# Patient Record
Sex: FEMALE | Race: WHITE | NOT HISPANIC OR LATINO | Employment: OTHER | URBAN - METROPOLITAN AREA
[De-identification: names, ages, dates, MRNs, and addresses within clinical notes are randomized per-mention and may not be internally consistent; named-entity substitution may affect disease eponyms.]

---

## 2017-04-04 ENCOUNTER — LAB CONVERSION - ENCOUNTER (OUTPATIENT)
Dept: OTHER | Facility: OTHER | Age: 72
End: 2017-04-04

## 2017-04-04 ENCOUNTER — APPOINTMENT (OUTPATIENT)
Dept: LAB | Facility: CLINIC | Age: 72
End: 2017-04-04
Payer: MEDICARE

## 2017-04-04 DIAGNOSIS — I81 PORTAL VEIN THROMBOSIS: ICD-10-CM

## 2017-04-04 LAB
INR PPP: 2.12 (ref 0.86–1.16)
PROTHROMBIN TIME: 23 SECONDS (ref 12–14.3)

## 2017-04-04 PROCEDURE — 85610 PROTHROMBIN TIME: CPT

## 2017-04-04 PROCEDURE — 36415 COLL VENOUS BLD VENIPUNCTURE: CPT

## 2017-04-17 ENCOUNTER — ALLSCRIPTS OFFICE VISIT (OUTPATIENT)
Dept: OTHER | Facility: OTHER | Age: 72
End: 2017-04-17

## 2017-04-17 ENCOUNTER — APPOINTMENT (OUTPATIENT)
Dept: LAB | Facility: CLINIC | Age: 72
End: 2017-04-17
Payer: MEDICARE

## 2017-04-17 ENCOUNTER — GENERIC CONVERSION - ENCOUNTER (OUTPATIENT)
Dept: OTHER | Facility: OTHER | Age: 72
End: 2017-04-17

## 2017-04-17 DIAGNOSIS — E87.1 HYPO-OSMOLALITY AND HYPONATREMIA: ICD-10-CM

## 2017-04-17 DIAGNOSIS — M85.80 OTHER SPECIFIED DISORDERS OF BONE DENSITY AND STRUCTURE, UNSPECIFIED SITE: ICD-10-CM

## 2017-04-17 DIAGNOSIS — I81 PORTAL VEIN THROMBOSIS: ICD-10-CM

## 2017-04-17 LAB
ANION GAP SERPL CALCULATED.3IONS-SCNC: 7 MMOL/L (ref 4–13)
BUN SERPL-MCNC: 19 MG/DL (ref 5–25)
CALCIUM SERPL-MCNC: 8.5 MG/DL (ref 8.3–10.1)
CHLORIDE SERPL-SCNC: 93 MMOL/L (ref 100–108)
CO2 SERPL-SCNC: 27 MMOL/L (ref 21–32)
CREAT SERPL-MCNC: 1.04 MG/DL (ref 0.6–1.3)
GFR SERPL CREATININE-BSD FRML MDRD: 52.1 ML/MIN/1.73SQ M
GLUCOSE SERPL-MCNC: 116 MG/DL (ref 65–140)
INR PPP: 1.49 (ref 0.86–1.16)
POTASSIUM SERPL-SCNC: 5 MMOL/L (ref 3.5–5.3)
PROTHROMBIN TIME: 17.6 SECONDS (ref 12–14.3)
SODIUM SERPL-SCNC: 127 MMOL/L (ref 136–145)

## 2017-04-17 PROCEDURE — 80048 BASIC METABOLIC PNL TOTAL CA: CPT

## 2017-04-17 PROCEDURE — 36415 COLL VENOUS BLD VENIPUNCTURE: CPT

## 2017-04-17 PROCEDURE — 85610 PROTHROMBIN TIME: CPT

## 2017-04-20 ENCOUNTER — HOSPITAL ENCOUNTER (OUTPATIENT)
Dept: RADIOLOGY | Age: 72
Discharge: HOME | End: 2017-04-20
Payer: MEDICARE

## 2017-04-20 ENCOUNTER — GENERIC CONVERSION - ENCOUNTER (OUTPATIENT)
Dept: OTHER | Facility: OTHER | Age: 72
End: 2017-04-20

## 2017-04-20 DIAGNOSIS — M85.80 OTHER SPECIFIED DISORDERS OF BONE DENSITY AND STRUCTURE, UNSPECIFIED SITE: ICD-10-CM

## 2017-04-20 PROCEDURE — 77080 DXA BONE DENSITY AXIAL: CPT

## 2017-04-24 ENCOUNTER — APPOINTMENT (OUTPATIENT)
Dept: LAB | Facility: CLINIC | Age: 72
End: 2017-04-24
Payer: MEDICARE

## 2017-04-24 ENCOUNTER — TRANSCRIBE ORDERS (OUTPATIENT)
Dept: LAB | Facility: CLINIC | Age: 72
End: 2017-04-24

## 2017-04-24 ENCOUNTER — LAB CONVERSION - ENCOUNTER (OUTPATIENT)
Dept: OTHER | Facility: OTHER | Age: 72
End: 2017-04-24

## 2017-04-24 DIAGNOSIS — E87.1 HYPO-OSMOLALITY AND HYPONATREMIA: ICD-10-CM

## 2017-04-24 DIAGNOSIS — I81 PORTAL VEIN THROMBOSIS: Primary | ICD-10-CM

## 2017-04-24 DIAGNOSIS — I81 PORTAL VEIN THROMBOSIS: ICD-10-CM

## 2017-04-24 DIAGNOSIS — I82.0 BUDD-CHIARI SYNDROME (HCC): ICD-10-CM

## 2017-04-24 LAB
ANION GAP SERPL CALCULATED.3IONS-SCNC: 4 MMOL/L (ref 4–13)
BUN SERPL-MCNC: 16 MG/DL (ref 5–25)
CALCIUM SERPL-MCNC: 8.9 MG/DL (ref 8.3–10.1)
CHLORIDE SERPL-SCNC: 96 MMOL/L (ref 100–108)
CO2 SERPL-SCNC: 32 MMOL/L (ref 21–32)
CREAT SERPL-MCNC: 0.79 MG/DL (ref 0.6–1.3)
GFR SERPL CREATININE-BSD FRML MDRD: >60 ML/MIN/1.73SQ M
GLUCOSE SERPL-MCNC: 95 MG/DL (ref 65–140)
INR PPP: 1.81 (ref 0.86–1.16)
POTASSIUM SERPL-SCNC: 4.3 MMOL/L (ref 3.5–5.3)
PROTHROMBIN TIME: 20.4 SECONDS (ref 12–14.3)
SODIUM SERPL-SCNC: 132 MMOL/L (ref 136–145)

## 2017-04-24 PROCEDURE — 85610 PROTHROMBIN TIME: CPT

## 2017-04-24 PROCEDURE — 80048 BASIC METABOLIC PNL TOTAL CA: CPT

## 2017-04-24 PROCEDURE — 36415 COLL VENOUS BLD VENIPUNCTURE: CPT

## 2017-04-25 ENCOUNTER — GENERIC CONVERSION - ENCOUNTER (OUTPATIENT)
Dept: OTHER | Facility: OTHER | Age: 72
End: 2017-04-25

## 2017-04-27 ENCOUNTER — TRANSCRIBE ORDERS (OUTPATIENT)
Dept: ADMINISTRATIVE | Facility: HOSPITAL | Age: 72
End: 2017-04-27

## 2017-04-27 DIAGNOSIS — R05.9 COUGH: Primary | ICD-10-CM

## 2017-04-27 DIAGNOSIS — K21.9 GASTROESOPHAGEAL REFLUX DISEASE WITHOUT ESOPHAGITIS: ICD-10-CM

## 2017-05-01 ENCOUNTER — APPOINTMENT (OUTPATIENT)
Dept: LAB | Facility: CLINIC | Age: 72
End: 2017-05-01
Payer: MEDICARE

## 2017-05-01 ENCOUNTER — GENERIC CONVERSION - ENCOUNTER (OUTPATIENT)
Dept: OTHER | Facility: OTHER | Age: 72
End: 2017-05-01

## 2017-05-01 DIAGNOSIS — I81 PORTAL VEIN THROMBOSIS: ICD-10-CM

## 2017-05-01 LAB
INR PPP: 1.83 (ref 0.86–1.16)
PROTHROMBIN TIME: 21.8 SECONDS (ref 12.1–14.4)

## 2017-05-01 PROCEDURE — 36415 COLL VENOUS BLD VENIPUNCTURE: CPT

## 2017-05-01 PROCEDURE — 85610 PROTHROMBIN TIME: CPT

## 2017-05-04 ENCOUNTER — HOSPITAL ENCOUNTER (OUTPATIENT)
Dept: RADIOLOGY | Facility: HOSPITAL | Age: 72
Discharge: HOME | End: 2017-05-04
Attending: INTERNAL MEDICINE
Payer: MEDICARE

## 2017-05-04 ENCOUNTER — GENERIC CONVERSION - ENCOUNTER (OUTPATIENT)
Dept: OTHER | Facility: OTHER | Age: 72
End: 2017-05-04

## 2017-05-04 DIAGNOSIS — K21.9 GASTROESOPHAGEAL REFLUX DISEASE WITHOUT ESOPHAGITIS: ICD-10-CM

## 2017-05-04 DIAGNOSIS — R05.9 COUGH: ICD-10-CM

## 2017-05-04 PROCEDURE — 92611 MOTION FLUOROSCOPY/SWALLOW: CPT

## 2017-05-04 PROCEDURE — G8998 SWALLOW D/C STATUS: HCPCS

## 2017-05-04 PROCEDURE — G8997 SWALLOW GOAL STATUS: HCPCS

## 2017-05-04 PROCEDURE — G8996 SWALLOW CURRENT STATUS: HCPCS

## 2017-05-04 PROCEDURE — 74230 X-RAY XM SWLNG FUNCJ C+: CPT

## 2017-05-08 ENCOUNTER — APPOINTMENT (OUTPATIENT)
Dept: LAB | Facility: CLINIC | Age: 72
End: 2017-05-08
Payer: MEDICARE

## 2017-05-08 ENCOUNTER — GENERIC CONVERSION - ENCOUNTER (OUTPATIENT)
Dept: OTHER | Facility: OTHER | Age: 72
End: 2017-05-08

## 2017-05-08 ENCOUNTER — HOSPITAL ENCOUNTER (OUTPATIENT)
Dept: MAMMOGRAPHY | Facility: HOSPITAL | Age: 72
Discharge: HOME | End: 2017-05-08
Payer: MEDICARE

## 2017-05-08 DIAGNOSIS — Z12.31 ENCOUNTER FOR SCREENING MAMMOGRAM FOR MALIGNANT NEOPLASM OF BREAST: ICD-10-CM

## 2017-05-08 DIAGNOSIS — I81 PORTAL VEIN THROMBOSIS: ICD-10-CM

## 2017-05-08 LAB
INR PPP: 2.42 (ref 0.86–1.16)
PROTHROMBIN TIME: 27.2 SECONDS (ref 12.1–14.4)

## 2017-05-08 PROCEDURE — G0202 SCR MAMMO BI INCL CAD: HCPCS

## 2017-05-08 PROCEDURE — 36415 COLL VENOUS BLD VENIPUNCTURE: CPT

## 2017-05-08 PROCEDURE — 85610 PROTHROMBIN TIME: CPT

## 2017-05-25 ENCOUNTER — APPOINTMENT (OUTPATIENT)
Dept: LAB | Facility: CLINIC | Age: 72
End: 2017-05-25
Payer: MEDICARE

## 2017-05-25 DIAGNOSIS — D50.9 IRON DEFICIENCY ANEMIA: ICD-10-CM

## 2017-05-25 LAB
ERYTHROCYTE [DISTWIDTH] IN BLOOD BY AUTOMATED COUNT: 13.3 % (ref 11.6–15.1)
FERRITIN SERPL-MCNC: 194 NG/ML (ref 8–388)
HCT VFR BLD AUTO: 41.6 % (ref 34.8–46.1)
HGB BLD-MCNC: 13.8 G/DL (ref 11.5–15.4)
IRON SATN MFR SERPL: 32 %
IRON SERPL-MCNC: 88 UG/DL (ref 50–170)
MCH RBC QN AUTO: 32 PG (ref 26.8–34.3)
MCHC RBC AUTO-ENTMCNC: 33.2 G/DL (ref 31.4–37.4)
MCV RBC AUTO: 97 FL (ref 82–98)
PLATELET # BLD AUTO: 387 THOUSANDS/UL (ref 149–390)
PMV BLD AUTO: 8.8 FL (ref 8.9–12.7)
RBC # BLD AUTO: 4.31 MILLION/UL (ref 3.81–5.12)
TIBC SERPL-MCNC: 272 UG/DL (ref 250–450)
WBC # BLD AUTO: 5.81 THOUSAND/UL (ref 4.31–10.16)

## 2017-05-25 PROCEDURE — 83550 IRON BINDING TEST: CPT

## 2017-05-25 PROCEDURE — 82728 ASSAY OF FERRITIN: CPT

## 2017-05-25 PROCEDURE — 83540 ASSAY OF IRON: CPT

## 2017-05-25 PROCEDURE — 36415 COLL VENOUS BLD VENIPUNCTURE: CPT

## 2017-05-25 PROCEDURE — 85027 COMPLETE CBC AUTOMATED: CPT

## 2017-05-31 ENCOUNTER — GENERIC CONVERSION - ENCOUNTER (OUTPATIENT)
Dept: OTHER | Facility: OTHER | Age: 72
End: 2017-05-31

## 2017-05-31 ENCOUNTER — APPOINTMENT (OUTPATIENT)
Dept: LAB | Facility: CLINIC | Age: 72
End: 2017-05-31
Payer: MEDICARE

## 2017-05-31 ENCOUNTER — TRANSCRIBE ORDERS (OUTPATIENT)
Dept: LAB | Facility: CLINIC | Age: 72
End: 2017-05-31

## 2017-05-31 DIAGNOSIS — I81 PORTAL VEIN THROMBOSIS: ICD-10-CM

## 2017-05-31 DIAGNOSIS — Z46.2: Primary | ICD-10-CM

## 2017-05-31 LAB
INR PPP: 3.99 (ref 0.86–1.16)
PROTHROMBIN TIME: 40.5 SECONDS (ref 12.1–14.4)

## 2017-05-31 PROCEDURE — 36415 COLL VENOUS BLD VENIPUNCTURE: CPT

## 2017-05-31 PROCEDURE — 85610 PROTHROMBIN TIME: CPT

## 2017-06-02 ENCOUNTER — GENERIC CONVERSION - ENCOUNTER (OUTPATIENT)
Dept: OTHER | Facility: OTHER | Age: 72
End: 2017-06-02

## 2017-06-02 ENCOUNTER — ALLSCRIPTS OFFICE VISIT (OUTPATIENT)
Dept: OTHER | Facility: OTHER | Age: 72
End: 2017-06-02

## 2017-06-06 ENCOUNTER — GENERIC CONVERSION - ENCOUNTER (OUTPATIENT)
Dept: OTHER | Facility: OTHER | Age: 72
End: 2017-06-06

## 2017-06-06 ENCOUNTER — APPOINTMENT (OUTPATIENT)
Dept: LAB | Facility: CLINIC | Age: 72
End: 2017-06-06
Payer: MEDICARE

## 2017-06-06 DIAGNOSIS — I81 PORTAL VEIN THROMBOSIS: ICD-10-CM

## 2017-06-06 LAB
INR PPP: 2.63 (ref 0.86–1.16)
PROTHROMBIN TIME: 29.1 SECONDS (ref 12.1–14.4)

## 2017-06-06 PROCEDURE — 85610 PROTHROMBIN TIME: CPT

## 2017-06-06 PROCEDURE — 36415 COLL VENOUS BLD VENIPUNCTURE: CPT

## 2017-06-12 ENCOUNTER — OFFICE VISIT (OUTPATIENT)
Dept: LAB | Facility: CLINIC | Age: 72
End: 2017-06-12
Payer: MEDICARE

## 2017-06-12 ENCOUNTER — ALLSCRIPTS OFFICE VISIT (OUTPATIENT)
Dept: OTHER | Facility: OTHER | Age: 72
End: 2017-06-12

## 2017-06-12 ENCOUNTER — APPOINTMENT (OUTPATIENT)
Dept: LAB | Facility: CLINIC | Age: 72
End: 2017-06-12
Payer: MEDICARE

## 2017-06-12 ENCOUNTER — HOSPITAL ENCOUNTER (OUTPATIENT)
Dept: RADIOLOGY | Facility: HOSPITAL | Age: 72
Discharge: HOME | End: 2017-06-12
Attending: NEUROLOGICAL SURGERY
Payer: MEDICARE

## 2017-06-12 DIAGNOSIS — Z46.2 ENCOUNTER FOR FITTING AND ADJUSTMENT OF OTHER DEVICES RELATED TO NERVOUS SYSTEM AND SPECIAL SENSES: ICD-10-CM

## 2017-06-12 DIAGNOSIS — Z79.01 LONG TERM CURRENT USE OF ANTICOAGULANT: ICD-10-CM

## 2017-06-12 DIAGNOSIS — Z46.2: ICD-10-CM

## 2017-06-12 LAB
ALBUMIN SERPL BCP-MCNC: 4.2 G/DL (ref 3.5–5)
ALP SERPL-CCNC: 63 U/L (ref 46–116)
ALT SERPL W P-5'-P-CCNC: 31 U/L (ref 12–78)
ANION GAP SERPL CALCULATED.3IONS-SCNC: 9 MMOL/L (ref 4–13)
APTT PPP: 42 SECONDS (ref 23–35)
AST SERPL W P-5'-P-CCNC: 21 U/L (ref 5–45)
ATRIAL RATE: 0 BPM
ATRIAL RATE: 63 BPM
BACTERIA UR QL AUTO: ABNORMAL /HPF
BASOPHILS # BLD AUTO: 0.16 THOUSANDS/ΜL (ref 0–0.1)
BASOPHILS NFR BLD AUTO: 2 % (ref 0–1)
BILIRUB SERPL-MCNC: 0.5 MG/DL (ref 0.2–1)
BILIRUB UR QL STRIP: NEGATIVE
BUN SERPL-MCNC: 26 MG/DL (ref 5–25)
CALCIUM SERPL-MCNC: 9.6 MG/DL (ref 8.3–10.1)
CHLORIDE SERPL-SCNC: 98 MMOL/L (ref 100–108)
CLARITY UR: CLEAR
CO2 SERPL-SCNC: 27 MMOL/L (ref 21–32)
COLOR UR: YELLOW
CREAT SERPL-MCNC: 0.81 MG/DL (ref 0.6–1.3)
EOSINOPHIL # BLD AUTO: 0.26 THOUSAND/ΜL (ref 0–0.61)
EOSINOPHIL NFR BLD AUTO: 4 % (ref 0–6)
ERYTHROCYTE [DISTWIDTH] IN BLOOD BY AUTOMATED COUNT: 13.3 % (ref 11.6–15.1)
EST. AVERAGE GLUCOSE BLD GHB EST-MCNC: 117 MG/DL
GFR SERPL CREATININE-BSD FRML MDRD: >60 ML/MIN/1.73SQ M
GLUCOSE P FAST SERPL-MCNC: 108 MG/DL (ref 65–99)
GLUCOSE UR STRIP-MCNC: NEGATIVE MG/DL
HBA1C MFR BLD: 5.7 % (ref 4.2–6.3)
HCT VFR BLD AUTO: 42.4 % (ref 34.8–46.1)
HGB BLD-MCNC: 14.6 G/DL (ref 11.5–15.4)
HGB UR QL STRIP.AUTO: ABNORMAL
INR PPP: 2.68 (ref 0.86–1.16)
KETONES UR STRIP-MCNC: NEGATIVE MG/DL
LEUKOCYTE ESTERASE UR QL STRIP: ABNORMAL
LYMPHOCYTES # BLD AUTO: 2.78 THOUSANDS/ΜL (ref 0.6–4.47)
LYMPHOCYTES NFR BLD AUTO: 40 % (ref 14–44)
MCH RBC QN AUTO: 32.7 PG (ref 26.8–34.3)
MCHC RBC AUTO-ENTMCNC: 34.4 G/DL (ref 31.4–37.4)
MCV RBC AUTO: 95 FL (ref 82–98)
MONOCYTES # BLD AUTO: 0.91 THOUSAND/ΜL (ref 0.17–1.22)
MONOCYTES NFR BLD AUTO: 13 % (ref 4–12)
NEUTROPHILS # BLD AUTO: 2.81 THOUSANDS/ΜL (ref 1.85–7.62)
NEUTS SEG NFR BLD AUTO: 41 % (ref 43–75)
NITRITE UR QL STRIP: NEGATIVE
NON-SQ EPI CELLS URNS QL MICRO: ABNORMAL /HPF
PH UR STRIP.AUTO: 5.5 [PH] (ref 4.5–8)
PLATELET # BLD AUTO: 495 THOUSANDS/UL (ref 149–390)
PMV BLD AUTO: 9.5 FL (ref 8.9–12.7)
POTASSIUM SERPL-SCNC: 4.1 MMOL/L (ref 3.5–5.3)
PR INTERVAL: 222 MS
PROT SERPL-MCNC: 7.3 G/DL (ref 6.4–8.2)
PROT UR STRIP-MCNC: NEGATIVE MG/DL
PROTHROMBIN TIME: 29.5 SECONDS (ref 12.1–14.4)
QRS AXIS: 0 DEGREES
QRS AXIS: 140 DEGREES
QRSD INTERVAL: 0 MS
QRSD INTERVAL: 104 MS
QT INTERVAL: 0 MS
QT INTERVAL: 432 MS
QTC INTERVAL: 0 MS
QTC INTERVAL: 442 MS
RBC # BLD AUTO: 4.46 MILLION/UL (ref 3.81–5.12)
RBC #/AREA URNS AUTO: ABNORMAL /HPF
SODIUM SERPL-SCNC: 134 MMOL/L (ref 136–145)
SP GR UR STRIP.AUTO: 1.01 (ref 1–1.03)
T WAVE AXIS: -15 DEGREES
T WAVE AXIS: 0 DEGREES
UROBILINOGEN UR QL STRIP.AUTO: 0.2 E.U./DL
VENTRICULAR RATE: 0 BPM
VENTRICULAR RATE: 63 BPM
WBC # BLD AUTO: 6.92 THOUSAND/UL (ref 4.31–10.16)
WBC #/AREA URNS AUTO: ABNORMAL /HPF

## 2017-06-12 PROCEDURE — 85730 THROMBOPLASTIN TIME PARTIAL: CPT

## 2017-06-12 PROCEDURE — 83036 HEMOGLOBIN GLYCOSYLATED A1C: CPT

## 2017-06-12 PROCEDURE — 36415 COLL VENOUS BLD VENIPUNCTURE: CPT

## 2017-06-12 PROCEDURE — 85610 PROTHROMBIN TIME: CPT

## 2017-06-12 PROCEDURE — 81001 URINALYSIS AUTO W/SCOPE: CPT

## 2017-06-12 PROCEDURE — 93005 ELECTROCARDIOGRAM TRACING: CPT

## 2017-06-12 PROCEDURE — 71020 HB CHEST X-RAY 2VW FRONTAL&LATL: CPT

## 2017-06-12 PROCEDURE — 85025 COMPLETE CBC W/AUTO DIFF WBC: CPT

## 2017-06-12 PROCEDURE — 80053 COMPREHEN METABOLIC PANEL: CPT

## 2017-06-15 ENCOUNTER — ALLSCRIPTS OFFICE VISIT (OUTPATIENT)
Dept: OTHER | Facility: OTHER | Age: 72
End: 2017-06-15

## 2017-06-16 RX ORDER — ALBUTEROL SULFATE 90 UG/1
2 AEROSOL, METERED RESPIRATORY (INHALATION) EVERY 6 HOURS PRN
COMMUNITY
End: 2018-12-05 | Stop reason: SDUPTHER

## 2017-06-19 ENCOUNTER — GENERIC CONVERSION - ENCOUNTER (OUTPATIENT)
Dept: OTHER | Facility: OTHER | Age: 72
End: 2017-06-19

## 2017-06-20 ENCOUNTER — APPOINTMENT (OUTPATIENT)
Dept: RADIOLOGY | Facility: HOSPITAL | Age: 72
End: 2017-06-20
Payer: MEDICARE

## 2017-06-20 ENCOUNTER — HOSPITAL ENCOUNTER (OUTPATIENT)
Facility: HOSPITAL | Age: 72
Setting detail: OUTPATIENT SURGERY
Discharge: HOME | End: 2017-06-20
Attending: NEUROLOGICAL SURGERY | Admitting: NEUROLOGICAL SURGERY
Payer: MEDICARE

## 2017-06-20 ENCOUNTER — ANESTHESIA (OUTPATIENT)
Dept: PERIOP | Facility: HOSPITAL | Age: 72
End: 2017-06-20
Payer: MEDICARE

## 2017-06-20 ENCOUNTER — ANESTHESIA EVENT (OUTPATIENT)
Dept: PERIOP | Facility: HOSPITAL | Age: 72
End: 2017-06-20
Payer: MEDICARE

## 2017-06-20 VITALS
TEMPERATURE: 98.2 F | HEIGHT: 64 IN | HEART RATE: 55 BPM | DIASTOLIC BLOOD PRESSURE: 52 MMHG | RESPIRATION RATE: 18 BRPM | SYSTOLIC BLOOD PRESSURE: 128 MMHG | OXYGEN SATURATION: 100 % | WEIGHT: 156 LBS | BODY MASS INDEX: 26.63 KG/M2

## 2017-06-20 LAB
INR PPP: 1.03 (ref 0.86–1.16)
PROTHROMBIN TIME: 13.3 SECONDS (ref 12.1–14.4)

## 2017-06-20 PROCEDURE — C1787 PATIENT PROGR, NEUROSTIM: HCPCS | Performed by: NEUROLOGICAL SURGERY

## 2017-06-20 PROCEDURE — 85610 PROTHROMBIN TIME: CPT | Performed by: PHYSICIAN ASSISTANT

## 2017-06-20 PROCEDURE — C1820 GENERATOR NEURO RECHG BAT SY: HCPCS | Performed by: NEUROLOGICAL SURGERY

## 2017-06-20 DEVICE — ACCESSORY KIT NEUROSTIM OCTOPOLAR IN LINE PLUG: Type: IMPLANTABLE DEVICE | Site: CHEST | Status: FUNCTIONAL

## 2017-06-20 DEVICE — NEUROSTIMULATOR ACTIVA RC OCTAPOLAR CONNECTOR: Type: IMPLANTABLE DEVICE | Site: CHEST | Status: FUNCTIONAL

## 2017-06-20 RX ORDER — VANCOMYCIN HYDROCHLORIDE 1 G/200ML
1000 INJECTION, SOLUTION INTRAVENOUS ONCE
Status: COMPLETED | OUTPATIENT
Start: 2017-06-20 | End: 2017-06-20

## 2017-06-20 RX ORDER — FENTANYL CITRATE 50 UG/ML
INJECTION, SOLUTION INTRAMUSCULAR; INTRAVENOUS AS NEEDED
Status: DISCONTINUED | OUTPATIENT
Start: 2017-06-20 | End: 2017-06-20 | Stop reason: SURG

## 2017-06-20 RX ORDER — OXYCODONE HYDROCHLORIDE AND ACETAMINOPHEN 5; 325 MG/1; MG/1
2 TABLET ORAL EVERY 6 HOURS PRN
Status: DISCONTINUED | OUTPATIENT
Start: 2017-06-20 | End: 2017-06-21 | Stop reason: HOSPADM

## 2017-06-20 RX ORDER — ONDANSETRON 2 MG/ML
4 INJECTION INTRAMUSCULAR; INTRAVENOUS EVERY 6 HOURS PRN
Status: DISCONTINUED | OUTPATIENT
Start: 2017-06-20 | End: 2017-06-21 | Stop reason: HOSPADM

## 2017-06-20 RX ORDER — MIDAZOLAM HYDROCHLORIDE 1 MG/ML
INJECTION INTRAMUSCULAR; INTRAVENOUS AS NEEDED
Status: DISCONTINUED | OUTPATIENT
Start: 2017-06-20 | End: 2017-06-20 | Stop reason: SURG

## 2017-06-20 RX ORDER — PROPOFOL 10 MG/ML
INJECTION, EMULSION INTRAVENOUS AS NEEDED
Status: DISCONTINUED | OUTPATIENT
Start: 2017-06-20 | End: 2017-06-20 | Stop reason: SURG

## 2017-06-20 RX ORDER — FENTANYL CITRATE/PF 50 MCG/ML
25 SYRINGE (ML) INJECTION
Status: DISCONTINUED | OUTPATIENT
Start: 2017-06-20 | End: 2017-06-20 | Stop reason: HOSPADM

## 2017-06-20 RX ORDER — SODIUM CHLORIDE, SODIUM LACTATE, POTASSIUM CHLORIDE, CALCIUM CHLORIDE 600; 310; 30; 20 MG/100ML; MG/100ML; MG/100ML; MG/100ML
20 INJECTION, SOLUTION INTRAVENOUS CONTINUOUS
Status: DISPENSED | OUTPATIENT
Start: 2017-06-20 | End: 2017-06-21

## 2017-06-20 RX ORDER — ONDANSETRON 2 MG/ML
4 INJECTION INTRAMUSCULAR; INTRAVENOUS ONCE AS NEEDED
Status: DISCONTINUED | OUTPATIENT
Start: 2017-06-20 | End: 2017-06-20 | Stop reason: HOSPADM

## 2017-06-20 RX ADMIN — PROPOFOL 50 MG: 10 INJECTION, EMULSION INTRAVENOUS at 07:40

## 2017-06-20 RX ADMIN — PROPOFOL 20 MG: 10 INJECTION, EMULSION INTRAVENOUS at 07:45

## 2017-06-20 RX ADMIN — VANCOMYCIN HYDROCHLORIDE 1000 MG: 1 INJECTION, SOLUTION INTRAVENOUS at 07:16

## 2017-06-20 RX ADMIN — SODIUM CHLORIDE, SODIUM LACTATE, POTASSIUM CHLORIDE, AND CALCIUM CHLORIDE 20 ML/HR: .6; .31; .03; .02 INJECTION, SOLUTION INTRAVENOUS at 06:35

## 2017-06-20 RX ADMIN — FENTANYL CITRATE 50 MCG: 50 INJECTION, SOLUTION INTRAMUSCULAR; INTRAVENOUS at 07:37

## 2017-06-20 RX ADMIN — PROPOFOL 20 MG: 10 INJECTION, EMULSION INTRAVENOUS at 07:50

## 2017-06-20 RX ADMIN — PROPOFOL 20 MG: 10 INJECTION, EMULSION INTRAVENOUS at 08:00

## 2017-06-20 RX ADMIN — FENTANYL CITRATE 50 MCG: 50 INJECTION, SOLUTION INTRAMUSCULAR; INTRAVENOUS at 07:34

## 2017-06-20 RX ADMIN — MIDAZOLAM HYDROCHLORIDE 2 MG: 1 INJECTION, SOLUTION INTRAMUSCULAR; INTRAVENOUS at 07:34

## 2017-06-20 RX ADMIN — PROPOFOL 20 MG: 10 INJECTION, EMULSION INTRAVENOUS at 07:56

## 2017-06-22 ENCOUNTER — GENERIC CONVERSION - ENCOUNTER (OUTPATIENT)
Dept: OTHER | Facility: OTHER | Age: 72
End: 2017-06-22

## 2017-06-27 ENCOUNTER — GENERIC CONVERSION - ENCOUNTER (OUTPATIENT)
Dept: OTHER | Facility: OTHER | Age: 72
End: 2017-06-27

## 2017-06-28 ENCOUNTER — GENERIC CONVERSION - ENCOUNTER (OUTPATIENT)
Dept: OTHER | Facility: OTHER | Age: 72
End: 2017-06-28

## 2017-06-29 ENCOUNTER — ALLSCRIPTS OFFICE VISIT (OUTPATIENT)
Dept: OTHER | Facility: OTHER | Age: 72
End: 2017-06-29

## 2017-06-29 ENCOUNTER — GENERIC CONVERSION - ENCOUNTER (OUTPATIENT)
Dept: OTHER | Facility: OTHER | Age: 72
End: 2017-06-29

## 2017-07-05 ENCOUNTER — ALLSCRIPTS OFFICE VISIT (OUTPATIENT)
Dept: OTHER | Facility: OTHER | Age: 72
End: 2017-07-05

## 2017-07-06 ENCOUNTER — GENERIC CONVERSION - ENCOUNTER (OUTPATIENT)
Dept: OTHER | Facility: OTHER | Age: 72
End: 2017-07-06

## 2017-07-13 ENCOUNTER — ALLSCRIPTS OFFICE VISIT (OUTPATIENT)
Dept: OTHER | Facility: OTHER | Age: 72
End: 2017-07-13

## 2017-07-17 ENCOUNTER — GENERIC CONVERSION - ENCOUNTER (OUTPATIENT)
Dept: OTHER | Facility: OTHER | Age: 72
End: 2017-07-17

## 2017-07-17 ENCOUNTER — APPOINTMENT (OUTPATIENT)
Dept: LAB | Facility: CLINIC | Age: 72
End: 2017-07-17
Payer: MEDICARE

## 2017-07-17 ENCOUNTER — TRANSCRIBE ORDERS (OUTPATIENT)
Dept: LAB | Facility: CLINIC | Age: 72
End: 2017-07-17

## 2017-07-17 DIAGNOSIS — I10 ESSENTIAL (PRIMARY) HYPERTENSION: ICD-10-CM

## 2017-07-17 DIAGNOSIS — I81 PORTAL VEIN THROMBOSIS: ICD-10-CM

## 2017-07-17 LAB
ANION GAP SERPL CALCULATED.3IONS-SCNC: 8 MMOL/L (ref 4–13)
BUN SERPL-MCNC: 34 MG/DL (ref 5–25)
CALCIUM SERPL-MCNC: 8.9 MG/DL (ref 8.3–10.1)
CHLORIDE SERPL-SCNC: 98 MMOL/L (ref 100–108)
CO2 SERPL-SCNC: 27 MMOL/L (ref 21–32)
CREAT SERPL-MCNC: 1.19 MG/DL (ref 0.6–1.3)
GFR SERPL CREATININE-BSD FRML MDRD: 44.6 ML/MIN/1.73SQ M
GLUCOSE SERPL-MCNC: 94 MG/DL (ref 65–140)
INR PPP: 1.89 (ref 0.86–1.16)
POTASSIUM SERPL-SCNC: 4.9 MMOL/L (ref 3.5–5.3)
PROTHROMBIN TIME: 22.4 SECONDS (ref 12.1–14.4)
SODIUM SERPL-SCNC: 133 MMOL/L (ref 136–145)
URATE SERPL-MCNC: 6.9 MG/DL (ref 2–6.8)

## 2017-07-17 PROCEDURE — 84550 ASSAY OF BLOOD/URIC ACID: CPT

## 2017-07-17 PROCEDURE — 80048 BASIC METABOLIC PNL TOTAL CA: CPT

## 2017-07-17 PROCEDURE — 85610 PROTHROMBIN TIME: CPT

## 2017-07-17 PROCEDURE — 36415 COLL VENOUS BLD VENIPUNCTURE: CPT

## 2017-07-18 ENCOUNTER — GENERIC CONVERSION - ENCOUNTER (OUTPATIENT)
Dept: OTHER | Facility: OTHER | Age: 72
End: 2017-07-18

## 2017-07-19 ENCOUNTER — ALLSCRIPTS OFFICE VISIT (OUTPATIENT)
Dept: OTHER | Facility: OTHER | Age: 72
End: 2017-07-19

## 2017-07-24 ENCOUNTER — APPOINTMENT (OUTPATIENT)
Dept: LAB | Facility: CLINIC | Age: 72
End: 2017-07-24
Payer: MEDICARE

## 2017-07-24 ENCOUNTER — GENERIC CONVERSION - ENCOUNTER (OUTPATIENT)
Dept: OTHER | Facility: OTHER | Age: 72
End: 2017-07-24

## 2017-07-24 DIAGNOSIS — I81 PORTAL VEIN THROMBOSIS: ICD-10-CM

## 2017-07-24 LAB
INR PPP: 2.84 (ref 0.86–1.16)
PROTHROMBIN TIME: 30.9 SECONDS (ref 12.1–14.4)

## 2017-07-24 PROCEDURE — 36415 COLL VENOUS BLD VENIPUNCTURE: CPT

## 2017-07-24 PROCEDURE — 85610 PROTHROMBIN TIME: CPT

## 2017-08-09 ENCOUNTER — APPOINTMENT (OUTPATIENT)
Dept: LAB | Facility: CLINIC | Age: 72
End: 2017-08-09
Payer: MEDICARE

## 2017-08-09 DIAGNOSIS — I81 PORTAL VEIN THROMBOSIS: ICD-10-CM

## 2017-08-09 LAB
INR PPP: 2.26 (ref 0.86–1.16)
PROTHROMBIN TIME: 25.8 SECONDS (ref 12.1–14.4)

## 2017-08-09 PROCEDURE — 85610 PROTHROMBIN TIME: CPT

## 2017-08-09 PROCEDURE — 36415 COLL VENOUS BLD VENIPUNCTURE: CPT

## 2017-08-10 ENCOUNTER — ALLSCRIPTS OFFICE VISIT (OUTPATIENT)
Dept: OTHER | Facility: OTHER | Age: 72
End: 2017-08-10

## 2017-08-21 ENCOUNTER — ALLSCRIPTS OFFICE VISIT (OUTPATIENT)
Dept: OTHER | Facility: OTHER | Age: 72
End: 2017-08-21

## 2017-08-29 ENCOUNTER — ANESTHESIA EVENT (OUTPATIENT)
Dept: GASTROENTEROLOGY | Facility: HOSPITAL | Age: 72
End: 2017-08-29
Payer: MEDICARE

## 2017-08-30 ENCOUNTER — ANESTHESIA (OUTPATIENT)
Dept: GASTROENTEROLOGY | Facility: HOSPITAL | Age: 72
End: 2017-08-30
Payer: MEDICARE

## 2017-08-30 ENCOUNTER — HOSPITAL ENCOUNTER (OUTPATIENT)
Facility: HOSPITAL | Age: 72
Setting detail: OUTPATIENT SURGERY
Discharge: HOME | End: 2017-08-30
Attending: COLON & RECTAL SURGERY | Admitting: COLON & RECTAL SURGERY
Payer: MEDICARE

## 2017-08-30 VITALS
BODY MASS INDEX: 25.61 KG/M2 | RESPIRATION RATE: 16 BRPM | DIASTOLIC BLOOD PRESSURE: 60 MMHG | SYSTOLIC BLOOD PRESSURE: 145 MMHG | HEIGHT: 64 IN | HEART RATE: 60 BPM | OXYGEN SATURATION: 97 % | TEMPERATURE: 97.2 F | WEIGHT: 150 LBS

## 2017-08-30 DIAGNOSIS — K62.3 RECTAL PROLAPSE: ICD-10-CM

## 2017-08-30 PROCEDURE — 88305 TISSUE EXAM BY PATHOLOGIST: CPT | Performed by: COLON & RECTAL SURGERY

## 2017-08-30 RX ORDER — WARFARIN SODIUM 5 MG/1
TABLET ORAL
COMMUNITY
End: 2018-09-19 | Stop reason: SDUPTHER

## 2017-08-30 RX ORDER — PROPOFOL 10 MG/ML
INJECTION, EMULSION INTRAVENOUS AS NEEDED
Status: DISCONTINUED | OUTPATIENT
Start: 2017-08-30 | End: 2017-08-30 | Stop reason: SURG

## 2017-08-30 RX ORDER — SODIUM CHLORIDE 9 MG/ML
125 INJECTION, SOLUTION INTRAVENOUS CONTINUOUS
Status: DISCONTINUED | OUTPATIENT
Start: 2017-08-30 | End: 2017-08-30 | Stop reason: HOSPADM

## 2017-08-30 RX ORDER — DOXEPIN HYDROCHLORIDE 50 MG/1
10 CAPSULE ORAL
COMMUNITY
End: 2017-12-23

## 2017-08-30 RX ADMIN — PROPOFOL 100 MG: 10 INJECTION, EMULSION INTRAVENOUS at 08:08

## 2017-08-30 RX ADMIN — SODIUM CHLORIDE 125 ML/HR: 0.9 INJECTION, SOLUTION INTRAVENOUS at 08:01

## 2017-08-30 RX ADMIN — PROPOFOL 30 MG: 10 INJECTION, EMULSION INTRAVENOUS at 08:12

## 2017-09-04 DIAGNOSIS — F32.9 MAJOR DEPRESSIVE DISORDER, SINGLE EPISODE: ICD-10-CM

## 2017-09-04 DIAGNOSIS — I10 ESSENTIAL (PRIMARY) HYPERTENSION: ICD-10-CM

## 2017-09-04 DIAGNOSIS — D64.9 ANEMIA: ICD-10-CM

## 2017-09-04 DIAGNOSIS — J45.909 UNCOMPLICATED ASTHMA: ICD-10-CM

## 2017-09-04 DIAGNOSIS — K21.9 GASTRO-ESOPHAGEAL REFLUX DISEASE WITHOUT ESOPHAGITIS: ICD-10-CM

## 2017-09-04 DIAGNOSIS — J30.9 ALLERGIC RHINITIS: ICD-10-CM

## 2017-09-04 DIAGNOSIS — E66.8 OTHER OBESITY: ICD-10-CM

## 2017-09-04 DIAGNOSIS — R63.5 ABNORMAL WEIGHT GAIN: ICD-10-CM

## 2017-09-04 DIAGNOSIS — F41.9 ANXIETY DISORDER: ICD-10-CM

## 2017-09-07 ENCOUNTER — GENERIC CONVERSION - ENCOUNTER (OUTPATIENT)
Dept: OTHER | Facility: OTHER | Age: 72
End: 2017-09-07

## 2017-09-07 ENCOUNTER — APPOINTMENT (OUTPATIENT)
Dept: LAB | Facility: CLINIC | Age: 72
End: 2017-09-07
Payer: MEDICARE

## 2017-09-07 ENCOUNTER — TRANSCRIBE ORDERS (OUTPATIENT)
Dept: LAB | Facility: CLINIC | Age: 72
End: 2017-09-07

## 2017-09-07 DIAGNOSIS — I81 PORTAL VEIN THROMBOSIS: ICD-10-CM

## 2017-09-07 LAB
INR PPP: 1.7 (ref 0.86–1.16)
PROTHROMBIN TIME: 20.6 SECONDS (ref 12.1–14.4)

## 2017-09-07 PROCEDURE — 85610 PROTHROMBIN TIME: CPT

## 2017-09-07 PROCEDURE — 36415 COLL VENOUS BLD VENIPUNCTURE: CPT

## 2017-09-15 ENCOUNTER — APPOINTMENT (OUTPATIENT)
Dept: LAB | Facility: CLINIC | Age: 72
End: 2017-09-15
Payer: MEDICARE

## 2017-09-15 ENCOUNTER — GENERIC CONVERSION - ENCOUNTER (OUTPATIENT)
Dept: OTHER | Facility: OTHER | Age: 72
End: 2017-09-15

## 2017-09-15 DIAGNOSIS — I81 PORTAL VEIN THROMBOSIS: ICD-10-CM

## 2017-09-15 LAB
INR PPP: 1.99 (ref 0.86–1.16)
PROTHROMBIN TIME: 23.3 SECONDS (ref 12.1–14.4)

## 2017-09-15 PROCEDURE — 36415 COLL VENOUS BLD VENIPUNCTURE: CPT

## 2017-09-15 PROCEDURE — 85610 PROTHROMBIN TIME: CPT

## 2017-10-05 ENCOUNTER — APPOINTMENT (OUTPATIENT)
Dept: LAB | Facility: CLINIC | Age: 72
End: 2017-10-05
Payer: MEDICARE

## 2017-10-05 ENCOUNTER — GENERIC CONVERSION - ENCOUNTER (OUTPATIENT)
Dept: OTHER | Facility: OTHER | Age: 72
End: 2017-10-05

## 2017-10-05 DIAGNOSIS — I81 PORTAL VEIN THROMBOSIS: ICD-10-CM

## 2017-10-05 LAB
INR PPP: 2.02 (ref 0.86–1.16)
PROTHROMBIN TIME: 23.6 SECONDS (ref 12.1–14.4)

## 2017-10-05 PROCEDURE — 85610 PROTHROMBIN TIME: CPT

## 2017-10-05 PROCEDURE — 36415 COLL VENOUS BLD VENIPUNCTURE: CPT

## 2017-10-18 ENCOUNTER — HOSPITAL ENCOUNTER (OUTPATIENT)
Dept: NON INVASIVE DIAGNOSTICS | Facility: HOSPITAL | Age: 72
Discharge: HOME | End: 2017-10-18
Payer: MEDICARE

## 2017-10-18 DIAGNOSIS — E78.5 HYPERLIPIDEMIA: ICD-10-CM

## 2017-10-18 PROCEDURE — 93226 XTRNL ECG REC<48 HR SCAN A/R: CPT

## 2017-10-18 PROCEDURE — 93225 XTRNL ECG REC<48 HRS REC: CPT

## 2017-10-19 DIAGNOSIS — E78.5 HYPERLIPIDEMIA: ICD-10-CM

## 2017-10-23 ENCOUNTER — GENERIC CONVERSION - ENCOUNTER (OUTPATIENT)
Dept: OTHER | Facility: OTHER | Age: 72
End: 2017-10-23

## 2017-10-31 ENCOUNTER — GENERIC CONVERSION - ENCOUNTER (OUTPATIENT)
Dept: OTHER | Facility: OTHER | Age: 72
End: 2017-10-31

## 2017-11-03 ENCOUNTER — APPOINTMENT (OUTPATIENT)
Dept: LAB | Facility: CLINIC | Age: 72
End: 2017-11-03
Payer: MEDICARE

## 2017-11-03 ENCOUNTER — GENERIC CONVERSION - ENCOUNTER (OUTPATIENT)
Dept: OTHER | Facility: OTHER | Age: 72
End: 2017-11-03

## 2017-11-03 ENCOUNTER — TRANSCRIBE ORDERS (OUTPATIENT)
Dept: LAB | Facility: CLINIC | Age: 72
End: 2017-11-03

## 2017-11-03 ENCOUNTER — ALLSCRIPTS OFFICE VISIT (OUTPATIENT)
Dept: OTHER | Facility: OTHER | Age: 72
End: 2017-11-03

## 2017-11-03 DIAGNOSIS — I81 PORTAL VEIN THROMBOSIS: ICD-10-CM

## 2017-11-03 LAB
INR PPP: 2.42 (ref 0.86–1.16)
PROTHROMBIN TIME: 27.2 SECONDS (ref 12.1–14.4)

## 2017-11-03 PROCEDURE — 36415 COLL VENOUS BLD VENIPUNCTURE: CPT

## 2017-11-03 PROCEDURE — 85610 PROTHROMBIN TIME: CPT

## 2017-11-24 ENCOUNTER — GENERIC CONVERSION - ENCOUNTER (OUTPATIENT)
Dept: OTHER | Facility: OTHER | Age: 72
End: 2017-11-24

## 2017-11-24 ENCOUNTER — APPOINTMENT (OUTPATIENT)
Dept: LAB | Facility: CLINIC | Age: 72
End: 2017-11-24
Payer: MEDICARE

## 2017-11-24 DIAGNOSIS — J45.909 UNCOMPLICATED ASTHMA: ICD-10-CM

## 2017-11-24 DIAGNOSIS — I10 ESSENTIAL (PRIMARY) HYPERTENSION: ICD-10-CM

## 2017-11-24 DIAGNOSIS — J30.9 ALLERGIC RHINITIS: ICD-10-CM

## 2017-11-24 DIAGNOSIS — D50.9 IRON DEFICIENCY ANEMIA: ICD-10-CM

## 2017-11-24 DIAGNOSIS — E66.8 OTHER OBESITY: ICD-10-CM

## 2017-11-24 DIAGNOSIS — F32.9 MAJOR DEPRESSIVE DISORDER, SINGLE EPISODE: ICD-10-CM

## 2017-11-24 DIAGNOSIS — K21.9 GASTRO-ESOPHAGEAL REFLUX DISEASE WITHOUT ESOPHAGITIS: ICD-10-CM

## 2017-11-24 DIAGNOSIS — R63.5 ABNORMAL WEIGHT GAIN: ICD-10-CM

## 2017-11-24 DIAGNOSIS — I81 PORTAL VEIN THROMBOSIS: ICD-10-CM

## 2017-11-24 DIAGNOSIS — F41.9 ANXIETY DISORDER: ICD-10-CM

## 2017-11-24 DIAGNOSIS — D64.9 ANEMIA: ICD-10-CM

## 2017-11-24 LAB
ANION GAP SERPL CALCULATED.3IONS-SCNC: 6 MMOL/L (ref 4–13)
BUN SERPL-MCNC: 12 MG/DL (ref 5–25)
CALCIUM SERPL-MCNC: 9.5 MG/DL (ref 8.3–10.1)
CHLORIDE SERPL-SCNC: 101 MMOL/L (ref 100–108)
CO2 SERPL-SCNC: 32 MMOL/L (ref 21–32)
CREAT SERPL-MCNC: 0.78 MG/DL (ref 0.6–1.3)
ERYTHROCYTE [DISTWIDTH] IN BLOOD BY AUTOMATED COUNT: 14.9 % (ref 11.6–15.1)
FERRITIN SERPL-MCNC: 119 NG/ML (ref 8–388)
GFR SERPL CREATININE-BSD FRML MDRD: 76 ML/MIN/1.73SQ M
GLUCOSE P FAST SERPL-MCNC: 114 MG/DL (ref 65–99)
HCT VFR BLD AUTO: 40.8 % (ref 34.8–46.1)
HGB BLD-MCNC: 13.4 G/DL (ref 11.5–15.4)
INR PPP: 2.01 (ref 0.86–1.16)
IRON SATN MFR SERPL: 32 %
IRON SERPL-MCNC: 90 UG/DL (ref 50–170)
MCH RBC QN AUTO: 32.2 PG (ref 26.8–34.3)
MCHC RBC AUTO-ENTMCNC: 32.8 G/DL (ref 31.4–37.4)
MCV RBC AUTO: 98 FL (ref 82–98)
PLATELET # BLD AUTO: 431 THOUSANDS/UL (ref 149–390)
PMV BLD AUTO: 9.3 FL (ref 8.9–12.7)
POTASSIUM SERPL-SCNC: 4.2 MMOL/L (ref 3.5–5.3)
PROTHROMBIN TIME: 23.5 SECONDS (ref 12.1–14.4)
RBC # BLD AUTO: 4.16 MILLION/UL (ref 3.81–5.12)
SODIUM SERPL-SCNC: 139 MMOL/L (ref 136–145)
TIBC SERPL-MCNC: 285 UG/DL (ref 250–450)
WBC # BLD AUTO: 6.73 THOUSAND/UL (ref 4.31–10.16)

## 2017-11-24 PROCEDURE — 83550 IRON BINDING TEST: CPT

## 2017-11-24 PROCEDURE — 85610 PROTHROMBIN TIME: CPT

## 2017-11-24 PROCEDURE — 83540 ASSAY OF IRON: CPT

## 2017-11-24 PROCEDURE — 80048 BASIC METABOLIC PNL TOTAL CA: CPT

## 2017-11-24 PROCEDURE — 85027 COMPLETE CBC AUTOMATED: CPT

## 2017-11-24 PROCEDURE — 82728 ASSAY OF FERRITIN: CPT

## 2017-11-24 PROCEDURE — 36415 COLL VENOUS BLD VENIPUNCTURE: CPT

## 2017-12-19 ENCOUNTER — TRANSCRIBE ORDERS (OUTPATIENT)
Dept: LAB | Facility: CLINIC | Age: 72
End: 2017-12-19

## 2017-12-19 ENCOUNTER — GENERIC CONVERSION - ENCOUNTER (OUTPATIENT)
Dept: OTHER | Facility: OTHER | Age: 72
End: 2017-12-19

## 2017-12-19 ENCOUNTER — APPOINTMENT (OUTPATIENT)
Dept: LAB | Facility: CLINIC | Age: 72
End: 2017-12-19
Payer: MEDICARE

## 2017-12-19 DIAGNOSIS — M85.80 OTHER SPECIFIED DISORDERS OF BONE DENSITY AND STRUCTURE: ICD-10-CM

## 2017-12-19 DIAGNOSIS — E78.5 HYPERLIPIDEMIA: ICD-10-CM

## 2017-12-19 DIAGNOSIS — R26.81 UNSTEADINESS ON FEET: ICD-10-CM

## 2017-12-19 DIAGNOSIS — G62.9 POLYNEUROPATHY: ICD-10-CM

## 2017-12-19 DIAGNOSIS — E03.9 HYPOTHYROIDISM: ICD-10-CM

## 2017-12-19 DIAGNOSIS — I81 PORTAL VEIN THROMBOSIS: ICD-10-CM

## 2017-12-19 DIAGNOSIS — E83.42 HYPOMAGNESEMIA: ICD-10-CM

## 2017-12-19 DIAGNOSIS — R73.09 OTHER ABNORMAL GLUCOSE: ICD-10-CM

## 2017-12-19 LAB
25(OH)D3 SERPL-MCNC: 30 NG/ML (ref 30–100)
ALBUMIN SERPL BCP-MCNC: 3.5 G/DL (ref 3.5–5)
ALP SERPL-CCNC: 79 U/L (ref 46–116)
ALT SERPL W P-5'-P-CCNC: 22 U/L (ref 12–78)
ANION GAP SERPL CALCULATED.3IONS-SCNC: 5 MMOL/L (ref 4–13)
AST SERPL W P-5'-P-CCNC: 20 U/L (ref 5–45)
BILIRUB SERPL-MCNC: 0.5 MG/DL (ref 0.2–1)
BUN SERPL-MCNC: 15 MG/DL (ref 5–25)
CALCIUM SERPL-MCNC: 9.1 MG/DL (ref 8.3–10.1)
CHLORIDE SERPL-SCNC: 104 MMOL/L (ref 100–108)
CHOLEST SERPL-MCNC: 193 MG/DL (ref 50–200)
CO2 SERPL-SCNC: 30 MMOL/L (ref 21–32)
CREAT SERPL-MCNC: 0.75 MG/DL (ref 0.6–1.3)
EST. AVERAGE GLUCOSE BLD GHB EST-MCNC: 126 MG/DL
GFR SERPL CREATININE-BSD FRML MDRD: 79 ML/MIN/1.73SQ M
GLUCOSE P FAST SERPL-MCNC: 102 MG/DL (ref 65–99)
HBA1C MFR BLD: 6 % (ref 4.2–6.3)
HDLC SERPL-MCNC: 78 MG/DL (ref 40–60)
INR PPP: 2.56 (ref 0.86–1.16)
LDLC SERPL CALC-MCNC: 100 MG/DL (ref 0–100)
MAGNESIUM SERPL-MCNC: 1.8 MG/DL (ref 1.6–2.6)
POTASSIUM SERPL-SCNC: 4.4 MMOL/L (ref 3.5–5.3)
PROT SERPL-MCNC: 7.2 G/DL (ref 6.4–8.2)
PROTHROMBIN TIME: 28.5 SECONDS (ref 12.1–14.4)
SODIUM SERPL-SCNC: 139 MMOL/L (ref 136–145)
TRIGL SERPL-MCNC: 73 MG/DL
TSH SERPL DL<=0.05 MIU/L-ACNC: 2.78 UIU/ML (ref 0.36–3.74)
VIT B12 SERPL-MCNC: 486 PG/ML (ref 100–900)

## 2017-12-19 PROCEDURE — 80053 COMPREHEN METABOLIC PANEL: CPT

## 2017-12-19 PROCEDURE — 82306 VITAMIN D 25 HYDROXY: CPT

## 2017-12-19 PROCEDURE — 83036 HEMOGLOBIN GLYCOSYLATED A1C: CPT

## 2017-12-19 PROCEDURE — 36415 COLL VENOUS BLD VENIPUNCTURE: CPT

## 2017-12-19 PROCEDURE — 83735 ASSAY OF MAGNESIUM: CPT

## 2017-12-19 PROCEDURE — 82607 VITAMIN B-12: CPT

## 2017-12-19 PROCEDURE — 84443 ASSAY THYROID STIM HORMONE: CPT

## 2017-12-19 PROCEDURE — 85610 PROTHROMBIN TIME: CPT

## 2017-12-19 PROCEDURE — 80061 LIPID PANEL: CPT

## 2017-12-20 ENCOUNTER — GENERIC CONVERSION - ENCOUNTER (OUTPATIENT)
Dept: OTHER | Facility: OTHER | Age: 72
End: 2017-12-20

## 2017-12-22 ENCOUNTER — ALLSCRIPTS OFFICE VISIT (OUTPATIENT)
Dept: OTHER | Facility: OTHER | Age: 72
End: 2017-12-22

## 2017-12-23 ENCOUNTER — APPOINTMENT (EMERGENCY)
Dept: CT IMAGING | Facility: HOSPITAL | Age: 72
End: 2017-12-23
Payer: MEDICARE

## 2017-12-23 ENCOUNTER — HOSPITAL ENCOUNTER (OUTPATIENT)
Facility: HOSPITAL | Age: 72
Setting detail: OBSERVATION
Discharge: HOME | End: 2017-12-25
Attending: EMERGENCY MEDICINE | Admitting: COLON & RECTAL SURGERY
Payer: MEDICARE

## 2017-12-23 DIAGNOSIS — R10.30 LOWER ABDOMINAL PAIN: Primary | ICD-10-CM

## 2017-12-23 LAB
ANION GAP SERPL CALCULATED.3IONS-SCNC: 6 MMOL/L (ref 4–13)
BASOPHILS # BLD AUTO: 0.06 THOUSANDS/ΜL (ref 0–0.1)
BASOPHILS NFR BLD AUTO: 1 % (ref 0–1)
BUN SERPL-MCNC: 13 MG/DL (ref 5–25)
CALCIUM SERPL-MCNC: 9.1 MG/DL (ref 8.3–10.1)
CHLORIDE SERPL-SCNC: 103 MMOL/L (ref 100–108)
CO2 SERPL-SCNC: 32 MMOL/L (ref 21–32)
CREAT SERPL-MCNC: 0.82 MG/DL (ref 0.6–1.3)
EOSINOPHIL # BLD AUTO: 0.25 THOUSAND/ΜL (ref 0–0.61)
EOSINOPHIL NFR BLD AUTO: 3 % (ref 0–6)
ERYTHROCYTE [DISTWIDTH] IN BLOOD BY AUTOMATED COUNT: 14.4 % (ref 11.6–15.1)
GFR SERPL CREATININE-BSD FRML MDRD: 71 ML/MIN/1.73SQ M
GLUCOSE SERPL-MCNC: 109 MG/DL (ref 65–140)
HCT VFR BLD AUTO: 38.9 % (ref 34.8–46.1)
HGB BLD-MCNC: 13 G/DL (ref 11.5–15.4)
INR PPP: 2.62 (ref 0.86–1.16)
LYMPHOCYTES # BLD AUTO: 2.22 THOUSANDS/ΜL (ref 0.6–4.47)
LYMPHOCYTES NFR BLD AUTO: 22 % (ref 14–44)
MCH RBC QN AUTO: 32.3 PG (ref 26.8–34.3)
MCHC RBC AUTO-ENTMCNC: 33.4 G/DL (ref 31.4–37.4)
MCV RBC AUTO: 97 FL (ref 82–98)
MONOCYTES # BLD AUTO: 1.19 THOUSAND/ΜL (ref 0.17–1.22)
MONOCYTES NFR BLD AUTO: 12 % (ref 4–12)
NEUTROPHILS # BLD AUTO: 6.22 THOUSANDS/ΜL (ref 1.85–7.62)
NEUTS SEG NFR BLD AUTO: 62 % (ref 43–75)
PLATELET # BLD AUTO: 383 THOUSANDS/UL (ref 149–390)
PMV BLD AUTO: 9.2 FL (ref 8.9–12.7)
POTASSIUM SERPL-SCNC: 4.2 MMOL/L (ref 3.5–5.3)
PROTHROMBIN TIME: 29 SECONDS (ref 12.1–14.4)
RBC # BLD AUTO: 4.02 MILLION/UL (ref 3.81–5.12)
SODIUM SERPL-SCNC: 141 MMOL/L (ref 136–145)
WBC # BLD AUTO: 9.94 THOUSAND/UL (ref 4.31–10.16)

## 2017-12-23 PROCEDURE — 96374 THER/PROPH/DIAG INJ IV PUSH: CPT

## 2017-12-23 PROCEDURE — 96361 HYDRATE IV INFUSION ADD-ON: CPT

## 2017-12-23 PROCEDURE — 99285 EMERGENCY DEPT VISIT HI MDM: CPT

## 2017-12-23 PROCEDURE — 85025 COMPLETE CBC W/AUTO DIFF WBC: CPT | Performed by: EMERGENCY MEDICINE

## 2017-12-23 PROCEDURE — 74176 CT ABD & PELVIS W/O CONTRAST: CPT

## 2017-12-23 PROCEDURE — 96376 TX/PRO/DX INJ SAME DRUG ADON: CPT

## 2017-12-23 PROCEDURE — 36415 COLL VENOUS BLD VENIPUNCTURE: CPT | Performed by: EMERGENCY MEDICINE

## 2017-12-23 PROCEDURE — 85610 PROTHROMBIN TIME: CPT | Performed by: EMERGENCY MEDICINE

## 2017-12-23 PROCEDURE — 80048 BASIC METABOLIC PNL TOTAL CA: CPT | Performed by: EMERGENCY MEDICINE

## 2017-12-23 RX ORDER — PRAVASTATIN SODIUM 10 MG
10 TABLET ORAL
Status: DISCONTINUED | OUTPATIENT
Start: 2017-12-24 | End: 2017-12-25 | Stop reason: HOSPADM

## 2017-12-23 RX ORDER — HEPARIN SODIUM 5000 [USP'U]/ML
5000 INJECTION, SOLUTION INTRAVENOUS; SUBCUTANEOUS EVERY 8 HOURS SCHEDULED
Status: DISCONTINUED | OUTPATIENT
Start: 2017-12-23 | End: 2017-12-23

## 2017-12-23 RX ORDER — HYDROXYCHLOROQUINE SULFATE 200 MG/1
200 TABLET, FILM COATED ORAL 2 TIMES DAILY
Status: DISCONTINUED | OUTPATIENT
Start: 2017-12-23 | End: 2017-12-25 | Stop reason: HOSPADM

## 2017-12-23 RX ORDER — LISINOPRIL 20 MG/1
20 TABLET ORAL DAILY
Status: DISCONTINUED | OUTPATIENT
Start: 2017-12-24 | End: 2017-12-25 | Stop reason: HOSPADM

## 2017-12-23 RX ORDER — ALBUTEROL SULFATE 90 UG/1
2 AEROSOL, METERED RESPIRATORY (INHALATION) EVERY 6 HOURS PRN
Status: DISCONTINUED | OUTPATIENT
Start: 2017-12-23 | End: 2017-12-25 | Stop reason: HOSPADM

## 2017-12-23 RX ORDER — HYDROCODONE BITARTRATE AND ACETAMINOPHEN 5; 325 MG/1; MG/1
1 TABLET ORAL EVERY 4 HOURS PRN
Status: DISCONTINUED | OUTPATIENT
Start: 2017-12-23 | End: 2017-12-25 | Stop reason: HOSPADM

## 2017-12-23 RX ORDER — MORPHINE SULFATE 10 MG/ML
6 INJECTION, SOLUTION INTRAMUSCULAR; INTRAVENOUS ONCE
Status: COMPLETED | OUTPATIENT
Start: 2017-12-23 | End: 2017-12-23

## 2017-12-23 RX ORDER — ESCITALOPRAM OXALATE 20 MG/1
20 TABLET ORAL DAILY
Status: DISCONTINUED | OUTPATIENT
Start: 2017-12-24 | End: 2017-12-25 | Stop reason: HOSPADM

## 2017-12-23 RX ORDER — WARFARIN SODIUM 5 MG/1
5 TABLET ORAL
Status: DISCONTINUED | OUTPATIENT
Start: 2017-12-24 | End: 2017-12-23

## 2017-12-23 RX ORDER — WARFARIN SODIUM 5 MG/1
5 TABLET ORAL
Status: DISCONTINUED | OUTPATIENT
Start: 2017-12-23 | End: 2017-12-25 | Stop reason: HOSPADM

## 2017-12-23 RX ORDER — SODIUM CHLORIDE 1000 MG
1 TABLET, SOLUBLE MISCELLANEOUS 2 TIMES DAILY
Status: DISCONTINUED | OUTPATIENT
Start: 2017-12-23 | End: 2017-12-25 | Stop reason: HOSPADM

## 2017-12-23 RX ORDER — PANTOPRAZOLE SODIUM 40 MG/1
40 TABLET, DELAYED RELEASE ORAL
Status: DISCONTINUED | OUTPATIENT
Start: 2017-12-24 | End: 2017-12-25 | Stop reason: HOSPADM

## 2017-12-23 RX ORDER — DILTIAZEM HYDROCHLORIDE 60 MG/1
60 TABLET, FILM COATED ORAL EVERY 8 HOURS SCHEDULED
Status: DISCONTINUED | OUTPATIENT
Start: 2017-12-23 | End: 2017-12-25 | Stop reason: HOSPADM

## 2017-12-23 RX ORDER — TORSEMIDE 10 MG/1
10 TABLET ORAL DAILY PRN
Status: DISCONTINUED | OUTPATIENT
Start: 2017-12-23 | End: 2017-12-25 | Stop reason: HOSPADM

## 2017-12-23 RX ORDER — DOXEPIN HYDROCHLORIDE 25 MG/1
50 CAPSULE ORAL
Status: DISCONTINUED | OUTPATIENT
Start: 2017-12-23 | End: 2017-12-25 | Stop reason: HOSPADM

## 2017-12-23 RX ORDER — MECLIZINE HYDROCHLORIDE 25 MG/1
25 TABLET ORAL EVERY 6 HOURS PRN
Status: DISCONTINUED | OUTPATIENT
Start: 2017-12-23 | End: 2017-12-25 | Stop reason: HOSPADM

## 2017-12-23 RX ORDER — GABAPENTIN 300 MG/1
600 CAPSULE ORAL 3 TIMES DAILY
Status: DISCONTINUED | OUTPATIENT
Start: 2017-12-23 | End: 2017-12-25 | Stop reason: HOSPADM

## 2017-12-23 RX ORDER — MORPHINE SULFATE 4 MG/ML
4 INJECTION, SOLUTION INTRAMUSCULAR; INTRAVENOUS ONCE
Status: COMPLETED | OUTPATIENT
Start: 2017-12-23 | End: 2017-12-23

## 2017-12-23 RX ORDER — LORAZEPAM 1 MG/1
1 TABLET ORAL EVERY 8 HOURS PRN
Status: DISCONTINUED | OUTPATIENT
Start: 2017-12-23 | End: 2017-12-25 | Stop reason: HOSPADM

## 2017-12-23 RX ORDER — DOXEPIN HYDROCHLORIDE 50 MG/1
1 CAPSULE ORAL
COMMUNITY
Start: 2017-08-21 | End: 2018-03-20 | Stop reason: SDUPTHER

## 2017-12-23 RX ORDER — LEVOTHYROXINE SODIUM 0.05 MG/1
50 TABLET ORAL
Status: DISCONTINUED | OUTPATIENT
Start: 2017-12-24 | End: 2017-12-25 | Stop reason: HOSPADM

## 2017-12-23 RX ORDER — SODIUM CHLORIDE 9 MG/ML
75 INJECTION, SOLUTION INTRAVENOUS CONTINUOUS
Status: DISCONTINUED | OUTPATIENT
Start: 2017-12-23 | End: 2017-12-25 | Stop reason: HOSPADM

## 2017-12-23 RX ORDER — ACETAMINOPHEN 325 MG/1
650 TABLET ORAL EVERY 6 HOURS PRN
Status: DISCONTINUED | OUTPATIENT
Start: 2017-12-23 | End: 2017-12-25 | Stop reason: HOSPADM

## 2017-12-23 RX ORDER — ONDANSETRON 2 MG/ML
4 INJECTION INTRAMUSCULAR; INTRAVENOUS EVERY 6 HOURS PRN
Status: DISCONTINUED | OUTPATIENT
Start: 2017-12-23 | End: 2017-12-25 | Stop reason: HOSPADM

## 2017-12-23 RX ORDER — FLUTICASONE PROPIONATE 50 MCG
1 SPRAY, SUSPENSION (ML) NASAL
Status: DISCONTINUED | OUTPATIENT
Start: 2017-12-24 | End: 2017-12-25 | Stop reason: HOSPADM

## 2017-12-23 RX ORDER — HYDRALAZINE HYDROCHLORIDE 20 MG/ML
5 INJECTION INTRAMUSCULAR; INTRAVENOUS EVERY 6 HOURS PRN
Status: DISCONTINUED | OUTPATIENT
Start: 2017-12-23 | End: 2017-12-25 | Stop reason: HOSPADM

## 2017-12-23 RX ADMIN — DOXEPIN HYDROCHLORIDE 50 MG: 25 CAPSULE ORAL at 22:44

## 2017-12-23 RX ADMIN — WARFARIN SODIUM 5 MG: 5 TABLET ORAL at 22:42

## 2017-12-23 RX ADMIN — SODIUM CHLORIDE 500 ML: 0.9 INJECTION, SOLUTION INTRAVENOUS at 19:23

## 2017-12-23 RX ADMIN — IOHEXOL 100 ML: 350 INJECTION, SOLUTION INTRAVENOUS at 19:14

## 2017-12-23 RX ADMIN — MORPHINE SULFATE 6 MG: 10 INJECTION, SOLUTION INTRAMUSCULAR; INTRAVENOUS at 20:46

## 2017-12-23 RX ADMIN — ANORECTAL OINTMENT: 15.7; .44; 24; 20.6 OINTMENT TOPICAL at 22:44

## 2017-12-23 RX ADMIN — GABAPENTIN 600 MG: 300 CAPSULE ORAL at 22:41

## 2017-12-23 RX ADMIN — HYDRALAZINE HYDROCHLORIDE 5 MG: 20 INJECTION INTRAMUSCULAR; INTRAVENOUS at 23:32

## 2017-12-23 RX ADMIN — SODIUM CHLORIDE 75 ML/HR: 0.9 INJECTION, SOLUTION INTRAVENOUS at 22:14

## 2017-12-23 RX ADMIN — MORPHINE SULFATE 4 MG: 4 INJECTION, SOLUTION INTRAMUSCULAR; INTRAVENOUS at 20:10

## 2017-12-23 RX ADMIN — HYDROCODONE BITARTRATE AND ACETAMINOPHEN 1 TABLET: 5; 325 TABLET ORAL at 22:24

## 2017-12-23 NOTE — PROGRESS NOTES
Assessment   1  Lower back pain (724 2) (M54 5)   2  Iron deficiency anemia (280 9) (D50 9)   3  Portal vein thrombosis (452) (I81)   4  Hypothyroidism (244 9) (E03 9)   5  Insomnia (780 52) (G47 00)    Plan   Anxiety    · Doxepin HCl - 50 MG Oral Capsule; TAKE 1-2 CAPSULES AT BEDTIME AS NEEDED  Hypomagnesemia    · (1) MAGNESIUM; Status:Active; Requested for:09Apr2018;   Hyponatremia    · (1) BASIC METABOLIC PROFILE; Status:Active; Requested [id number]:[id number]; Insomnia    · Belsomra 10 MG Oral Tablet; TAKE 1 TABLET AT  BEDTIME AS NEEDED FOR INSOMNIA  Iron deficiency anemia    · (1) CBC/PLT/DIFF; Status:Active; Requested RKZ:[id number]; Lower back pain, Osteoarthritis of right knee    · Hydrocodone-Acetaminophen 7 5-325 MG Oral Tablet; TAKE 1 TABLET FOUR TIMES A DAY    AS NEEDED FOR PAIN  Portal vein thrombosis    · (1) PT WITH INR; Status:Active; Requested for:PRN Schedule: ;   Prediabetes    · (1) HEMOGLOBIN A1C; Status:Active; Requested [id number]:[id number]; Discussion/Summary   Discussion Summary:     Anxiety, picking  Improved, may take doxepin 1-2 capsules at night  Maintain on Lexapro  Insomnia  Trial of Belsomra  Consider trazodone or adding Wellbutrin if no change  HTN,hyponatremia  BP stable, Na 139  On diltiazem, lisinopril and torsemide  Taking NaCl bid  Follow up with Nephrology next year  Hyperlipidemia  Total and LDL at goal  Maintain on low-dose simvastatin  Overweight, prediabetes  Weight gain of 14 lb since last visit  She will exercise more regularly, cut down carbs  A1c 6%  OA, knees and back  PDMP reviewed, Vicodin refilled  Allergies, asthma  No recent exacerbation, on Advair, rescue inhaler before exercise  GERD, s/p esophageal dilatation  On daily PPI  Sjogren's  On Plaquenil  DBS s/p battery replacement, tremors  Stable  Osteopenia, hypothyroid  increase D3 supplements in the winter  Thyroid adequately replaced  HM  Flu vaccine updated  Screening updated  up in 4 months or prn  Counseling Documentation With Imm: The patient was counseled regarding diagnostic results,-- instructions for management,-- risk factor reductions,-- impressions  Chief Complaint   Chief Complaint Chronic Condition [facility]: Patient is here today for follow up of chronic conditions described in HPI  History of Present Illness   HPI: Ms Katja Torres has been doing well  reports she is taking less, since increase in doxepin  She finds herself scratching and picking, mostly her hands at night  She continues to have issues staying asleep  She denies any increase in anxiety or depression recently  reports her back and leg pain has been good, takes gabapentin regularly  She takes Vicodin as needed  She reports almost falling down, caught herself and hurt her left hand  This is getting better, finds herself taking Vicodin regularly then  is taking her sodium tablets regularly  She reports loose stools depends, can occur between two to 5 times a day  is trying to walk on her treadmill at least five days a week  She admits eating more sweets recently  Insomnia (Follow-Up): The patient is being seen for follow-up of insomnia  The patient reports doing poorly  Comorbid Illnesses: anxiety-- and-- depression  Interval symptoms:  denies difficulty falling asleep-- and-- worsened difficulty staying asleep  Associated symptoms: no snoring  Medications:  the patient is adherent to her medication regimen  Disease management:  the patient is not doing well with her goals  Hypothyroidism (Follow-Up): The patient reports doing well  Interval symptoms:  worsened weight gain  Associated symptoms: no myalgias-- and-- no arthralgias  Review of Systems   Complete-Female:      Constitutional: not feeling tired  Eyes: no eyesight problems  Cardiovascular: no chest pain,-- no palpitations-- and-- no lower extremity edema        Respiratory: no cough-- and-- no shortness of breath during exertion  Gastrointestinal: diarrhea, but-- as noted in HPI,-- no abdominal pain,-- no vomiting-- and-- no constipation  Genitourinary: no dysuria  Musculoskeletal: no arthralgias  Integumentary: skin lesion  Neurological: no headache-- and-- no dizziness  Psychiatric: sleep disturbances, but-- as noted in HPI-- and-- no anxiety  no feelings of weakness      Active Problems   1  Abnormal weight gain (783 1) (R63 5)   2  Acid reflux (530 81) (K21 9)   3  Adult BMI > 30 (V85 30)   4  Allergic rhinitis (477 9) (J30 9)   5  Anemia (285 9) (D64 9)   6  Anxiety (300 00) (F41 9)   7  Asthma (493 90) (J45 909)   8  Depression (311) (F32 9)   9  Diarrhea (787 91) (R19 7)   10  Encounter for current long-term use of anticoagulants (V58 61) (Z79 01)   11  Encounter for gynecological examination without abnormal finding (V72 31) (Z01 419)   12  Encounter for postoperative care (V58 49) (Z48 89)   13  Encounter for screening mammogram for malignant neoplasm of breast (V76 12) (Z12 31)   14  End of battery life of deep brain stimulator (V53 02) (Z46 2)   15  Essential tremor (333 1) (G25 0)   16  History of allergy (V15 09) (Z88 9)   17  History of esophageal dilatation (V45 89) (Z98 890)   18  Hyperlipidemia (272 4) (E78 5)   19  Hypertension (401 9) (I10)   20  Hypomagnesemia (275 2) (E83 42)   21  Hyponatremia (276 1) (E87 1)   22  Hypothyroidism (244 9) (E03 9)   23  Insomnia (780 52) (G47 00)   24  Iron deficiency anemia (280 9) (D50 9)   25  Lower back pain (724 2) (M54 5)   26  Menopausal state (627 2) (N95 1)   27  Need for pneumococcal vaccination (V03 82) (Z23)   28  Need for prophylactic vaccination and inoculation against influenza (V04 81) (Z23)   29  Obesity (278 00) (E66 9)   30  Osteoarthritis of right knee (715 96) (M17 11)   31  Osteopenia (733 90) (M85 80)   32  Peripheral neuropathy (356 9) (G62 9)   33  Portal vein thrombosis (452) (I81)   34   Prediabetes (790 29) (R73 09)   35  S/P deep brain stimulator placement (V45 89) (Z96 89)   36  Salpingitis/oophoritis, 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)   37  Sjogren's syndrome (710 2) (M35 00)   38  Spinal stenosis (724 00) (M48 00)   39  Supraventricular tachycardia (427 89) (I47 1)   40  Ulcerative colitis without complications (423 6) (H62 52)   41  Unsteady gait (781 2) (R26 81)   42  Visit for pre-operative examination (V72 84) (Z01 818)   43  Visit for screening mammogram (V76 12) (Z12 31)   44  Vulvar itching (698 1) (L29 2)    Past Medical History   1  History of Arthritis (V13 4)   2  History of Back Pain   3  History of Bacterial vaginosis (616 10,041 9) (N76 0,B96 89)   4  History of Depression with anxiety (300 4) (F41 8)   5  Essential tremor (333 1) (G25 0)   6  History of Follow-up examination following surgery (V67 00) (Z09)   7  History of Gastroesophageal reflux disease, esophagitis presence not specified (530 81) (K21 9)   8  History of  2   9  History of breast lump (V13 89) (Z87 898)   10  History of edema (V13 89) (Z87 898)   11  History of fatigue (V13 89) (Z87 898)   12  History of hypertension (V12 59) (Z86 79)   13  History of hypokalemia (V12 29) (Z86 39)   14  History of hypothyroidism (V12 29) (Z86 39)   15  History of osteoarthritis (V13 4) (Z87 39)   16  History of paroxysmal supraventricular tachycardia (V12 59) (Z86 79)   17  History of thrombocytosis (V12 3) (Z86 2)   18  History of ulcerative colitis (V12 79) (Z87 19)   19  History of vertigo (V12 49) (Z87 898)   20  History of Impacted cerumen of right ear (380 4) (H61 21)   21  History of Lower extremity pain, left (729 5) (M79 605)   22  History of Mammogram abnormal (793 80) (R92 8)   23  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 0)   24  History of Other abnormal finding of urine (791 9) (R82 99)   25  History of Other muscle spasm (728 85) (M62 838)   26  History of Palpitations (785 1) (R00 2)   27   History of  (spontaneous vaginal delivery) (650) (O80)   28  History of Tear film insufficiency, unspecified laterality (375 15) (H04 129)   29  History of Vaginal yeast infection (112 1) (B37 3)   30  History of Weakness of both legs (729 89) (R29 898)  Active Problems And Past Medical History Reviewed: The active problems and past medical history were reviewed and updated today  Surgical History   1  History of Arthroscopy Knee   2  History of Breast Surgery Lumpectomy   3  History of Diagnostic Esophagogastroduodenoscopy   4  History of Fiberoptic Examinations Proctoscopy   5  History of Hysterectomy   6  History of Ileoanal Pouch Fistula Repair Transperin & Transabd Approach   7  History of Ileostomy Closure   8  History of Implantation Of Intracranial Neurostimulator   9  History of Splenectomy   10  History of Total Abdominal Colectomy    Family History   Mother    1  Family history of Acute Venous Thrombosis Of The Deep Vessels Of The Distal Lower Extremity   2  Denied: Family history of Alcoholism and drug addiction in family   3  Denied: Family history of Anxiety and depression   4  Family history of phlebitis (V19 8) (Z84 89)   5  Family history of Hypertension (V17 49)   6  Family history of Methicillin Resistant Staphylococcus Aureus Infection   7  Family history of Mother  At Age 49   5  Family history of Peripheral Arterial Disease  Father    5  Denied: Family history of Alcoholism and drug addiction in family   8  Denied: Family history of Anxiety and depression   11  Family history of Chronic Obstructive Pulmonary Disease   12  Family history of diabetes mellitus (V18 0) (Z83 3)   13  Family history of Stroke Syndrome (V17 1)  Child    15  Denied: Family history of Alcoholism and drug addiction in family   13  Denied: Family history of Anxiety and depression  Sibling    12  Denied: Family history of Alcoholism and drug addiction in family   16  Denied: Family history of Anxiety and depression  Sister    25   Family history of Diabetes Mellitus (V18 0)   19  Family history of Sjogren Syndrome    Social History    · Activities   · Caffeine use (V49 89) (F15 90)   · Drinks beer (V49 89) (Z78 9)   · Drinks caffeinated tea   · Drinks coffee   · Drinks wine (V49 89) (Z78 9)   · Denied: History of Drug Use   · Educational Level   · Former smoker (V15 82) (D68 367)   · Inadequate exercise (V69 0) (Z72 3)   · Lives with adult children   · Marital History -  (V61 03)   · Occupation: Retired   · Sexually Active   · Social alcohol use (Z78 9)  Social History Reviewed: The social history was reviewed and is unchanged  Current Meds    1  Advair Diskus 250-50 MCG/DOSE Inhalation Aerosol Powder Breath Activated; inhale 1 puff twice     daily; Therapy: 59LYI0396 to (Evaluate:10Jun2018)  Requested for: 47Vgi2118; Last Rx:79Dal5043     Ordered   2  Dexilant 60 MG Oral Capsule Delayed Release; TAKE 1 CAPSULE DAILY EVERY MORNING BEFORE     BREAKFAST Recorded   3  DilTIAZem HCl ER Coated Beads 180 MG Oral Capsule Extended Release 24 Hour; take 1 capsule     daily; Therapy: 77Sar0193 to (Evaluate:25Mar2018)  Requested for: 15SUJ8388; Last Rx:33Ekg2216     Ordered   4  Doxepin HCl - 50 MG Oral Capsule; TAKE 1 CAPSULE AT BEDTIME; Therapy: 40Qcl0115 to (Sean Way)  Requested for: 26BIV6510; Last Rx:39Nan6325     Ordered   5  Escitalopram Oxalate 20 MG Oral Tablet; Take 1 tablet daily; Therapy: 31WAF2662 to (Evaluate:20Opy3878)  Requested for: 73Coy8677; Last Rx:47Uvl3016     Ordered   6  Estradiol 0 05 MG/24HR Transdermal Patch Weekly; APPLY 1 PATCH WEEKLY AS DIRECTED; Therapy: 21JST5749 to (24-20-52-61)  Requested for: 052 576 88 48; Last Rx:26Qrf2441     Ordered   7  Fluticasone Propionate 50 MCG/ACT Nasal Suspension; use 2 sprays in each nostril once daily; Therapy: 79OOT8406 to (Evaluate:01Apr2018)  Requested for: 68GEQ3497; Last Rx:03Oct2017     Ordered   8   Gabapentin 100 MG Oral Capsule; TAKE ONE CAPSULE BY MOUTH TWICE DAILY EVERY MORNING &     AFTERNOON;     Therapy: 04FUP7587 to (Evaluate:52Wfk4949)  Requested for: 27Jun2017; Last PK:68URV0201     Ordered   9  Gabapentin 600 MG Oral Tablet; TAKE 1 TAB IN AM, 1 TAB IN AFTERNOON, AND 2 TABS QHS      Requested for: 68Mao6698; Last Rx:51Lhu6624 Ordered   10  Hydrocodone-Acetaminophen 7 5-325 MG Oral Tablet; TAKE 1 TABLET FOUR TIMES A DAY AS      NEEDED FOR PAIN;      Therapy: 01GKK1705 to (Evaluate:24Nov2017); Last Rx:39Bqd9707 Ordered   11  Hydroxychloroquine Sulfate 200 MG Oral Tablet; TAKE 1 TABLET TWICE DAILY WITH FOOD; Therapy: 75Oyg6511 to (Jai Forde)  Requested for: 23Ney7360; Last Rx:36Xcn2154;      Status: ACTIVE - Renewal Voided Ordered   12  Levothyroxine Sodium 50 MCG Oral Tablet; TAKE ONE TABLET BY MOUTH ONCE DAILY; Therapy: 98TUX5979 to (Evaluate:26Mar2018)  Requested for: 06DZG7740; Last Rx:61Epw5648      Ordered   13  Lisinopril 20 MG Oral Tablet; TAKE 1 TABLET DAILY AS DIRECTED; Therapy: 97OKP7768 to (Evaluate:58Xeq2354)  Requested for: 62Qqc0121; Last Rx:17Vib4523      Ordered   14  LORazepam 1 MG Oral Tablet; TAKE 1 TABLET Every 6 hours; Therapy: 72MFY5121 to (Evaluate:01Gqo3959); Last Rx:33Nir5311 Ordered   15  Meclizine HCl - 25 MG Oral Tablet; TAKE 1 TABLET Every 6 hours PRN; Therapy: 60Vjc8789 to (Evaluate:07Jan2016)  Requested for: 58WTQ4642; Last Rx:64Wyx9291      Ordered   16  ProAir  (90 Base) MCG/ACT Inhalation Aerosol Solution; INHALE 1 PUFFS Every 6-8 hours      PRN SOB; Therapy: 70YWJ9564 to (Last Rx:68Qxe0900)  Requested for: 77Pfx2842 Ordered   17  Simvastatin 5 MG Oral Tablet; take one tablet by mouth every day; Therapy: 45WVA7836 to (Evaluate:99Ovw3811)  Requested for: 34RFC8674; Last RC:12KBM0942      Ordered   18  Sodium Chloride 1 GM Oral Tablet; Take 1 tablet twice daily  Requested for: 22Nov2017; Last      Rx:22Nov2017 Ordered   19   Torsemide 10 MG Oral Tablet; TAKE 1 TABLET DAILY PRN for edema; Therapy: 91XBC7238 to (Evaluate:86Pys0754)  Requested for: 99HRF5538; Last Rx:67Agn5882      Ordered   21  Vitamin D3 2000 UNIT Oral Tablet; 1 TAB DAILY Recorded   21  Warfarin Sodium 5 MG Oral Tablet; TAKE 2 TABLETS ONCE DAILY OR AS DIRECTED; Therapy: 62AFR9266 to ([phone])  Requested for: 26Oct2017; Last Rx:26Oct2017      Ordered   22  ZyrTEC Allergy 10 MG Oral Tablet; TAKE 1 TABLET AT BEDTIME; Therapy: 41GKO5327 to Recorded  Medication List Reviewed: The medication list was reviewed and updated today  Allergies   1  Indocin SOLR   2  Macrobid CAPS   3  Penicillins   4  Sulfa Drugs    Vitals   Vital Signs    Recorded: 92Amr9746 08:02AM   Temperature 97 9 F   Heart Rate 72   Respiration 18   Systolic 870   Diastolic 70   Height 5 ft 3 in   Weight 165 lb 6 oz   BMI Calculated 29 3   BSA Calculated 1 78   O2 Saturation 97     Physical Exam        Constitutional      General appearance: No acute distress, well appearing and well nourished  comfortable,-- clothing appropriate-- and-- well hydrated  Head and Face      Head and face: Normal        Eyes      Pupils and irises: Equal, round, reactive to light  Ears, Nose, Mouth, and Throat      External inspection of ears and nose: Normal        Neck      Neck: Supple, symmetric, trachea midline, no masses  Pulmonary      Respiratory effort: No increased work of breathing or signs of respiratory distress  Auscultation of lungs: Clear to auscultation  Cardiovascular      Auscultation of heart: Normal rate and rhythm, normal S1 and S2, no murmurs  Examination of extremities for edema and/or varicosities: Normal        Abdomen      Abdomen: Non-tender, no masses  Musculoskeletal      Gait and station: Normal        Skin      Skin and subcutaneous tissue: Normal without rashes or lesions  Neurologic      Cortical function: Normal mental status         Psychiatric Orientation to person, place, and time: Normal        Mood and affect: Normal   Mood and Affect: not anxious,-- calm-- and-- not depressed  Results/Data   (1) HEMOGLOBIN A1C 71UTB8373 06:17AM [employer]   TW Order Number: [id number]      Test Name Result Flag Reference   HEMOGLOBIN A1C 6 0 %  4 2-6 3   EST  AVG  GLUCOSE 126 mg/dl          Future Appointments      Date/Time Provider Specialty Site   07/16/2018 07:00 AM Corbin Moran MD Neurology [facility]  [place]   07/19/2018 01:00 PM DEVAN De Guzman  Obstetrics/Gynecology [facility]   04/20/2018 08:30 AM Jannet Velasquez MD Internal Medicine [facility] INTERNAL MED     Verified Results   (1) COMPREHENSIVE METABOLIC PANEL 66WYX2383 42:00HN [employer]   TW Order Number: [id number]      Test Name Result Flag Reference   SODIUM 139 mmol/L  136-145   POTASSIUM 4 4 mmol/L  3 5-5 3   CHLORIDE 104 mmol/L  100-108   CARBON DIOXIDE 30 mmol/L  21-32   ANION GAP (CALC) 5 mmol/L  4-13   BLOOD UREA NITROGEN 15 mg/dL  5-25   CREATININE 0 75 mg/dL  0 60-1 30   Standardized to IDMS reference method   CALCIUM 9 1 mg/dL  8 3-10 1   BILI, TOTAL 0 50 mg/dL  0 20-1 00   ALK PHOSPHATAS 79 U/L     ALT (SGPT) 22 U/L  12-78   Specimen collection should occur prior to Sulfasalazine administration due to the potential for falsely depressed results  AST(SGOT) 20 U/L  5-45   Specimen collection should occur prior to Sulfasalazine administration due to the potential for falsely depressed results  ALBUMIN 3 5 g/dL  3 5-5 0   TOTAL PROTEIN 7 2 g/dL  6 4-8 2   eGFR 79 ml/min/1 73sq m     [facility] Disease Education Program recommendations are as follows:     GFR calculation is accurate only with a steady state creatinine     Chronic Kidney disease less than 60 ml/min/1 73 sq  meters     Kidney failure less than 15 ml/min/1 73 sq  meters     GLUCOSE FASTING 102 mg/dL H 65-99   Specimen collection should occur prior to Sulfasalazine administration due to the potential for falsely depressed results  Specimen collection should occur prior to Sulfapyridine administration due to the potential for falsely elevated results  (1) HEMOGLOBIN A1C 87QZL4123 06:17AM Yariel Qureshi    Order Number: [id number]      Test Name Result Flag Reference   HEMOGLOBIN A1C 6 0 %  4 2-6 3   EST  AVG   GLUCOSE 126 mg/dl          Signatures    Electronically signed by : Willa Pittman MD; Dec 22 2017  9:31AM EST                       (Author)

## 2017-12-24 PROBLEM — K56.600 PARTIAL SMALL BOWEL OBSTRUCTION (HCC): Status: ACTIVE | Noted: 2017-12-24

## 2017-12-24 LAB
ANION GAP SERPL CALCULATED.3IONS-SCNC: 10 MMOL/L (ref 4–13)
BASOPHILS # BLD AUTO: 0.06 THOUSANDS/ΜL (ref 0–0.1)
BASOPHILS NFR BLD AUTO: 1 % (ref 0–1)
BUN SERPL-MCNC: 10 MG/DL (ref 5–25)
CALCIUM SERPL-MCNC: 8.6 MG/DL (ref 8.3–10.1)
CHLORIDE SERPL-SCNC: 108 MMOL/L (ref 100–108)
CO2 SERPL-SCNC: 26 MMOL/L (ref 21–32)
CREAT SERPL-MCNC: 0.77 MG/DL (ref 0.6–1.3)
EOSINOPHIL # BLD AUTO: 0.07 THOUSAND/ΜL (ref 0–0.61)
EOSINOPHIL NFR BLD AUTO: 1 % (ref 0–6)
ERYTHROCYTE [DISTWIDTH] IN BLOOD BY AUTOMATED COUNT: 14.4 % (ref 11.6–15.1)
GFR SERPL CREATININE-BSD FRML MDRD: 77 ML/MIN/1.73SQ M
GLUCOSE P FAST SERPL-MCNC: 173 MG/DL (ref 65–99)
GLUCOSE SERPL-MCNC: 173 MG/DL (ref 65–140)
HCT VFR BLD AUTO: 42.2 % (ref 34.8–46.1)
HGB BLD-MCNC: 14.1 G/DL (ref 11.5–15.4)
INR PPP: 2.47 (ref 0.86–1.16)
LYMPHOCYTES # BLD AUTO: 1.14 THOUSANDS/ΜL (ref 0.6–4.47)
LYMPHOCYTES NFR BLD AUTO: 11 % (ref 14–44)
MCH RBC QN AUTO: 32.5 PG (ref 26.8–34.3)
MCHC RBC AUTO-ENTMCNC: 33.4 G/DL (ref 31.4–37.4)
MCV RBC AUTO: 97 FL (ref 82–98)
MONOCYTES # BLD AUTO: 0.98 THOUSAND/ΜL (ref 0.17–1.22)
MONOCYTES NFR BLD AUTO: 10 % (ref 4–12)
NEUTROPHILS # BLD AUTO: 7.98 THOUSANDS/ΜL (ref 1.85–7.62)
NEUTS SEG NFR BLD AUTO: 77 % (ref 43–75)
PLATELET # BLD AUTO: 390 THOUSANDS/UL (ref 149–390)
PMV BLD AUTO: 9.4 FL (ref 8.9–12.7)
POTASSIUM SERPL-SCNC: 3.5 MMOL/L (ref 3.5–5.3)
PROTHROMBIN TIME: 27.7 SECONDS (ref 12.1–14.4)
RBC # BLD AUTO: 4.34 MILLION/UL (ref 3.81–5.12)
SODIUM SERPL-SCNC: 144 MMOL/L (ref 136–145)
WBC # BLD AUTO: 10.23 THOUSAND/UL (ref 4.31–10.16)

## 2017-12-24 PROCEDURE — 85610 PROTHROMBIN TIME: CPT | Performed by: SURGERY

## 2017-12-24 PROCEDURE — 80048 BASIC METABOLIC PNL TOTAL CA: CPT | Performed by: SURGERY

## 2017-12-24 PROCEDURE — 85025 COMPLETE CBC W/AUTO DIFF WBC: CPT | Performed by: SURGERY

## 2017-12-24 RX ORDER — CALCIUM CARBONATE 200(500)MG
500 TABLET,CHEWABLE ORAL 3 TIMES DAILY PRN
Status: DISCONTINUED | OUTPATIENT
Start: 2017-12-24 | End: 2017-12-25 | Stop reason: HOSPADM

## 2017-12-24 RX ORDER — POTASSIUM CHLORIDE 20 MEQ/1
40 TABLET, EXTENDED RELEASE ORAL ONCE
Status: COMPLETED | OUTPATIENT
Start: 2017-12-24 | End: 2017-12-24

## 2017-12-24 RX ADMIN — POTASSIUM CHLORIDE 40 MEQ: 1500 TABLET, EXTENDED RELEASE ORAL at 09:49

## 2017-12-24 RX ADMIN — DILTIAZEM HYDROCHLORIDE 60 MG: 60 TABLET, FILM COATED ORAL at 21:21

## 2017-12-24 RX ADMIN — ANORECTAL OINTMENT: 15.7; .44; 24; 20.6 OINTMENT TOPICAL at 18:15

## 2017-12-24 RX ADMIN — ONDANSETRON 4 MG: 2 INJECTION INTRAMUSCULAR; INTRAVENOUS at 00:44

## 2017-12-24 RX ADMIN — DOXEPIN HYDROCHLORIDE 50 MG: 25 CAPSULE ORAL at 21:21

## 2017-12-24 RX ADMIN — HYDROCODONE BITARTRATE AND ACETAMINOPHEN 1 TABLET: 5; 325 TABLET ORAL at 16:36

## 2017-12-24 RX ADMIN — SODIUM CHLORIDE TAB 1 GM 1 G: 1 TAB at 09:02

## 2017-12-24 RX ADMIN — GABAPENTIN 600 MG: 300 CAPSULE ORAL at 09:02

## 2017-12-24 RX ADMIN — DILTIAZEM HYDROCHLORIDE 60 MG: 60 TABLET, FILM COATED ORAL at 05:32

## 2017-12-24 RX ADMIN — FLUTICASONE PROPIONATE AND SALMETEROL 1 PUFF: 50; 250 POWDER RESPIRATORY (INHALATION) at 21:23

## 2017-12-24 RX ADMIN — ESCITALOPRAM OXALATE 20 MG: 20 TABLET ORAL at 09:02

## 2017-12-24 RX ADMIN — ACETAMINOPHEN 650 MG: 325 TABLET, FILM COATED ORAL at 05:31

## 2017-12-24 RX ADMIN — GABAPENTIN 600 MG: 300 CAPSULE ORAL at 21:21

## 2017-12-24 RX ADMIN — GABAPENTIN 600 MG: 300 CAPSULE ORAL at 16:36

## 2017-12-24 RX ADMIN — HYDROMORPHONE HYDROCHLORIDE 0.5 MG: 1 INJECTION, SOLUTION INTRAMUSCULAR; INTRAVENOUS; SUBCUTANEOUS at 00:44

## 2017-12-24 RX ADMIN — HYDROCODONE BITARTRATE AND ACETAMINOPHEN 1 TABLET: 5; 325 TABLET ORAL at 21:21

## 2017-12-24 RX ADMIN — FLUTICASONE PROPIONATE AND SALMETEROL 1 PUFF: 50; 250 POWDER RESPIRATORY (INHALATION) at 09:04

## 2017-12-24 RX ADMIN — HYDROCODONE BITARTRATE AND ACETAMINOPHEN 1 TABLET: 5; 325 TABLET ORAL at 03:22

## 2017-12-24 RX ADMIN — DILTIAZEM HYDROCHLORIDE 60 MG: 60 TABLET, FILM COATED ORAL at 13:58

## 2017-12-24 RX ADMIN — SODIUM CHLORIDE TAB 1 GM 1 G: 1 TAB at 18:14

## 2017-12-24 RX ADMIN — WARFARIN SODIUM 5 MG: 5 TABLET ORAL at 18:14

## 2017-12-24 RX ADMIN — MECLIZINE HYDROCHLORIDE 25 MG: 25 TABLET ORAL at 03:22

## 2017-12-24 RX ADMIN — LISINOPRIL 20 MG: 20 TABLET ORAL at 09:02

## 2017-12-24 RX ADMIN — HYDROXYCHLOROQUINE SULFATE 200 MG: 200 TABLET, FILM COATED ORAL at 18:15

## 2017-12-24 RX ADMIN — PRAVASTATIN SODIUM 10 MG: 10 TABLET ORAL at 16:36

## 2017-12-24 RX ADMIN — ANORECTAL OINTMENT: 15.7; .44; 24; 20.6 OINTMENT TOPICAL at 09:04

## 2017-12-24 RX ADMIN — LEVOTHYROXINE SODIUM 50 MCG: 50 TABLET ORAL at 05:32

## 2017-12-24 RX ADMIN — PANTOPRAZOLE SODIUM 40 MG: 40 TABLET, DELAYED RELEASE ORAL at 05:31

## 2017-12-24 NOTE — ED NOTES
Patient transported to [facility] 18, [address] [facility]  12/23/17 1913

## 2017-12-24 NOTE — H&P
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 by Dr Brien Fish as per patient  Patient has always had some issues with chronic diarrhea; however 2 days prior, the diarrhea worsened  Patient normally takes imodium and decided to take more imodium 2 days prior because of the worsening diarrhea  She then tried to counter the imodium with miralax, which didn't help and the patient was unable to have a BM thereafter and started getting severe crampy abdominal pain prompting her visit to the ED  Patient denies any sick contacts, and recent changes to her diet  Patient felt nauseous overnight after admission, but no emesis  Patient denies any fevers or chills  In addition, since her overnight admission, patient was able to have 3 watery bowel movements  Patient recently had a pouchoscopy in 08/2017 that demonstrated normal ileal pouch mucosa and another pouchoscopy was recommended for 3 years from now  Review of Systems   Constitutional: Negative for appetite change, chills and fever  Respiratory: Negative for shortness of breath  Cardiovascular: Negative for chest pain  Gastrointestinal: Positive for abdominal distention, abdominal pain, constipation and nausea  Negative for diarrhea and vomiting  All other systems reviewed and are negative        Historical Information   Past Medical History:   Diagnosis Date    Anxiety     Arrhythmia     Arthritis     Asthma     Blood clot in vein     portal vein    Disease of thyroid gland     DVT, lower extremity (HCC)     GERD (gastroesophageal reflux disease)     Hyperlipidemia     Hypertension     Hypokalemia     Hyponatremia     Iron deficiency anemia     Irregular heart beat     [facility] [address] [facility]     PE (pulmonary thromboembolism) (HCC)     Tremors of nervous system     dbs implanted right and left chest    Ulcerative colitis ([facility] [address] )      Past Surgical History:   Procedure Laterality Date    ABDOMINAL SURGERY      APPENDECTOMY      BREAST SURGERY      lumpectomy & biopsy    COLON SURGERY      COLONOSCOPY N/A 8/30/2017    Procedure: POUCHOSCOPY;  Surgeon: Agustin Duncan MD;  [facility]; Service: Colorectal    COLOPROCTECTOMY W/ ILEO J POUCH      HYSTERECTOMY      KNEE ARTHROSCOPY      Right    NM IMP STIM,CRANIAL,SUBQ,1 ARRAY Right 6/20/2017    Procedure: DBS GENERATOR REPLACEMENT;  Surgeon: Jensen Baum MD;  Location:  MAIN OR;  Service: Neurosurgery    SPLENECTOMY       Social History   History   Alcohol Use    Yes     Comment: socially     History   Drug Use No     History   Smoking Status    Former Smoker    Packs/day: 2 00    Years: 5 00    Quit date: 1965   Smokeless Tobacco    Never Used     Family History: History reviewed  No pertinent family history  Meds/Allergies   PTA meds:   Prior to Admission Medications   Prescriptions Last Dose Informant Patient Reported? Taking?    Calcium Carbonate-Vitamin D (CALCIUM 600+D) 600-200 MG-UNIT TABS 12/23/2017 at Unknown time Self Yes Yes   Sig: Take 2 tablets by mouth daily   HYDROcodone-acetaminophen (NORCO) 5-325 mg per tablet 12/22/2017 at Unknown time Self Yes Yes   Sig: Take 1 tablet by mouth every 6 (six) hours as needed for pain Patient dose is 7 5-325, not an option   LORazepam (ATIVAN) 1 mg tablet 12/23/2017 at Unknown time Self Yes Yes   Sig: Take 1 mg by mouth every 6 (six) hours as needed for anxiety   Multiple Vitamin (MULTIVITAMIN) tablet 2017 at Unknown time Self Yes Yes   Sig: Take 1 tablet by mouth daily   Omega-3 Fatty Acids (OMEGA 3 PO) 2017 at Unknown time Self Yes Yes   Sig: Take 1,200 mg by mouth daily   albuterol (PROVENTIL HFA,VENTOLIN HFA) 90 mcg/act inhaler  Self Yes Yes   Sig: Inhale 2 puffs every 6 (six) hours as needed for wheezing   cetirizine (ZyrTEC) 10 mg tablet 2017 at Unknown time Self Yes Yes   Sig: Take 10 mg by mouth daily   co-enzyme Q-10 50 MG capsule 2017 at Unknown time Self Yes Yes   Sig: Take 100 mg by mouth daily   dexlansoprazole (DEXILANT) 60 MG capsule 2017 at Unknown time Self Yes Yes   Sig: Take 60 mg by mouth daily   diltiazem (DILACOR XR) 180 MG 24 hr capsule 2017 at Unknown time Self Yes Yes   Sig: Take 180 mg by mouth daily   doxepin (SINEquan) 50 mg capsule 2017 at Unknown time Self Yes Yes   Sig: Take 1 capsule by mouth daily at bedtime   escitalopram (LEXAPRO) 20 mg tablet 2017 at Unknown time Self Yes Yes   Sig: Take 20 mg by mouth daily   estradiol (VIVELLE-DOT) 0 05 MG/24HR Past Week at Unknown time Self Yes Yes   Sig: Place 1 patch on the skin once a week   fluticasone (VERAMYST) 27 5 MCG/SPRAY nasal spray 2017 at Unknown time Self Yes Yes   Si sprays into each nostril daily   fluticasone-salmeterol (ADVAIR) 250-50 mcg/dose inhaler 2017 at Unknown time Self Yes Yes   Sig: Inhale 1 puff every 12 (twelve) hours   gabapentin (NEURONTIN) 100 mg capsule 2017 at Unknown time Self Yes Yes   Sig: Take 100 mg by mouth 2 (two) times a day   gabapentin (NEURONTIN) 600 MG tablet 2017 at Unknown time Self Yes Yes   Sig: Take 600 mg by mouth 3 (three) times a day Patient takes one in am, one afternoon and 2 at hs   hydroxychloroquine (PLAQUENIL) 200 mg tablet 2017 at Unknown time Self Yes Yes   Sig: Take 200 mg by mouth 2 (two) times a day   levothyroxine 50 mcg tablet 12/22/2017 at Unknown time Self Yes Yes   Sig: Take 50 mcg by mouth daily   lisinopril (ZESTRIL) 20 mg tablet 12/23/2017 at Unknown time Self Yes Yes   Sig: Take 20 mg by mouth daily     loperamide (IMODIUM) 2 mg capsule 12/22/2017 at Unknown time Self Yes Yes   Sig: Take 2 mg by mouth 4 (four) times a day as needed     meclizine (ANTIVERT) 32 MG tablet Past Month at Unknown time Self Yes Yes   Sig: Take 25 mg by mouth every 6 (six) hours as needed for dizziness   simvastatin (ZOCOR) 5 MG tablet 12/22/2017 at Unknown time Self Yes Yes   Sig: Take 5 mg by mouth daily at bedtime   sodium chloride 1 g tablet 12/23/2017 at Unknown time Self Yes Yes   Sig: Take 1 g by mouth 2 (two) times a day   torsemide (DEMADEX) 10 mg tablet 12/23/2017 at Unknown time Self Yes Yes   Sig: Take 10 mg by mouth daily as needed   warfarin (COUMADIN) 5 mg tablet 12/22/2017 at Unknown time Self Yes Yes   Sig: Take by mouth daily      Facility-Administered Medications: None     Allergies   Allergen Reactions    Indocin [Indomethacin] Other (See Comments)     Nausea and dizziness    Macrobid [Nitrofurantoin Monohyd Macro] Rash    Penicillins Rash    Sulfa Antibiotics Rash       Objective   First Vitals:   Blood Pressure: 170/85 (12/23/17 1830)  Pulse: 82 (12/23/17 1830)  Temperature: 99 5 °F (37 5 °C) (12/23/17 1830)  Temp Source: Oral (12/23/17 1830)  Respirations: 18 (12/23/17 1830)  Height: 5' 3 5" (161 3 cm) (12/23/17 1830)  Weight - Scale: 77 8 kg (171 lb 8 3 oz) (12/23/17 1830)  SpO2: 95 % (12/23/17 1830)    Current Vitals:   Blood Pressure: 148/72 (12/24/17 0530)  Pulse: 72 (12/24/17 0530)  Temperature: 98 6 °F (37 °C) (12/23/17 2301)  Temp Source: Oral (12/23/17 2301)  Respirations: 16 (12/23/17 2301)  Height: 5' 3" (160 cm) (12/23/17 2152)  Weight - Scale: 78 8 kg (173 lb 11 6 oz) (12/23/17 2152)  SpO2: 93 % (12/23/17 2301)      Intake/Output Summary (Last 24 hours) at 12/24/17 0768  Last data filed at 12/24/17 5124   Gross per 24 hour   Intake          1091 25 ml   Output              800 ml   Net           291 25 ml       Invasive Devices     Peripheral Intravenous Line            Peripheral IV 12/23/17 Left Antecubital less than 1 day                Physical Exam   Gen: comfortable, NAD  CVS: RRR  Resp: CTAB  Abd: soft, lower tympany, lower distension, tender in the bilateral quadrants, no guarding, no rebound  Neuro: AO X3  Ext: BARBOSA  LUCIUS: deferred, patient with perianal excoriations and states its extremely painful     Lab Results:      Lab Results   Component Value Date    WBC 10 23 (H) 12/24/2017    HGB 14 1 12/24/2017    HCT 42 2 12/24/2017    MCV 97 12/24/2017     12/24/2017       Lab Results   Component Value Date    GLUCOSE 173 (H) 12/24/2017    CALCIUM 8 6 12/24/2017     12/24/2017    K 3 5 12/24/2017    CO2 26 12/24/2017     12/24/2017    BUN 10 12/24/2017    CREATININE 0 77 12/24/2017       Imaging: I have personally reviewed pertinent films in PACS    Code Status: Level 1 - Full Code  Advance Directive and Living Will:      Power of :    POLST:      Counseling / Coordination of Care  Total floor / unit time spent today 30  minutes  Greater than 50% of total time was spent with the patient and / or family counseling and / or coordination of care  A description of the counseling / coordination of care: Plan was discussed with patient

## 2017-12-24 NOTE — ED PROVIDER NOTES
History  Chief Complaint   Patient presents with    Abdominal Pain     pt with j-pouch presents with intermittent sharp lower abdominal cramps since this am  pt states she had diarrhea yesterday around noon so she took three imodium  today had not been able to have a bowel movment at all  states she also took miralax around 2 am this morning  denies n/v  denies fever     72 YR FEMALE WITH UC- WITH J POUCH- WITH CHROICI DIARRHEA- ANAL PAIN - REDNESS FROM DIARRHEA-- STATES YESTERDAY IN [place] TO CONTROL DIARRHEA- TOOK IMODIUM AD X 3-- WHICH SHE DOES- TO TRY TO LIMIT ANAL PAIN/REDNESS--  THEN COULD NOT MOVE BOWELS-  AND TOOK MIRALAX -- WHICH HAS HELPED HER IN THE PAST  IN THESE SITUATIONS-- SICNE NO BM AND LOWER ABD INTERMITENT CRAMPING--  NO FEVERS- VOMITING- NO ILL COTNACTS- RECENT ANTIBX- ON COUAMDIN FOR VET-- INR LAST WEEK NORMAL AS PER PT--         History provided by:  Patient and spouse   used: No    Abdominal Pain   Associated symptoms: diarrhea    Associated symptoms: no constipation, no nausea and no vomiting        Prior to Admission Medications   Prescriptions Last Dose Informant Patient Reported? Taking?    Calcium Carbonate-Vitamin D (CALCIUM 600+D) 600-200 MG-UNIT TABS   Yes No   Sig: Take 2 tablets by mouth daily   Cholecalciferol (VITAMIN D-3 PO)   Yes Yes   Sig: Take 2,000 Units by mouth     HYDROcodone-acetaminophen (NORCO) 5-325 mg per tablet   Yes Yes   Sig: Take 1 tablet by mouth every 6 (six) hours as needed for pain Patient dose is 7 5-325, not an option   LORazepam (ATIVAN) 1 mg tablet   Yes Yes   Sig: Take 1 mg by mouth every 6 (six) hours as needed for anxiety   Multiple Vitamin (MULTIVITAMIN) tablet   Yes No   Sig: Take 1 tablet by mouth daily   Omega-3 Fatty Acids (OMEGA 3 PO)   Yes No   Sig: Take 1,200 mg by mouth daily   albuterol (PROVENTIL HFA,VENTOLIN HFA) 90 mcg/act inhaler   Yes Yes   Sig: Inhale 2 puffs every 6 (six) hours as needed for wheezing   cetirizine (ZyrTEC) 10 mg tablet   Yes Yes   Sig: Take 10 mg by mouth daily   co-enzyme Q-10 50 MG capsule   Yes No   Sig: Take 100 mg by mouth daily   dexlansoprazole (DEXILANT) 60 MG capsule   Yes Yes   Sig: Take 60 mg by mouth daily   diltiazem (DILACOR XR) 180 MG 24 hr capsule   Yes Yes   Sig: Take 180 mg by mouth daily   doxepin (SINEquan) 50 mg capsule   Yes No   Sig: Take 10 mg by mouth daily at bedtime   escitalopram (LEXAPRO) 20 mg tablet   Yes Yes   Sig: Take 20 mg by mouth daily   estradiol (VIVELLE-DOT) 0 05 MG/24HR   Yes No   Sig: Place 1 patch on the skin once a week   fluticasone (VERAMYST) 27 5 MCG/SPRAY nasal spray   Yes Yes   Si sprays into each nostril daily   fluticasone-salmeterol (ADVAIR) 250-50 mcg/dose inhaler   Yes Yes   Sig: Inhale 1 puff every 12 (twelve) hours   gabapentin (NEURONTIN) 100 mg capsule   Yes Yes   Sig: Take 100 mg by mouth 2 (two) times a day   gabapentin (NEURONTIN) 600 MG tablet   Yes Yes   Sig: Take 600 mg by mouth 3 (three) times a day Patient takes one in am, one afternoon and 2 at hs   hydroxychloroquine (PLAQUENIL) 200 mg tablet   Yes Yes   Sig: Take 200 mg by mouth 2 (two) times a day   levothyroxine 50 mcg tablet   Yes Yes   Sig: Take 50 mcg by mouth daily   lisinopril (ZESTRIL) 20 mg tablet   Yes Yes   Sig: Take 20 mg by mouth 2 (two) times a day   loperamide (IMODIUM) 2 mg capsule   Yes No   Sig: Take 2 mg by mouth daily   meclizine (ANTIVERT) 32 MG tablet   Yes Yes   Sig: Take 25 mg by mouth every 6 (six) hours as needed for dizziness   metaxalone (SKELAXIN) 800 mg tablet   Yes Yes   Sig: Take 800 mg by mouth 3 (three) times a day as needed for muscle spasms 1 tablet in am, 1 afternoon, and 2 HS    simvastatin (ZOCOR) 5 MG tablet   Yes Yes   Sig: Take 5 mg by mouth daily at bedtime   sodium chloride 1 g tablet   Yes Yes   Sig: Take 1 g by mouth 2 (two) times a day   torsemide (DEMADEX) 10 mg tablet   Yes Yes   Sig: Take 10 mg by mouth daily as needed   warfarin (COUMADIN) 5 mg tablet   Yes No   Sig: Take by mouth daily      Facility-Administered Medications: None       Past Medical History:   Diagnosis Date    Anxiety     Arrhythmia     Arthritis     Asthma     Blood clot in vein     portal vein    Disease of thyroid gland     DVT, lower extremity (HCC)     GERD (gastroesophageal reflux disease)     Hyperlipidemia     Hypertension     Hypokalemia     Hyponatremia     Iron deficiency anemia     Irregular heart beat     [facility] [address]     PE (pulmonary thromboembolism) (HCC)     Tremors of nervous system     dbs implanted right and left chest    Ulcerative colitis ([facility] [address] [facility]        Past Surgical History:   Procedure Laterality Date    ABDOMINAL SURGERY      APPENDECTOMY      BREAST SURGERY      lumpectomy & biopsy    COLON SURGERY      COLONOSCOPY N/A 8/30/2017    Procedure: POUCHOSCOPY;  Surgeon: Bob Srinivasan MD;  [facility]; Service: Colorectal    COLOPROCTECTOMY W/ ILEO J POUCH      HYSTERECTOMY      KNEE ARTHROSCOPY      Right    NE IMP STIM,CRANIAL,SUBQ,1 ARRAY Right 6/20/2017    Procedure: DBS GENERATOR REPLACEMENT;  Surgeon: Peter Hernandez MD;  Location: [facility] OR;  Service: Neurosurgery    SPLENECTOMY         History reviewed  No pertinent family history  I have reviewed and agree with the history as documented  Social History   Substance Use Topics    Smoking status: Former Smoker     Packs/day: 2 00     Years: 5 00     Quit date: 1965    Smokeless tobacco: Never Used    Alcohol use Yes      Comment: socially        Review of Systems   Constitutional: Negative  HENT: Negative  Eyes: Negative  Respiratory: Negative  Cardiovascular: Negative  Gastrointestinal: Positive for abdominal pain and diarrhea  Negative for abdominal distention, anal bleeding, blood in stool, constipation, nausea, rectal pain and vomiting  Endocrine: Negative  Genitourinary: Negative  Musculoskeletal: Negative  Skin: Negative  Allergic/Immunologic: Negative  Neurological: Negative  Hematological: Negative  Psychiatric/Behavioral: Negative  Physical Exam  ED Triage Vitals [12/23/17 1830]   Temperature Pulse Respirations Blood Pressure SpO2   99 5 °F (37 5 °C) 82 18 170/85 95 %      Temp Source Heart Rate Source Patient Position - Orthostatic VS BP Location FiO2 (%)   Oral Monitor Sitting Right arm --      Pain Score       8           Orthostatic Vital Signs  Vitals:    12/23/17 1830   BP: 170/85   Pulse: 82   Patient Position - Orthostatic VS: Sitting       Physical Exam   Constitutional: She is oriented to person, place, and time  She appears well-developed and well-nourished  No distress  AVSS--  PULSE OX 95 % ON RA- INTEPRETATION IS NORMAL- NO INTERVENTION    HENT:   Head: Normocephalic and atraumatic  Eyes: Conjunctivae and EOM are normal  Pupils are equal, round, and reactive to light  Right eye exhibits no discharge  Left eye exhibits no discharge  No scleral icterus  MM PINK   Neck: Normal range of motion  Neck supple  No JVD present  No tracheal deviation present  No thyromegaly present  Cardiovascular: Normal rate, regular rhythm, normal heart sounds and intact distal pulses  Exam reveals no gallop and no friction rub  No murmur heard  Pulmonary/Chest: Effort normal and breath sounds normal  No stridor  No respiratory distress  She has no wheezes  She has no rales  She exhibits no tenderness  Abdominal: Soft  Bowel sounds are normal  She exhibits no distension and no mass  There is tenderness  There is no rebound and no guarding  No hernia  MILD LOWER ABD TENDERNESS-- NO PERITONEAL SIGNS   Musculoskeletal: Normal range of motion  She exhibits no edema, tenderness or deformity  Lymphadenopathy:     She has no cervical adenopathy  Neurological: She is alert and oriented to person, place, and time  No cranial nerve deficit or sensory deficit  She exhibits normal muscle tone  Coordination normal    Skin: Skin is warm  Capillary refill takes less than 2 seconds  No rash noted  She is not diaphoretic  No erythema  No pallor  Psychiatric: She has a normal mood and affect  Her behavior is normal  Judgment and thought content normal    Nursing note and vitals reviewed  ED Medications  Medications   sodium chloride 0 9 % bolus 500 mL (not administered)   iohexol (OMNIPAQUE) 350 MG/ML injection (MULTI-DOSE) 100 mL (100 mL Intravenous Given 12/23/17 1914)       Diagnostic Studies  Results Reviewed     Procedure Component Value Units Date/Time    CBC and differential [[id number]]     Lab Status:  No result Specimen:  Blood     Basic metabolic panel [[id number]]     Lab Status:  No result Specimen:  Blood     Protime-INR [[id number]]     Lab Status:  No result Specimen:  Blood                  CT abdomen pelvis wo contrast    (Results Pending)              Procedures  Procedures       Phone Contacts  ED Phone Contact    ED Course  ED Course as of Dec 23 2301   Sat Dec 23, 2017   1852 ER MD NOTE- ANO-RECTAL EXAM- VERY RAW EXCORIATED PERIANAL AREA-- NO ABSCESS SEEN -- BROWN- HEME POS STOOL- FEELS HEME POS VARSHA IS DO TO CHRISTOPHER-ANAL SKIN    1853 ER MD NOTE- PT WANTS TO WITHHOLD ON PAIN MEDS AT PRESENT     2023 - ER MD NOTE- CASE D/W- SURGERICAL RESIDENT ON CALL- WILL GET BACK TO ER MD -- PT AND  WARE OF THIS    2049 ER MD NOTE- SURGERICAL RESIDENT CALLED BACK- STATES WILL ADMIT PT TO OBS- UNDER COLORECTAL SERVICE- PT AND  WARE                                MDM  CritCare Time    Disposition  Final diagnoses:   None     ED Disposition     None      Follow-up Information    None       Patient's Medications   Discharge Prescriptions    No medications on file     No discharge procedures on file      ED Provider  Electronically Signed by           Georgie De La Rosa MD  12/23/17 5410

## 2017-12-24 NOTE — ED NOTES
Pt ambulated to the bathroom   Felt like she might have a bowel movement but did not     Guevara NOBLE Marroquin  12/23/17 7616

## 2017-12-24 NOTE — CASE MANAGEMENT
Initial Clinical Review    Admission: Date/Time/Statement: 12/23/2017 @ 20:47    Orders Placed This Encounter   Procedures    Place in Observation     Standing Status:   Standing     Number of Occurrences:   1     Order Specific Question:   Admitting Physician     Answer:   Regino Jules [[id number]]     Order Specific Question:   Level of Care     Answer:   Med Surg [16]    Place in Observation (expected length of stay for this patient is less than two midnights)     Standing Status:   Standing     Number of Occurrences:   1     Order Specific Question:   Admitting Physician     Answer:   Regino Jules [[id number]]     Order Specific Question:   Level of Care     Answer:   Med Surg [16]         ED: Date/Time/Mode of Arrival:   ED Arrival Information     Expected Arrival Acuity Means of Arrival Escorted By Service Admission Type    - 12/23/2017 18:24 Urgent Walk-In Self Colorectal Urgent    Arrival Complaint    Abdominal cramping          Chief Complaint:   Chief Complaint   Patient presents with    Abdominal Pain     pt with j-pouch presents with intermittent sharp lower abdominal cramps since this am  pt states she had diarrhea yesterday around noon so she took three imodium  today had not been able to have a bowel movment at all  states she also took miralax around 2 am this morning  denies n/v  denies fever       History of Illness:  67 y o  female who presents with lower abdominal pain and constipation  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 by Dr Geetha Zhao as per patient  Patient has always had some issues with chronic diarrhea; however 2 days prior, the diarrhea worsened  Patient normally takes imodium and decided to take more imodium 2 days prior because of the worsening diarrhea   She then tried to counter the imodium with miralax, which didn't help and the patient was unable to have a BM thereafter and started getting severe crampy abdominal pain prompting her visit to the ED      Patient denies any sick contacts, and recent changes to her diet  Patient felt nauseous overnight after admission, but no emesis  Patient denies any fevers or chills  In addition, since her overnight admission, patient was able to have 3 watery bowel movements  Patient recently had a pouchoscopy in 08/2017 that demonstrated normal ileal pouch mucosa and another pouchoscopy was recommended for 3 years from now      ED Vital Signs:   ED Triage Vitals [12/23/17 1830]   Temperature Pulse Respirations Blood Pressure SpO2   99 5 °F (37 5 °C) 82 18 170/85 95 %      Temp Source Heart Rate Source Patient Position - Orthostatic VS BP Location FiO2 (%)   Oral Monitor Sitting Right arm --      Pain Score       8        Wt Readings from Last 1 Encounters:   12/23/17 78 8 kg (173 lb 11 6 oz)     Abnormal Labs/Diagnostic Test Results: PT/INR 29 0/2 62    CT of Abd/Pelvis: Distended and fluid-filled J-pouch with mild prominence of fluid-filled small bowel loops proximal to the pouch    ED Treatment:   Medication Administration from 12/23/2017 1823 to 12/23/2017 2138       Date/Time Order Dose Route Action Action by Comments     12/23/2017 2009 sodium chloride 0 9 % bolus 500 mL 0 mL Intravenous Stopped Markos Tsai RN      12/23/2017 1923 sodium chloride 0 9 % bolus 500 mL 500 mL Intravenous Jennifer [address] [place] [address]      12/23/2017 1914 iohexol (OMNIPAQUE) 350 MG/ML injection (MULTI-DOSE) 100 mL 100 mL Intravenous Given Le Squibb      12/23/2017 2010 morphine (PF) 4 mg/mL injection 4 mg 4 mg Intravenous Given Markos Tsai RN      12/23/2017 2046 morphine (PF) 10 mg/mL injection 6 mg 6 mg Intravenous Given Markos Tsai RN      12/23/2017 2122 sodium chloride tablet 1 g 1 g Oral Not Given Benny Warren RN Pt alraedy took todays dose prior to arrival        Physical Exam   Gen: comfortable, NAD  CVS: RRR  Resp: CTAB  Abd: soft, lower tympany, lower distension, tender in the bilateral quadrants, no guarding, no rebound  Neuro: AO X3  Ext: BARBOSA  LUCIUS: deferred, patient with perianal excoriations and states its extremely painful      Past Medical/Surgical History: Active Ambulatory Problems     Diagnosis Date Noted    No Active Ambulatory Problems     Resolved Ambulatory Problems     Diagnosis Date Noted    No Resolved Ambulatory Problems     Past Medical History:   Diagnosis Date    Anxiety     Arrhythmia     Arthritis     Asthma     Blood clot in vein     Disease of thyroid gland     DVT, lower extremity (HCC)     GERD (gastroesophageal reflux disease)     Hyperlipidemia     Hypertension     Hypokalemia     Hyponatremia     Iron deficiency anemia     Irregular heart beat     Manic behavior ([address] )     PE (pulmonary thromboembolism) (HCC)     Tremors of nervous system     Ulcerative colitis (HCC)        Admitting Diagnosis: Abdominal cramping [R10 9]  Lower abdominal pain [R10 30]    Age/Sex: 67 y o  female    Assessment/Plan:     Assessment:  A 67 y o   Female presents with lower abdominal pain and distension 2/2 to distended J pouch and some dilated proximal small bowel loops possible partial small bowel obstruction     Plan:  - continue on clears  - oob/amb/IS  - continue IVF  - pain control prn  - on home coumadin       Admission Orders:  Observation/MedSurg  Bilateral Sequential Compression Device  Clear Liquids  NSS @ 75  Scheduled Meds:   diltiazem 60 mg Oral Q8H [address]   doxepin 50 mg Oral HS   escitalopram 20 mg Oral Daily   fluticasone 1 spray Each Nare Daily   fluticasone-salmeterol 1 puff Inhalation Q12H TEDDY   gabapentin 600 mg Oral TID   hydroxychloroquine 200 mg Oral BID   levothyroxine 50 mcg Oral Early Morning   lisinopril 20 mg Oral Daily   menthol-zinc oxide  Topical BID   pantoprazole 40 mg Oral Early Morning   pravastatin 10 mg Oral Daily With Dinner   sodium chloride 1 g Oral BID   warfarin 5 mg Oral Daily (warfarin)     IV Hydralazine 5 mg x 1 thus far  Hydrocodone po x 2 thus far  IV dilaudid 0 5 mg x 1 thus far  Antivert 25 mg po x 1 thus far  IV Zofran 4 mg x 1 thus far

## 2017-12-25 VITALS
BODY MASS INDEX: 30.78 KG/M2 | RESPIRATION RATE: 16 BRPM | HEART RATE: 72 BPM | OXYGEN SATURATION: 93 % | SYSTOLIC BLOOD PRESSURE: 128 MMHG | TEMPERATURE: 98.2 F | WEIGHT: 173.72 LBS | DIASTOLIC BLOOD PRESSURE: 58 MMHG | HEIGHT: 63 IN

## 2017-12-25 LAB
ANION GAP SERPL CALCULATED.3IONS-SCNC: 9 MMOL/L (ref 4–13)
BASOPHILS # BLD AUTO: 0.09 THOUSANDS/ΜL (ref 0–0.1)
BASOPHILS NFR BLD AUTO: 1 % (ref 0–1)
BUN SERPL-MCNC: 7 MG/DL (ref 5–25)
CALCIUM SERPL-MCNC: 8.2 MG/DL (ref 8.3–10.1)
CHLORIDE SERPL-SCNC: 111 MMOL/L (ref 100–108)
CO2 SERPL-SCNC: 22 MMOL/L (ref 21–32)
CREAT SERPL-MCNC: 0.61 MG/DL (ref 0.6–1.3)
EOSINOPHIL # BLD AUTO: 0.27 THOUSAND/ΜL (ref 0–0.61)
EOSINOPHIL NFR BLD AUTO: 3 % (ref 0–6)
ERYTHROCYTE [DISTWIDTH] IN BLOOD BY AUTOMATED COUNT: 15.1 % (ref 11.6–15.1)
GFR SERPL CREATININE-BSD FRML MDRD: 90 ML/MIN/1.73SQ M
GLUCOSE P FAST SERPL-MCNC: 122 MG/DL (ref 65–99)
GLUCOSE SERPL-MCNC: 122 MG/DL (ref 65–140)
HCT VFR BLD AUTO: 38.2 % (ref 34.8–46.1)
HGB BLD-MCNC: 12.3 G/DL (ref 11.5–15.4)
INR PPP: 3.33 (ref 0.86–1.16)
LYMPHOCYTES # BLD AUTO: 2.14 THOUSANDS/ΜL (ref 0.6–4.47)
LYMPHOCYTES NFR BLD AUTO: 24 % (ref 14–44)
MCH RBC QN AUTO: 32.3 PG (ref 26.8–34.3)
MCHC RBC AUTO-ENTMCNC: 32.2 G/DL (ref 31.4–37.4)
MCV RBC AUTO: 100 FL (ref 82–98)
MONOCYTES # BLD AUTO: 1.44 THOUSAND/ΜL (ref 0.17–1.22)
MONOCYTES NFR BLD AUTO: 16 % (ref 4–12)
NEUTROPHILS # BLD AUTO: 5.06 THOUSANDS/ΜL (ref 1.85–7.62)
NEUTS SEG NFR BLD AUTO: 56 % (ref 43–75)
PLATELET # BLD AUTO: 360 THOUSANDS/UL (ref 149–390)
PMV BLD AUTO: 9.7 FL (ref 8.9–12.7)
POTASSIUM SERPL-SCNC: 4.2 MMOL/L (ref 3.5–5.3)
PROTHROMBIN TIME: 35 SECONDS (ref 12.1–14.4)
RBC # BLD AUTO: 3.81 MILLION/UL (ref 3.81–5.12)
SODIUM SERPL-SCNC: 142 MMOL/L (ref 136–145)
WBC # BLD AUTO: 9 THOUSAND/UL (ref 4.31–10.16)

## 2017-12-25 PROCEDURE — 85025 COMPLETE CBC W/AUTO DIFF WBC: CPT | Performed by: SURGERY

## 2017-12-25 PROCEDURE — 80048 BASIC METABOLIC PNL TOTAL CA: CPT | Performed by: SURGERY

## 2017-12-25 PROCEDURE — 85610 PROTHROMBIN TIME: CPT | Performed by: SURGERY

## 2017-12-25 RX ADMIN — LEVOTHYROXINE SODIUM 50 MCG: 50 TABLET ORAL at 05:09

## 2017-12-25 RX ADMIN — DILTIAZEM HYDROCHLORIDE 60 MG: 60 TABLET, FILM COATED ORAL at 05:10

## 2017-12-25 RX ADMIN — PANTOPRAZOLE SODIUM 40 MG: 40 TABLET, DELAYED RELEASE ORAL at 05:10

## 2017-12-25 RX ADMIN — ACETAMINOPHEN 650 MG: 325 TABLET, FILM COATED ORAL at 07:45

## 2017-12-25 RX ADMIN — SODIUM CHLORIDE TAB 1 GM 1 G: 1 TAB at 08:07

## 2017-12-25 RX ADMIN — LISINOPRIL 20 MG: 20 TABLET ORAL at 08:08

## 2017-12-25 RX ADMIN — ANORECTAL OINTMENT: 15.7; .44; 24; 20.6 OINTMENT TOPICAL at 08:08

## 2017-12-25 RX ADMIN — FLUTICASONE PROPIONATE AND SALMETEROL 1 PUFF: 50; 250 POWDER RESPIRATORY (INHALATION) at 08:08

## 2017-12-25 RX ADMIN — HYDROXYCHLOROQUINE SULFATE 200 MG: 200 TABLET, FILM COATED ORAL at 08:07

## 2017-12-25 RX ADMIN — SODIUM CHLORIDE 75 ML/HR: 0.9 INJECTION, SOLUTION INTRAVENOUS at 00:42

## 2017-12-25 RX ADMIN — ESCITALOPRAM OXALATE 20 MG: 20 TABLET ORAL at 08:07

## 2017-12-25 RX ADMIN — FLUTICASONE PROPIONATE 1 SPRAY: 50 SPRAY, METERED NASAL at 08:08

## 2017-12-25 RX ADMIN — GABAPENTIN 600 MG: 300 CAPSULE ORAL at 08:07

## 2017-12-25 RX ADMIN — ONDANSETRON 4 MG: 2 INJECTION INTRAMUSCULAR; INTRAVENOUS at 08:28

## 2017-12-25 NOTE — PROGRESS NOTES
Progress Note - Colorectal Surgery   [place] 67 y o  female MRN: [id number]  Unit/Bed#: [facility] Encounter: [id number]    Assessment:  A 67 y o  Female presents with lower abdominal pain and distension 2/2 to distended J pouch and some dilated proximal small bowel loops p/w partial small bowel obstruction       Plan:  - d/c ivf  - pain control prn  - oob/amb/IS  - d/c home today    Subjective/Objective       Subjective: No acute events overnight  Tolerated regular diet, still having some crampy abdominal pain, but passing flatus and having BM's  Patient would like to go home today  Objective:     Vitals: Blood pressure 128/58, pulse 72, temperature 98 2 °F (36 8 °C), temperature source Oral, resp  rate 16, height 5' 3" (1 6 m), weight 78 8 kg (173 lb 11 6 oz), SpO2 93 %  ,Body mass index is 30 77 kg/m²  I/O       12/23 0701 - 12/24 0700 12/24 0701 - 12/25 0700 12/25 0701 - 12/26 0700    P  O   680     I V  (mL/kg) 591 3 (7 5)      IV Piggyback 500      Total Intake(mL/kg) 1091 3 (13 8) 680 (8 6)     Urine (mL/kg/hr) 800 1650 (0 9)     Stool 0 0 (0)     Total Output 800 1650      Net +291 3 -970             Unmeasured Stool Occurrence 2 x 1 x           Physical Exam:   Gen: comfortable, NAD  CVS: RRR  Abd: soft, minimally distended, mild upper tenderness, no guarding, no rebound    Lab, Imaging and other studies:      Lab Results   Component Value Date    WBC 9 00 12/25/2017    HGB 12 3 12/25/2017    HCT 38 2 12/25/2017     (H) 12/25/2017     12/25/2017         Lab Results   Component Value Date    GLUCOSE 122 12/25/2017    CALCIUM 8 2 (L) 12/25/2017     12/25/2017    K 4 2 12/25/2017    CO2 22 12/25/2017     (H) 12/25/2017    BUN 7 12/25/2017    CREATININE 0 61 12/25/2017       VTE Pharmacologic Prophylaxis: Warfarin (Coumadin)  VTE Mechanical Prophylaxis: sequential compression device

## 2017-12-25 NOTE — DISCHARGE SUMMARY
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 by Dr Belinda Salazar as per patient  Patient has always had some issues with chronic diarrhea; however 2 days prior, the diarrhea worsened  Patient normally takes imodium and decided to take more imodium 2 days prior because of the worsening diarrhea  She then tried to counter the imodium with miralax, which didn't help and the patient was unable to have a BM thereafter and started getting severe crampy abdominal pain prompting her visit to the ED      Patient denies any sick contacts, and recent changes to her diet  Patient felt nauseous overnight after admission, but no emesis  Patient denies any fevers or chills  In addition, since her overnight admission, patient was able to have 3 watery bowel movements  Patient recently had a pouchoscopy in 08/2017 that demonstrated normal ileal pouch mucosa and another pouchoscopy was recommended for 3 years from now  Procedures Performed: No orders of the defined types were placed in this encounter  Hospital Course: A 67 y  o female presents with partial small bowel obstruction 2/2 imodium and dehydration  Patient was admitted for observation onto the colorectal team and started on IVF  On HOD#1, patient started to regain bowel function and started to show improvement and was advanced from a clear liquid diet to a regular diet  On HOD#2, patient is stable for discharge home, tolerating a regular diet, and continues to have bowel function  Patient was instructed to minimize the imodium use as best as possible      Significant Findings, Care, Treatment and Services Provided: IV fluid hydration    Lab Results:      Lab Results   Component Value Date    WBC 9 00 12/25/2017    HGB 12 3 12/25/2017    HCT 38 2 12/25/2017     (H) 12/25/2017     12/25/2017       Lab Results   Component Value Date    GLUCOSE 122 12/25/2017    CALCIUM 8 2 (L) 12/25/2017     12/25/2017    K 4 2 12/25/2017    CO2 22 12/25/2017     (H) 12/25/2017    BUN 7 12/25/2017    CREATININE 0 61 15/31/7469         Complications: None    Discharge Diagnosis: Partial small bowel obstruction    Condition at Discharge: stable     Discharge instructions/Information to patient and family:   See after visit summary for information provided to patient and family  Provisions for Follow-Up Care:  See after visit summary for information related to follow-up care and any pertinent home health orders  Disposition: Home    Planned Readmission:No    Discharge Statement   I spent 30 minutes discharging the patient  This time was spent on the day of discharge  I had direct contact with the patient on the day of discharge  Additional documentation is required if more than 30 minutes were spent on discharge  Discharge Medications:  See after visit summary for reconciled discharge medications provided to patient and family

## 2017-12-25 NOTE — PLAN OF CARE
GASTROINTESTINAL - ADULT     Maintains or returns to baseline bowel function Progressing        PAIN - ADULT     Verbalizes/displays adequate comfort level or baseline comfort level Progressing        Potential for Falls     Patient will remain free of falls Progressing

## 2017-12-25 NOTE — DISCHARGE INSTRUCTIONS
Partial Small Bowel Obstruction   WHAT YOU NEED TO KNOW:   A partial small bowel obstruction occurs when your small intestine is  partly blocked  The blockage prevents food and waste from passing through normally  DISCHARGE INSTRUCTIONS:   Follow up with your healthcare provider as directed: Write down your questions so you remember to ask them during your visits  Contact your healthcare provider if:   · You have nausea and are vomiting  · Your abdomen is enlarged  · You cannot pass a bowel movement or gas  · You lose weight without trying  · You have blood in your bowel movement  · You have questions or concerns about your condition or care  Return to the emergency department if:   · You have severe abdominal pain that does not get better  · Your heart is beating faster than normal for you  · You have a fever

## 2017-12-28 ENCOUNTER — GENERIC CONVERSION - ENCOUNTER (OUTPATIENT)
Dept: INTERNAL MEDICINE CLINIC | Facility: CLINIC | Age: 72
End: 2017-12-28

## 2017-12-28 ENCOUNTER — APPOINTMENT (OUTPATIENT)
Dept: LAB | Facility: CLINIC | Age: 72
End: 2017-12-28
Payer: MEDICARE

## 2017-12-28 DIAGNOSIS — R19.7 DIARRHEA: ICD-10-CM

## 2017-12-28 LAB
ALBUMIN SERPL BCP-MCNC: 3.5 G/DL (ref 3.5–5)
ALP SERPL-CCNC: 60 U/L (ref 46–116)
ALT SERPL W P-5'-P-CCNC: 24 U/L (ref 12–78)
ANION GAP SERPL CALCULATED.3IONS-SCNC: 8 MMOL/L (ref 4–13)
AST SERPL W P-5'-P-CCNC: 23 U/L (ref 5–45)
BILIRUB SERPL-MCNC: 0.4 MG/DL (ref 0.2–1)
BUN SERPL-MCNC: 8 MG/DL (ref 5–25)
CALCIUM SERPL-MCNC: 9.1 MG/DL (ref 8.3–10.1)
CHLORIDE SERPL-SCNC: 100 MMOL/L (ref 100–108)
CO2 SERPL-SCNC: 31 MMOL/L (ref 21–32)
CREAT SERPL-MCNC: 0.81 MG/DL (ref 0.6–1.3)
GFR SERPL CREATININE-BSD FRML MDRD: 72 ML/MIN/1.73SQ M
GLUCOSE P FAST SERPL-MCNC: 103 MG/DL (ref 65–99)
POTASSIUM SERPL-SCNC: 4 MMOL/L (ref 3.5–5.3)
PROT SERPL-MCNC: 7 G/DL (ref 6.4–8.2)
SODIUM SERPL-SCNC: 139 MMOL/L (ref 136–145)

## 2017-12-28 PROCEDURE — 36415 COLL VENOUS BLD VENIPUNCTURE: CPT

## 2017-12-28 PROCEDURE — 80053 COMPREHEN METABOLIC PANEL: CPT

## 2018-01-09 NOTE — MISCELLANEOUS
Message   Recorded as Task   Date: 10/23/2017 09:57 AM, Created By: Zion Pitt   Task Name: Med Renewal Request   Assigned To: Ashley To   Regarding Patient: Harjeet Teixeira, Status: Active   Comment:    Opal Colvin - 23 Oct 2017 9:57 AM     TASK CREATED  Caller: Self; Renew Medication; [phone] (Home); [phone] (Work)  prov is d87, pt would like a 3 mth supply of the patch,  [facility],  pts # [phone] if needed   Janny Flores - 23 Oct 2017 9:59 AM     TASK EDITED   Luh Holcomb - 23 Oct 2017 10:12 AM     TASK EDITED  pt said dr Juan Beck gave her the estradiol patches and she wants to resume them, rx to ed told pt not sure if she will fill them but if not she will need an apt        Active Problems    1  Abnormal weight gain (783 1) (R63 5)   2  Acid reflux (530 81) (K21 9)   3  Adult BMI > 30 (V85 30)   4  Allergic rhinitis (477 9) (J30 9)   5  Anemia (285 9) (D64 9)   6  Anxiety (300 00) (F41 9)   7  Asthma (493 90) (J45 909)   8  Depression (311) (F32 9)   9  Diarrhea (787 91) (R19 7)   10  Encounter for current long-term use of anticoagulants (V58 61) (Z79 01)   11  Encounter for gynecological examination without abnormal finding (V72 31) (Z01 419)   12  Encounter for postoperative care (V58 49) (Z48 89)   13  Encounter for screening mammogram for malignant neoplasm of breast (V76 12)    (Z12 31)   14  End of battery life of deep brain stimulator (V53 02) (Z46 2)   15  Essential tremor (333 1) (G25 0)   16  History of allergy (V15 09) (Z88 9)   17  History of esophageal dilatation (V45 89) (Z98 890)   18  Hyperlipidemia (272 4) (E78 5)   19  Hypertension (401 9) (I10)   20  Hypomagnesemia (275 2) (E83 42)   21  Hyponatremia (276 1) (E87 1)   22  Hypothyroidism (244 9) (E03 9)   23  Insomnia (780 52) (G47 00)   24  Iron deficiency anemia (280 9) (D50 9)   25  Lower back pain (724 2) (M54 5)   26  Lower extremity pain, left (729 5) (M79 605)   27   Menopausal state (627 2) (N95 1)   28  Need for pneumococcal vaccination (V03 82) (Z23)   29  Need for prophylactic vaccination and inoculation against influenza (V04 81) (Z23)   30  Obesity (278 00) (E66 9)   31  Osteoarthritis of right knee (715 96) (M17 11)   32  Osteopenia (733 90) (M85 80)   33  Peripheral neuropathy (356 9) (G62 9)   34  Portal vein thrombosis (452) (I81)   35  Post-operative state (V45 89) (Z98 890)   36  Prediabetes (790 29) (R73 09)   37  S/P deep brain stimulator placement (V45 89) (Z96 89)   38  Salpingitis/oophoritis, 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)   39  Sjogren's syndrome (710 2) (M35 00)   40  Spinal stenosis (724 00) (M48 00)   41  Supraventricular tachycardia (427 89) (I47 1)   42  Ulcerative colitis without complications (316 4) (A38 51)   43  Unsteady gait (781 2) (R26 81)   44  Visit for pre-operative examination (V72 84) (Z01 818)   45  Visit for screening mammogram (V76 12) (Z12 31)   46  Vulvar itching (698 1) (L29 2)    Current Meds   1  Advair Diskus 250-50 MCG/DOSE Inhalation Aerosol Powder Breath Activated; inhale 1   puff twice daily; Therapy: 44KFS0890 to (Evaluate:78Nme1951)  Requested for: 12Jun2017; Last   Rx:12Jun2017 Ordered   2  Clobetasol Propionate 0 05 % External Ointment; APPLY AND GENTLY MASSAGE INTO   AFFECTED AREA(S) TWICE DAILY; Therapy: 64QOD2804 to (Last Raquel Grad)  Requested for: 76QXH3123 Ordered   3  Dexilant 60 MG Oral Capsule Delayed Release; TAKE 1 CAPSULE DAILY EVERY   MORNING BEFORE BREAKFAST Recorded   4  DilTIAZem HCl ER Coated Beads 180 MG Oral Capsule Extended Release 24 Hour;   take 1 capsule daily; Therapy: 40Xws6009 to (Evaluate:25Mar2018)  Requested for: 91UHC0430; Last   Rx:12Exv9962 Ordered   5  Doxepin HCl - 50 MG Oral Capsule; TAKE 1 CAPSULE AT BEDTIME; Therapy: 81Wyu0557 to (Oral Dorian)  Requested for: 63LEZ9486; Last   Rx:06Sep2017 Ordered   6  Escitalopram Oxalate 20 MG Oral Tablet (Lexapro); Take 1 tablet daily;    Therapy: 62DBT1926 to (Evaluate:41Hpp3934)  Requested for: 79Hdf6444; Last   Rx:30Hop5569 Ordered   7  Fluticasone Propionate 50 MCG/ACT Nasal Suspension; use 2 sprays in each nostril   once daily; Therapy: 36JVN5826 to (Evaluate:01Apr2018)  Requested for: 38PTU9482; Last   Rx:47Pjw0995 Ordered   8  Gabapentin 100 MG Oral Capsule; TAKE ONE CAPSULE BY MOUTH TWICE DAILY   EVERY MORNING & AFTERNOON;   Therapy: 88AOX6682 to (Evaluate:49Gxk7543)  Requested for: 27Jun2017; Last   OL:07YUW3800 Ordered   9  Gabapentin 600 MG Oral Tablet; TAKE 1 TAB IN AM, 1 TAB IN AFTERNOON, AND 2   TABS QHS  Requested for: 15Utj4782; Last Rx:84Cqe4987 Ordered   10  Hydrocodone-Acetaminophen 7 5-325 MG Oral Tablet; TAKE 1 TABLET FOUR TIMES A    DAY AS NEEDED FOR PAIN;    Therapy: 01YVR0280 to (Evaluate:20Sep2017); Last Rx:12Izw5399 Ordered   11  Hydroxychloroquine Sulfate 200 MG Oral Tablet; TAKE 1 TABLET TWICE DAILY WITH    FOOD; Therapy: 37Fbx7586 to (Rahel Mt)  Requested for: 81Pkv5535; Last    Rx:73Kca9576; Status: ACTIVE - Renewal Voided Ordered   12  Levothyroxine Sodium 50 MCG Oral Tablet; TAKE ONE TABLET BY MOUTH ONCE    DAILY; Therapy: 92RUF5416 to (Evaluate:26Mar2018)  Requested for: 80YSO3660; Last    Rx:64Ezf6415 Ordered   13  Lisinopril 20 MG Oral Tablet; TAKE 1 TABLET DAILY AS DIRECTED; Therapy: 79TGE9982 to (Evaluate:31Qkc8526)  Requested for: 53Pgj1031; Last    Rx:81Zuv3119 Ordered   14  LORazepam 1 MG Oral Tablet; TAKE 1 TABLET Every 6 hours; Therapy: 78WWH6105 to (Evaluate:53Vkr6575); Last Rx:99Gum1805 Ordered   15  Meclizine HCl - 25 MG Oral Tablet; TAKE 1 TABLET Every 6 hours PRN; Therapy: 47Sja8889 to (Evaluate:07Jan2016)  Requested for: 71SOO3405; Last    Rx:95Ygf5027 Ordered   16  ProAir  (90 Base) MCG/ACT Inhalation Aerosol Solution; INHALE 1 PUFFS    Every 6-8 hours PRN SOB; Therapy: 62AGU3009 to (Last Rx:80Udy7042)  Requested for: 94Nng5327 Ordered   17   Simvastatin 5 MG Oral Tablet; take one tablet by mouth every day; Therapy: 06GNO7851 to (Evaluate:41Xye3581)  Requested for: 92FHJ9518; Last    WE:59ANH1881 Ordered   18  Sodium Chloride 1 GM Oral Tablet; Take 1 tablet twice daily  Requested for: 55VLX8784;    Last Rx:70Yst2198 Ordered   19  Torsemide 10 MG Oral Tablet; TAKE 1 TABLET DAILY PRN for edema; Therapy: 66ZPM8922 to (Evaluate:04Nws5370)  Requested for: 60WAT2709; Last    Rx:46Ipr6416 Ordered   21  Vitamin D3 2000 UNIT Oral Tablet; 1 TAB DAILY Recorded   21  Warfarin Sodium 5 MG Oral Tablet; TAKE 2 TABLET Daily or as directed; Therapy: 88EXI3113 to (Mervin Hurtado)  Requested for: 27VWB5609; Last    LB:77BFJ6561 Ordered   22  ZyrTEC Allergy 10 MG Oral Tablet; TAKE 1 TABLET AT BEDTIME; Therapy: 62YGR5980 to Recorded    Allergies    1  Indocin SOLR   2  Macrobid CAPS   3  Penicillins   4   Sulfa Drugs    Signatures   Electronically signed by : Kali Quiros, ; Oct 23 2017 10:12AM EST                       (Author)

## 2018-01-09 NOTE — RESULT NOTES
Verified Results  (1) PT WITH INR 66JBG0405 01:54PM Mireya Merlin     Test Name Result Flag Reference   INR 1 83 H 0 86-1 16   PT 21 8 seconds H 12 1-14 4

## 2018-01-09 NOTE — RESULT NOTES
Verified Results  (1) PT WITH INR 62KQC8199 11:21AM Juan F Shear     Test Name Result Flag Reference   INR 1 59 H 0 86-1 16   PT 18 5 seconds H 12 0-14 3

## 2018-01-10 NOTE — MISCELLANEOUS
Message   Recorded as Task   Date: 11/21/2016 08:59 AM, Created By: Emmie Hodges   Task Name: Medical Complaint Callback   Assigned To: Sienna Ross   Regarding Patient: Grecia Vaz, Status: In Progress   Comment:    Marianne Lyon - 21 Nov 2016 8:59 AM     TASK CREATED  Caller: Self; Medical Complaint; [phone] (Home); [phone] (Work)  pt has a bacterial infection- she can't come in for appt because she has a stomach virus  [phone]  [facility]  Toby Pimentel - 21 Nov 2016 9:12 AM     TASK IN PROGRESS   Shady Lisette - 21 Nov 2016 9:13 AM     TASK IN PROGRESS   Toby Pimentel - 21 Nov 2016 9:17 AM     TASK EDITED  pt states she had alot of itching and burnign on the opening of vagina states she ahd itched alot, [facility]conazole [facility] to [facility]  Active Problems    1  Abnormal weight gain (783 1) (R63 5)   2  Acid reflux (530 81) (K21 9)   3  Adult BMI > 30 (V85 30) (E66 8)   4  Allergic rhinitis (477 9) (J30 9)   5  Anemia (285 9) (D64 9)   6  Anxiety (300 00) (F41 9)   7  Asthma (493 90) (J45 909)   8  Depression (311) (F32 9)   9  Diarrhea (787 91) (R19 7)   10  Edema (782 3) (R60 9)   11  Encounter for current long-term use of anticoagulants (V58 61) (Z79 01)   12  Encounter for gynecological examination without abnormal finding (V72 31) (Z01 419)   13  Encounter for screening mammogram for malignant neoplasm of breast (V76 12)    (Z12 31)   14  Essential tremor (333 1) (G25 0)   15  Follow-up examination following surgery (V67 00) (Z09)   16  History of allergy (V15 09) (Z88 9)   17  Hyperlipidemia (272 4) (E78 5)   18  Hypertension (401 9) (I10)   19  Hypokalemia (276 8) (E87 6)   20  Hypomagnesemia (275 2) (E83 42)   21  Hyponatremia (276 1) (E87 1)   22  Hypothyroidism (244 9) (E03 9)   23  Insomnia (780 52) (G47 00)   24  Iron deficiency anemia (280 9) (D50 9)   25  Lower back pain (724 2) (M54 5)   26  Lower extremity pain, left (729 5) (M79 605)   27   Lump or mass in breast (611 72) (N63)   28  Mammogram abnormal (793 80) (R92 8)   29  Menopausal state (627 2) (N95 1)   30  Muscle spasm (728 85) (M62 838)   31  Need for pneumococcal vaccination (V03 82) (Z23)   32  Need for prophylactic vaccination and inoculation against influenza (V04 81) (Z23)   33  Obesity (278 00) (E66 9)   34  Osteoarthritis of right knee (715 96) (M17 9)   35  Osteopenia (733 90) (M85 80)   36  Other abnormal finding of urine (791 9) (R82 99)   37  Peripheral neuropathy (356 9) (G62 9)   38  Portal vein thrombosis (452) (I81)   39  Prediabetes (790 29) (R73 09)   40  Primary osteoarthritis of right knee (715 16) (M17 11)   41  S/P deep brain stimulator placement (V45 89) (Z96 89)   42  Salpingitis/oophoritis, 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)   43  Spinal stenosis (724 00) (M48 00)   44  Supraventricular tachycardia (427 89) (I47 1)   45  Tear film insufficiency, unspecified laterality (375 15) (H04 129)   46  Thrombocytosis (238 71) (D47 3)   47  Ulcerative colitis without complications (049 2) (L33 20)   48  Unsteady gait (781 2) (R26 81)   49  Vaginal yeast infection (112 1) (B37 3)   50  Vertigo (780 4) (R42)   51  Visit for pre-operative examination (V72 84) (Z01 818)   52  Visit for screening mammogram (V76 12) (Z12 31)    Current Meds   1  Advair Diskus 250-50 MCG/DOSE Inhalation Aerosol Powder Breath Activated; Inhale 1   puff twice daily; Therapy: 39IYY2577 to (Evaluate:11Jun2016)  Requested for: 15VXZ2971; Last   Rx:35Uym3336 Ordered   2  Baclofen 10 MG Oral Tablet; TAKE 1 TABLET 3 TIMES DAILY AS NEEDED FOR MUSCLE   SPASM  Requested for: 43TNZ2787; Last Rx:31Mar2016 Ordered   3  Calcium 600+D 600-200 MG-UNIT Oral Tablet; take 2 tablet daily; Therapy: 22YBB1397 to Recorded   4  Co Q 10 100 MG Oral Capsule; take 1 capsule daily; Therapy: 65NMB8690 to Recorded   5  Dexilant 60 MG Oral Capsule Delayed Release; Therapy: (Recorded:19Mky4975) to Recorded   6   DiltiaZEM HCl ER Coated Beads 180 MG Oral Capsule Extended Release 24 Hour; take   1 capsule daily; Therapy: 74Teo4751 to (Evaluate:18Mar2017)  Requested for: 79Rpr3152; Last   Rx:37Xqi5843 Ordered   7  Escitalopram Oxalate 20 MG Oral Tablet (Lexapro); Take 1 tablet daily; Therapy: 16PYG4143 to (Evaluate:18Mar2017)  Requested for: 85Sng7222; Last   Rx:23Bhd5435 Ordered   8  Estradiol 0 05 MG/24HR Transdermal Patch Weekly (Climara); APPLY 1 PATCH TO SKIN   WEEKLY AS DIRECTED; Therapy: 67UIL3557 to (Evaluate:00Bod5354)  Requested for: 00Jlg5801; Last   Rx:37Olv3272 Ordered   9  Fluticasone Propionate 50 MCG/ACT Nasal Suspension; USE 2 SPRAYS IN EACH   NOSTRIL ONCE DAILY; Therapy: 24KIH1968 to (Evaluate:22Vne4445)  Requested for: 11Prr2317; Last   Rx:03Teh2799 Ordered   10  Gabapentin 100 MG Oral Capsule; TAKE 1 CAPSULE TWICE DAILY EVERY MORNING    AND AFTERNOON;    Therapy: 94RMF9829 to (Evaluate:18Mar2017)  Requested for: 75Erl6956; Last    Rx:32Qtg7292 Ordered   11  Gabapentin 600 MG Oral Tablet; TAKE 1 TAB IN AM, 1 TAB IN AFTERNOON, AND 2    TABS QHS  Requested for: 25Gii5082; Last Rx:46Vwi1079 Ordered   12  Hydrocodone-Acetaminophen 7 5-325 MG Oral Tablet; TAKE 1 TABLET FOUR TIMES A    DAY AS NEEDED FOR PAIN;    Therapy: 74PIT9406 to (Evaluate:81Evc9540); Last Rx:06Jfm9119 Ordered   13  Hydroxychloroquine Sulfate 200 MG Oral Tablet; TAKE 1 TABLET TWICE DAILY WITH    FOOD; Therapy: 42Liq2910 to (Herlinda Hill)  Requested for: 57Ejn2624; Last    Rx:84Wjx0144; Status: ACTIVE - Renewal Voided Ordered   14  Levothyroxine Sodium 50 MCG Oral Tablet; Take 1 tablet daily; Therapy: 42PHT8101 to (Evaluate:18Mar2017)  Requested for: 28Mbi4774; Last    Rx:40Ycp9504 Ordered   15  Lisinopril 20 MG Oral Tablet; Take 1 tablet twice daily; Therapy: 27WXW0771 to (Evaluate:18Mar2017)  Requested for: 11Eyb7463; Last    Rx:19Sep2016 Ordered   16  Loperamide HCl - 2 MG Oral Capsule; take 1 capsule daily; Therapy: (Recorded:07Apr2014) to Recorded   17   LORazepam 1 MG Oral Tablet; TAKE 1 TABLET Every 6 hours; Therapy: 66ENH4697 to (Evaluate:27Nov2016); Last Rx:15Byu3039 Ordered   18  Meclizine HCl - 25 MG Oral Tablet; TAKE 1 TABLET Every 6 hours PRN; Therapy: 29Njz2191 to (Evaluate:07Jan2016)  Requested for: 70RKW9020; Last    Rx:60Xop9552 Ordered   19  Multi-Vitamin Oral Tablet; TAKE 1 TABLET DAILY; Therapy: 55HSM8035 to Recorded   20  Omega-3 Fish Oil 1200 MG Oral Capsule; TAKE 1 CAPSULE Daily; Therapy: 63YFY3148 to Recorded   21  Simvastatin 5 MG Oral Tablet; TAKE 1 TABLET DAILY; Therapy: 40OKJ1529 to (Evaluate:65Mah7756)  Requested for: 21Jun2016; Last    Rx:21Jun2016 Ordered   22  Sodium Chloride 1 GM Oral Tablet; Take 1 tablet twice daily  Requested for: 82HCF5798;    Last Rx:43Oci0594 Ordered   23  Torsemide 10 MG Oral Tablet; TAKE 1 TABLET DAILY PRN for edema; Therapy: 64MHK1571 to (Evaluate:64Tbp6326)  Requested for: 17JWH7537; Last    Rx:92Usr4414 Ordered   24  Vitamin D3 TABS; Therapy: (Recorded:04Apr2016) to Recorded   25  Warfarin Sodium 5 MG Oral Tablet; TAKE 2 TABLET Daily or as directed; Therapy: 81CYH8520 to (Evaluate:27Zmi1313)  Requested for: 85UJP5776; Last    Rx:28Jun2016 Ordered   26  ZyrTEC Allergy 10 MG Oral Tablet; TAKE 1 TABLET AT BEDTIME; Therapy: 54DXM3190 to Recorded    Allergies    1  Indocin SOLR   2  Macrobid CAPS   3  Penicillins   4   Sulfa Drugs    Plan  Vaginal yeast infection    · Terconazole 0 4 % Vaginal Cream; INSERT 1 APPLICATORFUL  INTRAVAGINALLY AT BEDTIME NIGHTLY    Signatures   Electronically signed by : Ysabel Askew LPN; Nov 21 2474  4:23IE EST                       (Author)

## 2018-01-10 NOTE — RESULT NOTES
Verified Results  (1) PT WITH INR 22Agx7084 12:03PM Yariel Qureshi     Test Name Result Flag Reference   INR 2 28 H 0 86-1 16   PT 24 3 seconds H 12 0-14 3

## 2018-01-10 NOTE — RESULT NOTES
Verified Results  (1) PT WITH INR 27OZA4097 12:18PM Marylee Paterson     Test Name Result Flag Reference   INR 2 42 H 0 86-1 16   PT 27 2 seconds H 12 1-14 4

## 2018-01-11 NOTE — MISCELLANEOUS
Message  S/w pt on telephone who is POD 2  She reports that she is doing very well since the surgery and incision is wnl  She denies any fevers, chills, or pain  She only mild tenderness at the incision site  Went over incision care with patient and advised her to call the office with any further questions or concerns, or if any incisional issues or fevers would arise  Verified date/time/location of her upcoming POV  Active Problems    1  Abnormal weight gain (783 1) (R63 5)   2  Acid reflux (530 81) (K21 9)   3  Adult BMI > 30 (V85 30) (E66 8)   4  Allergic rhinitis (477 9) (J30 9)   5  Anemia (285 9) (D64 9)   6  Anxiety (300 00) (F41 9)   7  Asthma (493 90) (J45 909)   8  Depression (311) (F32 9)   9  Diarrhea (787 91) (R19 7)   10  Encounter for current long-term use of anticoagulants (V58 61) (Z79 01)   11  Encounter for gynecological examination without abnormal finding (V72 31) (Z01 419)   12  Encounter for screening mammogram for malignant neoplasm of breast (V76 12)    (Z12 31)   13  End of battery life of deep brain stimulator (V53 02) (Z46 2)   14  Essential tremor (333 1) (G25 0)   15  History of allergy (V15 09) (Z88 9)   16  Hyperlipidemia (272 4) (E78 5)   17  Hypertension (401 9) (I10)   18  Hypokalemia (276 8) (E87 6)   19  Hypomagnesemia (275 2) (E83 42)   20  Hyponatremia (276 1) (E87 1)   21  Hypothyroidism (244 9) (E03 9)   22  Insomnia (780 52) (G47 00)   23  Iron deficiency anemia (280 9) (D50 9)   24  Lower back pain (724 2) (M54 5)   25  Lower extremity pain, left (729 5) (M79 605)   26  Menopausal state (627 2) (N95 1)   27  Need for pneumococcal vaccination (V03 82) (Z23)   28  Need for prophylactic vaccination and inoculation against influenza (V04 81) (Z23)   29  Obesity (278 00) (E66 9)   30  Osteoarthritis of right knee (715 96) (M17 11)   31  Osteopenia (733 90) (M85 80)   32  Peripheral neuropathy (356 9) (G62 9)   33  Portal vein thrombosis (452) (I81)   34   Post-operative state (V45 89) (Z98 890)   35  Prediabetes (790 29) (R73 09)   36  S/P deep brain stimulator placement (V45 89) (Z96 89)   37  Salpingitis/oophoritis, 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)   38  Spinal stenosis (724 00) (M48 00)   39  Supraventricular tachycardia (427 89) (I47 1)   40  Ulcerative colitis without complications (997 7) (B15 47)   41  Unsteady gait (781 2) (R26 81)   42  Visit for pre-operative examination (V72 84) (Z01 818)   43  Visit for screening mammogram (V76 12) (Z12 31)    Current Meds   1  Advair Diskus 250-50 MCG/DOSE Inhalation Aerosol Powder Breath Activated; inhale 1   puff twice daily; Therapy: 52KIF9749 to (Evaluate:45Omg4990)  Requested for: 12Jun2017; Last   Rx:12Jun2017 Ordered   2  Clindamycin HCl - 300 MG Oral Capsule; TAKE 2 CAPSULE Every 8 hours; Therapy: 89RRI3870 to (Evaluate:26Jun2017)  Requested for: 49UGZ4071; Last   Rx:19Jun2017 Ordered   3  Dexilant 60 MG Oral Capsule Delayed Release; TAKE 1 CAPSULE DAILY EVERY   MORNING BEFORE BREAKFAST Recorded   4  DilTIAZem HCl ER Coated Beads 180 MG Oral Capsule Extended Release 24 Hour;   take 1 capsule daily; Therapy: 40Qvh0893 to (Evaluate:60Yiz4283)  Requested for: 71XJU4448; Last   Rx:30Mar2017 Ordered   5  Escitalopram Oxalate 20 MG Oral Tablet (Lexapro); Take 1 tablet daily; Therapy: 49YKI8634 to (Evaluate:13Jun2017)  Requested for: 23MKT6628; Last   Rx:16Fky3121 Ordered   6  Fluticasone Propionate 50 MCG/ACT Nasal Suspension; use 2 sprays in each nostril   once daily; Therapy: 43XKZ2135 to [phone]  Requested for: 28Apr2017; Last   Rx:28Apr2017 Ordered   7  Gabapentin 100 MG Oral Capsule; TAKE ONE CAPSULE BY MOUTH TWICE DAILY   EVERY MORNING & AFTERNOON;   Therapy: 83KYN2992 to (Evaluate:13Jun2017)  Requested for: 94OJW0911; Last   Rx:28Jyq7256 Ordered   8  Gabapentin 600 MG Oral Tablet; TAKE 1 TAB IN AM, 1 TAB IN AFTERNOON, AND 2   TABS QHS  Requested for: 11Bhj1516; Last Rx:96Etm7350 Ordered   9  Hydrocodone-Acetaminophen 7 5-325 MG Oral Tablet; TAKE 1 TABLET FOUR TIMES A   DAY AS NEEDED FOR PAIN;   Therapy: 12AVK9835 to (Evaluate:40Jpz8442); Last Rx:12Jun2017 Ordered   10  Hydroxychloroquine Sulfate 200 MG Oral Tablet; TAKE 1 TABLET TWICE DAILY WITH    FOOD; Therapy: 53Srq6088 to (Grayson Mccormick)  Requested for: 27Wbo5728; Last    Rx:71Hes5252; Status: ACTIVE - Renewal Voided Ordered   11  Levothyroxine Sodium 50 MCG Oral Tablet; TAKE ONE TABLET BY MOUTH ONCE    DAILY; Therapy: 71QMU3958 to (Evaluate:87Ftl8966)  Requested for: 45LZK0372; Last    Rx:61Kwh9464 Ordered   12  Lisinopril 20 MG Oral Tablet; Take 1 tablet twice daily; Therapy: 92VDX7821 to (Evaluate:01Oct2017)  Requested for: 21Cgb2571; Last    Rx:29Qao3241 Ordered   13  LORazepam 1 MG Oral Tablet; TAKE 1 TABLET Every 6 hours; Therapy: 95UJJ5669 to (Evaluate:14Apr2017); Last Rx:83Bfz4253 Ordered   14  Meclizine HCl - 25 MG Oral Tablet; TAKE 1 TABLET Every 6 hours PRN; Therapy: 62Bor2434 to (Evaluate:07Jan2016)  Requested for: 89BBN3196; Last    Rx:58Gpy2912 Ordered   15  ProAir  (90 Base) MCG/ACT Inhalation Aerosol Solution; INHALE 1 PUFFS    Every 6-8 hours PRN SOB; Therapy: 55Smz7190 to (Last Rx:70Wib0735)  Requested for: 38Jqd4997 Ordered   16  Simvastatin 5 MG Oral Tablet; take one tablet by mouth every day; Therapy: 75HOL1010 to (Evaluate:13Jun2017)  Requested for: 36WXN6888; Last    Rx:81Pmj0094 Ordered   17  Sodium Chloride 1 GM Oral Tablet; Take 1 tablet twice daily  Requested for: 21YHQ5414;    Last Rx:17Zwo3461 Ordered   18  Torsemide 10 MG Oral Tablet; TAKE 1 TABLET DAILY PRN for edema; Therapy: 13BBC4600 to (Evaluate:91Hal7586)  Requested for: 27SWR1171; Last    Rx:03Zgk6309 Ordered   19  Vitamin D3 2000 UNIT Oral Tablet; 1 TAB DAILY Recorded   20  Warfarin Sodium 5 MG Oral Tablet; TAKE 2 TABLET Daily or as directed;     Therapy: 03LFO6897 to ([phone])  Requested for: 57KPC1534; Last CA:99QOK5059 Ordered   21  ZyrTEC Allergy 10 MG Oral Tablet; TAKE 1 TABLET AT BEDTIME; Therapy: 34MKT5489 to Recorded    Allergies    1  Indocin SOLR   2  Macrobid CAPS   3  Penicillins   4   Sulfa Drugs    Signatures   Electronically signed by : Yousif Bird RN; Jun 22 2017  4:09PM EST                       (Author)

## 2018-01-11 NOTE — MISCELLANEOUS
Message   Recorded as Task   Date: 12/28/2016 09:01 AM, Created By: Jany Maxwell   Task Name: Follow Up   Assigned To: Paco Gamble   Regarding Patient: Aicha Osborne, Status: In Progress   Comment:    Sergio Kumar - 28 Dec 2016 9:01 AM     TASK CREATED  pt called this morning regaurding her PT INR Blood work  Pt states around 12/5 she stopped her coumadin for a tooth being pulled on 12/8, on 12/9 she restarted the coumadin  then she had BW done and was told to skip 12/21 and then she took 2 5mg 12/22 to 12/26, pt states on 12/26 she then had to stop her coumadin again due to a scope she is having done on 12/29  Pt states she is to take 5mg on 12/30 and test on 12/31 because she is leaving for [facility] late on 12/31/16 for 3 months  Pt would like to know if she should wait to have this blood work done because she will only be back on this medication for 1 day before having her BW done again? Please call pt Cell phone   Paco Gamble - 28 Dec 2016 10:31 AM     TASK REPLIED TO: Previously Assigned To [facility],Team  patient should complete blood work prior to leaving for [facility] the [facility]    also, pt needs to complete blood work while at [facility] [employer] for INR as would not want Syed Fuentes to have a problem such as bleeding while taking this medication    ordered INR labs for patient to take with her to [facility] as well to complete there    let me know if  questions    thanks   Sergio Kumar - 28 Dec 2016 10:48 AM     TASK IN PROGRESS   Sergio Kumar - 28 Dec 2016 10:53 AM     TASK EDITED  pt notified, pt states she has a sore throat, ears itch, cough with congestion  No fevers, no body aches  pt would like to know if you could prescibe her something for her cold, and also states she takes Advair but does not have an emergancy inhaler and wanted to know if you can prescribe one     Paco Gamble - 28 Dec 2016 11:00 AM     TASK REPLIED TO: Previously Assigned To Paco Gamble  recommend to be seen for her symptoms    can go to a local /walk-in care today for evaluation    thanks   Sergio Kumar - 28 Dec 2016 11:08 AM     TASK EDITED  pt notified and will go to a  center, pt would like to know if you can prescibe her an emergancy inhaler, states she has talked to Dr Brenton Whitley in the past about getting one but at that time stated she didnt want it   Pt would like one now before she leave with the current symptoms she is having just in case        Plan  Asthma    · ProAir  (90 Base) MCG/ACT Inhalation Aerosol Solution; INHALE 1 PUFFS  Every 6-8 hours PRN SOB    Signatures   Electronically signed by : Yaa Carbajal DO; Dec 28 2016 11:18AM EST                       (Author)

## 2018-01-11 NOTE — RESULT NOTES
Verified Results  (1) PT WITH INR 29Nxw1349 06:29AM Nancy Salvage     Test Name Result Flag Reference   INR 2 78 H 0 86-1 16   PT 28 2 seconds H 12 0-14 3

## 2018-01-11 NOTE — RESULT NOTES
Verified Results  Coumadin Flow Sheet [id number] 08:48AM Leigh      Test Name Result Flag Reference   INR 3 9     Current Dose      6mg for 6 days and 2 5mg for 1 day

## 2018-01-11 NOTE — RESULT NOTES
Verified Results  (1) PT WITH INR 06Jun2017 10:14AM Eugenia Chang     Test Name Result Flag Reference   INR 2 63 H 0 86-1 16   PT 29 1 seconds H 12 1-14 4

## 2018-01-11 NOTE — MISCELLANEOUS
Message   Let patient know INR blood test results are back and look fine at 2 28   recommend to continue SAME dose warfarin at this time and re-check INR blood work in 3 weeks  thanks   Plan - advised staff to contact patient to review INR blood work   INR at goal range, re-check INR in 3 weeks      Verified Results  (1) PT WITH INR 99Dsp3522 06:11AM Derrick Slater     Test Name Result Flag Reference   INR 2 28 H 0 86-1 16   PT 23 3 seconds H 11 8-14 1       Signatures   Electronically signed by : Laquita Dodson DO;  Apr 26 2016 10:30AM EST                       (Author)

## 2018-01-11 NOTE — RESULT NOTES
Verified Results  (1) PT WITH INR 18Qnu3105 11:56AM Freddy Pierre     Test Name Result Flag Reference   INR 1 70 H 0 86-1 16   PT 20 6 seconds H 12 1-14 4

## 2018-01-12 VITALS
OXYGEN SATURATION: 96 % | SYSTOLIC BLOOD PRESSURE: 136 MMHG | WEIGHT: 169.25 LBS | RESPIRATION RATE: 18 BRPM | HEIGHT: 63 IN | BODY MASS INDEX: 29.99 KG/M2 | HEART RATE: 72 BPM | DIASTOLIC BLOOD PRESSURE: 64 MMHG

## 2018-01-12 NOTE — RESULT NOTES
Message   Let Rober Askew know - INR test results are back and is 1 56   please confirm Matilda's current dose of warfarin - I see from Dr Abel Figures' notes she is to be alternating between 2 5 mg dose for 4 days of the week with 5mg dose for 3 days of the week?    recommend to complete INR test again on January 4th or 5th, thx     Verified Results  (1) PT WITH INR 60Vmz0013 01:14PM Buckner Nesha     Test Name Result Flag Reference   INR 1 56 H 0 86-1 16   PT 18 2 seconds H 12 0-14 3

## 2018-01-12 NOTE — RESULT NOTES
Verified Results  (1) PT WITH INR 63SJW6736 10:08AM Shawna Kelly     Test Name Result Flag Reference   INR 2 42 H 0 86-1 16   PT 27 2 seconds H 12 1-14 4

## 2018-01-12 NOTE — RESULT NOTES
Verified Results  (1) BASIC METABOLIC PROFILE [id number] 08:32AM Freddy Pierre   TW Order Number: [id number]     Test Name Result Flag Reference   GLUCOSE,RANDM 116 mg/dL     If the patient is fasting, the ADA then defines impaired fasting glucose as > 100 mg/dL and diabetes as > or equal to 123 mg/dL  SODIUM 127 mmol/L L 136-145   POTASSIUM 5 0 mmol/L  3 5-5 3   CHLORIDE 93 mmol/L L 100-108   CARBON DIOXIDE 27 mmol/L  21-32   ANION GAP (CALC) 7 mmol/L  4-13   BLOOD UREA NITROGEN 19 mg/dL  5-25   CREATININE 1 04 mg/dL  0 60-1 30   Standardized to IDMS reference method   CALCIUM 8 5 mg/dL  8 3-10 1   eGFR Non-African American 52 1 ml/min/1 73sq [facility] [employer] Disease Education Program recommendations are as follows:  GFR calculation is accurate only with a steady state creatinine  Chronic Kidney disease less than 60 ml/min/1 73 sq  meters  Kidney failure less than 15 ml/min/1 73 sq  meters       (1) PT WITH INR [id number] 08:32AM Freddy Ignacio     Test Name Result Flag Reference   INR 1 49 H 0 86-1 16   PT 17 6 seconds H 12 0-14 3

## 2018-01-13 VITALS
OXYGEN SATURATION: 96 % | HEIGHT: 63 IN | DIASTOLIC BLOOD PRESSURE: 70 MMHG | SYSTOLIC BLOOD PRESSURE: 122 MMHG | HEART RATE: 72 BPM | TEMPERATURE: 97.4 F | WEIGHT: 161.25 LBS | RESPIRATION RATE: 18 BRPM | BODY MASS INDEX: 28.57 KG/M2

## 2018-01-13 VITALS
BODY MASS INDEX: 27.38 KG/M2 | WEIGHT: 154.5 LBS | SYSTOLIC BLOOD PRESSURE: 112 MMHG | DIASTOLIC BLOOD PRESSURE: 58 MMHG | HEIGHT: 63 IN | OXYGEN SATURATION: 95 % | HEART RATE: 77 BPM

## 2018-01-13 VITALS
BODY MASS INDEX: 26.78 KG/M2 | WEIGHT: 151.13 LBS | HEIGHT: 63 IN | OXYGEN SATURATION: 97 % | HEART RATE: 82 BPM | DIASTOLIC BLOOD PRESSURE: 78 MMHG | TEMPERATURE: 97.6 F | RESPIRATION RATE: 18 BRPM | SYSTOLIC BLOOD PRESSURE: 114 MMHG

## 2018-01-13 NOTE — PROGRESS NOTES
Assessment    1  Encounter for postoperative care (V58 49) (Z48 89)    Plan  Encounter for postoperative care    · Follow-up PRN Evaluation and Treatment  Follow-up  Status: Complete  Done:  51SCJ8924   Ordered; For: Encounter for postoperative care; Ordered By: Keiko Lorenzana Performed:  Due: 36IEH3417    Discussion/Summary    Very pleasant 74-year-old female, returns for two-week postoperative follow-up, status post right chest DBS generator replacement  Patient reports unit is operational and providing excellent trial of her tremors  She is overall very pleased with surgical outcome  She has a history of a DBS placed approximately 3 years ago and noted the battery on the right when dead a few weeks ago, providing no control  She continues to follow with Dr Tyler Pizano, neurology as planned  Wound care was reviewed with the patient, specifically she is to observe for redness, swelling, increased pain, drainage or fever, should these be observed she understands she is too call and/or return immediately for reassessment  Additionally patient understands she may shower, wash with soap and water, pat wound dry, she also understands she is to avoid immersion in water such as swimming, hot tub use or tub bath  May resume immersion in water in approximately 2 weeks  Activity levels were also reviewed with the patient in detail, she is to lift no greater than 10 pounds she is advised she may bend occasionally, ambulation is encouraged as tolerated  She understands she may gradually increase her activity levels over the next 2 weeks to usual level  These findings, impressions and recommendations are reviewed in great detail with the patient, she expressed understanding and agreement, her questions were answered completely and to her satisfaction        Chief Complaint  Postoperative follow-up, 2 weeks, status post right chest DBS generator replacement      Post-Op  Post-Op Crani-Brain:   Zach Kendall is status post  performed on 06/20/2017  1  Replacement of right chest deep brain stimulator dual channel electrode array implantable pulse Â generator  Â   2  Electronic analysis and complex programming of deep brain Â   stimulator system in the postoperative period for approximately 1 Â   hour  Â      History of Present Illness: The patient reports no dizziness, no headache, no speech disturbances, no visual complaints, no neck stiffness, no fever, no vertigo, no facial weakness, no cognitive difficulties, no wound drainage, no ataxia, no upper extremity weakness, no lower extremity weakness, no photophobia and no cognition difficulties  Physical Examination:   Vitals: within normal limits  Surgical incision site is clean, dry and intact (the incision site had intact staples and had no drainage )  The surrounding skin findings included normal appearance  Evaluation of the surgical site demonstrates no warmth, no erythema, no swelling, no induration, no ecchymosis and no tenderness  Cranial Nerves: normal cranial nerve function unless otherwise noted  The patient is noted to be awake, alert and oriented  The patient's speech is normal    Coordination demonstrates normal function, including finger to nose, heal to shin, and rapid alternating movements   normal upper extremity and lower extremity motor strength bilaterally  Sensory exam: sensory function intact unless otherwise noted  no apparent limitations in mobility  Test Results:   Assessment:   Plan: To Do For Next Visit:  Further follow-up as needed  Review of Systems    Constitutional: No fever, no chills, feels well, no tiredness, no recent weight gain or weight loss  Eyes: No complaints of eye pain, no red eyes, no eyesight problems, no discharge, no dry eyes, no itching of eyes  ENT: no complaints of earache, no loss of hearing, no nose bleeds, no nasal discharge, no sore throat, no hoarseness     Cardiovascular: No complaints of slow heart rate, no fast heart rate, no chest pain, no palpitations, no leg claudication, no lower extremity edema  Respiratory: No complaints of shortness of breath, no wheezing, no cough, no SOB on exertion, no orthopnea, no PND  Gastrointestinal: No complaints of abdominal pain, no constipation, no nausea or vomiting, no diarrhea, no bloody stools  Genitourinary: No complaints of dysuria, no incontinence, no pelvic pain, no dysmenorrhea, no vaginal discharge or bleeding  Musculoskeletal: No complaints of arthralgias, no myalgias, no joint swelling or stiffness, no limb pain or swelling  Integumentary: skin wound and incision  Neurological: No complaints of headache, no confusion, no convulsions, no numbness, no dizziness or fainting, no tingling, no limb weakness, no difficulty walking  Psychiatric: Not suicidal, no sleep disturbance, no anxiety or depression, no change in personality, no emotional problems  Endocrine: No complaints of proptosis, no hot flashes, no muscle weakness, no deepening of the voice, no feelings of weakness  Hematologic/Lymphatic: a tendency for easy bruising  ROS reviewed  Active Problems    1  Abnormal weight gain (783 1) (R63 5)   2  Acid reflux (530 81) (K21 9)   3  Adult BMI > 30 (V85 30) (E66 8)   4  Allergic rhinitis (477 9) (J30 9)   5  Anemia (285 9) (D64 9)   6  Anxiety (300 00) (F41 9)   7  Asthma (493 90) (J45 909)   8  Depression (311) (F32 9)   9  Diarrhea (787 91) (R19 7)   10  Encounter for current long-term use of anticoagulants (V58 61) (Z79 01)   11  Encounter for gynecological examination without abnormal finding (V72 31) (Z01 419)   12  Encounter for screening mammogram for malignant neoplasm of breast (V76 12)    (Z12 31)   13  End of battery life of deep brain stimulator (V53 02) (Z46 2)   14  Essential tremor (333 1) (G25 0)   15  History of allergy (V15 09) (Z88 9)   16  Hyperlipidemia (272 4) (E78 5)   17   Hypertension (401 9) (I10)   18  Hypokalemia (276 8) (E87 6)   19  Hypomagnesemia (275 2) (E83 42)   20  Hyponatremia (276 1) (E87 1)   21  Hypothyroidism (244 9) (E03 9)   22  Insomnia (780 52) (G47 00)   23  Iron deficiency anemia (280 9) (D50 9)   24  Lower back pain (724 2) (M54 5)   25  Lower extremity pain, left (729 5) (M79 605)   26  Menopausal state (627 2) (N95 1)   27  Need for pneumococcal vaccination (V03 82) (Z23)   28  Need for prophylactic vaccination and inoculation against influenza (V04 81) (Z23)   29  Obesity (278 00) (E66 9)   30  Osteoarthritis of right knee (715 96) (M17 11)   31  Osteopenia (733 90) (M85 80)   32  Peripheral neuropathy (356 9) (G62 9)   33  Portal vein thrombosis (452) (I81)   34  Post-operative state (V45 89) (Z98 890)   35  Prediabetes (790 29) (R73 09)   36  S/P deep brain stimulator placement (V45 89) (Z96 89)   37  Salpingitis/oophoritis, 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)   38  Spinal stenosis (724 00) (M48 00)   39  Supraventricular tachycardia (427 89) (I47 1)   40  Ulcerative colitis without complications (068 0) (A78 97)   41  Unsteady gait (781 2) (R26 81)   42  Visit for pre-operative examination (V72 84) (Z01 818)   43  Visit for screening mammogram (V76 12) (Z12 31)   44  Vulvar itching (698 1) (L29 2)    Social History    · Activities   · Participates in activities inside/outsid the home-yes, moderate   · Caffeine use (V49 89) (F15 90)   · Drinks beer (V49 89) (Z78 9)   · Drinks caffeinated tea   · Drinks coffee   · 3-4 cups a day   · Drinks wine (V49 89) (Z78 9)   · Denied: History of Drug Use   · Educational Level   · 10th grade   · Former smoker (V15 82) (A27 797)   · Inadequate exercise (V69 0) (Z72 3)   · Lives with adult children   · Marital History -  (V61 03)   · Occupation: Retired   · home [employer]   · Sexually Active   · Social alcohol use (Z78 9)    Current Meds   1   Advair Diskus 250-50 MCG/DOSE Inhalation Aerosol Powder Breath Activated; inhale 1   puff twice daily; Therapy: 62PKG3680 to (Evaluate:14Epo2708)  Requested for: 12Jun2017; Last   Rx:26Alj9531 Ordered   2  Clobetasol Propionate 0 05 % External Ointment; APPLY AND GENTLY MASSAGE INTO   AFFECTED AREA(S) TWICE DAILY; Therapy: 60XCN6095 to (Last Benjamen Croon)  Requested for: 88BKU5550 Ordered   3  Dexilant 60 MG Oral Capsule Delayed Release; TAKE 1 CAPSULE DAILY EVERY   MORNING BEFORE BREAKFAST Recorded   4  DilTIAZem HCl ER Coated Beads 180 MG Oral Capsule Extended Release 24 Hour;   take 1 capsule daily; Therapy: 76Uqy3760 to (Evaluate:28Qlb4485)  Requested for: 94QYO9821; Last   Rx:84Uve7914 Ordered   5  Escitalopram Oxalate 20 MG Oral Tablet; Take 1 tablet daily; Therapy: 83JKK4061 to (Evaluate:41Bxe7852)  Requested for: 27Jun2017; Last   WR:86MAT9804 Ordered   6  Fluticasone Propionate 50 MCG/ACT Nasal Suspension; use 2 sprays in each nostril   once daily; Therapy: 39IUE4770 to (03 17 74 30 53)  Requested for: 28Apr2017; Last   Rx:07Pru1222 Ordered   7  Gabapentin 100 MG Oral Capsule; TAKE ONE CAPSULE BY MOUTH TWICE DAILY   EVERY MORNING & AFTERNOON;   Therapy: 33BAW6384 to (Evaluate:12Nya7419)  Requested for: 27Jun2017; Last   FO:23QVH1372 Ordered   8  Gabapentin 600 MG Oral Tablet; TAKE 1 TAB IN AM, 1 TAB IN AFTERNOON, AND 2 TABS   QHS  Requested for: 11Yzw3875; Last Rx:85Zch0075 Ordered   9  Hydrocodone-Acetaminophen 7 5-325 MG Oral Tablet; TAKE 1 TABLET FOUR TIMES A   DAY AS NEEDED FOR PAIN;   Therapy: 33HCC6145 to (Evaluate:67Zxb9974); Last Rx:06Ghq6662 Ordered   10  Hydroxychloroquine Sulfate 200 MG Oral Tablet; TAKE 1 TABLET TWICE DAILY WITH    FOOD; Therapy: 48Wcp9179 to (Elisa Baker)  Requested for: 78Tyw6926; Last    Rx:64Akw8056; Status: ACTIVE - Renewal Voided Ordered   11  Levothyroxine Sodium 50 MCG Oral Tablet; TAKE ONE TABLET BY MOUTH ONCE DAILY; Therapy: 98ZCD3980 to (Evaluate:10Nlp8861)  Requested for: 60BAN0756; Last    Rx:30Mar2017 Ordered   12   Lisinopril 20 MG Oral Tablet; Take 1 tablet twice daily; Therapy: 87WMD6051 to (Evaluate:54Fzb7314)  Requested for: 27Jun2017; Last    JV:11NUA0506 Ordered   13  LORazepam 1 MG Oral Tablet; TAKE 1 TABLET Every 6 hours; Therapy: 47BDI0833 to (Evaluate:64Asw4680); Last Rx:27Jun2017 Ordered   14  Meclizine HCl - 25 MG Oral Tablet; TAKE 1 TABLET Every 6 hours PRN; Therapy: 50Nsu9474 to (Evaluate:07Jan2016)  Requested for: 76BYS0623; Last    Rx:75Ryz8221 Ordered   15  ProAir  (90 Base) MCG/ACT Inhalation Aerosol Solution; INHALE 1 PUFFS Every    6-8 hours PRN SOB; Therapy: 06Ywz5813 to (Last Rx:60Qlc2445)  Requested for: 65Ezo6872 Ordered   16  Simvastatin 5 MG Oral Tablet; take one tablet by mouth every day; Therapy: 26CTV9387 to (Evaluate:03Jaf4235)  Requested for: 81FCF6410; Last    BM:36CKN2616 Ordered   17  Sodium Chloride 1 GM Oral Tablet; Take 1 tablet twice daily  Requested for: 32WQG7801;    Last Rx:98Buo0556 Ordered   18  Torsemide 10 MG Oral Tablet; TAKE 1 TABLET DAILY PRN for edema; Therapy: 82VLE4395 to (Evaluate:51Cuu7138)  Requested for: 53FPO9163; Last    Rx:87Iza2866 Ordered   19  Vitamin D3 2000 UNIT Oral Tablet; 1 TAB DAILY Recorded   20  Warfarin Sodium 5 MG Oral Tablet; TAKE 2 TABLET Daily or as directed; Therapy: 04ZXA5704 to [phone])  Requested for: 12IIH3451; Last    AF:66PHG8698 Ordered   21  ZyrTEC Allergy 10 MG Oral Tablet; TAKE 1 TABLET AT BEDTIME; Therapy: 54AQA0793 to Recorded    Allergies    1  Indocin SOLR   2  Macrobid CAPS   3  Penicillins   4  Sulfa Drugs    Vitals   Recorded: 50FOV7170 08:35AM   Temperature 97 F   Heart Rate 64   Respiration 16   Systolic 118   Diastolic 54   Height 5 ft 3 in   Weight 159 lb    BMI Calculated 28 17   BSA Calculated 1 75     Physical Exam     Respiratory Respiratory effort: Normal   Auscultation of lungs: Clear to auscultation bilaterally  Cardiovascular Auscultation of heart: Rate is regular  Rhythm is regular   No heart murmur appreciated  Procedure  The wound was located on the right chest  The wound was 4 cm in length  Wound Exam: well healed with no sign of infection  Procedure Note: 6 staples were removed  Patient Status:  the patient tolerated the procedure well  Complications:  there were no complications  Future Appointments    Date/Time Provider Specialty Site   11/29/2017 01:00 PM [facility] [place]   07/13/2017 07:00 AM Caryn Burks MD Neurology [address]   07/19/2018 01:00 PM DEVAN Farley  Obstetrics/Gynecology [facility] OB   08/10/2017 09:00 AM Zelda Rashid DO Nephrology [facility] [place]   08/21/2017 08:00 AM Mirlande Velasquez MD Internal Medicine [facility] INTERNAL MED   07/11/2017 08:30 AM DEVAN Doe   Cardiology [facility]     Signatures   Electronically signed by : Krystina Gao, [facility]; Jul 5 2017  8:58AM EST                       (Author)    Electronically signed by : DEVAN Hernandez ; Jul 7 2017  9:18AM EST                       (Author)

## 2018-01-13 NOTE — RESULT NOTES
Verified Results  * DXA BONE DENSITY SPINE HIP AND PELVIS 20Apr2017 08:18AM Whitney SHAFER Order Number: [id number]    - Patient Instructions: To schedule this appointment, please contact Central Scheduling at [phone]  Test Name Result Flag Reference   DXA BONE DENSITY SPINE HIP AND PELVIS (Report)     CENTRAL DXA SCAN     CLINICAL HISTORY:  66year old post-menopausal  female with history of COPD, ulcerative colitis, hypothyroidism and use of antireflux medication  The patient does not exercise and takes calcium and vitamin D  The patient uses steroids  There   is a stated loss in height of 2 1/2 inches  TECHNIQUE: Bone densitometry was performed using a Hologic Horizon A bone densitometer  Regions of interest appear properly placed  There are degenerative changes of the lumbar spine, which can artificially elevate bone mineral density results  COMPARISON: February 12, 2015 and before     RESULTS:    LUMBAR SPINE: L1-L4:   BMD 1 397 gm/cm2   T-score 3 2   Z-score 5 4     LEFT TOTAL HIP:   BMD 0 883 gm/cm2   T-score -0 5   Z-score 1 2     LEFT FEMORAL NECK:   BMD 0 646 gm/cm2   T-score -1 8   Z-score 0 1             IMPRESSION:   1  Based on the [facility] classification, the T-score of -1 8 in the left femoral neck is consistent with low bone mineral density  2  Since the prior study, there has been a significant decrease in OF THE BMD in the lumbar spine of 0 047 gm/cm2 or 3 3%  In the left hip, there has been a significant decrease in BMD of 0 067 gm/cm2 or 7 1%  These changes do exceed our own least    significant change and, therefore, are statistically significant within 95% confidence level  3  With the stated loss in height of 2 1/2 inches, consideration to obtaining thoraco-lumbar spine films may be helpful to exclude silent vertebral fractures, which can increase the risk for future fracture      4  Any secondary causes of low bone mineral density should be excluded prior to treatment, if clinically indicated  5 A daily intake of at least 1200 mg calcium and 800 to 1000 IU of Vitamin D, as well as weight bearing and muscle strengthening exercise, fall prevention and avoidance of tobacco and excessive alcohol intake as basic preventive measures are suggested  6  Repeat DXA in 18 - 24 months, on the same machine, as clinically indicated  The 10 year risk of hip fracture is 6 0%, with the 10 year risk of major osteoporotic fracture being 27%, as calculated by the [facility] fracture risk assessment tool (FRAX)  The current NOF guidelines recommend treating patients with FRAX 10 year risk score    of >3% for hip fracture and >20% for major osteoporotic fracture        WHO CLASSIFICATION:   Normal (a T-score of -1 0 or higher)   Low bone mineral density (a T-score of less than -1 0 but higher than -2 5)   Osteoporosis (a T-score of -2 5 or less)   Severe osteoporosis (a T-score of -2 5 or less with a fragility fracture)             Workstation performed: [id number]     Signed by:   Gabe Haines MD   4/20/17

## 2018-01-14 VITALS
HEIGHT: 63 IN | DIASTOLIC BLOOD PRESSURE: 78 MMHG | WEIGHT: 155.25 LBS | HEART RATE: 70 BPM | SYSTOLIC BLOOD PRESSURE: 110 MMHG | BODY MASS INDEX: 27.51 KG/M2

## 2018-01-14 VITALS
SYSTOLIC BLOOD PRESSURE: 100 MMHG | WEIGHT: 159 LBS | DIASTOLIC BLOOD PRESSURE: 60 MMHG | HEIGHT: 63 IN | BODY MASS INDEX: 28.17 KG/M2

## 2018-01-14 VITALS
WEIGHT: 156 LBS | DIASTOLIC BLOOD PRESSURE: 56 MMHG | HEART RATE: 80 BPM | RESPIRATION RATE: 16 BRPM | BODY MASS INDEX: 27.64 KG/M2 | HEIGHT: 63 IN | SYSTOLIC BLOOD PRESSURE: 98 MMHG

## 2018-01-14 VITALS
HEART RATE: 70 BPM | BODY MASS INDEX: 28.55 KG/M2 | OXYGEN SATURATION: 96 % | HEIGHT: 63 IN | WEIGHT: 161.13 LBS | SYSTOLIC BLOOD PRESSURE: 98 MMHG | DIASTOLIC BLOOD PRESSURE: 60 MMHG

## 2018-01-14 VITALS
BODY MASS INDEX: 28.17 KG/M2 | RESPIRATION RATE: 16 BRPM | HEIGHT: 63 IN | HEART RATE: 64 BPM | WEIGHT: 159 LBS | TEMPERATURE: 97 F | DIASTOLIC BLOOD PRESSURE: 54 MMHG | SYSTOLIC BLOOD PRESSURE: 112 MMHG

## 2018-01-14 NOTE — PROGRESS NOTES
Assessment    1  Iron deficiency anemia (280 9) (D50 9)    Plan  Iron deficiency anemia    · Drink plenty of fluids ; Status:Complete;   Done: 36JBR4789   Ordered; For:Iron deficiency anemia; Ordered By:Oscar Wright;   · (1) CBC/ PLT (NO DIFF); Status:Active; Requested for:30May2017;    Perform:[facility]; MARIAELENA:[id number]; Last Updated By:Alejandra Roberto; 11/30/2016 1:36:16 PM;Ordered; For:Iron deficiency anemia; Ordered By:Oscar Wright;   · (1) CBC/ PLT (NO DIFF); Status:Active; Requested for:30Nov2017;    Perform:[facility]; Due:30Nov2018; Last Updated By:Alejandra Roberto; 11/30/2016 1:36:31 PM;Ordered; For:Iron deficiency anemia; Ordered By:Oscar Wright;   · (1) CBC/ PLT (NO DIFF); Status:Complete; Requested for:Recurring Schedule:  5/30/2017; 11/30/2017 ;    Perform:[facility]; NYI:[id number]; Ordered; For:Iron deficiency anemia; Ordered By:Gama Wright;   · (1) FERRITIN; Status:Active; Requested for:30May2017;    Perform:[facility]; LIBERTY:[id number]; Last Updated By:Alejandra Roberto; 11/30/2016 1:36:16 PM;Ordered; For:Iron deficiency anemia; Ordered By:Gama Wright;   · (1) FERRITIN; Status:Active; Requested for:30Nov2017;    Perform:[facility]; Due:30Nov2018; Last Updated By:Alejandra Roberto; 11/30/2016 1:36:31 PM;Ordered; For:Iron deficiency anemia; Ordered By:Gama rWight;   · (1) FERRITIN; Status:Complete; Requested for:Recurring Schedule: 5/30/2017;  11/30/2017 ;    Perform:[facility]; GXM:[id number]; Ordered; For:Iron deficiency anemia; Ordered By:Oscar Wright;   · (1) IRON SATURATION %, TIBC; Status:Active; Requested for:30May2017;    Perform:[facility]; TMV:29ZFI2617; Last Updated By:Alejandra Roberto; 11/30/2016 1:36:16 PM;Ordered; For:Iron deficiency anemia; Ordered By:Oscar Wright;   · (1) IRON SATURATION %, TIBC; Status:Active; Requested for:30Nov2017;    Perform:[facility]; Due:30Nov2018;  Last Updated By:Alejandra Roberto; 11/30/2016 1:36:31 PM;Ordered; For:Iron deficiency anemia; Ordered By:Oscar Wright;   · (1) IRON SATURATION %, TIBC; Status:Complete; Requested for:Recurring Schedule:  5/30/2017; 11/30/2017 ;    Perform:[facility]; GYR:[id number]; Ordered; For:Iron deficiency anemia; Ordered By:Krys Wright;   · Follow-up visit in 1 year Evaluation and Treatment  Follow-up  Status: Complete  Done:  19MSC3533 01:00PM   Ordered; For: Iron deficiency anemia; Ordered By: Chantel Cost Performed:  Due: 04IDF7454; Last Updated By: Royal Edge; 11/30/2016 1:40:38 PM    Discussion/Summary  Discussion Summary:   In summary, this is a 76-year-old female history of inflammatory bowel disease/ulcerative colitis status post total colectomy 16 years ago  She's needed parenteral iron occasionally  Recent blood work shows a normal hemoglobin with borderline ferritin and depressed saturation  This is consistent with iron deficiency, though no anemia  Her energy level is somewhat depressed  She requests parenteral iron replacement  I think that is reasonable to help with her energy level  I anticipate this will likely be helpful as she's had long periods of time were no treatment was required  I made arrangements for parenteral iron replacement  I'll see her back in one year with repeat blood work in 6 months and again just prior to her next visit  I reviewed the above with the patient  She voiced understanding and agreement  Understands and agrees with treatment plan: The treatment plan was reviewed with the patient/guardian  The patient/guardian understands and agrees with the treatment plan   Counseling Documentation With Imm: The patient was counseled regarding diagnostic results, instructions for management, patient and family education, impressions  total time of encounter was 25 minutes  History of Present Illness  Previous Therapy:   Patient has a history of ulcerative colitis  She is status post total colectomy   She was seen for iron deficiency anemia  In 2011  She was treated with Feraheme x 2 doses in November 2011 with good improvement in her iron situation and anemia  Nov 2013- Feraheme 510mg x 2 doses  Current Therapy: Feraheme 510 mg IV x 2 doses November 2013  Disease Status: iron replete   Interval History: Past few days has been *"off balance "      Review of Systems  Complete-Female:   Constitutional: recent weight loss, but not feeling poorly    The patient presents with complaints of constant episodes of feeling tired  Episodes started about 6-8 weeks ago  Eyes: No complaints of eye pain, no red eyes, no eyesight problems, no discharge, no dry eyes, no itching of eyes  ENT: no complaints of earache, no loss of hearing, no nose bleeds, no nasal discharge, no sore throat, no hoarseness  Cardiovascular: No complaints of slow heart rate, no fast heart rate, no chest pain, no palpitations, no leg claudication, no lower extremity edema  Respiratory: No complaints of shortness of breath, no wheezing, no cough, no SOB on exertion, no orthopnea, no PND  The patient presents with complaints of sudden onset of intermittent episodes of moderate diarrhea, described as loose and watery  Episodes have been occurring 3 times a week  Genitourinary: No complaints of dysuria, no incontinence, no pelvic pain, no dysmenorrhea, no vaginal discharge or bleeding  Musculoskeletal: right knee pain  Integumentary: No complaints of skin rash or lesions, no itching, no skin wounds, no breast pain or lump  Neurological: dizziness and Vertogi recently  Takes meclizine with minimal benefit  Psychiatric: Not suicidal, no sleep disturbance, no anxiety or depression, no change in personality, no emotional problems  Endocrine: No complaints of proptosis, no hot flashes, no muscle weakness, no deepening of the voice, no feelings of weakness  Active Problems    1  Abnormal weight gain (783 1) (R63 5)   2   Acid reflux (530 81) (K21 9)   3  Adult BMI > 30 (V85 30) (E66 8)   4  Allergic rhinitis (477 9) (J30 9)   5  Anemia (285 9) (D64 9)   6  Anxiety (300 00) (F41 9)   7  Asthma (493 90) (J45 909)   8  Depression (311) (F32 9)   9  Diarrhea (787 91) (R19 7)   10  Edema (782 3) (R60 9)   11  Encounter for current long-term use of anticoagulants (V58 61) (Z79 01)   12  Encounter for gynecological examination without abnormal finding (V72 31) (Z01 419)   13  Encounter for screening mammogram for malignant neoplasm of breast (V76 12)    (Z12 31)   14  Essential tremor (333 1) (G25 0)   15  Follow-up examination following surgery (V67 00) (Z09)   16  History of allergy (V15 09) (Z88 9)   17  Hyperlipidemia (272 4) (E78 5)   18  Hypertension (401 9) (I10)   19  Hypokalemia (276 8) (E87 6)   20  Hypomagnesemia (275 2) (E83 42)   21  Hyponatremia (276 1) (E87 1)   22  Hypothyroidism (244 9) (E03 9)   23  Insomnia (780 52) (G47 00)   24  Iron deficiency anemia (280 9) (D50 9)   25  Lower back pain (724 2) (M54 5)   26  Lower extremity pain, left (729 5) (M79 605)   27  Lump or mass in breast (611 72) (N63)   28  Mammogram abnormal (793 80) (R92 8)   29  Menopausal state (627 2) (N95 1)   30  Muscle spasm (728 85) (M62 838)   31  Need for pneumococcal vaccination (V03 82) (Z23)   32  Need for prophylactic vaccination and inoculation against influenza (V04 81) (Z23)   33  Obesity (278 00) (E66 9)   34  Osteoarthritis of right knee (715 96) (M17 9)   35  Osteopenia (733 90) (M85 80)   36  Other abnormal finding of urine (791 9) (R82 99)   37  Peripheral neuropathy (356 9) (G62 9)   38  Portal vein thrombosis (452) (I81)   39  Prediabetes (790 29) (R73 09)   40  Primary osteoarthritis of right knee (715 16) (M17 11)   41  S/P deep brain stimulator placement (V45 89) (Z96 89)   42  Salpingitis/oophoritis, 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)   43  Spinal stenosis (724 00) (M48 00)   44  Supraventricular tachycardia (427 89) (I47 1)   45   Tear film insufficiency, unspecified laterality (375 15) (H04 129)   46  Thrombocytosis (238 71) (D47 3)   47  Ulcerative colitis without complications (530 4) (Z24 81)   48  Unsteady gait (781 2) (R26 81)   49  Vaginal yeast infection (112 1) (B37 3)   50  Vertigo (780 4) (R42)   51  Visit for pre-operative examination (V72 84) (Z01 818)   52  Visit for screening mammogram (V76 12) (Z12 31)    Past Medical History    1  History of Arthritis (V13 4)   2  History of Back Pain   3  History of Bacterial vaginosis (616 10,041 9) (N76 0,B96 89)   4  History of Depression with anxiety (300 4) (F41 8)   5  Essential tremor (333 1) (G25 0)   6  History of Gastroesophageal reflux disease, esophagitis presence not specified   (530 81) (K21 9)   7  History of  2   8  History of fatigue (V13 89) (Z87 898)   9  History of hypertension (V12 59) (Z86 79)   10  History of hypothyroidism (V12 29) (Z86 39)   11  History of osteoarthritis (V13 4) (Z87 39)   12  History of paroxysmal supraventricular tachycardia (V12 59) (Z86 79)   13  History of ulcerative colitis (V12 79) (Z87 19)   14  History of Impacted cerumen of right ear (380 4) (H61 21)   15  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 0)   16  History of Muscle pain (729 1) (M79 1)   17  History of Nausea (787 02) (R11 0)   18  History of Other muscle spasm (728 85) (M62 838)   19  History of Palpitations (785 1) (R00 2)   20  History of Sjogren's syndrome (710 2) (M35 00)   21  History of  (spontaneous vaginal delivery) (650) (O80)   22  History of Weakness of both legs (729 89) (R29 898)    Surgical History    1  History of Arthroscopy Knee   2  History of Breast Surgery Lumpectomy   3  History of Diagnostic Esophagogastroduodenoscopy   4  History of Fiberoptic Examinations Proctoscopy   5  History of Hysterectomy   6  History of Ileoanal Pouch Fistula Repair Transperin & Transabd Approach   7  History of Ileostomy Closure   8   History of Implantation Of Intracranial Neurostimulator   9  History of Splenectomy   10  History of Total Abdominal Colectomy  Surgical History Reviewed: The surgical history was reviewed and updated today  Family History  Mother    1  Family history of Acute Venous Thrombosis Of The Deep Vessels Of The Distal Lower   Extremity   2  Family history of phlebitis (V19 8) (Z84 89)   3  Family history of Hypertension (V17 49)   4  Family history of Methicillin Resistant Staphylococcus Aureus Infection   5  Family history of Mother  At Age 50   11  Family history of Peripheral Arterial Disease  Father    7  Family history of Chronic Obstructive Pulmonary Disease   8  Family history of diabetes mellitus (V18 0) (Z83 3)   9  Family history of Stroke Syndrome (V17 1)  Sister    8  Family history of Diabetes Mellitus (V18 0)   11  Family history of Sjogren Syndrome    Social History    · Activities   · Caffeine use (V49 89) (F15 90)   · Drinks beer (V49 89) (Z78 9)   · Drinks caffeinated tea   · Drinks coffee   · Drinks wine (V49 89) (Z78 9)   · Denied: History of Drug Use   · Educational Level   · Former smoker (V15 82) (R32 118)   · Inadequate exercise (V69 0) (Z72 3)   · Lives with adult children   · Marital History -  (V61 03)   · Occupation: Retired   · Sexually Active   · Social alcohol use (Z78 9)    Current Meds   1  Advair Diskus 250-50 MCG/DOSE Inhalation Aerosol Powder Breath Activated; Inhale 1   puff twice daily; Therapy: 20RYH0291 to (Evaluate:2016)  Requested for: 84WGH0643; Last   Rx:06Jey4469 Ordered   2  Calcium 600+D 600-200 MG-UNIT Oral Tablet; take 2 tablet daily; Therapy: 60LJE4449 to Recorded   3  Co Q 10 100 MG Oral Capsule; take 1 capsule daily; Therapy: 57IQG2519 to Recorded   4  Dexilant 60 MG Oral Capsule Delayed Release; Therapy: (Recorded:22Yyz8443) to Recorded   5  DiltiaZEM HCl ER Coated Beads 180 MG Oral Capsule Extended Release 24 Hour; take   1 capsule daily;    Therapy: 77Ggd8984 to (Evaluate:18Mar2017)  Requested for: 49Sml8380; Last   Rx:87Lec0927 Ordered   6  Escitalopram Oxalate 20 MG Oral Tablet; Take 1 tablet daily; Therapy: 69WAI1625 to (Evaluate:18Mar2017)  Requested for: 84Dpb3160; Last   Rx:68Svm6255 Ordered   7  Estradiol 0 05 MG/24HR Transdermal Patch Weekly; APPLY 1 PATCH TO SKIN WEEKLY   AS DIRECTED; Therapy: 02IXS9450 to (Evaluate:52Skn2377)  Requested for: 43Dop1727; Last   Rx:11Vzo9215 Ordered   8  Fluticasone Propionate 50 MCG/ACT Nasal Suspension; USE 2 SPRAYS IN EACH   NOSTRIL ONCE DAILY; Therapy: 00XWB5322 to (Evaluate:40Xot6158)  Requested for: 66Ikl5302; Last   Rx:13Fow3014 Ordered   9  Gabapentin 100 MG Oral Capsule; TAKE 1 CAPSULE TWICE DAILY EVERY MORNING   AND AFTERNOON;   Therapy: 94ISB6637 to (Evaluate:18Mar2017)  Requested for: 72Qfn2185; Last   Rx:56Xhh2264 Ordered   10  Gabapentin 600 MG Oral Tablet; TAKE 1 TAB IN AM, 1 TAB IN AFTERNOON, AND 2 TABS    QHS  Requested for: 59Iug0458; Last Rx:60Pbs5353 Ordered   11  Hydrocodone-Acetaminophen 7 5-325 MG Oral Tablet; TAKE 1 TABLET FOUR TIMES A    DAY AS NEEDED FOR PAIN;    Therapy: 44TYX2534 to (Evaluate:22Fba0921); Last Rx:30Iwm1029 Ordered   12  Hydroxychloroquine Sulfate 200 MG Oral Tablet; TAKE 1 TABLET TWICE DAILY WITH    FOOD; Therapy: 78Zxy7452 to (Walker Wilson)  Requested for: 67Iep6776; Last    Rx:40Ejp4417; Status: ACTIVE - Renewal Voided Ordered   13  Levothyroxine Sodium 50 MCG Oral Tablet; Take 1 tablet daily; Therapy: 51TTE9881 to (Evaluate:18Mar2017)  Requested for: 12Ctp8476; Last    Rx:26Ahz9173 Ordered   14  Lisinopril 20 MG Oral Tablet; Take 1 tablet twice daily; Therapy: 24FUQ8172 to (Evaluate:18Mar2017)  Requested for: 15Eaa5735; Last    Rx:96Tnv1531 Ordered   15  Loperamide HCl - 2 MG Oral Capsule; take 1 capsule daily; Therapy: (Recorded:07Apr2014) to Recorded   16  LORazepam 1 MG Oral Tablet; TAKE 1 TABLET Every 6 hours;     Therapy: 35IVP2230 to (Evaluate:27Nov2016); Last Rx:94Tfi8615 Ordered   17  Meclizine HCl - 25 MG Oral Tablet; TAKE 1 TABLET Every 6 hours PRN; Therapy: 59Coz3375 to (Evaluate:07Jan2016)  Requested for: 57YON6765; Last    Rx:67Naw5896 Ordered   18  Multi-Vitamin Oral Tablet; TAKE 1 TABLET DAILY; Therapy: 96KLJ0884 to Recorded   19  Omega-3 Fish Oil 1200 MG Oral Capsule; TAKE 1 CAPSULE Daily; Therapy: 03EJG2391 to Recorded   20  Simvastatin 5 MG Oral Tablet; TAKE 1 TABLET DAILY; Therapy: 96WOV5933 to (Evaluate:31Azx0321)  Requested for: 21Jun2016; Last    Rx:21Jun2016 Ordered   21  Skelaxin 800 MG Oral Tablet; Therapy: (Recorded:30Nov2016) to Recorded   22  Sodium Chloride 1 GM Oral Tablet; Take 1 tablet twice daily  Requested for: 24WEG0259;    Last Rx:19Sep2016 Ordered   23  Terconazole 0 4 % Vaginal Cream; INSERT 1 APPLICATORFUL INTRAVAGINALLY AT    BEDTIME NIGHTLY; Therapy: 20QPA8626 to (Evaluate:28Nov2016)  Requested for: 22Nov2016; Last    Rx:21Nov2016 Ordered   24  Torsemide 10 MG Oral Tablet; TAKE 1 TABLET DAILY PRN for edema; Therapy: 67NFR7877 to (Evaluate:74Kfo8031)  Requested for: 41OLK3564; Last    Rx:19Sep2016 Ordered   25  Vitamin D3 TABS; Therapy: (Recorded:76Apu0044) to Recorded   26  Warfarin Sodium 5 MG Oral Tablet; TAKE 2 TABLET Daily or as directed; Therapy: 17KYQ1318 to (Evaluate:94Otk7008)  Requested for: 22PHP6155; Last    Rx:28Jun2016 Ordered   27  ZyrTEC Allergy 10 MG Oral Tablet; TAKE 1 TABLET AT BEDTIME; Therapy: 68ISS3664 to Recorded    Allergies    1  Indocin SOLR   2  Macrobid CAPS   3  Penicillins   4  Sulfa Drugs    Vitals  Vital Signs    Recorded: 16IJO0108 01:04PM   Temperature 97 9 F   Heart Rate 66   Respiration 18   Systolic 255   Diastolic 70   Height 5 ft 3 in   Weight 165 lb    BMI Calculated 29 23   BSA Calculated 1 78   O2 Saturation 94   Pain Scale 0     Physical Exam    Constitutional   General appearance: No acute distress, well appearing and well nourished      Eyes   Conjunctiva and lids: No swelling, erythema or discharge  Ears, Nose, Mouth, and Throat   External inspection of ears and nose: Normal     Oropharynx: Normal with no erythema, edema, exudate or lesions  Pulmonary   Auscultation of lungs: Clear to auscultation  Cardiovascular   Auscultation of heart: Normal rate and rhythm, normal S1 and S2, without murmurs  Examination of extremities for edema and/or varicosities: Normal     Abdomen   Abdomen: Non-tender, no masses  Liver and spleen: No hepatomegaly or splenomegaly  Lymphatic   Palpation of lymph nodes in neck: No lymphadenopathy  Musculoskeletal   Gait and station: Normal     Skin   Skin and subcutaneous tissue: Normal without rashes or lesions  Neurologic   Cranial nerves: Cranial nerves 2-12 intact  Psychiatric   Orientation to person, place, and time: Normal          Results/Data  (1) CBC/ PLT (NO DIFF) 85VDL4428 11:21AM Nic Tejeda Order Number: [id number]     Test Name Result Flag Reference   HEMATOCRIT 37 9 %  34 8-46 1   HEMOGLOBIN 12 2 g/dL  11 5-15 4   MCHC 32 2 g/dL  31 4-37 4   MCH 30 9 pg  26 8-34 3   MCV 96 fL  82-98   PLATELET COUNT 946 Thousands/uL H 149-390   RBC COUNT 3 95 Million/uL  3 81-5 12   RDW 14 5 %  11 6-15 1   WBC COUNT 5 12 Thousand/uL  4 31-10 16   MPV 9 1 fL  8 9-12 7     (1) IRON SATURATION %, TIBC 21UCN0431 11:21AM Nic Tejeda Order Number: [id number]     Test Name Result Flag Reference   IRON SATURATION 8 %     TOTAL IRON BINDING CAPACITY 365 ug/dL  250-450   IRON 31 ug/dL L    Patients treated with metal-binding drugs (ie  Deferoxamine) may have depressed iron values       (1) FERRITIN 10CKX9342 11:21AM Nic Tejeda Order Number: [id number]     Test Name Result Flag Reference   FERRITIN 32 ng/mL  8-388     Future Appointments    Date/Time Provider Specialty Site   12/05/2016 07:00 AM Cara Salazar MD Neurology [address] [facility]   12/05/2016 03:20 PM Radha Sanchez DO Nephrology [facility] NEPHROLOGY [facility]   12/14/2016 08:30 AM Pradeep Pacheco MD Internal Medicine [address] [facility] INTERNAL MED     Signatures   Electronically signed by : DEVAN Arriaga ,DO; Nov 30 2016  1:43PM EST                       (Author)

## 2018-01-14 NOTE — RESULT NOTES
Verified Results  (1) PT WITH INR 18Aob7920 10:36AM Alis Muniz     Test Name Result Flag Reference   INR 1 99 H 0 86-1 16   PT 23 3 seconds H 12 1-14 4

## 2018-01-14 NOTE — RESULT NOTES
Verified Results  (1) PT WITH INR 98Tlt0481 11:20AM Lisa Duckworth     Test Name Result Flag Reference   INR 3 07 H 0 86-1 16   PT 30 4 seconds H 12 0-14 3

## 2018-01-14 NOTE — RESULT NOTES
Verified Results  (1) PT WITH INR 01Wee6058 01:12PM Lashonda Morales     Test Name Result Flag Reference   INR 1 81 H 0 86-1 16   PT 20 4 seconds H 12 0-14 3

## 2018-01-15 NOTE — RESULT NOTES
Verified Results  (1) PT WITH INR 04Apr2017 11:46AM Clista Dears     Test Name Result Flag Reference   INR 2 12 H 0 86-1 16   PT 23 0 seconds H 12 0-14 3

## 2018-01-15 NOTE — MISCELLANEOUS
To whom it may concern:    Ms David Henriquez may stop her warfarin today, Monday (12/5/16) until Thursday, 12/8/16 for planned tooth extraction  She may resume her warfarin on Friday, 12/9/16  If you have any questions,  kindly call my office at [phone]        Sincerely,      Tresa Curiel MD        Electronically signed by:Zaira Mota MD  Dec  5 2016  5:28PM EST Author

## 2018-01-15 NOTE — RESULT NOTES
Verified Results  (1) PT WITH INR 21Jun2016 12:22PM Charlie Cantrell     Test Name Result Flag Reference   INR 1 86 H 0 86-1 16   PT 20 8 seconds H 12 0-14 3

## 2018-01-15 NOTE — PROGRESS NOTES
Chief Complaint  pt is here for flu vaccine      Active Problems    1  Abnormal weight gain (783 1) (R63 5)   2  Acid reflux (530 81) (K21 9)   3  Adult BMI > 30 (V85 30)   4  Allergic rhinitis (477 9) (J30 9)   5  Anemia (285 9) (D64 9)   6  Anxiety (300 00) (F41 9)   7  Asthma (493 90) (J45 909)   8  Depression (311) (F32 9)   9  Diarrhea (787 91) (R19 7)   10  Encounter for current long-term use of anticoagulants (V58 61) (Z79 01)   11  Encounter for gynecological examination without abnormal finding (V72 31) (Z01 419)   12  Encounter for postoperative care (V58 49) (Z48 89)   13  Encounter for screening mammogram for malignant neoplasm of breast (V76 12)    (Z12 31)   14  End of battery life of deep brain stimulator (V53 02) (Z46 2)   15  Essential tremor (333 1) (G25 0)   16  History of allergy (V15 09) (Z88 9)   17  History of esophageal dilatation (V45 89) (Z98 890)   18  Hyperlipidemia (272 4) (E78 5)   19  Hypertension (401 9) (I10)   20  Hypomagnesemia (275 2) (E83 42)   21  Hyponatremia (276 1) (E87 1)   22  Hypothyroidism (244 9) (E03 9)   23  Insomnia (780 52) (G47 00)   24  Iron deficiency anemia (280 9) (D50 9)   25  Lower back pain (724 2) (M54 5)   26  Lower extremity pain, left (729 5) (M79 605)   27  Menopausal state (627 2) (N95 1)   28  Need for pneumococcal vaccination (V03 82) (Z23)   29  Need for prophylactic vaccination and inoculation against influenza (V04 81) (Z23)   30  Obesity (278 00) (E66 9)   31  Osteoarthritis of right knee (715 96) (M17 11)   32  Osteopenia (733 90) (M85 80)   33  Peripheral neuropathy (356 9) (G62 9)   34  Portal vein thrombosis (452) (I81)   35  Post-operative state (V45 89) (Z98 890)   36  Prediabetes (790 29) (R73 09)   37  S/P deep brain stimulator placement (V45 89) (Z96 89)   38  Salpingitis/oophoritis, 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)   39  Sjogren's syndrome (710 2) (M35 00)   40  Spinal stenosis (724 00) (M48 00)   41   Supraventricular tachycardia (427 89) (I47 1)   42  Ulcerative colitis without complications (293 3) (E00 96)   43  Unsteady gait (781 2) (R26 81)   44  Visit for pre-operative examination (V72 84) (Z01 818)   45  Visit for screening mammogram (V76 12) (Z12 31)   46  Vulvar itching (698 1) (L29 2)    Current Meds   1  Advair Diskus 250-50 MCG/DOSE Inhalation Aerosol Powder Breath Activated; inhale 1   puff twice daily; Therapy: 66TNX8415 to (Evaluate:96Aoz2137)  Requested for: 12Jun2017; Last   Rx:12Jun2017 Ordered   2  Clobetasol Propionate 0 05 % External Ointment; APPLY AND GENTLY MASSAGE INTO   AFFECTED AREA(S) TWICE DAILY; Therapy: 13OWE3099 to (Oscar Roberto)  Requested for: 48ZIR8457 Ordered   3  Dexilant 60 MG Oral Capsule Delayed Release; TAKE 1 CAPSULE DAILY EVERY   MORNING BEFORE BREAKFAST Recorded   4  DilTIAZem HCl ER Coated Beads 180 MG Oral Capsule Extended Release 24 Hour;   take 1 capsule daily; Therapy: 33Fdu7982 to (Evaluate:25Mar2018)  Requested for: 85YFB8387; Last   Rx:74Glo4848 Ordered   5  Doxepin HCl - 50 MG Oral Capsule; TAKE 1 CAPSULE AT BEDTIME; Therapy: 46Cee1660 to (Lolly Trujillo)  Requested for: 12NYH7565; Last   Rx:67Dtq4452 Ordered   6  Escitalopram Oxalate 20 MG Oral Tablet; Take 1 tablet daily; Therapy: 26HWA9141 to (Luz Mancia)  Requested for: 66Kuf2765; Last   Rx:29Sag1156 Ordered   7  Estradiol 0 05 MG/24HR Transdermal Patch Weekly; APPLY 1 PATCH WEEKLY AS   DIRECTED; Therapy: 21DLB3839 to (Carine Urrutia)  Requested for: 052 576 88 48; Last   Rx:23Oct2017 Ordered   8  Fluticasone Propionate 50 MCG/ACT Nasal Suspension; use 2 sprays in each nostril   once daily; Therapy: 39QEX4026 to (Evaluate:01Apr2018)  Requested for: 03SLR4754; Last   Rx:03Oct2017 Ordered   9  Gabapentin 100 MG Oral Capsule; TAKE ONE CAPSULE BY MOUTH TWICE DAILY   EVERY MORNING & AFTERNOON;   Therapy: 77AKD5879 to (Evaluate:43Xou8546)  Requested for: 27Jun2017; Last   MM:32AUD9864 Ordered   10   Gabapentin 600 MG Oral Tablet; TAKE 1 TAB IN AM, 1 TAB IN AFTERNOON, AND 2    TABS QHS  Requested for: 85Zlo4233; Last Rx:20Pxo7184 Ordered   11  Hydrocodone-Acetaminophen 7 5-325 MG Oral Tablet; TAKE 1 TABLET FOUR TIMES A    DAY AS NEEDED FOR PAIN;    Therapy: 43HLG3861 to (Evaluate:24Nov2017); Last Rx:74Pbs1940 Ordered   12  Hydroxychloroquine Sulfate 200 MG Oral Tablet; TAKE 1 TABLET TWICE DAILY WITH    FOOD; Therapy: 43Ebl4663 to (Larissa Glass)  Requested for: 81Ycr7909; Last    Rx:17Uaw1554; Status: ACTIVE - Renewal Voided Ordered   13  Levothyroxine Sodium 50 MCG Oral Tablet; TAKE ONE TABLET BY MOUTH ONCE    DAILY; Therapy: 02JAG5251 to (Evaluate:26Mar2018)  Requested for: 21ZHM2061; Last    Rx:72Eod1339 Ordered   14  Lisinopril 20 MG Oral Tablet; TAKE 1 TABLET DAILY AS DIRECTED; Therapy: 42KZR8867 to (Evaluate:76Hbj5152)  Requested for: 60Lxv8931; Last    Rx:69Evf9697 Ordered   15  LORazepam 1 MG Oral Tablet; TAKE 1 TABLET Every 6 hours; Therapy: 82QQG5068 to (Evaluate:88Xgx9566); Last Rx:45Eld8910 Ordered   16  Meclizine HCl - 25 MG Oral Tablet; TAKE 1 TABLET Every 6 hours PRN; Therapy: 70Cii3109 to (Evaluate:07Jan2016)  Requested for: 67TKG0111; Last    Rx:97Yuo9994 Ordered   17  ProAir  (90 Base) MCG/ACT Inhalation Aerosol Solution; INHALE 1 PUFFS    Every 6-8 hours PRN SOB; Therapy: 14Cqi9576 to (Last Rx:05Vsl7857)  Requested for: 30Cei0317 Ordered   18  Simvastatin 5 MG Oral Tablet; take one tablet by mouth every day; Therapy: 05GIY8277 to (Evaluate:48Nnd1400)  Requested for: 25NNE8091; Last    ME:96DGZ9631 Ordered   19  Sodium Chloride 1 GM Oral Tablet; Take 1 tablet twice daily  Requested for: 53SCW5011;    Last Rx:50Smj7971 Ordered   20  Torsemide 10 MG Oral Tablet; TAKE 1 TABLET DAILY PRN for edema; Therapy: 16DOV6379 to (Evaluate:05Qpv0131)  Requested for: 23OZB0672; Last    Rx:74Vpo9431 Ordered   21  Vitamin D3 2000 UNIT Oral Tablet; 1 TAB DAILY Recorded   22   Warfarin Sodium 5 MG Oral Tablet; TAKE 2 TABLETS ONCE DAILY OR AS DIRECTED; Therapy: 76IGK3234 to ([id number])  Requested for: 26Oct2017; Last    Rx:26Oct2017 Ordered   23  ZyrTEC Allergy 10 MG Oral Tablet; TAKE 1 TABLET AT BEDTIME; Therapy: 30IGG4072 to Recorded    Allergies    1  Indocin SOLR   2  Macrobid CAPS   3  Penicillins   4  Sulfa Drugs    Plan  Need for prophylactic vaccination and inoculation against influenza    · Fluzone High-Dose 0 5 ML Intramuscular Suspension Prefilled Syringe    Future Appointments    Date/Time Provider Specialty Site   11/29/2017 03:30 PM DEVAN Delgado , [facility] Hematology Oncology [facility] [place]   07/16/2018 07:00 AM Ajit Virgen MD Neurology [place] [address]   07/19/2018 01:00 PM DEVAN Zamora   Obstetrics/Gynecology [facility]   12/22/2017 08:00 AM Haley Velasquez MD Internal Medicine [facility] INTERNAL MED     Signatures   Electronically signed by : Marlene Ortez, ; Nov  3 2017 10:25AM EST                       (Co-author)    Electronically signed by : Jorge Bejarano MD; Nov  3 2017 10:47AM EST                       (Author)

## 2018-01-15 NOTE — RESULT NOTES
Verified Results  (1) MAGNESIUM 12BSR2598 06:18AM Derral Affibody     Test Name Result Flag Reference   MAGNESIUM 1 6 mg/dL  1 6-2 6     (1) TSH 58ORG4851 06:18AM Isaacral Affibody     Test Name Result Flag Reference   TSH 1 652 uIU/mL  0 358-3 740   Patients undergoing fluorescein dye angiography may retain small amounts of fluorescein in the body for 48-72 hours post procedure  Samples containing fluorescein can produce falsely depressed TSH values  If the patient had this procedure,a specimen should be resubmitted post fluorescein clearance  The recommended reference ranges for TSH during pregnancy are as follows:  First trimester 0 1 to 2 5 uIU/mL  Second trimester  0 2 to 3 0 uIU/mL  Third trimester 0 3 to 3 0 uIU/m     (1) LIPID PANEL, FASTING 99MQI3852 06:18AM IsaacnCino     Test Name Result Flag Reference   CHOLESTEROL 163 mg/dL     HDL,DIRECT 56 mg/dL  40-60   Specimen collection should occur prior to Metamizole administration due to the potential for falsely depressed results  LDL CHOLESTEROL CALCULATED 92 mg/dL  0-100   Triglyceride:         Normal              <150 mg/dl       Borderline High    150-199 mg/dl       High               200-499 mg/dl       Very High          >499 mg/dl  Cholesterol:         Desirable        <200 mg/dl      Borderline High  200-239 mg/dl      High             >239 mg/dl  HDL Cholesterol:        High    >59 mg/dL      Low     <41 mg/dL  LDL CALCULATED:    This screening LDL is a calculated result  It does not have the accuracy of the Direct Measured LDL in the monitoring of patients with hyperlipidemia and/or statin therapy  Direct Measure LDL (IEH048) must be ordered separately in these patients  TRIGLYCERIDES 76 mg/dL  <=150   Specimen collection should occur prior to N-Acetylcysteine or Metamizole administration due to the potential for falsely depressed results       (1) HEMOGLOBIN A1C 18SZL9386 06:18AM Ryan Velasquez Test Name Result Flag Reference   HEMOGLOBIN A1C 5 6 %  4 2-6 3   EST  AVG  GLUCOSE 114 mg/dl       (1) VITAMIN D 25-HYDROXY 06RKH9925 06:18AM TaKaDudasha Hands     Test Name Result Flag Reference   VIT D 25-HYDROX 38 8 ng/mL  30 0-100 0   This assay is a certified procedure of the CDC Vitamin D Standardization Certification Program (VDSCP)     Deficiency <20ng/ml   Insufficiency 20-30ng/ml   Sufficient  ng/ml     *Patients undergoing fluorescein dye angiography may retain small amounts of fluorescein in the body for 48-72 hours post procedure  Samples containing fluorescein can produce falsely elevated Vitamin D values  If the patient had this procedure, a specimen should be resubmitted post fluorescein clearance       (1) T4, FREE 74RRR5355 06:18AM Davene Hands     Test Name Result Flag Reference   T4,FREE 0 94 ng/dL  0 76-1 46     (1) VITAMIN B12 66CIE6236 06:18AM Davene Hands     Test Name Result Flag Reference   VITAMIN B12 506 pg/mL  100-900

## 2018-01-15 NOTE — MISCELLANEOUS
Message  S/w pt on telephone who is scheduled for surgery tomorrow, 6/19/17  Verified allergies and went over pre-op instructions, including bathing and NPO status  Answered any questions she may have had at this time  Confirmed pharmacy on file and sent over post-op antibiotic and explained to patient when to start taking it  Patient has script for vicodin from her PCP that she has not yet filled  States that she will fill this prior to surgery tomorrow  Active Problems    1  Abnormal weight gain (783 1) (R63 5)   2  Acid reflux (530 81) (K21 9)   3  Adult BMI > 30 (V85 30) (E66 8)   4  Allergic rhinitis (477 9) (J30 9)   5  Anemia (285 9) (D64 9)   6  Anxiety (300 00) (F41 9)   7  Asthma (493 90) (J45 909)   8  Depression (311) (F32 9)   9  Diarrhea (787 91) (R19 7)   10  Encounter for current long-term use of anticoagulants (V58 61) (Z79 01)   11  Encounter for gynecological examination without abnormal finding (V72 31) (Z01 419)   12  Encounter for screening mammogram for malignant neoplasm of breast (V76 12)    (Z12 31)   13  End of battery life of deep brain stimulator (V53 02) (Z46 2)   14  Essential tremor (333 1) (G25 0)   15  History of allergy (V15 09) (Z88 9)   16  Hyperlipidemia (272 4) (E78 5)   17  Hypertension (401 9) (I10)   18  Hypokalemia (276 8) (E87 6)   19  Hypomagnesemia (275 2) (E83 42)   20  Hyponatremia (276 1) (E87 1)   21  Hypothyroidism (244 9) (E03 9)   22  Insomnia (780 52) (G47 00)   23  Iron deficiency anemia (280 9) (D50 9)   24  Lower back pain (724 2) (M54 5)   25  Lower extremity pain, left (729 5) (M79 605)   26  Menopausal state (627 2) (N95 1)   27  Need for pneumococcal vaccination (V03 82) (Z23)   28  Need for prophylactic vaccination and inoculation against influenza (V04 81) (Z23)   29  Obesity (278 00) (E66 9)   30  Osteoarthritis of right knee (715 96) (M17 11)   31  Osteopenia (733 90) (M85 80)   32  Peripheral neuropathy (356 9) (G62 9)   33   Portal vein thrombosis (452) (I81)   34  Post-operative state (V45 89) (Z98 890)   35  Prediabetes (790 29) (R73 09)   36  S/P deep brain stimulator placement (V45 89) (Z96 89)   37  Salpingitis/oophoritis, 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)   38  Spinal stenosis (724 00) (M48 00)   39  Supraventricular tachycardia (427 89) (I47 1)   40  Ulcerative colitis without complications (768 1) (U17 98)   41  Unsteady gait (781 2) (R26 81)   42  Visit for pre-operative examination (V72 84) (Z01 818)   43  Visit for screening mammogram (V76 12) (Z12 31)    Current Meds   1  Advair Diskus 250-50 MCG/DOSE Inhalation Aerosol Powder Breath Activated; inhale 1   puff twice daily; Therapy: 88LQW3539 to (Evaluate:37Nmp9027)  Requested for: 12Jun2017; Last   Rx:12Jun2017 Ordered   2  Dexilant 60 MG Oral Capsule Delayed Release; TAKE 1 CAPSULE DAILY EVERY   MORNING BEFORE BREAKFAST Recorded   3  DilTIAZem HCl ER Coated Beads 180 MG Oral Capsule Extended Release 24 Hour;   take 1 capsule daily; Therapy: 49Ebq6062 to (Evaluate:77Evm7762)  Requested for: 13WEK9249; Last   Rx:92Slr4551 Ordered   4  Escitalopram Oxalate 20 MG Oral Tablet (Lexapro); Take 1 tablet daily; Therapy: 41QJI7127 to (Evaluate:13Jun2017)  Requested for: 99AXJ6341; Last   Rx:78Gxu3580 Ordered   5  Fluticasone Propionate 50 MCG/ACT Nasal Suspension; use 2 sprays in each nostril   once daily; Therapy: 39IOU9471 to (05 12 73 93 30)  Requested for: 20Lxn2392; Last   Rx:91Atk0649 Ordered   6  Gabapentin 100 MG Oral Capsule; TAKE ONE CAPSULE BY MOUTH TWICE DAILY   EVERY MORNING & AFTERNOON;   Therapy: 44STC4216 to (Evaluate:13Jun2017)  Requested for: 40BJX2001; Last   Rx:16Wbq4436 Ordered   7  Gabapentin 600 MG Oral Tablet; TAKE 1 TAB IN AM, 1 TAB IN AFTERNOON, AND 2   TABS QHS  Requested for: 83Vdg2263; Last Rx:66Ywq4307 Ordered   8   Hydrocodone-Acetaminophen 7 5-325 MG Oral Tablet; TAKE 1 TABLET FOUR TIMES A   DAY AS NEEDED FOR PAIN;   Therapy: 61JAO3495 to (Evaluate:12Zih3090); Last Rx:12Jun2017 Ordered   9  Hydroxychloroquine Sulfate 200 MG Oral Tablet; TAKE 1 TABLET TWICE DAILY WITH   FOOD; Therapy: 55Feq7721 to (Claude Rust)  Requested for: 49Str6610; Last   Rx:97Ptr9142; Status: ACTIVE - Renewal Voided Ordered   10  Levothyroxine Sodium 50 MCG Oral Tablet; TAKE ONE TABLET BY MOUTH ONCE    DAILY; Therapy: 66AOU2897 to (Evaluate:55Vok2758)  Requested for: 46FKD8921; Last    Rx:35Ciq5345 Ordered   11  Lisinopril 20 MG Oral Tablet; Take 1 tablet twice daily; Therapy: 27BPX0270 to (Evaluate:01Oct2017)  Requested for: 46Hbg7732; Last    Rx:83Yxl1598 Ordered   12  LORazepam 1 MG Oral Tablet; TAKE 1 TABLET Every 6 hours; Therapy: 64SAA3280 to (Evaluate:43Wbm5037); Last Rx:91Jtt1039 Ordered   13  Meclizine HCl - 25 MG Oral Tablet; TAKE 1 TABLET Every 6 hours PRN; Therapy: 41Ajr3828 to (Evaluate:07Jan2016)  Requested for: 20SCX8767; Last    Rx:00Ikh2006 Ordered   14  ProAir  (90 Base) MCG/ACT Inhalation Aerosol Solution; INHALE 1 PUFFS    Every 6-8 hours PRN SOB; Therapy: 57Mww1469 to (Last Rx:92Ros7075)  Requested for: 59Gmg6889 Ordered   15  Simvastatin 5 MG Oral Tablet; take one tablet by mouth every day; Therapy: 97OZT4837 to (Evaluate:13Jun2017)  Requested for: 37WDY4820; Last    Rx:41Vwe4963 Ordered   16  Sodium Chloride 1 GM Oral Tablet; Take 1 tablet twice daily  Requested for: 35JWH5291;    Last Rx:03Wgs4292 Ordered   17  Torsemide 10 MG Oral Tablet; TAKE 1 TABLET DAILY PRN for edema; Therapy: 38DIV3342 to (Evaluate:04Klf8476)  Requested for: 92LDG0907; Last    Rx:27Xfj4590 Ordered   18  Vitamin D3 2000 UNIT Oral Tablet; 1 TAB DAILY Recorded   19  Warfarin Sodium 5 MG Oral Tablet; TAKE 2 TABLET Daily or as directed; Therapy: 56PKV1663 to (21 )  Requested for: 46GRS4705; Last    PT:93KXJ5030 Ordered   20  ZyrTEC Allergy 10 MG Oral Tablet; TAKE 1 TABLET AT BEDTIME; Therapy: 53UVS9670 to Recorded    Allergies    1  Indocin SOLR   2  Macrobid CAPS   3  Penicillins   4   Sulfa Drugs    Plan  Post-operative state    · Clindamycin HCl - 300 MG Oral Capsule; TAKE 2 CAPSULE Every 8 hours    Signatures   Electronically signed by : Ajay Walters RN; Jun 19 2017  1:35PM EST                       (Author)

## 2018-01-15 NOTE — RESULT NOTES
Verified Results  (1) LIPID PANEL, FASTING 13GWS6315 07:11AM Aleyda Estrada     Test Name Result Flag Reference   CHOLESTEROL 177 mg/dL     HDL,DIRECT 50 mg/dL  40-60   Specimen collection should occur prior to Metamizole administration due to the potential for falsely depressed results  LDL CHOLESTEROL CALCULATED 87 mg/dL  0-100   Triglyceride:         Normal              <150 mg/dl       Borderline High    150-199 mg/dl       High               200-499 mg/dl       Very High          >499 mg/dl  Cholesterol:         Desirable        <200 mg/dl      Borderline High  200-239 mg/dl      High             >239 mg/dl  HDL Cholesterol:        High    >59 mg/dL      Low     <41 mg/dL  LDL CALCULATED:    This screening LDL is a calculated result  It does not have the accuracy of the Direct Measured LDL in the monitoring of patients with hyperlipidemia and/or statin therapy  Direct Measure LDL (XDD941) must be ordered separately in these patients  TRIGLYCERIDES 201 mg/dL H <=150   Specimen collection should occur prior to N-Acetylcysteine or Metamizole administration due to the potential for falsely depressed results

## 2018-01-15 NOTE — RESULT NOTES
Verified Results  (1) PT WITH INR 01Jun2016 02:34PM Nancy Salvage     Test Name Result Flag Reference   INR 2 68 H 0 86-1 16   PT 27 4 seconds H 12 0-14 3     Coumadin Flow Sheet 24ARJ1616 03:05PM      Test Name Result Flag Reference   Patient Notified Yes

## 2018-01-15 NOTE — MISCELLANEOUS
Message   Recorded as Task   Date: 12/28/2016 09:01 AM, Created By: Silvestre Davidson   Task Name: Follow Up   Assigned To: Olayinka Rust   Regarding Patient: Kendall Krishnamurthy, Status: Active   Comment:    Sergio Kumar - 28 Dec 2016 9:01 AM     TASK CREATED  pt called this morning regaurding her PT INR Blood work  Pt states around 12/5 she stopped her coumadin for a tooth being pulled on 12/8, on 12/9 she restarted the coumadin  then she had BW done and was told to skip 12/21 and then she took 2 5mg 12/22 to 12/26, pt states on 12/26 she then had to stop her coumadin again due to a scope she is having done on 12/29  Pt states she is to take 5mg on 12/30 and test on 12/31 because she is leaving for [facility] late on 12/31/16 for 3 months  Pt would like to know if she should wait to have this blood work done because she will only be back on this medication for 1 day before having her BW done again? Please call pt Cell phone   TyePaco - 28 Dec 2016 10:31 AM     TASK REPLIED TO: Previously Assigned To [employer],Team  patient should complete blood work prior to leaving for [facility]    also, pt needs to complete blood work while at [facility] for INR as would not want Zohaib Horne to have a problem such as bleeding while taking this medication    ordered INR labs for patient to take with her to [facility] as well to complete there    let me know if  questions    thanks        Plan  Portal vein thrombosis    · (1) PT WITH INR; Status:Active;  Requested for:patient may need to complete PT/INR  blood work once a week for up to 3 months;     Signatures   Electronically signed by : Camilla Toledo DO; Dec 28 2016 10:34AM EST                       (Author)

## 2018-01-16 NOTE — MISCELLANEOUS
Message   Recorded as Task   Date: 06/28/2017 08:27 AM, Created By: Guerry Holter   Task Name: Medical Complaint Callback   Assigned To: Jsa Fink   Regarding Patient: Marcy Donald, Status: In Progress   Comment:    Marianne Lyon - 28 Jun 2017 8:27 AM     TASK CREATED  Caller: Self; Medical Complaint; [phone] (Home)  patient is having terrible vaginal itching  She is @ [phone]  Viva Inch - 50 Jun 2017 8:31 AM     TASK IN PROGRESS   Nohemy Inches - 28 Jun 2017 8:36 AM     TASK EDITED  Pt wears 2 pads as she has had bowel resection and leaks  Said she now has vag dsch and smells like fish  Pt given ov        Active Problems    1  Abnormal weight gain (783 1) (R63 5)   2  Acid reflux (530 81) (K21 9)   3  Adult BMI > 30 (V85 30) (E66 8)   4  Allergic rhinitis (477 9) (J30 9)   5  Anemia (285 9) (D64 9)   6  Anxiety (300 00) (F41 9)   7  Asthma (493 90) (J45 909)   8  Depression (311) (F32 9)   9  Diarrhea (787 91) (R19 7)   10  Encounter for current long-term use of anticoagulants (V58 61) (Z79 01)   11  Encounter for gynecological examination without abnormal finding (V72 31) (Z01 419)   12  Encounter for screening mammogram for malignant neoplasm of breast (V76 12)    (Z12 31)   13  End of battery life of deep brain stimulator (V53 02) (Z46 2)   14  Essential tremor (333 1) (G25 0)   15  History of allergy (V15 09) (Z88 9)   16  Hyperlipidemia (272 4) (E78 5)   17  Hypertension (401 9) (I10)   18  Hypokalemia (276 8) (E87 6)   19  Hypomagnesemia (275 2) (E83 42)   20  Hyponatremia (276 1) (E87 1)   21  Hypothyroidism (244 9) (E03 9)   22  Insomnia (780 52) (G47 00)   23  Iron deficiency anemia (280 9) (D50 9)   24  Lower back pain (724 2) (M54 5)   25  Lower extremity pain, left (729 5) (M79 605)   26  Menopausal state (627 2) (N95 1)   27  Need for pneumococcal vaccination (V03 82) (Z23)   28  Need for prophylactic vaccination and inoculation against influenza (V04 81) (Z23)   29   Obesity (278 00) (E66 9)   30  Osteoarthritis of right knee (715 96) (M17 11)   31  Osteopenia (733 90) (M85 80)   32  Peripheral neuropathy (356 9) (G62 9)   33  Portal vein thrombosis (452) (I81)   34  Post-operative state (V45 89) (Z98 890)   35  Prediabetes (790 29) (R73 09)   36  S/P deep brain stimulator placement (V45 89) (Z96 89)   37  Salpingitis/oophoritis, 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)   38  Spinal stenosis (724 00) (M48 00)   39  Supraventricular tachycardia (427 89) (I47 1)   40  Ulcerative colitis without complications (372 8) (D82 61)   41  Unsteady gait (781 2) (R26 81)   42  Visit for pre-operative examination (V7 84) (Z01 818)   43  Visit for screening mammogram (V76 12) (Z12 31)    Current Meds   1  Advair Diskus 250-50 MCG/DOSE Inhalation Aerosol Powder Breath Activated; inhale 1   puff twice daily; Therapy: 27YRS6242 to (Evaluate:69Xkc9522)  Requested for: 2017; Last   Rx:2017 Ordered   2  Clindamycin HCl - 300 MG Oral Capsule; TAKE 2 CAPSULE Every 8 hours; Therapy: 21OZT2913 to (Evaluate:2017)  Requested for: 38ACK1331; Last   Rx:2017 Ordered   3  Dexilant 60 MG Oral Capsule Delayed Release; TAKE 1 CAPSULE DAILY EVERY   MORNING BEFORE BREAKFAST Recorded   4  DilTIAZem HCl ER Coated Beads 180 MG Oral Capsule Extended Release 24 Hour;   take 1 capsule daily; Therapy: 98Lln6611 to (Evaluate:02Hnn4377)  Requested for: 86YWI3563; Last   Rx:2017 Ordered   5  Escitalopram Oxalate 20 MG Oral Tablet (Lexapro); Take 1 tablet daily; Therapy: 94VEA9149 to (Evaluate:42Wqw9936)  Requested for: 2017; Last   N26VOC3473 Ordered   6  Fluticasone Propionate 50 MCG/ACT Nasal Suspension; use 2 sprays in each nostril   once daily; Therapy: 30ALR6617 to ()  Requested for: 2017; Last   Rx:2017 Ordered   7   Gabapentin 100 MG Oral Capsule; TAKE ONE CAPSULE BY MOUTH TWICE DAILY   EVERY MORNING & AFTERNOON;   Therapy: 32LVF3359 to (Evaluate:76Rgv9183)  Requested for: 02NHQ7947; Last   MM:23PEZ3457 Ordered   8  Gabapentin 600 MG Oral Tablet; TAKE 1 TAB IN AM, 1 TAB IN AFTERNOON, AND 2   TABS QHS  Requested for: 20Ivv3092; Last Rx:02Mmr9983 Ordered   9  Hydrocodone-Acetaminophen 7 5-325 MG Oral Tablet; TAKE 1 TABLET FOUR TIMES A   DAY AS NEEDED FOR PAIN;   Therapy: 30EUR9040 to (Evaluate:84Pky0857); Last Rx:71Vhg6217 Ordered   10  Hydroxychloroquine Sulfate 200 MG Oral Tablet; TAKE 1 TABLET TWICE DAILY WITH    FOOD; Therapy: 74Qxa5084 to (Sanjeev Rust)  Requested for: 14Ibw3923; Last    Rx:80Heq0557; Status: ACTIVE - Renewal Voided Ordered   11  Levothyroxine Sodium 50 MCG Oral Tablet; TAKE ONE TABLET BY MOUTH ONCE    DAILY; Therapy: 18SIQ9564 to (Evaluate:28Loq3717)  Requested for: 97TMR2253; Last    Rx:30Mar2017 Ordered   12  Lisinopril 20 MG Oral Tablet; Take 1 tablet twice daily; Therapy: 25IDJ9508 to (Evaluate:69Qzn1148)  Requested for: 27Jun2017; Last    QI:11ZDC9275 Ordered   13  LORazepam 1 MG Oral Tablet; TAKE 1 TABLET Every 6 hours; Therapy: 75QBQ1607 to (Evaluate:53Uqt2408); Last Rx:27Jun2017 Ordered   14  Meclizine HCl - 25 MG Oral Tablet; TAKE 1 TABLET Every 6 hours PRN; Therapy: 29Leu0771 to (Evaluate:07Jan2016)  Requested for: 38RXK6951; Last    Rx:67Wyc6357 Ordered   15  ProAir  (90 Base) MCG/ACT Inhalation Aerosol Solution; INHALE 1 PUFFS    Every 6-8 hours PRN SOB; Therapy: 72Mrf4092 to (Last Rx:96Gha4537)  Requested for: 22Nou2801 Ordered   16  Simvastatin 5 MG Oral Tablet; take one tablet by mouth every day; Therapy: 33ZQS7542 to (Evaluate:87Ppm7908)  Requested for: 58MFR9830; Last    UE:27YAQ7481 Ordered   17  Sodium Chloride 1 GM Oral Tablet; Take 1 tablet twice daily  Requested for: 85NOE1573;    Last Rx:55Hso5218 Ordered   18  Torsemide 10 MG Oral Tablet; TAKE 1 TABLET DAILY PRN for edema; Therapy: 63DQZ2759 to (Evaluate:44Qbn9465)  Requested for: 63NFD3755; Last    Rx:66Gsv8610 Ordered   19   Vitamin D3 2000 UNIT Oral Tablet; 1 TAB DAILY Recorded   20  Warfarin Sodium 5 MG Oral Tablet; TAKE 2 TABLET Daily or as directed; Therapy: 48HUR1627 to (03 17 74 30 53)  Requested for: 89YHO6715; Last    XF:09MNJ9146 Ordered   21  ZyrTEC Allergy 10 MG Oral Tablet; TAKE 1 TABLET AT BEDTIME; Therapy: 16YYW5404 to Recorded    Allergies    1  Indocin SOLR   2  Macrobid CAPS   3  Penicillins   4  Sulfa Drugs    Signatures   Electronically signed by :  Deni Martin, ; Jun 28 2017  8:36AM EST                       (Author)

## 2018-01-16 NOTE — RESULT NOTES
Verified Results  (1) PT WITH INR [id number] 11:51AM Alis Muniz     Test Name Result Flag Reference   INR 1 89 H 0 86-1 16   PT 22 4 seconds H 12 1-14 4

## 2018-01-16 NOTE — RESULT NOTES
Verified Results  (1) PT WITH INR 92WXM3724 08:54AM Dann Boeck     Test Name Result Flag Reference   INR 3 50 H 0 86-1 16   PT 33 6 seconds H 12 0-14 3

## 2018-01-16 NOTE — PROCEDURES
Procedures by JEANNIE Segundo at 5/4/2017  9:27 AM      Author:  JEANNIE Segundo  Service:  (none) Author Type:  Speech and Language Pathologist     Filed:  5/4/2017  9:46 AM  Date of Service:  5/4/2017  9:27 AM Status:  Addendum     :  JEANNIE Segundo (Speech and Language Pathologist)        Related Notes: Original Note by JEANNIE Segundo (Speech and Language Pathologist) filed at 5/4/2017  9:44 AM         Pre-procedure Diagnoses:       1  Dysphagia, unspecified type [R13 10]       2  Feeding difficulties and mismanagement [R63 3]                Post-procedure Diagnoses:       1  Pharyngeal dysphagia [R13 13]                Procedures:       1  FL BARIUM Willetta Bidding SPEECH [PNK083 (Custom)]                   Speech-Language Pathology Video Barium Swallow Study        Patient Name: Annamaria Paige    VDDPR'C Date: 5/4/2017     Problem List  There is no problem list on file for this patient  Past Medical History  Past Medical History:     Diagnosis  Date    Anxiety     Arthritis     Asthma     Disease of thyroid gland     Hyperlipidemia     Hypertension     Hypokalemia     Iron deficiency anemia     Ulcerative colitis        Past Surgical History  Past Surgical History:      Procedure  Laterality Date    ABDOMINAL SURGERY      BREAST SURGERY      HYSTERECTOMY      SPLENECTOMY           General Information;  Pt is a 66 y o  female who presented to [facility] for VBS  Pt was seen by GI as an outpt, who recommended f/u for VBS due to dysphagia with solids and liquids, and cough  Pt had previously had an esophagram with  evidence of reflux and perhaps some spasm per record review  Pt reported that she coughs intermittently with liquids, and certain solids feel stuck  Pt also reported globus sensation with pills, and she gestured to her lower throat when asked to locate  globus sensation  Pt with oral motor function grossly WFL   Pt was viewed in lateral position and was given trials of thin liquids, nectar thick liquids, puree, hard solids, and barium tablet with thin liquids  Oral stage; Pt presents with oral phase of the swallow WFL  Pharyngeal stage; Pt presents with mild pharyngeal dysphagia characterized by pharyngeal swallow delay (initiation at the level of the pyriform sinuses with thin liquids), and mildly decreased base of tongue retraction  Pt with noted transient penetration with  larger single sips of thin liquids, and with consecutive sips of thin liquids (cup and straw)  All material cleared from the laryngeal vestibule with the swallow  Pt was noted to clear her throat after instances of penetration  Esophageal stage;  Esophageal screening was completed  Noted retention and intraesophageal retrograde flow of material  Pt is followed by GI and has had esophagram in the past      Assessment Summary; Pt presents with mild pharyngeal dysphagia characterized by pharyngeal swallow delay, mildly decreased base of tongue retraction, and transient penetration of thin liquids by larger sips, and with consecutive sips (cup and straw)  Pt may be at mildly  increased risk for aspiration when she takes consecutive sips, or large sips  There was no penetration observed with small, single sips  Retention and retrograde flow was noted in esophagus  Suspect that pt's reported globus sensation with pills and solids  may be referred sensation from esophageal retention as there was no observed pharyngeal residue  Diagnosis/Prognosis;  Mild pharyngeal dysphagia  good prognosis for continued safe po intake given swallowing strategies    Recommendations; Recommend regular diet and thin liquids, with upright posture, small bites/sips and alternating bites and sips  Reviewed recommendations with pt, who verbalized understanding  Please call if you have questions or concerns         Leanna MARIEE  [address] [facility]  Speech-Language Pathologist  Phone [phone]  Pager [phone]             Received for:Provider  EPIC   May  4 2017  9:46AM Eastern Standard Time

## 2018-01-16 NOTE — RESULT NOTES
Verified Results  (1) PT WITH INR 22Nov2016 10:17AM Alis Muniz     Test Name Result Flag Reference   INR 2 95 H 0 86-1 16   PT 29 5 seconds H 12 0-14 3

## 2018-01-16 NOTE — RESULT NOTES
Verified Results  (1) PT WITH INR [id number] 12:38PM Mireya Merlin     Test Name Result Flag Reference   INR 3 99 H 0 86-1 16   PT 40 5 seconds H 12 1-14 4

## 2018-01-17 ENCOUNTER — GENERIC CONVERSION - ENCOUNTER (OUTPATIENT)
Dept: OTHER | Facility: OTHER | Age: 73
End: 2018-01-17

## 2018-01-17 NOTE — MISCELLANEOUS
Message   Recorded as Task   Date: 06/22/2017 03:14 PM, Created By: Peng Asif   Task Name: Call Back   Assigned To: Peng Asif   Regarding Patient: Jose Canseco, Status: Active   Comment:    Renate Perez - 22 Jun 2017 3:14 PM     TASK CREATED  Pt is s/p DBS battery replacement 6/20  Pt questioned using her treadmill  Suggested she wait until her 2 week post-op since it is an automatic moving surface and she wouldn't want to lose her footing  She was in agreement  It was suggested she may ambulate as much as she tolerates on her own          Signatures   Electronically signed by : Megha Reed, ; Jun 22 2017  3:14PM EST                       (Author)

## 2018-01-17 NOTE — RESULT NOTES
Verified Results  (1) PT WITH INR [id number] 12:06PM Jordon John     Test Name Result Flag Reference   INR 2 22 H 0 86-1 16   PT 23 8 seconds H 12 0-14 3

## 2018-01-17 NOTE — RESULT NOTES
Verified Results  (1) PT WITH INR [id number] 12:43PM Hanna Sanchez     Test Name Result Flag Reference   INR 4 01 H 0 86-1 16   PT 37 2 seconds H 12 0-14 3

## 2018-01-17 NOTE — RESULT NOTES
Verified Results  (1) MAGNESIUM 26Apr2016 06:11AM Bam Kerns Order Number: [id number]    TW Order Number: [id number]     Test Name Result Flag Reference   MAGNESIUM 1 7 mg/dL  1 6-2 6     (1) HEMOGLOBIN A1C 26Apr2016 06:11AM Marylee Paterson    Order Number: [id number]      5 7-6 4% impaired fasting glucose  >=6 5% diagnosis of diabetes    Falsely low levels are seen in conditions linked to short RBC life span-  hemolytic anemia, and splenomegaly  Falsely elevated levels are seen in situations where there is an increased production of RBC- receipt of erythropoietin or blood transfusions  Adopted from ADA-Clinical Practice Recommendations     Test Name Result Flag Reference   HEMOGLOBIN A1C 5 9 % H 4 0-5 6   EST  AVG   GLUCOSE 123 mg/dl

## 2018-01-17 NOTE — RESULT NOTES
Verified Results  (1) PT WITH INR [id number] 12:15PM Ha Wen     Test Name Result Flag Reference   INR 2 84 H 0 86-1 16   PT 30 9 seconds H 12 1-14 4

## 2018-01-18 NOTE — RESULT NOTES
Verified Results  (1) PT WITH INR [id number] 10:13AM Maria Isabel Wan     Test Name Result Flag Reference   INR 2 01 H 0 86-1 16   PT 23 5 seconds H 12 1-14 4

## 2018-01-22 VITALS
HEIGHT: 63 IN | WEIGHT: 170 LBS | HEART RATE: 64 BPM | DIASTOLIC BLOOD PRESSURE: 73 MMHG | BODY MASS INDEX: 30.12 KG/M2 | SYSTOLIC BLOOD PRESSURE: 161 MMHG | RESPIRATION RATE: 18 BRPM | TEMPERATURE: 97.3 F

## 2018-01-23 VITALS
TEMPERATURE: 97.9 F | SYSTOLIC BLOOD PRESSURE: 124 MMHG | RESPIRATION RATE: 18 BRPM | WEIGHT: 165.38 LBS | HEART RATE: 72 BPM | OXYGEN SATURATION: 97 % | DIASTOLIC BLOOD PRESSURE: 70 MMHG | HEIGHT: 63 IN | BODY MASS INDEX: 29.3 KG/M2

## 2018-01-23 NOTE — RESULT NOTES
Verified Results  (1) PT WITH INR [id number] 06:17AM Ariel Nish     Test Name Result Flag Reference   INR 2 56 H 0 86-1 16   PT 28 5 seconds H 12 1-14 4

## 2018-01-23 NOTE — RESULT NOTES
Verified Results  (1) COMPREHENSIVE METABOLIC PANEL [id number] 15:81AY [employer] Order Number: [id number]     Test Name Result Flag Reference   SODIUM 139 mmol/L  136-145   POTASSIUM 4 4 mmol/L  3 5-5 3   CHLORIDE 104 mmol/L  100-108   CARBON DIOXIDE 30 mmol/L  21-32   ANION GAP (CALC) 5 mmol/L  4-13   BLOOD UREA NITROGEN 15 mg/dL  5-25   CREATININE 0 75 mg/dL  0 60-1 30   Standardized to IDMS reference method   CALCIUM 9 1 mg/dL  8 3-10 1   BILI, TOTAL 0 50 mg/dL  0 20-1 00   ALK PHOSPHATAS 79 U/L     ALT (SGPT) 22 U/L  12-78   Specimen collection should occur prior to Sulfasalazine administration due to the potential for falsely depressed results  AST(SGOT) 20 U/L  5-45   Specimen collection should occur prior to Sulfasalazine administration due to the potential for falsely depressed results  ALBUMIN 3 5 g/dL  3 5-5 0   TOTAL PROTEIN 7 2 g/dL  6 4-8 2   eGFR 79 ml/min/1 73sq [facility] Disease Education Program recommendations are as follows:  GFR calculation is accurate only with a steady state creatinine  Chronic Kidney disease less than 60 ml/min/1 73 sq  meters  Kidney failure less than 15 ml/min/1 73 sq  meters  GLUCOSE FASTING 102 mg/dL H 65-99   Specimen collection should occur prior to Sulfasalazine administration due to the potential for falsely depressed results  Specimen collection should occur prior to Sulfapyridine administration due to the potential for falsely elevated results  (1) HEMOGLOBIN A1C [id number] 06:17AM [employer] Order Number: [id number]     Test Name Result Flag Reference   HEMOGLOBIN A1C 6 0 %  4 2-6 3   EST  AVG   GLUCOSE 126 mg/dl       (1) LIPID PANEL, FASTING [id number] 06:17AM [employer] Order Number: [id number]     Test Name Result Flag Reference   CHOLESTEROL 193 mg/dL     HDL,DIRECT 78 mg/dL H 40-60   Specimen collection should occur prior to Metamizole administration due to the potential for falsley depressed results  LDL CHOLESTEROL CALCULATED 100 mg/dL  0-100   Triglyceride:        Normal <150 mg/dl   Borderline High 150-199 mg/dl   High 200-499 mg/dl   Very High >499 mg/dl      Cholesterol:       Desirable <200 mg/dl    Borderline High 200-239 mg/dl    High >239 mg/dl      HDL Cholesterol:       High>59 mg/dL    Low <41 mg/dL      This screening LDL is a calculated result  It does not have the accuracy of the Direct Measured LDL in the monitoring of patients with hyperlipidemia and/or statin therapy  Direct Measure LDL (QPJ792) must be ordered separately in these patients  TRIGLYCERIDES 73 mg/dL  <=150   Specimen collection should occur prior to N-Acetylcysteine or Metamizole administration due to the potential for falsely depressed results  (1) MAGNESIUM [id number] 06:17A Alis Muniz    Order Number: [id number]     Test Name Result Flag Reference   MAGNESIUM 1 8 mg/dL  1 6-2 6     (1) TSH [id number] 06:17A Alis Muniz    Order Number: [id number]     Test Name Result Flag Reference   TSH 2 778 uIU/mL  0 358-3 740   Patients undergoing fluorescein dye angiography may retain small amounts of fluorescein in the body for 48-72 hours post procedure  Samples containing fluorescein can produce falsely depressed TSH values  If the patient had this procedure,a specimen should be resubmitted post fluorescein clearance            The recommended reference ranges for TSH during pregnancy are as follows:  First trimester 0 1 to 2 5 uIU/mL  Second trimester  0 2 to 3 0 uIU/mL  Third trimester 0 3 to 3 0 uIU/m     (1) VITAMIN D 25-HYDROXY [id number] 06:17A Alis Muniz    Order Number: [id number]     Test Name Result Flag Reference   VIT D 25-HYDROX 30 0 ng/mL  30 0-100 0   This assay is a certified procedure of the CDC Vitamin D Standardization Certification Program (VDSCP)     Deficiency <20ng/ml   Insufficiency 20-30ng/ml   Sufficient  ng/ml     *Patients undergoing fluorescein dye angiography may retain small amounts of fluorescein in the body for 48-72 hours post procedure  Samples containing fluorescein can produce falsely elevated Vitamin D values  If the patient had this procedure, a specimen should be resubmitted post fluorescein clearance  (1) VITAMIN B12 [id number] 06:17AM Anne Vasquez   TW Order Number: [id number]     Test Name Result Flag Reference   VITAMIN B12 486 pg/mL  100-900       Plan  Anxiety    · Doxepin HCl - 50 MG Oral Capsule; TAKE 1-2 CAPSULES AT BEDTIME AS  NEEDED  Hypomagnesemia    · (1) MAGNESIUM; Status:Active; Requested for:09Apr2018;   Hyponatremia    · (1) BASIC METABOLIC PROFILE; Status:Active; Requested TKC:89VFH8274; Insomnia    · Belsomra 10 MG Oral Tablet; TAKE 1 TABLET AT  BEDTIME AS NEEDED FOR  INSOMNIA  Iron deficiency anemia    · (1) CBC/PLT/DIFF; Status:Active; Requested WGO:07PIG0143; Lower back pain, Osteoarthritis of right knee    · Hydrocodone-Acetaminophen 7 5-325 MG Oral Tablet; TAKE 1 TABLET FOUR  TIMES A DAY AS NEEDED FOR PAIN  Portal vein thrombosis    · (1) PT WITH INR; Status:Active; Requested for:PRN Schedule: ;   Prediabetes    · (1) HEMOGLOBIN A1C; Status:Active;  Requested RRS:43WGD0076;

## 2018-01-23 NOTE — PROGRESS NOTES
Assessment    1  Lower back pain (724 2) (M54 5)   2  Iron deficiency anemia (280 9) (D50 9)   3  Portal vein thrombosis (452) (I81)   4  Hypothyroidism (244 9) (E03 9)   5  Insomnia (780 52) (G47 00)   6  Encounter for preventive health examination (V70 0) (Z00 00)    Plan  Anxiety    · Doxepin HCl - 50 MG Oral Capsule; TAKE 1-2 CAPSULES AT BEDTIME AS  NEEDED  Hypomagnesemia    · (1) MAGNESIUM; Status:Active; Requested for:09Apr2018;   Hyponatremia    · (1) BASIC METABOLIC PROFILE; Status:Active; Requested MSX:[id number]; Insomnia    · Belsomra 10 MG Oral Tablet; TAKE 1 TABLET AT  BEDTIME AS NEEDED FOR  INSOMNIA  Iron deficiency anemia    · (1) CBC/PLT/DIFF; Status:Active; Requested HRF:[id number]; Lower back pain, Osteoarthritis of right knee    · Hydrocodone-Acetaminophen 7 5-325 MG Oral Tablet; TAKE 1 TABLET FOUR  TIMES A DAY AS NEEDED FOR PAIN  Portal vein thrombosis    · (1) PT WITH INR; Status:Active; Requested for:PRN Schedule: ;   Prediabetes    · (1) HEMOGLOBIN A1C; Status:Active; Requested GRETEL:[id number]; Discussion/Summary  Impression: Subsequent Annual Wellness Visit, with preventive exam as well as age and risk appropriate counseling completed  Cardiovascular screening and counseling: screening is current  Diabetes screening and counseling: screening is current  Colorectal cancer screening and counseling: screening is current  Breast cancer screening and counseling: screening is current  Cervical cancer screening and counseling: screening not indicated  Osteoporosis screening and counseling: screening is current  Abdominal aortic aneurysm screening and counseling: screening not indicated  Immunizations: influenza vaccine is up to date this year, the lifetime pneumococcal vaccine has been completed, Zostavax vaccination up to date, Td vaccination status is unknown and Tdap vaccination status is unknown     Advance Directive Planning: complete and up to date, she was encouraged to follow-up with me to discuss her questions and/or decisions  Advice and education were given regarding fall risk reduction and nutrition (non-diabetic)  She was referred to cardiology and nephrology  Patient Discussion: follow-up visit needed in 4 mos  History of Present Illness  The patient is being seen for the subsequent annual wellness visit  Medicare Screening and Risk Factors   Hospitalizations: she has been previously hospitalizied  Medicare Screening Tests Risk Questions   Osteoporosis risk assessment: , female gender and over 48years of age  Drug and Alcohol Use: The patient is a former cigarette smoker and quit smoking 50 YEARS AGO  The patient reports occasional alcohol use and drinking 3 drinks per week  She has never used illicit drugs  Diet and Physical Activity: Current diet includes well balanced meals, 2 servings of fruit per day, 2-3 servings of vegetables per day, 1-2 servings of meat per day, 3 servings of whole grains per day, 1-2 cups of coffee per day, 5 cups of tea per day and water 4  She exercises infrequently  Exercise: walking 30 minutes per day  Mood Disorder and Cognitive Impairment Screening: She denies feeling down, depressed, or hopeless over the past two weeks  She denies feeling little interest or pleasure in doing things over the past two weeks  Cognitive impairment screening: denies difficulty learning/retaining new information, denies difficulty handling complex tasks, denies difficulty with reasoning, denies difficulty with spatial ability and orientation, denies difficulty with language and denies difficulty with behavior  Functional Ability/Level of Safety: Hearing is slightly decreased  She reports hearing difficulties  She does not use a hearing aid   The patient is currently able to do activities of daily living without limitations, able to do instrumental activities of daily living without limitations, able to participate in social activities without limitations and able to drive without limitations  Activities of daily living details: does not need help using the phone, no transportation help needed, does not need help shopping, no meal preparation help needed, does not need help doing housework, does not need help doing laundry, does not need help managing medications and does not need help managing money  Fall risk factors: The patient fell 0 times in the past 12 months  pt feels unsteady at times  Home safety risk factors:  no unfamiliar surroundings, no loose rugs, no poor household lighting, no uneven floors, no household clutter, grab bars in the bathroom and handrails on the stairs  Advance Directives: Advance directives: living will, durable power of  for health care directives and advance directives  end of life decisions were reviewed with the patient  Co-Managers and Medical Equipment/Suppliers: See Patient Care Team      Patient Care Team    Care Team Member Role Specialty Office Number   Henery Blizzard MD  Colon and Rectal Surgery [phone]   Oly Chinchilla MD Specialist Cardiology [phone]   Yessenia Hills  Gastroenterology Adult [phone]   Mercedes Beverly MD  Internal Medicine [phone]   Elva Chavira  Rheumatology [phone]   Brandie Ghotra MD  Urology [phone]   Carrie FRANCIS  Nephrology [phone]   Too Carrera MD  Obstetrics/Gynecology [phone]   Jeanette MACHUCA  Neurosurgery [phone]   Abraham Orlando Specialist Neurosurgery [phone]   Kemar Walters MD Specialist Internal Medicine [phone]   [facility]  Neurology [phone]   Rodrigo Salazar MD  Internal Medicine [phone], Doreen [facility]  Neurosurgery [phone]   Jose Regalado [place]  Nephrology [phone]     Review of Systems    Constitutional: not feeling tired  Eyes: no eyesight problems     Cardiovascular: no chest pain, no palpitations and no lower extremity edema  Respiratory: no cough and no shortness of breath during exertion  Gastrointestinal: diarrhea, but as noted in HPI, no abdominal pain, no vomiting and no constipation  Genitourinary: no dysuria  Musculoskeletal: no arthralgias  Integumentary: skin lesion  Neurological: no headache and no dizziness  Psychiatric: sleep disturbances, but as noted in HPI and no anxiety  no feelings of weakness      Active Problems    1  Abnormal weight gain (783 1) (R63 5)   2  Acid reflux (530 81) (K21 9)   3  Adult BMI > 30 (V85 30)   4  Allergic rhinitis (477 9) (J30 9)   5  Anemia (285 9) (D64 9)   6  Anxiety (300 00) (F41 9)   7  Asthma (493 90) (J45 909)   8  Depression (311) (F32 9)   9  Diarrhea (787 91) (R19 7)   10  Encounter for current long-term use of anticoagulants (V58 61) (Z79 01)   11  Encounter for gynecological examination without abnormal finding (V72 31) (Z01 419)   12  Encounter for postoperative care (V58 49) (Z48 89)   13  Encounter for screening mammogram for malignant neoplasm of breast (V76 12)    (Z12 31)   14  End of battery life of deep brain stimulator (V53 02) (Z46 2)   15  Essential tremor (333 1) (G25 0)   16  History of allergy (V15 09) (Z88 9)   17  History of esophageal dilatation (V45 89) (Z98 890)   18  Hyperlipidemia (272 4) (E78 5)   19  Hypertension (401 9) (I10)   20  Hypomagnesemia (275 2) (E83 42)   21  Hyponatremia (276 1) (E87 1)   22  Hypothyroidism (244 9) (E03 9)   23  Insomnia (780 52) (G47 00)   24  Iron deficiency anemia (280 9) (D50 9)   25  Lower back pain (724 2) (M54 5)   26  Menopausal state (627 2) (N95 1)   27  Need for prophylactic vaccination and inoculation against influenza (V04 81) (Z23)   28  Obesity (278 00) (E66 9)   29  Osteoarthritis of right knee (715 96) (M17 11)   30  Osteopenia (733 90) (M85 80)   31  Peripheral neuropathy (356 9) (G62 9)   32  Portal vein thrombosis (452) (I81)   33  Prediabetes (790 29) (R73 09)   34   S/P deep brain stimulator placement (V45 89) (Z96 89)   35  Salpingitis/oophoritis, 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)   36  Sjogren's syndrome (710 2) (M35 00)   37  Spinal stenosis (724 00) (M48 00)   38  Supraventricular tachycardia (427 89) (I47 1)   39  Ulcerative colitis without complications (185 1) (O24 23)   40  Unsteady gait (781 2) (R26 81)   41  Visit for pre-operative examination (V72 84) (Z01 818)   42  Vulvar itching (698 1) (L29 2)    Past Medical History    1  History of Arthritis (V13 4)   2  History of Back Pain   3  History of Bacterial vaginosis (616 10,041 9) (N76 0,B96 89)   4  History of Depression with anxiety (300 4) (F41 8)   5  Essential tremor (333 1) (G25 0)   6  History of Follow-up examination following surgery (V67 00) (Z09)   7  History of Gastroesophageal reflux disease, esophagitis presence not specified   (530 81) (K21 9)   8  History of  2   9  History of breast lump (V13 89) (Z87 898)   10  History of edema (V13 89) (Z87 898)   11  History of fatigue (V13 89) (Z87 898)   12  History of hypertension (V12 59) (Z86 79)   13  History of hypokalemia (V12 29) (Z86 39)   14  History of hypothyroidism (V12 29) (Z86 39)   15  History of osteoarthritis (V13 4) (Z87 39)   16  History of paroxysmal supraventricular tachycardia (V12 59) (Z86 79)   17  History of thrombocytosis (V12 3) (Z86 2)   18  History of ulcerative colitis (V12 79) (Z87 19)   19  History of vertigo (V12 49) (Z87 898)   20  History of Impacted cerumen of right ear (380 4) (H61 21)   21  History of Mammogram abnormal (793 80) (R92 8)   22  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 0)   23  History of Other abnormal finding of urine (791 9) (R82 99)   24  History of Other muscle spasm (728 85) (M62 838)   25  History of Palpitations (785 1) (R00 2)   26  History of  (spontaneous vaginal delivery) (650) (O80)   27  History of Tear film insufficiency, unspecified laterality (375 15) (H04 129)   28   History of Vaginal yeast infection (112  1) (B37 3)   29  History of Weakness of both legs (079 89) (R29 898)    The active problems and past medical history were reviewed and updated today  Surgical History    1  History of Arthroscopy Knee   2  History of Breast Surgery Lumpectomy   3  History of Diagnostic Esophagogastroduodenoscopy   4  History of Fiberoptic Examinations Proctoscopy   5  History of Hysterectomy   6  History of Ileoanal Pouch Fistula Repair Transperin & Transabd Approach   7  History of Ileostomy Closure   8  History of Implantation Of Intracranial Neurostimulator   9  History of Splenectomy   10  History of Total Abdominal Colectomy    Family History  Mother    1  Family history of Acute Venous Thrombosis Of The Deep Vessels Of The Distal Lower   Extremity   2  Denied: Family history of Alcoholism and drug addiction in family   3  Denied: Family history of Anxiety and depression   4  Family history of phlebitis (V19 8) (Z84 89)   5  Family history of Hypertension (V17 49)   6  Family history of Methicillin Resistant Staphylococcus Aureus Infection   7  Family history of Mother  At Age 49   5  Family history of Peripheral Arterial Disease  Father    5  Denied: Family history of Alcoholism and drug addiction in family   8  Denied: Family history of Anxiety and depression   11  Family history of Chronic Obstructive Pulmonary Disease   12  Family history of diabetes mellitus (V18 0) (Z83 3)   13  Family history of Stroke Syndrome (V17 1)  Child    15  Denied: Family history of Alcoholism and drug addiction in family   13  Denied: Family history of Anxiety and depression  Sibling    12  Denied: Family history of Alcoholism and drug addiction in family   16  Denied: Family history of Anxiety and depression  Sister    25  Family history of Diabetes Mellitus (V18 0)   19  Family history of Sjogren Syndrome    The family history was reviewed and updated today         Social History    · Activities   · Caffeine use (N04 89) (F15 90)   · Drinks beer (V49 89) (Z78 9)   · Drinks caffeinated tea   · Drinks coffee   · Drinks wine (V49 89) (Z78 9)   · Denied: History of Drug Use   · Educational Level   · Former smoker (Y91 25) (L90 754)   · Inadequate exercise (V69 0) (Z72 3)   · Lives with adult children   · Marital History -  (V61 03)   · Occupation: Retired   · Sexually Active   · Social alcohol use (Z78 9)  The social history was reviewed and is unchanged  Current Meds   1  Advair Diskus 250-50 MCG/DOSE Inhalation Aerosol Powder Breath Activated; inhale 1   puff twice daily; Therapy: 24DBI4067 to (Evaluate:10Jun2018)  Requested for: 07Wdw9002; Last   Rx:36Nyb5592 Ordered   2  Dexilant 60 MG Oral Capsule Delayed Release; TAKE 1 CAPSULE DAILY EVERY   MORNING BEFORE BREAKFAST Recorded   3  DilTIAZem HCl ER Coated Beads 180 MG Oral Capsule Extended Release 24 Hour;   take 1 capsule daily; Therapy: 29Clr6280 to (Evaluate:25Mar2018)  Requested for: 67SOB2730; Last   Rx:57Thq1991 Ordered   4  Doxepin HCl - 50 MG Oral Capsule; TAKE 1 CAPSULE AT BEDTIME; Therapy: 29Grs1830 to (Lawrence Quinones)  Requested for: 56JKE7906; Last   Rx:57Fpp7073 Ordered   5  Escitalopram Oxalate 20 MG Oral Tablet; Take 1 tablet daily; Therapy: 31TCD4861 to (Evaluate:98Dkl7836)  Requested for: 70Swd7032; Last   Rx:75Ghd4795 Ordered   6  Estradiol 0 05 MG/24HR Transdermal Patch Weekly; APPLY 1 PATCH WEEKLY AS   DIRECTED; Therapy: 36IAJ7636 to ([facility])  Requested for: 052 576 88 48; Last   Rx:42Jmc8402 Ordered   7  Fluticasone Propionate 50 MCG/ACT Nasal Suspension; use 2 sprays in each nostril   once daily; Therapy: 11LZP8464 to (Evaluate:48Rcs8139)  Requested for: 75ZJY1366; Last   Rx:08Myr0741 Ordered   8  Gabapentin 100 MG Oral Capsule; TAKE ONE CAPSULE BY MOUTH TWICE DAILY   EVERY MORNING & AFTERNOON;   Therapy: 44PZX9752 to (Evaluate:87Huo8362)  Requested for: 27Jun2017; Last   RY:90OIK3888 Ordered   9   Gabapentin 600 MG Oral Tablet; TAKE 1 TAB IN AM, 1 TAB IN AFTERNOON, AND 2 TABS   QHS  Requested for: 24Mmv5433; Last Rx:83Ptk1280 Ordered   10  Hydrocodone-Acetaminophen 7 5-325 MG Oral Tablet; TAKE 1 TABLET FOUR TIMES A    DAY AS NEEDED FOR PAIN;    Therapy: 92LIZ8343 to (Evaluate:24Nov2017); Last Rx:46Zdl0335 Ordered   11  Hydroxychloroquine Sulfate 200 MG Oral Tablet; TAKE 1 TABLET TWICE DAILY WITH    FOOD; Therapy: 71Ivs0664 to (Apolinar Day)  Requested for: 54Vez8394; Last    Rx:42Mhb1307; Status: ACTIVE - Renewal Voided Ordered   12  Levothyroxine Sodium 50 MCG Oral Tablet; TAKE ONE TABLET BY MOUTH ONCE DAILY; Therapy: 82OVU0352 to (Evaluate:26Mar2018)  Requested for: 12KGE0480; Last    Rx:60Uus3858 Ordered   13  Lisinopril 20 MG Oral Tablet; TAKE 1 TABLET DAILY AS DIRECTED; Therapy: 24LZJ5868 to (Evaluate:33Pfs5660)  Requested for: 83Gay5487; Last    Rx:98Jsm9339 Ordered   14  LORazepam 1 MG Oral Tablet; TAKE 1 TABLET Every 6 hours; Therapy: 95ZNM6552 to (Evaluate:25Nqe2912); Last Rx:79Vys6923 Ordered   15  Meclizine HCl - 25 MG Oral Tablet; TAKE 1 TABLET Every 6 hours PRN; Therapy: 23Slq1296 to (Evaluate:07Jan2016)  Requested for: 61VIF5412; Last    Rx:30Bma6497 Ordered   16  ProAir  (90 Base) MCG/ACT Inhalation Aerosol Solution; INHALE 1 PUFFS Every    6-8 hours PRN SOB; Therapy: 72PUD8283 to (Last Rx:28Fnu2378)  Requested for: 46Nin1725 Ordered   17  Simvastatin 5 MG Oral Tablet; take one tablet by mouth every day; Therapy: 06HLV7372 to (Evaluate:60Mao8494)  Requested for: 93OEF0727; Last    NV:37NLQ0890 Ordered   18  Sodium Chloride 1 GM Oral Tablet; Take 1 tablet twice daily  Requested for: 22Nov2017;    Last Rx:22Nov2017 Ordered   19  Torsemide 10 MG Oral Tablet; TAKE 1 TABLET DAILY PRN for edema; Therapy: 01DUS2074 to (Evaluate:82Vow5304)  Requested for: 64YSM0495; Last    Rx:16Pdp2552 Ordered   21  Vitamin D3 2000 UNIT Oral Tablet; 1 TAB DAILY Recorded   21   Warfarin Sodium 5 MG Oral Tablet; TAKE 2 TABLETS ONCE DAILY OR AS DIRECTED; Therapy: 51FPL0322 to (Kellee Fat)  Requested for: 26Oct2017; Last    Rx:26Oct2017 Ordered   22  ZyrTEC Allergy 10 MG Oral Tablet; TAKE 1 TABLET AT BEDTIME; Therapy: 49LCT0704 to Recorded    The medication list was reviewed and updated today  Allergies    1  Indocin SOLR   2  Macrobid CAPS   3  Penicillins   4  Sulfa Drugs    Immunizations  Influenza --- Danyelle Charlese: 9/17/09Naren Barnett: 9/23/10; Series3: 9/30/11; Cristhian Amichapo: 11/10/2012Aldona Morn:  87-Upi-2617Agmhdl Mano: 25-Sep-2014; Series7: 03-Oct-2015; Yaima Fu: 03-Nov-2016; Series9:  03-Nov-2017   Meningococcal A & C --- Series1: 2000; Series2: 2006   PCV --- Tamikolyefraine Loupe: 16-Jun-2015   PPSV --- Danyelle Loupe: 2006   Zoster --- Series1: Sep 2011     Vitals  Signs   Recorded: 72JZC8773 08:02AM   Temperature: 97 9 F  Heart Rate: 72  Respiration: 18  Systolic: 957  Diastolic: 70  Height: 5 ft 3 in  Weight: 165 lb 6 oz  BMI Calculated: 29 3  BSA Calculated: 1 78  O2 Saturation: 97    Results/Data  (1) COMPREHENSIVE METABOLIC PANEL 88RBM5621 92:35NW [facility] Order Number: [id number]     Test Name Result Flag Reference   SODIUM 139 mmol/L  136-145   POTASSIUM 4 4 mmol/L  3 5-5 3   CHLORIDE 104 mmol/L  100-108   CARBON DIOXIDE 30 mmol/L  21-32   ANION GAP (CALC) 5 mmol/L  4-13   BLOOD UREA NITROGEN 15 mg/dL  5-25   CREATININE 0 75 mg/dL  0 60-1 30   Standardized to IDMS reference method   CALCIUM 9 1 mg/dL  8 3-10 1   BILI, TOTAL 0 50 mg/dL  0 20-1 00   ALK PHOSPHATAS 79 U/L     ALT (SGPT) 22 U/L  12-78   Specimen collection should occur prior to Sulfasalazine administration due to the potential for falsely depressed results  AST(SGOT) 20 U/L  5-45   Specimen collection should occur prior to Sulfasalazine administration due to the potential for falsely depressed results     ALBUMIN 3 5 g/dL  3 5-5 0   TOTAL PROTEIN 7 2 g/dL  6 4-8 2   eGFR 79 ml/min/1 73sq m     National Kidney Disease Education Program recommendations are as follows:  GFR calculation is accurate only with a steady state creatinine  Chronic Kidney disease less than 60 ml/min/1 73 sq  meters  Kidney failure less than 15 ml/min/1 73 sq  meters  GLUCOSE FASTING 102 mg/dL H 65-99   Specimen collection should occur prior to Sulfasalazine administration due to the potential for falsely depressed results  Specimen collection should occur prior to Sulfapyridine administration due to the potential for falsely elevated results  (1) LIPID PANEL, FASTING 19Pnl0630 06:17A Amalia Burows    Order Number: [id number]     Test Name Result Flag Reference   CHOLESTEROL 193 mg/dL     HDL,DIRECT 78 mg/dL H 40-60   Specimen collection should occur prior to Metamizole administration due to the potential for falsley depressed results  LDL CHOLESTEROL CALCULATED 100 mg/dL  0-100   Triglyceride:        Normal <150 mg/dl   Borderline High 150-199 mg/dl   High 200-499 mg/dl   Very High >499 mg/dl      Cholesterol:       Desirable <200 mg/dl    Borderline High 200-239 mg/dl    High >239 mg/dl      HDL Cholesterol:       High>59 mg/dL    Low <41 mg/dL      This screening LDL is a calculated result  It does not have the accuracy of the Direct Measured LDL in the monitoring of patients with hyperlipidemia and/or statin therapy  Direct Measure LDL (MPA935) must be ordered separately in these patients  TRIGLYCERIDES 73 mg/dL  <=150   Specimen collection should occur prior to N-Acetylcysteine or Metamizole administration due to the potential for falsely depressed results       (1) MAGNESIUM 54XIQ2298 06:17A Amalia Burows    Order Number: [id number]     Test Name Result Flag Reference   MAGNESIUM 1 8 mg/dL  1 6-2 6     (1) TSH 50KVL7424 06:17A Amalia Burows    Order Number: [id number]     Test Name Result Flag Reference   TSH 2 778 uIU/mL  0 358-3 740   Patients undergoing fluorescein dye angiography may retain small amounts of fluorescein in the body for 48-72 hours post procedure  Samples containing fluorescein can produce falsely depressed TSH values  If the patient had this procedure,a specimen should be resubmitted post fluorescein clearance  The recommended reference ranges for TSH during pregnancy are as follows:  First trimester 0 1 to 2 5 uIU/mL  Second trimester  0 2 to 3 0 uIU/mL  Third trimester 0 3 to 3 0 uIU/m     (1) HEMOGLOBIN A1C 01KOQ5865 06:17AM Romulo Parker   TW Order Number: [id number]     Test Name Result Flag Reference   HEMOGLOBIN A1C 6 0 %  4 2-6 3   EST  AVG  GLUCOSE 126 mg/dl       (1) VITAMIN D 25-HYDROXY 44EPB9081 06:17AM Romulo Parker   TW Order Number: [id number]     Test Name Result Flag Reference   VIT D 25-HYDROX 30 0 ng/mL  30 0-100 0   This assay is a certified procedure of the CDC Vitamin D Standardization Certification Program (VDSCP)     Deficiency <20ng/ml   Insufficiency 20-30ng/ml   Sufficient  ng/ml     *Patients undergoing fluorescein dye angiography may retain small amounts of fluorescein in the body for 48-72 hours post procedure  Samples containing fluorescein can produce falsely elevated Vitamin D values  If the patient had this procedure, a specimen should be resubmitted post fluorescein clearance  (1) VITAMIN B12 84FHF5208 06:17AM Romulo Parker    Order Number: [id number]     Test Name Result Flag Reference   VITAMIN B12 486 pg/mL  100-900       Future Appointments    Date/Time Provider Specialty Site   07/16/2018 07:00 AM Irving Galvez MD Neurology [facility]  [place]   07/19/2018 01:00 PM DEVAN Mcdonald   Obstetrics/Gynecology [facility]   04/20/2018 08:30 AM Ramo Velasquez MD Internal Medicine [facility] INTERNAL MED     Signatures   Electronically signed by : Chuck Main MD; Dec 22 2017  3:00PM EST (Author)

## 2018-01-23 NOTE — PROGRESS NOTES
Assessment   1  Essential tremor (333 1) (G25 0)  2  End of battery life of deep brain stimulator (V53 02) (Z46 2)    Discussion/Summary    This is a 69 yo female with left greater then right intention tremor that has failed medical management  She is 3 years from DBS placement  Doing very well  Programmed by Dr Cmaron Hunter  She has reached complete EOL of her right sided IPG  Her left sided IPG is working well at battery life 2 96 on low settings  Presents for replacement  Will proceed with replacement of her right sided IPG  She would like to change to a rechargeable IPG  DBS interrogation and programming in results section  Will need to stop her Coumadin for 7 days prior and 2 days after  The risks and benefits of surgery were reviewed with the patient and family and they wish to proceed  These risks include, but are not limited to bleeding, infection, device malfunction, and malpositioning  All questions were answered and appropriate contact information was given in case questions arise in the future  They will undergo preoperative clearance and be scheduled for surgery in the near future  Chief Complaint  NP with hx of essential tremor of UE's/peripheral neuropathy here today to discuss DBS  History of Present Illness  She is now 3 years from Bilateral ViM DBS placement for ET with bilateral IPGs  She continues to do very well with the system  Followed and programmed by Dr Camron Hunter  She has reached complete EOL of her right sided IPG  Presents for replacement  Patient presented 6 weeks from bilateral ViM thalamus DBS implantation for her tremor  Doing very well  Denies incisional pain  No fevers or chills  Programmed by Dr Camorn Hunter twice with complete tremor control  Off all medications for her tremor  In review, this is a very pleasant 68yo female with a ten year history of left greater than right upper extremity tremor  The patient is left handed   It started with a mild intention tremor in her left hand and progressed in severity and also to the contralateral side  She intitially tried to teach herself to eat with her right hand but now the right side has progressed in severity  She is unable to write or eat without assistance  There is a mild resting component but it is largely intentional  It responds to alcohol with some relief  She has tried numerous medications including phenobarbitol and mysoline  She is presently on Inderal  She obtains some relief but the tremor remains disabling despite the medication  She has a history of a PE in 2000 for which she was placed on coumadin  This is managed by her PCP  She remains on coumdain secondary to a family history with her mother also having clots  Review of Systems    Constitutional: No fever, no chills, feels well, no tiredness, no recent weight gain or weight loss  Eyes: eyesight problems and Cataract, but No complaints of eye pain, no red eyes, no eyesight problems, no discharge, no dry eyes, no itching of eyes  ENT: no complaints of earache, no loss of hearing, no nose bleeds, no nasal discharge, no sore throat, no hoarseness  Cardiovascular: HTN, but No complaints of slow heart rate, no fast heart rate, no chest pain, no palpitations, no leg claudication, no lower extremity edema  Respiratory: Asthma, but No complaints of shortness of breath, no wheezing, no cough, no SOB on exertion, no orthopnea, no PND  Gastrointestinal: diarrhea and reflux, but No complaints of abdominal pain, no constipation, no nausea or vomiting, no diarrhea, no bloody stools  Genitourinary: No complaints of dysuria, no incontinence, no pelvic pain, no dysmenorrhea, no vaginal discharge or bleeding  Musculoskeletal: limb pain, left arm tremors returning due to IPG EOL of DBS and lower back pain, but No complaints of arthralgias, no myalgias, no joint swelling or stiffness, no limb pain or swelling     Integumentary: No complaints of skin rash or lesions, no itching, no skin wounds, no breast pain or lump  Neurological: No complaints of headache, no confusion, no convulsions, no numbness, no dizziness or fainting, no tingling, no limb weakness, no difficulty walking essential tremor UE's  Psychiatric: anxiety and depression, but Not suicidal, no sleep disturbance, no anxiety or depression, no change in personality, no emotional problems  Endocrine: hot flashes and Hypothyroidism, but No complaints of proptosis, no hot flashes, no muscle weakness, no deepening of the voice, no feelings of weakness  Hematologic/Lymphatic: coumadin, but No complaints of swollen glands, no swollen glands in the neck, does not bleed easily, does not bruise easily  ROS reviewed  Active Problems   1  Abnormal weight gain (783 1) (R63 5)  2  Acid reflux (530 81) (K21 9)  3  Adult BMI > 30 (V85 30) (E66 8)  4  Allergic rhinitis (477 9) (J30 9)  5  Anemia (285 9) (D64 9)  6  Anxiety (300 00) (F41 9)  7  Asthma (493 90) (J45 909)  8  Depression (311) (F32 9)  9  Diarrhea (787 91) (R19 7)  10  Encounter for current long-term use of anticoagulants (V58 61) (Z79 01)  11  Encounter for gynecological examination without abnormal finding (V72 31) (Z01 419)  12  Encounter for screening mammogram for malignant neoplasm of breast (V76 12)    (Z12 31)  13  Essential tremor (333 1) (G25 0)  14  History of allergy (V15 09) (Z88 9)  15  Hyperlipidemia (272 4) (E78 5)  16  Hypertension (401 9) (I10)  17  Hypokalemia (276 8) (E87 6)  18  Hypomagnesemia (275 2) (E83 42)  19  Hyponatremia (276 1) (E87 1)  20  Hypothyroidism (244 9) (E03 9)  21  Insomnia (780 52) (G47 00)  22  Iron deficiency anemia (280 9) (D50 9)  23  Lower back pain (724 2) (M54 5)  24  Lower extremity pain, left (729 5) (M79 605)  25  Menopausal state (627 2) (N95 1)  26  Need for pneumococcal vaccination (V03 82) (Z23)  27  Need for prophylactic vaccination and inoculation against influenza (V04 81) (Z23)  28   Obesity (278 00) (E66 9)  29  Osteoarthritis of right knee (715 96) (M17 11)  30  Osteopenia (733 90) (M85 80)  31  Peripheral neuropathy (356 9) (G62 9)  32  Portal vein thrombosis (452) (I81)  33  Prediabetes (790 29) (R73 09)  34  S/P deep brain stimulator placement (V45 89) (Z96 89)  35  Salpingitis/oophoritis, 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)  36  Spinal stenosis (724 00) (M48 00)  37  Supraventricular tachycardia (427 89) (I47 1)  38  Ulcerative colitis without complications (176 4) (R48 81)  39  Unsteady gait (781 2) (R26 81)  40  Visit for pre-operative examination (V72 84) (Z01 818)  41  Visit for screening mammogram (V76 12) (Z12 31)    Past Medical History   1  History of Arthritis (V13 4)  2  History of Back Pain  3  History of Bacterial vaginosis (616 10,041 9) (N76 0,B96 89)  4  History of Depression with anxiety (300 4) (F41 8)  5  Essential tremor (333 1) (G25 0)  6  History of Follow-up examination following surgery (V67 00) (Z09)  7  History of Gastroesophageal reflux disease, esophagitis presence not specified   (530 81) (K21 9)  8  History of  2  9  History of breast lump (V13 89) (Z87 898)  10  History of edema (V13 89) (Z87 898)  11  History of fatigue (V13 89) (Z87 898)  12  History of hypertension (V12 59) (Z86 79)  13  History of hypothyroidism (V12 29) (Z86 39)  14  History of osteoarthritis (V13 4) (Z87 39)  15  History of paroxysmal supraventricular tachycardia (V12 59) (Z86 79)  16  History of thrombocytosis (V12 3) (Z86 2)  17  History of ulcerative colitis (V12 79) (Z87 19)  18  History of vertigo (V12 49) (Z87 898)  19  History of Impacted cerumen of right ear (380 4) (H61 21)  20  History of Mammogram abnormal (793 80) (R92 8)  21  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 0)  22  History of Muscle pain (729 1) (M79 1)  23  History of Muscle spasm (728 85) (M62 838)  24  History of Nausea (787 02) (R11 0)  25  History of Other abnormal finding of urine (791 9) (R82 99)  26   History of Other muscle spasm (728 85) (M62 838)  27  History of Palpitations (785 1) (R00 2)  28  History of Sjogren's syndrome (710 2) (M35 00)  29  History of  (spontaneous vaginal delivery) (650) (O80)  30  History of Tear film insufficiency, unspecified laterality (375 15) (H04 129)  31  History of Vaginal yeast infection (112 1) (B37 3)  32  History of Weakness of both legs (729 89) (R29 898)    The active problems and past medical history were reviewed and updated today  Surgical History   1  History of Arthroscopy Knee  2  History of Breast Surgery Lumpectomy  3  History of Diagnostic Esophagogastroduodenoscopy  4  History of Fiberoptic Examinations Proctoscopy  5  History of Hysterectomy  6  History of Ileoanal Pouch Fistula Repair Transperin & Transabd Approach  7  History of Ileostomy Closure  8  History of Implantation Of Intracranial Neurostimulator  9  History of Splenectomy  10  History of Total Abdominal Colectomy    The surgical history was reviewed and updated today  Family History  Mother   1  Family history of Acute Venous Thrombosis Of The Deep Vessels Of The Distal Lower   Extremity  2  Family history of phlebitis (V19 8) (Z84 89)  3  Family history of Hypertension (V17 49)  4  Family history of Methicillin Resistant Staphylococcus Aureus Infection  5  Family history of Mother  At Age 49  9  Family history of Peripheral Arterial Disease  Father   7  Family history of Chronic Obstructive Pulmonary Disease  8  Family history of diabetes mellitus (V18 0) (Z83 3)  9  Family history of Stroke Syndrome (V17 1)  Sister   8  Family history of Diabetes Mellitus (V18 0)  11  Family history of Sjogren Syndrome    The family history was reviewed and updated today         Social History    · Activities   · Caffeine use (V49 89) (F15 90)   · Drinks beer (V49 89) (Z78 9)   · Drinks caffeinated tea   · Drinks coffee   · Drinks wine (V49 89) (Z78 9)   · Denied: History of Drug Use   · Educational Level   · Former smoker (V15 82) (T42 005)   · Inadequate exercise (V69 0) (Z72 3)   · Lives with adult children   · Marital History -  (V61 03)   · Occupation: Retired   · Sexually Active   · Social alcohol use (Z78 9)  The social history was reviewed and updated today  Current Meds  1  Advair Diskus 250-50 MCG/DOSE Inhalation Aerosol Powder Breath Activated; inhale 1   puff twice daily; Therapy: 40VDG1682 to (Evaluate:26Jan2017)  Requested for: 03Exs8687; Last   Rx:90Gel0831 Ordered  2  Dexilant 60 MG Oral Capsule Delayed Release; TAKE 1 CAPSULE DAILY EVERY   MORNING BEFORE BREAKFAST Recorded  3  DilTIAZem HCl ER Coated Beads 180 MG Oral Capsule Extended Release 24 Hour;   take 1 capsule daily; Therapy: 05Ngb6436 to (Evaluate:26Sep2017)  Requested for: 50GBF7452; Last   Rx:55Mzn7961 Ordered  4  Escitalopram Oxalate 20 MG Oral Tablet; Take 1 tablet daily; Therapy: 33ALC3642 to (Evaluate:13Jun2017)  Requested for: 36FTG7650; Last   Rx:38Tjd2861 Ordered  5  Fluticasone Propionate 50 MCG/ACT Nasal Suspension; use 2 sprays in each nostril   once daily; Therapy: 19BLI9863 to (96 122665)  Requested for: 88Bar6845; Last   Rx:51Hcy1335 Ordered  6  Gabapentin 100 MG Oral Capsule; TAKE ONE CAPSULE BY MOUTH TWICE DAILY   EVERY MORNING & AFTERNOON;   Therapy: 74GIX1917 to (Evaluate:13Jun2017)  Requested for: 61IYR5537; Last   Rx:38Uuu5422 Ordered  7  Gabapentin 600 MG Oral Tablet; TAKE 1 TAB IN AM, 1 TAB IN AFTERNOON, AND 2 TABS   QHS  Requested for: 22Njs2621; Last Rx:10Nyy1643 Ordered  8  Hydrocodone-Acetaminophen 7 5-325 MG Oral Tablet; TAKE 1 TABLET FOUR TIMES A   DAY AS NEEDED FOR PAIN;   Therapy: 72DMO4685 to (Evaluate:41Ehq1760); Last Rx:33Gsv0776 Ordered  9  Hydroxychloroquine Sulfate 200 MG Oral Tablet; TAKE 1 TABLET TWICE DAILY WITH   FOOD; Therapy: 11Epz6827 to (Luis Núñez)  Requested for: 09Bxw5015; Last   Rx:39Kan0471; Status: ACTIVE - Renewal Voided Ordered  10   Levothyroxine Sodium 50 MCG Oral Tablet; TAKE ONE TABLET BY MOUTH ONCE DAILY; Therapy: 48UHA0644 to (Evaluate:71Umg8499)  Requested for: 60WEL8964; Last    Rx:11Mcm0440 Ordered  11  Lisinopril 20 MG Oral Tablet; Take 1 tablet twice daily; Therapy: 98CTQ4383 to (Evaluate:01Oct2017)  Requested for: 08Aqz8008; Last    Rx:04Xkt1914 Ordered  12  LORazepam 1 MG Oral Tablet; TAKE 1 TABLET Every 6 hours; Therapy: 35ZSF5699 to (Evaluate:14Apr2017); Last Rx:50Lub8988 Ordered  13  Meclizine HCl - 25 MG Oral Tablet; TAKE 1 TABLET Every 6 hours PRN; Therapy: 64Fef4948 to (Evaluate:07Jan2016)  Requested for: 14UHL0872; Last    Rx:51Iog9171 Ordered  14  ProAir  (90 Base) MCG/ACT Inhalation Aerosol Solution; INHALE 1 PUFFS Every    6-8 hours PRN SOB; Therapy: 15Rwi6677 to (Last Rx:43Lad3730)  Requested for: 72Nbq1442 Ordered  15  Simvastatin 5 MG Oral Tablet; take one tablet by mouth every day; Therapy: 27FBO0588 to (Evaluate:13Jun2017)  Requested for: 46JSX6021; Last    Rx:71Ncm0660 Ordered  16  Sodium Chloride 1 GM Oral Tablet; Take 1 tablet twice daily  Requested for: 07TMZ3715;    Last Rx:23Luz0598 Ordered  17  Torsemide 10 MG Oral Tablet; TAKE 1 TABLET DAILY PRN for edema; Therapy: 69HKT8896 to (Evaluate:46Xie0509)  Requested for: 70DVU6656; Last    Rx:11Kqk9278 Ordered  18  Vitamin D3 2000 UNIT Oral Tablet; 1 TAB DAILY Recorded  19  Warfarin Sodium 5 MG Oral Tablet; TAKE 2 TABLET Daily or as directed; Therapy: 68SNR7544 to [phone])  Requested for: 43DZV5985; Last    AW:69BPK8373 Ordered  20  ZyrTEC Allergy 10 MG Oral Tablet; TAKE 1 TABLET AT BEDTIME; Therapy: 25EHB8931 to Recorded    The medication list was reviewed and updated today  Allergies   1  Indocin SOLR  2  Macrobid CAPS  3  Penicillins  4   Sulfa Drugs    Vitals  Vital Signs    Recorded: 02Jun2017 11:17AM   Temperature 97 3 F, Tympanic   Heart Rate 64, L Brachial Artery   Pulse Quality Normal, L Brachial Artery   Respiration Quality Normal   Respiration 18   Systolic 365, LUE, Sitting   Diastolic 73, LUE, Sitting   Height 5 ft 3 in   Weight 170 lb    BMI Calculated 30 11   BSA Calculated 1 8     Physical Exam     Constitutional Patient appears healthy and well developed  Head and Face Normal on inspection  Eyes Fundoscopic exam is benign  Neck No JVD  Carotid pulses: 2+ and equal bilaterally, without bruits  Respiratory Auscultation of lungs: Clear to auscultation  No rales, rhonchi, wheezes appreciated over the lungs bilaterally  Cardiovascular Auscultation of heart: Rhythm is regular, no gallop or rubs  No heart murmur appreciated  Peripheral vascular exam: Pedal Pulses: 2+ and equal bilaterally  Radial pulses: 2+ and equal bilaterally  Extremities: Peripheral circulation is normal    Abdomen Soft, nontender, and nondistended without guarding, rigidity or rebound tenderness  Musculo: Spine Contour is normal  No tenderness of the spine billaterally  Skin warm and dry  Incisions well healed  Neurologic - Mental Status: Alert and Oriented x3  Mood and affect: Affect is normal   Memory is grossly intact  Attention is WNL  Susan Soulier Speech: Language is fluent  Cranial Nerve Exam:  2nd cranial nerve: Intact  3rd, 4th, and 6th cranial nerves: Intact  5th cranial nerve: Intact  7th cranial nerve: Intact  8th cranial nerve: Intact  9th and 10th cranial nerves: Intact  11th cranial nerve: Intact  12th cranial nerve: Intact  Motor System General Motor Strength: No pronator drift and no parietal drift  Motor System - Upper Extremities: Muscle strength: 5/5 bilaterally  Muscle tone: Normal bilaterally  Motor System - Lower Extremities: Muscle strength: 5/5 bilaterally  Muscle tone: Normal bilaterally  Reflexes: DTR's are brisk, symmetric and 2+ bilaterally  Babinski's reflex is down going bilaterally symmetrical bilaterally  Coordination: Normal    Sensory: Sensation grossly intact to light touch   Sensation grossly intact to pinprick  Gait and Station: [place] with a normal gait  Results/Data  Diagnostic Studies Reviewed Neurosurger [facility]:   I personally reviewed the DBS Interrogation in detail with the patient  CT Scan Review Reviewed images and report of CT head 12/2012  NAD  Results Review Right ViM DBS at [facility] 2 29 turned off  Contact 1-2+ at 4 0/110/200  Left ViM DBS at 2 96  Contact 1-2+ at 2 1/60/130  Future Appointments    Date/Time Provider Specialty Site   11/29/2017 01:00 PM DEVAN Bain , [facility] Hematology Oncology CAN[facility]ER CARE MEDICAL ONCOLOGY [place]   06/12/2017 08:00 AM Carlos Ramirez MD Neurology [facility] NEUROLOGY [facility]  [facility]   08/21/2017 08:00 AM Kg Rojo MD Internal Medicine [address] [place] [address] INTERNAL MED   06/06/2017 10:15 AM DEVAN Hernandez  Bariatric Medicine [facility]     Surgery Scheduling Form    Location:[address] [place] [address]    Confirmation Number:   Procedure Date: 6/20/171    Requested Time:   Surgeon: Michael Shepherd  Co-Surgeon:   [facility] Required:   Bed:1  Out Patient - No Bed Required1   Anesthesia: IV Sedation w/Anesthesia  PROCEDURE DETAILS   Procedure: Replacement of Right Sided DBS Generator  Laterality/Level: Anticipated frozen section:   CPT Code(s):1  W041628    Pre-Op Diagnosis: ET, EOL DBS IPGs  Dx Code(s):1  G25 0, Z46 21   Length of Procedure: 30 min  Equipment:   Equipment Needs: Supine  Implants/Representative: [employer]   Is the patient able to walk up a flight of stairs, walk up a hill or do heavy housework WITHOUT having chest pain or shortness of breath? REGISTRATION & FINANCIAL CLEARANCE    FA Initials:   Insurance:1  MEDICARE1    Policy Number:1  [id number]  Group Number:     PRE-ADMISSION TESTING/CLINICAL INFORMATION   PAT Location:1  Outpatient Testing Elsewhere1    PAT Comments:1  OUTPT AT [facility] - 6/8/171        Communication Barrier:   Primary Care Physician:Leandro KERNS       CONSULTS NEEDED: Anesthesia Consult:   Medical Consult:   Cardiac Consult:     ALLERGIES AND ALERTS   Latex Allergy:1  NO1    Penicillin Allergy:1  YES1    Malignant Hyperthermia:1  NO1    Diabetic Patient:1  NO1    Height:1  5'3"1   Weight:1  77 11 KG1   COMMENTS   Scheduling Information Provided by:1  ADRIANYS1   IN OFFICE USE   Urgency:   Craniotomy Details:   Lab Studies Ordered: Full Labs Ordered, Medically Cleared   Ashly Hazel - 6/8/171   Films   Bracing:   Bone Stimulator   Return to office 2 Weeks post op   6 weeks         1 Amended By: Catrachita Bassett; Jun 09 2017 4:08 PM EST    Signatures   Electronically signed by : DEVAN Che ; Jun 2 2017 12:06PM EST                       (Author)    Electronically signed by : DEVAN Che ; Jun 13 2017  4:06PM EST                       (Author)

## 2018-01-24 VITALS
TEMPERATURE: 97.6 F | BODY MASS INDEX: 29.77 KG/M2 | WEIGHT: 168 LBS | HEIGHT: 63 IN | HEART RATE: 70 BPM | SYSTOLIC BLOOD PRESSURE: 128 MMHG | RESPIRATION RATE: 17 BRPM | OXYGEN SATURATION: 91 % | DIASTOLIC BLOOD PRESSURE: 72 MMHG

## 2018-01-26 ENCOUNTER — TELEPHONE (OUTPATIENT)
Dept: INTERNAL MEDICINE CLINIC | Facility: CLINIC | Age: 73
End: 2018-01-26

## 2018-01-26 NOTE — TELEPHONE ENCOUNTER
Pt notified and states she got labs at [facility] on Wednesday (p: [phone]) Called [facility] and spoke to Bunn and she is faxing the results

## 2018-01-28 LAB — INR PPP: 2.1 (ref 0.86–1.16)

## 2018-01-29 ENCOUNTER — TELEPHONE (OUTPATIENT)
Dept: INTERNAL MEDICINE CLINIC | Facility: CLINIC | Age: 73
End: 2018-01-29

## 2018-01-29 DIAGNOSIS — I81 PORTAL VEIN THROMBOSIS: Primary | ICD-10-CM

## 2018-01-29 NOTE — TELEPHONE ENCOUNTER
PT WENT FOR PT ON WED, CALLED Friday, AND HAS NOT HEARD BACK   SHE TOOK HER NORMAL DOSE OF COUMADIN OVER THE WEEKEND BUT IS WORRIED ABOUT HER DOSING  PLEASE CALL ASAP WITH DOSING INSTRUCTIONS  CALL [phone]

## 2018-01-31 ENCOUNTER — ANTICOAG VISIT (OUTPATIENT)
Dept: INTERNAL MEDICINE CLINIC | Facility: CLINIC | Age: 73
End: 2018-01-31

## 2018-01-31 DIAGNOSIS — I81 PORTAL VEIN THROMBOSIS: ICD-10-CM

## 2018-02-02 ENCOUNTER — TELEPHONE (OUTPATIENT)
Dept: INTERNAL MEDICINE CLINIC | Facility: CLINIC | Age: 73
End: 2018-02-02

## 2018-02-02 NOTE — TELEPHONE ENCOUNTER
Please call patient  Daughter was concerned that she has been slurring her words recently  Any arm or leg weakness? Are you feeling sleepy or sluggish? Are you taking your pains meds (gabapentin, Vicodin) regularly?

## 2018-02-02 NOTE — TELEPHONE ENCOUNTER
Can call neurology and ask if the stimulator may be causing it  Can try skipping noon dose of gabapentin and see how you feel

## 2018-02-02 NOTE — TELEPHONE ENCOUNTER
Pt states no arm and no leg weakness  Not feeling sleepy or sluggish  Gabapentin regularly, not taking the Vicodin       States she thinks its her deep brain stimulation and states she cant have this checked till she returns home to see her neurologist

## 2018-02-05 DIAGNOSIS — R63.5 ABNORMAL WEIGHT GAIN: ICD-10-CM

## 2018-02-05 DIAGNOSIS — D64.9 ANEMIA: ICD-10-CM

## 2018-02-05 DIAGNOSIS — E66.8 OTHER OBESITY: ICD-10-CM

## 2018-02-05 DIAGNOSIS — F32.9 MAJOR DEPRESSIVE DISORDER, SINGLE EPISODE: ICD-10-CM

## 2018-02-05 DIAGNOSIS — J45.909 UNCOMPLICATED ASTHMA: ICD-10-CM

## 2018-02-05 DIAGNOSIS — F41.9 ANXIETY DISORDER: ICD-10-CM

## 2018-02-05 DIAGNOSIS — J30.9 ALLERGIC RHINITIS: ICD-10-CM

## 2018-02-05 DIAGNOSIS — I10 ESSENTIAL (PRIMARY) HYPERTENSION: ICD-10-CM

## 2018-02-05 DIAGNOSIS — K21.9 GASTRO-ESOPHAGEAL REFLUX DISEASE WITHOUT ESOPHAGITIS: ICD-10-CM

## 2018-02-20 ENCOUNTER — TELEPHONE (OUTPATIENT)
Dept: INTERNAL MEDICINE CLINIC | Facility: CLINIC | Age: 73
End: 2018-02-20

## 2018-02-20 LAB — INR PPP: 2.9 (ref 0.86–1.16)

## 2018-02-20 NOTE — TELEPHONE ENCOUNTER
Pt called  Bradford Hewitt She had her blood drawn yesterday at [facility] in [place], [employer]   For pt/inr

## 2018-02-21 ENCOUNTER — ANTICOAG VISIT (OUTPATIENT)
Dept: INTERNAL MEDICINE CLINIC | Facility: CLINIC | Age: 73
End: 2018-02-21

## 2018-02-21 DIAGNOSIS — I81 PORTAL VEIN THROMBOSIS: ICD-10-CM

## 2018-02-28 NOTE — MISCELLANEOUS
To whom it may concern: It is ideal to have Ms Day's weight at 155 lbs  She is motivated to lose more weight, or at least to maintain it  If you have any questions, you can call my office at [phone]  Sincerely,      DEVAN Wiseman MD      Electronically signed by:Zaira Denson MD  Wisam 15 2018 12:03PM EST Author

## 2018-02-28 NOTE — RESULT NOTES
Verified Results  (1) COMPREHENSIVE METABOLIC PANEL [id number] 05:73RW Coni Elliott Order Number: [id number]     Test Name Result Flag Reference   SODIUM 139 mmol/L  136-145   POTASSIUM 4 0 mmol/L  3 5-5 3   CHLORIDE 100 mmol/L  100-108   CARBON DIOXIDE 31 mmol/L  21-32   ANION GAP (CALC) 8 mmol/L  4-13   BLOOD UREA NITROGEN 8 mg/dL  5-25   CREATININE 0 81 mg/dL  0 60-1 30   Standardized to IDMS reference method   CALCIUM 9 1 mg/dL  8 3-10 1   BILI, TOTAL 0 40 mg/dL  0 20-1 00   ALK PHOSPHATAS 60 U/L     ALT (SGPT) 24 U/L  12-78   Specimen collection should occur prior to Sulfasalazine administration due to the potential for falsely depressed results  AST(SGOT) 23 U/L  5-45   Specimen collection should occur prior to Sulfasalazine administration due to the potential for falsely depressed results  ALBUMIN 3 5 g/dL  3 5-5 0   TOTAL PROTEIN 7 0 g/dL  6 4-8 2   eGFR 72 ml/min/1 73sq [facility] Disease Education Program recommendations are as follows:  GFR calculation is accurate only with a steady state creatinine  Chronic Kidney disease less than 60 ml/min/1 73 sq  meters  Kidney failure less than 15 ml/min/1 73 sq  meters  GLUCOSE FASTING 103 mg/dL H 65-99   Specimen collection should occur prior to Sulfasalazine administration due to the potential for falsely depressed results  Specimen collection should occur prior to Sulfapyridine administration due to the potential for falsely elevated results

## 2018-03-14 ENCOUNTER — TELEPHONE (OUTPATIENT)
Dept: INTERNAL MEDICINE CLINIC | Facility: CLINIC | Age: 73
End: 2018-03-14

## 2018-03-14 ENCOUNTER — ANTICOAG VISIT (OUTPATIENT)
Dept: INTERNAL MEDICINE CLINIC | Facility: CLINIC | Age: 73
End: 2018-03-14

## 2018-03-14 DIAGNOSIS — I81 PORTAL VEIN THROMBOSIS: ICD-10-CM

## 2018-03-14 LAB — INR PPP: 2.8 (ref 0.86–1.16)

## 2018-03-20 DIAGNOSIS — I10 ESSENTIAL HYPERTENSION: ICD-10-CM

## 2018-03-20 DIAGNOSIS — E78.49 OTHER HYPERLIPIDEMIA: ICD-10-CM

## 2018-03-20 DIAGNOSIS — F41.9 ANXIETY: ICD-10-CM

## 2018-03-20 DIAGNOSIS — E03.9 ACQUIRED HYPOTHYROIDISM: Primary | ICD-10-CM

## 2018-03-20 PROBLEM — I81 PORTAL VEIN THROMBOSIS: Status: ACTIVE | Noted: 2018-01-29

## 2018-03-20 RX ORDER — LEVOTHYROXINE SODIUM 0.05 MG/1
50 TABLET ORAL DAILY
Qty: 90 TABLET | Refills: 1 | Status: SHIPPED | OUTPATIENT
Start: 2018-03-20 | End: 2018-06-27 | Stop reason: SDUPTHER

## 2018-03-20 RX ORDER — SIMVASTATIN 5 MG
5 TABLET ORAL
Qty: 90 TABLET | Refills: 1 | Status: SHIPPED | OUTPATIENT
Start: 2018-03-20 | End: 2018-03-23 | Stop reason: SDUPTHER

## 2018-03-20 RX ORDER — LORAZEPAM 1 MG/1
1 TABLET ORAL EVERY 8 HOURS PRN
Qty: 90 TABLET | Refills: 0 | OUTPATIENT
Start: 2018-03-20 | End: 2018-04-02 | Stop reason: SDUPTHER

## 2018-03-20 RX ORDER — DOXEPIN HYDROCHLORIDE 50 MG/1
CAPSULE ORAL
Qty: 180 CAPSULE | Refills: 1 | Status: SHIPPED | OUTPATIENT
Start: 2018-03-20 | End: 2018-03-23 | Stop reason: SDUPTHER

## 2018-03-20 RX ORDER — ESCITALOPRAM OXALATE 20 MG/1
20 TABLET ORAL DAILY
Qty: 90 TABLET | Refills: 1 | Status: SHIPPED | OUTPATIENT
Start: 2018-03-20 | End: 2018-03-24 | Stop reason: SDUPTHER

## 2018-03-20 RX ORDER — LISINOPRIL 20 MG/1
20 TABLET ORAL DAILY
Qty: 90 TABLET | Refills: 1 | Status: SHIPPED | OUTPATIENT
Start: 2018-03-20 | End: 2018-03-24 | Stop reason: SDUPTHER

## 2018-03-23 DIAGNOSIS — F41.9 ANXIETY: ICD-10-CM

## 2018-03-23 DIAGNOSIS — E78.49 OTHER HYPERLIPIDEMIA: ICD-10-CM

## 2018-03-23 RX ORDER — DOXEPIN HYDROCHLORIDE 50 MG/1
CAPSULE ORAL
Qty: 180 CAPSULE | Refills: 1 | Status: SHIPPED | OUTPATIENT
Start: 2018-03-23 | End: 2018-04-20 | Stop reason: ALTCHOICE

## 2018-03-24 DIAGNOSIS — F41.9 ANXIETY: ICD-10-CM

## 2018-03-24 DIAGNOSIS — I10 ESSENTIAL HYPERTENSION: ICD-10-CM

## 2018-03-26 RX ORDER — LISINOPRIL 20 MG/1
TABLET ORAL
Qty: 90 TABLET | Refills: 1 | Status: SHIPPED | OUTPATIENT
Start: 2018-03-26 | End: 2019-03-28 | Stop reason: SDUPTHER

## 2018-03-26 RX ORDER — SIMVASTATIN 5 MG
TABLET ORAL
Qty: 90 TABLET | Refills: 1 | Status: SHIPPED | OUTPATIENT
Start: 2018-03-26 | End: 2018-06-27 | Stop reason: SDUPTHER

## 2018-03-26 RX ORDER — ESCITALOPRAM OXALATE 20 MG/1
TABLET ORAL
Qty: 90 TABLET | Refills: 1 | Status: SHIPPED | OUTPATIENT
Start: 2018-03-26 | End: 2018-09-19 | Stop reason: SDUPTHER

## 2018-04-02 DIAGNOSIS — K21.9 GASTRO-ESOPHAGEAL REFLUX DISEASE WITHOUT ESOPHAGITIS: ICD-10-CM

## 2018-04-02 DIAGNOSIS — D64.9 ANEMIA: ICD-10-CM

## 2018-04-02 DIAGNOSIS — F41.9 ANXIETY DISORDER: ICD-10-CM

## 2018-04-02 DIAGNOSIS — M54.16 LUMBAR RADICULOPATHY: Primary | ICD-10-CM

## 2018-04-02 DIAGNOSIS — E83.42 HYPOMAGNESEMIA: ICD-10-CM

## 2018-04-02 DIAGNOSIS — R73.09 OTHER ABNORMAL GLUCOSE: ICD-10-CM

## 2018-04-02 DIAGNOSIS — J45.909 UNCOMPLICATED ASTHMA: ICD-10-CM

## 2018-04-02 DIAGNOSIS — F32.9 MAJOR DEPRESSIVE DISORDER, SINGLE EPISODE: ICD-10-CM

## 2018-04-02 DIAGNOSIS — E66.8 OTHER OBESITY: ICD-10-CM

## 2018-04-02 DIAGNOSIS — F41.9 ANXIETY: ICD-10-CM

## 2018-04-02 DIAGNOSIS — R63.5 ABNORMAL WEIGHT GAIN: ICD-10-CM

## 2018-04-02 DIAGNOSIS — J30.9 ALLERGIC RHINITIS: ICD-10-CM

## 2018-04-02 DIAGNOSIS — I10 ESSENTIAL (PRIMARY) HYPERTENSION: ICD-10-CM

## 2018-04-02 DIAGNOSIS — D50.9 IRON DEFICIENCY ANEMIA: ICD-10-CM

## 2018-04-02 DIAGNOSIS — E87.1 HYPO-OSMOLALITY AND HYPONATREMIA (CODE): ICD-10-CM

## 2018-04-02 RX ORDER — GABAPENTIN 100 MG/1
100 CAPSULE ORAL 2 TIMES DAILY
Qty: 180 CAPSULE | Refills: 1 | Status: SHIPPED | OUTPATIENT
Start: 2018-04-02 | End: 2018-04-20 | Stop reason: SDUPTHER

## 2018-04-02 RX ORDER — LORAZEPAM 1 MG/1
1 TABLET ORAL EVERY 8 HOURS PRN
Qty: 90 TABLET | Refills: 0 | OUTPATIENT
Start: 2018-04-02 | End: 2018-05-29 | Stop reason: SDUPTHER

## 2018-04-02 NOTE — TELEPHONE ENCOUNTER
Pt called and requested Lorazepam 1 mg and Gabapentin 100 mg be sent to [facility] 40  Pt states she did not  Lorazepam at [facility] pharmacy (ordered 3/20)  She says it was too expensive with her insurance  Please advise

## 2018-04-16 ENCOUNTER — APPOINTMENT (OUTPATIENT)
Dept: LAB | Facility: CLINIC | Age: 73
End: 2018-04-16
Payer: MEDICARE

## 2018-04-16 ENCOUNTER — TRANSCRIBE ORDERS (OUTPATIENT)
Dept: LAB | Facility: CLINIC | Age: 73
End: 2018-04-16

## 2018-04-16 ENCOUNTER — TELEPHONE (OUTPATIENT)
Dept: INTERNAL MEDICINE CLINIC | Facility: CLINIC | Age: 73
End: 2018-04-16

## 2018-04-16 ENCOUNTER — ANTICOAG VISIT (OUTPATIENT)
Dept: INTERNAL MEDICINE CLINIC | Facility: CLINIC | Age: 73
End: 2018-04-16

## 2018-04-16 DIAGNOSIS — K21.9 GASTRO-ESOPHAGEAL REFLUX DISEASE WITHOUT ESOPHAGITIS: ICD-10-CM

## 2018-04-16 DIAGNOSIS — E66.8 OTHER OBESITY: ICD-10-CM

## 2018-04-16 DIAGNOSIS — E83.42 HYPOMAGNESEMIA: ICD-10-CM

## 2018-04-16 DIAGNOSIS — J30.9 ALLERGIC RHINITIS: ICD-10-CM

## 2018-04-16 DIAGNOSIS — E87.1 HYPO-OSMOLALITY AND HYPONATREMIA (CODE): ICD-10-CM

## 2018-04-16 DIAGNOSIS — F32.9 MAJOR DEPRESSIVE DISORDER, SINGLE EPISODE: ICD-10-CM

## 2018-04-16 DIAGNOSIS — F41.9 ANXIETY DISORDER: ICD-10-CM

## 2018-04-16 DIAGNOSIS — D50.9 IRON DEFICIENCY ANEMIA: ICD-10-CM

## 2018-04-16 DIAGNOSIS — J45.909 UNCOMPLICATED ASTHMA: ICD-10-CM

## 2018-04-16 DIAGNOSIS — I10 ESSENTIAL (PRIMARY) HYPERTENSION: ICD-10-CM

## 2018-04-16 DIAGNOSIS — I81 PORTAL VEIN THROMBOSIS: ICD-10-CM

## 2018-04-16 DIAGNOSIS — R63.5 ABNORMAL WEIGHT GAIN: ICD-10-CM

## 2018-04-16 DIAGNOSIS — R73.09 OTHER ABNORMAL GLUCOSE: ICD-10-CM

## 2018-04-16 DIAGNOSIS — D64.9 ANEMIA: ICD-10-CM

## 2018-04-16 LAB
ANION GAP SERPL CALCULATED.3IONS-SCNC: 7 MMOL/L (ref 4–13)
BASOPHILS # BLD AUTO: 0.09 THOUSANDS/ΜL (ref 0–0.1)
BASOPHILS NFR BLD AUTO: 1 % (ref 0–1)
BUN SERPL-MCNC: 17 MG/DL (ref 5–25)
CALCIUM SERPL-MCNC: 9.5 MG/DL
CHLORIDE SERPL-SCNC: 98 MMOL/L (ref 100–108)
CO2 SERPL-SCNC: 30 MMOL/L (ref 21–32)
CREAT SERPL-MCNC: 0.78 MG/DL (ref 0.6–1.3)
EOSINOPHIL # BLD AUTO: 0.28 THOUSAND/ΜL (ref 0–0.61)
EOSINOPHIL NFR BLD AUTO: 4 % (ref 0–6)
ERYTHROCYTE [DISTWIDTH] IN BLOOD BY AUTOMATED COUNT: 14.2 % (ref 11.6–15.1)
EST. AVERAGE GLUCOSE BLD GHB EST-MCNC: 111 MG/DL
GFR SERPL CREATININE-BSD FRML MDRD: 76 ML/MIN/1.73SQ M
GLUCOSE P FAST SERPL-MCNC: 106 MG/DL (ref 65–99)
HBA1C MFR BLD: 5.5 % (ref 4.2–6.3)
HCT VFR BLD AUTO: 38.5 % (ref 34.8–46.1)
HGB BLD-MCNC: 13.1 G/DL (ref 11.5–15.4)
LYMPHOCYTES # BLD AUTO: 2.44 THOUSANDS/ΜL (ref 0.6–4.47)
LYMPHOCYTES NFR BLD AUTO: 38 % (ref 14–44)
MAGNESIUM SERPL-MCNC: 1.6 MG/DL (ref 1.6–2.6)
MCH RBC QN AUTO: 32.8 PG (ref 26.8–34.3)
MCHC RBC AUTO-ENTMCNC: 34 G/DL (ref 31.4–37.4)
MCV RBC AUTO: 97 FL (ref 82–98)
MONOCYTES # BLD AUTO: 0.85 THOUSAND/ΜL (ref 0.17–1.22)
MONOCYTES NFR BLD AUTO: 13 % (ref 4–12)
NEUTROPHILS # BLD AUTO: 2.72 THOUSANDS/ΜL (ref 1.85–7.62)
NEUTS SEG NFR BLD AUTO: 44 % (ref 43–75)
PLATELET # BLD AUTO: 478 THOUSANDS/UL (ref 149–390)
PMV BLD AUTO: 8.8 FL (ref 8.9–12.7)
POTASSIUM SERPL-SCNC: 4.8 MMOL/L (ref 3.5–5.3)
RBC # BLD AUTO: 3.99 MILLION/UL (ref 3.81–5.12)
SODIUM SERPL-SCNC: 135 MMOL/L (ref 136–145)
URATE SERPL-MCNC: 7.4 MG/DL (ref 2–6.8)
WBC # BLD AUTO: 6.38 THOUSAND/UL (ref 4.31–10.16)

## 2018-04-16 PROCEDURE — 84550 ASSAY OF BLOOD/URIC ACID: CPT

## 2018-04-16 PROCEDURE — 85025 COMPLETE CBC W/AUTO DIFF WBC: CPT

## 2018-04-16 PROCEDURE — 85610 PROTHROMBIN TIME: CPT | Performed by: INTERNAL MEDICINE

## 2018-04-16 PROCEDURE — 83036 HEMOGLOBIN GLYCOSYLATED A1C: CPT

## 2018-04-16 PROCEDURE — 83735 ASSAY OF MAGNESIUM: CPT

## 2018-04-16 PROCEDURE — 36415 COLL VENOUS BLD VENIPUNCTURE: CPT | Performed by: INTERNAL MEDICINE

## 2018-04-16 PROCEDURE — 80048 BASIC METABOLIC PNL TOTAL CA: CPT

## 2018-04-16 NOTE — TELEPHONE ENCOUNTER
Ask if she missed warfarin dose, INR low at 1 2    If not, take 5 mg daily   Recheck in 1 week  If she did miss a dose, resume regular dosing ( 5 mg x 5 days, 2 5 mg x 2 days), recheck in 1 week    Keep appt with me later this week

## 2018-04-20 ENCOUNTER — TELEPHONE (OUTPATIENT)
Dept: INTERNAL MEDICINE CLINIC | Facility: CLINIC | Age: 73
End: 2018-04-20

## 2018-04-20 ENCOUNTER — OFFICE VISIT (OUTPATIENT)
Dept: INTERNAL MEDICINE CLINIC | Facility: CLINIC | Age: 73
End: 2018-04-20
Payer: MEDICARE

## 2018-04-20 VITALS
DIASTOLIC BLOOD PRESSURE: 70 MMHG | BODY MASS INDEX: 29.16 KG/M2 | TEMPERATURE: 98.2 F | OXYGEN SATURATION: 98 % | HEIGHT: 63 IN | HEART RATE: 70 BPM | WEIGHT: 164.6 LBS | RESPIRATION RATE: 18 BRPM | SYSTOLIC BLOOD PRESSURE: 112 MMHG

## 2018-04-20 DIAGNOSIS — F33.1 MODERATE EPISODE OF RECURRENT MAJOR DEPRESSIVE DISORDER (HCC): Primary | ICD-10-CM

## 2018-04-20 DIAGNOSIS — G47.09 OTHER INSOMNIA: ICD-10-CM

## 2018-04-20 DIAGNOSIS — R73.03 PREDIABETES: ICD-10-CM

## 2018-04-20 DIAGNOSIS — Z12.31 ENCOUNTER FOR SCREENING MAMMOGRAM FOR BREAST CANCER: Primary | ICD-10-CM

## 2018-04-20 DIAGNOSIS — Z71.9 HEALTH COUNSELING: ICD-10-CM

## 2018-04-20 DIAGNOSIS — G25.0 ESSENTIAL TREMOR: ICD-10-CM

## 2018-04-20 DIAGNOSIS — M54.16 LUMBAR RADICULOPATHY: ICD-10-CM

## 2018-04-20 PROBLEM — M51.369 LUMBAR DEGENERATIVE DISC DISEASE: Status: ACTIVE | Noted: 2018-04-20

## 2018-04-20 PROBLEM — M51.36 LUMBAR DEGENERATIVE DISC DISEASE: Status: ACTIVE | Noted: 2018-04-20

## 2018-04-20 PROBLEM — K56.600 PARTIAL SMALL BOWEL OBSTRUCTION (HCC): Status: RESOLVED | Noted: 2017-12-24 | Resolved: 2018-04-20

## 2018-04-20 PROCEDURE — 99214 OFFICE O/P EST MOD 30 MIN: CPT | Performed by: INTERNAL MEDICINE

## 2018-04-20 RX ORDER — GABAPENTIN 100 MG/1
CAPSULE ORAL
Qty: 180 CAPSULE | Refills: 0
Start: 2018-04-20 | End: 2018-06-27 | Stop reason: SDUPTHER

## 2018-04-20 RX ORDER — HYDROCODONE BITARTRATE AND ACETAMINOPHEN 5; 325 MG/1; MG/1
1 TABLET ORAL EVERY 6 HOURS PRN
Qty: 120 TABLET | Refills: 0 | Status: SHIPPED | OUTPATIENT
Start: 2018-04-20 | End: 2018-07-20 | Stop reason: SDUPTHER

## 2018-04-20 RX ORDER — BUPROPION HYDROCHLORIDE 150 MG/1
150 TABLET ORAL DAILY
Qty: 90 TABLET | Refills: 0 | Status: SHIPPED | OUTPATIENT
Start: 2018-04-20 | End: 2018-06-29 | Stop reason: SDUPTHER

## 2018-04-20 NOTE — ASSESSMENT & PLAN NOTE
More frequent RLE pain, adjust gabapentin, take additional 200 mg in AM and at noon (total of 800 mg), continue 1200 mg qHS  She takes Vicodin as needed for severe pain only  PDMP reviewed

## 2018-04-20 NOTE — PROGRESS NOTES
Assessment/Plan:    Lumbar degenerative disc disease  More frequent RLE pain, adjust gabapentin, take additional 200 mg in AM and at noon (total of 800 mg), continue 1200 mg qHS  She takes Vicodin as needed for severe pain only  PDMP reviewed  Anxiety  Stop doxepin since minimal improvement of symptoms (still picking at her skin)  Instructed to take every other day then stop  Start bupropion daily  Continue lorazepam as needed  Insomnia  Sleeping better on Belsomra  Hyponatremia  Recent Na was 135  She is taking her "salt pills" bid  Asthma  Doing well, has not used her rescue inhaler recently  Hypertension  BP stable on diltiazem, lisinopril and torsemide  Obesity  No weight change since last visit  Acid reflux  On daily PPI  Essential tremor  Follow up with neurology as scheduled  Hypothyroidism  Adequately replaced  Prediabetes  A1c improved, now within normal     Osteopenia  On supplements  Bone density updated  Sjogren's syndrome ([facility] [address] )  On Plaquenil, sees rheum  Hyperlipidemia  Lipids stable, on low dose statin  Diagnoses and all orders for this visit:    Moderate episode of recurrent major depressive disorder (HCC)  -     buPROPion (WELLBUTRIN XL) 150 mg 24 hr tablet; Take 1 tablet (150 mg total) by mouth daily    Other insomnia    Essential tremor    Lumbar radiculopathy  -     gabapentin (NEURONTIN) 100 mg capsule; Take in AM and noon  -     HYDROcodone-acetaminophen (NORCO) 5-325 mg per tablet; Take 1 tablet by mouth every 6 (six) hours as needed for pain Max Daily Amount: 4 tablets    Prediabetes    Health counseling  Comments:  Mammogram due next month  Other orders  -     Suvorexant (BELSOMRA) 10 MG TABS; Take 1 tablet by mouth      Follow up in 3 months or as needed  Subjective:      Patient ID: Shanta Smith is a 67 y o  female  Ms Ramo Linder complains of more frequent right leg pain  It started a few months ago, while on vacation    It would occur intermittently, but severe  Pain usually on her whole leg, sometimes in her thighs, legs or ankles  She would apply warm moist heat with minimal relief  She started taking Vicodin more regularly again due to the pain  Denies any back pain, has occasional right groin pain  No falls  He also complains of frequent left knuckle pain  She continues to pick at her skin, feels the doxepin is not working would like to stop this  She tried the Belsomra once again, feels she is sleeping better when she takes it  She takes Ativan at least twice a day, sometimes 3 times depending on her anxiety  She continues to take her salt pills twice a day  Denies any chest pain, shortness of breath or wheezing  She would like her DBS be adjusted again because of increasing tremors, mostly on her left hand  The following portions of the patient's history were reviewed and updated as appropriate: allergies, current medications, past medical history, past social history and problem list     Review of Systems   Constitutional: Negative for appetite change and fatigue  HENT: Negative for congestion and ear pain  Eyes: Negative for visual disturbance  Respiratory: Negative for cough, shortness of breath and wheezing  Cardiovascular: Negative for chest pain and leg swelling  Gastrointestinal: Negative for abdominal pain, constipation, diarrhea and nausea  Genitourinary: Negative for dysuria  Musculoskeletal: Positive for arthralgias and back pain  Negative for myalgias  Skin: Negative for rash and wound  Neurological: Negative for dizziness, numbness and headaches  Psychiatric/Behavioral: Negative for confusion, dysphoric mood and sleep disturbance  The patient is nervous/anxious            Objective:      /70   Pulse 70   Temp 98 2 °F (36 8 °C)   Resp 18   Ht 5' 3" (1 6 m)   Wt 74 7 kg (164 lb 9 6 oz)   SpO2 98%   BMI 29 16 kg/m²          Physical Exam   Constitutional: She is oriented to person, place, and time  She appears well-developed and well-nourished  HENT:   Head: Normocephalic and atraumatic  Nose: Nose normal    Eyes: Conjunctivae are normal  Pupils are equal, round, and reactive to light  Neck: Neck supple  Cardiovascular: Normal rate, regular rhythm and normal heart sounds  No edema  Pulmonary/Chest: Effort normal and breath sounds normal  She has no wheezes  She has no rales  Abdominal: Soft  Bowel sounds are normal    Musculoskeletal:        Lumbar back: She exhibits spasm  She exhibits no pain  Neurological: She is alert and oriented to person, place, and time  Skin: Skin is warm  No rash noted  Psychiatric: She has a normal mood and affect  Her behavior is normal    Nursing note and vitals reviewed  Medications and lab results reviewed with patient

## 2018-04-20 NOTE — TELEPHONE ENCOUNTER
Pt called and requested a script ordered for a Mammo @ [address] [place] [address]  Pt states she would like to get Mammo done around May 8-9

## 2018-04-20 NOTE — LETTER
To whom it may concern:    Ms Nieves Eden ([id number]) is under my care  Her goal weight is 160 lbs  She is motivated to reach this weight using your program     If you have any questions, do not hesitate to call my office  Sincerely,    DEVAN Blakely MD

## 2018-04-20 NOTE — ASSESSMENT & PLAN NOTE
Stop doxepin since minimal improvement of symptoms (still picking at her skin)  Instructed to take every other day then stop  Start bupropion daily  Continue lorazepam as needed

## 2018-04-23 ENCOUNTER — APPOINTMENT (OUTPATIENT)
Dept: LAB | Facility: CLINIC | Age: 73
End: 2018-04-23
Payer: MEDICARE

## 2018-04-23 ENCOUNTER — TRANSCRIBE ORDERS (OUTPATIENT)
Dept: LAB | Facility: CLINIC | Age: 73
End: 2018-04-23

## 2018-04-23 DIAGNOSIS — I81 PORTAL VEIN THROMBOSIS: Primary | ICD-10-CM

## 2018-04-23 LAB
INR PPP: 1.86 (ref 0.86–1.16)
PROTHROMBIN TIME: 22.1 SECONDS (ref 12.1–14.4)

## 2018-04-23 PROCEDURE — 36415 COLL VENOUS BLD VENIPUNCTURE: CPT | Performed by: INTERNAL MEDICINE

## 2018-04-23 PROCEDURE — 85610 PROTHROMBIN TIME: CPT | Performed by: INTERNAL MEDICINE

## 2018-04-24 ENCOUNTER — TELEPHONE (OUTPATIENT)
Dept: INTERNAL MEDICINE CLINIC | Facility: CLINIC | Age: 73
End: 2018-04-24

## 2018-04-24 ENCOUNTER — ANTICOAG VISIT (OUTPATIENT)
Dept: INTERNAL MEDICINE CLINIC | Facility: CLINIC | Age: 73
End: 2018-04-24

## 2018-04-24 DIAGNOSIS — I81 PORTAL VEIN THROMBOSIS: ICD-10-CM

## 2018-04-24 LAB — INR PPP: 2 (ref 0.86–1.16)

## 2018-04-25 ENCOUNTER — OFFICE VISIT (OUTPATIENT)
Dept: NEPHROLOGY | Facility: CLINIC | Age: 73
End: 2018-04-25
Payer: MEDICARE

## 2018-04-25 VITALS
WEIGHT: 166.8 LBS | HEART RATE: 71 BPM | SYSTOLIC BLOOD PRESSURE: 136 MMHG | HEIGHT: 63 IN | DIASTOLIC BLOOD PRESSURE: 72 MMHG | BODY MASS INDEX: 29.55 KG/M2

## 2018-04-25 DIAGNOSIS — I10 ESSENTIAL HYPERTENSION: ICD-10-CM

## 2018-04-25 DIAGNOSIS — E87.1 HYPONATREMIA: Primary | ICD-10-CM

## 2018-04-25 PROCEDURE — 99213 OFFICE O/P EST LOW 20 MIN: CPT | Performed by: INTERNAL MEDICINE

## 2018-04-25 NOTE — LETTER
April 25, 2018     Jaskaran Mesa MD  [address]  [place] [address]    Patient: Radha Burciaga   YOB: 1945   Date of Visit: 4/25/2018     Dear Dr Titi Eugene      Thank you for referring Thalia Davidson to me for evaluation  Below are the relevant portions of my assessment and plan of care  If you have questions, please do not hesitate to call me  I look forward to following Noemi Capellan along with you  Sincerely,        Juan Teague DO        CC: No Recipients    Progress Notes:    ASSESSMENT and PLAN:  1  Hyponatremia, likely multifactorial, some degree of volume depletion secondary to chronic loose stools, doubt SIADH as uric acid is elevated, continue with torsemide as well as sodium chloride tablets  2  Hypertension, stable, continue with current regimen      · Repeat laboratory studies in 3 months, assuming stable will see her approximately 6 months with repeat labs and office appointment that time

## 2018-04-25 NOTE — PATIENT INSTRUCTIONS
ASSESSMENT and PLAN:  1  Hyponatremia, likely multifactorial, some degree of volume depletion secondary to chronic loose stools, doubt SIADH as uric acid is elevated, continue with torsemide as well as sodium chloride tablets  2  Hypertension, stable, continue with current regimen      · Repeat laboratory studies in 3 months, assuming stable will see her approximately 6 months with repeat labs and office appointment that time

## 2018-04-25 NOTE — PROGRESS NOTES
NEPHROLOGY OUTPATIENT PROGRESS NOTE   Tiffanylindseychasity Dear 67 y o  female MRN: [id number]  Reason for visit:   Hyponatremia    ASSESSMENT and PLAN:  1  Hyponatremia, likely multifactorial, some degree of volume depletion secondary to chronic loose stools, doubt SIADH as uric acid is elevated, continue with torsemide as well as sodium chloride tablets  2  Hypertension, stable, continue with current regimen      · Repeat laboratory studies in 3 months, assuming stable will see her approximately 6 months with repeat labs and office appointment that time  SUBJECTIVE / INTERVAL HISTORY:  She has been doing reasonably well  Denies any recent hospitalizations or illnesses  Denies any dizziness lightheadedness or falls  Blood pressure at home has been under fairly good control between 891 and 601 systolic  Denies any headaches  No chest pain  Does have periodic swelling but resolves with diuretic  Review of Systems      OBJECTIVE:  /72   Pulse 71   Ht 5' 3" (1 6 m)   Wt 75 7 kg (166 lb 12 8 oz)   BMI 29 55 kg/m²     Physical Exam   Constitutional: She is oriented to person, place, and time  She appears well-developed  No distress  HENT:   Head: Normocephalic  Mouth/Throat: Oropharynx is clear and moist    Eyes: Conjunctivae are normal  No scleral icterus  Neck: Neck supple  Cardiovascular: Normal rate, regular rhythm and normal heart sounds  Pulmonary/Chest: Effort normal and breath sounds normal    Abdominal: Soft  There is no tenderness  Musculoskeletal: She exhibits no edema  Neurological: She is alert and oriented to person, place, and time  Skin: Skin is warm and dry  Psychiatric: She has a normal mood and affect  Nursing note and vitals reviewed          Medications:    Current Outpatient Prescriptions:     albuterol (PROVENTIL HFA,VENTOLIN HFA) 90 mcg/act inhaler, Inhale 2 puffs every 6 (six) hours as needed for wheezing, Disp: , Rfl:     buPROPion (WELLBUTRIN XL) 150 mg 24 hr tablet, Take 1 tablet (150 mg total) by mouth daily, Disp: 90 tablet, Rfl: 0    Calcium Carbonate-Vitamin D (CALCIUM 600+D) 600-200 MG-UNIT TABS, Take 2 tablets by mouth daily, Disp: , Rfl:     cetirizine (ZyrTEC) 10 mg tablet, Take 10 mg by mouth daily, Disp: , Rfl:     co-enzyme Q-10 50 MG capsule, Take 100 mg by mouth daily, Disp: , Rfl:     dexlansoprazole (DEXILANT) 60 MG capsule, Take 60 mg by mouth daily, Disp: , Rfl:     diltiazem (DILACOR XR) 180 MG 24 hr capsule, Take 180 mg by mouth daily, Disp: , Rfl:     escitalopram (LEXAPRO) 20 mg tablet, TAKE ONE TABLET BY MOUTH ONCE DAILY, Disp: 90 tablet, Rfl: 1    estradiol (VIVELLE-DOT) 0 05 MG/24HR, Place 1 patch on the skin once a week, Disp: , Rfl:     fluticasone (VERAMYST) 27 5 MCG/SPRAY nasal spray, 2 sprays into each nostril daily, Disp: , Rfl:     fluticasone-salmeterol (ADVAIR) 250-50 mcg/dose inhaler, Inhale 1 puff every 12 (twelve) hours, Disp: , Rfl:     gabapentin (NEURONTIN) 100 mg capsule, Take in AM and noon, Disp: 180 capsule, Rfl: 0    gabapentin (NEURONTIN) 600 MG tablet, Take 600 mg by mouth 3 (three) times a day Patient takes one in am, one afternoon and 2 at hs, Disp: , Rfl:     HYDROcodone-acetaminophen (NORCO) 5-325 mg per tablet, Take 1 tablet by mouth every 6 (six) hours as needed for pain Max Daily Amount: 4 tablets, Disp: 120 tablet, Rfl: 0    hydroxychloroquine (PLAQUENIL) 200 mg tablet, Take 200 mg by mouth 2 (two) times a day, Disp: , Rfl:     levothyroxine 50 mcg tablet, Take 1 tablet (50 mcg total) by mouth daily, Disp: 90 tablet, Rfl: 1    lisinopril (ZESTRIL) 20 mg tablet, TAKE ONE TABLET BY MOUTH ONCE DAILY, Disp: 90 tablet, Rfl: 1    loperamide (IMODIUM) 2 mg capsule, Take 2 mg by mouth 4 (four) times a day as needed  , Disp: , Rfl:     LORazepam (ATIVAN) 1 mg tablet, Take 1 tablet (1 mg total) by mouth every 8 (eight) hours as needed for anxiety, Disp: 90 tablet, Rfl: 0    meclizine (ANTIVERT) 32 MG tablet, Take 25 mg by mouth every 6 (six) hours as needed for dizziness, Disp: , Rfl:     Multiple Vitamin (MULTIVITAMIN) tablet, Take 1 tablet by mouth daily, Disp: , Rfl:     Omega-3 Fatty Acids (OMEGA 3 PO), Take 1,200 mg by mouth daily, Disp: , Rfl:     simvastatin (ZOCOR) 5 MG tablet, TAKE ONE TABLET BY MOUTH DAILY AT BEDTIME, Disp: 90 tablet, Rfl: 1    sodium chloride 1 g tablet, Take 1 g by mouth 2 (two) times a day, Disp: , Rfl:     Suvorexant (BELSOMRA) 10 MG TABS, Take 1 tablet by mouth, Disp: , Rfl:     torsemide (DEMADEX) 10 mg tablet, Take 10 mg by mouth daily as needed, Disp: , Rfl:     warfarin (COUMADIN) 5 mg tablet, Take by mouth daily, Disp: , Rfl:     Laboratory Results:  Results for orders placed or performed in visit on 04/24/18   Protime-INR   Result Value Ref Range    INR 2 00 (A) 0 86 - 1 16

## 2018-05-01 ENCOUNTER — TELEPHONE (OUTPATIENT)
Dept: INTERNAL MEDICINE CLINIC | Facility: CLINIC | Age: 73
End: 2018-05-01

## 2018-05-01 ENCOUNTER — ANTICOAG VISIT (OUTPATIENT)
Dept: INTERNAL MEDICINE CLINIC | Facility: CLINIC | Age: 73
End: 2018-05-01

## 2018-05-01 ENCOUNTER — APPOINTMENT (OUTPATIENT)
Dept: LAB | Facility: CLINIC | Age: 73
End: 2018-05-01
Payer: MEDICARE

## 2018-05-01 DIAGNOSIS — I81 PORTAL VEIN THROMBOSIS: ICD-10-CM

## 2018-05-01 LAB
INR PPP: 1.86 (ref 0.86–1.16)
PROTHROMBIN TIME: 22.1 SECONDS (ref 12.1–14.4)

## 2018-05-01 PROCEDURE — 36415 COLL VENOUS BLD VENIPUNCTURE: CPT

## 2018-05-01 PROCEDURE — 85610 PROTHROMBIN TIME: CPT

## 2018-05-08 ENCOUNTER — TELEPHONE (OUTPATIENT)
Dept: INTERNAL MEDICINE CLINIC | Facility: CLINIC | Age: 73
End: 2018-05-08

## 2018-05-08 ENCOUNTER — APPOINTMENT (OUTPATIENT)
Dept: LAB | Facility: CLINIC | Age: 73
End: 2018-05-08
Payer: MEDICARE

## 2018-05-08 ENCOUNTER — ANTICOAG VISIT (OUTPATIENT)
Dept: INTERNAL MEDICINE CLINIC | Facility: CLINIC | Age: 73
End: 2018-05-08

## 2018-05-08 DIAGNOSIS — I81 PORTAL VEIN THROMBOSIS: ICD-10-CM

## 2018-05-08 LAB
INR PPP: 2.61 (ref 0.86–1.16)
PROTHROMBIN TIME: 28.9 SECONDS (ref 12.1–14.4)

## 2018-05-08 PROCEDURE — 85610 PROTHROMBIN TIME: CPT

## 2018-05-08 PROCEDURE — 36415 COLL VENOUS BLD VENIPUNCTURE: CPT

## 2018-05-08 NOTE — TELEPHONE ENCOUNTER
----- Message from Austyn Weiss MD sent at 5/8/2018  4:42 PM EDT -----  INR at goal, continue current dosing  Recheck in 3 weeks

## 2018-05-29 ENCOUNTER — ANTICOAG VISIT (OUTPATIENT)
Dept: INTERNAL MEDICINE CLINIC | Facility: CLINIC | Age: 73
End: 2018-05-29

## 2018-05-29 ENCOUNTER — TELEPHONE (OUTPATIENT)
Dept: INTERNAL MEDICINE CLINIC | Facility: CLINIC | Age: 73
End: 2018-05-29

## 2018-05-29 ENCOUNTER — APPOINTMENT (OUTPATIENT)
Dept: LAB | Facility: CLINIC | Age: 73
End: 2018-05-29
Payer: MEDICARE

## 2018-05-29 DIAGNOSIS — I81 PORTAL VEIN THROMBOSIS: ICD-10-CM

## 2018-05-29 DIAGNOSIS — F41.9 ANXIETY: ICD-10-CM

## 2018-05-29 DIAGNOSIS — J45.40 MODERATE PERSISTENT ASTHMA WITHOUT COMPLICATION: Primary | ICD-10-CM

## 2018-05-29 LAB
INR PPP: 7.83 (ref 0.86–1.17)
PROTHROMBIN TIME: 63.6 SECONDS (ref 11.8–14.2)

## 2018-05-29 PROCEDURE — 85610 PROTHROMBIN TIME: CPT

## 2018-05-29 PROCEDURE — 36415 COLL VENOUS BLD VENIPUNCTURE: CPT

## 2018-05-29 NOTE — TELEPHONE ENCOUNTER
----- Message from Ralph Manley MD sent at 5/29/2018 12:21 PM EDT -----  INR very high at 7 8  Hold warfarin today, Wednesday and Thursday  Recheck INR on Friday

## 2018-05-30 RX ORDER — LORAZEPAM 1 MG/1
TABLET ORAL
Qty: 90 TABLET | Refills: 0 | Status: SHIPPED | OUTPATIENT
Start: 2018-05-30 | End: 2018-06-27 | Stop reason: SDUPTHER

## 2018-06-01 ENCOUNTER — TELEPHONE (OUTPATIENT)
Dept: INTERNAL MEDICINE CLINIC | Facility: CLINIC | Age: 73
End: 2018-06-01

## 2018-06-01 ENCOUNTER — APPOINTMENT (OUTPATIENT)
Dept: LAB | Facility: CLINIC | Age: 73
End: 2018-06-01
Payer: MEDICARE

## 2018-06-01 ENCOUNTER — TRANSCRIBE ORDERS (OUTPATIENT)
Dept: LAB | Facility: CLINIC | Age: 73
End: 2018-06-01

## 2018-06-01 ENCOUNTER — ANTICOAG VISIT (OUTPATIENT)
Dept: INTERNAL MEDICINE CLINIC | Facility: CLINIC | Age: 73
End: 2018-06-01

## 2018-06-01 DIAGNOSIS — I81 PORTAL VEIN THROMBOSIS: ICD-10-CM

## 2018-06-01 LAB
INR PPP: 1.93 (ref 0.86–1.17)
PROTHROMBIN TIME: 21.5 SECONDS (ref 11.8–14.2)

## 2018-06-01 PROCEDURE — 36415 COLL VENOUS BLD VENIPUNCTURE: CPT

## 2018-06-01 PROCEDURE — 85610 PROTHROMBIN TIME: CPT

## 2018-06-01 NOTE — TELEPHONE ENCOUNTER
----- Message from Judy Mills MD sent at 6/1/2018  1:09 PM EDT -----  Take 5 mg daily, recheck INR next Thursday please

## 2018-06-08 ENCOUNTER — ANTICOAG VISIT (OUTPATIENT)
Dept: INTERNAL MEDICINE CLINIC | Facility: CLINIC | Age: 73
End: 2018-06-08

## 2018-06-08 ENCOUNTER — APPOINTMENT (OUTPATIENT)
Dept: LAB | Facility: CLINIC | Age: 73
End: 2018-06-08
Payer: MEDICARE

## 2018-06-08 ENCOUNTER — TELEPHONE (OUTPATIENT)
Dept: INTERNAL MEDICINE CLINIC | Facility: CLINIC | Age: 73
End: 2018-06-08

## 2018-06-08 DIAGNOSIS — I81 PORTAL VEIN THROMBOSIS: ICD-10-CM

## 2018-06-08 LAB
INR PPP: 1.64 (ref 0.86–1.17)
PROTHROMBIN TIME: 18.9 SECONDS (ref 11.8–14.2)

## 2018-06-08 PROCEDURE — 85610 PROTHROMBIN TIME: CPT

## 2018-06-08 PROCEDURE — 36415 COLL VENOUS BLD VENIPUNCTURE: CPT

## 2018-06-15 ENCOUNTER — TELEPHONE (OUTPATIENT)
Dept: INTERNAL MEDICINE CLINIC | Facility: CLINIC | Age: 73
End: 2018-06-15

## 2018-06-15 ENCOUNTER — ANTICOAG VISIT (OUTPATIENT)
Dept: INTERNAL MEDICINE CLINIC | Facility: CLINIC | Age: 73
End: 2018-06-15

## 2018-06-15 ENCOUNTER — APPOINTMENT (OUTPATIENT)
Dept: LAB | Facility: CLINIC | Age: 73
End: 2018-06-15
Payer: MEDICARE

## 2018-06-15 DIAGNOSIS — I81 PORTAL VEIN THROMBOSIS: ICD-10-CM

## 2018-06-15 LAB
INR PPP: 2.57 (ref 0.86–1.17)
PROTHROMBIN TIME: 26.8 SECONDS (ref 11.8–14.2)

## 2018-06-15 PROCEDURE — 85610 PROTHROMBIN TIME: CPT

## 2018-06-15 PROCEDURE — 36415 COLL VENOUS BLD VENIPUNCTURE: CPT

## 2018-06-15 NOTE — TELEPHONE ENCOUNTER
7 5mg x 2 days & 5mg x 5days     Patient would like for you to write another letter for weight watchers saying she could weigh 165lbs instead of 160lb

## 2018-06-15 NOTE — TELEPHONE ENCOUNTER
----- Message from Blanca Mendez MD sent at 6/15/2018 11:18 AM EDT -----  Continue current dosing (?5 mg daily), repeat in 3 weeks

## 2018-06-18 ENCOUNTER — HOSPITAL ENCOUNTER (OUTPATIENT)
Dept: MAMMOGRAPHY | Facility: HOSPITAL | Age: 73
Discharge: HOME | End: 2018-06-18
Payer: MEDICARE

## 2018-06-18 DIAGNOSIS — Z12.31 ENCOUNTER FOR SCREENING MAMMOGRAM FOR BREAST CANCER: ICD-10-CM

## 2018-06-18 PROCEDURE — 77067 SCR MAMMO BI INCL CAD: CPT

## 2018-06-27 DIAGNOSIS — E78.49 OTHER HYPERLIPIDEMIA: ICD-10-CM

## 2018-06-27 DIAGNOSIS — M54.16 LUMBAR RADICULOPATHY: ICD-10-CM

## 2018-06-27 DIAGNOSIS — I10 ESSENTIAL HYPERTENSION: Primary | ICD-10-CM

## 2018-06-27 DIAGNOSIS — F41.9 ANXIETY: ICD-10-CM

## 2018-06-27 DIAGNOSIS — E03.9 ACQUIRED HYPOTHYROIDISM: ICD-10-CM

## 2018-06-27 RX ORDER — DILTIAZEM HYDROCHLORIDE 180 MG/1
CAPSULE, COATED, EXTENDED RELEASE ORAL
Qty: 90 CAPSULE | Refills: 1 | Status: SHIPPED | OUTPATIENT
Start: 2018-06-27 | End: 2018-12-27 | Stop reason: SDUPTHER

## 2018-06-27 RX ORDER — LEVOTHYROXINE SODIUM 0.05 MG/1
TABLET ORAL
Qty: 90 TABLET | Refills: 1 | Status: SHIPPED | OUTPATIENT
Start: 2018-06-27 | End: 2019-03-22 | Stop reason: SDUPTHER

## 2018-06-27 RX ORDER — LORAZEPAM 1 MG/1
TABLET ORAL
Qty: 90 TABLET | Refills: 0 | Status: SHIPPED | OUTPATIENT
Start: 2018-06-27 | End: 2018-07-17 | Stop reason: SDUPTHER

## 2018-06-27 RX ORDER — SIMVASTATIN 5 MG
TABLET ORAL
Qty: 90 TABLET | Refills: 1 | Status: SHIPPED | OUTPATIENT
Start: 2018-06-27 | End: 2019-03-21 | Stop reason: SDUPTHER

## 2018-06-27 RX ORDER — GABAPENTIN 100 MG/1
100 CAPSULE ORAL 2 TIMES DAILY
Qty: 180 CAPSULE | Refills: 1 | Status: SHIPPED | OUTPATIENT
Start: 2018-06-27 | End: 2018-06-29 | Stop reason: SDUPTHER

## 2018-06-29 ENCOUNTER — TELEPHONE (OUTPATIENT)
Dept: INTERNAL MEDICINE CLINIC | Facility: CLINIC | Age: 73
End: 2018-06-29

## 2018-06-29 DIAGNOSIS — F33.1 MODERATE EPISODE OF RECURRENT MAJOR DEPRESSIVE DISORDER (HCC): ICD-10-CM

## 2018-06-29 DIAGNOSIS — M54.16 LUMBAR RADICULOPATHY: ICD-10-CM

## 2018-06-29 RX ORDER — BUPROPION HYDROCHLORIDE 150 MG/1
150 TABLET ORAL DAILY
Qty: 90 TABLET | Refills: 1 | Status: SHIPPED | OUTPATIENT
Start: 2018-06-29 | End: 2018-09-19 | Stop reason: SDUPTHER

## 2018-06-29 RX ORDER — GABAPENTIN 100 MG/1
200 CAPSULE ORAL 2 TIMES DAILY
Qty: 360 CAPSULE | Refills: 1 | Status: SHIPPED | OUTPATIENT
Start: 2018-06-29 | End: 2018-09-19 | Stop reason: SDUPTHER

## 2018-06-29 NOTE — TELEPHONE ENCOUNTER
Patient called regarding her refill of Gabapentin  100mgs  She states that you had increased her to 4 tablets a day  She will have not have enough medication for what had been refilled

## 2018-07-02 DIAGNOSIS — N95.1 FEMALE CLIMACTERIC STATE: Primary | ICD-10-CM

## 2018-07-02 RX ORDER — ESTRADIOL 0.05 MG/D
1 FILM, EXTENDED RELEASE TRANSDERMAL WEEKLY
Qty: 8 PATCH | Refills: 0 | Status: SHIPPED | OUTPATIENT
Start: 2018-07-02 | End: 2019-04-22 | Stop reason: ALTCHOICE

## 2018-07-02 NOTE — TELEPHONE ENCOUNTER
[address] called for request of refill for pt for the Estradiol patch - gets 84 day pack

## 2018-07-05 ENCOUNTER — APPOINTMENT (OUTPATIENT)
Dept: LAB | Facility: CLINIC | Age: 73
End: 2018-07-05
Payer: MEDICARE

## 2018-07-05 ENCOUNTER — TRANSCRIBE ORDERS (OUTPATIENT)
Dept: LAB | Facility: CLINIC | Age: 73
End: 2018-07-05

## 2018-07-05 ENCOUNTER — TELEPHONE (OUTPATIENT)
Dept: INTERNAL MEDICINE CLINIC | Facility: CLINIC | Age: 73
End: 2018-07-05

## 2018-07-05 DIAGNOSIS — J30.89 NON-SEASONAL ALLERGIC RHINITIS DUE TO OTHER ALLERGIC TRIGGER: Primary | ICD-10-CM

## 2018-07-05 DIAGNOSIS — I81 PORTAL VEIN THROMBOSIS: ICD-10-CM

## 2018-07-05 LAB
INR PPP: 2.57 (ref 0.86–1.17)
PROTHROMBIN TIME: 26.8 SECONDS (ref 11.8–14.2)

## 2018-07-05 PROCEDURE — 36415 COLL VENOUS BLD VENIPUNCTURE: CPT

## 2018-07-05 PROCEDURE — 85610 PROTHROMBIN TIME: CPT

## 2018-07-05 RX ORDER — FLUTICASONE PROPIONATE 50 MCG
SPRAY, SUSPENSION (ML) NASAL
Qty: 48 G | Refills: 1 | Status: SHIPPED | OUTPATIENT
Start: 2018-07-05 | End: 2018-08-18 | Stop reason: SDUPTHER

## 2018-07-06 ENCOUNTER — ANTICOAG VISIT (OUTPATIENT)
Dept: INTERNAL MEDICINE CLINIC | Facility: CLINIC | Age: 73
End: 2018-07-06

## 2018-07-06 DIAGNOSIS — I81 PORTAL VEIN THROMBOSIS: ICD-10-CM

## 2018-07-09 DIAGNOSIS — F41.9 ANXIETY: ICD-10-CM

## 2018-07-09 RX ORDER — LORAZEPAM 1 MG/1
TABLET ORAL
Qty: 90 TABLET | OUTPATIENT
Start: 2018-07-09

## 2018-07-16 ENCOUNTER — PROCEDURE VISIT (OUTPATIENT)
Dept: NEUROLOGY | Facility: CLINIC | Age: 73
End: 2018-07-16
Payer: MEDICARE

## 2018-07-16 VITALS
HEART RATE: 74 BPM | HEIGHT: 63 IN | BODY MASS INDEX: 31.01 KG/M2 | DIASTOLIC BLOOD PRESSURE: 76 MMHG | SYSTOLIC BLOOD PRESSURE: 145 MMHG | WEIGHT: 175 LBS

## 2018-07-16 DIAGNOSIS — G25.0 ESSENTIAL TREMOR: Primary | ICD-10-CM

## 2018-07-16 PROCEDURE — 95978 PR ANALYZE NEUROSTIM BRAIN, FIRST 1H: CPT | Performed by: PSYCHIATRY & NEUROLOGY

## 2018-07-16 NOTE — ASSESSMENT & PLAN NOTE
Breakthrough tremor bilaterally since last seen  Now affecting daily activities  Deep brain stimulator interrogated and changes made  See scanned stimulation sheets for details  On left voltage increased with improvement in tremor  On right tremor improved with increasing PW  Switched back to Group A 0+1- , 90 135, 4 and tremor was moderately controlled  Rate, PW  Increased with better control of tremor  See scanned stimulation sheets for detail

## 2018-07-16 NOTE — PROGRESS NOTES
Patient ID: Amberly Perry is a 67 y o  female  Assessment/Plan:    Essential tremor  Breakthrough tremor bilaterally since last seen  Now affecting daily activities  Deep brain stimulator interrogated and changes made  See scanned stimulation sheets for details  On left voltage increased with improvement in tremor  On right tremor improved with increasing PW  Switched back to Group A 0+1- , 90 135, 4 and tremor was moderately controlled  Rate, PW  Increased with better control of tremor  Diagnoses and all orders for this visit:    Essential tremor           Subjective:    Asael Siegel is a left handed female with peripheral neuropathy on Neurontin, spinal stenosis on gabapentin, anxiety and essential tremors s/p bilateral VIM DBS on 5/1/14 with Activa SC, who is now s/p right IPG update to Activa RC6/20/17, left placed who presents for follow up  She was previously followed by Dr Hoda Hicks for tremors  To review, tremors began in her late 46s  At that time the tremors began as a mild intentional tremor on the left  This has progressed with time and spread to the right side as well  She returns today with breakthrough tremor more so on the left  She has trouble brushing teeth, holding a pitcher, and using utensils  Handwriting is worse but legible  She is able to dress, button, and shower without difficulty  This started in February while in [facility]  They made stimulation changes which helped a bit  The following portions of the patient's history were reviewed and updated as appropriate: allergies, current medications and problem list          Objective:    Blood pressure 145/76, pulse 74, height 5' 3" (1 6 m), weight 79 4 kg (175 lb)  Physical Exam   Constitutional: She appears well-developed  Eyes: EOM are normal  Pupils are equal, round, and reactive to light  Neurological: She has normal strength   Gait normal    Psychiatric: Her speech is normal    Vitals reviewed  Neurological Exam    Mental Status  The patient is alert and oriented to person, place, time, and situation  Her recent and remote memory are normal  Her speech is normal  Her language is fluent with no aphasia  She has normal attention span and concentration  She follows multi-step commands  She has a normal fund of knowledge  Speech slightly slurred     Cranial Nerves  CN I: The patient has not tested  CN III, IV, VI: The patient's pupils are equally round and reactive to light and ocular movements are normal   CN V: The patient has normal facial sensation  CN VII:  The patient has symmetric facial movement  CN VIII:  The patient's hearing is normal   CN IX, X: The patient has symmetric palate movement  CN XI: The patient's shoulder shrug strength is normal   CN XII: The patient's tongue is midline without atrophy or fasciculations  Motor   Her overall muscle tone is normal throughout  Her strength is 5/5 throughout all four extremities  Moderate postural tremor on hands outstretched L>R  Moderate tremor on FTN L>R  No rest tremor  No head, leg or vocal tremor  Handwriting is slightly tremulous  Spirals mild tremor R>L  Moderate tremor on picking up cup on left, mild on right  Sensory  The patient's sensation is to light touch  Gait and Coordination  The patient has normal gait and station  ROS:    Review of Systems   Constitutional: Negative  Negative for appetite change and fever  HENT: Negative  Negative for hearing loss, tinnitus, trouble swallowing and voice change  Eyes: Negative  Negative for photophobia and pain  Respiratory: Negative  Negative for shortness of breath  Cardiovascular: Negative  Negative for palpitations  Gastrointestinal: Positive for diarrhea  Negative for nausea and vomiting  Endocrine: Negative  Negative for cold intolerance and heat intolerance  Genitourinary: Negative  Negative for dysuria, frequency and urgency  Musculoskeletal: Positive for arthralgias, back pain, myalgias and neck pain  Negative for joint swelling  Skin: Negative  Negative for rash  Neurological: Positive for tremors  Negative for dizziness, seizures, syncope, facial asymmetry, speech difficulty, weakness, light-headedness, numbness and headaches  Hematological: Bruises/bleeds easily  Psychiatric/Behavioral: Positive for sleep disturbance  Negative for confusion and hallucinations

## 2018-07-17 DIAGNOSIS — F41.9 ANXIETY: ICD-10-CM

## 2018-07-17 RX ORDER — LORAZEPAM 1 MG/1
TABLET ORAL
Qty: 90 TABLET | Refills: 0 | Status: SHIPPED | OUTPATIENT
Start: 2018-07-17 | End: 2018-09-16 | Stop reason: SDUPTHER

## 2018-07-19 ENCOUNTER — ANNUAL EXAM (OUTPATIENT)
Dept: OBGYN CLINIC | Facility: CLINIC | Age: 73
End: 2018-07-19
Payer: MEDICARE

## 2018-07-19 ENCOUNTER — OFFICE VISIT (OUTPATIENT)
Dept: CARDIOLOGY CLINIC | Facility: CLINIC | Age: 73
End: 2018-07-19
Payer: MEDICARE

## 2018-07-19 VITALS
WEIGHT: 173.8 LBS | SYSTOLIC BLOOD PRESSURE: 150 MMHG | OXYGEN SATURATION: 94 % | BODY MASS INDEX: 30.79 KG/M2 | HEART RATE: 73 BPM | DIASTOLIC BLOOD PRESSURE: 82 MMHG | HEIGHT: 63 IN

## 2018-07-19 VITALS
DIASTOLIC BLOOD PRESSURE: 52 MMHG | HEIGHT: 63 IN | SYSTOLIC BLOOD PRESSURE: 126 MMHG | WEIGHT: 174 LBS | BODY MASS INDEX: 30.83 KG/M2

## 2018-07-19 DIAGNOSIS — I47.1 SUPRAVENTRICULAR TACHYCARDIA (HCC): Primary | ICD-10-CM

## 2018-07-19 DIAGNOSIS — Z01.419 ENCOUNTER FOR GYNECOLOGICAL EXAMINATION WITHOUT ABNORMAL FINDING: Primary | ICD-10-CM

## 2018-07-19 DIAGNOSIS — L29.2 VULVAR ITCHING: ICD-10-CM

## 2018-07-19 PROCEDURE — G0101 CA SCREEN;PELVIC/BREAST EXAM: HCPCS | Performed by: OBSTETRICS & GYNECOLOGY

## 2018-07-19 PROCEDURE — 99214 OFFICE O/P EST MOD 30 MIN: CPT | Performed by: INTERNAL MEDICINE

## 2018-07-19 RX ORDER — CLOBETASOL PROPIONATE 0.5 MG/G
OINTMENT TOPICAL 2 TIMES DAILY
Qty: 30 G | Refills: 2 | Status: SHIPPED | OUTPATIENT
Start: 2018-07-19 | End: 2019-07-01 | Stop reason: ALTCHOICE

## 2018-07-19 NOTE — PROGRESS NOTES
Follow-up - Cardiology   Ruthie Galarza 67 y o  female MRN: [id number]        Problems    Problem List Items Addressed This Visit     Supraventricular tachycardia ([address] ) - Primary            Plan Holter counter in late fall of 2018 in return visit in 1 year  She is totally asymptomatic from her past history of SVT        History of Present Illness  Cardiology HPI Free Text Note Form [facility]: Ms Giuliano Horta is a 66year old woman with HTN, dyslipidemia, and SVT presents for pre-operative risk stratification prior to DBS battery placement  From a cardiac standpoint, she is doing well  No chest pain, shortness of breath, or palpitations  Is on warfarin for portal vein thrombosis  Patient seen July 19, 2017  Her list of issues of breath along  She has a history of asthma, Sjogren's, thyroid disorder, post colectomy, chronic anemia, brain stimulator for tremor, recent change of death stimulator, chronic use of Coumadin because of a DVT history of poor embolus history as well as a family history of emboli  Coumadin was recommended by hematology  From my standpoint she is a first-degree AV block and she's had some short episodes of SVT  She is on a low dose until today's exam  Her LDL is 80 is 87 and 92  A1c 5 7  From cardiac standpoint she's basically asymptomatic  We will do a Holter  HPI: Ruthie Galarza is a 67y o  year old female        Review of Systems   Constitutional: Negative  Respiratory: Negative  Neurological:        Tremor better with a brain stimulator   Psychiatric/Behavioral: Negative            Past Medical History:   Diagnosis Date    Anxiety     Arrhythmia     Arthritis     Asthma     Blood clot in vein     portal vein    Breast lump     [id number] RESOLVED    Depression     Disease of thyroid gland     DVT, lower extremity (HCC)     GERD (gastroesophageal reflux disease)     Hyperlipidemia     Hypertension     Hypokalemia     Hyponatremia     Hypothyroidism     Iron deficiency anemia     Irregular heart beat     Manic behavior (HCC)     Mesenteric vein thrombosis     Osteoarthritis     Palpitations     [id number]  RESOLVED    Paroxysmal supraventricular tachycardia (HCC)     PE (pulmonary thromboembolism) (HCC)     Thrombocytosis (HCC)     [id number]  RESOLVED    Tremors of nervous system     dbs implanted right and left chest    Ulcerative colitis ([facility] )     Vertigo     [id number] RESOLVED     History   Alcohol Use    Yes     Comment: socially BEER,WINE     History   Drug Use No     History   Smoking Status    Former Smoker    Packs/day: 2 00    Years: 5 00    Quit date: 1965   Smokeless Tobacco    Never Used       Allergies:   Allergies   Allergen Reactions    Indocin [Indomethacin] Other (See Comments), Dizziness and GI Intolerance     Reaction Date: 05Apr2012;   Nausea and dizziness    Macrobid [Nitrofurantoin Monohyd Macro] Rash    Penicillins Rash     Annotation - [id number]: can take cephalosporins    Sulfa Antibiotics Rash       Medications:     Current Outpatient Prescriptions:     ADVAIR DISKUS 250-50 MCG/DOSE inhaler, INHALE ONE PUFF TWICE DAILY, Disp: 1 Inhaler, Rfl: 5    albuterol (PROVENTIL HFA,VENTOLIN HFA) 90 mcg/act inhaler, Inhale 2 puffs every 6 (six) hours as needed for wheezing, Disp: , Rfl:     buPROPion (WELLBUTRIN XL) 150 mg 24 hr tablet, Take 1 tablet (150 mg total) by mouth daily, Disp: 90 tablet, Rfl: 1    Calcium Carbonate-Vitamin D (CALCIUM 600+D) 600-200 MG-UNIT TABS, Take 2 tablets by mouth daily, Disp: , Rfl:     cetirizine (ZyrTEC) 10 mg tablet, Take 10 mg by mouth daily, Disp: , Rfl:     co-enzyme Q-10 50 MG capsule, Take 100 mg by mouth daily, Disp: , Rfl:     dexlansoprazole (DEXILANT) 60 MG capsule, Take 60 mg by mouth daily, Disp: , Rfl:     diltiazem (CARDIZEM CD) 180 mg 24 hr capsule, TAKE ONE CAPSULE BY MOUTH ONCE DAILY, Disp: 90 capsule, Rfl: 1    escitalopram (LEXAPRO) 20 mg tablet, TAKE ONE TABLET BY MOUTH ONCE DAILY, Disp: 90 tablet, Rfl: 1    estradiol (VIVELLE-DOT) 0 05 MG/24HR, Place 1 patch on the skin once a week, Disp: 8 patch, Rfl: 0    fluticasone (FLONASE) 50 mcg/act nasal spray, USE 2 SPRAYS IN EACH NOSTRIL ONCE DAILY, Disp: 48 g, Rfl: 1    gabapentin (NEURONTIN) 100 mg capsule, Take 2 capsules (200 mg total) by mouth 2 (two) times a day, Disp: 360 capsule, Rfl: 1    gabapentin (NEURONTIN) 600 MG tablet, Patient takes one in am, one afternoon and 2 at hs  , Disp: , Rfl:     HYDROcodone-acetaminophen (NORCO) 5-325 mg per tablet, Take 1 tablet by mouth every 6 (six) hours as needed for pain Max Daily Amount: 4 tablets, Disp: 120 tablet, Rfl: 0    hydroxychloroquine (PLAQUENIL) 200 mg tablet, Take 200 mg by mouth 2 (two) times a day, Disp: , Rfl:     levothyroxine 50 mcg tablet, TAKE ONE TABLET BY MOUTH ONCE DAILY, Disp: 90 tablet, Rfl: 1    lisinopril (ZESTRIL) 20 mg tablet, TAKE ONE TABLET BY MOUTH ONCE DAILY, Disp: 90 tablet, Rfl: 1    loperamide (IMODIUM) 2 mg capsule, Take 2 mg by mouth 4 (four) times a day as needed  , Disp: , Rfl:     LORazepam (ATIVAN) 1 mg tablet, TAKE ONE TABLET BY MOUTH EVERY 8 HOURS AS NEEDED FOR ANXIETY, Disp: 90 tablet, Rfl: 0    meclizine (ANTIVERT) 32 MG tablet, Take 25 mg by mouth every 6 (six) hours as needed for dizziness, Disp: , Rfl:     Multiple Vitamin (MULTIVITAMIN) tablet, Take 1 tablet by mouth daily, Disp: , Rfl:     Omega-3 Fatty Acids (OMEGA 3 PO), Take 1,200 mg by mouth daily, Disp: , Rfl:     simvastatin (ZOCOR) 5 MG tablet, TAKE ONE TABLET BY MOUTH DAILY, Disp: 90 tablet, Rfl: 1    sodium chloride 1 g tablet, Take 1 g by mouth 2 (two) times a day, Disp: , Rfl:     torsemide (DEMADEX) 10 mg tablet, Take 10 mg by mouth daily as needed, Disp: , Rfl:     warfarin (COUMADIN) 5 mg tablet, Take by mouth daily, Disp: , Rfl:       Physical Exam   Constitutional: She is oriented to person, place, and time  She appears well-developed  Cardiovascular: Regular rhythm  No murmur heard  Pulmonary/Chest: Breath sounds normal    Musculoskeletal: She exhibits no edema  Neurological: She is oriented to person, place, and time  Laboratory Studies:  CMP:    NT-proBNP: No results found for: NTBNP   Coags:    Lipid Profile:   Lab Results   Component Value Date    CHOL 193 12/19/2017     Lab Results   Component Value Date    HDL 78 (H) 12/19/2017     Lab Results   Component Value Date    LDLCALC 100 12/19/2017     Lab Results   Component Value Date    TRIG 73 12/19/2017       Cardiac testing:     EKG reviewed personally:  EKG is stable with a borderline first-degree AV block and a minor intraventricular conduction delay    No results found for this or any previous visit  No results found for this or any previous visit  No results found for this or any previous visit  No results found for this or any previous visit  Charles Peacock MD    Portions of the record may have been created with voice recognition software   Occasional wrong word or "sound a like" substitutions may have occurred due to the inherent limitations of voice recognition software   Read the chart carefully and recognize, using context, where substitutions have occurred

## 2018-07-19 NOTE — PROGRESS NOTES
Doreen Kwon   [birth year]    CC:  Yearly exam    S:  67 y o  female here for yearly exam  She is postmenopausal and has had no vaginal bleeding  She denies vaginal discharge,odor or dryness  She is not sexually active  She has been having vulvar itching for the last week or two  She tried using clobetasol cream that I had prescribed her last year but did not get relief  Sounds like she has been using it sparingly as she was not sure if it was okay to use on the interior of her labia majora  Reassurance given regarding short-term use        Last Pap 2005 - normal; aware no further paps  Last Mammo 6/18/18 - BIRAD-1; Dr Donte Thompson orders  Last DEXA 2/12/15 - osteopenia (t-score -1 6); repeat 2020  Last Colonoscopy - every 3 years by Dr Jamie Macdonald      Current Outpatient Prescriptions:   Cristel Fordyce 250-50 MCG/DOSE inhaler, INHALE ONE PUFF TWICE DAILY, Disp: 1 Inhaler, Rfl: 5    albuterol (PROVENTIL HFA,VENTOLIN HFA) 90 mcg/act inhaler, Inhale 2 puffs every 6 (six) hours as needed for wheezing, Disp: , Rfl:     buPROPion (WELLBUTRIN XL) 150 mg 24 hr tablet, Take 1 tablet (150 mg total) by mouth daily, Disp: 90 tablet, Rfl: 1    Calcium Carbonate-Vitamin D (CALCIUM 600+D) 600-200 MG-UNIT TABS, Take 2 tablets by mouth daily, Disp: , Rfl:     cetirizine (ZyrTEC) 10 mg tablet, Take 10 mg by mouth daily, Disp: , Rfl:     co-enzyme Q-10 50 MG capsule, Take 100 mg by mouth daily, Disp: , Rfl:     dexlansoprazole (DEXILANT) 60 MG capsule, Take 60 mg by mouth daily, Disp: , Rfl:     diltiazem (CARDIZEM CD) 180 mg 24 hr capsule, TAKE ONE CAPSULE BY MOUTH ONCE DAILY, Disp: 90 capsule, Rfl: 1    escitalopram (LEXAPRO) 20 mg tablet, TAKE ONE TABLET BY MOUTH ONCE DAILY, Disp: 90 tablet, Rfl: 1    estradiol (VIVELLE-DOT) 0 05 MG/24HR, Place 1 patch on the skin once a week, Disp: 8 patch, Rfl: 0    fluticasone (FLONASE) 50 mcg/act nasal spray, USE 2 SPRAYS IN EACH NOSTRIL ONCE DAILY, Disp: 48 g, Rfl: 1    gabapentin (NEURONTIN) 100 mg capsule, Take 2 capsules (200 mg total) by mouth 2 (two) times a day, Disp: 360 capsule, Rfl: 1    gabapentin (NEURONTIN) 600 MG tablet, Patient takes one in am, one afternoon and 2 at hs  , Disp: , Rfl:     HYDROcodone-acetaminophen (NORCO) 5-325 mg per tablet, Take 1 tablet by mouth every 6 (six) hours as needed for pain Max Daily Amount: 4 tablets, Disp: 120 tablet, Rfl: 0    hydroxychloroquine (PLAQUENIL) 200 mg tablet, Take 200 mg by mouth 2 (two) times a day, Disp: , Rfl:     levothyroxine 50 mcg tablet, TAKE ONE TABLET BY MOUTH ONCE DAILY, Disp: 90 tablet, Rfl: 1    lisinopril (ZESTRIL) 20 mg tablet, TAKE ONE TABLET BY MOUTH ONCE DAILY, Disp: 90 tablet, Rfl: 1    loperamide (IMODIUM) 2 mg capsule, Take 2 mg by mouth 4 (four) times a day as needed  , Disp: , Rfl:     LORazepam (ATIVAN) 1 mg tablet, TAKE ONE TABLET BY MOUTH EVERY 8 HOURS AS NEEDED FOR ANXIETY, Disp: 90 tablet, Rfl: 0    meclizine (ANTIVERT) 32 MG tablet, Take 25 mg by mouth every 6 (six) hours as needed for dizziness, Disp: , Rfl:     Multiple Vitamin (MULTIVITAMIN) tablet, Take 1 tablet by mouth daily, Disp: , Rfl:     Omega-3 Fatty Acids (OMEGA 3 PO), Take 1,200 mg by mouth daily, Disp: , Rfl:     simvastatin (ZOCOR) 5 MG tablet, TAKE ONE TABLET BY MOUTH DAILY, Disp: 90 tablet, Rfl: 1    sodium chloride 1 g tablet, Take 1 g by mouth 2 (two) times a day, Disp: , Rfl:     torsemide (DEMADEX) 10 mg tablet, Take 10 mg by mouth daily as needed, Disp: , Rfl:     warfarin (COUMADIN) 5 mg tablet, Take by mouth daily, Disp: , Rfl:     clobetasol (TEMOVATE) 0 05 % ointment, Apply topically 2 (two) times a day, Disp: 30 g, Rfl: 2  Social History     Social History    Marital status:      Spouse name: N/A    Number of children: N/A    Years of education: EDUCATION LEVEL 10TH GRADE     Occupational History    RETIRED      Social History Main Topics    Smoking status: Former Smoker     Packs/day: 2 00 Years:  5 00     Quit date: 1965    Smokeless tobacco: Never Used      Comment: Quit    Alcohol use 1 2 oz/week     1 Glasses of wine, 1 Cans of beer per week      Comment: socially BEER,WINE    Drug use: No    Sexual activity: Yes     Partners: Male     Birth control/ protection: Post-menopausal     Other Topics Concern    Not on file     Social History Narrative    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ACTIVITIES INSIDE AND OUTSIDE OF HOME, MODERATE    CAFFEINE USE, DRINKS CAFEINATED TEA, DRINKS COFFEE    INADEQUATE EXERCISE    LIVES WITH ADULT CHILDREN     Family History   Problem Relation Age of Onset    Venous thrombosis Mother         ACUTE VENOUS THROMBOSIS OF DEEP VESSELS OF THE DISTAL LOWER EXTREMITY    Other Mother         PHLEBITIS    Hypertension Mother     Peripheral vascular disease Mother    Zena Viveros COPD Father     Diabetes Father         MELLITUS    Stroke Father     Diabetes Sister         MELLITUS    Sjogren's syndrome Sister     Alcohol abuse Neg Hx     Depression Neg Hx     Drug abuse Neg Hx     Substance Abuse Neg Hx      Past Medical History:   Diagnosis Date    Anxiety     Arrhythmia     Arthritis     Asthma     Blood clot in vein     portal vein    Breast lump     [id number] RESOLVED    Depression     Disease of thyroid gland     DVT, lower extremity (HCC)     GERD (gastroesophageal reflux disease)     Hyperlipidemia     Hypertension     Hypokalemia     Hyponatremia     Hypothyroidism     Iron deficiency anemia     Irregular heart beat     Manic behavior (HCC)     Mesenteric vein thrombosis     Osteoarthritis     Palpitations     [id number]  RESOLVED    Paroxysmal supraventricular tachycardia (HCC)     PE (pulmonary thromboembolism) (HCC)     Sjoegren syndrome (HCC)     Thrombocytosis (HCC)     [id number]  RESOLVED    Tremors of nervous system     dbs implanted right and left chest    Ulcerative [address] ([facility] [address] )     Vertigo     [id number] RESOLVED        O:  Blood pressure 126/52, height 5' 2 5" (1 588 m), weight 78 9 kg (174 lb)  Patient appears well and is not in distress  Neck is supple without masses  Breasts are symmetrical without mass, tenderness, nipple discharge, skin changes or adenopathy  Abdomen is soft and nontender without masses  External genitals are normal without lesions or rashes  Mild excoriation on labia majora bilaterally without evidence of underlying rash/pathology  Vagina is normal without discharge or bleeding  Cervix is normal without discharge or lesion  Uterus is normal, mobile, nontender without palpable mass  Adnexa are normal, nontender, without palpable mass  A:  Yearly exam      P:  RTO one year for yearly exam or sooner as needed  New Rx sent for clobetasol - will call if not resolved in 2 weeks

## 2018-07-20 ENCOUNTER — OFFICE VISIT (OUTPATIENT)
Dept: INTERNAL MEDICINE CLINIC | Facility: CLINIC | Age: 73
End: 2018-07-20
Payer: MEDICARE

## 2018-07-20 VITALS
HEIGHT: 63 IN | WEIGHT: 172 LBS | RESPIRATION RATE: 18 BRPM | SYSTOLIC BLOOD PRESSURE: 136 MMHG | HEART RATE: 78 BPM | TEMPERATURE: 98.4 F | OXYGEN SATURATION: 98 % | BODY MASS INDEX: 30.48 KG/M2 | DIASTOLIC BLOOD PRESSURE: 72 MMHG

## 2018-07-20 DIAGNOSIS — G25.0 ESSENTIAL TREMOR: ICD-10-CM

## 2018-07-20 DIAGNOSIS — M54.16 LUMBAR RADICULOPATHY: ICD-10-CM

## 2018-07-20 DIAGNOSIS — K51.80 OTHER ULCERATIVE COLITIS WITHOUT COMPLICATION (HCC): ICD-10-CM

## 2018-07-20 DIAGNOSIS — M51.36 LUMBAR DEGENERATIVE DISC DISEASE: ICD-10-CM

## 2018-07-20 DIAGNOSIS — I10 ESSENTIAL HYPERTENSION: ICD-10-CM

## 2018-07-20 DIAGNOSIS — F33.1 MODERATE EPISODE OF RECURRENT MAJOR DEPRESSIVE DISORDER (HCC): Primary | ICD-10-CM

## 2018-07-20 DIAGNOSIS — M35.00 SJOGREN'S SYNDROME, WITH UNSPECIFIED ORGAN INVOLVEMENT (HCC): ICD-10-CM

## 2018-07-20 DIAGNOSIS — L85.3 DRY SKIN DERMATITIS: ICD-10-CM

## 2018-07-20 DIAGNOSIS — E87.1 HYPONATREMIA: ICD-10-CM

## 2018-07-20 PROCEDURE — 99214 OFFICE O/P EST MOD 30 MIN: CPT | Performed by: INTERNAL MEDICINE

## 2018-07-20 RX ORDER — HYDROCODONE BITARTRATE AND ACETAMINOPHEN 5; 325 MG/1; MG/1
1 TABLET ORAL EVERY 6 HOURS PRN
Qty: 120 TABLET | Refills: 0 | Status: SHIPPED | OUTPATIENT
Start: 2018-07-20 | End: 2018-10-03 | Stop reason: SDUPTHER

## 2018-07-20 NOTE — ASSESSMENT & PLAN NOTE
Reviewed CT scan 12/17, she had a bowel obstruction then  She reports stools always soft, sometimes watery  Declined further work up at this time, she will monitor symptoms

## 2018-07-20 NOTE — ASSESSMENT & PLAN NOTE
Doing better since addition of Wellbutrin, also on Lexapro  She taking lorazepam at least twice a day only  Discussed taking 1/2 tablet sometimes  Stopped Belsomra due to sedation

## 2018-07-20 NOTE — ASSESSMENT & PLAN NOTE
She is considering repeat injections  On gabapentin, takes Vicodin 1-2x a day for severe pain  PDMP reviewed

## 2018-07-20 NOTE — PROGRESS NOTES
Assessment/Plan: Moderate episode of recurrent major depressive disorder (HCC)  Doing better since addition of Wellbutrin, also on Lexapro  She taking lorazepam at least twice a day only  Discussed taking 1/2 tablet sometimes  Stopped Belsomra due to sedation  Osteoarthritis of right knee  Follow up with ortho as scheduled for possible injection    Class 1 obesity due to excess calories without serious comorbidity with body mass index (BMI) of 30 0 to 30 9 in adult  Weight gain since last visit  Discussed portion control  Lumbar degenerative disc disease  She is considering repeat injections  On gabapentin, takes Vicodin 1-2x a day for severe pain  PDMP reviewed  Ulcerative colitis without complications ([facility] [address] )  Reviewed CT scan 12/17, she had a bowel obstruction then  She reports stools always soft, sometimes watery  Declined further work up at this time, she will monitor symptoms  Hyponatremia  Repeat BMP due  Taking NaCl bid  Followed by nephrology  Essential hypertension  Recently saw cardiology  BP stable on lisinopril, diltiazem and torsemide  Essential tremor  Symptoms improved since setting adjusted, per neurology  [facility]'s [facility]  Recently saw rheum, on Plaquenil  Mild intermittent asthma without complication  Stable, no recent exacerbation  Hyperlipidemia  On statin  Acquired hypothyroidism  Stable  GERD (gastroesophageal reflux disease)  No symptoms, on daily PPI  Osteopenia  Repeat bone density 2019  Portal vein thrombosis  Warfarin dose recently adjusted  Diagnoses and all orders for this visit:    Moderate episode of recurrent major depressive disorder (HCC)    Lumbar radiculopathy  -     HYDROcodone-acetaminophen (NORCO) 5-325 mg per tablet;  Take 1 tablet by mouth every 6 (six) hours as needed for pain Max Daily Amount: 4 tablets    Other ulcerative colitis without complication (HCC)    Hyponatremia    Sjogren's syndrome, with unspecified organ involvement ([facility] [address] )    Lumbar degenerative disc disease    Essential tremor    Essential hypertension    Dry skin dermatitis  Comments:  Recently saw dermatology, using moisturizer and oils  Follow up in 4 months or as needed  Subjective:      Patient ID: Kasie Quick is a 67 y o  female  Ms  Jazmine Damon complains of right groin pain  This started a few days ago  She complains pain from her low back area radiating to her right groin area  Pain worse when she wakes up in the morning, would frequently wake her up at night  She denies any triggering activity, denies any recent falls or injury  She complains of more frequent right knee pain, has an appointment with Ortho for an injection  She is considering back injections again, since pain gradually worse the past few months  She is taking gabapentin regularly  She takes Vicodin at least twice a day for severe pain only  She reports her anxiety is much better, has not been picking on her skin as much  She is on Lexapro and Wellbutrin  She takes lorazepam at least twice a day, tries not to take it mid day  She recently saw Neurology, she reports her tremors are much better  She reports occasional difficulty moving her bowels, reports that she needs to push her left lower quadrant area to push out the stool  This has been occurring more frequently, reports mild discomfort when she does this  Stool is usually soft, occasionally watery  The following portions of the patient's history were reviewed and updated as appropriate: allergies, current medications, past medical history, past social history and problem list     Review of Systems   Constitutional: Negative for appetite change and fatigue  Eyes: Negative for visual disturbance  Respiratory: Negative for cough and shortness of breath  Cardiovascular: Negative for chest pain and leg swelling     Gastrointestinal: Negative for abdominal pain, constipation, diarrhea and nausea  Genitourinary: Negative for dysuria and frequency  Musculoskeletal: Positive for arthralgias, back pain and gait problem  Negative for myalgias  Skin: Negative for rash and wound  Neurological: Negative for dizziness, numbness and headaches  Hematological: Bruises/bleeds easily  Psychiatric/Behavioral: Negative for confusion, dysphoric mood and sleep disturbance  The patient is not nervous/anxious  Objective:      /72   Pulse 78   Temp 98 4 °F (36 9 °C)   Resp 18   Ht 5' 3" (1 6 m)   Wt 78 kg (172 lb)   SpO2 98%   BMI 30 47 kg/m²          Physical Exam   Constitutional: She is oriented to person, place, and time  She appears well-developed and well-nourished  HENT:   Head: Normocephalic and atraumatic  Nose: Nose normal    Eyes: Conjunctivae are normal  Pupils are equal, round, and reactive to light  Neck: Neck supple  Cardiovascular: Normal rate, regular rhythm and normal heart sounds  No edema  Pulmonary/Chest: Effort normal and breath sounds normal  She has no wheezes  She has no rales  Abdominal: Soft  Bowel sounds are normal    Neurological: She is alert and oriented to person, place, and time  Minimal hand tremors  Skin: Skin is warm and dry  No rash noted  Psychiatric: She has a normal mood and affect  Her behavior is normal    Nursing note and vitals reviewed  Lab results reviewed with patient

## 2018-07-26 ENCOUNTER — APPOINTMENT (OUTPATIENT)
Dept: LAB | Facility: CLINIC | Age: 73
End: 2018-07-26
Payer: MEDICARE

## 2018-07-26 ENCOUNTER — ANTICOAG VISIT (OUTPATIENT)
Dept: INTERNAL MEDICINE CLINIC | Facility: CLINIC | Age: 73
End: 2018-07-26

## 2018-07-26 ENCOUNTER — TELEPHONE (OUTPATIENT)
Dept: INTERNAL MEDICINE CLINIC | Facility: CLINIC | Age: 73
End: 2018-07-26

## 2018-07-26 DIAGNOSIS — I81 PORTAL VEIN THROMBOSIS: ICD-10-CM

## 2018-07-26 LAB
INR PPP: 2.22 (ref 0.86–1.17)
PROTHROMBIN TIME: 23.9 SECONDS (ref 11.8–14.2)

## 2018-07-26 PROCEDURE — 36415 COLL VENOUS BLD VENIPUNCTURE: CPT

## 2018-07-26 PROCEDURE — 85610 PROTHROMBIN TIME: CPT

## 2018-07-26 NOTE — TELEPHONE ENCOUNTER
----- Message from Pat Johnson MD sent at 7/26/2018 12:35 PM EDT -----  Continue current dosing  Recheck in 1 month

## 2018-08-09 ENCOUNTER — TELEPHONE (OUTPATIENT)
Dept: NEUROSURGERY | Facility: CLINIC | Age: 73
End: 2018-08-09

## 2018-08-09 NOTE — LETTER
August 9, 2018     Dr Ferdinand Severs    Patient: Santi Morales   YOB: 1945   Date of Visit: 8/9/2018       Dear Dr Ferdinand Severs:    Angy Sanford has been under the care of this office  This letter is to inform you that the [employer] representative Eugenia Monroy has been consulted and has advised the patient may safely use a magnetic knee brace  If you have questions, please do not hesitate to call         Sincerely,        Senia Amaral RN        CC: No Recipients

## 2018-08-09 NOTE — TELEPHONE ENCOUNTER
Received call from Ainsley Burnett asking if it would be ok to get a knee brace that had a magnet with her DBS - called the [employer] rep Alex Wiggins to confirm - he said it would be fine for her to get a knee brace with magnet and would not interfere with her DBS  Let talia know, she requested a letter be faxed to her doctors office indicating this was ok  Dr Crespo Senior fax [phone]

## 2018-08-10 ENCOUNTER — TELEPHONE (OUTPATIENT)
Dept: NEUROLOGY | Facility: CLINIC | Age: 73
End: 2018-08-10

## 2018-08-10 NOTE — TELEPHONE ENCOUNTER
Called pt LMOM to schedule DBS appointment for May  If pt calls back please transfer to me or [place]

## 2018-08-18 DIAGNOSIS — J30.89 NON-SEASONAL ALLERGIC RHINITIS DUE TO OTHER ALLERGIC TRIGGER: ICD-10-CM

## 2018-08-22 RX ORDER — FLUTICASONE PROPIONATE 50 MCG
SPRAY, SUSPENSION (ML) NASAL
Qty: 48 G | Refills: 0 | Status: SHIPPED | OUTPATIENT
Start: 2018-08-22 | End: 2018-09-19 | Stop reason: SDUPTHER

## 2018-08-24 ENCOUNTER — APPOINTMENT (OUTPATIENT)
Dept: LAB | Facility: CLINIC | Age: 73
End: 2018-08-24
Payer: MEDICARE

## 2018-08-24 ENCOUNTER — TRANSCRIBE ORDERS (OUTPATIENT)
Dept: LAB | Facility: CLINIC | Age: 73
End: 2018-08-24

## 2018-08-24 DIAGNOSIS — M51.36 DEGENERATION OF LUMBAR INTERVERTEBRAL DISC: ICD-10-CM

## 2018-08-24 DIAGNOSIS — M54.40 LOW BACK PAIN WITH SCIATICA, SCIATICA LATERALITY UNSPECIFIED, UNSPECIFIED BACK PAIN LATERALITY, UNSPECIFIED CHRONICITY: ICD-10-CM

## 2018-08-24 DIAGNOSIS — G60.3 IDIOPATHIC PROGRESSIVE POLYNEUROPATHY: Primary | ICD-10-CM

## 2018-08-24 DIAGNOSIS — M35.01 KCS (KERATOCONJUNCTIVITIS SICCA) (HCC): ICD-10-CM

## 2018-08-24 DIAGNOSIS — I81 PORTAL VEIN THROMBOSIS: ICD-10-CM

## 2018-08-24 DIAGNOSIS — G60.3 IDIOPATHIC PROGRESSIVE POLYNEUROPATHY: ICD-10-CM

## 2018-08-24 LAB
ALBUMIN SERPL BCP-MCNC: 3.6 G/DL (ref 3.5–5)
ALP SERPL-CCNC: 64 U/L (ref 46–116)
ALT SERPL W P-5'-P-CCNC: 31 U/L (ref 12–78)
ANION GAP SERPL CALCULATED.3IONS-SCNC: 5 MMOL/L (ref 4–13)
AST SERPL W P-5'-P-CCNC: 23 U/L (ref 5–45)
BASOPHILS # BLD AUTO: 0.11 THOUSANDS/ΜL (ref 0–0.1)
BASOPHILS NFR BLD AUTO: 2 % (ref 0–1)
BILIRUB SERPL-MCNC: 0.4 MG/DL (ref 0.2–1)
BUN SERPL-MCNC: 10 MG/DL (ref 5–25)
CALCIUM SERPL-MCNC: 8.8 MG/DL (ref 8.3–10.1)
CHLORIDE SERPL-SCNC: 98 MMOL/L (ref 100–108)
CO2 SERPL-SCNC: 30 MMOL/L (ref 21–32)
CREAT SERPL-MCNC: 0.71 MG/DL (ref 0.6–1.3)
EOSINOPHIL # BLD AUTO: 0.25 THOUSAND/ΜL (ref 0–0.61)
EOSINOPHIL NFR BLD AUTO: 5 % (ref 0–6)
ERYTHROCYTE [DISTWIDTH] IN BLOOD BY AUTOMATED COUNT: 13.3 % (ref 11.6–15.1)
ERYTHROCYTE [SEDIMENTATION RATE] IN BLOOD: 8 MM/HOUR (ref 0–20)
GFR SERPL CREATININE-BSD FRML MDRD: 84 ML/MIN/1.73SQ M
GLUCOSE P FAST SERPL-MCNC: 96 MG/DL (ref 65–99)
HCT VFR BLD AUTO: 37 % (ref 34.8–46.1)
HGB BLD-MCNC: 12.7 G/DL (ref 11.5–15.4)
IMM GRANULOCYTES # BLD AUTO: 0.01 THOUSAND/UL (ref 0–0.2)
IMM GRANULOCYTES NFR BLD AUTO: 0 % (ref 0–2)
INR PPP: 2.37 (ref 0.86–1.17)
LYMPHOCYTES # BLD AUTO: 1.89 THOUSANDS/ΜL (ref 0.6–4.47)
LYMPHOCYTES NFR BLD AUTO: 34 % (ref 14–44)
MCH RBC QN AUTO: 32.6 PG (ref 26.8–34.3)
MCHC RBC AUTO-ENTMCNC: 34.3 G/DL (ref 31.4–37.4)
MCV RBC AUTO: 95 FL (ref 82–98)
MONOCYTES # BLD AUTO: 0.84 THOUSAND/ΜL (ref 0.17–1.22)
MONOCYTES NFR BLD AUTO: 15 % (ref 4–12)
NEUTROPHILS # BLD AUTO: 2.43 THOUSANDS/ΜL (ref 1.85–7.62)
NEUTS SEG NFR BLD AUTO: 44 % (ref 43–75)
NRBC BLD AUTO-RTO: 0 /100 WBCS
PLATELET # BLD AUTO: 419 THOUSANDS/UL (ref 149–390)
PMV BLD AUTO: 8.5 FL (ref 8.9–12.7)
POTASSIUM SERPL-SCNC: 4.5 MMOL/L (ref 3.5–5.3)
PROT SERPL-MCNC: 6.7 G/DL (ref 6.4–8.2)
PROTHROMBIN TIME: 25.2 SECONDS (ref 11.8–14.2)
RBC # BLD AUTO: 3.9 MILLION/UL (ref 3.81–5.12)
SODIUM SERPL-SCNC: 133 MMOL/L (ref 136–145)
WBC # BLD AUTO: 5.53 THOUSAND/UL (ref 4.31–10.16)

## 2018-08-24 PROCEDURE — 80053 COMPREHEN METABOLIC PANEL: CPT

## 2018-08-24 PROCEDURE — 85652 RBC SED RATE AUTOMATED: CPT

## 2018-08-24 PROCEDURE — 36415 COLL VENOUS BLD VENIPUNCTURE: CPT

## 2018-08-24 PROCEDURE — 85610 PROTHROMBIN TIME: CPT

## 2018-08-24 PROCEDURE — 86235 NUCLEAR ANTIGEN ANTIBODY: CPT

## 2018-08-24 PROCEDURE — 85025 COMPLETE CBC W/AUTO DIFF WBC: CPT

## 2018-08-25 LAB
ENA SS-A AB SER-ACNC: <0.2 AI (ref 0–0.9)
ENA SS-B AB SER-ACNC: <0.2 AI (ref 0–0.9)

## 2018-08-27 ENCOUNTER — TELEPHONE (OUTPATIENT)
Dept: INTERNAL MEDICINE CLINIC | Facility: CLINIC | Age: 73
End: 2018-08-27

## 2018-08-27 NOTE — TELEPHONE ENCOUNTER
----- Message from Soledad Wilkinson MD sent at 8/25/2018  4:29 PM EDT -----  Labs all ok  Continue current dose of warfarin  Repeat in INR in 1 month

## 2018-09-16 DIAGNOSIS — F41.9 ANXIETY: ICD-10-CM

## 2018-09-17 RX ORDER — LORAZEPAM 1 MG/1
TABLET ORAL
Qty: 90 TABLET | Refills: 0 | Status: SHIPPED | OUTPATIENT
Start: 2018-09-17 | End: 2018-10-13 | Stop reason: SDUPTHER

## 2018-09-19 DIAGNOSIS — F33.1 MODERATE EPISODE OF RECURRENT MAJOR DEPRESSIVE DISORDER (HCC): ICD-10-CM

## 2018-09-19 DIAGNOSIS — M54.16 LUMBAR RADICULOPATHY: ICD-10-CM

## 2018-09-19 DIAGNOSIS — F41.9 ANXIETY: ICD-10-CM

## 2018-09-19 DIAGNOSIS — J30.89 NON-SEASONAL ALLERGIC RHINITIS DUE TO OTHER ALLERGIC TRIGGER: ICD-10-CM

## 2018-09-19 DIAGNOSIS — I81 PORTAL VEIN THROMBOSIS: Primary | ICD-10-CM

## 2018-09-19 RX ORDER — ESCITALOPRAM OXALATE 20 MG/1
TABLET ORAL
Qty: 90 TABLET | Refills: 1 | Status: SHIPPED | OUTPATIENT
Start: 2018-09-19 | End: 2019-05-30 | Stop reason: SDUPTHER

## 2018-09-19 RX ORDER — BUPROPION HYDROCHLORIDE 150 MG/1
150 TABLET ORAL DAILY
Qty: 90 TABLET | Refills: 1 | Status: SHIPPED | OUTPATIENT
Start: 2018-09-19 | End: 2018-12-18 | Stop reason: SDUPTHER

## 2018-09-19 RX ORDER — GABAPENTIN 100 MG/1
200 CAPSULE ORAL 2 TIMES DAILY
Qty: 360 CAPSULE | Refills: 1 | Status: SHIPPED | OUTPATIENT
Start: 2018-09-19 | End: 2019-05-30 | Stop reason: SDUPTHER

## 2018-09-19 RX ORDER — WARFARIN SODIUM 5 MG/1
TABLET ORAL
Qty: 90 TABLET | Refills: 1 | Status: SHIPPED | OUTPATIENT
Start: 2018-09-19 | End: 2019-06-17 | Stop reason: SDUPTHER

## 2018-09-19 RX ORDER — FLUTICASONE PROPIONATE 50 MCG
SPRAY, SUSPENSION (ML) NASAL
Qty: 48 G | Refills: 1 | Status: SHIPPED | OUTPATIENT
Start: 2018-09-19 | End: 2019-05-30 | Stop reason: SDUPTHER

## 2018-09-20 ENCOUNTER — ANTICOAG VISIT (OUTPATIENT)
Dept: INTERNAL MEDICINE CLINIC | Facility: CLINIC | Age: 73
End: 2018-09-20

## 2018-09-20 ENCOUNTER — TELEPHONE (OUTPATIENT)
Dept: INTERNAL MEDICINE CLINIC | Facility: CLINIC | Age: 73
End: 2018-09-20

## 2018-09-20 ENCOUNTER — APPOINTMENT (OUTPATIENT)
Dept: LAB | Facility: CLINIC | Age: 73
End: 2018-09-20
Payer: MEDICARE

## 2018-09-20 DIAGNOSIS — I81 PORTAL VEIN THROMBOSIS: ICD-10-CM

## 2018-09-20 DIAGNOSIS — E87.1 HYPONATREMIA: ICD-10-CM

## 2018-09-20 DIAGNOSIS — I10 ESSENTIAL HYPERTENSION: ICD-10-CM

## 2018-09-20 NOTE — TELEPHONE ENCOUNTER
Adjust warfarin dose  Take 5 mg  X 6 days a week, 7 5 mg for 1 day a week  Repeat INR in 2 weeks  Please update flow sheet

## 2018-09-22 DIAGNOSIS — Z78.0 MENOPAUSE: Primary | ICD-10-CM

## 2018-09-24 RX ORDER — ESTRADIOL 0.05 MG/D
PATCH TRANSDERMAL
Qty: 12 PATCH | Refills: 3 | Status: SHIPPED | OUTPATIENT
Start: 2018-09-24 | End: 2019-04-22 | Stop reason: ALTCHOICE

## 2018-10-02 ENCOUNTER — ANTICOAG VISIT (OUTPATIENT)
Dept: INTERNAL MEDICINE CLINIC | Facility: CLINIC | Age: 73
End: 2018-10-02

## 2018-10-02 ENCOUNTER — TELEPHONE (OUTPATIENT)
Dept: INTERNAL MEDICINE CLINIC | Facility: CLINIC | Age: 73
End: 2018-10-02

## 2018-10-02 ENCOUNTER — APPOINTMENT (OUTPATIENT)
Dept: LAB | Facility: CLINIC | Age: 73
End: 2018-10-02
Payer: MEDICARE

## 2018-10-02 DIAGNOSIS — I81 PORTAL VEIN THROMBOSIS: ICD-10-CM

## 2018-10-02 LAB
ANION GAP SERPL CALCULATED.3IONS-SCNC: 7 MMOL/L (ref 4–13)
BUN SERPL-MCNC: 13 MG/DL (ref 5–25)
CALCIUM SERPL-MCNC: 8.8 MG/DL (ref 8.3–10.1)
CHLORIDE SERPL-SCNC: 92 MMOL/L (ref 100–108)
CO2 SERPL-SCNC: 28 MMOL/L (ref 21–32)
CREAT SERPL-MCNC: 1 MG/DL (ref 0.6–1.3)
CREAT UR-MCNC: 92.2 MG/DL
GFR SERPL CREATININE-BSD FRML MDRD: 56 ML/MIN/1.73SQ M
GLUCOSE SERPL-MCNC: 145 MG/DL (ref 65–140)
INR PPP: 3.52 (ref 0.86–1.17)
MICROALBUMIN UR-MCNC: 21.3 MG/L (ref 0–20)
MICROALBUMIN/CREAT 24H UR: 23 MG/G CREATININE (ref 0–30)
POTASSIUM SERPL-SCNC: 4.9 MMOL/L (ref 3.5–5.3)
PROTHROMBIN TIME: 34.2 SECONDS (ref 11.8–14.2)
SODIUM SERPL-SCNC: 127 MMOL/L (ref 136–145)
URATE SERPL-MCNC: 8.1 MG/DL (ref 2–6.8)

## 2018-10-02 PROCEDURE — 36415 COLL VENOUS BLD VENIPUNCTURE: CPT

## 2018-10-02 PROCEDURE — 80048 BASIC METABOLIC PNL TOTAL CA: CPT

## 2018-10-02 PROCEDURE — 82570 ASSAY OF URINE CREATININE: CPT

## 2018-10-02 PROCEDURE — 85610 PROTHROMBIN TIME: CPT

## 2018-10-02 PROCEDURE — 82043 UR ALBUMIN QUANTITATIVE: CPT

## 2018-10-02 PROCEDURE — 84550 ASSAY OF BLOOD/URIC ACID: CPT

## 2018-10-03 DIAGNOSIS — M54.16 LUMBAR RADICULOPATHY: ICD-10-CM

## 2018-10-03 RX ORDER — HYDROCODONE BITARTRATE AND ACETAMINOPHEN 5; 325 MG/1; MG/1
1 TABLET ORAL EVERY 6 HOURS PRN
Qty: 120 TABLET | Refills: 0 | Status: SHIPPED | OUTPATIENT
Start: 2018-10-03 | End: 2018-11-23 | Stop reason: SDUPTHER

## 2018-10-04 ENCOUNTER — OFFICE VISIT (OUTPATIENT)
Dept: NEPHROLOGY | Facility: CLINIC | Age: 73
End: 2018-10-04
Payer: MEDICARE

## 2018-10-04 VITALS
HEIGHT: 63 IN | SYSTOLIC BLOOD PRESSURE: 160 MMHG | DIASTOLIC BLOOD PRESSURE: 72 MMHG | BODY MASS INDEX: 30.12 KG/M2 | WEIGHT: 170 LBS

## 2018-10-04 DIAGNOSIS — E87.1 HYPONATREMIA: Primary | ICD-10-CM

## 2018-10-04 DIAGNOSIS — I10 ESSENTIAL HYPERTENSION: ICD-10-CM

## 2018-10-04 DIAGNOSIS — K51.80 OTHER ULCERATIVE COLITIS WITHOUT COMPLICATION (HCC): ICD-10-CM

## 2018-10-04 PROCEDURE — 99213 OFFICE O/P EST LOW 20 MIN: CPT | Performed by: INTERNAL MEDICINE

## 2018-10-04 NOTE — LETTER
October 4, 2018     Sadie Falk MD  [address]  [facility]    Patient: Daylin Montoya   YOB: 1945   Date of Visit: 10/4/2018     Dear Dr Pack Snare Recipients      Thank you for referring Tyron Rice to me for evaluation  Below are the relevant portions of my assessment and plan of care  If you have questions, please do not hesitate to call me  I look forward to following Shad Dc along with you  Sincerely,        Rita Alba, DO        CC: No Recipients    Progress Notes:      ASSESSMENT and PLAN:  1  Hyponatremia, again likely multifactorial but suspect secondary to her ongoing loose stools/diarrhea, uric acid is elevated, given sodium level 127, increase salt tablets 3 times daily for 2 weeks, repeat BMP at that time she will also adjust her salt in her normal diet  2  Hypertension, stable, continue with current regimen    Again will increase her salt tablets temporarily to 3 times daily, repeat BMP in 2 weeks  Assuming her sodium level is improved, will decrease back to twice daily  Repeat labs in approximately 3 months and then again in 6 months

## 2018-10-04 NOTE — PROGRESS NOTES
NEPHROLOGY OUTPATIENT PROGRESS NOTE   Lisa Osorio 75 y o  female MRN: [id number]  Reason for visit:   Hyponatremia,    ASSESSMENT and PLAN:  1  Hyponatremia, again likely multifactorial but suspect secondary to her ongoing loose stools/diarrhea, uric acid is elevated, given sodium level 127, increase salt tablets 3 times daily for 2 weeks, repeat BMP at that time she will also adjust her salt in her normal diet  2  Hypertension, stable, continue with current regimen    Again will increase her salt tablets temporarily to 3 times daily, repeat BMP in 2 weeks  Assuming her sodium level is improved, will decrease back to twice daily  Repeat labs in approximately 3 months and then again in 6 months  SUBJECTIVE / INTERVAL HISTORY:  She has been doing reasonably well  She still has chronic loose bowel movements secondary to her history of ulcerative colitis  Denies any chest pain, shortness of Breath  She does complain of periodic swelling but resolves with the diuretics  Appetite has been stable, has been very compliant with her salt tablets  Review of Systems      OBJECTIVE:  /72 (BP Location: Left arm, Patient Position: Sitting, Cuff Size: Standard)   Ht 5' 3" (1 6 m)   Wt 77 1 kg (170 lb)   BMI 30 11 kg/m²   Vitals:    10/04/18 1500   Weight: 77 1 kg (170 lb)       Physical Exam   Constitutional: She is oriented to person, place, and time  No distress  HENT:   Head: Normocephalic  Eyes: No scleral icterus  Neck: Neck supple  Cardiovascular: Normal rate and regular rhythm  Pulmonary/Chest: Effort normal and breath sounds normal    Abdominal: Soft  Musculoskeletal: She exhibits no edema  Neurological: She is alert and oriented to person, place, and time  Skin: Skin is warm and dry  Psychiatric: She has a normal mood and affect           Medications:    Current Outpatient Prescriptions:     ADVAIR DISKUS 250-50 MCG/DOSE inhaler, INHALE ONE PUFF TWICE DAILY, Disp: 1 Inhaler, Rfl: 5    albuterol (PROVENTIL HFA,VENTOLIN HFA) 90 mcg/act inhaler, Inhale 2 puffs every 6 (six) hours as needed for wheezing, Disp: , Rfl:     buPROPion (WELLBUTRIN XL) 150 mg 24 hr tablet, Take 1 tablet (150 mg total) by mouth daily, Disp: 90 tablet, Rfl: 1    Calcium Carbonate-Vitamin D (CALCIUM 600+D) 600-200 MG-UNIT TABS, Take 2 tablets by mouth daily, Disp: , Rfl:     cetirizine (ZyrTEC) 10 mg tablet, Take 10 mg by mouth daily, Disp: , Rfl:     co-enzyme Q-10 50 MG capsule, Take 100 mg by mouth daily, Disp: , Rfl:     dexlansoprazole (DEXILANT) 60 MG capsule, Take 60 mg by mouth daily, Disp: , Rfl:     diltiazem (CARDIZEM CD) 180 mg 24 hr capsule, TAKE ONE CAPSULE BY MOUTH ONCE DAILY, Disp: 90 capsule, Rfl: 1    escitalopram (LEXAPRO) 20 mg tablet, TAKE ONE TABLET BY MOUTH ONCE DAILY, Disp: 90 tablet, Rfl: 1    estradiol (VIVELLE-DOT) 0 05 MG/24HR, Place 1 patch on the skin once a week, Disp: 8 patch, Rfl: 0    fluticasone (FLONASE) 50 mcg/act nasal spray, USE 2 SPRAYS IN EACH NOSTRIL ONCE DAILY, Disp: 48 g, Rfl: 1    gabapentin (NEURONTIN) 100 mg capsule, Take 2 capsules (200 mg total) by mouth 2 (two) times a day, Disp: 360 capsule, Rfl: 1    gabapentin (NEURONTIN) 600 MG tablet, Patient takes one in am, one afternoon and 2 at hs  , Disp: , Rfl:     HYDROcodone-acetaminophen (NORCO) 5-325 mg per tablet, Take 1 tablet by mouth every 6 (six) hours as needed for pain Max Daily Amount: 4 tablets, Disp: 120 tablet, Rfl: 0    hydroxychloroquine (PLAQUENIL) 200 mg tablet, Take 200 mg by mouth 2 (two) times a day, Disp: , Rfl:     levothyroxine 50 mcg tablet, TAKE ONE TABLET BY MOUTH ONCE DAILY, Disp: 90 tablet, Rfl: 1    lisinopril (ZESTRIL) 20 mg tablet, TAKE ONE TABLET BY MOUTH ONCE DAILY, Disp: 90 tablet, Rfl: 1    loperamide (IMODIUM) 2 mg capsule, Take 2 mg by mouth 4 (four) times a day as needed  , Disp: , Rfl:     LORazepam (ATIVAN) 1 mg tablet, TAKE ONE TABLET BY MOUTH EVERY 8 HOURS AS NEEDED FOR ANXIETY, Disp: 90 tablet, Rfl: 0    meclizine (ANTIVERT) 32 MG tablet, Take 25 mg by mouth every 6 (six) hours as needed for dizziness, Disp: , Rfl:     Multiple Vitamin (MULTIVITAMIN) tablet, Take 1 tablet by mouth daily, Disp: , Rfl:     Omega-3 Fatty Acids (OMEGA 3 PO), Take 1,200 mg by mouth daily, Disp: , Rfl:     simvastatin (ZOCOR) 5 MG tablet, TAKE ONE TABLET BY MOUTH DAILY, Disp: 90 tablet, Rfl: 1    sodium chloride 1 g tablet, Take 1 g by mouth 2 (two) times a day, Disp: , Rfl:     torsemide (DEMADEX) 10 mg tablet, Take 10 mg by mouth daily as needed, Disp: , Rfl:     warfarin (COUMADIN) 5 mg tablet, Take 1 tablet daily or as directed, Disp: 90 tablet, Rfl: 1    clobetasol (TEMOVATE) 0 05 % ointment, Apply topically 2 (two) times a day, Disp: 30 g, Rfl: 2    estradiol (CLIMARA) 0 05 mg/24 hr, APPLY 1 PATCH WEEKLY AS DIRECTED   (Patient not taking: Reported on 10/4/2018), Disp: 12 patch, Rfl: 3    Laboratory Results:  Results for orders placed or performed in visit on 10/02/18   Protime-INR   Result Value Ref Range    Protime 34 2 (H) 11 8 - 14 2 seconds    INR 3 52 (H) 0 86 - 1 17

## 2018-10-04 NOTE — PATIENT INSTRUCTIONS
ASSESSMENT and PLAN:  1  Hyponatremia, again likely multifactorial but suspect secondary to her ongoing loose stools/diarrhea, uric acid is elevated, given sodium level 127, increase salt tablets 3 times daily for 2 weeks, repeat BMP at that time she will also adjust her salt in her normal diet  2  Hypertension, stable, continue with current regimen    Again will increase her salt tablets temporarily to 3 times daily, repeat BMP in 2 weeks  Assuming her sodium level is improved, will decrease back to twice daily  Repeat labs in approximately 3 months and then again in 6 months

## 2018-10-13 DIAGNOSIS — F41.9 ANXIETY: ICD-10-CM

## 2018-10-17 ENCOUNTER — APPOINTMENT (OUTPATIENT)
Dept: LAB | Facility: CLINIC | Age: 73
End: 2018-10-17
Payer: MEDICARE

## 2018-10-17 ENCOUNTER — TELEPHONE (OUTPATIENT)
Dept: INTERNAL MEDICINE CLINIC | Facility: CLINIC | Age: 73
End: 2018-10-17

## 2018-10-17 ENCOUNTER — TRANSCRIBE ORDERS (OUTPATIENT)
Dept: LAB | Facility: CLINIC | Age: 73
End: 2018-10-17

## 2018-10-17 DIAGNOSIS — K51.80 OTHER ULCERATIVE COLITIS WITHOUT COMPLICATION (HCC): ICD-10-CM

## 2018-10-17 DIAGNOSIS — E87.1 HYPONATREMIA: Primary | ICD-10-CM

## 2018-10-17 DIAGNOSIS — E87.1 HYPONATREMIA: ICD-10-CM

## 2018-10-17 DIAGNOSIS — I81 PORTAL VEIN THROMBOSIS: ICD-10-CM

## 2018-10-17 DIAGNOSIS — I10 ESSENTIAL HYPERTENSION: ICD-10-CM

## 2018-10-17 LAB
ANION GAP SERPL CALCULATED.3IONS-SCNC: 8 MMOL/L (ref 4–13)
BUN SERPL-MCNC: 12 MG/DL (ref 5–25)
CALCIUM SERPL-MCNC: 8.7 MG/DL (ref 8.3–10.1)
CHLORIDE SERPL-SCNC: 102 MMOL/L (ref 100–108)
CO2 SERPL-SCNC: 28 MMOL/L (ref 21–32)
CREAT SERPL-MCNC: 0.79 MG/DL (ref 0.6–1.3)
CREAT UR-MCNC: 99.1 MG/DL
GFR SERPL CREATININE-BSD FRML MDRD: 74 ML/MIN/1.73SQ M
GLUCOSE P FAST SERPL-MCNC: 117 MG/DL (ref 65–99)
INR PPP: 3.23 (ref 0.86–1.17)
MICROALBUMIN UR-MCNC: 18.5 MG/L (ref 0–20)
MICROALBUMIN/CREAT 24H UR: 19 MG/G CREATININE (ref 0–30)
POTASSIUM SERPL-SCNC: 3.8 MMOL/L (ref 3.5–5.3)
PROTHROMBIN TIME: 32 SECONDS (ref 11.8–14.2)
SODIUM SERPL-SCNC: 138 MMOL/L (ref 136–145)

## 2018-10-17 PROCEDURE — 82043 UR ALBUMIN QUANTITATIVE: CPT

## 2018-10-17 PROCEDURE — 36415 COLL VENOUS BLD VENIPUNCTURE: CPT

## 2018-10-17 PROCEDURE — 80048 BASIC METABOLIC PNL TOTAL CA: CPT

## 2018-10-17 PROCEDURE — 85610 PROTHROMBIN TIME: CPT

## 2018-10-17 PROCEDURE — 82570 ASSAY OF URINE CREATININE: CPT

## 2018-10-17 RX ORDER — SODIUM CHLORIDE 1000 MG
1 TABLET, SOLUBLE MISCELLANEOUS 3 TIMES DAILY
Qty: 270 TABLET | Refills: 3 | Status: SHIPPED | OUTPATIENT
Start: 2018-10-17 | End: 2019-10-07 | Stop reason: SDUPTHER

## 2018-10-17 NOTE — TELEPHONE ENCOUNTER
----- Message from Ramya Barbosa DO sent at 10/17/2018  6:41 PM EDT -----  (PCP out of office) BW/INR is back and still a bit high at 3 23, what dose of warfarin is [facility] taking every day?

## 2018-10-18 RX ORDER — LORAZEPAM 1 MG/1
TABLET ORAL
Qty: 90 TABLET | Refills: 0 | Status: SHIPPED | OUTPATIENT
Start: 2018-10-18 | End: 2018-11-16 | Stop reason: SDUPTHER

## 2018-10-29 ENCOUNTER — CLINICAL SUPPORT (OUTPATIENT)
Dept: INTERNAL MEDICINE CLINIC | Facility: CLINIC | Age: 73
End: 2018-10-29
Payer: MEDICARE

## 2018-10-29 ENCOUNTER — ANTICOAG VISIT (OUTPATIENT)
Dept: INTERNAL MEDICINE CLINIC | Facility: CLINIC | Age: 73
End: 2018-10-29

## 2018-10-29 ENCOUNTER — TELEPHONE (OUTPATIENT)
Dept: INTERNAL MEDICINE CLINIC | Facility: CLINIC | Age: 73
End: 2018-10-29

## 2018-10-29 ENCOUNTER — LAB (OUTPATIENT)
Dept: LAB | Facility: CLINIC | Age: 73
End: 2018-10-29
Payer: MEDICARE

## 2018-10-29 ENCOUNTER — LAB REQUISITION (OUTPATIENT)
Dept: LAB | Facility: HOSPITAL | Age: 73
End: 2018-10-29
Payer: MEDICARE

## 2018-10-29 DIAGNOSIS — I81 PORTAL VEIN THROMBOSIS: ICD-10-CM

## 2018-10-29 DIAGNOSIS — N39.0 URINARY TRACT INFECTION: ICD-10-CM

## 2018-10-29 DIAGNOSIS — Z23 NEED FOR INFLUENZA VACCINATION: Primary | ICD-10-CM

## 2018-10-29 LAB
INR PPP: 2.91 (ref 0.86–1.17)
PROTHROMBIN TIME: 29.5 SECONDS (ref 11.8–14.2)

## 2018-10-29 PROCEDURE — 85610 PROTHROMBIN TIME: CPT

## 2018-10-29 PROCEDURE — G0008 ADMIN INFLUENZA VIRUS VAC: HCPCS

## 2018-10-29 PROCEDURE — 87086 URINE CULTURE/COLONY COUNT: CPT | Performed by: UROLOGY

## 2018-10-29 PROCEDURE — 36415 COLL VENOUS BLD VENIPUNCTURE: CPT

## 2018-10-29 PROCEDURE — 90662 IIV NO PRSV INCREASED AG IM: CPT

## 2018-10-30 LAB — BACTERIA UR CULT: NORMAL

## 2018-11-01 ENCOUNTER — TELEPHONE (OUTPATIENT)
Dept: NEUROLOGY | Facility: CLINIC | Age: 73
End: 2018-11-01

## 2018-11-01 NOTE — TELEPHONE ENCOUNTER
Pt states that she is going away on vac, leaving 12/1/18-coming back in April 2019  DBS appt scheduled 4/8/19  Asking sooner appt, if possible this month  No open slots  [facility],   Can we fit her on earlier date with Dr Freddy Angel this month?   Dr Freddy Angel, can pt schedule w/ Dr Mere Jones or Dr Zuleyma Burkett?          [phone] cell

## 2018-11-01 NOTE — TELEPHONE ENCOUNTER
Patient called in, states she would like a sooner appt, placed patient on hold to review her chart and call was disconnected  Attempted to contact her, busy signal, unable to leave message

## 2018-11-06 ENCOUNTER — OFFICE VISIT (OUTPATIENT)
Dept: OBGYN CLINIC | Facility: CLINIC | Age: 73
End: 2018-11-06
Payer: MEDICARE

## 2018-11-06 VITALS
WEIGHT: 170.8 LBS | BODY MASS INDEX: 30.26 KG/M2 | DIASTOLIC BLOOD PRESSURE: 76 MMHG | SYSTOLIC BLOOD PRESSURE: 142 MMHG | HEIGHT: 63 IN

## 2018-11-06 DIAGNOSIS — B37.3 VULVAR CANDIDIASIS: Primary | ICD-10-CM

## 2018-11-06 PROBLEM — B37.31 VULVAR CANDIDIASIS: Status: ACTIVE | Noted: 2018-11-06

## 2018-11-06 PROCEDURE — 99213 OFFICE O/P EST LOW 20 MIN: CPT | Performed by: NURSE PRACTITIONER

## 2018-11-06 RX ORDER — NYSTATIN 100000 U/G
OINTMENT TOPICAL 2 TIMES DAILY
Qty: 30 G | Refills: 1 | Status: SHIPPED | OUTPATIENT
Start: 2018-11-06 | End: 2019-04-04 | Stop reason: ALTCHOICE

## 2018-11-06 NOTE — PROGRESS NOTES
Assessment/Plan:    Vulvar candidiasis  Exam c/w same  Advised nystatin ointment, conservative vulvar hygiene and pelvic rest until sx have improved  She agrees with plan  Recommended trial of coconut oil as lubricant with intercourse  Advised avoidance of iced tea for bladder health  Increase h20 intake and f/u with Uro for further complaints of what she perceives to be bladder spasm  Diagnoses and all orders for this visit:    Vulvar candidiasis  -     nystatin (MYCOSTATIN) ointment; Apply topically 2 (two) times a day for 7 days          Subjective:      Patient ID: Ignacio Velez is a 75 y o  female  This patient presents with c/o vulvar itching and burning  Sx present for about 2 weeks   She has had similar sx in the past after intercourse  She is not using lubricant   She has been using clobetasol and this is usually effective but did not improve vulvar symptoms  Lana Petit also reports she has been having bladder spasms and contacted Urology regarding this - urine culture was neg   She admits to drinking iced tea all day and night  Her meds cause dry mouth  The bladder spasms do subside when she drinks more water          The following portions of the patient's history were reviewed and updated as appropriate: allergies, current medications, past family history, past medical history, past social history, past surgical history and problem list     Review of Systems   Constitutional: Negative  HENT: Negative  Eyes: Negative  Respiratory: Negative  Cardiovascular: Negative  Gastrointestinal: Negative  Endocrine: Negative  Genitourinary: Negative  Bladder spasm  Vulvar burning and itching    Musculoskeletal: Negative  Skin: Negative  Allergic/Immunologic: Negative  Neurological: Negative  Hematological: Negative  Psychiatric/Behavioral: Negative            Objective:      /76 (BP Location: Left arm, Cuff Size: Large)   Ht 5' 3" (1 6 m)   Wt 77 5 kg (170 lb 12 8 oz)   BMI 30 26 kg/m²          Physical Exam   Constitutional: She is oriented to person, place, and time  She appears well-developed and well-nourished  HENT:   Head: Normocephalic and atraumatic  Eyes: Pupils are equal, round, and reactive to light  Neck: Normal range of motion  Pulmonary/Chest: Effort normal    Abdominal: Hernia confirmed negative in the right inguinal area and confirmed negative in the left inguinal area  Genitourinary: Rectum normal        There is no rash, tenderness, lesion or injury on the right labia  There is no rash, tenderness, lesion or injury on the left labia  No erythema, tenderness or bleeding in the vagina  No vaginal discharge found  Musculoskeletal: Normal range of motion  Lymphadenopathy:        Right: No inguinal adenopathy present  Left: No inguinal adenopathy present  Neurological: She is alert and oriented to person, place, and time  Skin: Skin is warm and dry  Psychiatric: She has a normal mood and affect   Her behavior is normal  Judgment and thought content normal

## 2018-11-06 NOTE — ASSESSMENT & PLAN NOTE
Exam c/w same  Advised nystatin ointment, conservative vulvar hygiene and pelvic rest until sx have improved  She agrees with plan  Recommended trial of coconut oil as lubricant with intercourse  Advised avoidance of iced tea for bladder health  Increase h20 intake and f/u with Uro for further complaints of what she perceives to be bladder spasm

## 2018-11-13 ENCOUNTER — TELEPHONE (OUTPATIENT)
Dept: INTERNAL MEDICINE CLINIC | Facility: CLINIC | Age: 73
End: 2018-11-13

## 2018-11-13 ENCOUNTER — APPOINTMENT (OUTPATIENT)
Dept: LAB | Facility: CLINIC | Age: 73
End: 2018-11-13
Payer: MEDICARE

## 2018-11-13 ENCOUNTER — ANTICOAG VISIT (OUTPATIENT)
Dept: INTERNAL MEDICINE CLINIC | Facility: CLINIC | Age: 73
End: 2018-11-13

## 2018-11-13 DIAGNOSIS — I81 PORTAL VEIN THROMBOSIS: ICD-10-CM

## 2018-11-13 LAB
INR PPP: 2.78 (ref 0.86–1.17)
PROTHROMBIN TIME: 28.5 SECONDS (ref 11.8–14.2)

## 2018-11-13 PROCEDURE — 85610 PROTHROMBIN TIME: CPT

## 2018-11-13 PROCEDURE — 36415 COLL VENOUS BLD VENIPUNCTURE: CPT

## 2018-11-16 DIAGNOSIS — F41.9 ANXIETY: ICD-10-CM

## 2018-11-16 RX ORDER — LORAZEPAM 1 MG/1
TABLET ORAL
Qty: 90 TABLET | Refills: 0 | Status: SHIPPED | OUTPATIENT
Start: 2018-11-16 | End: 2018-12-27 | Stop reason: SDUPTHER

## 2018-11-23 ENCOUNTER — OFFICE VISIT (OUTPATIENT)
Dept: INTERNAL MEDICINE CLINIC | Facility: CLINIC | Age: 73
End: 2018-11-23
Payer: MEDICARE

## 2018-11-23 VITALS
OXYGEN SATURATION: 98 % | SYSTOLIC BLOOD PRESSURE: 136 MMHG | DIASTOLIC BLOOD PRESSURE: 70 MMHG | BODY MASS INDEX: 29.59 KG/M2 | HEIGHT: 63 IN | HEART RATE: 78 BPM | RESPIRATION RATE: 18 BRPM | WEIGHT: 167 LBS | TEMPERATURE: 98 F

## 2018-11-23 DIAGNOSIS — E66.3 OVERWEIGHT: ICD-10-CM

## 2018-11-23 DIAGNOSIS — G25.0 ESSENTIAL TREMOR: ICD-10-CM

## 2018-11-23 DIAGNOSIS — M85.89 OSTEOPENIA OF MULTIPLE SITES: ICD-10-CM

## 2018-11-23 DIAGNOSIS — E03.9 ACQUIRED HYPOTHYROIDISM: ICD-10-CM

## 2018-11-23 DIAGNOSIS — M35.00 SJOGREN'S SYNDROME, WITH UNSPECIFIED ORGAN INVOLVEMENT (HCC): ICD-10-CM

## 2018-11-23 DIAGNOSIS — E83.42 HYPOMAGNESEMIA: ICD-10-CM

## 2018-11-23 DIAGNOSIS — I10 ESSENTIAL HYPERTENSION: ICD-10-CM

## 2018-11-23 DIAGNOSIS — E78.49 OTHER HYPERLIPIDEMIA: ICD-10-CM

## 2018-11-23 DIAGNOSIS — M51.36 LUMBAR DEGENERATIVE DISC DISEASE: ICD-10-CM

## 2018-11-23 DIAGNOSIS — M54.16 LUMBAR RADICULOPATHY: ICD-10-CM

## 2018-11-23 DIAGNOSIS — Z79.899 ENCOUNTER FOR LONG-TERM (CURRENT) USE OF MEDICATIONS: ICD-10-CM

## 2018-11-23 DIAGNOSIS — R19.7 DIARRHEA, UNSPECIFIED TYPE: ICD-10-CM

## 2018-11-23 DIAGNOSIS — R73.03 PREDIABETES: ICD-10-CM

## 2018-11-23 DIAGNOSIS — E87.1 HYPONATREMIA: Primary | ICD-10-CM

## 2018-11-23 DIAGNOSIS — I81 PORTAL VEIN THROMBOSIS: ICD-10-CM

## 2018-11-23 DIAGNOSIS — R10.31 RIGHT INGUINAL PAIN: ICD-10-CM

## 2018-11-23 DIAGNOSIS — J45.20 MILD INTERMITTENT ASTHMA WITHOUT COMPLICATION: ICD-10-CM

## 2018-11-23 DIAGNOSIS — Z23 NEED FOR SHINGLES VACCINE: ICD-10-CM

## 2018-11-23 PROCEDURE — 99214 OFFICE O/P EST MOD 30 MIN: CPT | Performed by: INTERNAL MEDICINE

## 2018-11-23 RX ORDER — HYDROCODONE BITARTRATE AND ACETAMINOPHEN 5; 325 MG/1; MG/1
1 TABLET ORAL EVERY 6 HOURS PRN
Qty: 120 TABLET | Refills: 0 | Status: SHIPPED | OUTPATIENT
Start: 2018-11-23 | End: 2018-12-05 | Stop reason: SDUPTHER

## 2018-11-23 NOTE — ASSESSMENT & PLAN NOTE
Recommend to see pain and spine again  On gabapentin (700 mg in AM and noon, 1200 mg qHS), takes Vicodin 1-2x a day  PDMP reviewed

## 2018-11-26 ENCOUNTER — PROCEDURE VISIT (OUTPATIENT)
Dept: NEUROLOGY | Facility: CLINIC | Age: 73
End: 2018-11-26
Payer: MEDICARE

## 2018-11-26 VITALS
HEIGHT: 63 IN | SYSTOLIC BLOOD PRESSURE: 126 MMHG | WEIGHT: 166 LBS | DIASTOLIC BLOOD PRESSURE: 72 MMHG | BODY MASS INDEX: 29.41 KG/M2

## 2018-11-26 DIAGNOSIS — Z96.89 S/P DEEP BRAIN STIMULATOR PLACEMENT: ICD-10-CM

## 2018-11-26 DIAGNOSIS — G25.0 ESSENTIAL TREMOR: Primary | ICD-10-CM

## 2018-11-26 PROCEDURE — 95978 PR ANALYZE NEUROSTIM BRAIN, FIRST 1H: CPT | Performed by: PSYCHIATRY & NEUROLOGY

## 2018-11-26 NOTE — PROGRESS NOTES
Patient ID: Chapo Emerson is a 75 y o  female  Assessment/Plan:    Essential tremor  Breakthrough tremor bilaterally since last seen  Now affecting daily activities  Over 45 minutes spent on DBS programming  Deep brain stimulator interrogated and changes made  See scanned stimulation sheets for details  On left voltage, PW and Rate, increased with improvement in tremor  On right tremor improved with increasing PW, Rate and and voltage  Left on Group B  On Group A there are alternative settings with similar control  If having issues we can try switching to this Group  See scanned clinical sheets and table in body of the note for detail  Diagnoses and all orders for this visit:    Essential tremor    S/P deep brain stimulator placement           Subjective:    Gary Stewart is a left handed female with peripheral neuropathy on Neurontin, spinal stenosis on gabapentin, anxiety and essential tremor s/p bilateral VIM DBS on 5/1/14 with Activa SC, who is now s/p right IPG update to Activa RC6/20/17, left placed who presents for follow up  She was previously followed by Dr Teresa Florence for tremors  To review, tremors began in her late 46s  At that time the tremors began as a mild intentional tremor on the left  This has progressed with time and spread to the right side as well  She returns today with breakthrough tremor in both hands but more on left  She has trouble brushing teeth, holding a pitcher, and using utensils  Handwriting is worse but legible  She is able to dress, button, and shower without difficulty  She increased voltage  She is leaving to [facility] soon so came in sooner  The following portions of the patient's history were reviewed and updated as appropriate: allergies, current medications, past family history, past medical history, past social history and past surgical history           Objective:    Blood pressure 126/72, height 5' 3" (1 6 m), weight 75 3 kg (166 lb)     Physical Exam   Constitutional: She appears well-developed  Eyes: Pupils are equal, round, and reactive to light  EOM are normal    Neurological: She has normal strength  Psychiatric: Her speech is normal    Vitals reviewed  Neurological Exam  Mental Status   Oriented to person, place, time and situation  Recent and remote memory are intact  Speech is normal  Language is fluent with no aphasia  Attention and concentration are normal   Speech slightly slurred  Cranial Nerves  CN III, IV, VI: Extraocular movements intact bilaterally  Pupils equal round and reactive to light bilaterally  CN V: Facial sensation is normal   CN VII: Full and symmetric facial movement  CN VIII: Hearing is normal   CN IX, X: Palate elevates symmetrically  CN XI: Shoulder shrug strength is normal   CN XII: Tongue midline without atrophy or fasciculations  Motor   Normal muscle tone  Strength is 5/5 throughout all four extremities  Sensory  Light touch is normal in upper and lower extremities  Coordination  Right: Finger-to-nose abnormality: Rapid alternating movement normal   Left: Finger-to-nose abnormality: Rapid alternating movement normal   Moderate postural tremor on hands outstretched L>R  Moderate tremor on FTN L>R  No rest tremor  No head, leg or vocal tremor  Moderate tremor on picking up cup on left, mild on right       Gait  Casual gait is normal including stance, stride, and arm swing  Able to rise from chair without using arms      DBS programming  Contacts PW Freq Amp Symptoms Side effects   LEFT        2+1- 60 140 2 7 Moderate tremor FTN, posture       2 9       160 2 9 Mild tremor     70 160 2 9      70 160 3 1 Tremor controlled    RIGHT        Group (A to) B        0+1- 100 150 4 4 Moderate tremor worse than right     110         160  Mild on FTN, mod posture      180  Better, still on picking cup     120 185  Much better picking cup    Group A        0+1-2- 120 180 1 8 Mild tremor FTN, cup moderate       3 3 Only mild tremor on picking up cup none on FTN            ROS:    Review of Systems   Constitutional: Negative  Negative for appetite change and fever  HENT: Positive for tinnitus and voice change  Negative for hearing loss and trouble swallowing  Eyes: Negative  Negative for photophobia and pain  Respiratory: Negative  Negative for shortness of breath  Cardiovascular: Negative  Negative for palpitations  Gastrointestinal: Negative  Negative for nausea and vomiting  Endocrine: Negative  Negative for cold intolerance and heat intolerance  Genitourinary: Negative  Negative for dysuria, frequency and urgency  Musculoskeletal: Positive for arthralgias, back pain and gait problem  Negative for myalgias and neck pain  Skin: Negative  Negative for rash  Neurological: Negative for dizziness, tremors, seizures, syncope, facial asymmetry, speech difficulty, weakness, light-headedness, numbness and headaches  Hematological: Negative  Does not bruise/bleed easily  Psychiatric/Behavioral: Positive for sleep disturbance  Negative for confusion and hallucinations  The patient is nervous/anxious

## 2018-11-26 NOTE — ASSESSMENT & PLAN NOTE
Breakthrough tremor bilaterally since last seen  Now affecting daily activities  Over 45 minutes spent on DBS programming  Deep brain stimulator interrogated and changes made  See scanned stimulation sheets for details  On left voltage, PW and Rate, increased with improvement in tremor  On right tremor improved with increasing PW, Rate and and voltage  Left on Group B  On Group A there are alternative settings with similar control  If having issues we can try switching to this Group  See scanned clinical sheets and table in body of the note for detail

## 2018-11-29 ENCOUNTER — TELEPHONE (OUTPATIENT)
Dept: INTERNAL MEDICINE CLINIC | Facility: CLINIC | Age: 73
End: 2018-11-29

## 2018-11-29 NOTE — TELEPHONE ENCOUNTER
Yes, can stop taking Coumadin today  Restart day after the procedure    Take Coumadin as prescribed, recheck INR after 2 weeks (after 12/17)

## 2018-11-29 NOTE — TELEPHONE ENCOUNTER
Patient is having a shot in her back on Tuesday she has to be off her comadin for 5 days so she is topping it today, she wants to know when she should start it back up, and when does she get her next proton ?  (To show how thick her blood is )

## 2018-12-04 ENCOUNTER — APPOINTMENT (OUTPATIENT)
Dept: LAB | Facility: CLINIC | Age: 73
End: 2018-12-04
Payer: MEDICARE

## 2018-12-04 ENCOUNTER — TELEPHONE (OUTPATIENT)
Dept: INTERNAL MEDICINE CLINIC | Facility: CLINIC | Age: 73
End: 2018-12-04

## 2018-12-04 ENCOUNTER — TRANSCRIBE ORDERS (OUTPATIENT)
Dept: LAB | Facility: CLINIC | Age: 73
End: 2018-12-04

## 2018-12-04 DIAGNOSIS — M54.16 LUMBAR RADICULOPATHY: ICD-10-CM

## 2018-12-04 DIAGNOSIS — M51.36 DEGENERATION OF LUMBAR INTERVERTEBRAL DISC: ICD-10-CM

## 2018-12-04 DIAGNOSIS — M54.16 LUMBAR RADICULOPATHY: Primary | ICD-10-CM

## 2018-12-04 DIAGNOSIS — M51.26 DISPLACEMENT OF LUMBAR INTERVERTEBRAL DISC WITHOUT MYELOPATHY: ICD-10-CM

## 2018-12-04 LAB
BASOPHILS # BLD AUTO: 0.08 THOUSANDS/ΜL (ref 0–0.1)
BASOPHILS NFR BLD AUTO: 1 % (ref 0–1)
CHOLEST SERPL-MCNC: 171 MG/DL (ref 50–200)
EOSINOPHIL # BLD AUTO: 0.19 THOUSAND/ΜL (ref 0–0.61)
EOSINOPHIL NFR BLD AUTO: 3 % (ref 0–6)
ERYTHROCYTE [DISTWIDTH] IN BLOOD BY AUTOMATED COUNT: 13.8 % (ref 11.6–15.1)
EST. AVERAGE GLUCOSE BLD GHB EST-MCNC: 131 MG/DL
HBA1C MFR BLD: 6.2 % (ref 4.2–6.3)
HCT VFR BLD AUTO: 37.9 % (ref 34.8–46.1)
HDLC SERPL-MCNC: 56 MG/DL (ref 40–60)
HGB BLD-MCNC: 12.8 G/DL (ref 11.5–15.4)
IMM GRANULOCYTES # BLD AUTO: 0.03 THOUSAND/UL (ref 0–0.2)
IMM GRANULOCYTES NFR BLD AUTO: 0 % (ref 0–2)
INR PPP: 1.15 (ref 0.86–1.17)
LDLC SERPL CALC-MCNC: 91 MG/DL (ref 0–100)
LYMPHOCYTES # BLD AUTO: 1.79 THOUSANDS/ΜL (ref 0.6–4.47)
LYMPHOCYTES NFR BLD AUTO: 24 % (ref 14–44)
MAGNESIUM SERPL-MCNC: 1.4 MG/DL (ref 1.6–2.6)
MCH RBC QN AUTO: 32.2 PG (ref 26.8–34.3)
MCHC RBC AUTO-ENTMCNC: 33.8 G/DL (ref 31.4–37.4)
MCV RBC AUTO: 96 FL (ref 82–98)
MONOCYTES # BLD AUTO: 0.9 THOUSAND/ΜL (ref 0.17–1.22)
MONOCYTES NFR BLD AUTO: 12 % (ref 4–12)
NEUTROPHILS # BLD AUTO: 4.38 THOUSANDS/ΜL (ref 1.85–7.62)
NEUTS SEG NFR BLD AUTO: 60 % (ref 43–75)
NONHDLC SERPL-MCNC: 115 MG/DL
NRBC BLD AUTO-RTO: 0 /100 WBCS
PLATELET # BLD AUTO: 444 THOUSANDS/UL (ref 149–390)
PMV BLD AUTO: 9 FL (ref 8.9–12.7)
PROTHROMBIN TIME: 14.4 SECONDS (ref 11.8–14.2)
RBC # BLD AUTO: 3.97 MILLION/UL (ref 3.81–5.12)
TRIGL SERPL-MCNC: 121 MG/DL
TSH SERPL DL<=0.05 MIU/L-ACNC: 1.03 UIU/ML (ref 0.36–3.74)
VIT B12 SERPL-MCNC: 592 PG/ML (ref 100–900)
WBC # BLD AUTO: 7.37 THOUSAND/UL (ref 4.31–10.16)

## 2018-12-04 PROCEDURE — 83036 HEMOGLOBIN GLYCOSYLATED A1C: CPT | Performed by: INTERNAL MEDICINE

## 2018-12-04 PROCEDURE — 83735 ASSAY OF MAGNESIUM: CPT | Performed by: INTERNAL MEDICINE

## 2018-12-04 PROCEDURE — 84443 ASSAY THYROID STIM HORMONE: CPT | Performed by: INTERNAL MEDICINE

## 2018-12-04 PROCEDURE — 85025 COMPLETE CBC W/AUTO DIFF WBC: CPT | Performed by: INTERNAL MEDICINE

## 2018-12-04 PROCEDURE — 85610 PROTHROMBIN TIME: CPT

## 2018-12-04 PROCEDURE — 82607 VITAMIN B-12: CPT | Performed by: INTERNAL MEDICINE

## 2018-12-04 PROCEDURE — 36415 COLL VENOUS BLD VENIPUNCTURE: CPT | Performed by: INTERNAL MEDICINE

## 2018-12-04 PROCEDURE — 80061 LIPID PANEL: CPT | Performed by: INTERNAL MEDICINE

## 2018-12-04 NOTE — TELEPHONE ENCOUNTER
Pt notified  States she does have magnesium oxide and will start this  Would like to know what she can do for her A1C?

## 2018-12-04 NOTE — TELEPHONE ENCOUNTER
Lab results:    Sugars worse, A1c is 6 2%  Magnesium is low, do you have magnesium oxide at home? If so, recommend to start taking 1 to 2 tablets daily  Rest of labs including cholesterol, thyroid are ok

## 2018-12-05 ENCOUNTER — OFFICE VISIT (OUTPATIENT)
Dept: INTERNAL MEDICINE CLINIC | Facility: CLINIC | Age: 73
End: 2018-12-05
Payer: MEDICARE

## 2018-12-05 VITALS
OXYGEN SATURATION: 98 % | DIASTOLIC BLOOD PRESSURE: 84 MMHG | HEART RATE: 74 BPM | BODY MASS INDEX: 28.99 KG/M2 | HEIGHT: 63 IN | RESPIRATION RATE: 16 BRPM | SYSTOLIC BLOOD PRESSURE: 142 MMHG | WEIGHT: 163.6 LBS | TEMPERATURE: 97.8 F

## 2018-12-05 DIAGNOSIS — E83.42 HYPOMAGNESEMIA: ICD-10-CM

## 2018-12-05 DIAGNOSIS — R73.03 PREDIABETES: ICD-10-CM

## 2018-12-05 DIAGNOSIS — I81 PORTAL VEIN THROMBOSIS: ICD-10-CM

## 2018-12-05 DIAGNOSIS — J45.21 MILD INTERMITTENT ASTHMA WITH ACUTE EXACERBATION: Primary | ICD-10-CM

## 2018-12-05 DIAGNOSIS — M54.16 LUMBAR RADICULOPATHY: ICD-10-CM

## 2018-12-05 DIAGNOSIS — M51.36 LUMBAR DEGENERATIVE DISC DISEASE: ICD-10-CM

## 2018-12-05 PROCEDURE — 94640 AIRWAY INHALATION TREATMENT: CPT | Performed by: INTERNAL MEDICINE

## 2018-12-05 PROCEDURE — 99214 OFFICE O/P EST MOD 30 MIN: CPT | Performed by: INTERNAL MEDICINE

## 2018-12-05 RX ORDER — HYDROCODONE BITARTRATE AND ACETAMINOPHEN 5; 325 MG/1; MG/1
1 TABLET ORAL EVERY 6 HOURS PRN
Qty: 120 TABLET | Refills: 0 | Status: SHIPPED | OUTPATIENT
Start: 2018-12-29 | End: 2019-04-22 | Stop reason: SDUPTHER

## 2018-12-05 RX ORDER — PREDNISONE 10 MG/1
TABLET ORAL
Qty: 20 TABLET | Refills: 0 | Status: SHIPPED | OUTPATIENT
Start: 2018-12-05 | End: 2019-04-22 | Stop reason: ALTCHOICE

## 2018-12-05 RX ORDER — ALBUTEROL SULFATE 90 UG/1
2 AEROSOL, METERED RESPIRATORY (INHALATION) EVERY 6 HOURS PRN
Qty: 1 INHALER | Refills: 1 | Status: SHIPPED | OUTPATIENT
Start: 2018-12-05

## 2018-12-05 RX ORDER — ALBUTEROL SULFATE 2.5 MG/3ML
2.5 SOLUTION RESPIRATORY (INHALATION) ONCE
Status: COMPLETED | OUTPATIENT
Start: 2018-12-05 | End: 2018-12-05

## 2018-12-05 RX ADMIN — ALBUTEROL SULFATE 2.5 MG: 2.5 SOLUTION RESPIRATORY (INHALATION) at 14:45

## 2018-12-05 NOTE — ASSESSMENT & PLAN NOTE
Restarted warfarin today  Will stop it again 5 days prior to epidural injection  Recheck INR 2 weeks after restarting warfarin

## 2018-12-05 NOTE — PROGRESS NOTES
Assessment/Plan:    Hypomagnesemia  Start Mg supplements, take bid for a week then once a day  Prediabetes  Discussed low carb diet, limit sandwiches  Portal vein thrombosis  Restarted warfarin today  Will stop it again 5 days prior to epidural injection  Recheck INR 2 weeks after restarting warfarin  Lumbar degenerative disc disease  S/p injection  Vicodin prescription given to take to [facility] reviewed  Diagnoses and all orders for this visit:    Mild intermittent asthma with acute exacerbation  Comments:  Lung sound improved after nebulizer  Start prednisone, maintain on Advair 250 for now  Instructed to use albuterol inhaler qHS  Orders:  -     albuterol inhalation solution 2 5 mg; Take 3 mL (2 5 mg total) by nebulization once   -     Mini neb  -     predniSONE 10 mg tablet; Take 4 tablets daily x 2 days then 3 tablets daily x 2 days then 2 tablets daily x 2 days then 1 tablet daily until gone  -     albuterol (PROVENTIL HFA,VENTOLIN HFA) 90 mcg/act inhaler; Inhale 2 puffs every 6 (six) hours as needed for wheezing    Lumbar degenerative disc disease    Portal vein thrombosis    Lumbar radiculopathy  -     HYDROcodone-acetaminophen (NORCO) 5-325 mg per tablet; Take 1 tablet by mouth every 6 (six) hours as needed for pain Earliest Fill Date: 18 Max Daily Amount: 4 tablets    Hypomagnesemia    Prediabetes      Follow up as scheduled or as needed  Subjective:      Patient ID: Anderson Richardson is a 75 y o  female  [facility] a [facility]  She reports cold symptoms started 5 days ago, with a non productive cough and wheezing  She has mild nasal congestion, uses Flonase regularly  Denies any fever, sore throat, sinus or ear pain  Cough intermittent, feels she is unable to expectorate sputum  No GI symptoms  She is using Advair regularly, also started using her albuterol inhaler which she think is       She had an injection for her back yesterday, reports pain is already much better  She is asking about her lab results, started taking magnesium twice a day  She is also concerned about her sugars  The following portions of the patient's history were reviewed and updated as appropriate: allergies, current medications, past medical history, past social history and problem list     Review of Systems   Constitutional: Negative for chills and fever  HENT: Negative for congestion  Respiratory: Positive for cough and wheezing  Negative for shortness of breath  Cardiovascular: Negative for chest pain  Gastrointestinal: Negative for nausea and vomiting  Musculoskeletal: Positive for back pain  Neurological: Negative for dizziness and headaches  Psychiatric/Behavioral: Positive for sleep disturbance  Objective:      /84   Pulse 74   Temp 97 8 °F (36 6 °C)   Resp 16   Ht 5' 3" (1 6 m)   Wt 74 2 kg (163 lb 9 6 oz)   SpO2 98%   BMI 28 98 kg/m²          Physical Exam   Constitutional: She appears well-developed and well-nourished  HENT:   Head: Normocephalic and atraumatic  Right Ear: Tympanic membrane, external ear and ear canal normal    Left Ear: Tympanic membrane, external ear and ear canal normal    Nose: Rhinorrhea present  Mouth/Throat: Mucous membranes are normal    Eyes: Pupils are equal, round, and reactive to light  Conjunctivae are normal    Neck: Neck supple  Cardiovascular: Normal rate, regular rhythm and normal heart sounds  Pulmonary/Chest: Effort normal  She has wheezes  She has no rales  Wheezing, middle lobes, bilateral  Fair air entry   Abdominal: Normal appearance and bowel sounds are normal    Lymphadenopathy:     She has no cervical adenopathy  Neurological: She is alert  Skin: Skin is warm  No rash noted  Nursing note and vitals reviewed            Mini neb  Performed by: Kell Ennis  Authorized by: Kell Ennis     Treatment 1:   Pre-Procedure     Symptoms:  Wheezing and cough    Lung Sounds:  Wheezing, mid field, bilateral    Medication Administered:  Albuterol 2 5 mg  Post-Procedure     Lung sounds:  Clear BS        Lab results reviewed with patient

## 2018-12-06 ENCOUNTER — HOSPITAL ENCOUNTER (OUTPATIENT)
Dept: RADIOLOGY | Facility: HOSPITAL | Age: 73
Discharge: HOME | End: 2018-12-06
Payer: MEDICARE

## 2018-12-06 DIAGNOSIS — R10.31 RIGHT INGUINAL PAIN: ICD-10-CM

## 2018-12-06 PROCEDURE — 73502 X-RAY EXAM HIP UNI 2-3 VIEWS: CPT

## 2018-12-12 ENCOUNTER — TELEPHONE (OUTPATIENT)
Dept: INTERNAL MEDICINE CLINIC | Facility: CLINIC | Age: 73
End: 2018-12-12

## 2018-12-12 NOTE — TELEPHONE ENCOUNTER
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

## 2018-12-18 DIAGNOSIS — F33.1 MODERATE EPISODE OF RECURRENT MAJOR DEPRESSIVE DISORDER (HCC): ICD-10-CM

## 2018-12-18 RX ORDER — BUPROPION HYDROCHLORIDE 300 MG/1
300 TABLET ORAL DAILY
Qty: 90 TABLET | Refills: 1 | Status: SHIPPED | OUTPATIENT
Start: 2018-12-18 | End: 2019-03-21 | Stop reason: SDUPTHER

## 2018-12-18 NOTE — TELEPHONE ENCOUNTER
Patients fingers are covered in band aids, bloody  Patient says she picks them you know what she is taking about        PCP had discussed increasing her Wellbutrin if this happened again

## 2018-12-19 ENCOUNTER — APPOINTMENT (OUTPATIENT)
Dept: LAB | Facility: CLINIC | Age: 73
End: 2018-12-19
Payer: MEDICARE

## 2018-12-19 DIAGNOSIS — M51.36 DEGENERATION OF LUMBAR INTERVERTEBRAL DISC: ICD-10-CM

## 2018-12-19 DIAGNOSIS — M54.16 LUMBAR RADICULOPATHY: ICD-10-CM

## 2018-12-19 DIAGNOSIS — M51.26 DISPLACEMENT OF LUMBAR INTERVERTEBRAL DISC WITHOUT MYELOPATHY: ICD-10-CM

## 2018-12-19 LAB
INR PPP: 0.94 (ref 0.86–1.17)
PROTHROMBIN TIME: 12.3 SECONDS (ref 11.8–14.2)

## 2018-12-19 PROCEDURE — 85610 PROTHROMBIN TIME: CPT

## 2018-12-19 PROCEDURE — 36415 COLL VENOUS BLD VENIPUNCTURE: CPT

## 2018-12-27 DIAGNOSIS — F41.9 ANXIETY: ICD-10-CM

## 2018-12-27 DIAGNOSIS — J45.40 MODERATE PERSISTENT ASTHMA WITHOUT COMPLICATION: ICD-10-CM

## 2018-12-27 DIAGNOSIS — I10 ESSENTIAL HYPERTENSION: ICD-10-CM

## 2018-12-27 RX ORDER — LORAZEPAM 1 MG/1
1 TABLET ORAL EVERY 8 HOURS PRN
Qty: 90 TABLET | Refills: 0 | Status: SHIPPED | OUTPATIENT
Start: 2018-12-27 | End: 2019-01-23 | Stop reason: SDUPTHER

## 2018-12-27 RX ORDER — DILTIAZEM HYDROCHLORIDE 180 MG/1
180 CAPSULE, COATED, EXTENDED RELEASE ORAL DAILY
Qty: 90 CAPSULE | Refills: 1 | Status: SHIPPED | OUTPATIENT
Start: 2018-12-27 | End: 2019-05-30 | Stop reason: SDUPTHER

## 2019-01-09 LAB — INR PPP: 2.7 (ref 0.86–1.17)

## 2019-01-10 ENCOUNTER — TELEPHONE (OUTPATIENT)
Dept: INTERNAL MEDICINE CLINIC | Facility: CLINIC | Age: 74
End: 2019-01-10

## 2019-01-10 ENCOUNTER — ANTICOAG VISIT (OUTPATIENT)
Dept: INTERNAL MEDICINE CLINIC | Facility: CLINIC | Age: 74
End: 2019-01-10

## 2019-01-10 DIAGNOSIS — I81 PORTAL VEIN THROMBOSIS: ICD-10-CM

## 2019-01-10 NOTE — TELEPHONE ENCOUNTER
Called and spoke with pt to find out where she got INR done- Done at [facility] in [address], [place], [address]       Called [facility] and requested resukt (this is being faxed)    Result:  INR 2 7  PT 27 8

## 2019-01-10 NOTE — TELEPHONE ENCOUNTER
----- Message from Tete Casper sent at 1/10/2019  9:34 AM EST -----  Regarding: INR  Patient called to make you aware she went to have her pro-eren done and to call her on her cell number with the result

## 2019-01-23 DIAGNOSIS — F41.9 ANXIETY: ICD-10-CM

## 2019-01-23 RX ORDER — LORAZEPAM 1 MG/1
1 TABLET ORAL EVERY 8 HOURS PRN
Qty: 90 TABLET | Refills: 1 | Status: SHIPPED | OUTPATIENT
Start: 2019-01-23 | End: 2019-03-21 | Stop reason: SDUPTHER

## 2019-01-23 NOTE — TELEPHONE ENCOUNTER
PT  IS GOING TO CALL BACK WITH A PHONE # FOR A PHARMACY IN [place] WHICH IS WHERE SHE IS AT FOR 3 MOS

## 2019-02-06 LAB — INR PPP: 1.8 (ref 0.86–1.17)

## 2019-02-07 ENCOUNTER — ANTICOAG VISIT (OUTPATIENT)
Dept: INTERNAL MEDICINE CLINIC | Facility: CLINIC | Age: 74
End: 2019-02-07

## 2019-02-07 ENCOUNTER — TELEPHONE (OUTPATIENT)
Dept: INTERNAL MEDICINE CLINIC | Facility: CLINIC | Age: 74
End: 2019-02-07

## 2019-02-07 DIAGNOSIS — I81 PORTAL VEIN THROMBOSIS: ICD-10-CM

## 2019-02-14 ENCOUNTER — TELEPHONE (OUTPATIENT)
Dept: NEPHROLOGY | Facility: CLINIC | Age: 74
End: 2019-02-14

## 2019-02-27 ENCOUNTER — TELEPHONE (OUTPATIENT)
Dept: NEPHROLOGY | Facility: CLINIC | Age: 74
End: 2019-02-27

## 2019-02-27 LAB — INR PPP: 1.8 (ref 0.86–1.17)

## 2019-02-27 NOTE — TELEPHONE ENCOUNTER
Patient called because she had spoken with someone earlier from our office in regards to having lab slips be faxed to [place]  Patient stated that nothing was was received down there  I checked patient's encounters/notes in her chart and  there is nothing being said that patient's lab slips were ever faxed  I got the fax number that they need to be sent to you from patient and I reprinted the labs and faxed them to the number provided: [phone]

## 2019-02-28 ENCOUNTER — TELEPHONE (OUTPATIENT)
Dept: INTERNAL MEDICINE CLINIC | Facility: CLINIC | Age: 74
End: 2019-02-28

## 2019-02-28 ENCOUNTER — ANTICOAG VISIT (OUTPATIENT)
Dept: INTERNAL MEDICINE CLINIC | Facility: CLINIC | Age: 74
End: 2019-02-28

## 2019-02-28 DIAGNOSIS — I81 PORTAL VEIN THROMBOSIS: ICD-10-CM

## 2019-02-28 NOTE — TELEPHONE ENCOUNTER
Spoke with [facility] who is going ot be faxing over patient INR results       Verbal result:   Drawn 2/27/19    PT- High at 19 2  INR- High at 1 8

## 2019-02-28 NOTE — TELEPHONE ENCOUNTER
Take 7 5 mg of warfarin tonight then continue 5 mg daily  Recheck INR in 3 weeks  Please update flow sheet

## 2019-03-20 LAB — INR PPP: 3.1 (ref 0.86–1.17)

## 2019-03-21 DIAGNOSIS — M51.36 LUMBAR DEGENERATIVE DISC DISEASE: ICD-10-CM

## 2019-03-21 DIAGNOSIS — F33.1 MODERATE EPISODE OF RECURRENT MAJOR DEPRESSIVE DISORDER (HCC): ICD-10-CM

## 2019-03-21 DIAGNOSIS — F41.9 ANXIETY: ICD-10-CM

## 2019-03-21 DIAGNOSIS — I10 ESSENTIAL HYPERTENSION: Primary | ICD-10-CM

## 2019-03-21 DIAGNOSIS — E78.49 OTHER HYPERLIPIDEMIA: ICD-10-CM

## 2019-03-21 DIAGNOSIS — E87.1 HYPONATREMIA: ICD-10-CM

## 2019-03-21 DIAGNOSIS — M35.00 SJOGREN'S SYNDROME, WITH UNSPECIFIED ORGAN INVOLVEMENT (HCC): Primary | ICD-10-CM

## 2019-03-21 RX ORDER — TORSEMIDE 10 MG/1
10 TABLET ORAL DAILY
Qty: 90 TABLET | Refills: 3 | Status: SHIPPED | OUTPATIENT
Start: 2019-03-21 | End: 2020-04-06 | Stop reason: SDUPTHER

## 2019-03-22 ENCOUNTER — ANTICOAG VISIT (OUTPATIENT)
Dept: INTERNAL MEDICINE CLINIC | Facility: CLINIC | Age: 74
End: 2019-03-22

## 2019-03-22 DIAGNOSIS — I81 PORTAL VEIN THROMBOSIS: ICD-10-CM

## 2019-03-22 DIAGNOSIS — E03.9 ACQUIRED HYPOTHYROIDISM: ICD-10-CM

## 2019-03-22 RX ORDER — SIMVASTATIN 5 MG
5 TABLET ORAL DAILY
Qty: 90 TABLET | Refills: 1 | Status: SHIPPED | OUTPATIENT
Start: 2019-03-22 | End: 2019-10-05 | Stop reason: SDUPTHER

## 2019-03-22 RX ORDER — GABAPENTIN 600 MG/1
TABLET ORAL
Qty: 360 TABLET | Refills: 1 | Status: SHIPPED | OUTPATIENT
Start: 2019-03-22 | End: 2019-09-18 | Stop reason: SDUPTHER

## 2019-03-22 RX ORDER — BUPROPION HYDROCHLORIDE 300 MG/1
300 TABLET ORAL DAILY
Qty: 90 TABLET | Refills: 1 | Status: SHIPPED | OUTPATIENT
Start: 2019-03-22 | End: 2019-08-23

## 2019-03-22 RX ORDER — HYDROXYCHLOROQUINE SULFATE 200 MG/1
200 TABLET, FILM COATED ORAL 2 TIMES DAILY
Qty: 180 TABLET | Refills: 1 | Status: SHIPPED | OUTPATIENT
Start: 2019-03-22 | End: 2019-07-03 | Stop reason: SDUPTHER

## 2019-03-22 RX ORDER — LEVOTHYROXINE SODIUM 0.05 MG/1
50 TABLET ORAL DAILY
Qty: 90 TABLET | Refills: 1 | Status: SHIPPED | OUTPATIENT
Start: 2019-03-22 | End: 2019-09-19 | Stop reason: SDUPTHER

## 2019-03-22 RX ORDER — LORAZEPAM 1 MG/1
1 TABLET ORAL EVERY 8 HOURS PRN
Qty: 90 TABLET | Refills: 1 | Status: SHIPPED | OUTPATIENT
Start: 2019-03-22 | End: 2019-06-20 | Stop reason: SDUPTHER

## 2019-03-22 NOTE — TELEPHONE ENCOUNTER
Patient had INR done on 3/20/19- Request fax from [facility] ([place]) with results       Verbal Results:     BW drawn 3/20/19    PT- HIGH at 32 6  INR- HIGH at 3 1

## 2019-03-28 DIAGNOSIS — I10 ESSENTIAL HYPERTENSION: ICD-10-CM

## 2019-03-28 RX ORDER — LISINOPRIL 20 MG/1
20 TABLET ORAL DAILY
Qty: 90 TABLET | Refills: 1 | Status: SHIPPED | OUTPATIENT
Start: 2019-03-28 | End: 2019-04-04 | Stop reason: SDUPTHER

## 2019-04-02 ENCOUNTER — HOSPITAL ENCOUNTER (OUTPATIENT)
Dept: NON INVASIVE DIAGNOSTICS | Facility: CLINIC | Age: 74
Discharge: HOME | End: 2019-04-02
Payer: MEDICARE

## 2019-04-02 DIAGNOSIS — I47.1 SUPRAVENTRICULAR TACHYCARDIA (HCC): ICD-10-CM

## 2019-04-02 PROCEDURE — 93226 XTRNL ECG REC<48 HR SCAN A/R: CPT

## 2019-04-02 PROCEDURE — 93225 XTRNL ECG REC<48 HRS REC: CPT

## 2019-04-03 ENCOUNTER — TELEPHONE (OUTPATIENT)
Dept: NEPHROLOGY | Facility: CLINIC | Age: 74
End: 2019-04-03

## 2019-04-04 ENCOUNTER — OFFICE VISIT (OUTPATIENT)
Dept: NEPHROLOGY | Facility: CLINIC | Age: 74
End: 2019-04-04
Payer: MEDICARE

## 2019-04-04 ENCOUNTER — APPOINTMENT (OUTPATIENT)
Dept: LAB | Facility: CLINIC | Age: 74
End: 2019-04-04
Payer: MEDICARE

## 2019-04-04 ENCOUNTER — TELEPHONE (OUTPATIENT)
Dept: INTERNAL MEDICINE CLINIC | Facility: CLINIC | Age: 74
End: 2019-04-04

## 2019-04-04 ENCOUNTER — TRANSCRIBE ORDERS (OUTPATIENT)
Dept: LAB | Facility: CLINIC | Age: 74
End: 2019-04-04

## 2019-04-04 VITALS
WEIGHT: 158.6 LBS | DIASTOLIC BLOOD PRESSURE: 78 MMHG | BODY MASS INDEX: 28.1 KG/M2 | HEIGHT: 63 IN | HEART RATE: 66 BPM | SYSTOLIC BLOOD PRESSURE: 152 MMHG

## 2019-04-04 DIAGNOSIS — E87.1 HYPONATREMIA: Primary | ICD-10-CM

## 2019-04-04 DIAGNOSIS — I10 ESSENTIAL HYPERTENSION: ICD-10-CM

## 2019-04-04 PROCEDURE — 99214 OFFICE O/P EST MOD 30 MIN: CPT | Performed by: INTERNAL MEDICINE

## 2019-04-04 RX ORDER — LISINOPRIL 20 MG/1
20 TABLET ORAL 2 TIMES DAILY
Qty: 180 TABLET | Refills: 2 | Status: SHIPPED | OUTPATIENT
Start: 2019-04-04 | End: 2019-10-20 | Stop reason: SDUPTHER

## 2019-04-05 ENCOUNTER — ANTICOAG VISIT (OUTPATIENT)
Dept: INTERNAL MEDICINE CLINIC | Facility: CLINIC | Age: 74
End: 2019-04-05

## 2019-04-05 DIAGNOSIS — I81 PORTAL VEIN THROMBOSIS: ICD-10-CM

## 2019-04-05 PROCEDURE — 93227 XTRNL ECG REC<48 HR R&I: CPT | Performed by: INTERNAL MEDICINE

## 2019-04-08 ENCOUNTER — PROCEDURE VISIT (OUTPATIENT)
Dept: NEUROLOGY | Facility: CLINIC | Age: 74
End: 2019-04-08
Payer: MEDICARE

## 2019-04-08 VITALS
DIASTOLIC BLOOD PRESSURE: 72 MMHG | SYSTOLIC BLOOD PRESSURE: 122 MMHG | BODY MASS INDEX: 27.45 KG/M2 | WEIGHT: 154.9 LBS | HEIGHT: 63 IN

## 2019-04-08 DIAGNOSIS — G25.0 ESSENTIAL TREMOR: Primary | ICD-10-CM

## 2019-04-08 PROCEDURE — 95984 ALYS BRN NPGT PRGRMG ADDL 15: CPT | Performed by: PSYCHIATRY & NEUROLOGY

## 2019-04-08 PROCEDURE — 95983 ALYS BRN NPGT PRGRMG 15 MIN: CPT | Performed by: PSYCHIATRY & NEUROLOGY

## 2019-04-08 RX ORDER — CODEINE PHOSPHATE AND GUAIFENESIN 10; 100 MG/5ML; MG/5ML
SOLUTION ORAL
Refills: 0 | COMMUNITY
Start: 2019-01-07 | End: 2019-04-22 | Stop reason: ALTCHOICE

## 2019-04-08 RX ORDER — AZITHROMYCIN 500 MG/1
500 TABLET, FILM COATED ORAL DAILY
Refills: 0 | COMMUNITY
Start: 2019-01-07 | End: 2019-04-22 | Stop reason: ALTCHOICE

## 2019-04-10 ENCOUNTER — APPOINTMENT (OUTPATIENT)
Dept: LAB | Facility: CLINIC | Age: 74
End: 2019-04-10
Payer: MEDICARE

## 2019-04-10 ENCOUNTER — TRANSCRIBE ORDERS (OUTPATIENT)
Dept: LAB | Facility: CLINIC | Age: 74
End: 2019-04-10

## 2019-04-10 DIAGNOSIS — Z79.01 LONG TERM (CURRENT) USE OF ANTICOAGULANTS: ICD-10-CM

## 2019-04-10 DIAGNOSIS — M54.16 LUMBAR RADICULOPATHY: Primary | ICD-10-CM

## 2019-04-10 LAB
INR PPP: 1.03 (ref 0.86–1.17)
PROTHROMBIN TIME: 13.2 SECONDS (ref 11.8–14.2)

## 2019-04-10 PROCEDURE — 36415 COLL VENOUS BLD VENIPUNCTURE: CPT

## 2019-04-10 PROCEDURE — 85610 PROTHROMBIN TIME: CPT

## 2019-04-22 ENCOUNTER — OFFICE VISIT (OUTPATIENT)
Dept: INTERNAL MEDICINE CLINIC | Facility: CLINIC | Age: 74
End: 2019-04-22
Payer: MEDICARE

## 2019-04-22 VITALS
RESPIRATION RATE: 18 BRPM | BODY MASS INDEX: 26.93 KG/M2 | SYSTOLIC BLOOD PRESSURE: 128 MMHG | WEIGHT: 152 LBS | HEIGHT: 63 IN | TEMPERATURE: 98 F | HEART RATE: 68 BPM | DIASTOLIC BLOOD PRESSURE: 78 MMHG | OXYGEN SATURATION: 98 %

## 2019-04-22 DIAGNOSIS — E03.9 ACQUIRED HYPOTHYROIDISM: ICD-10-CM

## 2019-04-22 DIAGNOSIS — Z00.00 HEALTH MAINTENANCE EXAMINATION: ICD-10-CM

## 2019-04-22 DIAGNOSIS — J30.89 NON-SEASONAL ALLERGIC RHINITIS DUE TO OTHER ALLERGIC TRIGGER: ICD-10-CM

## 2019-04-22 DIAGNOSIS — Z12.31 ENCOUNTER FOR SCREENING MAMMOGRAM FOR BREAST CANCER: ICD-10-CM

## 2019-04-22 DIAGNOSIS — I10 ESSENTIAL HYPERTENSION: ICD-10-CM

## 2019-04-22 DIAGNOSIS — E22.2 SIADH (SYNDROME OF INAPPROPRIATE ADH PRODUCTION) (HCC): ICD-10-CM

## 2019-04-22 DIAGNOSIS — F33.1 MODERATE EPISODE OF RECURRENT MAJOR DEPRESSIVE DISORDER (HCC): ICD-10-CM

## 2019-04-22 DIAGNOSIS — J45.20 MILD INTERMITTENT ASTHMA WITHOUT COMPLICATION: ICD-10-CM

## 2019-04-22 DIAGNOSIS — M54.16 LUMBAR RADICULOPATHY: ICD-10-CM

## 2019-04-22 DIAGNOSIS — F41.9 ANXIETY: Primary | ICD-10-CM

## 2019-04-22 DIAGNOSIS — R73.03 PREDIABETES: ICD-10-CM

## 2019-04-22 DIAGNOSIS — G25.0 ESSENTIAL TREMOR: ICD-10-CM

## 2019-04-22 DIAGNOSIS — K51.80 OTHER ULCERATIVE COLITIS WITHOUT COMPLICATION (HCC): ICD-10-CM

## 2019-04-22 PROCEDURE — G0439 PPPS, SUBSEQ VISIT: HCPCS | Performed by: INTERNAL MEDICINE

## 2019-04-22 PROCEDURE — 99214 OFFICE O/P EST MOD 30 MIN: CPT | Performed by: INTERNAL MEDICINE

## 2019-04-22 RX ORDER — BENZONATATE 100 MG/1
100 CAPSULE ORAL 3 TIMES DAILY PRN
Qty: 30 CAPSULE | Refills: 0 | Status: SHIPPED | OUTPATIENT
Start: 2019-04-22 | End: 2019-07-01 | Stop reason: ALTCHOICE

## 2019-04-22 RX ORDER — CLONAZEPAM 0.5 MG/1
0.5 TABLET ORAL 3 TIMES DAILY PRN
Qty: 90 TABLET | Refills: 0 | Status: SHIPPED | OUTPATIENT
Start: 2019-04-27 | End: 2019-07-01 | Stop reason: SINTOL

## 2019-04-22 RX ORDER — HYDROCODONE BITARTRATE AND ACETAMINOPHEN 5; 325 MG/1; MG/1
1 TABLET ORAL EVERY 6 HOURS PRN
Qty: 120 TABLET | Refills: 0 | Status: SHIPPED | OUTPATIENT
Start: 2019-04-22 | End: 2019-06-24 | Stop reason: SDUPTHER

## 2019-04-23 ENCOUNTER — APPOINTMENT (OUTPATIENT)
Dept: LAB | Facility: CLINIC | Age: 74
End: 2019-04-23
Payer: MEDICARE

## 2019-04-23 DIAGNOSIS — M54.16 LUMBAR RADICULOPATHY: ICD-10-CM

## 2019-04-23 LAB
INR PPP: 0.85 (ref 0.86–1.17)
PROTHROMBIN TIME: 11.4 SECONDS (ref 11.8–14.2)

## 2019-04-23 PROCEDURE — 36415 COLL VENOUS BLD VENIPUNCTURE: CPT

## 2019-04-23 PROCEDURE — 85610 PROTHROMBIN TIME: CPT

## 2019-04-24 ENCOUNTER — HOSPITAL ENCOUNTER (OUTPATIENT)
Dept: RADIOLOGY | Age: 74
Discharge: HOME | End: 2019-04-24
Payer: MEDICARE

## 2019-04-24 ENCOUNTER — TELEPHONE (OUTPATIENT)
Dept: INTERNAL MEDICINE CLINIC | Facility: CLINIC | Age: 74
End: 2019-04-24

## 2019-04-24 DIAGNOSIS — M85.89 OSTEOPENIA OF MULTIPLE SITES: ICD-10-CM

## 2019-04-24 PROCEDURE — 77080 DXA BONE DENSITY AXIAL: CPT

## 2019-04-26 ENCOUNTER — HOSPITAL ENCOUNTER (OUTPATIENT)
Dept: RADIOLOGY | Facility: HOSPITAL | Age: 74
Discharge: HOME | End: 2019-04-26
Payer: MEDICARE

## 2019-04-26 ENCOUNTER — TELEPHONE (OUTPATIENT)
Dept: INTERNAL MEDICINE CLINIC | Facility: CLINIC | Age: 74
End: 2019-04-26

## 2019-04-26 ENCOUNTER — OFFICE VISIT (OUTPATIENT)
Dept: INTERNAL MEDICINE CLINIC | Facility: CLINIC | Age: 74
End: 2019-04-26
Payer: MEDICARE

## 2019-04-26 VITALS
OXYGEN SATURATION: 96 % | HEART RATE: 79 BPM | BODY MASS INDEX: 26.79 KG/M2 | RESPIRATION RATE: 18 BRPM | TEMPERATURE: 99 F | WEIGHT: 151.2 LBS | DIASTOLIC BLOOD PRESSURE: 72 MMHG | HEIGHT: 63 IN | SYSTOLIC BLOOD PRESSURE: 124 MMHG

## 2019-04-26 DIAGNOSIS — B37.0 ORAL THRUSH: ICD-10-CM

## 2019-04-26 DIAGNOSIS — J45.20 MILD INTERMITTENT ASTHMA WITHOUT COMPLICATION: Primary | ICD-10-CM

## 2019-04-26 DIAGNOSIS — J45.20 MILD INTERMITTENT ASTHMA WITHOUT COMPLICATION: ICD-10-CM

## 2019-04-26 PROCEDURE — 94640 AIRWAY INHALATION TREATMENT: CPT | Performed by: INTERNAL MEDICINE

## 2019-04-26 PROCEDURE — 99214 OFFICE O/P EST MOD 30 MIN: CPT | Performed by: INTERNAL MEDICINE

## 2019-04-26 PROCEDURE — 71046 X-RAY EXAM CHEST 2 VIEWS: CPT

## 2019-04-26 RX ORDER — PREDNISONE 20 MG/1
40 TABLET ORAL DAILY
Qty: 10 TABLET | Refills: 0 | Status: SHIPPED | OUTPATIENT
Start: 2019-04-26 | End: 2019-05-01

## 2019-04-26 RX ORDER — ALBUTEROL SULFATE 2.5 MG/3ML
2.5 SOLUTION RESPIRATORY (INHALATION) ONCE
Status: COMPLETED | OUTPATIENT
Start: 2019-04-26 | End: 2019-04-26

## 2019-04-26 RX ORDER — CLOTRIMAZOLE 10 MG/1
10 LOZENGE ORAL; TOPICAL
Qty: 70 TABLET | Refills: 0 | Status: SHIPPED | OUTPATIENT
Start: 2019-04-26 | End: 2019-05-10

## 2019-04-26 RX ADMIN — ALBUTEROL SULFATE 2.5 MG: 2.5 SOLUTION RESPIRATORY (INHALATION) at 15:00

## 2019-05-02 ENCOUNTER — APPOINTMENT (OUTPATIENT)
Dept: LAB | Facility: CLINIC | Age: 74
End: 2019-05-02
Payer: MEDICARE

## 2019-05-02 ENCOUNTER — TELEPHONE (OUTPATIENT)
Dept: INTERNAL MEDICINE CLINIC | Facility: CLINIC | Age: 74
End: 2019-05-02

## 2019-05-02 ENCOUNTER — ANTICOAG VISIT (OUTPATIENT)
Dept: INTERNAL MEDICINE CLINIC | Facility: CLINIC | Age: 74
End: 2019-05-02

## 2019-05-02 DIAGNOSIS — R73.03 PREDIABETES: ICD-10-CM

## 2019-05-02 DIAGNOSIS — E87.1 HYPONATREMIA: ICD-10-CM

## 2019-05-02 DIAGNOSIS — I10 ESSENTIAL HYPERTENSION: ICD-10-CM

## 2019-05-02 DIAGNOSIS — I81 PORTAL VEIN THROMBOSIS: ICD-10-CM

## 2019-05-02 DIAGNOSIS — K51.80 OTHER ULCERATIVE COLITIS WITHOUT COMPLICATION (HCC): ICD-10-CM

## 2019-05-02 LAB
ALBUMIN SERPL BCP-MCNC: 3.4 G/DL (ref 3.5–5)
ANION GAP SERPL CALCULATED.3IONS-SCNC: 8 MMOL/L (ref 4–13)
BASOPHILS # BLD AUTO: 0.01 THOUSANDS/ΜL (ref 0–0.1)
BASOPHILS NFR BLD AUTO: 0 % (ref 0–1)
BUN SERPL-MCNC: 10 MG/DL (ref 5–25)
CALCIUM SERPL-MCNC: 9.1 MG/DL (ref 8.3–10.1)
CHLORIDE SERPL-SCNC: 95 MMOL/L (ref 100–108)
CO2 SERPL-SCNC: 30 MMOL/L (ref 21–32)
CREAT SERPL-MCNC: 0.9 MG/DL (ref 0.6–1.3)
EOSINOPHIL # BLD AUTO: 0.06 THOUSAND/ΜL (ref 0–0.61)
EOSINOPHIL NFR BLD AUTO: 1 % (ref 0–6)
ERYTHROCYTE [DISTWIDTH] IN BLOOD BY AUTOMATED COUNT: 12.6 % (ref 11.6–15.1)
EST. AVERAGE GLUCOSE BLD GHB EST-MCNC: 126 MG/DL
GFR SERPL CREATININE-BSD FRML MDRD: 63 ML/MIN/1.73SQ M
GLUCOSE P FAST SERPL-MCNC: 94 MG/DL (ref 65–99)
HBA1C MFR BLD: 6 % (ref 4.2–6.3)
HCT VFR BLD AUTO: 38 % (ref 34.8–46.1)
HGB BLD-MCNC: 13.3 G/DL (ref 11.5–15.4)
IMM GRANULOCYTES # BLD AUTO: 0.06 THOUSAND/UL (ref 0–0.2)
IMM GRANULOCYTES NFR BLD AUTO: 1 % (ref 0–2)
LYMPHOCYTES # BLD AUTO: 1.55 THOUSANDS/ΜL (ref 0.6–4.47)
LYMPHOCYTES NFR BLD AUTO: 18 % (ref 14–44)
MAGNESIUM SERPL-MCNC: 1.9 MG/DL (ref 1.6–2.6)
MCH RBC QN AUTO: 33.3 PG (ref 26.8–34.3)
MCHC RBC AUTO-ENTMCNC: 35 G/DL (ref 31.4–37.4)
MCV RBC AUTO: 95 FL (ref 82–98)
MONOCYTES # BLD AUTO: 0.85 THOUSAND/ΜL (ref 0.17–1.22)
MONOCYTES NFR BLD AUTO: 10 % (ref 4–12)
NEUTROPHILS # BLD AUTO: 5.9 THOUSANDS/ΜL (ref 1.85–7.62)
NEUTS SEG NFR BLD AUTO: 70 % (ref 43–75)
NRBC BLD AUTO-RTO: 0 /100 WBCS
PHOSPHATE SERPL-MCNC: 3.3 MG/DL (ref 2.3–4.1)
PLATELET # BLD AUTO: 386 THOUSANDS/UL (ref 149–390)
PMV BLD AUTO: 8.1 FL (ref 8.9–12.7)
POTASSIUM SERPL-SCNC: 4.2 MMOL/L (ref 3.5–5.3)
RBC # BLD AUTO: 3.99 MILLION/UL (ref 3.81–5.12)
SODIUM SERPL-SCNC: 133 MMOL/L (ref 136–145)
URATE SERPL-MCNC: 4.9 MG/DL (ref 2–6.8)
WBC # BLD AUTO: 8.43 THOUSAND/UL (ref 4.31–10.16)

## 2019-05-02 PROCEDURE — 83735 ASSAY OF MAGNESIUM: CPT

## 2019-05-02 PROCEDURE — 85025 COMPLETE CBC W/AUTO DIFF WBC: CPT

## 2019-05-02 PROCEDURE — 83036 HEMOGLOBIN GLYCOSYLATED A1C: CPT

## 2019-05-02 PROCEDURE — 84550 ASSAY OF BLOOD/URIC ACID: CPT

## 2019-05-02 PROCEDURE — 80069 RENAL FUNCTION PANEL: CPT

## 2019-05-13 ENCOUNTER — TELEPHONE (OUTPATIENT)
Dept: INTERNAL MEDICINE CLINIC | Facility: CLINIC | Age: 74
End: 2019-05-13

## 2019-05-21 ENCOUNTER — TELEPHONE (OUTPATIENT)
Dept: INTERNAL MEDICINE CLINIC | Facility: CLINIC | Age: 74
End: 2019-05-21

## 2019-05-23 ENCOUNTER — TELEPHONE (OUTPATIENT)
Dept: INTERNAL MEDICINE CLINIC | Facility: CLINIC | Age: 74
End: 2019-05-23

## 2019-05-23 ENCOUNTER — TRANSCRIBE ORDERS (OUTPATIENT)
Dept: LAB | Facility: CLINIC | Age: 74
End: 2019-05-23

## 2019-05-23 ENCOUNTER — ANTICOAG VISIT (OUTPATIENT)
Dept: INTERNAL MEDICINE CLINIC | Facility: CLINIC | Age: 74
End: 2019-05-23

## 2019-05-23 ENCOUNTER — APPOINTMENT (OUTPATIENT)
Dept: LAB | Facility: CLINIC | Age: 74
End: 2019-05-23
Payer: MEDICARE

## 2019-05-23 DIAGNOSIS — I81 PORTAL VEIN THROMBOSIS: ICD-10-CM

## 2019-05-23 LAB
INR PPP: 2.2 (ref 0.86–1.17)
INR PPP: 2.2 (ref 0.86–1.17)
PROTHROMBIN TIME: 23.8 SECONDS (ref 11.8–14.2)

## 2019-05-23 PROCEDURE — 85610 PROTHROMBIN TIME: CPT | Performed by: INTERNAL MEDICINE

## 2019-05-23 PROCEDURE — 36415 COLL VENOUS BLD VENIPUNCTURE: CPT | Performed by: INTERNAL MEDICINE

## 2019-05-30 DIAGNOSIS — J30.89 NON-SEASONAL ALLERGIC RHINITIS DUE TO OTHER ALLERGIC TRIGGER: ICD-10-CM

## 2019-05-30 DIAGNOSIS — F41.9 ANXIETY: ICD-10-CM

## 2019-05-30 DIAGNOSIS — M54.16 LUMBAR RADICULOPATHY: ICD-10-CM

## 2019-05-30 DIAGNOSIS — I10 ESSENTIAL HYPERTENSION: ICD-10-CM

## 2019-05-30 RX ORDER — GABAPENTIN 100 MG/1
200 CAPSULE ORAL 2 TIMES DAILY
Qty: 360 CAPSULE | Refills: 1 | Status: SHIPPED | OUTPATIENT
Start: 2019-05-30 | End: 2019-11-27

## 2019-05-30 RX ORDER — FLUTICASONE PROPIONATE 50 MCG
SPRAY, SUSPENSION (ML) NASAL
Qty: 48 G | Refills: 0 | Status: SHIPPED | OUTPATIENT
Start: 2019-05-30 | End: 2019-10-10 | Stop reason: SDUPTHER

## 2019-05-30 RX ORDER — DILTIAZEM HYDROCHLORIDE 180 MG/1
CAPSULE, COATED, EXTENDED RELEASE ORAL
Qty: 90 CAPSULE | Refills: 1 | Status: SHIPPED | OUTPATIENT
Start: 2019-05-30 | End: 2019-12-03 | Stop reason: SDUPTHER

## 2019-05-30 RX ORDER — ESCITALOPRAM OXALATE 20 MG/1
TABLET ORAL
Qty: 90 TABLET | Refills: 1 | Status: SHIPPED | OUTPATIENT
Start: 2019-05-30 | End: 2019-06-10 | Stop reason: SDUPTHER

## 2019-06-06 ENCOUNTER — TRANSCRIBE ORDERS (OUTPATIENT)
Dept: LAB | Facility: CLINIC | Age: 74
End: 2019-06-06

## 2019-06-06 ENCOUNTER — APPOINTMENT (OUTPATIENT)
Dept: LAB | Facility: CLINIC | Age: 74
End: 2019-06-06
Payer: MEDICARE

## 2019-06-06 ENCOUNTER — TELEPHONE (OUTPATIENT)
Dept: INTERNAL MEDICINE CLINIC | Facility: CLINIC | Age: 74
End: 2019-06-06

## 2019-06-06 ENCOUNTER — ANTICOAG VISIT (OUTPATIENT)
Dept: INTERNAL MEDICINE CLINIC | Facility: CLINIC | Age: 74
End: 2019-06-06

## 2019-06-06 DIAGNOSIS — I81 PORTAL VEIN THROMBOSIS: ICD-10-CM

## 2019-06-10 DIAGNOSIS — F41.9 ANXIETY: ICD-10-CM

## 2019-06-10 DIAGNOSIS — R42 DIZZINESS: Primary | ICD-10-CM

## 2019-06-10 RX ORDER — MECLIZINE HYDROCHLORIDE 25 MG/1
25 TABLET ORAL EVERY 6 HOURS PRN
Qty: 90 TABLET | Refills: 0 | Status: SHIPPED | OUTPATIENT
Start: 2019-06-10 | End: 2020-04-17 | Stop reason: SDUPTHER

## 2019-06-10 RX ORDER — ESCITALOPRAM OXALATE 20 MG/1
20 TABLET ORAL DAILY
Qty: 90 TABLET | Refills: 1 | Status: SHIPPED | OUTPATIENT
Start: 2019-06-10 | End: 2019-12-09 | Stop reason: SDUPTHER

## 2019-06-17 DIAGNOSIS — J45.40 MODERATE PERSISTENT ASTHMA WITHOUT COMPLICATION: ICD-10-CM

## 2019-06-17 DIAGNOSIS — I81 PORTAL VEIN THROMBOSIS: ICD-10-CM

## 2019-06-17 RX ORDER — WARFARIN SODIUM 5 MG/1
TABLET ORAL
Qty: 90 TABLET | Refills: 1 | Status: SHIPPED | OUTPATIENT
Start: 2019-06-17 | End: 2019-12-04 | Stop reason: HOSPADM

## 2019-06-19 ENCOUNTER — APPOINTMENT (OUTPATIENT)
Dept: LAB | Facility: CLINIC | Age: 74
End: 2019-06-19
Payer: MEDICARE

## 2019-06-20 ENCOUNTER — ANTICOAG VISIT (OUTPATIENT)
Dept: INTERNAL MEDICINE CLINIC | Facility: CLINIC | Age: 74
End: 2019-06-20

## 2019-06-20 ENCOUNTER — TELEPHONE (OUTPATIENT)
Dept: INTERNAL MEDICINE CLINIC | Facility: CLINIC | Age: 74
End: 2019-06-20

## 2019-06-20 DIAGNOSIS — I81 PORTAL VEIN THROMBOSIS: ICD-10-CM

## 2019-06-20 DIAGNOSIS — F41.9 ANXIETY: ICD-10-CM

## 2019-06-20 RX ORDER — LORAZEPAM 1 MG/1
1 TABLET ORAL EVERY 8 HOURS PRN
Qty: 90 TABLET | Refills: 0 | Status: SHIPPED | OUTPATIENT
Start: 2019-06-20 | End: 2019-09-07 | Stop reason: SDUPTHER

## 2019-06-24 DIAGNOSIS — M54.16 LUMBAR RADICULOPATHY: ICD-10-CM

## 2019-06-24 RX ORDER — HYDROCODONE BITARTRATE AND ACETAMINOPHEN 5; 325 MG/1; MG/1
1 TABLET ORAL EVERY 6 HOURS PRN
Qty: 120 TABLET | Refills: 0 | Status: SHIPPED | OUTPATIENT
Start: 2019-06-24 | End: 2019-06-24 | Stop reason: SDUPTHER

## 2019-06-24 RX ORDER — HYDROCODONE BITARTRATE AND ACETAMINOPHEN 5; 325 MG/1; MG/1
1 TABLET ORAL EVERY 6 HOURS PRN
Qty: 120 TABLET | Refills: 0 | Status: SHIPPED | OUTPATIENT
Start: 2019-06-24 | End: 2019-08-23

## 2019-06-27 ENCOUNTER — APPOINTMENT (OUTPATIENT)
Dept: LAB | Facility: CLINIC | Age: 74
End: 2019-06-27
Payer: MEDICARE

## 2019-06-27 ENCOUNTER — ANTICOAG VISIT (OUTPATIENT)
Dept: INTERNAL MEDICINE CLINIC | Facility: CLINIC | Age: 74
End: 2019-06-27

## 2019-06-27 ENCOUNTER — TELEPHONE (OUTPATIENT)
Dept: INTERNAL MEDICINE CLINIC | Facility: CLINIC | Age: 74
End: 2019-06-27

## 2019-06-27 DIAGNOSIS — I81 PORTAL VEIN THROMBOSIS: ICD-10-CM

## 2019-07-01 ENCOUNTER — TELEPHONE (OUTPATIENT)
Dept: INTERNAL MEDICINE CLINIC | Facility: CLINIC | Age: 74
End: 2019-07-01

## 2019-07-01 ENCOUNTER — OFFICE VISIT (OUTPATIENT)
Dept: INTERNAL MEDICINE CLINIC | Facility: CLINIC | Age: 74
End: 2019-07-01
Payer: MEDICARE

## 2019-07-01 VITALS
DIASTOLIC BLOOD PRESSURE: 80 MMHG | HEART RATE: 81 BPM | OXYGEN SATURATION: 98 % | BODY MASS INDEX: 25.62 KG/M2 | RESPIRATION RATE: 18 BRPM | WEIGHT: 144.6 LBS | TEMPERATURE: 98.1 F | SYSTOLIC BLOOD PRESSURE: 128 MMHG | HEIGHT: 63 IN

## 2019-07-01 DIAGNOSIS — M54.50 ACUTE LEFT-SIDED LOW BACK PAIN WITHOUT SCIATICA: Primary | ICD-10-CM

## 2019-07-01 DIAGNOSIS — F33.1 MODERATE EPISODE OF RECURRENT MAJOR DEPRESSIVE DISORDER (HCC): ICD-10-CM

## 2019-07-01 PROCEDURE — 99213 OFFICE O/P EST LOW 20 MIN: CPT | Performed by: INTERNAL MEDICINE

## 2019-07-01 RX ORDER — METHOCARBAMOL 500 MG/1
500 TABLET, FILM COATED ORAL 3 TIMES DAILY PRN
Qty: 60 TABLET | Refills: 0 | Status: SHIPPED | OUTPATIENT
Start: 2019-07-01 | End: 2019-07-17 | Stop reason: ALTCHOICE

## 2019-07-01 NOTE — TELEPHONE ENCOUNTER
Patient fell 2 weeks ago, pain in left leg and lower back, pain while walking tingling up into the hip, while she is sitting it is tingling in her hip  0-10 walking-9, sitting-4  Patient would like to come see you today if possible?

## 2019-07-01 NOTE — PROGRESS NOTES
Assessment/Plan: Moderate episode of recurrent major depressive disorder (HCC)  Consider Cymbalta, Effexor, Pristiq  Diagnoses and all orders for this visit:    Acute left-sided low back pain without sciatica  Comments:  Start muscle relaxant, already maximized on gabapentin, taking Vicodin as needed  Recommend to see pain again, will need imaging (CT scan)  Orders:  -     methocarbamol (ROBAXIN) 500 mg tablet; Take 1 tablet (500 mg total) by mouth 3 (three) times a day as needed for muscle spasms  -     Ambulatory referral to Physical Therapy; Future    Moderate episode of recurrent major depressive disorder ([facility] )      Follow up as scheduled or as needed  Subjective:      Patient ID: Santi Morales is a 75 y o  female  Ms  Khloe Hallch complains of low back pain, left hip pain and leg symptoms  She feel about 3 weeks ago, lost her balance as she was coming out of the bath tub  She slipped on something and landed on her buttocks, hut her left arm on a side table also  Since then she is experiencing intermittent low back pain, worse when standing up straight, walking or with prolonged sitting  Pain located in the left lower area, also on the left side of her hip  She noticed tingling on her left foot, radiating to the front of her left leg  This symptoms is new and she is concerned  She is seen by pain management, has been receiving injections for the right side of her back  She has been taking her gabapentin regularly, has increased her Vicodin due to pain  She is not on a muscle relaxant  She reports anxiety has not improved  She took clonazepam which made her very sleepy  She is now back to lorazepam     The following portions of the patient's history were reviewed and updated as appropriate: allergies, current medications, past medical history, past social history and problem list     Review of Systems   Constitutional: Positive for activity change     Respiratory: Negative for shortness of breath  Genitourinary: Negative for difficulty urinating  Musculoskeletal: Positive for arthralgias, back pain and gait problem  Neurological: Positive for tremors  Negative for dizziness, syncope and headaches  Psychiatric/Behavioral: Positive for sleep disturbance  The patient is nervous/anxious  Objective:      /80   Pulse 81   Temp 98 1 °F (36 7 °C)   Resp 18   Ht 5' 3" (1 6 m)   Wt 65 6 kg (144 lb 9 6 oz)   SpO2 98%   BMI 25 61 kg/m²          Physical Exam   Constitutional: She appears well-nourished  HENT:   Head: Normocephalic  Eyes: Pupils are equal, round, and reactive to light  Pulmonary/Chest: Effort normal    Musculoskeletal:        Thoracic back: She exhibits no pain and no spasm  Lumbar back: She exhibits pain and spasm  Back:    Neurological: She is alert  Decrease LE strength due to pain   Skin: Skin is warm  Nursing note and vitals reviewed

## 2019-07-03 DIAGNOSIS — M35.00 SJOGREN'S SYNDROME, WITH UNSPECIFIED ORGAN INVOLVEMENT (HCC): ICD-10-CM

## 2019-07-03 RX ORDER — HYDROXYCHLOROQUINE SULFATE 200 MG/1
200 TABLET, FILM COATED ORAL 2 TIMES DAILY
Qty: 180 TABLET | Refills: 0 | Status: SHIPPED | OUTPATIENT
Start: 2019-07-03 | End: 2020-01-02

## 2019-07-17 ENCOUNTER — EVALUATION (OUTPATIENT)
Dept: PHYSICAL THERAPY | Facility: CLINIC | Age: 74
End: 2019-07-17
Payer: MEDICARE

## 2019-07-17 ENCOUNTER — TELEPHONE (OUTPATIENT)
Dept: INTERNAL MEDICINE CLINIC | Facility: CLINIC | Age: 74
End: 2019-07-17

## 2019-07-17 ENCOUNTER — TRANSCRIBE ORDERS (OUTPATIENT)
Dept: LAB | Facility: CLINIC | Age: 74
End: 2019-07-17

## 2019-07-17 ENCOUNTER — ANTICOAG VISIT (OUTPATIENT)
Dept: INTERNAL MEDICINE CLINIC | Facility: CLINIC | Age: 74
End: 2019-07-17

## 2019-07-17 ENCOUNTER — APPOINTMENT (OUTPATIENT)
Dept: LAB | Facility: CLINIC | Age: 74
End: 2019-07-17
Payer: MEDICARE

## 2019-07-17 DIAGNOSIS — M54.50 ACUTE LEFT-SIDED LOW BACK PAIN WITHOUT SCIATICA: ICD-10-CM

## 2019-07-17 DIAGNOSIS — M54.50 ACUTE LEFT-SIDED LOW BACK PAIN WITHOUT SCIATICA: Primary | ICD-10-CM

## 2019-07-17 DIAGNOSIS — I81 PORTAL VEIN THROMBOSIS: ICD-10-CM

## 2019-07-17 PROCEDURE — 97110 THERAPEUTIC EXERCISES: CPT | Performed by: PHYSICAL THERAPIST

## 2019-07-17 PROCEDURE — 97162 PT EVAL MOD COMPLEX 30 MIN: CPT | Performed by: PHYSICAL THERAPIST

## 2019-07-17 RX ORDER — TIZANIDINE 2 MG/1
2 TABLET ORAL 2 TIMES DAILY PRN
Qty: 60 TABLET | Refills: 0 | Status: SHIPPED | OUTPATIENT
Start: 2019-07-17 | End: 2019-08-23

## 2019-07-17 NOTE — TELEPHONE ENCOUNTER
Patient saw Physical Therapy today and the therapist discussed with her that she should call PCP because her Muscle Relaxer isn't work and she needs something stronger

## 2019-07-17 NOTE — PROGRESS NOTES
PT Evaluation     Today's date: 2019  Patient name: Carla Davidson  :   MRN: [id number]  Referring provider: Derrick Slater MD  Dx:   Encounter Diagnosis     ICD-10-CM    1  Acute left-sided low back pain without sciatica M54 5 Ambulatory referral to Physical Therapy    Start muscle relaxant, already maximized on gabapentin, taking Vicodin as needed  Recommend to see pain again, will need imaging (CT scan)  Assessment  Assessment details: Carla Davidson is a 75 y o  female with Acute left-sided low back pain without sciatica following multiple falls in her home  She presents with pain, decreased strength, decreased ROM, impaired sensation, ambulatory dysfunction and postural dysfunction  Due to these impairments, Patient has difficulty performing a/iadls, recreational activities and engaging in social activities  Patient's clinical presentation is consistent with their referring diagnosis  Patient would benefit from skilled physical therapy to address their aforementioned impairments, improve their level of function and to improve their overall quality of life   IShe has been given a home exercise program and is in agreement with the plan of care  Thank you for your referral     Impairments: abnormal coordination, abnormal gait, abnormal muscle firing, abnormal or restricted ROM, activity intolerance, impaired balance, impaired physical strength, lacks appropriate home exercise program, pain with function, safety issue and poor posture     Symptom irritability: moderateBarriers to therapy: Multiple medical conditions effecting balance and gait    Understanding of Dx/Px/POC: excellent   Prognosis: good    Goals  ST Goals - 2-4 weeks  1  Patient will report decreased pain with activity by at least 2 points within 4 weeks  2  Patient will improve ROM 5-10 degrees within 4 weeks  3  Patient will demonstrate ability to actively correct posture without cueing within 4 weeks  4  Patient will perform IADLs without pain in 2 weeks  5  Patient will increase strength by 25% in 4 weeks    LT Goals - Discharge  1  Patient will improve FOTO score to maximum stated or greater by discharge  2  Patient will return to preferred recreational activity without significant pain increase by discharge   3  Patient will return to all home  activities without pain by discharge     Plan  Patient would benefit from: skilled physical therapy  Referral necessary: Yes  Planned modality interventions: cryotherapy, thermotherapy: hydrocollator packs and TENS  Planned therapy interventions: joint mobilization, manual therapy, neuromuscular re-education, therapeutic activities, therapeutic exercise, home exercise program, graded exercise and graded activity  Frequency: 2x week  Duration in visits: 20  Duration in weeks: 20  Plan of Care beginning date: 7/17/2019  Plan of Care expiration date: 9/17/2019  Treatment plan discussed with: patient        Subjective Evaluation    History of Present Illness  Mechanism of injury: Patient reports that approximately 3 months ago she fell on her L side  She was sitting on the edge of her bed and tried to get up but she lost her balance and fell  She also reports that she had also fallen in the bath tub a week or so prior  She indicates that she has not been able to stand up straight since the second fall  She has difficulty with sit to stand and has pain initiating ambulation  She feels she limps somewhat due to LLBP and calf muscle discomfort  She also notes L calf radicular sx with prolonged sittiing  She states she has tingling at her hip as well  She indicates that she has a history of  Balance disorder  She states that she uses furniture during the night to walk to the bathroom  She takes medication when she is dizzy  She also has gone to therapy for vertigo      HX:  Lumbar spinal stenosis (has had injections), R knee OA, vertigo, neuropathy, Sjogrens, Osteopenia, brain stimulator for tremors    Function: ambulation, prolonged ambulation, stairs, lifting, bending          Recurrent probem    Quality of life: excellent    Pain  Current pain ratin  At best pain ratin  At worst pain ratin  Location: L Lumbar, Hip, calf  Quality: dull ache, radiating and sharp  Relieving factors: change in position, rest, heat and ice  Aggravating factors: sitting, walking, stair climbing and lifting  Progression: worsening    Social Support  Steps to enter house: yes  Stairs in house: yes   Lives in: multiple-level home  Lives with: spouse    Employment status: not working    Diagnostic Tests  No diagnostic tests performed  Treatments  Previous treatment: injection treatment and medication  Patient Goals  Patient goals for therapy: decreased pain  Patient goal: straighten up and walk and decrease pain        Objective     Concurrent Complaints  Positive for night pain, disturbed sleep, cardiac problem and history of trauma  Additional Special Questions  Patient cannot lay on the L hip and has pain when she is changing positions  Static Posture     Thoracic Spine  Hyperkyphosis  Lumbar Spine   Flattened  Postural Observations  Seated posture: fair  Standing posture: fair  Correction of posture: makes symptoms worse    Additional Postural Observation Details  Sitting with roll behind back    Palpation   Left   Hypertonic in the quadratus lumborum  Muscle spasm in the quadratus lumborum  Tenderness of the erector spinae, lumbar paraspinals and quadratus lumborum  Trigger point to quadratus lumborum  Tenderness     Lumbar Spine  Tenderness in the spinous process and left transverse process       Additional Tenderness Details  L iliac crest - painful throughout      Neurological Testing     Sensation     Lumbar   Left   Intact: light touch    Reflexes   Left   Patellar (L4): brisk (3+)    Active Range of Motion     Lumbar   Flexion: 20 degrees  with pain Restriction level: moderate  Extension: 15 degrees   Left lateral flexion: 20 degrees    with pain Restriction level: moderate  Right lateral flexion: 15 degrees  with pain Restriction level: moderate    Joint Play     Hypomobile: T12, L1, L2, L3, L4, L5 and S1     Pain: L3, L4 and L5   Mechanical Assessment    Cervical      Thoracic      Lumbar    Sitting flexion: sustained positions  Pain location: no change  Pain intensity: better    Tests     Lumbar     Left   Negative passive SLR and slump test      Ambulation     Ambulation: Level Surfaces   Ambulation without assistive device: independent    Observational Gait   Gait: antalgic   Decreased walking speed, stride length, left stance time, left swing time and left step length     Left arm swing: decreased    General Comments:    Lower quarter screen   Knees: unremarkable  Foot/ankle: unremarkable    Hip Comments   L Hip ABD/ADD 4/5;  R:  5/5             Precautions: Balance and FALL RISK;  Sjogrens, Vertigo, R Knee OA, Spinal Stenosis      Manual  7/17            iastm             L hip distraction             HEP instruction 10                                          Exercise Diary  7/17                         Bike                          SKC             Hamstring st             Glut St             LTR             PT                                                                                                                                                                             Modalities              TENS MH/CP

## 2019-07-17 NOTE — TELEPHONE ENCOUNTER
Another kind of muscle relaxant sent to pharmacy  Monitor for sedation  INR at goal, continue current dosing  Repeat in 1 month  Please update flow sheet

## 2019-07-17 NOTE — TELEPHONE ENCOUNTER
Patient notified and states she would like a different muscle relaxer     Pharmacy:  [facility]

## 2019-07-17 NOTE — TELEPHONE ENCOUNTER
We can adjust the dose of your current muscle relaxant (methocarbamol)  OR we can change it something else, but it might cause dizziness or make you feel drowsy  Let me know

## 2019-07-18 ENCOUNTER — OFFICE VISIT (OUTPATIENT)
Dept: PHYSICAL THERAPY | Facility: CLINIC | Age: 74
End: 2019-07-18
Payer: MEDICARE

## 2019-07-18 DIAGNOSIS — M54.50 ACUTE LEFT-SIDED LOW BACK PAIN WITHOUT SCIATICA: Primary | ICD-10-CM

## 2019-07-18 PROCEDURE — 97014 ELECTRIC STIMULATION THERAPY: CPT | Performed by: PHYSICAL THERAPIST

## 2019-07-18 NOTE — PROGRESS NOTES
Daily Note     Today's date: 2019  Patient name: Maria Luisa Olivas  :   MRN: [id number]  Referring provider: Alis Muniz MD  Dx:   Encounter Diagnosis     ICD-10-CM    1  Acute left-sided low back pain without sciatica M54 5                   Subjective: Patient notes less discomfort today  Objective: See treatment diary below      Assessment: Tolerated treatment well  Patient would benefit from continued PT      Plan: Continue per plan of care        Precautions: Balance and FALL RISK;  Sjogrens, Vertigo, R Knee OA, Spinal Stenosis      Manual             iastm - L; R s/l  10'           L hip distraction             HEP instruction 10                                          Exercise Diary                          Bike  nv                        SKC  4 x :20           Hamstring st             Glut St  4 x :20           LTR  5 x :10           PT                                                                                                                                                                             Modalities              TENS - s/l R 10'            [address] seated 10'

## 2019-07-22 ENCOUNTER — OFFICE VISIT (OUTPATIENT)
Dept: PHYSICAL THERAPY | Facility: CLINIC | Age: 74
End: 2019-07-22
Payer: MEDICARE

## 2019-07-22 DIAGNOSIS — M54.50 ACUTE LEFT-SIDED LOW BACK PAIN WITHOUT SCIATICA: Primary | ICD-10-CM

## 2019-07-22 PROCEDURE — 97014 ELECTRIC STIMULATION THERAPY: CPT

## 2019-07-22 PROCEDURE — 97110 THERAPEUTIC EXERCISES: CPT

## 2019-07-22 PROCEDURE — 97140 MANUAL THERAPY 1/> REGIONS: CPT

## 2019-07-22 NOTE — PROGRESS NOTES
Daily Note     Today's date: 2019  Patient name: Carla Davidson  :   MRN: [id number]  Referring provider: Derrick Slater MD  Dx:   Encounter Diagnosis     ICD-10-CM    1  Acute left-sided low back pain without sciatica M54 5                   Subjective: Patient states that she has noticed a slight improvement with pain in LB and L hip  Remains TTP with manual therapy  Objective: See treatment diary below      Assessment: Tolerated treatment fair  Patient exhibited good technique with therapeutic exercises      Plan: Continue per plan of care        Precautions: Balance and FALL RISK;  Sjogrens, Vertigo, R Knee OA, Spinal Stenosis      Manual            iastm - L; R s/l  10' 15          L hip              HEP instruction 10                                          :20 [id number] Diary                         Bike  nv                        SKC  4 x :20 5 x :20          Hamstring st   :20x5          Glut St  4 x :20 :20x5          LTR  5 x :10 5 x :10          PT             Gastroc stretch   :20 x 5                                                                                                                                                             Modalities             TENS - s/l R 10' 10            MH seated 10' 10

## 2019-07-25 ENCOUNTER — OFFICE VISIT (OUTPATIENT)
Dept: PHYSICAL THERAPY | Facility: CLINIC | Age: 74
End: 2019-07-25
Payer: MEDICARE

## 2019-07-25 DIAGNOSIS — M54.50 ACUTE LEFT-SIDED LOW BACK PAIN WITHOUT SCIATICA: Primary | ICD-10-CM

## 2019-07-25 PROCEDURE — 97110 THERAPEUTIC EXERCISES: CPT

## 2019-07-25 PROCEDURE — 97140 MANUAL THERAPY 1/> REGIONS: CPT

## 2019-07-25 NOTE — PROGRESS NOTES
Daily Note     Today's date: 2019  Patient name: Franco Guerrero  :   MRN: [id number]  Referring provider: Ha Wen MD  Dx:   Encounter Diagnosis     ICD-10-CM    1  Acute left-sided low back pain without sciatica M54 5                   Subjective: Patient states that she has noticed a significant improvement with pain while walking since starting therapy  Continues to experience radiating symptoms  Objective: See treatment diary below      Assessment: Tolerated treatment fair  Patient exhibited good technique with therapeutic exercises      Plan: Continue per plan of care        Precautions: Balance and FALL RISK;  Sjogrens, Vertigo, R Knee OA, Spinal Stenosis      Manual           iastm - L; R s/l  10' 15 15         L hip              HEP instruction 10                                          :20 n9Lghmpcam Diary                        Bike  nv  4 - no res                      SKC  4 x :20 5 x :20 :20x5         Hamstring st   :20x5 :20x 5          Glut St  4 x :20 :20x5 :20 x 5          LTR  5 x :10 5 x :10 :10x 5          PT    :05 x 10         Gastroc stretch   :20 x 5 :20x 5                                                                                                                                                             Modalities            TENS - s/l R 10' 10  missed          MH seated 10' 10  missed

## 2019-07-29 ENCOUNTER — OFFICE VISIT (OUTPATIENT)
Dept: PHYSICAL THERAPY | Facility: CLINIC | Age: 74
End: 2019-07-29
Payer: MEDICARE

## 2019-07-29 DIAGNOSIS — M54.50 ACUTE LEFT-SIDED LOW BACK PAIN WITHOUT SCIATICA: Primary | ICD-10-CM

## 2019-07-29 PROCEDURE — 97110 THERAPEUTIC EXERCISES: CPT | Performed by: PHYSICAL THERAPIST

## 2019-07-29 PROCEDURE — 97140 MANUAL THERAPY 1/> REGIONS: CPT | Performed by: PHYSICAL THERAPIST

## 2019-07-29 NOTE — PROGRESS NOTES
Daily Note     Today's date: 2019  Patient name: Maxi Nogueira  :   MRN: [id number]  Referring provider: Chriss Closs, MD  Dx:   Encounter Diagnosis     ICD-10-CM    1  Acute left-sided low back pain without sciatica M54 5                   Subjective: Pt presents today stating as a whole she is feeling better  Movement makes her feel better, occasional L knee to foot numbness and Sitting will cause L hip numbness  Objective: See treatment diary below      Assessment:  Flexion based intervention appears to be assisting with pt as she was asymptomatic upon departure  Consider trial of TM n v  As able per pt request upon leaving  Plan: Continue per plan of care        Precautions: Balance and FALL RISK;  Sjogrens, Vertigo, R Knee OA, Spinal Stenosis      Manual          iastm - L; R s/l  10' 15 15 15        L hip              HEP instruction 10                                          :20 n9Koemusif Diary                       Bike  nv  4 - no res 5 min                     SKC  4 x :20 5 x :20 :20x5 20" x 5        Hamstring st   :20x5 :20x 5  20" x 4        Glut St  4 x :20 :20x5 :20 x 5  20" x 5        LTR  5 x :10 5 x :10 :10x 5  10" x 5        PT    :05 x 10 5"x 10        Gastroc stretch   :20 x 5 :20x 5  20" x 5        TM trial?                                                                                                                                                   Modalities          TENS - s/l R 10' 10  missed  missed PT error        MH seated 10' 10  missed  10 min

## 2019-07-31 ENCOUNTER — OFFICE VISIT (OUTPATIENT)
Dept: PHYSICAL THERAPY | Facility: CLINIC | Age: 74
End: 2019-07-31
Payer: MEDICARE

## 2019-07-31 DIAGNOSIS — M54.50 ACUTE LEFT-SIDED LOW BACK PAIN WITHOUT SCIATICA: Primary | ICD-10-CM

## 2019-07-31 PROCEDURE — 97140 MANUAL THERAPY 1/> REGIONS: CPT | Performed by: PHYSICAL THERAPIST

## 2019-07-31 PROCEDURE — 97014 ELECTRIC STIMULATION THERAPY: CPT | Performed by: PHYSICAL THERAPIST

## 2019-07-31 PROCEDURE — 97110 THERAPEUTIC EXERCISES: CPT | Performed by: PHYSICAL THERAPIST

## 2019-07-31 NOTE — PROGRESS NOTES
Daily Note     Today's date: 2019  Patient name: Glenis Ross  :   MRN: [id number]  Referring provider: José Ricks MD  Dx:   Encounter Diagnosis     ICD-10-CM    1  Acute left-sided low back pain without sciatica M54 5                   Subjective: Patient reports a flair up in sx beginning yesterday due to unknown  Objective: See treatment diary below      Assessment: Tolerated treatment well  Patient would benefit from continued PT  Patient with decreased pain and improved standing posture for gait  Plan: Continue per plan of care        Precautions: Balance and FALL RISK;  Sjogrens, Vertigo, R Knee OA, Spinal Stenosis      Manual         iastm - L; R s/l  10' 15 15 15 12'       L hip              HEP instruction 10                                          :20 [id number] Diary                      Bike  nv  4 - no res 5 min 5'                    SKC  4 x :20 5 x :20 :20x5 20" x 5 5 x :20       Hamstring st   :20x5 :20x 5  20" x 4 5 x :20       Glut St  4 x :20 :20x5 :20 x 5  20" x 5 5 x :20       LTR  5 x :10 5 x :10 :10x 5  10" x 5 5 x :20       PT    :05 x 10 5"x 10 nv       Gastroc stretch   :20 x 5 :20x 5  20" x 5 5 x :20       TM     When madyson                                                                                                                                              Modalities         TENS - s/l R with CP - post 10' 10  missed  missed PT error 10'       [address] seated - pre 10' 10  missed  10 min 10'

## 2019-08-05 ENCOUNTER — OFFICE VISIT (OUTPATIENT)
Dept: PHYSICAL THERAPY | Facility: CLINIC | Age: 74
End: 2019-08-05
Payer: MEDICARE

## 2019-08-05 DIAGNOSIS — M54.50 ACUTE LEFT-SIDED LOW BACK PAIN WITHOUT SCIATICA: Primary | ICD-10-CM

## 2019-08-05 PROCEDURE — 97140 MANUAL THERAPY 1/> REGIONS: CPT

## 2019-08-05 PROCEDURE — 97110 THERAPEUTIC EXERCISES: CPT

## 2019-08-05 NOTE — PROGRESS NOTES
Daily Note     Today's date: 2019  Patient name: Abelardo Aguilar  :   MRN: [id number]  Referring provider: Romulo Parker MD  Dx:   Encounter Diagnosis     ICD-10-CM    1  Acute left-sided low back pain without sciatica M54 5                   Subjective: Patient states that she has noticed a significant improvement with pain and mobility since starting therapy  Objective: See treatment diary below      Assessment: Tolerated treatment fair  Patient exhibited good technique with therapeutic exercises      Plan: Continue per plan of care        Precautions: Balance and FALL RISK;  Sjogrens, Vertigo, R Knee OA, Spinal Stenosis      Manual   8/      iastm - L; R s/l  10' 15 15 15 12' 10       L hip              HEP instruction 10                                          :20 [id number] Diary   8/5                   Bike  nv  4 - no res 5 min 5' 5                   SKC  4 x :20 5 x :20 :20x5 20" x 5 5 x :20 5 x :20       Hamstring st   :20x5 :20x 5  20" x 4 5 x :20 :20 x 5       Glut St  4 x :20 :20x5 :20 x 5  20" x 5 5 x :20 :20 x5       LTR  5 x :10 5 x :10 :10x 5  10" x 5 5 x :20 :20 x 5      PT    :05 x 10 5"x 10 nv       Gastroc stretch   :20 x 5 :20x 5  20" x 5 5 x :20 :20  X 5       TM     When madyson                                                                                                                                              Modalities   8/5      TENS - s/l R with CP - post 10' 10  missed  missed PT error 10'       [address] seated - pre 10' 10  missed  10 min 10' 10

## 2019-08-08 ENCOUNTER — OFFICE VISIT (OUTPATIENT)
Dept: PHYSICAL THERAPY | Facility: CLINIC | Age: 74
End: 2019-08-08
Payer: MEDICARE

## 2019-08-08 DIAGNOSIS — M54.50 ACUTE LEFT-SIDED LOW BACK PAIN WITHOUT SCIATICA: Primary | ICD-10-CM

## 2019-08-08 PROCEDURE — 97112 NEUROMUSCULAR REEDUCATION: CPT | Performed by: PHYSICAL THERAPIST

## 2019-08-08 PROCEDURE — 97140 MANUAL THERAPY 1/> REGIONS: CPT | Performed by: PHYSICAL THERAPIST

## 2019-08-08 NOTE — PROGRESS NOTES
Daily Note     Today's date: 2019  Patient name: Ruthie Galarza  :   MRN: [id number]  Referring provider: Kavin Negron MD  Dx:   Encounter Diagnosis     ICD-10-CM    1  Acute left-sided low back pain without sciatica M54 5                   Subjective: Asad Arango notes an overall reduction in pain  She states that she is ambulating without a limp      Objective: See treatment diary below      Assessment: Tolerated treatment well  Patient would benefit from continued PT      Plan: Continue per plan of care        Precautions: Balance and FALL RISK;  Sjogrens, Vertigo, R Knee OA, Spinal Stenosis      Manual   8/     iastm - L; R s/l  10' 15 15 15 12' 10  10     L hip              HEP instruction 10            (-) pressure iastm with hip flexion        5'     JM L TP of L5-S1        3'         :20 o5Psxjteit Diary   8/                  Bike  nv  4 - no res 5 min 5' 5 5'                  SKC  4 x :20 5 x :20 :20x5 20" x 5 5 x :20 5 x :20  5 x :20     Hamstring st   :20x5 :20x 5  20" x 4 5 x :20 :20 x 5  5 x :20     Glut St  4 x :20 :20x5 :20 x 5  20" x 5 5 x :20 :20 x5  5 x :20     LTR  5 x :10 5 x :10 :10x 5  10" x 5 5 x :20 :20 x 5 5 x :20     PT    :05 x 10 5"x 10 nv  X 10     Gastroc stretch   :20 x 5 :20x 5  20" x 5 5 x :20 :20  X 5  5 x :20     TM     When madyson        Ball forward flexion to R        10 x :10     Hip ABD iso        nv                  S/l ABD        10                                                                                       Modalities   8/8     TENS - s/l R with CP - post 10' 10  missed  missed PT error 10'       [facility] seated - pre 10' 10  missed  10 min 10' 10 10'

## 2019-08-14 ENCOUNTER — APPOINTMENT (OUTPATIENT)
Dept: PHYSICAL THERAPY | Facility: CLINIC | Age: 74
End: 2019-08-14
Payer: MEDICARE

## 2019-08-16 ENCOUNTER — OFFICE VISIT (OUTPATIENT)
Dept: PHYSICAL THERAPY | Facility: CLINIC | Age: 74
End: 2019-08-16
Payer: MEDICARE

## 2019-08-16 ENCOUNTER — ANTICOAG VISIT (OUTPATIENT)
Dept: INTERNAL MEDICINE CLINIC | Facility: CLINIC | Age: 74
End: 2019-08-16

## 2019-08-16 ENCOUNTER — APPOINTMENT (OUTPATIENT)
Dept: LAB | Facility: CLINIC | Age: 74
End: 2019-08-16
Payer: MEDICARE

## 2019-08-16 DIAGNOSIS — M54.50 ACUTE LEFT-SIDED LOW BACK PAIN WITHOUT SCIATICA: Primary | ICD-10-CM

## 2019-08-16 DIAGNOSIS — I81 PORTAL VEIN THROMBOSIS: ICD-10-CM

## 2019-08-16 PROCEDURE — 97140 MANUAL THERAPY 1/> REGIONS: CPT | Performed by: PHYSICAL THERAPIST

## 2019-08-16 PROCEDURE — 97112 NEUROMUSCULAR REEDUCATION: CPT | Performed by: PHYSICAL THERAPIST

## 2019-08-16 PROCEDURE — 97110 THERAPEUTIC EXERCISES: CPT | Performed by: PHYSICAL THERAPIST

## 2019-08-16 NOTE — PROGRESS NOTES
Daily Note     Today's date: 2019  Patient name: Amber Horn  : [birth year]  MRN: [id number]  Referring provider: Yariel Qureshi MD  Dx:   Encounter Diagnosis     ICD-10-CM    1  Acute left-sided low back pain without sciatica M54 5        Start Time: 1000  Stop Time: 1040  Total time in clinic (min): 40 minutes    Subjective: Patient reports that gait remains improved, except for the first couple of steps after sitting for prolonged periods of time  Objective: See treatment diary below      Assessment: Patient tolerated treatment well with improved sx reported following today's session  Min cueing provided during pelvic tilt exercise to maintain contact with plinth, which she was able to correct  Continue to progress proximal hip strengthening/core stabilization, as tolerated  Plan: Continue per plan of care        Precautions: Balance and FALL RISK;  Sjogrens, Vertigo, R Knee OA, Spinal Stenosis      Manual      iastm - L; R s/l  10' 15 15 15 12' 10  10 10    L hip              HEP instruction 10            (-) pressure iastm with hip flexion        5' nv    JM L TP of L5-S1        3' nv        :20 [id number] Diary                   Bike  nv  4 - no res 5 min 5' 5 5' 5'                 SKC  4 x :20 5 x :20 :20x5 20" x 5 5 x :20 5 x :20  5 x :20 5x20"    Hamstring st   :20x5 :20x 5  20" x 4 5 x :20 :20 x 5  5 x :20 5x20"    Glut St  4 x :20 :20x5 :20 x 5  20" x 5 5 x :20 :20 x5  5 x :20 5x20"    LTR  5 x :10 5 x :10 :10x 5  10" x 5 5 x :20 :20 x 5 5 x :20 5x20"    PT    :05 x 10 5"x 10 nv  X 10 10x5"    Gastroc stretch   :20 x 5 :20x 5  20" x 5 5 x :20 :20  X 5  5 x :20 5x20"    TM     When madyson        Ball forward flexion to R        10 x :10 20x5:    Hip ABD iso        nv nv                 S/l ABD        10 10x Modalities  7/18 7/22 7/25 7/29 7/31 8/5 8/8 8/16    TENS - s/l R with CP - post 10' 10  missed  missed PT error 10'       [address] seated - pre 10' 10  missed  10 min 10' 10 10'

## 2019-08-19 ENCOUNTER — OFFICE VISIT (OUTPATIENT)
Dept: PHYSICAL THERAPY | Facility: CLINIC | Age: 74
End: 2019-08-19
Payer: MEDICARE

## 2019-08-19 DIAGNOSIS — M54.50 ACUTE LEFT-SIDED LOW BACK PAIN WITHOUT SCIATICA: Primary | ICD-10-CM

## 2019-08-19 PROCEDURE — 97140 MANUAL THERAPY 1/> REGIONS: CPT | Performed by: PHYSICAL THERAPIST

## 2019-08-19 PROCEDURE — 97110 THERAPEUTIC EXERCISES: CPT | Performed by: PHYSICAL THERAPIST

## 2019-08-19 PROCEDURE — 97112 NEUROMUSCULAR REEDUCATION: CPT | Performed by: PHYSICAL THERAPIST

## 2019-08-19 NOTE — PROGRESS NOTES
Daily Note     Today's date: 2019  Patient name: Jaclyn Lizarraga  :   MRN: [id number]  Referring provider: Eugenia Chang MD  Dx:   Encounter Diagnosis     ICD-10-CM    1  Acute left-sided low back pain without sciatica M54 5                   Subjective: Patient reports that she has had less and less pain  She only occasionally has pain in the L buttock  Objective: See treatment diary below      Assessment: Tolerated treatment well  Patient would benefit from continued PT  Supervised by KD, PTA from [phone]      Plan: Continue per plan of care        Precautions: Balance and FALL RISK;  Sjogrens, Vertigo, R Knee OA, Spinal Stenosis      Manual     iastm - L; R s/l  10' 15 15 15 12' 10  10 10 10'   L hip              HEP instruction 10            (-) pressure iastm with hip flexion        5' nv 5'   JM L TP of L5-S1        3' nv        Exercise Diary   8 8                Bike  nv  4 - no res 5 min 5' 5 5' 5' 5'                SKC  4 x :20 5 x :20 :20x5 20" x 5 5 x :20 5 x :20  5 x :20 5x20" 5 x :20   Hamstring st   :20x5 :20x 5  20" x 4 5 x :20 :20 x 5  5 x :20 5x20" 5 x :20   Glut St  4 x :20 :20x5 :20 x 5  20" x 5 5 x :20 :20 x5  5 x :20 5x20" 5 x :20   LTR  5 x :10 5 x :10 :10x 5  10" x 5 5 x :20 :20 x 5 5 x :20 5x20" 5X :20   PT    :05 x 10 5"x 10 nv  X 10 10x5" 10 x :05   Gastroc stretch   :20 x 5 :20x 5  20" x 5 5 x :20 :20  X 5  5 x :20 5x20" 5 x :20   TM     When madyson        Ball forward flexion to R        10 x :10 20x5: 5x:20   Hip ABD iso        nv nv                 S/l ABD        10 10x                                                                                      Modalities  7/18 7   TENS - s/l R with CP - post 10' 10  missed  missed PT error 10'    10'    seated - pre 10' 10  missed  10 min 10' 10 10'  decl

## 2019-08-22 ENCOUNTER — OFFICE VISIT (OUTPATIENT)
Dept: PHYSICAL THERAPY | Facility: CLINIC | Age: 74
End: 2019-08-22
Payer: MEDICARE

## 2019-08-22 DIAGNOSIS — M54.50 ACUTE LEFT-SIDED LOW BACK PAIN WITHOUT SCIATICA: Primary | ICD-10-CM

## 2019-08-22 PROCEDURE — 97140 MANUAL THERAPY 1/> REGIONS: CPT | Performed by: PHYSICAL THERAPIST

## 2019-08-22 PROCEDURE — 97110 THERAPEUTIC EXERCISES: CPT | Performed by: PHYSICAL THERAPIST

## 2019-08-22 PROCEDURE — 97112 NEUROMUSCULAR REEDUCATION: CPT | Performed by: PHYSICAL THERAPIST

## 2019-08-22 NOTE — PROGRESS NOTES
Daily Note     Today's date: 2019  Patient name: Jenn Shaw  :   MRN: [id number]  Referring provider: Eduard Mercer MD  Dx:   Encounter Diagnosis     ICD-10-CM    1  Acute left-sided low back pain without sciatica M54 5                   Subjective:  Patient continues to report a reduction of pain in the L buttock region  Objective: See treatment diary below      Assessment: Tolerated treatment well  Patient would benefit from continued PT      Plan: Continue per plan of care        Precautions: Balance and FALL RISK;  Sjogrens, Vertigo, R Knee OA, Spinal Stenosis      Manual     iastm - L; R s/l 10' 10' 15 15 15 12' 10  10 10 10'   L hip              HEP instruction 10            (-) pressure iastm with hip flexion 5'       5' nv 5'   JM L TP of L5-S1        3' nv        Exercise Diary                  Bike 6' nv  4 - no res 5 min 5' 5 5' 5' 5'                SKC 5 x :20 4 x :20 5 x :20 :20x5 20" x 5 5 x :20 5 x :20  5 x :20 5x20" 5 x :20   Hamstring st 5 x :20  :20x5 :20x 5  20" x 4 5 x :20 :20 x 5  5 x :20 5x20" 5 x :20   Glut St 5 x :20 4 x :20 :20x5 :20 x 5  20" x 5 5 x :20 :20 x5  5 x :20 5x20" 5 x :20   LTR 5 x :20 5 x :10 5 x :10 :10x 5  10" x 5 5 x :20 :20 x 5 5 x :20 5x20" 5X :20   PT 5 x :20   :05 x 10 5"x 10 nv  X 10 10x5" 10 x :05   Gastroc stretch 5 x :20  :20 x 5 :20x 5  20" x 5 5 x :20 :20  X 5  5 x :20 5x20" 5 x :20   TM     When madyson        Ball forward flexion to R 5 x :20       10 x :10 20x5: 5x:20   Hip ABD iso 10 x :05       nv nv                 S/l ABD        10 10x                                                                                      Modalities     TENS - s/l R with CP - post  10  missed  missed PT error 10'    10'    seated - pre  10  missed  10 min 10' 10 10'  decl

## 2019-08-23 ENCOUNTER — OFFICE VISIT (OUTPATIENT)
Dept: INTERNAL MEDICINE CLINIC | Facility: CLINIC | Age: 74
End: 2019-08-23
Payer: MEDICARE

## 2019-08-23 VITALS
HEART RATE: 78 BPM | HEIGHT: 63 IN | DIASTOLIC BLOOD PRESSURE: 78 MMHG | WEIGHT: 143.4 LBS | RESPIRATION RATE: 18 BRPM | TEMPERATURE: 97.8 F | BODY MASS INDEX: 25.41 KG/M2 | SYSTOLIC BLOOD PRESSURE: 126 MMHG | OXYGEN SATURATION: 97 %

## 2019-08-23 DIAGNOSIS — R73.03 PREDIABETES: ICD-10-CM

## 2019-08-23 DIAGNOSIS — E53.8 VITAMIN B12 DEFICIENCY: ICD-10-CM

## 2019-08-23 DIAGNOSIS — E22.2 SIADH (SYNDROME OF INAPPROPRIATE ADH PRODUCTION) (HCC): ICD-10-CM

## 2019-08-23 DIAGNOSIS — E83.42 HYPOMAGNESEMIA: ICD-10-CM

## 2019-08-23 DIAGNOSIS — E03.9 ACQUIRED HYPOTHYROIDISM: ICD-10-CM

## 2019-08-23 DIAGNOSIS — Z71.9 HEALTH COUNSELING: ICD-10-CM

## 2019-08-23 DIAGNOSIS — M54.50 ACUTE LEFT-SIDED LOW BACK PAIN WITHOUT SCIATICA: ICD-10-CM

## 2019-08-23 DIAGNOSIS — F33.1 MODERATE EPISODE OF RECURRENT MAJOR DEPRESSIVE DISORDER (HCC): Primary | ICD-10-CM

## 2019-08-23 DIAGNOSIS — E78.49 OTHER HYPERLIPIDEMIA: ICD-10-CM

## 2019-08-23 DIAGNOSIS — D50.9 IRON DEFICIENCY ANEMIA, UNSPECIFIED IRON DEFICIENCY ANEMIA TYPE: ICD-10-CM

## 2019-08-23 DIAGNOSIS — M51.36 LUMBAR DEGENERATIVE DISC DISEASE: ICD-10-CM

## 2019-08-23 DIAGNOSIS — M54.16 LUMBAR RADICULOPATHY: ICD-10-CM

## 2019-08-23 DIAGNOSIS — G25.0 ESSENTIAL TREMOR: ICD-10-CM

## 2019-08-23 DIAGNOSIS — E55.9 VITAMIN D DEFICIENCY: ICD-10-CM

## 2019-08-23 PROBLEM — B37.31 VULVAR CANDIDIASIS: Status: RESOLVED | Noted: 2018-11-06 | Resolved: 2019-08-23

## 2019-08-23 PROBLEM — B37.3 VULVAR CANDIDIASIS: Status: RESOLVED | Noted: 2018-11-06 | Resolved: 2019-08-23

## 2019-08-23 PROCEDURE — 99214 OFFICE O/P EST MOD 30 MIN: CPT | Performed by: INTERNAL MEDICINE

## 2019-08-23 RX ORDER — TIZANIDINE 2 MG/1
2 TABLET ORAL 2 TIMES DAILY PRN
Qty: 60 TABLET | Refills: 0 | Status: SHIPPED | OUTPATIENT
Start: 2019-08-23 | End: 2019-09-14 | Stop reason: SDUPTHER

## 2019-08-23 RX ORDER — HYDROCODONE BITARTRATE AND ACETAMINOPHEN 5; 325 MG/1; MG/1
1 TABLET ORAL EVERY 6 HOURS PRN
Qty: 120 TABLET | Refills: 0 | Status: SHIPPED | OUTPATIENT
Start: 2019-08-23 | End: 2019-10-09 | Stop reason: SDUPTHER

## 2019-08-23 RX ORDER — BUPROPION HYDROCHLORIDE 150 MG/1
150 TABLET ORAL DAILY
Qty: 90 TABLET | Refills: 1 | Status: SHIPPED | OUTPATIENT
Start: 2019-08-23 | End: 2019-09-17

## 2019-08-23 NOTE — ASSESSMENT & PLAN NOTE
Takes 1/2 tab of bupropion 300 mg, also on Lexapro  Symptoms stable, takes lorazepam at least twice a day

## 2019-08-23 NOTE — PROGRESS NOTES
Assessment/Plan:    Lumbar degenerative disc disease  Improved, continue physical therapy  May take tizanidine prn  On gabapentin, still with intermittent LLE pain  Moderate episode of recurrent major depressive disorder ([facility] [address] )  Takes 1/2 tab of bupropion 300 mg, also on Lexapro  Symptoms stable, takes lorazepam at least twice a day  Essential tremor  She feels DBS needs to be adjusted, has been slurring her words more lately  Follow up with neurology as scheduled  Hyperlipidemia  Lipids due, on statin  Essential hypertension  BP stable, on lisinopril, diltiazem and torsemide  Hypomagnesemia  Takes Mg daily  Portal vein thrombosis  Repeat INR due next month  SIADH (syndrome of inappropriate ADH production) ([facility])  Taking NaCl tid, per nephrology  Acquired hypothyroidism  Stable  Mild intermittent asthma without complication  No recent exacerbation, using Advair regularly  Anxiety  Improved, advised against weaning off medication  GERD (gastroesophageal reflux disease)  On daily PPI  Sjogren's syndrome ([facility] [address] )  On Plaquenil, per rheum  Due for eye exam     [facility] )  On diltiazem, no symptoms  Follow up with cardiology next month  Diagnoses and all orders for this visit:    Moderate episode of recurrent major depressive disorder (HCC)  -     buPROPion (WELLBUTRIN XL) 150 mg 24 hr tablet; Take 1 tablet (150 mg total) by mouth daily    Acute left-sided low back pain without sciatica  -     tiZANidine (ZANAFLEX) 2 mg tablet; Take 1 tablet (2 mg total) by mouth 2 (two) times a day as needed for muscle spasms    Lumbar radiculopathy  -     HYDROcodone-acetaminophen (NORCO) 5-325 mg per tablet; Take 1 tablet by mouth every 6 (six) hours as needed for painMax Daily Amount: 4 tablets    Other hyperlipidemia  -     Hepatic function panel; Future    Hypomagnesemia  -     Magnesium;  Future    Acquired hypothyroidism  -     CBC and differential; Future  - Lipid panel; Future  -     TSH, 3rd generation with Free T4 reflex; Future    Iron deficiency anemia, unspecified iron deficiency anemia type  -     CBC and differential; Future    Prediabetes  -     Hemoglobin A1C; Future    Vitamin B12 deficiency  -     Vitamin B12; Future    Vitamin D deficiency  -     Vitamin D 25 hydroxy; Future    Lumbar degenerative disc disease    SIADH (syndrome of inappropriate ADH production) ([facility] )    Essential tremor    Health counseling      Follow up in 4 months or as needed  Subjective:      Patient ID: Marry Lemos is a 75 y o  female  Ms Isela Blancas is feeling better  She reports that her back is feeling better since she started physical therapy  She still feels sore sometimes, mostly in the left lower back area and left buttock  She complains of occasional tingling or sharp pains from her knee down to her left foot  This can occur during the day or at night  She is on gabapentin  She limits her use of hydrocodone, takes it at least twice a day  She reports no hand tremors, but notice that she has been slurring her words recently  She would feel tired and would slur her words and would have a difficult time cold getting words out later in the day  She denies any headache or dizziness, no extremity symptoms  She attributes this to her stimulator, will see neurology later this year  She would like to decrease her anxiety medications, has been taking half a tablet of Wellbutrin only  She denies picking, feeling anxious  She does take lorazepam at least twice a day  The following portions of the patient's history were reviewed and updated as appropriate: allergies, current medications, past medical history, past social history and problem list     Review of Systems   Constitutional: Negative for appetite change and fatigue  HENT: Negative for congestion and ear pain  Eyes: Negative for visual disturbance     Respiratory: Negative for cough and shortness of breath  Cardiovascular: Negative for chest pain and leg swelling  Gastrointestinal: Positive for diarrhea  Negative for abdominal pain, constipation and nausea  Genitourinary: Negative for dysuria and frequency  Musculoskeletal: Positive for arthralgias and back pain  Negative for myalgias  Skin: Negative for rash and wound  Neurological: Positive for numbness  Negative for dizziness and headaches  Hematological: Does not bruise/bleed easily  Psychiatric/Behavioral: Negative for confusion and sleep disturbance  The patient is not nervous/anxious  Objective:      /78   Pulse 78   Temp 97 8 °F (36 6 °C)   Resp 18   Ht 5' 3" (1 6 m)   Wt 65 kg (143 lb 6 4 oz)   SpO2 97%   BMI 25 40 kg/m²          Physical Exam   Constitutional: She is oriented to person, place, and time  She appears well-developed and well-nourished  HENT:   Head: Normocephalic and atraumatic  Nose: Nose normal    Eyes: Pupils are equal, round, and reactive to light  Conjunctivae are normal    Neck: Neck supple  Cardiovascular: Normal rate, regular rhythm and normal heart sounds  Pulmonary/Chest: Effort normal and breath sounds normal  She has no wheezes  She has no rales  Abdominal: Soft  Bowel sounds are normal    Musculoskeletal: She exhibits no edema  Thoracic back: She exhibits spasm  Lumbar back: She exhibits spasm  Neurological: She is alert and oriented to person, place, and time  No cranial nerve deficit  No resting hand tremors   Skin: Skin is warm  No rash noted  Psychiatric: She has a normal mood and affect  Her behavior is normal    Nursing note and vitals reviewed  Lab &/or imaging results reviewed with patient

## 2019-08-23 NOTE — ASSESSMENT & PLAN NOTE
She feels DBS needs to be adjusted, has been slurring her words more lately  Follow up with neurology as scheduled

## 2019-08-23 NOTE — ASSESSMENT & PLAN NOTE
Improved, continue physical therapy  May take tizanidine prn  On gabapentin, still with intermittent LLE pain

## 2019-08-27 ENCOUNTER — OFFICE VISIT (OUTPATIENT)
Dept: PHYSICAL THERAPY | Facility: CLINIC | Age: 74
End: 2019-08-27
Payer: MEDICARE

## 2019-08-27 DIAGNOSIS — M54.50 ACUTE LEFT-SIDED LOW BACK PAIN WITHOUT SCIATICA: Primary | ICD-10-CM

## 2019-08-27 PROCEDURE — 97112 NEUROMUSCULAR REEDUCATION: CPT | Performed by: PHYSICAL THERAPIST

## 2019-08-27 PROCEDURE — 97110 THERAPEUTIC EXERCISES: CPT | Performed by: PHYSICAL THERAPIST

## 2019-08-27 PROCEDURE — 97140 MANUAL THERAPY 1/> REGIONS: CPT | Performed by: PHYSICAL THERAPIST

## 2019-08-27 NOTE — PROGRESS NOTES
PT Re-evaluation     Today's date: 2019  Patient name: Amberly Perry  :   MRN: [id number]  Referring provider: Anne Vasquez MD  Dx:   Encounter Diagnosis     ICD-10-CM    1  Acute left-sided low back pain without sciatica M54 5 Ambulatory referral to Physical Therapy    Start muscle relaxant, already maximized on gabapentin, taking Vicodin as needed  Recommend to see pain again, will need imaging (CT scan)  Assessment  Assessment details: Amberly Perry has been attending physical therapy  for Acute left-sided low back pain without sciatica following multiple falls in her home  She presents with improving pain, strength, and ROM, and ambulation    Patient has improved ability to  perform a/iadls, recreational activities and  social activities  Patient's clinical presentation is consistent with their referring diagnosis  Patient would benefit from skilled physical therapy to address their aforementioned impairments, improve their level of function and to improve their overall quality of life   IShe has been given a home exercise program and is in agreement with the plan of care  Thank you for your referral     Impairments: abnormal coordination, abnormal gait, abnormal muscle firing, abnormal or restricted ROM, activity intolerance, impaired balance, impaired physical strength, lacks appropriate home exercise program, pain with function, safety issue and poor posture     Symptom irritability: moderateBarriers to therapy: Multiple medical conditions effecting balance and gait    Understanding of Dx/Px/POC: excellent   Prognosis: good    Goals  ST Goals - 2-4 weeks  1  Patient will report decreased pain with activity by at least 2 points within 4 weeks  2  Patient will improve ROM 5-10 degrees within 4 weeks  3  Patient will demonstrate ability to actively correct posture without cueing within 4 weeks  4  Patient will perform IADLs without pain in 2 weeks  5    Patient will increase strength by 25% in 4 weeks    LT Goals - Discharge  1  Patient will improve FOTO score to maximum stated or greater by discharge  2  Patient will return to preferred recreational activity without significant pain increase by discharge   3  Patient will return to all home  activities without pain by discharge     Plan  Patient would benefit from: skilled physical therapy  Referral necessary: Yes  Planned modality interventions: cryotherapy, thermotherapy: hydrocollator packs and TENS  Planned therapy interventions: joint mobilization, manual therapy, neuromuscular re-education, therapeutic activities, therapeutic exercise, home exercise program, graded exercise and graded activity  Frequency: 2x week  Duration in visits: 20  Duration in weeks: 20  Plan of Care beginning date: 8/27/2019  Plan of Care expiration date: 10/27/2019  Treatment plan discussed with: patient        Subjective Evaluation    History of Present Illness  Mechanism of injury: Patient reports that approximately 3 months ago she fell on her L side  She was sitting on the edge of her bed and tried to get up but she lost her balance and fell  She also reports that she had also fallen in the bath tub a week or so prior  She indicates that she has not been able to stand up straight since the second fall  She has difficulty with sit to stand and has pain initiating ambulation  She feels she limps somewhat due to LLBP and calf muscle discomfort  She also notes L calf radicular sx with prolonged sittiing  She states she has tingling at her hip as well  She indicates that she has a history of  Balance disorder  She states that she uses furniture during the night to walk to the bathroom  She takes medication when she is dizzy  She also has gone to therapy for vertigo      HX:  Lumbar spinal stenosis (has had injections), R knee OA, vertigo, neuropathy, Sjogrens, Osteopenia, brain stimulator for tremors    Function: ambulation, prolonged ambulation, stairs, lifting, bending          Recurrent probem    Quality of life: excellent    Pain  Current pain ratin/0  At best pain ratin/0  At worst pain ratin/ 2-3  Location: L Lumbar, Hip, calf  Quality: dull ache, radiating and sharp  Relieving factors: change in position, rest, heat and ice  Aggravating factors: sitting, walking, stair climbing and lifting  Progression: worsening    Social Support  Steps to enter house: yes  Stairs in house: yes   Lives in: multiple-level home  Lives with: spouse    Employment status: not working    Diagnostic Tests  No diagnostic tests performed  Treatments  Previous treatment: injection treatment and medication  Patient Goals  Patient goals for therapy: decreased pain  Patient goal: straighten up and walk and decrease pain        Objective     Concurrent Complaints  Positive for night pain, disturbed sleep, cardiac problem and history of trauma  Additional Special Questions  Patient cannot lay on the L hip and has pain when she is changing positions  Static Posture     Thoracic Spine  Hyperkyphosis  Lumbar Spine   Flattened  Postural Observations  Seated posture: fair  Standing posture: fair  Correction of posture: makes symptoms worse    Additional Postural Observation Details  Sitting with roll behind back    Palpation   Left   Hypertonic in the quadratus lumborum  Muscle spasm in the quadratus lumborum  Tenderness of the erector spinae, lumbar paraspinals and quadratus lumborum  Trigger point to quadratus lumborum  Tenderness     Lumbar Spine  Tenderness in the spinous process and left transverse process       Additional Tenderness Details  L iliac crest - painful throughout      Neurological Testing     Sensation     Lumbar   Left   Intact: light touch    Reflexes   Left   Patellar (L4): brisk (3+)    Active Range of Motion     Lumbar   Flexion: 20 degrees  with pain Restriction level: moderate/ 90 degrees  Extension: 15 degrees / 17 degrees  Left lateral flexion: 20 degrees /28 degrees   with pain Restriction level: moderate  Right lateral flexion: 15 degrees  with pain Restriction level: moderate/25 degrees    Joint Play     Hypomobile: T12, L1, L2, L3, L4, L5 and S1     Pain: L3, L4 and L5   Mechanical Assessment    Cervical      Thoracic      Lumbar    Sitting flexion: sustained positions  Pain location: no change  Pain intensity: better    Tests     Lumbar     Left   Negative passive SLR and slump test      Ambulation     Ambulation: Level Surfaces   Ambulation without assistive device: independent    Observational Gait   Gait: antalgic   Decreased walking speed, stride length, left stance time, left swing time and left step length     Left arm swing: decreased    General Comments:    Lower quarter screen   Knees: unremarkable  Foot/ankle: unremarkable    Hip Comments   L Hip ABD/ADD 4/5;  R:  5/5

## 2019-08-27 NOTE — PROGRESS NOTES
Daily Note     Today's date: 2019  Patient name: Ruthie Galarza  :   MRN: [id number]  Referring provider: Kavin Negron MD  Dx:   Encounter Diagnosis     ICD-10-CM    1  Acute left-sided low back pain without sciatica M54 5                   Subjective: Patient reports that she has been taking muscle relaxants since Friday and feels a reduction in her sx  Objective: See treatment diary below      Assessment: Tolerated treatment well  Patient would benefit from continued PT      Plan: Continue per plan of care        Precautions: Balance and FALL RISK;  Sjogrens, Vertigo, R Knee OA, Spinal Stenosis      Manual     iastm - L; R s/l 10' 10' 15 15 15 12' 10  10 10 10'   L hip              REV 10 10'           (-) pressure iastm with hip flexion 5'       5' nv 5'   JM L TP of L5-S1        3' nv        Exercise Diary                  Bike 6' 6'  4 - no res 5 min 5' 5 5' 5' 5'                SKC 5 x :20 4 x :20 5 x :20 :20x5 20" x 5 5 x :20 5 x :20  5 x :20 5x20" 5 x :20   Hamstring st 5 x :20 5xL:20 :20x5 :20x 5  20" x 4 5 x :20 :20 x 5  5 x :20 5x20" 5 x :20   Glut St 5 x :20 4 x :20 :20x5 :20 x 5  20" x 5 5 x :20 :20 x5  5 x :20 5x20" 5 x :20   LTR 5 x :20 5 x :10 5 x :10 :10x 5  10" x 5 5 x :20 :20 x 5 5 x :20 5x20" 5X :20   PT 5 x :20 10 x :05  :05 x 10 5"x 10 nv  X 10 10x5" 10 x :05   Gastroc stretch 5 x :20 5 x :20 :20 x 5 :20x 5  20" x 5 5 x :20 :20  X 5  5 x :20 5x20" 5 x :20   TM     When madyson        Ball forward flexion to R 5 x :20       10 x :10 20x5: 5x:20   Hip ABD iso 10 x :05 10 x :05      nv nv                 S/l ABD        10 10x                                                                                      Modalities     TENS - s/l R with CP - post   missed  missed PT error 10'    10'   [address] seated - pre   missed  10 min 10' 10 10' decl

## 2019-08-29 ENCOUNTER — OFFICE VISIT (OUTPATIENT)
Dept: PHYSICAL THERAPY | Facility: CLINIC | Age: 74
End: 2019-08-29
Payer: MEDICARE

## 2019-08-29 DIAGNOSIS — M54.50 ACUTE LEFT-SIDED LOW BACK PAIN WITHOUT SCIATICA: Primary | ICD-10-CM

## 2019-08-29 PROCEDURE — 97140 MANUAL THERAPY 1/> REGIONS: CPT | Performed by: PHYSICAL THERAPIST

## 2019-08-29 PROCEDURE — 97110 THERAPEUTIC EXERCISES: CPT | Performed by: PHYSICAL THERAPIST

## 2019-08-29 NOTE — PROGRESS NOTES
PT Re-evaluation     Today's date: 2019  Patient name: Dominic Reis  :   MRN: [id number]  Referring provider: Charlie Cantrell MD  Dx:   Encounter Diagnosis     ICD-10-CM    1  Acute left-sided low back pain without sciatica M54 5 Ambulatory referral to Physical Therapy    Start muscle relaxant, already maximized on gabapentin, taking Vicodin as needed  Recommend to see pain again, will need imaging (CT scan)  Assessment  Assessment details: Dominic Reis is a 75 y o  Female who has been attending physical therapy for Acute left-sided low back pain without sciatica following multiple falls in her home  She presents with resolving pain, increasing  strength,  ROM, and function  Patient has improved performing a/iadls, and recreational activities  Patient would benefit from skilled physical therapy to address their aforementioned impairments, improve their level of function and to improve their overall quality of life   Corrie has been given a home exercise program and is in agreement with the plan of care  Thank you for your referral     Impairments: abnormal coordination, abnormal gait, abnormal muscle firing, abnormal or restricted ROM, activity intolerance, impaired balance, impaired physical strength, lacks appropriate home exercise program, pain with function, safety issue and poor posture     Symptom irritability: moderateBarriers to therapy: Multiple medical conditions effecting balance and gait    Understanding of Dx/Px/POC: excellent   Prognosis: good    Goals  ST Goals - 2-4 weeks  1  Patient will report decreased pain with activity by at least 2 points within 4 weeks  2  Patient will improve ROM 5-10 degrees within 4 weeks  3  Patient will demonstrate ability to actively correct posture without cueing within 4 weeks  4  Patient will perform IADLs without pain in 2 weeks  5  Patient will increase strength by 25% in 4 weeks    LT Goals - Discharge  1   Patient will improve FOTO score to maximum stated or greater by discharge  2  Patient will return to preferred recreational activity without significant pain increase by discharge   3  Patient will return to all home  activities without pain by discharge     Plan  Patient would benefit from: skilled physical therapy  Referral necessary: Yes  Planned modality interventions: cryotherapy, thermotherapy: hydrocollator packs and TENS  Planned therapy interventions: joint mobilization, manual therapy, neuromuscular re-education, therapeutic activities, therapeutic exercise, home exercise program, graded exercise and graded activity  Frequency: 2x week  Duration in visits: 20  Duration in weeks: 20  Plan of Care beginning date: 7/17/2019  Plan of Care expiration date: 9/17/2019  Treatment plan discussed with: patient        Subjective Evaluation    History of Present Illness  Mechanism of injury: Patient reports that approximately 3 months ago she fell on her L side  She was sitting on the edge of her bed and tried to get up but she lost her balance and fell  She also reports that she had also fallen in the bath tub a week or so prior  She indicates that she has not been able to stand up straight since the second fall  She has difficulty with sit to stand and has pain initiating ambulation  She feels she limps somewhat due to LLBP and calf muscle discomfort  She also notes L calf radicular sx with prolonged sittiing  She states she has tingling at her hip as well  She indicates that she has a history of  Balance disorder  She states that she uses furniture during the night to walk to the bathroom  She takes medication when she is dizzy  She also has gone to therapy for vertigo      HX:  Lumbar spinal stenosis (has had injections), R knee OA, vertigo, neuropathy, Sjogrens, Osteopenia, brain stimulator for tremors    Function: ambulation, prolonged ambulation, stairs, lifting, bending          Recurrent probem    Quality of life: excellent    Pain  Current pain ratin/0  At best pain ratin/0  At worst pain ratin/3  Location: L Lumbar, Hip, calf  Quality: dull ache, radiating and sharp  Relieving factors: change in position, rest, heat and ice  Aggravating factors: sitting, walking, stair climbing and lifting  Progression: worsening    Social Support  Steps to enter house: yes  Stairs in house: yes   Lives in: multiple-level home  Lives with: spouse    Employment status: not working    Diagnostic Tests  No diagnostic tests performed  Treatments  Previous treatment: injection treatment and medication  Patient Goals  Patient goals for therapy: decreased pain  Patient goal: straighten up and walk and decrease pain        Objective     Concurrent Complaints  Positive for night pain, disturbed sleep, cardiac problem and history of trauma  Additional Special Questions  Patient cannot lay on the L hip and has pain when she is changing positions  Static Posture     Thoracic Spine  Hyperkyphosis  Lumbar Spine   Flattened  Postural Observations  Seated posture: fair  Standing posture: fair  Correction of posture: makes symptoms worse    Additional Postural Observation Details  Sitting with roll behind back    Palpation   Left   Hypertonic in the quadratus lumborum  Muscle spasm in the quadratus lumborum  Tenderness of the erector spinae, lumbar paraspinals and quadratus lumborum  Trigger point to quadratus lumborum  Tenderness     Lumbar Spine  Tenderness in the spinous process and left transverse process       Additional Tenderness Details  L iliac crest - painful throughout      Neurological Testing     Sensation     Lumbar   Left   Intact: light touch    Reflexes   Left   Patellar (L4): brisk (3+)    Active Range of Motion     Lumbar   Flexion: 20 degrees  with pain Restriction level: moderate/ 90 degrees  Extension: 15 degrees /20 degrees  Left lateral flexion: 20 degrees / 24 degrees   with pain Restriction level: moderate  Right lateral flexion: 15 degrees  with pain Restriction level: moderate/25 degrees    Joint Play     Hypomobile: T12, L1, L2, L3, L4, L5 and S1     Pain: L3, L4 and L5   Mechanical Assessment    Cervical      Thoracic      Lumbar    Sitting flexion: sustained positions  Pain location: no change  Pain intensity: better    Tests     Lumbar     Left   Negative passive SLR and slump test      Ambulation     Ambulation: Level Surfaces   Ambulation without assistive device: independent    Observational Gait   Gait: antalgic   Decreased walking speed, stride length, left stance time, left swing time and left step length     Left arm swing: decreased    General Comments:    Lower quarter screen   Knees: unremarkable  Foot/ankle: unremarkable    Hip Comments   L Hip ABD/ADD 4/5;  R:  5/5

## 2019-08-29 NOTE — PROGRESS NOTES
Daily Note     Today's date: 2019  Patient name: Radha Patient  : [id number]  MRN: [id number]  Referring provider: Juan F Callahan MD  Dx:   Encounter Diagnosis     ICD-10-CM    1  Acute left-sided low back pain without sciatica M54 5                   Subjective: Patient reports that her L buttock is "feeling good "      Objective: See treatment diary below      Assessment: Tolerated treatment well  Patient would benefit from continued PT  Unbilled 15'      Plan: Continue per plan of care        Precautions: Balance and FALL RISK;  Sjogrens, Vertigo, R Knee OA, Spinal Stenosis      Manual     iastm - L; R s/l 10' 10' 15 15 15 12' 10  10 10 10'   L hip              REV   10'          (-) pressure iastm with hip flexion 5'       5' nv 5'   JM L TP of L5-S1        3' nv        Exercise Diary                  Bike 6' 6' 6' 4 - no res 5 min 5' 5 5' 5' 5'                SKC 5 x :20 4 x :20 5 x :20 :20x5 20" x 5 5 x :20 5 x :20  5 x :20 5x20" 5 x :20   Hamstring st 5 x :20 5xL:20 :20x5 :20x 5  20" x 4 5 x :20 :20 x 5  5 x :20 5x20" 5 x :20   Glut St 5 x :20 4 x :20 :20x5 :20 x 5  20" x 5 5 x :20 :20 x5  5 x :20 5x20" 5 x :20   LTR 5 x :20 5 x :10 5 x :10 :10x 5  10" x 5 5 x :20 :20 x 5 5 x :20 5x20" 5X :20   PT 5 x :20 10 x :05 10 x :10 :05 x 10 5"x 10 nv  X 10 10x5" 10 x :05   Gastroc stretch 5 x :20 5 x :20 :20 x 5 :20x 5  20" x 5 5 x :20 :20  X 5  5 x :20 5x20" 5 x :20   TM     When amdyson        Ball forward flexion to R 5 x :20  5 x :20     10 x :10 20x5: 5x:20   Hip ABD iso 10 x :05 10 x :05 10 x :10     nv nv                 S/l ABD        10 10x    SLS   10 x :10                                                                               Modalities       8   TENS - s/l R with CP - post     missed PT error 10'    10'    seated - pre     10 min 10' 10 10'  decl

## 2019-09-03 ENCOUNTER — OFFICE VISIT (OUTPATIENT)
Dept: PHYSICAL THERAPY | Facility: CLINIC | Age: 74
End: 2019-09-03
Payer: MEDICARE

## 2019-09-03 DIAGNOSIS — I81 PORTAL VEIN THROMBOSIS: ICD-10-CM

## 2019-09-03 DIAGNOSIS — M54.50 ACUTE LEFT-SIDED LOW BACK PAIN WITHOUT SCIATICA: Primary | ICD-10-CM

## 2019-09-03 PROCEDURE — 97112 NEUROMUSCULAR REEDUCATION: CPT

## 2019-09-03 PROCEDURE — 97110 THERAPEUTIC EXERCISES: CPT

## 2019-09-03 PROCEDURE — 97140 MANUAL THERAPY 1/> REGIONS: CPT

## 2019-09-03 NOTE — PROGRESS NOTES
Daily Note     Today's date: 9/3/2019  Patient name: Tamela Jones  :   MRN: [id number]  Referring provider: Lisa Duckworth MD  Dx:   Encounter Diagnosis     ICD-10-CM    1  Acute left-sided low back pain without sciatica M54 5    2  Portal vein thrombosis I81                   Subjective: Pt reports tightness/pain along L side of low back after waking up this am      Objective: See treatment diary below      Assessment: Tolerated treatment well  Pt with good tolerance to therapeutic exercise, verbal and tactile cueing required for proper form and reps throughout exercises  Pt noted decreased tightness and pain post IASTM to piriformis and L lumbar region  Patient would benefit from continued PT  Plan: Continue per plan of care      Pt 1:1 with PTA JW 8:45-9:30     Precautions: Balance and FALL RISK;  Sjogrens, Vertigo, R Knee OA, Spinal Stenosis      Manual  8/22 8/27 8/29 9/3 7/29 7/31 8/5 8/8 8/16 8/19   iastm - L; R s/l 10' 10' 15' 15' 15 12' 10  10 10 10'   L hip              REV   10'          (-) pressure iastm with hip flexion 5'       5' nv 5'   JM L TP of L5-S1        3' nv        Exercise Diary  8/22 8/27 8/29 9/3 7/29 7/31 8/5 8/8 8/16 8/19                Bike 6' 6' 6' 6' 5 min 5' 5 5' 5' 5'                SKC 5 x :20 4 x :20 5 x :20 5 x :20 20" x 5 5 x :20 5 x :20  5 x :20 5x20" 5 x :20   Hamstring st 5 x :20 5xL:20 :20x5 5 x :20 20" x 4 5 x :20 :20 x 5  5 x :20 5x20" 5 x :20   Glut St 5 x :20 4 x :20 :20x5 5 x :20 20" x 5 5 x :20 :20 x5  5 x :20 5x20" 5 x :20   LTR 5 x :20 5 x :10 5 x :10 5 x :10  10" x 5 5 x :20 :20 x 5 5 x :20 5x20" 5X :20   PT 5 x :20 10 x :05 10 x :10 10 x :10 5"x 10 nv  X 10 10x5" 10 x :05   Gastroc stretch 5 x :20 5 x :20 :20 x 5 :20x 5  20" x 5 5 x :20 :20  X 5  5 x :20 5x20" 5 x :20   TM    -- When madyson        Ball forward flexion to R 5 x :20  5 x :20 5 x :20    10 x :10 20x5: 5x:20   Hip ABD iso 10 x :05 10 x :05 10 x :10 10 x :10    nv nv    S/l ABD --    10 10x    SLS   10 x :10 10 x :10                                                                              Modalities  9/3    7/29 7/31 8/5 8/8 8/16 8/19   TENS - s/l R with CP - post NP    missed PT error 10'    10'    seated - pre NP    10 min 10' 10 10'  decl

## 2019-09-05 ENCOUNTER — OFFICE VISIT (OUTPATIENT)
Dept: PHYSICAL THERAPY | Facility: CLINIC | Age: 74
End: 2019-09-05
Payer: MEDICARE

## 2019-09-05 DIAGNOSIS — M54.50 ACUTE LEFT-SIDED LOW BACK PAIN WITHOUT SCIATICA: Primary | ICD-10-CM

## 2019-09-05 PROCEDURE — 97112 NEUROMUSCULAR REEDUCATION: CPT | Performed by: PHYSICAL MEDICINE & REHABILITATION

## 2019-09-05 PROCEDURE — 97110 THERAPEUTIC EXERCISES: CPT | Performed by: PHYSICAL MEDICINE & REHABILITATION

## 2019-09-05 PROCEDURE — 97140 MANUAL THERAPY 1/> REGIONS: CPT | Performed by: PHYSICAL MEDICINE & REHABILITATION

## 2019-09-05 NOTE — PROGRESS NOTES
Daily Note     Today's date: 2019  Patient name: Citlaly Jimenez  : [birth year]  MRN: [id number]  Referring provider: Indira Del Rio MD  Dx:   Encounter Diagnosis     ICD-10-CM    1  Acute left-sided low back pain without sciatica M54 5                   Subjective: Patient reports she is feeling "really pretty good " Denies issues or change in status since last visit  Objective: See treatment diary below      Assessment: Tolerated treatment well  Patient notes most challenge with SLS  Soft tissue restrictions noted with manual therapy, good tissue response and relief of sxs  Patient would benefit from continued PT  Plan: Continue per plan of care         Precautions: Balance and FALL RISK;  Sjogrens, Vertigo, R Knee OA, Spinal Stenosis      Manual  8/22 8/27 8/29 9/3 9/5  8/5 8/8 8/16 8/19   iastm - L; R s/l 10' 10' 15' 15' LH  10  10 10 10'   L hip              REV   10'          (-) pressure iastm with hip flexion 5'       5' nv 5'   JM L TP of L5-S1        3' nv        Exercise Diary  8/22 8/27 8/29 9/3 9/5  8/5 8/8 8/16 8/19                Bike 6' 6' 6' 6' 6'  5 5' 5' 5'                SKC 5 x :20 4 x :20 5 x :20 5 x :20 5x20"  5 x :20  5 x :20 5x20" 5 x :20   Hamstring st 5 x :20 5xL:20 :20x5 5 x :20 5x20" ea  :20 x 5  5 x :20 5x20" 5 x :20   Glut St 5 x :20 4 x :20 :20x5 5 x :20 5x20"  :20 x5  5 x :20 5x20" 5 x :20   LTR 5 x :20 5 x :10 5 x :10 5 x :10  5x10"  :20 x 5 5 x :20 5x20" 5X :20   PT 5 x :20 10 x :05 10 x :10 10 x :10 10x10"   X 10 10x5" 10 x :05   Gastroc stretch 5 x :20 5 x :20 :20 x 5 :20x 5  Stand 5x20" ea  :20  X 5  5 x :20 5x20" 5 x :20   TM    --         Ball forward flexion to R 5 x :20  5 x :20 5 x :20 5x20"   10 x :10 20x5: 5x:20   Hip ABD iso 10 x :05 10 x :05 10 x :10 10 x :10 10x10"   nv nv    S/l ABD    --    10 10x    SLS   10 x :10 10 x :10 10x10"                                                                             Modalities  9/3 9/5   7/29 7/31 8/5 8/8 8/16 8/19   TENS - s/l R with CP - post NP np   missed PT error 10'    10'   [address] seated - pre NP np   10 min 10' 10 10'  decl

## 2019-09-07 DIAGNOSIS — F41.9 ANXIETY: ICD-10-CM

## 2019-09-09 RX ORDER — LORAZEPAM 1 MG/1
TABLET ORAL
Qty: 90 TABLET | Refills: 0 | Status: SHIPPED | OUTPATIENT
Start: 2019-09-09 | End: 2019-10-05 | Stop reason: SDUPTHER

## 2019-09-12 ENCOUNTER — ANTICOAG VISIT (OUTPATIENT)
Dept: INTERNAL MEDICINE CLINIC | Facility: CLINIC | Age: 74
End: 2019-09-12

## 2019-09-12 ENCOUNTER — TRANSCRIBE ORDERS (OUTPATIENT)
Dept: LAB | Facility: CLINIC | Age: 74
End: 2019-09-12

## 2019-09-12 ENCOUNTER — OFFICE VISIT (OUTPATIENT)
Dept: PHYSICAL THERAPY | Facility: CLINIC | Age: 74
End: 2019-09-12
Payer: MEDICARE

## 2019-09-12 ENCOUNTER — APPOINTMENT (OUTPATIENT)
Dept: LAB | Facility: CLINIC | Age: 74
End: 2019-09-12
Payer: MEDICARE

## 2019-09-12 DIAGNOSIS — M54.50 ACUTE LEFT-SIDED LOW BACK PAIN WITHOUT SCIATICA: Primary | ICD-10-CM

## 2019-09-12 DIAGNOSIS — I81 PORTAL VEIN THROMBOSIS: ICD-10-CM

## 2019-09-12 PROCEDURE — 97140 MANUAL THERAPY 1/> REGIONS: CPT | Performed by: PHYSICAL THERAPIST

## 2019-09-12 PROCEDURE — 97110 THERAPEUTIC EXERCISES: CPT | Performed by: PHYSICAL THERAPIST

## 2019-09-12 NOTE — PROGRESS NOTES
Daily Note     Today's date: 2019  Patient name: Danny Quiroz  : [id number]  MRN: [id number]  Referring provider: Whitney Sheth MD  Dx:   Encounter Diagnosis     ICD-10-CM    1  Acute left-sided low back pain without sciatica M54 5                   Subjective: Pt reports Leg symptoms occur occasionally  Otherwise feeling very well  Objective: See treatment diary below      Assessment: Asymptomatic t/o entire session  Plan: Continue per plan of care         Precautions: Balance and FALL RISK;  Sjogrens, Vertigo, R Knee OA, Spinal Stenosis      Manual  8/22 8/27 8/29 9/3 9/5 9/12 8/5 8/8 8/16 8/19   iastm - L; R s/l 10' 10' 15' 15' LH 10 10  10 10 10'   L hip              REV   10'          (-) pressure iastm with hip flexion 5'       5' nv 5'   JM L TP of L5-S1        3' nv        Exercise Diary  8/22 8/27 8/29 9/3 9/5 9/12 8/5 8/8 8/16 8/19                Bike 6' 6' 6' 6' 6' 6 min 5 5' 5' 5'                SKC 5 x :20 4 x :20 5 x :20 5 x :20 5x20" 5 x 20"  5 x :20  5 x :20 5x20" 5 x :20   Hamstring st 5 x :20 5xL:20 :20x5 5 x :20 5x20" ea 5 x 20" :20 x 5  5 x :20 5x20" 5 x :20   Glut St 5 x :20 4 x :20 :20x5 5 x :20 5x20" 5 x 20" :20 x5  5 x :20 5x20" 5 x :20   LTR 5 x :20 5 x :10 5 x :10 5 x :10  5x10" 5 x 10" :20 x 5 5 x :20 5x20" 5X :20   PT 5 x :20 10 x :05 10 x :10 10 x :10 10x10" 10" x 10  X 10 10x5" 10 x :05   Gastroc stretch 5 x :20 5 x :20 :20 x 5 :20x 5  Stand 5x20" ea stand 5 x 20" :20  X 5  5 x :20 5x20" 5 x :20   TM    --         Ball forward flexion to R 5 x :20  5 x :20 5 x :20 5x20" 5 x 20"  10 x :10 20x5: 5x:20   Hip ABD iso 10 x :05 10 x :05 10 x :10 10 x :10 10x10" 10" x 10  nv nv    S/l ABD    --    10 10x    SLS   10 x :10 10 x :10 10x10" 10" x 10                                                                            Modalities  9/3 9/5 9/ 8/ 8/ 8/   TENS - s/l R with CP - post NP np decline  missed PT error 10'    10'   MH seated - pre NP np decline  10 min 10' 10 10'  decl

## 2019-09-14 DIAGNOSIS — M54.50 ACUTE LEFT-SIDED LOW BACK PAIN WITHOUT SCIATICA: ICD-10-CM

## 2019-09-15 RX ORDER — TIZANIDINE 2 MG/1
2 TABLET ORAL 2 TIMES DAILY PRN
Qty: 60 TABLET | Refills: 5 | Status: SHIPPED | OUTPATIENT
Start: 2019-09-15 | End: 2019-11-04

## 2019-09-17 ENCOUNTER — OFFICE VISIT (OUTPATIENT)
Dept: CARDIOLOGY CLINIC | Facility: CLINIC | Age: 74
End: 2019-09-17
Payer: MEDICARE

## 2019-09-17 VITALS
WEIGHT: 143.3 LBS | HEART RATE: 69 BPM | DIASTOLIC BLOOD PRESSURE: 68 MMHG | OXYGEN SATURATION: 96 % | BODY MASS INDEX: 25.39 KG/M2 | SYSTOLIC BLOOD PRESSURE: 128 MMHG | HEIGHT: 63 IN

## 2019-09-17 DIAGNOSIS — I45.10 INCOMPLETE RIGHT BUNDLE BRANCH BLOCK (RBBB): ICD-10-CM

## 2019-09-17 DIAGNOSIS — I47.1 SVT (SUPRAVENTRICULAR TACHYCARDIA) (HCC): Primary | ICD-10-CM

## 2019-09-17 DIAGNOSIS — I47.1 SUPRAVENTRICULAR TACHYCARDIA (HCC): ICD-10-CM

## 2019-09-17 PROCEDURE — 99214 OFFICE O/P EST MOD 30 MIN: CPT | Performed by: INTERNAL MEDICINE

## 2019-09-17 PROCEDURE — 93000 ELECTROCARDIOGRAM COMPLETE: CPT | Performed by: INTERNAL MEDICINE

## 2019-09-17 RX ORDER — BUPROPION HYDROCHLORIDE 300 MG/1
300 TABLET ORAL DAILY
COMMUNITY
End: 2019-12-18

## 2019-09-17 NOTE — PROGRESS NOTES
Follow-up - Cardiology   Santi Morales 75 y o  female MRN: [id number]        Problems    Problem List Items Addressed This Visit        Cardiovascular and Mediastinum    Supraventricular tachycardia ([facility] [address] )      Other Visit Diagnoses     SVT (supraventricular tachycardia) ([facility] [address] )    -  Primary    Relevant Orders    POCT ECG    Incomplete right bundle branch block (RBBB)                Plan patient seen after 1 year  Patient had initially been seen for clearance for surgery  His first-degree AV block and a history supraventricular tachycardia  Holter counter recently done shows a 6 run beat run of SVT approximately 400 PVCs and PACs  She is asymptomatic  There is no high-grade block  No change in medication  Her LDL is approximately 100  Her other issues include Sjogren's disease  History of asthma  Hypothyroidism  Mild anemia  Brain stimulator for tremor  All the above seem to be skin stable as well          HPI: Santi Morales is a 75y o  year old female History of Present Illness  Cardiology HPI Free Text Note Form [facility]: Ms Khloe Dick is a 66year old woman with HTN, dyslipidemia, and SVT presents for pre-operative risk stratification prior to DBS battery placement  From a cardiac standpoint, she is doing well  No chest pain, shortness of breath, or palpitations  Is on warfarin for portal vein thrombosis  Patient seen July 19, 2017  Her list of issues of breath along  She has a history of asthma, Sjogren's, thyroid disorder, post colectomy, chronic anemia, brain stimulator for tremor, recent change of death stimulator, chronic use of Coumadin because of a DVT history of poor embolus history as well as a family history of emboli  Coumadin was recommended by hematology  From my standpoint she is a first-degree AV block and she's had some short episodes of SVT  She is on a low dose until today's exam  Her LDL is 80 is 87 and 92  A1c 5 7  From cardiac standpoint she's basically asymptomatic  We will do a Holter  Review of Systems   Constitutional: Negative  Respiratory: Negative  Cardiovascular: Negative  Past Medical History:   Diagnosis Date    Anxiety     Arrhythmia     Arthritis     Asthma     Blood clot in vein     portal vein    Breast lump     [id number] RESOLVED    Depression     Disease of thyroid gland     DVT, lower extremity (HCC)     GERD (gastroesophageal reflux disease)     Hyperlipidemia     Hypertension     Hypokalemia     Hyponatremia     Hypothyroidism     Iron deficiency anemia     Irregular heart beat     Manic behavior (HCC)     Mesenteric vein thrombosis     Osteoarthritis     Palpitations     [id number]  RESOLVED    Paroxysmal supraventricular tachycardia (HCC)     PE (pulmonary thromboembolism) (HCC)     Sjoegren syndrome     Thrombocytosis (HCC)     [id number]  RESOLVED    Tremors of nervous system     dbs implanted right and left chest    Ulcerative colitis (HCC)     Vertigo     [id number] RESOLVED     Social History     Substance and Sexual Activity   Alcohol Use Yes    Alcohol/week: 2 0 standard drinks    Types: 1 Glasses of wine, 1 Cans of beer per week    Comment: socially BEER,WINE     Social History     Substance and Sexual Activity   Drug Use No     Social History     Tobacco Use   Smoking Status Former Smoker    Packs/day: 2 00    Years: 5 00    Pack years: 10 00    Last attempt to quit: 1965    Years since quittin 7   Smokeless Tobacco Never Used   Tobacco Comment    Quit       Allergies:   Allergies   Allergen Reactions    Indocin [Indomethacin] Other (See Comments), Dizziness and GI Intolerance     Reaction Date: 2012;   Nausea and dizziness    Macrobid [Nitrofurantoin Monohyd Macro] Rash    Penicillins Rash     Annotation - [id number]: can take cephalosporins    Sulfa Antibiotics Rash       Medications:     Current Outpatient Medications:     albuterol (PROVENTIL HFA,VENTOLIN HFA) 90 mcg/act inhaler, Inhale 2 puffs every 6 (six) hours as needed for wheezing, Disp: 1 Inhaler, Rfl: 1    buPROPion (WELLBUTRIN XL) 300 mg 24 hr tablet, Take 300 mg by mouth daily, Disp: , Rfl:     Calcium Carbonate-Vitamin D (CALCIUM 600+D) 600-200 MG-UNIT TABS, Take 2 tablets by mouth daily, Disp: , Rfl:     cetirizine (ZyrTEC) 10 mg tablet, Take 10 mg by mouth daily, Disp: , Rfl:     co-enzyme Q-10 50 MG capsule, Take 200 mg by mouth daily , Disp: , Rfl:     dexlansoprazole (DEXILANT) 60 MG capsule, Take 60 mg by mouth daily, Disp: , Rfl:     diltiazem (CARDIZEM CD) 180 mg 24 hr capsule, TAKE 1 CAPSULE BY MOUTH EVERY DAY, Disp: 90 capsule, Rfl: 1    escitalopram (LEXAPRO) 20 mg tablet, Take 1 tablet (20 mg total) by mouth daily, Disp: 90 tablet, Rfl: 1    fluticasone (FLONASE) 50 mcg/act nasal spray, SPRAY TWO SPRAYS IN EACH NOSTRIL ONCE DAILY, Disp: 48 g, Rfl: 0    fluticasone-salmeterol (ADVAIR DISKUS) 250-50 mcg/dose inhaler, Inhale 1 puff 2 (two) times a day Rinse mouth after use , Disp: 60 each, Rfl: 5    gabapentin (NEURONTIN) 100 mg capsule, TAKE 2 CAPSULES (200 MG TOTAL) BY MOUTH 2 (TWO) TIMES A DAY, Disp: 360 capsule, Rfl: 1    hydroxychloroquine (PLAQUENIL) 200 mg tablet, Take 1 tablet (200 mg total) by mouth 2 (two) times a day for 180 days, Disp: 180 tablet, Rfl: 0    levothyroxine 50 mcg tablet, Take 1 tablet (50 mcg total) by mouth daily, Disp: 90 tablet, Rfl: 1    lisinopril (ZESTRIL) 20 mg tablet, Take 1 tablet (20 mg total) by mouth 2 (two) times a day, Disp: 180 tablet, Rfl: 2    loperamide (IMODIUM) 2 mg capsule, Take 2 mg by mouth 4 (four) times a day as needed  , Disp: , Rfl:     LORazepam (ATIVAN) 1 mg tablet, TAKE 1 TABLET (1 MG TOTAL) BY MOUTH EVERY 8 (EIGHT) HOURS AS NEEDED FOR ANXIETY   FILL ON 6 29 19, Disp: 90 tablet, Rfl: 0    Magnesium 100 MG TABS, Take by mouth, Disp: , Rfl:     meclizine (ANTIVERT) 25 mg tablet, Take 1 tablet (25 mg total) by mouth every 6 (six) hours as needed for dizziness, Disp: 90 tablet, Rfl: 0    Multiple Vitamin (MULTIVITAMIN) tablet, Take 1 tablet by mouth daily, Disp: , Rfl:     Omega-3 Fatty Acids (OMEGA 3 PO), Take 1,200 mg by mouth daily, Disp: , Rfl:     simvastatin (ZOCOR) 5 MG tablet, Take 1 tablet (5 mg total) by mouth daily, Disp: 90 tablet, Rfl: 1    sodium chloride 1 g tablet, Take 1 tablet (1 g total) by mouth 3 (three) times a day, Disp: 270 tablet, Rfl: 3    tiZANidine (ZANAFLEX) 2 mg tablet, TAKE 1 TABLET (2 MG TOTAL) BY MOUTH 2 (TWO) TIMES A DAY AS NEEDED FOR MUSCLE SPASMS, Disp: 60 tablet, Rfl: 5    torsemide (DEMADEX) 10 mg tablet, Take 1 tablet (10 mg total) by mouth daily, Disp: 90 tablet, Rfl: 3    warfarin (COUMADIN) 5 mg tablet, Take 1 tablet daily or as directed, Disp: 90 tablet, Rfl: 1    gabapentin (NEURONTIN) 600 MG tablet, Patient takes one in am, one afternoon and 2 at hs, Disp: 360 tablet, Rfl: 1    HYDROcodone-acetaminophen (NORCO) 5-325 mg per tablet, Take 1 tablet by mouth every 6 (six) hours as needed for painMax Daily Amount: 4 tablets, Disp: 120 tablet, Rfl: 0    lidocaine viscous (XYLOCAINE) 2 % mucosal solution, APPLY 5ML TOPICALLY TO CANKER SORE 4 TIMES A DAY AS NEEDED FOR MOUTH PAIN(USE A QTIP), Disp: , Rfl: 0      Physical Exam   Constitutional: She is oriented to person, place, and time  She appears well-developed and well-nourished  No distress  Cardiovascular: Normal rate, regular rhythm, normal heart sounds and intact distal pulses  Pulmonary/Chest: Breath sounds normal  No stridor  No respiratory distress  Musculoskeletal: She exhibits no edema, tenderness or deformity  Neurological: She is alert and oriented to person, place, and time  Skin: Skin is warm and dry  No rash noted  She is not diaphoretic  No erythema  No pallor           Laboratory Studies:  CMP:      Invalid input(s): ALBUMIN  NT-proBNP: No results found for: NTBNP   Coags:  Results from last 7 days   Lab Units 09/12/19  0859   INR  3 27*     Lipid Profile:   Lab Results   Component Value Date    CHOL 167 12/17/2015     Lab Results   Component Value Date    HDL 56 12/04/2018     Lab Results   Component Value Date    LDLCALC 91 12/04/2018     Lab Results   Component Value Date    TRIG 121 12/04/2018       Cardiac testing:     EKG reviewed personally:  Borderline first-degree AV block with incomplete right bundle branch block pattern    No results found for this or any previous visit  No results found for this or any previous visit  No results found for this or any previous visit  No results found for this or any previous visit  Penelope Kocher, MD    Portions of the record may have been created with voice recognition software   Occasional wrong word or "sound a like" substitutions may have occurred due to the inherent limitations of voice recognition software   Read the chart carefully and recognize, using context, where substitutions have occurred

## 2019-09-18 DIAGNOSIS — M51.36 LUMBAR DEGENERATIVE DISC DISEASE: ICD-10-CM

## 2019-09-18 RX ORDER — GABAPENTIN 600 MG/1
TABLET ORAL
Qty: 360 TABLET | Refills: 1 | Status: SHIPPED | OUTPATIENT
Start: 2019-09-18 | End: 2020-03-12

## 2019-09-19 ENCOUNTER — APPOINTMENT (OUTPATIENT)
Dept: LAB | Facility: CLINIC | Age: 74
End: 2019-09-19
Payer: MEDICARE

## 2019-09-19 ENCOUNTER — OFFICE VISIT (OUTPATIENT)
Dept: PHYSICAL THERAPY | Facility: CLINIC | Age: 74
End: 2019-09-19
Payer: MEDICARE

## 2019-09-19 ENCOUNTER — ANTICOAG VISIT (OUTPATIENT)
Dept: INTERNAL MEDICINE CLINIC | Facility: CLINIC | Age: 74
End: 2019-09-19

## 2019-09-19 DIAGNOSIS — I81 PORTAL VEIN THROMBOSIS: ICD-10-CM

## 2019-09-19 DIAGNOSIS — M54.50 ACUTE LEFT-SIDED LOW BACK PAIN WITHOUT SCIATICA: Primary | ICD-10-CM

## 2019-09-19 DIAGNOSIS — E03.9 ACQUIRED HYPOTHYROIDISM: ICD-10-CM

## 2019-09-19 PROCEDURE — 97110 THERAPEUTIC EXERCISES: CPT | Performed by: PHYSICAL THERAPIST

## 2019-09-19 PROCEDURE — 97112 NEUROMUSCULAR REEDUCATION: CPT | Performed by: PHYSICAL THERAPIST

## 2019-09-19 PROCEDURE — 97140 MANUAL THERAPY 1/> REGIONS: CPT | Performed by: PHYSICAL THERAPIST

## 2019-09-19 RX ORDER — LEVOTHYROXINE SODIUM 0.05 MG/1
TABLET ORAL
Qty: 90 TABLET | Refills: 1 | Status: SHIPPED | OUTPATIENT
Start: 2019-09-19 | End: 2020-03-12

## 2019-09-19 NOTE — PROGRESS NOTES
Daily Note     Today's date: 2019  Patient name: Radha Patient  : [birth year]  MRN: [id number]  Referring provider: Juan F Callahan MD  Dx:   Encounter Diagnosis     ICD-10-CM    1  Acute left-sided low back pain without sciatica M54 5    2  Portal vein thrombosis I81                   Subjective: Pt reports R calf gets sore at night and she had tried calf stretches s relief  Objective: See treatment diary below      Assessment: Asymptomatic t/o entire session  She had some difficulty with L hip abduction today  Plan: Continue per plan of care         Precautions: Balance and FALL RISK;  Sjogrens, Vertigo, R Knee OA, Spinal Stenosis      Manual  8/22 8/27 8/29 9/3 9/5 9/12 9/19 8/8 8/16 8/19   iastm - L; R s/l 10' 10' 15' 15' LH 10 10  10 10 10'   L hip              REV   10'          (-) pressure iastm with hip flexion 5'       5' nv 5'   JM L TP of L5-S1        3' nv        Exercise Diary  8/22 8/27 8/29 9/3 9/5 9/12 9/19 8/8 8/16 8/19                Bike 6' 6' 6' 6' 6' 6 min 6' 5' 5' 5'                SKC 5 x :20 4 x :20 5 x :20 5 x :20 5x20" 5 x 20"  5 x :20  5 x :20 5x20" 5 x :20   Hamstring st 5 x :20 5xL:20 :20x5 5 x :20 5x20" ea 5 x 20" :20 x 5  5 x :20 5x20" 5 x :20   Glut St 5 x :20 4 x :20 :20x5 5 x :20 5x20" 5 x 20" :20 x5  5 x :20 5x20" 5 x :20   LTR 5 x :20 5 x :10 5 x :10 5 x :10  5x10" 5 x 10" :20 x 5 5 x :20 5x20" 5X :20   PT 5 x :20 10 x :05 10 x :10 10 x :10 10x10" 10" x 10  X 10 10x5" 10 x :05   Gastroc stretch 5 x :20 5 x :20 :20 x 5 :20x 5  Stand 5x20" ea stand 5 x 20" :20  X 5  5 x :20 5x20" 5 x :20   TM    --         Ball forward flexion to R 5 x :20  5 x :20 5 x :20 5x20" 5 x 20" 5x :20 10 x :10 20x5: 5x:20   Hip ABD iso 10 x :05 10 x :05 10 x :10 10 x :10 10x10" 10" x 10 10"x10 nv nv    S/l ABD    --    10 10x    SLS   10 x :10 10 x :10 10x10" 10" x 10 10x :10      Standing hip abd       2x10                                                              Modalities  9/3 9/5 9/12 9/19 7/29 7/31 8/5 8/8 8/16 8/19   TENS - s/l R with CP - post NP np decline  missed PT error 10'    10'   [address] seated - pre NP np decline  10 min 10' 10 10'  decl

## 2019-09-24 ENCOUNTER — EVALUATION (OUTPATIENT)
Dept: PHYSICAL THERAPY | Facility: CLINIC | Age: 74
End: 2019-09-24
Payer: MEDICARE

## 2019-09-24 ENCOUNTER — HOSPITAL ENCOUNTER (OUTPATIENT)
Dept: MAMMOGRAPHY | Facility: HOSPITAL | Age: 74
Discharge: HOME | End: 2019-09-24
Payer: MEDICARE

## 2019-09-24 VITALS — HEIGHT: 63 IN | BODY MASS INDEX: 25.34 KG/M2 | WEIGHT: 143 LBS

## 2019-09-24 DIAGNOSIS — Z12.31 ENCOUNTER FOR SCREENING MAMMOGRAM FOR BREAST CANCER: ICD-10-CM

## 2019-09-24 DIAGNOSIS — M54.50 ACUTE LEFT-SIDED LOW BACK PAIN WITHOUT SCIATICA: Primary | ICD-10-CM

## 2019-09-24 PROCEDURE — 97112 NEUROMUSCULAR REEDUCATION: CPT | Performed by: PHYSICAL THERAPIST

## 2019-09-24 PROCEDURE — 97110 THERAPEUTIC EXERCISES: CPT | Performed by: PHYSICAL THERAPIST

## 2019-09-24 PROCEDURE — 77067 SCR MAMMO BI INCL CAD: CPT

## 2019-09-24 PROCEDURE — 97140 MANUAL THERAPY 1/> REGIONS: CPT | Performed by: PHYSICAL THERAPIST

## 2019-09-24 NOTE — PROGRESS NOTES
Daily Note     Today's date: 2019  Patient name: Ruthie Galarza  :   MRN: [id number]  Referring provider: Kavin Negron MD  Dx:   Encounter Diagnosis     ICD-10-CM    1  Acute left-sided low back pain without sciatica M54 5                   Subjective: Patient reports that she is feeling well  Objective: See treatment diary below      Assessment: Tolerated treatment well  Patient would benefit from continued PT      Plan:  Discharge today       Precautions: Balance and FALL RISK;  Sjogrens, Vertigo, R Knee OA, Spinal Stenosis      Manual  8/22 8/27 8/29 9/3 9/5 9/12 9/19 9/24     iastm - L; R s/l 10' 10' 15' 15' LH 10 10  10     L hip              REV   10'          (-) pressure iastm with hip flexion 5'       5'     JM L TP of L5-S1        3'         Exercise Diary  8/22 8/27 8/29 9/3 9/5 9/12 9/19 9/24                  Bike 6' 6' 6' 6' 6' 6 min 6' 5'                  SKC 5 x :20 4 x :20 5 x :20 5 x :20 5x20" 5 x 20"  5 x :20  5 x :20     Hamstring st 5 x :20 5xL:20 :20x5 5 x :20 5x20" ea 5 x 20" :20 x 5  5 x :20     Glut St 5 x :20 4 x :20 :20x5 5 x :20 5x20" 5 x 20" :20 x5  5 x :20     LTR 5 x :20 5 x :10 5 x :10 5 x :10  5x10" 5 x 10" :20 x 5 5 x :20     PT 5 x :20 10 x :05 10 x :10 10 x :10 10x10" 10" x 10  X 10     Gastroc stretch 5 x :20 5 x :20 :20 x 5 :20x 5  Stand 5x20" ea stand 5 x 20" :20  X 5  5 x :20     TM    --         Ball forward flexion to R 5 x :20  5 x :20 5 x :20 5x20" 5 x 20" 5x :20 10 x :10     Hip ABD iso 10 x :05 10 x :05 10 x :10 10 x :10 10x10" 10" x 10 10"x10 10 x :10     S/l ABD    --    10     SLS   10 x :10 10 x :10 10x10" 10" x 10 10x :10 10 x :10     Standing hip abd       2x10                                                              Modalities  9/3 9/5 9/12 9/19 9/24        TENS - s/l R with CP - post NP np decline  missed PT error        MH seated - pre NP np decline  10 min

## 2019-10-05 DIAGNOSIS — E78.49 OTHER HYPERLIPIDEMIA: ICD-10-CM

## 2019-10-05 DIAGNOSIS — F41.9 ANXIETY: ICD-10-CM

## 2019-10-07 DIAGNOSIS — E87.1 HYPONATREMIA: ICD-10-CM

## 2019-10-07 RX ORDER — SODIUM CHLORIDE 1000 MG
1 TABLET, SOLUBLE MISCELLANEOUS 3 TIMES DAILY
Qty: 270 TABLET | Refills: 3 | Status: SHIPPED | OUTPATIENT
Start: 2019-10-07 | End: 2020-10-26

## 2019-10-07 RX ORDER — LORAZEPAM 1 MG/1
TABLET ORAL
Qty: 90 TABLET | Refills: 0 | Status: SHIPPED | OUTPATIENT
Start: 2019-10-07 | End: 2019-11-15 | Stop reason: SDUPTHER

## 2019-10-07 RX ORDER — SIMVASTATIN 5 MG
TABLET ORAL
Qty: 90 TABLET | Refills: 1 | Status: SHIPPED | OUTPATIENT
Start: 2019-10-07 | End: 2020-03-29

## 2019-10-07 NOTE — TELEPHONE ENCOUNTER
Patient sees Julianne Horowitz  She is calling for a refill on sodium Chloride and will like it sent to [facility] on [address]  She stated she is completely out and that the pharmacy was going to contact the office  If any questions please contact patient at [phone]

## 2019-10-07 NOTE — PROGRESS NOTES
DISCHARGE      Rober Zhao attended physical therapy for Left LBP  She made excellent progress in reducing her symptoms and has discontinued treatment at this time   Thank you for your referral

## 2019-10-08 ENCOUNTER — ANTICOAG VISIT (OUTPATIENT)
Dept: INTERNAL MEDICINE CLINIC | Facility: CLINIC | Age: 74
End: 2019-10-08

## 2019-10-08 ENCOUNTER — APPOINTMENT (OUTPATIENT)
Dept: LAB | Facility: CLINIC | Age: 74
End: 2019-10-08
Payer: MEDICARE

## 2019-10-08 DIAGNOSIS — I81 PORTAL VEIN THROMBOSIS: ICD-10-CM

## 2019-10-08 DIAGNOSIS — I10 ESSENTIAL HYPERTENSION: ICD-10-CM

## 2019-10-08 DIAGNOSIS — E87.1 HYPONATREMIA: ICD-10-CM

## 2019-10-08 LAB
ALBUMIN SERPL BCP-MCNC: 3.7 G/DL (ref 3.5–5)
ANION GAP SERPL CALCULATED.3IONS-SCNC: 5 MMOL/L (ref 4–13)
BUN SERPL-MCNC: 10 MG/DL (ref 5–25)
CALCIUM SERPL-MCNC: 9.2 MG/DL (ref 8.3–10.1)
CHLORIDE SERPL-SCNC: 96 MMOL/L (ref 100–108)
CO2 SERPL-SCNC: 32 MMOL/L (ref 21–32)
CREAT SERPL-MCNC: 0.77 MG/DL (ref 0.6–1.3)
CREAT UR-MCNC: <13 MG/DL
GFR SERPL CREATININE-BSD FRML MDRD: 76 ML/MIN/1.73SQ M
GLUCOSE P FAST SERPL-MCNC: 113 MG/DL (ref 65–99)
MICROALBUMIN UR-MCNC: 9.8 MG/L (ref 0–20)
MICROALBUMIN/CREAT 24H UR: >75 MG/G CREATININE (ref 0–30)
PHOSPHATE SERPL-MCNC: 3.9 MG/DL (ref 2.3–4.1)
POTASSIUM SERPL-SCNC: 4 MMOL/L (ref 3.5–5.3)
SODIUM SERPL-SCNC: 133 MMOL/L (ref 136–145)
URATE SERPL-MCNC: 5.6 MG/DL (ref 2–6.8)

## 2019-10-08 PROCEDURE — 82570 ASSAY OF URINE CREATININE: CPT

## 2019-10-08 PROCEDURE — 36415 COLL VENOUS BLD VENIPUNCTURE: CPT

## 2019-10-08 PROCEDURE — 80069 RENAL FUNCTION PANEL: CPT

## 2019-10-08 PROCEDURE — 84550 ASSAY OF BLOOD/URIC ACID: CPT

## 2019-10-08 PROCEDURE — 82043 UR ALBUMIN QUANTITATIVE: CPT

## 2019-10-09 DIAGNOSIS — M54.16 LUMBAR RADICULOPATHY: ICD-10-CM

## 2019-10-09 RX ORDER — HYDROCODONE BITARTRATE AND ACETAMINOPHEN 5; 325 MG/1; MG/1
1 TABLET ORAL EVERY 6 HOURS PRN
Qty: 120 TABLET | Refills: 0 | Status: SHIPPED | OUTPATIENT
Start: 2019-10-09 | End: 2019-12-03 | Stop reason: SDUPTHER

## 2019-10-10 DIAGNOSIS — J30.89 NON-SEASONAL ALLERGIC RHINITIS DUE TO OTHER ALLERGIC TRIGGER: ICD-10-CM

## 2019-10-10 RX ORDER — FLUTICASONE PROPIONATE 50 MCG
1 SPRAY, SUSPENSION (ML) NASAL DAILY PRN
Qty: 48 ML | Refills: 0 | Status: SHIPPED | OUTPATIENT
Start: 2019-10-10 | End: 2020-01-06

## 2019-10-10 RX ORDER — FLUTICASONE PROPIONATE 50 MCG
SPRAY, SUSPENSION (ML) NASAL
Qty: 48 ML | Refills: 0 | Status: SHIPPED | OUTPATIENT
Start: 2019-10-10 | End: 2019-10-10 | Stop reason: SDUPTHER

## 2019-10-14 ENCOUNTER — TRANSCRIBE ORDERS (OUTPATIENT)
Dept: LAB | Facility: CLINIC | Age: 74
End: 2019-10-14

## 2019-10-14 ENCOUNTER — OFFICE VISIT (OUTPATIENT)
Dept: NEPHROLOGY | Facility: CLINIC | Age: 74
End: 2019-10-14
Payer: MEDICARE

## 2019-10-14 ENCOUNTER — APPOINTMENT (OUTPATIENT)
Dept: LAB | Facility: CLINIC | Age: 74
End: 2019-10-14
Payer: MEDICARE

## 2019-10-14 ENCOUNTER — TELEPHONE (OUTPATIENT)
Dept: INTERNAL MEDICINE CLINIC | Facility: CLINIC | Age: 74
End: 2019-10-14

## 2019-10-14 VITALS
HEIGHT: 63 IN | SYSTOLIC BLOOD PRESSURE: 138 MMHG | HEART RATE: 68 BPM | RESPIRATION RATE: 16 BRPM | BODY MASS INDEX: 24.8 KG/M2 | DIASTOLIC BLOOD PRESSURE: 62 MMHG | WEIGHT: 140 LBS

## 2019-10-14 DIAGNOSIS — I10 ESSENTIAL HYPERTENSION: ICD-10-CM

## 2019-10-14 DIAGNOSIS — Z79.01 LONG TERM (CURRENT) USE OF ANTICOAGULANTS: Primary | ICD-10-CM

## 2019-10-14 DIAGNOSIS — Z79.01 LONG TERM (CURRENT) USE OF ANTICOAGULANTS: ICD-10-CM

## 2019-10-14 DIAGNOSIS — E22.2 SIADH (SYNDROME OF INAPPROPRIATE ADH PRODUCTION) (HCC): Primary | ICD-10-CM

## 2019-10-14 LAB
INR PPP: 1.01 (ref 0.84–1.19)
PROTHROMBIN TIME: 12.7 SECONDS (ref 11.6–14.5)

## 2019-10-14 PROCEDURE — 85610 PROTHROMBIN TIME: CPT

## 2019-10-14 PROCEDURE — 36415 COLL VENOUS BLD VENIPUNCTURE: CPT

## 2019-10-14 PROCEDURE — 99213 OFFICE O/P EST LOW 20 MIN: CPT | Performed by: INTERNAL MEDICINE

## 2019-10-14 NOTE — LETTER
October 14, 2019     Rock Grigsby MD  [address]  [facility]    Patient: Larry Colbert   YOB: 1945   Date of Visit: 10/14/2019       Dear Dr Radha Serrano: Thank you for referring Sameer Blount to me for evaluation  Below are the relevant portions of my assessment and plan of care  If you have questions, please do not hesitate to call me  I look forward to following Keren Gunn along with you  Sincerely,        Vanda Gallego DO        CC: No Recipients                     ASSESSMENT and PLAN:  1  Hyponatremia, multifactorial, secondary to some component of SIADH as well as history of ulcerative colitis with J-pouch and chronic loose bowel movements  2  Hypertension, well controlled, continue with current regimen    · Overall sodium level has been fairly stable, range between 130-140  Current level 133  Continue sodium chloride tablets 3 times daily plus daily torsemide  · Stable renal function  · Blood pressure under good control  · Elevated microalbumin to creatinine ratio although falsely high given undetectable creatinine levels which obviously affects the ratio, will repeat next visit

## 2019-10-14 NOTE — TELEPHONE ENCOUNTER
Patient notified and states she is leaving on 10/24 to go to [place] for 10 days so she wont be here in 2 weeks to check it

## 2019-10-14 NOTE — PROGRESS NOTES
NEPHROLOGY OUTPATIENT PROGRESS NOTE   Waleska Girard 75 y o  female MRN: [id number]  Reason for visit:  Hyponatremia    ASSESSMENT and PLAN:  1  Hyponatremia, multifactorial, secondary to some component of SIADH as well as history of ulcerative colitis with J-pouch and chronic loose bowel movements  2  Hypertension, well controlled, continue with current regimen    · Overall sodium level has been fairly stable, range between 130-140  Current level 133  Continue sodium chloride tablets 3 times daily plus daily torsemide  · Stable renal function  · Blood pressure under good control  · Elevated microalbumin to creatinine ratio although falsely high given undetectable creatinine levels which obviously affects the ratio, will repeat next visit  SUBJECTIVE / INTERVAL HISTORY:  She has been doing reasonably well  She does complain of chronic back pain and is anticipated to have an injection today  Denies any chest pain shortness of breath or worsening swelling  Her appetite has been stable without episodes of nausea vomiting  She does have chronic loose bowel movements  Denies any dizziness lightheadedness  Review of Systems      OBJECTIVE:  /62 (BP Location: Right arm, Patient Position: Sitting, Cuff Size: Standard)   Pulse 68   Resp 16   Ht 5' 3" (1 6 m)   Wt 63 5 kg (140 lb)   BMI 24 80 kg/m²   Vitals:    10/14/19 0900   Weight: 63 5 kg (140 lb)       Physical Exam   Constitutional: She is oriented to person, place, and time  No distress  HENT:   Head: Normocephalic  Eyes: No scleral icterus  Neck: Neck supple  Cardiovascular: Normal rate and regular rhythm  Pulmonary/Chest: Effort normal and breath sounds normal    Abdominal: Soft  Musculoskeletal: She exhibits no edema  Neurological: She is alert and oriented to person, place, and time  Skin: Skin is warm and dry  No rash noted           Medications:    Current Outpatient Medications:     albuterol (PROVENTIL HFA,VENTOLIN HFA) 90 mcg/act inhaler, Inhale 2 puffs every 6 (six) hours as needed for wheezing, Disp: 1 Inhaler, Rfl: 1    buPROPion (WELLBUTRIN XL) 300 mg 24 hr tablet, Take 300 mg by mouth daily, Disp: , Rfl:     Calcium Carbonate-Vitamin D (CALCIUM 600+D) 600-200 MG-UNIT TABS, Take 2 tablets by mouth daily, Disp: , Rfl:     cetirizine (ZyrTEC) 10 mg tablet, Take 10 mg by mouth daily, Disp: , Rfl:     co-enzyme Q-10 50 MG capsule, Take 200 mg by mouth daily , Disp: , Rfl:     dexlansoprazole (DEXILANT) 60 MG capsule, Take 60 mg by mouth daily, Disp: , Rfl:     diltiazem (CARDIZEM CD) 180 mg 24 hr capsule, TAKE 1 CAPSULE BY MOUTH EVERY DAY, Disp: 90 capsule, Rfl: 1    escitalopram (LEXAPRO) 20 mg tablet, Take 1 tablet (20 mg total) by mouth daily, Disp: 90 tablet, Rfl: 1    fluticasone (FLONASE) 50 mcg/act nasal spray, 1 spray into each nostril daily as needed for rhinitis, Disp: 48 mL, Rfl: 0    fluticasone-salmeterol (ADVAIR DISKUS) 250-50 mcg/dose inhaler, Inhale 1 puff 2 (two) times a day Rinse mouth after use , Disp: 60 each, Rfl: 5    gabapentin (NEURONTIN) 100 mg capsule, TAKE 2 CAPSULES (200 MG TOTAL) BY MOUTH 2 (TWO) TIMES A DAY, Disp: 360 capsule, Rfl: 1    gabapentin (NEURONTIN) 600 MG tablet, PATIENT TAKES ONE IN AM, ONE AFTERNOON AND 2 AT BEDTIME, Disp: 360 tablet, Rfl: 1    HYDROcodone-acetaminophen (NORCO) 5-325 mg per tablet, Take 1 tablet by mouth every 6 (six) hours as needed for painMax Daily Amount: 4 tablets, Disp: 120 tablet, Rfl: 0    hydroxychloroquine (PLAQUENIL) 200 mg tablet, Take 1 tablet (200 mg total) by mouth 2 (two) times a day for 180 days, Disp: 180 tablet, Rfl: 0    levothyroxine 50 mcg tablet, TAKE 1 TABLET BY MOUTH EVERY DAY, Disp: 90 tablet, Rfl: 1    lidocaine viscous (XYLOCAINE) 2 % mucosal solution, APPLY 5ML TOPICALLY TO CANKER SORE 4 TIMES A DAY AS NEEDED FOR MOUTH PAIN(USE A QTIP), Disp: , Rfl: 0    lisinopril (ZESTRIL) 20 mg tablet, Take 1 tablet (20 mg total) by mouth 2 (two) times a day, Disp: 180 tablet, Rfl: 2    loperamide (IMODIUM) 2 mg capsule, Take 2 mg by mouth 4 (four) times a day as needed  , Disp: , Rfl:     LORazepam (ATIVAN) 1 mg tablet, TAKE 1 TABLET (1 MG TOTAL) BY MOUTH EVERY 8 (EIGHT) HOURS AS NEEDED FOR ANXIETY, Disp: 90 tablet, Rfl: 0    Magnesium 100 MG TABS, Take by mouth, Disp: , Rfl:     meclizine (ANTIVERT) 25 mg tablet, Take 1 tablet (25 mg total) by mouth every 6 (six) hours as needed for dizziness, Disp: 90 tablet, Rfl: 0    Multiple Vitamin (MULTIVITAMIN) tablet, Take 1 tablet by mouth daily, Disp: , Rfl:     Omega-3 Fatty Acids (OMEGA 3 PO), Take 1,200 mg by mouth daily, Disp: , Rfl:     simvastatin (ZOCOR) 5 MG tablet, TAKE 1 TABLET BY MOUTH EVERY DAY, Disp: 90 tablet, Rfl: 1    sodium chloride 1 g tablet, Take 1 tablet (1 g total) by mouth 3 (three) times a day, Disp: 270 tablet, Rfl: 3    tiZANidine (ZANAFLEX) 2 mg tablet, TAKE 1 TABLET (2 MG TOTAL) BY MOUTH 2 (TWO) TIMES A DAY AS NEEDED FOR MUSCLE SPASMS, Disp: 60 tablet, Rfl: 5    torsemide (DEMADEX) 10 mg tablet, Take 1 tablet (10 mg total) by mouth daily, Disp: 90 tablet, Rfl: 3    warfarin (COUMADIN) 5 mg tablet, Take 1 tablet daily or as directed, Disp: 90 tablet, Rfl: 1    Laboratory Results:  Results for orders placed or performed in visit on 10/14/19   Protime-INR   Result Value Ref Range    Protime 12 7 11 6 - 14 5 seconds    INR 1 01 0 84 - 1 19

## 2019-10-14 NOTE — TELEPHONE ENCOUNTER
Pt  Was off her Coumadin for five days because she is going to get a hot in her back this afternoon  When should she restart it and when should she have her INR drawn? If pt  Doesn't answer the phone, you can leave a message with instructions

## 2019-10-17 ENCOUNTER — HOSPITAL ENCOUNTER (OUTPATIENT)
Dept: MAMMOGRAPHY | Facility: CLINIC | Age: 74
Discharge: HOME | End: 2019-10-17
Payer: MEDICARE

## 2019-10-17 DIAGNOSIS — R92.8 ABNORMAL MAMMOGRAM: ICD-10-CM

## 2019-10-17 PROCEDURE — 77065 DX MAMMO INCL CAD UNI: CPT

## 2019-10-17 NOTE — PROGRESS NOTES
Met with patient and Dr Marisol Jansen                 regarding recommendation for;      _____ RIGHT ___x___LEFT      _____Ultrasound guided  __x____Stereotactic  Breast biopsy  ___x__Verbalized understanding  Blood thinners:  ___x__yes _____no  Mikael Layman    Date stopped: ____n/a Moncho Garcia will call with INR results for 10/22/19_______    Biopsy teaching sheet given:  ____x___yes ______no    Moncho Garcia has multiple medical history-see Epic

## 2019-10-19 DIAGNOSIS — I10 ESSENTIAL HYPERTENSION: ICD-10-CM

## 2019-10-20 RX ORDER — LISINOPRIL 20 MG/1
20 TABLET ORAL 2 TIMES DAILY
Qty: 180 TABLET | Refills: 1 | Status: SHIPPED | OUTPATIENT
Start: 2019-10-20 | End: 2020-05-29

## 2019-10-21 ENCOUNTER — OFFICE VISIT (OUTPATIENT)
Dept: NEUROLOGY | Facility: CLINIC | Age: 74
End: 2019-10-21
Payer: MEDICARE

## 2019-10-21 ENCOUNTER — TELEPHONE (OUTPATIENT)
Dept: NEUROLOGY | Facility: CLINIC | Age: 74
End: 2019-10-21

## 2019-10-21 VITALS
DIASTOLIC BLOOD PRESSURE: 72 MMHG | BODY MASS INDEX: 24.8 KG/M2 | WEIGHT: 140 LBS | HEIGHT: 63 IN | SYSTOLIC BLOOD PRESSURE: 136 MMHG

## 2019-10-21 DIAGNOSIS — G25.0 ESSENTIAL TREMOR: Primary | ICD-10-CM

## 2019-10-21 PROCEDURE — 95983 ALYS BRN NPGT PRGRMG 15 MIN: CPT | Performed by: PSYCHIATRY & NEUROLOGY

## 2019-10-21 PROCEDURE — 95984 ALYS BRN NPGT PRGRMG ADDL 15: CPT | Performed by: PSYCHIATRY & NEUROLOGY

## 2019-10-21 NOTE — ASSESSMENT & PLAN NOTE
Tremor controlled but speech has declined  Slurring worse when tired  Deep brain stimulator interrogated  Left IPG SC at HARMEET 2 44  This was discussed with the patient  It did last 5 years  Settings increased last visit with control of tremor but quicker drain  PW decrease to help battery last and see if it is contributing to speech changes  Will refer to neurosurgery for replacement  She is interested in replacing it with an RC as she already has an RC on the left  Left RC interrogated  Improved speech when off  Changes made with improved speech and control of tremor  See DBS table in body of note and attached clinical sheets for details

## 2019-10-21 NOTE — TELEPHONE ENCOUNTER
PT IS SCHEDULED WITH DR BERNARD ROSS [facility] 11/4/19 @ 845AM [place] WITH DR LAIRD

## 2019-10-21 NOTE — PATIENT INSTRUCTIONS
Will refer you to neurosurgery for left battery replacement  Will see you after replacement and if having issue with new settings will further program    Call prior to follow up should issues arise

## 2019-10-21 NOTE — PROGRESS NOTES
Patient ID: Henry Lopez is a 75 y o  female  Assessment/Plan:    Essential tremor  Tremor controlled but speech has declined  Slurring worse when tired  Deep brain stimulator interrogated  Left IPG SC at [facility] 2 44  This was discussed with the patient  It did last 5 years  Settings increased last visit with control of tremor but quicker drain  PW decrease to help battery last and see if it is contributing to speech changes  Will refer to neurosurgery for replacement  She is interested in replacing it with an RC as she already has an RC on the left  Left RC interrogated  Improved speech when off  Changes made with improved speech and control of tremor  See DBS table in body of note and attached clinical sheets for details  Diagnoses and all orders for this visit:    Essential tremor  -     Ambulatory referral to Neurosurgery; Future           Subjective:    Keenan Sevilla is a left handed female with peripheral neuropathy on Neurontin, spinal stenosis on gabapentin, anxiety and essential tremor s/p bilateral VIM DBS on 5/1/14 with Activa SC, s/p right IPG update to [facility] 6/20/17,  who presents for follow up  She was previously followed by Dr Lilian Victor for tremors  To review, tremors began in her late 46s  At that time the tremors began as a mild intentional tremor on the left  This has progressed with time and spread to the right side as well  She returns today with concerns regarding speech  She describes this as feeling "tongie tied" when speaking  This has developed in  after the last adjustment  It is worse in the evening when tired  It can be moderate where she has difficulty being understood, during the evening  Tremors are controlled  Previously they were occurring in both hands and affected activities such as drinking coffee, using utensils and brushing her teeth  She is able to dress, button and do personal hygiene acts        The following portions of the patient's history were reviewed and updated as appropriate: allergies, current medications, past family history, past medical history, past social history, past surgical history and problem list          Objective:    Blood pressure 136/72, height 5' 3" (1 6 m), weight 63 5 kg (140 lb)  Physical Exam   Constitutional: She appears well-developed  Neurological: She has normal strength  Vitals reviewed  Neurological Exam  Mental Status   Oriented to person, place, time and situation  Speech: Mildly slurred  Language is fluent with no aphasia  Attention and concentration are normal     Cranial Nerves  CN III, IV, VI: Extraocular movements intact bilaterally  CN V: Facial sensation is normal   CN VII: Full and symmetric facial movement  CN VIII: Hearing is normal   CN IX, X: Palate elevates symmetrically  CN XI: Shoulder shrug strength is normal   CN XII: Tongue midline without atrophy or fasciculations  Motor   Normal muscle tone  Strength is 5/5 throughout all four extremities  Sensory  Light touch is normal in upper and lower extremities  Coordination  Right: Finger-to-nose abnormality: Rapid alternating movement normal   Left: Finger-to-nose abnormality: Rapid alternating movement normal   Slight tremor on FTN bilaterally  Slight left hand postural tremor which was intermittent  Speech is mildly slurred  No bradykinesia  Handwriting with mild tremor  Spirals with tremor  See spiral sheets       Gait  Casual gait is normal including stance, stride, and arm swing  Able to rise from chair without using arms  Narrow based  Good stride           ROS:    Review of Systems   Constitutional: Negative  Negative for appetite change and fever  HENT: Negative  Negative for hearing loss, tinnitus, trouble swallowing and voice change  Eyes: Negative  Negative for photophobia and pain  Respiratory: Negative  Negative for shortness of breath  Cardiovascular: Negative  Negative for palpitations     Gastrointestinal: Negative  Negative for nausea and vomiting  Endocrine: Negative  Negative for cold intolerance and heat intolerance  Genitourinary: Negative  Negative for dysuria, frequency and urgency  Musculoskeletal: Negative for myalgias and neck pain  Balance issues a little bit     Skin: Negative  Negative for rash  Allergic/Immunologic: Negative  Neurological: Positive for tremors (Tremors have stopped but cannot talk right) and speech difficulty (Pt needs adjustment)  Negative for dizziness, seizures, syncope, facial asymmetry, weakness, light-headedness, numbness and headaches  Hematological: Negative  Does not bruise/bleed easily  Psychiatric/Behavioral: Positive for sleep disturbance  Negative for confusion and hallucinations  All other systems reviewed and are negative  Review of system was personally reviewed  Contacts PW Freq Amp Symptoms Side effects   Left        Activa SC        [address] 44        2+1- 90 175 3 7 (LAST ON 3 5)      60   Return of mild tremor No change in slurred speech    Right RC        0+2-1- 120 180 4 (Last on 3 8)                3 8  Mild improvement in speech   VIM1- GRP C        0+1- 90 125 2 9  Speech much improved                                           VIM 2        0+2- 90 125 2        2 2 Able to hold cup of water with no issues  Speech still good

## 2019-10-22 ENCOUNTER — TELEPHONE (OUTPATIENT)
Dept: INTERNAL MEDICINE CLINIC | Facility: CLINIC | Age: 74
End: 2019-10-22

## 2019-10-22 ENCOUNTER — APPOINTMENT (OUTPATIENT)
Dept: LAB | Facility: CLINIC | Age: 74
End: 2019-10-22
Payer: MEDICARE

## 2019-10-22 ENCOUNTER — ANTICOAG VISIT (OUTPATIENT)
Dept: INTERNAL MEDICINE CLINIC | Facility: CLINIC | Age: 74
End: 2019-10-22

## 2019-10-22 DIAGNOSIS — I81 PORTAL VEIN THROMBOSIS: ICD-10-CM

## 2019-10-22 NOTE — TELEPHONE ENCOUNTER
Take 7 5 mg of warfarin for 3 days then 5 mg daily  Recheck in 2-3 weeks or when you return from vacation

## 2019-10-23 ENCOUNTER — CLINICAL SUPPORT (OUTPATIENT)
Dept: INTERNAL MEDICINE CLINIC | Facility: CLINIC | Age: 74
End: 2019-10-23
Payer: MEDICARE

## 2019-10-23 DIAGNOSIS — Z23 NEED FOR INFLUENZA VACCINATION: Primary | ICD-10-CM

## 2019-10-23 PROCEDURE — G0008 ADMIN INFLUENZA VIRUS VAC: HCPCS

## 2019-10-23 PROCEDURE — 90662 IIV NO PRSV INCREASED AG IM: CPT

## 2019-11-04 ENCOUNTER — APPOINTMENT (OUTPATIENT)
Dept: LAB | Facility: CLINIC | Age: 74
End: 2019-11-04
Payer: MEDICARE

## 2019-11-04 ENCOUNTER — OFFICE VISIT (OUTPATIENT)
Dept: NEUROSURGERY | Facility: CLINIC | Age: 74
End: 2019-11-04
Payer: MEDICARE

## 2019-11-04 ENCOUNTER — ANTICOAG VISIT (OUTPATIENT)
Dept: INTERNAL MEDICINE CLINIC | Facility: CLINIC | Age: 74
End: 2019-11-04

## 2019-11-04 ENCOUNTER — TELEPHONE (OUTPATIENT)
Dept: NEUROSURGERY | Facility: CLINIC | Age: 74
End: 2019-11-04

## 2019-11-04 ENCOUNTER — TELEPHONE (OUTPATIENT)
Dept: NEUROLOGY | Facility: CLINIC | Age: 74
End: 2019-11-04

## 2019-11-04 VITALS
TEMPERATURE: 98.3 F | HEIGHT: 63 IN | DIASTOLIC BLOOD PRESSURE: 78 MMHG | HEART RATE: 63 BPM | SYSTOLIC BLOOD PRESSURE: 152 MMHG | BODY MASS INDEX: 25.2 KG/M2 | WEIGHT: 142.2 LBS | RESPIRATION RATE: 16 BRPM

## 2019-11-04 DIAGNOSIS — Z79.01 CURRENT USE OF LONG TERM ANTICOAGULATION: ICD-10-CM

## 2019-11-04 DIAGNOSIS — G25.0 ESSENTIAL TREMOR: Primary | ICD-10-CM

## 2019-11-04 DIAGNOSIS — I81 PORTAL VEIN THROMBOSIS: ICD-10-CM

## 2019-11-04 DIAGNOSIS — Z79.899 HISTORY OF LONG-TERM TREATMENT WITH HIGH-RISK MEDICATION: ICD-10-CM

## 2019-11-04 DIAGNOSIS — Z79.01 MONITORING FOR LONG-TERM ANTICOAGULANT USE: ICD-10-CM

## 2019-11-04 DIAGNOSIS — Z51.81 MONITORING FOR LONG-TERM ANTICOAGULANT USE: ICD-10-CM

## 2019-11-04 PROCEDURE — 99215 OFFICE O/P EST HI 40 MIN: CPT | Performed by: NEUROLOGICAL SURGERY

## 2019-11-04 RX ORDER — CHLORHEXIDINE GLUCONATE 0.12 MG/ML
15 RINSE ORAL ONCE
Status: CANCELLED | OUTPATIENT
Start: 2019-11-04 | End: 2019-11-04

## 2019-11-04 NOTE — TELEPHONE ENCOUNTER
Patient called in to inform us her DBS battery will be changed out on 12/4/19  She said she was told to call you when she had a date so appt for follow-up can be scheduled  Dr Vasquez - can we use an appt on 12/16 for her? And how long does she need? Maybe Ari Saxena can assist scheduling?

## 2019-11-04 NOTE — PROGRESS NOTES
Assessment/Plan:    No problem-specific Assessment & Plan notes found for this encounter  Patient is stable  Symptoms, as detailed in HPI, continue to significantly impact of patient's quality of life in daily activities  After carefully considering presentation, investigations, functional status and co-morbidities, the risk/benefit profile of surgical intervention is favorable  History, physical examination and diagnostic tests were reviewed and questions answered  Diagnosis, care plan and treatment options were discussed  The patient understand instructions and will follow up as directed  Patient with good control of ET symptoms with DBS therapy  She is nearing end of life for her left generator she needs a replacement of her generator  She has a rechargeable on her right and primary cell on the left  We did discuss potentially simplifying her system to a single generator system she was interested in this possibility  Expected postoperative course, including activity restrictions, expected pain and postoperative medication were reviewed  Patient provided verbal consent to surgical procedure and signed consent form: Yes    We also discussed the risks and benefits of the procedure the risks being including: infection (~2%), ew pain, revisions surgery, hardware issues  The benefits including continuation of therapy  The patient stated understanding of the risks and benefits and agreed to proceed  Diagnoses and all orders for this visit:    Essential tremor  -     Ambulatory referral to Neurosurgery  -     UA w Reflex to Microscopic w Reflex to Culture  -     Comprehensive metabolic panel; Future  -     CBC and differential; Future  -     APTT; Future  -     Protime-INR; Future  -     HEMOGLOBIN A1C W/ EAG ESTIMATION; Future  -     EKG 12 lead;  Future  -     Case request operating room: REPLACEMENT IMPLANTABLE PULSE GENERATOR (IPG) DEEP BRAIN STIMULATION (DBS), LEFT CHEST, POSSIBLE TUNNELING OF RIGHT EXTENSION TO LEFT CHEST AND CONNECTION TO LEFT GENERATOR TO SINGLE SYSTEM ; Standing  -     Case request operating room: REPLACEMENT IMPLANTABLE PULSE GENERATOR (IPG) DEEP BRAIN STIMULATION (DBS), LEFT CHEST, POSSIBLE TUNNELING OF RIGHT EXTENSION TO LEFT CHEST AND CONNECTION TO LEFT GENERATOR TO SINGLE SYSTEM  Current use of long term anticoagulation   -     APTT; Future    Monitoring for long-term anticoagulant use   -     Protime-INR; Future    History of long-term treatment with high-risk medication   -     HEMOGLOBIN A1C W/ EAG ESTIMATION; Future    Other orders  -     Diet NPO; Sips with meds; Standing  -     Nursing Communication [facility] Interventions Implemented; Standing  -     Nursing Communication Use 2 CHG cloths, have staff wash the entire body (neck down) ; Standing  -     Nursing Communication Swab both nares with Povidone-Iodine solution, EXCLUDE if patient has shellfish/Iodine allergy; Standing  -     chlorhexidine (PERIDEX) 0 12 % oral rinse 15 mL  -     Void on call to OR; Standing  -     Insert peripheral IV; Standing  -     vancomycin (VANCOCIN) 1,000 mg in sodium chloride 0 9 % 500 mL IVPB          I have spent 40 minutes with Patient and family today in which greater than 50% of this time was spent in counseling/coordination of care regarding Diagnostic results, Prognosis, Risks and benefits of tx options, Intructions for management, Patient and family education, Importance of tx compliance, Risk factor reductions and Impressions  Subjective:      Patient ID: Zachery Schmitt is a 75 y o  female  Patient is a 75year old female with symptoms of essential tremor  Treated with a DBS bilateral system in the VIM  Patient has good symptom control  Her last replacement was on the right for a rechargeable generator  She is nearing end of life on the left and needs a replacement of her generator  She has no complaints   She does seem interested in simplifying her system to a one generator system  The following portions of the patient's history were reviewed and updated as appropriate: She  has a past medical history of Anxiety, Arrhythmia, Arthritis, Asthma, Blood clot in vein, Breast lump, Depression, Disease of thyroid gland, DVT, lower extremity ([address] ), GERD (gastroesophageal reflux disease), Hyperlipidemia, Hypertension, Hypokalemia, Hyponatremia, Hypothyroidism, Iron deficiency anemia, Irregular heart beat, Manic behavior ([address] ), Mesenteric vein thrombosis, Osteoarthritis, Palpitations, Paroxysmal supraventricular tachycardia ([address] ), PE (pulmonary thromboembolism) ([address] ), Sjoegren syndrome, Thrombocytosis ([address] ), Tremors of nervous system, Ulcerative colitis ([address] ), and Vertigo    She   Patient Active Problem List    Diagnosis Date Noted    Vitamin B12 deficiency 08/23/2019    Vitamin D deficiency 08/23/2019    S/P deep brain stimulator placement 11/26/2018    Encounter for long-term (current) use of medications 11/23/2018    Lumbar degenerative disc disease 04/20/2018    Encounter for screening mammogram for breast cancer 04/20/2018    Portal vein thrombosis 01/29/2018    Hypomagnesemia 12/08/2015    Unsteady gait 12/08/2015    Overweight 07/16/2015    Essential tremor 07/13/2015    Prediabetes 06/16/2015    Lower extremity pain, left 11/06/2014    Osteoarthritis of right knee 09/05/2014    Spinal stenosis 09/05/2014    Iron deficiency anemia 04/30/2014    Supraventricular tachycardia ([address] ) 10/07/2013    Diarrhea 01/09/2013    Chronic salpingo-oophoritis 12/26/2012    SIADH (syndrome of inappropriate ADH production) ([address] ) 12/14/2012    Peripheral neuropathy 12/14/2012    GERD (gastroesophageal reflux disease) 08/23/2012    Anemia 08/23/2012    Moderate episode of recurrent major depressive disorder ([address] ) 06/13/2012    Hyperlipidemia 06/13/2012    Essential hypertension 06/13/2012    Acquired hypothyroidism 06/13/2012    Osteopenia 06/13/2012    Ulcerative colitis without complications ([place] [address] ) 60/95/9495    Allergic rhinitis 06/13/2012    Mild intermittent asthma without complication 46/21/3970    Sjogren's syndrome ([place] [address] ) 06/13/2012    Anxiety 04/30/2012    Insomnia 04/30/2012     She  has a past surgical history that includes Splenectomy; Abdominal surgery; Colon surgery; Breast surgery; Appendectomy; Knee arthroscopy; pr imp stim,cranial,subq,1 array (Right, 6/20/2017); Colonoscopy (N/A, 8/30/2017); Coloproctectomy w/ ileo J-pouch; Esophagogastroduodenoscopy; FISTULA REPAIR; Ileostomy closure; [place] hole w/ stereotactic insertion of DBS leads / intraop microelectrode recording; and Hysterectomy  Her family history includes COPD in her father; Diabetes in her father and sister; Hypertension in her mother; No Known Problems in her daughter, maternal aunt, maternal grandfather, maternal grandmother, paternal aunt, paternal grandfather, and paternal grandmother; Other in her mother; Peripheral vascular disease in her mother; Sjogren's syndrome in her sister; Stroke in her father; Venous thrombosis in her mother  She  reports that she quit smoking about 54 years ago  She has a 10 00 pack-year smoking history  She has never used smokeless tobacco  She reports that she drinks about 2 0 standard drinks of alcohol per week  She reports that she does not use drugs    Current Outpatient Medications   Medication Sig Dispense Refill    albuterol (PROVENTIL HFA,VENTOLIN HFA) 90 mcg/act inhaler Inhale 2 puffs every 6 (six) hours as needed for wheezing 1 Inhaler 1    buPROPion (WELLBUTRIN XL) 300 mg 24 hr tablet Take 300 mg by mouth daily      Calcium Carbonate-Vitamin D (CALCIUM 600+D) 600-200 MG-UNIT TABS Take 2 tablets by mouth daily      cetirizine (ZyrTEC) 10 mg tablet Take 10 mg by mouth daily      co-enzyme Q-10 50 MG capsule Take 200 mg by mouth daily       dexlansoprazole (DEXILANT) 60 MG capsule Take 60 mg by mouth daily      diltiazem (CARDIZEM CD) 180 mg 24 hr capsule TAKE 1 CAPSULE BY MOUTH EVERY DAY 90 capsule 1    escitalopram (LEXAPRO) 20 mg tablet Take 1 tablet (20 mg total) by mouth daily 90 tablet 1    fluticasone (FLONASE) 50 mcg/act nasal spray 1 spray into each nostril daily as needed for rhinitis 48 mL 0    fluticasone-salmeterol (ADVAIR DISKUS) 250-50 mcg/dose inhaler Inhale 1 puff 2 (two) times a day Rinse mouth after use   60 each 5    gabapentin (NEURONTIN) 100 mg capsule TAKE 2 CAPSULES (200 MG TOTAL) BY MOUTH 2 (TWO) TIMES A  capsule 1    gabapentin (NEURONTIN) 600 MG tablet PATIENT TAKES ONE IN AM, ONE AFTERNOON AND 2 AT BEDTIME 360 tablet 1    HYDROcodone-acetaminophen (NORCO) 5-325 mg per tablet Take 1 tablet by mouth every 6 (six) hours as needed for painMax Daily Amount: 4 tablets 120 tablet 0    hydroxychloroquine (PLAQUENIL) 200 mg tablet Take 1 tablet (200 mg total) by mouth 2 (two) times a day for 180 days 180 tablet 0    levothyroxine 50 mcg tablet TAKE 1 TABLET BY MOUTH EVERY DAY 90 tablet 1    lisinopril (ZESTRIL) 20 mg tablet Take 1 tablet (20 mg total) by mouth 2 (two) times a day 180 tablet 1    loperamide (IMODIUM) 2 mg capsule Take 2 mg by mouth 4 (four) times a day as needed        LORazepam (ATIVAN) 1 mg tablet TAKE 1 TABLET (1 MG TOTAL) BY MOUTH EVERY 8 (EIGHT) HOURS AS NEEDED FOR ANXIETY (Patient taking differently: 3 (three) times a day ) 90 tablet 0    Magnesium 100 MG TABS Take by mouth      meclizine (ANTIVERT) 25 mg tablet Take 1 tablet (25 mg total) by mouth every 6 (six) hours as needed for dizziness 90 tablet 0    Multiple Vitamin (MULTIVITAMIN) tablet Take 1 tablet by mouth daily      Omega-3 Fatty Acids (OMEGA 3 PO) Take 1,200 mg by mouth daily      simvastatin (ZOCOR) 5 MG tablet TAKE 1 TABLET BY MOUTH EVERY DAY 90 tablet 1    sodium chloride 1 g tablet Take 1 tablet (1 g total) by mouth 3 (three) times a day 270 tablet 3    torsemide (DEMADEX) 10 mg tablet Take 1 tablet (10 mg total) by mouth daily 90 tablet 3    warfarin (COUMADIN) 5 mg tablet Take 1 tablet daily or as directed 90 tablet 1     No current facility-administered medications for this visit  Current Outpatient Medications on File Prior to Visit   Medication Sig    albuterol (PROVENTIL HFA,VENTOLIN HFA) 90 mcg/act inhaler Inhale 2 puffs every 6 (six) hours as needed for wheezing    buPROPion (WELLBUTRIN XL) 300 mg 24 hr tablet Take 300 mg by mouth daily    Calcium Carbonate-Vitamin D (CALCIUM 600+D) 600-200 MG-UNIT TABS Take 2 tablets by mouth daily    cetirizine (ZyrTEC) 10 mg tablet Take 10 mg by mouth daily    co-enzyme Q-10 50 MG capsule Take 200 mg by mouth daily     dexlansoprazole (DEXILANT) 60 MG capsule Take 60 mg by mouth daily    diltiazem (CARDIZEM CD) 180 mg 24 hr capsule TAKE 1 CAPSULE BY MOUTH EVERY DAY    escitalopram (LEXAPRO) 20 mg tablet Take 1 tablet (20 mg total) by mouth daily    fluticasone (FLONASE) 50 mcg/act nasal spray 1 spray into each nostril daily as needed for rhinitis    fluticasone-salmeterol (ADVAIR DISKUS) 250-50 mcg/dose inhaler Inhale 1 puff 2 (two) times a day Rinse mouth after use      gabapentin (NEURONTIN) 100 mg capsule TAKE 2 CAPSULES (200 MG TOTAL) BY MOUTH 2 (TWO) TIMES A DAY    gabapentin (NEURONTIN) 600 MG tablet PATIENT TAKES ONE IN AM, ONE AFTERNOON AND 2 AT BEDTIME    HYDROcodone-acetaminophen (NORCO) 5-325 mg per tablet Take 1 tablet by mouth every 6 (six) hours as needed for painMax Daily Amount: 4 tablets    hydroxychloroquine (PLAQUENIL) 200 mg tablet Take 1 tablet (200 mg total) by mouth 2 (two) times a day for 180 days    levothyroxine 50 mcg tablet TAKE 1 TABLET BY MOUTH EVERY DAY    lisinopril (ZESTRIL) 20 mg tablet Take 1 tablet (20 mg total) by mouth 2 (two) times a day    loperamide (IMODIUM) 2 mg capsule Take 2 mg by mouth 4 (four) times a day as needed      LORazepam (ATIVAN) 1 mg tablet TAKE 1 TABLET (1 MG TOTAL) BY MOUTH EVERY 8 (EIGHT) HOURS AS NEEDED FOR ANXIETY (Patient taking differently: 3 (three) times a day )    Magnesium 100 MG TABS Take by mouth    meclizine (ANTIVERT) 25 mg tablet Take 1 tablet (25 mg total) by mouth every 6 (six) hours as needed for dizziness    Multiple Vitamin (MULTIVITAMIN) tablet Take 1 tablet by mouth daily    Omega-3 Fatty Acids (OMEGA 3 PO) Take 1,200 mg by mouth daily    simvastatin (ZOCOR) 5 MG tablet TAKE 1 TABLET BY MOUTH EVERY DAY    sodium chloride 1 g tablet Take 1 tablet (1 g total) by mouth 3 (three) times a day    torsemide (DEMADEX) 10 mg tablet Take 1 tablet (10 mg total) by mouth daily    warfarin (COUMADIN) 5 mg tablet Take 1 tablet daily or as directed    [DISCONTINUED] lidocaine viscous (XYLOCAINE) 2 % mucosal solution APPLY 5ML TOPICALLY TO CANKER SORE 4 TIMES A DAY AS NEEDED FOR MOUTH PAIN(USE A QTIP)    [DISCONTINUED] tiZANidine (ZANAFLEX) 2 mg tablet TAKE 1 TABLET (2 MG TOTAL) BY MOUTH 2 (TWO) TIMES A DAY AS NEEDED FOR MUSCLE SPASMS (Patient not taking: Reported on 10/21/2019)     No current facility-administered medications on file prior to visit  She is allergic to indocin [indomethacin]; macrobid [nitrofurantoin monohyd macro]; penicillins; and sulfa antibiotics       Review of Systems   Constitutional: Negative  HENT: Negative  Eyes: Negative  Respiratory:        Asthma  PE   Gastrointestinal: Negative  Endocrine: Negative  Genitourinary: Negative  Allergic/Immunologic: Negative  Neurological: Positive for dizziness (vertigo) and tremors (ET  )  Hematological:        Hx of clotts  Omega 3  Warfarin 5mg         Objective:      /78 (BP Location: Left arm)   Pulse 63   Temp 98 3 °F (36 8 °C) (Tympanic)   Resp 16   Ht 5' 3" (1 6 m)   Wt 64 5 kg (142 lb 3 2 oz)   BMI 25 19 kg/m²          Physical Exam   Constitutional: She is oriented to person, place, and time  She appears well-developed and well-nourished  No distress  HENT:   Head: Normocephalic and atraumatic  Eyes: Pupils are equal, round, and reactive to light  Conjunctivae and EOM are normal  Right eye exhibits no discharge  Left eye exhibits no discharge  No scleral icterus  Neck: Normal range of motion  No tracheal deviation present  No thyromegaly present  Cardiovascular: Normal rate  Pulmonary/Chest: Effort normal and breath sounds normal  No stridor  No respiratory distress  Abdominal: Soft  Musculoskeletal: Normal range of motion  She exhibits no edema, tenderness or deformity  Neurological: She is alert and oriented to person, place, and time  She has normal strength and normal reflexes  No cranial nerve deficit or sensory deficit  Skin: Skin is warm and dry  No rash noted  She is not diaphoretic  No erythema  No pallor  Psychiatric: She has a normal mood and affect   Her behavior is normal  Judgment and thought content normal

## 2019-11-04 NOTE — TELEPHONE ENCOUNTER
Signed surgical consent in the presence of surgeon after procedure explained: REPLACEMENT IMPLANTABLE PULSE GENERATOR FOR DEEP BRAIN STIMULATOR LEFT CHEST, POSSIBLE TUNNELING OF RIGHT EXTENSION TO LEFT CHEST AND CONNECTION OF LEFT GENERATOR TO SINGLE SYSTEM (N/A Chest)        Assessment for comorbid medical conditions:   HO adverse response to general anesthesia:Denies   Surgical Procedures past 6 months:11/7/2019 Left breast Bx--mammogram showed calcified cyst    Cardiac:Dr Beltre clearance needed history of AVB 1st degree , runs of SVT on Holter  --asymptomatic   Portal vein thrombosis treating w/ warfarin Coumadin was recommended by hematology  visit note 9/17/2019   EKG completed 9/18/2019 attached in visit note 9/17/2019     Pulmonary: asthma , use albuterol less than monthly  Denies oxygen or CPAP use   Endocrine:  Hypothyroidism   MISC/Oncology/hematology : 11/7/2019 Left breast Bx--mammogram showed calcified cyst    Skin intact//GI- (urostomy, colostomy, ileostomy, intermittent catheterization):--denies     Personal history of venous thromboembolic disease: patient reports pulmonary emboli, cardiac note 9/17/2019   Imagining: none required for procedure    Pain management:  Hydrocodone prescribed by PCP for leg pain 1 tablet every 6 hour will use this medication for postoperative pain management   Medications preoperative and postoperative (AC, Antiplatelet, ASA, NSAID, vitamins , dietary supplements , OTC)   Is on warfarin for portal vein thrombosis--need permission to hold 7 days preop and 7 days postoperatively  chronic use of Coumadin because of a DVT history of poor embolus history as well as a family history of emboli  Coumadin was recommended by hematology  Hold other medications as per hold list     Emailed Dr Lawrence Elias 11/6/2019  for consensus to hold      Discussed overview of surgical process from office appointment thru 6 weeks post op:--done     Patient verbalized understanding, all questions were answered and contact information provided in the event future questions arise       DBS;  DX ET or PD--contact Dr Yannick Dyer need f/u appointment for DBS IPG interrogation   Scheduled 12/16/2019

## 2019-11-07 ENCOUNTER — HOSPITAL ENCOUNTER (OUTPATIENT)
Dept: MAMMOGRAPHY | Facility: CLINIC | Age: 74
Discharge: HOME | End: 2019-11-07
Payer: MEDICARE

## 2019-11-07 ENCOUNTER — TELEPHONE (OUTPATIENT)
Dept: PREADMISSION TESTING | Facility: HOSPITAL | Age: 74
End: 2019-11-07

## 2019-11-07 VITALS
DIASTOLIC BLOOD PRESSURE: 60 MMHG | HEIGHT: 63 IN | BODY MASS INDEX: 25.16 KG/M2 | HEART RATE: 68 BPM | SYSTOLIC BLOOD PRESSURE: 122 MMHG | WEIGHT: 142 LBS

## 2019-11-07 DIAGNOSIS — R92.8 ABNORMAL MAMMOGRAM: ICD-10-CM

## 2019-11-07 DIAGNOSIS — Z98.890 STATUS POST BREAST BIOPSY: ICD-10-CM

## 2019-11-07 PROCEDURE — 88305 TISSUE EXAM BY PATHOLOGIST: CPT | Performed by: PATHOLOGY

## 2019-11-07 PROCEDURE — 19081 BX BREAST 1ST LESION STRTCTC: CPT

## 2019-11-07 RX ORDER — LIDOCAINE HYDROCHLORIDE AND EPINEPHRINE BITARTRATE 20; .01 MG/ML; MG/ML
10 INJECTION, SOLUTION SUBCUTANEOUS ONCE
Status: COMPLETED | OUTPATIENT
Start: 2019-11-07 | End: 2019-11-07

## 2019-11-07 RX ORDER — LIDOCAINE HYDROCHLORIDE 10 MG/ML
4 INJECTION, SOLUTION INFILTRATION; PERINEURAL ONCE
Status: COMPLETED | OUTPATIENT
Start: 2019-11-07 | End: 2019-11-07

## 2019-11-07 RX ADMIN — LIDOCAINE HYDROCHLORIDE AND EPINEPHRINE 10 ML: 20; 10 INJECTION, SOLUTION INFILTRATION; PERINEURAL at 09:25

## 2019-11-07 RX ADMIN — LIDOCAINE HYDROCHLORIDE 4 ML: 10 INJECTION, SOLUTION INFILTRATION; PERINEURAL at 09:25

## 2019-11-07 NOTE — PROGRESS NOTES
Procedure type:    _____ultrasound guided __x___stereotactic    Breast:    __x___Left _____Right    Location:    Needle: 10g Mammotome Revolve    # of passes: 6 total (3 with calcs/specimen A & 3 without calcs/specimen B)    Clip: U shape Mammomark    Performed by: Dr Hyman  held for 5 minutes by: Tali Meyer    Steri Strips:    __x___yes _____no    Band aid:    __x___yes_____no    Tape and guaze:    _____yes __x___no    Tolerated procedure:    _x____yes _____no

## 2019-11-07 NOTE — DISCHARGE INSTR - OTHER ORDERS
POST LARGE CORE BREAST BIOPSY PATIENT INFORMATION      1  Place an ice pack inside your bra over the top of the dressing every hour for 20 minutes (20 minutes on, 60 minutes off)  Do this until bedtime  2  Do not shower or bathe until the following morning  3  You may bathe your breast carefully with the steri-strips in place  Be careful    Not to loosen them  The steri-strips will fall off in 3-5 days  4  You may have mild discomfort, and you may have some bruising where the   Needle entered the skin  This should clear within 5-7 days  5  If you need medicine for discomfort, take acetaminophen products such as   Tylenol  You may also take Advil or Motrin products  6  Do not participate in strenuous activities such as-tennis, aerobics, skiing,  Weight lifting, etc  for 24 hours  Refrain from swimming/soaking for 72 hours  7  Wearing a bra for sleeping may be more comfortable for the first 24-48 hours  8  Watch for continued bleeding, pain or fever over 101; please call with any questions or concerns  For procedures done at the [facility]  [facility] [address] call:  Mary Ruiz RN at [phone]  Bandar Bateman RN at [phone]                    *After 4 PM call the Interventional Radiology Department                    [phone] and ask to speak with the nurse on call  For procedures done at the [address] [place] [address] call:         Evy Agustin RN at   *After 4 PM call the Interventional Radiology Department   [phone] and ask to speak with the nurse on call  For procedures done at [address] [facility] call: The Radiology Nurse at [phone]  *After 4 PM call your physician, or go to the Emergency Department  9          The final results of your biopsy are usually available within one week

## 2019-11-08 ENCOUNTER — APPOINTMENT (OUTPATIENT)
Dept: LAB | Facility: CLINIC | Age: 74
End: 2019-11-08
Payer: MEDICARE

## 2019-11-08 DIAGNOSIS — Z79.01 CURRENT USE OF LONG TERM ANTICOAGULATION: ICD-10-CM

## 2019-11-08 DIAGNOSIS — Z51.81 MONITORING FOR LONG-TERM ANTICOAGULANT USE: ICD-10-CM

## 2019-11-08 DIAGNOSIS — Z79.899 HISTORY OF LONG-TERM TREATMENT WITH HIGH-RISK MEDICATION: ICD-10-CM

## 2019-11-08 DIAGNOSIS — Z79.01 MONITORING FOR LONG-TERM ANTICOAGULANT USE: ICD-10-CM

## 2019-11-08 DIAGNOSIS — G25.0 ESSENTIAL TREMOR: ICD-10-CM

## 2019-11-08 LAB
ALBUMIN SERPL BCP-MCNC: 3.6 G/DL (ref 3.5–5)
ALP SERPL-CCNC: 65 U/L (ref 46–116)
ALT SERPL W P-5'-P-CCNC: 35 U/L (ref 12–78)
ANION GAP SERPL CALCULATED.3IONS-SCNC: 6 MMOL/L (ref 4–13)
APTT PPP: 33 SECONDS (ref 23–37)
AST SERPL W P-5'-P-CCNC: 29 U/L (ref 5–45)
BACTERIA UR QL AUTO: ABNORMAL /HPF
BASOPHILS # BLD AUTO: 0.04 THOUSANDS/ΜL (ref 0–0.1)
BASOPHILS NFR BLD AUTO: 1 % (ref 0–1)
BILIRUB SERPL-MCNC: 0.6 MG/DL (ref 0.2–1)
BILIRUB UR QL STRIP: NEGATIVE
BUN SERPL-MCNC: 8 MG/DL (ref 5–25)
CALCIUM SERPL-MCNC: 8.8 MG/DL (ref 8.3–10.1)
CHLORIDE SERPL-SCNC: 94 MMOL/L (ref 100–108)
CLARITY UR: ABNORMAL
CO2 SERPL-SCNC: 29 MMOL/L (ref 21–32)
COLOR UR: YELLOW
CREAT SERPL-MCNC: 0.76 MG/DL (ref 0.6–1.3)
EOSINOPHIL # BLD AUTO: 0.12 THOUSAND/ΜL (ref 0–0.61)
EOSINOPHIL NFR BLD AUTO: 2 % (ref 0–6)
ERYTHROCYTE [DISTWIDTH] IN BLOOD BY AUTOMATED COUNT: 14.8 % (ref 11.6–15.1)
EST. AVERAGE GLUCOSE BLD GHB EST-MCNC: 120 MG/DL
GFR SERPL CREATININE-BSD FRML MDRD: 77 ML/MIN/1.73SQ M
GLUCOSE P FAST SERPL-MCNC: 110 MG/DL (ref 65–99)
GLUCOSE UR STRIP-MCNC: NEGATIVE MG/DL
HBA1C MFR BLD: 5.8 % (ref 4.2–6.3)
HCT VFR BLD AUTO: 37.4 % (ref 34.8–46.1)
HGB BLD-MCNC: 12.4 G/DL (ref 11.5–15.4)
HGB UR QL STRIP.AUTO: NEGATIVE
IMM GRANULOCYTES # BLD AUTO: 0.02 THOUSAND/UL (ref 0–0.2)
IMM GRANULOCYTES NFR BLD AUTO: 0 % (ref 0–2)
INR PPP: 2.32 (ref 0.84–1.19)
KETONES UR STRIP-MCNC: NEGATIVE MG/DL
LEUKOCYTE ESTERASE UR QL STRIP: ABNORMAL
LYMPHOCYTES # BLD AUTO: 1.7 THOUSANDS/ΜL (ref 0.6–4.47)
LYMPHOCYTES NFR BLD AUTO: 28 % (ref 14–44)
MCH RBC QN AUTO: 32.2 PG (ref 26.8–34.3)
MCHC RBC AUTO-ENTMCNC: 33.2 G/DL (ref 31.4–37.4)
MCV RBC AUTO: 97 FL (ref 82–98)
MONOCYTES # BLD AUTO: 0.64 THOUSAND/ΜL (ref 0.17–1.22)
MONOCYTES NFR BLD AUTO: 11 % (ref 4–12)
NEUTROPHILS # BLD AUTO: 3.48 THOUSANDS/ΜL (ref 1.85–7.62)
NEUTS SEG NFR BLD AUTO: 58 % (ref 43–75)
NITRITE UR QL STRIP: NEGATIVE
NON-SQ EPI CELLS URNS QL MICRO: ABNORMAL /HPF
NRBC BLD AUTO-RTO: 0 /100 WBCS
PH UR STRIP.AUTO: 7 [PH]
PLATELET # BLD AUTO: 325 THOUSANDS/UL (ref 149–390)
PMV BLD AUTO: 8.5 FL (ref 8.9–12.7)
POTASSIUM SERPL-SCNC: 4.3 MMOL/L (ref 3.5–5.3)
PROT SERPL-MCNC: 6.5 G/DL (ref 6.4–8.2)
PROT UR STRIP-MCNC: NEGATIVE MG/DL
PROTHROMBIN TIME: 24.6 SECONDS (ref 11.6–14.5)
RBC # BLD AUTO: 3.85 MILLION/UL (ref 3.81–5.12)
RBC #/AREA URNS AUTO: ABNORMAL /HPF
SODIUM SERPL-SCNC: 129 MMOL/L (ref 136–145)
SP GR UR STRIP.AUTO: 1.01 (ref 1–1.03)
UROBILINOGEN UR QL STRIP.AUTO: 0.2 E.U./DL
WBC # BLD AUTO: 6 THOUSAND/UL (ref 4.31–10.16)
WBC #/AREA URNS AUTO: ABNORMAL /HPF

## 2019-11-08 PROCEDURE — 85025 COMPLETE CBC W/AUTO DIFF WBC: CPT

## 2019-11-08 PROCEDURE — 87086 URINE CULTURE/COLONY COUNT: CPT | Performed by: NEUROLOGICAL SURGERY

## 2019-11-08 PROCEDURE — 80053 COMPREHEN METABOLIC PANEL: CPT

## 2019-11-08 PROCEDURE — 85610 PROTHROMBIN TIME: CPT

## 2019-11-08 PROCEDURE — 87186 SC STD MICRODIL/AGAR DIL: CPT | Performed by: NEUROLOGICAL SURGERY

## 2019-11-08 PROCEDURE — 83036 HEMOGLOBIN GLYCOSYLATED A1C: CPT

## 2019-11-08 PROCEDURE — 36415 COLL VENOUS BLD VENIPUNCTURE: CPT

## 2019-11-08 PROCEDURE — 87077 CULTURE AEROBIC IDENTIFY: CPT | Performed by: NEUROLOGICAL SURGERY

## 2019-11-08 PROCEDURE — 85730 THROMBOPLASTIN TIME PARTIAL: CPT

## 2019-11-08 PROCEDURE — 81001 URINALYSIS AUTO W/SCOPE: CPT | Performed by: NEUROLOGICAL SURGERY

## 2019-11-08 NOTE — PROGRESS NOTES
Post procedure call completed on 11/8/19 @ 1209    Bleeding: _____yes __x___no    Pain: _____yes ___x___no    Redness/Swelling: ______yes ___x___no    Band aid removed: _____yes __x___no    Steri-Strips intact: ___x___yes _____no

## 2019-11-08 NOTE — TELEPHONE ENCOUNTER
Reply to Dr Param Massey     I will discuss with surgeon Dr Rosa Urrutia, likely he will not agree secondary to high risk of postoperative hematoma in surgical incision, in particular with an implanted device  From: Brien Sands MD  Sent: 11/8/2019   1:25 PM EST  To: RODNEY Schneider  Subject: RE: surgery and warfarin                         Hannah Lizama,    She can stop her warfarin 7 days prior to the procedure  She can restart this 7 days after; however, I would prefer this to be restarted 3 to 5 days if possible  Thank you!    -Dr Param Massey    ----- Message -----  From: RODNEY Schneider  Sent: 11/6/2019   3:18 PM EST  To: Brien Sands MD  Subject: surgery and warfarin                             Patient is scheduled for neurosurgery on 12/4/2019 for     [address] LEFT CHEST, POSSIBLE TUNNELING OF RIGHT EXTENSION TO LEFT CHEST AND CONNECTION OF LEFT GENERATOR TO SINGLE SYSTEM (N/A Chest)    Concern for intraoperative or postoperative hemorrhage requesting permission to hold warfarin 7 days before surgery and for 7 days after for a total of 14 days    Seeking consensus with request      Kanu Tucker for Dr Choco Parra

## 2019-11-10 LAB — BACTERIA UR CULT: ABNORMAL

## 2019-11-11 ENCOUNTER — TELEPHONE (OUTPATIENT)
Dept: MAMMOGRAPHY | Facility: CLINIC | Age: 74
End: 2019-11-11

## 2019-11-15 DIAGNOSIS — F41.9 ANXIETY: ICD-10-CM

## 2019-11-18 RX ORDER — LORAZEPAM 1 MG/1
TABLET ORAL
Qty: 90 TABLET | Refills: 0 | Status: SHIPPED | OUTPATIENT
Start: 2019-11-18 | End: 2019-12-18

## 2019-11-19 ENCOUNTER — ANESTHESIA EVENT (OUTPATIENT)
Dept: PERIOP | Facility: HOSPITAL | Age: 74
End: 2019-11-19
Payer: MEDICARE

## 2019-11-25 ENCOUNTER — TELEPHONE (OUTPATIENT)
Dept: NEUROLOGY | Facility: CLINIC | Age: 74
End: 2019-11-25

## 2019-11-25 NOTE — TELEPHONE ENCOUNTER
Called pt to offer an appt on 11/27/19 in the [place] office  Pt called to ask for sooner appt due to worsening tremor post surgery  Pt agreed to the appt on 11/27/19 at 7:30 AM and is happy that we could get her in sooner and before Thanksgiving  Pt was given address for the [facility]

## 2019-11-27 ENCOUNTER — OFFICE VISIT (OUTPATIENT)
Dept: INTERNAL MEDICINE CLINIC | Facility: CLINIC | Age: 74
End: 2019-11-27
Payer: MEDICARE

## 2019-11-27 ENCOUNTER — OFFICE VISIT (OUTPATIENT)
Dept: NEUROLOGY | Facility: CLINIC | Age: 74
End: 2019-11-27
Payer: MEDICARE

## 2019-11-27 VITALS
BODY MASS INDEX: 25.05 KG/M2 | DIASTOLIC BLOOD PRESSURE: 70 MMHG | SYSTOLIC BLOOD PRESSURE: 131 MMHG | HEART RATE: 66 BPM | HEIGHT: 63 IN | WEIGHT: 141.4 LBS

## 2019-11-27 VITALS
HEIGHT: 63 IN | OXYGEN SATURATION: 98 % | WEIGHT: 141.8 LBS | DIASTOLIC BLOOD PRESSURE: 70 MMHG | TEMPERATURE: 98 F | BODY MASS INDEX: 25.12 KG/M2 | SYSTOLIC BLOOD PRESSURE: 130 MMHG | RESPIRATION RATE: 18 BRPM | HEART RATE: 68 BPM

## 2019-11-27 DIAGNOSIS — B96.20 E-COLI UTI: ICD-10-CM

## 2019-11-27 DIAGNOSIS — Z96.89 S/P DEEP BRAIN STIMULATOR PLACEMENT: Primary | ICD-10-CM

## 2019-11-27 DIAGNOSIS — E87.1 HYPONATREMIA: ICD-10-CM

## 2019-11-27 DIAGNOSIS — G25.0 ESSENTIAL TREMOR: ICD-10-CM

## 2019-11-27 DIAGNOSIS — F41.9 ANXIETY: ICD-10-CM

## 2019-11-27 DIAGNOSIS — R73.03 PREDIABETES: ICD-10-CM

## 2019-11-27 DIAGNOSIS — N39.0 E-COLI UTI: ICD-10-CM

## 2019-11-27 DIAGNOSIS — I10 ESSENTIAL HYPERTENSION: ICD-10-CM

## 2019-11-27 PROCEDURE — 95983 ALYS BRN NPGT PRGRMG 15 MIN: CPT | Performed by: PSYCHIATRY & NEUROLOGY

## 2019-11-27 PROCEDURE — 95984 ALYS BRN NPGT PRGRMG ADDL 15: CPT | Performed by: PSYCHIATRY & NEUROLOGY

## 2019-11-27 PROCEDURE — 99214 OFFICE O/P EST MOD 30 MIN: CPT | Performed by: INTERNAL MEDICINE

## 2019-11-27 RX ORDER — CEPHALEXIN 500 MG/1
500 CAPSULE ORAL EVERY 12 HOURS SCHEDULED
Qty: 10 CAPSULE | Refills: 0 | Status: SHIPPED | OUTPATIENT
Start: 2019-11-27 | End: 2019-12-02

## 2019-11-27 NOTE — PRE-PROCEDURE INSTRUCTIONS
Pre-Surgery Instructions:   Medication Instructions    albuterol (PROVENTIL HFA,VENTOLIN HFA) 90 mcg/act inhaler Instructed patient per Anesthesia Guidelines   buPROPion (WELLBUTRIN XL) 300 mg 24 hr tablet Instructed patient per Anesthesia Guidelines   Calcium Carbonate-Vitamin D (CALCIUM 600+D) 600-200 MG-UNIT TABS Instructed patient per Anesthesia Guidelines   cetirizine (ZyrTEC) 10 mg tablet Instructed patient per Anesthesia Guidelines   co-enzyme Q-10 50 MG capsule Instructed patient per Anesthesia Guidelines   dexlansoprazole (DEXILANT) 60 MG capsule Instructed patient per Anesthesia Guidelines   diltiazem (CARDIZEM CD) 180 mg 24 hr capsule Instructed patient per Anesthesia Guidelines   escitalopram (LEXAPRO) 20 mg tablet Instructed patient per Anesthesia Guidelines   fluticasone (FLONASE) 50 mcg/act nasal spray Instructed patient per Anesthesia Guidelines   fluticasone-salmeterol (ADVAIR DISKUS) 250-50 mcg/dose inhaler Instructed patient per Anesthesia Guidelines   gabapentin (NEURONTIN) 600 MG tablet Instructed patient per Anesthesia Guidelines   HYDROcodone-acetaminophen (NORCO) 5-325 mg per tablet Instructed patient per Anesthesia Guidelines   hydroxychloroquine (PLAQUENIL) 200 mg tablet Instructed patient per Anesthesia Guidelines   levothyroxine 50 mcg tablet Instructed patient per Anesthesia Guidelines   lisinopril (ZESTRIL) 20 mg tablet Instructed patient per Anesthesia Guidelines   loperamide (IMODIUM) 2 mg capsule Instructed patient per Anesthesia Guidelines   LORazepam (ATIVAN) 1 mg tablet Instructed patient per Anesthesia Guidelines   Magnesium 100 MG TABS Instructed patient per Anesthesia Guidelines   meclizine (ANTIVERT) 25 mg tablet Instructed patient per Anesthesia Guidelines   Multiple Vitamin (MULTIVITAMIN) tablet Instructed patient per Anesthesia Guidelines      Omega-3 Fatty Acids (OMEGA 3 PO) Instructed patient per Anesthesia Guidelines   simvastatin (ZOCOR) 5 MG tablet Instructed patient per Anesthesia Guidelines   sodium chloride 1 g tablet Instructed patient per Anesthesia Guidelines   torsemide (DEMADEX) 10 mg tablet Instructed patient per Anesthesia Guidelines   warfarin (COUMADIN) 5 mg tablet Patient was instructed by Physician and understands

## 2019-11-27 NOTE — PRE-PROCEDURE INSTRUCTIONS
Pre-Surgery Instructions:   Medication Instructions    albuterol (PROVENTIL HFA,VENTOLIN HFA) 90 mcg/act inhaler Instructed patient per Anesthesia Guidelines   buPROPion (WELLBUTRIN XL) 300 mg 24 hr tablet Instructed patient per Anesthesia Guidelines   Calcium Carbonate-Vitamin D (CALCIUM 600+D) 600-200 MG-UNIT TABS Instructed patient per Anesthesia Guidelines   cetirizine (ZyrTEC) 10 mg tablet Instructed patient per Anesthesia Guidelines   co-enzyme Q-10 50 MG capsule Instructed patient per Anesthesia Guidelines   dexlansoprazole (DEXILANT) 60 MG capsule Instructed patient per Anesthesia Guidelines   diltiazem (CARDIZEM CD) 180 mg 24 hr capsule Instructed patient per Anesthesia Guidelines   escitalopram (LEXAPRO) 20 mg tablet Instructed patient per Anesthesia Guidelines   fluticasone (FLONASE) 50 mcg/act nasal spray Instructed patient per Anesthesia Guidelines   fluticasone-salmeterol (ADVAIR DISKUS) 250-50 mcg/dose inhaler Instructed patient per Anesthesia Guidelines   gabapentin (NEURONTIN) 600 MG tablet Instructed patient per Anesthesia Guidelines   HYDROcodone-acetaminophen (NORCO) 5-325 mg per tablet Instructed patient per Anesthesia Guidelines   hydroxychloroquine (PLAQUENIL) 200 mg tablet Instructed patient per Anesthesia Guidelines   levothyroxine 50 mcg tablet Instructed patient per Anesthesia Guidelines   lisinopril (ZESTRIL) 20 mg tablet Instructed patient per Anesthesia Guidelines   loperamide (IMODIUM) 2 mg capsule Instructed patient per Anesthesia Guidelines   LORazepam (ATIVAN) 1 mg tablet Instructed patient per Anesthesia Guidelines   Magnesium 100 MG TABS Instructed patient per Anesthesia Guidelines   meclizine (ANTIVERT) 25 mg tablet Instructed patient per Anesthesia Guidelines   Multiple Vitamin (MULTIVITAMIN) tablet Instructed patient per Anesthesia Guidelines      Omega-3 Fatty Acids (OMEGA 3 PO) Instructed patient per Anesthesia Guidelines   simvastatin (ZOCOR) 5 MG tablet Instructed patient per Anesthesia Guidelines   sodium chloride 1 g tablet Instructed patient per Anesthesia Guidelines   torsemide (DEMADEX) 10 mg tablet Instructed patient per Anesthesia Guidelines   warfarin (COUMADIN) 5 mg tablet Patient was instructed by Physician and understands  Per pt last dose of coumadin is today 11/27  Pt instructed to take RX meds on day of surgery except for demadex and lisinopril  Pt received soap and written shower instructions from surgeons office and verbalized an understanding

## 2019-11-27 NOTE — ASSESSMENT & PLAN NOTE
Left hand tremor with delayed return following last programming session where changes were made to improve speech  Speech continues to be fine  When she tried switching back to prior settings speech worse and she has facial pulling and tingling  Approximately 30 minutes spent on deep brain stimulation programming  Devices interrogated and changes made  Left on Group C and amplitude slowly increase with improvement of tremor without compromising speech  See DBS table in body of note and attached clinical sheets for details

## 2019-11-27 NOTE — PROGRESS NOTES
Assessment/Plan:    Essential hypertension  Well controlled  Continue current medications    Essential tremor  S/p DBS     Anxiety  On lexapro and wellbutrin   Lorazepam PRN     Prediabetes  Last A1C 5 8%  Reduce sugars and carbs     S/P deep brain stimulator placement  Scheduled for battery replacement on 12/4   Medically stable for procedure   Labs reviewed  Will treat UTI with keflex (which she feels she's tolerated ok in the past)   Advised to take salt tablets 3 times a day- don't miss any doses  Obtain repeat sodium level post op   Stop coumadin today, restart on day 6 post op, INR 1 week after restarting coumadin           Diagnoses and all orders for this visit:    S/P deep brain stimulator placement    E-coli UTI  -     cephalexin (KEFLEX) 500 mg capsule; Take 1 capsule (500 mg total) by mouth every 12 (twelve) hours for 5 days    Hyponatremia  -     Basic metabolic panel; Future    Essential hypertension    Essential tremor    Anxiety    Prediabetes          Subjective:      Patient ID: Rajiv Juarez is a 75 y o  female      Here today for pre op evaluation  Moncho Garcia is scheduled for replacement of DBS battery on 12/4 with Dr Key Person   Denies any issues with anesthesia in the past   Denies any cardiopulmonary complaints   She had pre op labs completed  Sodium was low, she admits to sometimes missing a dose of salt tablets but is mostly taking them 2-3 times daily   Her urine was positive for E coli infection, she denies any frequency or burning but has a history of frequent UTIs and follows with urology   She stopped her coumadin today and will restart it on day 6 post op   She has stopped her other supplements as advised by pre admission testing             The following portions of the patient's history were reviewed and updated as appropriate: allergies, current medications, past family history, past medical history, past social history, past surgical history and problem list     Review of Systems Constitutional: Negative for activity change, appetite change, fatigue and unexpected weight change  Eyes: Negative for visual disturbance  Respiratory: Negative for cough, chest tightness, shortness of breath and wheezing  Cardiovascular: Negative for chest pain, palpitations and leg swelling  Gastrointestinal: Positive for diarrhea  Negative for abdominal pain, blood in stool and constipation  Chronic diarrhea    Genitourinary: Negative for difficulty urinating, dysuria, flank pain, frequency and urgency  Musculoskeletal: Negative for arthralgias  Skin: Negative for rash  Neurological: Positive for tremors  Negative for dizziness, weakness, light-headedness and headaches  Psychiatric/Behavioral: Negative for sleep disturbance  The patient is nervous/anxious  Objective:      /70   Pulse 68   Temp 98 °F (36 7 °C)   Resp 18   Ht 5' 3" (1 6 m)   Wt 64 3 kg (141 lb 12 8 oz)   SpO2 98%   BMI 25 12 kg/m²          Physical Exam   Constitutional: She is oriented to person, place, and time  She appears well-developed and well-nourished  HENT:   Head: Normocephalic and atraumatic  Mouth/Throat: Oropharynx is clear and moist    Eyes: Pupils are equal, round, and reactive to light  Conjunctivae are normal    Neck: No thyromegaly present  Cardiovascular: Normal rate, regular rhythm, normal heart sounds and intact distal pulses  Pulmonary/Chest: Effort normal and breath sounds normal    Abdominal: Soft  Bowel sounds are normal    Musculoskeletal: Normal range of motion  She exhibits no edema  Lymphadenopathy:     She has no cervical adenopathy  Neurological: She is alert and oriented to person, place, and time  Skin: Skin is warm and dry  Psychiatric: She has a normal mood and affect  Her behavior is normal    Vitals reviewed

## 2019-11-27 NOTE — ASSESSMENT & PLAN NOTE
Scheduled for battery replacement on 12/4   Medically stable for procedure   Labs reviewed  Will treat UTI with keflex (which she feels she's tolerated ok in the past)   Advised to take salt tablets 3 times a day- don't miss any doses  Obtain repeat sodium level post op   Stop coumadin today, restart on day 6 post op, INR 1 week after restarting coumadin

## 2019-11-27 NOTE — H&P (VIEW-ONLY)
Assessment/Plan:    Essential hypertension  Well controlled  Continue current medications    Essential tremor  S/p DBS     Anxiety  On lexapro and wellbutrin   Lorazepam PRN     Prediabetes  Last A1C 5 8%  Reduce sugars and carbs     S/P deep brain stimulator placement  Scheduled for battery replacement on 12/4   Medically stable for procedure   Labs reviewed  Will treat UTI with keflex (which she feels she's tolerated ok in the past)   Advised to take salt tablets 3 times a day- don't miss any doses  Obtain repeat sodium level post op   Stop coumadin today, restart on day 6 post op, INR 1 week after restarting coumadin           Diagnoses and all orders for this visit:    S/P deep brain stimulator placement    E-coli UTI  -     cephalexin (KEFLEX) 500 mg capsule; Take 1 capsule (500 mg total) by mouth every 12 (twelve) hours for 5 days    Hyponatremia  -     Basic metabolic panel; Future    Essential hypertension    Essential tremor    Anxiety    Prediabetes          Subjective:      Patient ID: Aiden Epps is a 75 y o  female      Here today for pre op evaluation  Anel Samuels is scheduled for replacement of DBS battery on 12/4 with Dr Yoselyn Espinosa   Denies any issues with anesthesia in the past   Denies any cardiopulmonary complaints   She had pre op labs completed  Sodium was low, she admits to sometimes missing a dose of salt tablets but is mostly taking them 2-3 times daily   Her urine was positive for E coli infection, she denies any frequency or burning but has a history of frequent UTIs and follows with urology   She stopped her coumadin today and will restart it on day 6 post op   She has stopped her other supplements as advised by pre admission testing             The following portions of the patient's history were reviewed and updated as appropriate: allergies, current medications, past family history, past medical history, past social history, past surgical history and problem list     Review of Systems Constitutional: Negative for activity change, appetite change, fatigue and unexpected weight change  Eyes: Negative for visual disturbance  Respiratory: Negative for cough, chest tightness, shortness of breath and wheezing  Cardiovascular: Negative for chest pain, palpitations and leg swelling  Gastrointestinal: Positive for diarrhea  Negative for abdominal pain, blood in stool and constipation  Chronic diarrhea    Genitourinary: Negative for difficulty urinating, dysuria, flank pain, frequency and urgency  Musculoskeletal: Negative for arthralgias  Skin: Negative for rash  Neurological: Positive for tremors  Negative for dizziness, weakness, light-headedness and headaches  Psychiatric/Behavioral: Negative for sleep disturbance  The patient is nervous/anxious  Objective:      /70   Pulse 68   Temp 98 °F (36 7 °C)   Resp 18   Ht 5' 3" (1 6 m)   Wt 64 3 kg (141 lb 12 8 oz)   SpO2 98%   BMI 25 12 kg/m²          Physical Exam   Constitutional: She is oriented to person, place, and time  She appears well-developed and well-nourished  HENT:   Head: Normocephalic and atraumatic  Mouth/Throat: Oropharynx is clear and moist    Eyes: Pupils are equal, round, and reactive to light  Conjunctivae are normal    Neck: No thyromegaly present  Cardiovascular: Normal rate, regular rhythm, normal heart sounds and intact distal pulses  Pulmonary/Chest: Effort normal and breath sounds normal    Abdominal: Soft  Bowel sounds are normal    Musculoskeletal: Normal range of motion  She exhibits no edema  Lymphadenopathy:     She has no cervical adenopathy  Neurological: She is alert and oriented to person, place, and time  Skin: Skin is warm and dry  Psychiatric: She has a normal mood and affect  Her behavior is normal    Vitals reviewed

## 2019-11-27 NOTE — PROGRESS NOTES
POSITIVE URINE    Large leukocytes   urine c/s --100,000 cfu/ml Escherichia coli     PCP treated with Cephalexin x 5 days ordered 11/27  Repeat preop urine on surgery day

## 2019-11-27 NOTE — PROGRESS NOTES
Patient ID: Larry Colbert is a 75 y o  female  Assessment/Plan:    Essential tremor  Left hand tremor with delayed return following last programming session where changes were made to improve speech  Speech continues to be fine  When she tried switching back to prior settings speech worse and she has facial pulling and tingling  Approximately 30 minutes spent on deep brain stimulation programming  Devices interrogated and changes made  Left on Group C and amplitude slowly increase with improvement of tremor without compromising speech  See DBS table in body of note and attached clinical sheets for details  Diagnoses and all orders for this visit:    S/P deep brain stimulator placement    Essential tremor           Subjective:    Sameer Blount is a left handed female with peripheral neuropathy on Neurontin, spinal stenosis on gabapentin, anxiety and essential tremor s/p bilateral VIM DBS on 5/1/14 with Activa SC, s/p right IPG update to [facility] 6/20/17,  who presents for follow up  She was previously followed by Dr José Antonio Man for tremors  To review, tremors began in her late 46s  At that time the tremors began as a mild intentional tremor on the left  This has progressed with time and spread to the right side as well  She called for urgent follow up as right hand tremor worsened as expected, given she is awaiting IPG replacement next week but also she was having trouble controlling her left hand since last seen  When last seen she was set on a new program with much improvement in speech and tremor was controlled when leaving the office but this broke through  Tremor is now interfering with eating, drinking, holding a phone or heavy object  Objective:    Blood pressure 131/70, pulse 66, height 5' 3" (1 6 m), weight 64 1 kg (141 lb 6 4 oz)  Physical Exam   Constitutional: She appears well-developed  Eyes: Pupils are equal, round, and reactive to light  Neurological: She is alert   She has normal strength  Vitals reviewed  Neurological Exam  Mental Status  Alert  Oriented to person, place, time and situation  Speech: hypophonia  Language is fluent with no aphasia  Attention and concentration are normal     Cranial Nerves  CN III, IV, VI: Extraocular movements intact bilaterally  Pupils equal round and reactive to light bilaterally  CN V: Facial sensation is normal   CN VII: Full and symmetric facial movement  CN VIII: Hearing is normal   CN IX, X: Palate elevates symmetrically  CN XI: Shoulder shrug strength is normal   CN XII: Tongue midline without atrophy or fasciculations  Motor   Normal muscle tone  Strength is 5/5 throughout all four extremities  Sensory  Light touch is normal in upper and lower extremities  Coordination  Right: Finger-to-nose abnormality: Rapid alternating movement normal   Left: Finger-to-nose abnormality: Rapid alternating movement normal   Moderate tremors on finger to nose bilaterally  No vocal or head tremor  Apparent when holding a phone in her hand       Gait  Casual gait is normal including stance, stride, and arm swing  Contacts PW Freq Amp Symptoms Side effects   Right VIM        VIM 1 90 125         3 1 Improved but still moderate       3 5        3 6     Vim 2 90 125         2 6        2 9 Mild tremor       3 2 Very good, slight when holding object out              ROS:    Review of Systems   Constitutional: Negative  Negative for appetite change and fever  HENT: Positive for tinnitus  Negative for hearing loss, trouble swallowing and voice change  Eyes: Negative  Negative for photophobia and pain  Respiratory: Negative  Negative for shortness of breath  Cardiovascular: Negative  Negative for palpitations  Gastrointestinal: Negative  Negative for nausea and vomiting  Endocrine: Negative  Negative for cold intolerance and heat intolerance  Genitourinary: Negative  Negative for dysuria, frequency and urgency  Musculoskeletal: Positive for back pain  Negative for myalgias and neck pain  A little bit of balance issue      Skin: Negative  Negative for rash  Allergic/Immunologic: Negative  Neurological: Positive for tremors  Negative for dizziness, seizures, syncope, facial asymmetry, speech difficulty, weakness, light-headedness, numbness and headaches  Hematological: Bruises/bleeds easily (Bruise)  Psychiatric/Behavioral: Positive for sleep disturbance (Gets up a lot during the night time)  Negative for confusion and hallucinations  All other systems reviewed and are negative  Review of system was personally reviewed

## 2019-11-29 NOTE — TELEPHONE ENCOUNTER
11/18 reply email      RODNEY Falcon MD   Cc: Naa Izquierdo MD             Will hold warfarin 7 days preoperatively and 5 days postop restart day 6 postoperatively as per Ele Vickers       Thank you for the reply      Ric Lopez MD  Replied thank you for update

## 2019-12-03 ENCOUNTER — TELEPHONE (OUTPATIENT)
Dept: NEUROSURGERY | Facility: CLINIC | Age: 74
End: 2019-12-03

## 2019-12-03 ENCOUNTER — DOCUMENTATION (OUTPATIENT)
Dept: NEUROSURGERY | Facility: CLINIC | Age: 74
End: 2019-12-03

## 2019-12-03 DIAGNOSIS — M54.16 LUMBAR RADICULOPATHY: ICD-10-CM

## 2019-12-03 DIAGNOSIS — I10 ESSENTIAL HYPERTENSION: ICD-10-CM

## 2019-12-03 DIAGNOSIS — Z98.890 POSTOPERATIVE STATE: Primary | ICD-10-CM

## 2019-12-03 RX ORDER — DOXYCYCLINE HYCLATE 100 MG/1
100 CAPSULE ORAL EVERY 12 HOURS SCHEDULED
Qty: 6 CAPSULE | Refills: 0 | Status: SHIPPED | OUTPATIENT
Start: 2019-12-03 | End: 2019-12-06

## 2019-12-03 RX ORDER — DILTIAZEM HYDROCHLORIDE 180 MG/1
CAPSULE, COATED, EXTENDED RELEASE ORAL
Qty: 90 CAPSULE | Refills: 1 | Status: SHIPPED | OUTPATIENT
Start: 2019-12-03 | End: 2020-05-29

## 2019-12-03 RX ORDER — HYDROCODONE BITARTRATE AND ACETAMINOPHEN 5; 325 MG/1; MG/1
1 TABLET ORAL EVERY 6 HOURS PRN
Qty: 120 TABLET | Refills: 0 | Status: SHIPPED | OUTPATIENT
Start: 2019-12-03 | End: 2020-02-18 | Stop reason: SDUPTHER

## 2019-12-03 NOTE — ANESTHESIA PREPROCEDURE EVALUATION
Review of Systems/Medical History  Patient summary reviewed  Chart reviewed  No history of anesthetic complications     Cardiovascular  Negative cardio ROS Hyperlipidemia, Hypertension , DVT  PE,  Pulmonary  Negative pulmonary ROS Smoker ex-smoker  , Asthma ,   Comment: Allergic rhinitis     GI/Hepatic  Negative GI/hepatic ROS   GERD , Liver disease (portal vein thrombosis) ,   Comment: Ulcerative cholitis     Negative  ROS        Endo/Other  Negative endo/other ROS History of thyroid disease , hypothyroidism,   Comment: Sjogren's    GYN  Negative gynecology ROS   Hysterectomy,        Hematology  Negative hematology ROS Anemia ,  Coagulation disorder currently taking oral anticoagulants,    Musculoskeletal  Negative musculoskeletal ROS Back pain , lumbar pain, Osteoarthritis,   Arthritis     Neurology  Negative neurology ROS      Psychology   Negative psychology ROS Anxiety, Depression , depressed,   Chronic opioid dependence Chronic pain,        Holter 4/2/19:  1) Borderline Holter monitor of 24 hrs duration  2) Normal burden of ventricular and supraventricular ectopic beats     3) Brief asymptomatic runs of supraventricular ectopy, longest 6 beats in duration    EKG 9/17/19: borderline first-degree AV block with iRBBB    Cardiology "clearance" 11/27/19 in EHR    Treated for UTI 11/27/19, Cephalexin x5 days (starting 11/27); rpt UA on DoS    Recent Results (from the past 672 hour(s))   Tissue Exam    Collection Time: 11/07/19  9:35 AM   Result Value Ref Range    Case Report       Surgical Pathology Report                         Case: [id number]                                   Authorizing Provider:  Lisa Hinojosa MD    Collected:           11/07/2019 0935              Ordering Location:     [address] Received:            11/07/2019 [address]:           Adore Guerin Robbie Washington MD                                                        Specimens:   A) - Breast, Left, 3 cores with calcs                                                               B) - Breast, Left, 3 cores without calcs                                                   Final Diagnosis       A  Left breast, 3 cores with calcs:  - Fibroadenoma, hyalinized and calcified   - Adjacent benign breast tissue with focal sclerosing adenosis  B  Left breast, 3 cores without calcs:  - Benign breast tissue  Additional Information       All controls performed with the immunohistochemical stains reported above reacted appropriately  These tests were developed and their performance characteristics determined by [facility] or [facility]  They may not be cleared or approved by the [place]  Food and Drug Administration  The FDA has determined that such clearance or approval is not necessary  These tests are used for clinical purposes  They should not be regarded as investigational or for research  This laboratory has been approved by CLIA 88, designated as a high-complexity laboratory and is qualified to perform these tests  Interpretation performed at [facility], [address] [address]        Gross Description       A  The specimen is received in formalin, labeled with the patient's name and hospital number, and is designated "left breast 3 cores with calcs  The specimen consists of 4 tan yellow fibromembranous and fibrofatty soft tissue cores measuring less than 0 1-0 6 cm in diameter and ranging from 1 9-3 4 cm in length  The specimen is entirely submitted between sponges, 2 cassettes  B  The specimen is received in formalin, labeled with the patient's name and hospital number, and is designated "left breast 3 cores without calcs    The specimen consists of 4 tan yellow fibromembranous and fibrofatty soft tissue cores measuring less than 0 1-0 6 cm in diameter and ranging from 2 2-2 7 cm in length  The specimen is entirely submitted between sponges, 2 cassettes  Note: The estimated total formalin fixation time based upon information provided by the submitting clinician and the standard processing schedule is 20 30 hours  Caity                                     Clinical Information       Left breast stereotactic biopsy performed for indeterminate calcifications seen on mammogram/ 10g Mammotome Revolve/ U-shape clip/ 3 cores with calcs/ 3 cores without calcs     UA w Reflex to Microscopic w Reflex to Culture    Collection Time: 11/08/19  6:14 AM   Result Value Ref Range    Color, UA Yellow     Clarity, UA Cloudy     Specific [place], UA 1 010 1 003 - 1 030    pH, UA 7 0 4 5, 5 0, 5 5, 6 0, 6 5, 7 0, 7 5, 8 0    Leukocytes, UA Large (A) Negative    Nitrite, UA Negative Negative    Protein, UA Negative Negative mg/dl    Glucose, UA Negative Negative mg/dl    Ketones, UA Negative Negative mg/dl    Urobilinogen, UA 0 2 0 2, 1 0 E U /dl E U /dl    Bilirubin, UA Negative Negative    Blood, UA Negative Negative   Comprehensive metabolic panel    Collection Time: 11/08/19  6:14 AM   Result Value Ref Range    Sodium 129 (L) 136 - 145 mmol/L    Potassium 4 3 3 5 - 5 3 mmol/L    Chloride 94 (L) 100 - 108 mmol/L    CO2 29 21 - 32 mmol/L    ANION GAP 6 4 - 13 mmol/L    BUN 8 5 - 25 mg/dL    Creatinine 0 76 0 60 - 1 30 mg/dL    Glucose, Fasting 110 (H) 65 - 99 mg/dL    Calcium 8 8 8 3 - 10 1 mg/dL    AST 29 5 - 45 U/L    ALT 35 12 - 78 U/L    Alkaline Phosphatase 65 46 - 116 U/L    Total Protein 6 5 6 4 - 8 2 g/dL    Albumin 3 6 3 5 - 5 0 g/dL    Total Bilirubin 0 60 0 20 - 1 00 mg/dL    eGFR 77 ml/min/1 73sq m   CBC and differential    Collection Time: 11/08/19  6:14 AM   Result Value Ref Range    WBC 6 00 4 31 - 10 16 Thousand/uL    RBC 3 85 3 81 - 5 12 Million/uL    Hemoglobin 12 4 11 5 - 15 4 g/dL    Hematocrit 37 4 34 8 - 46 1 %    MCV 97 82 - 98 fL    MCH 32 2 26 8 - 34 3 pg MCHC 33 2 31 4 - 37 4 g/dL    RDW 14 8 11 6 - 15 1 %    MPV 8 5 (L) 8 9 - 12 7 fL    Platelets 046 161 - 102 Thousands/uL    nRBC 0 /100 WBCs    Neutrophils Relative 58 43 - 75 %    Immat GRANS % 0 0 - 2 %    Lymphocytes Relative 28 14 - 44 %    Monocytes Relative 11 4 - 12 %    Eosinophils Relative 2 0 - 6 %    Basophils Relative 1 0 - 1 %    Neutrophils Absolute 3 48 1 85 - 7 62 Thousands/µL    Immature Grans Absolute 0 02 0 00 - 0 20 Thousand/uL    Lymphocytes Absolute 1 70 0 60 - 4 47 Thousands/µL    Monocytes Absolute 0 64 0 17 - 1 22 Thousand/µL    Eosinophils Absolute 0 12 0 00 - 0 61 Thousand/µL    Basophils Absolute 0 04 0 00 - 0 10 Thousands/µL   APTT    Collection Time: 11/08/19  6:14 AM   Result Value Ref Range    PTT 33 23 - 37 seconds   Protime-INR    Collection Time: 11/08/19  6:14 AM   Result Value Ref Range    Protime 24 6 (H) 11 6 - 14 5 seconds    INR 2 32 (H) 0 84 - 1 19   HEMOGLOBIN A1C W/ EAG ESTIMATION    Collection Time: 11/08/19  6:14 AM   Result Value Ref Range    Hemoglobin A1C 5 8 4 2 - 6 3 %     mg/dl   Urine Microscopic    Collection Time: 11/08/19  6:14 AM   Result Value Ref Range    RBC, UA None Seen None Seen, 0-5 /hpf    WBC, UA 10-20 (A) None Seen, 0-5, 5-55, 5-65 /hpf    Epithelial Cells Occasional None Seen, Occasional /hpf    Bacteria, UA Moderate (A) None Seen, Occasional /hpf   Urine culture    Collection Time: 11/08/19  6:14 AM   Result Value Ref Range    Urine Culture >100,000 cfu/ml Escherichia coli (A)        Susceptibility    Escherichia coli - BRANDON     ZID Performed Yes       Ampicillin ($$) <=8 00 Susceptible ug/ml     Aztreonam ($$$)  <=4 Susceptible ug/ml     Cefazolin ($) <=2 00 Susceptible ug/ml     Ciprofloxacin ($)  >2 00 Resistant ug/ml     Gentamicin ($$) <=1 Susceptible ug/ml     Levofloxacin ($) >4 00 Resistant ug/ml     Nitrofurantoin <=32 Susceptible ug/ml     Tetracycline <=4 Susceptible ug/ml     Tobramycin ($) 2 Susceptible ug/ml     Trimethoprim + Sulfamethoxazole ($$$) <=2/38 Susceptible ug/ml           Physical Exam    Airway    Mallampati score: II  TM Distance: >3 FB  Neck ROM: full     Dental       Cardiovascular  Comment: Negative ROS, Rhythm: regular, Rate: normal, No murmur,     Pulmonary  Breath sounds clear to auscultation,     Other Findings        Anesthesia Plan  ASA Score- 3     Anesthesia Type- general with ASA Monitors  Additional Monitors:   Airway Plan: ETT  Comment: Can consider LMA if appropriate with surgeon  Plan Factors-    Induction- intravenous  Postoperative Plan- Plan for postoperative opioid use  Planned trial extubation    Informed Consent- Anesthetic plan and risks discussed with patient  I personally reviewed this patient with the CRNA  Discussed and agreed on the Anesthesia Plan with the CRNA  Alexy Bauer

## 2019-12-03 NOTE — TELEPHONE ENCOUNTER
Pre operative call day prior surgery scheduled in the AM w/ Dr Bossman Fuller, POSSIBLE TUNNELING OF RIGHT EXTENSION TO LEFT CHEST AND CONNECTION OF LEFT GENERATOR TO SINGLE SYSTEM (N/A Chest)--    Discussion/Review      Allergies ---Reviewed   Hold medications --- Reviewed as per review  Warfarin held for past 7 days ok to restart 5 days after sx on  12/10/2019  , do not restart vitamins and dietary supplements until 7 days after sx   NPO after MN, night prior surgery ---Reviewed--as per ASU nurse instructions   Medication (s) instructed by healthcare provider to take the morning of surgery w/ sip of water 4 OZ discussed:as per ASU instructions   Post operative scripts electronic transmission: doxicycliene , multiple ABX allergies   PDMP site reviewed accessed and reviewed scheduled drug list printed and scanned into record--reviewed   Pain management script: primary opiate prescriber Delroy Ramos 1 tab every 4 hours -----does not normally take this much ---will use for postoperative pain management , has only 6 tablets remaining for fil 109/2019 ---will call  for refill today  Pre- operative shower protocol reviewed; Clarify instructions as per protocol, third chlorhexidine shower tonight before surgery, then use KAREN wipes as per packaging instructions, Use a clean towel and wash cloth starting tonight and continue nightly until seen 2 weeks post operative visit for incision check removal  Change bed linens tonight and continue at least 1-2 times weekly  --reinforced     Informed will receive a telephone call tonight from a hospital representative with time to report on surgery day: pending call    Informed will receive a f/u call within in 24 -48 hours post-op to assess recovery reinforce instructions, and to answer any questions     Reinforced   Follow-up appointments reviewed 12/18/2019     Patient verbalized understanding information provided /discussed

## 2019-12-04 ENCOUNTER — HOSPITAL ENCOUNTER (OUTPATIENT)
Facility: HOSPITAL | Age: 74
Setting detail: OUTPATIENT SURGERY
Discharge: HOME | End: 2019-12-04
Attending: NEUROLOGICAL SURGERY | Admitting: NEUROLOGICAL SURGERY
Payer: MEDICARE

## 2019-12-04 ENCOUNTER — ANESTHESIA (OUTPATIENT)
Dept: PERIOP | Facility: HOSPITAL | Age: 74
End: 2019-12-04
Payer: MEDICARE

## 2019-12-04 VITALS
SYSTOLIC BLOOD PRESSURE: 134 MMHG | HEIGHT: 63 IN | BODY MASS INDEX: 25.16 KG/M2 | RESPIRATION RATE: 18 BRPM | OXYGEN SATURATION: 97 % | HEART RATE: 71 BPM | TEMPERATURE: 99.2 F | DIASTOLIC BLOOD PRESSURE: 59 MMHG | WEIGHT: 142 LBS

## 2019-12-04 LAB
ANION GAP SERPL CALCULATED.3IONS-SCNC: 6 MMOL/L (ref 4–13)
BACTERIA UR QL AUTO: ABNORMAL /HPF
BILIRUB UR QL STRIP: NEGATIVE
BUN SERPL-MCNC: 12 MG/DL (ref 5–25)
CALCIUM SERPL-MCNC: 9.7 MG/DL (ref 8.3–10.1)
CHLORIDE SERPL-SCNC: 98 MMOL/L (ref 100–108)
CLARITY UR: ABNORMAL
CO2 SERPL-SCNC: 29 MMOL/L (ref 21–32)
COLOR UR: YELLOW
CREAT SERPL-MCNC: 0.72 MG/DL (ref 0.6–1.3)
GFR SERPL CREATININE-BSD FRML MDRD: 82 ML/MIN/1.73SQ M
GLUCOSE P FAST SERPL-MCNC: 108 MG/DL (ref 65–99)
GLUCOSE SERPL-MCNC: 106 MG/DL (ref 65–140)
GLUCOSE SERPL-MCNC: 108 MG/DL (ref 65–140)
GLUCOSE UR STRIP-MCNC: NEGATIVE MG/DL
HGB UR QL STRIP.AUTO: NEGATIVE
KETONES UR STRIP-MCNC: NEGATIVE MG/DL
LEUKOCYTE ESTERASE UR QL STRIP: ABNORMAL
NITRITE UR QL STRIP: NEGATIVE
NON-SQ EPI CELLS URNS QL MICRO: ABNORMAL /HPF
PH UR STRIP.AUTO: 7 [PH]
POTASSIUM SERPL-SCNC: 4.7 MMOL/L (ref 3.5–5.3)
PROT UR STRIP-MCNC: NEGATIVE MG/DL
RBC #/AREA URNS AUTO: ABNORMAL /HPF
SODIUM SERPL-SCNC: 133 MMOL/L (ref 136–145)
SP GR UR STRIP.AUTO: 1.01 (ref 1–1.03)
UROBILINOGEN UR QL STRIP.AUTO: 0.2 E.U./DL
WBC #/AREA URNS AUTO: ABNORMAL /HPF

## 2019-12-04 PROCEDURE — C1820 GENERATOR NEURO RECHG BAT SY: HCPCS | Performed by: NEUROLOGICAL SURGERY

## 2019-12-04 PROCEDURE — 80048 BASIC METABOLIC PNL TOTAL CA: CPT | Performed by: STUDENT IN AN ORGANIZED HEALTH CARE EDUCATION/TRAINING PROGRAM

## 2019-12-04 PROCEDURE — 61885 INSRT/REDO NEUROSTIM 1 ARRAY: CPT | Performed by: NEUROLOGICAL SURGERY

## 2019-12-04 PROCEDURE — 81001 URINALYSIS AUTO W/SCOPE: CPT | Performed by: NURSE PRACTITIONER

## 2019-12-04 PROCEDURE — C1787 PATIENT PROGR, NEUROSTIM: HCPCS | Performed by: NEUROLOGICAL SURGERY

## 2019-12-04 PROCEDURE — 82948 REAGENT STRIP/BLOOD GLUCOSE: CPT

## 2019-12-04 PROCEDURE — 87086 URINE CULTURE/COLONY COUNT: CPT | Performed by: NURSE PRACTITIONER

## 2019-12-04 DEVICE — ACCESSORY KIT NEUROSTIM OCTOPOLAR IN LINE PLUG: Type: IMPLANTABLE DEVICE | Site: CHEST | Status: FUNCTIONAL

## 2019-12-04 DEVICE — NEUROSTIMULATOR ACTIVA RC OCTAPOLAR CONNECTOR: Type: IMPLANTABLE DEVICE | Site: CHEST | Status: FUNCTIONAL

## 2019-12-04 RX ORDER — LIDOCAINE HYDROCHLORIDE 10 MG/ML
INJECTION, SOLUTION INFILTRATION; PERINEURAL AS NEEDED
Status: DISCONTINUED | OUTPATIENT
Start: 2019-12-04 | End: 2019-12-04 | Stop reason: SURG

## 2019-12-04 RX ORDER — LIDOCAINE HYDROCHLORIDE AND EPINEPHRINE 10; 10 MG/ML; UG/ML
INJECTION, SOLUTION INFILTRATION; PERINEURAL AS NEEDED
Status: DISCONTINUED | OUTPATIENT
Start: 2019-12-04 | End: 2019-12-04 | Stop reason: HOSPADM

## 2019-12-04 RX ORDER — ACETAMINOPHEN 325 MG/1
975 TABLET ORAL ONCE
Status: COMPLETED | OUTPATIENT
Start: 2019-12-04 | End: 2019-12-04

## 2019-12-04 RX ORDER — LIDOCAINE HYDROCHLORIDE 10 MG/ML
0.5 INJECTION, SOLUTION EPIDURAL; INFILTRATION; INTRACAUDAL; PERINEURAL ONCE AS NEEDED
Status: DISCONTINUED | OUTPATIENT
Start: 2019-12-04 | End: 2019-12-04 | Stop reason: HOSPADM

## 2019-12-04 RX ORDER — ONDANSETRON 2 MG/ML
INJECTION INTRAMUSCULAR; INTRAVENOUS AS NEEDED
Status: DISCONTINUED | OUTPATIENT
Start: 2019-12-04 | End: 2019-12-04 | Stop reason: SURG

## 2019-12-04 RX ORDER — FENTANYL CITRATE/PF 50 MCG/ML
25 SYRINGE (ML) INJECTION
Status: DISCONTINUED | OUTPATIENT
Start: 2019-12-04 | End: 2019-12-04 | Stop reason: HOSPADM

## 2019-12-04 RX ORDER — CHLORHEXIDINE GLUCONATE 0.12 MG/ML
15 RINSE ORAL ONCE
Status: DISCONTINUED | OUTPATIENT
Start: 2019-12-04 | End: 2019-12-04

## 2019-12-04 RX ORDER — SODIUM CHLORIDE, SODIUM LACTATE, POTASSIUM CHLORIDE, CALCIUM CHLORIDE 600; 310; 30; 20 MG/100ML; MG/100ML; MG/100ML; MG/100ML
50 INJECTION, SOLUTION INTRAVENOUS CONTINUOUS
Status: DISCONTINUED | OUTPATIENT
Start: 2019-12-04 | End: 2019-12-04 | Stop reason: HOSPADM

## 2019-12-04 RX ORDER — CHLORHEXIDINE GLUCONATE 0.12 MG/ML
15 RINSE ORAL ONCE
Status: DISCONTINUED | OUTPATIENT
Start: 2019-12-04 | End: 2019-12-04 | Stop reason: SDUPTHER

## 2019-12-04 RX ORDER — ONDANSETRON 2 MG/ML
4 INJECTION INTRAMUSCULAR; INTRAVENOUS ONCE AS NEEDED
Status: DISCONTINUED | OUTPATIENT
Start: 2019-12-04 | End: 2019-12-04 | Stop reason: HOSPADM

## 2019-12-04 RX ORDER — DEXAMETHASONE SODIUM PHOSPHATE 10 MG/ML
INJECTION, SOLUTION INTRAMUSCULAR; INTRAVENOUS AS NEEDED
Status: DISCONTINUED | OUTPATIENT
Start: 2019-12-04 | End: 2019-12-04 | Stop reason: SURG

## 2019-12-04 RX ORDER — PROPOFOL 10 MG/ML
INJECTION, EMULSION INTRAVENOUS AS NEEDED
Status: DISCONTINUED | OUTPATIENT
Start: 2019-12-04 | End: 2019-12-04 | Stop reason: SURG

## 2019-12-04 RX ORDER — VANCOMYCIN HYDROCHLORIDE 1 G/200ML
1000 INJECTION, SOLUTION INTRAVENOUS ONCE
Status: COMPLETED | OUTPATIENT
Start: 2019-12-04 | End: 2019-12-04

## 2019-12-04 RX ADMIN — DEXAMETHASONE SODIUM PHOSPHATE 10 MG: 10 INJECTION, SOLUTION INTRAMUSCULAR; INTRAVENOUS at 08:31

## 2019-12-04 RX ADMIN — ONDANSETRON 4 MG: 2 INJECTION INTRAMUSCULAR; INTRAVENOUS at 08:31

## 2019-12-04 RX ADMIN — SODIUM CHLORIDE, SODIUM LACTATE, POTASSIUM CHLORIDE, AND CALCIUM CHLORIDE: .6; .31; .03; .02 INJECTION, SOLUTION INTRAVENOUS at 08:14

## 2019-12-04 RX ADMIN — LIDOCAINE HYDROCHLORIDE 50 MG: 10 INJECTION, SOLUTION INFILTRATION; PERINEURAL at 08:23

## 2019-12-04 RX ADMIN — ACETAMINOPHEN 975 MG: 325 TABLET ORAL at 07:06

## 2019-12-04 RX ADMIN — PROPOFOL 200 MG: 10 INJECTION, EMULSION INTRAVENOUS at 08:23

## 2019-12-04 RX ADMIN — VANCOMYCIN HYDROCHLORIDE 1000 MG: 1 INJECTION, SOLUTION INTRAVENOUS at 07:57

## 2019-12-04 NOTE — DISCHARGE INSTRUCTIONS
Discharge Instructions  Battery (IPG) replacement    Activity:  1  Do not lift more than 10 pounds for 6 weeks  2  Avoid bending, lifting and twisting for 6 weeks  3  No strenuous activities  No driving for 2 weeks  4  When able to shower, continue to use clean towel and washcloth for 2 weeks post-op  5  Continue to change bed linens and pajamas more frequently  Wear clean clothes daily  Surgical incision care:  1  Keep dressing in place for 3 days  2  Keep incision dry for 3 days  3  May shower with mild antimicrobial soap after 3 days  4  After 3 days, incisions may be left open to air, but must remain clean  5  Do not immerse the incisions in water for 6 weeks  6  Do not apply any creams or ointments to the incision for 6 weeks, unless otherwise instructed by [facility]  7  Contact office if increasing redness, drainage, pain or swelling around the incisions  Postoperative medication:  1  Complete course of antibiotic as directed  2  [facility] will provide pain medication as coordinated with your pain specialist  All prescriptions must come from a single provider  a  Take all medications as prescribed  Call office with any questions/concerns  3  Please contact office for questions regarding dosage and modifications  4  No antiplatelet, anticoagulation or Nonsteroidal anti-inflammatory (NSAIDs) medication until cleared by [facility], unless otherwise instructed  ** May restart Warfin as of 12/10/19  5  Do not operate heavy machinery or vehicles while taking sedating medications  6  Use a bowel regimen while on opioids as they induce constipation  Ie  Senokot-S, Miralax, Colace, etc  Increase fiber and water intake

## 2019-12-04 NOTE — INTERVAL H&P NOTE
H&P reviewed  After examining the patient I find no changes in the patients condition since the H&P had been written      Vitals:    12/04/19 0703   BP: 158/66   Pulse: 66   Resp: 16   Temp: 98 9 °F (37 2 °C)   SpO2: 96%     OR for replacement of DBS generator left

## 2019-12-04 NOTE — OP NOTE
OPERATIVE REPORT  PATIENT NAME: Jose Kay    :  [id number]  MRN: [id number]  Pt Location: BE OR ROOM 09    SURGERY DATE: 2019    Surgeon(s) and Role:     * Soniya Hernandez MD - Primary     * Michelle Luna PA-C - Assisting    Preop Diagnosis:  Essential tremor [G25 0]    Post-Op Diagnosis Codes:     * Essential tremor [G25 0]    Procedure(s) (LRB):  REPLACEMENT IMPLANTABLE PULSE GENERATOR FOR DEEP BRAIN STIMULATOR LEFT CHEST (N/A)    Specimen(s):  * No specimens in log *    Estimated Blood Loss:   Minimal    Drains:  * No LDAs found *    Anesthesia Type:   General    Operative Indications:  Essential tremor [G25 0]      Operative Findings:  See dictated note  [facility] [id number]    Complications:   None    Procedure and Technique:  The patient was taken to the operative theater and successfully induced under general and intubated with LMA  The patient was positioned supine  The patients prior incision was marked on the left chest  Then the patient was prepped and draped in sterile fashion, a timeout was performed  We began by making an incision along the old scar with a #10 Blade  We then used monopolar cautery to dissect down to the generator       We then removed the old single channel generator and this was disconnected from the electrode using the proprietary screwdriver  Then the new dual channel rechargeable generator was brought into the field  The new dual channel rechargeable generator was reconnected to the single channel electrode with the screwdriver and then the impedances were tested and they were found to be within normal limits  Then we placed the new dual channel rechargeable generator into the pocket and irrigated the wound  We then proceeded to close in layers with 2-0 Vicryl for the deeper tissues and 4-0 running monocryl for the skin       The patient was then was allowed to awaken from sedation and taken to the recovery area in stable condition   All needle and sponge counts were correct at the end of the procedure      I was present for the entire procedure, A qualified resident physician was not available and A physician assistant was required during the procedure for retraction tissue handling,dissection and suturing    Patient Disposition:  PACU     SIGNATURE: Fercho Gibbs MD  DATE: December 4, 2019  TIME: 8:51 AM

## 2019-12-04 NOTE — ANESTHESIA POSTPROCEDURE EVALUATION
Post-Op Assessment Note    CV Status:  Stable  Pain Score: 0    Pain management: satisfactory to patient     Mental Status:  Awake   Hydration Status:  Stable   PONV Controlled:  None   Airway Patency:  Patent   Post Op Vitals Reviewed: Yes      Staff: Anesthesiologist, CRNA           BP   152/62   Temp   98 3   Pulse  67   Resp   16   SpO2   100

## 2019-12-05 ENCOUNTER — TELEPHONE (OUTPATIENT)
Dept: NEUROSURGERY | Facility: CLINIC | Age: 74
End: 2019-12-05

## 2019-12-05 LAB — BACTERIA UR CULT: NORMAL

## 2019-12-05 NOTE — TELEPHONE ENCOUNTER
Spoke with Mikki Stern to see how she is doing after surgery yesterday  She reports that she is doing well overall and denies any incisional issues or fevers  Advised that after three days she may take a shower and gently wash the surgical site with soap and water  Use clean wash cloth, towels, and clothing  Do not submerge in water until cleared by the surgeon  Do not apply any creams, ointments, or lotions to the site  Patient has moved her bowels since the surgery  Patient educated to drink 10-12 glasses of water daily and increase her fiber intake  Also instructed to ambulate as tolerated to help with constipation and prevent blood clots  she has no further questions at this time  Verified date/time/location of her upcoming POV on 12/18/2019 and advised her to call the office with any further questions or concerns, or if any incisional issues or fevers would arise  Patient was appreciative for the call

## 2019-12-05 NOTE — TELEPHONE ENCOUNTER
Treated with cephalexin 500 mg po q 12 hours  x 5 days started 11/27 prescribed by Edward All   Her Note --Will treat UTI with keflex (which she feels she's tolerated ok in the past)  12/8/2019 >100,000 cfu/ml Escherichia coli Abnormal        Repeat urine on SX day 12/4/2019   Urine repeat on surgery day --worsneing result   leukocytes moderate   Blood -negative   WBC innumerable   Epithelial cells moderate   Bacterial occasional     12/5   Urine Culture [id number] cfu/ml     Mixed Contaminants X2          Telephoned patient to discuss urine report from surgery day urine ---remains concern for UTI   She started Doxcycline empiric doseon surgery day for 72 hours per protocol  ecoli in urine 11/8 was susceptible to tetracycline     Need to question patient if she is symptomatic, will call tomorrow

## 2019-12-06 NOTE — TELEPHONE ENCOUNTER
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

## 2019-12-09 DIAGNOSIS — F41.9 ANXIETY: ICD-10-CM

## 2019-12-09 RX ORDER — ESCITALOPRAM OXALATE 20 MG/1
TABLET ORAL
Qty: 90 TABLET | Refills: 1 | Status: SHIPPED | OUTPATIENT
Start: 2019-12-09 | End: 2020-06-10

## 2019-12-11 ENCOUNTER — APPOINTMENT (OUTPATIENT)
Dept: LAB | Facility: CLINIC | Age: 74
End: 2019-12-11
Payer: MEDICARE

## 2019-12-11 ENCOUNTER — TRANSCRIBE ORDERS (OUTPATIENT)
Dept: LAB | Facility: CLINIC | Age: 74
End: 2019-12-11

## 2019-12-11 DIAGNOSIS — E53.8 VITAMIN B12 DEFICIENCY: ICD-10-CM

## 2019-12-11 DIAGNOSIS — E55.9 VITAMIN D DEFICIENCY: ICD-10-CM

## 2019-12-11 DIAGNOSIS — E78.49 OTHER HYPERLIPIDEMIA: ICD-10-CM

## 2019-12-11 DIAGNOSIS — E83.42 HYPOMAGNESEMIA: ICD-10-CM

## 2019-12-11 DIAGNOSIS — E03.9 ACQUIRED HYPOTHYROIDISM: ICD-10-CM

## 2019-12-11 DIAGNOSIS — R73.03 PREDIABETES: ICD-10-CM

## 2019-12-11 DIAGNOSIS — I81 PORTAL VEIN THROMBOSIS: Primary | ICD-10-CM

## 2019-12-11 DIAGNOSIS — D50.9 IRON DEFICIENCY ANEMIA, UNSPECIFIED IRON DEFICIENCY ANEMIA TYPE: ICD-10-CM

## 2019-12-11 LAB
25(OH)D3 SERPL-MCNC: 45.4 NG/ML (ref 30–100)
ALBUMIN SERPL BCP-MCNC: 3.6 G/DL (ref 3.5–5)
ALP SERPL-CCNC: 63 U/L (ref 46–116)
ALT SERPL W P-5'-P-CCNC: 16 U/L (ref 12–78)
AST SERPL W P-5'-P-CCNC: 20 U/L (ref 5–45)
BASOPHILS # BLD AUTO: 0.07 THOUSANDS/ΜL (ref 0–0.1)
BASOPHILS NFR BLD AUTO: 1 % (ref 0–1)
BILIRUB DIRECT SERPL-MCNC: 0.13 MG/DL (ref 0–0.2)
BILIRUB SERPL-MCNC: 0.5 MG/DL (ref 0.2–1)
CHOLEST SERPL-MCNC: 191 MG/DL (ref 50–200)
EOSINOPHIL # BLD AUTO: 0.21 THOUSAND/ΜL (ref 0–0.61)
EOSINOPHIL NFR BLD AUTO: 4 % (ref 0–6)
ERYTHROCYTE [DISTWIDTH] IN BLOOD BY AUTOMATED COUNT: 14.3 % (ref 11.6–15.1)
EST. AVERAGE GLUCOSE BLD GHB EST-MCNC: 117 MG/DL
HBA1C MFR BLD: 5.7 % (ref 4.2–6.3)
HCT VFR BLD AUTO: 36.8 % (ref 34.8–46.1)
HDLC SERPL-MCNC: 70 MG/DL
HGB BLD-MCNC: 12.4 G/DL (ref 11.5–15.4)
IMM GRANULOCYTES # BLD AUTO: 0.02 THOUSAND/UL (ref 0–0.2)
IMM GRANULOCYTES NFR BLD AUTO: 0 % (ref 0–2)
LDLC SERPL CALC-MCNC: 102 MG/DL (ref 0–100)
LYMPHOCYTES # BLD AUTO: 2.07 THOUSANDS/ΜL (ref 0.6–4.47)
LYMPHOCYTES NFR BLD AUTO: 36 % (ref 14–44)
MAGNESIUM SERPL-MCNC: 1.3 MG/DL (ref 1.6–2.6)
MCH RBC QN AUTO: 33 PG (ref 26.8–34.3)
MCHC RBC AUTO-ENTMCNC: 33.7 G/DL (ref 31.4–37.4)
MCV RBC AUTO: 98 FL (ref 82–98)
MONOCYTES # BLD AUTO: 0.9 THOUSAND/ΜL (ref 0.17–1.22)
MONOCYTES NFR BLD AUTO: 16 % (ref 4–12)
NEUTROPHILS # BLD AUTO: 2.45 THOUSANDS/ΜL (ref 1.85–7.62)
NEUTS SEG NFR BLD AUTO: 43 % (ref 43–75)
NONHDLC SERPL-MCNC: 121 MG/DL
NRBC BLD AUTO-RTO: 0 /100 WBCS
PLATELET # BLD AUTO: 379 THOUSANDS/UL (ref 149–390)
PMV BLD AUTO: 8.8 FL (ref 8.9–12.7)
PROT SERPL-MCNC: 6.6 G/DL (ref 6.4–8.2)
RBC # BLD AUTO: 3.76 MILLION/UL (ref 3.81–5.12)
TRIGL SERPL-MCNC: 93 MG/DL
TSH SERPL DL<=0.05 MIU/L-ACNC: 0.83 UIU/ML (ref 0.36–3.74)
VIT B12 SERPL-MCNC: 536 PG/ML (ref 100–900)
WBC # BLD AUTO: 5.72 THOUSAND/UL (ref 4.31–10.16)

## 2019-12-11 PROCEDURE — 80076 HEPATIC FUNCTION PANEL: CPT

## 2019-12-11 PROCEDURE — 83036 HEMOGLOBIN GLYCOSYLATED A1C: CPT

## 2019-12-11 PROCEDURE — 80061 LIPID PANEL: CPT

## 2019-12-11 PROCEDURE — 36415 COLL VENOUS BLD VENIPUNCTURE: CPT

## 2019-12-11 PROCEDURE — 85025 COMPLETE CBC W/AUTO DIFF WBC: CPT

## 2019-12-11 PROCEDURE — 82306 VITAMIN D 25 HYDROXY: CPT

## 2019-12-11 PROCEDURE — 82607 VITAMIN B-12: CPT

## 2019-12-11 PROCEDURE — 84443 ASSAY THYROID STIM HORMONE: CPT

## 2019-12-11 PROCEDURE — 83735 ASSAY OF MAGNESIUM: CPT

## 2019-12-12 ENCOUNTER — TELEPHONE (OUTPATIENT)
Dept: NEUROLOGY | Facility: CLINIC | Age: 74
End: 2019-12-12

## 2019-12-12 DIAGNOSIS — J45.40 MODERATE PERSISTENT ASTHMA WITHOUT COMPLICATION: ICD-10-CM

## 2019-12-16 ENCOUNTER — PROCEDURE VISIT (OUTPATIENT)
Dept: NEUROLOGY | Facility: CLINIC | Age: 74
End: 2019-12-16
Payer: MEDICARE

## 2019-12-16 VITALS
HEART RATE: 68 BPM | DIASTOLIC BLOOD PRESSURE: 68 MMHG | BODY MASS INDEX: 24.82 KG/M2 | HEIGHT: 63 IN | SYSTOLIC BLOOD PRESSURE: 146 MMHG | WEIGHT: 140.1 LBS

## 2019-12-16 DIAGNOSIS — I81 PORTAL VEIN THROMBOSIS: ICD-10-CM

## 2019-12-16 DIAGNOSIS — G25.0 ESSENTIAL TREMOR: Primary | ICD-10-CM

## 2019-12-16 PROCEDURE — 95983 ALYS BRN NPGT PRGRMG 15 MIN: CPT | Performed by: PSYCHIATRY & NEUROLOGY

## 2019-12-16 PROCEDURE — 95984 ALYS BRN NPGT PRGRMG ADDL 15: CPT | Performed by: PSYCHIATRY & NEUROLOGY

## 2019-12-16 RX ORDER — WARFARIN SODIUM 5 MG/1
TABLET ORAL
Qty: 90 TABLET | Refills: 1 | Status: SHIPPED | OUTPATIENT
Start: 2019-12-16 | End: 2019-12-18

## 2019-12-16 RX ORDER — GABAPENTIN 100 MG/1
100 CAPSULE ORAL 3 TIMES DAILY
COMMUNITY
End: 2020-01-02

## 2019-12-16 NOTE — PROGRESS NOTES
Patient ID: Dick Hodge is a 75 y o  female  Assessment/Plan:    Essential tremor  Moderate left hand tremor and mild right hand tremor  Approximately 40 minutes spent on deep brain stimulation programming  Devices interrogated and changes made  Left on Group D on right IPG, interleaved with tremor being mild when holding a phone  Much improvement  Room in amplitude changes for P2 left on this group  On left amplitude increased slightly with resolution of tremor  Speech was no compromised  See body of note and attached clinical sheets for details  Diagnoses and all orders for this visit:    Essential tremor    Other orders  -     gabapentin (NEURONTIN) 100 mg capsule; Take 100 mg by mouth 3 (three) times a day 800 mg in the AM and in the Afternoon and 1200 at night tiime  Subjective:    Aye Kelley is a left handed female with peripheral neuropathy on Neurontin, spinal stenosis on gabapentin, anxiety and essential tremor s/p bilateral VIM DBS on 5/1/14 with Activa SC, s/p right IPG update to [facility] 6/20/17,  who presents for follow up  She was previously followed by Dr Son Cook for tremors  To review, tremors began in her late 46s  At that time the tremors began as a mild intentional tremor on the left  This has progressed with time and spread to the right side as well  IPG replacement went well  She has mild right hand tremor  Left hand with moderate tremor  Tremor interferes with writing  She has trouble eating and brushing her teeth  She drinks out of a bottle  Using both hands helps  Left hand has not been completely controlled without compromising speech  She has trouble holding her phone  She has been unable to change over to using her right hand for actions and now tends to use both  She is using covered cups at times  Objective:    Blood pressure 146/68, pulse 68, height 5' 3" (1 6 m), weight 63 5 kg (140 lb 1 6 oz)      Physical Exam   Constitutional: She appears well-developed  Eyes: Pupils are equal, round, and reactive to light  Neurological: She has normal strength  Psychiatric: She has a normal mood and affect  Her speech is normal    Vitals reviewed  Neurological Exam  Mental Status   Oriented only to person, place and situation  Speech is normal  Follows complex commands  Attention and concentration are normal     Cranial Nerves  CN II: Right funduscopic exam: not visualized  Left funduscopic exam: not visualized  CN III, IV, VI: Extraocular movements intact bilaterally  Pupils equal round and reactive to light bilaterally  CN V: Facial sensation is normal   CN VII: Full and symmetric facial movement  CN VIII: Hearing is normal   CN XI: Shoulder shrug strength is normal     Motor   Normal muscle tone  Strength is 5/5 throughout all four extremities  Sensory  Light touch is normal in upper and lower extremities  Coordination  Right: Finger-to-nose abnormality: Rapid alternating movement normal   Left: Finger-to-nose abnormality: Rapid alternating movement normal   Mild tremor on right FTN  Moderate on left  Mild vocal tremor  No head or leg tremor       Gait  Casual gait is normal including stance, stride, and arm swing  ROS:    Review of Systems   Constitutional: Negative  Negative for appetite change and fever  HENT: Positive for tinnitus  Negative for hearing loss, trouble swallowing and voice change  Eyes: Negative  Negative for photophobia and pain  Respiratory: Negative  Negative for shortness of breath  Cardiovascular: Negative  Negative for palpitations  Gastrointestinal: Negative  Negative for nausea and vomiting  Endocrine: Negative  Negative for cold intolerance and heat intolerance  Genitourinary: Negative  Negative for dysuria, frequency and urgency  Musculoskeletal: Positive for joint swelling and myalgias  Negative for neck pain  Skin: Negative  Negative for rash  Allergic/Immunologic: Negative  Neurological: Negative  Negative for dizziness, tremors, seizures, syncope, facial asymmetry, speech difficulty, weakness, light-headedness, numbness and headaches  Hematological: Negative  Does not bruise/bleed easily  Psychiatric/Behavioral: Negative for confusion, hallucinations and sleep disturbance  The patient is nervous/anxious (Anxiety)  All other systems reviewed and are negative  Review of system was personally reviewed  Contacts PW Freq Amp Symptoms Side effects             Left VIm          60 175 3 7         3 8       80   Tremor controlled                       Right VIM        Old setting Group C                  VIM 1 1-0+ 90 125 3 8       100       VIM 2 2-0+ 90 125 3 2         3 4      3+2- 90  125 2 8  improved tremor holding phone        3+2- 100 125 2 8 Room on P2 left             New setting Group D

## 2019-12-16 NOTE — ASSESSMENT & PLAN NOTE
Moderate left hand tremor and mild right hand tremor  Approximately 40 minutes spent on deep brain stimulation programming  Devices interrogated and changes made  Left on Group D on right IPG, interleaved with tremor being mild when holding a phone  Much improvement  Room in amplitude changes for P2 left on this group  On left amplitude increased slightly with resolution of tremor  Speech was no compromised  See body of note and attached clinical sheets for details

## 2019-12-17 ENCOUNTER — APPOINTMENT (OUTPATIENT)
Dept: LAB | Facility: CLINIC | Age: 74
End: 2019-12-17
Payer: MEDICARE

## 2019-12-17 ENCOUNTER — ANTICOAG VISIT (OUTPATIENT)
Dept: INTERNAL MEDICINE CLINIC | Facility: CLINIC | Age: 74
End: 2019-12-17

## 2019-12-17 ENCOUNTER — TELEPHONE (OUTPATIENT)
Dept: INTERNAL MEDICINE CLINIC | Facility: CLINIC | Age: 74
End: 2019-12-17

## 2019-12-17 DIAGNOSIS — I81 PORTAL VEIN THROMBOSIS: ICD-10-CM

## 2019-12-17 DIAGNOSIS — E87.1 HYPONATREMIA: ICD-10-CM

## 2019-12-17 DIAGNOSIS — I81 PORTAL VEIN THROMBOSIS: Primary | ICD-10-CM

## 2019-12-17 LAB
ANION GAP SERPL CALCULATED.3IONS-SCNC: 8 MMOL/L (ref 4–13)
BUN SERPL-MCNC: 11 MG/DL (ref 5–25)
CALCIUM SERPL-MCNC: 8.7 MG/DL (ref 8.3–10.1)
CHLORIDE SERPL-SCNC: 96 MMOL/L (ref 100–108)
CO2 SERPL-SCNC: 30 MMOL/L (ref 21–32)
CREAT SERPL-MCNC: 0.77 MG/DL (ref 0.6–1.3)
GFR SERPL CREATININE-BSD FRML MDRD: 76 ML/MIN/1.73SQ M
GLUCOSE SERPL-MCNC: 102 MG/DL (ref 65–140)
POTASSIUM SERPL-SCNC: 3.7 MMOL/L (ref 3.5–5.3)
SODIUM SERPL-SCNC: 134 MMOL/L (ref 136–145)

## 2019-12-17 PROCEDURE — 80048 BASIC METABOLIC PNL TOTAL CA: CPT

## 2019-12-18 ENCOUNTER — OFFICE VISIT (OUTPATIENT)
Dept: NEUROSURGERY | Facility: CLINIC | Age: 74
End: 2019-12-18

## 2019-12-18 ENCOUNTER — OFFICE VISIT (OUTPATIENT)
Dept: INTERNAL MEDICINE CLINIC | Facility: CLINIC | Age: 74
End: 2019-12-18
Payer: MEDICARE

## 2019-12-18 VITALS
RESPIRATION RATE: 16 BRPM | HEART RATE: 70 BPM | WEIGHT: 141 LBS | BODY MASS INDEX: 24.98 KG/M2 | DIASTOLIC BLOOD PRESSURE: 70 MMHG | HEIGHT: 63 IN | SYSTOLIC BLOOD PRESSURE: 128 MMHG | TEMPERATURE: 98.9 F

## 2019-12-18 VITALS
OXYGEN SATURATION: 97 % | HEIGHT: 63 IN | SYSTOLIC BLOOD PRESSURE: 120 MMHG | BODY MASS INDEX: 25.02 KG/M2 | RESPIRATION RATE: 18 BRPM | DIASTOLIC BLOOD PRESSURE: 78 MMHG | HEART RATE: 71 BPM | WEIGHT: 141.2 LBS | TEMPERATURE: 98.1 F

## 2019-12-18 DIAGNOSIS — G25.0 ESSENTIAL TREMOR: Primary | ICD-10-CM

## 2019-12-18 DIAGNOSIS — Z96.89 S/P DEEP BRAIN STIMULATOR PLACEMENT: ICD-10-CM

## 2019-12-18 DIAGNOSIS — K51.80 OTHER ULCERATIVE COLITIS WITHOUT COMPLICATION (HCC): ICD-10-CM

## 2019-12-18 DIAGNOSIS — Z48.89 AFTERCARE FOLLOWING SURGERY: ICD-10-CM

## 2019-12-18 DIAGNOSIS — F41.9 ANXIETY: ICD-10-CM

## 2019-12-18 DIAGNOSIS — M35.00 SJOGREN'S SYNDROME, WITH UNSPECIFIED ORGAN INVOLVEMENT (HCC): ICD-10-CM

## 2019-12-18 DIAGNOSIS — I10 ESSENTIAL HYPERTENSION: ICD-10-CM

## 2019-12-18 DIAGNOSIS — R73.03 PREDIABETES: ICD-10-CM

## 2019-12-18 DIAGNOSIS — E22.2 SIADH (SYNDROME OF INAPPROPRIATE ADH PRODUCTION) (HCC): ICD-10-CM

## 2019-12-18 DIAGNOSIS — E83.42 HYPOMAGNESEMIA: ICD-10-CM

## 2019-12-18 DIAGNOSIS — M51.36 LUMBAR DEGENERATIVE DISC DISEASE: ICD-10-CM

## 2019-12-18 DIAGNOSIS — I81 PORTAL VEIN THROMBOSIS: ICD-10-CM

## 2019-12-18 PROBLEM — E87.1 HYPONATREMIA: Status: RESOLVED | Noted: 2019-11-27 | Resolved: 2019-12-18

## 2019-12-18 PROCEDURE — 99214 OFFICE O/P EST MOD 30 MIN: CPT | Performed by: INTERNAL MEDICINE

## 2019-12-18 PROCEDURE — 99024 POSTOP FOLLOW-UP VISIT: CPT | Performed by: NURSE PRACTITIONER

## 2019-12-18 RX ORDER — WARFARIN SODIUM 5 MG/1
TABLET ORAL
Qty: 180 TABLET | Refills: 1
Start: 2019-12-18 | End: 2019-12-24 | Stop reason: SDUPTHER

## 2019-12-18 RX ORDER — BUPROPION HYDROCHLORIDE 450 MG/1
450 TABLET, FILM COATED, EXTENDED RELEASE ORAL DAILY
Qty: 90 TABLET | Refills: 1 | Status: SHIPPED | OUTPATIENT
Start: 2019-12-18 | End: 2020-06-01

## 2019-12-18 RX ORDER — LORAZEPAM 1 MG/1
1 TABLET ORAL EVERY 6 HOURS PRN
Qty: 120 TABLET | Refills: 0 | Status: SHIPPED | OUTPATIENT
Start: 2019-12-18 | End: 2020-01-13

## 2019-12-18 NOTE — PROGRESS NOTES
Assessment/Plan:    Essential tremor  S/p battery replacement of DBS, recently adjusted by neurology  Portal vein thrombosis  Warfarin adjusted  Repeat INR next week  May restart supplements  Anxiety  Increase picking and anxiety lately, may increase lorazepam back to 4x a day  Increase bupropion to 450 mg daily  Side effects with other meds  Hypomagnesemia  Restart Mg supplement daily  SIADH (syndrome of inappropriate ADH production) (HCC)  Stable, taking NaCl tid  Sees nephrology  Hyperlipidemia  LDL slightly elevated, maintain on statin  Essential hypertension  BP controlled on lisinopril, diltiazem and torsemide  Acquired hypothyroidism  Adequately replaced  Prediabetes  A1c improved to 5 7%  GERD (gastroesophageal reflux disease)  Takes PPI daily, will see GI prn  Sjogren's syndrome ([facility] [address] )  Schedule eye exam, on Plaquenil  Does not see rheum anymore  Mild intermittent asthma without complication  Doing well, uses Advair regularly  Lumbar degenerative disc disease  Stable, takes gabapentin regularly, has not taken tizanidine  On hydrocodone, PDMP reviewed  Supraventricular tachycardia (HCC)  No symptoms, on diltiazem  Sees cardiology yearly  Overweight  Stable weight  Vitamin B12 deficiency  Takes MVI daily  Diagnoses and all orders for this visit:    Essential tremor    Anxiety  -     buPROPion (FORFIVO XL) 450 MG 24 hr tablet; Take 1 tablet (450 mg total) by mouth daily  -     LORazepam (ATIVAN) 1 mg tablet; Take 1 tablet (1 mg total) by mouth every 6 (six) hours as needed for anxiety    Hypomagnesemia  -     Magnesium; Future    SIADH (syndrome of inappropriate ADH production) (HCC)  -     Comprehensive metabolic panel; Future    Essential hypertension  -     CBC and differential; Future  -     Comprehensive metabolic panel; Future    Other ulcerative colitis without complication (HCC)    Portal vein thrombosis  -     Protime-INR;  Standing  - Protime-INR  -     warfarin (COUMADIN) 5 mg tablet; Take 1-2 tablets daily As directed    Prediabetes    Sjogren's syndrome, with unspecified organ involvement ([facility] )    Lumbar degenerative disc disease      Follow up in 4 months or as needed  Subjective:      Patient ID: Gianna Pino is a 75 y o  female  Ms  Josue Mercado reports her anxiety is worse  She has been picking more, has a difficult time sleeping through the night  She is to take 2 tablets of lorazepam at night to help her sleep  She would usually wake up at least once to urinate or move her bowels  She has a difficult time going back to sleep  She is requesting to increase her lorazepam back to 4 times a day, would like to take 2 tablets at night for sleep  She has been taking Lexapro and Wellbutrin regularly  She reports her tremors have been good, recently had her battery changed  She saw Neurology recently  She has not restarted her vitamin supplements, has not been taking her magnesium  She has been taking her salt pills 3 times a day  The following portions of the patient's history were reviewed and updated as appropriate: allergies, current medications, past medical history, past social history and problem list     Review of Systems   Constitutional: Negative for appetite change and fatigue  HENT: Negative for congestion, ear pain and postnasal drip  Eyes: Negative for visual disturbance  Respiratory: Negative for cough and shortness of breath  Cardiovascular: Negative for chest pain and leg swelling  Gastrointestinal: Positive for diarrhea  Negative for abdominal pain  Genitourinary: Negative for dysuria and frequency  Musculoskeletal: Negative for arthralgias, back pain and myalgias  Skin: Negative for rash and wound  Neurological: Positive for tremors  Negative for dizziness, weakness, numbness and headaches  Hematological: Does not bruise/bleed easily     Psychiatric/Behavioral: Positive for sleep disturbance  Negative for agitation and confusion  The patient is nervous/anxious  Objective:      /78   Pulse 71   Temp 98 1 °F (36 7 °C)   Resp 18   Ht 5' 3" (1 6 m)   Wt 64 kg (141 lb 3 2 oz)   SpO2 97%   BMI 25 01 kg/m²          Physical Exam   Constitutional: She is oriented to person, place, and time  She appears well-developed and well-nourished  HENT:   Head: Normocephalic and atraumatic  Eyes: Pupils are equal, round, and reactive to light  Cardiovascular: Normal rate, regular rhythm and normal heart sounds  Pulmonary/Chest: Effort normal and breath sounds normal  She has no wheezes  Abdominal: Soft  Bowel sounds are normal    Musculoskeletal: She exhibits no edema  Neurological: She is alert and oriented to person, place, and time  Skin: Skin is warm  No rash noted  Psychiatric: She has a normal mood and affect  Her behavior is normal    Nursing note and vitals reviewed  Lab results reviewed with patient

## 2019-12-18 NOTE — PATIENT INSTRUCTIONS
Start taking your magnesium again  Check your current dose of Wellbutrin (bupropion), will increase this to 450 mg daily

## 2019-12-18 NOTE — PROGRESS NOTES
Assessment/Plan:  Diagnoses and all orders for this visit:    Essential tremor    S/P deep brain stimulator placement    Aftercare following surgery        Subjective:   2 week postoperative visit      Patient ID: Jailyn Cabrera is a 75 y o  female  HPI  She presents for 2 week postoperative visit for incision assessment and care  She has a longstanding history of bilateral benign essential tremor, intentional, affecting both upper extremity,onset 1950's  She is status post VIM DBS implantation 5/2014  W/ Activa SC, has bilateral generators   DBS system is managed by neurology,Vasquez  Patient is reported to have good ET  Symptom control with DBS therapy  Her right DBS generator was replaced in June 2017, has a rechargeable   She presented for consult with DR Gilford Gash 11/4/20 for left DBS generator end of life alert, a primary cell generator  She was scheduled and proceeded with surgery on  12/4/2019  REPLACEMENT IMPLANTABLE PULSE GENERATOR FOR DEEP BRAIN STIMULATOR LEFT CHEST (N/A Chest)      Assessment/Plan;  As outlined  in HPI above  She presents today status post surgery left upper chest incision with barely visible Monocryl dissolvable sutures  The incision is  clean, dry and intact, without erythema, dehiscence, drainage or s/s of infection, well healed with well approximated wound edges  Unable to visualize lateral end knots , scab formation on lateral ends, remnants of glue dressing present  Refer to attachment picture  Reports postoperative neurology appointment was 12/16/2019, DBS generator interrogation and changes  Patient denies decline in DBS efficacy for ET control since surgery  The following instructions are reviewed in detail and continue thru next 4 weeks (6 week post op)  At 2 weeks postop can resume restricted medications such as ASA, products containing ASA, NSAID, fish oils, and OTC products or as previously directed  Restarted Warfarin 12/10/2019    Resume driving 2 week postoperatively  Continue to observe incisions for redness, drainage, swelling dehiscence, increased pain, fever >/= 101, warmth to touch incision or skin surrounding, if occur call or report to office immediately for reassessment  Continue showers using clean towel and wash cloth with OTC antibacterial body wash for an additional 2 weeks  Do not apply lotions, creams, powder, or ointments to surgical incisions  No Immersion of incision in water such as swimming, hot tub, or tub bath  Activity as tolerated,  Avoid excessive  use of arm on operative side as much as possible , avoid repetitive pushing , pulling,  rotating arm movement, and refrain from  lifting greater than 10 lbs or extreme stretching  If  there is any significant change in her neurologic status, call and/or return to the office immediately for reassessment  Impressions and treatment recommendations were discussed in detail with the patient who verbalized understanding, all questions were answered and contact information was given in the event future questions arise      The following portions of the patient's history were reviewed and updated as appropriate:   She  has a past medical history of Anxiety, Arrhythmia, Arthritis, Asthma, Blood clot in vein, Breast lump, Depression, Disease of thyroid gland, DVT, lower extremity ([address] ), GERD (gastroesophageal reflux disease), Hyperlipidemia, Hypertension, Hypokalemia, Hyponatremia, Hypothyroidism, Iron deficiency anemia, Irregular heart beat, Manic behavior ([address] ), Mesenteric vein thrombosis, Osteoarthritis, Palpitations, Paroxysmal supraventricular tachycardia ([address] ), PE (pulmonary thromboembolism) ([address] ), Sjoegren syndrome, Thrombocytosis ([address] ), Tremors of nervous system, Ulcerative colitis ([address] ), and Vertigo    She   Patient Active Problem List    Diagnosis Date Noted    Aftercare following surgery 12/18/2019    Vitamin B12 deficiency 08/23/2019    Vitamin D deficiency 08/23/2019  S/P deep brain stimulator placement 11/26/2018    Encounter for long-term (current) use of medications 11/23/2018    Lumbar degenerative disc disease 04/20/2018    Encounter for screening mammogram for breast cancer 04/20/2018    Portal vein thrombosis 01/29/2018    Hypomagnesemia 12/08/2015    Unsteady gait 12/08/2015    Overweight 07/16/2015    Essential tremor 07/13/2015    Prediabetes 06/16/2015    Lower extremity pain, left 11/06/2014    Osteoarthritis of right knee 09/05/2014    Spinal stenosis 09/05/2014    Iron deficiency anemia 04/30/2014    Supraventricular tachycardia ([facility] [address] ) 10/07/2013    Diarrhea 01/09/2013    Chronic salpingo-oophoritis 12/26/2012    SIADH (syndrome of inappropriate ADH production) ([facility] [address] ) 12/14/2012    Peripheral neuropathy 12/14/2012    GERD (gastroesophageal reflux disease) 08/23/2012    Anemia 08/23/2012    Moderate episode of recurrent major depressive disorder ([facility] ) 06/13/2012    Hyperlipidemia 06/13/2012    Essential hypertension 06/13/2012    Acquired hypothyroidism 06/13/2012    Osteopenia 06/13/2012    Ulcerative colitis without complications ([facility] [facility] ) 79/60/9679    Allergic rhinitis 06/13/2012    Mild intermittent asthma without complication 85/92/1076    Sjogren's syndrome ([facility] [address] ) 06/13/2012    Anxiety 04/30/2012    Insomnia 04/30/2012     She  has a past surgical history that includes Splenectomy; Abdominal surgery; Colon surgery; Breast surgery; Appendectomy; Knee arthroscopy; pr imp stim,cranial,subq,1 array (Right, 6/20/2017); Colonoscopy (N/A, 8/30/2017); Coloproctectomy w/ ileo J-pouch; Esophagogastroduodenoscopy; FISTULA REPAIR; Ileostomy closure; [place] hole w/ stereotactic insertion of DBS leads / intraop microelectrode recording; Hysterectomy; Mammo stereotactic breast biopsy left (all inc) (Left, 11/7/2019); and pr imp stim,cranial,subq,1 array (N/A, 12/4/2019)    Her family history includes COPD in her father; Diabetes in her father and sister; Hypertension in her mother; No Known Problems in her daughter, maternal aunt, maternal grandfather, maternal grandmother, paternal aunt, paternal grandfather, and paternal grandmother; Other in her mother; Peripheral vascular disease in her mother; Sjogren's syndrome in her sister; Stroke in her father; Venous thrombosis in her mother  She  reports that she quit smoking about 54 years ago  She has a 10 00 pack-year smoking history  She has never used smokeless tobacco  She reports that she drinks alcohol  She reports that she does not use drugs  Current Outpatient Medications   Medication Sig Dispense Refill    ADVAIR DISKUS 250-50 MCG/DOSE inhaler INHALE 1 PUFF 2 (TWO) TIMES A DAY RINSE MOUTH AFTER USE  180 each 1    albuterol (PROVENTIL HFA,VENTOLIN HFA) 90 mcg/act inhaler Inhale 2 puffs every 6 (six) hours as needed for wheezing 1 Inhaler 1    buPROPion (FORFIVO XL) 450 MG 24 hr tablet Take 1 tablet (450 mg total) by mouth daily 90 tablet 1    cetirizine (ZyrTEC) 10 mg tablet Take 10 mg by mouth daily      dexlansoprazole (DEXILANT) 60 MG capsule Take 60 mg by mouth daily      diltiazem (CARDIZEM CD) 180 mg 24 hr capsule TAKE 1 CAPSULE BY MOUTH EVERY DAY 90 capsule 1    escitalopram (LEXAPRO) 20 mg tablet TAKE 1 TABLET BY MOUTH EVERY DAY 90 tablet 1    fluticasone (FLONASE) 50 mcg/act nasal spray 1 spray into each nostril daily as needed for rhinitis 48 mL 0    gabapentin (NEURONTIN) 100 mg capsule Take 100 mg by mouth 3 (three) times a day 800 mg in the AM and in the Afternoon and 1200 at night tiime        gabapentin (NEURONTIN) 600 MG tablet PATIENT TAKES ONE IN AM, ONE AFTERNOON AND 2 AT BEDTIME 360 tablet 1    HYDROcodone-acetaminophen (NORCO) 5-325 mg per tablet Take 1 tablet by mouth every 6 (six) hours as needed for painMax Daily Amount: 4 tablets 120 tablet 0    hydroxychloroquine (PLAQUENIL) 200 mg tablet Take 1 tablet (200 mg total) by mouth 2 (two) times a day for 180 days 180 tablet 0    levothyroxine 50 mcg tablet TAKE 1 TABLET BY MOUTH EVERY DAY 90 tablet 1    lisinopril (ZESTRIL) 20 mg tablet Take 1 tablet (20 mg total) by mouth 2 (two) times a day 180 tablet 1    loperamide (IMODIUM) 2 mg capsule Take 2 mg by mouth 4 (four) times a day as needed        LORazepam (ATIVAN) 1 mg tablet Take 1 tablet (1 mg total) by mouth every 6 (six) hours as needed for anxiety 120 tablet 0    meclizine (ANTIVERT) 25 mg tablet Take 1 tablet (25 mg total) by mouth every 6 (six) hours as needed for dizziness 90 tablet 0    simvastatin (ZOCOR) 5 MG tablet TAKE 1 TABLET BY MOUTH EVERY DAY 90 tablet 1    sodium chloride 1 g tablet Take 1 tablet (1 g total) by mouth 3 (three) times a day 270 tablet 3    torsemide (DEMADEX) 10 mg tablet Take 1 tablet (10 mg total) by mouth daily 90 tablet 3    warfarin (COUMADIN) 5 mg tablet Take 1-2 tablets daily As directed 180 tablet 1     No current facility-administered medications for this visit  She is allergic to indocin [indomethacin]; macrobid [nitrofurantoin monohyd macro]; penicillins; and sulfa antibiotics       Review of Systems   Constitutional: Negative  HENT: Negative  Eyes: Negative  Respiratory: Negative  Cardiovascular: Negative  Gastrointestinal: Negative  Endocrine: Negative  Genitourinary: Negative  Musculoskeletal: Positive for back pain  Skin:        Surgical incision    Allergic/Immunologic: Negative  Neurological: Negative  Hematological:        Warfarin    Psychiatric/Behavioral: The patient is nervous/anxious (Anxiety )  All other systems reviewed and are negative  Objective:      /70 (BP Location: Left arm, Patient Position: Sitting, Cuff Size: Adult)   Pulse 70   Temp 98 9 °F (37 2 °C) (Tympanic)   Resp 16   Ht 5' 3" (1 6 m)   Wt 64 kg (141 lb)   BMI 24 98 kg/m²          Physical Exam   Constitutional: She is oriented to person, place, and time   She appears well-developed and well-nourished  No distress  HENT:   Head: Normocephalic and atraumatic  Eyes: Right eye exhibits no discharge  Left eye exhibits no discharge  No scleral icterus  Neck: Normal range of motion  Neck supple  Cardiovascular: Normal rate and regular rhythm  Pulmonary/Chest: Effort normal  No respiratory distress  Neurological: She is alert and oriented to person, place, and time  Skin: Skin is warm and dry  She is not diaphoretic  Nursing note and vitals reviewed  Neurologic Exam     Mental Status   Oriented to person, place, and time       Motor Exam     Strength   Right deltoid: 5/5  Left deltoid: 5/5  Right biceps: 5/5  Left biceps: 5/5  Right triceps: 5/5  Left triceps: 5/5  Right wrist flexion: 5/5  Left wrist flexion: 5/5    Gait, Coordination, and Reflexes     Tremor   Intention tremor: present  Action tremor: left arm and right arm

## 2019-12-18 NOTE — ASSESSMENT & PLAN NOTE
Increase picking and anxiety lately, may increase lorazepam back to 4x a day  Increase bupropion to 450 mg daily  Side effects with other meds

## 2019-12-20 ENCOUNTER — TELEPHONE (OUTPATIENT)
Dept: NEUROLOGY | Facility: CLINIC | Age: 74
End: 2019-12-20

## 2019-12-20 NOTE — TELEPHONE ENCOUNTER
MARIAN  Patient states since her DBS was adjusted on 12/16 she is experiencing worsening symptoms  She states her speech gets worse at night  She also reports tremors are very bad in her right hand when she wakes up in the morning, tremors continue all day  Denies any other complaints  She believes her  may be able to adjust her DBS if needed but is unsure, is requesting appt with Dr Yannick Dyer before 12/28 when she leaves for 3 months to go down [address]  Taking gabapentin 800mg in Am and at noon, 1200mg in PM     No appointments available with Dr Yannick Dyer before patient leaves  Patient has previously seen Domingo Mckinnon for DBS programming  Scheduled patient with Domingo Mckinnon on 12/26  Patient states she would like to wait until her appointment for any recommendations/ adjustments  She is aware to call back if symptoms worsen

## 2019-12-24 ENCOUNTER — TELEPHONE (OUTPATIENT)
Dept: NEUROLOGY | Facility: CLINIC | Age: 74
End: 2019-12-24

## 2019-12-24 DIAGNOSIS — I81 PORTAL VEIN THROMBOSIS: ICD-10-CM

## 2019-12-24 RX ORDER — WARFARIN SODIUM 5 MG/1
TABLET ORAL
Qty: 180 TABLET | Refills: 1 | Status: SHIPPED | OUTPATIENT
Start: 2019-12-24 | End: 2020-09-14 | Stop reason: SDUPTHER

## 2019-12-24 NOTE — TELEPHONE ENCOUNTER
Patient states she needs more than 90 pills, 3 month supply  Patient is leaving Saturday going down [address] for 3 months     Patient needs filled today      Please call patient when done

## 2019-12-26 ENCOUNTER — OFFICE VISIT (OUTPATIENT)
Dept: NEUROLOGY | Facility: CLINIC | Age: 74
End: 2019-12-26
Payer: MEDICARE

## 2019-12-26 ENCOUNTER — APPOINTMENT (OUTPATIENT)
Dept: LAB | Facility: CLINIC | Age: 74
End: 2019-12-26
Payer: MEDICARE

## 2019-12-26 VITALS
DIASTOLIC BLOOD PRESSURE: 74 MMHG | WEIGHT: 142.8 LBS | HEART RATE: 64 BPM | BODY MASS INDEX: 25.3 KG/M2 | SYSTOLIC BLOOD PRESSURE: 155 MMHG | HEIGHT: 63 IN

## 2019-12-26 DIAGNOSIS — Z96.89 S/P DEEP BRAIN STIMULATOR PLACEMENT: ICD-10-CM

## 2019-12-26 DIAGNOSIS — G25.0 ESSENTIAL TREMOR: Primary | ICD-10-CM

## 2019-12-26 LAB
INR PPP: 3.68 (ref 0.84–1.19)
PROTHROMBIN TIME: 35.4 SECONDS (ref 11.6–14.5)

## 2019-12-26 PROCEDURE — 85610 PROTHROMBIN TIME: CPT | Performed by: NURSE PRACTITIONER

## 2019-12-26 PROCEDURE — 95984 ALYS BRN NPGT PRGRMG ADDL 15: CPT | Performed by: PHYSICIAN ASSISTANT

## 2019-12-26 PROCEDURE — 95983 ALYS BRN NPGT PRGRMG 15 MIN: CPT | Performed by: PHYSICIAN ASSISTANT

## 2019-12-26 PROCEDURE — 36415 COLL VENOUS BLD VENIPUNCTURE: CPT | Performed by: NURSE PRACTITIONER

## 2019-12-26 NOTE — PATIENT INSTRUCTIONS
Changes made to DBS settings with improvement of tremors, no clear worsening of speech  Patient : On the RIGHT (which will affect the LEFT hand) you can increase from 3 0 to a max of 3 4     On the LEFT ( will affect the right hand) you can increase from 4 0 to a max of 4 4

## 2019-12-26 NOTE — PROGRESS NOTES
Patient ID: Zachery Schmitt is a 75 y o  female  Assessment/Plan:    Essential tremor  Patient with increased tremors since last visit  She has also been having worsening speech changes which are very bothersome at times, more so when tired  She feels that these speech changes have been more noticeable since the last visit (contacts changes on the RIGHT and PW increased)  Over 45 minutes spent on DBS programming  On the LEFT - turned device off with no clear improvement of speech  Increased PW from 80 to 90 with no change in tremors  Increased amplitude to 4 0 (from 3 8) with improvement of tremor  No worsening os speech  On the RIGHT - turned the device off with some improvement of slurring  Attempted to increase voltage and PW with no clear improvement of tremor  Switched the contacts on both VIM 1 and VIM 2 with improvement of tremor control  Able to reduce the PW to 90 with good tremor control and some improvement of speech  Left patient the ability to increase voltage on VIM 1 (chosen given she seemed to have the most benefit when these contacts were switched)  Attempted to leave on slightly lower settings given her speech complaints and she had very good tremor control on these settings in the office  Subjective:    Janet David is a left handed female with peripheral neuropathy on Neurontin, spinal stenosis on gabapentin, anxiety and essential tremor s/p bilateral VIM DBS on 5/1/14 with Activa SC, s/p right IPG update to [facility] 6/20/17,  and IPG replacement 12/4/19 who presents for follow up  She was previously followed by Dr Mila Gutierrez for tremors  To review, tremors began in her late 46s  At that time the tremors began as a mild intentional tremor on the left  This has progressed with time and spread to the right side as well  She was switched to interleaving settings on the RIGHT 10/2019 due to speech changes    This seemed to help however the tremor has continued to breakthrough and increased settings has caused worsening speech changes  11/27/19 - patient with worsening right tremor while waiting for IPG replacement  Left hand tremor also worse  She had been set on a new program for the left hand tremor at the visit prior with much improvement in speech and tremor was controlled when leaving the office but this broke through  At her last visit with Dr Honey Seo 12/16 she was having moderate left hand tremor and mild right hand tremor  Left on Group D on RIGHT IPG, interleaved with tremor mild, much improved  On LEFT amplitude increased slightly with resolution of tremor, no apparent worsening of speech  The patient called with concerns for worsening tremor and speech since the changes made at that visit  She was scheduled today for a follow up given she is leaving for [facility] for 3 months  She was having improvement of the tremors for a few days following her last visit however the tremors began to breakthrough and are now more bothersome  The right side is back a little however the left is very bothersome to her  She is having a hard time with carrying a cup in the hands, worse on the left  She is also noticing more slurring of the speech since the last visit  This is much worse for her in the evening when she is tired  She is most bothered by the speech and would prefer to get this better  Her  has tried to increase the settings with the patient  with no clear improvement of tremors (LEFT increased from 2 8 to 3 2)  No worsening of speech  I personally reviewed and updated the ROS  Objective:    Blood pressure 155/74, pulse 64, height 5' 3" (1 6 m), weight 64 8 kg (142 lb 12 8 oz)  Physical Exam   Constitutional: She appears well-developed and well-nourished  HENT:   Right Ear: Hearing normal    Left Ear: Hearing normal    Eyes: Pupils are equal, round, and reactive to light  Neurological: She has normal strength  Neurological Exam  Mental Status   Oriented only to person, place and situation  Follows complex commands  Attention and concentration are normal   Slurring of speech at times and with certain letters such as S       Cranial Nerves  CN III, IV, VI: Extraocular movements intact bilaterally  Pupils equal round and reactive to light bilaterally  CN V:  Right: Facial sensation is normal   Left: Facial sensation is normal on the left  CN VII:  Right: There is no facial weakness  Left: There is no facial weakness  CN VIII:  Right: Hearing is normal   Left: Hearing is normal   CN XI: Shoulder shrug strength is normal     Motor   Normal muscle tone  Strength is 5/5 throughout all four extremities  Sensory  Light touch is normal in upper and lower extremities  Coordination  Right: Finger-to-nose abnormality: Rapid alternating movement normal   Left: Finger-to-nose abnormality: Rapid alternating movement normal   Mild tremor on right FTN  Moderate on left  Moderate postural tremor on the left  Mild vocal tremor  No head or leg tremor       Gait  Casual gait is normal including stance, stride, and arm swing  ROS:    Review of Systems   Constitutional: Negative  Negative for appetite change and fever  HENT: Negative  Negative for hearing loss, tinnitus, trouble swallowing and voice change  Eyes: Negative  Negative for photophobia and pain  Respiratory: Negative  Negative for shortness of breath  Cardiovascular: Negative  Negative for palpitations  Gastrointestinal: Negative  Negative for nausea and vomiting  Endocrine: Negative  Negative for cold intolerance and heat intolerance  Genitourinary: Negative  Negative for dysuria, frequency and urgency  Musculoskeletal: Negative  Negative for myalgias and neck pain  Skin: Negative  Negative for rash  Neurological: Positive for tremors (tremors in both hands)   Negative for dizziness, seizures, syncope, facial asymmetry, speech difficulty, weakness, light-headedness, numbness and headaches  Hematological: Negative  Does not bruise/bleed easily  Psychiatric/Behavioral: Negative  Negative for confusion, hallucinations and sleep disturbance            Contacts PW Freq Amp Symptoms Side effects                     Left VIM                 80 175 3 8           90 175 3 8  No clear improvement of tremor         80  175  4 0 Tremor controlled, no worsening of speech                                       Right VIM             Old setting Group D                               VIM 1 1-0+ 100 125 3 8                      VIM 2 3+2- 100 125 3  2                         CHANGES made                       VIM 1  1-0+ 110 125 4 0 No improvement             VIM 2  3+2- 110 125 3 4 No improvement              VIM 1  1+0- 100 125 3 0 Significantly improved tremor holding phone                   VIM 2   2+3-  100  125  3 1  improved tremor, able to bring phone to mouth better         90  125  3 1  Speech improved

## 2019-12-26 NOTE — ASSESSMENT & PLAN NOTE
Patient with increased tremors since last visit  She has also been having worsening speech changes which are very bothersome at times, more so when tired  She feels that these speech changes have been more noticeable since the last visit (contacts changes on the RIGHT and PW increased)  Over 45 minutes spent on DBS programming  On the LEFT - turned device off with no clear improvement of speech  Increased PW from 80 to 90 with no change in tremors  Increased amplitude to 4 0 (from 3 8) with improvement of tremor  No worsening os speech  On the RIGHT - turned the device off with some improvement of slurring  Attempted to increase voltage and PW with no clear improvement of tremor  Switched the contacts on both VIM 1 and VIM 2 with improvement of tremor control  Able to reduce the PW to 90 with good tremor control and some improvement of speech  Left patient the ability to increase voltage on VIM 1 (chosen given she seemed to have the most benefit when these contacts were switched)  Attempted to leave on slightly lower settings given her speech complaints and she had very good tremor control on these settings in the office

## 2019-12-27 ENCOUNTER — TELEPHONE (OUTPATIENT)
Dept: INTERNAL MEDICINE CLINIC | Facility: CLINIC | Age: 74
End: 2019-12-27

## 2019-12-27 ENCOUNTER — ANTICOAG VISIT (OUTPATIENT)
Dept: INTERNAL MEDICINE CLINIC | Facility: CLINIC | Age: 74
End: 2019-12-27

## 2019-12-27 DIAGNOSIS — I81 PORTAL VEIN THROMBOSIS: ICD-10-CM

## 2019-12-27 NOTE — TELEPHONE ENCOUNTER
Patient notified  States she will be getting her INR done at [facility] for the next re-check  Flow sheet updated

## 2019-12-27 NOTE — TELEPHONE ENCOUNTER
INR is over 3  Hold warfarin today and tomorrow  Restart at 5 mg daily  Recheck in 2 weeks please  Please update flow sheet

## 2020-01-01 DIAGNOSIS — M35.00 SJOGREN'S SYNDROME, WITH UNSPECIFIED ORGAN INVOLVEMENT (HCC): ICD-10-CM

## 2020-01-02 RX ORDER — HYDROXYCHLOROQUINE SULFATE 200 MG/1
200 TABLET, FILM COATED ORAL 2 TIMES DAILY
Qty: 180 TABLET | Refills: 0 | Status: SHIPPED | OUTPATIENT
Start: 2020-01-02 | End: 2020-03-23

## 2020-01-02 RX ORDER — GABAPENTIN 100 MG/1
CAPSULE ORAL
Qty: 360 CAPSULE | Refills: 0 | Status: SHIPPED | OUTPATIENT
Start: 2020-01-02 | End: 2020-03-25

## 2020-01-05 DIAGNOSIS — J30.89 NON-SEASONAL ALLERGIC RHINITIS DUE TO OTHER ALLERGIC TRIGGER: ICD-10-CM

## 2020-01-06 ENCOUNTER — TELEPHONE (OUTPATIENT)
Dept: INTERNAL MEDICINE CLINIC | Facility: CLINIC | Age: 75
End: 2020-01-06

## 2020-01-06 DIAGNOSIS — M54.50 ACUTE LEFT-SIDED LOW BACK PAIN WITHOUT SCIATICA: ICD-10-CM

## 2020-01-06 RX ORDER — METHOCARBAMOL 500 MG/1
500 TABLET, FILM COATED ORAL 3 TIMES DAILY PRN
Qty: 60 TABLET | Refills: 0 | Status: SHIPPED | OUTPATIENT
Start: 2020-01-06 | End: 2020-09-21

## 2020-01-06 RX ORDER — FLUTICASONE PROPIONATE 50 MCG
1 SPRAY, SUSPENSION (ML) NASAL DAILY PRN
Qty: 48 ML | Refills: 1 | Status: SHIPPED | OUTPATIENT
Start: 2020-01-06 | End: 2020-06-22

## 2020-01-06 NOTE — TELEPHONE ENCOUNTER
Patient is having pain in her lower back, she just drove down to [place]  She was on a muscle relaxer before but it wasn't strong enough, she would like something stronger  [facility] [address]  She will have her granddaughter will mail them to her

## 2020-01-06 NOTE — TELEPHONE ENCOUNTER
She said which ever one is the best  She doesn't want a weak one but she doesn't want one that will make her a zombie

## 2020-01-06 NOTE — TELEPHONE ENCOUNTER
What muslce relaxant would you like to take? Tizanidine is the strongest, can make you sleepy  There is Flexeril (cyclobenzaprine) and methocarbamol also  Let me know which you prefer

## 2020-01-12 DIAGNOSIS — F41.9 ANXIETY: ICD-10-CM

## 2020-01-13 RX ORDER — LORAZEPAM 1 MG/1
1 TABLET ORAL EVERY 6 HOURS PRN
Qty: 120 TABLET | Refills: 0 | Status: SHIPPED | OUTPATIENT
Start: 2020-01-13 | End: 2020-02-18 | Stop reason: SDUPTHER

## 2020-01-15 ENCOUNTER — TELEPHONE (OUTPATIENT)
Dept: INTERNAL MEDICINE CLINIC | Facility: CLINIC | Age: 75
End: 2020-01-15

## 2020-01-15 ENCOUNTER — ANTICOAG VISIT (OUTPATIENT)
Dept: INTERNAL MEDICINE CLINIC | Facility: CLINIC | Age: 75
End: 2020-01-15

## 2020-01-15 DIAGNOSIS — I81 PORTAL VEIN THROMBOSIS: ICD-10-CM

## 2020-01-15 LAB — INR PPP: 3.2 (ref 0.84–1.19)

## 2020-01-15 NOTE — TELEPHONE ENCOUNTER
INR a bit high at 3 2  Take 2 5 mg x 1 day, 5 mg the rest of the week  Repeat in 2 weeks  Please update flow sheet

## 2020-01-21 ENCOUNTER — TELEPHONE (OUTPATIENT)
Dept: INTERNAL MEDICINE CLINIC | Facility: CLINIC | Age: 75
End: 2020-01-21

## 2020-01-21 NOTE — TELEPHONE ENCOUNTER
Patient called and states that the methocarbamol is not helping with pain  She is taking it 3 times a day  She is asking for a stronger medication

## 2020-01-21 NOTE — TELEPHONE ENCOUNTER
Are you able to do any back exercises? We can try a stronger muscle relaxant but it might be too much with all your other meds  Is there a nearby Urgent care center near you to get checked?

## 2020-01-24 DIAGNOSIS — M54.50 ACUTE LEFT-SIDED LOW BACK PAIN WITHOUT SCIATICA: ICD-10-CM

## 2020-01-24 RX ORDER — METHOCARBAMOL 500 MG/1
500 TABLET, FILM COATED ORAL 3 TIMES DAILY PRN
Qty: 60 TABLET | Refills: 0 | OUTPATIENT
Start: 2020-01-24

## 2020-01-29 LAB — INR PPP: 4.2 (ref 0.84–1.19)

## 2020-01-30 ENCOUNTER — TELEPHONE (OUTPATIENT)
Dept: INTERNAL MEDICINE CLINIC | Facility: CLINIC | Age: 75
End: 2020-01-30

## 2020-01-30 ENCOUNTER — ANTICOAG VISIT (OUTPATIENT)
Dept: INTERNAL MEDICINE CLINIC | Facility: CLINIC | Age: 75
End: 2020-01-30

## 2020-01-30 DIAGNOSIS — I81 PORTAL VEIN THROMBOSIS: ICD-10-CM

## 2020-01-30 NOTE — TELEPHONE ENCOUNTER
INR still high  Do not take warfarin today  Take 2 5 mg x 2 days and 5 mg x 5 days  Recheck in 2 weeks again  Please update flow sheet  ALSO, your Wellbutrin (bupropion) 450 mg may not be covered by your insurance, may need to give you 2 separate pills

## 2020-02-12 LAB — INR PPP: 4.8 (ref 0.84–1.19)

## 2020-02-13 ENCOUNTER — TELEPHONE (OUTPATIENT)
Dept: INTERNAL MEDICINE CLINIC | Facility: CLINIC | Age: 75
End: 2020-02-13

## 2020-02-13 ENCOUNTER — ANTICOAG VISIT (OUTPATIENT)
Dept: INTERNAL MEDICINE CLINIC | Facility: CLINIC | Age: 75
End: 2020-02-13

## 2020-02-13 DIAGNOSIS — I81 PORTAL VEIN THROMBOSIS: ICD-10-CM

## 2020-02-13 NOTE — TELEPHONE ENCOUNTER
INR is higher (4 5)  Do not take warfarin for the next 2 days  Resume 2 5 mg x 3 days, 5 mg x 2 days  Recheck in 1 week

## 2020-02-18 DIAGNOSIS — M54.16 LUMBAR RADICULOPATHY: ICD-10-CM

## 2020-02-18 DIAGNOSIS — F41.9 ANXIETY: ICD-10-CM

## 2020-02-18 RX ORDER — LORAZEPAM 1 MG/1
1 TABLET ORAL EVERY 6 HOURS PRN
Qty: 120 TABLET | Refills: 0 | Status: SHIPPED | OUTPATIENT
Start: 2020-02-18 | End: 2020-04-06

## 2020-02-18 RX ORDER — HYDROCODONE BITARTRATE AND ACETAMINOPHEN 5; 325 MG/1; MG/1
1 TABLET ORAL EVERY 6 HOURS PRN
Qty: 120 TABLET | Refills: 0 | Status: SHIPPED | OUTPATIENT
Start: 2020-02-18 | End: 2020-04-17 | Stop reason: SDUPTHER

## 2020-02-20 ENCOUNTER — TELEPHONE (OUTPATIENT)
Dept: INTERNAL MEDICINE CLINIC | Facility: CLINIC | Age: 75
End: 2020-02-20

## 2020-02-20 LAB — INR PPP: 1.4 (ref 0.84–1.19)

## 2020-02-21 ENCOUNTER — ANTICOAG VISIT (OUTPATIENT)
Dept: INTERNAL MEDICINE CLINIC | Facility: CLINIC | Age: 75
End: 2020-02-21

## 2020-02-21 DIAGNOSIS — I81 PORTAL VEIN THROMBOSIS: ICD-10-CM

## 2020-02-26 LAB — INR PPP: 2.8 (ref 0.84–1.19)

## 2020-02-27 ENCOUNTER — ANTICOAG VISIT (OUTPATIENT)
Dept: INTERNAL MEDICINE CLINIC | Facility: CLINIC | Age: 75
End: 2020-02-27

## 2020-02-27 ENCOUNTER — TELEPHONE (OUTPATIENT)
Dept: INTERNAL MEDICINE CLINIC | Facility: CLINIC | Age: 75
End: 2020-02-27

## 2020-02-27 DIAGNOSIS — I81 PORTAL VEIN THROMBOSIS: ICD-10-CM

## 2020-02-27 NOTE — TELEPHONE ENCOUNTER
INR  Is 2 8  Continue current dosing  (Clarify: 5 mg x 4 days, 2 5 mg x 3 days )    Repeat in 3 weeks  Please update flow sheet

## 2020-03-12 DIAGNOSIS — M51.36 LUMBAR DEGENERATIVE DISC DISEASE: ICD-10-CM

## 2020-03-12 DIAGNOSIS — E03.9 ACQUIRED HYPOTHYROIDISM: ICD-10-CM

## 2020-03-12 RX ORDER — LEVOTHYROXINE SODIUM 0.05 MG/1
TABLET ORAL
Qty: 90 TABLET | Refills: 1 | Status: SHIPPED | OUTPATIENT
Start: 2020-03-12 | End: 2020-09-07

## 2020-03-12 RX ORDER — GABAPENTIN 600 MG/1
TABLET ORAL
Qty: 360 TABLET | Refills: 1 | Status: SHIPPED | OUTPATIENT
Start: 2020-03-12 | End: 2020-04-07

## 2020-03-18 LAB — INR PPP: 2.2 (ref 0.84–1.19)

## 2020-03-18 NOTE — TELEPHONE ENCOUNTER
Patient : Lester Ly Age: 30 year old Sex: male   MRN: [id number] Encounter Date: 3/18/2020      History     Chief Complaint   Patient presents with   • Flu Like Symptoms     HPI  3/18/2020  5:45 PM Lester Ly is a 30 year old male who presents to the ED for evaluation of flu-like sx that began 24 hours ago. Pt reports fevers (100.1 F), cough, sore throat, and general myalgias that all began 24 hours ago. He has been taking Tylenol for fevers, last dose just PTA. Pt states he had a negative flu swab in his PCP's office earlier today and was then referred to the ER for COVID-19 testing. 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 He states his work insisted he receive COVID testing. The pt states he feels otherwise well and has no underlying medical problems. There are no other alleviating or modifying factors at this time.     Chart Review: I reviewed the pt's medications, allergies, and past medical and surgical history in Epic. Pt was seen by his PCP today for sx and had a negative influenza swab.       PCP: Sony LEVINE MD    No Known Allergies      History reviewed. No pertinent past medical history.    Past Surgical History:   Procedure Laterality Date   • PAST SURGICAL HISTORY      None       Family History   Problem Relation Age of Onset   • Cancer Mother         skin   • Hypertension Mother    • NEGATIVE FAMILY HX OF Father    • Other Other         G.parents with heart disease,cancers of prostate,lung and pancreas.       Social History     Tobacco Use   • Smoking status: Never Smoker   • Smokeless tobacco: Never Used   Substance Use Topics   • Alcohol use: Yes     Alcohol/week: 5.0 standard drinks     Types: 6 drink(s) per week   • Drug use: No       Review of Systems   Constitutional: Positive for fever (100.1 F). Negative for chills.   HENT: Positive for sore throat. Negative for congestion and ear pain.   Restart warfarin tomorrow  Recheck INR in 2 weeks    Respiratory: Positive for cough. Negative for chest tightness and shortness of breath.    Cardiovascular: Negative for chest pain and leg swelling.   Gastrointestinal: Negative for abdominal pain, diarrhea, nausea and vomiting.   Genitourinary: Negative for dysuria, frequency and urgency.   Musculoskeletal: Positive for myalgias (general). Negative for back pain and neck pain.   Skin: Negative for rash.   Neurological: Negative for weakness, numbness and headaches.   Psychiatric/Behavioral: The patient is not nervous/anxious.        Physical Exam     ED Triage Vitals [03/18/20 1725]   ED Triage Vitals Group      Temp 97.7 °F (36.5 °C)      Heart Rate 93      Resp 16      BP (!) 131/91      SpO2 98 %      EtCO2 mmHg       Height 5' 11\" (1.803 m)      Weight 201 lb 8 oz (91.4 kg)      Weight Scale Used ED Actual      BMI (Calculated) 28.1      IBW/kg (Calculated) 75.3       Physical Exam   Constitutional: He is oriented to person, place, and time. He appears well-developed and well-nourished.   HENT:   Head: Normocephalic and atraumatic.   Eyes: Pupils are equal, round, and reactive to light. EOM are normal.   Neck: Normal range of motion. Neck supple.   Cardiovascular: Normal rate, regular rhythm and intact distal pulses.   Pulmonary/Chest: Effort normal and breath sounds normal. No respiratory distress. He has no wheezes.   Musculoskeletal: Normal range of motion.   Neurological: He is alert and oriented to person, place, and time.   Skin: Skin is warm and dry.   Psychiatric: He has a normal mood and affect. Judgment normal.   Nursing note and vitals reviewed.      ED Course     Procedures    Lab Results     No results found for this visit on 03/18/20.       ED Medication Orders (From admission, onward)    None               MDM  Vitals  Vitals:    03/18/20 1725   BP: (!) 131/91   Pulse: 93   Resp: 16   Temp: 97.7 °F (36.5 °C)   TempSrc: Oral   SpO2: 98%   Weight: 91.4 kg   Height: 5' 11\" (1.803 m)       ED  Course    5:52 PM After evaluating the pt, I informed that their presentation seems to be suggestive of a viral illness, including but not limited to common cold, influenza, or COVID-19. Given possible exposure, pt will be tested for COVID-19 today. Pt will be contacted with any positive results. As he is well-appearing, afebrile, SpO2 is 98% on RA, lungs are clear, and pt is in no acute distress, we discussed plan for care at home. Pt advised to remain home until COVID-19 test results have returned and sx completely resolve. Pt was provided with [facility] quarantine precautions. Pt is encouraged to take OTC Ibuprofen and Tylenol for pain and sx relief. I also encouraged pt to rest and increase fluid intake over the next few days. Pt should contact their PCP for any persisting sx. I informed pt that they should return to the ED for uncontrolled fevers, SOB, or any new or worsening sx. Pt understands and agrees with the plan. All questions were answered.      [facility]    Critical Care time spent on this patient outside of billable procedures:  None    Discharge  Clinical Impression  ED Diagnosis        Final diagnosis    Flu-like symptoms                Follow Up:  Sony LEVINE MD  [address]  [place] [address]  [phone]    Schedule an appointment as soon as possible for a visit   As needed for follow up       Pt is discharged to home/self care in stable condition.      I have reviewed the information recorded by the scribe for accuracy and agree with its contents.    ____________________________________________________________________    Mitzi Pang acting as a scribe for Antonella Joy PA-C.    Antonella Joy PA-C  SER: [id number]  Scribe: Mitzi Pang    Attending Physician: Dr. Aleksander Duggan  Dictation #: [id number]       Antonella Joy PA-C  03/18/20 213

## 2020-03-19 ENCOUNTER — TELEPHONE (OUTPATIENT)
Dept: INTERNAL MEDICINE CLINIC | Facility: CLINIC | Age: 75
End: 2020-03-19

## 2020-03-19 ENCOUNTER — ANTICOAG VISIT (OUTPATIENT)
Dept: INTERNAL MEDICINE CLINIC | Facility: CLINIC | Age: 75
End: 2020-03-19

## 2020-03-19 DIAGNOSIS — I81 PORTAL VEIN THROMBOSIS: ICD-10-CM

## 2020-03-23 DIAGNOSIS — M35.00 SJOGREN'S SYNDROME, WITH UNSPECIFIED ORGAN INVOLVEMENT (HCC): ICD-10-CM

## 2020-03-23 RX ORDER — HYDROXYCHLOROQUINE SULFATE 200 MG/1
TABLET, FILM COATED ORAL
Qty: 180 TABLET | Refills: 0 | Status: SHIPPED | OUTPATIENT
Start: 2020-03-23 | End: 2020-06-21

## 2020-03-25 DIAGNOSIS — M35.00 SJOGREN'S SYNDROME, WITH UNSPECIFIED ORGAN INVOLVEMENT (HCC): ICD-10-CM

## 2020-03-25 RX ORDER — GABAPENTIN 100 MG/1
CAPSULE ORAL
Qty: 360 CAPSULE | Refills: 0 | Status: SHIPPED | OUTPATIENT
Start: 2020-03-25 | End: 2020-06-19

## 2020-03-29 DIAGNOSIS — E78.49 OTHER HYPERLIPIDEMIA: ICD-10-CM

## 2020-03-29 RX ORDER — SIMVASTATIN 5 MG
TABLET ORAL
Qty: 90 TABLET | Refills: 0 | Status: SHIPPED | OUTPATIENT
Start: 2020-03-29 | End: 2020-06-22 | Stop reason: SDUPTHER

## 2020-03-30 ENCOUNTER — APPOINTMENT (OUTPATIENT)
Dept: LAB | Facility: CLINIC | Age: 75
End: 2020-03-30
Payer: MEDICARE

## 2020-03-30 ENCOUNTER — TELEPHONE (OUTPATIENT)
Dept: INTERNAL MEDICINE CLINIC | Facility: CLINIC | Age: 75
End: 2020-03-30

## 2020-03-30 ENCOUNTER — ANTICOAG VISIT (OUTPATIENT)
Dept: INTERNAL MEDICINE CLINIC | Facility: CLINIC | Age: 75
End: 2020-03-30

## 2020-03-30 DIAGNOSIS — I81 PORTAL VEIN THROMBOSIS: ICD-10-CM

## 2020-03-30 DIAGNOSIS — I10 ESSENTIAL HYPERTENSION: ICD-10-CM

## 2020-03-30 DIAGNOSIS — E83.42 HYPOMAGNESEMIA: ICD-10-CM

## 2020-03-30 DIAGNOSIS — E22.2 SIADH (SYNDROME OF INAPPROPRIATE ADH PRODUCTION) (HCC): ICD-10-CM

## 2020-03-30 LAB
ALBUMIN SERPL BCP-MCNC: 3.7 G/DL (ref 3.5–5)
ALP SERPL-CCNC: 61 U/L (ref 46–116)
ALT SERPL W P-5'-P-CCNC: 25 U/L (ref 12–78)
ANION GAP SERPL CALCULATED.3IONS-SCNC: 5 MMOL/L (ref 4–13)
AST SERPL W P-5'-P-CCNC: 20 U/L (ref 5–45)
BASOPHILS # BLD AUTO: 0.07 THOUSANDS/ΜL (ref 0–0.1)
BASOPHILS NFR BLD AUTO: 1 % (ref 0–1)
BILIRUB SERPL-MCNC: 0.4 MG/DL (ref 0.2–1)
BUN SERPL-MCNC: 9 MG/DL (ref 5–25)
CALCIUM SERPL-MCNC: 8.8 MG/DL (ref 8.3–10.1)
CHLORIDE SERPL-SCNC: 94 MMOL/L (ref 100–108)
CO2 SERPL-SCNC: 32 MMOL/L (ref 21–32)
CREAT SERPL-MCNC: 0.86 MG/DL (ref 0.6–1.3)
CREAT UR-MCNC: <13 MG/DL
EOSINOPHIL # BLD AUTO: 0.26 THOUSAND/ΜL (ref 0–0.61)
EOSINOPHIL NFR BLD AUTO: 4 % (ref 0–6)
ERYTHROCYTE [DISTWIDTH] IN BLOOD BY AUTOMATED COUNT: 13.9 % (ref 11.6–15.1)
GFR SERPL CREATININE-BSD FRML MDRD: 66 ML/MIN/1.73SQ M
GLUCOSE P FAST SERPL-MCNC: 106 MG/DL (ref 65–99)
HCT VFR BLD AUTO: 39 % (ref 34.8–46.1)
HGB BLD-MCNC: 13 G/DL (ref 11.5–15.4)
IMM GRANULOCYTES # BLD AUTO: 0.01 THOUSAND/UL (ref 0–0.2)
IMM GRANULOCYTES NFR BLD AUTO: 0 % (ref 0–2)
LYMPHOCYTES # BLD AUTO: 1.75 THOUSANDS/ΜL (ref 0.6–4.47)
LYMPHOCYTES NFR BLD AUTO: 30 % (ref 14–44)
MCH RBC QN AUTO: 32.3 PG (ref 26.8–34.3)
MCHC RBC AUTO-ENTMCNC: 33.3 G/DL (ref 31.4–37.4)
MCV RBC AUTO: 97 FL (ref 82–98)
MICROALBUMIN UR-MCNC: <5 MG/L (ref 0–20)
MONOCYTES # BLD AUTO: 1.08 THOUSAND/ΜL (ref 0.17–1.22)
MONOCYTES NFR BLD AUTO: 18 % (ref 4–12)
NEUTROPHILS # BLD AUTO: 2.76 THOUSANDS/ΜL (ref 1.85–7.62)
NEUTS SEG NFR BLD AUTO: 47 % (ref 43–75)
NRBC BLD AUTO-RTO: 0 /100 WBCS
PHOSPHATE SERPL-MCNC: 3.6 MG/DL (ref 2.3–4.1)
PLATELET # BLD AUTO: 382 THOUSANDS/UL (ref 149–390)
PMV BLD AUTO: 8.8 FL (ref 8.9–12.7)
POTASSIUM SERPL-SCNC: 3.9 MMOL/L (ref 3.5–5.3)
PROT SERPL-MCNC: 7.3 G/DL (ref 6.4–8.2)
RBC # BLD AUTO: 4.03 MILLION/UL (ref 3.81–5.12)
SODIUM SERPL-SCNC: 131 MMOL/L (ref 136–145)
URATE SERPL-MCNC: 5.9 MG/DL (ref 2–6.8)
WBC # BLD AUTO: 5.93 THOUSAND/UL (ref 4.31–10.16)

## 2020-03-30 PROCEDURE — 82570 ASSAY OF URINE CREATININE: CPT

## 2020-03-30 PROCEDURE — 85025 COMPLETE CBC W/AUTO DIFF WBC: CPT

## 2020-03-30 PROCEDURE — 84100 ASSAY OF PHOSPHORUS: CPT

## 2020-03-30 PROCEDURE — 80053 COMPREHEN METABOLIC PANEL: CPT

## 2020-03-30 PROCEDURE — 82043 UR ALBUMIN QUANTITATIVE: CPT

## 2020-03-30 PROCEDURE — 84550 ASSAY OF BLOOD/URIC ACID: CPT

## 2020-03-30 NOTE — TELEPHONE ENCOUNTER
Your sodium is a bit low 131  Are you taking your salt tablets 3 times a day? INR is 1 88  Continue current dosing  Repeat INR in 1 month  Please update flow sheet

## 2020-04-04 DIAGNOSIS — F41.9 ANXIETY: ICD-10-CM

## 2020-04-06 ENCOUNTER — TELEPHONE (OUTPATIENT)
Dept: NEPHROLOGY | Facility: CLINIC | Age: 75
End: 2020-04-06

## 2020-04-06 DIAGNOSIS — E87.1 HYPONATREMIA: ICD-10-CM

## 2020-04-06 DIAGNOSIS — I10 ESSENTIAL HYPERTENSION: ICD-10-CM

## 2020-04-06 RX ORDER — LORAZEPAM 1 MG/1
1 TABLET ORAL EVERY 6 HOURS PRN
Qty: 120 TABLET | Refills: 0 | Status: SHIPPED | OUTPATIENT
Start: 2020-04-06 | End: 2020-05-04

## 2020-04-06 RX ORDER — TORSEMIDE 10 MG/1
10 TABLET ORAL DAILY
Qty: 90 TABLET | Refills: 0 | Status: SHIPPED | OUTPATIENT
Start: 2020-04-06 | End: 2020-06-22 | Stop reason: SDUPTHER

## 2020-04-07 DIAGNOSIS — M51.36 LUMBAR DEGENERATIVE DISC DISEASE: ICD-10-CM

## 2020-04-07 RX ORDER — GABAPENTIN 600 MG/1
TABLET ORAL
Qty: 360 TABLET | Refills: 2 | Status: SHIPPED | OUTPATIENT
Start: 2020-04-07 | End: 2020-12-23

## 2020-04-17 RX ORDER — DEXLANSOPRAZOLE 60 MG/1
60 CAPSULE, DELAYED RELEASE ORAL DAILY
Status: CANCELLED | OUTPATIENT
Start: 2020-04-17

## 2020-04-20 ENCOUNTER — TELEMEDICINE (OUTPATIENT)
Dept: INTERNAL MEDICINE CLINIC | Facility: CLINIC | Age: 75
End: 2020-04-20
Payer: MEDICARE

## 2020-04-20 VITALS
WEIGHT: 142 LBS | HEART RATE: 69 BPM | HEIGHT: 63 IN | SYSTOLIC BLOOD PRESSURE: 135 MMHG | BODY MASS INDEX: 25.16 KG/M2 | DIASTOLIC BLOOD PRESSURE: 77 MMHG

## 2020-04-20 DIAGNOSIS — Z71.9 HEALTH COUNSELING: ICD-10-CM

## 2020-04-20 DIAGNOSIS — K21.9 GASTROESOPHAGEAL REFLUX DISEASE, ESOPHAGITIS PRESENCE NOT SPECIFIED: Primary | ICD-10-CM

## 2020-04-20 DIAGNOSIS — E53.8 VITAMIN B12 DEFICIENCY: ICD-10-CM

## 2020-04-20 DIAGNOSIS — R73.03 PREDIABETES: ICD-10-CM

## 2020-04-20 DIAGNOSIS — E22.2 SIADH (SYNDROME OF INAPPROPRIATE ADH PRODUCTION) (HCC): ICD-10-CM

## 2020-04-20 DIAGNOSIS — E78.49 OTHER HYPERLIPIDEMIA: ICD-10-CM

## 2020-04-20 DIAGNOSIS — E03.9 ACQUIRED HYPOTHYROIDISM: ICD-10-CM

## 2020-04-20 DIAGNOSIS — I10 ESSENTIAL HYPERTENSION: ICD-10-CM

## 2020-04-20 DIAGNOSIS — J45.20 MILD INTERMITTENT ASTHMA WITHOUT COMPLICATION: ICD-10-CM

## 2020-04-20 DIAGNOSIS — R42 DIZZINESS: ICD-10-CM

## 2020-04-20 DIAGNOSIS — G25.0 ESSENTIAL TREMOR: ICD-10-CM

## 2020-04-20 DIAGNOSIS — M54.16 LUMBAR RADICULOPATHY: ICD-10-CM

## 2020-04-20 DIAGNOSIS — M51.36 LUMBAR DEGENERATIVE DISC DISEASE: ICD-10-CM

## 2020-04-20 DIAGNOSIS — I81 PORTAL VEIN THROMBOSIS: ICD-10-CM

## 2020-04-20 DIAGNOSIS — F41.9 ANXIETY: ICD-10-CM

## 2020-04-20 PROBLEM — Z48.89 AFTERCARE FOLLOWING SURGERY: Status: RESOLVED | Noted: 2019-12-18 | Resolved: 2020-04-20

## 2020-04-20 PROBLEM — E87.1 HYPONATREMIA: Status: RESOLVED | Noted: 2019-11-27 | Resolved: 2020-04-20

## 2020-04-20 PROCEDURE — 99214 OFFICE O/P EST MOD 30 MIN: CPT | Performed by: INTERNAL MEDICINE

## 2020-04-20 RX ORDER — MECLIZINE HYDROCHLORIDE 25 MG/1
25 TABLET ORAL EVERY 6 HOURS PRN
Qty: 90 TABLET | Refills: 1 | Status: SHIPPED | OUTPATIENT
Start: 2020-04-20 | End: 2021-12-20 | Stop reason: SDUPTHER

## 2020-04-20 RX ORDER — PANTOPRAZOLE SODIUM 40 MG/1
40 TABLET, DELAYED RELEASE ORAL DAILY
Qty: 90 TABLET | Refills: 1 | Status: SHIPPED | OUTPATIENT
Start: 2020-04-20 | End: 2020-10-12

## 2020-04-20 RX ORDER — HYDROCODONE BITARTRATE AND ACETAMINOPHEN 5; 325 MG/1; MG/1
1 TABLET ORAL EVERY 6 HOURS PRN
Qty: 120 TABLET | Refills: 0 | Status: SHIPPED | OUTPATIENT
Start: 2020-04-20 | End: 2020-06-18 | Stop reason: SDUPTHER

## 2020-05-02 DIAGNOSIS — F41.9 ANXIETY: ICD-10-CM

## 2020-05-04 RX ORDER — LORAZEPAM 1 MG/1
1 TABLET ORAL EVERY 6 HOURS PRN
Qty: 120 TABLET | Refills: 0 | Status: SHIPPED | OUTPATIENT
Start: 2020-05-04 | End: 2020-06-01

## 2020-05-15 DIAGNOSIS — F41.9 ANXIETY: ICD-10-CM

## 2020-05-18 RX ORDER — LORAZEPAM 1 MG/1
1 TABLET ORAL EVERY 6 HOURS PRN
Qty: 120 TABLET | Refills: 0 | OUTPATIENT
Start: 2020-05-18

## 2020-05-21 ENCOUNTER — TRANSCRIBE ORDERS (OUTPATIENT)
Dept: LAB | Facility: CLINIC | Age: 75
End: 2020-05-21

## 2020-05-21 ENCOUNTER — TELEPHONE (OUTPATIENT)
Dept: INTERNAL MEDICINE CLINIC | Facility: CLINIC | Age: 75
End: 2020-05-21

## 2020-05-21 ENCOUNTER — ANTICOAG VISIT (OUTPATIENT)
Dept: INTERNAL MEDICINE CLINIC | Facility: CLINIC | Age: 75
End: 2020-05-21

## 2020-05-21 ENCOUNTER — APPOINTMENT (OUTPATIENT)
Dept: LAB | Facility: CLINIC | Age: 75
End: 2020-05-21
Payer: MEDICARE

## 2020-05-21 DIAGNOSIS — I81 PORTAL VEIN THROMBOSIS: ICD-10-CM

## 2020-05-28 ENCOUNTER — TELEPHONE (OUTPATIENT)
Dept: INTERNAL MEDICINE CLINIC | Facility: CLINIC | Age: 75
End: 2020-05-28

## 2020-05-28 ENCOUNTER — APPOINTMENT (OUTPATIENT)
Dept: LAB | Facility: CLINIC | Age: 75
End: 2020-05-28
Payer: MEDICARE

## 2020-05-29 ENCOUNTER — ANTICOAG VISIT (OUTPATIENT)
Dept: INTERNAL MEDICINE CLINIC | Facility: CLINIC | Age: 75
End: 2020-05-29

## 2020-05-29 DIAGNOSIS — I81 PORTAL VEIN THROMBOSIS: ICD-10-CM

## 2020-05-29 DIAGNOSIS — I10 ESSENTIAL HYPERTENSION: ICD-10-CM

## 2020-05-29 RX ORDER — LISINOPRIL 20 MG/1
TABLET ORAL
Qty: 180 TABLET | Refills: 1 | Status: SHIPPED | OUTPATIENT
Start: 2020-05-29 | End: 2020-09-03

## 2020-05-29 RX ORDER — DILTIAZEM HYDROCHLORIDE 180 MG/1
CAPSULE, COATED, EXTENDED RELEASE ORAL
Qty: 90 CAPSULE | Refills: 1 | Status: SHIPPED | OUTPATIENT
Start: 2020-05-29 | End: 2020-06-22 | Stop reason: SDUPTHER

## 2020-06-01 DIAGNOSIS — F41.9 ANXIETY: ICD-10-CM

## 2020-06-01 DIAGNOSIS — F33.1 MODERATE EPISODE OF RECURRENT MAJOR DEPRESSIVE DISORDER (HCC): ICD-10-CM

## 2020-06-01 RX ORDER — BUPROPION HYDROCHLORIDE 150 MG/1
150 TABLET ORAL EVERY MORNING
Qty: 90 TABLET | Refills: 1 | Status: SHIPPED | OUTPATIENT
Start: 2020-06-01 | End: 2020-08-19 | Stop reason: ALTCHOICE

## 2020-06-01 RX ORDER — LORAZEPAM 1 MG/1
1 TABLET ORAL EVERY 6 HOURS PRN
Qty: 120 TABLET | Refills: 0 | Status: SHIPPED | OUTPATIENT
Start: 2020-06-01 | End: 2020-06-30

## 2020-06-01 RX ORDER — BUPROPION HYDROCHLORIDE 300 MG/1
300 TABLET ORAL DAILY
Qty: 90 TABLET | Refills: 1 | Status: SHIPPED | OUTPATIENT
Start: 2020-06-01 | End: 2020-12-22

## 2020-06-05 ENCOUNTER — LAB (OUTPATIENT)
Dept: LAB | Facility: CLINIC | Age: 75
End: 2020-06-05
Payer: MEDICARE

## 2020-06-05 DIAGNOSIS — I81 PORTAL VEIN THROMBOSIS: ICD-10-CM

## 2020-06-05 LAB
INR PPP: 2.51 (ref 0.84–1.19)
PROTHROMBIN TIME: 26.1 SECONDS (ref 11.6–14.5)

## 2020-06-05 PROCEDURE — 36415 COLL VENOUS BLD VENIPUNCTURE: CPT

## 2020-06-05 PROCEDURE — 85610 PROTHROMBIN TIME: CPT

## 2020-06-08 ENCOUNTER — TELEPHONE (OUTPATIENT)
Dept: NEUROLOGY | Facility: CLINIC | Age: 75
End: 2020-06-08

## 2020-06-08 ENCOUNTER — ANTICOAG VISIT (OUTPATIENT)
Dept: INTERNAL MEDICINE CLINIC | Facility: CLINIC | Age: 75
End: 2020-06-08

## 2020-06-08 DIAGNOSIS — I81 PORTAL VEIN THROMBOSIS: ICD-10-CM

## 2020-06-10 DIAGNOSIS — F41.9 ANXIETY: ICD-10-CM

## 2020-06-10 RX ORDER — ESCITALOPRAM OXALATE 20 MG/1
TABLET ORAL
Qty: 90 TABLET | Refills: 1 | Status: SHIPPED | OUTPATIENT
Start: 2020-06-10 | End: 2020-12-22

## 2020-06-18 DIAGNOSIS — M54.16 LUMBAR RADICULOPATHY: ICD-10-CM

## 2020-06-18 RX ORDER — HYDROCODONE BITARTRATE AND ACETAMINOPHEN 5; 325 MG/1; MG/1
1 TABLET ORAL EVERY 6 HOURS PRN
Qty: 120 TABLET | Refills: 0 | Status: SHIPPED | OUTPATIENT
Start: 2020-06-18 | End: 2020-07-31 | Stop reason: SDUPTHER

## 2020-06-19 DIAGNOSIS — M35.00 SJOGREN'S SYNDROME, WITH UNSPECIFIED ORGAN INVOLVEMENT (HCC): ICD-10-CM

## 2020-06-19 RX ORDER — GABAPENTIN 100 MG/1
CAPSULE ORAL
Qty: 360 CAPSULE | Refills: 0 | Status: SHIPPED | OUTPATIENT
Start: 2020-06-19 | End: 2020-06-29

## 2020-06-21 DIAGNOSIS — M35.00 SJOGREN'S SYNDROME, WITH UNSPECIFIED ORGAN INVOLVEMENT (HCC): ICD-10-CM

## 2020-06-21 RX ORDER — HYDROXYCHLOROQUINE SULFATE 200 MG/1
TABLET, FILM COATED ORAL
Qty: 180 TABLET | Refills: 0 | Status: SHIPPED | OUTPATIENT
Start: 2020-06-21 | End: 2020-09-03 | Stop reason: SDUPTHER

## 2020-06-22 ENCOUNTER — PROCEDURE VISIT (OUTPATIENT)
Dept: NEUROLOGY | Facility: CLINIC | Age: 75
End: 2020-06-22
Payer: MEDICARE

## 2020-06-22 VITALS
HEART RATE: 68 BPM | DIASTOLIC BLOOD PRESSURE: 65 MMHG | SYSTOLIC BLOOD PRESSURE: 131 MMHG | HEIGHT: 63 IN | WEIGHT: 141.1 LBS | TEMPERATURE: 98.1 F | BODY MASS INDEX: 25 KG/M2

## 2020-06-22 DIAGNOSIS — E87.1 HYPONATREMIA: ICD-10-CM

## 2020-06-22 DIAGNOSIS — G62.9 PERIPHERAL POLYNEUROPATHY: ICD-10-CM

## 2020-06-22 DIAGNOSIS — I10 ESSENTIAL HYPERTENSION: ICD-10-CM

## 2020-06-22 DIAGNOSIS — E78.49 OTHER HYPERLIPIDEMIA: ICD-10-CM

## 2020-06-22 DIAGNOSIS — J30.89 NON-SEASONAL ALLERGIC RHINITIS DUE TO OTHER ALLERGIC TRIGGER: ICD-10-CM

## 2020-06-22 DIAGNOSIS — G25.0 ESSENTIAL TREMOR: Primary | ICD-10-CM

## 2020-06-22 DIAGNOSIS — Z96.89 S/P DEEP BRAIN STIMULATOR PLACEMENT: ICD-10-CM

## 2020-06-22 PROCEDURE — 3078F DIAST BP <80 MM HG: CPT | Performed by: PSYCHIATRY & NEUROLOGY

## 2020-06-22 PROCEDURE — 3008F BODY MASS INDEX DOCD: CPT | Performed by: PSYCHIATRY & NEUROLOGY

## 2020-06-22 PROCEDURE — 3075F SYST BP GE 130 - 139MM HG: CPT | Performed by: PSYCHIATRY & NEUROLOGY

## 2020-06-22 PROCEDURE — 95983 ALYS BRN NPGT PRGRMG 15 MIN: CPT | Performed by: PSYCHIATRY & NEUROLOGY

## 2020-06-22 PROCEDURE — 4040F PNEUMOC VAC/ADMIN/RCVD: CPT | Performed by: PSYCHIATRY & NEUROLOGY

## 2020-06-22 PROCEDURE — 99212 OFFICE O/P EST SF 10 MIN: CPT | Performed by: PSYCHIATRY & NEUROLOGY

## 2020-06-22 PROCEDURE — 1036F TOBACCO NON-USER: CPT | Performed by: PSYCHIATRY & NEUROLOGY

## 2020-06-22 PROCEDURE — 95984 ALYS BRN NPGT PRGRMG ADDL 15: CPT | Performed by: PSYCHIATRY & NEUROLOGY

## 2020-06-22 PROCEDURE — 1160F RVW MEDS BY RX/DR IN RCRD: CPT | Performed by: PSYCHIATRY & NEUROLOGY

## 2020-06-22 RX ORDER — SIMVASTATIN 5 MG
5 TABLET ORAL DAILY
Qty: 90 TABLET | Refills: 1 | Status: SHIPPED | OUTPATIENT
Start: 2020-06-22 | End: 2020-12-15

## 2020-06-22 RX ORDER — TORSEMIDE 10 MG/1
10 TABLET ORAL DAILY
Qty: 90 TABLET | Refills: 1 | Status: SHIPPED | OUTPATIENT
Start: 2020-06-22 | End: 2020-12-17

## 2020-06-22 RX ORDER — DILTIAZEM HYDROCHLORIDE 180 MG/1
180 CAPSULE, COATED, EXTENDED RELEASE ORAL DAILY
Qty: 90 CAPSULE | Refills: 1 | Status: SHIPPED | OUTPATIENT
Start: 2020-06-22 | End: 2021-07-06

## 2020-06-22 RX ORDER — FLUTICASONE PROPIONATE 50 MCG
1 SPRAY, SUSPENSION (ML) NASAL DAILY PRN
Qty: 48 ML | Refills: 1 | Status: SHIPPED | OUTPATIENT
Start: 2020-06-22 | End: 2021-04-12 | Stop reason: SDUPTHER

## 2020-06-23 DIAGNOSIS — J45.40 MODERATE PERSISTENT ASTHMA WITHOUT COMPLICATION: ICD-10-CM

## 2020-06-25 ENCOUNTER — TRANSCRIBE ORDERS (OUTPATIENT)
Dept: LAB | Facility: CLINIC | Age: 75
End: 2020-06-25

## 2020-06-25 ENCOUNTER — TELEPHONE (OUTPATIENT)
Dept: INTERNAL MEDICINE CLINIC | Facility: CLINIC | Age: 75
End: 2020-06-25

## 2020-06-25 ENCOUNTER — APPOINTMENT (OUTPATIENT)
Dept: LAB | Facility: CLINIC | Age: 75
End: 2020-06-25
Payer: MEDICARE

## 2020-06-25 ENCOUNTER — ANTICOAG VISIT (OUTPATIENT)
Dept: INTERNAL MEDICINE CLINIC | Facility: CLINIC | Age: 75
End: 2020-06-25

## 2020-06-25 DIAGNOSIS — I81 PORTAL VEIN THROMBOSIS: ICD-10-CM

## 2020-06-25 LAB
INR PPP: 3.31 (ref 0.84–1.19)
PROTHROMBIN TIME: 32.6 SECONDS (ref 11.6–14.5)

## 2020-06-25 PROCEDURE — 85610 PROTHROMBIN TIME: CPT

## 2020-06-25 PROCEDURE — 36415 COLL VENOUS BLD VENIPUNCTURE: CPT

## 2020-06-29 DIAGNOSIS — M35.00 SJOGREN'S SYNDROME, WITH UNSPECIFIED ORGAN INVOLVEMENT (HCC): ICD-10-CM

## 2020-06-29 RX ORDER — GABAPENTIN 100 MG/1
CAPSULE ORAL
Qty: 360 CAPSULE | Refills: 0 | Status: SHIPPED | OUTPATIENT
Start: 2020-06-29 | End: 2020-09-11

## 2020-06-30 DIAGNOSIS — F41.9 ANXIETY: ICD-10-CM

## 2020-06-30 RX ORDER — LORAZEPAM 1 MG/1
1 TABLET ORAL EVERY 6 HOURS PRN
Qty: 120 TABLET | Refills: 0 | Status: SHIPPED | OUTPATIENT
Start: 2020-06-30 | End: 2020-07-31 | Stop reason: SDUPTHER

## 2020-07-02 ENCOUNTER — APPOINTMENT (OUTPATIENT)
Dept: LAB | Facility: CLINIC | Age: 75
End: 2020-07-02
Payer: MEDICARE

## 2020-07-02 ENCOUNTER — ANTICOAG VISIT (OUTPATIENT)
Dept: INTERNAL MEDICINE CLINIC | Facility: CLINIC | Age: 75
End: 2020-07-02

## 2020-07-02 ENCOUNTER — TELEPHONE (OUTPATIENT)
Dept: INTERNAL MEDICINE CLINIC | Facility: CLINIC | Age: 75
End: 2020-07-02

## 2020-07-02 DIAGNOSIS — I81 PORTAL VEIN THROMBOSIS: ICD-10-CM

## 2020-07-02 LAB
INR PPP: 2.24 (ref 0.84–1.19)
MAGNESIUM SERPL-MCNC: 1.5 MG/DL (ref 1.6–2.6)
PROTHROMBIN TIME: 23.9 SECONDS (ref 11.6–14.5)

## 2020-07-02 PROCEDURE — 83735 ASSAY OF MAGNESIUM: CPT

## 2020-07-02 PROCEDURE — 85610 PROTHROMBIN TIME: CPT

## 2020-07-02 PROCEDURE — 36415 COLL VENOUS BLD VENIPUNCTURE: CPT

## 2020-07-02 NOTE — TELEPHONE ENCOUNTER
Please try taking magnesium twice a day  If you develop more loose stools than you can go back to once a day dosing

## 2020-07-02 NOTE — TELEPHONE ENCOUNTER
Patient notified  Patient states she is taking her magnesium once a day  Should she take it twice a day? Flow sheet updated

## 2020-07-02 NOTE — TELEPHONE ENCOUNTER
INR at goal   Take 2 5 mg x 2 days, 5 mg x 5 days  Recheck in 2 weeks  Are you taking your magnesium? Once a day or twice? Levels still low  Staff Please update flow sheet

## 2020-07-16 ENCOUNTER — APPOINTMENT (OUTPATIENT)
Dept: LAB | Facility: CLINIC | Age: 75
End: 2020-07-16
Payer: MEDICARE

## 2020-07-16 DIAGNOSIS — I81 PORTAL VEIN THROMBOSIS: ICD-10-CM

## 2020-07-16 LAB
INR PPP: 2.56 (ref 0.84–1.19)
PROTHROMBIN TIME: 26.6 SECONDS (ref 11.6–14.5)

## 2020-07-16 PROCEDURE — 36415 COLL VENOUS BLD VENIPUNCTURE: CPT

## 2020-07-16 PROCEDURE — 85610 PROTHROMBIN TIME: CPT

## 2020-07-20 ENCOUNTER — TELEPHONE (OUTPATIENT)
Dept: INTERNAL MEDICINE CLINIC | Facility: CLINIC | Age: 75
End: 2020-07-20

## 2020-07-20 NOTE — TELEPHONE ENCOUNTER
INR stable  Continue current dosing (5 mg x 5 days, 2 5 mg x 2 days)  Recheck in 1 month  Please update flow sheet

## 2020-07-20 NOTE — TELEPHONE ENCOUNTER
Pt had INR done on 07/16    She would like to know results she thought she would have gotten call back last week but did not

## 2020-07-21 ENCOUNTER — ANTICOAG VISIT (OUTPATIENT)
Dept: INTERNAL MEDICINE CLINIC | Facility: CLINIC | Age: 75
End: 2020-07-21

## 2020-07-21 DIAGNOSIS — I81 PORTAL VEIN THROMBOSIS: ICD-10-CM

## 2020-07-31 DIAGNOSIS — F11.20 UNCOMPLICATED OPIOID DEPENDENCE (HCC): Primary | ICD-10-CM

## 2020-07-31 DIAGNOSIS — M54.16 LUMBAR RADICULOPATHY: ICD-10-CM

## 2020-07-31 DIAGNOSIS — F41.9 ANXIETY: ICD-10-CM

## 2020-07-31 RX ORDER — LORAZEPAM 1 MG/1
1 TABLET ORAL EVERY 6 HOURS PRN
Qty: 120 TABLET | Refills: 0 | Status: SHIPPED | OUTPATIENT
Start: 2020-07-31 | End: 2020-09-03 | Stop reason: SDUPTHER

## 2020-07-31 RX ORDER — NALOXONE HYDROCHLORIDE 4 MG/.1ML
SPRAY NASAL
Qty: 1 EACH | Refills: 1 | Status: SHIPPED | OUTPATIENT
Start: 2020-07-31 | End: 2021-08-12

## 2020-07-31 RX ORDER — HYDROCODONE BITARTRATE AND ACETAMINOPHEN 5; 325 MG/1; MG/1
1 TABLET ORAL EVERY 6 HOURS PRN
Qty: 120 TABLET | Refills: 0 | Status: SHIPPED | OUTPATIENT
Start: 2020-07-31 | End: 2020-09-03 | Stop reason: SDUPTHER

## 2020-07-31 NOTE — TELEPHONE ENCOUNTER
I sent in her meds  There's a new fda recommendation that patients on controlled substances should have narcan on hand at home in case of extreme drowsiness or respiratory depression  I sent if in for her to have on hand at home

## 2020-08-13 ENCOUNTER — TRANSCRIBE ORDERS (OUTPATIENT)
Dept: LAB | Facility: CLINIC | Age: 75
End: 2020-08-13

## 2020-08-13 ENCOUNTER — LAB (OUTPATIENT)
Dept: LAB | Facility: CLINIC | Age: 75
End: 2020-08-13
Payer: MEDICARE

## 2020-08-13 ENCOUNTER — ANTICOAG VISIT (OUTPATIENT)
Dept: INTERNAL MEDICINE CLINIC | Facility: CLINIC | Age: 75
End: 2020-08-13

## 2020-08-13 ENCOUNTER — TELEPHONE (OUTPATIENT)
Dept: INTERNAL MEDICINE CLINIC | Facility: CLINIC | Age: 75
End: 2020-08-13

## 2020-08-13 DIAGNOSIS — I10 ESSENTIAL HYPERTENSION: ICD-10-CM

## 2020-08-13 DIAGNOSIS — I47.1 SUPRAVENTRICULAR TACHYCARDIA (HCC): ICD-10-CM

## 2020-08-13 DIAGNOSIS — E53.8 VITAMIN B12 DEFICIENCY: ICD-10-CM

## 2020-08-13 DIAGNOSIS — E03.9 ACQUIRED HYPOTHYROIDISM: ICD-10-CM

## 2020-08-13 DIAGNOSIS — E78.49 OTHER HYPERLIPIDEMIA: ICD-10-CM

## 2020-08-13 DIAGNOSIS — E22.2 SIADH (SYNDROME OF INAPPROPRIATE ADH PRODUCTION) (HCC): ICD-10-CM

## 2020-08-13 DIAGNOSIS — I45.10 INCOMPLETE RIGHT BUNDLE BRANCH BLOCK (RBBB): ICD-10-CM

## 2020-08-13 DIAGNOSIS — I47.1 SVT (SUPRAVENTRICULAR TACHYCARDIA) (HCC): ICD-10-CM

## 2020-08-13 DIAGNOSIS — I81 PORTAL VEIN THROMBOSIS: ICD-10-CM

## 2020-08-13 DIAGNOSIS — I81 PORTAL VEIN THROMBOSIS: Primary | ICD-10-CM

## 2020-08-13 DIAGNOSIS — R73.03 PREDIABETES: ICD-10-CM

## 2020-08-13 LAB
ALBUMIN SERPL BCP-MCNC: 3.3 G/DL (ref 3.5–5)
ALP SERPL-CCNC: 75 U/L (ref 46–116)
ALT SERPL W P-5'-P-CCNC: 22 U/L (ref 12–78)
ANION GAP SERPL CALCULATED.3IONS-SCNC: 6 MMOL/L (ref 4–13)
AST SERPL W P-5'-P-CCNC: 23 U/L (ref 5–45)
BASOPHILS # BLD AUTO: 0.08 THOUSANDS/ΜL (ref 0–0.1)
BASOPHILS NFR BLD AUTO: 2 % (ref 0–1)
BILIRUB SERPL-MCNC: 0.38 MG/DL (ref 0.2–1)
BUN SERPL-MCNC: 9 MG/DL (ref 5–25)
CALCIUM SERPL-MCNC: 8.8 MG/DL (ref 8.3–10.1)
CHLORIDE SERPL-SCNC: 98 MMOL/L (ref 100–108)
CHOLEST SERPL-MCNC: 159 MG/DL (ref 50–200)
CO2 SERPL-SCNC: 32 MMOL/L (ref 21–32)
CREAT SERPL-MCNC: 0.79 MG/DL (ref 0.6–1.3)
EOSINOPHIL # BLD AUTO: 0.22 THOUSAND/ΜL (ref 0–0.61)
EOSINOPHIL NFR BLD AUTO: 4 % (ref 0–6)
ERYTHROCYTE [DISTWIDTH] IN BLOOD BY AUTOMATED COUNT: 13.7 % (ref 11.6–15.1)
EST. AVERAGE GLUCOSE BLD GHB EST-MCNC: 117 MG/DL
GFR SERPL CREATININE-BSD FRML MDRD: 73 ML/MIN/1.73SQ M
GLUCOSE P FAST SERPL-MCNC: 89 MG/DL (ref 65–99)
HBA1C MFR BLD: 5.7 %
HCT VFR BLD AUTO: 37.7 % (ref 34.8–46.1)
HDLC SERPL-MCNC: 59 MG/DL
HGB BLD-MCNC: 12.6 G/DL (ref 11.5–15.4)
IMM GRANULOCYTES # BLD AUTO: 0.01 THOUSAND/UL (ref 0–0.2)
IMM GRANULOCYTES NFR BLD AUTO: 0 % (ref 0–2)
INR PPP: 3.05 (ref 0.84–1.19)
LDLC SERPL CALC-MCNC: 88 MG/DL (ref 0–100)
LYMPHOCYTES # BLD AUTO: 1.97 THOUSANDS/ΜL (ref 0.6–4.47)
LYMPHOCYTES NFR BLD AUTO: 39 % (ref 14–44)
MCH RBC QN AUTO: 32.6 PG (ref 26.8–34.3)
MCHC RBC AUTO-ENTMCNC: 33.4 G/DL (ref 31.4–37.4)
MCV RBC AUTO: 98 FL (ref 82–98)
MONOCYTES # BLD AUTO: 0.79 THOUSAND/ΜL (ref 0.17–1.22)
MONOCYTES NFR BLD AUTO: 16 % (ref 4–12)
NEUTROPHILS # BLD AUTO: 1.94 THOUSANDS/ΜL (ref 1.85–7.62)
NEUTS SEG NFR BLD AUTO: 39 % (ref 43–75)
NONHDLC SERPL-MCNC: 100 MG/DL
NRBC BLD AUTO-RTO: 0 /100 WBCS
PLATELET # BLD AUTO: 401 THOUSANDS/UL (ref 149–390)
PMV BLD AUTO: 9.1 FL (ref 8.9–12.7)
POTASSIUM SERPL-SCNC: 4.3 MMOL/L (ref 3.5–5.3)
PROT SERPL-MCNC: 6.5 G/DL (ref 6.4–8.2)
PROTHROMBIN TIME: 31.6 SECONDS (ref 11.6–14.5)
RBC # BLD AUTO: 3.86 MILLION/UL (ref 3.81–5.12)
SODIUM SERPL-SCNC: 136 MMOL/L (ref 136–145)
T4 FREE SERPL-MCNC: 0.95 NG/DL (ref 0.76–1.46)
TRIGL SERPL-MCNC: 59 MG/DL
TSH SERPL DL<=0.05 MIU/L-ACNC: 1.15 UIU/ML (ref 0.36–3.74)
VIT B12 SERPL-MCNC: 514 PG/ML (ref 100–900)
WBC # BLD AUTO: 5.01 THOUSAND/UL (ref 4.31–10.16)

## 2020-08-13 PROCEDURE — 84443 ASSAY THYROID STIM HORMONE: CPT

## 2020-08-13 PROCEDURE — 80053 COMPREHEN METABOLIC PANEL: CPT

## 2020-08-13 PROCEDURE — 85025 COMPLETE CBC W/AUTO DIFF WBC: CPT

## 2020-08-13 PROCEDURE — 82607 VITAMIN B-12: CPT

## 2020-08-13 PROCEDURE — 80061 LIPID PANEL: CPT

## 2020-08-13 PROCEDURE — 85610 PROTHROMBIN TIME: CPT

## 2020-08-13 PROCEDURE — 84439 ASSAY OF FREE THYROXINE: CPT

## 2020-08-13 PROCEDURE — 36415 COLL VENOUS BLD VENIPUNCTURE: CPT

## 2020-08-13 PROCEDURE — 83036 HEMOGLOBIN GLYCOSYLATED A1C: CPT

## 2020-08-13 NOTE — TELEPHONE ENCOUNTER
Pt  went to the lab this morning and had bw drawn  They told her they could draw her INR but she needed to call us to put in a new order because the one in there is   Please enter new order for INR and we also need to call the lab to tell them when it is in so they can run it

## 2020-08-19 ENCOUNTER — OFFICE VISIT (OUTPATIENT)
Dept: INTERNAL MEDICINE CLINIC | Facility: CLINIC | Age: 75
End: 2020-08-19
Payer: MEDICARE

## 2020-08-19 VITALS
BODY MASS INDEX: 24.31 KG/M2 | SYSTOLIC BLOOD PRESSURE: 134 MMHG | RESPIRATION RATE: 18 BRPM | WEIGHT: 137.2 LBS | HEIGHT: 63 IN | OXYGEN SATURATION: 96 % | HEART RATE: 72 BPM | TEMPERATURE: 97.2 F | DIASTOLIC BLOOD PRESSURE: 80 MMHG

## 2020-08-19 DIAGNOSIS — F33.1 MODERATE EPISODE OF RECURRENT MAJOR DEPRESSIVE DISORDER (HCC): ICD-10-CM

## 2020-08-19 DIAGNOSIS — I10 ESSENTIAL HYPERTENSION: ICD-10-CM

## 2020-08-19 DIAGNOSIS — R39.9 URINARY SYMPTOM OR SIGN: ICD-10-CM

## 2020-08-19 DIAGNOSIS — E22.2 SIADH (SYNDROME OF INAPPROPRIATE ADH PRODUCTION) (HCC): ICD-10-CM

## 2020-08-19 DIAGNOSIS — E78.49 OTHER HYPERLIPIDEMIA: ICD-10-CM

## 2020-08-19 DIAGNOSIS — E03.9 ACQUIRED HYPOTHYROIDISM: ICD-10-CM

## 2020-08-19 DIAGNOSIS — G25.0 ESSENTIAL TREMOR: Primary | ICD-10-CM

## 2020-08-19 DIAGNOSIS — M51.36 LUMBAR DEGENERATIVE DISC DISEASE: ICD-10-CM

## 2020-08-19 DIAGNOSIS — Z00.00 HEALTH MAINTENANCE EXAMINATION: ICD-10-CM

## 2020-08-19 DIAGNOSIS — J45.20 MILD INTERMITTENT ASTHMA WITHOUT COMPLICATION: ICD-10-CM

## 2020-08-19 DIAGNOSIS — Z23 NEED FOR PNEUMOCOCCAL VACCINATION: ICD-10-CM

## 2020-08-19 DIAGNOSIS — F41.9 ANXIETY: ICD-10-CM

## 2020-08-19 DIAGNOSIS — Z12.31 ENCOUNTER FOR SCREENING MAMMOGRAM FOR BREAST CANCER: ICD-10-CM

## 2020-08-19 DIAGNOSIS — R26.81 UNSTEADY GAIT: ICD-10-CM

## 2020-08-19 LAB
BILIRUB UR QL STRIP: NEGATIVE
CLARITY UR: CLEAR
COLOR UR: YELLOW
GLUCOSE UR STRIP-MCNC: NEGATIVE MG/DL
HGB UR QL STRIP.AUTO: NEGATIVE
KETONES UR STRIP-MCNC: NEGATIVE MG/DL
LEUKOCYTE ESTERASE UR QL STRIP: NEGATIVE
NITRITE UR QL STRIP: NEGATIVE
PH UR STRIP.AUTO: 7 [PH]
PROT UR STRIP-MCNC: NEGATIVE MG/DL
SL AMB  POCT GLUCOSE, UA: NORMAL
SL AMB LEUKOCYTE ESTERASE,UA: 15
SL AMB POCT BILIRUBIN,UA: NORMAL
SL AMB POCT BLOOD,UA: NORMAL
SL AMB POCT CLARITY,UA: CLEAR
SL AMB POCT COLOR,UA: YELLOW
SL AMB POCT KETONES,UA: 40
SL AMB POCT NITRITE,UA: NORMAL
SL AMB POCT PH,UA: 7
SL AMB POCT SPECIFIC GRAVITY,UA: 1.01
SL AMB POCT URINE PROTEIN: NORMAL
SL AMB POCT UROBILINOGEN: 1
SP GR UR STRIP.AUTO: 1.01 (ref 1–1.03)
UROBILINOGEN UR QL STRIP.AUTO: 0.2 E.U./DL

## 2020-08-19 PROCEDURE — 3079F DIAST BP 80-89 MM HG: CPT | Performed by: INTERNAL MEDICINE

## 2020-08-19 PROCEDURE — 81002 URINALYSIS NONAUTO W/O SCOPE: CPT | Performed by: INTERNAL MEDICINE

## 2020-08-19 PROCEDURE — 4040F PNEUMOC VAC/ADMIN/RCVD: CPT | Performed by: INTERNAL MEDICINE

## 2020-08-19 PROCEDURE — 81003 URINALYSIS AUTO W/O SCOPE: CPT | Performed by: INTERNAL MEDICINE

## 2020-08-19 PROCEDURE — 3008F BODY MASS INDEX DOCD: CPT | Performed by: INTERNAL MEDICINE

## 2020-08-19 PROCEDURE — G0439 PPPS, SUBSEQ VISIT: HCPCS | Performed by: INTERNAL MEDICINE

## 2020-08-19 PROCEDURE — 99214 OFFICE O/P EST MOD 30 MIN: CPT | Performed by: INTERNAL MEDICINE

## 2020-08-19 PROCEDURE — 3075F SYST BP GE 130 - 139MM HG: CPT | Performed by: INTERNAL MEDICINE

## 2020-08-19 PROCEDURE — 1170F FXNL STATUS ASSESSED: CPT | Performed by: INTERNAL MEDICINE

## 2020-08-19 PROCEDURE — 1125F AMNT PAIN NOTED PAIN PRSNT: CPT | Performed by: INTERNAL MEDICINE

## 2020-08-19 PROCEDURE — 1036F TOBACCO NON-USER: CPT | Performed by: INTERNAL MEDICINE

## 2020-08-19 PROCEDURE — G0009 ADMIN PNEUMOCOCCAL VACCINE: HCPCS | Performed by: INTERNAL MEDICINE

## 2020-08-19 PROCEDURE — 1160F RVW MEDS BY RX/DR IN RCRD: CPT | Performed by: INTERNAL MEDICINE

## 2020-08-19 PROCEDURE — 90732 PPSV23 VACC 2 YRS+ SUBQ/IM: CPT | Performed by: INTERNAL MEDICINE

## 2020-08-19 NOTE — PATIENT INSTRUCTIONS
Start using a rolling walker, instead of a cane  Consider seeing pain management  Limit exercise to 30 minutes at a time, can do it twice a day

## 2020-08-19 NOTE — PROGRESS NOTES
Assessment and Plan:     Problem List Items Addressed This Visit        Endocrine    Acquired hypothyroidism     Adequately replaced  SIADH (syndrome of inappropriate ADH production) (HCC)     Improved Na at 136  Taking NaCl tid  Per nephrology  Respiratory    Mild intermittent asthma without complication     Stable, using Advair  Cardiovascular and Mediastinum    Essential hypertension     BP stable, on lisinopril, diltiazem and torsemide  Nervous and Auditory    Essential tremor - Primary     DBS recently adjusted, per neurology  Musculoskeletal and Integument    Lumbar degenerative disc disease     Recommend to use treadmill bid  Mostly with bilateral leg pain, agrees to do x-ray  She will consider seeing pain management again, will need updated MRI  On gabapentin, hydrocodone  Relevant Orders    XR spine lumbar minimum 4 views non injury       Other    Anxiety     As above  Encounter for screening mammogram for breast cancer    Relevant Orders    Mammo screening bilateral w cad    Hyperlipidemia     Lipids at goal, on statin  Moderate episode of recurrent major depressive disorder (HCC)     Stable, has stopped additional 150 mg of bupropion  Maintain on 300 mg, with Lexapro  Still taking lorazepam regularly  Unsteady gait     Recommend to use rolling walker  Other Visit Diagnoses     Need for pneumococcal vaccination        Relevant Orders    PNEUMOCOCCAL POLYSACCHARIDE VACCINE 23-VALENT =>1YO SQ IM (Completed)    Urinary symptom or sign        Relevant Orders    POCT urine dip (Completed)    UA (URINE) with reflex to Scope    Health maintenance examination        Mammogram due  Pneumovax today  Falls Plan of Care: Recommended assistive device to help with gait and balance         Preventive health issues were discussed with patient, and age appropriate screening tests were ordered as noted in patient's After Visit Summary  Personalized health advice and appropriate referrals for health education or preventive services given if needed, as noted in patient's After Visit Summary       History of Present Illness:     Patient presents for Medicare Annual Wellness visit    Patient Care Team:  Jorge Bejarano MD as PCP - MD Munira Monzon MD Bettejane Luster, DO Jolie Gruber, MD Allena Levo, MD Vilinda Bruns, MD Rowe Murphy, MD Berton Danish, MD Ora Nye, ROSANGELA Moreland MD as Endoscopist     Problem List:     Patient Active Problem List   Diagnosis    Portal vein thrombosis    Anxiety    Moderate episode of recurrent major depressive disorder [facility])    Essential tremor    Hyperlipidemia    Essential hypertension    Hypomagnesemia    SIADH (syndrome of inappropriate ADH production) [facility] [address] [facility]    Acquired hypothyroidism    Insomnia    Iron deficiency anemia    Prediabetes    Peripheral neuropathy    Osteopenia    Osteoarthritis of right knee    Ulcerative colitis without complications ([facility]    Allergic rhinitis    GERD (gastroesophageal reflux disease)    Anemia    Mild intermittent asthma without complication    Diarrhea    Sjogren's syndrome ([facility]    Chronic salpingo-oophoritis    Overweight    Spinal stenosis    Supraventricular tachycardia ([address] )    Unsteady gait    Lower extremity pain, left    Lumbar degenerative disc disease    Encounter for screening mammogram for breast cancer    Encounter for long-term (current) use of medications    S/P deep brain stimulator placement    Vitamin B12 deficiency    Vitamin D deficiency      Past Medical and Surgical History:     Past Medical History:   Diagnosis Date    Anxiety     Arrhythmia     Arthritis     Asthma     Blood clot in vein     portal vein    Breast lump     [id number] RESOLVED    Depression     Disease of thyroid gland     DVT, lower extremity ([facility] [facility]     GERD (gastroesophageal reflux disease)     Hyperlipidemia     Hypertension     Hypokalemia     Hyponatremia     Hypothyroidism     Iron deficiency anemia     Irregular heart beat     Manic behavior (HCC)     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HCC)     Osteoarthritis     Palpitations     [id number]  RESOLVED    Paroxysmal supraventricular tachycardia (HCC)     PE (pulmonary thromboembolism) (HCC)     Sjoegren syndrome     Thrombocytosis (HCC)     [id number]  RESOLVED    Tremors of nervous system     dbs implanted right and left chest    Ulcerative colitis ([address] )     Vertigo     [id number] RESOLVED     Past Surgical History:   Procedure Laterality Date    ABDOMINAL SURGERY      APPENDECTOMY      BREAST SURGERY      lumpectomy & biopsy    CARMELLA HOLE W/ STEREOTACTIC INSERTION OF DBS LEADS / INTRAOP MICROELECTRODE RECORDING      COLON SURGERY      COLONOSCOPY N/A 8/30/2017    Procedure: Darby Marks;  Surgeon: Radha Santiago MD;  Location: [facility];   Service: Colorectal    COLOPROCTECTOMY W/ ILEO J POUCH      ESOPHAGOGASTRODUODENOSCOPY      ONSET 10/17/11    FISTULA REPAIR      LLEOANAL FISTULA REPAIR TRANSPERIN TRANSABD APPROACH    HYSTERECTOMY      age 43    ILEOSTOMY CLOSURE      KNEE ARTHROSCOPY      Right    MAMMO STEREOTACTIC BREAST BIOPSY LEFT (ALL INC) Left 11/7/2019    MA IMP STIM,CRANIAL,SUBQ,1 ARRAY Right 6/20/2017    Procedure: DBS GENERATOR REPLACEMENT;  Surgeon: Quinn Hatchet, MD;  Location:  MAIN OR;  Service: Neurosurgery    MA IMP STIM,CRANIAL,SUBQ,1 ARRAY N/A 12/4/2019    Procedure: REPLACEMENT IMPLANTABLE PULSE GENERATOR FOR DEEP BRAIN STIMULATOR LEFT CHEST;  Surgeon: Gabby Vázquez MD;  Location[facility];  Service: Neurosurgery    SPLENECTOMY        Family History:     Family History   Problem Relation Age of Onset    Venous thrombosis Mother         ACUTE VENOUS THROMBOSIS OF DEEP VESSELS OF THE DISTAL LOWER EXTREMITY    Other Mother         PHLEBITIS    Hypertension Mother     Peripheral vascular disease Mother     COPD Father     Diabetes Father         MELLITUS    Stroke Father     Diabetes Sister         MELLITUS    Sjogren's syndrome Sister     No Known Problems Daughter     No Known Problems Maternal Grandmother     No Known Problems Maternal Grandfather     No Known Problems Paternal Grandmother     No Known Problems Paternal Grandfather     No Known Problems Maternal Aunt     No Known Problems Paternal Aunt     Alcohol abuse Neg Hx     Depression Neg Hx     Drug abuse Neg Hx     Substance Abuse Neg Hx     Mental illness Neg Hx       Social History:        Social History     Socioeconomic History    Marital status:      Spouse name: None    Number of children: None    Years of education: EDUCATION LEVEL 10TH GRADE    Highest education level: None   Occupational History    Occupation: RETIRED   Social Needs    Financial resource strain: None    Food insecurity     Worry: None     Inability: None    Transportation needs     Medical: None     Non-medical: None   Tobacco Use    Smoking status: Former Smoker     Packs/day: 2 00     Years: 5 00     Pack years: 10 00     Last attempt to quit:      Years since quittin 6    Smokeless tobacco: Never Used    Tobacco comment: Quit   Substance and Sexual Activity    Alcohol use: Yes     Frequency: 2-3 times a week     Drinks per session: 1 or 2     Binge frequency: Never    Drug use: No    Sexual activity: Yes     Partners: Male     Birth control/protection: Post-menopausal   Lifestyle    Physical activity     Days per week: None     Minutes per session: None    Stress: None   Relationships    Social connections     Talks on phone: None     Gets together: None     Attends Taoism service: None     Active member of club or organization: None     Attends meetings of clubs or organizations: None     Relationship status: None    Intimate partner violence     Fear of current or ex partner: None     Emotionally abused: None     Physically abused: None     Forced sexual activity: None   Other Topics Concern    None   Social History Narrative    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ACTIVITIES INSIDE AND OUTSIDE OF HOME, MODERATE    CAFFEINE USE, DRINKS CAFEINATED TEA, DRINKS COFFEE    INADEQUATE EXERCISE    LIVES WITH ADULT CHILDREN      Medications and Allergies:     Current Outpatient Medications   Medication Sig Dispense Refill    albuterol (PROVENTIL HFA,VENTOLIN HFA) 90 mcg/act inhaler Inhale 2 puffs every 6 (six) hours as needed for wheezing 1 Inhaler 1    buPROPion (WELLBUTRIN XL) 300 mg 24 hr tablet Take 1 tablet (300 mg total) by mouth daily With 150 mg 90 tablet 1    cetirizine (ZyrTEC) 10 mg tablet Take 10 mg by mouth daily      diltiazem (CARDIZEM CD) 180 mg 24 hr capsule Take 1 capsule (180 mg total) by mouth daily 90 capsule 1    escitalopram (LEXAPRO) 20 mg tablet TAKE 1 TABLET BY MOUTH EVERY DAY 90 tablet 1    fluticasone (FLONASE) 50 mcg/act nasal spray 1 SPRAY INTO EACH NOSTRIL DAILY AS NEEDED FOR RHINITIS 48 mL 1    fluticasone-salmeterol (Advair Diskus) 250-50 mcg/dose inhaler Inhale 1 puff 2 (two) times a day Rinse mouth after use 180 each 1    gabapentin (NEURONTIN) 100 mg capsule TAKE 2 CAPSULES (200 MG TOTAL) BY MOUTH 2 (TWO) TIMES A  capsule 0    gabapentin (NEURONTIN) 600 MG tablet PATIENT TAKES ONE IN AM, ONE AFTERNOON AND 2 AT BEDTIME 360 tablet 2    HYDROcodone-acetaminophen (NORCO) 5-325 mg per tablet Take 1 tablet by mouth every 6 (six) hours as needed for painMax Daily Amount: 4 tablets 120 tablet 0    hydroxychloroquine (PLAQUENIL) 200 mg tablet TAKE 1 TABLET BY MOUTH TWICE A  tablet 0    levothyroxine 50 mcg tablet TAKE 1 TABLET BY MOUTH EVERY DAY 90 tablet 1    lisinopril (ZESTRIL) 20 mg tablet TAKE 1 TABLET BY MOUTH TWICE A  tablet 1    loperamide (IMODIUM) 2 mg capsule Take 2 mg by mouth 4 (four) times a day as needed        LORazepam (ATIVAN) 1 mg tablet Take 1 tablet (1 mg total) by mouth every 6 (six) hours as needed for anxiety 120 tablet 0    meclizine (ANTIVERT) 25 mg tablet Take 1 tablet (25 mg total) by mouth every 6 (six) hours as needed for dizziness 90 tablet 1    naloxone (NARCAN) 4 mg/0 1 mL nasal spray Administer 1 spray into a nostril  If breathing does not return to normal or if breathing difficulty resumes after 2-3 minutes, give another dose in the other nostril using a new spray  1 each 1    pantoprazole (PROTONIX) 40 mg tablet Take 1 tablet (40 mg total) by mouth daily 90 tablet 1    simvastatin (ZOCOR) 5 MG tablet Take 1 tablet (5 mg total) by mouth daily 90 tablet 1    sodium chloride 1 g tablet Take 1 tablet (1 g total) by mouth 3 (three) times a day 270 tablet 3    torsemide (DEMADEX) 10 mg tablet Take 1 tablet (10 mg total) by mouth daily 90 tablet 1    warfarin (COUMADIN) 5 mg tablet Take 1-2 tablets daily As directed 180 tablet 1    methocarbamol (ROBAXIN) 500 mg tablet Take 1 tablet (500 mg total) by mouth 3 (three) times a day as needed for muscle spasms (Patient not taking: Reported on 6/22/2020) 60 tablet 0     No current facility-administered medications for this visit        Allergies   Allergen Reactions    Indocin [Indomethacin] Other (See Comments), Dizziness and GI Intolerance     Reaction Date: [id number];   Nausea and dizziness    Macrobid [Nitrofurantoin Monohyd Macro] Rash    Penicillins Rash     Annotation - [id number]: can take cephalosporins    Sulfa Antibiotics Rash      Immunizations:     Immunization History   Administered Date(s) Administered    INFLUENZA 10/29/2018    Influenza Split High Dose Preservative Free IM 09/25/2013, 09/25/2014, 10/03/2015, 11/03/2016, 11/03/2017    Influenza TIV (IM) 09/17/2009, 09/23/2010, 09/30/2011, 11/10/2012    Influenza, high dose seasonal 0 5 mL 10/29/2018, 10/23/2019    Meningococcal Polysaccharide (MPSV4) 01/01/2000, 01/01/2006    Pneumococcal Conjugate 13-Valent 06/16/2015    Pneumococcal Polysaccharide PPV23 01/01/2006, 08/19/2020    Zoster 09/01/2011      Health Maintenance: There are no preventive care reminders to display for this patient  Topic Date Due    Meningococcal ACWY Vaccine (1 - Risk start before 7 months 4-dose series) 1945    HIB Vaccine (1 of 1 - Risk 1-dose series) 11/24/1946      Medicare Health Risk Assessment:     /80   Pulse 72   Temp (!) 97 2 °F (36 2 °C)   Resp 18   Ht 5' 3" (1 6 m)   Wt 62 2 kg (137 lb 3 2 oz)   SpO2 96%   BMI 24 30 kg/m²      Silverio Islas is here for her Subsequent Wellness visit  Last Medicare Wellness visit information reviewed, patient interviewed and updates made to the record today  Health Risk Assessment:   Patient rates overall health as fair  Patient feels that their physical health rating is slightly worse  Eyesight was rated as same  Hearing was rated as same  Patient feels that their emotional and mental health rating is same  Pain experienced in the last 7 days has been some  Patient's pain rating has been 6/10  Patient states that she has experienced no weight loss or gain in last 6 months  Depression Screening:   PHQ-2 Score: 0  PHQ-9 Score: 0      Fall Risk Screening: In the past year, patient has experienced: history of falling in past year    Number of falls: 2 or more  Injured during fall?: No    Feels unsteady when standing or walking?: Yes    Worried about falling?: Yes      Urinary Incontinence Screening:   Patient has leaked urine accidently in the last six months  Home Safety:  Patient does not have trouble with stairs inside or outside of their home  Patient has working smoke alarms and has no working carbon monoxide detector  Home safety hazards include: none  Nutrition:   Current diet is Regular  Medications:   Patient is currently taking over-the-counter supplements   OTC medications include: see medication list  Patient is able to manage medications  Activities of Daily Living (ADLs)/Instrumental Activities of Daily Living (IADLs):   Walk and transfer into and out of bed and chair?: Yes  Dress and groom yourself?: Yes    Bathe or shower yourself?: Yes    Feed yourself? Yes  Do your laundry/housekeeping?: Yes  Manage your money, pay your bills and track your expenses?: Yes  Make your own meals?: Yes    Do your own shopping?: Yes    Previous Hospitalizations:   Any hospitalizations or ED visits within the last 12 months?: Yes    How many hospitalizations have you had in the last year?: 1-2    Advance Care Planning:   Living will: Yes    Durable POA for healthcare: Yes    Advanced directive: Yes    End of Life Decisions reviewed with patient: Yes      Cognitive Screening:   Provider or family/friend/caregiver concerned regarding cognition?: No    PREVENTIVE SCREENINGS      Cardiovascular Screening:    General: History Lipid Disorder and Screening Current      Diabetes Screening:     General: Screening Current      Colorectal Cancer Screening:     General: Screening Current      Breast Cancer Screening:     General: Screening Current      Cervical Cancer Screening:    General: Screening Not Indicated      Osteoporosis Screening:    General: Screening Current      Abdominal Aortic Aneurysm (AAA) Screening:        General: Screening Not Indicated      Lung Cancer Screening:     General: Screening Not Indicated      Hepatitis C Screening:    General: Screening Not Indicated    Other Counseling Topics:   Regular weightbearing exercise         Josseline Luong MD

## 2020-08-19 NOTE — ASSESSMENT & PLAN NOTE
Recommend to use treadmill bid  Mostly with bilateral leg pain, agrees to do x-ray  She will consider seeing pain management again, will need updated MRI  On gabapentin, hydrocodone

## 2020-08-19 NOTE — PROGRESS NOTES
Assessment/Plan:    Lumbar degenerative disc disease  Recommend to use treadmill bid  Mostly with bilateral leg pain, agrees to do x-ray  She will consider seeing pain management again, will need updated MRI  On gabapentin, hydrocodone  Moderate episode of recurrent major depressive disorder (HCC)  Stable, has stopped additional 150 mg of bupropion  Maintain on 300 mg, with Lexapro  Still taking lorazepam regularly  Unsteady gait  Recommend to use rolling walker  SIADH (syndrome of inappropriate ADH production) ([facility])  Improved Na at 136  Taking NaCl tid  Per nephrology  Portal vein thrombosis  Stable INR  Acquired hypothyroidism  Adequately replaced  Anxiety  As above  GERD (gastroesophageal reflux disease)  On daily PPI  Hyperlipidemia  Lipids at goal, on statin  Mild intermittent asthma without complication  Stable, using Advair  Essential tremor  DBS recently adjusted, per neurology  Essential hypertension  BP stable, on lisinopril, diltiazem and torsemide  Prediabetes  A1c stable  Osteopenia  Bone density due 2021  Diagnoses and all orders for this visit:    Essential tremor    Lumbar degenerative disc disease  -     XR spine lumbar minimum 4 views non injury; Future    Moderate episode of recurrent major depressive disorder (HCC)    [facility]    Need for pneumococcal vaccination  -     PNEUMOCOCCAL POLYSACCHARIDE VACCINE 23-VALENT =>3YO SQ IM    Urinary symptom or sign  -     POCT urine dip  -     UA (URINE) with reflex to Scope    Unsteady gait    Encounter for screening mammogram for breast cancer  -     Mammo screening bilateral w cad; Future    Acquired hypothyroidism    Anxiety    Mild intermittent asthma without complication    Essential hypertension    Other hyperlipidemia    Health maintenance examination  Comments:  Mammogram due  Pneumovax today  Follow up in 4 months or as needed        Subjective: Patient ID: Mayur Diaz is a 75 y o  female  Ms  Maida Rosales complains of sore in her lip area  She has biting her lip frequently when eating, it does not happen if she chew carefully or if she pays attention  She has applied over the counter medication and complains that it tastes bad  Denies any bleeding  She fell in her kitchen a few weeks ago  She developed a large bruise on her left hip area, reports this has resolved but still with occasional pain in the area  She does feel her balance is off whenever she walks  She has not been using a cane, does not want to use a walker  She complains of frequent pain behind both legs, occasional low back pain  She takes her Vicodin regularly  She stopped taking the additional 150 mg of the Wellbutrin, felt a little foggy while on it  She reports her anxiety has been better, has not been picking lately  She complains of urinary frequency, during the day and at night  She does wear a pad  She reports occasional fecal leakage as well  The following portions of the patient's history were reviewed and updated as appropriate: allergies, current medications, past medical history, past social history and problem list     Review of Systems   Constitutional: Negative for activity change, appetite change and fatigue  HENT: Negative for congestion and postnasal drip  Eyes: Negative for visual disturbance  Respiratory: Negative for cough, shortness of breath and wheezing  Cardiovascular: Negative for chest pain and leg swelling  Gastrointestinal: Negative for abdominal pain, constipation and diarrhea  Genitourinary: Positive for urgency  Negative for dysuria and frequency  Musculoskeletal: Positive for arthralgias, back pain and gait problem  Negative for myalgias  Skin: Negative for rash and wound  Neurological: Positive for tremors  Negative for dizziness, syncope, weakness, light-headedness, numbness and headaches     Hematological: Does not bruise/bleed easily  Psychiatric/Behavioral: Negative for confusion  The patient is not nervous/anxious  Objective:      /80   Pulse 72   Temp (!) 97 2 °F (36 2 °C)   Resp 18   Ht 5' 3" (1 6 m)   Wt 62 2 kg (137 lb 3 2 oz)   SpO2 96%   BMI 24 30 kg/m²          Physical Exam  Vitals signs and nursing note reviewed  Constitutional:       General: She is not in acute distress  Appearance: She is well-developed  HENT:      Head: Normocephalic and atraumatic  Eyes:      Pupils: Pupils are equal, round, and reactive to light  Cardiovascular:      Rate and Rhythm: Normal rate and regular rhythm  Heart sounds: Normal heart sounds  Pulmonary:      Effort: Pulmonary effort is normal       Breath sounds: Normal breath sounds  No wheezing  Abdominal:      General: Bowel sounds are normal       Palpations: Abdomen is soft  Musculoskeletal:      Comments: Abnormal gait  Slightly stooped, unsteady  RLE occasionally dragging  Skin:     General: Skin is warm  Findings: No rash  Neurological:      Mental Status: She is alert and oriented to person, place, and time  Motor: Tremor present  Psychiatric:         Behavior: Behavior normal            Labs & imaging results reviewed with patient

## 2020-08-19 NOTE — ASSESSMENT & PLAN NOTE
Stable, has stopped additional 150 mg of bupropion  Maintain on 300 mg, with Lexapro  Still taking lorazepam regularly

## 2020-08-20 ENCOUNTER — TELEPHONE (OUTPATIENT)
Dept: INTERNAL MEDICINE CLINIC | Facility: CLINIC | Age: 75
End: 2020-08-20

## 2020-08-20 DIAGNOSIS — Z23 NEED FOR SHINGLES VACCINE: ICD-10-CM

## 2020-09-02 ENCOUNTER — ANESTHESIA EVENT (OUTPATIENT)
Dept: GASTROENTEROLOGY | Facility: HOSPITAL | Age: 75
End: 2020-09-02

## 2020-09-02 ENCOUNTER — HOSPITAL ENCOUNTER (OUTPATIENT)
Dept: GASTROENTEROLOGY | Facility: HOSPITAL | Age: 75
Setting detail: OUTPATIENT SURGERY
Discharge: HOME | End: 2020-09-02
Attending: COLON & RECTAL SURGERY | Admitting: COLON & RECTAL SURGERY
Payer: MEDICARE

## 2020-09-02 ENCOUNTER — ANESTHESIA (OUTPATIENT)
Dept: GASTROENTEROLOGY | Facility: HOSPITAL | Age: 75
End: 2020-09-02

## 2020-09-02 VITALS
DIASTOLIC BLOOD PRESSURE: 72 MMHG | BODY MASS INDEX: 24.27 KG/M2 | SYSTOLIC BLOOD PRESSURE: 152 MMHG | OXYGEN SATURATION: 98 % | RESPIRATION RATE: 18 BRPM | HEART RATE: 70 BPM | WEIGHT: 137 LBS | TEMPERATURE: 98.7 F

## 2020-09-02 DIAGNOSIS — K51.80 OTHER ULCERATIVE COLITIS WITHOUT COMPLICATION (HCC): ICD-10-CM

## 2020-09-02 DIAGNOSIS — R19.7 DIARRHEA, UNSPECIFIED TYPE: ICD-10-CM

## 2020-09-02 PROCEDURE — 88305 TISSUE EXAM BY PATHOLOGIST: CPT | Performed by: PATHOLOGY

## 2020-09-02 PROCEDURE — 99213 OFFICE O/P EST LOW 20 MIN: CPT | Performed by: COLON & RECTAL SURGERY

## 2020-09-02 PROCEDURE — 44386 ENDOSCOPY BOWEL POUCH/BIOP: CPT | Performed by: COLON & RECTAL SURGERY

## 2020-09-02 RX ORDER — LIDOCAINE HYDROCHLORIDE 10 MG/ML
INJECTION, SOLUTION EPIDURAL; INFILTRATION; INTRACAUDAL; PERINEURAL AS NEEDED
Status: DISCONTINUED | OUTPATIENT
Start: 2020-09-02 | End: 2020-09-02

## 2020-09-02 RX ORDER — SODIUM CHLORIDE 9 MG/ML
INJECTION, SOLUTION INTRAVENOUS CONTINUOUS PRN
Status: DISCONTINUED | OUTPATIENT
Start: 2020-09-02 | End: 2020-09-02

## 2020-09-02 RX ORDER — PROPOFOL 10 MG/ML
INJECTION, EMULSION INTRAVENOUS AS NEEDED
Status: DISCONTINUED | OUTPATIENT
Start: 2020-09-02 | End: 2020-09-02

## 2020-09-02 RX ADMIN — PROPOFOL 20 MG: 10 INJECTION, EMULSION INTRAVENOUS at 10:21

## 2020-09-02 RX ADMIN — LIDOCAINE HYDROCHLORIDE 50 MG: 10 INJECTION, SOLUTION EPIDURAL; INFILTRATION; INTRACAUDAL; PERINEURAL at 10:14

## 2020-09-02 RX ADMIN — PROPOFOL 30 MG: 10 INJECTION, EMULSION INTRAVENOUS at 10:25

## 2020-09-02 RX ADMIN — SODIUM CHLORIDE: 0.9 INJECTION, SOLUTION INTRAVENOUS at 10:03

## 2020-09-02 RX ADMIN — PROPOFOL 20 MG: 10 INJECTION, EMULSION INTRAVENOUS at 10:17

## 2020-09-02 RX ADMIN — PROPOFOL 130 MG: 10 INJECTION, EMULSION INTRAVENOUS at 10:14

## 2020-09-02 NOTE — H&P
History and Physical   Colon and Rectal Surgery   Joann Colmenares 67 y o  female MRN: [id number]  Unit/Bed#:  Encounter: [id number]  09/02/20   9:52 AM      CC:  History of Ulcerative Colitis     History of Present Illness   HPI:  Joann Colmenares is a 67 y o  female for surveillance of an ileal pouch-anal anastomosis  She is functioning well  Historical Information   Past Medical History:   Diagnosis Date    Anxiety     Arrhythmia     Arthritis     Asthma     Blood clot in vein     portal vein    Breast lump     [id number] RESOLVED    Depression     Disease of thyroid gland     DVT, lower extremity (HCC)     GERD (gastroesophageal reflux disease)     Hyperlipidemia     Hypertension     Hypokalemia     Hyponatremia     Hypothyroidism     Iron deficiency anemia     Irregular heart beat     Manic behavior (HCC)     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HCC)     Osteoarthritis     Palpitations     [id number]  RESOLVED    Paroxysmal supraventricular tachycardia (HCC)     PE (pulmonary thromboembolism) (HCC)     Sjoegren syndrome     Thrombocytosis (HCC)     [id number]  RESOLVED    Tremors of nervous system     dbs implanted right and left chest    Ulcerative colitis ([address] )     Vertigo     [id number] RESOLVED     Past Surgical History:   Procedure Laterality Date    ABDOMINAL SURGERY      APPENDECTOMY      BREAST SURGERY      lumpectomy & biopsy    CARMELLA HOLE W/ STEREOTACTIC INSERTION OF DBS LEADS / INTRAOP MICROELECTRODE RECORDING      COLON SURGERY      COLONOSCOPY N/A 8/30/2017    Procedure: POUCHOSCOPY;  Surgeon: Yaakov Boss MD;  Location: [facility];   Service: Colorectal    COLOPROCTECTOMY W/ ILEO J POUCH      ESOPHAGOGASTRODUODENOSCOPY      ONSET 10/17/11    FISTULA REPAIR      LLEOANAL FISTULA REPAIR TRANSPERIN TRANSABD APPROACH    HYSTERECTOMY      age 43    ILEOSTOMY CLOSURE      KNEE ARTHROSCOPY      Right    MAMMO STEREOTACTIC BREAST BIOPSY LEFT (ALL INC) Left 11/7/2019    DE IMP STIM,CRANIAL,SUBQ,1 ARRAY Right 6/20/2017    Procedure: DBS GENERATOR REPLACEMENT;  Surgeon: Neno Mary MD;  Location: [facility];  Service: Neurosurgery    AK IMP STIM,CRANIAL,SUBQ,1 ARRAY N/A 12/4/2019    Procedure: REPLACEMENT IMPLANTABLE PULSE GENERATOR FOR DEEP BRAIN STIMULATOR LEFT CHEST;  Surgeon: Mynor Haley MD;  [facility];  Service: Neurosurgery    SPLENECTOMY         Meds/Allergies     (Not in a hospital admission)        Current Outpatient Medications:     albuterol (PROVENTIL HFA,VENTOLIN HFA) 90 mcg/act inhaler, Inhale 2 puffs every 6 (six) hours as needed for wheezing, Disp: 1 Inhaler, Rfl: 1    buPROPion (WELLBUTRIN XL) 300 mg 24 hr tablet, Take 1 tablet (300 mg total) by mouth daily With 150 mg, Disp: 90 tablet, Rfl: 1    cetirizine (ZyrTEC) 10 mg tablet, Take 10 mg by mouth daily, Disp: , Rfl:     diltiazem (CARDIZEM CD) 180 mg 24 hr capsule, Take 1 capsule (180 mg total) by mouth daily, Disp: 90 capsule, Rfl: 1    escitalopram (LEXAPRO) 20 mg tablet, TAKE 1 TABLET BY MOUTH EVERY DAY, Disp: 90 tablet, Rfl: 1    fluticasone (FLONASE) 50 mcg/act nasal spray, 1 SPRAY INTO EACH NOSTRIL DAILY AS NEEDED FOR RHINITIS, Disp: 48 mL, Rfl: 1    fluticasone-salmeterol (Advair Diskus) 250-50 mcg/dose inhaler, Inhale 1 puff 2 (two) times a day Rinse mouth after use, Disp: 180 each, Rfl: 1    gabapentin (NEURONTIN) 100 mg capsule, TAKE 2 CAPSULES (200 MG TOTAL) BY MOUTH 2 (TWO) TIMES A DAY, Disp: 360 capsule, Rfl: 0    gabapentin (NEURONTIN) 600 MG tablet, PATIENT TAKES ONE IN AM, ONE AFTERNOON AND 2 AT BEDTIME, Disp: 360 tablet, Rfl: 2    HYDROcodone-acetaminophen (NORCO) 5-325 mg per tablet, Take 1 tablet by mouth every 6 (six) hours as needed for painMax Daily Amount: 4 tablets, Disp: 120 tablet, Rfl: 0    hydroxychloroquine (PLAQUENIL) 200 mg tablet, TAKE 1 TABLET BY MOUTH TWICE A DAY, Disp: 180 tablet, Rfl: 0    levothyroxine 50 mcg tablet, TAKE 1 TABLET BY MOUTH EVERY DAY, Disp: 90 tablet, Rfl: 1    lisinopril (ZESTRIL) 20 mg tablet, TAKE 1 TABLET BY MOUTH TWICE A DAY, Disp: 180 tablet, Rfl: 1    loperamide (IMODIUM) 2 mg capsule, Take 2 mg by mouth 4 (four) times a day as needed  , Disp: , Rfl:     LORazepam (ATIVAN) 1 mg tablet, Take 1 tablet (1 mg total) by mouth every 6 (six) hours as needed for anxiety, Disp: 120 tablet, Rfl: 0    meclizine (ANTIVERT) 25 mg tablet, Take 1 tablet (25 mg total) by mouth every 6 (six) hours as needed for dizziness, Disp: 90 tablet, Rfl: 1    methocarbamol (ROBAXIN) 500 mg tablet, Take 1 tablet (500 mg total) by mouth 3 (three) times a day as needed for muscle spasms (Patient not taking: Reported on 6/22/2020), Disp: 60 tablet, Rfl: 0    naloxone (NARCAN) 4 mg/0 1 mL nasal spray, Administer 1 spray into a nostril   If breathing does not return to normal or if breathing difficulty resumes after 2-3 minutes, give another dose in the other nostril using a new spray , Disp: 1 each, Rfl: 1    pantoprazole (PROTONIX) 40 mg tablet, Take 1 tablet (40 mg total) by mouth daily, Disp: 90 tablet, Rfl: 1    simvastatin (ZOCOR) 5 MG tablet, Take 1 tablet (5 mg total) by mouth daily, Disp: 90 tablet, Rfl: 1    sodium chloride 1 g tablet, Take 1 tablet (1 g total) by mouth 3 (three) times a day, Disp: 270 tablet, Rfl: 3    torsemide (DEMADEX) 10 mg tablet, Take 1 tablet (10 mg total) by mouth daily, Disp: 90 tablet, Rfl: 1    warfarin (COUMADIN) 5 mg tablet, Take 1-2 tablets daily As directed, Disp: 180 tablet, Rfl: 1    Allergies   Allergen Reactions    Indocin [Indomethacin] Other (See Comments), Dizziness and GI Intolerance     Reaction Date: [id number];   Nausea and dizziness    Macrobid [Nitrofurantoin Monohyd Macro] Rash    Penicillins Rash     Annotation - [id number]: can take cephalosporins    Sulfa Antibiotics Rash         Social History   Social History     Substance and Sexual Activity   Alcohol Use Yes    Frequency: 2-3 times a week    Drinks per session: 1 or 2    Binge frequency: Never     Social History     Substance and Sexual Activity   Drug Use No     Social History     Tobacco Use   Smoking Status Former Smoker    Packs/day: 2 00    Years: 5 00    Pack years: 10 00    Last attempt to quit:     Years since quittin 7   Smokeless Tobacco Never Used   Tobacco Comment    Quit         Family History:   Family History   Problem Relation Age of Onset    Venous thrombosis Mother         ACUTE VENOUS THROMBOSIS OF DEEP VESSELS OF THE DISTAL LOWER EXTREMITY    Other Mother         PHLEBITIS    Hypertension Mother     Peripheral vascular disease Mother     COPD Father     Diabetes Father         MELLITUS    Stroke Father     Diabetes Sister         MELLITUS    Sjogren's syndrome Sister     No Known Problems Daughter     No Known Problems Maternal Grandmother     No Known Problems Maternal Grandfather     No Known Problems Paternal Grandmother     No Known Problems Paternal Grandfather     No Known Problems Maternal Aunt     No Known Problems Paternal Aunt     Alcohol abuse Neg Hx     Depression Neg Hx     Drug abuse Neg Hx     Substance Abuse Neg Hx     Mental illness Neg Hx      Review of Systems - General ROS: negative  Respiratory ROS: no cough, shortness of breath, or wheezing  Cardiovascular ROS: no chest pain or dyspnea on exertion     Objective     Current Vitals:      No intake or output data in the 24 hours ending 20 0952    Physical Exam:  General:  Well nourished, no distress  Neuro: Alert and oriented  Eyes:Sclera anicteric, conjunctiva pink  Pulm: Clear to auscultation bilaterally  No respiratory Distress  CV:  Regular rate and rhythm  No murmurs  Abdomen:  Soft, flat, non-tender, without masses or hepatosplenomegaly  Lab Results:       ASSESSMENT:  Daylin Montoya is a 67 y o  female for pouchoscopy    PLAN: Risks , including, but not limited to, bleeding, perforation, missed lesions, and potential need for surgery, were reviewed  Alternatives to colonoscopy were discussed    Mitch Baca MD

## 2020-09-02 NOTE — ANESTHESIA POSTPROCEDURE EVALUATION
Post-Op Assessment Note    CV Status:  Stable  Pain Score: 0    Pain management: adequate     Mental Status:  Sleepy   Hydration Status:  Stable   PONV Controlled:  None   Airway Patency:  Patent and adequate      Post Op Vitals Reviewed: Yes      Staff: CRNA         No complications documented      BP  132/63   Temp      Pulse  68   Resp   16   SpO2   100%

## 2020-09-02 NOTE — ANESTHESIA PREPROCEDURE EVALUATION
Procedure:  FLEXIBLE SIGMOIDOSCOPY    Relevant Problems   CARDIO   (+) Essential hypertension   (+) Hyperlipidemia   (+) Portal vein thrombosis   (+) Supraventricular tachycardia (HCC)      ENDO   (+) Acquired hypothyroidism      GI/HEPATIC   (+) GERD (gastroesophageal reflux disease)   (+) Portal vein thrombosis      HEMATOLOGY   (+) Anemia   (+) Iron deficiency anemia      MUSCULOSKELETAL   (+) Lumbar degenerative disc disease   (+) Osteoarthritis of right knee      NEURO/PSYCH  Tremor s/p deep brain stimulator   (+) Anxiety   (+) Moderate episode of recurrent major depressive disorder (HCC)      PULMONARY   (+) Mild intermittent asthma without complication        Physical Exam    Airway    Mallampati score: I  TM Distance: >3 FB  Neck ROM: full     Dental   No notable dental hx     Cardiovascular      Pulmonary      Other Findings        Anesthesia Plan  ASA Score- 3     Anesthesia Type- IV sedation with anesthesia with ASA Monitors  Additional Monitors:   Airway Plan:     Comment: Holter 4/2/19:  1) Borderline Holter monitor of 24 hrs duration  2) Normal burden of ventricular and supraventricular ectopic beats  3) Brief asymptomatic runs of supraventricular ectopy, longest 6 beats in duration     EKG 9/17/19: borderline first-degree AV block with iRBBB  Plan Factors-    Chart reviewed  EKG reviewed  Existing labs reviewed  Patient summary reviewed  Induction-     Postoperative Plan-     Informed Consent- Anesthetic plan and risks discussed with patient  I personally reviewed this patient with the CRNA  Discussed and agreed on the Anesthesia Plan with the CRNA  Marck Flower

## 2020-09-03 DIAGNOSIS — M54.16 LUMBAR RADICULOPATHY: ICD-10-CM

## 2020-09-03 DIAGNOSIS — M35.00 SJOGREN'S SYNDROME, WITH UNSPECIFIED ORGAN INVOLVEMENT (HCC): ICD-10-CM

## 2020-09-03 DIAGNOSIS — I10 ESSENTIAL HYPERTENSION: ICD-10-CM

## 2020-09-03 DIAGNOSIS — F41.9 ANXIETY: ICD-10-CM

## 2020-09-03 RX ORDER — LORAZEPAM 1 MG/1
1 TABLET ORAL EVERY 6 HOURS PRN
Qty: 120 TABLET | Refills: 0 | Status: SHIPPED | OUTPATIENT
Start: 2020-09-03 | End: 2020-11-20 | Stop reason: SDUPTHER

## 2020-09-03 RX ORDER — HYDROCODONE BITARTRATE AND ACETAMINOPHEN 5; 325 MG/1; MG/1
1 TABLET ORAL EVERY 6 HOURS PRN
Qty: 120 TABLET | Refills: 0 | Status: SHIPPED | OUTPATIENT
Start: 2020-09-03 | End: 2020-10-02 | Stop reason: SDUPTHER

## 2020-09-03 RX ORDER — LISINOPRIL 20 MG/1
TABLET ORAL
Qty: 180 TABLET | Refills: 1 | Status: SHIPPED | OUTPATIENT
Start: 2020-09-03 | End: 2021-03-04 | Stop reason: SDUPTHER

## 2020-09-03 RX ORDER — HYDROXYCHLOROQUINE SULFATE 200 MG/1
200 TABLET, FILM COATED ORAL 2 TIMES DAILY
Qty: 180 TABLET | Refills: 0 | Status: SHIPPED | OUTPATIENT
Start: 2020-09-03 | End: 2020-09-14

## 2020-09-04 ENCOUNTER — HOSPITAL ENCOUNTER (OUTPATIENT)
Dept: NON INVASIVE DIAGNOSTICS | Facility: CLINIC | Age: 75
Discharge: HOME | End: 2020-09-04
Payer: MEDICARE

## 2020-09-04 DIAGNOSIS — I47.1 SUPRAVENTRICULAR TACHYCARDIA (HCC): ICD-10-CM

## 2020-09-04 DIAGNOSIS — I45.10 INCOMPLETE RIGHT BUNDLE BRANCH BLOCK (RBBB): ICD-10-CM

## 2020-09-04 DIAGNOSIS — I47.1 SVT (SUPRAVENTRICULAR TACHYCARDIA) (HCC): ICD-10-CM

## 2020-09-04 PROCEDURE — 93225 XTRNL ECG REC<48 HRS REC: CPT

## 2020-09-04 PROCEDURE — 93226 XTRNL ECG REC<48 HR SCAN A/R: CPT

## 2020-09-06 DIAGNOSIS — E03.9 ACQUIRED HYPOTHYROIDISM: ICD-10-CM

## 2020-09-07 RX ORDER — LEVOTHYROXINE SODIUM 0.05 MG/1
TABLET ORAL
Qty: 90 TABLET | Refills: 1 | Status: SHIPPED | OUTPATIENT
Start: 2020-09-07 | End: 2021-03-04

## 2020-09-08 ENCOUNTER — TELEPHONE (OUTPATIENT)
Dept: INTERNAL MEDICINE CLINIC | Facility: CLINIC | Age: 75
End: 2020-09-08

## 2020-09-09 PROCEDURE — 93227 XTRNL ECG REC<48 HR R&I: CPT | Performed by: INTERNAL MEDICINE

## 2020-09-11 DIAGNOSIS — M35.00 SJOGREN'S SYNDROME, WITH UNSPECIFIED ORGAN INVOLVEMENT (HCC): ICD-10-CM

## 2020-09-11 RX ORDER — GABAPENTIN 100 MG/1
CAPSULE ORAL
Qty: 360 CAPSULE | Refills: 1 | Status: SHIPPED | OUTPATIENT
Start: 2020-09-11 | End: 2021-04-09 | Stop reason: SDUPTHER

## 2020-09-14 DIAGNOSIS — I81 PORTAL VEIN THROMBOSIS: ICD-10-CM

## 2020-09-14 DIAGNOSIS — M35.00 SJOGREN'S SYNDROME, WITH UNSPECIFIED ORGAN INVOLVEMENT (HCC): ICD-10-CM

## 2020-09-14 RX ORDER — HYDROXYCHLOROQUINE SULFATE 200 MG/1
TABLET, FILM COATED ORAL
Qty: 180 TABLET | Refills: 0 | Status: SHIPPED | OUTPATIENT
Start: 2020-09-14 | End: 2020-12-20

## 2020-09-14 RX ORDER — WARFARIN SODIUM 5 MG/1
TABLET ORAL
Qty: 180 TABLET | Refills: 1 | Status: SHIPPED | OUTPATIENT
Start: 2020-09-14 | End: 2021-10-17

## 2020-09-16 ENCOUNTER — ANTICOAG VISIT (OUTPATIENT)
Dept: INTERNAL MEDICINE CLINIC | Facility: CLINIC | Age: 75
End: 2020-09-16

## 2020-09-16 ENCOUNTER — APPOINTMENT (OUTPATIENT)
Dept: LAB | Facility: CLINIC | Age: 75
End: 2020-09-16
Payer: MEDICARE

## 2020-09-16 DIAGNOSIS — I81 PORTAL VEIN THROMBOSIS: ICD-10-CM

## 2020-09-21 ENCOUNTER — OFFICE VISIT (OUTPATIENT)
Dept: CARDIOLOGY CLINIC | Facility: CLINIC | Age: 75
End: 2020-09-21
Payer: MEDICARE

## 2020-09-21 VITALS
HEART RATE: 64 BPM | BODY MASS INDEX: 24.68 KG/M2 | SYSTOLIC BLOOD PRESSURE: 140 MMHG | WEIGHT: 139.3 LBS | TEMPERATURE: 97.1 F | OXYGEN SATURATION: 94 % | HEIGHT: 63 IN | DIASTOLIC BLOOD PRESSURE: 74 MMHG

## 2020-09-21 DIAGNOSIS — I47.1 SVT (SUPRAVENTRICULAR TACHYCARDIA) (HCC): Primary | ICD-10-CM

## 2020-09-21 DIAGNOSIS — I45.10 INCOMPLETE RIGHT BUNDLE BRANCH BLOCK (RBBB): ICD-10-CM

## 2020-09-21 PROCEDURE — 99214 OFFICE O/P EST MOD 30 MIN: CPT | Performed by: INTERNAL MEDICINE

## 2020-09-21 NOTE — PROGRESS NOTES
Follow-up - Cardiology   Jada Barrientos 67 y o  female MRN: [id number]        Problems    Problem List Items Addressed This Visit     None      Visit Diagnoses     SVT (supraventricular tachycardia) ([address] )    -  Primary    Incomplete right bundle branch block (RBBB)                Plan patient seen September 21, 2020  We had seen in her the end of 2019 for clearance  That is clearance for surgery  She had her better replaced in December of 2019  She has a history of supraventricular tachycardia first-degree AV block  Holter counter recently done shows only 2% PACs no evidence of supraventricular tachycardia  She is on diltiazem  She is asymptomatic  Other issues include Sjogren's disease  Asthma  Hypothyroidism  Mild anemia  Brain stimulator for tremor  Neuropathy  Coumadin for history of DVT in emboli  History colectomy  From a cardiac standpoint she is asymptomatic  Plan just to get another Holter counter in 1 year  HPI: Jada Barrientos is a 67y o  year old female    Plan patient seen after 1 year  Patient had initially been seen for clearance for surgery  His first-degree AV block and a history supraventricular tachycardia  Holter counter recently done shows a 6 run beat run of SVT approximately 400 PVCs and PACs  She is asymptomatic  There is no high-grade block  No change in medication  Her LDL is approximately 100  Her other issues include Sjogren's disease  History of asthma  Hypothyroidism  Mild anemia  Brain stimulator for tremor  All the above seem to be skin stable as well              HPI: Jada Barrientos is a 75y o  year old female History of Present Illness  Cardiology HPI Free Text Note Form [facility]: Ms Tito Herrear is a 66year old woman with HTN, dyslipidemia, and SVT presents for pre-operative risk stratification prior to DBS battery placement  From a cardiac standpoint, she is doing well  No chest pain, shortness of breath, or palpitations   Is on warfarin for portal vein thrombosis  Patient seen 2017  Her list of issues of breath along  She has a history of asthma, Sjogren's, thyroid disorder, post colectomy, chronic anemia, brain stimulator for tremor, recent change of death stimulator, chronic use of Coumadin because of a DVT history of poor embolus history as well as a family history of emboli  Coumadin was recommended by hematology  From my standpoint she is a first-degree AV block and she's had some short episodes of SVT  She is on a low dose until today's exam  Her LDL is 80 is 87 and 92  A1c 5 7  From cardiac standpoint she's basically asymptomatic  We will do a Holter             Review of Systems      Past Medical History:   Diagnosis Date    Anxiety     Arrhythmia     Arthritis     Asthma     Blood clot in vein     portal vein    Breast lump     [id number] RESOLVED    Depression     Disease of thyroid gland     DVT, lower extremity (HCC)     GERD (gastroesophageal reflux disease)     Hyperlipidemia     Hypertension     Hypokalemia     Hyponatremia     Hypothyroidism     Iron deficiency anemia     Irregular heart beat     Manic behavior (HCC)     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HCC)     Osteoarthritis     Palpitations     [id number]  RESOLVED    Paroxysmal supraventricular tachycardia (HCC)     PE (pulmonary thromboembolism) (HCC)     Sjoegren syndrome     Thrombocytosis (HCC)     [id number]  RESOLVED    Tremors of nervous system     dbs implanted right and left chest    Ulcerative colitis (HCC)     Vertigo     [id number] RESOLVED     Social History     Substance and Sexual Activity   Alcohol Use Yes    Frequency: 2-3 times a week    Drinks per session: 1 or 2    Binge frequency: Never     Social History     Substance and Sexual Activity   Drug Use No     Social History     Tobacco Use   Smoking Status Former Smoker    Packs/day: 2 00    Years: 5 00    Pack years: 10 00    Last attempt to quit: 1965    Years since quittin 7   Smokeless Tobacco Never Used   Tobacco Comment    Quit       Allergies:   Allergies   Allergen Reactions    Indocin [Indomethacin] Other (See Comments), Dizziness and GI Intolerance     Reaction Date: 05Apr2012;   Nausea and dizziness    Macrobid [Nitrofurantoin Monohyd Macro] Rash    Penicillins Rash     Annotation - [id number]: can take cephalosporins    Sulfa Antibiotics Rash       Medications:     Current Outpatient Medications:     albuterol (PROVENTIL HFA,VENTOLIN HFA) 90 mcg/act inhaler, Inhale 2 puffs every 6 (six) hours as needed for wheezing, Disp: 1 Inhaler, Rfl: 1    buPROPion (WELLBUTRIN XL) 300 mg 24 hr tablet, Take 1 tablet (300 mg total) by mouth daily With 150 mg, Disp: 90 tablet, Rfl: 1    cetirizine (ZyrTEC) 10 mg tablet, Take 10 mg by mouth daily, Disp: , Rfl:     diltiazem (CARDIZEM CD) 180 mg 24 hr capsule, Take 1 capsule (180 mg total) by mouth daily, Disp: 90 capsule, Rfl: 1    escitalopram (LEXAPRO) 20 mg tablet, TAKE 1 TABLET BY MOUTH EVERY DAY, Disp: 90 tablet, Rfl: 1    fluticasone (FLONASE) 50 mcg/act nasal spray, 1 SPRAY INTO EACH NOSTRIL DAILY AS NEEDED FOR RHINITIS, Disp: 48 mL, Rfl: 1    fluticasone-salmeterol (Advair Diskus) 250-50 mcg/dose inhaler, Inhale 1 puff 2 (two) times a day Rinse mouth after use, Disp: 180 each, Rfl: 1    gabapentin (NEURONTIN) 100 mg capsule, TAKE 2 CAPSULES (200 MG TOTAL) BY MOUTH 2 (TWO) TIMES A DAY, Disp: 360 capsule, Rfl: 1    gabapentin (NEURONTIN) 600 MG tablet, PATIENT TAKES ONE IN AM, ONE AFTERNOON AND 2 AT BEDTIME, Disp: 360 tablet, Rfl: 2    HYDROcodone-acetaminophen (NORCO) 5-325 mg per tablet, Take 1 tablet by mouth every 6 (six) hours as needed for painMax Daily Amount: 4 tablets, Disp: 120 tablet, Rfl: 0    hydroxychloroquine (PLAQUENIL) 200 mg tablet, TAKE 1 TABLET BY MOUTH TWICE A DAY, Disp: 180 tablet, Rfl: 0    levothyroxine 50 mcg tablet, TAKE 1 TABLET BY MOUTH EVERY DAY, Disp: 90 tablet, Rfl: 1    lisinopril (ZESTRIL) 20 mg tablet, TAKE 1 TABLET BY MOUTH TWICE A DAY, Disp: 180 tablet, Rfl: 1    loperamide (IMODIUM) 2 mg capsule, Take 2 mg by mouth 4 (four) times a day as needed  , Disp: , Rfl:     LORazepam (ATIVAN) 1 mg tablet, Take 1 tablet (1 mg total) by mouth every 6 (six) hours as needed for anxiety, Disp: 120 tablet, Rfl: 0    meclizine (ANTIVERT) 25 mg tablet, Take 1 tablet (25 mg total) by mouth every 6 (six) hours as needed for dizziness, Disp: 90 tablet, Rfl: 1    pantoprazole (PROTONIX) 40 mg tablet, Take 1 tablet (40 mg total) by mouth daily, Disp: 90 tablet, Rfl: 1    simvastatin (ZOCOR) 5 MG tablet, Take 1 tablet (5 mg total) by mouth daily, Disp: 90 tablet, Rfl: 1    sodium chloride 1 g tablet, Take 1 tablet (1 g total) by mouth 3 (three) times a day, Disp: 270 tablet, Rfl: 3    torsemide (DEMADEX) 10 mg tablet, Take 1 tablet (10 mg total) by mouth daily, Disp: 90 tablet, Rfl: 1    warfarin (COUMADIN) 5 mg tablet, Take 1-2 tablets daily As directed, Disp: 180 tablet, Rfl: 1    naloxone (NARCAN) 4 mg/0 1 mL nasal spray, Administer 1 spray into a nostril  If breathing does not return to normal or if breathing difficulty resumes after 2-3 minutes, give another dose in the other nostril using a new spray , Disp: 1 each, Rfl: 1      Physical Exam      Laboratory Studies:  CMP:      Invalid input(s): ALBUMIN  NT-proBNP: No results found for: NTBNP   Coags:  Results from last 7 days   Lab Units 09/16/20  1235   INR  2 47*     Lipid Profile:   Lab Results   Component Value Date    CHOL 167 12/17/2015     Lab Results   Component Value Date    HDL 59 08/13/2020     Lab Results   Component Value Date    LDLCALC 88 08/13/2020     Lab Results   Component Value Date    TRIG 59 08/13/2020       Cardiac testing:     EKG reviewed personally:     No results found for this or any previous visit  No results found for this or any previous visit  No results found for this or any previous visit    No results found for this or any previous visit  Pamela Christensen MD    Portions of the record may have been created with voice recognition software   Occasional wrong word or "sound a like" substitutions may have occurred due to the inherent limitations of voice recognition software   Read the chart carefully and recognize, using context, where substitutions have occurred

## 2020-10-02 DIAGNOSIS — M54.16 LUMBAR RADICULOPATHY: ICD-10-CM

## 2020-10-02 RX ORDER — HYDROCODONE BITARTRATE AND ACETAMINOPHEN 5; 325 MG/1; MG/1
1 TABLET ORAL EVERY 6 HOURS PRN
Qty: 120 TABLET | Refills: 0 | Status: SHIPPED | OUTPATIENT
Start: 2020-10-02 | End: 2020-11-13 | Stop reason: SDUPTHER

## 2020-10-08 ENCOUNTER — APPOINTMENT (OUTPATIENT)
Dept: LAB | Facility: CLINIC | Age: 75
End: 2020-10-08
Payer: MEDICARE

## 2020-10-08 ENCOUNTER — ANTICOAG VISIT (OUTPATIENT)
Dept: INTERNAL MEDICINE CLINIC | Facility: CLINIC | Age: 75
End: 2020-10-08

## 2020-10-08 DIAGNOSIS — I81 PORTAL VEIN THROMBOSIS: ICD-10-CM

## 2020-10-10 ENCOUNTER — CLINICAL SUPPORT (OUTPATIENT)
Dept: INTERNAL MEDICINE CLINIC | Facility: CLINIC | Age: 75
End: 2020-10-10
Payer: MEDICARE

## 2020-10-10 DIAGNOSIS — Z23 ENCOUNTER FOR IMMUNIZATION: ICD-10-CM

## 2020-10-10 PROCEDURE — G0008 ADMIN INFLUENZA VIRUS VAC: HCPCS | Performed by: INTERNAL MEDICINE

## 2020-10-10 PROCEDURE — 90662 IIV NO PRSV INCREASED AG IM: CPT | Performed by: INTERNAL MEDICINE

## 2020-10-11 DIAGNOSIS — K21.9 GASTROESOPHAGEAL REFLUX DISEASE: ICD-10-CM

## 2020-10-12 RX ORDER — PANTOPRAZOLE SODIUM 40 MG/1
TABLET, DELAYED RELEASE ORAL
Qty: 90 TABLET | Refills: 1 | Status: SHIPPED | OUTPATIENT
Start: 2020-10-12 | End: 2021-04-12

## 2020-10-17 ENCOUNTER — HOSPITAL ENCOUNTER (EMERGENCY)
Facility: HOSPITAL | Age: 75
Discharge: HOME | End: 2020-10-17
Attending: EMERGENCY MEDICINE | Admitting: EMERGENCY MEDICINE
Payer: MEDICARE

## 2020-10-17 ENCOUNTER — APPOINTMENT (EMERGENCY)
Dept: CT IMAGING | Facility: HOSPITAL | Age: 75
End: 2020-10-17
Payer: MEDICARE

## 2020-10-17 VITALS
WEIGHT: 149.25 LBS | BODY MASS INDEX: 26.44 KG/M2 | SYSTOLIC BLOOD PRESSURE: 171 MMHG | HEART RATE: 64 BPM | OXYGEN SATURATION: 93 % | DIASTOLIC BLOOD PRESSURE: 79 MMHG | RESPIRATION RATE: 18 BRPM | TEMPERATURE: 98.3 F

## 2020-10-17 DIAGNOSIS — S39.011A STRAIN OF ABDOMINAL WALL, INITIAL ENCOUNTER: ICD-10-CM
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
## 2020-10-17 LAB
ALBUMIN SERPL BCP-MCNC: 3.5 G/DL (ref 3.5–5)
ALP SERPL-CCNC: 91 U/L (ref 46–116)
ALT SERPL W P-5'-P-CCNC: 21 U/L (ref 12–78)
ANION GAP SERPL CALCULATED.3IONS-SCNC: 4 MMOL/L (ref 4–13)
AST SERPL W P-5'-P-CCNC: 17 U/L (ref 5–45)
BASOPHILS # BLD AUTO: 0.11 THOUSANDS/ΜL (ref 0–0.1)
BASOPHILS NFR BLD AUTO: 2 % (ref 0–1)
BILIRUB SERPL-MCNC: 0.33 MG/DL (ref 0.2–1)
BUN SERPL-MCNC: 17 MG/DL (ref 5–25)
CALCIUM SERPL-MCNC: 8.9 MG/DL (ref 8.3–10.1)
CHLORIDE SERPL-SCNC: 98 MMOL/L (ref 100–108)
CO2 SERPL-SCNC: 32 MMOL/L (ref 21–32)
CREAT SERPL-MCNC: 0.89 MG/DL (ref 0.6–1.3)
EOSINOPHIL # BLD AUTO: 0.16 THOUSAND/ΜL (ref 0–0.61)
EOSINOPHIL NFR BLD AUTO: 3 % (ref 0–6)
ERYTHROCYTE [DISTWIDTH] IN BLOOD BY AUTOMATED COUNT: 13.9 % (ref 11.6–15.1)
GFR SERPL CREATININE-BSD FRML MDRD: 63 ML/MIN/1.73SQ M
GLUCOSE SERPL-MCNC: 95 MG/DL (ref 65–140)
HCT VFR BLD AUTO: 35.8 % (ref 34.8–46.1)
HGB BLD-MCNC: 11.8 G/DL (ref 11.5–15.4)
IMM GRANULOCYTES # BLD AUTO: 0.01 THOUSAND/UL (ref 0–0.2)
IMM GRANULOCYTES NFR BLD AUTO: 0 % (ref 0–2)
INR PPP: 3.05 (ref 0.84–1.19)
LYMPHOCYTES # BLD AUTO: 2.25 THOUSANDS/ΜL (ref 0.6–4.47)
LYMPHOCYTES NFR BLD AUTO: 38 % (ref 14–44)
MCH RBC QN AUTO: 31.9 PG (ref 26.8–34.3)
MCHC RBC AUTO-ENTMCNC: 33 G/DL (ref 31.4–37.4)
MCV RBC AUTO: 97 FL (ref 82–98)
MONOCYTES # BLD AUTO: 0.98 THOUSAND/ΜL (ref 0.17–1.22)
MONOCYTES NFR BLD AUTO: 17 % (ref 4–12)
NEUTROPHILS # BLD AUTO: 2.42 THOUSANDS/ΜL (ref 1.85–7.62)
NEUTS SEG NFR BLD AUTO: 40 % (ref 43–75)
NRBC BLD AUTO-RTO: 0 /100 WBCS
PLATELET # BLD AUTO: 407 THOUSANDS/UL (ref 149–390)
PMV BLD AUTO: 8.8 FL (ref 8.9–12.7)
POTASSIUM SERPL-SCNC: 4.8 MMOL/L (ref 3.5–5.3)
PROT SERPL-MCNC: 7 G/DL (ref 6.4–8.2)
PROTHROMBIN TIME: 31.6 SECONDS (ref 11.6–14.5)
RBC # BLD AUTO: 3.7 MILLION/UL (ref 3.81–5.12)
SODIUM SERPL-SCNC: 134 MMOL/L (ref 136–145)
WBC # BLD AUTO: 5.93 THOUSAND/UL (ref 4.31–10.16)

## 2020-10-17 PROCEDURE — 70450 CT HEAD/BRAIN W/O DYE: CPT

## 2020-10-17 PROCEDURE — 36415 COLL VENOUS BLD VENIPUNCTURE: CPT | Performed by: EMERGENCY MEDICINE

## 2020-10-17 PROCEDURE — 99284 EMERGENCY DEPT VISIT MOD MDM: CPT

## 2020-10-17 PROCEDURE — 99284 EMERGENCY DEPT VISIT MOD MDM: CPT | Performed by: EMERGENCY MEDICINE

## 2020-10-17 PROCEDURE — G1004 CDSM NDSC: HCPCS

## 2020-10-17 PROCEDURE — 85610 PROTHROMBIN TIME: CPT | Performed by: EMERGENCY MEDICINE

## 2020-10-17 PROCEDURE — 80053 COMPREHEN METABOLIC PANEL: CPT | Performed by: EMERGENCY MEDICINE

## 2020-10-17 PROCEDURE — 74177 CT ABD & PELVIS W/CONTRAST: CPT

## 2020-10-17 PROCEDURE — 85025 COMPLETE CBC W/AUTO DIFF WBC: CPT | Performed by: EMERGENCY MEDICINE

## 2020-10-17 RX ADMIN — IOHEXOL 100 ML: 350 INJECTION, SOLUTION INTRAVENOUS at 12:02

## 2020-10-26 DIAGNOSIS — J45.40 MODERATE PERSISTENT ASTHMA WITHOUT COMPLICATION: ICD-10-CM

## 2020-10-26 DIAGNOSIS — M51.36 LUMBAR DEGENERATIVE DISC DISEASE: Primary | ICD-10-CM

## 2020-10-26 DIAGNOSIS — E87.1 HYPONATREMIA: ICD-10-CM

## 2020-10-26 RX ORDER — METAXALONE 800 MG/1
800 TABLET ORAL 3 TIMES DAILY
Qty: 90 TABLET | Refills: 0 | Status: SHIPPED | OUTPATIENT
Start: 2020-10-26 | End: 2021-10-17 | Stop reason: ALTCHOICE

## 2020-10-26 RX ORDER — SODIUM CHLORIDE 1000 MG
1 TABLET, SOLUBLE MISCELLANEOUS 3 TIMES DAILY
Qty: 270 TABLET | Refills: 1 | Status: SHIPPED | OUTPATIENT
Start: 2020-10-26 | End: 2021-04-12 | Stop reason: SDUPTHER

## 2020-11-06 ENCOUNTER — LAB (OUTPATIENT)
Dept: LAB | Facility: CLINIC | Age: 75
End: 2020-11-06
Payer: MEDICARE

## 2020-11-06 ENCOUNTER — TELEPHONE (OUTPATIENT)
Dept: INTERNAL MEDICINE CLINIC | Facility: CLINIC | Age: 75
End: 2020-11-06

## 2020-11-12 ENCOUNTER — LAB (OUTPATIENT)
Dept: LAB | Facility: CLINIC | Age: 75
End: 2020-11-12
Payer: MEDICARE

## 2020-11-12 DIAGNOSIS — I81 PORTAL VEIN THROMBOSIS: ICD-10-CM

## 2020-11-12 LAB
INR PPP: 2.31 (ref 0.84–1.19)
PROTHROMBIN TIME: 25.5 SECONDS (ref 11.6–14.5)

## 2020-11-12 PROCEDURE — 36415 COLL VENOUS BLD VENIPUNCTURE: CPT

## 2020-11-12 PROCEDURE — 85610 PROTHROMBIN TIME: CPT

## 2020-11-13 ENCOUNTER — ANTICOAG VISIT (OUTPATIENT)
Dept: INTERNAL MEDICINE CLINIC | Facility: CLINIC | Age: 75
End: 2020-11-13

## 2020-11-13 DIAGNOSIS — I81 PORTAL VEIN THROMBOSIS: ICD-10-CM

## 2020-11-13 DIAGNOSIS — M54.16 LUMBAR RADICULOPATHY: ICD-10-CM

## 2020-11-13 RX ORDER — HYDROCODONE BITARTRATE AND ACETAMINOPHEN 5; 325 MG/1; MG/1
1 TABLET ORAL EVERY 6 HOURS PRN
Qty: 120 TABLET | Refills: 0 | Status: SHIPPED | OUTPATIENT
Start: 2020-11-13 | End: 2020-12-23 | Stop reason: SDUPTHER

## 2020-11-17 ENCOUNTER — HOSPITAL ENCOUNTER (OUTPATIENT)
Dept: RADIOLOGY | Facility: MEDICAL CENTER | Age: 75
Discharge: HOME | End: 2020-11-17
Payer: MEDICARE

## 2020-11-17 VITALS — BODY MASS INDEX: 26.4 KG/M2 | HEIGHT: 63 IN | WEIGHT: 149 LBS

## 2020-11-17 DIAGNOSIS — Z12.31 ENCOUNTER FOR SCREENING MAMMOGRAM FOR BREAST CANCER: ICD-10-CM

## 2020-11-17 PROCEDURE — 77063 BREAST TOMOSYNTHESIS BI: CPT

## 2020-11-17 PROCEDURE — 77067 SCR MAMMO BI INCL CAD: CPT

## 2020-11-18 PROBLEM — R92.8 ABNORMAL MAMMOGRAM OF LEFT BREAST: Status: ACTIVE | Noted: 2020-11-18

## 2020-11-20 DIAGNOSIS — F41.9 ANXIETY: ICD-10-CM

## 2020-11-20 RX ORDER — LORAZEPAM 1 MG/1
1 TABLET ORAL EVERY 6 HOURS PRN
Qty: 120 TABLET | Refills: 0 | Status: SHIPPED | OUTPATIENT
Start: 2020-11-20 | End: 2020-11-20 | Stop reason: SDUPTHER

## 2020-11-20 RX ORDER — LORAZEPAM 1 MG/1
1 TABLET ORAL EVERY 6 HOURS PRN
Qty: 120 TABLET | Refills: 0 | Status: SHIPPED | OUTPATIENT
Start: 2020-11-20 | End: 2020-12-23 | Stop reason: SDUPTHER

## 2020-11-25 ENCOUNTER — TELEPHONE (OUTPATIENT)
Dept: MAMMOGRAPHY | Facility: CLINIC | Age: 75
End: 2020-11-25

## 2020-12-01 ENCOUNTER — TELEPHONE (OUTPATIENT)
Dept: MAMMOGRAPHY | Facility: CLINIC | Age: 75
End: 2020-12-01

## 2020-12-02 ENCOUNTER — ANTICOAG VISIT (OUTPATIENT)
Dept: INTERNAL MEDICINE CLINIC | Facility: CLINIC | Age: 75
End: 2020-12-02

## 2020-12-02 ENCOUNTER — LAB (OUTPATIENT)
Dept: LAB | Facility: CLINIC | Age: 75
End: 2020-12-02
Payer: MEDICARE

## 2020-12-02 ENCOUNTER — TELEPHONE (OUTPATIENT)
Dept: INTERNAL MEDICINE CLINIC | Facility: CLINIC | Age: 75
End: 2020-12-02

## 2020-12-02 ENCOUNTER — TRANSCRIBE ORDERS (OUTPATIENT)
Dept: LAB | Facility: CLINIC | Age: 75
End: 2020-12-02

## 2020-12-02 DIAGNOSIS — I81 PORTAL VEIN THROMBOSIS: ICD-10-CM

## 2020-12-02 DIAGNOSIS — M35.06 NEUROPATHY DUE TO SJOGREN'S SYNDROME (HCC): Primary | ICD-10-CM

## 2020-12-02 DIAGNOSIS — G63 NEUROPATHY DUE TO SJOGREN'S SYNDROME (HCC): Primary | ICD-10-CM

## 2020-12-02 DIAGNOSIS — G63 NEUROPATHY DUE TO SJOGREN'S SYNDROME (HCC): ICD-10-CM

## 2020-12-02 DIAGNOSIS — M35.06 NEUROPATHY DUE TO SJOGREN'S SYNDROME (HCC): ICD-10-CM

## 2020-12-02 LAB
CRP SERPL QL: 3.6 MG/L
ERYTHROCYTE [SEDIMENTATION RATE] IN BLOOD: 18 MM/HOUR (ref 0–29)
INR PPP: 6.96 (ref 0.84–1.19)
PROTHROMBIN TIME: 59.9 SECONDS (ref 11.6–14.5)

## 2020-12-02 PROCEDURE — 85610 PROTHROMBIN TIME: CPT

## 2020-12-02 PROCEDURE — 86140 C-REACTIVE PROTEIN: CPT

## 2020-12-02 PROCEDURE — 85652 RBC SED RATE AUTOMATED: CPT

## 2020-12-02 PROCEDURE — 36415 COLL VENOUS BLD VENIPUNCTURE: CPT

## 2020-12-02 PROCEDURE — 86235 NUCLEAR ANTIGEN ANTIBODY: CPT

## 2020-12-03 LAB
ENA SS-A AB SER-ACNC: <0.2 AI (ref 0–0.9)
ENA SS-B AB SER-ACNC: <0.2 AI (ref 0–0.9)

## 2020-12-07 ENCOUNTER — ANTICOAG VISIT (OUTPATIENT)
Dept: INTERNAL MEDICINE CLINIC | Facility: CLINIC | Age: 75
End: 2020-12-07

## 2020-12-07 ENCOUNTER — LAB (OUTPATIENT)
Dept: LAB | Facility: CLINIC | Age: 75
End: 2020-12-07
Payer: MEDICARE

## 2020-12-07 DIAGNOSIS — I81 PORTAL VEIN THROMBOSIS: ICD-10-CM

## 2020-12-07 LAB
INR PPP: 1.23 (ref 0.84–1.19)
PROTHROMBIN TIME: 15.6 SECONDS (ref 11.6–14.5)

## 2020-12-07 PROCEDURE — 85610 PROTHROMBIN TIME: CPT

## 2020-12-07 PROCEDURE — 36415 COLL VENOUS BLD VENIPUNCTURE: CPT

## 2020-12-09 ENCOUNTER — HOSPITAL ENCOUNTER (OUTPATIENT)
Dept: MAMMOGRAPHY | Facility: CLINIC | Age: 75
Discharge: HOME | End: 2020-12-09
Payer: MEDICARE

## 2020-12-09 VITALS — HEIGHT: 63 IN | BODY MASS INDEX: 26.4 KG/M2 | WEIGHT: 149 LBS

## 2020-12-09 DIAGNOSIS — R92.8 ABNORMAL MAMMOGRAM: ICD-10-CM

## 2020-12-09 PROCEDURE — 77065 DX MAMMO INCL CAD UNI: CPT

## 2020-12-09 NOTE — PROGRESS NOTES
Met with patient and Dr Carmen Dyer                  regarding recommendation for;      _____ RIGHT ___x___LEFT      _____Ultrasound guided  ___x___Stereotactic  Breast biopsy  __x___Verbalized understanding  Blood thinners:  __x___yes _____no-WARFARIN    Date stopped: ____n/a_______    Biopsy teaching sheet given:  __x_____yes ______no    Yamila Duckworth has extensive medical history-see Epic Summary  She has history of benign left stereotactic biopsy 11/19  She has had recent INR elevations as high as 6 96 on 12/2/20  Her last INR was 1 23 on 12/7/20  She is for repeat INR on 12/14/20  She is tentatively scheduled 12/30/20 at [facility] but prefers sooner appt at [facility]  I have a call into Aubree at [facility]

## 2020-12-10 ENCOUNTER — TELEPHONE (OUTPATIENT)
Dept: MAMMOGRAPHY | Facility: CLINIC | Age: 75
End: 2020-12-10

## 2020-12-14 ENCOUNTER — LAB (OUTPATIENT)
Dept: LAB | Facility: CLINIC | Age: 75
End: 2020-12-14
Payer: MEDICARE

## 2020-12-14 ENCOUNTER — ANTICOAG VISIT (OUTPATIENT)
Dept: INTERNAL MEDICINE CLINIC | Facility: CLINIC | Age: 75
End: 2020-12-14

## 2020-12-14 DIAGNOSIS — I81 PORTAL VEIN THROMBOSIS: ICD-10-CM

## 2020-12-14 LAB
INR PPP: 1.96 (ref 0.84–1.19)
PROTHROMBIN TIME: 22.4 SECONDS (ref 11.6–14.5)

## 2020-12-14 PROCEDURE — 85610 PROTHROMBIN TIME: CPT

## 2020-12-14 PROCEDURE — 36415 COLL VENOUS BLD VENIPUNCTURE: CPT

## 2020-12-15 DIAGNOSIS — E78.49 OTHER HYPERLIPIDEMIA: ICD-10-CM

## 2020-12-15 RX ORDER — SIMVASTATIN 5 MG
TABLET ORAL
Qty: 90 TABLET | Refills: 1 | Status: SHIPPED | OUTPATIENT
Start: 2020-12-15 | End: 2021-07-06

## 2020-12-17 ENCOUNTER — HOSPITAL ENCOUNTER (OUTPATIENT)
Dept: MAMMOGRAPHY | Facility: CLINIC | Age: 75
Discharge: HOME | End: 2020-12-17
Payer: MEDICARE

## 2020-12-17 ENCOUNTER — HOSPITAL ENCOUNTER (OUTPATIENT)
Dept: MAMMOGRAPHY | Facility: CLINIC | Age: 75
Discharge: HOME | End: 2020-12-17

## 2020-12-17 VITALS — DIASTOLIC BLOOD PRESSURE: 71 MMHG | SYSTOLIC BLOOD PRESSURE: 148 MMHG | HEART RATE: 65 BPM

## 2020-12-17 DIAGNOSIS — Z98.890 STATUS POST BREAST BIOPSY: ICD-10-CM

## 2020-12-17 DIAGNOSIS — I10 ESSENTIAL HYPERTENSION: ICD-10-CM

## 2020-12-17 DIAGNOSIS — R92.8 ABNORMAL MAMMOGRAM: ICD-10-CM

## 2020-12-17 DIAGNOSIS — E87.1 HYPONATREMIA: ICD-10-CM

## 2020-12-17 PROCEDURE — 88305 TISSUE EXAM BY PATHOLOGIST: CPT | Performed by: PATHOLOGY

## 2020-12-17 PROCEDURE — 88361 TUMOR IMMUNOHISTOCHEM/COMPUT: CPT | Performed by: PATHOLOGY

## 2020-12-17 PROCEDURE — 88342 IMHCHEM/IMCYTCHM 1ST ANTB: CPT | Performed by: PATHOLOGY

## 2020-12-17 PROCEDURE — 88341 IMHCHEM/IMCYTCHM EA ADD ANTB: CPT | Performed by: PATHOLOGY

## 2020-12-17 PROCEDURE — 19082 BX BREAST ADD LESION STRTCTC: CPT

## 2020-12-17 PROCEDURE — 19081 BX BREAST 1ST LESION STRTCTC: CPT

## 2020-12-17 RX ORDER — LIDOCAINE HYDROCHLORIDE AND EPINEPHRINE 20; 5 MG/ML; UG/ML
10 INJECTION, SOLUTION EPIDURAL; INFILTRATION; INTRACAUDAL; PERINEURAL ONCE
Status: COMPLETED | OUTPATIENT
Start: 2020-12-17 | End: 2020-12-17

## 2020-12-17 RX ORDER — LIDOCAINE HYDROCHLORIDE 10 MG/ML
5 INJECTION, SOLUTION EPIDURAL; INFILTRATION; INTRACAUDAL; PERINEURAL ONCE
Status: COMPLETED | OUTPATIENT
Start: 2020-12-17 | End: 2020-12-17

## 2020-12-17 RX ORDER — TORSEMIDE 10 MG/1
TABLET ORAL
Qty: 90 TABLET | Refills: 1 | Status: SHIPPED | OUTPATIENT
Start: 2020-12-17 | End: 2021-07-06

## 2020-12-17 RX ADMIN — LIDOCAINE HYDROCHLORIDE 5 ML: 10 INJECTION, SOLUTION EPIDURAL; INFILTRATION; INTRACAUDAL; PERINEURAL at 13:38

## 2020-12-17 RX ADMIN — LIDOCAINE HYDROCHLORIDE AND EPINEPHRINE 10 ML: 20; 5 INJECTION, SOLUTION EPIDURAL; INFILTRATION; INTRACAUDAL; PERINEURAL at 13:38

## 2020-12-17 RX ADMIN — LIDOCAINE HYDROCHLORIDE AND EPINEPHRINE 10 ML: 20; 5 INJECTION, SOLUTION EPIDURAL; INFILTRATION; INTRACAUDAL; PERINEURAL at 14:14

## 2020-12-17 RX ADMIN — LIDOCAINE HYDROCHLORIDE 5 ML: 10 INJECTION, SOLUTION EPIDURAL; INFILTRATION; INTRACAUDAL; PERINEURAL at 14:14

## 2020-12-19 DIAGNOSIS — M35.00 SJOGREN'S SYNDROME, WITH UNSPECIFIED ORGAN INVOLVEMENT (HCC): ICD-10-CM

## 2020-12-20 RX ORDER — HYDROXYCHLOROQUINE SULFATE 200 MG/1
TABLET, FILM COATED ORAL
Qty: 180 TABLET | Refills: 0 | Status: SHIPPED | OUTPATIENT
Start: 2020-12-20 | End: 2021-03-01 | Stop reason: SDUPTHER

## 2020-12-21 ENCOUNTER — TELEPHONE (OUTPATIENT)
Dept: MAMMOGRAPHY | Facility: CLINIC | Age: 75
End: 2020-12-21

## 2020-12-21 ENCOUNTER — TELEPHONE (OUTPATIENT)
Dept: SURGICAL ONCOLOGY | Facility: CLINIC | Age: 75
End: 2020-12-21

## 2020-12-22 ENCOUNTER — LAB (OUTPATIENT)
Dept: LAB | Facility: CLINIC | Age: 75
End: 2020-12-22
Payer: MEDICARE

## 2020-12-22 ENCOUNTER — ANTICOAG VISIT (OUTPATIENT)
Dept: INTERNAL MEDICINE CLINIC | Facility: CLINIC | Age: 75
End: 2020-12-22

## 2020-12-22 DIAGNOSIS — M51.36 LUMBAR DEGENERATIVE DISC DISEASE: ICD-10-CM

## 2020-12-22 DIAGNOSIS — F41.9 ANXIETY: ICD-10-CM

## 2020-12-22 DIAGNOSIS — F33.1 MODERATE EPISODE OF RECURRENT MAJOR DEPRESSIVE DISORDER (HCC): ICD-10-CM

## 2020-12-22 DIAGNOSIS — I81 PORTAL VEIN THROMBOSIS: ICD-10-CM

## 2020-12-22 LAB
INR PPP: 2.55 (ref 0.84–1.19)
PROTHROMBIN TIME: 27.5 SECONDS (ref 11.6–14.5)

## 2020-12-22 PROCEDURE — 36415 COLL VENOUS BLD VENIPUNCTURE: CPT

## 2020-12-22 PROCEDURE — 85610 PROTHROMBIN TIME: CPT

## 2020-12-22 RX ORDER — ESCITALOPRAM OXALATE 20 MG/1
TABLET ORAL
Qty: 90 TABLET | Refills: 1 | Status: SHIPPED | OUTPATIENT
Start: 2020-12-22 | End: 2021-07-06

## 2020-12-22 RX ORDER — BUPROPION HYDROCHLORIDE 300 MG/1
300 TABLET ORAL DAILY
Qty: 90 TABLET | Refills: 1 | Status: SHIPPED | OUTPATIENT
Start: 2020-12-22 | End: 2021-03-01 | Stop reason: SDUPTHER

## 2020-12-23 ENCOUNTER — TELEMEDICINE (OUTPATIENT)
Dept: INTERNAL MEDICINE CLINIC | Facility: CLINIC | Age: 75
End: 2020-12-23
Payer: MEDICARE

## 2020-12-23 DIAGNOSIS — F33.1 MODERATE EPISODE OF RECURRENT MAJOR DEPRESSIVE DISORDER (HCC): ICD-10-CM

## 2020-12-23 DIAGNOSIS — F41.9 ANXIETY: ICD-10-CM

## 2020-12-23 DIAGNOSIS — R73.03 PREDIABETES: ICD-10-CM

## 2020-12-23 DIAGNOSIS — E03.9 ACQUIRED HYPOTHYROIDISM: ICD-10-CM

## 2020-12-23 DIAGNOSIS — J45.20 MILD INTERMITTENT ASTHMA WITHOUT COMPLICATION: ICD-10-CM

## 2020-12-23 DIAGNOSIS — I10 ESSENTIAL HYPERTENSION: ICD-10-CM

## 2020-12-23 DIAGNOSIS — M85.89 OSTEOPENIA OF MULTIPLE SITES: ICD-10-CM

## 2020-12-23 DIAGNOSIS — G25.0 ESSENTIAL TREMOR: ICD-10-CM

## 2020-12-23 DIAGNOSIS — E55.9 VITAMIN D DEFICIENCY: ICD-10-CM

## 2020-12-23 DIAGNOSIS — M51.36 LUMBAR DEGENERATIVE DISC DISEASE: ICD-10-CM

## 2020-12-23 DIAGNOSIS — E53.8 VITAMIN B12 DEFICIENCY: ICD-10-CM

## 2020-12-23 DIAGNOSIS — M35.00 SJOGREN'S SYNDROME, WITH UNSPECIFIED ORGAN INVOLVEMENT (HCC): ICD-10-CM

## 2020-12-23 DIAGNOSIS — E22.2 SIADH (SYNDROME OF INAPPROPRIATE ADH PRODUCTION) (HCC): ICD-10-CM

## 2020-12-23 DIAGNOSIS — M54.16 LUMBAR RADICULOPATHY: ICD-10-CM

## 2020-12-23 DIAGNOSIS — C50.912 MALIGNANT NEOPLASM OF LEFT FEMALE BREAST, UNSPECIFIED ESTROGEN RECEPTOR STATUS, UNSPECIFIED SITE OF BREAST (HCC): Primary | ICD-10-CM

## 2020-12-23 DIAGNOSIS — K21.9 GASTROESOPHAGEAL REFLUX DISEASE, UNSPECIFIED WHETHER ESOPHAGITIS PRESENT: ICD-10-CM

## 2020-12-23 DIAGNOSIS — R26.81 UNSTEADY GAIT: ICD-10-CM

## 2020-12-23 DIAGNOSIS — E83.42 HYPOMAGNESEMIA: ICD-10-CM

## 2020-12-23 DIAGNOSIS — E78.49 OTHER HYPERLIPIDEMIA: ICD-10-CM

## 2020-12-23 DIAGNOSIS — I81 PORTAL VEIN THROMBOSIS: ICD-10-CM

## 2020-12-23 PROBLEM — C50.919 BREAST CANCER (HCC): Status: ACTIVE | Noted: 2020-12-23

## 2020-12-23 PROBLEM — R92.8 ABNORMAL MAMMOGRAM OF LEFT BREAST: Status: RESOLVED | Noted: 2020-11-18 | Resolved: 2020-12-23

## 2020-12-23 PROCEDURE — 99214 OFFICE O/P EST MOD 30 MIN: CPT | Performed by: INTERNAL MEDICINE

## 2020-12-23 RX ORDER — GABAPENTIN 600 MG/1
TABLET ORAL
Qty: 360 TABLET | Refills: 2 | Status: SHIPPED | OUTPATIENT
Start: 2020-12-23 | End: 2021-04-09 | Stop reason: SDUPTHER

## 2020-12-23 RX ORDER — HYDROCODONE BITARTRATE AND ACETAMINOPHEN 5; 325 MG/1; MG/1
1 TABLET ORAL EVERY 6 HOURS PRN
Qty: 120 TABLET | Refills: 0 | Status: SHIPPED | OUTPATIENT
Start: 2020-12-23 | End: 2021-01-27 | Stop reason: SDUPTHER

## 2020-12-23 RX ORDER — LORAZEPAM 1 MG/1
1 TABLET ORAL EVERY 6 HOURS PRN
Qty: 120 TABLET | Refills: 0 | Status: SHIPPED | OUTPATIENT
Start: 2020-12-23 | End: 2021-01-27 | Stop reason: SDUPTHER

## 2020-12-28 ENCOUNTER — PROCEDURE VISIT (OUTPATIENT)
Dept: NEUROLOGY | Facility: CLINIC | Age: 75
End: 2020-12-28
Payer: MEDICARE

## 2020-12-28 VITALS
HEART RATE: 74 BPM | WEIGHT: 146.2 LBS | SYSTOLIC BLOOD PRESSURE: 132 MMHG | DIASTOLIC BLOOD PRESSURE: 68 MMHG | BODY MASS INDEX: 25.9 KG/M2

## 2020-12-28 DIAGNOSIS — G25.0 ESSENTIAL TREMOR: Primary | ICD-10-CM

## 2020-12-28 DIAGNOSIS — Z96.89 S/P DEEP BRAIN STIMULATOR PLACEMENT: ICD-10-CM

## 2020-12-28 DIAGNOSIS — G62.9 PERIPHERAL POLYNEUROPATHY: ICD-10-CM

## 2020-12-28 PROCEDURE — 95983 ALYS BRN NPGT PRGRMG 15 MIN: CPT | Performed by: PSYCHIATRY & NEUROLOGY

## 2020-12-28 PROCEDURE — 99212 OFFICE O/P EST SF 10 MIN: CPT | Performed by: PSYCHIATRY & NEUROLOGY

## 2020-12-28 PROCEDURE — 95984 ALYS BRN NPGT PRGRMG ADDL 15: CPT | Performed by: PSYCHIATRY & NEUROLOGY

## 2020-12-30 ENCOUNTER — HOSPITAL ENCOUNTER (OUTPATIENT)
Dept: MAMMOGRAPHY | Facility: CLINIC | Age: 75
Discharge: HOME | End: 2020-12-30

## 2020-12-30 ENCOUNTER — TELEPHONE (OUTPATIENT)
Dept: SURGICAL ONCOLOGY | Facility: CLINIC | Age: 75
End: 2020-12-30

## 2021-01-05 ENCOUNTER — TELEPHONE (OUTPATIENT)
Dept: SURGICAL ONCOLOGY | Facility: CLINIC | Age: 76
End: 2021-01-05

## 2021-01-06 PROBLEM — D05.12 DUCTAL CARCINOMA IN SITU (DCIS) OF LEFT BREAST: Status: ACTIVE | Noted: 2020-12-23

## 2021-01-07 ENCOUNTER — TRANSCRIBE ORDERS (OUTPATIENT)
Dept: LAB | Facility: CLINIC | Age: 76
End: 2021-01-07

## 2021-01-07 ENCOUNTER — CONSULT (OUTPATIENT)
Dept: SURGICAL ONCOLOGY | Facility: CLINIC | Age: 76
End: 2021-01-07
Payer: MEDICARE

## 2021-01-07 ENCOUNTER — APPOINTMENT (OUTPATIENT)
Dept: LAB | Facility: CLINIC | Age: 76
End: 2021-01-07
Payer: MEDICARE

## 2021-01-07 VITALS
WEIGHT: 144 LBS | HEART RATE: 68 BPM | BODY MASS INDEX: 25.52 KG/M2 | TEMPERATURE: 97.4 F | SYSTOLIC BLOOD PRESSURE: 120 MMHG | HEIGHT: 63 IN | RESPIRATION RATE: 14 BRPM | DIASTOLIC BLOOD PRESSURE: 78 MMHG

## 2021-01-07 DIAGNOSIS — D05.12 INTRADUCTAL CARCINOMA IN SITU OF LEFT BREAST: ICD-10-CM

## 2021-01-07 DIAGNOSIS — Z17.1 ESTROGEN RECEPTOR NEGATIVE STATUS (ER-): ICD-10-CM

## 2021-01-07 DIAGNOSIS — D05.12 INTRADUCTAL CARCINOMA IN SITU OF LEFT BREAST: Primary | ICD-10-CM

## 2021-01-07 DIAGNOSIS — Z01.818 PREOPERATIVE TESTING: ICD-10-CM

## 2021-01-07 DIAGNOSIS — D05.12 DUCTAL CARCINOMA IN SITU (DCIS) OF LEFT BREAST: Primary | ICD-10-CM

## 2021-01-07 DIAGNOSIS — D05.12 DUCTAL CARCINOMA IN SITU (DCIS) OF LEFT BREAST: ICD-10-CM

## 2021-01-07 LAB
ALBUMIN SERPL BCP-MCNC: 3.9 G/DL (ref 3.5–5)
ALP SERPL-CCNC: 93 U/L (ref 46–116)
ALT SERPL W P-5'-P-CCNC: 22 U/L (ref 12–78)
ANION GAP SERPL CALCULATED.3IONS-SCNC: 6 MMOL/L (ref 4–13)
AST SERPL W P-5'-P-CCNC: 25 U/L (ref 5–45)
BASOPHILS # BLD AUTO: 0.08 THOUSANDS/ΜL (ref 0–0.1)
BASOPHILS NFR BLD AUTO: 1 % (ref 0–1)
BILIRUB SERPL-MCNC: 0.44 MG/DL (ref 0.2–1)
BUN SERPL-MCNC: 13 MG/DL (ref 5–25)
CALCIUM SERPL-MCNC: 9.2 MG/DL (ref 8.3–10.1)
CHLORIDE SERPL-SCNC: 101 MMOL/L (ref 100–108)
CO2 SERPL-SCNC: 30 MMOL/L (ref 21–32)
CREAT SERPL-MCNC: 0.76 MG/DL (ref 0.6–1.3)
EOSINOPHIL # BLD AUTO: 0.2 THOUSAND/ΜL (ref 0–0.61)
EOSINOPHIL NFR BLD AUTO: 3 % (ref 0–6)
ERYTHROCYTE [DISTWIDTH] IN BLOOD BY AUTOMATED COUNT: 14.9 % (ref 11.6–15.1)
GFR SERPL CREATININE-BSD FRML MDRD: 76 ML/MIN/1.73SQ M
GLUCOSE SERPL-MCNC: 101 MG/DL (ref 65–140)
HCT VFR BLD AUTO: 39.7 % (ref 34.8–46.1)
HGB BLD-MCNC: 12.9 G/DL (ref 11.5–15.4)
IMM GRANULOCYTES # BLD AUTO: 0.02 THOUSAND/UL (ref 0–0.2)
IMM GRANULOCYTES NFR BLD AUTO: 0 % (ref 0–2)
LYMPHOCYTES # BLD AUTO: 1.99 THOUSANDS/ΜL (ref 0.6–4.47)
LYMPHOCYTES NFR BLD AUTO: 34 % (ref 14–44)
MCH RBC QN AUTO: 31.4 PG (ref 26.8–34.3)
MCHC RBC AUTO-ENTMCNC: 32.5 G/DL (ref 31.4–37.4)
MCV RBC AUTO: 97 FL (ref 82–98)
MISCELLANEOUS LAB TEST RESULT: NORMAL
MONOCYTES # BLD AUTO: 0.84 THOUSAND/ΜL (ref 0.17–1.22)
MONOCYTES NFR BLD AUTO: 14 % (ref 4–12)
NEUTROPHILS # BLD AUTO: 2.81 THOUSANDS/ΜL (ref 1.85–7.62)
NEUTS SEG NFR BLD AUTO: 48 % (ref 43–75)
NRBC BLD AUTO-RTO: 0 /100 WBCS
PLATELET # BLD AUTO: 452 THOUSANDS/UL (ref 149–390)
PMV BLD AUTO: 8.9 FL (ref 8.9–12.7)
POTASSIUM SERPL-SCNC: 4.6 MMOL/L (ref 3.5–5.3)
PROT SERPL-MCNC: 7.4 G/DL (ref 6.4–8.2)
RBC # BLD AUTO: 4.11 MILLION/UL (ref 3.81–5.12)
SODIUM SERPL-SCNC: 137 MMOL/L (ref 136–145)
WBC # BLD AUTO: 5.94 THOUSAND/UL (ref 4.31–10.16)

## 2021-01-07 PROCEDURE — 80053 COMPREHEN METABOLIC PANEL: CPT

## 2021-01-07 PROCEDURE — 36415 COLL VENOUS BLD VENIPUNCTURE: CPT

## 2021-01-07 PROCEDURE — 85025 COMPLETE CBC W/AUTO DIFF WBC: CPT

## 2021-01-07 PROCEDURE — 99205 OFFICE O/P NEW HI 60 MIN: CPT | Performed by: SURGERY

## 2021-01-07 NOTE — PROGRESS NOTES
Surgical Oncology Consult Note       [facility]  [facility] [place]  [address] [facility] [address]  [place] [address]    Aiden Goodr  1945  [id number]  [address] [facility]  [facility] [place]  [address] [address]      Chief Complaint:   No chief complaint on file  Assessment and Plan:   Assessment/Plan   Patient presents with a new diagnosis of left breast ductal carcinoma in situ cover an area of approximately 4 5 cm with micro invasion  She has bilateral neuro modulators  After considerable discussion we ultimately elected to perform a mastectomy (in an effort to minimize the need for radiation therapy with her neuromodulator)  We would also perform a sentinel lymph nodes and she has known micro invasion  The micro invasion is deemed to be ER negative, MA negative and her 2 positive  Patient is also interested in having genetic testing for her children's information  We will try to coordinate that today  Oncology History:     Oncology History   Ductal carcinoma in situ (DCIS) of left breast   12/17/2020 Biopsy    Left Breast stereotactic biopsy:  A  4 o'clock posterior  -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  -Grade 3  -ER 0  -MA 0  -HER2 3+    B/C  5 o'clock anterior  -Ductal carcinoma in situ  -Grade 3  -ER 0  -MA 0    Concordant  Malignancy appears unifocal  Calcifications span 4 1 cm  Both clips migrated (see comments in original biopsy report)  No recent US of left axilla  Right breast clear  History of Present Illness: This is a 78-year-old woman who has tremors particularly in her left hand has had bilateral neuromodulator 8 years placed  She went for a screening mammogram which demonstrated no abnormalities on the right side however on the left side she had microcalcifications extending over approximately 4 1 cm area    The posterior and anterior regions were biopsied in both shown to be ductal carcinoma in situ  The posterior area had micro invasion  The posterior area was grade 3 ER negative VT negative and HER2 3+/positive  She had a bow tie and a triple twist clip placed posteriorly and anteriorly respectively  They have both substantially migrated  The patient has no family history of breast cancer  We regionally discussed performing a bracketed lumpectomy however with her neuro modulators in the possibility of needing radiation therapy we ultimately agreed that a mastectomy would be more simple for her (she also complained that she has had multiple problems with the left side and has no objections to a mastectomy)  We subsequent through the process of informed consent for that  Review of Systems:   Review of Systems   Constitutional: Negative for activity change, appetite change, chills, fatigue and fever  HENT: Negative  Negative for ear pain and sore throat  Eyes: Negative  Negative for pain and visual disturbance  Respiratory: Negative for cough, shortness of breath and wheezing  Cardiovascular: Negative for chest pain, palpitations and leg swelling  Gastrointestinal: Negative  Negative for abdominal pain and vomiting  Endocrine: Negative  Genitourinary: Negative  Negative for dysuria and hematuria  Musculoskeletal: Negative for arthralgias and back pain  No new changes or complaints of bone pain   Skin: Negative  Negative for color change and rash  Allergic/Immunologic: Negative  Neurological: Negative  Negative for seizures and syncope  Bilateral neural modulators for tremors mostly in the left hand  Hematological: Negative  Psychiatric/Behavioral: Negative  All other systems reviewed and are negative        Past Medical History:      Patient Active Problem List   Diagnosis    Portal vein thrombosis    Anxiety    Moderate episode of recurrent major depressive disorder ([facility] [address] )    Essential tremor    Hyperlipidemia    Essential hypertension    Hypomagnesemia    SIADH (syndrome of inappropriate ADH production) (HCC)    Acquired hypothyroidism    Insomnia    Iron deficiency anemia    Prediabetes    Peripheral neuropathy    Osteopenia    Osteoarthritis of right knee    Ulcerative colitis without complications (HCC)    Allergic rhinitis    GERD (gastroesophageal reflux disease)    Anemia    Mild intermittent asthma without complication    Diarrhea    Sjogren's syndrome (HCC)    Chronic salpingo-oophoritis    Overweight    Spinal stenosis    Supraventricular tachycardia (HCC)    Unsteady gait    Lower extremity pain, left    Lumbar degenerative disc disease    Encounter for screening mammogram for breast cancer    Encounter for long-term (current) use of medications    S/P deep brain stimulator placement    Vitamin B12 deficiency    Vitamin D deficiency    Ductal carcinoma in situ (DCIS) of left breast        Past Medical History:   Diagnosis Date    Anxiety     Arrhythmia     Arthritis     Asthma     Blood clot in vein     portal vein    Breast lump     [id number] RESOLVED    Depression     Disease of thyroid gland     DVT, lower extremity (HCC)     GERD (gastroesophageal reflux disease)     Hyperlipidemia     Hypertension     Hypokalemia     Hyponatremia     Hypothyroidism     Iron deficiency anemia     Irregular heart beat     Manic behavior (HCC)     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HCC)     Osteoarthritis     Palpitations     [id number]  RESOLVED    Paroxysmal supraventricular tachycardia (HCC)     PE (pulmonary thromboembolism) (HCC)     Sjoegren syndrome     Thrombocytosis (HCC)     [id number]  RESOLVED    Tremors of nervous system     dbs implanted right and left chest    Ulcerative colitis ([address] )     Vertigo     [id number] RESOLVED        Past Surgical History:   Procedure Laterality Date    ABDOMINAL SURGERY      APPENDECTOMY      BREAST BIOPSY Left 11/07/2019    Stereo    BREAST EXCISIONAL BIOPSY Left     x many years    BREAST SURGERY      lumpectomy & biopsy    CARMELLA HOLE W/ STEREOTACTIC INSERTION OF DBS LEADS / INTRAOP MICROELECTRODE RECORDING      COLON SURGERY      COLONOSCOPY N/A 8/30/2017    Procedure: Cristiano Goncalves;  Surgeon: Mali Almaguer MD;  Location: [facility];   Service: Colorectal    COLOPROCTECTOMY W/ ILEO J POUCH      ESOPHAGOGASTRODUODENOSCOPY      ONSET 10/17/11    FISTULA REPAIR      LLEOANAL FISTULA REPAIR TRANSPERIN TRANSABD APPROACH    HYSTERECTOMY      age 43    ILEOSTOMY CLOSURE      KNEE ARTHROSCOPY      Right    MAMMO STEREOTACTIC BREAST BIOPSY LEFT (ALL INC) Left 11/7/2019    MAMMO STEREOTACTIC BREAST BIOPSY LEFT (ALL INC) Left 12/17/2020    MAMMO STEREOTACTIC BREAST BIOPSY LEFT (ALL INC) EACH ADD Left 12/17/2020    TN IMP STIM,CRANIAL,SUBQ,1 ARRAY Right 6/20/2017    Procedure: DBS GENERATOR REPLACEMENT;  Surgeon: Kieran Oates MD;  [facility];  Service: Neurosurgery    TN IMP STIM,CRANIAL,SUBQ,1 ARRAY N/A 12/4/2019    Procedure: REPLACEMENT IMPLANTABLE PULSE GENERATOR FOR DEEP BRAIN STIMULATOR LEFT CHEST;  Surgeon: Soniya Hernandez MD;  [facility];  Service: Neurosurgery    SPLENECTOMY          Family History   Problem Relation Age of Onset    Venous thrombosis Mother         ACUTE VENOUS THROMBOSIS OF DEEP VESSELS OF THE DISTAL LOWER EXTREMITY    Other Mother         PHLEBITIS    Hypertension Mother     Peripheral vascular disease Mother     COPD Father     Diabetes Father         MELLITUS    Stroke Father     Diabetes Sister         MELLITUS    Sjogren's syndrome Sister     No Known Problems Daughter     No Known Problems Maternal Grandmother     No Known Problems Maternal Grandfather     No Known Problems Paternal Grandmother     No Known Problems Paternal Grandfather     No Known Problems Sister     No Known Problems Maternal Aunt     No Known Problems Paternal Aunt     No Known Problems Son     Alcohol abuse Neg Hx     Depression Neg Hx     Drug abuse Neg Hx     Substance Abuse Neg Hx     Mental illness Neg Hx         Social History     Socioeconomic History    Marital status:      Spouse name: Not on file    Number of children: Not on file    Years of education: EDUCATION LEVEL 10TH GRADE    Highest education level: Not on file   Occupational History    Occupation: RETIRED   Social Needs    Financial resource strain: Not on file    Food insecurity     Worry: Not on file     Inability: Not on file   [employer] needs     Medical: Not on file     Non-medical: Not on file   Tobacco Use    Smoking status: Former Smoker     Packs/day: 2 00     Years: 5 00     Pack years: 10 00     Quit date:      Years since quittin 0    Smokeless tobacco: Never Used    Tobacco comment: Quit   Substance and Sexual Activity    Alcohol use: Yes     Frequency: 2-3 times a week     Drinks per session: 1 or 2     Binge frequency: Never    Drug use: No    Sexual activity: Yes     Partners: Male     Birth control/protection: Post-menopausal   Lifestyle    Physical activity     Days per week: Not on file     Minutes per session: Not on file    Stress: Not on file   Relationships    Social connections     Talks on phone: Not on file     Gets together: Not on file     Attends [employer] service: Not on file     Active member of club or organization: Not on file     Attends meetings of clubs or organizations: Not on file     Relationship status: Not on file    Intimate partner violence     Fear of current or ex partner: Not on file     Emotionally abused: Not on file     Physically abused: Not on file     Forced sexual activity: Not on file   Other Topics Concern    Not on file   Social History Narrative    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[place], MODERATE    CAFFEINE USE, DRINKS CAFEINATED TEA, DRINKS [address] [place] LIVES WITH ADULT CHILDREN        Current Outpatient Medications:     albuterol (PROVENTIL HFA,VENTOLIN HFA) 90 mcg/act inhaler, Inhale 2 puffs every 6 (six) hours as needed for wheezing, Disp: 1 Inhaler, Rfl: 1    buPROPion (WELLBUTRIN XL) 300 mg 24 hr tablet, TAKE 1 TABLET (300 MG TOTAL) BY MOUTH DAILY WITH 150 MG, Disp: 90 tablet, Rfl: 1    cetirizine (ZyrTEC) 10 mg tablet, Take 10 mg by mouth daily, Disp: , Rfl:     diltiazem (CARDIZEM CD) 180 mg 24 hr capsule, Take 1 capsule (180 mg total) by mouth daily, Disp: 90 capsule, Rfl: 1    escitalopram (LEXAPRO) 20 mg tablet, TAKE 1 TABLET BY MOUTH EVERY DAY, Disp: 90 tablet, Rfl: 1    fluticasone (FLONASE) 50 mcg/act nasal spray, 1 SPRAY INTO EACH NOSTRIL DAILY AS NEEDED FOR RHINITIS, Disp: 48 mL, Rfl: 1    fluticasone-salmeterol (Advair Diskus) 250-50 mcg/dose inhaler, Inhale 1 puff 2 (two) times a day Rinse mouth after use, Disp: 180 each, Rfl: 1    gabapentin (NEURONTIN) 100 mg capsule, TAKE 2 CAPSULES (200 MG TOTAL) BY MOUTH 2 (TWO) TIMES A DAY, Disp: 360 capsule, Rfl: 1    gabapentin (NEURONTIN) 600 MG tablet, TAKE ONE TABLET IN THE MORNING, ONE TABLET IN THE AFTERNOON, AND 2 TABLETS AT BEDTIME, Disp: 360 tablet, Rfl: 2    HYDROcodone-acetaminophen (NORCO) 5-325 mg per tablet, Take 1 tablet by mouth every 6 (six) hours as needed for painMax Daily Amount: 4 tablets, Disp: 120 tablet, Rfl: 0    hydroxychloroquine (PLAQUENIL) 200 mg tablet, TAKE 1 TABLET BY MOUTH TWICE A DAY (Patient taking differently: 200 mg daily with breakfast ), Disp: 180 tablet, Rfl: 0    levothyroxine 50 mcg tablet, TAKE 1 TABLET BY MOUTH EVERY DAY, Disp: 90 tablet, Rfl: 1    lisinopril (ZESTRIL) 20 mg tablet, TAKE 1 TABLET BY MOUTH TWICE A DAY, Disp: 180 tablet, Rfl: 1    loperamide (IMODIUM) 2 mg capsule, Take 2 mg by mouth 4 (four) times a day as needed  , Disp: , Rfl:     LORazepam (ATIVAN) 1 mg tablet, Take 1 tablet (1 mg total) by mouth every 6 (six) hours as needed for anxiety, Disp: 120 tablet, Rfl: 0    meclizine (ANTIVERT) 25 mg tablet, Take 1 tablet (25 mg total) by mouth every 6 (six) hours as needed for dizziness, Disp: 90 tablet, Rfl: 1    pantoprazole (PROTONIX) 40 mg tablet, TAKE 1 TABLET BY MOUTH EVERY DAY, Disp: 90 tablet, Rfl: 1    simvastatin (ZOCOR) 5 MG tablet, TAKE 1 TABLET BY MOUTH EVERY DAY, Disp: 90 tablet, Rfl: 1    sodium chloride 1 g tablet, TAKE 1 TABLET (1 G TOTAL) BY MOUTH 3 (THREE) TIMES A DAY, Disp: 270 tablet, Rfl: 1    torsemide (DEMADEX) 10 mg tablet, TAKE 1 TABLET BY MOUTH EVERY DAY (Patient taking differently: As needed), Disp: 90 tablet, Rfl: 1    warfarin (COUMADIN) 5 mg tablet, Take 1-2 tablets daily As directed, Disp: 180 tablet, Rfl: 1    metaxalone (SKELAXIN) 800 mg tablet, Take 1 tablet (800 mg total) by mouth 3 (three) times a day (Patient not taking: Reported on 1/7/2021), Disp: 90 tablet, Rfl: 0    naloxone (NARCAN) 4 mg/0 1 mL nasal spray, Administer 1 spray into a nostril  If breathing does not return to normal or if breathing difficulty resumes after 2-3 minutes, give another dose in the other nostril using a new spray  (Patient not taking: Reported on 12/28/2020), Disp: 1 each, Rfl: 1     Allergies   Allergen Reactions    Indocin [Indomethacin] Other (See Comments), Dizziness and GI Intolerance     Reaction Date: [id number];   Nausea and dizziness    Macrobid [Nitrofurantoin Monohyd Macro] Rash    Penicillins Rash     Annotation - [id number]: can take cephalosporins    Sulfa Antibiotics Rash       Physical Exam:     Vitals:    01/07/21 0800   BP: 120/78   Pulse: 68   Resp: 14   Temp: (!) 97 4 °F (36 3 °C)     Physical Exam  Vitals signs reviewed  Constitutional:       Appearance: She is well-developed  HENT:      Head: Normocephalic and atraumatic  Eyes:      Pupils: Pupils are equal, round, and reactive to light  Neck:      Musculoskeletal: Normal range of motion and neck supple  Thyroid: No thyromegaly  Vascular: No JVD  Trachea: No tracheal deviation  Cardiovascular:      Rate and Rhythm: Normal rate and regular rhythm  Heart sounds: Normal heart sounds  No murmur  No friction rub  No gallop  Pulmonary:      Effort: Pulmonary effort is normal  No respiratory distress  Breath sounds: Normal breath sounds  No wheezing or rales  Chest:          Comments: Examination the right breast demonstrates no worrisome skin findings dominant masses or axillary adenopathy  She has an her modular unit as outlined  Examination of the right breast demonstrates a large ecchymosis area in the lower outer quadrant  She was on her Coumadin at the time of the biopsies  Otherwise there are no masses within the breast   There is no axillary or paraclavicular adenopathy  She also has a neuromodulator  Abdominal:      General: There is no distension  Palpations: Abdomen is soft  There is no hepatomegaly or mass  Tenderness: There is no abdominal tenderness  There is no guarding or rebound  Musculoskeletal: Normal range of motion  General: No tenderness  Lymphadenopathy:      Cervical: No cervical adenopathy  Skin:     General: Skin is warm and dry  Findings: No erythema or rash  Neurological:      Mental Status: She is alert and oriented to person, place, and time  Cranial Nerves: No cranial nerve deficit  Psychiatric:         Behavior: Behavior normal          Results:   I reviewed her mammograms pre and post biopsy  Her calcifications cover an area of approximately 4 cm  This could be encompassed with a large lumpectomy  However as outlined above we have elected to perform a mastectomy in should have widely negative margins  We would also do this in conjunction with a sentinel lymph node biopsy because of the micro invasion  Discussion/Summary: This is a stage 0 or stage I cancer based on final pathology  She is clinically node negative    The patient is a good candidate for breast conservation therapy or mastectomy  Given possible complications with interactions of radiation therapy and the neuromodulator we have elected to perform a mastectomy patient is more comfortable with this approach  We went through the process of informed consent for this  The patient and I underwent the process of consent for left breast mastectomy, intraoperative lymphatic mapping with blue and radioactive dye, sentinel lymph node biopsy and possible axillary dissection  The complications outlined on the consent including relatively minor problems (wound infection, wound healing problems, hematomas, scarring, chronic discomfort/pain), moderate problems (injury to nerves or blood vessels, allergic reactions) and major complications (extensive blood loss requiring transfusions or possible addtional surgeries, cardiac arrest, stroke and possible death)  We also reviewed specific complications as outlined on the consent form including possible cancer recurrence, postmastectomy pain syndrome, arm swelling, possible adjuvant therapies  All questions were answered to the patient's satisfaction  We will coordinate the surgery at our next mutual convience  Advance Care Planning/Advance Directives:  I discussed the disease status, treatment plans and follow-up with the patient

## 2021-01-07 NOTE — PATIENT INSTRUCTIONS
Pre-Surgery Instructions:   Medication Instructions    albuterol (PROVENTIL HFA,VENTOLIN HFA) 90 mcg/act inhaler Take morning of surgery as needed    buPROPion (WELLBUTRIN XL) 300 mg 24 hr tablet Take morning of surgery    cetirizine (ZyrTEC) 10 mg tablet Stop taking 1 days prior to surgery    diltiazem (CARDIZEM CD) 180 mg 24 hr capsule Take morning of surgery    escitalopram (LEXAPRO) 20 mg tablet Take morning of surgery    fluticasone (FLONASE) 50 mcg/act nasal spray Take morning of surgery as needed    fluticasone-salmeterol (Advair Diskus) 250-50 mcg/dose inhaler Take morning of surgery    gabapentin (NEURONTIN) 100 mg capsule Take morning of surgery    gabapentin (NEURONTIN) 600 MG tablet Take morning of surgery    HYDROcodone-acetaminophen (NORCO) 5-325 mg per tablet Stop taking 1 days prior to surgery    hydroxychloroquine (PLAQUENIL) 200 mg tablet Take morning of surgery    levothyroxine 50 mcg tablet Take morning of surgery    lisinopril (ZESTRIL) 20 mg tablet Stop taking 1 days prior to surgery    loperamide (IMODIUM) 2 mg capsule Stop taking 1 days prior to surgery    LORazepam (ATIVAN) 1 mg tablet Take morning of surgery as needed    meclizine (ANTIVERT) 25 mg tablet Stop taking 1 days prior to surgery    pantoprazole (PROTONIX) 40 mg tablet Take morning of surgery    simvastatin (ZOCOR) 5 MG tablet Take morning of surgery    sodium chloride 1 g tablet Stop taking 1 days prior to surgery    torsemide (DEMADEX) 10 mg tablet Stop taking 1 days prior to surgery    warfarin (COUMADIN) 5 mg  Tablet  PLEASE FOLLOW PCP GUIDELINES  PLEASE FOLLOW PCP GUIDELINES         Mastectomy   AMBULATORY CARE:   What you need to know about a mastectomy:  A mastectomy is surgery to remove all or part of one or both breasts  Tissue, lymph nodes, or muscle near the breast may also be removed  A mastectomy may be done to treat breast cancer or prevent the cancer from spreading   The type of mastectomy used depends on the size of the tumor and if the cancer has spread  A mastectomy can also be done to prevent breast cancer  This may be a choice if you are at high risk for breast cancer  How to prepare for a mastectomy:   · Your surgeon will talk to you about how to prepare for surgery  He or she may tell you not to eat or drink anything after midnight on the day before your surgery  Arrange for someone to drive you home and stay with you after surgery  This person can help care for you and watch for complications from surgery  · Tell your surgeon about all medicines you currently take  He or she will tell you if you need to stop any medicine for the surgery, and when to stop  You may need to stop taking aspirin or blood thinners several days before surgery  He or she will tell you which medicines to take or not take on the day of surgery  · Tell your healthcare provider or surgeon about all your allergies, including allergic reactions to medicine, anesthesia, or antibiotics  What will happen during a mastectomy:   · You will be given general anesthesia to keep you asleep and free from pain during surgery  You may be given an antibiotic to help prevent a bacterial infection  · Your surgeon will make an incision over your breast  He or she will remove the tumor and breast tissue  The nipple and areola (area around the nipple) may be removed  Surgery that leaves these in place is called nipple-sparing mastectomy  Your surgeon may also remove muscle from behind your breast  If lymph nodes will be taken, a small incision will be made in your armpit  The lymph nodes will be removed and tested for cancer  · Your surgeon may leave extra skin if you are having reconstruction surgery  This surgery is called skin-sparing mastectomy  Reconstruction surgery helps make the breast look more natural  It is done right after the mastectomy  · One or more drains may be inserted near your incision   A drain removes extra fluid and helps your incision heal  Your surgeon will close your incision with stitches and cover it with a bandage  He or she may also wrap a tight-fitting bandage around both of your breasts  This may decrease swelling, bleeding, and pain  What to expect after a mastectomy:   · You will be helped to walk around after surgery to help prevent blood clots  · Healthcare providers will monitor you until you are awake  You may need to spend 1 to 2 nights in the hospital     · You may have trouble moving the arm closest to your mastectomy  This should get better in a few days  Risks of a mastectomy:  You may bleed more than expected or develop an infection  Nerves, blood vessels, and muscles may be damaged during your surgery  Blood or fluid may collect under your skin  You may need other procedures to remove the fluid or blood  You may have swelling in the arm closest to the mastectomy or where lymph nodes were removed  This swelling is called lymphedema  Lymphedema may cause tingling, numbness, stiffness, and weakness in your arm  This may be permanent  You may get a blood clot in your arm or leg  The blood clot may travel to your heart, lungs, or brain  This may become life-threatening  Call your local emergency number (911 in the [facility]) for any of the following:   · You feel lightheaded, short of breath, and have chest pain  · You have trouble breathing  · You cough up blood  Seek care immediately if:   · Blood soaks through your bandage  · Your stitches come apart  · Your bruise suddenly gets bigger  · Your leg or arm is larger than usual and painful  Call your doctor or surgeon if:   · You have a fever or chills  · Your wound is red, swollen, or draining pus  · You have nausea or are vomiting  · Your skin is itchy, swollen, or you have a rash  · Your pain does not get better after you take pain medicine  · Your drain falls out or stops draining fluid      · Your drain has pus or foul-smelling fluid coming out of it  · You have numbness, tingling, or swelling in your arm or hand  · You feel very sad or anxious, or are having trouble coping with changes from the mastectomy  · You have questions or concerns about your condition or care  Medicines: You may need any of the following:  · Antibiotics  help prevent a bacterial infection  · Prescription pain medicine  may be given  Ask your healthcare provider how to take this medicine safely  Some prescription pain medicines contain acetaminophen  Do not take other medicines that contain acetaminophen without talking to your healthcare provider  Too much acetaminophen may cause liver damage  Prescription pain medicine may cause constipation  Ask your healthcare provider how to prevent or treat constipation  · NSAIDs , such as ibuprofen, help decrease swelling, pain, and fever  NSAIDs can cause stomach bleeding or kidney problems in certain people  If you take blood thinner medicine, always ask your healthcare provider if NSAIDs are safe for you  Always read the medicine label and follow directions  · Take your medicine as directed  Contact your healthcare provider if you think your medicine is not helping or if you have side effects  Tell him or her if you are allergic to any medicine  Keep a list of the medicines, vitamins, and herbs you take  Include the amounts, and when and why you take them  Bring the list or the pill bottles to follow-up visits  Carry your medicine list with you in case of an emergency  Care for your wound as directed: If you have a tight-fitting bandage, you can remove it in 24 to 48 hours, or as directed  Ask your healthcare provider when your incision can get wet  You may need to take a sponge bath until your drain is removed  Carefully wash around the incision with soap and water  It is okay to allow the soap and water to gently run over your incision   Gently pat dry the area and put on new, clean bandages as directed  Change your bandages when they get wet or dirty  If lymph nodes were removed from your armpit, ask your healthcare provider when you can wear deodorant  Check your incision every day for redness, pus, or swelling  Self-care:   · Apply ice  on your incision for 15 to 20 minutes every hour or as directed  Use an ice pack, or put crushed ice in a plastic bag  Cover it with a towel  Ice helps prevent tissue damage and decreases swelling and pain  · Elevate  your arm nearest to your incision above the level of your heart  Do this as often as you can  This will help decrease swelling and pain  Prop your arm on pillows or blankets to keep it elevated comfortably  · Rest  as directed  Do not lift anything heavier than 5 pounds  Do not push or pull with your arms  You can use your arms to groom, eat, and bathe  Take short walks around the house  Gradually walk further as you feel better  Ask your healthcare provider when you can return to your normal activities  · Do not sleep on your stomach  This will put too much pressure on your incision  Sleep on your back or on the opposite side of your incision  · Empty your drain  as directed  You may need to write down how much fluid you empty from your drain each day  Ask your healthcare provider for more information about how to empty your drain  · Wear a supportive bra  as directed  Wait until you remove the tight-fitting bandage to wear a bra  You may be given a surgical bra or told to wear a sports bra  A supportive bra may help hold your bandages in place  It may also help with swelling and pain  Do not  wear bras with lace or underwire  They may rub against your incision and cause discomfort  Arm stretches: Your healthcare provider may show you how to do arm stretches  Arm stretches may prevent stiff arms or shoulders  You may need to wait until after your drains are removed to begin stretching   Do not do arm stretches until your healthcare provider says it is okay  Ask your healthcare provider for more information about arm stretches  Follow up with your doctor or surgeon as directed:  Write down your questions so you remember to ask them during your visits  For support and more information:  You may have difficulty coping with the changes to your body  Talk to your family or friends about how you are feeling  Ask your healthcare provider about support groups  It may be helpful to talk with other women who have had a mastectomy  · [address]  [place] [address]  Phone: [phone]  Web Address: http://[employer]/  [facility]  · [address] [place] [address], [address] [place] [address] , [address] [place] [address]  Phone: 0- [phone]  Web Address: http://[employer]/  [employer] Information is for End User's use only and may not be sold, redistributed or otherwise used for commercial purposes  All illustrations and images included in CareNotes® are the copyrighted property of [employer]  or [address] [place] [address]  The above information is an  only  It is not intended as medical advice for individual conditions or treatments  Talk to your doctor, nurse or pharmacist before following any medical regimen to see if it is safe and effective for you  Breast Cancer [place] Lymph Node Biopsy   AMBULATORY CARE:   What you need to know about a sentinel lymph node biopsy (SLNB):  A sentinel lymph node (SLN) is usually the lymph node closest to the breast tumor  It is usually found in the armpit, or along the sternum (breastbone) or collarbone  A biopsy is a procedure used to find and remove a SLN  During the biopsy, the SLN will be tested for cancer cells  If the test is positive, it may mean that breast cancer has spread outside of your breast  This information can help your healthcare provider decide what other treatments you need    How to prepare for a SLNB:   · You may need a nuclear scan before your procedure  During a nuclear scan, healthcare providers will inject a small amount of radioactive liquid in your breast  Radioactive liquid will move to the location of your lymph nodes and help them show up better in pictures  A camera will take pictures of the lymph nodes  The pictures will help your healthcare provider plan for your procedure  · Your healthcare provider will talk to you about how to prepare for your procedure  He or she may tell you not to eat or drink anything after midnight on the day of your procedure  He or she will tell you what medicines to take or not take on the day of your procedure  You may be given contrast liquid during your biopsy  Tell your healthcare provider if you have ever had an allergic reaction to contrast liquid  Arrange for someone to drive you home and stay with you after your procedure  What will happen during a SLNB:   · You may be given an antibiotic through your IV to help prevent a bacterial infection  Tell the healthcare provider if you have ever had an allergic reaction to an antibiotic  You may be given general anesthesia to keep you asleep and free from pain during your procedure  You may instead be given local anesthesia to numb the area  With local anesthesia, you may still feel pressure or pushing during the procedure, but you should not feel any pain  · Your healthcare provider will inject blue contrast liquid, radioactive liquid, or both near the tumor  The liquid will move to the SLN  Your healthcare provider may use an instrument to help find the SLN  He or she will do this by gently moving an instrument over your skin  The instrument will show pictures of the SLN on a monitor  Your healthcare provider will make a small incision in the skin that covers the SLN  The incision is usually in your armpit or chest  The SLN will be removed and checked for cancer cells   If cancer is found, your healthcare provider may remove several more lymph nodes for testing  Your incision may be closed with stitches or strips of medical tape and covered with a bandage  What will happen after a SLNB:  Healthcare providers will monitor you until you are awake  You may be able to go home after you are awake and your pain is controlled  Your urine or bowel movement may be blue for 24 to 48 hours after your procedure  This is caused by the blue contrast liquid given to you during the procedure  You may have bruising or swelling at the biopsy site  This is normal and expected  The arm closest to the biopsy site may be sore  This should get better within 48 to 72 hours  Risks of a SLNB:  You may bleed more than expected or get an infection  You may develop a condition called lymphedema  Lymphedema is tissue swelling in your arm nearest to where the SLN was removed  You may have long-term pain or discomfort in your arm  Your skin in the arm may be permanently thick or hard  Your nerves may be damaged during your procedure  This may cause numbness or tingling in your arm  It may also cause difficulty moving your arm  You may have an allergic reaction to the contrast liquid  This may require medicine or other treatments  Seek care immediately if:   · Blood soaks through your bandage  · Your stitches come apart  · Your bruise suddenly gets larger and feels firm  Call your doctor if:   · You have a fever or chills  · Your wound is red, swollen, or draining pus  · You have nausea or are vomiting  · Your skin is itchy, swollen, or you have a rash  · Your pain does not get better after you take medicine for pain  · You have questions or concerns about your condition or care  Medicines: You may need any of the following:  · NSAIDs , such as ibuprofen, help decrease swelling, pain, and fever  This medicine is available with or without a doctor's order   NSAIDs can cause stomach bleeding or kidney problems in certain people  If you take blood thinner medicine, always ask your healthcare provider if NSAIDs are safe for you  Always read the medicine label and follow directions  · Acetaminophen  decreases pain and fever  It is available without a doctor's order  Ask how much to take and how often to take it  Follow directions  Read the labels of all other medicines you are using to see if they also contain acetaminophen, or ask your doctor or pharmacist  Acetaminophen can cause liver damage if not taken correctly  Do not use more than 4 grams (4,000 milligrams) total of acetaminophen in one day  · Prescription pain medicine  may be given  Ask your healthcare provider how to take this medicine safely  Some prescription pain medicines contain acetaminophen  Do not take other medicines that contain acetaminophen without talking to your healthcare provider  Too much acetaminophen may cause liver damage  Prescription pain medicine may cause constipation  Ask your healthcare provider how to prevent or treat constipation  · Take your medicine as directed  Contact your healthcare provider if you think your medicine is not helping or if you have side effects  Tell him or her if you are allergic to any medicine  Keep a list of the medicines, vitamins, and herbs you take  Include the amounts, and when and why you take them  Bring the list or the pill bottles to follow-up visits  Carry your medicine list with you in case of an emergency  Care for your incision wound as directed:  Ask your healthcare provider when your wound can get wet  Carefully wash around the wound with soap and water  It is okay to let soap and water gently run over your wound  Do not  scrub your wound  Gently pat dry the area and put on new, clean bandages as directed  Change your bandages when they get wet or dirty  If you have strips of medical tape, let them fall of on their own  It may take 10 to 14 days for them to fall off   Check your wound every day for signs of infection, such as redness, swelling, or pus  Do not put powders or lotions on your wound  If lymph nodes have been taken from your armpit, ask your healthcare provider when you can wear deodorant  Self-care:   · Apply ice  on your wound for 15 to 20 minutes every hour or as directed  Use an ice pack, or put crushed ice in a plastic bag  Cover it with a towel before you apply it to your skin  Ice helps prevent tissue damage and decreases swelling and pain  · Elevate  your arm nearest to the biopsy site as often as you can  This will help decrease swelling and pain  Prop your arm on pillows or blankets to keep it elevated above the level of your heart comfortably  · Do not do strenuous activities  for 24 to 48 hours  Strenuous activities include heavy lifting, sports, or running  If lymph nodes were taken from your armpit, do not push or pull with your arm  These activities may put too much stress on your wound  Rest and take short walks around the house  Ask your healthcare provider when you can return to your normal activities  · Drink plenty of liquids  as directed  This will help flush out contrast liquid from your body  Ask how much liquid to drink each day and which liquids are best for you  Ask your healthcare provider how to prevent lymphedema and infection:  Lymphedema is fluid buildup in fatty tissues under your skin  Lymphedema may happen in the arm closest to where lymph nodes were removed  An infection in your skin can make lymphedema worse  Ask your healthcare provider how you can decrease your risk for skin infections and lymphedema  Follow up with your doctor as directed:  Write down your questions so you remember to ask them during your visits  © Copyright [address] Information is for End User's use only and may not be sold, redistributed or otherwise used for commercial purposes   All illustrations and images included in CareNotes® are the copyrighted property of [employer]  or [address]   The above information is an  only  It is not intended as medical advice for individual conditions or treatments  Talk to your doctor, nurse or pharmacist before following any medical regimen to see if it is safe and effective for you  Memo-St Drain Care   AMBULATORY CARE:   A Memo-St (OSEAS) drain  is used to remove fluids that build up in an area of your body after surgery  The OSEAS drain is a bulb shaped device connected to a tube  One end of the tube is placed inside you during surgery  The other end comes out through a small cut in your skin  The bulb is connected to this end  You may have a stitch to hold the tube in place  Seek care immediately if:   · Your OSEAS drain breaks or comes out  · You have cloudy yellow or brown drainage from your OSEAS drain site, or the drainage smells bad  Contact your healthcare provider if:   · You drain less than 30 milliliters (2 tablespoons) in 24 hours  This may mean your drain can be removed  · You suddenly stop draining fluid or think your OSEAS drain is blocked  · You have a fever higher than 101 5°F (38 6°C)  · You have increased pain, redness, or swelling around the drain site  · You have questions about your OSEAS drain care  How a Memo-St drain works: The OSEAS drain removes fluids by creating suction in the tube  The bulb is squeezed flat and connected to the tube that sticks out of your body  The bulb expands as it fills with fluid  How to change the bandage around your Memo-St drain:  If you have a bandage, change it once a day  You may need to change your bandage more than once a day if it gets completely wet  · Wash your hands with soap and water  · Loosen the tape and gently remove the old bandage  Throw the old bandage into a plastic trash bag  · Use soap and water or saline (salt water) solution to clean your OSEAS drain site   Dip a cotton swab or gauze pad in the solution and gently clean your skin  · Pat the area dry  · Place a new bandage on your OSEAS drain site and secure it to your skin with medical tape  · Wash your hands  How to empty the Memo-St drain:  Empty the bulb when it is half full or every 8 to 12 hours  · Wash your hands with soap and water  · Remove the plug from the bulb  · Pour the fluid into a measuring cup  · Clean the plug with an alcohol swab or a cotton ball dipped in rubbing alcohol  · Squeeze the bulb flat and put the plug back in  The bulb should stay flat until it starts to fill with fluid again  · Measure the amount of fluid you pour out  Write down how much fluid you empty from the OSEAS drain and the date and time you collected it  · Flush the fluid down the toilet  Wash your hands  Clear clogged tubing: Use the following steps to clear your Memo-St tubing:  · Hold the tubing between your thumb and first finger at the place closest to your skin  This hand will prevent the tube from being pulled out of your skin  · Use your other thumb and first finger to slide the clog down the tubing toward the bulb  You may have to repeat the sliding until the tubing is unclogged  Memo-St drain removal:  The amount of fluid that you drain will decrease as your wound heals  The OSEAS drain usually is removed when less than 30 milliliters (2 tablespoons) is collected in 24 hours  Ask your healthcare provider when and how your OSEAS drain will be removed  Follow up with your healthcare provider as directed:  Write down your questions so you remember to ask them during your visits  © Copyright [address] Information is for End User's use only and may not be sold, redistributed or otherwise used for commercial purposes   All illustrations and images included in CareNotes® are the copyrighted property of [employer]  or [facility] Yanely Macedo   The above information is an  only  It is not intended as medical advice for individual conditions or treatments  Talk to your doctor, nurse or pharmacist before following any medical regimen to see if it is safe and effective for you

## 2021-01-08 ENCOUNTER — PATIENT OUTREACH (OUTPATIENT)
Dept: SURGICAL ONCOLOGY | Facility: CLINIC | Age: 76
End: 2021-01-08

## 2021-01-08 NOTE — PROGRESS NOTES
Breast Oncology Nurse Navigator    Navigation initial outreach letter mailed to patients home address with business card contact information

## 2021-01-09 ENCOUNTER — IMMUNIZATIONS (OUTPATIENT)
Dept: FAMILY MEDICINE CLINIC | Facility: HOSPITAL | Age: 76
End: 2021-01-09

## 2021-01-09 DIAGNOSIS — Z23 ENCOUNTER FOR IMMUNIZATION: ICD-10-CM

## 2021-01-09 PROCEDURE — 0001A SARS-COV-2 / COVID-19 MRNA VACCINE (PFIZER-BIONTECH) 30 MCG: CPT

## 2021-01-09 PROCEDURE — 91300 SARS-COV-2 / COVID-19 MRNA VACCINE (PFIZER-BIONTECH) 30 MCG: CPT

## 2021-01-11 ENCOUNTER — OFFICE VISIT (OUTPATIENT)
Dept: LAB | Facility: CLINIC | Age: 76
End: 2021-01-11
Payer: MEDICARE

## 2021-01-11 ENCOUNTER — HOSPITAL ENCOUNTER (OUTPATIENT)
Dept: RADIOLOGY | Facility: HOSPITAL | Age: 76
Discharge: HOME | End: 2021-01-11
Attending: SURGERY
Payer: MEDICARE

## 2021-01-11 DIAGNOSIS — D05.12 DUCTAL CARCINOMA IN SITU (DCIS) OF LEFT BREAST: ICD-10-CM

## 2021-01-11 DIAGNOSIS — Z01.818 PREOPERATIVE TESTING: ICD-10-CM

## 2021-01-11 LAB
ATRIAL RATE: 61 BPM
P AXIS: 52 DEGREES
PR INTERVAL: 216 MS
QRS AXIS: 54 DEGREES
QRSD INTERVAL: 96 MS
QT INTERVAL: 444 MS
QTC INTERVAL: 446 MS
T WAVE AXIS: 41 DEGREES
VENTRICULAR RATE: 61 BPM

## 2021-01-11 PROCEDURE — 93010 ELECTROCARDIOGRAM REPORT: CPT | Performed by: INTERNAL MEDICINE

## 2021-01-11 PROCEDURE — 71046 X-RAY EXAM CHEST 2 VIEWS: CPT

## 2021-01-11 PROCEDURE — 93005 ELECTROCARDIOGRAM TRACING: CPT

## 2021-01-12 ENCOUNTER — PATIENT OUTREACH (OUTPATIENT)
Dept: CASE MANAGEMENT | Facility: HOSPITAL | Age: 76
End: 2021-01-12

## 2021-01-12 NOTE — PROGRESS NOTES
LSW received DT and problem list via referral      Pt self scored 6/10 and noted concerns with treatment decisions, fears, nervousness, worry and physical issues  LSW contacted pt via telephone to discuss DT and offer emotional support  PT stated she was very nervous prior to her appointment but after having her questions answered and a plan in place she feels much more able to manage her emotions  Pt declined short term counseling with this LSW and did agree to call if needs arise

## 2021-01-13 ENCOUNTER — TELEPHONE (OUTPATIENT)
Dept: SURGICAL ONCOLOGY | Facility: CLINIC | Age: 76
End: 2021-01-13

## 2021-01-18 ENCOUNTER — TELEPHONE (OUTPATIENT)
Dept: SURGICAL ONCOLOGY | Facility: CLINIC | Age: 76
End: 2021-01-18

## 2021-01-18 NOTE — TELEPHONE ENCOUNTER
Called patient to report genetics test results which were negative  Patient appreciative of the phone call  Confirmed next appointment with Dr Amy Robertson  Patient stated she will  copy of genetics test results at her next office visit

## 2021-01-20 ENCOUNTER — LAB (OUTPATIENT)
Dept: LAB | Facility: CLINIC | Age: 76
End: 2021-01-20
Payer: MEDICARE

## 2021-01-20 DIAGNOSIS — I81 PORTAL VEIN THROMBOSIS: ICD-10-CM

## 2021-01-20 LAB
INR PPP: 2.99 (ref 0.84–1.19)
PROTHROMBIN TIME: 31.1 SECONDS (ref 11.6–14.5)

## 2021-01-20 PROCEDURE — 85610 PROTHROMBIN TIME: CPT

## 2021-01-20 PROCEDURE — 36415 COLL VENOUS BLD VENIPUNCTURE: CPT

## 2021-01-21 ENCOUNTER — ANTICOAG VISIT (OUTPATIENT)
Dept: INTERNAL MEDICINE CLINIC | Facility: CLINIC | Age: 76
End: 2021-01-21

## 2021-01-21 DIAGNOSIS — I81 PORTAL VEIN THROMBOSIS: ICD-10-CM

## 2021-01-22 ENCOUNTER — TELEPHONE (OUTPATIENT)
Dept: SURGICAL ONCOLOGY | Facility: CLINIC | Age: 76
End: 2021-01-22

## 2021-01-25 ENCOUNTER — OFFICE VISIT (OUTPATIENT)
Dept: SURGICAL ONCOLOGY | Facility: CLINIC | Age: 76
End: 2021-01-25

## 2021-01-25 VITALS
HEART RATE: 68 BPM | SYSTOLIC BLOOD PRESSURE: 124 MMHG | TEMPERATURE: 98 F | WEIGHT: 144 LBS | DIASTOLIC BLOOD PRESSURE: 70 MMHG | RESPIRATION RATE: 16 BRPM | BODY MASS INDEX: 25.52 KG/M2 | HEIGHT: 63 IN

## 2021-01-25 DIAGNOSIS — Z01.818 PREOP EXAMINATION: Primary | ICD-10-CM

## 2021-01-25 DIAGNOSIS — D05.12 DUCTAL CARCINOMA IN SITU (DCIS) OF LEFT BREAST: ICD-10-CM

## 2021-01-25 PROCEDURE — 99024 POSTOP FOLLOW-UP VISIT: CPT | Performed by: SURGERY

## 2021-01-25 NOTE — PROGRESS NOTES
Surgical Oncology Follow Up       [address]  [facility]  [address]  [place] [address]    Crystal Zhang  1945  [id number]  [address] [facility]  [facility]  [address] [address]    Chief Complaint   Patient presents with    Updated History and Physical          Assessment & Plan:     Patient presents for a preoperative H and P  The patient still needs medical clearance  She is aware of this she will contact her internist   Surgery is scheduled for the 12th  The  will turn off her neuromodulator  He will need to be available to turn it back on when she recovers  Cancer History:     Oncology History   Ductal carcinoma in situ (DCIS) of left breast   12/17/2020 Biopsy    Left Breast stereotactic biopsy:  A  4 o'clock posterior  -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  -Grade 3  -ER 0  -VT 0  -HER2 3+    B/C  5 o'clock anterior  -Ductal carcinoma in situ  -Grade 3  -ER 0  -VT 0    Concordant  Malignancy appears unifocal  Calcifications span 4 1 cm  Both clips migrated (see comments in original biopsy report)  No recent US of left axilla  Right breast clear  1/7/2021 Genetic Testing    The following genes were evaluated: CHARLOTTE, BRCA1, BRCA2, CDH1, CHEK2, PALB2, PTEN, STK11, Tp53; additional genes evaluated for a total of 20 genes  Negative result  No pathogenic sequence variants or deletions/dupllications identified  [employer]             Interval History:   The patient has done well since I saw her last   She has no complaints  Her genetic testing was negative  We have previously discussed and went through the process of informed consent for left mastectomy conjunction with a sentinel lymph node biopsy  Patient had no questions regarding the surgery    Review of Systems:   Review of Systems   All other systems reviewed and are negative        Past Medical History     Patient Active Problem List   Diagnosis    Portal vein thrombosis    Anxiety    Moderate episode of recurrent major depressive disorder (HCC)    Essential tremor    Hyperlipidemia    Essential hypertension    Hypomagnesemia    SIADH (syndrome of inappropriate ADH production) (HCC)    Acquired hypothyroidism    Insomnia    Iron deficiency anemia    Prediabetes    Peripheral neuropathy    Osteopenia    Osteoarthritis of right knee    Ulcerative colitis without complications (HCC)    Allergic rhinitis    GERD (gastroesophageal reflux disease)    Anemia    Mild intermittent asthma without complication    Diarrhea    Sjogren's syndrome (HCC)    Chronic salpingo-oophoritis    Overweight    Spinal stenosis    Supraventricular tachycardia (HCC)    Unsteady gait    Lower extremity pain, left    Lumbar degenerative disc disease    Encounter for screening mammogram for breast cancer    Encounter for long-term (current) use of medications    S/P deep brain stimulator placement    Vitamin B12 deficiency    Vitamin D deficiency    Ductal carcinoma in situ (DCIS) of left breast     Past Medical History:   Diagnosis Date    Anxiety     Arrhythmia     Arthritis     Asthma     Blood clot in vein     portal vein    Breast lump     [id number] RESOLVED    Depression     Disease of thyroid gland     DVT, lower extremity (HCC)     GERD (gastroesophageal reflux disease)     Hyperlipidemia     Hypertension     Hypokalemia     Hyponatremia     Hypothyroidism     Iron deficiency anemia     Irregular heart beat     Manic behavior (HCC)     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HCC)     Osteoarthritis     Palpitations     [id number]  RESOLVED    Paroxysmal supraventricular tachycardia (HCC)     PE (pulmonary thromboembolism) (HCC)     Sjoegren syndrome     Thrombocytosis (HCC)     [id number]  RESOLVED    Tremors of nervous system     dbs implanted right and left chest    Ulcerative colitis ([facility] [address] )     Vertigo     [id number] RESOLVED     Past Surgical History:   Procedure Laterality Date    ABDOMINAL SURGERY      APPENDECTOMY      BREAST BIOPSY Left 11/07/2019    Stereo    BREAST EXCISIONAL BIOPSY Left     x many years    BREAST SURGERY      lumpectomy & biopsy    CARMELLA HOLE W/ STEREOTACTIC INSERTION OF DBS LEADS / INTRAOP MICROELECTRODE RECORDING      COLON SURGERY      COLONOSCOPY N/A 8/30/2017    Procedure: Marisol Hooks;  Surgeon: Jesse Prieto MD;  Location: [facility];   Service: Colorectal    COLOPROCTECTOMY W/ ILEO J POUCH      ESOPHAGOGASTRODUODENOSCOPY      ONSET 10/17/11    FISTULA REPAIR      LLEOANAL FISTULA REPAIR TRANSPERIN TRANSABD APPROACH    HYSTERECTOMY      age 43    ILEOSTOMY CLOSURE      KNEE ARTHROSCOPY      Right    MAMMO STEREOTACTIC BREAST BIOPSY LEFT (ALL INC) Left 11/7/2019    MAMMO STEREOTACTIC BREAST BIOPSY LEFT (ALL INC) Left 12/17/2020    MAMMO STEREOTACTIC BREAST BIOPSY LEFT (ALL INC) EACH ADD Left 12/17/2020    AZ IMP STIM,CRANIAL,SUBQ,1 ARRAY Right 6/20/2017    Procedure: DBS GENERATOR REPLACEMENT;  Surgeon: Emilia Whitney MD;  Location[facility];  Service: Neurosurgery    AZ IMP STIM,CRANIAL,SUBQ,1 ARRAY N/A 12/4/2019    Procedure: REPLACEMENT IMPLANTABLE PULSE GENERATOR FOR DEEP BRAIN STIMULATOR LEFT CHEST;  Surgeon: Dania Sierra MD;  [facility];  Service: Neurosurgery    SPLENECTOMY       Family History   Problem Relation Age of Onset    Venous thrombosis Mother         ACUTE VENOUS THROMBOSIS OF DEEP VESSELS OF THE DISTAL LOWER EXTREMITY    Other Mother         PHLEBITIS    Hypertension Mother     Peripheral vascular disease Mother     COPD Father     Diabetes Father         MELLITUS    Stroke Father     Diabetes Sister         MELLITUS    Sjogren's syndrome Sister     No Known Problems Daughter     No Known Problems Maternal Grandmother     No Known Problems Maternal Grandfather     No Known Problems Paternal Grandmother     No Known Problems Paternal Grandfather     No Known Problems Sister     No Known Problems Maternal Aunt     No Known Problems Paternal Aunt     No Known Problems Son      Social History     Socioeconomic History    Marital status:      Spouse name: Not on file    Number of children: Not on file    Years of education: EDUCATION LEVEL 10TH GRADE    Highest education level: Not on file   Occupational History    Occupation: RETIRED   Social Needs    Financial resource strain: Not on file    Food insecurity     Worry: Not on file     Inability: Not on file   [employer] needs     Medical: Not on file     Non-medical: Not on file   Tobacco Use    Smoking status: Former Smoker     Packs/day: 2 00     Years: 5 00     Pack years: 10 00     Quit date:      Years since quittin 1    Smokeless tobacco: Never Used    Tobacco comment: Quit   Substance and Sexual Activity    Alcohol use: Yes     Frequency: 2-3 times a week     Drinks per session: 1 or 2     Binge frequency: Never    Drug use: No    Sexual activity: Yes     Partners: Male     Birth control/protection: Post-menopausal   Lifestyle    Physical activity     Days per week: Not on file     Minutes per session: Not on file    Stress: Not on file   Relationships    Social connections     Talks on phone: Not on file     Gets together: Not on file     Attends Congregation service: Not on file     Active member of club or organization: Not on file     Attends meetings of clubs or organizations: Not on file     Relationship status: Not on file    Intimate partner violence     Fear of current or ex partner: Not on file     Emotionally abused: Not on file     Physically abused: Not on file     Forced sexual activity: Not on file   Other Topics Concern    Not on file   Social History Narrative    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[facility], MODERATE    CAFFEINE USE, DRINKS CAFEINATED TEA, [address] [place] [address] INADEQUATE EXERCISE    LIVES WITH ADULT CHILDREN       Current Outpatient Medications:     albuterol (PROVENTIL HFA,VENTOLIN HFA) 90 mcg/act inhaler, Inhale 2 puffs every 6 (six) hours as needed for wheezing, Disp: 1 Inhaler, Rfl: 1    buPROPion (WELLBUTRIN XL) 300 mg 24 hr tablet, TAKE 1 TABLET (300 MG TOTAL) BY MOUTH DAILY WITH 150 MG, Disp: 90 tablet, Rfl: 1    cetirizine (ZyrTEC) 10 mg tablet, Take 10 mg by mouth daily, Disp: , Rfl:     diltiazem (CARDIZEM CD) 180 mg 24 hr capsule, Take 1 capsule (180 mg total) by mouth daily, Disp: 90 capsule, Rfl: 1    escitalopram (LEXAPRO) 20 mg tablet, TAKE 1 TABLET BY MOUTH EVERY DAY, Disp: 90 tablet, Rfl: 1    fluticasone (FLONASE) 50 mcg/act nasal spray, 1 SPRAY INTO EACH NOSTRIL DAILY AS NEEDED FOR RHINITIS, Disp: 48 mL, Rfl: 1    fluticasone-salmeterol (Advair Diskus) 250-50 mcg/dose inhaler, Inhale 1 puff 2 (two) times a day Rinse mouth after use, Disp: 180 each, Rfl: 1    gabapentin (NEURONTIN) 100 mg capsule, TAKE 2 CAPSULES (200 MG TOTAL) BY MOUTH 2 (TWO) TIMES A DAY, Disp: 360 capsule, Rfl: 1    gabapentin (NEURONTIN) 600 MG tablet, TAKE ONE TABLET IN THE MORNING, ONE TABLET IN THE AFTERNOON, AND 2 TABLETS AT BEDTIME, Disp: 360 tablet, Rfl: 2    HYDROcodone-acetaminophen (NORCO) 5-325 mg per tablet, Take 1 tablet by mouth every 6 (six) hours as needed for painMax Daily Amount: 4 tablets, Disp: 120 tablet, Rfl: 0    hydroxychloroquine (PLAQUENIL) 200 mg tablet, TAKE 1 TABLET BY MOUTH TWICE A DAY (Patient taking differently: 200 mg daily with breakfast ), Disp: 180 tablet, Rfl: 0    levothyroxine 50 mcg tablet, TAKE 1 TABLET BY MOUTH EVERY DAY, Disp: 90 tablet, Rfl: 1    lisinopril (ZESTRIL) 20 mg tablet, TAKE 1 TABLET BY MOUTH TWICE A DAY, Disp: 180 tablet, Rfl: 1    loperamide (IMODIUM) 2 mg capsule, Take 2 mg by mouth 4 (four) times a day as needed  , Disp: , Rfl:     LORazepam (ATIVAN) 1 mg tablet, Take 1 tablet (1 mg total) by mouth every 6 (six) hours as needed for anxiety, Disp: 120 tablet, Rfl: 0    meclizine (ANTIVERT) 25 mg tablet, Take 1 tablet (25 mg total) by mouth every 6 (six) hours as needed for dizziness, Disp: 90 tablet, Rfl: 1    metaxalone (SKELAXIN) 800 mg tablet, Take 1 tablet (800 mg total) by mouth 3 (three) times a day, Disp: 90 tablet, Rfl: 0    naloxone (NARCAN) 4 mg/0 1 mL nasal spray, Administer 1 spray into a nostril  If breathing does not return to normal or if breathing difficulty resumes after 2-3 minutes, give another dose in the other nostril using a new spray , Disp: 1 each, Rfl: 1    pantoprazole (PROTONIX) 40 mg tablet, TAKE 1 TABLET BY MOUTH EVERY DAY, Disp: 90 tablet, Rfl: 1    simvastatin (ZOCOR) 5 MG tablet, TAKE 1 TABLET BY MOUTH EVERY DAY, Disp: 90 tablet, Rfl: 1    sodium chloride 1 g tablet, TAKE 1 TABLET (1 G TOTAL) BY MOUTH 3 (THREE) TIMES A DAY, Disp: 270 tablet, Rfl: 1    torsemide (DEMADEX) 10 mg tablet, TAKE 1 TABLET BY MOUTH EVERY DAY (Patient taking differently: As needed), Disp: 90 tablet, Rfl: 1    warfarin (COUMADIN) 5 mg tablet, Take 1-2 tablets daily As directed, Disp: 180 tablet, Rfl: 1  Allergies   Allergen Reactions    Indocin [Indomethacin] Other (See Comments), Dizziness and GI Intolerance     Reaction Date: [id number];   Nausea and dizziness    Macrobid [Nitrofurantoin Monohyd Macro] Rash    Penicillins Rash     Annotation - [id number]: can take cephalosporins    Sulfa Antibiotics Rash       Physical Exam:     Vitals:    01/25/21 0815   BP: 124/70   Pulse: 68   Resp: 16   Temp: 98 °F (36 7 °C)     Physical Exam  Vitals signs reviewed  Constitutional:       Appearance: She is well-developed  HENT:      Head: Normocephalic and atraumatic  Eyes:      Pupils: Pupils are equal, round, and reactive to light  Neck:      Musculoskeletal: Normal range of motion and neck supple  Thyroid: No thyromegaly  Vascular: No JVD  Trachea: No tracheal deviation     Cardiovascular: Rate and Rhythm: Normal rate and regular rhythm  Heart sounds: Normal heart sounds  No murmur  No friction rub  No gallop  Pulmonary:      Effort: Pulmonary effort is normal  No respiratory distress  Breath sounds: Normal breath sounds  No wheezing or rales  Chest:          Comments: The right breast was examined in the sitting and supine position  There are no worrisome skin changes, tenderness, inverted nipple, nipple discharge, swelling, bleeding or evidence of a mass in any quadrant  Jose Raul survey demonstrated no evidence of any clinically suspicious axillary, pectoral or paraclavicular lymph nodes  Examination of the left breast demonstrates considerable decrease in her ecchymosis in the lower outer quadrant  Though she still has a palpable mass  Abdominal:      General: There is no distension  Palpations: Abdomen is soft  There is no hepatomegaly or mass  Tenderness: There is no abdominal tenderness  There is no guarding or rebound  Musculoskeletal: Normal range of motion  General: No tenderness  Lymphadenopathy:      Cervical: No cervical adenopathy  Skin:     General: Skin is warm and dry  Findings: No erythema or rash  Neurological:      Mental Status: She is alert and oriented to person, place, and time  Cranial Nerves: No cranial nerve deficit  Psychiatric:         Behavior: Behavior normal            Results & Discussion:   See above, the patient's  will turn offer neuromodulator  She will have medical clearance  All questions were answered to both of their satisfaction  Advance Care Planning/Advance Directives:  I discussed the disease status, treatment plans and follow-up with the patient

## 2021-01-25 NOTE — H&P (VIEW-ONLY)
Surgical Oncology Follow Up       [address]  [facility] [place]  [address]  [place] [address]    Arnaldo Pearson  1945  [id number]  [address] [facility]  [facility] [place]  [address] [address]    Chief Complaint   Patient presents with    Updated History and Physical          Assessment & Plan:     Patient presents for a preoperative H and P  The patient still needs medical clearance  She is aware of this she will contact her internist   Surgery is scheduled for the 12th  The  will turn off her neuromodulator  He will need to be available to turn it back on when she recovers  Cancer History:     Oncology History   Ductal carcinoma in situ (DCIS) of left breast   12/17/2020 Biopsy    Left Breast stereotactic biopsy:  A  4 o'clock posterior  -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  -Grade 3  -ER 0  -NJ 0  -HER2 3+    B/C  5 o'clock anterior  -Ductal carcinoma in situ  -Grade 3  -ER 0  -NJ 0    Concordant  Malignancy appears unifocal  Calcifications span 4 1 cm  Both clips migrated (see comments in original biopsy report)  No recent US of left axilla  Right breast clear  1/7/2021 Genetic Testing    The following genes were evaluated: CHARLOTTE, BRCA1, BRCA2, CDH1, CHEK2, PALB2, PTEN, STK11, Tp53; additional genes evaluated for a total of 20 genes  Negative result  No pathogenic sequence variants or deletions/dupllications identified  [employer]             Interval History:   The patient has done well since I saw her last   She has no complaints  Her genetic testing was negative  We have previously discussed and went through the process of informed consent for left mastectomy conjunction with a sentinel lymph node biopsy  Patient had no questions regarding the surgery    Review of Systems:   Review of Systems   All other systems reviewed and are negative        Past Medical History     Patient Active Problem List   Diagnosis    Portal vein thrombosis    Anxiety    Moderate episode of recurrent major depressive disorder (HCC)    Essential tremor    Hyperlipidemia    Essential hypertension    Hypomagnesemia    SIADH (syndrome of inappropriate ADH production) (HCC)    Acquired hypothyroidism    Insomnia    Iron deficiency anemia    Prediabetes    Peripheral neuropathy    Osteopenia    Osteoarthritis of right knee    Ulcerative colitis without complications (HCC)    Allergic rhinitis    GERD (gastroesophageal reflux disease)    Anemia    Mild intermittent asthma without complication    Diarrhea    Sjogren's syndrome (HCC)    Chronic salpingo-oophoritis    Overweight    Spinal stenosis    Supraventricular tachycardia (HCC)    Unsteady gait    Lower extremity pain, left    Lumbar degenerative disc disease    Encounter for screening mammogram for breast cancer    Encounter for long-term (current) use of medications    S/P deep brain stimulator placement    Vitamin B12 deficiency    Vitamin D deficiency    Ductal carcinoma in situ (DCIS) of left breast     Past Medical History:   Diagnosis Date    Anxiety     Arrhythmia     Arthritis     Asthma     Blood clot in vein     portal vein    Breast lump     [id number] RESOLVED    Depression     Disease of thyroid gland     DVT, lower extremity (HCC)     GERD (gastroesophageal reflux disease)     Hyperlipidemia     Hypertension     Hypokalemia     Hyponatremia     Hypothyroidism     Iron deficiency anemia     Irregular heart beat     Manic behavior (HCC)     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HCC)     Osteoarthritis     Palpitations     [id number]  RESOLVED    Paroxysmal supraventricular tachycardia (HCC)     PE (pulmonary thromboembolism) (HCC)     Sjoegren syndrome     Thrombocytosis (HCC)     [id number]  RESOLVED    Tremors of nervous system     dbs implanted right and left chest    Ulcerative colitis ([facility] [address] )     Vertigo     [id number] RESOLVED     Past Surgical History:   Procedure Laterality Date    ABDOMINAL SURGERY      APPENDECTOMY      BREAST BIOPSY Left 11/07/2019    Stereo    BREAST EXCISIONAL BIOPSY Left     x many years    BREAST SURGERY      lumpectomy & biopsy    CARMELLA HOLE W/ STEREOTACTIC INSERTION OF DBS LEADS / INTRAOP MICROELECTRODE RECORDING      COLON SURGERY      COLONOSCOPY N/A 8/30/2017    Procedure: Ngoc Light;  Surgeon: Sirena De Jesus MD;  Location: [facility];   Service: Colorectal    COLOPROCTECTOMY W/ ILEO J POUCH      ESOPHAGOGASTRODUODENOSCOPY      ONSET 10/17/11    FISTULA REPAIR      LLEOANAL FISTULA REPAIR TRANSPERIN TRANSABD APPROACH    HYSTERECTOMY      age 43    ILEOSTOMY CLOSURE      KNEE ARTHROSCOPY      Right    MAMMO STEREOTACTIC BREAST BIOPSY LEFT (ALL INC) Left 11/7/2019    MAMMO STEREOTACTIC BREAST BIOPSY LEFT (ALL INC) Left 12/17/2020    MAMMO STEREOTACTIC BREAST BIOPSY LEFT (ALL INC) EACH ADD Left 12/17/2020    AZ IMP STIM,CRANIAL,SUBQ,1 ARRAY Right 6/20/2017    Procedure: DBS GENERATOR REPLACEMENT;  Surgeon: Zell Lanes, MD;  [facility];  Service: Neurosurgery    AZ IMP STIM,CRANIAL,SUBQ,1 ARRAY N/A 12/4/2019    Procedure: REPLACEMENT IMPLANTABLE PULSE GENERATOR FOR DEEP BRAIN STIMULATOR LEFT CHEST;  Surgeon: Chelsey Bowens MD;  [facility];  Service: Neurosurgery    SPLENECTOMY       Family History   Problem Relation Age of Onset    Venous thrombosis Mother         ACUTE VENOUS THROMBOSIS OF DEEP VESSELS OF THE DISTAL LOWER EXTREMITY    Other Mother         PHLEBITIS    Hypertension Mother     Peripheral vascular disease Mother     COPD Father     Diabetes Father         MELLITUS    Stroke Father     Diabetes Sister         MELLITUS    Sjogren's syndrome Sister     No Known Problems Daughter     No Known Problems Maternal Grandmother     No Known Problems Maternal Grandfather     No Known Problems Paternal Grandmother     No Known Problems Paternal Grandfather     No Known Problems Sister     No Known Problems Maternal Aunt     No Known Problems Paternal Aunt     No Known Problems Son      Social History     Socioeconomic History    Marital status:      Spouse name: Not on file    Number of children: Not on file    Years of education: EDUCATION LEVEL 10TH GRADE    Highest education level: Not on file   Occupational History    Occupation: RETIRED   Social Needs    Financial resource strain: Not on file    Food insecurity     Worry: Not on file     Inability: Not on file   [employer] needs     Medical: Not on file     Non-medical: Not on file   Tobacco Use    Smoking status: Former Smoker     Packs/day: 2 00     Years: 5 00     Pack years: 10 00     Quit date:      Years since quittin 1    Smokeless tobacco: Never Used    Tobacco comment: Quit   Substance and Sexual Activity    Alcohol use: Yes     Frequency: 2-3 times a week     Drinks per session: 1 or 2     Binge frequency: Never    Drug use: No    Sexual activity: Yes     Partners: Male     Birth control/protection: Post-menopausal   Lifestyle    Physical activity     Days per week: Not on file     Minutes per session: Not on file    Stress: Not on file   Relationships    Social connections     Talks on phone: Not on file     Gets together: Not on file     Attends Buddhism service: Not on file     Active member of club or organization: Not on file     Attends meetings of clubs or organizations: Not on file     Relationship status: Not on file    Intimate partner violence     Fear of current or ex partner: Not on file     Emotionally abused: Not on file     Physically abused: Not on file     Forced sexual activity: Not on file   Other Topics Concern    Not on file   Social History Narrative    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[facility], MODERATE    CAFFEINE USE, DRINKS CAFEINATED TEA, [address] [place] [address] INADEQUATE EXERCISE    LIVES WITH ADULT CHILDREN       Current Outpatient Medications:     albuterol (PROVENTIL HFA,VENTOLIN HFA) 90 mcg/act inhaler, Inhale 2 puffs every 6 (six) hours as needed for wheezing, Disp: 1 Inhaler, Rfl: 1    buPROPion (WELLBUTRIN XL) 300 mg 24 hr tablet, TAKE 1 TABLET (300 MG TOTAL) BY MOUTH DAILY WITH 150 MG, Disp: 90 tablet, Rfl: 1    cetirizine (ZyrTEC) 10 mg tablet, Take 10 mg by mouth daily, Disp: , Rfl:     diltiazem (CARDIZEM CD) 180 mg 24 hr capsule, Take 1 capsule (180 mg total) by mouth daily, Disp: 90 capsule, Rfl: 1    escitalopram (LEXAPRO) 20 mg tablet, TAKE 1 TABLET BY MOUTH EVERY DAY, Disp: 90 tablet, Rfl: 1    fluticasone (FLONASE) 50 mcg/act nasal spray, 1 SPRAY INTO EACH NOSTRIL DAILY AS NEEDED FOR RHINITIS, Disp: 48 mL, Rfl: 1    fluticasone-salmeterol (Advair Diskus) 250-50 mcg/dose inhaler, Inhale 1 puff 2 (two) times a day Rinse mouth after use, Disp: 180 each, Rfl: 1    gabapentin (NEURONTIN) 100 mg capsule, TAKE 2 CAPSULES (200 MG TOTAL) BY MOUTH 2 (TWO) TIMES A DAY, Disp: 360 capsule, Rfl: 1    gabapentin (NEURONTIN) 600 MG tablet, TAKE ONE TABLET IN THE MORNING, ONE TABLET IN THE AFTERNOON, AND 2 TABLETS AT BEDTIME, Disp: 360 tablet, Rfl: 2    HYDROcodone-acetaminophen (NORCO) 5-325 mg per tablet, Take 1 tablet by mouth every 6 (six) hours as needed for painMax Daily Amount: 4 tablets, Disp: 120 tablet, Rfl: 0    hydroxychloroquine (PLAQUENIL) 200 mg tablet, TAKE 1 TABLET BY MOUTH TWICE A DAY (Patient taking differently: 200 mg daily with breakfast ), Disp: 180 tablet, Rfl: 0    levothyroxine 50 mcg tablet, TAKE 1 TABLET BY MOUTH EVERY DAY, Disp: 90 tablet, Rfl: 1    lisinopril (ZESTRIL) 20 mg tablet, TAKE 1 TABLET BY MOUTH TWICE A DAY, Disp: 180 tablet, Rfl: 1    loperamide (IMODIUM) 2 mg capsule, Take 2 mg by mouth 4 (four) times a day as needed  , Disp: , Rfl:     LORazepam (ATIVAN) 1 mg tablet, Take 1 tablet (1 mg total) by mouth every 6 (six) hours as needed for anxiety, Disp: 120 tablet, Rfl: 0    meclizine (ANTIVERT) 25 mg tablet, Take 1 tablet (25 mg total) by mouth every 6 (six) hours as needed for dizziness, Disp: 90 tablet, Rfl: 1    metaxalone (SKELAXIN) 800 mg tablet, Take 1 tablet (800 mg total) by mouth 3 (three) times a day, Disp: 90 tablet, Rfl: 0    naloxone (NARCAN) 4 mg/0 1 mL nasal spray, Administer 1 spray into a nostril  If breathing does not return to normal or if breathing difficulty resumes after 2-3 minutes, give another dose in the other nostril using a new spray , Disp: 1 each, Rfl: 1    pantoprazole (PROTONIX) 40 mg tablet, TAKE 1 TABLET BY MOUTH EVERY DAY, Disp: 90 tablet, Rfl: 1    simvastatin (ZOCOR) 5 MG tablet, TAKE 1 TABLET BY MOUTH EVERY DAY, Disp: 90 tablet, Rfl: 1    sodium chloride 1 g tablet, TAKE 1 TABLET (1 G TOTAL) BY MOUTH 3 (THREE) TIMES A DAY, Disp: 270 tablet, Rfl: 1    torsemide (DEMADEX) 10 mg tablet, TAKE 1 TABLET BY MOUTH EVERY DAY (Patient taking differently: As needed), Disp: 90 tablet, Rfl: 1    warfarin (COUMADIN) 5 mg tablet, Take 1-2 tablets daily As directed, Disp: 180 tablet, Rfl: 1  Allergies   Allergen Reactions    Indocin [Indomethacin] Other (See Comments), Dizziness and GI Intolerance     Reaction Date: [id number];   Nausea and dizziness    Macrobid [Nitrofurantoin Monohyd Macro] Rash    Penicillins Rash     Annotation - [id number]: can take cephalosporins    Sulfa Antibiotics Rash       Physical Exam:     Vitals:    01/25/21 0815   BP: 124/70   Pulse: 68   Resp: 16   Temp: 98 °F (36 7 °C)     Physical Exam  Vitals signs reviewed  Constitutional:       Appearance: She is well-developed  HENT:      Head: Normocephalic and atraumatic  Eyes:      Pupils: Pupils are equal, round, and reactive to light  Neck:      Musculoskeletal: Normal range of motion and neck supple  Thyroid: No thyromegaly  Vascular: No JVD  Trachea: No tracheal deviation     Cardiovascular: Rate and Rhythm: Normal rate and regular rhythm  Heart sounds: Normal heart sounds  No murmur  No friction rub  No gallop  Pulmonary:      Effort: Pulmonary effort is normal  No respiratory distress  Breath sounds: Normal breath sounds  No wheezing or rales  Chest:          Comments: The right breast was examined in the sitting and supine position  There are no worrisome skin changes, tenderness, inverted nipple, nipple discharge, swelling, bleeding or evidence of a mass in any quadrant  Jose Raul survey demonstrated no evidence of any clinically suspicious axillary, pectoral or paraclavicular lymph nodes  Examination of the left breast demonstrates considerable decrease in her ecchymosis in the lower outer quadrant  Though she still has a palpable mass  Abdominal:      General: There is no distension  Palpations: Abdomen is soft  There is no hepatomegaly or mass  Tenderness: There is no abdominal tenderness  There is no guarding or rebound  Musculoskeletal: Normal range of motion  General: No tenderness  Lymphadenopathy:      Cervical: No cervical adenopathy  Skin:     General: Skin is warm and dry  Findings: No erythema or rash  Neurological:      Mental Status: She is alert and oriented to person, place, and time  Cranial Nerves: No cranial nerve deficit  Psychiatric:         Behavior: Behavior normal            Results & Discussion:   See above, the patient's  will turn offer neuromodulator  She will have medical clearance  All questions were answered to both of their satisfaction  Advance Care Planning/Advance Directives:  I discussed the disease status, treatment plans and follow-up with the patient

## 2021-01-27 ENCOUNTER — OFFICE VISIT (OUTPATIENT)
Dept: INTERNAL MEDICINE CLINIC | Facility: CLINIC | Age: 76
End: 2021-01-27
Payer: MEDICARE

## 2021-01-27 VITALS
DIASTOLIC BLOOD PRESSURE: 72 MMHG | BODY MASS INDEX: 25.52 KG/M2 | WEIGHT: 144 LBS | OXYGEN SATURATION: 98 % | TEMPERATURE: 97.2 F | SYSTOLIC BLOOD PRESSURE: 142 MMHG | HEIGHT: 63 IN | HEART RATE: 72 BPM

## 2021-01-27 DIAGNOSIS — M54.16 LUMBAR RADICULOPATHY: ICD-10-CM

## 2021-01-27 DIAGNOSIS — I10 ESSENTIAL HYPERTENSION: ICD-10-CM

## 2021-01-27 DIAGNOSIS — I81 PORTAL VEIN THROMBOSIS: ICD-10-CM

## 2021-01-27 DIAGNOSIS — F41.9 ANXIETY: ICD-10-CM

## 2021-01-27 DIAGNOSIS — G25.0 ESSENTIAL TREMOR: ICD-10-CM

## 2021-01-27 DIAGNOSIS — Z01.818 PRE-OP EXAMINATION: Primary | ICD-10-CM

## 2021-01-27 DIAGNOSIS — K51.80 OTHER ULCERATIVE COLITIS WITHOUT COMPLICATION (HCC): ICD-10-CM

## 2021-01-27 DIAGNOSIS — K21.9 GASTROESOPHAGEAL REFLUX DISEASE, UNSPECIFIED WHETHER ESOPHAGITIS PRESENT: ICD-10-CM

## 2021-01-27 DIAGNOSIS — J30.89 NON-SEASONAL ALLERGIC RHINITIS DUE TO OTHER ALLERGIC TRIGGER: ICD-10-CM

## 2021-01-27 DIAGNOSIS — D05.12 DUCTAL CARCINOMA IN SITU (DCIS) OF LEFT BREAST: ICD-10-CM

## 2021-01-27 DIAGNOSIS — E22.2 SIADH (SYNDROME OF INAPPROPRIATE ADH PRODUCTION) (HCC): ICD-10-CM

## 2021-01-27 DIAGNOSIS — Z71.9 HEALTH COUNSELING: ICD-10-CM

## 2021-01-27 DIAGNOSIS — G62.9 PERIPHERAL POLYNEUROPATHY: ICD-10-CM

## 2021-01-27 PROCEDURE — 99214 OFFICE O/P EST MOD 30 MIN: CPT | Performed by: INTERNAL MEDICINE

## 2021-01-27 RX ORDER — LORAZEPAM 1 MG/1
1 TABLET ORAL EVERY 6 HOURS PRN
Qty: 120 TABLET | Refills: 0 | Status: SHIPPED | OUTPATIENT
Start: 2021-01-27 | End: 2021-03-01 | Stop reason: SDUPTHER

## 2021-01-27 RX ORDER — HYDROCODONE BITARTRATE AND ACETAMINOPHEN 5; 325 MG/1; MG/1
1 TABLET ORAL EVERY 6 HOURS PRN
Qty: 120 TABLET | Refills: 0 | Status: SHIPPED | OUTPATIENT
Start: 2021-01-27 | End: 2021-03-01 | Stop reason: SDUPTHER

## 2021-01-27 NOTE — PROGRESS NOTES
Assessment/Plan:    Ductal carcinoma in situ (DCIS) of left breast  Surgery as scheduled  Anxiety  Symptoms worse due to upcoming surgery, taking lorazepam regularly  PDMP reviewed, refilled  On Wellbutrin, Lexapro  SIADH (syndrome of inappropriate ADH production) (HCC)  Na stable, taking NaCl regularly  Portal vein thrombosis  Stop warfarin 5 days prior to surgery  Restart warfarin 1-2 days after surgery  Moderate episode of recurrent major depressive disorder ([facility] [address] )  As above  Essential tremor  DBS to be turned off during surgery  Mild intermittent asthma without complication  Stable, on Advair  Allergic rhinitis  May stop antihistamine, continue steroid nasal spray daily  Essential hypertension  BP stable, on lisinopril, diltiazem and torsermide  GERD (gastroesophageal reflux disease)  On daily PPI  Hyperlipidemia  On statin  Sjogren's syndrome ([facility] [address] )  On Plaquenil  Peripheral neuropathy  On gabapentin  Diagnoses and all orders for this visit:    Pre-op examination  Comments:  Moderate risk for planned procedure  Stop warfarin at least 5 days before surgery, vitamins/supplements 5-7 days  Ductal carcinoma in situ (DCIS) of left breast    Other ulcerative colitis without complication (HCC)    Anxiety  -     LORazepam (ATIVAN) 1 mg tablet; Take 1 tablet (1 mg total) by mouth every 6 (six) hours as needed for anxiety    Lumbar radiculopathy  -     HYDROcodone-acetaminophen (NORCO) 5-325 mg per tablet; Take 1 tablet by mouth every 6 (six) hours as needed for painMax Daily Amount: 4 tablets    SIADH (syndrome of inappropriate ADH production) (HCC)    Portal vein thrombosis    Non-seasonal allergic rhinitis due to other allergic trigger    Essential hypertension    Gastroesophageal reflux disease, unspecified whether esophagitis present    Essential tremor    Peripheral polyneuropathy    Health counseling  Comments:  2nd dose of COVID vaccine tomorrow        Follow up as scheduled or as needed  Subjective:      Patient ID: Jabier Ramos is a 67 y o  female for pre-op  She is feeling nervous for her coming surgery  She would like to just get it over with  She denies any chest pain, shortness of, wheezing or palpitations  She has not needed her rescue  She reports loose stools are about the same, no nausea or vomiting  She is nervous that her DBS will be turned off during surgery  She is asking when to stop her warfarin  She would like to stop taking Zyrtec since she feels this does really help  She continues to use her Flonase daily, daily sinus congestion and occasional postnasal drip  Denies any recent fever or chills  She did receive the first dose of  her COVID vaccine, second dose scheduled tomorrow  The following portions of the patient's history were reviewed and updated as appropriate: allergies, current medications, past medical history, past social history, past surgical history and problem list     Review of Systems   Constitutional: Negative for activity change, appetite change, fatigue and fever  HENT: Positive for congestion and rhinorrhea  Negative for ear pain and postnasal drip  Eyes: Negative for visual disturbance  Respiratory: Negative for cough and shortness of breath  Cardiovascular: Negative for chest pain and leg swelling  Gastrointestinal: Positive for diarrhea  Negative for abdominal pain and constipation  Genitourinary: Negative for dysuria and frequency  Musculoskeletal: Positive for arthralgias  Negative for myalgias  Neurological: Negative for dizziness and headaches  Hematological: Bruises/bleeds easily  Psychiatric/Behavioral: Negative for confusion  The patient is nervous/anxious            Objective:      /72   Pulse 72   Temp (!) 97 2 °F (36 2 °C)   Ht 5' 3" (1 6 m)   Wt 65 3 kg (144 lb)   SpO2 98%   BMI 25 51 kg/m²          Physical Exam  Vitals signs and nursing note reviewed  Constitutional:       General: She is not in acute distress  Appearance: She is well-developed  HENT:      Head: Normocephalic and atraumatic  Eyes:      Pupils: Pupils are equal, round, and reactive to light  Cardiovascular:      Rate and Rhythm: Normal rate and regular rhythm  Heart sounds: Normal heart sounds  Pulmonary:      Effort: Pulmonary effort is normal       Breath sounds: Normal breath sounds  No wheezing  Abdominal:      General: Bowel sounds are normal       Palpations: Abdomen is soft  Skin:     General: Skin is warm  Findings: No rash  Neurological:      General: No focal deficit present  Mental Status: She is alert and oriented to person, place, and time  Motor: Tremor present  Psychiatric:         Mood and Affect: Mood normal  Mood is not depressed  Speech: Speech normal          Behavior: Behavior normal          Lab results reviewed with patient

## 2021-01-27 NOTE — ASSESSMENT & PLAN NOTE
Symptoms worse due to upcoming surgery, taking lorazepam regularly  PDMP reviewed, refilled  On Wellbutrin, Lexapro

## 2021-01-28 ENCOUNTER — IMMUNIZATIONS (OUTPATIENT)
Dept: FAMILY MEDICINE CLINIC | Facility: HOSPITAL | Age: 76
End: 2021-01-28

## 2021-01-28 DIAGNOSIS — Z23 ENCOUNTER FOR IMMUNIZATION: Primary | ICD-10-CM

## 2021-01-28 PROCEDURE — 0002A SARS-COV-2 / COVID-19 MRNA VACCINE (PFIZER-BIONTECH) 30 MCG: CPT

## 2021-01-28 PROCEDURE — 91300 SARS-COV-2 / COVID-19 MRNA VACCINE (PFIZER-BIONTECH) 30 MCG: CPT

## 2021-02-05 DIAGNOSIS — Z01.818 PREOPERATIVE TESTING: ICD-10-CM

## 2021-02-05 DIAGNOSIS — D05.12 DUCTAL CARCINOMA IN SITU (DCIS) OF LEFT BREAST: ICD-10-CM

## 2021-02-05 PROCEDURE — U0003 INFECTIOUS AGENT DETECTION BY NUCLEIC ACID (DNA OR RNA); SEVERE ACUTE RESPIRATORY SYNDROME CORONAVIRUS 2 (SARS-COV-2) (CORONAVIRUS DISEASE [COVID-19]), AMPLIFIED PROBE TECHNIQUE, MAKING USE OF HIGH THROUGHPUT TECHNOLOGIES AS DESCRIBED BY CMS-2020-01-R: HCPCS

## 2021-02-05 PROCEDURE — U0005 INFEC AGEN DETEC AMPLI PROBE: HCPCS

## 2021-02-05 NOTE — PRE-PROCEDURE INSTRUCTIONS
Pre-Surgery Instructions:   Medication Instructions    albuterol (PROVENTIL HFA,VENTOLIN HFA) 90 mcg/act inhaler Instructed to continue using as prescribed up to and including DOS   buPROPion (WELLBUTRIN XL) 300 mg 24 hr tablet Instructed patient to continue taking as prescribed up to and including DOS with sips of water   Calcium Carb-Cholecalciferol (CALCIUM 600-D PO) Instructed patient per Anesthesia Guidelines   cetirizine (ZyrTEC) 10 mg tablet Instructed patient to continue taking as prescribed up to and including DOS with sips of water   Coenzyme Q10 (CO Q-10 PO) Instructed patient per Anesthesia Guidelines   diltiazem (CARDIZEM CD) 180 mg 24 hr capsule Instructed patient to continue taking as prescribed up to and including DOS with sips of water   escitalopram (LEXAPRO) 20 mg tablet Instructed patient to continue taking as prescribed up to and including DOS with sips of water   fluticasone (FLONASE) 50 mcg/act nasal spray Instructed to continue using as prescribed up to and including DOS   fluticasone-salmeterol (Advair Diskus) 250-50 mcg/dose inhaler Instructed to continue using up to and including DOS   gabapentin (NEURONTIN) 100 mg capsule Instructed patient to continue taking as prescribed up to and including DOS with sips of water   gabapentin (NEURONTIN) 600 MG tablet Instructed patient to continue taking as prescribed up to and including DOS with sips of water   HYDROcodone-acetaminophen (NORCO) 5-325 mg per tablet Instructed patient to continue taking as prescribed up to and including DOS with sips of water   hydroxychloroquine (PLAQUENIL) 200 mg tablet Instructed patient to continue taking as prescribed up to and including DOS with sips of water   levothyroxine 50 mcg tablet Instructed patient to continue taking as prescribed up to and including DOS with sips of water      lisinopril (ZESTRIL) 20 mg tablet Instructed per anesthesia guidelines to stop taking 2 days prior to surgery with last dose on 2/9/21    loperamide (IMODIUM) 2 mg capsule Pt stated she does not wish to take this DOS   LORazepam (ATIVAN) 1 mg tablet Instructed patient to continue taking as prescribed up to and including DOS with sips of water   MAGNESIUM PO Instructed patient per Anesthesia Guidelines   meclizine (ANTIVERT) 25 mg tablet Instructed patient to continue taking as prescribed up to and including DOS with sips of water   metaxalone (SKELAXIN) 800 mg tablet Instructed patient to continue taking as prescribed up to and including DOS with sips of water   Multiple Vitamin (MULTIVITAMIN ADULT PO) Instructed patient per Anesthesia Guidelines   naloxone (NARCAN) 4 mg/0 1 mL nasal spray N/A- Pt would only use in an emergency for overdose    Omega-3 Fatty Acids (FISH OIL PO) Instructed patient per Anesthesia Guidelines   pantoprazole (PROTONIX) 40 mg tablet Instructed patient to continue taking as prescribed up to and including DOS with sips of water   simvastatin (ZOCOR) 5 MG tablet Instructed patient to continue taking as prescribed up to and including DOS with sips of water   sodium chloride 1 g tablet Instructed patient to continue taking as prescribed up to but NOT including DOS   torsemide (DEMADEX) 10 mg tablet Instructed patient to continue taking as prescribed up to but NOT including DOS   warfarin (COUMADIN) 5 mg tablet Pt was instructed by PCP Dr Joseph Jhaveri to stop warfarin 5 days prior to surgery with last dose to be taken on 2/7/21    Med list with specific instructions reviewed as above  Also instructed per anesthesia guidelines to stop vitamins/supplements (excluding sodium pill) 1 week prior to surgery and to avoid NSAID's 3 days prior to surgery  Pt has CHG surgical soap and understands preop showering protocol  Reviewed [facility] current covid visitor restriction policy and pt understands that it may change at any time      Pt instructed to bring DBS  DOS  All information within "My Surgical Experience" pamphlet reviewed and patient verbalizes understanding and compliance  All questions answered

## 2021-02-06 LAB — SARS-COV-2 RNA RESP QL NAA+PROBE: NEGATIVE

## 2021-02-11 ENCOUNTER — TELEPHONE (OUTPATIENT)
Dept: INTERNAL MEDICINE CLINIC | Facility: CLINIC | Age: 76
End: 2021-02-11

## 2021-02-11 ENCOUNTER — ANESTHESIA EVENT (OUTPATIENT)
Dept: PERIOP | Facility: HOSPITAL | Age: 76
End: 2021-02-11
Payer: MEDICARE

## 2021-02-11 NOTE — TELEPHONE ENCOUNTER
Patient called and stated she is going in for surgery tomorrow and was told to stop B/p meds yesterday and today  Patient states her B/P is 181/90 and would like to know if she should do anything, she is afraid it will be higher tomorrow

## 2021-02-11 NOTE — TELEPHONE ENCOUNTER
You can still take the diltiazem, just not the lisinopril and torsemide    Take it today and tomorrow

## 2021-02-12 ENCOUNTER — APPOINTMENT (OUTPATIENT)
Dept: RADIOLOGY | Facility: HOSPITAL | Age: 76
End: 2021-02-12
Payer: MEDICARE

## 2021-02-12 ENCOUNTER — HOSPITAL ENCOUNTER (OUTPATIENT)
Dept: NUCLEAR MEDICINE | Facility: HOSPITAL | Age: 76
Discharge: HOME | End: 2021-02-12
Attending: SURGERY
Payer: MEDICARE

## 2021-02-12 ENCOUNTER — ANESTHESIA (OUTPATIENT)
Dept: PERIOP | Facility: HOSPITAL | Age: 76
End: 2021-02-12
Payer: MEDICARE

## 2021-02-12 ENCOUNTER — HOSPITAL ENCOUNTER (OUTPATIENT)
Facility: HOSPITAL | Age: 76
Setting detail: OUTPATIENT SURGERY
Discharge: HOME | End: 2021-02-13
Attending: SURGERY | Admitting: SURGERY
Payer: MEDICARE

## 2021-02-12 VITALS — HEART RATE: 72 BPM

## 2021-02-12 DIAGNOSIS — D05.12 DUCTAL CARCINOMA IN SITU (DCIS) OF LEFT BREAST: ICD-10-CM

## 2021-02-12 LAB
APTT PPP: 27 SECONDS (ref 23–37)
INR PPP: 1 (ref 0.84–1.19)
PROTHROMBIN TIME: 13.3 SECONDS (ref 11.6–14.5)

## 2021-02-12 PROCEDURE — 88334 PATH CONSLTJ SURG CYTO XM EA: CPT | Performed by: PATHOLOGY

## 2021-02-12 PROCEDURE — 85730 THROMBOPLASTIN TIME PARTIAL: CPT | Performed by: SURGERY

## 2021-02-12 PROCEDURE — 88333 PATH CONSLTJ SURG CYTO XM 1: CPT | Performed by: PATHOLOGY

## 2021-02-12 PROCEDURE — 85610 PROTHROMBIN TIME: CPT | Performed by: SURGERY

## 2021-02-12 PROCEDURE — A9541 TC99M SULFUR COLLOID: HCPCS

## 2021-02-12 PROCEDURE — 19307 MAST MOD RAD: CPT | Performed by: SURGERY

## 2021-02-12 PROCEDURE — G9198 NO ORDER FOR CEPH NO REASON: HCPCS | Performed by: SURGERY

## 2021-02-12 PROCEDURE — C9290 INJ, BUPIVACAINE LIPOSOME: HCPCS | Performed by: ANESTHESIOLOGY

## 2021-02-12 PROCEDURE — NC001 PR NO CHARGE: Performed by: PHYSICIAN ASSISTANT

## 2021-02-12 PROCEDURE — 88307 TISSUE EXAM BY PATHOLOGIST: CPT | Performed by: PATHOLOGY

## 2021-02-12 PROCEDURE — 38792 RA TRACER ID OF SENTINL NODE: CPT

## 2021-02-12 PROCEDURE — 38792 RA TRACER ID OF SENTINL NODE: CPT | Performed by: SURGERY

## 2021-02-12 PROCEDURE — 38900 IO MAP OF SENT LYMPH NODE: CPT | Performed by: SURGERY

## 2021-02-12 RX ORDER — FENTANYL CITRATE 50 UG/ML
INJECTION, SOLUTION INTRAMUSCULAR; INTRAVENOUS AS NEEDED
Status: DISCONTINUED | OUTPATIENT
Start: 2021-02-12 | End: 2021-02-12

## 2021-02-12 RX ORDER — GABAPENTIN 100 MG/1
200 CAPSULE ORAL 2 TIMES DAILY
Status: DISCONTINUED | OUTPATIENT
Start: 2021-02-12 | End: 2021-02-13 | Stop reason: HOSPADM

## 2021-02-12 RX ORDER — GABAPENTIN 400 MG/1
1200 CAPSULE ORAL
Status: DISCONTINUED | OUTPATIENT
Start: 2021-02-12 | End: 2021-02-13 | Stop reason: HOSPADM

## 2021-02-12 RX ORDER — HYDROXYCHLOROQUINE SULFATE 200 MG/1
200 TABLET, FILM COATED ORAL
Status: DISCONTINUED | OUTPATIENT
Start: 2021-02-13 | End: 2021-02-13 | Stop reason: HOSPADM

## 2021-02-12 RX ORDER — FLUTICASONE PROPIONATE 50 MCG
1 SPRAY, SUSPENSION (ML) NASAL DAILY PRN
Status: DISCONTINUED | OUTPATIENT
Start: 2021-02-12 | End: 2021-02-13 | Stop reason: HOSPADM

## 2021-02-12 RX ORDER — OXYCODONE HYDROCHLORIDE 5 MG/1
5 TABLET ORAL ONCE
Status: COMPLETED | OUTPATIENT
Start: 2021-02-12 | End: 2021-02-12

## 2021-02-12 RX ORDER — METAXALONE 800 MG/1
800 TABLET ORAL 3 TIMES DAILY
Status: DISCONTINUED | OUTPATIENT
Start: 2021-02-12 | End: 2021-02-13 | Stop reason: HOSPADM

## 2021-02-12 RX ORDER — SODIUM CHLORIDE 1000 MG
1 TABLET, SOLUBLE MISCELLANEOUS 3 TIMES DAILY
Status: DISCONTINUED | OUTPATIENT
Start: 2021-02-12 | End: 2021-02-12

## 2021-02-12 RX ORDER — LABETALOL 20 MG/4 ML (5 MG/ML) INTRAVENOUS SYRINGE
5
Status: DISCONTINUED | OUTPATIENT
Start: 2021-02-12 | End: 2021-02-12 | Stop reason: HOSPADM

## 2021-02-12 RX ORDER — ALBUTEROL SULFATE 90 UG/1
2 AEROSOL, METERED RESPIRATORY (INHALATION) EVERY 6 HOURS PRN
Status: DISCONTINUED | OUTPATIENT
Start: 2021-02-12 | End: 2021-02-13 | Stop reason: HOSPADM

## 2021-02-12 RX ORDER — PROPOFOL 10 MG/ML
INJECTION, EMULSION INTRAVENOUS AS NEEDED
Status: DISCONTINUED | OUTPATIENT
Start: 2021-02-12 | End: 2021-02-12

## 2021-02-12 RX ORDER — OXYCODONE HYDROCHLORIDE 5 MG/1
5 TABLET ORAL EVERY 4 HOURS PRN
Status: DISCONTINUED | OUTPATIENT
Start: 2021-02-12 | End: 2021-02-13 | Stop reason: HOSPADM

## 2021-02-12 RX ORDER — MECLIZINE HCL 12.5 MG/1
25 TABLET ORAL EVERY 6 HOURS PRN
Status: DISCONTINUED | OUTPATIENT
Start: 2021-02-12 | End: 2021-02-13 | Stop reason: HOSPADM

## 2021-02-12 RX ORDER — HYDROMORPHONE HCL/PF 1 MG/ML
0.5 SYRINGE (ML) INJECTION
Status: DISCONTINUED | OUTPATIENT
Start: 2021-02-12 | End: 2021-02-12 | Stop reason: HOSPADM

## 2021-02-12 RX ORDER — BUPIVACAINE HYDROCHLORIDE 2.5 MG/ML
INJECTION, SOLUTION EPIDURAL; INFILTRATION; INTRACAUDAL AS NEEDED
Status: DISCONTINUED | OUTPATIENT
Start: 2021-02-12 | End: 2021-02-12 | Stop reason: HOSPADM

## 2021-02-12 RX ORDER — PROMETHAZINE HYDROCHLORIDE 25 MG/ML
12.5 INJECTION, SOLUTION INTRAMUSCULAR; INTRAVENOUS ONCE AS NEEDED
Status: DISCONTINUED | OUTPATIENT
Start: 2021-02-12 | End: 2021-02-12 | Stop reason: HOSPADM

## 2021-02-12 RX ORDER — OXYCODONE HYDROCHLORIDE AND ACETAMINOPHEN 5; 325 MG/1; MG/1
1 TABLET ORAL EVERY 6 HOURS PRN
Status: DISCONTINUED | OUTPATIENT
Start: 2021-02-12 | End: 2021-02-13 | Stop reason: HOSPADM

## 2021-02-12 RX ORDER — TORSEMIDE 10 MG/1
10 TABLET ORAL DAILY
Status: DISCONTINUED | OUTPATIENT
Start: 2021-02-12 | End: 2021-02-13 | Stop reason: HOSPADM

## 2021-02-12 RX ORDER — ONDANSETRON 2 MG/ML
4 INJECTION INTRAMUSCULAR; INTRAVENOUS ONCE AS NEEDED
Status: DISCONTINUED | OUTPATIENT
Start: 2021-02-12 | End: 2021-02-12 | Stop reason: HOSPADM

## 2021-02-12 RX ORDER — CLINDAMYCIN PHOSPHATE 900 MG/50ML
900 INJECTION INTRAVENOUS ONCE
Status: COMPLETED | OUTPATIENT
Start: 2021-02-12 | End: 2021-02-12

## 2021-02-12 RX ORDER — ALBUTEROL SULFATE 2.5 MG/3ML
2.5 SOLUTION RESPIRATORY (INHALATION) ONCE AS NEEDED
Status: DISCONTINUED | OUTPATIENT
Start: 2021-02-12 | End: 2021-02-12 | Stop reason: HOSPADM

## 2021-02-12 RX ORDER — BUPIVACAINE HYDROCHLORIDE AND EPINEPHRINE 2.5; 5 MG/ML; UG/ML
INJECTION, SOLUTION EPIDURAL; INFILTRATION; INTRACAUDAL; PERINEURAL AS NEEDED
Status: DISCONTINUED | OUTPATIENT
Start: 2021-02-12 | End: 2021-02-12 | Stop reason: HOSPADM

## 2021-02-12 RX ORDER — SIMVASTATIN 5 MG
5 TABLET ORAL DAILY
Status: DISCONTINUED | OUTPATIENT
Start: 2021-02-12 | End: 2021-02-12

## 2021-02-12 RX ORDER — SUCCINYLCHOLINE/SOD CL,ISO/PF 100 MG/5ML
SYRINGE (ML) INTRAVENOUS AS NEEDED
Status: DISCONTINUED | OUTPATIENT
Start: 2021-02-12 | End: 2021-02-12

## 2021-02-12 RX ORDER — MAGNESIUM HYDROXIDE 1200 MG/15ML
LIQUID ORAL AS NEEDED
Status: DISCONTINUED | OUTPATIENT
Start: 2021-02-12 | End: 2021-02-12 | Stop reason: HOSPADM

## 2021-02-12 RX ORDER — DILTIAZEM HYDROCHLORIDE 180 MG/1
180 CAPSULE, COATED, EXTENDED RELEASE ORAL DAILY
Status: DISCONTINUED | OUTPATIENT
Start: 2021-02-12 | End: 2021-02-13 | Stop reason: HOSPADM

## 2021-02-12 RX ORDER — MEPERIDINE HYDROCHLORIDE 25 MG/ML
12.5 INJECTION INTRAMUSCULAR; INTRAVENOUS; SUBCUTANEOUS ONCE
Status: DISCONTINUED | OUTPATIENT
Start: 2021-02-12 | End: 2021-02-12 | Stop reason: HOSPADM

## 2021-02-12 RX ORDER — LIDOCAINE HYDROCHLORIDE 10 MG/ML
0.5 INJECTION, SOLUTION EPIDURAL; INFILTRATION; INTRACAUDAL; PERINEURAL ONCE AS NEEDED
Status: DISCONTINUED | OUTPATIENT
Start: 2021-02-12 | End: 2021-02-12 | Stop reason: HOSPADM

## 2021-02-12 RX ORDER — ACETAMINOPHEN 325 MG/1
650 TABLET ORAL EVERY 6 HOURS SCHEDULED
Status: DISCONTINUED | OUTPATIENT
Start: 2021-02-12 | End: 2021-02-13 | Stop reason: HOSPADM

## 2021-02-12 RX ORDER — FENTANYL CITRATE/PF 50 MCG/ML
25 SYRINGE (ML) INJECTION
Status: DISCONTINUED | OUTPATIENT
Start: 2021-02-12 | End: 2021-02-12 | Stop reason: HOSPADM

## 2021-02-12 RX ORDER — LABETALOL 20 MG/4 ML (5 MG/ML) INTRAVENOUS SYRINGE
AS NEEDED
Status: DISCONTINUED | OUTPATIENT
Start: 2021-02-12 | End: 2021-02-12

## 2021-02-12 RX ORDER — DEXAMETHASONE SODIUM PHOSPHATE 10 MG/ML
INJECTION, SOLUTION INTRAMUSCULAR; INTRAVENOUS AS NEEDED
Status: DISCONTINUED | OUTPATIENT
Start: 2021-02-12 | End: 2021-02-12

## 2021-02-12 RX ORDER — ONDANSETRON 2 MG/ML
INJECTION INTRAMUSCULAR; INTRAVENOUS AS NEEDED
Status: DISCONTINUED | OUTPATIENT
Start: 2021-02-12 | End: 2021-02-12

## 2021-02-12 RX ORDER — BUPROPION HYDROCHLORIDE 150 MG/1
300 TABLET ORAL DAILY
Status: DISCONTINUED | OUTPATIENT
Start: 2021-02-12 | End: 2021-02-13 | Stop reason: HOSPADM

## 2021-02-12 RX ORDER — SODIUM CHLORIDE, SODIUM LACTATE, POTASSIUM CHLORIDE, CALCIUM CHLORIDE 600; 310; 30; 20 MG/100ML; MG/100ML; MG/100ML; MG/100ML
100 INJECTION, SOLUTION INTRAVENOUS CONTINUOUS
Status: DISPENSED | OUTPATIENT
Start: 2021-02-12 | End: 2021-02-12

## 2021-02-12 RX ORDER — PRAVASTATIN SODIUM 10 MG
10 TABLET ORAL
Status: DISCONTINUED | OUTPATIENT
Start: 2021-02-12 | End: 2021-02-13 | Stop reason: HOSPADM

## 2021-02-12 RX ORDER — LANOLIN ALCOHOL/MO/W.PET/CERES
3 CREAM (GRAM) TOPICAL
Status: DISCONTINUED | OUTPATIENT
Start: 2021-02-12 | End: 2021-02-13 | Stop reason: HOSPADM

## 2021-02-12 RX ORDER — LIDOCAINE HYDROCHLORIDE 10 MG/ML
INJECTION, SOLUTION EPIDURAL; INFILTRATION; INTRACAUDAL; PERINEURAL AS NEEDED
Status: DISCONTINUED | OUTPATIENT
Start: 2021-02-12 | End: 2021-02-12

## 2021-02-12 RX ORDER — CETIRIZINE HYDROCHLORIDE 10 MG/1
10 TABLET ORAL DAILY
Status: DISCONTINUED | OUTPATIENT
Start: 2021-02-12 | End: 2021-02-12

## 2021-02-12 RX ORDER — OXYCODONE HYDROCHLORIDE 10 MG/1
10 TABLET ORAL EVERY 4 HOURS PRN
Status: DISCONTINUED | OUTPATIENT
Start: 2021-02-12 | End: 2021-02-12

## 2021-02-12 RX ORDER — LISINOPRIL 20 MG/1
20 TABLET ORAL 2 TIMES DAILY
Status: DISCONTINUED | OUTPATIENT
Start: 2021-02-12 | End: 2021-02-13 | Stop reason: HOSPADM

## 2021-02-12 RX ADMIN — GABAPENTIN 1200 MG: 400 CAPSULE ORAL at 21:25

## 2021-02-12 RX ADMIN — BUPROPION HYDROCHLORIDE 300 MG: 150 TABLET, EXTENDED RELEASE ORAL at 14:30

## 2021-02-12 RX ADMIN — FENTANYL CITRATE 25 MCG: 50 INJECTION, SOLUTION INTRAMUSCULAR; INTRAVENOUS at 10:51

## 2021-02-12 RX ADMIN — LABETALOL 20 MG/4 ML (5 MG/ML) INTRAVENOUS SYRINGE 10 MG: at 12:14

## 2021-02-12 RX ADMIN — METAXALONE 800 MG: 800 TABLET ORAL at 21:26

## 2021-02-12 RX ADMIN — Medication 3 MG: at 21:25

## 2021-02-12 RX ADMIN — LIDOCAINE HYDROCHLORIDE 50 MG: 10 INJECTION, SOLUTION EPIDURAL; INFILTRATION; INTRACAUDAL at 10:51

## 2021-02-12 RX ADMIN — Medication 100 MG: at 10:51

## 2021-02-12 RX ADMIN — OXYCODONE HYDROCHLORIDE 5 MG: 5 TABLET ORAL at 23:35

## 2021-02-12 RX ADMIN — GABAPENTIN 200 MG: 100 CAPSULE ORAL at 17:00

## 2021-02-12 RX ADMIN — FENTANYL CITRATE 25 MCG: 50 INJECTION, SOLUTION INTRAMUSCULAR; INTRAVENOUS at 11:19

## 2021-02-12 RX ADMIN — LISINOPRIL 20 MG: 20 TABLET ORAL at 14:30

## 2021-02-12 RX ADMIN — OXYCODONE HYDROCHLORIDE 5 MG: 5 TABLET ORAL at 18:25

## 2021-02-12 RX ADMIN — SODIUM CHLORIDE, SODIUM LACTATE, POTASSIUM CHLORIDE, AND CALCIUM CHLORIDE: .6; .31; .03; .02 INJECTION, SOLUTION INTRAVENOUS at 10:40

## 2021-02-12 RX ADMIN — FENTANYL CITRATE 50 MCG: 50 INJECTION, SOLUTION INTRAMUSCULAR; INTRAVENOUS at 11:14

## 2021-02-12 RX ADMIN — TORSEMIDE 10 MG: 10 TABLET ORAL at 14:31

## 2021-02-12 RX ADMIN — CLINDAMYCIN PHOSPHATE 900 MG: 18 INJECTION, SOLUTION INTRAMUSCULAR; INTRAVENOUS at 10:45

## 2021-02-12 RX ADMIN — ONDANSETRON 4 MG: 2 INJECTION INTRAMUSCULAR; INTRAVENOUS at 11:46

## 2021-02-12 RX ADMIN — METAXALONE 800 MG: 800 TABLET ORAL at 16:59

## 2021-02-12 RX ADMIN — FENTANYL CITRATE 25 MCG: 50 INJECTION INTRAMUSCULAR; INTRAVENOUS at 12:33

## 2021-02-12 RX ADMIN — DILTIAZEM HYDROCHLORIDE 180 MG: 180 CAPSULE, COATED, EXTENDED RELEASE ORAL at 14:31

## 2021-02-12 RX ADMIN — DEXAMETHASONE SODIUM PHOSPHATE 4 MG: 10 INJECTION, SOLUTION INTRAMUSCULAR; INTRAVENOUS at 10:58

## 2021-02-12 RX ADMIN — PROPOFOL 150 MG: 10 INJECTION, EMULSION INTRAVENOUS at 10:51

## 2021-02-12 RX ADMIN — PRAVASTATIN SODIUM 10 MG: 10 TABLET ORAL at 17:00

## 2021-02-12 RX ADMIN — OXYCODONE HYDROCHLORIDE AND ACETAMINOPHEN 1 TABLET: 5; 325 TABLET ORAL at 14:30

## 2021-02-12 RX ADMIN — LISINOPRIL 20 MG: 20 TABLET ORAL at 17:00

## 2021-02-12 RX ADMIN — FENTANYL CITRATE 25 MCG: 50 INJECTION INTRAMUSCULAR; INTRAVENOUS at 12:39

## 2021-02-12 RX ADMIN — ACETAMINOPHEN 650 MG: 325 TABLET, FILM COATED ORAL at 21:26

## 2021-02-12 NOTE — CASE MANAGEMENT
[facility] is able to accept pt and will be providing SN care for drains    Contact information has been placed on AVS

## 2021-02-12 NOTE — OP NOTE
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]    Procedure(s) (LRB):  BREAST MASTECTOMY WITH SENTINEL LYMPH NODE BIOPSY, LYMPHATIC MAPPING WITH BLUE DYE AND RADIAOCTIVE DYE (INJECT AT 1100 BY DR GILLESPIE IN THE OR) (Left)    Specimen(s):  ID Type Source Tests Collected by Time Destination   1 : Left breast sentinel lymph node  Tissue Axillary lymph node TISSUE EXAM Miryam Peña MD 2021 1117    2 : Left Breast- stitch marks short superior, long lateral Tissue Breast, Left TISSUE EXAM Miryam Peña MD 2021 1132        Estimated Blood Loss:   Minimal    Drains:  Closed/Suction Drain Left Breast Bulb 19 Fr   (Active)   Number of days: 0       Anesthesia Type:   General    Operative Indications:  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]      Operative Findings:  Good specimen radiograph, 2 sln, blue and hot, negative on touch prep     Complications:   None    Procedure and Technique:  The patient was brought to the operation room and placed under general anesthesia   Attention was paid to ensure appropriate padding and correct positioning   The left breast was injected intradermally with 0 3cc of methylene blue followed by technetium sulfur colloid and then prepped and draped in a sterile fashion   I initiated a time-out, identifying the patient, the correct side and the above procedure   All parties agreed with the time out      The planned incision was then injected with 0 25% Marcaine and epinephrine for local anesthesia   The incision was sharply incised   Thin flaps were then elevated using electrocautery starting superiorly and then continuing medially, inferiorly and finally laterally   Dissection was carried just to the inferior surface of the neuromodulator superiorly  The tail of Gabrielle Katz was then dissected away from the axilla proper leaving the breast tethered to the underlying musculature       The sentinel lymph node was identified and removed  The node was blue and radioactive  There were no other radioactive, blue or enlarged lymph nodes in the axilla  The sentinel lymph node was sent to pathology and there was no microscopic evidence of metastasis  There were two nodes, both were negative  The breast was then removed from the muscles in a sub-facial plane   The breast was oriented with sutures (short=superior; medium=medial; long=lateral)  The breast was sent to pathology in formalin for permanent pathologic evaluation  Meticulous hemostasis was maintained with electrocautery      Approximately 14 cc of exparel was injected subfascially along the pectoralis muscles as well as the serratus muscles for post operative pain control  The wound was extensively irrigated and two 19mm, slotted, round OSEAS drains were placed and brought out through separate incisions and secured with nylon sutures   The wound was then closed in two layers - an inner layer of 2-0 vicryl and a subcuticular closure with 4-0 Monocryl  Steri-strips were applied  The counts were correct x2  I was present for the entire procedure, and advanced practitioner was used to facilitate retraction dissection and closure      Patient Disposition:  PACU     SIGNATURE: Malorie Bose MD  DATE: February 12, 2021  TIME: 11:54 AM

## 2021-02-12 NOTE — CASE MANAGEMENT
Cm met with pt as she was recently brought to the floor s/p breast mastectomy  Cm reviewed role and introduced self  Cm offered VNA services to assist her at home upon DC  Pt is requesting referrals be made for VNA  Pt prefers [facility] however is open to any other agency in the event they are not able to accept due to high census  Referrals have been sent to [facility] and [facility]  Cm will continue to follow to assist with needs

## 2021-02-12 NOTE — ANESTHESIA POSTPROCEDURE EVALUATION
Post-Op Assessment Note    CV Status:  Stable    Pain management: adequate     Mental Status:  Alert and awake   Hydration Status:  Euvolemic   PONV Controlled:  Controlled   Airway Patency:  Patent      Post Op Vitals Reviewed: Yes      Staff: CRNA   Comments: vss report rn        No complications documented      BP  179/79   Temp      Pulse  65   Resp      SpO2   95 RA

## 2021-02-12 NOTE — PLAN OF CARE
Problem: PAIN - ADULT  Goal: Verbalizes/displays adequate comfort level or baseline comfort level  Description: Interventions:  - Encourage patient to monitor pain and request assistance  - Assess pain using appropriate pain scale  - Administer analgesics based on type and severity of pain and evaluate response  - Implement non-pharmacological measures as appropriate and evaluate response  - Consider cultural and social influences on pain and pain management  - Notify physician/advanced practitioner if interventions unsuccessful or patient reports new pain  Outcome: Progressing     Problem: INFECTION - ADULT  Goal: Absence or prevention of progression during hospitalization  Description: INTERVENTIONS:  - Assess and monitor for signs and symptoms of infection  - Monitor lab/diagnostic results  - Monitor all insertion sites, i e  indwelling lines, tubes, and drains  - Monitor endotracheal if appropriate and nasal secretions for changes in amount and color  - [place] appropriate cooling/warming therapies per order  - Administer medications as ordered  - Instruct and encourage patient and family to use good hand hygiene technique  - Identify and instruct in appropriate isolation precautions for identified infection/condition  Outcome: Progressing  Goal: Absence of fever/infection during neutropenic period  Description: INTERVENTIONS:  - Monitor WBC    Outcome: Progressing     Problem: SAFETY ADULT  Goal: Patient will remain free of falls  Description: INTERVENTIONS:  - Assess patient frequently for physical needs  -  Identify cognitive and physical deficits and behaviors that affect risk of falls    -  [place] fall precautions as indicated by assessment   - Educate patient/family on patient safety including physical limitations  - Instruct patient to call for assistance with activity based on assessment  - Modify environment to reduce risk of injury  - Consider OT/PT consult to assist with strengthening/mobility  Outcome: Progressing  Goal: Maintain or return to baseline ADL function  Description: INTERVENTIONS:  -  Assess patient's ability to carry out ADLs; assess patient's baseline for ADL function and identify physical deficits which impact ability to perform ADLs (bathing, care of mouth/teeth, toileting, grooming, dressing, etc )  - Assess/evaluate cause of self-care deficits   - Assess range of motion  - Assess patient's mobility; develop plan if impaired  - Assess patient's need for assistive devices and provide as appropriate  - Encourage maximum independence but intervene and supervise when necessary  - Involve family in performance of ADLs  - Assess for home care needs following discharge   - Consider OT consult to assist with ADL evaluation and planning for discharge  - Provide patient education as appropriate  Outcome: Progressing  Goal: Maintain or return mobility status to optimal level  Description: INTERVENTIONS:  - Assess patient's baseline mobility status (ambulation, transfers, stairs, etc )    - Identify cognitive and physical deficits and behaviors that affect mobility  - Identify mobility aids required to assist with transfers and/or ambulation (gait belt, sit-to-stand, lift, walker, cane, etc )  - [place] fall precautions as indicated by assessment  - Record patient progress and toleration of activity level on Mobility SBAR; progress patient to next Phase/Stage  - Instruct patient to call for assistance with activity based on assessment  - Consider rehabilitation consult to assist with strengthening/weightbearing, etc   Outcome: Progressing     Problem: DISCHARGE PLANNING  Goal: Discharge to home or other facility with appropriate resources  Description: INTERVENTIONS:  - Identify barriers to discharge w/patient and caregiver  - Arrange for needed discharge resources and transportation as appropriate  - Identify discharge learning needs (meds, wound care, etc )  - Arrange for interpretive services to assist at discharge as needed  - Refer to Case Management Department for coordinating discharge planning if the patient needs post-hospital services based on physician/advanced practitioner order or complex needs related to functional status, cognitive ability, or social support system  Outcome: Progressing     Problem: Knowledge Deficit  Goal: Patient/family/caregiver demonstrates understanding of disease process, treatment plan, medications, and discharge instructions  Description: Complete learning assessment and assess knowledge base    Interventions:  - Provide teaching at level of understanding  - Provide teaching via preferred learning methods  Outcome: Progressing

## 2021-02-12 NOTE — ANESTHESIA PREPROCEDURE EVALUATION
Procedure:  BREAST MASTECTOMY WITH SENTINEL LYMPH NODE BIOPSY, LYMPHATIC MAPPING WITH BLUE DYE AND RADIAOCTIVE DYE (INJECT AT 80 BY DR GILLESPIE IN THE OR) (Left Breast)    Relevant Problems   CARDIO   (+) Essential hypertension   (+) Hyperlipidemia   (+) Portal vein thrombosis   (+) Supraventricular tachycardia (HCC)      ENDO   (+) Acquired hypothyroidism      GI/HEPATIC   (+) GERD (gastroesophageal reflux disease)   (+) Portal vein thrombosis      HEMATOLOGY   (+) Anemia   (+) Iron deficiency anemia      MUSCULOSKELETAL   (+) Lumbar degenerative disc disease   (+) Osteoarthritis of right knee      NEURO/PSYCH  Tremor s/p deep brain stimulator   (+) Anxiety   (+) Moderate episode of recurrent major depressive disorder (HCC)      PULMONARY   (+) Mild intermittent asthma without complication      Other   (+) Essential tremor   (+) Hypomagnesemia   (+) Peripheral neuropathy   (+) S/P deep brain stimulator placement   (+) SIADH (syndrome of inappropriate ADH production) ([facility] [address] )   (+) Sjogren's syndrome ([facility] [address] )        Physical Exam    Airway    Mallampati score: I  TM Distance: >3 FB  Neck ROM: full     Dental   Comment: Missing multiple teeth,     Cardiovascular      Pulmonary      Other Findings        Anesthesia Plan  ASA Score- 3     Anesthesia Type- general with ASA Monitors  Additional Monitors:   Airway Plan: ETT  Comment: Patient seen and examined  History reviewed  Patient to be done under general anesthesia with ETT and routine monitors  PEC block for post-op pain control  Risks discussed with the patient  Consent obtained          Plan Factors-Exercise tolerance (METS): >4 METS  Chart reviewed  EKG reviewed  Existing labs reviewed  Patient summary reviewed  Induction- intravenous  Postoperative Plan-     Informed Consent- Anesthetic plan and risks discussed with patient  I personally reviewed this patient with the CRNA   Discussed and agreed on the Anesthesia Plan with the RYANNE Brink

## 2021-02-12 NOTE — CASE MANAGEMENT
[facility] is able to accept pt and is able to see her 2/15    Contact information has been included on the AVS

## 2021-02-13 VITALS
TEMPERATURE: 99.9 F | DIASTOLIC BLOOD PRESSURE: 74 MMHG | HEIGHT: 63 IN | WEIGHT: 144 LBS | RESPIRATION RATE: 16 BRPM | OXYGEN SATURATION: 92 % | HEART RATE: 63 BPM | BODY MASS INDEX: 25.52 KG/M2 | SYSTOLIC BLOOD PRESSURE: 167 MMHG

## 2021-02-13 LAB
ANION GAP SERPL CALCULATED.3IONS-SCNC: 6 MMOL/L (ref 4–13)
BASOPHILS # BLD AUTO: 0.03 THOUSANDS/ΜL (ref 0–0.1)
BASOPHILS NFR BLD AUTO: 0 % (ref 0–1)
BUN SERPL-MCNC: 12 MG/DL (ref 5–25)
CALCIUM SERPL-MCNC: 8.4 MG/DL (ref 8.3–10.1)
CHLORIDE SERPL-SCNC: 99 MMOL/L (ref 100–108)
CO2 SERPL-SCNC: 30 MMOL/L (ref 21–32)
CREAT SERPL-MCNC: 0.75 MG/DL (ref 0.6–1.3)
EOSINOPHIL # BLD AUTO: 0.01 THOUSAND/ΜL (ref 0–0.61)
EOSINOPHIL NFR BLD AUTO: 0 % (ref 0–6)
ERYTHROCYTE [DISTWIDTH] IN BLOOD BY AUTOMATED COUNT: 14.2 % (ref 11.6–15.1)
GFR SERPL CREATININE-BSD FRML MDRD: 78 ML/MIN/1.73SQ M
GLUCOSE SERPL-MCNC: 116 MG/DL (ref 65–140)
HCT VFR BLD AUTO: 33.4 % (ref 34.8–46.1)
HGB BLD-MCNC: 10.6 G/DL (ref 11.5–15.4)
IMM GRANULOCYTES # BLD AUTO: 0.03 THOUSAND/UL (ref 0–0.2)
IMM GRANULOCYTES NFR BLD AUTO: 0 % (ref 0–2)
LYMPHOCYTES # BLD AUTO: 1.9 THOUSANDS/ΜL (ref 0.6–4.47)
LYMPHOCYTES NFR BLD AUTO: 21 % (ref 14–44)
MCH RBC QN AUTO: 30.3 PG (ref 26.8–34.3)
MCHC RBC AUTO-ENTMCNC: 31.7 G/DL (ref 31.4–37.4)
MCV RBC AUTO: 95 FL (ref 82–98)
MONOCYTES # BLD AUTO: 0.96 THOUSAND/ΜL (ref 0.17–1.22)
MONOCYTES NFR BLD AUTO: 11 % (ref 4–12)
NEUTROPHILS # BLD AUTO: 6.25 THOUSANDS/ΜL (ref 1.85–7.62)
NEUTS SEG NFR BLD AUTO: 68 % (ref 43–75)
NRBC BLD AUTO-RTO: 0 /100 WBCS
PLATELET # BLD AUTO: 396 THOUSANDS/UL (ref 149–390)
PMV BLD AUTO: 8.5 FL (ref 8.9–12.7)
POTASSIUM SERPL-SCNC: 4.1 MMOL/L (ref 3.5–5.3)
RBC # BLD AUTO: 3.5 MILLION/UL (ref 3.81–5.12)
SODIUM SERPL-SCNC: 135 MMOL/L (ref 136–145)
WBC # BLD AUTO: 9.18 THOUSAND/UL (ref 4.31–10.16)

## 2021-02-13 PROCEDURE — 99024 POSTOP FOLLOW-UP VISIT: CPT | Performed by: STUDENT IN AN ORGANIZED HEALTH CARE EDUCATION/TRAINING PROGRAM

## 2021-02-13 PROCEDURE — 80048 BASIC METABOLIC PNL TOTAL CA: CPT | Performed by: PHYSICIAN ASSISTANT

## 2021-02-13 PROCEDURE — 99024 POSTOP FOLLOW-UP VISIT: CPT | Performed by: SURGERY

## 2021-02-13 PROCEDURE — 85025 COMPLETE CBC W/AUTO DIFF WBC: CPT | Performed by: PHYSICIAN ASSISTANT

## 2021-02-13 RX ADMIN — TORSEMIDE 10 MG: 10 TABLET ORAL at 08:26

## 2021-02-13 RX ADMIN — METAXALONE 800 MG: 800 TABLET ORAL at 08:26

## 2021-02-13 RX ADMIN — ACETAMINOPHEN 650 MG: 325 TABLET, FILM COATED ORAL at 03:22

## 2021-02-13 RX ADMIN — LISINOPRIL 20 MG: 20 TABLET ORAL at 08:25

## 2021-02-13 RX ADMIN — BUPROPION HYDROCHLORIDE 300 MG: 150 TABLET, EXTENDED RELEASE ORAL at 08:25

## 2021-02-13 RX ADMIN — DILTIAZEM HYDROCHLORIDE 180 MG: 180 CAPSULE, COATED, EXTENDED RELEASE ORAL at 08:25

## 2021-02-13 RX ADMIN — HYDROXYCHLOROQUINE SULFATE 200 MG: 200 TABLET, FILM COATED ORAL at 08:26

## 2021-02-13 RX ADMIN — GABAPENTIN 200 MG: 100 CAPSULE ORAL at 08:25

## 2021-02-13 RX ADMIN — OXYCODONE HYDROCHLORIDE 5 MG: 5 TABLET ORAL at 08:43

## 2021-02-13 NOTE — PROGRESS NOTES
Progress Note - Oncology Surgery   Tyrell Araiza 67 y o  female MRN: [id number]  Unit/Bed#: S -46 Encounter: [id number]    Assessment:  75F w/ DCIS of left breast now s/p left breast mastectomy with SLN Bx on 2/12    Plan:  Diet as tolerated  Out of bed with ambulation    VNA set up by case management yesterday  Patient is stable for discharge today with VNA for drain care  Will follow up in Dr Armen Mccartney office in 2 weeks time  Subjective/Objective   Chief Complaint:     Subjective:  No acute events overnight  Patient resting comfortably in bed this time  Her pain is well controlled  Her drainage is serosanguineous however mostly serous    Objective:     Blood pressure 167/74, pulse 63, temperature 99 9 °F (37 7 °C), temperature source Oral, resp  rate 16, height 5' 3" (1 6 m), weight 65 3 kg (144 lb), SpO2 92 %  ,Body mass index is 25 51 kg/m²  Intake/Output Summary (Last 24 hours) at 2/13/2021 4102  Last data filed at 2/13/2021 5338  Gross per 24 hour   Intake 1016 67 ml   Output 2510 ml   Net -1493 33 ml       Invasive Devices     Peripheral Intravenous Line            Peripheral IV 02/12/21 Right Forearm less than 1 day          Drain            Closed/Suction Drain Left Breast Bulb 1 day    Closed/Suction Drain Left Breast Bulb 19 Fr  1 day                Physical Exam: General: AAOx3  Head: normocephalic, atraumatic  Neck: supple, trachea midline  Respiratory: BS b/l  Left breast incision c/d/i, OSEAS drains with sero-sanguinous output  Abdomen: Soft, nontender  Heart: RRR, S1s2  Ext: Warm no cyanosis         Lab, Imaging and other studies:  I have personally reviewed pertinent lab results    , CBC:   Lab Results   Component Value Date    WBC 9 18 02/13/2021    HGB 10 6 (L) 02/13/2021    HCT 33 4 (L) 02/13/2021    MCV 95 02/13/2021     (H) 02/13/2021    MCH 30 3 02/13/2021    MCHC 31 7 02/13/2021    RDW 14 2 02/13/2021    MPV 8 5 (L) 02/13/2021    NRBC 0 02/13/2021   , CMP:   Lab Results Component Value Date    SODIUM 135 (L) 02/13/2021    K 4 1 02/13/2021    CL 99 (L) 02/13/2021    CO2 30 02/13/2021    BUN 12 02/13/2021    CREATININE 0 75 02/13/2021    CALCIUM 8 4 02/13/2021    EGFR 78 02/13/2021     VTE Pharmacologic Prophylaxis: Heparin  VTE Mechanical Prophylaxis: sequential compression device

## 2021-02-13 NOTE — QUICK NOTE
Post Op Check    Patient is resting in her bed comfortably at this time   She did have some incisional pain earlier which was well controlled with pain medications    Physical Exam    Left breast/axillary OSEAS drains are serosanguinous, mostly serious  Left breast incision is c/d/i  Left axillary incision is c/d/i

## 2021-02-13 NOTE — DISCHARGE SUMMARY
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]    HPI: The patient has done well since I saw her last   She has no complaints  Her genetic testing was negative  We have previously discussed and went through the process of informed consent for left mastectomy conjunction with a sentinel lymph node biopsy  Patient had no questions regarding the surgery    Procedures Performed: No orders of the defined types were placed in this encounter  Summary of Hospital Course: Patient presented on 2/12 for elective mastectomy with sentinal lymph node biopsy for DCIS  Procedure was uncomplicated with intraoperative findings of 2 sentinal lymph nodes which were both negative on touch prep  She was admitted overnight for monitoring and on POD1 was tolerating a regular diet, pain was controlled, and she was deemed appropriate for discharge home with VNA set up for drain care and instructions to follow up with her surgeon in 2 weeks time  Significant Findings, Care, Treatment and Services Provided:  2/12: Left mastectomy with sentinal lymph node biopsy    Complications: None    Discharge Diagnosis: DCIS of left breast    Resolved Problems  Date Reviewed: 2/12/2021    None          Condition at Discharge: good         Discharge instructions/Information to patient and family:   See after visit summary for information provided to patient and family  Provisions for Follow-Up Care:  See after visit summary for information related to follow-up care and any pertinent home health orders  PCP: Suma Gilliam MD    Disposition: Home    Planned Readmission: No      Discharge Statement   I spent 30 minutes discharging the patient  This time was spent on the day of discharge  I had direct contact with the patient on the day of discharge   Additional documentation is required if more than 30 minutes were spent on discharge  Discharge Medications:  See after visit summary for reconciled discharge medications provided to patient and family

## 2021-02-13 NOTE — PLAN OF CARE
Problem: PAIN - ADULT  Goal: Verbalizes/displays adequate comfort level or baseline comfort level  Description: Interventions:  - Encourage patient to monitor pain and request assistance  - Assess pain using appropriate pain scale  - Administer analgesics based on type and severity of pain and evaluate response  - Implement non-pharmacological measures as appropriate and evaluate response  - Consider cultural and social influences on pain and pain management  - Notify physician/advanced practitioner if interventions unsuccessful or patient reports new pain  Outcome: Progressing     Problem: INFECTION - ADULT  Goal: Absence or prevention of progression during hospitalization  Description: INTERVENTIONS:  - Assess and monitor for signs and symptoms of infection  - Monitor lab/diagnostic results  - Monitor all insertion sites, i e  indwelling lines, tubes, and drains  - Monitor endotracheal if appropriate and nasal secretions for changes in amount and color  - [place] appropriate cooling/warming therapies per order  - Administer medications as ordered  - Instruct and encourage patient and family to use good hand hygiene technique  - Identify and instruct in appropriate isolation precautions for identified infection/condition  Outcome: Progressing  Goal: Absence of fever/infection during neutropenic period  Description: INTERVENTIONS:  - Monitor WBC    Outcome: Progressing     Problem: SAFETY ADULT  Goal: Patient will remain free of falls  Description: INTERVENTIONS:  - Assess patient frequently for physical needs  -  Identify cognitive and physical deficits and behaviors that affect risk of falls    -  [place] fall precautions as indicated by assessment   - Educate patient/family on patient safety including physical limitations  - Instruct patient to call for assistance with activity based on assessment  - Modify environment to reduce risk of injury  - Consider OT/PT consult to assist with strengthening/mobility  Outcome: Progressing  Goal: Maintain or return to baseline ADL function  Description: INTERVENTIONS:  -  Assess patient's ability to carry out ADLs; assess patient's baseline for ADL function and identify physical deficits which impact ability to perform ADLs (bathing, care of mouth/teeth, toileting, grooming, dressing, etc )  - Assess/evaluate cause of self-care deficits   - Assess range of motion  - Assess patient's mobility; develop plan if impaired  - Assess patient's need for assistive devices and provide as appropriate  - Encourage maximum independence but intervene and supervise when necessary  - Involve family in performance of ADLs  - Assess for home care needs following discharge   - Consider OT consult to assist with ADL evaluation and planning for discharge  - Provide patient education as appropriate  Outcome: Progressing  Goal: Maintain or return mobility status to optimal level  Description: INTERVENTIONS:  - Assess patient's baseline mobility status (ambulation, transfers, stairs, etc )    - Identify cognitive and physical deficits and behaviors that affect mobility  - Identify mobility aids required to assist with transfers and/or ambulation (gait belt, sit-to-stand, lift, walker, cane, etc )  - [place] fall precautions as indicated by assessment  - Record patient progress and toleration of activity level on Mobility SBAR; progress patient to next Phase/Stage  - Instruct patient to call for assistance with activity based on assessment  - Consider rehabilitation consult to assist with strengthening/weightbearing, etc   Outcome: Progressing     Problem: DISCHARGE PLANNING  Goal: Discharge to home or other facility with appropriate resources  Description: INTERVENTIONS:  - Identify barriers to discharge w/patient and caregiver  - Arrange for needed discharge resources and transportation as appropriate  - Identify discharge learning needs (meds, wound care, etc )  - Arrange for interpretive services to assist at discharge as needed  - Refer to Case Management Department for coordinating discharge planning if the patient needs post-hospital services based on physician/advanced practitioner order or complex needs related to functional status, cognitive ability, or social support system  Outcome: Progressing     Problem: Knowledge Deficit  Goal: Patient/family/caregiver demonstrates understanding of disease process, treatment plan, medications, and discharge instructions  Description: Complete learning assessment and assess knowledge base    Interventions:  - Provide teaching at level of understanding  - Provide teaching via preferred learning methods  Outcome: Progressing

## 2021-02-13 NOTE — DISCHARGE INSTRUCTIONS
Please follow-up as scheduled      Activity:  - No lifting greater than 20 pounds or strenuous physical activity or exercise for 2 weeks  - Walking and normal light activities are encouraged  - Normal daily activities including climbing steps are okay  - No driving until no longer using pain medications      Diet:    - You may resume your normal diet      Wound Care:  - May shower daily  No tub baths or swimming until cleared by your surgeon   - Wash incision gently with soap and water and pat dry  - Do not apply any creams or ointments unless instructed to do so by your surgeon   - Teressa Nissen may apply ice as needed (no longer than 20 minutes at a time) for the first 48 hours  - Bruising is not unusual and will go away with a little time  You may apply a warm, moist compress that may help the bruising resolve quicker  - You may remove the dressings the day after surgery (unless otherwise instructed)  Leave any skin tapes (steri-strips) on the incision(s) in place until they fall off on their own  Any new dressings are optional      Medications:    - You may resume all of your regular medications, including blood thinners and aspirin, after going home unless otherwise instructed  Please refer to your discharge medication list for further details  - Please take the pain medications as directed  - You are encouraged to use non-narcotic pain medications first and whenever possible  Reserve the use of narcotic pain medication for moderate to severe pain not controlled by non-narcotic medications   - No driving while taking narcotic pain medications  - You may become constipated, especially if taking pain medications  You may take any over the counter stool softeners or laxatives as needed  Examples: Milk of Magnesia, Colace, Senna      Additional Instructions:  - If you have any questions or concerns after discharge please call the office   - Call office or return to ER if fever greater than 101, chills, persistent nausea/vomiting, worsening/uncontrollable pain, and/or increasing redness or purulent/foul smelling drainage from incision(s)

## 2021-02-15 ENCOUNTER — TELEPHONE (OUTPATIENT)
Dept: INTERNAL MEDICINE CLINIC | Facility: CLINIC | Age: 76
End: 2021-02-15

## 2021-02-22 ENCOUNTER — TELEPHONE (OUTPATIENT)
Dept: FAMILY MEDICINE CLINIC | Facility: CLINIC | Age: 76
End: 2021-02-22

## 2021-02-22 DIAGNOSIS — L03.818 CELLULITIS OF OTHER SPECIFIED SITE: Primary | ICD-10-CM

## 2021-02-22 DIAGNOSIS — N61.0 CELLULITIS OF BREAST: Primary | ICD-10-CM

## 2021-02-22 PROBLEM — C50.812 MALIGNANT NEOPLASM OF OVERLAPPING SITES OF LEFT BREAST IN FEMALE, ESTROGEN RECEPTOR NEGATIVE (HCC): Status: ACTIVE | Noted: 2020-12-23

## 2021-02-22 PROBLEM — Z17.1 MALIGNANT NEOPLASM OF OVERLAPPING SITES OF LEFT BREAST IN FEMALE, ESTROGEN RECEPTOR NEGATIVE (HCC): Status: ACTIVE | Noted: 2020-12-23

## 2021-02-22 RX ORDER — DOXYCYCLINE 100 MG/1
100 TABLET ORAL 2 TIMES DAILY
Qty: 14 TABLET | Refills: 0 | Status: CANCELLED | OUTPATIENT
Start: 2021-02-22 | End: 2021-03-01

## 2021-02-22 RX ORDER — CLINDAMYCIN HYDROCHLORIDE 300 MG/1
300 CAPSULE ORAL 3 TIMES DAILY
Qty: 21 CAPSULE | Refills: 0 | Status: SHIPPED | OUTPATIENT
Start: 2021-02-22 | End: 2021-02-23

## 2021-02-22 NOTE — PROGRESS NOTES
Patient with photographic evidence of cellulitis based on a image sent by the visiting nurse  Recommended doxycycline    Prescription placed

## 2021-02-22 NOTE — TELEPHONE ENCOUNTER
Jossie from [facility] called and wanted to let Dr Yumiko Guaman know that she was out to see Marilyn Greco today to check on her left mastectomy site  While there she forgot to check her Protime  When she called the patient and let her know she needed to come back for that the patient stated she is going to see Dr Vanessa Mclaughlin on Wednesday and will go to the lab to have it checked that day

## 2021-02-23 ENCOUNTER — HOSPITAL ENCOUNTER (INPATIENT)
Facility: HOSPITAL | Age: 76
LOS: 2 days | Discharge: HOME HEALTH | DRG: 603 | End: 2021-02-26
Attending: EMERGENCY MEDICINE | Admitting: SURGERY
Payer: MEDICARE

## 2021-02-23 ENCOUNTER — TELEPHONE (OUTPATIENT)
Dept: SURGICAL ONCOLOGY | Facility: CLINIC | Age: 76
End: 2021-02-23

## 2021-02-23 ENCOUNTER — APPOINTMENT (EMERGENCY)
Dept: ULTRASOUND IMAGING | Facility: HOSPITAL | Age: 76
DRG: 603 | End: 2021-02-23
Payer: MEDICARE

## 2021-02-23 DIAGNOSIS — L03.818 CELLULITIS OF OTHER SPECIFIED SITE: ICD-10-CM

## 2021-02-23 DIAGNOSIS — R53.1 WEAKNESS: ICD-10-CM

## 2021-02-23 DIAGNOSIS — E03.9 ACQUIRED HYPOTHYROIDISM: ICD-10-CM

## 2021-02-23 DIAGNOSIS — L03.313 CELLULITIS OF CHEST WALL: ICD-10-CM

## 2021-02-23 DIAGNOSIS — M35.00 SJOGREN'S SYNDROME, WITH UNSPECIFIED ORGAN INVOLVEMENT (HCC): ICD-10-CM

## 2021-02-23 DIAGNOSIS — R73.03 PREDIABETES: ICD-10-CM

## 2021-02-23 DIAGNOSIS — I10 ESSENTIAL HYPERTENSION: ICD-10-CM

## 2021-02-23 DIAGNOSIS — R65.10 SIRS (SYSTEMIC INFLAMMATORY RESPONSE SYNDROME) (HCC): Primary | ICD-10-CM

## 2021-02-23 LAB
ALBUMIN SERPL BCP-MCNC: 3.3 G/DL (ref 3.5–5)
ALP SERPL-CCNC: 78 U/L (ref 46–116)
ALT SERPL W P-5'-P-CCNC: 20 U/L (ref 12–78)
ANION GAP SERPL CALCULATED.3IONS-SCNC: 8 MMOL/L (ref 4–13)
APTT PPP: 59 SECONDS (ref 23–37)
AST SERPL W P-5'-P-CCNC: 19 U/L (ref 5–45)
BASOPHILS # BLD AUTO: 0.09 THOUSANDS/ΜL (ref 0–0.1)
BASOPHILS NFR BLD AUTO: 1 % (ref 0–1)
BILIRUB SERPL-MCNC: 0.64 MG/DL (ref 0.2–1)
BUN SERPL-MCNC: 10 MG/DL (ref 5–25)
CALCIUM ALBUM COR SERPL-MCNC: 9.5 MG/DL (ref 8.3–10.1)
CALCIUM SERPL-MCNC: 8.9 MG/DL (ref 8.3–10.1)
CHLORIDE SERPL-SCNC: 96 MMOL/L (ref 100–108)
CO2 SERPL-SCNC: 28 MMOL/L (ref 21–32)
CREAT SERPL-MCNC: 1.02 MG/DL (ref 0.6–1.3)
EOSINOPHIL # BLD AUTO: 0.18 THOUSAND/ΜL (ref 0–0.61)
EOSINOPHIL NFR BLD AUTO: 1 % (ref 0–6)
ERYTHROCYTE [DISTWIDTH] IN BLOOD BY AUTOMATED COUNT: 14.1 % (ref 11.6–15.1)
GFR SERPL CREATININE-BSD FRML MDRD: 54 ML/MIN/1.73SQ M
GLUCOSE SERPL-MCNC: 124 MG/DL (ref 65–140)
HCT VFR BLD AUTO: 38.4 % (ref 34.8–46.1)
HGB BLD-MCNC: 12.4 G/DL (ref 11.5–15.4)
IMM GRANULOCYTES # BLD AUTO: 0.07 THOUSAND/UL (ref 0–0.2)
IMM GRANULOCYTES NFR BLD AUTO: 0 % (ref 0–2)
INR PPP: 2.53 (ref 0.84–1.19)
LACTATE SERPL-SCNC: 1.2 MMOL/L (ref 0.5–2)
LYMPHOCYTES # BLD AUTO: 1.08 THOUSANDS/ΜL (ref 0.6–4.47)
LYMPHOCYTES NFR BLD AUTO: 7 % (ref 14–44)
MCH RBC QN AUTO: 30.5 PG (ref 26.8–34.3)
MCHC RBC AUTO-ENTMCNC: 32.3 G/DL (ref 31.4–37.4)
MCV RBC AUTO: 94 FL (ref 82–98)
MONOCYTES # BLD AUTO: 1.72 THOUSAND/ΜL (ref 0.17–1.22)
MONOCYTES NFR BLD AUTO: 10 % (ref 4–12)
NEUTROPHILS # BLD AUTO: 13.44 THOUSANDS/ΜL (ref 1.85–7.62)
NEUTS SEG NFR BLD AUTO: 81 % (ref 43–75)
NRBC BLD AUTO-RTO: 0 /100 WBCS
PLATELET # BLD AUTO: 412 THOUSANDS/UL (ref 149–390)
PMV BLD AUTO: 9 FL (ref 8.9–12.7)
POTASSIUM SERPL-SCNC: 3.5 MMOL/L (ref 3.5–5.3)
PROCALCITONIN SERPL-MCNC: 0.21 NG/ML
PROT SERPL-MCNC: 7.1 G/DL (ref 6.4–8.2)
PROTHROMBIN TIME: 27.3 SECONDS (ref 11.6–14.5)
RBC # BLD AUTO: 4.07 MILLION/UL (ref 3.81–5.12)
SODIUM SERPL-SCNC: 132 MMOL/L (ref 136–145)
WBC # BLD AUTO: 16.58 THOUSAND/UL (ref 4.31–10.16)

## 2021-02-23 PROCEDURE — 85025 COMPLETE CBC W/AUTO DIFF WBC: CPT | Performed by: PHYSICIAN ASSISTANT

## 2021-02-23 PROCEDURE — 99285 EMERGENCY DEPT VISIT HI MDM: CPT | Performed by: PHYSICIAN ASSISTANT

## 2021-02-23 PROCEDURE — 99285 EMERGENCY DEPT VISIT HI MDM: CPT

## 2021-02-23 PROCEDURE — 96368 THER/DIAG CONCURRENT INF: CPT

## 2021-02-23 PROCEDURE — 84145 PROCALCITONIN (PCT): CPT | Performed by: PHYSICIAN ASSISTANT

## 2021-02-23 PROCEDURE — 96366 THER/PROPH/DIAG IV INF ADDON: CPT

## 2021-02-23 PROCEDURE — 76642 ULTRASOUND BREAST LIMITED: CPT | Performed by: PHYSICIAN ASSISTANT

## 2021-02-23 PROCEDURE — 80053 COMPREHEN METABOLIC PANEL: CPT | Performed by: PHYSICIAN ASSISTANT

## 2021-02-23 PROCEDURE — 93005 ELECTROCARDIOGRAM TRACING: CPT

## 2021-02-23 PROCEDURE — 85730 THROMBOPLASTIN TIME PARTIAL: CPT | Performed by: PHYSICIAN ASSISTANT

## 2021-02-23 PROCEDURE — 85610 PROTHROMBIN TIME: CPT | Performed by: PHYSICIAN ASSISTANT

## 2021-02-23 PROCEDURE — 87040 BLOOD CULTURE FOR BACTERIA: CPT | Performed by: PHYSICIAN ASSISTANT

## 2021-02-23 PROCEDURE — 83605 ASSAY OF LACTIC ACID: CPT | Performed by: PHYSICIAN ASSISTANT

## 2021-02-23 PROCEDURE — 76642 ULTRASOUND BREAST LIMITED: CPT

## 2021-02-23 PROCEDURE — 36415 COLL VENOUS BLD VENIPUNCTURE: CPT | Performed by: PHYSICIAN ASSISTANT

## 2021-02-23 PROCEDURE — 96365 THER/PROPH/DIAG IV INF INIT: CPT

## 2021-02-23 PROCEDURE — 99024 POSTOP FOLLOW-UP VISIT: CPT | Performed by: SURGERY

## 2021-02-23 RX ORDER — HYDROXYCHLOROQUINE SULFATE 200 MG/1
200 TABLET, FILM COATED ORAL
Status: DISCONTINUED | OUTPATIENT
Start: 2021-02-24 | End: 2021-02-26 | Stop reason: HOSPADM

## 2021-02-23 RX ORDER — LISINOPRIL 20 MG/1
20 TABLET ORAL 2 TIMES DAILY
Status: DISCONTINUED | OUTPATIENT
Start: 2021-02-23 | End: 2021-02-26 | Stop reason: HOSPADM

## 2021-02-23 RX ORDER — SODIUM CHLORIDE 9 MG/ML
3 INJECTION INTRAVENOUS
Status: DISCONTINUED | OUTPATIENT
Start: 2021-02-23 | End: 2021-02-26 | Stop reason: HOSPADM

## 2021-02-23 RX ORDER — CLINDAMYCIN PHOSPHATE 600 MG/50ML
600 INJECTION INTRAVENOUS ONCE
Status: COMPLETED | OUTPATIENT
Start: 2021-02-23 | End: 2021-02-23

## 2021-02-23 RX ORDER — ESCITALOPRAM OXALATE 20 MG/1
20 TABLET ORAL
Status: DISCONTINUED | OUTPATIENT
Start: 2021-02-23 | End: 2021-02-26 | Stop reason: HOSPADM

## 2021-02-23 RX ORDER — FLUTICASONE FUROATE AND VILANTEROL 200; 25 UG/1; UG/1
1 POWDER RESPIRATORY (INHALATION)
Status: DISCONTINUED | OUTPATIENT
Start: 2021-02-24 | End: 2021-02-26 | Stop reason: HOSPADM

## 2021-02-23 RX ORDER — DILTIAZEM HYDROCHLORIDE 180 MG/1
180 CAPSULE, COATED, EXTENDED RELEASE ORAL DAILY
Status: DISCONTINUED | OUTPATIENT
Start: 2021-02-24 | End: 2021-02-26 | Stop reason: HOSPADM

## 2021-02-23 RX ORDER — SODIUM CHLORIDE 9 MG/ML
60 INJECTION, SOLUTION INTRAVENOUS CONTINUOUS
Status: DISCONTINUED | OUTPATIENT
Start: 2021-02-23 | End: 2021-02-25

## 2021-02-23 RX ORDER — FLUTICASONE PROPIONATE 50 MCG
1 SPRAY, SUSPENSION (ML) NASAL DAILY PRN
Status: DISCONTINUED | OUTPATIENT
Start: 2021-02-23 | End: 2021-02-26 | Stop reason: HOSPADM

## 2021-02-23 RX ORDER — WARFARIN SODIUM 5 MG/1
5 TABLET ORAL
Status: DISCONTINUED | OUTPATIENT
Start: 2021-02-23 | End: 2021-02-26 | Stop reason: HOSPADM

## 2021-02-23 RX ORDER — SODIUM CHLORIDE, SODIUM GLUCONATE, SODIUM ACETATE, POTASSIUM CHLORIDE, MAGNESIUM CHLORIDE, SODIUM PHOSPHATE, DIBASIC, AND POTASSIUM PHOSPHATE .53; .5; .37; .037; .03; .012; .00082 G/100ML; G/100ML; G/100ML; G/100ML; G/100ML; G/100ML; G/100ML
50 INJECTION, SOLUTION INTRAVENOUS CONTINUOUS
Status: DISCONTINUED | OUTPATIENT
Start: 2021-02-23 | End: 2021-02-23

## 2021-02-23 RX ORDER — LEVOTHYROXINE SODIUM 0.05 MG/1
50 TABLET ORAL
Status: DISCONTINUED | OUTPATIENT
Start: 2021-02-24 | End: 2021-02-26 | Stop reason: HOSPADM

## 2021-02-23 RX ORDER — HEPARIN SODIUM 5000 [USP'U]/ML
5000 INJECTION, SOLUTION INTRAVENOUS; SUBCUTANEOUS EVERY 8 HOURS SCHEDULED
Status: DISCONTINUED | OUTPATIENT
Start: 2021-02-23 | End: 2021-02-23

## 2021-02-23 RX ORDER — PRAVASTATIN SODIUM 10 MG
10 TABLET ORAL
Status: DISCONTINUED | OUTPATIENT
Start: 2021-02-24 | End: 2021-02-26 | Stop reason: HOSPADM

## 2021-02-23 RX ORDER — GABAPENTIN 300 MG/1
600 CAPSULE ORAL 3 TIMES DAILY
Status: DISCONTINUED | OUTPATIENT
Start: 2021-02-23 | End: 2021-02-26 | Stop reason: HOSPADM

## 2021-02-23 RX ORDER — PANTOPRAZOLE SODIUM 40 MG/1
40 TABLET, DELAYED RELEASE ORAL
Status: DISCONTINUED | OUTPATIENT
Start: 2021-02-24 | End: 2021-02-26 | Stop reason: HOSPADM

## 2021-02-23 RX ORDER — ALBUTEROL SULFATE 90 UG/1
2 AEROSOL, METERED RESPIRATORY (INHALATION) EVERY 6 HOURS PRN
Status: DISCONTINUED | OUTPATIENT
Start: 2021-02-23 | End: 2021-02-26 | Stop reason: HOSPADM

## 2021-02-23 RX ORDER — ACETAMINOPHEN 325 MG/1
650 TABLET ORAL ONCE
Status: COMPLETED | OUTPATIENT
Start: 2021-02-23 | End: 2021-02-23

## 2021-02-23 RX ORDER — ONDANSETRON 2 MG/ML
4 INJECTION INTRAMUSCULAR; INTRAVENOUS EVERY 6 HOURS PRN
Status: DISCONTINUED | OUTPATIENT
Start: 2021-02-23 | End: 2021-02-26 | Stop reason: HOSPADM

## 2021-02-23 RX ORDER — LORAZEPAM 1 MG/1
1 TABLET ORAL EVERY 6 HOURS PRN
Status: DISCONTINUED | OUTPATIENT
Start: 2021-02-23 | End: 2021-02-26 | Stop reason: HOSPADM

## 2021-02-23 RX ORDER — BUPROPION HYDROCHLORIDE 150 MG/1
300 TABLET ORAL DAILY
Status: DISCONTINUED | OUTPATIENT
Start: 2021-02-24 | End: 2021-02-26 | Stop reason: HOSPADM

## 2021-02-23 RX ADMIN — SODIUM CHLORIDE 60 ML/HR: 0.9 INJECTION, SOLUTION INTRAVENOUS at 22:39

## 2021-02-23 RX ADMIN — WARFARIN SODIUM 5 MG: 2.5 TABLET ORAL at 22:37

## 2021-02-23 RX ADMIN — ACETAMINOPHEN 650 MG: 325 TABLET, FILM COATED ORAL at 17:23

## 2021-02-23 RX ADMIN — VANCOMYCIN HYDROCHLORIDE 1250 MG: 1 INJECTION, POWDER, LYOPHILIZED, FOR SOLUTION INTRAVENOUS at 22:52

## 2021-02-23 RX ADMIN — HEPARIN SODIUM 5000 UNITS: 5000 INJECTION INTRAVENOUS; SUBCUTANEOUS at 22:37

## 2021-02-23 RX ADMIN — SODIUM CHLORIDE, SODIUM LACTATE, POTASSIUM CHLORIDE, AND CALCIUM CHLORIDE 1000 ML: .6; .31; .03; .02 INJECTION, SOLUTION INTRAVENOUS at 15:50

## 2021-02-23 RX ADMIN — ESCITALOPRAM OXALATE 20 MG: 20 TABLET ORAL at 22:52

## 2021-02-23 RX ADMIN — LISINOPRIL 20 MG: 20 TABLET ORAL at 22:37

## 2021-02-23 RX ADMIN — CLINDAMYCIN IN 5 PERCENT DEXTROSE 600 MG: 12 INJECTION, SOLUTION INTRAVENOUS at 16:07

## 2021-02-23 RX ADMIN — GABAPENTIN 600 MG: 300 CAPSULE ORAL at 22:37

## 2021-02-23 NOTE — ED PROVIDER NOTES
History  Chief Complaint   Patient presents with    Weakness - Generalized     Patient arrived via EMS from home  Patient had left breast removed friday and is c/o weakness and fever  Patient took tylenol at approx 10a this morning and is on oral abx  Patient had 2nd dosage of covid vaccination last month  Dami Pope is a 67year old female with an extensive past medical history who is currently post op from a left mastectomy on 2/12, who presents to the emergency department with generalized malaise and fevers to 102  The patient states that approximately 2 days ago she started having generalized malaise as well as fevers  She reports a T-max to be 102 1, responsive to Tylenol  She has been taking Tylenol as directed  She notes that there has been erythema and warmth over her left breast as well  She notes a decreased output of fluid into the drains  She states there has been intact approximately 10 cc in both drains and describes it as serosanguineous  The patient states he was prescribed clindamycin yesterday and has taken a full days course yesterday as well as 1 dose today  She has had no relief of pain or symptoms, she additionally complains of fevers and chills     She denies any other associated complaints  She denies any headaches, chest pain, shortness of breath, palpitations, abdominal pain, nausea, vomiting, frequency, urgency, dysuria or lower extremity pain  She admits to chronic diarrhea         Fatigue  Severity:  Moderate  Onset quality:  Gradual  Duration:  2 days  Timing:  Constant  Progression:  Worsening  Chronicity:  New  Context: not alcohol use, not allergies, not change in medication, not decreased sleep, not dehydration, not drug use, not increased activity, not pinched nerve, not recent infection, not stress and not urinary tract infection    Context comment:  Post-op   Relieved by:  Medication (Acetominophen)  Worsened by:  Nothing  Ineffective treatments:  Medication  Associated symptoms: diarrhea, fever, headaches and lethargy    Associated symptoms: no abdominal pain, no anorexia, no aphasia, no arthralgias, no ataxia, no chest pain, no cough, no difficulty walking, no dizziness, no drooling, no dysphagia, no dysuria, no frequency, no melena, no nausea, no seizures, no shortness of breath, no syncope, no urgency and no vomiting    Associated symptoms comment:  Diarrhea is chronic  Diarrhea:     Number of occurrences:  Chronic   Fever:     Duration:  2 days    Timing:  Constant    Max temp PTA:  102    Temp source:  Oral    Progression:  Worsening  Headaches:     Severity:  Mild    Onset quality:  Gradual    Duration:  2 days    Timing:  Intermittent    Progression:  Improving    Chronicity:  New  Risk factors: no anemia, no congestive heart failure, no coronary artery disease, no diabetes, no excessive menstruation, no family hx of stroke, no heart disease, no neurologic disease, no new medications and no recent stressors        Prior to Admission Medications   Prescriptions Last Dose Informant Patient Reported? Taking?    Calcium Carb-Cholecalciferol (CALCIUM 600-D PO) 2/23/2021 at Unknown time  Yes Yes   Sig: Take 1 tablet by mouth 2 (two) times a day   Coenzyme Q10 (CO Q-10 PO) Past Week at Unknown time  Yes Yes   Sig: Take 1 capsule by mouth daily   HYDROcodone-acetaminophen (NORCO) 5-325 mg per tablet Past Month at Unknown time  No Yes   Sig: Take 1 tablet by mouth every 6 (six) hours as needed for painMax Daily Amount: 4 tablets   LORazepam (ATIVAN) 1 mg tablet 2/23/2021 at Unknown time  No Yes   Sig: Take 1 tablet (1 mg total) by mouth every 6 (six) hours as needed for anxiety   MAGNESIUM PO 2/22/2021 at Unknown time  Yes Yes   Sig: Take 1 tablet by mouth daily   Multiple Vitamin (MULTIVITAMIN ADULT PO) 2/23/2021 at Unknown time  Yes Yes   Sig: Take 1 tablet by mouth daily   Omega-3 Fatty Acids (FISH OIL PO) 2/23/2021 at Unknown time  Yes Yes   Sig: Take 1 capsule by mouth daily albuterol (PROVENTIL HFA,VENTOLIN HFA) 90 mcg/act inhaler More than a month at Unknown time Self No No   Sig: Inhale 2 puffs every 6 (six) hours as needed for wheezing   buPROPion (WELLBUTRIN XL) 300 mg 24 hr tablet 2021 at Unknown time  No Yes   Sig: TAKE 1 TABLET (300 MG TOTAL) BY MOUTH DAILY WITH 150 MG   cetirizine (ZyrTEC) 10 mg tablet Not Taking at Unknown time Self Yes No   Sig: Take 10 mg by mouth daily   diltiazem (CARDIZEM CD) 180 mg 24 hr capsule 2021 at Unknown time Self No Yes   Sig: Take 1 capsule (180 mg total) by mouth daily   escitalopram (LEXAPRO) 20 mg tablet 2021 at Unknown time Self No Yes   Sig: TAKE 1 TABLET BY MOUTH EVERY DAY   Patient taking differently: Take 20 mg by mouth daily at bedtime    fluticasone (FLONASE) 50 mcg/act nasal spray 2021 at Unknown time Self No Yes   Si SPRAY INTO EACH NOSTRIL DAILY AS NEEDED FOR RHINITIS   Patient taking differently: 1 spray into each nostril daily    fluticasone-salmeterol (Advair Diskus) 250-50 mcg/dose inhaler 2021 at Unknown time  No Yes   Sig: Inhale 1 puff 2 (two) times a day Rinse mouth after use   gabapentin (NEURONTIN) 100 mg capsule 2021 at Unknown time Self No Yes   Sig: TAKE 2 CAPSULES (200 MG TOTAL) BY MOUTH 2 (TWO) TIMES A DAY   gabapentin (NEURONTIN) 600 MG tablet 2021 at Unknown time  No Yes   Sig: TAKE ONE TABLET IN THE MORNING, ONE TABLET IN THE AFTERNOON, AND 2 TABLETS AT BEDTIME   hydroxychloroquine (PLAQUENIL) 200 mg tablet 2021 at Unknown time  No Yes   Sig: TAKE 1 TABLET BY MOUTH TWICE A DAY   Patient taking differently: 200 mg daily with breakfast    levothyroxine 50 mcg tablet 2021 at Unknown time Self No Yes   Sig: TAKE 1 TABLET BY MOUTH EVERY DAY   lisinopril (ZESTRIL) 20 mg tablet 2021 at Unknown time Self No Yes   Sig: TAKE 1 TABLET BY MOUTH TWICE A DAY   loperamide (IMODIUM) 2 mg capsule 2021 at Unknown time Self Yes Yes   Sig: Take 2 mg by mouth 4 (four) times a day as needed     meclizine (ANTIVERT) 25 mg tablet More than a month at Unknown time Self No No   Sig: Take 1 tablet (25 mg total) by mouth every 6 (six) hours as needed for dizziness   metaxalone (SKELAXIN) 800 mg tablet Past Week at Unknown time  No Yes   Sig: Take 1 tablet (800 mg total) by mouth 3 (three) times a day   naloxone (NARCAN) 4 mg/0 1 mL nasal spray Unknown at Unknown time Self No No   Sig: Administer 1 spray into a nostril  If breathing does not return to normal or if breathing difficulty resumes after 2-3 minutes, give another dose in the other nostril using a new spray     pantoprazole (PROTONIX) 40 mg tablet 2/23/2021 at Unknown time  No Yes   Sig: TAKE 1 TABLET BY MOUTH EVERY DAY   simvastatin (ZOCOR) 5 MG tablet 2/22/2021 at Unknown time  No Yes   Sig: TAKE 1 TABLET BY MOUTH EVERY DAY   sodium chloride 1 g tablet 2/23/2021 at Unknown time  No Yes   Sig: TAKE 1 TABLET (1 G TOTAL) BY MOUTH 3 (THREE) TIMES A DAY   torsemide (DEMADEX) 10 mg tablet Past Week at Unknown time  No Yes   Sig: TAKE 1 TABLET BY MOUTH EVERY DAY   Patient taking differently: As needed   warfarin (COUMADIN) 5 mg tablet 2/22/2021 at Unknown time Self No Yes   Sig: Take 1-2 tablets daily As directed      Facility-Administered Medications: None       Past Medical History:   Diagnosis Date    Anemia     Anxiety     Arrhythmia     Arthritis     Asthma     Blood clot in vein     portal vein    Breast cancer (HCC)     Breast lump     [id number] RESOLVED    Cancer (HCC)     Depression     Disease of thyroid gland     DVT, lower extremity (HCC)     GERD (gastroesophageal reflux disease)     Hyperlipidemia     Hypertension     Hypokalemia     Hyponatremia     Hypothyroidism     Iron deficiency anemia     Irregular heart beat     Manic behavior (HCC)     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HCC)     Osteoarthritis     Palpitations     [id number]  RESOLVED    Paroxysmal supraventricular tachycardia (HCC)     PE [facility]pulmonary thromboembolism) ([facility] [address] )     Sjoegren syndrome     Spinal stenosis     Thrombocytosis (HCC)     [id number]  RESOLVED    Tremors of nervous system     dbs implanted right and left chest    Ulcerative colitis ([facility] [address] )     Vertigo     [id number] RESOLVED       Past Surgical History:   Procedure Laterality Date    ABDOMINAL SURGERY      APPENDECTOMY      BREAST BIOPSY Left 11/07/2019    Stereo    BREAST EXCISIONAL BIOPSY Left     x many years    BREAST SURGERY      lumpectomy & biopsy    CARMELLA HOLE W/ STEREOTACTIC INSERTION OF DBS LEADS / INTRAOP MICROELECTRODE RECORDING      COLON SURGERY      COLONOSCOPY N/A 8/30/2017    Procedure: Fidencio Toth;  Surgeon: Janice Givens MD;  Location: [facility]; Service: Colorectal    COLOPROCTECTOMY W/ ILEO J POUCH      ESOPHAGOGASTRODUODENOSCOPY      ONSET 10/17/11    FISTULA REPAIR      LLEOANAL FISTULA REPAIR TRANSPERIN TRANSABD APPROACH    HYSTERECTOMY      age 43    ILEOSTOMY CLOSURE      KNEE ARTHROSCOPY      Right    MAMMO STEREOTACTIC BREAST BIOPSY LEFT (ALL INC) Left 11/7/2019    MAMMO STEREOTACTIC BREAST BIOPSY LEFT (ALL INC) Left 12/17/2020    MAMMO STEREOTACTIC BREAST BIOPSY LEFT (ALL INC) EACH ADD Left 12/17/2020    MASTECTOMY W/ SENTINEL NODE BIOPSY Left 2/12/2021    Procedure: BREAST MASTECTOMY WITH SENTINEL LYMPH NODE BIOPSY, LYMPHATIC MAPPING WITH BLUE DYE AND RADIAOCTIVE DYE (INJECT AT 1100 BY DR GILLESPIE IN THE OR);   Surgeon: Marisela Gregorio MD;  [facility];  Service: Surgical Oncology    RI IMP STIM,CRANIAL,SUBQ,1 ARRAY Right 6/20/2017    Procedure: DBS GENERATOR REPLACEMENT;  Surgeon: Mikayla José MD;  Location:  MAIN OR;  Service: Neurosurgery    RI IMP STIM,CRANIAL,SUBQ,1 ARRAY N/A 12/4/2019    Procedure: REPLACEMENT IMPLANTABLE PULSE GENERATOR FOR DEEP BRAIN STIMULATOR LEFT CHEST;  Surgeon: Regine Barney MD;  [facility];  Service: Neurosurgery    SPLENECTOMY         Family History   Problem Relation Age of Onset    Venous thrombosis Mother         ACUTE VENOUS THROMBOSIS OF DEEP VESSELS OF THE DISTAL LOWER EXTREMITY    Other Mother         PHLEBITIS    Hypertension Mother     Peripheral vascular disease Mother     COPD Father     Diabetes Father         MELLITUS    Stroke Father     Diabetes Sister         MELLITUS    Sjogren's syndrome Sister     No Known Problems Daughter     No Known Problems Maternal Grandmother     No Known Problems Maternal Grandfather     No Known Problems Paternal Grandmother     No Known Problems Paternal Grandfather     No Known Problems Sister     No Known Problems Maternal Aunt     No Known Problems Paternal Aunt     No Known Problems Son      I have reviewed and agree with the history as documented  E-Cigarette/Vaping    E-Cigarette Use Never User      E-Cigarette/Vaping Substances     Social History     Tobacco Use    Smoking status: Former Smoker     Packs/day: 2 00     Years: 5 00     Pack years: 10 00     Quit date:      Years since quittin 1    Smokeless tobacco: Never Used    Tobacco comment: Quit   Substance Use Topics    Alcohol use: Yes     Frequency: 2-4 times a month     Drinks per session: 1 or 2     Binge frequency: Never    Drug use: No       Review of Systems   Constitutional: Positive for activity change, chills, fatigue and fever  Negative for appetite change and diaphoresis  HENT: Negative for drooling, ear pain, mouth sores, nosebleeds, sinus pain and tinnitus  Respiratory: Negative for apnea, cough, choking, chest tightness, shortness of breath, wheezing and stridor  Cardiovascular: Negative for chest pain, palpitations, leg swelling and syncope  Gastrointestinal: Positive for diarrhea  Negative for abdominal distention, abdominal pain, anorexia, dysphagia, melena, nausea and vomiting  Genitourinary: Negative for dysuria, frequency and urgency  Musculoskeletal: Negative for arthralgias     Skin: Negative for color change, pallor, rash and wound  Neurological: Positive for headaches  Negative for dizziness and seizures  Psychiatric/Behavioral: Negative for agitation and behavioral problems  All other systems reviewed and are negative  Physical Exam  Physical Exam  Vitals signs reviewed  Constitutional:       General: She is not in acute distress  Appearance: Normal appearance  She is normal weight  She is ill-appearing  She is not toxic-appearing  HENT:      Head: Normocephalic and atraumatic  Mouth/Throat:      Mouth: Mucous membranes are moist       Pharynx: Oropharynx is clear  No oropharyngeal exudate  Neck:      Musculoskeletal: Normal range of motion and neck supple  Cardiovascular:      Rate and Rhythm: Regular rhythm  Tachycardia present  Pulses: Normal pulses  Heart sounds: Normal heart sounds  No murmur  No friction rub  No gallop  Pulmonary:      Effort: Pulmonary effort is normal  No respiratory distress  Breath sounds: Normal breath sounds  No stridor  No wheezing, rhonchi or rales  Chest:      Chest wall: Swelling present  No tenderness, crepitus or edema  Breasts:         Right: Normal          Left: Skin change and tenderness present  Abdominal:      General: Abdomen is flat  Bowel sounds are normal  There is no distension  Palpations: Abdomen is soft  There is no mass  Tenderness: There is no abdominal tenderness  There is no guarding or rebound  Hernia: No hernia is present  Musculoskeletal: Normal range of motion  General: No swelling, tenderness, deformity or signs of injury  Skin:     Capillary Refill: Capillary refill takes less than 2 seconds  Neurological:      General: No focal deficit present  Mental Status: She is alert and oriented to person, place, and time  Mental status is at baseline           Vital Signs  ED Triage Vitals   Temperature Pulse Respirations Blood Pressure SpO2   02/23/21 1500 02/23/21 1502 02/23/21 1502 02/23/21 1500 02/23/21 1502   99 5 °F (37 5 °C) 77 20 146/84 95 %      Temp Source Heart Rate Source Patient Position - Orthostatic VS BP Location FiO2 (%)   02/23/21 1500 02/23/21 1502 02/23/21 1500 02/23/21 1500 --   Oral Monitor Lying Left arm       Pain Score       02/23/21 1502       9           Vitals:    02/24/21 0340 02/24/21 0415 02/24/21 0445 02/24/21 0751   BP: 147/70 136/65  167/75   Pulse: 86 81 78 84   Patient Position - Orthostatic VS: Lying Lying  Sitting         Visual Acuity  Visual Acuity      Most Recent Value   L Pupil Size (mm)  3   R Pupil Size (mm)  3          ED Medications  Medications   sodium chloride (PF) 0 9 % injection 3 mL (has no administration in time range)   ondansetron (ZOFRAN) injection 4 mg (has no administration in time range)   sodium chloride 0 9 % infusion (60 mL/hr Intravenous New Bag 2/23/21 2239)   albuterol (PROVENTIL HFA,VENTOLIN HFA) inhaler 2 puff (has no administration in time range)   buPROPion (WELLBUTRIN XL) 24 hr tablet 300 mg (300 mg Oral Given 2/24/21 0839)   diltiazem (CARDIZEM CD) 24 hr capsule 180 mg (180 mg Oral Given 2/24/21 0839)   escitalopram (LEXAPRO) tablet 20 mg (20 mg Oral Given 2/23/21 2252)   fluticasone (FLONASE) 50 mcg/act nasal spray 1 spray (has no administration in time range)   fluticasone-vilanterol (BREO ELLIPTA) 200-25 MCG/INH inhaler 1 puff (1 puff Inhalation Given 2/24/21 0839)   gabapentin (NEURONTIN) capsule 600 mg (600 mg Oral Given 2/24/21 0831)   hydroxychloroquine (PLAQUENIL) tablet 200 mg (200 mg Oral Given 2/24/21 0630)   LORazepam (ATIVAN) tablet 1 mg (1 mg Oral Given 2/24/21 0400)   levothyroxine tablet 50 mcg (50 mcg Oral Given 2/24/21 0620)   lisinopril (ZESTRIL) tablet 20 mg (20 mg Oral Given 2/24/21 0831)   pantoprazole (PROTONIX) EC tablet 40 mg (40 mg Oral Given 2/24/21 0620)   pravastatin (PRAVACHOL) tablet 10 mg (has no administration in time range)   warfarin (COUMADIN) tablet 5 mg (5 mg Oral Given 2/23/21 4924) vancomycin (VANCOCIN) 1,250 mg in sodium chloride 0 9 % 250 mL IVPB (has no administration in time range)   lactated ringers bolus 1,000 mL (0 mL Intravenous Stopped 2/23/21 1750)   clindamycin (CLEOCIN) IVPB (premix in dextrose) 600 mg 50 mL (0 mg Intravenous Stopped 2/23/21 1637)   acetaminophen (TYLENOL) tablet 650 mg (650 mg Oral Given 2/23/21 1723)   vancomycin (VANCOCIN) 1,250 mg in sodium chloride 0 9 % 250 mL IVPB (0 mg/kg × 59 kg (Adjusted) Intravenous Stopped 2/24/21 0131)       Diagnostic Studies  Results Reviewed     Procedure Component Value Units Date/Time    CBC and differential [[id number]]  (Abnormal) Collected: 02/24/21 0620    Lab Status: Final result Specimen: Blood from Arm, Right Updated: 02/24/21 0640     WBC 15 32 Thousand/uL      RBC 3 55 Million/uL      Hemoglobin 10 9 g/dL      Hematocrit 32 8 %      MCV 92 fL      MCH 30 7 pg      MCHC 33 2 g/dL      RDW 13 9 %      MPV 9 3 fL      Platelets 053 Thousands/uL      nRBC 0 /100 WBCs      Neutrophils Relative 76 %      Immat GRANS % 1 %      Lymphocytes Relative 10 %      Monocytes Relative 10 %      Eosinophils Relative 3 %      Basophils Relative 0 %      Neutrophils Absolute 11 81 Thousands/µL      Immature Grans Absolute 0 07 Thousand/uL      Lymphocytes Absolute 1 48 Thousands/µL      Monocytes Absolute 1 47 Thousand/µL      Eosinophils Absolute 0 44 Thousand/µL      Basophils Absolute 0 05 Thousands/µL     Procalcitonin Reflex [[id number]] Collected: 02/24/21 2568    Lab Status: In process Specimen: Blood from Arm, Right Updated: 02/24/21 9392    Basic metabolic panel [[id number]] Collected: 02/24/21 0620    Lab Status:  In process Specimen: Blood from Arm, Right Updated: 02/24/21 0636    Procalcitonin with AM Reflex [[id number]]  (Normal) Collected: 02/23/21 1532    Lab Status: Final result Specimen: Blood from Arm, Right Updated: 02/23/21 2105     Procalcitonin 0 21 ng/ml     Blood culture #1 [[id number]] Collected: 02/23/21 1538    Lab Status: Preliminary result Specimen: Blood from Arm, Right Updated: 02/23/21 2101     Blood Culture Received in Microbiology Lab  Culture in Progress  Blood culture #2 [[id number]] Collected: 02/23/21 1532    Lab Status: Preliminary result Specimen: Blood from Arm, Right Updated: 02/23/21 2101     Blood Culture Received in Microbiology Lab  Culture in Progress  Lactic Acid [[id number]]  (Normal) Collected: 02/23/21 1532    Lab Status: Final result Specimen: Blood from Arm, Right Updated: 02/23/21 1613     LACTIC ACID 1 2 mmol/L     Narrative:      Result may be elevated if tourniquet was used during collection      Comprehensive metabolic panel [[id number]]  (Abnormal) Collected: 02/23/21 1532    Lab Status: Final result Specimen: Blood from Arm, Right Updated: 02/23/21 1611     Sodium 132 mmol/L      Potassium 3 5 mmol/L      Chloride 96 mmol/L      CO2 28 mmol/L      ANION GAP 8 mmol/L      BUN 10 mg/dL      Creatinine 1 02 mg/dL      Glucose 124 mg/dL      Calcium 8 9 mg/dL      Corrected Calcium 9 5 mg/dL      AST 19 U/L      ALT 20 U/L      Alkaline Phosphatase 78 U/L      Total Protein 7 1 g/dL      Albumin 3 3 g/dL      Total Bilirubin 0 64 mg/dL      eGFR 54 ml/min/1 73sq m     Narrative:      [place] guidelines for Chronic Kidney Disease (CKD):     Stage 1 with normal or high GFR (GFR > 90 mL/min/1 73 square meters)    Stage 2 Mild CKD (GFR = 60-89 mL/min/1 73 square meters)    Stage 3A Moderate CKD (GFR = 45-59 mL/min/1 73 square meters)    Stage 3B Moderate CKD (GFR = 30-44 mL/min/1 73 square meters)    Stage 4 Severe CKD (GFR = 15-29 mL/min/1 73 square meters)    Stage 5 End Stage CKD (GFR <15 mL/min/1 73 square meters)  Note: GFR calculation is accurate only with a steady state creatinine    Protime-INR [[id number]]  (Abnormal) Collected: 02/23/21 1532    Lab Status: Final result Specimen: Blood from Arm, Right Updated: 02/23/21 1610     Protime 27 3 seconds      INR 2  53    APTT [[id number]]  (Abnormal) Collected: 02/23/21 1532    Lab Status: Final result Specimen: Blood from Arm, Right Updated: 02/23/21 1610     PTT 59 seconds     CBC and differential [[id number]]  (Abnormal) Collected: 02/23/21 1532    Lab Status: Final result Specimen: Blood from Arm, Right Updated: 02/23/21 1554     WBC 16 58 Thousand/uL      RBC 4 07 Million/uL      Hemoglobin 12 4 g/dL      Hematocrit 38 4 %      MCV 94 fL      MCH 30 5 pg      MCHC 32 3 g/dL      RDW 14 1 %      MPV 9 0 fL      Platelets 441 Thousands/uL      nRBC 0 /100 WBCs      Neutrophils Relative 81 %      Immat GRANS % 0 %      Lymphocytes Relative 7 %      Monocytes Relative 10 %      Eosinophils Relative 1 %      Basophils Relative 1 %      Neutrophils Absolute 13 44 Thousands/µL      Immature Grans Absolute 0 07 Thousand/uL      Lymphocytes Absolute 1 08 Thousands/µL      Monocytes Absolute 1 72 Thousand/µL      Eosinophils Absolute 0 18 Thousand/µL      Basophils Absolute 0 09 Thousands/µL                  US breast left limited (diagnostic)   ED Interpretation by Basim Garcia PA-C (02/23 1722)   "No signs of abscess" per technician       by Richie Srivastava (02/23 1634)                 Procedures  Procedures         ED Course  ED Course as of Feb 24 1003   Tue Feb 23, 2021   1548 Sepsis note completed  Reached out to surgery who recommend IV clindamycin  Started on 1L LR, will reassess and give 2nd lieter to complete 2L (30cc/kg-2070cc)       1619 Will obtain U/S of breast per surgery      1619 LACTIC ACID: 1 2   1625 Surgery Bedside       1652 On coumadin    INR(!): 2 53   1714 Per Ultrasound technician- no sign of abscess- will update surgery team       2038 Awaiting inpatient status and attending confirmation  Initial Sepsis Screening     Row Name 02/23/21 1530                Is the patient's history suggestive of a new or worsening infection?   (!) Yes (Proceed)  -        Suspected source of infection  wound infection  -RM        Are two or more of the following signs & symptoms of infection both present and new to the patient? (!) Yes (Proceed)  -RM        Indicate SIRS criteria  Hyperthemia > 38 3C (100 9F); Tachycardia > 90 bpm  -RM        If the answer is yes to both questions, suspicion of sepsis is present  --        If severe sepsis is present AND tissue hypoperfusion perists in the hour after fluid resuscitation or lactate > 4, the patient meets criteria for SEPTIC SHOCK  --        Are any of the following organ dysfunction criteria present within 6 hours of suspected infection and SIRS criteria that are NOT considered to be chronic conditions?  --  -RM        Organ dysfunction  --        Date of presentation of severe sepsis  --        Time of presentation of severe sepsis  --        Tissue hypoperfusion persists in the hour after crystalloid fluid administration, evidenced, by either:  --        Was hypotension present within one hour of the conclusion of crystalloid fluid administration?  --        Date of presentation of septic shock  --        Time of presentation of septic shock  --          User Key  (r) = Recorded By, (t) = Taken By, (c) = Cosigned By    Initials Name Provider Type    RM Latosha Lieberman PA-C Physician Assistant          SBIRT 20yo+      Most Recent Value   SBIRT (25 yo +)   In order to provide better care to our patients, we are screening all of our patients for alcohol and drug use  Would it be okay to ask you these screening questions? Yes Filed at: 02/24/2021 0300   Initial Alcohol Screen: US AUDIT-C    1  How often do you have a drink containing alcohol?  0 Filed at: 02/24/2021 0300   2  How many drinks containing alcohol do you have on a typical day you are drinking? 0 Filed at: 02/24/2021 0300   3b  FEMALE Any Age, or MALE 65+: How often do you have 4 or more drinks on one occassion?   0 Filed at: 02/24/2021 0300   Audit-C Score  0 Filed at: 02/24/2021 0300 TOMA: How many times in the past year have you    Used an illegal drug or used a prescription medication for non-medical reasons? Never Filed at: 02/24/2021 0300                    MDM  Number of Diagnoses or Management Options  Cellulitis of other specified site: new and requires workup  SIRS (systemic inflammatory response syndrome) ([facility] [address] ): new and requires workup  Weakness: new and requires workup  Diagnosis management comments: Patient was seen and examined by me and Dr Leslee Johnson in the emergency department  The patient presented with generalized malaise, and fevers with a T-max of 102 1° at home responsive to Tylenol  The patient had a recent left mastectomy on 2/12 and states she has warmth and pain at the incision sites  She notes decreased drain output  She denies any other associated symptoms besides fevers, chills  Evaluation:  Tachycardic on exam, currently afebrile but recent tylenol administration  Purulent discharge and OSEAS drain  Incisions erythematous and warm  Will complete sepsis workup  Surgery contacted and recommended clindamycin as antibiotic of choice  Will obtain diagnostic breast x-ray to determine if abscess is present, surgery agreeable  Workup:  CBC shows leukocytosis  Lactic 1 1  CMP hyponatremia 132, 135 on 2/12  INR 2 53 on coumadin  Blood cultures in process  Will obtain ultrasound of the left breast to rule out abscess  Disposition:Admit to surgery service for further management         Amount and/or Complexity of Data Reviewed  Clinical lab tests: ordered and reviewed  Tests in the radiology section of CPT®: ordered and reviewed  Tests in the medicine section of CPT®: ordered and reviewed  Discussion of test results with the performing providers: yes  Decide to obtain previous medical records or to obtain history from someone other than the patient: yes  Review and summarize past medical records: yes  Discuss the patient with other providers: yes  Independent visualization of images, tracings, or specimens: yes    Risk of Complications, Morbidity, and/or Mortality  Presenting problems: moderate  Diagnostic procedures: moderate  Management options: moderate    Patient Progress  Patient progress: stable      Disposition  Final diagnoses:   SIRS (systemic inflammatory response syndrome) ([facility] )   Cellulitis of other specified site   Weakness     Time reflects when diagnosis was documented in both MDM as applicable and the Disposition within this note     Time User Action Codes Description Comment    2/23/2021  6:17 PM Nancy Drought Add [R65 10] SIRS (systemic inflammatory response syndrome) ([facility] )     2/23/2021  6:17 PM Nancy Drought Add [M78 746] Cellulitis of other specified site     2/23/2021  6:17 PM Nancy Drought Add [R53 1] Weakness     2/24/2021  8:11 AM Pellechi, Nathaneil Bhanu Add [I10] Essential hypertension     2/24/2021  8:12 AM Pellechi, Nathaneil Bhanu Add [E03 9] Acquired hypothyroidism     2/24/2021  8:12 AM Jessie Check Add [R73 03] Prediabetes     2/24/2021  8:12 AM Pellechi, Nathaneil McDuffie Add [M35 00] Sjogren's syndrome, with unspecified organ involvement [facility])       ED Disposition     ED Disposition Condition Date/Time Comment    Admit Stable Tue Feb 23, 2021 10:03 PM Discussed Course of Care with Dr Kg Gray resident  Patient will be admitted observation to the service of Dr Hayes/Oncology  Follow-up Information    None         Patient's Medications   Discharge Prescriptions    No medications on file     No discharge procedures on file      PDMP Review       Value Time User    PDMP Reviewed  Yes 1/27/2021  8:21 AM Rodger Maxwell MD          ED Provider  Electronically Signed by           Víctor Pretty PA-C  02/24/21 0787

## 2021-02-23 NOTE — SEPSIS NOTE
Sepsis Note   Mary Smith 67 y o  female MRN: [id number]  Unit/Bed#: ED 27 Encounter: [id number]      qSOFA     Row Name 02/23/21 1502 02/23/21 1500             Altered mental status GCS < 15  --  --       Respiratory Rate > / =22  0  --       Systolic BP < / =210  --  0       Q Sofa Score  0  --           Initial Sepsis Screening     Row Name 02/23/21 1530                Is the patient's history suggestive of a new or worsening infection? (!) Yes (Proceed)  -RM        Suspected source of infection  wound infection  -RM        Are two or more of the following signs & symptoms of infection both present and new to the patient?  --        Indicate SIRS criteria  Hyperthemia > 38 3C (100 9F); Tachycardia > 90 bpm  -RM        If the answer is yes to both questions, suspicion of sepsis is present  --        If severe sepsis is present AND tissue hypoperfusion perists in the hour after fluid resuscitation or lactate > 4, the patient meets criteria for SEPTIC SHOCK  --        Are any of the following organ dysfunction criteria present within 6 hours of suspected infection and SIRS criteria that are NOT considered to be chronic conditions?  --  -RM        Organ dysfunction  --        Date of presentation of severe sepsis  --        Time of presentation of severe sepsis  --        Tissue hypoperfusion persists in the hour after crystalloid fluid administration, evidenced, by either:  --        Was hypotension present within one hour of the conclusion of crystalloid fluid administration?  --        Date of presentation of septic shock  --        Time of presentation of septic shock  --          User Key  (r) = Recorded By, (t) = Taken By, (c) = Cosigned By    [address] Name Provider Type    [address] [place] [address] Physician Assistant

## 2021-02-23 NOTE — TELEPHONE ENCOUNTER
Patients' spouse called this morning stating that despite starting antibiotics yesterday, patient is running a fever of 102 and patient medicated with tylenol  Reviewed concerns with Dr Tadeo Cardenas  Called patient back and per Dr Tadeo Cardenas advised patients' spouse to bring the patient to the ER; if she is still running a temperature  Patient may need IV antibiotics  He verbalized understanding and was appreciative of the phone call

## 2021-02-24 PROBLEM — L03.90 CELLULITIS: Status: ACTIVE | Noted: 2021-02-24

## 2021-02-24 LAB
ANION GAP SERPL CALCULATED.3IONS-SCNC: 5 MMOL/L (ref 4–13)
ATRIAL RATE: 76 BPM
BASOPHILS # BLD AUTO: 0.05 THOUSANDS/ΜL (ref 0–0.1)
BASOPHILS NFR BLD AUTO: 0 % (ref 0–1)
BUN SERPL-MCNC: 11 MG/DL (ref 5–25)
CALCIUM SERPL-MCNC: 8.8 MG/DL (ref 8.3–10.1)
CHLORIDE SERPL-SCNC: 105 MMOL/L (ref 100–108)
CO2 SERPL-SCNC: 27 MMOL/L (ref 21–32)
CREAT SERPL-MCNC: 0.65 MG/DL (ref 0.6–1.3)
EOSINOPHIL # BLD AUTO: 0.44 THOUSAND/ΜL (ref 0–0.61)
EOSINOPHIL NFR BLD AUTO: 3 % (ref 0–6)
ERYTHROCYTE [DISTWIDTH] IN BLOOD BY AUTOMATED COUNT: 13.9 % (ref 11.6–15.1)
GFR SERPL CREATININE-BSD FRML MDRD: 87 ML/MIN/1.73SQ M
GLUCOSE P FAST SERPL-MCNC: 100 MG/DL (ref 65–99)
GLUCOSE SERPL-MCNC: 100 MG/DL (ref 65–140)
HCT VFR BLD AUTO: 32.8 % (ref 34.8–46.1)
HGB BLD-MCNC: 10.9 G/DL (ref 11.5–15.4)
IMM GRANULOCYTES # BLD AUTO: 0.07 THOUSAND/UL (ref 0–0.2)
IMM GRANULOCYTES NFR BLD AUTO: 1 % (ref 0–2)
LYMPHOCYTES # BLD AUTO: 1.48 THOUSANDS/ΜL (ref 0.6–4.47)
LYMPHOCYTES NFR BLD AUTO: 10 % (ref 14–44)
MCH RBC QN AUTO: 30.7 PG (ref 26.8–34.3)
MCHC RBC AUTO-ENTMCNC: 33.2 G/DL (ref 31.4–37.4)
MCV RBC AUTO: 92 FL (ref 82–98)
MONOCYTES # BLD AUTO: 1.47 THOUSAND/ΜL (ref 0.17–1.22)
MONOCYTES NFR BLD AUTO: 10 % (ref 4–12)
NEUTROPHILS # BLD AUTO: 11.81 THOUSANDS/ΜL (ref 1.85–7.62)
NEUTS SEG NFR BLD AUTO: 76 % (ref 43–75)
NRBC BLD AUTO-RTO: 0 /100 WBCS
P AXIS: 52 DEGREES
PLATELET # BLD AUTO: 371 THOUSANDS/UL (ref 149–390)
PMV BLD AUTO: 9.3 FL (ref 8.9–12.7)
POTASSIUM SERPL-SCNC: 3.7 MMOL/L (ref 3.5–5.3)
PR INTERVAL: 200 MS
PROCALCITONIN SERPL-MCNC: 0.14 NG/ML
QRS AXIS: 46 DEGREES
QRSD INTERVAL: 96 MS
QT INTERVAL: 388 MS
QTC INTERVAL: 436 MS
RBC # BLD AUTO: 3.55 MILLION/UL (ref 3.81–5.12)
SODIUM SERPL-SCNC: 137 MMOL/L (ref 136–145)
T WAVE AXIS: 31 DEGREES
VENTRICULAR RATE: 76 BPM
WBC # BLD AUTO: 15.32 THOUSAND/UL (ref 4.31–10.16)

## 2021-02-24 PROCEDURE — 99024 POSTOP FOLLOW-UP VISIT: CPT | Performed by: SURGERY

## 2021-02-24 PROCEDURE — 80048 BASIC METABOLIC PNL TOTAL CA: CPT | Performed by: SURGERY

## 2021-02-24 PROCEDURE — 36415 COLL VENOUS BLD VENIPUNCTURE: CPT | Performed by: PHYSICIAN ASSISTANT

## 2021-02-24 PROCEDURE — 84145 PROCALCITONIN (PCT): CPT | Performed by: PHYSICIAN ASSISTANT

## 2021-02-24 PROCEDURE — 85025 COMPLETE CBC W/AUTO DIFF WBC: CPT | Performed by: SURGERY

## 2021-02-24 PROCEDURE — 93010 ELECTROCARDIOGRAM REPORT: CPT | Performed by: INTERNAL MEDICINE

## 2021-02-24 RX ORDER — BACITRACIN, NEOMYCIN, POLYMYXIN B 400; 3.5; 5 [USP'U]/G; MG/G; [USP'U]/G
1 OINTMENT TOPICAL ONCE
Status: COMPLETED | OUTPATIENT
Start: 2021-02-24 | End: 2021-02-24

## 2021-02-24 RX ADMIN — SODIUM CHLORIDE 60 ML/HR: 0.9 INJECTION, SOLUTION INTRAVENOUS at 21:45

## 2021-02-24 RX ADMIN — LISINOPRIL 20 MG: 20 TABLET ORAL at 08:31

## 2021-02-24 RX ADMIN — HYDROXYCHLOROQUINE SULFATE 200 MG: 200 TABLET, FILM COATED ORAL at 06:30

## 2021-02-24 RX ADMIN — ESCITALOPRAM OXALATE 20 MG: 20 TABLET ORAL at 21:40

## 2021-02-24 RX ADMIN — GABAPENTIN 600 MG: 300 CAPSULE ORAL at 08:31

## 2021-02-24 RX ADMIN — LORAZEPAM 1 MG: 1 TABLET ORAL at 04:00

## 2021-02-24 RX ADMIN — DILTIAZEM HYDROCHLORIDE 180 MG: 180 CAPSULE, COATED, EXTENDED RELEASE ORAL at 08:39

## 2021-02-24 RX ADMIN — LISINOPRIL 20 MG: 20 TABLET ORAL at 17:26

## 2021-02-24 RX ADMIN — FLUTICASONE FUROATE AND VILANTEROL TRIFENATATE 1 PUFF: 200; 25 POWDER RESPIRATORY (INHALATION) at 08:39

## 2021-02-24 RX ADMIN — PANTOPRAZOLE SODIUM 40 MG: 40 TABLET, DELAYED RELEASE ORAL at 06:20

## 2021-02-24 RX ADMIN — WARFARIN SODIUM 5 MG: 2.5 TABLET ORAL at 17:26

## 2021-02-24 RX ADMIN — GABAPENTIN 600 MG: 300 CAPSULE ORAL at 21:41

## 2021-02-24 RX ADMIN — PRAVASTATIN SODIUM 10 MG: 10 TABLET ORAL at 17:26

## 2021-02-24 RX ADMIN — LEVOTHYROXINE SODIUM 50 MCG: 50 TABLET ORAL at 06:20

## 2021-02-24 RX ADMIN — GABAPENTIN 600 MG: 300 CAPSULE ORAL at 17:25

## 2021-02-24 RX ADMIN — BUPROPION HYDROCHLORIDE 300 MG: 150 TABLET, EXTENDED RELEASE ORAL at 08:39

## 2021-02-24 RX ADMIN — VANCOMYCIN HYDROCHLORIDE 1250 MG: 5 INJECTION, POWDER, LYOPHILIZED, FOR SOLUTION INTRAVENOUS at 21:42

## 2021-02-24 RX ADMIN — BACITRACIN ZINC NEOMYCIN SULFATE POLYMYXIN B SULFATE 1 LARGE APPLICATION: 400; 3.5; 5 OINTMENT TOPICAL at 11:39

## 2021-02-24 NOTE — PROGRESS NOTES
Progress Note - Oncology Surgery   Larry Colbert 67 y o  female MRN: [id number]  Unit/Bed#: ED 27 Encounter: [id number]    Assessment:  75yo F s/p L mastectomy 2/12/21, who presents with L breast cellulitis    Plan:  · Continue IV vanco  · Diet as tolerated   · Gentle IVF  · Maintain OSEAS drains x2  · Trend WBC count, fever curve  · DVT PPx       Subjective/Objective     Subjective:   No acute events overnight  Feels better today  Tmax 100 4 since presentation  C/o pain around lateral drain site  Objective:    Blood pressure 167/75, pulse 84, temperature 99 3 °F (37 4 °C), temperature source Oral, resp  rate 20, height 5' 3" (1 6 m), weight 69 kg (152 lb 1 9 oz), SpO2 95 %  ,Body mass index is 26 95 kg/m²        Intake/Output Summary (Last 24 hours) at 2/24/2021 9095  Last data filed at 2/24/2021 0131  Gross per 24 hour   Intake 1300 ml   Output --   Net 1300 ml       Invasive Devices     Peripheral Intravenous Line            Peripheral IV 02/24/21 Right;Medial Forearm less than 1 day          Drain            Closed/Suction Drain Left Breast Bulb 12 days    Closed/Suction Drain Left Breast Bulb 19 Fr  12 days                Physical Exam:   Gen:  NAD  CV:  warm, well-perfused  Lungs: nl effort  Chest: Slightly improved L chest wall erythema   Lateral drain remains with some debris, medial drain serosang  Abd:  soft, NT/ND  Ext:  no CCE  Neuro: A&Ox3     Results from last 7 days   Lab Units 02/24/21  0620 02/23/21  1532   WBC Thousand/uL 15 32* 16 58*   HEMOGLOBIN g/dL 10 9* 12 4   HEMATOCRIT % 32 8* 38 4   PLATELETS Thousands/uL 371 412*     Results from last 7 days   Lab Units 02/23/21  1532   POTASSIUM mmol/L 3 5   CHLORIDE mmol/L 96*   CO2 mmol/L 28   BUN mg/dL 10   CREATININE mg/dL 1 02   CALCIUM mg/dL 8 9     Results from last 7 days   Lab Units 02/23/21  1532   INR  2 53*   PTT seconds 59*

## 2021-02-24 NOTE — UTILIZATION REVIEW
Initial Clinical Review    Admission: Date/Time/Statement:   Admission Orders (From admission, onward)     Ordered        02/23/21 2147  Place in Observation  Once                   Orders Placed This Encounter   Procedures    Place in Observation     Standing Status:   Standing     Number of Occurrences:   1     Order Specific Question:   Level of Care     Answer:   Med Surg [16]     ED Arrival Information     Expected Arrival Acuity Means of Arrival Escorted By Service Admission Type    - 2/23/2021 14:59 Urgent Ambulance [facility] Oncology-Surgical Urgent    Arrival Complaint    Weakness (post op CA breast surg)        Chief Complaint   Patient presents with    Weakness - Generalized     Patient arrived via EMS from home  Patient had left breast removed friday and is c/o weakness and fever  Patient took tylenol at approx 10a this morning and is on oral abx  Patient had 2nd dosage of covid vaccination last month  Assessment/Plan: 67 y o  female to ED by EMS on provider advice presents with fever  Observation for Cellulitis/fever  She underwent mastectomy on 2/12/21 and was discharged home with two OSEAS drains in place  Her visiting nurse noticed cellulitis in the area and she was subsequently started on PO clindamycin  However, today she spiked a fever to 102F and felt weak  She was instructed by Dr Te Lott to go to ED for IV abx  Workup demonstrated leukocytosis (WBC 16 6); ultrasound did not show evidence of underlying abscess  Consult surg ONCOLOGY  Plan:  · Admit to surgical oncology for observation  · IV vanco, pharmacy consult  · Diet as tolerated   · Gentle IVF  · Maintain OSEAS drains x2  · Trend WBC count, fever curve  · Home meds reordered  · DVT PPx      2/24/2021 Oncology surg  73yo F s/p L mastectomy 2/12/21, who presents with L breast cellulitis, No acute events overnight  Feels better today  Tmax 100 4 since presentation  C/o pain around lateral drain site    Plan:  · Continue IV vanco  · Diet as tolerated   · Gentle IVF  · Maintain OSEAS drains x2  · Trend WBC count, fever curve  · DVT PPx  ED Triage Vitals   Temperature Pulse Respirations Blood Pressure SpO2   02/23/21 1500 02/23/21 1502 02/23/21 1502 02/23/21 1500 02/23/21 1502   99 5 °F (37 5 °C) 77 20 146/84 95 %      Temp Source Heart Rate Source Patient Position - Orthostatic VS BP Location FiO2 (%)   02/23/21 1500 02/23/21 1502 02/23/21 1500 02/23/21 1500 --   Oral Monitor Lying Left arm       Pain Score       02/23/21 1502       9          Wt Readings from Last 1 Encounters:   02/23/21 69 kg (152 lb 1 9 oz)     Additional Vital Signs:   Date/Time  Temp  Pulse  Resp  BP  MAP (mmHg)  SpO2  O2 Device  Patient Position - Orthostatic VS   02/24/21 0751  99 3 °F (37 4 °C)  84  20  167/75  --  95 %  None (Room air)  Sitting   02/24/21 0445  --  78  --  --  --  93 %  --  --   02/24/21 0415  --  81  18  136/65  93  93 %  None (Room air)  Lying   02/24/21 0340  98 3 °F (36 8 °C)  86  18  147/70  100  95 %  None (Room air)  Lying   02/23/21 2322  98 6 °F (37 °C)  82  14  169/72  --  93 %  None (Room air)  Sitting   02/23/21 2237  --  --  --  140/61  --  --  --  --   02/23/21 2115  --  80  16  141/70  97  91 %  None (Room air)  Lying   02/23/21 2045  --  74  16  158/72  103  90 %  None (Room air)  Lying   02/23/21 1830  --  82  16  157/72  104  93 %  None (Room air)  Lying   02/23/21 1815  --  82  16  155/70  101  91 %  None (Room air)  Lying   02/23/21 1800  --  80  16  147/65  94  92 %  None (Room air)  Lying   02/23/21 1730  --  84  16  158/70  101  93 %  None (Room air)  Lying   02/23/21 1717  --  --  --  --  --  --  None (Room air)  --   02/23/21 1700  --  80  18  164/75  108  94 %  None (Room air)  Lying   02/23/21 1640  100 4 °F (38 °C)  80  16  160/70  101  94 %  None (Room air)  --   02/23/21 1502  --  77  20  --  --  95 %  None (Room air)  --   02/23/21 1500  99 5 °F (37 5 °C)  --  --  146/84  --  --  --  Lying      Weights (last 14 days)    Date/Time  Weight  Weight Method  Height   02/23/21 1502  69 kg (152 lb 1 9 oz)  Bed scale  5' 3" (1 6 m)       Pertinent Labs/Diagnostic Test Results:       Results from last 7 days   Lab Units 02/24/21  0620 02/23/21  1532   WBC Thousand/uL 15 32* 16 58*   HEMOGLOBIN g/dL 10 9* 12 4   HEMATOCRIT % 32 8* 38 4   PLATELETS Thousands/uL 371 412*   NEUTROS ABS Thousands/µL 11 81* 13 44*         Results from last 7 days   Lab Units 02/23/21  1532   SODIUM mmol/L 132*   POTASSIUM mmol/L 3 5   CHLORIDE mmol/L 96*   CO2 mmol/L 28   ANION GAP mmol/L 8   BUN mg/dL 10   CREATININE mg/dL 1 02   EGFR ml/min/1 73sq m 54   CALCIUM mg/dL 8 9     Results from last 7 days   Lab Units 02/23/21  1532   AST U/L 19   ALT U/L 20   ALK PHOS U/L 78   TOTAL PROTEIN g/dL 7 1   ALBUMIN g/dL 3 3*   TOTAL BILIRUBIN mg/dL 0 64         Results from last 7 days   Lab Units 02/23/21  1532   GLUCOSE RANDOM mg/dL 124       Results from last 7 days   Lab Units 02/23/21  1532   PROTIME seconds 27 3*   INR  2 53*   PTT seconds 59*         Results from last 7 days   Lab Units 02/23/21  1532   PROCALCITONIN ng/ml 0 21     Results from last 7 days   Lab Units 02/23/21  1532   LACTIC ACID mmol/L 1 2     Results from last 7 days   Lab Units 02/23/21  1538 02/23/21  1532   BLOOD CULTURE  Received in Microbiology Lab  Culture in Progress  Received in Microbiology Lab  Culture in Progress       2/23     breast left limited (diagnostic)   ED Interpretation  (02/23 1722)   "No signs of abscess" per technician   No ekg aval     ED Treatment:   Medication Administration from 02/23/2021 1457 to 02/24/2021 0850       Date/Time Order Dose Route Action     02/23/2021 1550 lactated ringers bolus 1,000 mL 1,000 mL Intravenous New Bag     02/23/2021 1607 clindamycin (CLEOCIN) IVPB (premix in dextrose) 600 mg 50 mL 600 mg Intravenous New Bag     02/23/2021 1723 acetaminophen (TYLENOL) tablet 650 mg 650 mg Oral Given     02/23/2021 2252 vancomycin (VANCOCIN) 1,250 mg in sodium chloride 0 9 % 250 mL IVPB 1,250 mg Intravenous New Bag     02/23/2021 2237 heparin (porcine) subcutaneous injection 5,000 Units 5,000 Units Subcutaneous Given     02/23/2021 2239 sodium chloride 0 9 % infusion 60 mL/hr Intravenous New Bag     02/24/2021 0839 buPROPion (WELLBUTRIN XL) 24 hr tablet 300 mg 300 mg Oral Given     02/24/2021 0839 diltiazem (CARDIZEM CD) 24 hr capsule 180 mg 180 mg Oral Given     02/23/2021 2252 escitalopram (LEXAPRO) tablet 20 mg 20 mg Oral Given     02/24/2021 0839 fluticasone-vilanterol (BREO ELLIPTA) 200-25 MCG/INH inhaler 1 puff 1 puff Inhalation Given     02/24/2021 0831 gabapentin (NEURONTIN) capsule 600 mg 600 mg Oral Given     02/23/2021 2237 gabapentin (NEURONTIN) capsule 600 mg 600 mg Oral Given     02/24/2021 0630 hydroxychloroquine (PLAQUENIL) tablet 200 mg 200 mg Oral Given     02/24/2021 0400 LORazepam (ATIVAN) tablet 1 mg 1 mg Oral Given     02/24/2021 8031 levothyroxine tablet 50 mcg 50 mcg Oral Given     02/24/2021 0831 lisinopril (ZESTRIL) tablet 20 mg 20 mg Oral Given     02/23/2021 2237 lisinopril (ZESTRIL) tablet 20 mg 20 mg Oral Given     02/24/2021 0620 pantoprazole (PROTONIX) EC tablet 40 mg 40 mg Oral Given     02/23/2021 2237 warfarin (COUMADIN) tablet 5 mg 5 mg Oral Given        Past Medical History:   Diagnosis Date    Anemia     Anxiety     Arrhythmia     Arthritis     Asthma     Blood clot in vein     portal vein    Breast cancer (HCC)     Breast lump     [id number] RESOLVED    Cancer (HCC)     Depression     Disease of thyroid gland     DVT, lower extremity (HCC)     GERD (gastroesophageal reflux disease)     Hyperlipidemia     Hypertension     Hypokalemia     Hyponatremia     Hypothyroidism     Iron deficiency anemia     Irregular heart beat     Manic behavior ([address] )     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HCC)     Osteoarthritis     Palpitations     [id number]  RESOLVED    Paroxysmal supraventricular tachycardia ([address] )     PE (pulmonary thromboembolism) ([facility] )     Sjoegren syndrome     Spinal stenosis     Thrombocytosis (HCC)     [id number]  RESOLVED    Tremors of nervous system     dbs implanted right and left chest    Ulcerative colitis ([facility] )     Vertigo     [id number] RESOLVED         Admitting Diagnosis: Weakness [R53 1]  Age/Sex: 67 y o  female  Admission Orders:  Scheduled Medications:  buPROPion, 300 mg, Oral, Daily  diltiazem, 180 mg, Oral, Daily  escitalopram, 20 mg, Oral, HS  fluticasone-vilanterol, 1 puff, Inhalation, Daily  gabapentin, 600 mg, Oral, TID  hydroxychloroquine, 200 mg, Oral, Daily With Breakfast  levothyroxine, 50 mcg, Oral, Early Morning  lisinopril, 20 mg, Oral, BID  pantoprazole, 40 mg, Oral, Early Morning  pravastatin, 10 mg, Oral, Daily With Dinner  vancomycin, 20 mg/kg (Adjusted), Intravenous, Q24H  warfarin, 5 mg, Oral, Daily (warfarin)      Continuous IV Infusions:  sodium chloride, 60 mL/hr, Intravenous, Continuous      PRN Meds:  albuterol, 2 puff, Inhalation, Q6H PRN  fluticasone, 1 spray, Nasal, Daily PRN  LORazepam, 1 mg, Oral, Q6H PRN  ondansetron, 4 mg, Intravenous, Q6H PRN  sodium chloride (PF), 3 mL, Intravenous, Q1H PRN    IP CONSULT TO PHARMACY  IP CONSULT TO INTERNAL MEDICINE  Network Utilization Review Department  ATTENTION: Please call with any questions or concerns to [phone] and carefully listen to the prompts so that you are directed to the right person  All voicemails are confidential   Dilshad Arias all requests for admission clinical reviews, approved or denied determinations and any other requests to dedicated fax number below belonging to the campus where the patient is receiving treatment   List of dedicated fax numbers for the Facilities:  [address] DENIALS (Administrative/Medical Necessity) [phone]   [address] [place] [address] (Maternity/NICU/Pediatrics) 261 [facility] [facility] 40 [facility] [place] [address] [facility] [address] [address] [place] [address] [address] [phone]   [address] [phone]

## 2021-02-24 NOTE — PROGRESS NOTES
Vancomycin Assessment    Henry Lopez is a 67 y o  female who is currently receiving vancomycin 1250mg (20mg/kg) Q 24hrs for skin-soft tissue infection     Relevant clinical data and objective history reviewed:  Creatinine   Date Value Ref Range Status   02/23/2021 1 02 0 60 - 1 30 mg/dL Final     Comment:     Standardized to IDMS reference method   02/13/2021 0 75 0 60 - 1 30 mg/dL Final     Comment:     Standardized to IDMS reference method   01/07/2021 0 76 0 60 - 1 30 mg/dL Final     Comment:     Standardized to IDMS reference method   12/28/2015 0 76 0 60 - 1 30 mg/dL Final     Comment:     Standardized to IDMS reference method   11/16/2015 0 91 0 60 - 1 30 mg/dL Final     Comment:     Standardized to IDMS reference method   11/02/2015 0 85 0 60 - 1 30 mg/dL Final     Comment:     Standardized to IDMS reference method     /70 (BP Location: Right arm)   Pulse 86   Temp 98 6 °F (37 °C) (Oral)   Resp 18   Ht 5' 3" (1 6 m)   Wt 69 kg (152 lb 1 9 oz)   SpO2 95%   BMI 26 95 kg/m²   I/O last 3 completed shifts: In: 1050 [IV Piggyback:1050]  Out: -   Lab Results   Component Value Date/Time    BUN 10 02/23/2021 03:32 PM    BUN 7 12/28/2015 12:34 PM    WBC 16 58 (H) 02/23/2021 03:32 PM    WBC 6 23 11/16/2015 01:02 PM    HGB 12 4 02/23/2021 03:32 PM    HGB 13 3 11/16/2015 01:02 PM    HCT 38 4 02/23/2021 03:32 PM    HCT 40 4 11/16/2015 01:02 PM    MCV 94 02/23/2021 03:32 PM    MCV 94 11/16/2015 01:02 PM     (H) 02/23/2021 03:32 PM     (H) 11/16/2015 01:02 PM     Temp Readings from Last 3 Encounters:   02/23/21 98 6 °F (37 °C) (Oral)   02/13/21 99 9 °F (37 7 °C) (Oral)   01/27/21 (!) 97 2 °F (36 2 °C)     Vancomycin Days of Therapy: 1    Assessment/Plan  The patient is currently on vancomycin utilizing scheduled dosing based on adjusted body weight (due to obesity)  Baseline risks associated with therapy include: pre-existing renal impairment and advanced age    The patient is currently receiving 1250mg (20mg/kg) Q 24hrs and is clinically appropriate and dose will be continued  Pharmacy will also follow closely for s/sx of nephrotoxicity, infusion reactions, and appropriateness of therapy  BMP and CBC will be ordered per protocol  Plan for trough as patient approaches steady state, prior to the 4th  dose at approximately 2200 on 2/26/21  Due to infection severity, will target a trough of 15-20 (appropriate for most indications)   Pharmacy will continue to follow the patients culture results and clinical progress daily      Saúl Jeronimo, Pharmacist

## 2021-02-24 NOTE — ED NOTES
Pt ambulated to restroom with minimal assist of RN  Pt requested to hold onto RN's arm D/T feeling "a bit wabbly since I've been laying down"  Slow, steady gait noted  Pt requested "something for my nerves"  Pt medicated per admission orders with Ativan 1 mg po  Medication administered with negative complications  Pt provided with disposable underwear and  Feminine pad per request  VS  Will continue to monitor       Elva Stevens RN  02/24/21 1174

## 2021-02-24 NOTE — ED NOTES
Patient ambulated to bathroom with a standby  Visibly short of breathe post ambulation   O2 96% HR 89     Miguel Barahona  02/24/21 5087

## 2021-02-24 NOTE — H&P
H&P Exam - Surgical Oncology   Claudio Srivastava 67 y o  female MRN: [id number]  Unit/Bed#: ED 27 Encounter: [id number]    Assessment/Plan     Assessment:  75yo F s/p L mastectomy 2/12/21, who presents with L breast cellulitis    Plan:  · Admit to surgical oncology for observation  · IV vanco, pharmacy consult  · Diet as tolerated   · Gentle IVF  · Maintain OSEAS drains x2  · Trend WBC count, fever curve  · Home meds reordered  · DVT PPx    History of Present Illness     HPI:  Claudio Srivastava is a 67 y o  female who presents with fever  She underwent mastectomy on 2/12/21 and was discharged home with two OSEAS drains in place  Her visiting nurse noticed cellulitis in the area and she was subsequently started on PO clindamycin  However, today she spiked a fever to 102F and felt weak  She was instructed by Dr Luz Maria Sorensen to go to ED for IV abx  Workup demonstrated leukocytosis (WBC 16 6); ultrasound did not show evidence of underlying abscess  Review of Systems   Constitutional: Positive for chills and fever  Skin: Positive for color change  Neurological: Positive for weakness  All other systems reviewed and are negative        Historical Information   Past Medical History:   Diagnosis Date    Anemia     Anxiety     Arrhythmia     Arthritis     Asthma     Blood clot in vein     portal vein    Breast cancer (HCC)     Breast lump     [id number] RESOLVED    Cancer ([address] )     Depression     Disease of thyroid gland     DVT, lower extremity (HCC)     GERD (gastroesophageal reflux disease)     Hyperlipidemia     Hypertension     Hypokalemia     Hyponatremia     Hypothyroidism     Iron deficiency anemia     Irregular heart beat     Manic behavior (HCC)     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HCC)     Osteoarthritis     Palpitations     [id number]  RESOLVED    Paroxysmal supraventricular tachycardia (HCC)     PE (pulmonary thromboembolism) (HCC)     Sjoegren syndrome     Spinal stenosis     Thrombocytosis ([address] ) [id number]  RESOLVED    Tremors of nervous system     dbs implanted right and left chest    Ulcerative colitis [facility]     Vertigo     [id number] RESOLVED     Past Surgical History:   Procedure Laterality Date    ABDOMINAL SURGERY      APPENDECTOMY      BREAST BIOPSY Left 11/07/2019    Stereo    BREAST EXCISIONAL BIOPSY Left     x many years    BREAST SURGERY      lumpectomy & biopsy    CARMELLA HOLE W/ STEREOTACTIC INSERTION OF DBS LEADS / INTRAOP MICROELECTRODE RECORDING      COLON SURGERY      COLONOSCOPY N/A 8/30/2017    Procedure: Shruthi Valdez;  Surgeon: Libby Rivera MD;  [facility]; Service: Colorectal    COLOPROCTECTOMY W/ ILEO J POUCH      ESOPHAGOGASTRODUODENOSCOPY      ONSET 10/17/11    FISTULA REPAIR      LLEOANAL FISTULA REPAIR TRANSPERIN TRANSABD APPROACH    HYSTERECTOMY      age 43    ILEOSTOMY CLOSURE      KNEE ARTHROSCOPY      Right    MAMMO STEREOTACTIC BREAST BIOPSY LEFT (ALL INC) Left 11/7/2019    MAMMO STEREOTACTIC BREAST BIOPSY LEFT (ALL INC) Left 12/17/2020    MAMMO STEREOTACTIC BREAST BIOPSY LEFT (ALL INC) EACH ADD Left 12/17/2020    MASTECTOMY W/ SENTINEL NODE BIOPSY Left 2/12/2021    Procedure: BREAST MASTECTOMY WITH SENTINEL LYMPH NODE BIOPSY, LYMPHATIC MAPPING WITH BLUE DYE AND RADIAOCTIVE DYE (INJECT AT 1100 BY DR GILLESPIE IN THE OR);   Surgeon: Malorie Bose MD;  [facility];  Service: Surgical Oncology    ID IMP STIM,CRANIAL,SUBQ,1 ARRAY Right 6/20/2017    Procedure: DBS GENERATOR REPLACEMENT;  Surgeon: Harpreet Mace MD;  Location:  MAIN OR;  Service: Neurosurgery    ID IMP STIM,CRANIAL,SUBQ,1 ARRAY N/A 12/4/2019    Procedure: REPLACEMENT IMPLANTABLE PULSE GENERATOR FOR DEEP BRAIN STIMULATOR LEFT CHEST;  Surgeon: Tremayne Wilson MD;  [facility];  Service: Neurosurgery    SPLENECTOMY       Social History   Social History     Substance and Sexual Activity   Alcohol Use Yes    Frequency: 2-4 times a month    Drinks per session: 1 or 2    Binge frequency: Never     Social History     Substance and Sexual Activity   Drug Use No     Social History     Tobacco Use   Smoking Status Former Smoker    Packs/day: 2 00    Years: 5 00    Pack years: 10 00    Quit date:     Years since quittin 1   Smokeless Tobacco Never Used   Tobacco Comment    Quit     E-Cigarette/Vaping    E-Cigarette Use Never User      E-Cigarette/Vaping Substances     Family History   Problem Relation Age of Onset    Venous thrombosis Mother         ACUTE VENOUS THROMBOSIS OF DEEP VESSELS OF THE DISTAL LOWER EXTREMITY    Other Mother         PHLEBITIS    Hypertension Mother     Peripheral vascular disease Mother     COPD Father     Diabetes Father         MELLITUS    Stroke Father     Diabetes Sister         MELLITUS    Sjogren's syndrome Sister     No Known Problems Daughter     No Known Problems Maternal Grandmother     No Known Problems Maternal Grandfather     No Known Problems Paternal Grandmother     No Known Problems Paternal Grandfather     No Known Problems Sister     No Known Problems Maternal Aunt     No Known Problems Paternal Aunt     No Known Problems Son        Meds/Allergies   all current active meds have been reviewed, current meds:   Current Facility-Administered Medications   Medication Dose Route Frequency    albuterol (PROVENTIL HFA,VENTOLIN HFA) inhaler 2 puff  2 puff Inhalation Q6H PRN    [START ON 2021] buPROPion (WELLBUTRIN XL) 24 hr tablet 300 mg  300 mg Oral Daily    [START ON 2021] diltiazem (CARDIZEM CD) 24 hr capsule 180 mg  180 mg Oral Daily    escitalopram (LEXAPRO) tablet 20 mg  20 mg Oral HS    fluticasone (FLONASE) 50 mcg/act nasal spray 1 spray  1 spray Nasal Daily PRN    [START ON 2021] fluticasone-vilanterol (BREO ELLIPTA) 200-25 MCG/INH inhaler 1 puff  1 puff Inhalation Daily    gabapentin (NEURONTIN) capsule 600 mg  600 mg Oral TID    heparin (porcine) subcutaneous injection 5,000 Units  5,000 Units Subcutaneous Q8H [facility]    [START ON 2/24/2021] hydroxychloroquine (PLAQUENIL) tablet 200 mg  200 mg Oral Daily With Breakfast    [START ON 2/24/2021] levothyroxine tablet 50 mcg  50 mcg Oral Early Morning    lisinopril (ZESTRIL) tablet 20 mg  20 mg Oral BID    LORazepam (ATIVAN) tablet 1 mg  1 mg Oral Q6H PRN    ondansetron (ZOFRAN) injection 4 mg  4 mg Intravenous Q6H PRN    [START ON 2/24/2021] pantoprazole (PROTONIX) EC tablet 40 mg  40 mg Oral Early Morning    [START ON 2/24/2021] pravastatin (PRAVACHOL) tablet 10 mg  10 mg Oral Daily With Dinner    sodium chloride (PF) 0 9 % injection 3 mL  3 mL Intravenous Q1H PRN    sodium chloride 0 9 % infusion  60 mL/hr Intravenous Continuous    vancomycin (VANCOCIN) 1,250 mg in sodium chloride 0 9 % 250 mL IVPB  20 mg/kg (Adjusted) Intravenous Once    warfarin (COUMADIN) tablet 5 mg  5 mg Oral Daily (warfarin)    and PTA meds:   Prior to Admission Medications   Prescriptions Last Dose Informant Patient Reported? Taking?    Calcium Carb-Cholecalciferol (CALCIUM 600-D PO) 2/23/2021 at Unknown time  Yes Yes   Sig: Take 1 tablet by mouth 2 (two) times a day   Coenzyme Q10 (CO Q-10 PO) Past Week at Unknown time  Yes Yes   Sig: Take 1 capsule by mouth daily   HYDROcodone-acetaminophen (NORCO) 5-325 mg per tablet Past Month at Unknown time  No Yes   Sig: Take 1 tablet by mouth every 6 (six) hours as needed for painMax Daily Amount: 4 tablets   LORazepam (ATIVAN) 1 mg tablet 2/23/2021 at Unknown time  No Yes   Sig: Take 1 tablet (1 mg total) by mouth every 6 (six) hours as needed for anxiety   MAGNESIUM PO 2/22/2021 at Unknown time  Yes Yes   Sig: Take 1 tablet by mouth daily   Multiple Vitamin (MULTIVITAMIN ADULT PO) 2/23/2021 at Unknown time  Yes Yes   Sig: Take 1 tablet by mouth daily   Omega-3 Fatty Acids (FISH OIL PO) 2/23/2021 at Unknown time  Yes Yes   Sig: Take 1 capsule by mouth daily   albuterol (PROVENTIL HFA,VENTOLIN HFA) 90 mcg/act inhaler More than a month at Unknown time Self No No   Sig: Inhale 2 puffs every 6 (six) hours as needed for wheezing   buPROPion (WELLBUTRIN XL) 300 mg 24 hr tablet 2021 at Unknown time  No Yes   Sig: TAKE 1 TABLET (300 MG TOTAL) BY MOUTH DAILY WITH 150 MG   cetirizine (ZyrTEC) 10 mg tablet Not Taking at Unknown time Self Yes No   Sig: Take 10 mg by mouth daily   diltiazem (CARDIZEM CD) 180 mg 24 hr capsule 2021 at Unknown time Self No Yes   Sig: Take 1 capsule (180 mg total) by mouth daily   escitalopram (LEXAPRO) 20 mg tablet 2021 at Unknown time Self No Yes   Sig: TAKE 1 TABLET BY MOUTH EVERY DAY   Patient taking differently: Take 20 mg by mouth daily at bedtime    fluticasone (FLONASE) 50 mcg/act nasal spray 2021 at Unknown time Self No Yes   Si SPRAY INTO EACH NOSTRIL DAILY AS NEEDED FOR RHINITIS   Patient taking differently: 1 spray into each nostril daily    fluticasone-salmeterol (Advair Diskus) 250-50 mcg/dose inhaler 2021 at Unknown time  No Yes   Sig: Inhale 1 puff 2 (two) times a day Rinse mouth after use   gabapentin (NEURONTIN) 100 mg capsule 2021 at Unknown time Self No Yes   Sig: TAKE 2 CAPSULES (200 MG TOTAL) BY MOUTH 2 (TWO) TIMES A DAY   gabapentin (NEURONTIN) 600 MG tablet 2021 at Unknown time  No Yes   Sig: TAKE ONE TABLET IN THE MORNING, ONE TABLET IN THE AFTERNOON, AND 2 TABLETS AT BEDTIME   hydroxychloroquine (PLAQUENIL) 200 mg tablet 2021 at Unknown time  No Yes   Sig: TAKE 1 TABLET BY MOUTH TWICE A DAY   Patient taking differently: 200 mg daily with breakfast    levothyroxine 50 mcg tablet 2021 at Unknown time Self No Yes   Sig: TAKE 1 TABLET BY MOUTH EVERY DAY   lisinopril (ZESTRIL) 20 mg tablet 2021 at Unknown time Self No Yes   Sig: TAKE 1 TABLET BY MOUTH TWICE A DAY   loperamide (IMODIUM) 2 mg capsule 2021 at Unknown time Self Yes Yes   Sig: Take 2 mg by mouth 4 (four) times a day as needed     meclizine (ANTIVERT) 25 mg tablet More than a month at Unknown time Self No No   Sig: Take 1 tablet (25 mg total) by mouth every 6 (six) hours as needed for dizziness   metaxalone (SKELAXIN) 800 mg tablet Past Week at Unknown time  No Yes   Sig: Take 1 tablet (800 mg total) by mouth 3 (three) times a day   naloxone (NARCAN) 4 mg/0 1 mL nasal spray Unknown at Unknown time Self No No   Sig: Administer 1 spray into a nostril  If breathing does not return to normal or if breathing difficulty resumes after 2-3 minutes, give another dose in the other nostril using a new spray  pantoprazole (PROTONIX) 40 mg tablet 2/23/2021 at Unknown time  No Yes   Sig: TAKE 1 TABLET BY MOUTH EVERY DAY   simvastatin (ZOCOR) 5 MG tablet 2/22/2021 at Unknown time  No Yes   Sig: TAKE 1 TABLET BY MOUTH EVERY DAY   sodium chloride 1 g tablet 2/23/2021 at Unknown time  No Yes   Sig: TAKE 1 TABLET (1 G TOTAL) BY MOUTH 3 (THREE) TIMES A DAY   torsemide (DEMADEX) 10 mg tablet Past Week at Unknown time  No Yes   Sig: TAKE 1 TABLET BY MOUTH EVERY DAY   Patient taking differently: As needed   warfarin (COUMADIN) 5 mg tablet 2/22/2021 at Unknown time Self No Yes   Sig: Take 1-2 tablets daily As directed      Facility-Administered Medications: None     Allergies   Allergen Reactions    Macrobid [Nitrofurantoin Monohyd Macro] Rash    Penicillins Rash     Annotation - [id number]: can take cephalosporins    Sulfa Antibiotics Rash    Indocin [Indomethacin] GI Intolerance and Dizziness       Objective   Vitals: Blood pressure 158/72, pulse 74, temperature 100 4 °F (38 °C), temperature source Oral, resp  rate 16, height 5' 3" (1 6 m), weight 69 kg (152 lb 1 9 oz), SpO2 90 %        Intake/Output Summary (Last 24 hours) at 2/23/2021 2149  Last data filed at 2/23/2021 1750  Gross per 24 hour   Intake 1050 ml   Output --   Net 1050 ml       Invasive Devices     Peripheral Intravenous Line            Peripheral IV 02/23/21 Right Antecubital less than 1 day          Drain            Closed/Suction Drain Left Breast Bulb 11 days    Closed/Suction Drain Left Breast Bulb 19 Fr  11 days                Physical Exam   Gen:  NAD  HENT: MMM  CV:  RRR  Lungs: nl effort on RA  Chest: L chest incision intact with blanchable erythema, no fluctuance   OSEAS drain x2 in place, medial drain serosanguineous, lateral drain serosanguineous with some debris in tubing  Abd:  soft, NT/ND  Ext:  no CCE  Skin:  no rashes  Neuro: A&Ox3     Lab Results:   I have personally reviewed pertinent lab results  , CBC:   Lab Results   Component Value Date    WBC 16 58 (H) 02/23/2021    HGB 12 4 02/23/2021    HCT 38 4 02/23/2021    MCV 94 02/23/2021     (H) 02/23/2021    MCH 30 5 02/23/2021    MCHC 32 3 02/23/2021    RDW 14 1 02/23/2021    MPV 9 0 02/23/2021    NRBC 0 02/23/2021   , CMP:   Lab Results   Component Value Date    SODIUM 132 (L) 02/23/2021    K 3 5 02/23/2021    CL 96 (L) 02/23/2021    CO2 28 02/23/2021    BUN 10 02/23/2021    CREATININE 1 02 02/23/2021    CALCIUM 8 9 02/23/2021    AST 19 02/23/2021    ALT 20 02/23/2021    ALKPHOS 78 02/23/2021    EGFR 54 02/23/2021     Imaging: I have personally reviewed pertinent reports  and I have personally reviewed pertinent films in PACS  Pathology, and Other Studies: I have personally reviewed pertinent reports       Code Status: Level 1 - Full Code  Advance Directive and Living Will:      Power of :    POLST:

## 2021-02-24 NOTE — PHYSICIAN ADVISOR
Current patient class: Observation  The patient is currently on Hospital Day: 2 at [address]      This patient was originally admitted to the hospital under observation class  After admission the patient was reevaluated and determined to require further hospitalization  The patient was then documented to require at least a 2nd midnight in the hospital  As such the patient was then expected to satisfy the 2 midnight benchmark and was therefore eligible for inpatient admission  After review of the relevant documentation, labs, vital signs and test results, the patient is appropriate for INPATIENT ADMISSION  Admission to the hospital as an inpatient is a complex decision making process which requires the practitioner to consider the patients presenting complaint, history and physical examination and all relevant testing  With this in mind, in this case, the patient was deemed appropriate for INPATIENT ADMISSION  After review of the documentation and testing available at the time of the admission I concur with this clinical determination of medical necessity  Rationale is as follows: The patient was admitted due to left breast cellulitis after mastectomy on February 12th  Since admission temperature maximum was 100 4, most recent 99 3  She was placed under observation class  Today she complains of pain around the lateral drain site  She is to remain on intravenous vancomycin  The two drains will be maintained  Because the patient requires active hospital management to include at least two midnights, I agree that inpatient class is appropriate   UR contacted the surgical team       The patients vitals on arrival were   ED Triage Vitals   Temperature Pulse Respirations Blood Pressure SpO2   02/23/21 1500 02/23/21 1502 02/23/21 1502 02/23/21 1500 02/23/21 1502   99 5 °F (37 5 °C) 77 20 146/84 95 %      Temp Source Heart Rate Source Patient Position - Orthostatic VS BP Location FiO2 (%)   02/23/21 1500 02/23/21 1502 02/23/21 1500 02/23/21 1500 --   Oral Monitor Lying Left arm       Pain Score       02/23/21 1502       9           Past Medical History:   Diagnosis Date    Anemia     Anxiety     Arrhythmia     Arthritis     Asthma     Blood clot in vein     portal vein    Breast cancer (HCC)     Breast lump     [id number] RESOLVED    Cancer (HCC)     Depression     Disease of thyroid gland     DVT, lower extremity (HCC)     GERD (gastroesophageal reflux disease)     Hyperlipidemia     Hypertension     Hypokalemia     Hyponatremia     Hypothyroidism     Iron deficiency anemia     Irregular heart beat     Manic behavior ([address] )     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HCC)     Osteoarthritis     Palpitations     [id number]  RESOLVED    Paroxysmal supraventricular tachycardia (HCC)     PE (pulmonary thromboembolism) (HCC)     Sjoegren syndrome     Spinal stenosis     Thrombocytosis (HCC)     [id number]  RESOLVED    Tremors of nervous system     dbs implanted right and left chest    Ulcerative colitis ([address] )     Vertigo     [id number] RESOLVED     Past Surgical History:   Procedure Laterality Date    ABDOMINAL SURGERY      APPENDECTOMY      BREAST BIOPSY Left 11/07/2019    Stereo    BREAST EXCISIONAL BIOPSY Left     x many years    BREAST SURGERY      lumpectomy & biopsy    CARMELLA HOLE W/ STEREOTACTIC INSERTION OF DBS LEADS / INTRAOP MICROELECTRODE RECORDING      COLON SURGERY      COLONOSCOPY N/A 8/30/2017    Procedure: POUCHOSCOPY;  Surgeon: Tariq Yee MD;  [facility];   Service: Colorectal    COLOPROCTECTOMY W/ ILEO J POUCH      ESOPHAGOGASTRODUODENOSCOPY      ONSET 10/17/11    FISTULA REPAIR      LLEOANAL FISTULA REPAIR TRANSPERIN TRANSABD APPROACH    HYSTERECTOMY      age 43    ILEOSTOMY CLOSURE      KNEE ARTHROSCOPY      Right    MAMMO STEREOTACTIC BREAST BIOPSY LEFT (ALL INC) Left 11/7/2019    MAMMO STEREOTACTIC BREAST BIOPSY LEFT (ALL INC) Left 12/17/2020    MAMMO STEREOTACTIC BREAST BIOPSY LEFT (ALL INC) EACH ADD Left 12/17/2020    MASTECTOMY W/ SENTINEL NODE BIOPSY Left 2/12/2021    Procedure: BREAST MASTECTOMY WITH SENTINEL LYMPH NODE BIOPSY, LYMPHATIC MAPPING WITH BLUE DYE AND RADIAOCTIVE DYE (INJECT AT 1100 BY DR GILLESPIE IN THE OR);   Surgeon: Angel Dowell MD;  [facility];  Service: Surgical Oncology    [place] IMP STIM,CRANIAL,SUBQ,1 ARRAY Right 6/20/2017    Procedure: DBS GENERATOR REPLACEMENT;  Surgeon: Paresh Rueda MD;  [facility];  Service: Neurosurgery    [place] IMP STIM,CRANIAL,SUBQ,1 ARRAY N/A 12/4/2019    Procedure: REPLACEMENT IMPLANTABLE PULSE GENERATOR FOR DEEP BRAIN STIMULATOR LEFT CHEST;  Surgeon: La Mcdonald MD;  [facility];  Service: Neurosurgery    SPLENECTOMY         The patient was admitted to the hospital at N/A on N/A for the following diagnosis:  Acquired hypothyroidism [E03 9]  Essential hypertension [I10]  Weakness [R53 1]  Prediabetes [R73 03]  SIRS (systemic inflammatory response syndrome) (HCC) [R65 10]  Cellulitis of other specified site [J67 558]  Sjogren's syndrome, with unspecified organ involvement ([facility] ) [M35 00]    Consults have been placed to:   IP CONSULT TO PHARMACY  IP CONSULT TO INTERNAL MEDICINE    Vitals:    02/24/21 1142 02/24/21 1347 02/24/21 1403 02/24/21 1725   BP: 146/67 133/63 153/69 158/70   BP Location: Right arm Right arm Right arm    Pulse: 70 75 71    Resp: 18 16 16    Temp:   99 3 °F (37 4 °C)    TempSrc:   Oral    SpO2: 94% 94% 94%    Weight:       Height:           Most recent labs:    Recent Labs     02/23/21  1532 02/24/21  0620   WBC 16 58* 15 32*   HGB 12 4 10 9*   HCT 38 4 32 8*   * 371   K 3 5 3 7   CALCIUM 8 9 8 8   BUN 10 11   CREATININE 1 02 0 65   INR 2 53*  --    AST 19  --    ALT 20  --    ALKPHOS 78  --        Scheduled Meds:  Current Facility-Administered Medications   Medication Dose Route Frequency Provider Last Rate    albuterol  2 puff Inhalation Q6H PRN Deidra Rivas MD      buPROPion  300 mg Oral Daily Deidra Rivas MD      diltiazem  180 mg Oral Daily Deidra Rivas MD      escitalopram  20 mg Oral HS Deidra Rivas MD      fluticasone  1 spray Nasal Daily PRN Deidra Rivas MD      fluticasone-vilanterol  1 puff Inhalation Daily Deidra Rivas MD      gabapentin  600 mg Oral TID Deidra Rivas MD      hydroxychloroquine  200 mg Oral Daily With Breakfast Deidra Rivas MD      levothyroxine  50 mcg Oral Early Morning Deidra Rivas MD      lisinopril  20 mg Oral BID Deidra Rivas MD      LORazepam  1 mg Oral Q6H PRN Deidra Rivas MD      ondansetron  4 mg Intravenous Q6H PRN Deidra Rivas MD      pantoprazole  40 mg Oral Early Morning Deidra Rivas MD      pravastatin  10 mg Oral Daily With Shanelle Velasquez MD      sodium chloride (PF)  3 mL Intravenous Q1H PRN Mitch Thompson PA-C      sodium chloride  60 mL/hr Intravenous Continuous Deidra Rivas MD 60 mL/hr (02/23/21 2239)    vancomycin  20 mg/kg (Adjusted) Intravenous Q24H Deidra Rivas MD      warfarin  5 mg Oral Daily (warfarin) Deidra Rivas MD       Continuous Infusions:sodium chloride, 60 mL/hr, Last Rate: 60 mL/hr (02/23/21 2239)      PRN Meds:   albuterol    fluticasone    LORazepam    ondansetron    sodium chloride (PF)    Surgical procedures (if appropriate):

## 2021-02-25 LAB
ANION GAP SERPL CALCULATED.3IONS-SCNC: 7 MMOL/L (ref 4–13)
BASOPHILS # BLD AUTO: 0.04 THOUSANDS/ΜL (ref 0–0.1)
BASOPHILS NFR BLD AUTO: 0 % (ref 0–1)
BUN SERPL-MCNC: 7 MG/DL (ref 5–25)
CALCIUM SERPL-MCNC: 8.1 MG/DL (ref 8.3–10.1)
CHLORIDE SERPL-SCNC: 105 MMOL/L (ref 100–108)
CO2 SERPL-SCNC: 26 MMOL/L (ref 21–32)
CREAT SERPL-MCNC: 0.61 MG/DL (ref 0.6–1.3)
EOSINOPHIL # BLD AUTO: 0.54 THOUSAND/ΜL (ref 0–0.61)
EOSINOPHIL NFR BLD AUTO: 5 % (ref 0–6)
ERYTHROCYTE [DISTWIDTH] IN BLOOD BY AUTOMATED COUNT: 14.4 % (ref 11.6–15.1)
GFR SERPL CREATININE-BSD FRML MDRD: 88 ML/MIN/1.73SQ M
GLUCOSE SERPL-MCNC: 129 MG/DL (ref 65–140)
HCT VFR BLD AUTO: 35.9 % (ref 34.8–46.1)
HGB BLD-MCNC: 11.7 G/DL (ref 11.5–15.4)
IMM GRANULOCYTES # BLD AUTO: 0.05 THOUSAND/UL (ref 0–0.2)
IMM GRANULOCYTES NFR BLD AUTO: 0 % (ref 0–2)
LYMPHOCYTES # BLD AUTO: 1.34 THOUSANDS/ΜL (ref 0.6–4.47)
LYMPHOCYTES NFR BLD AUTO: 11 % (ref 14–44)
MCH RBC QN AUTO: 31.4 PG (ref 26.8–34.3)
MCHC RBC AUTO-ENTMCNC: 32.6 G/DL (ref 31.4–37.4)
MCV RBC AUTO: 96 FL (ref 82–98)
MONOCYTES # BLD AUTO: 1.25 THOUSAND/ΜL (ref 0.17–1.22)
MONOCYTES NFR BLD AUTO: 10 % (ref 4–12)
NEUTROPHILS # BLD AUTO: 8.87 THOUSANDS/ΜL (ref 1.85–7.62)
NEUTS SEG NFR BLD AUTO: 74 % (ref 43–75)
NRBC BLD AUTO-RTO: 0 /100 WBCS
PLATELET # BLD AUTO: 375 THOUSANDS/UL (ref 149–390)
PMV BLD AUTO: 8.7 FL (ref 8.9–12.7)
POTASSIUM SERPL-SCNC: 4.4 MMOL/L (ref 3.5–5.3)
RBC # BLD AUTO: 3.73 MILLION/UL (ref 3.81–5.12)
SODIUM SERPL-SCNC: 138 MMOL/L (ref 136–145)
WBC # BLD AUTO: 12.09 THOUSAND/UL (ref 4.31–10.16)

## 2021-02-25 PROCEDURE — 80048 BASIC METABOLIC PNL TOTAL CA: CPT | Performed by: SURGERY

## 2021-02-25 PROCEDURE — 85025 COMPLETE CBC W/AUTO DIFF WBC: CPT | Performed by: SURGERY

## 2021-02-25 PROCEDURE — 99024 POSTOP FOLLOW-UP VISIT: CPT | Performed by: SURGERY

## 2021-02-25 RX ORDER — ACETAMINOPHEN 325 MG/1
650 TABLET ORAL EVERY 6 HOURS PRN
Status: DISCONTINUED | OUTPATIENT
Start: 2021-02-25 | End: 2021-02-26 | Stop reason: HOSPADM

## 2021-02-25 RX ADMIN — PANTOPRAZOLE SODIUM 40 MG: 40 TABLET, DELAYED RELEASE ORAL at 06:31

## 2021-02-25 RX ADMIN — HYDROXYCHLOROQUINE SULFATE 200 MG: 200 TABLET, FILM COATED ORAL at 09:01

## 2021-02-25 RX ADMIN — WARFARIN SODIUM 5 MG: 2.5 TABLET ORAL at 18:16

## 2021-02-25 RX ADMIN — VANCOMYCIN HYDROCHLORIDE 1250 MG: 5 INJECTION, POWDER, LYOPHILIZED, FOR SOLUTION INTRAVENOUS at 23:06

## 2021-02-25 RX ADMIN — DILTIAZEM HYDROCHLORIDE 180 MG: 180 CAPSULE, COATED, EXTENDED RELEASE ORAL at 09:01

## 2021-02-25 RX ADMIN — LISINOPRIL 20 MG: 20 TABLET ORAL at 09:01

## 2021-02-25 RX ADMIN — GABAPENTIN 600 MG: 300 CAPSULE ORAL at 18:16

## 2021-02-25 RX ADMIN — LISINOPRIL 20 MG: 20 TABLET ORAL at 18:16

## 2021-02-25 RX ADMIN — PRAVASTATIN SODIUM 10 MG: 10 TABLET ORAL at 18:16

## 2021-02-25 RX ADMIN — LORAZEPAM 1 MG: 1 TABLET ORAL at 20:17

## 2021-02-25 RX ADMIN — LORAZEPAM 1 MG: 1 TABLET ORAL at 13:46

## 2021-02-25 RX ADMIN — ESCITALOPRAM OXALATE 20 MG: 20 TABLET ORAL at 21:13

## 2021-02-25 RX ADMIN — LEVOTHYROXINE SODIUM 50 MCG: 50 TABLET ORAL at 06:31

## 2021-02-25 RX ADMIN — BUPROPION HYDROCHLORIDE 300 MG: 150 TABLET, EXTENDED RELEASE ORAL at 09:01

## 2021-02-25 RX ADMIN — GABAPENTIN 600 MG: 300 CAPSULE ORAL at 21:13

## 2021-02-25 RX ADMIN — FLUTICASONE FUROATE AND VILANTEROL TRIFENATATE 1 PUFF: 200; 25 POWDER RESPIRATORY (INHALATION) at 09:02

## 2021-02-25 RX ADMIN — GABAPENTIN 600 MG: 300 CAPSULE ORAL at 09:01

## 2021-02-25 NOTE — PLAN OF CARE
Problem: Potential for Falls  Goal: Patient will remain free of falls  Description: INTERVENTIONS:  - Assess patient frequently for physical needs  -  Identify cognitive and physical deficits and behaviors that affect risk of falls    -  [place] fall precautions as indicated by assessment   - Educate patient/family on patient safety including physical limitations  - Instruct patient to call for assistance with activity based on assessment  - Modify environment to reduce risk of injury  - Consider OT/PT consult to assist with strengthening/mobility  Outcome: Progressing     Problem: PAIN - ADULT  Goal: Verbalizes/displays adequate comfort level or baseline comfort level  Description: Interventions:  - Encourage patient to monitor pain and request assistance  - Assess pain using appropriate pain scale  - Administer analgesics based on type and severity of pain and evaluate response  - Implement non-pharmacological measures as appropriate and evaluate response  - Consider cultural and social influences on pain and pain management  - Notify physician/advanced practitioner if interventions unsuccessful or patient reports new pain  Outcome: Progressing     Problem: INFECTION - ADULT  Goal: Absence or prevention of progression during hospitalization  Description: INTERVENTIONS:  - Assess and monitor for signs and symptoms of infection  - Monitor lab/diagnostic results  - Monitor all insertion sites, i e  indwelling lines, tubes, and drains  - Monitor endotracheal if appropriate and nasal secretions for changes in amount and color  - [place] appropriate cooling/warming therapies per order  - Administer medications as ordered  - Instruct and encourage patient and family to use good hand hygiene technique  - Identify and instruct in appropriate isolation precautions for identified infection/condition  Outcome: Progressing  Goal: Absence of fever/infection during neutropenic period  Description: INTERVENTIONS:  - Monitor WBC    Outcome: Progressing     Problem: SAFETY ADULT  Goal: Maintain or return to baseline ADL function  Description: INTERVENTIONS:  -  Assess patient's ability to carry out ADLs; assess patient's baseline for ADL function and identify physical deficits which impact ability to perform ADLs (bathing, care of mouth/teeth, toileting, grooming, dressing, etc )  - Assess/evaluate cause of self-care deficits   - Assess range of motion  - Assess patient's mobility; develop plan if impaired  - Assess patient's need for assistive devices and provide as appropriate  - Encourage maximum independence but intervene and supervise when necessary  - Involve family in performance of ADLs  - Assess for home care needs following discharge   - Consider OT consult to assist with ADL evaluation and planning for discharge  - Provide patient education as appropriate  Outcome: Progressing  Goal: Maintain or return mobility status to optimal level  Description: INTERVENTIONS:  - Assess patient's baseline mobility status (ambulation, transfers, stairs, etc )    - Identify cognitive and physical deficits and behaviors that affect mobility  - Identify mobility aids required to assist with transfers and/or ambulation (gait belt, sit-to-stand, lift, walker, cane, etc )  - [place] fall precautions as indicated by assessment  - Record patient progress and toleration of activity level on Mobility SBAR; progress patient to next Phase/Stage  - Instruct patient to call for assistance with activity based on assessment  - Consider rehabilitation consult to assist with strengthening/weightbearing, etc   Outcome: Progressing     Problem: DISCHARGE PLANNING  Goal: Discharge to home or other facility with appropriate resources  Description: INTERVENTIONS:  - Identify barriers to discharge w/patient and caregiver  - Arrange for needed discharge resources and transportation as appropriate  - Identify discharge learning needs (meds, wound care, etc )  - Arrange for interpretive services to assist at discharge as needed  - Refer to Case Management Department for coordinating discharge planning if the patient needs post-hospital services based on physician/advanced practitioner order or complex needs related to functional status, cognitive ability, or social support system  Outcome: Progressing     Problem: Knowledge Deficit  Goal: Patient/family/caregiver demonstrates understanding of disease process, treatment plan, medications, and discharge instructions  Description: Complete learning assessment and assess knowledge base    Interventions:  - Provide teaching at level of understanding  - Provide teaching via preferred learning methods  Outcome: Progressing

## 2021-02-25 NOTE — PROGRESS NOTES
Progress Note - Surgical Oncology   Demetrius Speaker 67 y o  female MRN: [id number]  Unit/Bed#: S -01 Encounter: [id number]    Assessment:  73yo F s/p L mastectomy 2/12/21, who presents with L breast cellulitis    Plan:  Regular diet  Continue IV Vancomycin  PRN analgesia  DVT PPX    Subjective/Objective     Subjective: NAEO, reporting some stomach cramps, some redness and pain in left breast but improving    Objective:    Blood pressure 136/76, pulse 73, temperature 99 1 °F (37 3 °C), temperature source Oral, resp  rate 18, height 5' 3" (1 6 m), weight 69 kg (152 lb 1 9 oz), SpO2 92 %  ,Body mass index is 26 95 kg/m²  Intake/Output Summary (Last 24 hours) at 2/25/2021 0319  Last data filed at 2/25/2021 8976  Gross per 24 hour   Intake 1000 ml   Output 1300 ml   Net -300 ml       Invasive Devices     Peripheral Intravenous Line            Peripheral IV 02/24/21 Right;Medial Forearm 1 day          Drain            Closed/Suction Drain Left Breast Bulb 13 days    Closed/Suction Drain Left Breast Bulb 19 Fr  13 days                Physical Exam:  General: AAO x 3, NAD  HEENT: Normocephalic, atraumatic, conjunctiva normal, EOMI, PERRLA  Neck: No JVD, No LAD  Cardio: RRR  Breast: Some erythema of left breast but improving, drains removed  Resp: NWB on RA  Abd: Soft, nontender, nondistended, No guarding, no rebound  Neuro: Sensation and motor intact x 4 limbs  Extremities: WWP, No edema  Skin: Warm and dry    Lab, Imaging and other studies:I have personally reviewed pertinent lab results      VTE Pharmacologic Prophylaxis: Warfarin (Coumadin)  VTE Mechanical Prophylaxis: sequential compression device

## 2021-02-25 NOTE — PROGRESS NOTES
Vancomycin IV Pharmacy-to-Dose Consultation    Cluadio Srivastava is a 67 y o  female who is currently receiving Vancomycin IV with management by the Pharmacy Consult service  Assessment/Plan:  The patient was reviewed  Renal function is stable and no signs or symptoms of nephrotoxicity and/or infusion reactions were documented in the chart  Based on todays assessment, continue current vancomycin (day # 3) dosing of 1250 mg every 24 hours, with a plan for trough to be drawn at 2200 on 02/26/21  We will continue to follow the patients culture results and clinical progress daily      Bro Pac, Pharmacist

## 2021-02-25 NOTE — UTILIZATION REVIEW
2/23/2021 2147 observation and CHANGED 2/24/2021 1833 INPATIENT RE: PATIENT NEEDS > 2 MIDNIGHT STAY DUE TO CONTINUED ANTIBIOTICS FOR LEFT BREAST CELLULITIS S/P LEFT MASTECTOMY 2/12/2021     Start   Ordered   02/24/21 1833  Inpatient Admission Once     Question Answer Comment   Level of Care Med Surg    Estimated length of stay More than 2 Midnights    Certification I certify that inpatient services are medically necessary for this patient for a duration of greater than two midnights  See H&P and MD Progress Notes for additional information about the patient's course of treatment          02/24/21 1833

## 2021-02-26 VITALS
HEART RATE: 78 BPM | BODY MASS INDEX: 26.95 KG/M2 | DIASTOLIC BLOOD PRESSURE: 56 MMHG | RESPIRATION RATE: 18 BRPM | TEMPERATURE: 98.3 F | HEIGHT: 63 IN | OXYGEN SATURATION: 90 % | SYSTOLIC BLOOD PRESSURE: 162 MMHG | WEIGHT: 152.12 LBS

## 2021-02-26 LAB
BASOPHILS # BLD AUTO: 0.05 THOUSANDS/ΜL (ref 0–0.1)
BASOPHILS NFR BLD AUTO: 1 % (ref 0–1)
EOSINOPHIL # BLD AUTO: 0.46 THOUSAND/ΜL (ref 0–0.61)
EOSINOPHIL NFR BLD AUTO: 5 % (ref 0–6)
ERYTHROCYTE [DISTWIDTH] IN BLOOD BY AUTOMATED COUNT: 14.4 % (ref 11.6–15.1)
HCT VFR BLD AUTO: 34.5 % (ref 34.8–46.1)
HGB BLD-MCNC: 11.1 G/DL (ref 11.5–15.4)
IMM GRANULOCYTES # BLD AUTO: 0.05 THOUSAND/UL (ref 0–0.2)
IMM GRANULOCYTES NFR BLD AUTO: 1 % (ref 0–2)
LYMPHOCYTES # BLD AUTO: 1.42 THOUSANDS/ΜL (ref 0.6–4.47)
LYMPHOCYTES NFR BLD AUTO: 15 % (ref 14–44)
MCH RBC QN AUTO: 31.1 PG (ref 26.8–34.3)
MCHC RBC AUTO-ENTMCNC: 32.2 G/DL (ref 31.4–37.4)
MCV RBC AUTO: 97 FL (ref 82–98)
MONOCYTES # BLD AUTO: 1.2 THOUSAND/ΜL (ref 0.17–1.22)
MONOCYTES NFR BLD AUTO: 12 % (ref 4–12)
NEUTROPHILS # BLD AUTO: 6.6 THOUSANDS/ΜL (ref 1.85–7.62)
NEUTS SEG NFR BLD AUTO: 66 % (ref 43–75)
NRBC BLD AUTO-RTO: 0 /100 WBCS
PLATELET # BLD AUTO: 450 THOUSANDS/UL (ref 149–390)
PMV BLD AUTO: 8.8 FL (ref 8.9–12.7)
RBC # BLD AUTO: 3.57 MILLION/UL (ref 3.81–5.12)
WBC # BLD AUTO: 9.78 THOUSAND/UL (ref 4.31–10.16)

## 2021-02-26 PROCEDURE — 99024 POSTOP FOLLOW-UP VISIT: CPT | Performed by: SURGERY

## 2021-02-26 PROCEDURE — 99221 1ST HOSP IP/OBS SF/LOW 40: CPT | Performed by: INTERNAL MEDICINE

## 2021-02-26 PROCEDURE — NC001 PR NO CHARGE: Performed by: SURGERY

## 2021-02-26 PROCEDURE — 85025 COMPLETE CBC W/AUTO DIFF WBC: CPT | Performed by: PHYSICIAN ASSISTANT

## 2021-02-26 RX ORDER — DOXYCYCLINE 100 MG/1
100 TABLET ORAL 2 TIMES DAILY
Qty: 14 TABLET | Refills: 0 | Status: SHIPPED | OUTPATIENT
Start: 2021-02-26 | End: 2021-03-05

## 2021-02-26 RX ORDER — CEPHALEXIN 500 MG/1
500 CAPSULE ORAL EVERY 6 HOURS SCHEDULED
Qty: 28 CAPSULE | Refills: 0 | Status: SHIPPED | OUTPATIENT
Start: 2021-02-26 | End: 2021-03-05

## 2021-02-26 RX ADMIN — GABAPENTIN 600 MG: 300 CAPSULE ORAL at 08:27

## 2021-02-26 RX ADMIN — PANTOPRAZOLE SODIUM 40 MG: 40 TABLET, DELAYED RELEASE ORAL at 05:13

## 2021-02-26 RX ADMIN — LISINOPRIL 20 MG: 20 TABLET ORAL at 08:27

## 2021-02-26 RX ADMIN — LORAZEPAM 1 MG: 1 TABLET ORAL at 08:31

## 2021-02-26 RX ADMIN — BUPROPION HYDROCHLORIDE 300 MG: 150 TABLET, EXTENDED RELEASE ORAL at 08:27

## 2021-02-26 RX ADMIN — LEVOTHYROXINE SODIUM 50 MCG: 50 TABLET ORAL at 05:13

## 2021-02-26 RX ADMIN — DILTIAZEM HYDROCHLORIDE 180 MG: 180 CAPSULE, COATED, EXTENDED RELEASE ORAL at 08:27

## 2021-02-26 RX ADMIN — HYDROXYCHLOROQUINE SULFATE 200 MG: 200 TABLET, FILM COATED ORAL at 08:28

## 2021-02-26 RX ADMIN — FLUTICASONE FUROATE AND VILANTEROL TRIFENATATE 1 PUFF: 200; 25 POWDER RESPIRATORY (INHALATION) at 08:32

## 2021-02-26 NOTE — CONSULTS
Consultation - Infectious Disease   Israel Childress 67 y o  female MRN: [id number]  Unit/Bed#: S -01 Encounter: [id number]      IMPRESSION & RECOMMENDATIONS:   Cellulitis of the left breast area  With fever, erythema and tenderness on presentation  Status post recent mastectomy, consideration for staphylococcal infection abdominal abscess noted on ultrasound  Consideration for streptococcal infection given no abscess  Blood cultures with no growth at 48 hours  With allergy to penicillin noted  Also with similar allergy to sulfa  Currently on IV vancomycin with limited p o  Options  Given above, would recommend doxycycline for MRSA coverage and Keflex for streptococcal coverage    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   Surgical oncology following  Status post mastectomy 2/12/2021      Portal vein thrombosis  Currently on warfarin  INR therapeutic  Sjogren syndrome  On Plaquenil  Essential tremor  With bilateral neuromodulator devices  No tenderness, erythema or warmth noted on the skin overlying the devices  Antibiotics  Vancomycin day 4  RECOMMENDATIONS:   (pending discussion with attending physician)  Transition to doxycycline and Keflex to complete a 7 day antibiotic course  Alternatively, can plan for 5 day antibiotic course with IV vancomycin with tomorrow being the last day      HISTORY OF PRESENT ILLNESS:  Reason for Consult:  Cellulitis with multiple antibiotic allergies    HPI: Israel Childress is a 67y o  year old female with a past medical history of 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 status post mastectomy 02/12/2021, SIADH, portable vein thrombosis, Sjogren syndrome and hypertension who presented to the hospital 3 days ago with fever  Reports that she was well up until Monday when the visiting nurse noticed an area of redness around the left breast area and patient was subsequently started on p o  Clindamycin    She reports that she had taken 3 doses of this antibiotics prior to spiking a fever of 102 with associated chills and weakness prompting the presentation to the ED  Workup demonstrated leukocytosis with no underlying abscess on ultrasound  She was started on IV vancomycin with improvement of symptoms  Patient currently stable for discharge and we have been consulted to assist in transition to p o  Antibiotics given the patient worsening symptoms while on clindamycin at home and penicillin allergy  She also has bilateral neuromodulator implants  Blood cultures have been negative at 48 hours  She reports that her penicillin allergy was several decades ago probably in her 35s or 45s  She describes her allergic reaction as are pruritic rash in denies any associated swelling or difficulty breathing  He does state that she might have taking an antibiotic that may have contaiedn penicillin in the recent past with no allergic reaction of the time  She is currently afebrile with no symptoms  She denies any fever, chills, sweats, cough, shortness of breath, abdominal pain, nausea, vomiting or urinary symptoms  She does endorse having soft stool but states that this is normal for her after her colectomy  REVIEW OF SYSTEMS:  Review of Systems   Constitutional: Negative for appetite change, chills, diaphoresis, fatigue and fever  Respiratory: Negative for cough and shortness of breath  Cardiovascular: Negative for chest pain, palpitations and leg swelling  Gastrointestinal: Negative for abdominal distention, abdominal pain, constipation, nausea and vomiting  Genitourinary: Negative for difficulty urinating and dysuria  Musculoskeletal: Negative for arthralgias and back pain  Neurological: Negative for dizziness, weakness, light-headedness, numbness and headaches         PAST MEDICAL HISTORY:  Past Medical History:   Diagnosis Date    Anemia     Anxiety     Arrhythmia     Arthritis     Asthma     Blood clot in vein     portal vein    [facility]     Breast lump     [id number] RESOLVED    Cancer ([place] [address] )     Depression     Disease of thyroid gland     DVT, lower extremity (HCC)     GERD (gastroesophageal reflux disease)     Hyperlipidemia     Hypertension     Hypokalemia     Hyponatremia     Hypothyroidism     Iron deficiency anemia     Irregular heart beat     Manic behavior ([place] [address] )     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HCC)     Osteoarthritis     Palpitations     [id number]  RESOLVED    Paroxysmal supraventricular tachycardia (HCC)     PE (pulmonary thromboembolism) (HCC)     Sjoegren syndrome     Spinal stenosis     Thrombocytosis (HCC)     [id number]  RESOLVED    Tremors of nervous system     dbs implanted right and left chest    Ulcerative colitis ([place] [address] )     Vertigo     [id number] RESOLVED     Past Surgical History:   Procedure Laterality Date    ABDOMINAL SURGERY      APPENDECTOMY      BREAST BIOPSY Left 11/07/2019    Stereo    BREAST EXCISIONAL BIOPSY Left     x many years    BREAST SURGERY      lumpectomy & biopsy    CARMELLA HOLE W/ STEREOTACTIC INSERTION OF DBS LEADS / INTRAOP MICROELECTRODE RECORDING      COLON SURGERY      COLONOSCOPY N/A 8/30/2017    Procedure: Fidencio Toth;  Surgeon: Janice Givens MD;  Location: [facility];   Service: Colorectal    COLOPROCTECTOMY W/ ILEO J POUCH      ESOPHAGOGASTRODUODENOSCOPY      ONSET 10/17/11    FISTULA REPAIR      LLEOANAL FISTULA REPAIR TRANSPERIN TRANSABD APPROACH    HYSTERECTOMY      age 43    ILEOSTOMY CLOSURE      KNEE ARTHROSCOPY      Right    MAMMO STEREOTACTIC BREAST BIOPSY LEFT (ALL INC) Left 11/7/2019    MAMMO STEREOTACTIC BREAST BIOPSY LEFT (ALL INC) Left 12/17/2020    MAMMO STEREOTACTIC BREAST BIOPSY LEFT (ALL INC) EACH ADD Left 12/17/2020    MASTECTOMY W/ SENTINEL NODE BIOPSY Left 2/12/2021    Procedure: BREAST MASTECTOMY WITH SENTINEL LYMPH NODE BIOPSY, LYMPHATIC MAPPING WITH BLUE DYE AND RADIAOCTIVE DYE (INJECT AT 1100 BY DR GILLESPIE IN THE OR); Surgeon: Ivana Somers MD;  [facility];  Service: Surgical Oncology    ND IMP STIM,CRANIAL,SUBQ,1 ARRAY Right 2017    Procedure: DBS GENERATOR REPLACEMENT;  Surgeon: Aleena Alexander MD;  Location[facility];  Service: Neurosurgery    ND IMP STIM,CRANIAL,SUBQ,1 ARRAY N/A 2019    Procedure: REPLACEMENT IMPLANTABLE PULSE GENERATOR FOR DEEP BRAIN STIMULATOR LEFT CHEST;  Surgeon: Montse Garcia MD;  [facility];  Service: Neurosurgery    SPLENECTOMY         FAMILY HISTORY:      SOCIAL HISTORY:  Social History   Social History     Substance and Sexual Activity   Alcohol Use Yes    Frequency: 2-4 times a month    Drinks per session: 1 or 2    Binge frequency: Never     Social History     Substance and Sexual Activity   Drug Use No     Social History     Tobacco Use   Smoking Status Former Smoker    Packs/day: 2 00    Years: 5 00    Pack years: 10 00    Quit date: 65    Years since quittin 1   Smokeless Tobacco Never Used   Tobacco Comment    Quit       ALLERGIES:  Allergies   Allergen Reactions    Macrobid [Nitrofurantoin Monohyd Macro] Rash    Penicillins Rash     Annotation - [id number]: can take cephalosporins    Sulfa Antibiotics Rash    Indocin [Indomethacin] GI Intolerance and Dizziness       MEDICATIONS:  All current active medications have been reviewed  PHYSICAL EXAM:  Temp:  [97 9 °F (36 6 °C)-98 9 °F (37 2 °C)] 98 3 °F (36 8 °C)  HR:  [78-81] 78  Resp:  [17-18] 18  BP: (139-162)/(56-68) 162/56  SpO2:  [90 %-94 %] 90 %  Temp (24hrs), Av 3 °F (36 8 °C), Min:97 9 °F (36 6 °C), Max:98 9 °F (37 2 °C)  Current: Temperature: 98 3 °F (36 8 °C)    Intake/Output Summary (Last 24 hours) at 2021 1044  Last data filed at 2021 0900  Gross per 24 hour   Intake 300 ml   Output 1150 ml   Net -850 ml       Physical Exam  Vitals signs and nursing note reviewed  Constitutional:       General: She is not in acute distress  Appearance: She is well-developed   She is not diaphoretic  HENT:      Head: Normocephalic and atraumatic  Mouth/Throat:      Mouth: Mucous membranes are moist       Pharynx: Oropharynx is clear  Eyes:      Conjunctiva/sclera: Conjunctivae normal    Cardiovascular:      Rate and Rhythm: Normal rate and regular rhythm  Heart sounds: Normal heart sounds  No murmur  Pulmonary:      Effort: Pulmonary effort is normal       Breath sounds: Normal breath sounds  No wheezing or rales  Abdominal:      General: Bowel sounds are normal  There is no distension  Palpations: Abdomen is soft  Tenderness: There is no abdominal tenderness  Musculoskeletal:      Right lower leg: No edema  Left lower leg: No edema  Skin:     General: Skin is warm and dry  Findings: Erythema present  Comments: Healing surgical incision noted on the left breast area with intact sutures  Improving erythema as per patient  Nontender, not warm to touch  No purulence noted  Dry  Dressing over previous OSEAS drain sites  Neurological:      Mental Status: She is alert and oriented to person, place, and time  Psychiatric:         Mood and Affect: Mood normal          Behavior: Behavior normal              LABS, IMAGING, & OTHER STUDIES:  Lab Results:  I have personally reviewed pertinent labs  Results from last 7 days   Lab Units 02/26/21  0519 02/25/21  0352 02/24/21  0620   WBC Thousand/uL 9 78 12 09* 15 32*   HEMOGLOBIN g/dL 11 1* 11 7 10 9*   PLATELETS Thousands/uL 450* 375 371     Results from last 7 days   Lab Units 02/25/21  0351  02/23/21  1532   POTASSIUM mmol/L 4 4   < > 3 5   CHLORIDE mmol/L 105   < > 96*   CO2 mmol/L 26   < > 28   BUN mg/dL 7   < > 10   CREATININE mg/dL 0 61   < > 1 02   EGFR ml/min/1 73sq m 88   < > 54   CALCIUM mg/dL 8 1*   < > 8 9   AST U/L  --   --  19   ALT U/L  --   --  20   ALK PHOS U/L  --   --  78    < > = values in this interval not displayed       Results from last 7 days   Lab Units 02/23/21  1538 02/23/21  1532   BLOOD CULTURE  No Growth at 48 hrs  No Growth at 48 hrs  Imaging Studies:   I have personally reviewed pertinent imaging study reports and images in PACS  Us Breast Left Limited (diagnostic)    Result Date: 2/25/2021  Impression:  No suspicious fluid collection to suggest abscess at the area palpable concern left breast  ASSESSMENT/BI-RADS CATEGORY: Left: 2 - Benign Overall: 2 - Benign RECOMMENDATION:      - Clinical management for the left breast  Workstation ID: [id number]      Other Studies:   I have personally reviewed pertinent reports

## 2021-02-26 NOTE — DISCHARGE INSTRUCTIONS
Take full 7 days of both antibiotics  Take with food to decrease GI upset  Call office to schedule follow up appointment with Dr Warren Luciano in 2 weeks at outpatient office  Cellulitis   WHAT YOU NEED TO KNOW:   Cellulitis is a skin infection caused by bacteria  Cellulitis may go away on its own or you may need treatment  Your healthcare provider may draw a [place] around the outside edges of your cellulitis  If your cellulitis spreads, your healthcare provider will see it outside of the [place]  DISCHARGE INSTRUCTIONS:   Call 911 if:   · You have sudden trouble breathing or chest pain  Seek care immediately if:   · Your wound gets larger and more painful  · You feel a crackling under your skin when you touch it  · You have purple dots or bumps on your skin, or you see bleeding under your skin  · You have new swelling and pain in your legs  · The red, warm, swollen area gets larger  · You see red streaks coming from the infected area  Contact your healthcare provider if:   · You have a fever  · Your fever or pain does not go away or gets worse  · The area does not get smaller after 2 days of antibiotics  · Your skin is flaking or peeling off  · You have questions or concerns about your condition or care  Medicines:   · Antibiotics  help treat the bacterial infection  · NSAIDs , such as ibuprofen, help decrease swelling, pain, and fever  NSAIDs can cause stomach bleeding or kidney problems in certain people  If you take blood thinner medicine, always ask if NSAIDs are safe for you  Always read the medicine label and follow directions  Do not give these medicines to children under 10months of age without direction from your child's healthcare provider  · Acetaminophen  decreases pain and fever  It is available without a doctor's order  Ask how much to take and how often to take it  Follow directions   Read the labels of all other medicines you are using to see if they also contain acetaminophen, or ask your doctor or pharmacist  Acetaminophen can cause liver damage if not taken correctly  Do not use more than 4 grams (4,000 milligrams) total of acetaminophen in one day  · Take your medicine as directed  Contact your healthcare provider if you think your medicine is not helping or if you have side effects  Tell him or her if you are allergic to any medicine  Keep a list of the medicines, vitamins, and herbs you take  Include the amounts, and when and why you take them  Bring the list or the pill bottles to follow-up visits  Carry your medicine list with you in case of an emergency  Self-care:   · Elevate the area above the level of your heart  as often as you can  This will help decrease swelling and pain  Prop the area on pillows or blankets to keep it elevated comfortably  · Clean the area daily until the wound scabs over  Gently wash the area with soap and water  Pat dry  Use dressings as directed  · Place cool or warm, wet cloths on the area as directed  Use clean cloths and clean water  Leave it on the area until the cloth is room temperature  Pat the area dry with a clean, dry cloth  The cloths may help decrease pain  Prevent cellulitis:   · Do not scratch bug bites or areas of injury  You increase your risk for cellulitis by scratching these areas  · Do not share personal items, such as towels, clothing, and razors  · Clean exercise equipment  with germ-killing  before and after you use it  · Wash your hands often  Use soap and water  Wash your hands after you use the bathroom, change a child's diapers, or sneeze  Wash your hands before you prepare or eat food  Use lotion to prevent dry, cracked skin  · Wear pressure stockings as directed  You may be told to wear the stockings if you have peripheral edema  The stockings improve blood flow and decrease swelling  · Treat athlete's foot    This can help prevent the spread of a bacterial skin infection  Follow up with your healthcare provider within 3 days, or as directed: Your healthcare provider will check if your cellulitis is getting better  You may need different medicine  Write down your questions so you remember to ask them during your visits  © Copyright [address] Information is for End User's use only and may not be sold, redistributed or otherwise used for commercial purposes  All illustrations and images included in CareNotes® are the copyrighted property of [employer]  [facility] [address]   The above information is an  only  It is not intended as medical advice for individual conditions or treatments  Talk to your doctor, nurse or pharmacist before following any medical regimen to see if it is safe and effective for you

## 2021-02-26 NOTE — PLAN OF CARE
Problem: Potential for Falls  Goal: Patient will remain free of falls  Description: INTERVENTIONS:  - Assess patient frequently for physical needs  -  Identify cognitive and physical deficits and behaviors that affect risk of falls    -  [place] fall precautions as indicated by assessment   - Educate patient/family on patient safety including physical limitations  - Instruct patient to call for assistance with activity based on assessment  - Modify environment to reduce risk of injury  - Consider OT/PT consult to assist with strengthening/mobility  Outcome: Progressing     Problem: PAIN - ADULT  Goal: Verbalizes/displays adequate comfort level or baseline comfort level  Description: Interventions:  - Encourage patient to monitor pain and request assistance  - Assess pain using appropriate pain scale  - Administer analgesics based on type and severity of pain and evaluate response  - Implement non-pharmacological measures as appropriate and evaluate response  - Consider cultural and social influences on pain and pain management  - Notify physician/advanced practitioner if interventions unsuccessful or patient reports new pain  Outcome: Progressing     Problem: INFECTION - ADULT  Goal: Absence or prevention of progression during hospitalization  Description: INTERVENTIONS:  - Assess and monitor for signs and symptoms of infection  - Monitor lab/diagnostic results  - Monitor all insertion sites, i e  indwelling lines, tubes, and drains  - Monitor endotracheal if appropriate and nasal secretions for changes in amount and color  - [place] appropriate cooling/warming therapies per order  - Administer medications as ordered  - Instruct and encourage patient and family to use good hand hygiene technique  - Identify and instruct in appropriate isolation precautions for identified infection/condition  Outcome: Progressing  Goal: Absence of fever/infection during neutropenic period  Description: INTERVENTIONS:  - Monitor WBC    Outcome: Progressing     Problem: SAFETY ADULT  Goal: Maintain or return to baseline ADL function  Description: INTERVENTIONS:  -  Assess patient's ability to carry out ADLs; assess patient's baseline for ADL function and identify physical deficits which impact ability to perform ADLs (bathing, care of mouth/teeth, toileting, grooming, dressing, etc )  - Assess/evaluate cause of self-care deficits   - Assess range of motion  - Assess patient's mobility; develop plan if impaired  - Assess patient's need for assistive devices and provide as appropriate  - Encourage maximum independence but intervene and supervise when necessary  - Involve family in performance of ADLs  - Assess for home care needs following discharge   - Consider OT consult to assist with ADL evaluation and planning for discharge  - Provide patient education as appropriate  Outcome: Progressing  Goal: Maintain or return mobility status to optimal level  Description: INTERVENTIONS:  - Assess patient's baseline mobility status (ambulation, transfers, stairs, etc )    - Identify cognitive and physical deficits and behaviors that affect mobility  - Identify mobility aids required to assist with transfers and/or ambulation (gait belt, sit-to-stand, lift, walker, cane, etc )  - [place] fall precautions as indicated by assessment  - Record patient progress and toleration of activity level on Mobility SBAR; progress patient to next Phase/Stage  - Instruct patient to call for assistance with activity based on assessment  - Consider rehabilitation consult to assist with strengthening/weightbearing, etc   Outcome: Progressing     Problem: DISCHARGE PLANNING  Goal: Discharge to home or other facility with appropriate resources  Description: INTERVENTIONS:  - Identify barriers to discharge w/patient and caregiver  - Arrange for needed discharge resources and transportation as appropriate  - Identify discharge learning needs (meds, wound care, etc )  - Arrange for interpretive services to assist at discharge as needed  - Refer to Case Management Department for coordinating discharge planning if the patient needs post-hospital services based on physician/advanced practitioner order or complex needs related to functional status, cognitive ability, or social support system  Outcome: Progressing     Problem: Knowledge Deficit  Goal: Patient/family/caregiver demonstrates understanding of disease process, treatment plan, medications, and discharge instructions  Description: Complete learning assessment and assess knowledge base    Interventions:  - Provide teaching at level of understanding  - Provide teaching via preferred learning methods  Outcome: Progressing

## 2021-02-26 NOTE — CASE MANAGEMENT
Cm met with pt to introduce self and explain role  CM reviewed that cm understands pt is currently receiving services from [facility]  Cm asked pt is this is something she would like to continue  Freedom of choice was reviewed  She stated she would like for services to continue  Referral has been made and [facility] will resume services    Contact information has been placed on AVS

## 2021-02-26 NOTE — DISCHARGE SUMMARY
Discharge Date: Discharge Summary - Gianna Pino 67 y o  female MRN: [id number]  Unit/Bed#: S -01 Encounter: [id number]    Admission Date: 2/23/2021   Discharge Date: 2/26/2021    Admitting Diagnosis:   Acquired hypothyroidism [E03 9]  Essential hypertension [I10]  Weakness [R53 1]  Prediabetes [R73 03]  SIRS (systemic inflammatory response syndrome) (HCC) [R65 10]  Cellulitis of other specified site [L03 818]  Sjogren's syndrome, with unspecified organ involvement (HCC) [M35 00]    Principle/ Secondary Diagnosis:  Past Medical History:   Diagnosis Date    Anemia     Anxiety     Arrhythmia     Arthritis     Asthma     Blood clot in vein     portal vein    Breast cancer (HCC)     Breast lump     [id number] RESOLVED    Cancer ([address] )     Depression     Disease of thyroid gland     DVT, lower extremity ([address] )     GERD (gastroesophageal reflux disease)     Hyperlipidemia     Hypertension     Hypokalemia     Hyponatremia     Hypothyroidism     Iron deficiency anemia     Irregular heart beat     Manic behavior ([address] )     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HCC)     Osteoarthritis     Palpitations     [id number]  RESOLVED    Paroxysmal supraventricular tachycardia ([address] )     PE (pulmonary thromboembolism) ([address] )     Sjoegren syndrome     Spinal stenosis     Thrombocytosis (HCC)     [id number]  RESOLVED    Tremors of nervous system     dbs implanted right and left chest    Ulcerative colitis ([address] )     Vertigo     [id number] RESOLVED     Past Surgical History:   Procedure Laterality Date    ABDOMINAL SURGERY      APPENDECTOMY      BREAST BIOPSY Left 11/07/2019    Stereo    BREAST EXCISIONAL BIOPSY Left     x many years    BREAST SURGERY      lumpectomy & biopsy    CARMELLA HOLE W/ STEREOTACTIC INSERTION OF DBS LEADS / INTRAOP MICROELECTRODE RECORDING      COLON SURGERY      COLONOSCOPY N/A 8/30/2017    Procedure: Alize Molina;  Surgeon: Stephanie Henriquez MD;  [facility]: [facility];   Service: Colorectal    COLOPROCTECTOMY W/ ILEO J POUCH      ESOPHAGOGASTRODUODENOSCOPY      ONSET 10/17/11    FISTULA REPAIR      LLEOANAL FISTULA REPAIR TRANSPERIN TRANSABD APPROACH    HYSTERECTOMY      age 43    ILEOSTOMY CLOSURE      KNEE ARTHROSCOPY      Right    MAMMO STEREOTACTIC BREAST BIOPSY LEFT (ALL INC) Left 11/7/2019    MAMMO STEREOTACTIC BREAST BIOPSY LEFT (ALL INC) Left 12/17/2020    MAMMO STEREOTACTIC BREAST BIOPSY LEFT (ALL INC) EACH ADD Left 12/17/2020    MASTECTOMY W/ SENTINEL NODE BIOPSY Left 2/12/2021    Procedure: BREAST MASTECTOMY WITH SENTINEL LYMPH NODE BIOPSY, LYMPHATIC MAPPING WITH BLUE DYE AND RADIAOCTIVE DYE (INJECT AT 1100 BY DR GILLESPIE IN THE OR); Surgeon: Shae Rowe MD;  [facility];  Service: Surgical Oncology    VA IMP STIM,CRANIAL,SUBQ,1 ARRAY Right 6/20/2017    Procedure: DBS GENERATOR REPLACEMENT;  Surgeon: Anitha Bang MD;  [facility];  Service: Neurosurgery    VA IMP STIM,CRANIAL,SUBQ,1 ARRAY N/A 12/4/2019    Procedure: REPLACEMENT IMPLANTABLE PULSE GENERATOR FOR DEEP BRAIN STIMULATOR LEFT CHEST;  Surgeon: Rosette Bill MD;  [facility];  Service: Neurosurgery    SPLENECTOMY         Discharge Diagnoses:   Principal Problem:    Cellulitis      Procedures Performed:  No orders of the defined types were placed in this encounter  Procedure name not found  Consultants: Infectious Disease     HPI : As per Quintin Mtz MD on 2/23: "Claudio Srivastava is a 67 y o  female who presents with fever  She underwent mastectomy on 2/12/21 and was discharged home with two OSEAS drains in place  Her visiting nurse noticed cellulitis in the area and she was subsequently started on PO clindamycin  However, today she spiked a fever to 102F and felt weak  She was instructed by Dr Luz Maria Sorensen to go to ED for IV abx   Workup demonstrated leukocytosis (WBC 16 6); ultrasound did not show evidence of underlying abscess "    Summary of Hospital Course: Patient was admitted to surgical oncology for observation of a left breast cellulitis  Was started on IV Vancomycin with pharmacy consult for monitoring  Started on IVF, cardiac diet, DVT ppx, and kept on home meds  Ultrasound of the left breast revealed no fluid collection/abscess  Blood cultures were negative at 48 hours  Patient's temperature was monitored and continued to downtrend and was resolved on day of discharge  White count was 16 58 on admission and continued to downtrend to 9 78 on day of discharge  All other labs have been within normal limits and patient has been stable  Erythema and tenderness had significantly improved  No signs of induration or fluctuance the site  Patient was eager for discharge and was cleared by Surgical Oncology team   Infectious Disease was consulted regarding transition to p o  antibiotics for discharge due to penicillin and sulfa allergy (pruritic rash), clindamycin failure at home, and presence bilateral neuro modulators for her essential tremor  She was on day 4 of vancomycin treatment before discharge  ID recommended 7 day course of Keflex and doxycycline to complete through 2/29  They discussed with patient who says that she has tolerated Keflex in the past without difficulty, with no anaphylactic reactions  Patient was discharged with instructions to take full course of antibiotics and to follow up in outpatient office with Dr Savannah Onofre in 2 weeks  Physical Exam:  See progress note today to 26 by Sloan Velez MD   Overall physical exam was without any abnormalities  Left breast erythema significantly improved, almost completely resolved      Significant Findings, Care, Treatment and Services Provided: See Above  Us Breast Left Limited (diagnostic)  Result Date: 2/25/2021  Impression:  No suspicious fluid collection to suggest abscess at the area palpable concern left breast  ASSESSMENT/BI-RADS CATEGORY: Left: 2 - Benign Overall: 2 - Benign RECOMMENDATION:      - Clinical management for the left breast  Workstation ID: [id number]    Complications:  None    Discharge Diagnosis:  Left breast cellulitis    Resolved Problems  Date Reviewed: 2/26/2021    None        Principal Problem:    Cellulitis    Condition at Discharge: stable     Discharge instructions/Information to patient and family:   See after visit summary for information provided to patient and family  Provisions for Follow-Up Care:  See after visit summary for information related to follow-up care and any pertinent home health orders  PCP: Valentina Granger MD    Disposition: Home    Planned Readmission: No    Discharge Statement   I spent 20 minutes discharging the patient  This time was spent on the day of discharge  I had direct contact with the patient on the day of discharge  Additional documentation is required if more than 30 minutes were spent on discharge  Discharge Medications:  See after visit summary for reconciled discharge medications provided to patient and family        ROSANGELA Beasley   2/26/2021

## 2021-02-26 NOTE — PROGRESS NOTES
Progress Note - Surgical Oncology   Reinaldo Powell 67 y o  female MRN: [id number]  Unit/Bed#: S -47 Encounter: [id number]    Assessment:  73yo F s/p L mastectomy 2/12/21, who presents with L breast cellulitis    Plan:  Regular diet  Continue IV Vancomycin  PRN analgesia  DVT PPX  Anticipate discharge today vs tomorrow    Subjective/Objective     Subjective: no acute events  Doing well  Afebrile  Objective:    Blood pressure 146/67, pulse 78, temperature 98 9 °F (37 2 °C), temperature source Oral, resp  rate 17, height 5' 3" (1 6 m), weight 69 kg (152 lb 1 9 oz), SpO2 92 %  ,Body mass index is 26 95 kg/m²  Intake/Output Summary (Last 24 hours) at 2/26/2021 7263  Last data filed at 2/26/2021 0514  Gross per 24 hour   Intake 595 ml   Output 1150 ml   Net -555 ml       Invasive Devices     Peripheral Intravenous Line            Peripheral IV 02/24/21 Right;Medial Forearm 1 day          Drain            Closed/Suction Drain Left Breast Bulb 14 days    Closed/Suction Drain Left Breast Bulb 19 Fr  14 days                Physical Exam: General: AAOx3  Head: normocephalic, atraumatic  Neck: supple, trachea midline  Respiratory: BS b/l  Chest: Left breast with erythema almost completely resolved  Much improved  Abdomen: Soft, NT, no rebound/guarding  Heart: RRR, S1s2  Ext: Warm no cyanosis         Lab, Imaging and other studies:I have personally reviewed pertinent lab results      VTE Pharmacologic Prophylaxis: Warfarin (Coumadin)  VTE Mechanical Prophylaxis: sequential compression device

## 2021-02-28 ENCOUNTER — TELEPHONE (OUTPATIENT)
Dept: OTHER | Facility: OTHER | Age: 76
End: 2021-02-28

## 2021-02-28 ENCOUNTER — DOCUMENTATION (OUTPATIENT)
Dept: SOCIAL WORK | Facility: HOSPITAL | Age: 76
End: 2021-02-28

## 2021-02-28 LAB
BACTERIA BLD CULT: NORMAL
BACTERIA BLD CULT: NORMAL

## 2021-02-28 NOTE — TELEPHONE ENCOUNTER
[phone]/Miguel/FATEMEH SLATER/Matilda Day/[birth year]/There is a triple antibiotic not on her after visit summary  There is sloth and purulent drainage lateral OSEAS drain site

## 2021-02-28 NOTE — PROGRESS NOTES
Admission Report at [facility] has admitted your patient to [facility] service with the following disciplines:      SN  Response needed, please respond via tiger text  Primary focus of home health care: integumentary assess  Anticipated visit pattern:  2w2 1w2 and next visit date: 2/4/21 wound assess  Significant clinical findings: TC to Dr Tadeo Cardenas left massage with [facility] [place] [facility] call reporting purulent drainage and slough coming from lateral OSEAS drain site  Patient given triple antibiotic ointment at hospital but not on AVS     Request for medication clarification: Educated patient to hold zinc, magnesium, calcium + D, and multivitamin until Doxycycline is finished, due to the combination lowers effectiveness of antibiotic per VNA medication profile  Request for other order clarification: Would you like triple antibiotic ointment to be used and at what frequency  Soap and water 4x4 daily to OSEAS sites? Needs follow up physician appointments scheduled: Tadeo TAPIA  Potential barriers to goal achievement: [place], Colon resection causing chronic diarrhea  Thank you for allowing us to participate in the care of your patient        Alberto Brown RN

## 2021-03-01 ENCOUNTER — TRANSITIONAL CARE MANAGEMENT (OUTPATIENT)
Dept: INTERNAL MEDICINE CLINIC | Facility: CLINIC | Age: 76
End: 2021-03-01

## 2021-03-01 DIAGNOSIS — M54.16 LUMBAR RADICULOPATHY: ICD-10-CM

## 2021-03-01 DIAGNOSIS — F41.9 ANXIETY: ICD-10-CM

## 2021-03-01 DIAGNOSIS — F33.1 MODERATE EPISODE OF RECURRENT MAJOR DEPRESSIVE DISORDER (HCC): ICD-10-CM

## 2021-03-01 DIAGNOSIS — M35.00 SJOGREN'S SYNDROME, WITH UNSPECIFIED ORGAN INVOLVEMENT (HCC): ICD-10-CM

## 2021-03-01 RX ORDER — LORAZEPAM 1 MG/1
TABLET ORAL
Qty: 120 TABLET | Refills: 0 | OUTPATIENT
Start: 2021-03-01

## 2021-03-01 RX ORDER — HYDROXYCHLOROQUINE SULFATE 200 MG/1
200 TABLET, FILM COATED ORAL 2 TIMES DAILY
Qty: 180 TABLET | Refills: 0 | Status: SHIPPED | OUTPATIENT
Start: 2021-03-01 | End: 2021-10-17 | Stop reason: ALTCHOICE

## 2021-03-01 RX ORDER — BUPROPION HYDROCHLORIDE 300 MG/1
300 TABLET ORAL DAILY
Qty: 90 TABLET | Refills: 1 | Status: SHIPPED | OUTPATIENT
Start: 2021-03-01 | End: 2022-04-22 | Stop reason: ALTCHOICE

## 2021-03-01 RX ORDER — HYDROCODONE BITARTRATE AND ACETAMINOPHEN 5; 325 MG/1; MG/1
1 TABLET ORAL EVERY 6 HOURS PRN
Qty: 120 TABLET | Refills: 0 | Status: SHIPPED | OUTPATIENT
Start: 2021-03-01 | End: 2021-04-07 | Stop reason: SDUPTHER

## 2021-03-01 RX ORDER — LORAZEPAM 1 MG/1
1 TABLET ORAL EVERY 6 HOURS PRN
Qty: 120 TABLET | Refills: 0 | Status: SHIPPED | OUTPATIENT
Start: 2021-03-01 | End: 2021-04-07 | Stop reason: SDUPTHER

## 2021-03-04 ENCOUNTER — OFFICE VISIT (OUTPATIENT)
Dept: INTERNAL MEDICINE CLINIC | Facility: CLINIC | Age: 76
End: 2021-03-04
Payer: MEDICARE

## 2021-03-04 VITALS
OXYGEN SATURATION: 97 % | DIASTOLIC BLOOD PRESSURE: 64 MMHG | WEIGHT: 141 LBS | HEART RATE: 70 BPM | BODY MASS INDEX: 24.98 KG/M2 | HEIGHT: 63 IN | SYSTOLIC BLOOD PRESSURE: 140 MMHG | TEMPERATURE: 96.8 F

## 2021-03-04 DIAGNOSIS — I10 ESSENTIAL HYPERTENSION: ICD-10-CM

## 2021-03-04 DIAGNOSIS — G25.0 ESSENTIAL TREMOR: ICD-10-CM

## 2021-03-04 DIAGNOSIS — I81 PORTAL VEIN THROMBOSIS: ICD-10-CM

## 2021-03-04 DIAGNOSIS — E03.9 ACQUIRED HYPOTHYROIDISM: ICD-10-CM

## 2021-03-04 DIAGNOSIS — Z17.1 MALIGNANT NEOPLASM OF OVERLAPPING SITES OF LEFT BREAST IN FEMALE, ESTROGEN RECEPTOR NEGATIVE (HCC): ICD-10-CM

## 2021-03-04 DIAGNOSIS — F41.9 ANXIETY: ICD-10-CM

## 2021-03-04 DIAGNOSIS — C50.812 MALIGNANT NEOPLASM OF OVERLAPPING SITES OF LEFT BREAST IN FEMALE, ESTROGEN RECEPTOR NEGATIVE (HCC): ICD-10-CM

## 2021-03-04 DIAGNOSIS — E22.2 SIADH (SYNDROME OF INAPPROPRIATE ADH PRODUCTION) (HCC): ICD-10-CM

## 2021-03-04 DIAGNOSIS — R65.10 SIRS (SYSTEMIC INFLAMMATORY RESPONSE SYNDROME) (HCC): Primary | ICD-10-CM

## 2021-03-04 PROCEDURE — 99495 TRANSJ CARE MGMT MOD F2F 14D: CPT | Performed by: INTERNAL MEDICINE

## 2021-03-04 RX ORDER — LEVOTHYROXINE SODIUM 0.05 MG/1
50 TABLET ORAL DAILY
Qty: 90 TABLET | Refills: 1 | Status: SHIPPED | OUTPATIENT
Start: 2021-03-04 | End: 2021-11-15

## 2021-03-04 RX ORDER — LISINOPRIL 20 MG/1
20 TABLET ORAL 2 TIMES DAILY
Qty: 180 TABLET | Refills: 1 | Status: SHIPPED | OUTPATIENT
Start: 2021-03-04 | End: 2021-09-27

## 2021-03-04 RX ORDER — LEVOTHYROXINE SODIUM 0.05 MG/1
TABLET ORAL
Qty: 90 TABLET | Refills: 1 | Status: SHIPPED | OUTPATIENT
Start: 2021-03-04 | End: 2021-03-04 | Stop reason: SDUPTHER

## 2021-03-04 NOTE — PROGRESS NOTES
Assessment/Plan:     Malignant neoplasm of overlapping sites of left breast in female, estrogen receptor negative ([facility] )  Follow up with surgery and oncology as scheduled  Essential tremor  Stable  Anxiety  Improved since surgery  No medication changes  Portal vein thrombosis  INR at goal    Repeat in 3 weeks  SIADH (syndrome of inappropriate ADH production) (HCC)  Na normal        Diagnoses and all orders for this visit:    SIRS (systemic inflammatory response syndrome) ([facility])  Comments:  Complete antibiotics (cephalexin, doxycycline)  Currently asymptomatic  Clean wound  Acquired hypothyroidism  -     levothyroxine 50 mcg tablet; Take 1 tablet (50 mcg total) by mouth daily    Essential hypertension  -     lisinopril (ZESTRIL) 20 mg tablet; Take 1 tablet (20 mg total) by mouth 2 (two) times a day    Malignant neoplasm of overlapping sites of left breast in female, estrogen receptor negative (HCC)    Essential tremor    Anxiety    Portal vein thrombosis    SIADH (syndrome of inappropriate ADH production) ([place] [address] )      Follow up in 1 month or as needed  Subjective:     Patient ID: Larry Colbert is a 67 y o  female here for hospital follow up  She is feeling better since her hospital discharge  She denies any fevers, no pain or discharge from the wound  She feels that her nipple would itch sometimes even though it does not exist anymore  She reports anxiety has improved since his surgery, still worries about needing chemotherapy or not  She denies any chest pain, shortness of breath or palpitations  She reports bowel movements is normal   No urinary symptoms  She reports her tremors are about the same  Good appetite, no falls  She has visiting nurses for wound care  Review of Systems   Constitutional: Negative for activity change, appetite change, fatigue and fever  HENT: Negative for congestion  Eyes: Negative for visual disturbance     Respiratory: Negative for cough and shortness of breath  Cardiovascular: Negative for chest pain and leg swelling  Gastrointestinal: Negative for abdominal pain, constipation and diarrhea  Genitourinary: Negative for dysuria, frequency and urgency  Musculoskeletal: Negative for arthralgias  Skin: Positive for wound  Negative for rash  Neurological: Positive for tremors  Negative for dizziness, weakness and headaches  Hematological: Does not bruise/bleed easily  Psychiatric/Behavioral: Negative for confusion  The patient is nervous/anxious  Objective:     Physical Exam  Vitals signs and nursing note reviewed  Constitutional:       General: She is not in acute distress  Appearance: She is well-developed  She is not ill-appearing  HENT:      Head: Normocephalic and atraumatic  Eyes:      Pupils: Pupils are equal, round, and reactive to light  Cardiovascular:      Rate and Rhythm: Normal rate and regular rhythm  Heart sounds: Normal heart sounds  Pulmonary:      Effort: Pulmonary effort is normal       Breath sounds: Normal breath sounds  No wheezing  Chest:      Breasts:         Left: Absent  Abdominal:      General: Bowel sounds are normal       Palpations: Abdomen is soft  Skin:     General: Skin is warm  Findings: No rash  Neurological:      General: No focal deficit present  Mental Status: She is alert and oriented to person, place, and time  Psychiatric:         Mood and Affect: Mood normal          Behavior: Behavior normal            Vitals:    03/04/21 1200   BP: 140/64   Pulse: 70   Temp: (!) 96 8 °F (36 °C)   SpO2: 97%   Weight: 64 kg (141 lb)   Height: 5' 3" (1 6 m)       Transitional Care Management Review:  Marylene Dooms is a 67 y o  female here for TCM follow up       During the TCM phone call patient stated:    TCM Call (since 2/1/2021)     Date and time call was made  3/1/2021 10:15 AM    Hospital care reviewed  Records reviewed    Patient was hospitialized at  [facility]  [facility]        Date of Admission  02/23/21    Date of discharge  02/26/21    Diagnosis  Cellulitis    Disposition  Home    Were the patients medications reviewed and updated  Yes    Current Symptoms  None      TCM Call (since 2/1/2021)     Post hospital issues  None    Should patient be enrolled in anticoag monitoring? No    Scheduled for follow up? Yes    Referrals needed  Lana Guerrero MD     Labs & imaging results reviewed with patient  Reviewed hospital records

## 2021-03-09 ENCOUNTER — TELEPHONE (OUTPATIENT)
Dept: OBGYN CLINIC | Facility: CLINIC | Age: 76
End: 2021-03-09

## 2021-03-09 ENCOUNTER — TELEPHONE (OUTPATIENT)
Dept: INTERNAL MEDICINE CLINIC | Facility: CLINIC | Age: 76
End: 2021-03-09

## 2021-03-09 ENCOUNTER — OFFICE VISIT (OUTPATIENT)
Dept: OBGYN CLINIC | Facility: CLINIC | Age: 76
End: 2021-03-09
Payer: MEDICARE

## 2021-03-09 VITALS
WEIGHT: 140.2 LBS | HEIGHT: 63 IN | DIASTOLIC BLOOD PRESSURE: 72 MMHG | BODY MASS INDEX: 24.84 KG/M2 | SYSTOLIC BLOOD PRESSURE: 128 MMHG

## 2021-03-09 DIAGNOSIS — L29.2 VULVAR ITCHING: Primary | ICD-10-CM

## 2021-03-09 DIAGNOSIS — Z90.12 S/P MASTECTOMY, LEFT: ICD-10-CM

## 2021-03-09 DIAGNOSIS — B37.3 CANDIDIASIS OF VULVA: ICD-10-CM

## 2021-03-09 LAB
BV WHIFF TEST VAG QL: NEGATIVE
CLUE CELLS SPEC QL WET PREP: NEGATIVE
PH SMN: ABNORMAL [PH]
SL AMB POCT WET MOUNT: ABNORMAL
T VAGINALIS VAG QL WET PREP: NEGATIVE
YEAST VAG QL WET PREP: POSITIVE

## 2021-03-09 PROCEDURE — 87210 SMEAR WET MOUNT SALINE/INK: CPT | Performed by: OBSTETRICS & GYNECOLOGY

## 2021-03-09 PROCEDURE — 99213 OFFICE O/P EST LOW 20 MIN: CPT | Performed by: OBSTETRICS & GYNECOLOGY

## 2021-03-09 PROCEDURE — 1123F ACP DISCUSS/DSCN MKR DOCD: CPT | Performed by: OBSTETRICS & GYNECOLOGY

## 2021-03-09 NOTE — PROGRESS NOTES
Assessment/Plan:   vulvar moniliasis    Sweating with involvement of the perineum     Rectal incontinence requiring the use of a pad continuously     Mycolog-II b i d  for 10 days   Balmex ointment t i d  after this to protect the area   Return the office in 2-3 weeks for re-evaluation and also yearly examination       Diagnoses and all orders for this visit:    Vulvar itching  -     POCT wet mount    S/P mastectomy, left    Candidiasis of vulva  -     nystatin-triamcinolone (MYCOLOG-II) cream; Apply topically 2 (two) times a day  -     POCT wet mount              Subjective:        Patient ID: Waleska Girard is a 67 y o  female  Mrs Day presents with Vulvar itching  This is been a chronic problem which is been present for the majority of the time since she was seen last in 2018  At that time it was thought to possibly be related to a pad she was wearing  She uses them continuously for rectal incontinence  She changed the brands twice to no avail  She has used Vagisil,  Summer's Ingrid spray,  Hydrocortisone cream, and vaginal still powder  She even used an  tube of clobetasol but nothing seemed to help  She believes there may be an odor  She is not diabetic  She had recently been on  outpatient Keflex following a mastectomy which was complicated by  Cellulitis and then SIRS  She probably received antibiotics while hospitalized too  She denies  Urinary incontinence but she does  Sweat significantly to the point that the genital area is moist   She denies fever,  Chills,  Dysuria,  Or pelvic pain        The following portions of the patient's history were reviewed and updated as appropriate: She  has a past medical history of Anemia, Anxiety, Arrhythmia, Arthritis, Asthma, Blood clot in vein, Breast cancer ([address] ), Breast lump, Cancer ([address] ), Depression, Disease of thyroid gland, DVT, lower extremity ([address] ), GERD (gastroesophageal reflux disease), Hyperlipidemia, Hypertension, Hypokalemia, Hyponatremia, Hypothyroidism, Iron deficiency anemia, Irregular heart beat, Manic behavior ([facility] ), 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[facility] ), Osteoarthritis, Palpitations, Paroxysmal supraventricular tachycardia ([facility] ), PE (pulmonary thromboembolism) ([facility] ), Sjoegren syndrome, Spinal stenosis, Thrombocytosis ([facility] ), Tremors of nervous system, Ulcerative colitis ([facility] ), and Vertigo    Patient Active Problem List    Diagnosis Date Noted    Cellulitis 02/24/2021    Malignant neoplasm of overlapping sites of left breast in female, estrogen receptor negative ([facility] ) 12/23/2020    Vitamin B12 deficiency 08/23/2019    Vitamin D deficiency 08/23/2019    S/P deep brain stimulator placement 11/26/2018    Encounter for long-term (current) use of medications 11/23/2018    Lumbar degenerative disc disease 04/20/2018    Encounter for screening mammogram for breast cancer 04/20/2018    Portal vein thrombosis 01/29/2018    Hypomagnesemia 12/08/2015    Unsteady gait 12/08/2015    Overweight 07/16/2015    Essential tremor 07/13/2015    Prediabetes 06/16/2015    Lower extremity pain, left 11/06/2014    Osteoarthritis of right knee 09/05/2014    Spinal stenosis 09/05/2014    Iron deficiency anemia 04/30/2014    Supraventricular tachycardia (HCC) 10/07/2013    Diarrhea 01/09/2013    Chronic salpingo-oophoritis 12/26/2012    SIADH (syndrome of inappropriate ADH production) ([facility] ) 12/14/2012    Peripheral neuropathy 12/14/2012    GERD (gastroesophageal reflux disease) 08/23/2012    Anemia 08/23/2012    Moderate episode of recurrent major depressive disorder ([facility] ) 06/13/2012    Hyperlipidemia 06/13/2012    Essential hypertension 06/13/2012    Acquired hypothyroidism 06/13/2012    Osteopenia 06/13/2012    Ulcerative colitis without complications ([facility] ) 15/76/4391    Allergic rhinitis 06/13/2012    Mild intermittent asthma without complication 07/62/5301    Sjogren's syndrome ([facility] ) 06/13/2012    Anxiety 04/30/2012    Insomnia 04/30/2012   PMH:   G4, P2   depression/ anxiety   portal vein thrombosis   estrogen replacement therapy 9/18   pulmonary embolus   lumbar radiculopathy   essential tremor   Sjogren's disease   SVT   osteopenia   asthma   hypothyroidism   SIADH   deep brain stimulator placement [ left chest] 12/19   sigmoidoscopy 9/20  Ulcerative colitis   left breast ductal carcinoma in situ 12/20   left mastectomy 2/12/21   breast cellulitis 2/21  SIRS 2/23/21    She  has a past surgical history that includes Splenectomy; Abdominal surgery; Colon surgery; Breast surgery; Appendectomy; Knee arthroscopy; pr imp stim,cranial,subq,1 array (Right, 6/20/2017); Colonoscopy (N/A, 8/30/2017); Coloproctectomy w/ ileo J-pouch; Esophagogastroduodenoscopy; FISTULA REPAIR; Ileostomy closure; Jeffrey hole w/ stereotactic insertion of DBS leads / intraop microelectrode recording; Hysterectomy; Mammo stereotactic breast biopsy left (all inc) (Left, 11/7/2019); pr imp stim,cranial,subq,1 array (N/A, 12/4/2019); Breast biopsy (Left, 11/07/2019); Breast excisional biopsy (Left); Mammo stereotactic breast biopsy left (all inc) (Left, 12/17/2020); Mammo stereotactic breast biopsy left (all inc) each add (Left, 12/17/2020); and Mastectomy w/ sentinel node biopsy (Left, 2/12/2021)  Her family history includes COPD in her father; Diabetes in her father and sister; Hypertension in her mother; No Known Problems in her daughter, maternal aunt, maternal grandfather, maternal grandmother, paternal aunt, paternal grandfather, paternal grandmother, sister, and son; Other in her mother; Peripheral vascular disease in her mother; Sjogren's syndrome in her sister; Stroke in her father; Venous thrombosis in her mother  She  reports that she quit smoking about 56 years ago  She has a 10 00 pack-year smoking history  She has never used smokeless tobacco  She reports current alcohol use  She reports that she does not use drugs    Current Outpatient Medications Medication Sig Dispense Refill    albuterol (PROVENTIL HFA,VENTOLIN HFA) 90 mcg/act inhaler Inhale 2 puffs every 6 (six) hours as needed for wheezing 1 Inhaler 1    buPROPion (WELLBUTRIN XL) 300 mg 24 hr tablet Take 1 tablet (300 mg total) by mouth daily With 150 mg 90 tablet 1    Calcium Carb-Cholecalciferol (CALCIUM 600-D PO) Take 1 tablet by mouth 2 (two) times a day      cetirizine (ZyrTEC) 10 mg tablet Take 10 mg by mouth daily      Coenzyme Q10 (CO Q-10 PO) Take 1 capsule by mouth daily      diltiazem (CARDIZEM CD) 180 mg 24 hr capsule Take 1 capsule (180 mg total) by mouth daily 90 capsule 1    escitalopram (LEXAPRO) 20 mg tablet TAKE 1 TABLET BY MOUTH EVERY DAY (Patient taking differently: Take 20 mg by mouth daily at bedtime ) 90 tablet 1    fluticasone (FLONASE) 50 mcg/act nasal spray 1 SPRAY INTO EACH NOSTRIL DAILY AS NEEDED FOR RHINITIS (Patient taking differently: 1 spray into each nostril daily ) 48 mL 1    fluticasone-salmeterol (Advair Diskus) 250-50 mcg/dose inhaler Inhale 1 puff 2 (two) times a day Rinse mouth after use 180 each 1    gabapentin (NEURONTIN) 100 mg capsule TAKE 2 CAPSULES (200 MG TOTAL) BY MOUTH 2 (TWO) TIMES A  capsule 1    gabapentin (NEURONTIN) 600 MG tablet TAKE ONE TABLET IN THE MORNING, ONE TABLET IN THE AFTERNOON, AND 2 TABLETS AT BEDTIME 360 tablet 2    HYDROcodone-acetaminophen (NORCO) 5-325 mg per tablet Take 1 tablet by mouth every 6 (six) hours as needed for painMax Daily Amount: 4 tablets 120 tablet 0    hydroxychloroquine (PLAQUENIL) 200 mg tablet Take 1 tablet (200 mg total) by mouth 2 (two) times a day 180 tablet 0    levothyroxine 50 mcg tablet Take 1 tablet (50 mcg total) by mouth daily 90 tablet 1    lisinopril (ZESTRIL) 20 mg tablet Take 1 tablet (20 mg total) by mouth 2 (two) times a day 180 tablet 1    loperamide (IMODIUM) 2 mg capsule Take 2 mg by mouth 4 (four) times a day as needed        LORazepam (ATIVAN) 1 mg tablet Take 1 tablet (1 mg total) by mouth every 6 (six) hours as needed for anxiety 120 tablet 0    MAGNESIUM PO Take 1 tablet by mouth daily      meclizine (ANTIVERT) 25 mg tablet Take 1 tablet (25 mg total) by mouth every 6 (six) hours as needed for dizziness 90 tablet 1    metaxalone (SKELAXIN) 800 mg tablet Take 1 tablet (800 mg total) by mouth 3 (three) times a day 90 tablet 0    Multiple Vitamin (MULTIVITAMIN ADULT PO) Take 1 tablet by mouth daily      naloxone (NARCAN) 4 mg/0 1 mL nasal spray Administer 1 spray into a nostril  If breathing does not return to normal or if breathing difficulty resumes after 2-3 minutes, give another dose in the other nostril using a new spray  1 each 1    nystatin-triamcinolone (MYCOLOG-II) cream Apply topically 2 (two) times a day 30 g 0    Omega-3 Fatty Acids (FISH OIL PO) Take 1 capsule by mouth daily      pantoprazole (PROTONIX) 40 mg tablet TAKE 1 TABLET BY MOUTH EVERY DAY 90 tablet 1    simvastatin (ZOCOR) 5 MG tablet TAKE 1 TABLET BY MOUTH EVERY DAY 90 tablet 1    sodium chloride 1 g tablet TAKE 1 TABLET (1 G TOTAL) BY MOUTH 3 (THREE) TIMES A  tablet 1    torsemide (DEMADEX) 10 mg tablet TAKE 1 TABLET BY MOUTH EVERY DAY (Patient taking differently: As needed) 90 tablet 1    warfarin (COUMADIN) 5 mg tablet Take 1-2 tablets daily As directed 180 tablet 1     No current facility-administered medications for this visit        Current Outpatient Medications on File Prior to Visit   Medication Sig    albuterol (PROVENTIL HFA,VENTOLIN HFA) 90 mcg/act inhaler Inhale 2 puffs every 6 (six) hours as needed for wheezing    buPROPion (WELLBUTRIN XL) 300 mg 24 hr tablet Take 1 tablet (300 mg total) by mouth daily With 150 mg    Calcium Carb-Cholecalciferol (CALCIUM 600-D PO) Take 1 tablet by mouth 2 (two) times a day    cetirizine (ZyrTEC) 10 mg tablet Take 10 mg by mouth daily    Coenzyme Q10 (CO Q-10 PO) Take 1 capsule by mouth daily    diltiazem (CARDIZEM CD) 180 mg 24 hr capsule Take 1 capsule (180 mg total) by mouth daily    escitalopram (LEXAPRO) 20 mg tablet TAKE 1 TABLET BY MOUTH EVERY DAY (Patient taking differently: Take 20 mg by mouth daily at bedtime )    fluticasone (FLONASE) 50 mcg/act nasal spray 1 SPRAY INTO EACH NOSTRIL DAILY AS NEEDED FOR RHINITIS (Patient taking differently: 1 spray into each nostril daily )    fluticasone-salmeterol (Advair Diskus) 250-50 mcg/dose inhaler Inhale 1 puff 2 (two) times a day Rinse mouth after use    gabapentin (NEURONTIN) 100 mg capsule TAKE 2 CAPSULES (200 MG TOTAL) BY MOUTH 2 (TWO) TIMES A DAY    gabapentin (NEURONTIN) 600 MG tablet TAKE ONE TABLET IN THE MORNING, ONE TABLET IN THE AFTERNOON, AND 2 TABLETS AT BEDTIME    HYDROcodone-acetaminophen (NORCO) 5-325 mg per tablet Take 1 tablet by mouth every 6 (six) hours as needed for painMax Daily Amount: 4 tablets    hydroxychloroquine (PLAQUENIL) 200 mg tablet Take 1 tablet (200 mg total) by mouth 2 (two) times a day    levothyroxine 50 mcg tablet Take 1 tablet (50 mcg total) by mouth daily    lisinopril (ZESTRIL) 20 mg tablet Take 1 tablet (20 mg total) by mouth 2 (two) times a day    loperamide (IMODIUM) 2 mg capsule Take 2 mg by mouth 4 (four) times a day as needed      LORazepam (ATIVAN) 1 mg tablet Take 1 tablet (1 mg total) by mouth every 6 (six) hours as needed for anxiety    MAGNESIUM PO Take 1 tablet by mouth daily    meclizine (ANTIVERT) 25 mg tablet Take 1 tablet (25 mg total) by mouth every 6 (six) hours as needed for dizziness    metaxalone (SKELAXIN) 800 mg tablet Take 1 tablet (800 mg total) by mouth 3 (three) times a day    Multiple Vitamin (MULTIVITAMIN ADULT PO) Take 1 tablet by mouth daily    naloxone (NARCAN) 4 mg/0 1 mL nasal spray Administer 1 spray into a nostril  If breathing does not return to normal or if breathing difficulty resumes after 2-3 minutes, give another dose in the other nostril using a new spray      Omega-3 Fatty Acids (FISH OIL PO) Take 1 capsule by mouth daily    pantoprazole (PROTONIX) 40 mg tablet TAKE 1 TABLET BY MOUTH EVERY DAY    simvastatin (ZOCOR) 5 MG tablet TAKE 1 TABLET BY MOUTH EVERY DAY    sodium chloride 1 g tablet TAKE 1 TABLET (1 G TOTAL) BY MOUTH 3 (THREE) TIMES A DAY    torsemide (DEMADEX) 10 mg tablet TAKE 1 TABLET BY MOUTH EVERY DAY (Patient taking differently: As needed)    warfarin (COUMADIN) 5 mg tablet Take 1-2 tablets daily As directed     No current facility-administered medications on file prior to visit  She is allergic to macrobid [nitrofurantoin monohyd macro]; penicillins; sulfa antibiotics; and indocin [indomethacin]       Review of Systems   Constitutional: Positive for activity change  Negative for chills and fever  Genitourinary: Positive for vaginal pain  Negative for dysuria, vaginal bleeding and vaginal discharge  She is not sexually active,  no urinary incontinence   Skin: Positive for rash  Objective:    Vitals:    03/09/21 1307   BP: 128/72   Weight: 63 6 kg (140 lb 3 2 oz)   Height: 5' 3" (1 6 m)            Physical Exam  Constitutional:       General: She is not in acute distress  Appearance: Normal appearance  She is not ill-appearing  HENT:      Head: Normocephalic and atraumatic  Eyes:      General:         Right eye: No discharge  Left eye: No discharge  Extraocular Movements: Extraocular movements intact  Pulmonary:      Effort: Pulmonary effort is normal    Abdominal:      General: Abdomen is flat  There is no distension  Palpations: Abdomen is soft  There is no mass  Tenderness: There is no abdominal tenderness  There is no right CVA tenderness, left CVA tenderness, guarding or rebound  Hernia: No hernia is present  Genitourinary:     Comments: There is a butterfly erythematous rash of the vulva, perineum,  And inner thighs  There is no odor  The vagina is not involved and there is no vaginal discharge       a wet mount revealed occasional hyphae  Musculoskeletal:      Right lower leg: No edema  Left lower leg: No edema  Skin:     General: Skin is warm and dry  Neurological:      Mental Status: She is alert and oriented to person, place, and time  Psychiatric:         Mood and Affect: Mood normal          Behavior: Behavior normal          Thought Content:  Thought content normal          Judgment: Judgment normal

## 2021-03-09 NOTE — TELEPHONE ENCOUNTER
Pt  Is doing ood  Her wound healed  They would like to see her one time a week for two weeks and then d/c her  They need a verbal order

## 2021-03-09 NOTE — TELEPHONE ENCOUNTER
Please let the patient know the Mycolog-II is a combination medication with 2 medications  1 is for the yeast in 1 is for the irritation  After that is finish she should use Balmex to the perineal area and that is over-the-counter  She wears a pad constantly due to rectal incontinence

## 2021-03-09 NOTE — TELEPHONE ENCOUNTER
Per comm consent lm to pt per Dr Charlotte Bean notes I only saw mycolog to be used  But I will send to him to confirm

## 2021-03-09 NOTE — TELEPHONE ENCOUNTER
Patient thought 2 medications were called into [facility] to [facility] down the steroid cream and yeast infection  Picked up Nystatin-Triminiclone cream    What was the 2nd RX?

## 2021-03-16 ENCOUNTER — TELEPHONE (OUTPATIENT)
Dept: INTERNAL MEDICINE CLINIC | Facility: CLINIC | Age: 76
End: 2021-03-16

## 2021-03-16 NOTE — TELEPHONE ENCOUNTER
[facility] reports patient was suppose to be seen this Friday and patient canceled due to significant other having a test that day- so she will not be seen this week      She is scheduled to be seen again on Monday 03/23 and be discharged from [facility] services next week

## 2021-03-18 ENCOUNTER — OFFICE VISIT (OUTPATIENT)
Dept: SURGICAL ONCOLOGY | Facility: CLINIC | Age: 76
End: 2021-03-18

## 2021-03-18 VITALS
TEMPERATURE: 98 F | DIASTOLIC BLOOD PRESSURE: 60 MMHG | SYSTOLIC BLOOD PRESSURE: 112 MMHG | RESPIRATION RATE: 12 BRPM | HEIGHT: 63 IN | WEIGHT: 142 LBS | HEART RATE: 68 BPM | BODY MASS INDEX: 25.16 KG/M2

## 2021-03-18 DIAGNOSIS — Z17.1 MALIGNANT NEOPLASM OF OVERLAPPING SITES OF LEFT BREAST IN FEMALE, ESTROGEN RECEPTOR NEGATIVE (HCC): ICD-10-CM

## 2021-03-18 DIAGNOSIS — C50.812 MALIGNANT NEOPLASM OF OVERLAPPING SITES OF LEFT BREAST IN FEMALE, ESTROGEN RECEPTOR NEGATIVE (HCC): ICD-10-CM

## 2021-03-18 DIAGNOSIS — Z71.89 OTHER SPECIFIED COUNSELING: Primary | ICD-10-CM

## 2021-03-18 DIAGNOSIS — Z90.12 STATUS POST LEFT MASTECTOMY: ICD-10-CM

## 2021-03-18 PROCEDURE — 99024 POSTOP FOLLOW-UP VISIT: CPT | Performed by: SURGERY

## 2021-03-18 NOTE — PROGRESS NOTES
Surgical Oncology Follow Up       [address]  [facility] [place]  [address] [facility] [address]  [place] [address]    Tonyjessee Coleen  1945  [id number]  [address] 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  Her margins are widely negative  I have recommended she see Dr Jose Antonio Silva for an opinion regarding any adjuvant therapies though I think it is unlikely given her phenotype  Cancer History:     Oncology History   Malignant neoplasm of overlapping sites of left breast in female, estrogen receptor negative ([facility] [address] )   12/17/2020 Biopsy    Left Breast stereotactic biopsy:  A  4 o'clock posterior  -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  -Grade 3  -ER 0  -AR 0  -HER2 3+    B/C  5 o'clock anterior  -Ductal carcinoma in situ  -Grade 3  -ER 0  -AR 0    Concordant  Malignancy appears unifocal  Calcifications span 4 1 cm  Both clips migrated (see comments in original biopsy report)  No recent US of left axilla  Right breast clear  1/7/2021 Genetic Testing    The following genes were evaluated: CHARLOTTE, BRCA1, BRCA2, CDH1, CHEK2, PALB2, PTEN, STK11, Tp53; additional genes evaluated for a total of 20 genes  Negative result  No pathogenic sequence variants or deletions/dupllications identified  [employer]       2/12/2021 Surgery    Left breast mastectomy with sentinel lymph node biopsy:  Invasive carcinoma of the breast; DCIS with microinvasions  Grade 3  77 mm  Margins negative  0/2 LN  Stage IA  Interval History:     Patient has done well postoperatively      Review of Systems:   Review of Systems   All other systems reviewed and are negative        Past Medical History     Patient Active Problem List   Diagnosis    Portal vein thrombosis    Anxiety    Moderate episode of recurrent major depressive disorder (HCC)    Essential tremor    Hyperlipidemia    Essential hypertension    Hypomagnesemia    SIADH (syndrome of inappropriate ADH production) (HCC)    Acquired hypothyroidism    Insomnia    Iron deficiency anemia    Prediabetes    Peripheral neuropathy    Osteopenia    Osteoarthritis of right knee    Ulcerative colitis without complications (HCC)    Allergic rhinitis    GERD (gastroesophageal reflux disease)    Anemia    Mild intermittent asthma without complication    Diarrhea    Sjogren's syndrome (HCC)    Chronic salpingo-oophoritis    Overweight    Spinal stenosis    Supraventricular tachycardia (HCC)    Unsteady gait    Lower extremity pain, left    Lumbar degenerative disc disease    Encounter for screening mammogram for breast cancer    Encounter for long-term (current) use of medications    S/P deep brain stimulator placement    Vitamin B12 deficiency    Vitamin D deficiency    Malignant neoplasm of overlapping sites of left breast in female, estrogen receptor negative ([facility] [address] )    Cellulitis     Past Medical History:   Diagnosis Date    Anemia     Anxiety     Arrhythmia     Arthritis     Asthma     Blood clot in vein     portal vein    Breast cancer (HCC)     Breast lump     [id number] RESOLVED    Cancer ([facility] [address] )     Depression     Disease of thyroid gland     DVT, lower extremity (HCC)     GERD (gastroesophageal reflux disease)     Hyperlipidemia     Hypertension     Hypokalemia     Hyponatremia     Hypothyroidism     Iron deficiency anemia     Irregular heart beat     Manic behavior (HCC)     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HCC)     Osteoarthritis     Palpitations     [id number]  RESOLVED    Paroxysmal supraventricular tachycardia ([facility] [address] )     PE (pulmonary thromboembolism) (HCC)     Sjoegren syndrome     Spinal stenosis     Thrombocytosis (HCC)     [id number]  RESOLVED    Tremors of nervous system     dbs implanted right and left chest    Ulcerative colitis ([address] )     Vertigo     [id number] RESOLVED     Past Surgical History:   Procedure Laterality Date    ABDOMINAL SURGERY      APPENDECTOMY      BREAST BIOPSY Left 11/07/2019    Stereo    BREAST EXCISIONAL BIOPSY Left     x many years    BREAST SURGERY      lumpectomy & biopsy    CARMELLA HOLE W/ STEREOTACTIC INSERTION OF DBS LEADS / INTRAOP MICROELECTRODE RECORDING      COLON SURGERY      COLONOSCOPY N/A 8/30/2017    Procedure: Lori June;  Surgeon: Ishaan Price MD;  Location: [facility]; Service: Colorectal    COLOPROCTECTOMY W/ ILEO J POUCH      ESOPHAGOGASTRODUODENOSCOPY      ONSET 10/17/11    FISTULA REPAIR      LLEOANAL FISTULA REPAIR TRANSPERIN TRANSABD APPROACH    HYSTERECTOMY      age 43    ILEOSTOMY CLOSURE      KNEE ARTHROSCOPY      Right    MAMMO STEREOTACTIC BREAST BIOPSY LEFT (ALL INC) Left 11/7/2019    MAMMO STEREOTACTIC BREAST BIOPSY LEFT (ALL INC) Left 12/17/2020    MAMMO STEREOTACTIC BREAST BIOPSY LEFT (ALL INC) EACH ADD Left 12/17/2020    MASTECTOMY W/ SENTINEL NODE BIOPSY Left 2/12/2021    Procedure: BREAST MASTECTOMY WITH SENTINEL LYMPH NODE BIOPSY, LYMPHATIC MAPPING WITH BLUE DYE AND RADIAOCTIVE DYE (INJECT AT 1100 BY DR GILLESPIE IN THE OR);   Surgeon: Herbert Lamb MD;  [facility];  Service: Surgical Oncology    OK IMP STIM,CRANIAL,SUBQ,1 ARRAY Right 6/20/2017    Procedure: DBS GENERATOR REPLACEMENT;  Surgeon: Dirk Gagnon MD;  Location:  MAIN OR;  Service: Neurosurgery    OK IMP STIM,CRANIAL,SUBQ,1 ARRAY N/A 12/4/2019    Procedure: REPLACEMENT IMPLANTABLE PULSE GENERATOR FOR DEEP BRAIN STIMULATOR LEFT CHEST;  Surgeon: Reena Sunshine MD;  [facility];  Service: Neurosurgery    SPLENECTOMY       Family History   Problem Relation Age of Onset  Venous thrombosis Mother         ACUTE VENOUS THROMBOSIS OF DEEP VESSELS OF THE DISTAL LOWER EXTREMITY    Other Mother         PHLEBITIS    Hypertension Mother     Peripheral vascular disease Mother     COPD Father     Diabetes Father         MELLITUS    Stroke Father     Diabetes Sister         MELLITUS    Sjogren's syndrome Sister     No Known Problems Daughter     No Known Problems Maternal Grandmother     No Known Problems Maternal Grandfather     No Known Problems Paternal Grandmother     No Known Problems Paternal Grandfather     No Known Problems Sister     No Known Problems Maternal Aunt     No Known Problems Paternal Aunt     No Known Problems Son      Social History     Socioeconomic History    Marital status:      Spouse name: Not on file    Number of children: Not on file    Years of education: EDUCATION LEVEL 10TH GRADE    Highest education level: Not on file   Occupational History    Occupation: RETIRED   Social Needs    Financial resource strain: Not on file    Food insecurity     Worry: Not on file     Inability: Not on file   [employer] needs     Medical: Not on file     Non-medical: Not on file   Tobacco Use    Smoking status: Former Smoker     Packs/day: 2 00     Years: 5 00     Pack years: 10 00     Quit date:      Years since quittin 2    Smokeless tobacco: Never Used    Tobacco comment: Quit   Substance and Sexual Activity    Alcohol use: Yes     Frequency: 2-4 times a month     Drinks per session: 1 or 2     Binge frequency: Never    Drug use: No    Sexual activity: Not Currently     Partners: Male     Birth control/protection: Post-menopausal   Lifestyle    Physical activity     Days per week: Not on file     Minutes per session: Not on file    Stress: Not on file   Relationships    Social connections     Talks on phone: Not on file     Gets together: Not on file     Attends Taoism service: Not on file     Active member of club or organization: Not on file     Attends meetings of clubs or organizations: Not on file     Relationship status: Not on file    Intimate partner violence     Fear of current or ex partner: Not on file     Emotionally abused: Not on file     Physically abused: Not on file     Forced sexual activity: Not on file   Other Topics Concern    Not on file   Social History Narrative    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[employer], MODERATE    CAFFEINE USE, DRINKS CAFEINATED TEA, [address]       Current Outpatient Medications:     albuterol (PROVENTIL HFA,VENTOLIN HFA) 90 mcg/act inhaler, Inhale 2 puffs every 6 (six) hours as needed for wheezing, Disp: 1 Inhaler, Rfl: 1    buPROPion (WELLBUTRIN XL) 300 mg 24 hr tablet, Take 1 tablet (300 mg total) by mouth daily With 150 mg, Disp: 90 tablet, Rfl: 1    Calcium Carb-Cholecalciferol (CALCIUM 600-D PO), Take 1 tablet by mouth 2 (two) times a day, Disp: , Rfl:     cetirizine (ZyrTEC) 10 mg tablet, Take 10 mg by mouth daily, Disp: , Rfl:     Coenzyme Q10 (CO Q-10 PO), Take 1 capsule by mouth daily, Disp: , Rfl:     diltiazem (CARDIZEM CD) 180 mg 24 hr capsule, Take 1 capsule (180 mg total) by mouth daily, Disp: 90 capsule, Rfl: 1    escitalopram (LEXAPRO) 20 mg tablet, TAKE 1 TABLET BY MOUTH EVERY DAY (Patient taking differently: Take 20 mg by mouth daily at bedtime ), Disp: 90 tablet, Rfl: 1    fluticasone (FLONASE) 50 mcg/act nasal spray, 1 SPRAY INTO EACH NOSTRIL DAILY AS NEEDED FOR RHINITIS (Patient taking differently: 1 spray into each nostril daily ), Disp: 48 mL, Rfl: 1    fluticasone-salmeterol (Advair Diskus) 250-50 mcg/dose inhaler, Inhale 1 puff 2 (two) times a day Rinse mouth after use, Disp: 180 each, Rfl: 1    gabapentin (NEURONTIN) 100 mg capsule, TAKE 2 CAPSULES (200 MG TOTAL) BY MOUTH 2 (TWO) TIMES A DAY, Disp: 360 capsule, Rfl: 1    gabapentin (NEURONTIN) 600 MG tablet, TAKE ONE TABLET IN THE MORNING, ONE TABLET IN THE AFTERNOON, AND 2 TABLETS AT BEDTIME, Disp: 360 tablet, Rfl: 2    HYDROcodone-acetaminophen (NORCO) 5-325 mg per tablet, Take 1 tablet by mouth every 6 (six) hours as needed for painMax Daily Amount: 4 tablets, Disp: 120 tablet, Rfl: 0    hydroxychloroquine (PLAQUENIL) 200 mg tablet, Take 1 tablet (200 mg total) by mouth 2 (two) times a day, Disp: 180 tablet, Rfl: 0    levothyroxine 50 mcg tablet, Take 1 tablet (50 mcg total) by mouth daily, Disp: 90 tablet, Rfl: 1    lisinopril (ZESTRIL) 20 mg tablet, Take 1 tablet (20 mg total) by mouth 2 (two) times a day, Disp: 180 tablet, Rfl: 1    loperamide (IMODIUM) 2 mg capsule, Take 2 mg by mouth 4 (four) times a day as needed  , Disp: , Rfl:     LORazepam (ATIVAN) 1 mg tablet, Take 1 tablet (1 mg total) by mouth every 6 (six) hours as needed for anxiety, Disp: 120 tablet, Rfl: 0    MAGNESIUM PO, Take 1 tablet by mouth daily, Disp: , Rfl:     meclizine (ANTIVERT) 25 mg tablet, Take 1 tablet (25 mg total) by mouth every 6 (six) hours as needed for dizziness, Disp: 90 tablet, Rfl: 1    metaxalone (SKELAXIN) 800 mg tablet, Take 1 tablet (800 mg total) by mouth 3 (three) times a day, Disp: 90 tablet, Rfl: 0    Multiple Vitamin (MULTIVITAMIN ADULT PO), Take 1 tablet by mouth daily, Disp: , Rfl:     naloxone (NARCAN) 4 mg/0 1 mL nasal spray, Administer 1 spray into a nostril   If breathing does not return to normal or if breathing difficulty resumes after 2-3 minutes, give another dose in the other nostril using a new spray , Disp: 1 each, Rfl: 1    nystatin-triamcinolone (MYCOLOG-II) cream, Apply topically 2 (two) times a day, Disp: 30 g, Rfl: 0    Omega-3 Fatty Acids (FISH OIL PO), Take 1 capsule by mouth daily, Disp: , Rfl:     pantoprazole (PROTONIX) 40 mg tablet, TAKE 1 TABLET BY MOUTH EVERY DAY, Disp: 90 tablet, Rfl: 1    simvastatin (ZOCOR) 5 MG tablet, TAKE 1 TABLET BY MOUTH EVERY DAY, Disp: 90 tablet, Rfl: 1    sodium chloride 1 g tablet, TAKE 1 TABLET (1 G TOTAL) BY MOUTH 3 (THREE) TIMES A DAY, Disp: 270 tablet, Rfl: 1    torsemide (DEMADEX) 10 mg tablet, TAKE 1 TABLET BY MOUTH EVERY DAY (Patient taking differently: As needed), Disp: 90 tablet, Rfl: 1    warfarin (COUMADIN) 5 mg tablet, Take 1-2 tablets daily As directed, Disp: 180 tablet, Rfl: 1  Allergies   Allergen Reactions    Macrobid [Nitrofurantoin Monohyd Macro] Rash    Penicillins Rash     Annotation - [id number]: can take cephalosporins    Sulfa Antibiotics Rash    Indocin [Indomethacin] GI Intolerance and Dizziness       Physical Exam:     Vitals:    03/18/21 1026   BP: 112/60   Pulse: 68   Resp: 12   Temp: 98 °F (36 7 °C)     Physical Exam  Chest:      Comments:   Examination of the left mastectomy site shows no evidence of infection  She has a good cosmetic outcome  She has good range of motion  Results & Discussion:     I have recommended the patient see Dr Keya Blake as outlined above  I reviewed her pathology with her  She was aware of it from my chart  I do not think she needs radiation therapy  We will see her back in approximately 3 months and then a six-month intervals  At that time she will be following with our advanced practitioner  Advance Care Planning/Advance Directives:  I discussed the disease status, treatment plans and follow-up with the patient

## 2021-03-22 ENCOUNTER — ANTICOAG VISIT (OUTPATIENT)
Dept: INTERNAL MEDICINE CLINIC | Facility: CLINIC | Age: 76
End: 2021-03-22

## 2021-03-22 ENCOUNTER — LAB (OUTPATIENT)
Dept: LAB | Facility: CLINIC | Age: 76
End: 2021-03-22
Payer: MEDICARE

## 2021-03-22 ENCOUNTER — TELEPHONE (OUTPATIENT)
Dept: INTERNAL MEDICINE CLINIC | Facility: CLINIC | Age: 76
End: 2021-03-22

## 2021-03-22 DIAGNOSIS — E03.9 ACQUIRED HYPOTHYROIDISM: ICD-10-CM

## 2021-03-22 DIAGNOSIS — E22.2 SIADH (SYNDROME OF INAPPROPRIATE ADH PRODUCTION) (HCC): ICD-10-CM

## 2021-03-22 DIAGNOSIS — I10 ESSENTIAL HYPERTENSION: ICD-10-CM

## 2021-03-22 DIAGNOSIS — E55.9 VITAMIN D DEFICIENCY: ICD-10-CM

## 2021-03-22 DIAGNOSIS — I81 PORTAL VEIN THROMBOSIS: ICD-10-CM

## 2021-03-22 DIAGNOSIS — R73.03 PREDIABETES: ICD-10-CM

## 2021-03-22 DIAGNOSIS — E53.8 VITAMIN B12 DEFICIENCY: ICD-10-CM

## 2021-03-22 DIAGNOSIS — E83.42 HYPOMAGNESEMIA: ICD-10-CM

## 2021-03-22 LAB
25(OH)D3 SERPL-MCNC: 42.4 NG/ML (ref 30–100)
ALBUMIN SERPL BCP-MCNC: 3.6 G/DL (ref 3.5–5)
ALP SERPL-CCNC: 84 U/L (ref 46–116)
ALT SERPL W P-5'-P-CCNC: 23 U/L (ref 12–78)
ANION GAP SERPL CALCULATED.3IONS-SCNC: 3 MMOL/L (ref 4–13)
AST SERPL W P-5'-P-CCNC: 23 U/L (ref 5–45)
BASOPHILS # BLD AUTO: 0.06 THOUSANDS/ΜL (ref 0–0.1)
BASOPHILS NFR BLD AUTO: 1 % (ref 0–1)
BILIRUB SERPL-MCNC: 0.31 MG/DL (ref 0.2–1)
BUN SERPL-MCNC: 11 MG/DL (ref 5–25)
CALCIUM SERPL-MCNC: 8.8 MG/DL (ref 8.3–10.1)
CHLORIDE SERPL-SCNC: 100 MMOL/L (ref 100–108)
CO2 SERPL-SCNC: 31 MMOL/L (ref 21–32)
CREAT SERPL-MCNC: 0.79 MG/DL (ref 0.6–1.3)
EOSINOPHIL # BLD AUTO: 0.32 THOUSAND/ΜL (ref 0–0.61)
EOSINOPHIL NFR BLD AUTO: 5 % (ref 0–6)
ERYTHROCYTE [DISTWIDTH] IN BLOOD BY AUTOMATED COUNT: 14.7 % (ref 11.6–15.1)
EST. AVERAGE GLUCOSE BLD GHB EST-MCNC: 123 MG/DL
GFR SERPL CREATININE-BSD FRML MDRD: 73 ML/MIN/1.73SQ M
GLUCOSE SERPL-MCNC: 105 MG/DL (ref 65–140)
HBA1C MFR BLD: 5.9 %
HCT VFR BLD AUTO: 35 % (ref 34.8–46.1)
HGB BLD-MCNC: 11.5 G/DL (ref 11.5–15.4)
IMM GRANULOCYTES # BLD AUTO: 0.02 THOUSAND/UL (ref 0–0.2)
IMM GRANULOCYTES NFR BLD AUTO: 0 % (ref 0–2)
INR PPP: 2.91 (ref 0.84–1.19)
LYMPHOCYTES # BLD AUTO: 2.32 THOUSANDS/ΜL (ref 0.6–4.47)
LYMPHOCYTES NFR BLD AUTO: 36 % (ref 14–44)
MAGNESIUM SERPL-MCNC: 1.9 MG/DL (ref 1.6–2.6)
MCH RBC QN AUTO: 31.3 PG (ref 26.8–34.3)
MCHC RBC AUTO-ENTMCNC: 32.9 G/DL (ref 31.4–37.4)
MCV RBC AUTO: 95 FL (ref 82–98)
MONOCYTES # BLD AUTO: 0.84 THOUSAND/ΜL (ref 0.17–1.22)
MONOCYTES NFR BLD AUTO: 13 % (ref 4–12)
NEUTROPHILS # BLD AUTO: 2.93 THOUSANDS/ΜL (ref 1.85–7.62)
NEUTS SEG NFR BLD AUTO: 45 % (ref 43–75)
NRBC BLD AUTO-RTO: 0 /100 WBCS
PLATELET # BLD AUTO: 352 THOUSANDS/UL (ref 149–390)
PMV BLD AUTO: 8.7 FL (ref 8.9–12.7)
POTASSIUM SERPL-SCNC: 4.5 MMOL/L (ref 3.5–5.3)
PROT SERPL-MCNC: 7 G/DL (ref 6.4–8.2)
PROTHROMBIN TIME: 30.4 SECONDS (ref 11.6–14.5)
RBC # BLD AUTO: 3.67 MILLION/UL (ref 3.81–5.12)
SODIUM SERPL-SCNC: 134 MMOL/L (ref 136–145)
TSH SERPL DL<=0.05 MIU/L-ACNC: 1.87 UIU/ML (ref 0.36–3.74)
VIT B12 SERPL-MCNC: 534 PG/ML (ref 100–900)
WBC # BLD AUTO: 6.49 THOUSAND/UL (ref 4.31–10.16)

## 2021-03-22 PROCEDURE — 85610 PROTHROMBIN TIME: CPT

## 2021-03-22 PROCEDURE — 85025 COMPLETE CBC W/AUTO DIFF WBC: CPT

## 2021-03-22 PROCEDURE — 82607 VITAMIN B-12: CPT

## 2021-03-22 PROCEDURE — 83735 ASSAY OF MAGNESIUM: CPT

## 2021-03-22 PROCEDURE — 36415 COLL VENOUS BLD VENIPUNCTURE: CPT

## 2021-03-22 PROCEDURE — 80053 COMPREHEN METABOLIC PANEL: CPT

## 2021-03-22 PROCEDURE — 84443 ASSAY THYROID STIM HORMONE: CPT

## 2021-03-22 PROCEDURE — 83036 HEMOGLOBIN GLYCOSYLATED A1C: CPT

## 2021-03-22 PROCEDURE — 82306 VITAMIN D 25 HYDROXY: CPT

## 2021-03-31 NOTE — PROGRESS NOTES
Assessment/Plan:  NGE                                                                                                               BMD-  Active order present via PCP                                                                         RTO 1 yr  SBA monthly  3 D Mammography R, s/p L Mastectomy Dr Guadalupe Idania  Colonoscopy  Exercise 3/wk                  Calcium 1,200 mg/d with Vit D     Depression Screen: Neg       Diagnoses and all orders for this visit:    Encounter for annual routine gynecological examination    Encounter for screening mammogram for malignant neoplasm of breast  -     Mammo screening right w 3d & cad; Future    Other orders  -     Cancel: Mammo screening bilateral w 3d & cad; Future  -     Cancel: Liquid-based pap, screening              Subjective:        Patient ID: Merary You is a 67 y o  female  Tenny Learn returns today for a yearly evaluation  The yeast infection resolved  If this recurs she will need another medication because of formulary  She will call the pharmacy to find out what that medication is since she mistakenly threw the paper out  Part of the irritation is sweating from  Panty liners she wears because of rectal incontinence  She tried but was unable to get Balmex which I recommended  She will try again  Other than the breast cancer her health has remained stable in the past year        The following portions of the patient's history were reviewed and updated as appropriate: She  has a past medical history of Anemia, Anxiety, Arrhythmia, Arthritis, Asthma, Blood clot in vein, Breast cancer ([address] ), Breast lump, Cancer ([address] ), Depression, Disease of thyroid gland, DVT, lower extremity ([address] ), GERD (gastroesophageal reflux disease), Hyperlipidemia, Hypertension, Hypokalemia, Hyponatremia, Hypothyroidism, Iron deficiency anemia, Irregular heart beat, Manic behavior ([address] ), 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[facility] ), Osteoarthritis, Palpitations, Paroxysmal supraventricular tachycardia ([facility] ), PE (pulmonary thromboembolism) ([facility] ), Sjoegren syndrome, Spinal stenosis, Thrombocytosis ([facility] ), Tremors of nervous system, Ulcerative colitis ([facility] ), and Vertigo    Patient Active Problem List    Diagnosis Date Noted    Cellulitis 02/24/2021    Malignant neoplasm of overlapping sites of left breast in female, estrogen receptor negative ([facility] ) 12/23/2020    Vitamin B12 deficiency 08/23/2019    Vitamin D deficiency 08/23/2019    S/P deep brain stimulator placement 11/26/2018    Encounter for long-term (current) use of medications 11/23/2018    Lumbar degenerative disc disease 04/20/2018    Encounter for screening mammogram for breast cancer 04/20/2018    Portal vein thrombosis 01/29/2018    Hypomagnesemia 12/08/2015    Unsteady gait 12/08/2015    Overweight 07/16/2015    Essential tremor 07/13/2015    Prediabetes 06/16/2015    Lower extremity pain, left 11/06/2014    Osteoarthritis of right knee 09/05/2014    Spinal stenosis 09/05/2014    Iron deficiency anemia 04/30/2014    Supraventricular tachycardia ([facility] ) 10/07/2013    Diarrhea 01/09/2013    Chronic salpingo-oophoritis 12/26/2012    SIADH (syndrome of inappropriate ADH production) ([facility] ) 12/14/2012    Peripheral neuropathy 12/14/2012    GERD (gastroesophageal reflux disease) 08/23/2012    Anemia 08/23/2012    Moderate episode of recurrent major depressive disorder ([facility] ) 06/13/2012    Hyperlipidemia 06/13/2012    Essential hypertension 06/13/2012    Acquired hypothyroidism 06/13/2012    Osteopenia 06/13/2012    Ulcerative colitis without complications ([facility] ) 79/52/2486    Allergic rhinitis 06/13/2012    Mild intermittent asthma without complication 59/86/7283    Sjogren's syndrome ([facility] ) 06/13/2012    Anxiety 04/30/2012    Insomnia 04/30/2012   PMH:   G4, P2   THEODORE- late 30's for Endometriosis   Partial colectomy   depression/ anxiety   portal vein thrombosis   estrogen replacement therapy 9/18   pulmonary embolus   lumbar radiculopathy   essential tremor   Sjogren's disease   SVT   osteopenia   asthma   hypothyroidism   SIADH   deep brain stimulator placement [ left chest] 12/19   sigmoidoscopy 9/20  Ulcerative colitis   left breast ductal carcinoma in situ 12/20   left mastectomy 2/12/21   breast cellulitis 2/21   SIRS 2/23/21   Vulvar Moniliasis- 3/21 [sweating due to pad for Rectal Incontinence]  She  has a past surgical history that includes Splenectomy; Abdominal surgery; Colon surgery; Breast surgery; Appendectomy; Knee arthroscopy; pr imp stim,cranial,subq,1 array (Right, 6/20/2017); Colonoscopy (N/A, 8/30/2017); Coloproctectomy w/ ileo J-pouch; Esophagogastroduodenoscopy; FISTULA REPAIR; Ileostomy closure; Jeffrey hole w/ stereotactic insertion of DBS leads / intraop microelectrode recording; Hysterectomy; Mammo stereotactic breast biopsy left (all inc) (Left, 11/7/2019); pr imp stim,cranial,subq,1 array (N/A, 12/4/2019); Breast biopsy (Left, 11/07/2019); Breast excisional biopsy (Left); Mammo stereotactic breast biopsy left (all inc) (Left, 12/17/2020); Mammo stereotactic breast biopsy left (all inc) each add (Left, 12/17/2020); and Mastectomy w/ sentinel node biopsy (Left, 2/12/2021)  Her family history includes COPD in her father; Diabetes in her father and sister; Hypertension in her mother; No Known Problems in her daughter, maternal aunt, maternal grandfather, maternal grandmother, paternal aunt, paternal grandfather, paternal grandmother, sister, and son; Other in her mother; Peripheral vascular disease in her mother; Sjogren's syndrome in her sister; Stroke in her father; Venous thrombosis in her mother  She  reports that she quit smoking about 56 years ago  She has a 10 00 pack-year smoking history  She has never used smokeless tobacco  She reports current alcohol use  She reports that she does not use drugs     SH:  Lives with her partner of 21 years    Current Outpatient Medications   Medication Sig Dispense Refill    albuterol (PROVENTIL HFA,VENTOLIN HFA) 90 mcg/act inhaler Inhale 2 puffs every 6 (six) hours as needed for wheezing 1 Inhaler 1    buPROPion (WELLBUTRIN XL) 300 mg 24 hr tablet Take 1 tablet (300 mg total) by mouth daily With 150 mg 90 tablet 1    Calcium Carb-Cholecalciferol (CALCIUM 600-D PO) Take 1 tablet by mouth 2 (two) times a day      cetirizine (ZyrTEC) 10 mg tablet Take 10 mg by mouth daily      Coenzyme Q10 (CO Q-10 PO) Take 1 capsule by mouth daily      diltiazem (CARDIZEM CD) 180 mg 24 hr capsule Take 1 capsule (180 mg total) by mouth daily 90 capsule 1    escitalopram (LEXAPRO) 20 mg tablet TAKE 1 TABLET BY MOUTH EVERY DAY (Patient taking differently: Take 20 mg by mouth daily at bedtime ) 90 tablet 1    fluticasone (FLONASE) 50 mcg/act nasal spray 1 SPRAY INTO EACH NOSTRIL DAILY AS NEEDED FOR RHINITIS (Patient taking differently: 1 spray into each nostril daily ) 48 mL 1    fluticasone-salmeterol (Advair Diskus) 250-50 mcg/dose inhaler Inhale 1 puff 2 (two) times a day Rinse mouth after use 180 each 1    gabapentin (NEURONTIN) 100 mg capsule TAKE 2 CAPSULES (200 MG TOTAL) BY MOUTH 2 (TWO) TIMES A  capsule 1    gabapentin (NEURONTIN) 600 MG tablet TAKE ONE TABLET IN THE MORNING, ONE TABLET IN THE AFTERNOON, AND 2 TABLETS AT BEDTIME 360 tablet 2    HYDROcodone-acetaminophen (NORCO) 5-325 mg per tablet Take 1 tablet by mouth every 6 (six) hours as needed for painMax Daily Amount: 4 tablets 120 tablet 0    hydroxychloroquine (PLAQUENIL) 200 mg tablet Take 1 tablet (200 mg total) by mouth 2 (two) times a day 180 tablet 0    levothyroxine 50 mcg tablet Take 1 tablet (50 mcg total) by mouth daily 90 tablet 1    lisinopril (ZESTRIL) 20 mg tablet Take 1 tablet (20 mg total) by mouth 2 (two) times a day 180 tablet 1    loperamide (IMODIUM) 2 mg capsule Take 2 mg by mouth 4 (four) times a day as needed        LORazepam (ATIVAN) 1 mg tablet Take 1 tablet (1 mg total) by mouth every 6 (six) hours as needed for anxiety 120 tablet 0    MAGNESIUM PO Take 1 tablet by mouth daily      meclizine (ANTIVERT) 25 mg tablet Take 1 tablet (25 mg total) by mouth every 6 (six) hours as needed for dizziness 90 tablet 1    metaxalone (SKELAXIN) 800 mg tablet Take 1 tablet (800 mg total) by mouth 3 (three) times a day 90 tablet 0    Multiple Vitamin (MULTIVITAMIN ADULT PO) Take 1 tablet by mouth daily      naloxone (NARCAN) 4 mg/0 1 mL nasal spray Administer 1 spray into a nostril  If breathing does not return to normal or if breathing difficulty resumes after 2-3 minutes, give another dose in the other nostril using a new spray  1 each 1    nystatin-triamcinolone (MYCOLOG-II) cream Apply topically 2 (two) times a day 30 g 0    Omega-3 Fatty Acids (FISH OIL PO) Take 1 capsule by mouth daily      pantoprazole (PROTONIX) 40 mg tablet TAKE 1 TABLET BY MOUTH EVERY DAY 90 tablet 1    simvastatin (ZOCOR) 5 MG tablet TAKE 1 TABLET BY MOUTH EVERY DAY 90 tablet 1    sodium chloride 1 g tablet TAKE 1 TABLET (1 G TOTAL) BY MOUTH 3 (THREE) TIMES A  tablet 1    torsemide (DEMADEX) 10 mg tablet TAKE 1 TABLET BY MOUTH EVERY DAY (Patient taking differently: As needed) 90 tablet 1    warfarin (COUMADIN) 5 mg tablet Take 1-2 tablets daily As directed 180 tablet 1     No current facility-administered medications for this visit        Current Outpatient Medications on File Prior to Visit   Medication Sig    albuterol (PROVENTIL HFA,VENTOLIN HFA) 90 mcg/act inhaler Inhale 2 puffs every 6 (six) hours as needed for wheezing    buPROPion (WELLBUTRIN XL) 300 mg 24 hr tablet Take 1 tablet (300 mg total) by mouth daily With 150 mg    Calcium Carb-Cholecalciferol (CALCIUM 600-D PO) Take 1 tablet by mouth 2 (two) times a day    cetirizine (ZyrTEC) 10 mg tablet Take 10 mg by mouth daily    Coenzyme Q10 (CO Q-10 PO) Take 1 capsule by mouth daily  diltiazem (CARDIZEM CD) 180 mg 24 hr capsule Take 1 capsule (180 mg total) by mouth daily    escitalopram (LEXAPRO) 20 mg tablet TAKE 1 TABLET BY MOUTH EVERY DAY (Patient taking differently: Take 20 mg by mouth daily at bedtime )    fluticasone (FLONASE) 50 mcg/act nasal spray 1 SPRAY INTO EACH NOSTRIL DAILY AS NEEDED FOR RHINITIS (Patient taking differently: 1 spray into each nostril daily )    fluticasone-salmeterol (Advair Diskus) 250-50 mcg/dose inhaler Inhale 1 puff 2 (two) times a day Rinse mouth after use    gabapentin (NEURONTIN) 100 mg capsule TAKE 2 CAPSULES (200 MG TOTAL) BY MOUTH 2 (TWO) TIMES A DAY    gabapentin (NEURONTIN) 600 MG tablet TAKE ONE TABLET IN THE MORNING, ONE TABLET IN THE AFTERNOON, AND 2 TABLETS AT BEDTIME    HYDROcodone-acetaminophen (NORCO) 5-325 mg per tablet Take 1 tablet by mouth every 6 (six) hours as needed for painMax Daily Amount: 4 tablets    hydroxychloroquine (PLAQUENIL) 200 mg tablet Take 1 tablet (200 mg total) by mouth 2 (two) times a day    levothyroxine 50 mcg tablet Take 1 tablet (50 mcg total) by mouth daily    lisinopril (ZESTRIL) 20 mg tablet Take 1 tablet (20 mg total) by mouth 2 (two) times a day    loperamide (IMODIUM) 2 mg capsule Take 2 mg by mouth 4 (four) times a day as needed      LORazepam (ATIVAN) 1 mg tablet Take 1 tablet (1 mg total) by mouth every 6 (six) hours as needed for anxiety    MAGNESIUM PO Take 1 tablet by mouth daily    meclizine (ANTIVERT) 25 mg tablet Take 1 tablet (25 mg total) by mouth every 6 (six) hours as needed for dizziness    metaxalone (SKELAXIN) 800 mg tablet Take 1 tablet (800 mg total) by mouth 3 (three) times a day    Multiple Vitamin (MULTIVITAMIN ADULT PO) Take 1 tablet by mouth daily    naloxone (NARCAN) 4 mg/0 1 mL nasal spray Administer 1 spray into a nostril   If breathing does not return to normal or if breathing difficulty resumes after 2-3 minutes, give another dose in the other nostril using a new spray     nystatin-triamcinolone (MYCOLOG-II) cream Apply topically 2 (two) times a day    Omega-3 Fatty Acids (FISH OIL PO) Take 1 capsule by mouth daily    pantoprazole (PROTONIX) 40 mg tablet TAKE 1 TABLET BY MOUTH EVERY DAY    simvastatin (ZOCOR) 5 MG tablet TAKE 1 TABLET BY MOUTH EVERY DAY    sodium chloride 1 g tablet TAKE 1 TABLET (1 G TOTAL) BY MOUTH 3 (THREE) TIMES A DAY    torsemide (DEMADEX) 10 mg tablet TAKE 1 TABLET BY MOUTH EVERY DAY (Patient taking differently: As needed)    warfarin (COUMADIN) 5 mg tablet Take 1-2 tablets daily As directed     No current facility-administered medications on file prior to visit  She is allergic to macrobid [nitrofurantoin monohyd macro]; penicillins; sulfa antibiotics; and indocin [indomethacin]       Review of Systems   Constitutional: Negative for activity change, appetite change, fatigue and unexpected weight change  Eyes: Negative for visual disturbance  Respiratory: Negative for cough, chest tightness, shortness of breath and wheezing  Cardiovascular: Negative for chest pain, palpitations and leg swelling  Breast: Patient denies tenderness, nipple discharge, masses, or erythema  Gastrointestinal: Negative for abdominal distention, abdominal pain, blood in stool, constipation, diarrhea, nausea and vomiting  Endocrine: Negative for cold intolerance and heat intolerance  Genitourinary: Positive for urgency  Negative for decreased urine volume, difficulty urinating, dyspareunia, dysuria, frequency, hematuria, menstrual problem, pelvic pain, vaginal bleeding, vaginal discharge and vaginal pain  Not sexually active  Rare stress incontinence  Extremely rare urge incontinence but she does have urgency  Musculoskeletal: Positive for back pain  Negative for arthralgias  Skin: Negative for rash  Neurological: Negative for weakness, light-headedness, numbness and headaches  Hematological: Does not bruise/bleed easily  Psychiatric/Behavioral: Negative for agitation, behavioral problems and sleep disturbance  The patient is nervous/anxious  Objective: There were no vitals filed for this visit  Physical Exam  Vitals signs and nursing note reviewed  Exam conducted with a chaperone present  Constitutional:       General: She is not in acute distress  Appearance: Normal appearance  She is well-developed  She is not ill-appearing  HENT:      Head: Normocephalic and atraumatic  Eyes:      General: No scleral icterus  Right eye: No discharge  Left eye: No discharge  Extraocular Movements: Extraocular movements intact  Conjunctiva/sclera: Conjunctivae normal    Neck:      Musculoskeletal: Normal range of motion and neck supple  Thyroid: No thyromegaly  Trachea: No tracheal deviation  Cardiovascular:      Rate and Rhythm: Normal rate and regular rhythm  Heart sounds: Normal heart sounds  No murmur  Pulmonary:      Effort: Pulmonary effort is normal  No respiratory distress  Breath sounds: Normal breath sounds  No wheezing  Chest:      Breasts: Breasts are symmetrical          Right: No inverted nipple, mass, nipple discharge, skin change or tenderness  Left: No inverted nipple, mass, nipple discharge, skin change or tenderness  Abdominal:      General: Bowel sounds are normal  There is no distension  Palpations: Abdomen is soft  There is no mass  Tenderness: There is no abdominal tenderness  Genitourinary:     Labia:         Right: No rash, tenderness or lesion  Left: No rash, tenderness or lesion  Vagina: Normal       Adnexa:         Right: No mass, tenderness or fullness  Left: No mass, tenderness or fullness  Rectum: No external hemorrhoid  Comments: The erythema of the vulva has diminished  Urethral meatus within normal limits  Perineum within normal limits  Bladder well supported    Uterus and cervix surgically removed  The vaginal cuff is normal   Musculoskeletal: Normal range of motion  Skin:     General: Skin is warm and dry  Neurological:      Mental Status: She is alert and oriented to person, place, and time  Psychiatric:         Mood and Affect: Mood normal          Behavior: Behavior normal          Thought Content:  Thought content normal          Judgment: Judgment normal

## 2021-04-01 ENCOUNTER — ANNUAL EXAM (OUTPATIENT)
Dept: OBGYN CLINIC | Facility: CLINIC | Age: 76
End: 2021-04-01
Payer: MEDICARE

## 2021-04-01 VITALS
BODY MASS INDEX: 25.76 KG/M2 | SYSTOLIC BLOOD PRESSURE: 120 MMHG | HEIGHT: 63 IN | WEIGHT: 145.4 LBS | DIASTOLIC BLOOD PRESSURE: 64 MMHG

## 2021-04-01 DIAGNOSIS — Z01.419 ENCOUNTER FOR ANNUAL ROUTINE GYNECOLOGICAL EXAMINATION: Primary | ICD-10-CM

## 2021-04-01 DIAGNOSIS — Z12.31 ENCOUNTER FOR SCREENING MAMMOGRAM FOR MALIGNANT NEOPLASM OF BREAST: ICD-10-CM

## 2021-04-01 PROCEDURE — G0101 CA SCREEN;PELVIC/BREAST EXAM: HCPCS | Performed by: OBSTETRICS & GYNECOLOGY

## 2021-04-07 ENCOUNTER — OFFICE VISIT (OUTPATIENT)
Dept: INTERNAL MEDICINE CLINIC | Facility: CLINIC | Age: 76
End: 2021-04-07
Payer: MEDICARE

## 2021-04-07 ENCOUNTER — CONSULT (OUTPATIENT)
Dept: HEMATOLOGY ONCOLOGY | Facility: CLINIC | Age: 76
End: 2021-04-07
Payer: MEDICARE

## 2021-04-07 VITALS
OXYGEN SATURATION: 98 % | BODY MASS INDEX: 26.4 KG/M2 | DIASTOLIC BLOOD PRESSURE: 68 MMHG | SYSTOLIC BLOOD PRESSURE: 140 MMHG | HEIGHT: 63 IN | RESPIRATION RATE: 18 BRPM | TEMPERATURE: 97.1 F | HEART RATE: 77 BPM | WEIGHT: 149 LBS

## 2021-04-07 VITALS
DIASTOLIC BLOOD PRESSURE: 82 MMHG | HEART RATE: 66 BPM | SYSTOLIC BLOOD PRESSURE: 136 MMHG | TEMPERATURE: 97.5 F | WEIGHT: 146 LBS | BODY MASS INDEX: 25.87 KG/M2 | HEIGHT: 63 IN | OXYGEN SATURATION: 98 %

## 2021-04-07 DIAGNOSIS — I47.1 SVT (SUPRAVENTRICULAR TACHYCARDIA) (HCC): ICD-10-CM

## 2021-04-07 DIAGNOSIS — I81 PORTAL VEIN THROMBOSIS: ICD-10-CM

## 2021-04-07 DIAGNOSIS — Z17.1 MALIGNANT NEOPLASM OF OVERLAPPING SITES OF LEFT BREAST IN FEMALE, ESTROGEN RECEPTOR NEGATIVE (HCC): Primary | ICD-10-CM

## 2021-04-07 DIAGNOSIS — F33.1 MODERATE EPISODE OF RECURRENT MAJOR DEPRESSIVE DISORDER (HCC): ICD-10-CM

## 2021-04-07 DIAGNOSIS — C50.812 MALIGNANT NEOPLASM OF OVERLAPPING SITES OF LEFT BREAST IN FEMALE, ESTROGEN RECEPTOR NEGATIVE (HCC): Primary | ICD-10-CM

## 2021-04-07 DIAGNOSIS — E78.49 OTHER HYPERLIPIDEMIA: ICD-10-CM

## 2021-04-07 DIAGNOSIS — M51.36 LUMBAR DEGENERATIVE DISC DISEASE: ICD-10-CM

## 2021-04-07 DIAGNOSIS — M54.16 LUMBAR RADICULOPATHY: ICD-10-CM

## 2021-04-07 DIAGNOSIS — I10 ESSENTIAL HYPERTENSION: ICD-10-CM

## 2021-04-07 DIAGNOSIS — E22.2 SIADH (SYNDROME OF INAPPROPRIATE ADH PRODUCTION) (HCC): ICD-10-CM

## 2021-04-07 DIAGNOSIS — R73.03 PREDIABETES: ICD-10-CM

## 2021-04-07 DIAGNOSIS — G25.0 ESSENTIAL TREMOR: ICD-10-CM

## 2021-04-07 DIAGNOSIS — F41.9 ANXIETY: ICD-10-CM

## 2021-04-07 PROBLEM — L03.90 CELLULITIS: Status: RESOLVED | Noted: 2021-02-24 | Resolved: 2021-04-07

## 2021-04-07 PROCEDURE — 99214 OFFICE O/P EST MOD 30 MIN: CPT | Performed by: INTERNAL MEDICINE

## 2021-04-07 PROCEDURE — 99205 OFFICE O/P NEW HI 60 MIN: CPT | Performed by: INTERNAL MEDICINE

## 2021-04-07 RX ORDER — LORAZEPAM 1 MG/1
1 TABLET ORAL EVERY 6 HOURS PRN
Qty: 120 TABLET | Refills: 0 | Status: SHIPPED | OUTPATIENT
Start: 2021-04-07 | End: 2021-05-21 | Stop reason: SDUPTHER

## 2021-04-07 RX ORDER — HYDROCODONE BITARTRATE AND ACETAMINOPHEN 5; 325 MG/1; MG/1
1 TABLET ORAL EVERY 6 HOURS PRN
Qty: 120 TABLET | Refills: 0 | Status: SHIPPED | OUTPATIENT
Start: 2021-04-07 | End: 2021-05-21 | Stop reason: SDUPTHER

## 2021-04-07 NOTE — PROGRESS NOTES
Assessment/Plan:    Malignant neoplasm of overlapping sites of left breast in female, estrogen receptor negative ([facility] )  Saw oncology earlier today, no treatment recommended  Follow up with Dr Tameka Jc  SIADH (syndrome of inappropriate ADH production) ([facility])  Na 134  Taking NaCl tid  Prediabetes  A1c at 5 9%  Anxiety  Stable  On bupropion and escitalopram   Lorazepam prn  Essential tremor  Stable, on gabapentin  Hyperlipidemia  Lipids at goal, on statin  Mild intermittent asthma without complication  No symptoms, using Advair bid  Portal vein thrombosis  INR at goal     GERD (gastroesophageal reflux disease)  Takes PPI daily  Essential hypertension  BP stable, on lisinopril and diltiazem, torsemide prn only  Lumbar degenerative disc disease  More frequent pain recently, suspect due to R hip OA  Vicodin refilled  PDMP reviewed  Allergic rhinitis  Recommend to change daily antihistamine  Acquired hypothyroidism  Adequately replaced  Sjogren's syndrome ([facility] )  On Plaquenil  Moderate episode of recurrent major depressive disorder ([place] [id number] )  As above  Diagnoses and all orders for this visit:    Malignant neoplasm of overlapping sites of left breast in female, estrogen receptor negative ([facility] )    Portal vein thrombosis  -     Protime-INR; Standing  -     Protime-INR    Moderate episode of recurrent major depressive disorder (HCC)    SVT (supraventricular tachycardia) (HCC)    Essential hypertension    Essential tremor    Prediabetes  -     Hemoglobin A1C; Future    SIADH (syndrome of inappropriate ADH production) ([place] [id number] )  -     Basic metabolic panel; Future    Lumbar radiculopathy  -     HYDROcodone-acetaminophen (NORCO) 5-325 mg per tablet; Take 1 tablet by mouth every 6 (six) hours as needed for painMax Daily Amount: 4 tablets    Anxiety  -     LORazepam (ATIVAN) 1 mg tablet;  Take 1 tablet (1 mg total) by mouth every 6 (six) hours as needed for anxiety    Other hyperlipidemia  - Lipid panel; Future    Lumbar degenerative disc disease      Follow up in 4 months or as needed  Subjective:      Patient ID: Svetlana Cousin is a 67 y o  female here for a follow up  She has been feeling all right  She just spoke to the oncologist, happy that she does not need further treatment after her surgery  She is asking if she can receive the shingles vaccine, has been over 6 months  It was delayed due to her breast surgery and COVID vaccine  She reports more frequent nasal congestion and postnasal drip  She has been using her Flonase daily  She takes Zyrtec daily, has been for several years  Denies any wheezing or shortness of breath  She complains of more frequent right low back pain radiating down her right groin and thigh area  She cannot recall of any aggravating activity  She is requesting a refill of her Vicodin  The following portions of the patient's history were reviewed and updated as appropriate: allergies, current medications, past medical history, past social history, past surgical history and problem list     Review of Systems   Constitutional: Negative for appetite change and fatigue  HENT: Negative for congestion, ear pain and postnasal drip  Eyes: Negative for visual disturbance  Respiratory: Negative for cough and shortness of breath  Cardiovascular: Negative for chest pain and leg swelling  Gastrointestinal: Negative for abdominal pain, constipation and diarrhea  Genitourinary: Negative for dysuria, frequency and urgency  Musculoskeletal: Positive for arthralgias and back pain  Negative for gait problem, joint swelling and myalgias  Skin: Negative for rash and wound  Neurological: Negative for dizziness, numbness and headaches  Psychiatric/Behavioral: Negative for confusion and dysphoric mood  The patient is not nervous/anxious            Objective:      /82   Pulse 66   Temp 97 5 °F (36 4 °C)   Ht 5' 3" (1 6 m)   Wt 66 2 kg (146 lb)   SpO2 98%   BMI 25 86 kg/m²          Physical Exam  Vitals signs and nursing note reviewed  Constitutional:       General: She is not in acute distress  Appearance: She is well-developed  HENT:      Head: Normocephalic and atraumatic  Eyes:      Pupils: Pupils are equal, round, and reactive to light  Cardiovascular:      Rate and Rhythm: Normal rate and regular rhythm  Heart sounds: Normal heart sounds  Pulmonary:      Effort: Pulmonary effort is normal       Breath sounds: Normal breath sounds  No wheezing  Chest:      Breasts:         Left: Absent  Abdominal:      General: Bowel sounds are normal       Palpations: Abdomen is soft  Musculoskeletal:      Comments: Stooped posture, unsteady gait  No assistive device   Skin:     General: Skin is warm  Findings: No rash  Neurological:      Mental Status: She is alert and oriented to person, place, and time  Psychiatric:         Behavior: Behavior normal            Depression Screening Follow-up Plan: Patient's depression screening was positive with a PHQ-2 score of 0  Their PHQ-9 score was 2  Patient assessed for underlying major depression  They have no active suicidal ideations  Brief counseling provided and recommend additional follow-up/re-evaluation next office visit

## 2021-04-07 NOTE — LETTER
April 7, 2021     Verónica Claire MD  [address]  [id number] [place] [address]    Patient: Rosangela Jamison   YOB: 1945   Date of Visit: 4/7/2021       Dear Dr Lord Cue: Thank you for referring Cynthia Hermosillo to me for evaluation  Below are my notes for this consultation  If you have questions, please do not hesitate to call me  I look forward to following your patient along with you  Sincerely,        Gabo Christine MD        CC: MD Gabo Brown MD  4/7/2021  9:35 AM  Sign when Signing Visit  Hematology / Oncology Outpatient Follow Up Note    Rosangela Jamison 67 y o  female FUF:9[birth year]11 [facility]:[id number]         Date:  4/7/2021    Assessment / Plan:    A 25-year-old postmenopausal woman with newly diagnosed microinvasive left breast cancer, ER negative, GA negative, 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  Her microinvasive breast cancer measure less than 1 mm  However, her DCIS measure 7 7 cm  She underwent mastectomy and sentinel lymph node biopsy, resulting in KORIN  She presents today with her  to discuss possible adjuvant therapy  We had extensive discussion regarding the diagnosis, very small micro invasive cancer as well as large size of non invasive carcinoma, tumor phenotype, good prognosis and treatment options  Due to the size of invasive component of breast cancer, despite the phenotype, adjuvant chemotherapy or trastuzumab is not indicated  Obviously, adjuvant hormonal therapy is not indicated due to the ER negative disease  She has very good prognosis since her most of her tumor are non invasive carcinoma  I recommended her to have observation by Dr Raiford Shone  She is in agreement with my recommendation  She may see me p r n  basis  Subjective:     HPI:    A 25-year-old postmenopausal woman who was found to have abnormality in her left breast, based on a screening mammography    Therefore, she underwent left breast biopsy in December 17, 2020 which showed 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  DCIS port was ER negative, MI negative  She subsequently underwent left mastectomy with sentinel lymph node biopsy by Dr Sav Arredondo in February 12, 2021  She had 7 7 cm of ductal carcinoma in situ, grade 3  She also had micro invasion measuring less than 1 mm  Micro invasive part of cancer was ER negative, MI negative, HER2 3+ disease  2 sentinel lymph nodes were negative for metastatic disease  She did not have reconstruction  She presents today with her  to discuss possible adjuvant therapy  She feels well with no complaint of pain  Her weight is stable  She has history of essential tremor for which she had bilateral brain stimulator which improved her quality life substantially  She also has history of splenectomy back in 1999  She has no  Family history of breast cancer  She is nonsmoker  She is to have some ambulatory dysfunction for which she occasionally use walkers  Her performance status is 1/4 on ECOG scale  Interval History:          Objective:     Primary Diagnosis:      Left breast cancer, stage I A ( pT1a, pN0, M0) with microinvasion measuring less than 1 mm  ER negative, MI negative, HER2 3+ disease  Diagnosed in February 2021  Cancer Staging:  Cancer Staging  No matching staging information was found for the patient  Previous Hematologic/ Oncologic Treatment:         Current Hematologic/ Oncologic Treatment:       observation  Disease Status:       KORIN status post mastectomy and sentinel lymph node biopsy  Test Results:    Pathology:      7 7 cm of ductal carcinoma in situ, grade 3, ER negative, MI negative  Micro invasive cancer measuring less than 1 mm, ER negative, MI negative, HER2 3+ disease  2 sentinel lymph node was negative for metastatic disease    Stage I A ( pT1 a, pN0, M0)    Radiology:     chest x-ray was negative for pulmonary disease  Laboratory:      See below  Physical Exam:      General Appearance:    Alert, oriented        Eyes:    PERRL   Ears:    Normal external ear canals, both ears   Nose:   Nares normal, septum midline   Throat:   Mucosa moist  Pharynx without injection  Neck:   Supple       Lungs:     Clear to auscultation bilaterally   Chest Wall:    No tenderness or deformity    Heart:    Regular rate and rhythm       Abdomen:     Soft, non-tender, bowel sounds +, no organomegaly           Extremities:   Extremities no cyanosis or edema       Skin:   no rash or icterus  Lymph nodes:   Cervical, supraclavicular, and axillary nodes normal   Neurologic:   CNII-XII intact, normal strength, sensation and reflexes     Throughout          Breast exam:     Status post mastectomy without reconstruction  There is no palpable abnormality in her left chest wall  Right breast exam is negative  ROS: Review of Systems   All other systems reviewed and are negative  Imaging: No results found        Labs:   Lab Results   Component Value Date    WBC 6 49 03/22/2021    HGB 11 5 03/22/2021    HCT 35 0 03/22/2021    MCV 95 03/22/2021     03/22/2021     Lab Results   Component Value Date     (L) 12/28/2015    K 4 5 03/22/2021     03/22/2021    CO2 31 03/22/2021    ANIONGAP 8 12/28/2015    BUN 11 03/22/2021    CREATININE 0 79 03/22/2021    GLUCOSE 102 12/28/2015    GLUF 100 (H) 02/24/2021    CALCIUM 8 8 03/22/2021    CORRECTEDCA 9 5 02/23/2021    AST 23 03/22/2021    ALT 23 03/22/2021    ALKPHOS 84 03/22/2021    PROT 6 9 11/16/2015    BILITOT 0 36 11/16/2015    EGFR 73 03/22/2021           Lab Results   Component Value Date    IRON 90 11/24/2017    TIBC 285 11/24/2017    FERRITIN 119 11/24/2017       Lab Results   Component Value Date    TOPEEZPY24 508 03/22/2021         Current Medications: Reviewed  Allergies: Reviewed  PMH/FH/SH:  Reviewed      Vital Sign:    Body surface area is 1 71 meters squared      Wt Readings from Last 3 Encounters:   04/07/21 67 6 kg (149 lb)   04/01/21 66 kg (145 lb 6 4 oz)   03/18/21 64 4 kg (142 lb)        Temp Readings from Last 3 Encounters:   04/07/21 (!) 97 1 °F (36 2 °C) (Tympanic Core)   03/18/21 98 °F (36 7 °C)   03/04/21 (!) 96 8 °F (36 °C)        BP Readings from Last 3 Encounters:   04/07/21 140/68   04/01/21 120/64   03/18/21 112/60         Pulse Readings from Last 3 Encounters:   04/07/21 77   03/18/21 68   03/04/21 70     @LASTSAO2(3)@

## 2021-04-07 NOTE — PROGRESS NOTES
Hematology / Oncology Outpatient Follow Up Note    Nory Solorzano 67 y o  female CNU:[birth year] U:[id number]         Date:  4/7/2021    Assessment / Plan:    A 71-year-old postmenopausal woman with newly diagnosed microinvasive left breast cancer, ER negative, IL negative, 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  Her microinvasive breast cancer measure less than 1 mm  However, her DCIS measure 7 7 cm  She underwent mastectomy and sentinel lymph node biopsy, resulting in KORIN  She presents today with her  to discuss possible adjuvant therapy  We had extensive discussion regarding the diagnosis, very small micro invasive cancer as well as large size of non invasive carcinoma, tumor phenotype, good prognosis and treatment options  Due to the size of invasive component of breast cancer, despite the phenotype, adjuvant chemotherapy or trastuzumab is not indicated  Obviously, adjuvant hormonal therapy is not indicated due to the ER negative disease  She has very good prognosis since her most of her tumor are non invasive carcinoma  I recommended her to have observation by Dr Glover Every  She is in agreement with my recommendation  She may see me p r n  basis  Subjective:     HPI:    A 71-year-old postmenopausal woman who was found to have abnormality in her left breast, based on a screening mammography  Therefore, she underwent left breast biopsy in December 17, 2020 which showed 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  DCIS port was ER negative, IL negative  She subsequently underwent left mastectomy with sentinel lymph node biopsy by Dr Glover Every in February 12, 2021  She had 7 7 cm of ductal carcinoma in situ, grade 3  She also had micro invasion measuring less than 1 mm  Micro invasive part of cancer was ER negative, IL negative, HER2 3+ disease  2 sentinel lymph nodes were negative for metastatic disease  She did not have reconstruction    She presents today with her  to discuss possible adjuvant therapy  She feels well with no complaint of pain  Her weight is stable  She has history of essential tremor for which she had bilateral brain stimulator which improved her quality life substantially  She also has history of splenectomy back in 1999  She has no  Family history of breast cancer  She is nonsmoker  She is to have some ambulatory dysfunction for which she occasionally use walkers  Her performance status is 1/4 on ECOG scale  Interval History:          Objective:     Primary Diagnosis:      Left breast cancer, stage I A ( pT1a, pN0, M0) with microinvasion measuring less than 1 mm  ER negative, ME negative, HER2 3+ disease  Diagnosed in February 2021  Cancer Staging:  Cancer Staging  No matching staging information was found for the patient  Previous Hematologic/ Oncologic Treatment:         Current Hematologic/ Oncologic Treatment:       observation  Disease Status:       KORIN status post mastectomy and sentinel lymph node biopsy  Test Results:    Pathology:      7 7 cm of ductal carcinoma in situ, grade 3, ER negative, ME negative  Micro invasive cancer measuring less than 1 mm, ER negative, ME negative, HER2 3+ disease  2 sentinel lymph node was negative for metastatic disease  Stage I A ( pT1 a, pN0, M0)    Radiology:     chest x-ray was negative for pulmonary disease  Laboratory:      See below  Physical Exam:      General Appearance:    Alert, oriented        Eyes:    PERRL   Ears:    Normal external ear canals, both ears   Nose:   Nares normal, septum midline   Throat:   Mucosa moist  Pharynx without injection  Neck:   Supple       Lungs:     Clear to auscultation bilaterally   Chest Wall:    No tenderness or deformity    Heart:    Regular rate and rhythm       Abdomen:     Soft, non-tender, bowel sounds +, no organomegaly           Extremities:   Extremities no cyanosis or edema       Skin:   no rash or icterus      Lymph nodes:   Cervical, supraclavicular, and axillary nodes normal   Neurologic:   CNII-XII intact, normal strength, sensation and reflexes     Throughout          Breast exam:     Status post mastectomy without reconstruction  There is no palpable abnormality in her left chest wall  Right breast exam is negative  ROS: Review of Systems   All other systems reviewed and are negative  Imaging: No results found  Labs:   Lab Results   Component Value Date    WBC 6 49 03/22/2021    HGB 11 5 03/22/2021    HCT 35 0 03/22/2021    MCV 95 03/22/2021     03/22/2021     Lab Results   Component Value Date     (L) 12/28/2015    K 4 5 03/22/2021     03/22/2021    CO2 31 03/22/2021    ANIONGAP 8 12/28/2015    BUN 11 03/22/2021    CREATININE 0 79 03/22/2021    GLUCOSE 102 12/28/2015    GLUF 100 (H) 02/24/2021    CALCIUM 8 8 03/22/2021    CORRECTEDCA 9 5 02/23/2021    AST 23 03/22/2021    ALT 23 03/22/2021    ALKPHOS 84 03/22/2021    PROT 6 9 11/16/2015    BILITOT 0 36 11/16/2015    EGFR 73 03/22/2021           Lab Results   Component Value Date    IRON 90 11/24/2017    TIBC 285 11/24/2017    FERRITIN 119 11/24/2017       Lab Results   Component Value Date    [id number] 952 03/22/2021         Current Medications: Reviewed  Allergies: Reviewed  PMH/FH/SH:  Reviewed      Vital Sign:    Body surface area is 1 71 meters squared      Wt Readings from Last 3 Encounters:   04/07/21 67 6 kg (149 lb)   04/01/21 66 kg (145 lb 6 4 oz)   03/18/21 64 4 kg (142 lb)        Temp Readings from Last 3 Encounters:   04/07/21 (!) 97 1 °F (36 2 °C) (Tympanic Core)   03/18/21 98 °F (36 7 °C)   03/04/21 (!) 96 8 °F (36 °C)        BP Readings from Last 3 Encounters:   04/07/21 140/68   04/01/21 120/64   03/18/21 112/60         Pulse Readings from Last 3 Encounters:   04/07/21 77   03/18/21 68   03/04/21 70     @LASTSAO2(3)@

## 2021-04-08 ENCOUNTER — PATIENT OUTREACH (OUTPATIENT)
Dept: CASE MANAGEMENT | Facility: HOSPITAL | Age: 76
End: 2021-04-08

## 2021-04-08 NOTE — PROGRESS NOTES
LSW received DT and problem list via referral process  Pt self scored 6/10 and noted concerns with nervousness and worry  LSW attempted to contact pt via telephone no answer  LSW left message with contact information and requested a call back    LSW will attempt 2nd outreach in the near future

## 2021-04-09 ENCOUNTER — TELEPHONE (OUTPATIENT)
Dept: NEUROLOGY | Facility: CLINIC | Age: 76
End: 2021-04-09

## 2021-04-09 DIAGNOSIS — M35.00 SJOGREN'S SYNDROME, WITH UNSPECIFIED ORGAN INVOLVEMENT (HCC): ICD-10-CM

## 2021-04-09 DIAGNOSIS — M51.36 LUMBAR DEGENERATIVE DISC DISEASE: ICD-10-CM

## 2021-04-09 RX ORDER — GABAPENTIN 600 MG/1
TABLET ORAL
Qty: 360 TABLET | Refills: 2 | Status: SHIPPED | OUTPATIENT
Start: 2021-04-09 | End: 2021-09-30 | Stop reason: SDUPTHER

## 2021-04-09 RX ORDER — GABAPENTIN 100 MG/1
200 CAPSULE ORAL 2 TIMES DAILY
Qty: 360 CAPSULE | Refills: 1 | Status: SHIPPED | OUTPATIENT
Start: 2021-04-09 | End: 2021-09-30

## 2021-04-09 NOTE — TELEPHONE ENCOUNTER
Pt left a voicemail message today at 10:33 requesting DBS appt be rescheduled  [place]  Requesting appt be rescheduled mid July   Call back number is [phone]

## 2021-04-10 DIAGNOSIS — K21.9 GASTROESOPHAGEAL REFLUX DISEASE: ICD-10-CM

## 2021-04-12 ENCOUNTER — TELEPHONE (OUTPATIENT)
Dept: NEPHROLOGY | Facility: CLINIC | Age: 76
End: 2021-04-12

## 2021-04-12 DIAGNOSIS — J30.89 NON-SEASONAL ALLERGIC RHINITIS DUE TO OTHER ALLERGIC TRIGGER: ICD-10-CM

## 2021-04-12 DIAGNOSIS — E87.1 HYPONATREMIA: ICD-10-CM

## 2021-04-12 DIAGNOSIS — K21.9 GASTROESOPHAGEAL REFLUX DISEASE: ICD-10-CM

## 2021-04-12 RX ORDER — SODIUM CHLORIDE 1000 MG
1 TABLET, SOLUBLE MISCELLANEOUS 3 TIMES DAILY
Qty: 270 TABLET | Refills: 3 | Status: SHIPPED | OUTPATIENT
Start: 2021-04-12 | End: 2022-05-10 | Stop reason: SDUPTHER

## 2021-04-12 RX ORDER — PANTOPRAZOLE SODIUM 40 MG/1
TABLET, DELAYED RELEASE ORAL
Qty: 90 TABLET | Refills: 1 | Status: SHIPPED | OUTPATIENT
Start: 2021-04-12 | End: 2021-04-12 | Stop reason: SDUPTHER

## 2021-04-12 NOTE — TELEPHONE ENCOUNTER
Patient called the office saying she needs a refill for her Sodium Chloride   If you could please send that to [facility] in [place]

## 2021-04-13 RX ORDER — PANTOPRAZOLE SODIUM 40 MG/1
40 TABLET, DELAYED RELEASE ORAL DAILY
Qty: 90 TABLET | Refills: 1 | Status: SHIPPED | OUTPATIENT
Start: 2021-04-13 | End: 2022-01-14 | Stop reason: SDUPTHER

## 2021-04-13 RX ORDER — FLUTICASONE PROPIONATE 50 MCG
1 SPRAY, SUSPENSION (ML) NASAL DAILY PRN
Qty: 48 ML | Refills: 1 | Status: SHIPPED | OUTPATIENT
Start: 2021-04-13 | End: 2022-05-31

## 2021-04-15 ENCOUNTER — TELEPHONE (OUTPATIENT)
Dept: NEUROLOGY | Facility: CLINIC | Age: 76
End: 2021-04-15

## 2021-04-15 NOTE — TELEPHONE ENCOUNTER
Pt needs to r/s her 6/28 Kerline Mustafa w Dr Marisol Rodriguez - DBS appt  Lives in [facility]  Needs after 7/2 (away)    Saving Thursday 7/8 0700 or 0730 in [facility] w Dr Fanny Wasserman that I am saving this slot  Realize it is not convenient, since she lives near [facility] and this is super early  Can put on WL for [facility], but at this point no appts open in [facility] until November  Please c/b  Gave T4 sched phone number      Sending IB to Marisol Rodriguez MA to discuss

## 2021-04-15 NOTE — TELEPHONE ENCOUNTER
Patient called to reschedule visit for June 28 as she will be out of town  I spoke to ANDREY [facility] who will get the messge to [place]  I apologized to patient as she said she has been waiting for cb and has been transferred to different extensions without resolution  Advised her to expect a call to reschedule within next few days  No further action on her part needed

## 2021-04-20 NOTE — TELEPHONE ENCOUNTER
DONE!    Scheduled for Thurs 7/8/21 7a   [facility] in [place]      Gave her an OVL    Sent appt card

## 2021-04-21 ENCOUNTER — APPOINTMENT (OUTPATIENT)
Dept: LAB | Facility: CLINIC | Age: 76
End: 2021-04-21
Payer: MEDICARE

## 2021-04-21 LAB
INR PPP: 3.62 (ref 0.84–1.19)
PROTHROMBIN TIME: 36.1 SECONDS (ref 11.6–14.5)

## 2021-04-21 PROCEDURE — 85610 PROTHROMBIN TIME: CPT | Performed by: INTERNAL MEDICINE

## 2021-04-21 PROCEDURE — 36415 COLL VENOUS BLD VENIPUNCTURE: CPT | Performed by: INTERNAL MEDICINE

## 2021-04-22 ENCOUNTER — ANTICOAG VISIT (OUTPATIENT)
Dept: INTERNAL MEDICINE CLINIC | Facility: CLINIC | Age: 76
End: 2021-04-22

## 2021-04-22 ENCOUNTER — TELEPHONE (OUTPATIENT)
Dept: INTERNAL MEDICINE CLINIC | Facility: CLINIC | Age: 76
End: 2021-04-22

## 2021-04-22 DIAGNOSIS — I81 PORTAL VEIN THROMBOSIS: ICD-10-CM

## 2021-04-22 NOTE — TELEPHONE ENCOUNTER
Patient leaves for [facility] [place] the end of next week and she wants to know if you can give her a paper script for vicodin, she doesn't think she can make this script she just filled stretch

## 2021-04-22 NOTE — TELEPHONE ENCOUNTER
We are not allowed to give paper scripts for controlled substances anymore  I can send it to your local pharmacy there if you run out

## 2021-04-22 NOTE — TELEPHONE ENCOUNTER
Chart reviewed: it looks like Augusto Giang from scheduling dept rescheduled this pt's dbs appt, 7/8/21 at 0700      Closing this enc

## 2021-04-22 NOTE — RESULT ENCOUNTER NOTE
INR high at 3 62  Adjust warfarin: take 5 mg x 2 days, 2 5 mg x 5 days  Repeat INR in 2 weeks  Please update flow sheet

## 2021-05-05 ENCOUNTER — TELEPHONE (OUTPATIENT)
Dept: INTERNAL MEDICINE CLINIC | Facility: CLINIC | Age: 76
End: 2021-05-05

## 2021-05-05 ENCOUNTER — ANTICOAG VISIT (OUTPATIENT)
Dept: INTERNAL MEDICINE CLINIC | Facility: CLINIC | Age: 76
End: 2021-05-05

## 2021-05-05 DIAGNOSIS — I81 PORTAL VEIN THROMBOSIS: ICD-10-CM

## 2021-05-05 LAB — INR PPP: 3.6 (ref 0.84–1.19)

## 2021-05-19 ENCOUNTER — ANTICOAG VISIT (OUTPATIENT)
Dept: INTERNAL MEDICINE CLINIC | Facility: CLINIC | Age: 76
End: 2021-05-19

## 2021-05-19 DIAGNOSIS — I81 PORTAL VEIN THROMBOSIS: ICD-10-CM

## 2021-05-19 LAB — INR PPP: 1.4 (ref 0.84–1.19)

## 2021-05-19 NOTE — RESULT ENCOUNTER NOTE
INR is 1 4  Take warfarin 5 mg x 2 days, 2 5 mg x 5 days  Recheck in 2 weeks  Please update flow sheet

## 2021-05-21 DIAGNOSIS — F41.9 ANXIETY: ICD-10-CM

## 2021-05-21 DIAGNOSIS — M54.16 LUMBAR RADICULOPATHY: ICD-10-CM

## 2021-05-21 RX ORDER — HYDROCODONE BITARTRATE AND ACETAMINOPHEN 5; 325 MG/1; MG/1
1 TABLET ORAL EVERY 6 HOURS PRN
Qty: 120 TABLET | Refills: 0 | Status: SHIPPED | OUTPATIENT
Start: 2021-05-21 | End: 2021-07-06 | Stop reason: SDUPTHER

## 2021-05-21 RX ORDER — LORAZEPAM 1 MG/1
1 TABLET ORAL EVERY 6 HOURS PRN
Qty: 120 TABLET | Refills: 0 | Status: SHIPPED | OUTPATIENT
Start: 2021-05-21 | End: 2021-07-06 | Stop reason: SDUPTHER

## 2021-06-04 ENCOUNTER — TELEPHONE (OUTPATIENT)
Dept: INTERNAL MEDICINE CLINIC | Facility: CLINIC | Age: 76
End: 2021-06-04

## 2021-06-04 ENCOUNTER — ANTICOAG VISIT (OUTPATIENT)
Dept: INTERNAL MEDICINE CLINIC | Facility: CLINIC | Age: 76
End: 2021-06-04

## 2021-06-04 DIAGNOSIS — I81 PORTAL VEIN THROMBOSIS: ICD-10-CM

## 2021-06-08 NOTE — RESULT NOTES
Verified Results  (1) PT WITH INR [id number] 01:44PM Romulo Parker     Test Name Result Flag Reference   INR 3 07 H 0 86-1 16   PT 30 4 seconds H 12 0-14 3 65

## 2021-06-16 ENCOUNTER — ANTICOAG VISIT (OUTPATIENT)
Dept: INTERNAL MEDICINE CLINIC | Facility: CLINIC | Age: 76
End: 2021-06-16

## 2021-06-16 DIAGNOSIS — I81 PORTAL VEIN THROMBOSIS: Primary | ICD-10-CM

## 2021-06-16 LAB — INR PPP: 3 (ref 0.84–1.19)

## 2021-07-05 ENCOUNTER — APPOINTMENT (OUTPATIENT)
Dept: LAB | Facility: CLINIC | Age: 76
End: 2021-07-05
Payer: MEDICARE

## 2021-07-06 ENCOUNTER — ANTICOAG VISIT (OUTPATIENT)
Dept: INTERNAL MEDICINE CLINIC | Facility: CLINIC | Age: 76
End: 2021-07-06

## 2021-07-06 DIAGNOSIS — F41.9 ANXIETY: ICD-10-CM

## 2021-07-06 DIAGNOSIS — I81 PORTAL VEIN THROMBOSIS: Primary | ICD-10-CM

## 2021-07-06 DIAGNOSIS — M54.16 LUMBAR RADICULOPATHY: ICD-10-CM

## 2021-07-06 RX ORDER — HYDROCODONE BITARTRATE AND ACETAMINOPHEN 5; 325 MG/1; MG/1
1 TABLET ORAL EVERY 6 HOURS PRN
Qty: 120 TABLET | Refills: 0 | Status: SHIPPED | OUTPATIENT
Start: 2021-07-06 | End: 2021-10-12 | Stop reason: SDUPTHER

## 2021-07-06 RX ORDER — LORAZEPAM 1 MG/1
1 TABLET ORAL EVERY 6 HOURS PRN
Qty: 120 TABLET | Refills: 0 | Status: SHIPPED | OUTPATIENT
Start: 2021-07-06 | End: 2021-08-12 | Stop reason: SDUPTHER

## 2021-07-08 ENCOUNTER — PROCEDURE VISIT (OUTPATIENT)
Dept: NEUROLOGY | Facility: CLINIC | Age: 76
End: 2021-07-08
Payer: MEDICARE

## 2021-07-08 VITALS
DIASTOLIC BLOOD PRESSURE: 63 MMHG | WEIGHT: 151.2 LBS | HEART RATE: 61 BPM | SYSTOLIC BLOOD PRESSURE: 133 MMHG | BODY MASS INDEX: 26.78 KG/M2

## 2021-07-08 DIAGNOSIS — G25.0 ESSENTIAL TREMOR: Primary | ICD-10-CM

## 2021-07-08 DIAGNOSIS — Z96.89 S/P DEEP BRAIN STIMULATOR PLACEMENT: ICD-10-CM

## 2021-07-08 PROCEDURE — 95984 ALYS BRN NPGT PRGRMG ADDL 15: CPT | Performed by: PSYCHIATRY & NEUROLOGY

## 2021-07-08 PROCEDURE — 95983 ALYS BRN NPGT PRGRMG 15 MIN: CPT | Performed by: PSYCHIATRY & NEUROLOGY

## 2021-07-08 NOTE — PROGRESS NOTES
Patient ID: Ruthie Galarza is a 67 y o  female  Assessment/Plan:    Essential tremor  ET with moderate breakthrough tremor L>R  Spent 30 minutes on deep brain stimulation programming  Right IPG- on Group D  Reviewed prior    Switched to Group C  Increased amplitude with better control of left hand tremor on FTN, posture and action  Final settings:  Left RC  4 5V, PW80, 185Hz (increased from 175Hz),1-2+    Right RC   Group D  Interleaved  0-1+, 3 6V, PW90, 125Hz  2+3-, 3 5V, , 125Hz  Switched to Group C  0+1-, 3 9V, PW90, 125Hz  0+2-, PW90, 125Hz, 3 8V    Will keep on the settings  The future if having difficulty may switch back to prior group D make adjustments  Diagnoses and all orders for this visit:    Essential tremor    S/P deep brain stimulator placement           Subjective:    Samia Tiwari is a left handed female with peripheral neuropathy on Neurontin, spinal stenosis on gabapentin, anxiety and essential tremor s/p bilateral VIM DBS on 5/1/14 with Activa SC, s/p right IPG update to [facility] 6/20/17, left 12/4/19, who presents for follow up  To review, tremors began in her late 52's with a mild intentional tremor on the left  This has progressed with time and spread to the right side as well  Tremors have worsened a bit in both hands, worse on the left  Tremors present with action and noted while eating    She uses two hands to brush her teeth  She drinks out of covered cups  Speech can be slurred toward the evening when tired  She continues to have imbalance  This is unchanged and may be multifactorial in part related to neuropathy  She now uses a walker only at night  She is on gabapentin for neuropathic pain  Objective:    Blood pressure 133/63, pulse 61, weight 68 6 kg (151 lb 3 2 oz)  Physical Exam  Vitals reviewed  Eyes:      Extraocular Movements: Extraocular movements intact  Pupils: Pupils are equal, round, and reactive to light     Pulmonary: Effort: Pulmonary effort is normal    Neurological:      Cranial Nerves: Dysarthria present  Deep Tendon Reflexes: Strength normal          Neurological Exam  Mental Status   Oriented to person, place, time and situation  Recent and remote memory are intact  Mild dysarthria present  Follows complex commands  Attention and concentration are normal     Cranial Nerves  CN II: Right funduscopic exam: not visualized  Left funduscopic exam: not visualized  CN III, IV, VI: Extraocular movements intact bilaterally  Pupils equal round and reactive to light bilaterally  CN V: Facial sensation is normal   CN VII: Full and symmetric facial movement  CN VIII: Hearing is normal   CN IX, X: Palate elevates symmetrically  CN XI: Shoulder shrug strength is normal   CN XII: Tongue midline without atrophy or fasciculations  Motor   Normal muscle tone  Strength is 5/5 throughout all four extremities  Sensory  Light touch is normal in upper and lower extremities  Coordination  Right: Finger-to-nose abnormality: Rapid alternating movement normal   Left: Finger-to-nose abnormality: Heel-to-shin normal   Moderate postural and action tremors, more so on the left  No rest tremor       Gait  Casual gait: Narrow stance  Reduced stride length  Unable to rise from chair without using arms  ROS:    Review of Systems   Constitutional: Negative  Negative for appetite change and fever  HENT: Positive for voice change (hoarseness)  Negative for hearing loss, tinnitus and trouble swallowing  Eyes: Negative  Negative for photophobia and pain  Respiratory: Negative  Negative for shortness of breath  Cardiovascular: Negative  Negative for palpitations  Gastrointestinal: Negative  Negative for nausea and vomiting  Endocrine: Negative  Negative for cold intolerance  Genitourinary: Negative  Negative for dysuria, frequency and urgency  Musculoskeletal: Positive for myalgias (legs)   Negative for neck pain         Balance issues     Skin: Negative  Negative for rash  Allergic/Immunologic: Negative  Neurological: Positive for dizziness (once in a while), tremors (Left hand) and speech difficulty (some)  Negative for seizures, syncope, facial asymmetry, weakness, light-headedness, numbness and headaches  Hematological: Bruises/bleeds easily (Bruise)  Psychiatric/Behavioral: Positive for sleep disturbance  Negative for confusion and hallucinations  All other systems reviewed and are negative  Review of system was personally reviewed

## 2021-07-08 NOTE — ASSESSMENT & PLAN NOTE
ET with moderate breakthrough tremor L>R  Spent 30 minutes on deep brain stimulation programming  Right IPG- on Group D  Reviewed prior    Switched to Group C  Increased amplitude with better control of left hand tremor on FTN, posture and action  Final settings:  Left RC  4 5V, PW80, 185Hz (increased from 175Hz),1-2+    Right RC   Group D  Interleaved  0-1+, 3 6V, PW90, 125Hz  2+3-, 3 5V, , 125Hz  Switched to Group C  0+1-, 3 9V, PW90, 125Hz  0+2-, PW90, 125Hz, 3 8V    Will keep on the settings  The future if having difficulty may switch back to prior group D make adjustments

## 2021-07-13 PROBLEM — Z12.31 ENCOUNTER FOR SCREENING MAMMOGRAM FOR BREAST CANCER: Status: RESOLVED | Noted: 2018-04-20 | Resolved: 2021-07-13

## 2021-07-13 PROBLEM — Z85.3 HISTORY OF LEFT BREAST CANCER: Status: ACTIVE | Noted: 2020-12-23

## 2021-07-14 ENCOUNTER — OFFICE VISIT (OUTPATIENT)
Dept: SURGICAL ONCOLOGY | Facility: CLINIC | Age: 76
End: 2021-07-14
Payer: MEDICARE

## 2021-07-14 VITALS
WEIGHT: 151 LBS | HEART RATE: 66 BPM | SYSTOLIC BLOOD PRESSURE: 132 MMHG | TEMPERATURE: 98.5 F | RESPIRATION RATE: 14 BRPM | BODY MASS INDEX: 26.75 KG/M2 | HEIGHT: 63 IN | OXYGEN SATURATION: 95 % | DIASTOLIC BLOOD PRESSURE: 78 MMHG

## 2021-07-14 DIAGNOSIS — Z08 ENCOUNTER FOR FOLLOW-UP SURVEILLANCE OF BREAST CANCER: ICD-10-CM

## 2021-07-14 DIAGNOSIS — Z85.3 ENCOUNTER FOR FOLLOW-UP SURVEILLANCE OF BREAST CANCER: ICD-10-CM

## 2021-07-14 DIAGNOSIS — Z85.3 HISTORY OF LEFT BREAST CANCER: Primary | ICD-10-CM

## 2021-07-14 PROCEDURE — 99213 OFFICE O/P EST LOW 20 MIN: CPT | Performed by: SURGERY

## 2021-07-14 NOTE — PROGRESS NOTES
Surgical Oncology Follow Up       [address]  [facility]  [address] [facility] [address]  [facility] [address]    [facility]  1945  [id number]  [address] [facility]  [facility]  [address] [address]    Chief Complaint   Patient presents with    Follow-up     Pt is here for a three month follow up          Assessment & Plan:     Patient presents for a three-month follow-up visit for her left ductal carcinoma in Situ  She has no complaints referable to her mastectomy site  She is due for mammogram in November which will coordinate on the contralateral side  Clinical examination shows no evidence of local regional distant recurrence disease  We will see her back in 6 months  She is agreeable see our advanced nurse practitioner at that time  Cancer History:     Oncology History   History of left breast cancer   12/17/2020 Biopsy    Left Breast stereotactic biopsy:  A  4 o'clock, posterior  Ductal carcinoma in situ with microinvasion  Grade 3  ER 0, CA 0, HER2 3+    B & C  5 o'clock, anterior with & without calcs  Ductal carcinoma in situ  Grade 3  ER 0, CA 0    Concordant  Malignancy appears unifocal; calcifications span 4 1 cm  Both clips migrated  No recent axillary US  Right breast clear  1/7/2021 Genetic Testing    The following genes were evaluated: CHARLOTTE, BRCA1, BRCA2, CDH1, CHEK2, PALB2, PTEN, STK11, Tp53; additional genes evaluated for a total of 20 genes  Negative result   No pathogenic sequence variants or deletions/dupllications identified  [employer]     2/12/2021 Surgery    Left breast mastectomy with sentinel lymph node biopsy  Micro-invasive carcinoma (less than 1 mm)  Extensive ductal carcinoma in situ (7 7 cm)  Grade 3  Margins negative  0/2 Lymph nodes  Anatomic/Prognostic Stage IA     4/7/2021 - 4/7/2021 Hormone Therapy    Consult with Dr Harman Terrell therapy not recommended           Interval History:     Patient has no complaints referable to her breast other than being somewhat tight I have recommended she can do stretching exercises though her skin is actually doing quite well  Review of Systems:   Review of Systems   All other systems reviewed and are negative        Past Medical History     Patient Active Problem List   Diagnosis    Portal vein thrombosis    Anxiety    Moderate episode of recurrent major depressive disorder (HCC)    Essential tremor    Hyperlipidemia    Essential hypertension    Hypomagnesemia    SIADH (syndrome of inappropriate ADH production) (HCC)    Acquired hypothyroidism    Insomnia    Iron deficiency anemia    Prediabetes    Peripheral neuropathy    Osteopenia    Osteoarthritis of right knee    Ulcerative colitis without complications (HCC)    Allergic rhinitis    GERD (gastroesophageal reflux disease)    Anemia    Mild intermittent asthma without complication    Diarrhea    Sjogren's syndrome (HCC)    Chronic salpingo-oophoritis    Overweight    Spinal stenosis    Supraventricular tachycardia (HCC)    Unsteady gait    Lower extremity pain, left    Lumbar degenerative disc disease    Encounter for long-term (current) use of medications    S/P deep brain stimulator placement    Vitamin B12 deficiency    Vitamin D deficiency    History of left breast cancer     Past Medical History:   Diagnosis Date    Anemia     Anxiety     Arrhythmia     Arthritis     Asthma     Blood clot in vein     portal vein    Breast cancer (HCC)     Breast lump     [id number] RESOLVED    Cancer (HCC)     Depression     Disease of thyroid gland     DVT, lower extremity (HCC)     GERD (gastroesophageal reflux disease)     Hyperlipidemia     Hypertension     Hypokalemia     Hyponatremia     Hypothyroidism     Iron deficiency anemia     Irregular heart beat     Manic behavior ([address] )     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HCC)  Osteoarthritis     Palpitations     [id number]  RESOLVED    Paroxysmal supraventricular tachycardia (HCC)     PE (pulmonary thromboembolism) (HCC)     Sjoegren syndrome     Spinal stenosis     Thrombocytosis (HCC)     [id number]  RESOLVED    Tremors of nervous system     dbs implanted right and left chest    Ulcerative colitis ([address] )     Vertigo     [id number] RESOLVED     Past Surgical History:   Procedure Laterality Date    ABDOMINAL SURGERY      APPENDECTOMY      BREAST BIOPSY Left 11/07/2019    Stereo    BREAST EXCISIONAL BIOPSY Left     x many years    BREAST SURGERY      lumpectomy & biopsy    CARMELLA HOLE W/ STEREOTACTIC INSERTION OF DBS LEADS / INTRAOP MICROELECTRODE RECORDING      COLON SURGERY      COLONOSCOPY N/A 8/30/2017    Procedure: Gonsales Push;  Surgeon: Adalid Templeton MD;  Location: [facility]; Service: Colorectal    COLOPROCTECTOMY W/ ILEO J POUCH      ESOPHAGOGASTRODUODENOSCOPY      ONSET 10/17/11    FISTULA REPAIR      LLEOANAL FISTULA REPAIR TRANSPERIN TRANSABD APPROACH    HYSTERECTOMY      age 43    ILEOSTOMY CLOSURE      KNEE ARTHROSCOPY      Right    MAMMO STEREOTACTIC BREAST BIOPSY LEFT (ALL INC) Left 11/7/2019    MAMMO STEREOTACTIC BREAST BIOPSY LEFT (ALL INC) Left 12/17/2020    MAMMO STEREOTACTIC BREAST BIOPSY LEFT (ALL INC) EACH ADD Left 12/17/2020    MASTECTOMY W/ SENTINEL NODE BIOPSY Left 2/12/2021    Procedure: BREAST MASTECTOMY WITH SENTINEL LYMPH NODE BIOPSY, LYMPHATIC MAPPING WITH BLUE DYE AND RADIAOCTIVE DYE (INJECT AT 1100 BY DR GILLESPIE IN THE OR);   Surgeon: Quang Hill MD;  [facility];  Service: Surgical Oncology    IL IMP STIM,CRANIAL,SUBQ,1 ARRAY Right 6/20/2017    Procedure: DBS GENERATOR REPLACEMENT;  Surgeon: Patrick Figueroa MD;  [facility];  Service: Neurosurgery    IL IMP STIM,CRANIAL,SUBQ,1 ARRAY N/A 12/4/2019    Procedure: REPLACEMENT IMPLANTABLE PULSE GENERATOR FOR DEEP BRAIN STIMULATOR LEFT CHEST;  Surgeon: Brittany Frederick MD;  [facility];  Service: Neurosurgery    SPLENECTOMY       Family History   Problem Relation Age of Onset    Venous thrombosis Mother         ACUTE VENOUS THROMBOSIS OF DEEP VESSELS OF THE DISTAL LOWER EXTREMITY    Other Mother         PHLEBITIS    Hypertension Mother     Peripheral vascular disease Mother     COPD Father     Diabetes Father         MELLITUS    Stroke Father     Diabetes Sister         MELLITUS    Sjogren's syndrome Sister     No Known Problems Daughter     No Known Problems Maternal Grandmother     No Known Problems Maternal Grandfather     No Known Problems Paternal Grandmother     No Known Problems Paternal Grandfather     No Known Problems Sister     No Known Problems Maternal Aunt     No Known Problems Paternal Aunt     No Known Problems Son      Social History     Socioeconomic History    Marital status:      Spouse name: Not on file    Number of children: Not on file    Years of education: EDUCATION LEVEL 10TH GRADE    Highest education level: Not on file   Occupational History    Occupation: RETIRED   Tobacco Use    Smoking status: Former Smoker     Packs/day: 2 00     Years: 5 00     Pack years: 10 00     Quit date:      Years since quittin 5    Smokeless tobacco: Never Used    Tobacco comment: Quit   Vaping Use    Vaping Use: Never used   Substance and Sexual Activity    Alcohol use:  Yes    Drug use: No    Sexual activity: Not Currently     Partners: Male     Birth control/protection: Post-menopausal   Other Topics Concern    Not on file   Social History Narrative    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ACTIVITIES INSIDE AND OUTSIDE OF HOME, MODERATE    CAFFEINE USE, DRINKS CAFEINATED TEA, DRINKS COFFEE    INADEQUATE EXERCISE    LIVES WITH ADULT CHILDREN     Social Determinants of Health     Financial Resource Strain:     Difficulty of Paying Living Expenses:    Food Insecurity:     Worried About Running Out of Food in the Last Year:     Dominick of Food in the Last Year:    Transportation Needs:     Lack of Transportation (Medical):      Lack of Transportation (Non-Medical):    Physical Activity:     Days of Exercise per Week:     Minutes of Exercise per Session:    Stress:     Feeling of Stress :    Social Connections:     Frequency of Communication with Friends and Family:     Frequency of Social Gatherings with Friends and Family:     Attends Anabaptism Services:     Active Member of Clubs or Organizations:     Attends Club or Organization Meetings:     Marital Status:    Intimate Partner Violence:     Fear of Current or Ex-Partner:     Emotionally Abused:     Physically Abused:     Sexually Abused:        Current Outpatient Medications:     albuterol (PROVENTIL HFA,VENTOLIN HFA) 90 mcg/act inhaler, Inhale 2 puffs every 6 (six) hours as needed for wheezing, Disp: 1 Inhaler, Rfl: 1    buPROPion (WELLBUTRIN XL) 300 mg 24 hr tablet, Take 1 tablet (300 mg total) by mouth daily With 150 mg, Disp: 90 tablet, Rfl: 1    Calcium Carb-Cholecalciferol (CALCIUM 600-D PO), Take 1 tablet by mouth 2 (two) times a day, Disp: , Rfl:     cetirizine (ZyrTEC) 10 mg tablet, Take 10 mg by mouth daily, Disp: , Rfl:     Coenzyme Q10 (CO Q-10 PO), Take 1 capsule by mouth daily, Disp: , Rfl:     diltiazem (CARDIZEM CD) 180 mg 24 hr capsule, TAKE ONE CAPSULE BY MOUTH EVERY DAY, Disp: 90 capsule, Rfl: 1    escitalopram (LEXAPRO) 20 mg tablet, TAKE ONE TABLET BY MOUTH EVERY DAY, Disp: 90 tablet, Rfl: 1    fluticasone (FLONASE) 50 mcg/act nasal spray, 1 spray into each nostril daily as needed for rhinitis, Disp: 48 mL, Rfl: 1    fluticasone-salmeterol (Advair Diskus) 250-50 mcg/dose inhaler, Inhale 1 puff 2 (two) times a day Rinse mouth after use, Disp: 180 each, Rfl: 1    gabapentin (NEURONTIN) 100 mg capsule, Take 2 capsules (200 mg total) by mouth 2 (two) times a day, Disp: 360 capsule, Rfl: 1    gabapentin (NEURONTIN) 600 MG tablet, TAKE ONE TABLET IN THE MORNING, ONE TABLET IN THE AFTERNOON, AND 2 TABLETS AT BEDTIME, Disp: 360 tablet, Rfl: 2    HYDROcodone-acetaminophen (NORCO) 5-325 mg per tablet, Take 1 tablet by mouth every 6 (six) hours as needed for painMax Daily Amount: 4 tablets, Disp: 120 tablet, Rfl: 0    levothyroxine 50 mcg tablet, Take 1 tablet (50 mcg total) by mouth daily, Disp: 90 tablet, Rfl: 1    lisinopril (ZESTRIL) 20 mg tablet, Take 1 tablet (20 mg total) by mouth 2 (two) times a day, Disp: 180 tablet, Rfl: 1    loperamide (IMODIUM) 2 mg capsule, Take 2 mg by mouth 4 (four) times a day as needed  , Disp: , Rfl:     LORazepam (ATIVAN) 1 mg tablet, Take 1 tablet (1 mg total) by mouth every 6 (six) hours as needed for anxiety, Disp: 120 tablet, Rfl: 0    MAGNESIUM PO, Take 1 tablet by mouth daily, Disp: , Rfl:     meclizine (ANTIVERT) 25 mg tablet, Take 1 tablet (25 mg total) by mouth every 6 (six) hours as needed for dizziness, Disp: 90 tablet, Rfl: 1    metaxalone (SKELAXIN) 800 mg tablet, Take 1 tablet (800 mg total) by mouth 3 (three) times a day (Patient taking differently: Take 800 mg by mouth 3 (three) times a day 0 5 tab three times a day), Disp: 90 tablet, Rfl: 0    Multiple Vitamin (MULTIVITAMIN ADULT PO), Take 1 tablet by mouth daily, Disp: , Rfl:     naloxone (NARCAN) 4 mg/0 1 mL nasal spray, Administer 1 spray into a nostril   If breathing does not return to normal or if breathing difficulty resumes after 2-3 minutes, give another dose in the other nostril using a new spray , Disp: 1 each, Rfl: 1    nystatin-triamcinolone (MYCOLOG-II) cream, Apply topically 2 (two) times a day, Disp: 30 g, Rfl: 0    Omega-3 Fatty Acids (FISH OIL PO), Take 1 capsule by mouth daily, Disp: , Rfl:     pantoprazole (PROTONIX) 40 mg tablet, Take 1 tablet (40 mg total) by mouth daily, Disp: 90 tablet, Rfl: 1    simvastatin (ZOCOR) 5 MG tablet, TAKE ONE TABLET BY MOUTH EVERY DAY, Disp: 90 tablet, Rfl: 1    sodium chloride 1 g tablet, Take 1 tablet (1 g total) by mouth 3 (three) times a day, Disp: 270 tablet, Rfl: 3    torsemide (DEMADEX) 10 mg tablet, Take 1 tablet (10 mg total) by mouth daily as needed (edema), Disp: 90 tablet, Rfl: 0    warfarin (COUMADIN) 5 mg tablet, Take 1-2 tablets daily As directed, Disp: 180 tablet, Rfl: 1    hydroxychloroquine (PLAQUENIL) 200 mg tablet, Take 1 tablet (200 mg total) by mouth 2 (two) times a day, Disp: 180 tablet, Rfl: 0  Allergies   Allergen Reactions    Macrobid [Nitrofurantoin Monohyd Macro] Rash    Penicillins Rash     Annotation - [id number]: can take cephalosporins    Sulfa Antibiotics Rash    Indocin [Indomethacin] GI Intolerance and Dizziness       Physical Exam:     Vitals:    07/14/21 1409   BP: 132/78   Pulse: 66   Resp: 14   Temp: 98 5 °F (36 9 °C)   SpO2: 95%     Physical Exam  Vitals reviewed  Constitutional:       Appearance: She is well-developed  HENT:      Head: Normocephalic and atraumatic  Eyes:      Pupils: Pupils are equal, round, and reactive to light  Neck:      Thyroid: No thyromegaly  Vascular: No JVD  Trachea: No tracheal deviation  Cardiovascular:      Rate and Rhythm: Normal rate and regular rhythm  Heart sounds: Normal heart sounds  No murmur heard  No friction rub  No gallop  Pulmonary:      Effort: Pulmonary effort is normal  No respiratory distress  Breath sounds: Normal breath sounds  No wheezing or rales  Chest:      Comments: The right breast was examined in the sitting and supine position  There are no worrisome skin changes, tenderness, inverted nipple, nipple discharge, swelling, bleeding or evidence of a mass in any quadrant  Jose Raul survey demonstrated no evidence of any clinically suspicious axillary, pectoral or paraclavicular lymph nodes  Examination of the left mastectomy site shows no worrisome skin findings dominant masses or axillary adenopathy  Abdominal:      General: There is no distension        Palpations: Abdomen is soft  There is no hepatomegaly or mass  Tenderness: There is no abdominal tenderness  There is no guarding or rebound  Musculoskeletal:         General: No tenderness  Normal range of motion  Cervical back: Normal range of motion and neck supple  Lymphadenopathy:      Cervical: No cervical adenopathy  Skin:     General: Skin is warm and dry  Findings: No erythema or rash  Neurological:      Mental Status: She is alert and oriented to person, place, and time  Cranial Nerves: No cranial nerve deficit  Psychiatric:         Behavior: Behavior normal            Results & Discussion:     No current imaging  The patient is free of any clinical evidence of local regional or distant recurrence disease  She presented with her   All questions were answered their satisfaction  We will see her back in 6 months as outlined above  Advance Care Planning/Advance Directives:  I discussed the disease status, treatment plans and follow-up with the patient

## 2021-07-29 ENCOUNTER — HOSPITAL ENCOUNTER (OUTPATIENT)
Dept: RADIOLOGY | Age: 76
Discharge: HOME | End: 2021-07-29
Payer: MEDICARE

## 2021-07-29 DIAGNOSIS — M85.89 OSTEOPENIA OF MULTIPLE SITES: ICD-10-CM

## 2021-07-29 PROCEDURE — 77080 DXA BONE DENSITY AXIAL: CPT

## 2021-08-03 ENCOUNTER — LAB (OUTPATIENT)
Dept: LAB | Facility: CLINIC | Age: 76
End: 2021-08-03
Payer: MEDICARE

## 2021-08-03 ENCOUNTER — ANTICOAG VISIT (OUTPATIENT)
Dept: INTERNAL MEDICINE CLINIC | Facility: CLINIC | Age: 76
End: 2021-08-03

## 2021-08-03 DIAGNOSIS — E22.2 SIADH (SYNDROME OF INAPPROPRIATE ADH PRODUCTION) (HCC): ICD-10-CM

## 2021-08-03 DIAGNOSIS — E78.49 OTHER HYPERLIPIDEMIA: ICD-10-CM

## 2021-08-03 DIAGNOSIS — I81 PORTAL VEIN THROMBOSIS: Primary | ICD-10-CM

## 2021-08-03 DIAGNOSIS — R73.03 PREDIABETES: ICD-10-CM

## 2021-08-03 LAB
ANION GAP SERPL CALCULATED.3IONS-SCNC: 5 MMOL/L (ref 4–13)
BUN SERPL-MCNC: 7 MG/DL (ref 5–25)
CALCIUM SERPL-MCNC: 9 MG/DL (ref 8.3–10.1)
CHLORIDE SERPL-SCNC: 102 MMOL/L (ref 100–108)
CHOLEST SERPL-MCNC: 179 MG/DL (ref 50–200)
CO2 SERPL-SCNC: 30 MMOL/L (ref 21–32)
CREAT SERPL-MCNC: 0.84 MG/DL (ref 0.6–1.3)
EST. AVERAGE GLUCOSE BLD GHB EST-MCNC: 120 MG/DL
GFR SERPL CREATININE-BSD FRML MDRD: 68 ML/MIN/1.73SQ M
GLUCOSE P FAST SERPL-MCNC: 93 MG/DL (ref 65–99)
HBA1C MFR BLD: 5.8 %
HDLC SERPL-MCNC: 67 MG/DL
LDLC SERPL CALC-MCNC: 100 MG/DL (ref 0–100)
NONHDLC SERPL-MCNC: 112 MG/DL
POTASSIUM SERPL-SCNC: 4.4 MMOL/L (ref 3.5–5.3)
SODIUM SERPL-SCNC: 137 MMOL/L (ref 136–145)
TRIGL SERPL-MCNC: 61 MG/DL

## 2021-08-03 PROCEDURE — 80048 BASIC METABOLIC PNL TOTAL CA: CPT

## 2021-08-03 PROCEDURE — 80061 LIPID PANEL: CPT

## 2021-08-03 PROCEDURE — 83036 HEMOGLOBIN GLYCOSYLATED A1C: CPT

## 2021-08-12 ENCOUNTER — OFFICE VISIT (OUTPATIENT)
Dept: INTERNAL MEDICINE CLINIC | Facility: CLINIC | Age: 76
End: 2021-08-12
Payer: MEDICARE

## 2021-08-12 VITALS
BODY MASS INDEX: 26.19 KG/M2 | HEART RATE: 69 BPM | WEIGHT: 147.8 LBS | TEMPERATURE: 97.1 F | OXYGEN SATURATION: 98 % | SYSTOLIC BLOOD PRESSURE: 130 MMHG | DIASTOLIC BLOOD PRESSURE: 76 MMHG | HEIGHT: 63 IN

## 2021-08-12 DIAGNOSIS — K51.80 OTHER ULCERATIVE COLITIS WITHOUT COMPLICATION (HCC): ICD-10-CM

## 2021-08-12 DIAGNOSIS — F33.1 MODERATE EPISODE OF RECURRENT MAJOR DEPRESSIVE DISORDER (HCC): ICD-10-CM

## 2021-08-12 DIAGNOSIS — F41.9 ANXIETY: ICD-10-CM

## 2021-08-12 DIAGNOSIS — M35.00 SJOGREN'S SYNDROME, WITH UNSPECIFIED ORGAN INVOLVEMENT (HCC): ICD-10-CM

## 2021-08-12 DIAGNOSIS — E53.8 VITAMIN B12 DEFICIENCY: ICD-10-CM

## 2021-08-12 DIAGNOSIS — E03.9 ACQUIRED HYPOTHYROIDISM: Primary | ICD-10-CM

## 2021-08-12 DIAGNOSIS — I81 PORTAL VEIN THROMBOSIS: ICD-10-CM

## 2021-08-12 DIAGNOSIS — E22.2 SIADH (SYNDROME OF INAPPROPRIATE ADH PRODUCTION) (HCC): ICD-10-CM

## 2021-08-12 DIAGNOSIS — I10 ESSENTIAL HYPERTENSION: ICD-10-CM

## 2021-08-12 DIAGNOSIS — Z85.3 HISTORY OF LEFT BREAST CANCER: ICD-10-CM

## 2021-08-12 DIAGNOSIS — G25.0 ESSENTIAL TREMOR: ICD-10-CM

## 2021-08-12 PROCEDURE — 99214 OFFICE O/P EST MOD 30 MIN: CPT | Performed by: INTERNAL MEDICINE

## 2021-08-12 RX ORDER — LORAZEPAM 1 MG/1
1 TABLET ORAL EVERY 6 HOURS PRN
Qty: 120 TABLET | Refills: 0 | Status: SHIPPED | OUTPATIENT
Start: 2021-08-12 | End: 2021-10-12 | Stop reason: SDUPTHER

## 2021-08-12 RX ORDER — TIZANIDINE 4 MG/1
TABLET ORAL
COMMUNITY
Start: 2021-07-27 | End: 2021-10-17 | Stop reason: ALTCHOICE

## 2021-08-12 NOTE — ASSESSMENT & PLAN NOTE
Injection scheduled later this month  She will stop warfarin 5 days prior, restart next day  Repeat INR one week after  On Vicodin, gabapentin

## 2021-08-12 NOTE — ASSESSMENT & PLAN NOTE
Encouraged to take 1/2 tablet of lorazepam 4 times a day, she is trying to wean herself off    Also on bupropion and escitalopram

## 2021-08-12 NOTE — PROGRESS NOTES
Assessment/Plan:    Anxiety  Encouraged to take 1/2 tablet of lorazepam 4 times a day, she is trying to wean herself off  Also on bupropion and escitalopram     Sjogren's syndrome (HCC)  Stopped Plaquenil recently, per rheum  Ulcerative colitis without complications (HCC)  Stable  SIADH (syndrome of inappropriate ADH production) (HCC)  Na normal   Continue NaCl tid  Portal vein thrombosis  Discussed switching to new anticoagulants, she is hesitant  Recommend to check cost with insurance  Moderate episode of recurrent major depressive disorder ([facility] )  As above  Lumbar degenerative disc disease  Injection scheduled later this month  She will stop warfarin 5 days prior, restart next day  Repeat INR one week after  On Vicodin, gabapentin  Prediabetes  Improved, A1c 5 8%  Mild intermittent asthma without complication  Using Advair bid, no recent symptoms  Essential hypertension  BP stable, on diltiazem and lisinopril, torsemide prn  Hyperlipidemia  At goal, on statin  GERD (gastroesophageal reflux disease)  Takes PPI daily  Acquired hypothyroidism  Stable  History of left breast cancer  R mammogram scheduled  Using prosthetic bra  Essential tremor  On gabapentin  Diagnoses and all orders for this visit:    Acquired hypothyroidism    Anxiety  -     LORazepam (ATIVAN) 1 mg tablet; Take 1 tablet (1 mg total) by mouth every 6 (six) hours as needed for anxiety    Moderate episode of recurrent major depressive disorder (HCC)    Essential hypertension  -     Comprehensive metabolic panel; Future  -     CBC and differential; Future    Other ulcerative colitis without complication (HCC)  -     Magnesium; Future  -     Comprehensive metabolic panel;  Future    Sjogren's syndrome, with unspecified organ involvement ([facility] )    Portal vein thrombosis    Vitamin B12 deficiency  -     Vitamin B12; Future    SIADH (syndrome of inappropriate ADH production) (HCC)    History of left breast cancer    Essential tremor    Other orders  -     tiZANidine (ZANAFLEX) 4 mg tablet      Follow up in 4 months or as needed  Subjective:      Patient ID: Shanta Smith is a 67 y o  female here for a follow up  She is accompanied by her grand daughter Марина Hammer today  She is experiencing more frequent low back pain, has some tingling on her left thigh and knee  She saw her pain doctor, has an injection scheduled later this month  She knows when to stop and restart her warfarin  She was asked why she is still on warfarin and not the newer drugs  She feels more comfortable knowing where her levels are  She moves her bowls 2 to 3 times during the day, up to 2 times at night  She recently saw her rheumatologist, stopped Plaquenil  She is trying to take less lorazepam, has been taking it twice a day recently  The following portions of the patient's history were reviewed and updated as appropriate: allergies, current medications, past medical history, past social history and problem list     Review of Systems   Constitutional: Negative for activity change, appetite change and fatigue  HENT: Negative for congestion, ear pain and postnasal drip  Eyes: Negative for visual disturbance  Respiratory: Negative for cough and shortness of breath  Cardiovascular: Negative for chest pain and leg swelling  Gastrointestinal: Positive for diarrhea  Negative for abdominal pain and constipation  Genitourinary: Negative for dysuria and frequency  Musculoskeletal: Positive for arthralgias and gait problem  Negative for joint swelling and myalgias  Skin: Negative for rash and wound  Neurological: Negative for dizziness, numbness and headaches  Psychiatric/Behavioral: Negative for confusion  The patient is nervous/anxious            Objective:      /76   Pulse 69   Temp (!) 97 1 °F (36 2 °C) (Temporal)   Ht 5' 3" (1 6 m)   Wt 67 kg (147 lb 12 8 oz)   SpO2 98%   BMI 26 18 kg/m² Physical Exam  Vitals and nursing note reviewed  Constitutional:       General: She is not in acute distress  Appearance: She is well-developed  HENT:      Head: Normocephalic and atraumatic  Eyes:      Pupils: Pupils are equal, round, and reactive to light  Cardiovascular:      Rate and Rhythm: Normal rate and regular rhythm  Heart sounds: Normal heart sounds  Pulmonary:      Effort: Pulmonary effort is normal       Breath sounds: Normal breath sounds  No wheezing  Abdominal:      General: Bowel sounds are normal       Palpations: Abdomen is soft  Musculoskeletal:         General: No swelling  Comments: Not using assistive device   Skin:     General: Skin is warm  Findings: No rash  Neurological:      General: No focal deficit present  Mental Status: She is alert and oriented to person, place, and time  Psychiatric:         Mood and Affect: Mood normal          Behavior: Behavior normal            Labs & imaging results reviewed with patient  done/in OR

## 2021-08-24 ENCOUNTER — HOSPITAL ENCOUNTER (OUTPATIENT)
Dept: NON INVASIVE DIAGNOSTICS | Facility: CLINIC | Age: 76
Discharge: HOME | End: 2021-08-24
Payer: MEDICARE

## 2021-08-24 DIAGNOSIS — I45.10 INCOMPLETE RIGHT BUNDLE BRANCH BLOCK (RBBB): ICD-10-CM

## 2021-08-24 DIAGNOSIS — I47.1 SVT (SUPRAVENTRICULAR TACHYCARDIA) (HCC): ICD-10-CM

## 2021-08-24 PROCEDURE — 93225 XTRNL ECG REC<48 HRS REC: CPT

## 2021-08-24 PROCEDURE — 93226 XTRNL ECG REC<48 HR SCAN A/R: CPT

## 2021-08-30 ENCOUNTER — APPOINTMENT (OUTPATIENT)
Dept: LAB | Facility: CLINIC | Age: 76
End: 2021-08-30
Payer: MEDICARE

## 2021-08-30 PROCEDURE — 93227 XTRNL ECG REC<48 HR R&I: CPT | Performed by: INTERNAL MEDICINE

## 2021-09-08 DIAGNOSIS — I81 PORTAL VEIN THROMBOSIS: Primary | ICD-10-CM

## 2021-09-09 ENCOUNTER — TELEPHONE (OUTPATIENT)
Dept: INTERNAL MEDICINE CLINIC | Facility: CLINIC | Age: 76
End: 2021-09-09

## 2021-09-09 ENCOUNTER — ANTICOAG VISIT (OUTPATIENT)
Dept: INTERNAL MEDICINE CLINIC | Facility: CLINIC | Age: 76
End: 2021-09-09

## 2021-09-09 ENCOUNTER — APPOINTMENT (OUTPATIENT)
Dept: LAB | Facility: CLINIC | Age: 76
End: 2021-09-09
Payer: MEDICARE

## 2021-09-09 DIAGNOSIS — I81 PORTAL VEIN THROMBOSIS: Primary | ICD-10-CM

## 2021-09-09 LAB
INR PPP: 2.11 (ref 0.84–1.19)
PROTHROMBIN TIME: 23.4 SECONDS (ref 11.6–14.5)

## 2021-09-09 PROCEDURE — 36415 COLL VENOUS BLD VENIPUNCTURE: CPT | Performed by: INTERNAL MEDICINE

## 2021-09-09 PROCEDURE — 85610 PROTHROMBIN TIME: CPT | Performed by: INTERNAL MEDICINE

## 2021-09-09 NOTE — TELEPHONE ENCOUNTER
Pt  is traveling down [address] for two wks  needs a script to check her INR down there for [facility]  Call pt  when ready and she will pick it up  Is going to be in the building for bw this afternoon

## 2021-09-27 DIAGNOSIS — I10 ESSENTIAL HYPERTENSION: ICD-10-CM

## 2021-09-27 RX ORDER — LISINOPRIL 20 MG/1
TABLET ORAL
Qty: 180 TABLET | Refills: 1 | Status: SHIPPED | OUTPATIENT
Start: 2021-09-27 | End: 2022-02-04 | Stop reason: SDUPTHER

## 2021-09-30 DIAGNOSIS — M51.36 LUMBAR DEGENERATIVE DISC DISEASE: ICD-10-CM

## 2021-09-30 DIAGNOSIS — M35.00 SJOGREN'S SYNDROME, WITH UNSPECIFIED ORGAN INVOLVEMENT (HCC): ICD-10-CM

## 2021-09-30 RX ORDER — GABAPENTIN 600 MG/1
TABLET ORAL
Qty: 360 TABLET | Refills: 2 | Status: SHIPPED | OUTPATIENT
Start: 2021-09-30 | End: 2022-03-25 | Stop reason: SDUPTHER

## 2021-09-30 RX ORDER — GABAPENTIN 100 MG/1
CAPSULE ORAL
Qty: 360 CAPSULE | Refills: 1 | Status: SHIPPED | OUTPATIENT
Start: 2021-09-30 | End: 2021-12-23 | Stop reason: SDUPTHER

## 2021-10-12 DIAGNOSIS — F41.9 ANXIETY: ICD-10-CM

## 2021-10-12 DIAGNOSIS — M54.16 LUMBAR RADICULOPATHY: ICD-10-CM

## 2021-10-12 RX ORDER — HYDROCODONE BITARTRATE AND ACETAMINOPHEN 5; 325 MG/1; MG/1
1 TABLET ORAL EVERY 6 HOURS PRN
Qty: 120 TABLET | Refills: 0 | Status: SHIPPED | OUTPATIENT
Start: 2021-10-12 | End: 2021-12-02 | Stop reason: SDUPTHER

## 2021-10-12 RX ORDER — LORAZEPAM 1 MG/1
1 TABLET ORAL EVERY 6 HOURS PRN
Qty: 120 TABLET | Refills: 0 | Status: SHIPPED | OUTPATIENT
Start: 2021-10-12 | End: 2021-12-02 | Stop reason: SDUPTHER

## 2021-10-15 ENCOUNTER — TRANSCRIBE ORDERS (OUTPATIENT)
Dept: LAB | Facility: AMBULARY SURGERY CENTER | Age: 76
End: 2021-10-15

## 2021-10-15 DIAGNOSIS — Z79.01 LONG TERM (CURRENT) USE OF ANTICOAGULANTS: Primary | ICD-10-CM

## 2021-10-17 ENCOUNTER — HOSPITAL ENCOUNTER (OUTPATIENT)
Facility: HOSPITAL | Age: 76
Setting detail: OBSERVATION
Discharge: HOME | End: 2021-10-18
Attending: SURGERY | Admitting: SURGERY
Payer: MEDICARE

## 2021-10-17 ENCOUNTER — APPOINTMENT (EMERGENCY)
Dept: CT IMAGING | Facility: HOSPITAL | Age: 76
End: 2021-10-17
Payer: MEDICARE

## 2021-10-17 DIAGNOSIS — S52.134A CLOSED NONDISPLACED FRACTURE OF NECK OF RIGHT RADIUS, INITIAL ENCOUNTER: ICD-10-CM

## 2021-10-17 DIAGNOSIS — S01.81XA CHIN LACERATION, INITIAL ENCOUNTER: ICD-10-CM

## 2021-10-17 DIAGNOSIS — I16.0 HYPERTENSIVE URGENCY: ICD-10-CM
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
## 2021-10-17 LAB
ANION GAP SERPL CALCULATED.3IONS-SCNC: 7 MMOL/L (ref 4–13)
APTT PPP: 26 SECONDS (ref 23–37)
BASE EXCESS BLDA CALC-SCNC: 9 MMOL/L (ref -2–3)
BASOPHILS # BLD AUTO: 0.08 THOUSANDS/ΜL (ref 0–0.1)
BASOPHILS NFR BLD AUTO: 1 % (ref 0–1)
BUN SERPL-MCNC: 11 MG/DL (ref 5–25)
CALCIUM SERPL-MCNC: 8.7 MG/DL (ref 8.3–10.1)
CHLORIDE SERPL-SCNC: 99 MMOL/L (ref 100–108)
CO2 SERPL-SCNC: 33 MMOL/L (ref 21–32)
CREAT SERPL-MCNC: 0.95 MG/DL (ref 0.6–1.3)
EOSINOPHIL # BLD AUTO: 0.19 THOUSAND/ΜL (ref 0–0.61)
EOSINOPHIL NFR BLD AUTO: 2 % (ref 0–6)
ERYTHROCYTE [DISTWIDTH] IN BLOOD BY AUTOMATED COUNT: 16.6 % (ref 11.6–15.1)
GFR SERPL CREATININE-BSD FRML MDRD: 58 ML/MIN/1.73SQ M
GLUCOSE P FAST SERPL-MCNC: 106 MG/DL (ref 65–99)
GLUCOSE SERPL-MCNC: 106 MG/DL (ref 65–140)
GLUCOSE SERPL-MCNC: 112 MG/DL (ref 65–140)
HCO3 BLDA-SCNC: 34 MMOL/L (ref 24–30)
HCT VFR BLD AUTO: 37 % (ref 34.8–46.1)
HCT VFR BLD CALC: 40 % (ref 34.8–46.1)
HGB BLD-MCNC: 12.5 G/DL (ref 11.5–15.4)
HGB BLDA-MCNC: 13.6 G/DL (ref 11.5–15.4)
IMM GRANULOCYTES # BLD AUTO: 0.04 THOUSAND/UL (ref 0–0.2)
IMM GRANULOCYTES NFR BLD AUTO: 0 % (ref 0–2)
INR PPP: 0.98 (ref 0.84–1.19)
LYMPHOCYTES # BLD AUTO: 1.76 THOUSANDS/ΜL (ref 0.6–4.47)
LYMPHOCYTES NFR BLD AUTO: 19 % (ref 14–44)
MAGNESIUM SERPL-MCNC: 1.7 MG/DL (ref 1.6–2.6)
MCH RBC QN AUTO: 31.5 PG (ref 26.8–34.3)
MCHC RBC AUTO-ENTMCNC: 33.8 G/DL (ref 31.4–37.4)
MCV RBC AUTO: 93 FL (ref 82–98)
MONOCYTES # BLD AUTO: 1.16 THOUSAND/ΜL (ref 0.17–1.22)
MONOCYTES NFR BLD AUTO: 13 % (ref 4–12)
NEUTROPHILS # BLD AUTO: 5.83 THOUSANDS/ΜL (ref 1.85–7.62)
NEUTS SEG NFR BLD AUTO: 65 % (ref 43–75)
NRBC BLD AUTO-RTO: 0 /100 WBCS
PCO2 BLD: 35 MMOL/L (ref 21–32)
PCO2 BLD: 44.2 MM HG (ref 42–50)
PH BLD: 7.49 [PH] (ref 7.3–7.4)
PHOSPHATE SERPL-MCNC: 3 MG/DL (ref 2.3–4.1)
PLATELET # BLD AUTO: 375 THOUSANDS/UL (ref 149–390)
PMV BLD AUTO: 8.6 FL (ref 8.9–12.7)
PO2 BLD: 28 MM HG (ref 35–45)
POTASSIUM BLD-SCNC: 3.8 MMOL/L (ref 3.5–5.3)
POTASSIUM SERPL-SCNC: 3.1 MMOL/L (ref 3.5–5.3)
PROTHROMBIN TIME: 13 SECONDS (ref 11.6–14.5)
RBC # BLD AUTO: 3.97 MILLION/UL (ref 3.81–5.12)
SAO2 % BLD FROM PO2: 57 % (ref 60–85)
SODIUM BLD-SCNC: 136 MMOL/L (ref 136–145)
SODIUM SERPL-SCNC: 139 MMOL/L (ref 136–145)
SPECIMEN SOURCE: ABNORMAL
WBC # BLD AUTO: 9.06 THOUSAND/UL (ref 4.31–10.16)

## 2021-10-17 PROCEDURE — 84100 ASSAY OF PHOSPHORUS: CPT | Performed by: NURSE PRACTITIONER

## 2021-10-17 PROCEDURE — 12011 RPR F/E/E/N/L/M 2.5 CM/<: CPT | Performed by: SURGERY

## 2021-10-17 PROCEDURE — 85025 COMPLETE CBC W/AUTO DIFF WBC: CPT | Performed by: NURSE PRACTITIONER

## 2021-10-17 PROCEDURE — 90715 TDAP VACCINE 7 YRS/> IM: CPT | Performed by: NURSE PRACTITIONER

## 2021-10-17 PROCEDURE — 99219 PR INITIAL OBSERVATION CARE/DAY 50 MINUTES: CPT | Performed by: SURGERY

## 2021-10-17 PROCEDURE — 85014 HEMATOCRIT: CPT

## 2021-10-17 PROCEDURE — 99285 EMERGENCY DEPT VISIT HI MDM: CPT

## 2021-10-17 PROCEDURE — 72125 CT NECK SPINE W/O DYE: CPT

## 2021-10-17 PROCEDURE — 84132 ASSAY OF SERUM POTASSIUM: CPT

## 2021-10-17 PROCEDURE — NC001 PR NO CHARGE: Performed by: SURGERY

## 2021-10-17 PROCEDURE — 82803 BLOOD GASES ANY COMBINATION: CPT

## 2021-10-17 PROCEDURE — 82947 ASSAY GLUCOSE BLOOD QUANT: CPT

## 2021-10-17 PROCEDURE — 85730 THROMBOPLASTIN TIME PARTIAL: CPT | Performed by: SURGERY

## 2021-10-17 PROCEDURE — 36415 COLL VENOUS BLD VENIPUNCTURE: CPT | Performed by: SURGERY

## 2021-10-17 PROCEDURE — 96374 THER/PROPH/DIAG INJ IV PUSH: CPT

## 2021-10-17 PROCEDURE — 76705 ECHO EXAM OF ABDOMEN: CPT | Performed by: SURGERY

## 2021-10-17 PROCEDURE — 80048 BASIC METABOLIC PNL TOTAL CA: CPT | Performed by: NURSE PRACTITIONER

## 2021-10-17 PROCEDURE — 90471 IMMUNIZATION ADMIN: CPT

## 2021-10-17 PROCEDURE — 83735 ASSAY OF MAGNESIUM: CPT | Performed by: NURSE PRACTITIONER

## 2021-10-17 PROCEDURE — 93308 TTE F-UP OR LMTD: CPT | Performed by: SURGERY

## 2021-10-17 PROCEDURE — 1123F ACP DISCUSS/DSCN MKR DOCD: CPT | Performed by: EMERGENCY MEDICINE

## 2021-10-17 PROCEDURE — 85610 PROTHROMBIN TIME: CPT | Performed by: NURSE PRACTITIONER

## 2021-10-17 PROCEDURE — NC001 PR NO CHARGE: Performed by: EMERGENCY MEDICINE

## 2021-10-17 PROCEDURE — 70450 CT HEAD/BRAIN W/O DYE: CPT

## 2021-10-17 PROCEDURE — 84295 ASSAY OF SERUM SODIUM: CPT

## 2021-10-17 RX ORDER — LISINOPRIL 20 MG/1
20 TABLET ORAL 2 TIMES DAILY
Status: DISCONTINUED | OUTPATIENT
Start: 2021-10-17 | End: 2021-10-18 | Stop reason: HOSPADM

## 2021-10-17 RX ORDER — GABAPENTIN 400 MG/1
1200 CAPSULE ORAL
Status: DISCONTINUED | OUTPATIENT
Start: 2021-10-17 | End: 2021-10-18 | Stop reason: HOSPADM

## 2021-10-17 RX ORDER — HYDRALAZINE HYDROCHLORIDE 20 MG/ML
5 INJECTION INTRAMUSCULAR; INTRAVENOUS ONCE
Status: COMPLETED | OUTPATIENT
Start: 2021-10-17 | End: 2021-10-17

## 2021-10-17 RX ORDER — LEVOTHYROXINE SODIUM 0.05 MG/1
50 TABLET ORAL DAILY
Status: DISCONTINUED | OUTPATIENT
Start: 2021-10-18 | End: 2021-10-18 | Stop reason: HOSPADM

## 2021-10-17 RX ORDER — MECLIZINE HYDROCHLORIDE 25 MG/1
25 TABLET ORAL EVERY 6 HOURS PRN
Status: DISCONTINUED | OUTPATIENT
Start: 2021-10-17 | End: 2021-10-18 | Stop reason: HOSPADM

## 2021-10-17 RX ORDER — ALBUTEROL SULFATE 90 UG/1
2 AEROSOL, METERED RESPIRATORY (INHALATION) EVERY 6 HOURS PRN
Status: DISCONTINUED | OUTPATIENT
Start: 2021-10-17 | End: 2021-10-18 | Stop reason: HOSPADM

## 2021-10-17 RX ORDER — LORAZEPAM 1 MG/1
1 TABLET ORAL EVERY 6 HOURS PRN
Status: DISCONTINUED | OUTPATIENT
Start: 2021-10-17 | End: 2021-10-18 | Stop reason: HOSPADM

## 2021-10-17 RX ORDER — SENNOSIDES 8.6 MG
2 TABLET ORAL DAILY
Status: DISCONTINUED | OUTPATIENT
Start: 2021-10-17 | End: 2021-10-18 | Stop reason: HOSPADM

## 2021-10-17 RX ORDER — DOCUSATE SODIUM 100 MG/1
100 CAPSULE, LIQUID FILLED ORAL 2 TIMES DAILY
Status: DISCONTINUED | OUTPATIENT
Start: 2021-10-17 | End: 2021-10-18 | Stop reason: HOSPADM

## 2021-10-17 RX ORDER — LIDOCAINE HYDROCHLORIDE 10 MG/ML
5 INJECTION, SOLUTION EPIDURAL; INFILTRATION; INTRACAUDAL; PERINEURAL ONCE
Status: COMPLETED | OUTPATIENT
Start: 2021-10-17 | End: 2021-10-17

## 2021-10-17 RX ORDER — PRAVASTATIN SODIUM 10 MG
10 TABLET ORAL
Status: DISCONTINUED | OUTPATIENT
Start: 2021-10-17 | End: 2021-10-18 | Stop reason: HOSPADM

## 2021-10-17 RX ORDER — BUPROPION HYDROCHLORIDE 150 MG/1
300 TABLET ORAL DAILY
Status: DISCONTINUED | OUTPATIENT
Start: 2021-10-17 | End: 2021-10-17

## 2021-10-17 RX ORDER — ESCITALOPRAM OXALATE 20 MG/1
20 TABLET ORAL DAILY
Status: DISCONTINUED | OUTPATIENT
Start: 2021-10-18 | End: 2021-10-18 | Stop reason: HOSPADM

## 2021-10-17 RX ORDER — GABAPENTIN 400 MG/1
800 CAPSULE ORAL 2 TIMES DAILY
Status: DISCONTINUED | OUTPATIENT
Start: 2021-10-18 | End: 2021-10-18 | Stop reason: HOSPADM

## 2021-10-17 RX ORDER — TORSEMIDE 10 MG/1
10 TABLET ORAL DAILY PRN
Status: DISCONTINUED | OUTPATIENT
Start: 2021-10-17 | End: 2021-10-18 | Stop reason: HOSPADM

## 2021-10-17 RX ORDER — ACETAMINOPHEN 325 MG/1
975 TABLET ORAL EVERY 8 HOURS SCHEDULED
Status: DISCONTINUED | OUTPATIENT
Start: 2021-10-17 | End: 2021-10-18 | Stop reason: HOSPADM

## 2021-10-17 RX ORDER — DILTIAZEM HYDROCHLORIDE 180 MG/1
180 CAPSULE, COATED, EXTENDED RELEASE ORAL DAILY
Status: DISCONTINUED | OUTPATIENT
Start: 2021-10-18 | End: 2021-10-18 | Stop reason: HOSPADM

## 2021-10-17 RX ORDER — FLUTICASONE PROPIONATE 50 MCG
1 SPRAY, SUSPENSION (ML) NASAL DAILY PRN
Status: DISCONTINUED | OUTPATIENT
Start: 2021-10-17 | End: 2021-10-18 | Stop reason: HOSPADM

## 2021-10-17 RX ORDER — BUPROPION HYDROCHLORIDE 150 MG/1
300 TABLET ORAL DAILY
Status: DISCONTINUED | OUTPATIENT
Start: 2021-10-18 | End: 2021-10-18 | Stop reason: HOSPADM

## 2021-10-17 RX ORDER — WARFARIN SODIUM 5 MG/1
5 TABLET ORAL
COMMUNITY

## 2021-10-17 RX ORDER — WARFARIN SODIUM 5 MG/1
5 TABLET ORAL
Status: DISCONTINUED | OUTPATIENT
Start: 2021-10-17 | End: 2021-10-18 | Stop reason: HOSPADM

## 2021-10-17 RX ORDER — FLUTICASONE FUROATE AND VILANTEROL 200; 25 UG/1; UG/1
1 POWDER RESPIRATORY (INHALATION)
Status: DISCONTINUED | OUTPATIENT
Start: 2021-10-18 | End: 2021-10-18 | Stop reason: HOSPADM

## 2021-10-17 RX ADMIN — PRAVASTATIN SODIUM 10 MG: 10 TABLET ORAL at 16:21

## 2021-10-17 RX ADMIN — GABAPENTIN 1200 MG: 400 CAPSULE ORAL at 21:46

## 2021-10-17 RX ADMIN — LORAZEPAM 1 MG: 1 TABLET ORAL at 21:46

## 2021-10-17 RX ADMIN — HYDRALAZINE HYDROCHLORIDE 5 MG: 20 INJECTION, SOLUTION INTRAMUSCULAR; INTRAVENOUS at 12:44

## 2021-10-17 RX ADMIN — ACETAMINOPHEN 975 MG: 325 TABLET, FILM COATED ORAL at 21:46

## 2021-10-17 RX ADMIN — LISINOPRIL 20 MG: 20 TABLET ORAL at 18:46

## 2021-10-17 RX ADMIN — LIDOCAINE HYDROCHLORIDE 5 ML: 10 INJECTION, SOLUTION EPIDURAL; INFILTRATION; INTRACAUDAL; PERINEURAL at 13:34

## 2021-10-17 RX ADMIN — WARFARIN SODIUM 5 MG: 5 TABLET ORAL at 18:46

## 2021-10-17 RX ADMIN — ACETAMINOPHEN 975 MG: 325 TABLET, FILM COATED ORAL at 13:34

## 2021-10-17 RX ADMIN — TETANUS TOXOID, REDUCED DIPHTHERIA TOXOID AND ACELLULAR PERTUSSIS VACCINE, ADSORBED 0.5 ML: 5; 2.5; 8; 8; 2.5 SUSPENSION INTRAMUSCULAR at 13:34

## 2021-10-18 ENCOUNTER — APPOINTMENT (OUTPATIENT)
Dept: RADIOLOGY | Facility: HOSPITAL | Age: 76
End: 2021-10-18
Payer: MEDICARE

## 2021-10-18 VITALS
TEMPERATURE: 97.9 F | HEIGHT: 63 IN | WEIGHT: 153.22 LBS | OXYGEN SATURATION: 94 % | DIASTOLIC BLOOD PRESSURE: 70 MMHG | SYSTOLIC BLOOD PRESSURE: 139 MMHG | RESPIRATION RATE: 18 BRPM | BODY MASS INDEX: 27.15 KG/M2 | HEART RATE: 69 BPM

## 2021-10-18 PROBLEM — S01.81XA CHIN LACERATION: Status: ACTIVE | Noted: 2021-10-18

## 2021-10-18 PROBLEM — M79.601 RIGHT ARM PAIN: Status: ACTIVE | Noted: 2021-10-18
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
## 2021-10-18 PROCEDURE — 99215 OFFICE O/P EST HI 40 MIN: CPT | Performed by: INTERNAL MEDICINE

## 2021-10-18 PROCEDURE — 73080 X-RAY EXAM OF ELBOW: CPT

## 2021-10-18 PROCEDURE — 90662 IIV NO PRSV INCREASED AG IM: CPT | Performed by: SURGERY

## 2021-10-18 PROCEDURE — 73090 X-RAY EXAM OF FOREARM: CPT

## 2021-10-18 PROCEDURE — 99225 PR SBSQ OBSERVATION CARE/DAY 25 MINUTES: CPT | Performed by: SURGERY

## 2021-10-18 PROCEDURE — 97116 GAIT TRAINING THERAPY: CPT

## 2021-10-18 PROCEDURE — 99204 OFFICE O/P NEW MOD 45 MIN: CPT | Performed by: PHYSICIAN ASSISTANT

## 2021-10-18 PROCEDURE — G0008 ADMIN INFLUENZA VIRUS VAC: HCPCS | Performed by: SURGERY

## 2021-10-18 PROCEDURE — 97163 PT EVAL HIGH COMPLEX 45 MIN: CPT

## 2021-10-18 RX ORDER — POTASSIUM CHLORIDE 20 MEQ/1
40 TABLET, EXTENDED RELEASE ORAL EVERY 4 HOURS
Status: COMPLETED | OUTPATIENT
Start: 2021-10-18 | End: 2021-10-18

## 2021-10-18 RX ORDER — ACETAMINOPHEN 325 MG/1
650 TABLET ORAL EVERY 6 HOURS PRN
Refills: 0
Start: 2021-10-18

## 2021-10-18 RX ADMIN — ACETAMINOPHEN 975 MG: 325 TABLET, FILM COATED ORAL at 05:14

## 2021-10-18 RX ADMIN — LISINOPRIL 20 MG: 20 TABLET ORAL at 17:25

## 2021-10-18 RX ADMIN — GABAPENTIN 800 MG: 400 CAPSULE ORAL at 12:45

## 2021-10-18 RX ADMIN — DILTIAZEM HYDROCHLORIDE 180 MG: 180 CAPSULE, COATED, EXTENDED RELEASE ORAL at 09:12

## 2021-10-18 RX ADMIN — INFLUENZA A VIRUS A/VICTORIA/2570/2019 IVR-215 (H1N1) ANTIGEN (FORMALDEHYDE INACTIVATED), INFLUENZA A VIRUS A/TASMANIA/503/2020 IVR-221 (H3N2) ANTIGEN (FORMALDEHYDE INACTIVATED), INFLUENZA B VIRUS B/PHUKET/3073/2013 ANTIGEN (FORMALDEHYDE INACTIVATED), AND INFLUENZA B VIRUS B/WASHINGTON/02/2019 ANTIGEN (FORMALDEHYDE INACTIVATED) 0.7 ML: 60; 60; 60; 60 INJECTION, SUSPENSION INTRAMUSCULAR at 09:12

## 2021-10-18 RX ADMIN — LISINOPRIL 20 MG: 20 TABLET ORAL at 09:12

## 2021-10-18 RX ADMIN — LORAZEPAM 1 MG: 1 TABLET ORAL at 09:12

## 2021-10-18 RX ADMIN — GABAPENTIN 800 MG: 400 CAPSULE ORAL at 09:12

## 2021-10-18 RX ADMIN — ESCITALOPRAM OXALATE 20 MG: 20 TABLET ORAL at 09:12

## 2021-10-18 RX ADMIN — LEVOTHYROXINE SODIUM 50 MCG: 50 TABLET ORAL at 05:14

## 2021-10-18 RX ADMIN — PRAVASTATIN SODIUM 10 MG: 10 TABLET ORAL at 17:25

## 2021-10-18 RX ADMIN — WARFARIN SODIUM 5 MG: 5 TABLET ORAL at 17:25

## 2021-10-18 RX ADMIN — FLUTICASONE FUROATE AND VILANTEROL TRIFENATATE 1 PUFF: 200; 25 POWDER RESPIRATORY (INHALATION) at 09:08

## 2021-10-18 RX ADMIN — POTASSIUM CHLORIDE 40 MEQ: 1500 TABLET, EXTENDED RELEASE ORAL at 05:14

## 2021-10-18 RX ADMIN — ACETAMINOPHEN 975 MG: 325 TABLET, FILM COATED ORAL at 12:45

## 2021-10-18 RX ADMIN — POTASSIUM CHLORIDE 40 MEQ: 1500 TABLET, EXTENDED RELEASE ORAL at 00:45

## 2021-10-18 RX ADMIN — BUPROPION HYDROCHLORIDE 300 MG: 150 TABLET, EXTENDED RELEASE ORAL at 09:12

## 2021-10-19 ENCOUNTER — TELEPHONE (OUTPATIENT)
Dept: INTERNAL MEDICINE CLINIC | Facility: CLINIC | Age: 76
End: 2021-10-19

## 2021-10-19 ENCOUNTER — TRANSITIONAL CARE MANAGEMENT (OUTPATIENT)
Dept: INTERNAL MEDICINE CLINIC | Facility: CLINIC | Age: 76
End: 2021-10-19

## 2021-10-20 NOTE — ASSESSMENT & PLAN NOTE
As above  CASE MANAGEMENT DISCHARGE SUMMARY      Discharge to: The [address] [place] [address] completed: [facility] Exemption Notification (HENS) completed: yes    IMM given: (date) 10/18/2021    New Durable Medical Equipment ordered/agency: SNF    Transportation: Ambulance  Agency used: [address] up time: 4:00 pm   Ambulance form completed: Yes    Confirmed discharge plan with:     Patient: yes     Family:  yes    Name: Abdullahi Gabriela number: [phone]     Facility/Agency, name:  [facility] faxed   Phone number for report to facility: [phone]     RN, name: Jazmin Noland RN    Note: Discharging nurse to complete [facility], reconcile [facility], and place final copy with patient's discharge packet. RN to ensure that written prescriptions for  Level II medications are sent with patient to the facility as per protocol. Pt choosing to switch facilities. RN informed [facility] of change. Pt aware of upcharge of 35/day for private room at the [facility] and in agreement. Son, Yobany Wu, will  pt belongings from [facility] [place].   Meliza Marshall RN

## 2021-10-24 ENCOUNTER — DOCUMENTATION (OUTPATIENT)
Dept: SOCIAL WORK | Facility: HOSPITAL | Age: 76
End: 2021-10-24

## 2021-10-25 ENCOUNTER — APPOINTMENT (OUTPATIENT)
Dept: LAB | Facility: CLINIC | Age: 76
End: 2021-10-25
Payer: MEDICARE

## 2021-10-25 ENCOUNTER — OFFICE VISIT (OUTPATIENT)
Dept: CARDIOLOGY CLINIC | Facility: CLINIC | Age: 76
End: 2021-10-25
Payer: MEDICARE

## 2021-10-25 ENCOUNTER — ANTICOAG VISIT (OUTPATIENT)
Dept: INTERNAL MEDICINE CLINIC | Facility: CLINIC | Age: 76
End: 2021-10-25

## 2021-10-25 ENCOUNTER — TELEPHONE (OUTPATIENT)
Dept: INTERNAL MEDICINE CLINIC | Facility: CLINIC | Age: 76
End: 2021-10-25

## 2021-10-25 ENCOUNTER — OFFICE VISIT (OUTPATIENT)
Dept: INTERNAL MEDICINE CLINIC | Facility: CLINIC | Age: 76
End: 2021-10-25
Payer: MEDICARE

## 2021-10-25 VITALS
OXYGEN SATURATION: 97 % | BODY MASS INDEX: 26.44 KG/M2 | TEMPERATURE: 97.9 F | HEIGHT: 63 IN | WEIGHT: 149.2 LBS | DIASTOLIC BLOOD PRESSURE: 90 MMHG | HEART RATE: 75 BPM | SYSTOLIC BLOOD PRESSURE: 160 MMHG

## 2021-10-25 VITALS
HEART RATE: 64 BPM | BODY MASS INDEX: 26.68 KG/M2 | DIASTOLIC BLOOD PRESSURE: 90 MMHG | HEIGHT: 63 IN | SYSTOLIC BLOOD PRESSURE: 150 MMHG | WEIGHT: 150.6 LBS | OXYGEN SATURATION: 95 %

## 2021-10-25 DIAGNOSIS — E87.6 HYPOKALEMIA: Primary | ICD-10-CM

## 2021-10-25 DIAGNOSIS — I47.1 SVT (SUPRAVENTRICULAR TACHYCARDIA) (HCC): Primary | ICD-10-CM

## 2021-10-25 DIAGNOSIS — I47.1 SUPRAVENTRICULAR TACHYCARDIA (HCC): ICD-10-CM

## 2021-10-25 DIAGNOSIS — I81 PORTAL VEIN THROMBOSIS: Primary | ICD-10-CM

## 2021-10-25 DIAGNOSIS — S01.81XS CHIN LACERATION, SEQUELA: ICD-10-CM
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
## 2021-10-25 DIAGNOSIS — I10 ESSENTIAL HYPERTENSION: ICD-10-CM

## 2021-10-25 DIAGNOSIS — E87.6 HYPOKALEMIA WITH NORMAL ACID-BASE BALANCE: ICD-10-CM

## 2021-10-25 DIAGNOSIS — S52.134S CLOSED NONDISPLACED FRACTURE OF NECK OF RIGHT RADIUS, SEQUELA: ICD-10-CM

## 2021-10-25 PROCEDURE — 99495 TRANSJ CARE MGMT MOD F2F 14D: CPT | Performed by: NURSE PRACTITIONER

## 2021-10-25 PROCEDURE — 99214 OFFICE O/P EST MOD 30 MIN: CPT | Performed by: INTERNAL MEDICINE

## 2021-10-25 RX ORDER — POTASSIUM CHLORIDE 750 MG/1
CAPSULE, EXTENDED RELEASE ORAL
Qty: 180 CAPSULE | Refills: 3 | Status: SHIPPED | OUTPATIENT
Start: 2021-10-25

## 2021-10-25 RX ORDER — POTASSIUM CHLORIDE 750 MG/1
CAPSULE, EXTENDED RELEASE ORAL
COMMUNITY
End: 2021-10-25 | Stop reason: SDUPTHER

## 2021-10-25 RX ORDER — DILTIAZEM HYDROCHLORIDE 60 MG/1
60 TABLET, FILM COATED ORAL DAILY
Qty: 30 TABLET | Refills: 0 | Status: SHIPPED | OUTPATIENT
Start: 2021-10-25 | End: 2021-11-09 | Stop reason: SDUPTHER

## 2021-10-29 ENCOUNTER — TELEPHONE (OUTPATIENT)
Dept: INTERNAL MEDICINE CLINIC | Facility: CLINIC | Age: 76
End: 2021-10-29

## 2021-11-03 ENCOUNTER — APPOINTMENT (OUTPATIENT)
Dept: LAB | Facility: CLINIC | Age: 76
End: 2021-11-03
Payer: MEDICARE

## 2021-11-03 ENCOUNTER — ANTICOAG VISIT (OUTPATIENT)
Dept: INTERNAL MEDICINE CLINIC | Facility: CLINIC | Age: 76
End: 2021-11-03

## 2021-11-03 DIAGNOSIS — E87.6 HYPOKALEMIA WITH NORMAL ACID-BASE BALANCE: ICD-10-CM

## 2021-11-03 DIAGNOSIS — I81 PORTAL VEIN THROMBOSIS: Primary | ICD-10-CM

## 2021-11-03 DIAGNOSIS — I47.1 SVT (SUPRAVENTRICULAR TACHYCARDIA) (HCC): ICD-10-CM

## 2021-11-03 LAB
ALBUMIN SERPL BCP-MCNC: 3.7 G/DL (ref 3.5–5)
ALP SERPL-CCNC: 86 U/L (ref 46–116)
ALT SERPL W P-5'-P-CCNC: 25 U/L (ref 12–78)
ANION GAP SERPL CALCULATED.3IONS-SCNC: 9 MMOL/L (ref 4–13)
AST SERPL W P-5'-P-CCNC: 23 U/L (ref 5–45)
BILIRUB SERPL-MCNC: 0.25 MG/DL (ref 0.2–1)
BUN SERPL-MCNC: 10 MG/DL (ref 5–25)
CALCIUM SERPL-MCNC: 8.7 MG/DL (ref 8.3–10.1)
CHLORIDE SERPL-SCNC: 98 MMOL/L (ref 100–108)
CO2 SERPL-SCNC: 28 MMOL/L (ref 21–32)
CREAT SERPL-MCNC: 0.91 MG/DL (ref 0.6–1.3)
GFR SERPL CREATININE-BSD FRML MDRD: 61 ML/MIN/1.73SQ M
GLUCOSE SERPL-MCNC: 89 MG/DL (ref 65–140)
POTASSIUM SERPL-SCNC: 4.3 MMOL/L (ref 3.5–5.3)
PROT SERPL-MCNC: 7.1 G/DL (ref 6.4–8.2)
SODIUM SERPL-SCNC: 135 MMOL/L (ref 136–145)

## 2021-11-03 PROCEDURE — 80053 COMPREHEN METABOLIC PANEL: CPT

## 2021-11-05 ENCOUNTER — OFFICE VISIT (OUTPATIENT)
Dept: OBGYN CLINIC | Facility: OTHER | Age: 76
End: 2021-11-05
Payer: MEDICARE

## 2021-11-05 VITALS
DIASTOLIC BLOOD PRESSURE: 79 MMHG | HEART RATE: 61 BPM | HEIGHT: 63 IN | WEIGHT: 149.5 LBS | BODY MASS INDEX: 26.49 KG/M2 | SYSTOLIC BLOOD PRESSURE: 159 MMHG

## 2021-11-05 DIAGNOSIS — S52.134A CLOSED NONDISPLACED FRACTURE OF NECK OF RIGHT RADIUS, INITIAL ENCOUNTER: Primary | ICD-10-CM

## 2021-11-05 PROCEDURE — 99203 OFFICE O/P NEW LOW 30 MIN: CPT | Performed by: ORTHOPAEDIC SURGERY

## 2021-11-08 ENCOUNTER — CLINICAL SUPPORT (OUTPATIENT)
Dept: INTERNAL MEDICINE CLINIC | Facility: CLINIC | Age: 76
End: 2021-11-08
Payer: MEDICARE

## 2021-11-08 ENCOUNTER — TELEPHONE (OUTPATIENT)
Dept: INTERNAL MEDICINE CLINIC | Facility: CLINIC | Age: 76
End: 2021-11-08

## 2021-11-08 VITALS
DIASTOLIC BLOOD PRESSURE: 80 MMHG | TEMPERATURE: 98 F | OXYGEN SATURATION: 97 % | HEART RATE: 63 BPM | SYSTOLIC BLOOD PRESSURE: 130 MMHG

## 2021-11-08 DIAGNOSIS — I10 ESSENTIAL HYPERTENSION: Primary | ICD-10-CM

## 2021-11-08 DIAGNOSIS — I10 ESSENTIAL HYPERTENSION: ICD-10-CM

## 2021-11-08 PROCEDURE — 99211 OFF/OP EST MAY X REQ PHY/QHP: CPT

## 2021-11-09 RX ORDER — DILTIAZEM HYDROCHLORIDE 60 MG/1
60 TABLET, FILM COATED ORAL DAILY
Qty: 90 TABLET | Refills: 0 | Status: SHIPPED | OUTPATIENT
Start: 2021-11-09 | End: 2022-02-15 | Stop reason: SDUPTHER

## 2021-11-12 ENCOUNTER — TELEPHONE (OUTPATIENT)
Dept: INTERNAL MEDICINE CLINIC | Facility: CLINIC | Age: 76
End: 2021-11-12

## 2021-11-14 DIAGNOSIS — E03.9 ACQUIRED HYPOTHYROIDISM: ICD-10-CM

## 2021-11-15 RX ORDER — LEVOTHYROXINE SODIUM 0.05 MG/1
TABLET ORAL
Qty: 90 TABLET | Refills: 1 | Status: SHIPPED | OUTPATIENT
Start: 2021-11-15 | End: 2022-05-04

## 2021-11-17 ENCOUNTER — APPOINTMENT (OUTPATIENT)
Dept: LAB | Facility: CLINIC | Age: 76
End: 2021-11-17
Payer: MEDICARE

## 2021-11-17 ENCOUNTER — ANTICOAG VISIT (OUTPATIENT)
Dept: INTERNAL MEDICINE CLINIC | Facility: CLINIC | Age: 76
End: 2021-11-17

## 2021-11-17 DIAGNOSIS — I81 PORTAL VEIN THROMBOSIS: Primary | ICD-10-CM

## 2021-11-18 ENCOUNTER — HOSPITAL ENCOUNTER (OUTPATIENT)
Dept: MAMMOGRAPHY | Facility: CLINIC | Age: 76
Discharge: HOME | End: 2021-11-18
Payer: MEDICARE

## 2021-11-18 DIAGNOSIS — Z85.3 HISTORY OF LEFT BREAST CANCER: ICD-10-CM

## 2021-11-18 PROCEDURE — 77065 DX MAMMO INCL CAD UNI: CPT

## 2021-11-18 PROCEDURE — G0279 TOMOSYNTHESIS, MAMMO: HCPCS

## 2021-11-22 ENCOUNTER — OFFICE VISIT (OUTPATIENT)
Dept: CARDIOLOGY CLINIC | Facility: CLINIC | Age: 76
End: 2021-11-22
Payer: MEDICARE

## 2021-11-22 VITALS
DIASTOLIC BLOOD PRESSURE: 78 MMHG | BODY MASS INDEX: 26.44 KG/M2 | HEART RATE: 62 BPM | WEIGHT: 149.2 LBS | OXYGEN SATURATION: 97 % | SYSTOLIC BLOOD PRESSURE: 160 MMHG | HEIGHT: 63 IN

## 2021-11-22 DIAGNOSIS — I10 HYPERTENSION, UNSPECIFIED TYPE: ICD-10-CM

## 2021-11-22 DIAGNOSIS — I45.10 INCOMPLETE RIGHT BUNDLE BRANCH BLOCK (RBBB): ICD-10-CM

## 2021-11-22 DIAGNOSIS — I10 ESSENTIAL HYPERTENSION: Primary | ICD-10-CM

## 2021-11-22 DIAGNOSIS — I47.1 SVT (SUPRAVENTRICULAR TACHYCARDIA) (HCC): Primary | ICD-10-CM

## 2021-11-22 PROCEDURE — 99214 OFFICE O/P EST MOD 30 MIN: CPT | Performed by: INTERNAL MEDICINE

## 2021-11-22 RX ORDER — AMLODIPINE BESYLATE 2.5 MG/1
2.5 TABLET ORAL DAILY
COMMUNITY
End: 2021-11-22 | Stop reason: SDUPTHER

## 2021-11-22 RX ORDER — AMLODIPINE BESYLATE 2.5 MG/1
2.5 TABLET ORAL DAILY
Qty: 30 TABLET | Refills: 11 | Status: SHIPPED | OUTPATIENT
Start: 2021-11-22 | End: 2021-12-21 | Stop reason: SDUPTHER

## 2021-12-02 DIAGNOSIS — M54.16 LUMBAR RADICULOPATHY: ICD-10-CM

## 2021-12-02 DIAGNOSIS — F41.9 ANXIETY: ICD-10-CM

## 2021-12-02 RX ORDER — HYDROCODONE BITARTRATE AND ACETAMINOPHEN 5; 325 MG/1; MG/1
1 TABLET ORAL EVERY 6 HOURS PRN
Qty: 120 TABLET | Refills: 0 | Status: SHIPPED | OUTPATIENT
Start: 2021-12-02 | End: 2021-12-20 | Stop reason: SDUPTHER

## 2021-12-02 RX ORDER — LORAZEPAM 1 MG/1
1 TABLET ORAL EVERY 6 HOURS PRN
Qty: 120 TABLET | Refills: 0 | Status: SHIPPED | OUTPATIENT
Start: 2021-12-02 | End: 2022-01-02 | Stop reason: SDUPTHER

## 2021-12-06 ENCOUNTER — LAB (OUTPATIENT)
Dept: LAB | Facility: CLINIC | Age: 76
End: 2021-12-06
Payer: MEDICARE

## 2021-12-06 DIAGNOSIS — Z79.01 LONG TERM (CURRENT) USE OF ANTICOAGULANTS: ICD-10-CM

## 2021-12-06 DIAGNOSIS — E53.8 VITAMIN B12 DEFICIENCY: ICD-10-CM

## 2021-12-06 DIAGNOSIS — I10 ESSENTIAL HYPERTENSION: ICD-10-CM

## 2021-12-06 DIAGNOSIS — K51.80 OTHER ULCERATIVE COLITIS WITHOUT COMPLICATION (HCC): ICD-10-CM

## 2021-12-06 LAB
ALBUMIN SERPL BCP-MCNC: 3.4 G/DL (ref 3.5–5)
ALP SERPL-CCNC: 78 U/L (ref 46–116)
ALT SERPL W P-5'-P-CCNC: 22 U/L (ref 12–78)
ANION GAP SERPL CALCULATED.3IONS-SCNC: 8 MMOL/L (ref 4–13)
AST SERPL W P-5'-P-CCNC: 21 U/L (ref 5–45)
BASOPHILS # BLD AUTO: 0.08 THOUSANDS/ΜL (ref 0–0.1)
BASOPHILS NFR BLD AUTO: 1 % (ref 0–1)
BILIRUB SERPL-MCNC: 0.61 MG/DL (ref 0.2–1)
BUN SERPL-MCNC: 9 MG/DL (ref 5–25)
CALCIUM ALBUM COR SERPL-MCNC: 9.4 MG/DL (ref 8.3–10.1)
CALCIUM SERPL-MCNC: 8.9 MG/DL (ref 8.3–10.1)
CHLORIDE SERPL-SCNC: 101 MMOL/L (ref 100–108)
CO2 SERPL-SCNC: 29 MMOL/L (ref 21–32)
CREAT SERPL-MCNC: 0.82 MG/DL (ref 0.6–1.3)
EOSINOPHIL # BLD AUTO: 0.34 THOUSAND/ΜL (ref 0–0.61)
EOSINOPHIL NFR BLD AUTO: 6 % (ref 0–6)
ERYTHROCYTE [DISTWIDTH] IN BLOOD BY AUTOMATED COUNT: 15.2 % (ref 11.6–15.1)
GFR SERPL CREATININE-BSD FRML MDRD: 69 ML/MIN/1.73SQ M
GLUCOSE P FAST SERPL-MCNC: 79 MG/DL (ref 65–99)
HCT VFR BLD AUTO: 35.9 % (ref 34.8–46.1)
HGB BLD-MCNC: 11.7 G/DL (ref 11.5–15.4)
IMM GRANULOCYTES # BLD AUTO: 0.02 THOUSAND/UL (ref 0–0.2)
IMM GRANULOCYTES NFR BLD AUTO: 0 % (ref 0–2)
INR PPP: 1.06 (ref 0.84–1.19)
LYMPHOCYTES # BLD AUTO: 2.04 THOUSANDS/ΜL (ref 0.6–4.47)
LYMPHOCYTES NFR BLD AUTO: 35 % (ref 14–44)
MAGNESIUM SERPL-MCNC: 2 MG/DL (ref 1.6–2.6)
MCH RBC QN AUTO: 31.8 PG (ref 26.8–34.3)
MCHC RBC AUTO-ENTMCNC: 32.6 G/DL (ref 31.4–37.4)
MCV RBC AUTO: 98 FL (ref 82–98)
MONOCYTES # BLD AUTO: 0.88 THOUSAND/ΜL (ref 0.17–1.22)
MONOCYTES NFR BLD AUTO: 15 % (ref 4–12)
NEUTROPHILS # BLD AUTO: 2.44 THOUSANDS/ΜL (ref 1.85–7.62)
NEUTS SEG NFR BLD AUTO: 43 % (ref 43–75)
NRBC BLD AUTO-RTO: 0 /100 WBCS
PLATELET # BLD AUTO: 435 THOUSANDS/UL (ref 149–390)
PMV BLD AUTO: 8.7 FL (ref 8.9–12.7)
POTASSIUM SERPL-SCNC: 3.9 MMOL/L (ref 3.5–5.3)
PROT SERPL-MCNC: 7 G/DL (ref 6.4–8.2)
PROTHROMBIN TIME: 13.8 SECONDS (ref 11.6–14.5)
RBC # BLD AUTO: 3.68 MILLION/UL (ref 3.81–5.12)
SODIUM SERPL-SCNC: 138 MMOL/L (ref 136–145)
VIT B12 SERPL-MCNC: 402 PG/ML (ref 100–900)
WBC # BLD AUTO: 5.8 THOUSAND/UL (ref 4.31–10.16)

## 2021-12-06 PROCEDURE — 85610 PROTHROMBIN TIME: CPT

## 2021-12-06 PROCEDURE — 83735 ASSAY OF MAGNESIUM: CPT

## 2021-12-06 PROCEDURE — 80053 COMPREHEN METABOLIC PANEL: CPT

## 2021-12-06 PROCEDURE — 85025 COMPLETE CBC W/AUTO DIFF WBC: CPT

## 2021-12-06 PROCEDURE — 82607 VITAMIN B-12: CPT

## 2021-12-08 ENCOUNTER — TELEPHONE (OUTPATIENT)
Dept: INTERNAL MEDICINE CLINIC | Facility: CLINIC | Age: 76
End: 2021-12-08

## 2021-12-09 ENCOUNTER — HOSPITAL ENCOUNTER (OUTPATIENT)
Dept: NON INVASIVE DIAGNOSTICS | Facility: CLINIC | Age: 76
Discharge: HOME | End: 2021-12-09
Payer: MEDICARE

## 2021-12-09 DIAGNOSIS — I47.1 SVT (SUPRAVENTRICULAR TACHYCARDIA) (HCC): ICD-10-CM

## 2021-12-09 PROCEDURE — 93226 XTRNL ECG REC<48 HR SCAN A/R: CPT

## 2021-12-09 PROCEDURE — 93225 XTRNL ECG REC<48 HRS REC: CPT

## 2021-12-13 PROCEDURE — 93227 XTRNL ECG REC<48 HR R&I: CPT | Performed by: INTERNAL MEDICINE

## 2021-12-20 ENCOUNTER — OFFICE VISIT (OUTPATIENT)
Dept: INTERNAL MEDICINE CLINIC | Facility: CLINIC | Age: 76
End: 2021-12-20
Payer: MEDICARE

## 2021-12-20 ENCOUNTER — APPOINTMENT (OUTPATIENT)
Dept: LAB | Facility: CLINIC | Age: 76
End: 2021-12-20
Payer: MEDICARE

## 2021-12-20 ENCOUNTER — ANTICOAG VISIT (OUTPATIENT)
Dept: INTERNAL MEDICINE CLINIC | Facility: CLINIC | Age: 76
End: 2021-12-20

## 2021-12-20 ENCOUNTER — TELEPHONE (OUTPATIENT)
Dept: INTERNAL MEDICINE CLINIC | Facility: CLINIC | Age: 76
End: 2021-12-20

## 2021-12-20 VITALS
HEIGHT: 63 IN | WEIGHT: 146 LBS | DIASTOLIC BLOOD PRESSURE: 78 MMHG | OXYGEN SATURATION: 97 % | SYSTOLIC BLOOD PRESSURE: 140 MMHG | HEART RATE: 78 BPM | TEMPERATURE: 97.6 F | BODY MASS INDEX: 25.87 KG/M2

## 2021-12-20 DIAGNOSIS — M54.16 LUMBAR RADICULOPATHY: ICD-10-CM

## 2021-12-20 DIAGNOSIS — I81 PORTAL VEIN THROMBOSIS: Primary | ICD-10-CM

## 2021-12-20 DIAGNOSIS — E22.2 SIADH (SYNDROME OF INAPPROPRIATE ADH PRODUCTION) (HCC): ICD-10-CM

## 2021-12-20 DIAGNOSIS — E55.9 VITAMIN D DEFICIENCY: ICD-10-CM

## 2021-12-20 DIAGNOSIS — K21.9 GASTROESOPHAGEAL REFLUX DISEASE, UNSPECIFIED WHETHER ESOPHAGITIS PRESENT: ICD-10-CM

## 2021-12-20 DIAGNOSIS — K51.80 OTHER ULCERATIVE COLITIS WITHOUT COMPLICATION (HCC): ICD-10-CM

## 2021-12-20 DIAGNOSIS — E78.49 OTHER HYPERLIPIDEMIA: ICD-10-CM

## 2021-12-20 DIAGNOSIS — E53.8 VITAMIN B12 DEFICIENCY: ICD-10-CM

## 2021-12-20 DIAGNOSIS — M85.89 OSTEOPENIA OF MULTIPLE SITES: ICD-10-CM

## 2021-12-20 DIAGNOSIS — R42 DIZZINESS: ICD-10-CM

## 2021-12-20 DIAGNOSIS — M51.36 LUMBAR DEGENERATIVE DISC DISEASE: ICD-10-CM

## 2021-12-20 DIAGNOSIS — E83.42 HYPOMAGNESEMIA: ICD-10-CM

## 2021-12-20 DIAGNOSIS — R73.03 PREDIABETES: ICD-10-CM

## 2021-12-20 DIAGNOSIS — E03.9 ACQUIRED HYPOTHYROIDISM: ICD-10-CM

## 2021-12-20 DIAGNOSIS — G25.0 ESSENTIAL TREMOR: ICD-10-CM

## 2021-12-20 DIAGNOSIS — I81 PORTAL VEIN THROMBOSIS: ICD-10-CM

## 2021-12-20 DIAGNOSIS — I10 ESSENTIAL HYPERTENSION: ICD-10-CM

## 2021-12-20 DIAGNOSIS — F33.1 MODERATE EPISODE OF RECURRENT MAJOR DEPRESSIVE DISORDER (HCC): ICD-10-CM

## 2021-12-20 DIAGNOSIS — Z00.00 HEALTH MAINTENANCE EXAMINATION: ICD-10-CM

## 2021-12-20 DIAGNOSIS — F41.9 ANXIETY: ICD-10-CM

## 2021-12-20 DIAGNOSIS — M35.09 SJOGREN'S SYNDROME WITH OTHER ORGAN INVOLVEMENT (HCC): ICD-10-CM

## 2021-12-20 DIAGNOSIS — S52.134S CLOSED NONDISPLACED FRACTURE OF NECK OF RIGHT RADIUS, SEQUELA: Primary | ICD-10-CM

## 2021-12-20 DIAGNOSIS — J45.20 MILD INTERMITTENT ASTHMA WITHOUT COMPLICATION: ICD-10-CM

## 2021-12-20 PROBLEM — S52.134A CLOSED NONDISPLACED FRACTURE OF NECK OF RIGHT RADIUS: Status: ACTIVE | Noted: 2021-10-18

## 2021-12-20 PROBLEM — S01.81XA CHIN LACERATION: Status: RESOLVED | Noted: 2021-10-18 | Resolved: 2021-12-20
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
## 2021-12-20 LAB
CRP SERPL QL: <3 MG/L
ERYTHROCYTE [SEDIMENTATION RATE] IN BLOOD: 18 MM/HOUR (ref 0–29)

## 2021-12-20 PROCEDURE — G0439 PPPS, SUBSEQ VISIT: HCPCS | Performed by: INTERNAL MEDICINE

## 2021-12-20 PROCEDURE — 99214 OFFICE O/P EST MOD 30 MIN: CPT | Performed by: INTERNAL MEDICINE

## 2021-12-20 PROCEDURE — 85652 RBC SED RATE AUTOMATED: CPT

## 2021-12-20 PROCEDURE — 86140 C-REACTIVE PROTEIN: CPT

## 2021-12-20 PROCEDURE — 86235 NUCLEAR ANTIGEN ANTIBODY: CPT

## 2021-12-20 RX ORDER — MECLIZINE HYDROCHLORIDE 25 MG/1
25 TABLET ORAL EVERY 6 HOURS PRN
Qty: 90 TABLET | Refills: 1 | Status: SHIPPED | OUTPATIENT
Start: 2021-12-20

## 2021-12-20 RX ORDER — HYDROCODONE BITARTRATE AND ACETAMINOPHEN 5; 325 MG/1; MG/1
1 TABLET ORAL EVERY 6 HOURS PRN
Qty: 120 TABLET | Refills: 0 | Status: SHIPPED | OUTPATIENT
Start: 2021-12-20 | End: 2022-01-02 | Stop reason: SDUPTHER

## 2021-12-20 NOTE — TELEPHONE ENCOUNTER
Too early to fill [facility]  They only allow a 48 hour lola period  Patient picked up on 12/3 and should have enough until 1/2/22  Since she is traveling out of state, we can send the refill electronically to wherever she's  going or give her a written prescription for her to take with her

## 2021-12-21 ENCOUNTER — APPOINTMENT (OUTPATIENT)
Dept: RADIOLOGY | Facility: OTHER | Age: 76
End: 2021-12-21
Payer: MEDICARE

## 2021-12-21 ENCOUNTER — OFFICE VISIT (OUTPATIENT)
Dept: OBGYN CLINIC | Facility: OTHER | Age: 76
End: 2021-12-21
Payer: MEDICARE

## 2021-12-21 VITALS
DIASTOLIC BLOOD PRESSURE: 75 MMHG | HEIGHT: 63 IN | SYSTOLIC BLOOD PRESSURE: 169 MMHG | BODY MASS INDEX: 26.4 KG/M2 | HEART RATE: 69 BPM | WEIGHT: 149 LBS

## 2021-12-21 DIAGNOSIS — M25.561 ACUTE PAIN OF BOTH KNEES: ICD-10-CM

## 2021-12-21 DIAGNOSIS — M25.562 CHRONIC PAIN OF BOTH KNEES: ICD-10-CM

## 2021-12-21 DIAGNOSIS — M25.562 ACUTE PAIN OF BOTH KNEES: ICD-10-CM

## 2021-12-21 DIAGNOSIS — M25.561 CHRONIC PAIN OF BOTH KNEES: ICD-10-CM

## 2021-12-21 DIAGNOSIS — M17.0 BILATERAL PRIMARY OSTEOARTHRITIS OF KNEE: Primary | ICD-10-CM

## 2021-12-21 DIAGNOSIS — G89.29 CHRONIC PAIN OF BOTH KNEES: ICD-10-CM

## 2021-12-21 DIAGNOSIS — I10 ESSENTIAL HYPERTENSION: ICD-10-CM

## 2021-12-21 LAB
ENA SS-A AB SER-ACNC: <0.2 AI (ref 0–0.9)
ENA SS-B AB SER-ACNC: <0.2 AI (ref 0–0.9)

## 2021-12-21 PROCEDURE — 20610 DRAIN/INJ JOINT/BURSA W/O US: CPT | Performed by: FAMILY MEDICINE

## 2021-12-21 PROCEDURE — 73564 X-RAY EXAM KNEE 4 OR MORE: CPT

## 2021-12-21 PROCEDURE — 99214 OFFICE O/P EST MOD 30 MIN: CPT | Performed by: FAMILY MEDICINE

## 2021-12-21 RX ORDER — AMLODIPINE BESYLATE 2.5 MG/1
2.5 TABLET ORAL DAILY
Qty: 90 TABLET | Refills: 3 | Status: SHIPPED | OUTPATIENT
Start: 2021-12-21 | End: 2022-03-29

## 2021-12-21 RX ADMIN — TRIAMCINOLONE ACETONIDE 40 MG: 40 INJECTION, SUSPENSION INTRA-ARTICULAR; INTRAMUSCULAR at 11:13

## 2021-12-21 RX ADMIN — LIDOCAINE HYDROCHLORIDE 4 ML: 10 INJECTION, SOLUTION INFILTRATION; PERINEURAL at 11:13

## 2021-12-22 ENCOUNTER — TELEPHONE (OUTPATIENT)
Dept: NEUROLOGY | Facility: CLINIC | Age: 76
End: 2021-12-22

## 2021-12-22 RX ORDER — TRIAMCINOLONE ACETONIDE 40 MG/ML
40 INJECTION, SUSPENSION INTRA-ARTICULAR; INTRAMUSCULAR
Status: COMPLETED | OUTPATIENT
Start: 2021-12-21 | End: 2021-12-21

## 2021-12-22 RX ORDER — LIDOCAINE HYDROCHLORIDE 10 MG/ML
4 INJECTION, SOLUTION INFILTRATION; PERINEURAL
Status: COMPLETED | OUTPATIENT
Start: 2021-12-21 | End: 2021-12-21

## 2021-12-23 DIAGNOSIS — M35.00 SJOGREN'S SYNDROME, WITH UNSPECIFIED ORGAN INVOLVEMENT (HCC): ICD-10-CM

## 2021-12-23 DIAGNOSIS — J45.40 MODERATE PERSISTENT ASTHMA WITHOUT COMPLICATION: ICD-10-CM

## 2021-12-23 RX ORDER — GABAPENTIN 100 MG/1
200 CAPSULE ORAL 2 TIMES DAILY
Qty: 360 CAPSULE | Refills: 1 | Status: SHIPPED | OUTPATIENT
Start: 2021-12-23 | End: 2022-01-17 | Stop reason: SDUPTHER

## 2021-12-27 DIAGNOSIS — J45.40 MODERATE PERSISTENT ASTHMA WITHOUT COMPLICATION: ICD-10-CM

## 2022-01-01 DIAGNOSIS — F41.9 ANXIETY: ICD-10-CM

## 2022-01-02 RX ORDER — HYDROCODONE BITARTRATE AND ACETAMINOPHEN 5; 325 MG/1; MG/1
1 TABLET ORAL EVERY 6 HOURS PRN
Qty: 120 TABLET | Refills: 0 | Status: SHIPPED | OUTPATIENT
Start: 2022-01-02 | End: 2022-03-17 | Stop reason: SDUPTHER

## 2022-01-02 RX ORDER — LORAZEPAM 1 MG/1
1 TABLET ORAL EVERY 6 HOURS PRN
Qty: 120 TABLET | Refills: 0 | Status: SHIPPED | OUTPATIENT
Start: 2022-01-02 | End: 2022-03-17 | Stop reason: SDUPTHER

## 2022-01-03 RX ORDER — ESCITALOPRAM OXALATE 20 MG/1
TABLET ORAL
Qty: 90 TABLET | Refills: 1 | Status: SHIPPED | OUTPATIENT
Start: 2022-01-03 | End: 2022-03-24

## 2022-01-05 ENCOUNTER — ANTICOAG VISIT (OUTPATIENT)
Dept: INTERNAL MEDICINE CLINIC | Facility: CLINIC | Age: 77
End: 2022-01-05

## 2022-01-05 ENCOUNTER — TELEPHONE (OUTPATIENT)
Dept: INTERNAL MEDICINE CLINIC | Facility: CLINIC | Age: 77
End: 2022-01-05

## 2022-01-05 DIAGNOSIS — F41.9 ANXIETY: ICD-10-CM

## 2022-01-05 DIAGNOSIS — I81 PORTAL VEIN THROMBOSIS: Primary | ICD-10-CM

## 2022-01-05 DIAGNOSIS — I10 ESSENTIAL HYPERTENSION: ICD-10-CM

## 2022-01-05 LAB — SL AMB POCT INR: 3.3

## 2022-01-05 RX ORDER — DILTIAZEM HYDROCHLORIDE 180 MG/1
180 CAPSULE, COATED, EXTENDED RELEASE ORAL DAILY
Qty: 90 CAPSULE | Refills: 1 | Status: SHIPPED | OUTPATIENT
Start: 2022-01-05 | End: 2022-03-24

## 2022-01-05 RX ORDER — LORAZEPAM 1 MG/1
1 TABLET ORAL EVERY 6 HOURS PRN
Qty: 120 TABLET | Refills: 0 | Status: CANCELLED | OUTPATIENT
Start: 2022-01-05

## 2022-01-05 NOTE — TELEPHONE ENCOUNTER
INR elevated at 3 3  Please adjust warfarin: 2 5 mg x 5 days, 5 mg x 2 days  Repeat INR in 1 week  Please update flow sheet 
Pt notified and updated
What Type Of Note Output Would You Prefer (Optional)?: Standard Output
How Severe Is Your Rash?: moderate
Is This A New Presentation, Or A Follow-Up?: Rash

## 2022-01-05 NOTE — TELEPHONE ENCOUNTER
Please let her know I sent the refill over the weekend  Please have her call the pharmacy if they are able to process it

## 2022-01-12 ENCOUNTER — TELEPHONE (OUTPATIENT)
Dept: INTERNAL MEDICINE CLINIC | Facility: CLINIC | Age: 77
End: 2022-01-12

## 2022-01-12 LAB
INR PPP: 2.8 (ref 0.84–1.19)
INR PPP: 2.8 (ref 0.84–1.19)

## 2022-01-12 NOTE — TELEPHONE ENCOUNTER
INR at goal 2 8  Continue current dosing  Please clarify if 2 5 mg x 5 days, 5 mg x 2 days  Repeat in 1 month  Please update flow sheet

## 2022-01-13 ENCOUNTER — ANTICOAG VISIT (OUTPATIENT)
Dept: INTERNAL MEDICINE CLINIC | Facility: CLINIC | Age: 77
End: 2022-01-13

## 2022-01-14 DIAGNOSIS — K21.9 GASTROESOPHAGEAL REFLUX DISEASE: ICD-10-CM

## 2022-01-14 RX ORDER — PANTOPRAZOLE SODIUM 40 MG/1
40 TABLET, DELAYED RELEASE ORAL DAILY
Qty: 90 TABLET | Refills: 1 | Status: SHIPPED | OUTPATIENT
Start: 2022-01-14 | End: 2022-05-06 | Stop reason: SDUPTHER

## 2022-01-17 DIAGNOSIS — M35.00 SJOGREN'S SYNDROME, WITH UNSPECIFIED ORGAN INVOLVEMENT (HCC): ICD-10-CM

## 2022-01-17 RX ORDER — GABAPENTIN 100 MG/1
200 CAPSULE ORAL 2 TIMES DAILY
Qty: 120 CAPSULE | Refills: 2 | Status: SHIPPED | OUTPATIENT
Start: 2022-01-17 | End: 2022-04-13

## 2022-02-04 DIAGNOSIS — I10 ESSENTIAL HYPERTENSION: ICD-10-CM

## 2022-02-04 RX ORDER — LISINOPRIL 20 MG/1
20 TABLET ORAL 2 TIMES DAILY
Qty: 180 TABLET | Refills: 1 | Status: SHIPPED | OUTPATIENT
Start: 2022-02-04 | End: 2022-08-08

## 2022-02-07 LAB — INR PPP: 2.9 (ref 0.84–1.19)

## 2022-02-08 ENCOUNTER — ANTICOAG VISIT (OUTPATIENT)
Dept: INTERNAL MEDICINE CLINIC | Facility: CLINIC | Age: 77
End: 2022-02-08

## 2022-02-15 DIAGNOSIS — I10 ESSENTIAL HYPERTENSION: ICD-10-CM

## 2022-02-15 RX ORDER — DILTIAZEM HYDROCHLORIDE 60 MG/1
60 TABLET, FILM COATED ORAL DAILY
Qty: 90 TABLET | Refills: 0 | Status: SHIPPED | OUTPATIENT
Start: 2022-02-15 | End: 2022-05-05 | Stop reason: SDUPTHER

## 2022-02-17 ENCOUNTER — ANTICOAG VISIT (OUTPATIENT)
Dept: INTERNAL MEDICINE CLINIC | Facility: CLINIC | Age: 77
End: 2022-02-17

## 2022-02-17 LAB
INR PPP: 2 (ref 0.84–1.19)
INR PPP: 2 (ref 0.84–1.19)

## 2022-03-10 ENCOUNTER — TELEPHONE (OUTPATIENT)
Dept: NEUROLOGY | Facility: CLINIC | Age: 77
End: 2022-03-10

## 2022-03-17 DIAGNOSIS — F41.9 ANXIETY: ICD-10-CM

## 2022-03-17 DIAGNOSIS — M54.16 LUMBAR RADICULOPATHY: ICD-10-CM

## 2022-03-17 RX ORDER — LORAZEPAM 1 MG/1
1 TABLET ORAL EVERY 6 HOURS PRN
Qty: 120 TABLET | Refills: 0 | Status: SHIPPED | OUTPATIENT
Start: 2022-03-17 | End: 2022-04-22 | Stop reason: SDUPTHER

## 2022-03-17 RX ORDER — HYDROCODONE BITARTRATE AND ACETAMINOPHEN 5; 325 MG/1; MG/1
1 TABLET ORAL EVERY 6 HOURS PRN
Qty: 120 TABLET | Refills: 0 | Status: SHIPPED | OUTPATIENT
Start: 2022-03-17 | End: 2022-04-22 | Stop reason: SDUPTHER

## 2022-03-19 ENCOUNTER — LAB (OUTPATIENT)
Dept: LAB | Facility: CLINIC | Age: 77
End: 2022-03-19
Payer: MEDICARE

## 2022-03-19 DIAGNOSIS — R73.03 PREDIABETES: ICD-10-CM

## 2022-03-19 DIAGNOSIS — K51.80 OTHER ULCERATIVE COLITIS WITHOUT COMPLICATION (HCC): ICD-10-CM

## 2022-03-19 DIAGNOSIS — I47.1 SVT (SUPRAVENTRICULAR TACHYCARDIA) (HCC): ICD-10-CM

## 2022-03-19 DIAGNOSIS — E55.9 VITAMIN D DEFICIENCY: ICD-10-CM

## 2022-03-19 DIAGNOSIS — E83.42 HYPOMAGNESEMIA: ICD-10-CM

## 2022-03-19 DIAGNOSIS — E03.9 ACQUIRED HYPOTHYROIDISM: ICD-10-CM

## 2022-03-19 DIAGNOSIS — E78.49 OTHER HYPERLIPIDEMIA: ICD-10-CM

## 2022-03-19 DIAGNOSIS — I10 HYPERTENSION, UNSPECIFIED TYPE: ICD-10-CM

## 2022-03-19 DIAGNOSIS — I45.10 INCOMPLETE RIGHT BUNDLE BRANCH BLOCK (RBBB): ICD-10-CM

## 2022-03-19 LAB
25(OH)D3 SERPL-MCNC: 34 NG/ML (ref 30–100)
ALBUMIN SERPL BCP-MCNC: 3.3 G/DL (ref 3.5–5)
ALP SERPL-CCNC: 72 U/L (ref 46–116)
ALT SERPL W P-5'-P-CCNC: 23 U/L (ref 12–78)
ANION GAP SERPL CALCULATED.3IONS-SCNC: 8 MMOL/L (ref 4–13)
AST SERPL W P-5'-P-CCNC: 25 U/L (ref 5–45)
BASOPHILS # BLD AUTO: 0.07 THOUSANDS/ΜL (ref 0–0.1)
BASOPHILS NFR BLD AUTO: 1 % (ref 0–1)
BILIRUB SERPL-MCNC: 0.39 MG/DL (ref 0.2–1)
BUN SERPL-MCNC: 10 MG/DL (ref 5–25)
CALCIUM ALBUM COR SERPL-MCNC: 9.2 MG/DL (ref 8.3–10.1)
CALCIUM SERPL-MCNC: 8.6 MG/DL (ref 8.3–10.1)
CHLORIDE SERPL-SCNC: 97 MMOL/L (ref 100–108)
CHOLEST SERPL-MCNC: 174 MG/DL
CO2 SERPL-SCNC: 32 MMOL/L (ref 21–32)
CREAT SERPL-MCNC: 0.83 MG/DL (ref 0.6–1.3)
EOSINOPHIL # BLD AUTO: 0.19 THOUSAND/ΜL (ref 0–0.61)
EOSINOPHIL NFR BLD AUTO: 3 % (ref 0–6)
ERYTHROCYTE [DISTWIDTH] IN BLOOD BY AUTOMATED COUNT: 14.6 % (ref 11.6–15.1)
EST. AVERAGE GLUCOSE BLD GHB EST-MCNC: 120 MG/DL
GFR SERPL CREATININE-BSD FRML MDRD: 68 ML/MIN/1.73SQ M
GLUCOSE P FAST SERPL-MCNC: 96 MG/DL (ref 65–99)
HBA1C MFR BLD: 5.8 %
HCT VFR BLD AUTO: 35.7 % (ref 34.8–46.1)
HDLC SERPL-MCNC: 74 MG/DL
HGB BLD-MCNC: 11.5 G/DL (ref 11.5–15.4)
IMM GRANULOCYTES # BLD AUTO: 0.01 THOUSAND/UL (ref 0–0.2)
IMM GRANULOCYTES NFR BLD AUTO: 0 % (ref 0–2)
INR PPP: 3.45 (ref 0.84–1.19)
LDLC SERPL CALC-MCNC: 92 MG/DL (ref 0–100)
LYMPHOCYTES # BLD AUTO: 1.92 THOUSANDS/ΜL (ref 0.6–4.47)
LYMPHOCYTES NFR BLD AUTO: 32 % (ref 14–44)
MAGNESIUM SERPL-MCNC: 1.8 MG/DL (ref 1.6–2.6)
MCH RBC QN AUTO: 31.6 PG (ref 26.8–34.3)
MCHC RBC AUTO-ENTMCNC: 32.2 G/DL (ref 31.4–37.4)
MCV RBC AUTO: 98 FL (ref 82–98)
MONOCYTES # BLD AUTO: 0.77 THOUSAND/ΜL (ref 0.17–1.22)
MONOCYTES NFR BLD AUTO: 13 % (ref 4–12)
NEUTROPHILS # BLD AUTO: 3.01 THOUSANDS/ΜL (ref 1.85–7.62)
NEUTS SEG NFR BLD AUTO: 51 % (ref 43–75)
NONHDLC SERPL-MCNC: 100 MG/DL
NRBC BLD AUTO-RTO: 0 /100 WBCS
PLATELET # BLD AUTO: 494 THOUSANDS/UL (ref 149–390)
PMV BLD AUTO: 9 FL (ref 8.9–12.7)
POTASSIUM SERPL-SCNC: 4 MMOL/L (ref 3.5–5.3)
PROT SERPL-MCNC: 6.8 G/DL (ref 6.4–8.2)
PROTHROMBIN TIME: 34 SECONDS (ref 11.6–14.5)
RBC # BLD AUTO: 3.64 MILLION/UL (ref 3.81–5.12)
SODIUM SERPL-SCNC: 137 MMOL/L (ref 136–145)
TRIGL SERPL-MCNC: 38 MG/DL
TSH SERPL DL<=0.05 MIU/L-ACNC: 1.29 UIU/ML (ref 0.36–3.74)
WBC # BLD AUTO: 5.97 THOUSAND/UL (ref 4.31–10.16)

## 2022-03-19 PROCEDURE — 83735 ASSAY OF MAGNESIUM: CPT

## 2022-03-19 PROCEDURE — 80053 COMPREHEN METABOLIC PANEL: CPT

## 2022-03-19 PROCEDURE — 36415 COLL VENOUS BLD VENIPUNCTURE: CPT | Performed by: INTERNAL MEDICINE

## 2022-03-19 PROCEDURE — 84443 ASSAY THYROID STIM HORMONE: CPT

## 2022-03-19 PROCEDURE — 85025 COMPLETE CBC W/AUTO DIFF WBC: CPT

## 2022-03-19 PROCEDURE — 82306 VITAMIN D 25 HYDROXY: CPT

## 2022-03-19 PROCEDURE — 83036 HEMOGLOBIN GLYCOSYLATED A1C: CPT

## 2022-03-19 PROCEDURE — 85610 PROTHROMBIN TIME: CPT | Performed by: INTERNAL MEDICINE

## 2022-03-19 PROCEDURE — 80061 LIPID PANEL: CPT

## 2022-03-20 ENCOUNTER — TELEPHONE (OUTPATIENT)
Dept: INTERNAL MEDICINE CLINIC | Facility: CLINIC | Age: 77
End: 2022-03-20

## 2022-03-20 NOTE — TELEPHONE ENCOUNTER
INR high  Adjust warfarin, take 2 5 mg 6 days a week, 5 mg x 1 day  Recheck in 2 weeks  Please update flow sheet

## 2022-03-21 ENCOUNTER — ANTICOAG VISIT (OUTPATIENT)
Dept: INTERNAL MEDICINE CLINIC | Facility: CLINIC | Age: 77
End: 2022-03-21

## 2022-03-24 DIAGNOSIS — E78.49 OTHER HYPERLIPIDEMIA: ICD-10-CM

## 2022-03-24 DIAGNOSIS — I10 ESSENTIAL HYPERTENSION: ICD-10-CM

## 2022-03-24 DIAGNOSIS — F41.9 ANXIETY: ICD-10-CM

## 2022-03-24 RX ORDER — SIMVASTATIN 5 MG
5 TABLET ORAL DAILY
Qty: 90 TABLET | Refills: 1 | Status: SHIPPED | OUTPATIENT
Start: 2022-03-24

## 2022-03-24 RX ORDER — DILTIAZEM HYDROCHLORIDE 180 MG/1
180 CAPSULE, COATED, EXTENDED RELEASE ORAL DAILY
Qty: 90 CAPSULE | Refills: 1 | Status: SHIPPED | OUTPATIENT
Start: 2022-03-24

## 2022-03-24 RX ORDER — SIMVASTATIN 5 MG
TABLET ORAL
Qty: 90 TABLET | Refills: 1 | Status: SHIPPED | OUTPATIENT
Start: 2022-03-24 | End: 2022-03-24 | Stop reason: SDUPTHER

## 2022-03-24 RX ORDER — ESCITALOPRAM OXALATE 20 MG/1
TABLET ORAL
Qty: 90 TABLET | Refills: 1 | Status: SHIPPED | OUTPATIENT
Start: 2022-03-24

## 2022-03-24 RX ORDER — DILTIAZEM HYDROCHLORIDE 180 MG/1
CAPSULE, COATED, EXTENDED RELEASE ORAL
Qty: 90 CAPSULE | Refills: 1 | Status: SHIPPED | OUTPATIENT
Start: 2022-03-24 | End: 2022-03-24 | Stop reason: SDUPTHER

## 2022-03-25 ENCOUNTER — TELEPHONE (OUTPATIENT)
Dept: INTERNAL MEDICINE CLINIC | Facility: CLINIC | Age: 77
End: 2022-03-25

## 2022-03-25 DIAGNOSIS — M51.36 LUMBAR DEGENERATIVE DISC DISEASE: ICD-10-CM

## 2022-03-25 RX ORDER — GABAPENTIN 600 MG/1
TABLET ORAL
Qty: 360 TABLET | Refills: 2 | Status: SHIPPED | OUTPATIENT
Start: 2022-03-25

## 2022-03-25 NOTE — TELEPHONE ENCOUNTER
Pharmacist wants to confirm that pt  Is taking Diltiazem ER 180mg daily and Diltiazem 60mg tab and Amlodipine 2 5 mg tab

## 2022-03-29 ENCOUNTER — OFFICE VISIT (OUTPATIENT)
Dept: CARDIOLOGY CLINIC | Facility: CLINIC | Age: 77
End: 2022-03-29
Payer: MEDICARE

## 2022-03-29 VITALS
DIASTOLIC BLOOD PRESSURE: 68 MMHG | WEIGHT: 157.1 LBS | HEART RATE: 64 BPM | SYSTOLIC BLOOD PRESSURE: 136 MMHG | HEIGHT: 63 IN | BODY MASS INDEX: 27.84 KG/M2 | OXYGEN SATURATION: 97 %

## 2022-03-29 DIAGNOSIS — I47.1 SVT (SUPRAVENTRICULAR TACHYCARDIA) (HCC): ICD-10-CM

## 2022-03-29 DIAGNOSIS — I47.1 SUPRAVENTRICULAR TACHYCARDIA (HCC): ICD-10-CM

## 2022-03-29 DIAGNOSIS — I10 ESSENTIAL HYPERTENSION: Primary | ICD-10-CM

## 2022-03-29 DIAGNOSIS — I45.10 INCOMPLETE RIGHT BUNDLE BRANCH BLOCK (RBBB): ICD-10-CM

## 2022-03-29 DIAGNOSIS — I10 HYPERTENSION, UNSPECIFIED TYPE: ICD-10-CM

## 2022-03-29 DIAGNOSIS — E87.6 HYPOKALEMIA WITH NORMAL ACID-BASE BALANCE: ICD-10-CM

## 2022-03-29 DIAGNOSIS — E87.6 HYPOKALEMIA: ICD-10-CM

## 2022-03-29 PROCEDURE — 99213 OFFICE O/P EST LOW 20 MIN: CPT | Performed by: INTERNAL MEDICINE

## 2022-03-29 RX ORDER — AMLODIPINE BESYLATE 2.5 MG/1
2.5 TABLET ORAL DAILY
COMMUNITY

## 2022-03-29 NOTE — PROGRESS NOTES
Follow-up - Cardiology   Yessenia Novoa 67 y o  female MRN: [id number]        Problems    Problem List Items Addressed This Visit        Cardiovascular and Mediastinum    Essential hypertension - Primary    Relevant Medications    amLODIPine (NORVASC) 2 5 mg tablet    Supraventricular tachycardia (HCC)    Relevant Medications    amLODIPine (NORVASC) 2 5 mg tablet      Other Visit Diagnoses     Incomplete right bundle branch block (RBBB)        Hypokalemia        SVT (supraventricular tachycardia) (HCC)        Relevant Medications    amLODIPine (NORVASC) 2 5 mg tablet    Hypertension, unspecified type        Relevant Medications    amLODIPine (NORVASC) 2 5 mg tablet    Hypokalemia with normal acid-base balance                Plan and discussion   From a cardiac standpoint patient has premature atrial contractions in the past some supraventricular tachycardia but of recently she has primarily relatively few PACs relatively few PVCs on Holter counter done December 2021  The other issue is been blood pressure in as well controlled as well  She has multiple other issues that include a history of: Ectopies  Anemia  Brain stimulator for tremor  History of colectomy   History of asthma  History Sjogren's   A needed needs to be replaced  And back pain for which she gets injections  She is also on a you just does of gabapentin for peripheral neuropathy  From a cardiac standpoint her no changes in medication  She is not on a beta-blocker and I do not think she needs 1 at this point          HPI: Yessenia Novoa is a 67y o  year old female      Plan and discussion  Patient seen November 22, 2021  Her supraventricular tachycardia PACs seen well controlled  Her blood pressure remains elevated  When add Norvasc 2 5 mg daily  She is already on 40 mg of lisinopril  Also get another Holter counter      Apparently the treatment for the breast carcinomas at the moment quiescent  Other issues include the following  History of colectomy   Mild anemia   Brain stimulator for tremor  His history of colectomy   History of asthma   History of Sjogren's              HPI: Mitul Mancuso is a 67y o  year old female      Plan and discussion   Patient seen October 25, 2021     Since I last saw her i e  in February 2021 she had a breast removed  Tiffany Weber tells me she had negative nodes and she is not getting any chemotherapy or radiation     She has a stimulator in place for tremor   That is much improved     From a cardiac standpoint she has had some evidence of supraventricular tachycardia     She is on diltiazem     Holter counter in August 2021 shows only 300 PACs and about 300 PVCs     The longest SVT runs only 5 beats in duration     She is asymptomatic  Her other issues are as follows     She takes chronic Coumadin for history of DVT and emboli  History colectomy  Mild anemia  Brain stimulator as noted  History of hypothyroidism   History of asthma     History Sjogren's disease        From our standpoint we will see her in upwards of a year   Recheck a Holter counter  Her routine lab work in October 2021 shows decreased potassium     We can add 20 mEq of potassium to her regimen I will get another BMP within 2 weeks  We will re  see her regarding her potassium issues           Plan patient seen September 21, 2020  We had seen in her the end of 2019 for clearance   That is clearance for surgery   She had her better replaced in December of 2019  She has a history of supraventricular tachycardia first-degree AV block  Holter counter recently done shows only 2% PACs no evidence of supraventricular tachycardia  She is on diltiazem  She is asymptomatic    Other issues include Sjogren's disease  Asthma  Hypothyroidism  Mild anemia  Brain stimulator for tremor  Neuropathy  Coumadin for history of DVT in emboli  History colectomy  From a cardiac standpoint she is asymptomatic      Plan just to get another Holter counter in 1 year               HPI: Matilda Day is Lidia Gaitan y o  year old female    Plan patient seen after 1 year  Patient had initially been seen for clearance for surgery  His first-degree AV block and a history supraventricular tachycardia  Holter counter recently done shows a 6 run beat run of SVT approximately 400 PVCs and PACs  She is asymptomatic  There is no high-grade block  No change in medication  Her LDL is approximately 100  Her other issues include Sjogren's disease  History of asthma  Hypothyroidism  Mild anemia  Brain stimulator for tremor  All the above seem to be skin stable as well              HPI: Matilda Day is a 74 y  o  year old female History of Present Illness  Cardiology HPI Free Text Note Form [facility]: Ms Sid Thompson is a 66year old woman with HTN, dyslipidemia, and SVT presents for pre-operative risk stratification prior to DBS battery placement  From a cardiac standpoint, she is doing well  No chest pain, shortness of breath, or palpitations  Is on warfarin for portal vein thrombosis  Patient seen July 19, 2017  Her list of issues of breath along  She has a history of asthma, Sjogren's, thyroid disorder, post colectomy, chronic anemia, brain stimulator for tremor, recent change of death stimulator, chronic use of Coumadin because of a DVT history of poor embolus history as well as a family history of emboli  Coumadin was recommended by hematology  From my standpoint she is a first-degree AV block and she's had some short episodes of SVT  She is on a low dose until today's exam  Her LDL is 80 is 87 and 92  A1c 5 7  From cardiac standpoint she's basically asymptomatic  We will do a Holter       HPI: Matilda Day is [id number] y  o  year old female        Review of Systems   Constitutional: Negative  Respiratory: Negative  Cardiovascular: Positive for palpitations           Past Medical History:   Diagnosis Date    Anemia     Anxiety     Arrhythmia     Arthritis     Asthma     Blood clot in vein     portal vein    Breast cancer (HCC) Left    Breast lump     [id number] RESOLVED    Cancer (HCC)     Depression     Disease of thyroid gland     DVT, lower extremity (HCC)     GERD (gastroesophageal reflux disease)     Hyperlipidemia     Hypertension     Hypokalemia     Hyponatremia     Hypothyroidism     Iron deficiency anemia     Irregular heart beat     Manic behavior ([address] )     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HCC)     Osteoarthritis     Palpitations     [id number]  RESOLVED    Paroxysmal supraventricular tachycardia (HCC)     PE (pulmonary thromboembolism) (HCC)     Sjoegren syndrome     Spinal stenosis     Thrombocytosis     [id number]  RESOLVED    Tremors of nervous system     dbs implanted right and left chest    Ulcerative colitis (HCC)     Vertigo     [id number] RESOLVED     Social History     Substance and Sexual Activity   Alcohol Use Yes     Social History     Substance and Sexual Activity   Drug Use No     Social History     Tobacco Use   Smoking Status Former Smoker    Packs/day: 2 00    Years: 5 00    Pack years: 10 00    Quit date: 65    Years since quittin 2   Smokeless Tobacco Never Used   Tobacco Comment    Quit       Allergies:   Allergies   Allergen Reactions    Macrobid [Nitrofurantoin Monohyd Macro] Rash    Penicillins Rash     Annotation - [id number]: can take cephalosporins    Sulfa Antibiotics Rash    Indocin [Indomethacin] GI Intolerance and Dizziness       Medications:     Current Outpatient Medications:     acetaminophen (TYLENOL) 325 mg tablet, Take 2 tablets (650 mg total) by mouth every 6 (six) hours as needed for mild pain, Disp: , Rfl: 0    albuterol (PROVENTIL HFA,VENTOLIN HFA) 90 mcg/act inhaler, Inhale 2 puffs every 6 (six) hours as needed for wheezing, Disp: 1 Inhaler, Rfl: 1    amLODIPine (NORVASC) 2 5 mg tablet, Take 2 5 mg by mouth daily, Disp: , Rfl:     buPROPion (WELLBUTRIN XL) 300 mg 24 hr tablet, Take 1 tablet (300 mg total) by mouth daily With 150 mg (Patient taking differently: Take 300 mg by mouth daily ), Disp: 90 tablet, Rfl: 1    Calcium Carb-Cholecalciferol (CALCIUM 600-D PO), Take 1 tablet by mouth 2 (two) times a day, Disp: , Rfl:     cetirizine (ZyrTEC) 10 mg tablet, Take 10 mg by mouth daily, Disp: , Rfl:     Coenzyme Q10 (CO Q-10 PO), Take 1 capsule by mouth daily, Disp: , Rfl:     diltiazem (CARDIZEM CD) 180 mg 24 hr capsule, Take 1 capsule (180 mg total) by mouth daily, Disp: 90 capsule, Rfl: 1    diltiazem (CARDIZEM) 60 mg tablet, Take 1 tablet (60 mg total) by mouth daily, Disp: 90 tablet, Rfl: 0    escitalopram (LEXAPRO) 20 mg tablet, TAKE ONE TABLET BY MOUTH EVERY DAY, Disp: 90 tablet, Rfl: 1    fluticasone (FLONASE) 50 mcg/act nasal spray, 1 spray into each nostril daily as needed for rhinitis, Disp: 48 mL, Rfl: 1    fluticasone-salmeterol (Advair Diskus) 250-50 mcg/dose inhaler, Inhale 1 puff 2 (two) times a day Rinse mouth after use, Disp: 180 blister, Rfl: 3    gabapentin (NEURONTIN) 100 mg capsule, Take 2 capsules (200 mg total) by mouth 2 (two) times a day, Disp: 120 capsule, Rfl: 2    gabapentin (NEURONTIN) 600 MG tablet, TAKE ONE TABLET IN THE MORNING, ONE TABLET IN THE AFTERNOON, AND 2 TABLETS AT BEDTIME, Disp: 360 tablet, Rfl: 2    levothyroxine 50 mcg tablet, TAKE ONE TABLET BY MOUTH EVERY DAY, Disp: 90 tablet, Rfl: 1    lisinopril (ZESTRIL) 20 mg tablet, Take 1 tablet (20 mg total) by mouth 2 (two) times a day, Disp: 180 tablet, Rfl: 1    loperamide (IMODIUM) 2 mg capsule, Take 2 mg by mouth 4 (four) times a day as needed  , Disp: , Rfl:     LORazepam (ATIVAN) 1 mg tablet, Take 1 tablet (1 mg total) by mouth every 6 (six) hours as needed for anxiety, Disp: 120 tablet, Rfl: 0    MAGNESIUM PO, Take 1 tablet by mouth daily, Disp: , Rfl:     meclizine (ANTIVERT) 25 mg tablet, Take 1 tablet (25 mg total) by mouth every 6 (six) hours as needed for dizziness, Disp: 90 tablet, Rfl: 1   Multiple Vitamin (MULTIVITAMIN ADULT PO), Take 1 tablet by mouth daily, Disp: , Rfl:     Omega-3 Fatty Acids (FISH OIL PO), Take 1 capsule by mouth daily, Disp: , Rfl:     pantoprazole (PROTONIX) 40 mg tablet, Take 1 tablet (40 mg total) by mouth daily, Disp: 90 tablet, Rfl: 1    potassium chloride (MICRO-K) 10 MEQ CR capsule, Take 2 capsules daily, Disp: 180 capsule, Rfl: 3    simvastatin (ZOCOR) 5 MG tablet, Take 1 tablet (5 mg total) by mouth daily, Disp: 90 tablet, Rfl: 1    sodium chloride 1 g tablet, Take 1 tablet (1 g total) by mouth 3 (three) times a day, Disp: 270 tablet, Rfl: 3    torsemide (DEMADEX) 10 mg tablet, Take 1 tablet (10 mg total) by mouth daily as needed (edema), Disp: 90 tablet, Rfl: 0    warfarin (COUMADIN) 5 mg tablet, Take 5 mg by mouth daily, Disp: , Rfl:     HYDROcodone-acetaminophen (NORCO) 5-325 mg per tablet, Take 1 tablet by mouth every 6 (six) hours as needed for pain Max Daily Amount: 4 tablets, Disp: 120 tablet, Rfl: 0      Physical Exam  Constitutional:       Appearance: She is normal weight  HENT:      Head:      Comments: She has some hoarseness apparently is been aggravated by her brain stimulator that is going to be readjusted  Cardiovascular:      Rate and Rhythm: Normal rate and regular rhythm  Pulses: Normal pulses  Heart sounds: No murmur heard  Comments: Additional extrasystole  Pulmonary:      Effort: Pulmonary effort is normal    Musculoskeletal:      Right lower leg: No edema  Left lower leg: No edema  Skin:     General: Skin is dry  Coloration: Skin is pale  Neurological:      Mental Status: She is oriented to person, place, and time     Psychiatric:         Behavior: Behavior normal            Laboratory Studies:  CMP:      Invalid input(s): ALBUMIN  NT-proBNP: No results found for: NTBNP   Coags:    Lipid Profile:   Lab Results   Component Value Date    CHOL 167 12/17/2015     Lab Results   Component Value Date    HDL 74 03/19/2022     Lab Results   Component Value Date    LDLCALC 92 03/19/2022     Lab Results   Component Value Date    TRIG 38 03/19/2022       Cardiac testing:     EKG reviewed personally:     No results found for this or any previous visit  No results found for this or any previous visit  No results found for this or any previous visit  No results found for this or any previous visit  Staci Fowler MD    Portions of the record may have been created with voice recognition software   Occasional wrong word or "sound a like" substitutions may have occurred due to the inherent limitations of voice recognition software   Read the chart carefully and recognize, using context, where substitutions have occurred

## 2022-03-30 ENCOUNTER — APPOINTMENT (OUTPATIENT)
Dept: LAB | Facility: CLINIC | Age: 77
End: 2022-03-30
Payer: MEDICARE

## 2022-03-30 DIAGNOSIS — Z79.01 LONG TERM (CURRENT) USE OF ANTICOAGULANTS: ICD-10-CM

## 2022-03-30 LAB
INR PPP: 0.91 (ref 0.84–1.19)
PROTHROMBIN TIME: 12.2 SECONDS (ref 11.6–14.5)

## 2022-03-30 PROCEDURE — 85610 PROTHROMBIN TIME: CPT

## 2022-03-31 ENCOUNTER — TELEPHONE (OUTPATIENT)
Dept: INTERNAL MEDICINE CLINIC | Facility: CLINIC | Age: 77
End: 2022-03-31

## 2022-04-04 ENCOUNTER — OFFICE VISIT (OUTPATIENT)
Dept: NEUROLOGY | Facility: CLINIC | Age: 77
End: 2022-04-04
Payer: MEDICARE

## 2022-04-04 VITALS
BODY MASS INDEX: 27.63 KG/M2 | DIASTOLIC BLOOD PRESSURE: 72 MMHG | WEIGHT: 156 LBS | SYSTOLIC BLOOD PRESSURE: 165 MMHG | HEART RATE: 68 BPM

## 2022-04-04 DIAGNOSIS — G25.0 ESSENTIAL TREMOR: Primary | ICD-10-CM

## 2022-04-04 PROCEDURE — 95984 ALYS BRN NPGT PRGRMG ADDL 15: CPT | Performed by: PSYCHIATRY & NEUROLOGY

## 2022-04-04 PROCEDURE — 95983 ALYS BRN NPGT PRGRMG 15 MIN: CPT | Performed by: PSYCHIATRY & NEUROLOGY

## 2022-04-04 NOTE — PROGRESS NOTES
Patient ID: Wilbur Holman is a 67 y o  female    Assessment/Plan:    Essential tremor  Patient presents with her main concern being changes in speech for the past 3 months  Apparently tremor has worsened about 3 months ago and increases the amplitude on both sides were made  On exam today she had moderate slurring of speech, fairly good control tremor on the left and moderate tremor with posture on the right  Deep brain stimulator was interrogated  Approximately 35 minutes was spent on DBS programming and changes made with improvement in speech  Final settings:  Left VIM- changed electrode configuration  Activa RC   1-3+, PW80, 185Hz, 2 3V, Imp 1033, 2 2mA - speech improved, adequate tremor control  Left old setting on Prior group     Right VIM - no change made  Activa RC   Group C  0+1-, 4 3V , PW90, 125Hz  0+2-, PW90, 125Hz, 3 8V    She remained on the settings are no change  She is to call prior to follow-up should she have any increase in tremor  Time spent discussing how increasing amplitude can lead to spread of stimulation causing worsening speech  Diagnoses and all orders for this visit:    Essential tremor        Subjective:      Luci Ojeda is a left handed female with peripheral neuropathy on Neurontin, spinal stenosis on gabapentin, anxiety and essential tremor s/p bilateral VIM DBS on 5/1/14 with right Activa RC 6/20/17, left RC 12/4/19, who presents for follow up  To review, tremors began in her late 52's with a mild intentional tremor on the left  This has progressed with time and spread to the right side as well  She is most bothered by speech which has worsened over the past 3 months  It is worse at the end of day when tired  Others ask her to repeat herself  Tremors are not as bothersome  They are most noticeable when eating   In retrospect she was shaking more about 3 months ago and they did increase amplitude of stimulation on both side at the time due to worsening tremor  She uses left hand for most activity and has been able to manage  Objective:     /72 (BP Location: Right arm, Patient Position: Sitting, Cuff Size: Adult)   Pulse 68   Wt 70 8 kg (156 lb)   BMI 27 63 kg/m²       Physical Exam  Vitals reviewed  Eyes:      Extraocular Movements: Extraocular movements intact  Pupils: Pupils are equal, round, and reactive to light  Neurological:      Deep Tendon Reflexes: Strength normal    Psychiatric:         Speech: Speech normal          Neurological Exam  Mental Status   Oriented to person, place, time and situation  Recent and remote memory are intact  Speech is normal  Follows complex commands  Attention and concentration are normal     Cranial Nerves  CN II: Right funduscopic exam: not visualized  Left funduscopic exam: not visualized  CN III, IV, VI: Extraocular movements intact bilaterally  Pupils equal round and reactive to light bilaterally  CN VII: Full and symmetric facial movement  CN VIII: Hearing is normal   CN XI: Shoulder shrug strength is normal   CN XII: Tongue midline without atrophy or fasciculations  Motor   Normal muscle tone  Strength is 5/5 throughout all four extremities  Sensory  Light touch is normal in upper and lower extremities  Coordination  Right: Finger-to-nose abnormality: Rapid alternating movement normal   Left: Finger-to-nose abnormality: Rapid alternating movement normal   Moderate postural tremor bilaterally with hands outstretched  Varies by position  Moderate  on right FTN  Fairly well controlled on the left  Significant slurring of speech on arrival   No head tremor       Gait  Casual gait is normal including stance, stride, and arm swing  Able to rise from chair without using arms      DBS Programming    Left RC  4 9V (increased between visits from 4 5), PW80, 185Hz,1-2+    Left Device off- improved speech  1-3+, PW80, 185Hz, 2V- mild improvement in tremor  2 2V- mild tremor, speech better Right RC   Group C  0+1-, 4 3V (increased since last seen from 3 9V), PW90, 125Hz  0+2-, PW90, 125Hz, 3 8V        Current Outpatient Medications on File Prior to Visit   Medication Sig Dispense Refill    acetaminophen (TYLENOL) 325 mg tablet Take 2 tablets (650 mg total) by mouth every 6 (six) hours as needed for mild pain  0    albuterol (PROVENTIL HFA,VENTOLIN HFA) 90 mcg/act inhaler Inhale 2 puffs every 6 (six) hours as needed for wheezing 1 Inhaler 1    amLODIPine (NORVASC) 2 5 mg tablet Take 2 5 mg by mouth daily      buPROPion (WELLBUTRIN XL) 300 mg 24 hr tablet Take 1 tablet (300 mg total) by mouth daily With 150 mg (Patient taking differently: Take 300 mg by mouth daily ) 90 tablet 1    Calcium Carb-Cholecalciferol (CALCIUM 600-D PO) Take 1 tablet by mouth 2 (two) times a day      cetirizine (ZyrTEC) 10 mg tablet Take 10 mg by mouth daily      Coenzyme Q10 (CO Q-10 PO) Take 1 capsule by mouth daily      diltiazem (CARDIZEM CD) 180 mg 24 hr capsule Take 1 capsule (180 mg total) by mouth daily 90 capsule 1    diltiazem (CARDIZEM) 60 mg tablet Take 1 tablet (60 mg total) by mouth daily 90 tablet 0    escitalopram (LEXAPRO) 20 mg tablet TAKE ONE TABLET BY MOUTH EVERY DAY 90 tablet 1    fluticasone (FLONASE) 50 mcg/act nasal spray 1 spray into each nostril daily as needed for rhinitis 48 mL 1    fluticasone-salmeterol (Advair Diskus) 250-50 mcg/dose inhaler Inhale 1 puff 2 (two) times a day Rinse mouth after use 180 blister 3    gabapentin (NEURONTIN) 100 mg capsule Take 2 capsules (200 mg total) by mouth 2 (two) times a day 120 capsule 2    gabapentin (NEURONTIN) 600 MG tablet TAKE ONE TABLET IN THE MORNING, ONE TABLET IN THE AFTERNOON, AND 2 TABLETS AT BEDTIME 360 tablet 2    HYDROcodone-acetaminophen (NORCO) 5-325 mg per tablet Take 1 tablet by mouth every 6 (six) hours as needed for pain Max Daily Amount: 4 tablets 120 tablet 0    levothyroxine 50 mcg tablet TAKE ONE TABLET BY MOUTH EVERY DAY 90 tablet 1    lisinopril (ZESTRIL) 20 mg tablet Take 1 tablet (20 mg total) by mouth 2 (two) times a day 180 tablet 1    loperamide (IMODIUM) 2 mg capsule Take 2 mg by mouth 4 (four) times a day as needed        LORazepam (ATIVAN) 1 mg tablet Take 1 tablet (1 mg total) by mouth every 6 (six) hours as needed for anxiety 120 tablet 0    MAGNESIUM PO Take 1 tablet by mouth daily      meclizine (ANTIVERT) 25 mg tablet Take 1 tablet (25 mg total) by mouth every 6 (six) hours as needed for dizziness 90 tablet 1    Multiple Vitamin (MULTIVITAMIN ADULT PO) Take 1 tablet by mouth daily      Omega-3 Fatty Acids (FISH OIL PO) Take 1 capsule by mouth daily      pantoprazole (PROTONIX) 40 mg tablet Take 1 tablet (40 mg total) by mouth daily 90 tablet 1    potassium chloride (MICRO-K) 10 MEQ CR capsule Take 2 capsules daily 180 capsule 3    simvastatin (ZOCOR) 5 MG tablet Take 1 tablet (5 mg total) by mouth daily 90 tablet 1    sodium chloride 1 g tablet Take 1 tablet (1 g total) by mouth 3 (three) times a day 270 tablet 3    torsemide (DEMADEX) 10 mg tablet Take 1 tablet (10 mg total) by mouth daily as needed (edema) 90 tablet 0    warfarin (COUMADIN) 5 mg tablet Take 5 mg by mouth daily       No current facility-administered medications on file prior to visit           Nixon Pritchard MD  Movement disorder physician  [email or web address]

## 2022-04-04 NOTE — ASSESSMENT & PLAN NOTE
Patient presents with her main concern being changes in speech for the past 3 months  Apparently tremor has worsened about 3 months ago and increases the amplitude on both sides were made  On exam today she had moderate slurring of speech, fairly good control tremor on the left and moderate tremor with posture on the right  Deep brain stimulator was interrogated  Approximately 35 minutes was spent on DBS programming and changes made with improvement in speech  Final settings:  Left VIM- changed electrode configuration  Activa RC   1-3+, PW80, 185Hz, 2 3V, Imp 1033, 2 2mA - speech improved, adequate tremor control  Left old setting on Prior group     Right VIM - no change made  Activa RC   Group C  0+1-, 4 3V , PW90, 125Hz  0+2-, PW90, 125Hz, 3 8V    She remained on the settings are no change  She is to call prior to follow-up should she have any increase in tremor  Time spent discussing how increasing amplitude can lead to spread of stimulation causing worsening speech

## 2022-04-04 NOTE — PROGRESS NOTES
Review of Systems   Constitutional: Negative  Negative for appetite change and fever  HENT: Negative  Negative for hearing loss, tinnitus, trouble swallowing and voice change  Eyes: Negative  Negative for photophobia and pain  Respiratory: Negative  Negative for shortness of breath  Cardiovascular: Negative  Negative for palpitations  Gastrointestinal: Negative  Negative for nausea and vomiting  Endocrine: Negative  Negative for cold intolerance  Genitourinary: Negative  Negative for dysuria, frequency and urgency  Musculoskeletal: Negative  Negative for myalgias and neck pain  Skin: Negative  Negative for rash  Neurological: Positive for tremors and speech difficulty  Negative for dizziness, seizures, syncope, facial asymmetry, weakness, light-headedness, numbness and headaches  Hematological: Negative  Does not bruise/bleed easily  Psychiatric/Behavioral: Negative  Negative for confusion, hallucinations and sleep disturbance  All other systems reviewed and are negative

## 2022-04-05 ENCOUNTER — OFFICE VISIT (OUTPATIENT)
Dept: SURGICAL ONCOLOGY | Facility: CLINIC | Age: 77
End: 2022-04-05
Payer: MEDICARE

## 2022-04-05 VITALS
HEART RATE: 71 BPM | HEIGHT: 63 IN | TEMPERATURE: 98.2 F | SYSTOLIC BLOOD PRESSURE: 120 MMHG | WEIGHT: 153 LBS | BODY MASS INDEX: 27.11 KG/M2 | OXYGEN SATURATION: 98 % | DIASTOLIC BLOOD PRESSURE: 74 MMHG | RESPIRATION RATE: 16 BRPM

## 2022-04-05 DIAGNOSIS — Z85.3 HISTORY OF LEFT BREAST CANCER: Primary | ICD-10-CM

## 2022-04-05 PROCEDURE — 99213 OFFICE O/P EST LOW 20 MIN: CPT | Performed by: NURSE PRACTITIONER

## 2022-04-05 NOTE — PROGRESS NOTES
Surgical Oncology Follow Up       [address]  [facility]  [address] [facility] [place] [address]    Vivienne Skiff  1945  [id number]  [address] [facility]  [facility]  [address] [address]    Chief Complaint   Patient presents with    Other     office visit       Assessment/Plan:  1  History of left breast cancer  - 6 month follow up       Discussion/Summary: Pt is a 67year old female presenting today for a six-month follow-up for left breast cancer diagnosed in December of 2020  Pathology revealed DCIS with microinvasion  Pathology was ER CO negative HER2 3 positive  Patient had genetic testing which was negative  She underwent a left breast mastectomy with sentinel node biopsy with Dr Caitie Lamar  She had a consult with Medical Oncology in adjuvant therapy was not recommended  She had a diagnostic mammogram of the right breast on 11/18/2021 which was BI-RADS 2 category 3 density  There are no concerns on today's breast exam   I will order her annual mammogram at the next visit  We will see the patient back in 6 months or sooner should the need arise  She was instructed to call with any concerns or questions prior to that time  All questions were answered today  History of Present Illness:     Oncology History   History of left breast cancer   12/17/2020 Biopsy    Left Breast stereotactic biopsy:  A  4 o'clock, posterior  Ductal carcinoma in situ with microinvasion  Grade 3  ER 0, CO 0, HER2 3+    B & C  5 o'clock, anterior with & without calcs  Ductal carcinoma in situ  Grade 3  ER 0, CO 0    Concordant  Malignancy appears unifocal; calcifications span 4 1 cm  Both clips migrated  No recent axillary US  Right breast clear       1/7/2021 Genetic Testing    The following genes were evaluated: CHARLOTTE, BRCA1, BRCA2, CDH1, CHEK2, PALB2, PTEN, STK11, Tp53; additional genes evaluated for a total of 20 genes  Negative result  No pathogenic sequence variants or deletions/dupllications identified  [employer]     2/12/2021 Surgery    Left breast mastectomy with sentinel lymph node biopsy  Micro-invasive carcinoma (less than 1 mm)  Extensive ductal carcinoma in situ (7 7 cm)  Grade 3  Margins negative  0/2 Lymph nodes  Anatomic/Prognostic Stage IA     4/7/2021 - 4/7/2021 Hormone Therapy    Consult with Dr Aleah Moya  Adjuvant therapy not recommended          -Interval History: Pt is a 67year old female presenting today for a six-month follow-up for left breast cancer diagnosed in December of 2020  She had a diagnostic mammogram of the right breast on 11/18/2021 which was BI-RADS 2 category 3 density  She denies any changes on her breast exam   She denies persistent headaches, bone pain, SOB, chest pain, abdominal pain  Review of Systems:  Review of Systems   Constitutional: Negative for activity change, appetite change, fatigue and unexpected weight change  Respiratory: Negative for cough and shortness of breath  Cardiovascular: Negative for chest pain  Gastrointestinal: Negative for abdominal pain, diarrhea, nausea and vomiting  Endocrine: Negative for heat intolerance  Musculoskeletal: Negative for arthralgias, back pain and myalgias  Skin: Negative for rash  Neurological: Negative for weakness and headaches  Hematological: Negative for adenopathy         Patient Active Problem List   Diagnosis    Portal vein thrombosis    Anxiety    Moderate episode of recurrent major depressive disorder (HCC)    Essential tremor    Hyperlipidemia    Essential hypertension    Hypomagnesemia    SIADH (syndrome of inappropriate ADH production) (HCC)    Acquired hypothyroidism    Insomnia    Iron deficiency anemia    Prediabetes    Peripheral neuropathy    Osteopenia    Osteoarthritis of right knee    Ulcerative colitis without complications (HCC)    Allergic rhinitis    GERD (gastroesophageal reflux disease)    Anemia    Mild intermittent asthma without complication    Diarrhea    Sjogren's syndrome (HCC)    Chronic salpingo-oophoritis    Overweight    Supraventricular tachycardia (HCC)    Unsteady gait    Lower extremity pain, left    Lumbar degenerative disc disease    Encounter for long-term (current) use of medications    S/P deep brain stimulator placement    Vitamin B12 deficiency    Vitamin D deficiency    History of left breast cancer    Closed nondisplaced fracture of neck of right radius     Past Medical History:   Diagnosis Date    Anemia     Anxiety     Arrhythmia     Arthritis     Asthma     Blood clot in vein     portal vein    Breast cancer (HCC) Left    Breast lump     [id number] RESOLVED    Cancer (HCC)     Depression     Disease of thyroid gland     DVT, lower extremity (HCC)     GERD (gastroesophageal reflux disease)     Hyperlipidemia     Hypertension     Hypokalemia     Hyponatremia     Hypothyroidism     Iron deficiency anemia     Irregular heart beat     Manic behavior (HCC)     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HCC)     Osteoarthritis     Palpitations     [id number]  RESOLVED    Paroxysmal supraventricular tachycardia (HCC)     PE (pulmonary thromboembolism) (HCC)     Sjoegren syndrome     Spinal stenosis     Thrombocytosis     [id number]  RESOLVED    Tremors of nervous system     dbs implanted right and left chest    Ulcerative colitis ([address] )     Vertigo     [id number] RESOLVED     Past Surgical History:   Procedure Laterality Date    ABDOMINAL SURGERY      APPENDECTOMY      BREAST BIOPSY Left 11/07/2019    Stereo    BREAST EXCISIONAL BIOPSY Left     x many years    BREAST SURGERY      lumpectomy & biopsy    CARMELLA HOLE W/ STEREOTACTIC INSERTION OF DBS LEADS / INTRAOP MICROELECTRODE RECORDING      COLON SURGERY      COLONOSCOPY N/A 8/30/2017    Procedure: POUCHOSCOPY;  Surgeon: Antonia Hope MD;  [facility];  Service: Colorectal    COLOPROCTECTOMY W/ ILEO J POUCH      ESOPHAGOGASTRODUODENOSCOPY      ONSET 10/17/11    FISTULA REPAIR      LLEOANAL FISTULA REPAIR TRANSPERIN TRANSABD APPROACH    HYSTERECTOMY      age 43    ILEOSTOMY CLOSURE      KNEE ARTHROSCOPY      Right    MAMMO STEREOTACTIC BREAST BIOPSY LEFT (ALL INC) Left 11/7/2019    MAMMO STEREOTACTIC BREAST BIOPSY LEFT (ALL INC) Left 12/17/2020    MAMMO STEREOTACTIC BREAST BIOPSY LEFT (ALL INC) EACH ADD Left 12/17/2020    MASTECTOMY Left     MASTECTOMY W/ SENTINEL NODE BIOPSY Left 2/12/2021    Procedure: BREAST MASTECTOMY WITH SENTINEL LYMPH NODE BIOPSY, LYMPHATIC MAPPING WITH BLUE DYE AND RADIAOCTIVE DYE (INJECT AT 1100 BY DR GILLESPIE IN THE OR);   Surgeon: Miguel Ángel Napier MD;  [facility];  Service: Surgical Oncology    AK IMP STIM,CRANIAL,SUBQ,1 ARRAY Right 6/20/2017    Procedure: DBS GENERATOR REPLACEMENT;  Surgeon: Ofe Oconnor MD;  [facility];  Service: Neurosurgery    AK IMP STIM,CRANIAL,SUBQ,1 ARRAY N/A 12/4/2019    Procedure: REPLACEMENT IMPLANTABLE PULSE GENERATOR FOR DEEP BRAIN STIMULATOR LEFT CHEST;  Surgeon: Fayrene Seip, MD;  [facility];  Service: Neurosurgery    SPLENECTOMY       Family History   Problem Relation Age of Onset    Venous thrombosis Mother         ACUTE VENOUS THROMBOSIS OF DEEP VESSELS OF THE DISTAL LOWER EXTREMITY    Other Mother         PHLEBITIS    Hypertension Mother     Peripheral vascular disease Mother     COPD Father     Diabetes Father         MELLITUS    Stroke Father     Diabetes Sister         MELLITUS    Sjogren's syndrome Sister     No Known Problems Daughter     No Known Problems Maternal Grandmother     No Known Problems Maternal Grandfather     No Known Problems Paternal Grandmother     No Known Problems Paternal Grandfather     No Known Problems Sister     No Known Problems Maternal Aunt     No Known Problems Paternal Aunt     No Known Problems Son Social History     Socioeconomic History    Marital status:      Spouse name: Not on file    Number of children: Not on file    Years of education: EDUCATION LEVEL 10TH GRADE    Highest education level: Not on file   Occupational History    Occupation: RETIRED   Tobacco Use    Smoking status: Former Smoker     Packs/day: 2 00     Years: 5 00     Pack years: 10 00     Quit date:      Years since quittin 2    Smokeless tobacco: Never Used    Tobacco comment: Quit   Vaping Use    Vaping Use: Never used   Substance and Sexual Activity    Alcohol use:  Yes    Drug use: No    Sexual activity: Not Currently     Partners: Male     Birth control/protection: Post-menopausal   Other Topics Concern    Not on file   Social History Narrative    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ACTIVITIES INSIDE AND OUTSIDE OF HOME, MODERATE    CAFFEINE USE, DRINKS CAFEINATED TEA, DRINKS COFFEE    INADEQUATE EXERCISE    LIVES WITH ADULT CHILDREN     Social Determinants of Health     Financial Resource Strain: Not on file   Food Insecurity: Not on file   Transportation Needs: Not on file   Physical Activity: Not on file   Stress: Not on file   Social Connections: Not on file   Intimate Partner Violence: Not on file   Housing Stability: Not on file       Current Outpatient Medications:     acetaminophen (TYLENOL) 325 mg tablet, Take 2 tablets (650 mg total) by mouth every 6 (six) hours as needed for mild pain, Disp: , Rfl: 0    albuterol (PROVENTIL HFA,VENTOLIN HFA) 90 mcg/act inhaler, Inhale 2 puffs every 6 (six) hours as needed for wheezing, Disp: 1 Inhaler, Rfl: 1    amLODIPine (NORVASC) 2 5 mg tablet, Take 2 5 mg by mouth daily, Disp: , Rfl:     buPROPion (WELLBUTRIN XL) 300 mg 24 hr tablet, Take 1 tablet (300 mg total) by mouth daily With 150 mg (Patient taking differently: Take 300 mg by mouth daily ), Disp: 90 tablet, Rfl: 1    Calcium Carb-Cholecalciferol (CALCIUM 600-D PO), Take 1 tablet by mouth 2 (two) times a day, Disp: , Rfl:     cetirizine (ZyrTEC) 10 mg tablet, Take 10 mg by mouth daily, Disp: , Rfl:     Coenzyme Q10 (CO Q-10 PO), Take 1 capsule by mouth daily, Disp: , Rfl:     diltiazem (CARDIZEM CD) 180 mg 24 hr capsule, Take 1 capsule (180 mg total) by mouth daily, Disp: 90 capsule, Rfl: 1    diltiazem (CARDIZEM) 60 mg tablet, Take 1 tablet (60 mg total) by mouth daily, Disp: 90 tablet, Rfl: 0    escitalopram (LEXAPRO) 20 mg tablet, TAKE ONE TABLET BY MOUTH EVERY DAY, Disp: 90 tablet, Rfl: 1    fluticasone (FLONASE) 50 mcg/act nasal spray, 1 spray into each nostril daily as needed for rhinitis, Disp: 48 mL, Rfl: 1    fluticasone-salmeterol (Advair Diskus) 250-50 mcg/dose inhaler, Inhale 1 puff 2 (two) times a day Rinse mouth after use, Disp: 180 blister, Rfl: 3    gabapentin (NEURONTIN) 100 mg capsule, Take 2 capsules (200 mg total) by mouth 2 (two) times a day, Disp: 120 capsule, Rfl: 2    gabapentin (NEURONTIN) 600 MG tablet, TAKE ONE TABLET IN THE MORNING, ONE TABLET IN THE AFTERNOON, AND 2 TABLETS AT BEDTIME, Disp: 360 tablet, Rfl: 2    HYDROcodone-acetaminophen (NORCO) 5-325 mg per tablet, Take 1 tablet by mouth every 6 (six) hours as needed for pain Max Daily Amount: 4 tablets, Disp: 120 tablet, Rfl: 0    levothyroxine 50 mcg tablet, TAKE ONE TABLET BY MOUTH EVERY DAY, Disp: 90 tablet, Rfl: 1    lisinopril (ZESTRIL) 20 mg tablet, Take 1 tablet (20 mg total) by mouth 2 (two) times a day, Disp: 180 tablet, Rfl: 1    loperamide (IMODIUM) 2 mg capsule, Take 2 mg by mouth 4 (four) times a day as needed  , Disp: , Rfl:     LORazepam (ATIVAN) 1 mg tablet, Take 1 tablet (1 mg total) by mouth every 6 (six) hours as needed for anxiety, Disp: 120 tablet, Rfl: 0    MAGNESIUM PO, Take 1 tablet by mouth daily, Disp: , Rfl:     meclizine (ANTIVERT) 25 mg tablet, Take 1 tablet (25 mg total) by mouth every 6 (six) hours as needed for dizziness, Disp: 90 tablet, Rfl: 1    Multiple Vitamin (MULTIVITAMIN ADULT PO), Take 1 tablet by mouth daily, Disp: , Rfl:     Omega-3 Fatty Acids (FISH OIL PO), Take 1 capsule by mouth daily, Disp: , Rfl:     pantoprazole (PROTONIX) 40 mg tablet, Take 1 tablet (40 mg total) by mouth daily, Disp: 90 tablet, Rfl: 1    potassium chloride (MICRO-K) 10 MEQ CR capsule, Take 2 capsules daily, Disp: 180 capsule, Rfl: 3    simvastatin (ZOCOR) 5 MG tablet, Take 1 tablet (5 mg total) by mouth daily, Disp: 90 tablet, Rfl: 1    sodium chloride 1 g tablet, Take 1 tablet (1 g total) by mouth 3 (three) times a day, Disp: 270 tablet, Rfl: 3    torsemide (DEMADEX) 10 mg tablet, Take 1 tablet (10 mg total) by mouth daily as needed (edema), Disp: 90 tablet, Rfl: 0    warfarin (COUMADIN) 5 mg tablet, Take 5 mg by mouth daily, Disp: , Rfl:   Allergies   Allergen Reactions    Macrobid [Nitrofurantoin Monohyd Macro] Rash    Penicillins Rash     Annotation - [id number]: can take cephalosporins    Sulfa Antibiotics Rash    Indocin [Indomethacin] GI Intolerance and Dizziness     Vitals:    04/05/22 0812   Resp: 16       Physical Exam  Constitutional:       General: She is not in acute distress  Appearance: Normal appearance  Cardiovascular:      Rate and Rhythm: Normal rate and regular rhythm  Pulses: Normal pulses  Heart sounds: Normal heart sounds  Pulmonary:      Effort: Pulmonary effort is normal       Breath sounds: Normal breath sounds  Chest:      Chest wall: No mass  Breasts:      Right: No swelling, bleeding, inverted nipple, mass, nipple discharge, skin change, tenderness, axillary adenopathy or supraclavicular adenopathy  Left: No swelling, mass, skin change, tenderness, axillary adenopathy or supraclavicular adenopathy  Comments: Left breast left breast mastectomy scar  No masses, adenopathy, skin changes  Right breast negative for masses, adenopathy, skin changes, a nipple changes  Abdominal:      General: Abdomen is flat  Palpations: Abdomen is soft  Lymphadenopathy:      Upper Body:      Right upper body: No supraclavicular, axillary or pectoral adenopathy  Left upper body: No supraclavicular, axillary or pectoral adenopathy  Skin:     General: Skin is warm  Neurological:      General: No focal deficit present  Mental Status: She is alert and oriented to person, place, and time  Psychiatric:         Mood and Affect: Mood normal          Behavior: Behavior normal            Results:    Imaging  No results found  I reviewed the above imaging data  Advance Care Planning/Advance Directives:  Discussed disease status, cancer treatment plans and/or cancer treatment goals with the patient

## 2022-04-09 ENCOUNTER — APPOINTMENT (OUTPATIENT)
Dept: LAB | Facility: CLINIC | Age: 77
End: 2022-04-09
Payer: MEDICARE

## 2022-04-10 ENCOUNTER — TELEPHONE (OUTPATIENT)
Dept: INTERNAL MEDICINE CLINIC | Facility: CLINIC | Age: 77
End: 2022-04-10

## 2022-04-10 NOTE — TELEPHONE ENCOUNTER
INR remains low  Are you taking 2 5 mg x 5 days and 5 mg x 2 days? If so, increase to 5 mg daily  Repeat INR in 1 week

## 2022-04-11 ENCOUNTER — ANTICOAG VISIT (OUTPATIENT)
Dept: INTERNAL MEDICINE CLINIC | Facility: CLINIC | Age: 77
End: 2022-04-11

## 2022-04-11 NOTE — PROGRESS NOTES
Placed call to pt, reviewed INR results and medication instructions  Pt repeated medication instructions back and understood when next INR check is to be completed  Pt had no additional questions or concerns

## 2022-04-11 NOTE — TELEPHONE ENCOUNTER
Placed call to patient she is taking 2 5 mg x 6 days and 5 mg x 1 days  Patient had a back injection on 3/29/22 and stopped coumadin 1 week prior to the back injection  Resumed coumadin on 3/30/22  Patient has INR done on 4/9/22  Will route to provider for updated instructions and next INR date

## 2022-04-11 NOTE — TELEPHONE ENCOUNTER
Placed call to pt, reviewed INR results and medication instructions  Pt repeated medication instructions back and understood when next INR check is to be completed  Pt had no additional questions or concerns  Updated flowsheet with new instructions and next INR date

## 2022-04-12 ENCOUNTER — PROCEDURE VISIT (OUTPATIENT)
Dept: OBGYN CLINIC | Facility: OTHER | Age: 77
End: 2022-04-12
Payer: MEDICARE

## 2022-04-12 VITALS
HEART RATE: 76 BPM | WEIGHT: 153 LBS | DIASTOLIC BLOOD PRESSURE: 69 MMHG | SYSTOLIC BLOOD PRESSURE: 113 MMHG | BODY MASS INDEX: 27.11 KG/M2 | HEIGHT: 63 IN

## 2022-04-12 DIAGNOSIS — M17.0 BILATERAL PRIMARY OSTEOARTHRITIS OF KNEE: Primary | ICD-10-CM

## 2022-04-12 PROCEDURE — 20611 DRAIN/INJ JOINT/BURSA W/US: CPT | Performed by: FAMILY MEDICINE

## 2022-04-12 RX ORDER — LIDOCAINE HYDROCHLORIDE 10 MG/ML
5 INJECTION, SOLUTION INFILTRATION; PERINEURAL
Status: COMPLETED | OUTPATIENT
Start: 2022-04-12 | End: 2022-04-12

## 2022-04-12 RX ADMIN — LIDOCAINE HYDROCHLORIDE 5 ML: 10 INJECTION, SOLUTION INFILTRATION; PERINEURAL at 08:49

## 2022-04-12 NOTE — PROGRESS NOTES
1  Bilateral primary osteoarthritis of knee  Large joint arthrocentesis: bilateral knee     Orders Placed This Encounter   Procedures    Large joint arthrocentesis: bilateral knee        IMAGING STUDIES: (I personally reviewed images in PACS and report):       Past Diagnostics:  Right knee xray 12/22/201: Moderately advanced OA of the right knee predominantly within the lateral compartment  No acute osseous abnormalities  Left knee xray 12/22/2021 mild to moderate tricompartmental OA present  No acute osseous abnormalities       IMPRESSION:  Bilateral knee OA   PMH: History for Pulmonary embolism and DVT  INR 1 4/9/22        Repeat X-ray next visit: None    Return for Follow-up every 3 months as needed for injection  Patient Instructions   red flags and risks of injection include but are not limited to infection <0 072% as referenced in some sources, nerve or artery penetration, and if steroids are used-skin dimpling <1%, hypo-pigmentation <1%  Recommended no submerging underwater in a tub, pool, ocean, lake, jacuzzi, hot tub, or any other body of water for 1 week until needle wound closes due to risk of infection  May take showers  Clean needle site with soap and water and keep covered at all times with sterile bandage such as a band-aid until fully healed  Educated if any symptoms including fevers, chills, swelling, or worsening symptoms occur then to call office or go to hospital for immediate care if physician unavailable due to possible infection or other complication which is a serious medical problem  Patient expressed understanding and agreed to proceed with procedure             __________________________________________________________________________    CHIEF COMPLAINT:  F/u bilateral knee arthritis    HPI:  Adry Cordova is a 67 y o  female  who presents for       Visit 4/12/22:  Uncontrolled           Review of Systems      Following history reviewed and update:    Past Medical History: Diagnosis Date    Anemia     Anxiety     Arrhythmia     Arthritis     Asthma     Blood clot in vein     portal vein    Breast cancer (HCC) Left    Breast lump     [id number] RESOLVED    Cancer (HCC)     Depression     Disease of thyroid gland     DVT, lower extremity (HCC)     GERD (gastroesophageal reflux disease)     Hyperlipidemia     Hypertension     Hypokalemia     Hyponatremia     Hypothyroidism     Iron deficiency anemia     Irregular heart beat     Manic behavior (HCC)     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HCC)     Osteoarthritis     Palpitations     [id number]  RESOLVED    Paroxysmal supraventricular tachycardia (HCC)     PE (pulmonary thromboembolism) (HCC)     Sjoegren syndrome     Spinal stenosis     Thrombocytosis     [id number]  RESOLVED    Tremors of nervous system     dbs implanted right and left chest    Ulcerative colitis ([address] )     Vertigo     [id number] RESOLVED     Past Surgical History:   Procedure Laterality Date    ABDOMINAL SURGERY      APPENDECTOMY      BREAST BIOPSY Left 11/07/2019    Stereo    BREAST EXCISIONAL BIOPSY Left     x many years    BREAST SURGERY      lumpectomy & biopsy    CARMELLA HOLE W/ STEREOTACTIC INSERTION OF DBS LEADS / INTRAOP MICROELECTRODE RECORDING      COLON SURGERY      COLONOSCOPY N/A 8/30/2017    Procedure: POUCHOSCOPY;  Surgeon: Charlie Patricio MD;  [facility];   Service: Colorectal    COLOPROCTECTOMY W/ ILEO J POUCH      ESOPHAGOGASTRODUODENOSCOPY      ONSET 10/17/11    FISTULA REPAIR      LLEOANAL FISTULA REPAIR TRANSPERIN TRANSABD APPROACH    HYSTERECTOMY      age 43    ILEOSTOMY CLOSURE      KNEE ARTHROSCOPY      Right    MAMMO STEREOTACTIC BREAST BIOPSY LEFT (ALL INC) Left 11/7/2019    MAMMO STEREOTACTIC BREAST BIOPSY LEFT (ALL INC) Left 12/17/2020    MAMMO STEREOTACTIC BREAST BIOPSY LEFT (ALL INC) EACH ADD Left 12/17/2020    MASTECTOMY Left     MASTECTOMY W/ SENTINEL NODE BIOPSY Left 2/12/2021    Procedure: BREAST MASTECTOMY WITH SENTINEL LYMPH NODE BIOPSY, LYMPHATIC MAPPING WITH BLUE DYE AND RADIAOCTIVE DYE (INJECT AT 1100 BY DR GILLESPIE IN THE OR);   Surgeon: Miryam Peña MD;  [facility];  Service: Surgical Oncology    NE IMP STIM,CRANIAL,SUBQ,1 ARRAY Right 2017    Procedure: DBS GENERATOR REPLACEMENT;  Surgeon: Irene Rebollar MD;  [facility];  Service: Neurosurgery    NE IMP STIM,CRANIAL,SUBQ,1 ARRAY N/A 2019    Procedure: REPLACEMENT IMPLANTABLE PULSE GENERATOR FOR DEEP BRAIN STIMULATOR LEFT CHEST;  Surgeon: Robert Jean MD;  [facility];  Service: Neurosurgery    SPLENECTOMY       Social History   Social History     Substance and Sexual Activity   Alcohol Use Yes     Social History     Substance and Sexual Activity   Drug Use No     Social History     Tobacco Use   Smoking Status Former Smoker    Packs/day: 2 00    Years: 5 00    Pack years: 10 00    Quit date:     Years since quittin 3   Smokeless Tobacco Never Used   Tobacco Comment    Quit     Family History   Problem Relation Age of Onset    Venous thrombosis Mother         ACUTE VENOUS THROMBOSIS OF DEEP VESSELS OF THE DISTAL LOWER EXTREMITY    Other Mother         PHLEBITIS    Hypertension Mother     Peripheral vascular disease Mother     COPD Father     Diabetes Father         MELLITUS    Stroke Father     Diabetes Sister         MELLITUS    Sjogren's syndrome Sister     No Known Problems Daughter     No Known Problems Maternal Grandmother     No Known Problems Maternal Grandfather     No Known Problems Paternal Grandmother     No Known Problems Paternal Grandfather     No Known Problems Sister     No Known Problems Maternal Aunt     No Known Problems Paternal Aunt     No Known Problems Son      Allergies   Allergen Reactions    Macrobid [Nitrofurantoin Monohyd Macro] Rash    Penicillins Rash     Annotation - [id number]: can take cephalosporins    Sulfa Antibiotics Rash    Indocin [Indomethacin] GI Intolerance and Dizziness          Physical Exam  /69 (BP Location: Right arm, Patient Position: Sitting, Cuff Size: Adult)   Pulse 76   Ht 5' 3" (1 6 m)   Wt 69 4 kg (153 lb)   BMI 27 10 kg/m²     Constitutional:  see vital signs  Gen: well-developed, normocephalic/atraumatic, well-groomed  Eyes: No inflammation or discharge of conjunctiva or lids; sclera clear   Pharynx: no inflammation, lesion, or mass of lips  Neck: supple, no masses, non-distended  MSK: no inflammation, lesion, mass, or clubbing of nails and digits except for other than mentioned below  SKIN: no visible rashes or skin lesions  Pulmonary/Chest: Effort normal  No respiratory distress  NEURO: cranial nerves grossly intact  PSYCH:  Alert and oriented to person, place, and time; recent and remote memory intact; mood normal, no depression, anxiety, or agitation, judgment and insight good and intact     Ortho Exam    __________________________________________________________________________  Large joint arthrocentesis: bilateral knee  Universal Protocol:  Procedure performed by: Leo RIVAS [facility] Student)  Consent: Verbal consent obtained  Risks and benefits: risks, benefits and alternatives were discussed  Consent given by: patient  Time out: Immediately prior to procedure a "time out" was called to verify the correct patient, procedure, equipment, support staff and site/side marked as required  Patient understanding: patient states understanding of the procedure being performed  Site marked: the operative site was marked  Patient identity confirmed: verbally with patient    Supporting Documentation  Indications: pain   Procedure Details  Location: knee - bilateral knee  Preparation: Patient was prepped and draped in the usual sterile fashion (betadine if not allergic   alcohol before and after ethyl chloride cold spray before 25g needle track anesthetic)  Needle size: 22 G  Ultrasound guidance: yes  Approach: superior    Medications (Right): 88 mg hyaluronan 88 MG/4ML; 5 mL lidocaine 1 %Medications (Left): 88 mg hyaluronan 88 MG/4ML; 5 mL lidocaine 1 %   Patient tolerance: patient tolerated the procedure well with no immediate complications  Dressing:  Sterile dressing applied

## 2022-04-13 DIAGNOSIS — M35.00 SJOGREN'S SYNDROME, WITH UNSPECIFIED ORGAN INVOLVEMENT (HCC): ICD-10-CM

## 2022-04-13 RX ORDER — GABAPENTIN 100 MG/1
CAPSULE ORAL
Qty: 360 CAPSULE | Refills: 1 | Status: SHIPPED | OUTPATIENT
Start: 2022-04-13 | End: 2022-07-28 | Stop reason: ALTCHOICE

## 2022-04-14 ENCOUNTER — OFFICE VISIT (OUTPATIENT)
Dept: OBGYN CLINIC | Facility: OTHER | Age: 77
End: 2022-04-14
Payer: MEDICARE

## 2022-04-14 DIAGNOSIS — M17.11 PRIMARY OSTEOARTHRITIS OF RIGHT KNEE: Primary | ICD-10-CM

## 2022-04-14 PROCEDURE — 20610 DRAIN/INJ JOINT/BURSA W/O US: CPT | Performed by: FAMILY MEDICINE

## 2022-04-14 PROCEDURE — 99213 OFFICE O/P EST LOW 20 MIN: CPT | Performed by: FAMILY MEDICINE

## 2022-04-15 RX ORDER — LIDOCAINE HYDROCHLORIDE 10 MG/ML
4 INJECTION, SOLUTION INFILTRATION; PERINEURAL
Status: COMPLETED | OUTPATIENT
Start: 2022-04-15 | End: 2022-04-15

## 2022-04-15 RX ORDER — TRIAMCINOLONE ACETONIDE 40 MG/ML
40 INJECTION, SUSPENSION INTRA-ARTICULAR; INTRAMUSCULAR
Status: COMPLETED | OUTPATIENT
Start: 2022-04-15 | End: 2022-04-15

## 2022-04-15 RX ADMIN — LIDOCAINE HYDROCHLORIDE 4 ML: 10 INJECTION, SOLUTION INFILTRATION; PERINEURAL at 16:17

## 2022-04-15 RX ADMIN — TRIAMCINOLONE ACETONIDE 40 MG: 40 INJECTION, SUSPENSION INTRA-ARTICULAR; INTRAMUSCULAR at 16:17

## 2022-04-15 NOTE — PROGRESS NOTES
1  Primary osteoarthritis of right knee  Large joint arthrocentesis: R knee     Orders Placed This Encounter   Procedures    Large joint arthrocentesis: R knee        IMAGING STUDIES: (I personally reviewed images in PACS and report):         PAST REPORTS:        ASSESSMENT/PLAN:  Right knee osteoarthritis  Recent viscosupplementation this week with no improvement    Repeat X-ray next visit: None    No follow-ups on file  There are no Patient Instructions on file for this visit       __________________________________________________________________________    CHIEF COMPLAINT:  Follow-up right knee osteoarthritis    HPI:  Lavon Braga is a 67 y o  female  who presents for       Visit   Ultrasound-guided viscosupplementation injection given 2 days ago  No significant relief  Continue to have significant knee pain  Denies any trauma    No redness           Review of Systems      Following history reviewed and update:    Past Medical History:   Diagnosis Date    Anemia     Anxiety     Arrhythmia     Arthritis     Asthma     Blood clot in vein     portal vein    Breast cancer (HCC) Left    Breast lump     [id number] RESOLVED    Cancer ([facility] [address] )     Depression     Disease of thyroid gland     DVT, lower extremity (HCC)     GERD (gastroesophageal reflux disease)     Hyperlipidemia     Hypertension     Hypokalemia     Hyponatremia     Hypothyroidism     Iron deficiency anemia     Irregular heart beat     Manic behavior ([facility] [address] )     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HCC)     Osteoarthritis     Palpitations     [id number]  RESOLVED    Paroxysmal supraventricular tachycardia (HCC)     PE (pulmonary thromboembolism) (HCC)     Sjoegren syndrome     Spinal stenosis     Thrombocytosis     [id number]  RESOLVED    Tremors of nervous system     dbs implanted right and left chest    Ulcerative colitis ([facility] [address] )     Vertigo     [id number] RESOLVED     Past Surgical History:   Procedure Laterality Date    ABDOMINAL SURGERY      APPENDECTOMY      BREAST BIOPSY Left 2019    Stereo    BREAST EXCISIONAL BIOPSY Left     x many years    BREAST SURGERY      lumpectomy & biopsy    CARMELLA HOLE W/ STEREOTACTIC INSERTION OF DBS LEADS / INTRAOP MICROELECTRODE RECORDING      COLON SURGERY      COLONOSCOPY N/A 2017    Procedure: Anastacia Eaton;  Surgeon: Tommie Warren MD;  Location: [facility]; Service: Colorectal    COLOPROCTECTOMY W/ ILEO J POUCH      ESOPHAGOGASTRODUODENOSCOPY      ONSET 10/17/11    FISTULA REPAIR      LLEOANAL FISTULA REPAIR TRANSPERIN TRANSABD APPROACH    HYSTERECTOMY      age 43    ILEOSTOMY CLOSURE      KNEE ARTHROSCOPY      Right    MAMMO STEREOTACTIC BREAST BIOPSY LEFT (ALL INC) Left 2019    MAMMO STEREOTACTIC BREAST BIOPSY LEFT (ALL INC) Left 2020    MAMMO STEREOTACTIC BREAST BIOPSY LEFT (ALL INC) EACH ADD Left 2020    MASTECTOMY Left     MASTECTOMY W/ SENTINEL NODE BIOPSY Left 2021    Procedure: BREAST MASTECTOMY WITH SENTINEL LYMPH NODE BIOPSY, LYMPHATIC MAPPING WITH BLUE DYE AND RADIAOCTIVE DYE (INJECT AT 1100 BY DR GILLESPIE IN THE OR);   Surgeon: Ivana Somers MD;  [facility];  Service: Surgical Oncology    AR IMP STIM,CRANIAL,SUBQ,1 ARRAY Right 2017    Procedure: DBS GENERATOR REPLACEMENT;  Surgeon: Aleena Alexander MD;  [facility];  Service: Neurosurgery    AR IMP STIM,CRANIAL,SUBQ,1 ARRAY N/A 2019    Procedure: REPLACEMENT IMPLANTABLE PULSE GENERATOR FOR DEEP BRAIN STIMULATOR LEFT CHEST;  Surgeon: Montse Garcia MD;  [facility];  Service: Neurosurgery    SPLENECTOMY       Social History   Social History     Substance and Sexual Activity   Alcohol Use Yes     Social History     Substance and Sexual Activity   Drug Use No     Social History     Tobacco Use   Smoking Status Former Smoker    Packs/day: 2 00    Years: 5 00    Pack years: 10 00    Quit date: 65    Years since quittin 3   Smokeless Tobacco Never Used Tobacco Comment    Quit     Family History   Problem Relation Age of Onset    Venous thrombosis Mother         ACUTE VENOUS THROMBOSIS OF DEEP VESSELS OF THE DISTAL LOWER EXTREMITY    Other Mother         PHLEBITIS    Hypertension Mother     Peripheral vascular disease Mother     COPD Father     Diabetes Father         MELLITUS    Stroke Father     Diabetes Sister         MELLITUS    Sjogren's syndrome Sister     No Known Problems Daughter     No Known Problems Maternal Grandmother     No Known Problems Maternal Grandfather     No Known Problems Paternal Grandmother     No Known Problems Paternal Grandfather     No Known Problems Sister     No Known Problems Maternal Aunt     No Known Problems Paternal Aunt     No Known Problems Son      Allergies   Allergen Reactions    Macrobid [Nitrofurantoin Monohyd Macro] Rash    Penicillins Rash     Annotation - [id number]: can take cephalosporins    Sulfa Antibiotics Rash    Indocin [Indomethacin] GI Intolerance and Dizziness          Physical Exam  There were no vitals taken for this visit  Constitutional:  see vital signs  Gen: well-developed, normocephalic/atraumatic, well-groomed  Eyes: No inflammation or discharge of conjunctiva or lids; sclera clear   Pharynx: no inflammation, lesion, or mass of lips  Neck: supple, no masses, non-distended  MSK: no inflammation, lesion, mass, or clubbing of nails and digits except for other than mentioned below  SKIN: no visible rashes or skin lesions  Pulmonary/Chest: Effort normal  No respiratory distress     NEURO: cranial nerves grossly intact  PSYCH:  Alert and oriented to person, place, and time; recent and remote memory intact; mood normal, no depression, anxiety, or agitation, judgment and insight good and intact     Ortho Exam  RIGHT KNEE:  Erythema: no  Swelling: no  Increased Warmth: no  __________________________________________________________________________  Large joint arthrocentesis: R knee  Universal Protocol:  Procedure performed by: (Dr Miranda [facility] PGY2 Moody Reardon)  Consent: Verbal consent obtained  Risks and benefits: risks, benefits and alternatives were discussed  Consent given by: patient  Time out: Immediately prior to procedure a "time out" was called to verify the correct patient, procedure, equipment, support staff and site/side marked as required    Site marked: the operative site was marked  Patient identity confirmed: verbally with patient    Supporting Documentation  Indications: pain and diagnostic evaluation   Procedure Details  Location: knee - R knee  Preparation: Patient was prepped and draped in the usual sterile fashion  Needle size: 22 G  Ultrasound guidance: no  Approach: anterolateral  Medications administered: 4 mL lidocaine 1 %; 40 mg triamcinolone acetonide 40 mg/mL    Patient tolerance: patient tolerated the procedure well with no immediate complications  Dressing:  Sterile dressing applied

## 2022-04-18 ENCOUNTER — ANTICOAG VISIT (OUTPATIENT)
Dept: INTERNAL MEDICINE CLINIC | Facility: CLINIC | Age: 77
End: 2022-04-18

## 2022-04-18 ENCOUNTER — APPOINTMENT (OUTPATIENT)
Dept: LAB | Facility: CLINIC | Age: 77
End: 2022-04-18
Payer: MEDICARE

## 2022-04-18 ENCOUNTER — TELEPHONE (OUTPATIENT)
Dept: INTERNAL MEDICINE CLINIC | Facility: CLINIC | Age: 77
End: 2022-04-18

## 2022-04-18 DIAGNOSIS — I81 PORTAL VEIN THROMBOSIS: ICD-10-CM

## 2022-04-18 NOTE — TELEPHONE ENCOUNTER
INR 3 22  Adjust warfarin, take 2 5 mg x 4 days, 5 mg x 3 days  Take 2 5 mg tonight  Repeat in 1 week  Please update flow sheet

## 2022-04-22 ENCOUNTER — TELEPHONE (OUTPATIENT)
Dept: INTERNAL MEDICINE CLINIC | Facility: CLINIC | Age: 77
End: 2022-04-22

## 2022-04-22 ENCOUNTER — OFFICE VISIT (OUTPATIENT)
Dept: INTERNAL MEDICINE CLINIC | Facility: CLINIC | Age: 77
End: 2022-04-22
Payer: MEDICARE

## 2022-04-22 VITALS
SYSTOLIC BLOOD PRESSURE: 104 MMHG | HEART RATE: 65 BPM | HEIGHT: 63 IN | TEMPERATURE: 97 F | OXYGEN SATURATION: 97 % | DIASTOLIC BLOOD PRESSURE: 64 MMHG | WEIGHT: 156 LBS | BODY MASS INDEX: 27.64 KG/M2

## 2022-04-22 DIAGNOSIS — E22.2 SIADH (SYNDROME OF INAPPROPRIATE ADH PRODUCTION) (HCC): Primary | ICD-10-CM

## 2022-04-22 DIAGNOSIS — M54.16 LUMBAR RADICULOPATHY: ICD-10-CM

## 2022-04-22 DIAGNOSIS — L98.9 SKIN LESION OF FACE: ICD-10-CM

## 2022-04-22 DIAGNOSIS — G25.0 ESSENTIAL TREMOR: ICD-10-CM

## 2022-04-22 DIAGNOSIS — F33.1 MODERATE EPISODE OF RECURRENT MAJOR DEPRESSIVE DISORDER (HCC): ICD-10-CM

## 2022-04-22 DIAGNOSIS — I10 ESSENTIAL HYPERTENSION: ICD-10-CM

## 2022-04-22 DIAGNOSIS — F41.9 ANXIETY: ICD-10-CM

## 2022-04-22 DIAGNOSIS — F11.20 CONTINUOUS OPIOID DEPENDENCE (HCC): ICD-10-CM

## 2022-04-22 DIAGNOSIS — M51.36 LUMBAR DEGENERATIVE DISC DISEASE: Primary | ICD-10-CM

## 2022-04-22 DIAGNOSIS — R26.81 UNSTEADY GAIT: ICD-10-CM

## 2022-04-22 DIAGNOSIS — M17.11 PRIMARY OSTEOARTHRITIS OF RIGHT KNEE: ICD-10-CM

## 2022-04-22 DIAGNOSIS — E03.9 ACQUIRED HYPOTHYROIDISM: ICD-10-CM

## 2022-04-22 DIAGNOSIS — K21.9 GASTROESOPHAGEAL REFLUX DISEASE, UNSPECIFIED WHETHER ESOPHAGITIS PRESENT: ICD-10-CM

## 2022-04-22 PROCEDURE — 99214 OFFICE O/P EST MOD 30 MIN: CPT | Performed by: INTERNAL MEDICINE

## 2022-04-22 RX ORDER — NALOXONE HYDROCHLORIDE 4 MG/.1ML
SPRAY NASAL
Qty: 1 EACH | Refills: 1 | Status: SHIPPED | OUTPATIENT
Start: 2022-04-22

## 2022-04-22 RX ORDER — HYDROCODONE BITARTRATE AND ACETAMINOPHEN 5; 325 MG/1; MG/1
1 TABLET ORAL EVERY 6 HOURS PRN
Qty: 120 TABLET | Refills: 0 | Status: SHIPPED | OUTPATIENT
Start: 2022-04-22 | End: 2022-06-10 | Stop reason: SDUPTHER

## 2022-04-22 RX ORDER — LORAZEPAM 1 MG/1
1 TABLET ORAL EVERY 6 HOURS PRN
Qty: 120 TABLET | Refills: 0 | Status: SHIPPED | OUTPATIENT
Start: 2022-04-22 | End: 2022-06-10 | Stop reason: SDUPTHER

## 2022-04-22 NOTE — ASSESSMENT & PLAN NOTE
BP slightly low today  Instructed to monitor at home, may be able to stop amlodipine 2 5 mg  Also on diltiazem 240 mg and lisinopril 20 mg bid

## 2022-04-22 NOTE — ASSESSMENT & PLAN NOTE
On gabapentin, Vicodin  PDMP reviewed  Narcotic contract updated  Discussed decreasing gabapentin, take 700 mg in AM and noon, continue 1200 mg qHS

## 2022-04-22 NOTE — PATIENT INSTRUCTIONS
Decrease gabapentin use: Take 700 mg in AM and noon for 2 to 3 weeks  If feeling well, decrease to 600 mg in AM and noon  Monitor BP daily  Call in 2 weeks with BP readings

## 2022-04-22 NOTE — PROGRESS NOTES
Assessment/Plan:    Essential tremor  DBS recently adjusted  Speech and tremors improved  Follow up with neurology as scheduled  Essential hypertension  BP slightly low today  Instructed to monitor at home, may be able to stop amlodipine 2 5 mg  Also on diltiazem 240 mg and lisinopril 20 mg bid  Acquired hypothyroidism  Adequately replaced  GERD (gastroesophageal reflux disease)  Takes PPI daily  Hyperlipidemia  LDL stable, on statin  Lumbar degenerative disc disease  On gabapentin, Vicodin  PDMP reviewed  Narcotic contract updated  Discussed decreasing gabapentin, take 700 mg in AM and noon, continue 1200 mg qHS  Anxiety  Stable  Weaned off bupropion on her own  Still taking escitalopram   Recommend taking 1/2 tablet of lorazepam occasionally with the goal to decrease dose and frequency  Moderate episode of recurrent major depressive disorder ([facility] [address] )  As above  Prediabetes  A1c 5 8%  Hypomagnesemia  Taking Mg  Mild intermittent asthma without complication  Stable, using Advair  Portal vein thrombosis  Warfarin recently adjusted  SIADH (syndrome of inappropriate ADH production) ([facility])  Na normal, on NaCl tid  Osteoarthritis of right knee  S/p injection  Recently saw Ortho  Discussed TKR  Recommend to start regular exercise  Closed nondisplaced fracture of neck of right radius  No recent issues  Sjogren's syndrome ([address] )  Stable, off medication  History of left breast cancer  Recently saw oncology  Diagnoses and all orders for this visit:    SIADH (syndrome of inappropriate ADH production) ([facility] [address] )  -     Comprehensive metabolic panel; Future    Lumbar radiculopathy  -     HYDROcodone-acetaminophen (NORCO) 5-325 mg per tablet; Take 1 tablet by mouth every 6 (six) hours as needed for pain Max Daily Amount: 4 tablets    Anxiety  -     LORazepam (ATIVAN) 1 mg tablet;  Take 1 tablet (1 mg total) by mouth every 6 (six) hours as needed for anxiety    Moderate episode of recurrent major depressive disorder (HCC)    Continuous opioid dependence (HCC)  -     naloxone (NARCAN) 4 mg/0 1 mL nasal spray; Administer 1 spray into a nostril  If no response after 2-3 minutes, give another dose in the other nostril using a new spray  Acquired hypothyroidism    Essential hypertension    Essential tremor    Gastroesophageal reflux disease, unspecified whether esophagitis present    Primary osteoarthritis of right knee    Skin lesion of face  Comments: Follow up with dermatology  Follow up in 4 months or as needed  Subjective:      Patient ID: Yadira Segal is a 67 y o  female here for a follow up  She is having issues with her right knee  She recently received a gel shot which seemed to help with the pain  She is worried that if the pain recurs she may need surgery  Her stimulator was adjusted and reports her speech is better  she had weaned herself off Wellbutrin in the last few months  She had completely stopped taking it about 2 weeks ago  She reports feeling really good the past few months, would like to wean off more medications  She has not checked her blood pressure recently  Denies any dizziness  No chest pain or shortness of breath, no palpitations  She had a back injection about a month ago  She would like to decrease her gabapentin dose  The following portions of the patient's history were reviewed and updated as appropriate: allergies, current medications, past medical history, past social history and problem list     Review of Systems   Constitutional: Negative for activity change, appetite change and fatigue  HENT: Negative for congestion, ear pain and postnasal drip  Eyes: Negative for visual disturbance  Respiratory: Negative for cough and shortness of breath  Cardiovascular: Negative for chest pain, palpitations and leg swelling  Gastrointestinal: Negative for abdominal pain, constipation, diarrhea and nausea  Genitourinary: Negative for dysuria, frequency and urgency  Musculoskeletal: Positive for arthralgias and gait problem  Negative for joint swelling and myalgias  Skin: Negative for rash and wound  Neurological: Negative for dizziness, numbness and headaches  Hematological: Does not bruise/bleed easily  Psychiatric/Behavioral: Negative for confusion and dysphoric mood  The patient is not nervous/anxious  Objective:      /64   Pulse 65   Temp (!) 97 °F (36 1 °C)   Ht 5' 3" (1 6 m)   Wt 70 8 kg (156 lb)   SpO2 97%   BMI 27 63 kg/m²          Physical Exam  Vitals and nursing note reviewed  Constitutional:       General: She is not in acute distress  Appearance: She is well-developed  HENT:      Head: Normocephalic and atraumatic  Eyes:      Pupils: Pupils are equal, round, and reactive to light  Cardiovascular:      Rate and Rhythm: Normal rate and regular rhythm  Heart sounds: Normal heart sounds  Pulmonary:      Effort: Pulmonary effort is normal       Breath sounds: Normal breath sounds  No wheezing  Abdominal:      General: Bowel sounds are normal       Palpations: Abdomen is soft  Musculoskeletal:         General: No swelling  Right lower leg: No edema  Left lower leg: No edema  Skin:     General: Skin is warm  Findings: No rash  Neurological:      General: No focal deficit present  Mental Status: She is alert and oriented to person, place, and time  Psychiatric:         Mood and Affect: Mood and affect normal  Mood is not anxious or depressed  Behavior: Behavior normal            Labs & imaging results reviewed with patient

## 2022-04-22 NOTE — ASSESSMENT & PLAN NOTE
Stable  Weaned off bupropion on her own  Still taking escitalopram   Recommend taking 1/2 tablet of lorazepam occasionally with the goal to decrease dose and frequency

## 2022-04-25 ENCOUNTER — LAB (OUTPATIENT)
Dept: LAB | Facility: CLINIC | Age: 77
End: 2022-04-25
Payer: MEDICARE

## 2022-04-25 ENCOUNTER — ANTICOAG VISIT (OUTPATIENT)
Dept: INTERNAL MEDICINE CLINIC | Facility: CLINIC | Age: 77
End: 2022-04-25

## 2022-04-25 DIAGNOSIS — I81 PORTAL VEIN THROMBOSIS: ICD-10-CM

## 2022-04-25 LAB
INR PPP: 2.6 (ref 0.84–1.19)
PROTHROMBIN TIME: 27.3 SECONDS (ref 11.6–14.5)

## 2022-04-25 PROCEDURE — 36415 COLL VENOUS BLD VENIPUNCTURE: CPT

## 2022-04-25 PROCEDURE — 85610 PROTHROMBIN TIME: CPT

## 2022-05-04 DIAGNOSIS — E03.9 ACQUIRED HYPOTHYROIDISM: ICD-10-CM

## 2022-05-04 RX ORDER — LEVOTHYROXINE SODIUM 0.05 MG/1
TABLET ORAL
Qty: 90 TABLET | Refills: 1 | Status: SHIPPED | OUTPATIENT
Start: 2022-05-04 | End: 2022-05-05

## 2022-05-05 DIAGNOSIS — I10 ESSENTIAL HYPERTENSION: ICD-10-CM

## 2022-05-05 DIAGNOSIS — E03.9 ACQUIRED HYPOTHYROIDISM: ICD-10-CM

## 2022-05-05 RX ORDER — DILTIAZEM HYDROCHLORIDE 60 MG/1
60 TABLET, FILM COATED ORAL DAILY
Qty: 90 TABLET | Refills: 1 | Status: SHIPPED | OUTPATIENT
Start: 2022-05-05

## 2022-05-05 RX ORDER — LEVOTHYROXINE SODIUM 0.05 MG/1
TABLET ORAL
Qty: 90 TABLET | Refills: 1 | Status: SHIPPED | OUTPATIENT
Start: 2022-05-05

## 2022-05-06 DIAGNOSIS — K21.9 GASTROESOPHAGEAL REFLUX DISEASE: ICD-10-CM

## 2022-05-06 RX ORDER — PANTOPRAZOLE SODIUM 40 MG/1
40 TABLET, DELAYED RELEASE ORAL DAILY
Qty: 90 TABLET | Refills: 1 | Status: SHIPPED | OUTPATIENT
Start: 2022-05-06

## 2022-05-06 NOTE — TELEPHONE ENCOUNTER
Pt states still taking amlodipine      BP readings    4/26- 105/58  4/27- 134/79  4/28- 132/81  4/28- 128/51  4/30- 147/80  5/1- 127/54  5/2- 132/82  5/3- 124/71 and 123/72  5/4- 125/75  5/5- 143/86  5/6- 140/83

## 2022-05-10 DIAGNOSIS — E87.1 HYPONATREMIA: ICD-10-CM

## 2022-05-10 RX ORDER — SODIUM CHLORIDE 1000 MG
1 TABLET, SOLUBLE MISCELLANEOUS 3 TIMES DAILY
Qty: 270 TABLET | Refills: 3 | Status: SHIPPED | OUTPATIENT
Start: 2022-05-10

## 2022-05-10 NOTE — TELEPHONE ENCOUNTER
Medication Refills   Person Calling In  Name if not patient Patient    Medication name  Sodium Chloride    Medication Dosage  Medication Frequency 10 mEq 1 tablet 3x a day    [facility]    Additional Information

## 2022-05-23 ENCOUNTER — TELEPHONE (OUTPATIENT)
Dept: INTERNAL MEDICINE CLINIC | Facility: CLINIC | Age: 77
End: 2022-05-23

## 2022-05-23 NOTE — TELEPHONE ENCOUNTER
Swelling in both feet, left worse than right, for about a week  Taking her water pills 4-5 days  Swelling is worse at night after being on her feet all day  Elevating feet makes it a little better  Taking sodium pills 3 x daily and wants to know if she should cut down on the dose?

## 2022-05-23 NOTE — TELEPHONE ENCOUNTER
Spoke w/ pt  She does not have any sob,chest pain, dizziness or other symptoms  Said she thinks she can wear compression stockings-doesn't have any

## 2022-05-23 NOTE — TELEPHONE ENCOUNTER
Any shortness of breath, chest pain, dizziness or other symptoms? Are you able to wear compression stockings?

## 2022-05-23 NOTE — TELEPHONE ENCOUNTER
You can look to see if your local pharmacy has it, or [facility]  I also wrote a prescription - please mail or

## 2022-05-31 DIAGNOSIS — J30.89 NON-SEASONAL ALLERGIC RHINITIS DUE TO OTHER ALLERGIC TRIGGER: ICD-10-CM

## 2022-05-31 RX ORDER — FLUTICASONE PROPIONATE 50 MCG
SPRAY, SUSPENSION (ML) NASAL
Qty: 48 G | Refills: 1 | Status: SHIPPED | OUTPATIENT
Start: 2022-05-31

## 2022-06-04 ENCOUNTER — APPOINTMENT (OUTPATIENT)
Dept: LAB | Facility: CLINIC | Age: 77
End: 2022-06-04
Payer: MEDICARE

## 2022-06-04 LAB — CK SERPL-CCNC: 66 U/L (ref 26–192)

## 2022-06-04 PROCEDURE — 82550 ASSAY OF CK (CPK): CPT

## 2022-06-10 DIAGNOSIS — F41.9 ANXIETY: ICD-10-CM

## 2022-06-10 DIAGNOSIS — M54.16 LUMBAR RADICULOPATHY: ICD-10-CM

## 2022-06-10 RX ORDER — LORAZEPAM 1 MG/1
1 TABLET ORAL EVERY 6 HOURS PRN
Qty: 120 TABLET | Refills: 0 | Status: SHIPPED | OUTPATIENT
Start: 2022-06-10 | End: 2022-07-26 | Stop reason: SDUPTHER

## 2022-06-10 RX ORDER — HYDROCODONE BITARTRATE AND ACETAMINOPHEN 5; 325 MG/1; MG/1
1 TABLET ORAL EVERY 6 HOURS PRN
Qty: 120 TABLET | Refills: 0 | Status: SHIPPED | OUTPATIENT
Start: 2022-06-10 | End: 2022-07-26 | Stop reason: SDUPTHER

## 2022-06-20 ENCOUNTER — ANTICOAG VISIT (OUTPATIENT)
Dept: INTERNAL MEDICINE CLINIC | Facility: CLINIC | Age: 77
End: 2022-06-20

## 2022-06-20 ENCOUNTER — TELEPHONE (OUTPATIENT)
Dept: INTERNAL MEDICINE CLINIC | Facility: CLINIC | Age: 77
End: 2022-06-20

## 2022-06-20 NOTE — TELEPHONE ENCOUNTER
Placed call to patient, advising patient is overdue for PT/INR draw  Requested patient to complete the lab work at her earliest convenience  Per patient she went on 6/6/22  Reviewed chart - her INR is on file for 6/4/22 however the anticoag flow sheet was never updated  Updated template

## 2022-07-08 ENCOUNTER — APPOINTMENT (OUTPATIENT)
Dept: LAB | Facility: CLINIC | Age: 77
End: 2022-07-08
Payer: MEDICARE

## 2022-07-08 ENCOUNTER — ANTICOAG VISIT (OUTPATIENT)
Dept: INTERNAL MEDICINE CLINIC | Facility: CLINIC | Age: 77
End: 2022-07-08

## 2022-07-21 ENCOUNTER — OFFICE VISIT (OUTPATIENT)
Dept: INTERNAL MEDICINE CLINIC | Facility: CLINIC | Age: 77
End: 2022-07-21
Payer: MEDICARE

## 2022-07-21 VITALS
BODY MASS INDEX: 26.58 KG/M2 | OXYGEN SATURATION: 96 % | SYSTOLIC BLOOD PRESSURE: 130 MMHG | TEMPERATURE: 97.6 F | HEART RATE: 72 BPM | HEIGHT: 63 IN | DIASTOLIC BLOOD PRESSURE: 70 MMHG | WEIGHT: 150 LBS

## 2022-07-21 DIAGNOSIS — B34.9 VIRAL INFECTION, UNSPECIFIED: ICD-10-CM

## 2022-07-21 DIAGNOSIS — H57.89 DISCHARGE OF EYE, LEFT: Primary | ICD-10-CM

## 2022-07-21 DIAGNOSIS — R73.03 PREDIABETES: ICD-10-CM

## 2022-07-21 DIAGNOSIS — D50.9 IRON DEFICIENCY ANEMIA, UNSPECIFIED IRON DEFICIENCY ANEMIA TYPE: ICD-10-CM

## 2022-07-21 LAB
SARS-COV-2 AG UPPER RESP QL IA: NEGATIVE
VALID CONTROL: NORMAL

## 2022-07-21 PROCEDURE — 99213 OFFICE O/P EST LOW 20 MIN: CPT | Performed by: INTERNAL MEDICINE

## 2022-07-21 PROCEDURE — 87811 SARS-COV-2 COVID19 W/OPTIC: CPT | Performed by: INTERNAL MEDICINE

## 2022-07-21 RX ORDER — ERYTHROMYCIN 5 MG/G
0.5 OINTMENT OPHTHALMIC EVERY 12 HOURS SCHEDULED
Qty: 3.5 G | Refills: 0 | Status: SHIPPED | OUTPATIENT
Start: 2022-07-21 | End: 2022-08-25 | Stop reason: ALTCHOICE

## 2022-07-21 NOTE — PROGRESS NOTES
Assessment/Plan:    No problem-specific Assessment & Plan notes found for this encounter  Diagnoses and all orders for this visit:    Discharge of eye, left  Comments:  Start antibiotic ointment  Rinse with warm/cool water prn  Orders:  -     erythromycin (ILOTYCIN) ophthalmic ointment; Administer 0 5 inches into the left eye every 12 (twelve) hours    Viral infection, unspecified  Comments:  Rapid COVID negative  Orders:  -     Poct Covid 19 Rapid Antigen Test    Iron deficiency anemia, unspecified iron deficiency anemia type  -     CBC and differential  -     Ferritin  -     Iron  -     TIBC    Prediabetes  -     Hemoglobin A1C    Follow up as scheduled or as needed  Subjective:      Patient ID: Jenna Patel is a 67 y o  female c/o left eye symptoms  Symptoms started 2 days ago  She woke up with some yellow discharge coming out of her left eye  Eye feels irritated, denies any pain or itching  It is red and irritated, she has been rinsing it with water frequently  She reports mild nasal congestion, no cough or sore throat  She thinks this is allergies  No fever or chills  Left eye symptoms have not improved this morning, still with discharge  No blurring of vision  The following portions of the patient's history were reviewed and updated as appropriate: allergies, current medications, past medical history, past social history and problem list     Review of Systems   Constitutional: Negative for fever  HENT: Positive for congestion, postnasal drip and rhinorrhea  Negative for sinus pressure, sinus pain and sore throat  Eyes: Positive for discharge, redness and itching  Negative for photophobia, pain and visual disturbance  Respiratory: Negative for cough and shortness of breath  Neurological: Negative for dizziness and headaches           Objective:      /70   Pulse 72   Temp 97 6 °F (36 4 °C)   Ht 5' 3" (1 6 m)   Wt 68 kg (150 lb)   SpO2 96%   BMI 26 57 kg/m² Patient/Caregiver provided printed discharge information. Physical Exam  Constitutional:       General: She is not in acute distress  Appearance: Normal appearance  She is not ill-appearing  HENT:      Head: Normocephalic and atraumatic  Nose: Congestion present  Mouth/Throat:      Mouth: Mucous membranes are moist    Eyes:      General:         Right eye: No discharge or hordeolum  Left eye: Discharge present  No hordeolum  Extraocular Movements:      Left eye: Normal extraocular motion  Conjunctiva/sclera:      Right eye: Right conjunctiva is not injected  Left eye: Left conjunctiva is injected  No hemorrhage  Pupils: Pupils are equal, round, and reactive to light  Cardiovascular:      Rate and Rhythm: Normal rate  Pulmonary:      Effort: Pulmonary effort is normal  No respiratory distress  Breath sounds: Normal breath sounds  Neurological:      General: No focal deficit present  Mental Status: She is alert and oriented to person, place, and time     Psychiatric:         Mood and Affect: Mood normal          Behavior: Behavior normal

## 2022-07-26 DIAGNOSIS — M54.16 LUMBAR RADICULOPATHY: ICD-10-CM

## 2022-07-26 DIAGNOSIS — F41.9 ANXIETY: ICD-10-CM

## 2022-07-26 RX ORDER — LORAZEPAM 1 MG/1
1 TABLET ORAL EVERY 6 HOURS PRN
Qty: 120 TABLET | Refills: 0 | Status: SHIPPED | OUTPATIENT
Start: 2022-07-26 | End: 2022-09-28 | Stop reason: SDUPTHER

## 2022-07-26 RX ORDER — HYDROCODONE BITARTRATE AND ACETAMINOPHEN 5; 325 MG/1; MG/1
1 TABLET ORAL EVERY 6 HOURS PRN
Qty: 120 TABLET | Refills: 0 | Status: SHIPPED | OUTPATIENT
Start: 2022-07-26 | End: 2022-09-28 | Stop reason: SDUPTHER

## 2022-07-28 ENCOUNTER — OFFICE VISIT (OUTPATIENT)
Dept: OBGYN CLINIC | Facility: OTHER | Age: 77
End: 2022-07-28
Payer: MEDICARE

## 2022-07-28 VITALS
SYSTOLIC BLOOD PRESSURE: 103 MMHG | DIASTOLIC BLOOD PRESSURE: 64 MMHG | WEIGHT: 154.6 LBS | HEART RATE: 64 BPM | BODY MASS INDEX: 27.39 KG/M2 | HEIGHT: 63 IN

## 2022-07-28 DIAGNOSIS — M17.0 BILATERAL PRIMARY OSTEOARTHRITIS OF KNEE: Primary | ICD-10-CM

## 2022-07-28 PROCEDURE — 99214 OFFICE O/P EST MOD 30 MIN: CPT | Performed by: FAMILY MEDICINE

## 2022-07-28 PROCEDURE — 20610 DRAIN/INJ JOINT/BURSA W/O US: CPT | Performed by: FAMILY MEDICINE

## 2022-07-28 RX ADMIN — TRIAMCINOLONE ACETONIDE 40 MG: 40 INJECTION, SUSPENSION INTRA-ARTICULAR; INTRAMUSCULAR at 08:29

## 2022-07-28 RX ADMIN — LIDOCAINE HYDROCHLORIDE 4 ML: 10 INJECTION, SOLUTION INFILTRATION; PERINEURAL at 08:29

## 2022-07-28 NOTE — PROGRESS NOTES
1  Bilateral primary osteoarthritis of knee  Large joint arthrocentesis: L knee    Large joint arthrocentesis: R knee     Orders Placed This Encounter   Procedures    Large joint arthrocentesis: L knee    Large joint arthrocentesis: R knee        IMAGING STUDIES: (I personally reviewed images in PACS and report):         PAST REPORTS:        ASSESSMENT/PLAN:  Right Knee Mod-Severe Lateral OA  Left Knee Mild to mod Medial OA      Repeat X-ray next visit: None    Return for Follow-up every 3 months as needed for injection  Patient Instructions   Treatment for knee osteoarthritis depends on severity of cartilage wear and pain levels  First line for mild arthritis is exercise to strengthen the knee and allow better joint movement, 5-10% weight loss if overweight, and topical anti-inflammatories such as diclofenac gel or topical analgesic pain relief creams such as capsaicin  Symptoms at this stage usually improve in 3 months  Moderate to severe arthritis or arthritis not responding to first line treatments may require oral medicine such as anti-inflammatories (NSAIDs) such as ibuprofen/motrin, and if failing treatment with oral NSAIDs, then may consider depression medicines such as duloxetine (cymbalta) which also have been shown to work on pain receptors and help to control pain  Other analgesics such as tylenol (acetaminophen) may be used but do not appear to offer significant pain relief  Supplements such as glucosamine and chondroitin supplements  Some studies do show benefit from taking glucosamine sulfate (1500 mg/day) and chondroitin (800 mg/day) but evidence is controversial  I recommend against a type of glucosamine known as "glucosamine hydrochloride" which appears not as effective as glucosamine sulfate  If no improvement with oral medicines, then patients may consider steroid injection into the knee joint which provides pain relief  Steroid injections can be given every 3 months   Any sooner than that can  result in possible deterioration or cartilage in your joint  Other injections are available such as as viscosupplementation or gel shots into the knee which provide nutrients for the cartilage and can result in pain reduction  These viscosupplementation injections are generally considered every 6 months but are controversial   The [address] recommends against viscosupplementation injection; however, the [employer] [facility] recommends for these injections  Beyond these injections there are other controversial treatments including stem cell as well as platelet rich plasma injection which are out of pocket usually up to a 1000 dollars per injection  Lastly, if you fail conservative measures and have persistent pain, then we may consider referral to surgeon for knee replacement  ([facility] 2018)  red flags and risks of injection include but are not limited to infection <0 072% as referenced in some sources, nerve or artery penetration, and if steroids are used-skin dimpling <1%, hypo-pigmentation <1%  Recommended no submerging underwater in a tub, pool, ocean, lake, jacuzzi, hot tub, or any other body of water for 1 week until needle wound closes due to risk of infection  May take showers  Clean needle site with soap and water and keep covered at all times with sterile bandage such as a band-aid until fully healed  Educated if any symptoms including fevers, chills, swelling, or worsening symptoms occur then to call office or go to hospital for immediate care if physician unavailable due to possible infection or other complication which is a serious medical problem  Patient expressed understanding and agreed to proceed with procedure             __________________________________________________________________________    HISTORY OF PRESENT ILLNESS:  Follow-up bilateral knee osteoarthritis:  Uncontrolled    Patient did have 2 months of almost complete relief after injection  Currently 50% overall  She did have viscosupplementation 04/12/2022  She persistent right knee pain and had corticosteroid injection to the right knee  Review of Systems      Following history reviewed and update:    Past Medical History:   Diagnosis Date    Anemia     Anxiety     Arrhythmia     Arthritis     Asthma     Blood clot in vein     portal vein    Breast cancer (HCC) Left    Breast lump     [id number] RESOLVED    Cancer ([facility] )     Depression     Disease of thyroid gland     DVT, lower extremity (HCC)     GERD (gastroesophageal reflux disease)     Hyperlipidemia     Hypertension     Hypokalemia     Hyponatremia     Hypothyroidism     Iron deficiency anemia     Irregular heart beat     [facility]     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HCC)     Osteoarthritis     Palpitations     [id number]  RESOLVED    Paroxysmal supraventricular tachycardia (HCC)     PE [facility]embolism) ([facility])     Sjoegren syndrome     Spinal stenosis     Thrombocytosis     [id number]  RESOLVED    Tremors of nervous system     dbs implanted right and left chest    Ulcerative colitis ([facility]     Vertigo     [id number] RESOLVED     Past Surgical History:   Procedure Laterality Date    ABDOMINAL SURGERY      APPENDECTOMY      BREAST BIOPSY Left 11/07/2019    Stereo    BREAST EXCISIONAL BIOPSY Left     x many years    BREAST SURGERY      lumpectomy & biopsy    CARMELLA HOLE W/ STEREOTACTIC INSERTION OF DBS LEADS / INTRAOP MICROELECTRODE RECORDING      COLON SURGERY      COLONOSCOPY N/A 8/30/2017    Procedure: POUCHOSCOPY;  Surgeon: Magno Briones MD;  [facility]: [facility];   Service: Colorectal    COLOPROCTECTOMY W/ ILEO J POUCH      ESOPHAGOGASTRODUODENOSCOPY      ONSET 10/17/11    FISTULA REPAIR      LLEOANAL FISTULA REPAIR TRANSPERIN TRANSABD APPROACH    HYSTERECTOMY      age 43    ILEOSTOMY CLOSURE      KNEE ARTHROSCOPY      Right    MAMMO STEREOTACTIC BREAST BIOPSY LEFT (ALL INC) Left 2019    MAMMO STEREOTACTIC BREAST BIOPSY LEFT (ALL INC) Left 2020    MAMMO STEREOTACTIC BREAST BIOPSY LEFT (ALL INC) EACH ADD Left 2020    MASTECTOMY Left     MASTECTOMY W/ SENTINEL NODE BIOPSY Left 2021    Procedure: BREAST MASTECTOMY WITH SENTINEL LYMPH NODE BIOPSY, LYMPHATIC MAPPING WITH BLUE DYE AND RADIAOCTIVE DYE (INJECT AT 1100 BY DR GILLESPIE IN THE OR);   Surgeon: Meenakshi Julian MD;  [facility];  Service: Surgical Oncology    WY IMP STIM,CRANIAL,SUBQ,1 ARRAY Right 2017    Procedure: DBS GENERATOR REPLACEMENT;  Surgeon: Jacqueline Mota MD;  [facility];  Service: Neurosurgery    WY IMP STIM,CRANIAL,SUBQ,1 ARRAY N/A 2019    Procedure: REPLACEMENT IMPLANTABLE PULSE GENERATOR FOR DEEP BRAIN STIMULATOR LEFT CHEST;  Surgeon: Aren Anglin MD;  [facility];  Service: Neurosurgery    SPLENECTOMY       Social History   Social History     Substance and Sexual Activity   Alcohol Use Yes     Social History     Substance and Sexual Activity   Drug Use No     Social History     Tobacco Use   Smoking Status Former Smoker    Packs/day: 2 00    Years: 5 00    Pack years: 10 00    Quit date:     Years since quittin 6   Smokeless Tobacco Never Used   Tobacco Comment    Quit     Family History   Problem Relation Age of Onset    Venous thrombosis Mother         ACUTE VENOUS THROMBOSIS OF DEEP VESSELS OF THE DISTAL LOWER EXTREMITY    Other Mother         PHLEBITIS    Hypertension Mother     Peripheral vascular disease Mother     COPD Father     Diabetes Father         MELLITUS    Stroke Father     Diabetes Sister         MELLITUS    Sjogren's syndrome Sister     No Known Problems Daughter     No Known Problems Maternal Grandmother     No Known Problems Maternal Grandfather     No Known Problems Paternal Grandmother     No Known Problems Paternal Grandfather     No Known Problems Sister     No Known Problems Maternal Aunt     No Known Problems Paternal Aunt     No Known Problems Son      Allergies   Allergen Reactions    Macrobid [Nitrofurantoin Monohyd Macro] Rash    Penicillins Rash     Annotation - [id number]: can take cephalosporins    Sulfa Antibiotics Rash    Indocin [Indomethacin] GI Intolerance and Dizziness          Physical Exam  /64 (BP Location: Right arm, Patient Position: Sitting, Cuff Size: Adult)   Pulse 64   Ht 5' 3" (1 6 m)   Wt 70 1 kg (154 lb 9 6 oz)   BMI 27 39 kg/m²     Constitutional:  see vital signs  Gen: well-developed, normocephalic/atraumatic, well-groomed  Eyes: No inflammation or discharge of conjunctiva or lids; sclera clear   Pharynx: no inflammation, lesion, or mass of lips  Neck: supple, no masses, non-distended  MSK: no inflammation, lesion, mass, or clubbing of nails and digits except for other than mentioned below  SKIN: no visible rashes or skin lesions  Pulmonary/Chest: Effort normal  No respiratory distress  NEURO: cranial nerves grossly intact  PSYCH:  Alert and oriented to person, place, and time; recent and remote memory intact; mood normal, no depression, anxiety, or agitation, judgment and insight good and intact     Ortho Exam  RIGHT KNEE:  Erythema: no  Swelling: no  Increased Warmth: no  Tenderness: +joint line  Flexion: seated at 90  Extension: intact  Drawer: negative  Varus laxity: negative  Valgus laxity: negative    LEFT KNEE:  Erythema: no  Swelling: no  Increased Warmth: no  Tenderness: + joint line  Flexion: seated at 90  Extension: intact  Drawer: negative  Varus laxity: negative  Valgus laxity: negative    __________________________________________________________________________  Large joint arthrocentesis: L knee  Universal Protocol:  Procedure performed by: Blanca Yoon MS4)  Consent: Verbal consent obtained    Risks and benefits: risks, benefits and alternatives were discussed  Consent given by: patient  Time out: Immediately prior to procedure a "time out" was called to verify the correct patient, procedure, equipment, support staff and site/side marked as required  Patient understanding: patient states understanding of the procedure being performed  Site marked: the operative site was marked  Patient identity confirmed: verbally with patient    Supporting Documentation  Indications: pain   Procedure Details  Location: knee - L knee  Preparation: Patient was prepped and draped in the usual sterile fashion (betadine if not allergic  alcohol before and after ethyl chloride spray)  Needle size: 22 G  Ultrasound guidance: no  Approach: anteromedial  Medications administered: 4 mL lidocaine 1 %; 40 mg triamcinolone acetonide 40 mg/mL    Patient tolerance: patient tolerated the procedure well with no immediate complications  Dressing:  Sterile dressing applied    Large joint arthrocentesis: R knee  [place] Protocol:  Procedure performed by: (Dr Kimi Mckeon)  Consent: Verbal consent obtained  Risks and benefits: risks, benefits and alternatives were discussed  Consent given by: patient  Time out: Immediately prior to procedure a "time out" was called to verify the correct patient, procedure, equipment, support staff and site/side marked as required    Patient understanding: patient states understanding of the procedure being performed  Site marked: the operative site was marked  Patient identity confirmed: verbally with patient    Supporting Documentation  Indications: pain and diagnostic evaluation   Procedure Details  Location: knee - R knee  Preparation: Patient was prepped and draped in the usual sterile fashion  Needle size: 22 G  Ultrasound guidance: no  Approach: anterolateral  Medications administered: 4 mL lidocaine 1 %; 40 mg triamcinolone acetonide 40 mg/mL    Patient tolerance: patient tolerated the procedure well with no immediate complications  Dressing:  Sterile dressing applied

## 2022-07-28 NOTE — PATIENT INSTRUCTIONS
Treatment for knee osteoarthritis depends on severity of cartilage wear and pain levels  First line for mild arthritis is exercise to strengthen the knee and allow better joint movement, 5-10% weight loss if overweight, and topical anti-inflammatories such as diclofenac gel or topical analgesic pain relief creams such as capsaicin  Symptoms at this stage usually improve in 3 months  Moderate to severe arthritis or arthritis not responding to first line treatments may require oral medicine such as anti-inflammatories (NSAIDs) such as ibuprofen/motrin, and if failing treatment with oral NSAIDs, then may consider depression medicines such as duloxetine (cymbalta) which also have been shown to work on pain receptors and help to control pain  Other analgesics such as tylenol (acetaminophen) may be used but do not appear to offer significant pain relief  Supplements such as glucosamine and chondroitin supplements  Some studies do show benefit from taking glucosamine sulfate (1500 mg/day) and chondroitin (800 mg/day) but evidence is controversial  I recommend against a type of glucosamine known as "glucosamine hydrochloride" which appears not as effective as glucosamine sulfate  If no improvement with oral medicines, then patients may consider steroid injection into the knee joint which provides pain relief  Steroid injections can be given every 3 months  Any sooner than that can  result in possible deterioration or cartilage in your joint  Other injections are available such as as viscosupplementation or gel shots into the knee which provide nutrients for the cartilage and can result in pain reduction  These viscosupplementation injections are generally considered every 6 months but are controversial   The [address] recommends against viscosupplementation injection; however, the [facility] recommends for these injections       Beyond these injections there are other controversial treatments including stem cell as well as platelet rich plasma injection which are out of pocket usually up to a 1000 dollars per injection  Lastly, if you fail conservative measures and have persistent pain, then we may consider referral to surgeon for knee replacement  ([facility] 2018)  red flags and risks of injection include but are not limited to infection <0 072% as referenced in some sources, nerve or artery penetration, and if steroids are used-skin dimpling <1%, hypo-pigmentation <1%  Recommended no submerging underwater in a tub, pool, ocean, lake, jacuzzi, hot tub, or any other body of water for 1 week until needle wound closes due to risk of infection  May take showers  Clean needle site with soap and water and keep covered at all times with sterile bandage such as a band-aid until fully healed  Educated if any symptoms including fevers, chills, swelling, or worsening symptoms occur then to call office or go to hospital for immediate care if physician unavailable due to possible infection or other complication which is a serious medical problem  Patient expressed understanding and agreed to proceed with procedure

## 2022-07-29 RX ORDER — LIDOCAINE HYDROCHLORIDE 10 MG/ML
4 INJECTION, SOLUTION INFILTRATION; PERINEURAL
Status: COMPLETED | OUTPATIENT
Start: 2022-07-28 | End: 2022-07-28

## 2022-07-29 RX ORDER — TRIAMCINOLONE ACETONIDE 40 MG/ML
40 INJECTION, SUSPENSION INTRA-ARTICULAR; INTRAMUSCULAR
Status: COMPLETED | OUTPATIENT
Start: 2022-07-28 | End: 2022-07-28

## 2022-08-07 DIAGNOSIS — I10 ESSENTIAL HYPERTENSION: ICD-10-CM

## 2022-08-08 ENCOUNTER — RA CDI HCC (OUTPATIENT)
Dept: OTHER | Facility: HOSPITAL | Age: 77
End: 2022-08-08

## 2022-08-08 RX ORDER — LISINOPRIL 20 MG/1
TABLET ORAL
Qty: 180 TABLET | Refills: 1 | Status: SHIPPED | OUTPATIENT
Start: 2022-08-08

## 2022-08-09 ENCOUNTER — ANTICOAG VISIT (OUTPATIENT)
Dept: INTERNAL MEDICINE CLINIC | Facility: CLINIC | Age: 77
End: 2022-08-09

## 2022-08-09 ENCOUNTER — APPOINTMENT (OUTPATIENT)
Dept: LAB | Facility: CLINIC | Age: 77
End: 2022-08-09
Payer: MEDICARE

## 2022-08-09 DIAGNOSIS — E22.2 SIADH (SYNDROME OF INAPPROPRIATE ADH PRODUCTION) (HCC): ICD-10-CM

## 2022-08-09 LAB
ALBUMIN SERPL BCP-MCNC: 3.7 G/DL (ref 3.5–5)
ALP SERPL-CCNC: 71 U/L (ref 34–104)
ALT SERPL W P-5'-P-CCNC: 11 U/L (ref 7–52)
ANION GAP SERPL CALCULATED.3IONS-SCNC: 4 MMOL/L (ref 4–13)
AST SERPL W P-5'-P-CCNC: 13 U/L (ref 13–39)
BASOPHILS # BLD AUTO: 0.05 THOUSANDS/ΜL (ref 0–0.1)
BASOPHILS NFR BLD AUTO: 1 % (ref 0–1)
BILIRUB SERPL-MCNC: 0.47 MG/DL (ref 0.2–1)
BUN SERPL-MCNC: 11 MG/DL (ref 5–25)
CALCIUM SERPL-MCNC: 9.2 MG/DL (ref 8.4–10.2)
CHLORIDE SERPL-SCNC: 100 MMOL/L (ref 96–108)
CO2 SERPL-SCNC: 31 MMOL/L (ref 21–32)
CREAT SERPL-MCNC: 0.66 MG/DL (ref 0.6–1.3)
EOSINOPHIL # BLD AUTO: 0.14 THOUSAND/ΜL (ref 0–0.61)
EOSINOPHIL NFR BLD AUTO: 1 % (ref 0–6)
ERYTHROCYTE [DISTWIDTH] IN BLOOD BY AUTOMATED COUNT: 15 % (ref 11.6–15.1)
EST. AVERAGE GLUCOSE BLD GHB EST-MCNC: 128 MG/DL
FERRITIN SERPL-MCNC: 18 NG/ML (ref 8–388)
GFR SERPL CREATININE-BSD FRML MDRD: 85 ML/MIN/1.73SQ M
GLUCOSE P FAST SERPL-MCNC: 110 MG/DL (ref 65–99)
HBA1C MFR BLD: 6.1 %
HCT VFR BLD AUTO: 33.4 % (ref 34.8–46.1)
HGB BLD-MCNC: 11.1 G/DL (ref 11.5–15.4)
IMM GRANULOCYTES # BLD AUTO: 0.06 THOUSAND/UL (ref 0–0.2)
IMM GRANULOCYTES NFR BLD AUTO: 1 % (ref 0–2)
IRON SERPL-MCNC: 57 UG/DL (ref 50–170)
LYMPHOCYTES # BLD AUTO: 2.01 THOUSANDS/ΜL (ref 0.6–4.47)
LYMPHOCYTES NFR BLD AUTO: 21 % (ref 14–44)
MCH RBC QN AUTO: 30.6 PG (ref 26.8–34.3)
MCHC RBC AUTO-ENTMCNC: 33.2 G/DL (ref 31.4–37.4)
MCV RBC AUTO: 92 FL (ref 82–98)
MONOCYTES # BLD AUTO: 0.95 THOUSAND/ΜL (ref 0.17–1.22)
MONOCYTES NFR BLD AUTO: 10 % (ref 4–12)
NEUTROPHILS # BLD AUTO: 6.45 THOUSANDS/ΜL (ref 1.85–7.62)
NEUTS SEG NFR BLD AUTO: 66 % (ref 43–75)
NRBC BLD AUTO-RTO: 0 /100 WBCS
PLATELET # BLD AUTO: 430 THOUSANDS/UL (ref 149–390)
PMV BLD AUTO: 9 FL (ref 8.9–12.7)
POTASSIUM SERPL-SCNC: 4.7 MMOL/L (ref 3.5–5.3)
PROT SERPL-MCNC: 5.9 G/DL (ref 6.4–8.4)
RBC # BLD AUTO: 3.63 MILLION/UL (ref 3.81–5.12)
SODIUM SERPL-SCNC: 135 MMOL/L (ref 135–147)
TIBC SERPL-MCNC: 361 UG/DL (ref 250–450)
WBC # BLD AUTO: 9.66 THOUSAND/UL (ref 4.31–10.16)

## 2022-08-09 PROCEDURE — 83540 ASSAY OF IRON: CPT | Performed by: INTERNAL MEDICINE

## 2022-08-09 PROCEDURE — 85025 COMPLETE CBC W/AUTO DIFF WBC: CPT | Performed by: INTERNAL MEDICINE

## 2022-08-09 PROCEDURE — 82728 ASSAY OF FERRITIN: CPT | Performed by: INTERNAL MEDICINE

## 2022-08-09 PROCEDURE — 80053 COMPREHEN METABOLIC PANEL: CPT

## 2022-08-09 PROCEDURE — 83550 IRON BINDING TEST: CPT | Performed by: INTERNAL MEDICINE

## 2022-08-09 PROCEDURE — 83036 HEMOGLOBIN GLYCOSYLATED A1C: CPT | Performed by: INTERNAL MEDICINE

## 2022-08-22 ENCOUNTER — APPOINTMENT (OUTPATIENT)
Dept: LAB | Facility: CLINIC | Age: 77
End: 2022-08-22
Payer: MEDICARE

## 2022-08-22 DIAGNOSIS — Z79.01 LONG TERM (CURRENT) USE OF ANTICOAGULANTS: ICD-10-CM

## 2022-08-22 LAB
INR PPP: 0.97 (ref 0.84–1.19)
PROTHROMBIN TIME: 13.1 SECONDS (ref 11.6–14.5)

## 2022-08-22 PROCEDURE — 36415 COLL VENOUS BLD VENIPUNCTURE: CPT

## 2022-08-22 PROCEDURE — 85610 PROTHROMBIN TIME: CPT

## 2022-08-25 ENCOUNTER — TELEPHONE (OUTPATIENT)
Dept: HEMATOLOGY ONCOLOGY | Facility: CLINIC | Age: 77
End: 2022-08-25

## 2022-08-25 ENCOUNTER — OFFICE VISIT (OUTPATIENT)
Dept: INTERNAL MEDICINE CLINIC | Facility: CLINIC | Age: 77
End: 2022-08-25
Payer: MEDICARE

## 2022-08-25 VITALS
DIASTOLIC BLOOD PRESSURE: 78 MMHG | OXYGEN SATURATION: 98 % | BODY MASS INDEX: 26.58 KG/M2 | WEIGHT: 150 LBS | HEART RATE: 63 BPM | TEMPERATURE: 97.6 F | HEIGHT: 63 IN | SYSTOLIC BLOOD PRESSURE: 160 MMHG

## 2022-08-25 DIAGNOSIS — E03.9 ACQUIRED HYPOTHYROIDISM: ICD-10-CM

## 2022-08-25 DIAGNOSIS — D50.9 IRON DEFICIENCY ANEMIA, UNSPECIFIED IRON DEFICIENCY ANEMIA TYPE: ICD-10-CM

## 2022-08-25 DIAGNOSIS — I81 PORTAL VEIN THROMBOSIS: ICD-10-CM

## 2022-08-25 DIAGNOSIS — F33.1 MODERATE EPISODE OF RECURRENT MAJOR DEPRESSIVE DISORDER (HCC): ICD-10-CM

## 2022-08-25 DIAGNOSIS — E22.2 SIADH (SYNDROME OF INAPPROPRIATE ADH PRODUCTION) (HCC): ICD-10-CM

## 2022-08-25 DIAGNOSIS — J45.20 MILD INTERMITTENT ASTHMA WITHOUT COMPLICATION: ICD-10-CM

## 2022-08-25 DIAGNOSIS — M65.341 TRIGGER RING FINGER OF RIGHT HAND: ICD-10-CM

## 2022-08-25 DIAGNOSIS — G25.0 ESSENTIAL TREMOR: ICD-10-CM

## 2022-08-25 DIAGNOSIS — I10 ESSENTIAL HYPERTENSION: ICD-10-CM

## 2022-08-25 DIAGNOSIS — K21.9 GASTROESOPHAGEAL REFLUX DISEASE, UNSPECIFIED WHETHER ESOPHAGITIS PRESENT: ICD-10-CM

## 2022-08-25 DIAGNOSIS — R49.9 HOARSENESS OR CHANGING VOICE: Primary | ICD-10-CM

## 2022-08-25 PROCEDURE — 99214 OFFICE O/P EST MOD 30 MIN: CPT | Performed by: INTERNAL MEDICINE

## 2022-08-25 RX ORDER — DILTIAZEM HYDROCHLORIDE 60 MG/1
60 TABLET, FILM COATED ORAL DAILY
Qty: 90 TABLET | Refills: 1 | Status: SHIPPED | OUTPATIENT
Start: 2022-08-25

## 2022-08-25 NOTE — TELEPHONE ENCOUNTER
Scheduling Appointment SEND TO [facility]    Who Is Calling to Schedule self   Doctor Dr Carroll Munson   Date and Time 9/8 9:20Am   Reason for scheduling appointment Low ferritin   Patient verbalized understanding    yes

## 2022-08-25 NOTE — PROGRESS NOTES
Assessment/Plan:    Essential hypertension  BP slightly elevated today, has not taken diltiazem 60 mg this morning  Also on amlodipine 2 5 mg daily  diltiazem 240 mg and lisinopril 20 mg bid  Essential tremor  More frequent RLQ tremor  Follow up with neurology as scheduled  Acquired hypothyroidism  Stable  Iron deficiency anemia  Ferritin low again, refer back to hematology for iron infusion  Prediabetes  A1c slightly worse at 6 1%  Anxiety  On escitalopram   Again, discussed use of lorazepam     Lumbar degenerative disc disease  S/p injection earlier this week  Adjust gabapentin, take 600 mg in AM, 300 mg at noon, continue 1200 mg qHS  Still taking Vicodin 3 to 4x a day  Hyperlipidemia  On statin  GERD (gastroesophageal reflux disease)  On daily PPI  SIADH (syndrome of inappropriate ADH production) (HCC)  Na 135, taking NaCl tid  Portal vein thrombosis  Restarted warfarin, repeat INR next week  Overweight  Lost 6 lbs since last visit  Recommend to maintain current weight  Mild intermittent asthma without complication  Using Advair once a day only  Ulcerative colitis without complications (HCC)  Stable  Osteoarthritis of right knee  S/p injection  Diagnoses and all orders for this visit:    Hoarseness or changing voice  Comments:  Discussed ENT referral     Trigger ring finger of right hand  Comments:  Discussed Ortho referral     Essential hypertension  -     diltiazem (CARDIZEM) 60 mg tablet;  Take 1 tablet (60 mg total) by mouth daily    Acquired hypothyroidism    Iron deficiency anemia, unspecified iron deficiency anemia type    SIADH (syndrome of inappropriate ADH production) (HCC)    Essential tremor    Portal vein thrombosis    Moderate episode of recurrent major depressive disorder (HCC)    Mild intermittent asthma without complication    Gastroesophageal reflux disease, unspecified whether esophagitis present    Follow up in 3 months or as needed  Subjective:      Patient ID: Jenna Patel is a 65 y o  female here for a follow up  She had a back injection a few days ago, had restarted her warfarin later that evening  She reports it had really hurt, hoping it will help her right groin and back pain  She had knee injections last month  She had decreased her gabapentin, wants to further decrease the dose  She complains of her right ring finger locking frequently, no pain  She says it is bothersome when ironing clothes  She noticed her voice is becoming more hoarse frequently, gets tired later in the day and feels it is softer  She recalls having it checked several years ago, had a scope done  She is wondering about her iron, had infusion in the past     She noticed her right leg is shaking more often, says it is because she is nervous  No hand tremor  The following portions of the patient's history were reviewed and updated as appropriate: allergies, current medications, past medical history, past social history and problem list     Review of Systems   Constitutional: Negative for activity change, appetite change and fatigue  HENT: Positive for voice change  Negative for congestion, ear pain and trouble swallowing  Eyes: Negative for visual disturbance  Respiratory: Negative for cough and shortness of breath  Cardiovascular: Negative for chest pain and leg swelling  Gastrointestinal: Negative for abdominal pain, constipation, diarrhea and nausea  Genitourinary: Negative for dysuria and frequency  Musculoskeletal: Positive for arthralgias and back pain  Negative for joint swelling and myalgias  Skin: Negative for rash and wound  Neurological: Positive for tremors  Negative for dizziness, numbness and headaches  Psychiatric/Behavioral: Negative for confusion and dysphoric mood  The patient is not nervous/anxious            Objective:      /78   Pulse 63   Temp 97 6 °F (36 4 °C)   Ht 5' 3" (1 6 m)   Wt 68 kg (150 lb)   SpO2 98%   BMI 26 57 kg/m²          Physical Exam  Vitals and nursing note reviewed  Constitutional:       General: She is not in acute distress  Appearance: She is well-developed  HENT:      Head: Normocephalic and atraumatic  Eyes:      Pupils: Pupils are equal, round, and reactive to light  Cardiovascular:      Rate and Rhythm: Normal rate and regular rhythm  Heart sounds: Normal heart sounds  Pulmonary:      Effort: Pulmonary effort is normal       Breath sounds: Normal breath sounds  No wheezing  Abdominal:      General: Bowel sounds are normal       Palpations: Abdomen is soft  Musculoskeletal:         General: No swelling  Right lower leg: No edema  Left lower leg: No edema  Skin:     General: Skin is warm  Findings: No rash  Neurological:      General: No focal deficit present  Mental Status: She is alert and oriented to person, place, and time  Motor: Tremor present  Comments: RLE tremor   Psychiatric:         Mood and Affect: Mood and affect normal  Mood is not anxious or depressed  Behavior: Behavior normal            Labs & imaging results reviewed with patient

## 2022-08-25 NOTE — ASSESSMENT & PLAN NOTE
S/p injection earlier this week  Adjust gabapentin, take 600 mg in AM, 300 mg at noon, continue 1200 mg qHS  Still taking Vicodin 3 to 4x a day

## 2022-08-25 NOTE — ASSESSMENT & PLAN NOTE
BP slightly elevated today, has not taken diltiazem 60 mg this morning  Also on amlodipine 2 5 mg daily  diltiazem 240 mg and lisinopril 20 mg bid

## 2022-08-29 ENCOUNTER — ANTICOAG VISIT (OUTPATIENT)
Dept: INTERNAL MEDICINE CLINIC | Facility: CLINIC | Age: 77
End: 2022-08-29

## 2022-08-29 ENCOUNTER — APPOINTMENT (OUTPATIENT)
Dept: LAB | Facility: CLINIC | Age: 77
End: 2022-08-29
Payer: MEDICARE

## 2022-08-29 DIAGNOSIS — I81 PORTAL VEIN THROMBOSIS: ICD-10-CM

## 2022-08-29 LAB — SL AMB POCT INR: 1.43

## 2022-09-06 DIAGNOSIS — E78.49 OTHER HYPERLIPIDEMIA: ICD-10-CM

## 2022-09-06 RX ORDER — SIMVASTATIN 5 MG
TABLET ORAL
Qty: 90 TABLET | Refills: 1 | Status: SHIPPED | OUTPATIENT
Start: 2022-09-06

## 2022-09-08 ENCOUNTER — OFFICE VISIT (OUTPATIENT)
Dept: HEMATOLOGY ONCOLOGY | Facility: CLINIC | Age: 77
End: 2022-09-08
Payer: MEDICARE

## 2022-09-08 ENCOUNTER — TELEPHONE (OUTPATIENT)
Dept: HEMATOLOGY ONCOLOGY | Facility: CLINIC | Age: 77
End: 2022-09-08

## 2022-09-08 VITALS
TEMPERATURE: 96.9 F | HEIGHT: 63 IN | HEART RATE: 54 BPM | SYSTOLIC BLOOD PRESSURE: 128 MMHG | DIASTOLIC BLOOD PRESSURE: 72 MMHG | OXYGEN SATURATION: 97 % | WEIGHT: 154 LBS | RESPIRATION RATE: 18 BRPM | BODY MASS INDEX: 27.29 KG/M2

## 2022-09-08 DIAGNOSIS — D50.9 IRON DEFICIENCY ANEMIA, UNSPECIFIED IRON DEFICIENCY ANEMIA TYPE: Primary | ICD-10-CM

## 2022-09-08 PROCEDURE — 99215 OFFICE O/P EST HI 40 MIN: CPT | Performed by: INTERNAL MEDICINE

## 2022-09-08 RX ORDER — SODIUM CHLORIDE 9 MG/ML
20 INJECTION, SOLUTION INTRAVENOUS ONCE
Status: CANCELLED | OUTPATIENT
Start: 2022-09-15

## 2022-09-08 NOTE — TELEPHONE ENCOUNTER
While we try to accommodate patient requests, our priority is to schedule treatment according to Doctor's orders and site availability  1  Does the Provider use the intake sheet or checkout note? Intake Sheet  2  What would be a preferred day of the week that would work best for your infusion appointment? Any day but Wednesday  3  Do you prefer mornings or afternoons for your appointments? Morning  4  Are there any days or dates that do not work for your schedule, including any upcoming vacations? Patient is away starting January 28   5  We are going to try our best to schedule you at the infusion center closest to your home  In the event that we are unable to what would be your next preferred infusion site or sites? 1  [place]  2  [place]  3      6  Do you have transportation to take you to all of your appointments? Yes  7   Would you like the infusion center to draw labs from your port? (disregard if patient doesn't have a port or need labs for infusion appointment)

## 2022-09-08 NOTE — TELEPHONE ENCOUNTER
Left message for patient regarding new iron tx schedule  Advised patient to check her MyChart for more details and to call back if she has any questions  I will call again later today

## 2022-09-08 NOTE — LETTER
September 8, 2022     Sherif Bailey MD  [address]  [place] [address]    Patient: Radha Templeton   YOB: 1945   Date of Visit: 9/8/2022       Dear Dr German John: Thank you for referring Zelalem Flores to me for evaluation  Below are my notes for this consultation  If you have questions, please do not hesitate to call me  I look forward to following your patient along with you  Sincerely,        Phylicia Andrew MD        CC: No Recipients  Phylicia Andrew MD  9/8/2022  9:28 AM  Sign when Signing Visit  Hematology / Oncology Outpatient Follow Up Note    Radha Templeton 65 y o  female XPL:[id number] MQI:[id number]         Date:  9/8/2022    Assessment / Plan:    A 69-year-old postmenopausal woman with microinvasive left breast cancer, ER negative, 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, 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 diagnosed in early 2021  Her microinvasive breast cancer measure less than 1 mm  However, her DCIS measure 7 7 cm  She underwent mastectomy and sentinel lymph node biopsy, resulting in KORIN  She does not require any adjuvant therapy  She has history of ulcerative colitis as well as bowel resection in 2010  She has history of iron-deficiency anemia for which oral iron did not help  She was on IV iron in the past   This is due to the iron malabsorption associated with bowel resection  She has mild anemia with low ferritin  Therefore, I recommended her to have venofer monthly x3  She is in agreement with my recommendation  I will see her again in 4 months with CBC and ferritin                Subjective:      HPI:    A 69-year-old postmenopausal woman who was found to have abnormality in her left breast, based on a screening mammography  Therefore, she underwent left breast biopsy in December 17, 2020 which showed 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  DCIS port was ER negative, FL negative    She subsequently underwent left mastectomy with sentinel lymph node biopsy by Dr Luz Haines in February 12, 2021  She had 7 7 cm of ductal carcinoma in situ, grade 3  She also had micro invasion measuring less than 1 mm  Micro invasive part of cancer was ER negative, VA negative, HER2 3+ disease  2 sentinel lymph nodes were negative for metastatic disease  She did not have reconstruction  She presents today with her  to discuss possible adjuvant therapy  She feels well with no complaint of pain  Her weight is stable  She has history of essential tremor for which she had bilateral brain stimulator which improved her quality life substantially  She also has history of splenectomy back in 1999  She has no  Family history of breast cancer  She is nonsmoker  She is to have some ambulatory dysfunction for which she occasionally use walkers  Her performance status is 1/4 on ECOG scale            Interval History:   A 69-year-old postmenopausal woman with microinvasive left breast cancer, ER negative, VA negative, 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 diagnosed in early 2021  Her microinvasive breast cancer measure less than 1 mm  However, her DCIS measure 7 7 cm  She underwent mastectomy and sentinel lymph node biopsy, resulting in KORIN  She did not require any adjuvant therapy  She has history of ulcerative colitis as well as history of bowel resection in 2010  She has history of iron-deficiency anemia for which she was on iron infusion by Dr Leslie Deluna  She was referred to me by her primary care physician because her recent CBC showed mild anemia with hemoglobin 11 as well as ferritin 19  She has mild fatigue  Otherwise, she feels well  She told me that she was on oral iron which did not improve anemia in the past   Her performance status is normal              Objective:      Primary Diagnosis:       1  Left breast cancer, stage I A ( pT1a, pN0, M0) with microinvasion measuring less than 1 mm  ER negative, VA negative, HER2 3+ disease  Diagnosed in February 2021     2  Iron-deficiency anemia probably due to the bowel resection      Cancer Staging:  Cancer Staging  No matching staging information was found for the patient         Previous Hematologic/ Oncologic Treatment:            Current Hematologic/ Oncologic Treatment:         venofer monthly x3 to be started next week      Disease Status:        KORIN status post mastectomy and sentinel lymph node biopsy      Test Results:     Pathology:       7 7 cm of ductal carcinoma in situ, grade 3, ER negative, SD negative  Micro invasive cancer measuring less than 1 mm, ER negative, SD negative, HER2 3+ disease  2 sentinel lymph node was negative for metastatic disease  Stage I A ( pT1 a, pN0, M0)     Radiology:      chest x-ray was negative for pulmonary disease      Laboratory:       See below for CBC and ferritin      Physical Exam:        General Appearance:    Alert, oriented          Eyes:    PERRL   Ears:    Normal external ear canals, both ears   Nose:   Nares normal, septum midline   Throat:   Mucosa moist  Pharynx without injection  Neck:   Supple         Lungs:     Clear to auscultation bilaterally   Chest Wall:    No tenderness or deformity    Heart:    Regular rate and rhythm         Abdomen:     Soft, non-tender, bowel sounds +, no organomegaly               Extremities:   Extremities no cyanosis or edema         Skin:   no rash or icterus  Lymph nodes:   Cervical, supraclavicular, and axillary nodes normal   Neurologic:   CNII-XII intact, normal strength, sensation and reflexes     Throughout             Breast exam:     Status post mastectomy without reconstruction  There is no palpable abnormality in her left chest wall  Right breast exam is negative  ROS: Review of Systems   All other systems reviewed and are negative  Imaging: No results found        Labs:   Lab Results   Component Value Date    WBC 9 66 08/09/2022    HGB 11 1 (L) 08/09/2022    HCT 33 4 (L) 08/09/2022    MCV 92 08/09/2022     (H) 08/09/2022     Lab Results   Component Value Date     (L) 12/28/2015    K 4 7 08/09/2022     08/09/2022    CO2 31 08/09/2022    ANIONGAP 8 12/28/2015    BUN 11 08/09/2022    CREATININE 0 66 08/09/2022    GLUCOSE 112 10/17/2021    GLUF 110 (H) 08/09/2022    CALCIUM 9 2 08/09/2022    CORRECTEDCA 9 2 03/19/2022    AST 13 08/09/2022    ALT 11 08/09/2022    ALKPHOS 71 08/09/2022    PROT 6 9 11/16/2015    BILITOT 0 36 11/16/2015    EGFR 85 08/09/2022         Lab Results   Component Value Date    IRON 57 08/09/2022    TIBC 361 08/09/2022    FERRITIN 18 08/09/2022       Lab Results   Component Value Date    LQCUBDSX12 317 12/06/2021         Current Medications: Reviewed  Allergies: Reviewed  PMH/FH/SH:  Reviewed      Vital Sign:    Body surface area is 1 73 meters squared      Wt Readings from Last 3 Encounters:   09/08/22 69 9 kg (154 lb)   09/02/22 68 kg (150 lb)   08/25/22 68 kg (150 lb)        Temp Readings from Last 3 Encounters:   09/08/22 (!) 96 9 °F (36 1 °C) (Tympanic)   08/25/22 97 6 °F (36 4 °C)   07/21/22 97 6 °F (36 4 °C)        BP Readings from Last 3 Encounters:   09/08/22 128/72   08/25/22 160/78   07/28/22 103/64         Pulse Readings from Last 3 Encounters:   09/08/22 (!) 54   08/25/22 63   07/28/22 64     @LASTSAO2(3)@

## 2022-09-08 NOTE — PROGRESS NOTES
Hematology / Oncology Outpatient Follow Up Note    Jenna Patel 65 y o  female KFT:[birth year] YOF:[id number]         Date:  9/8/2022    Assessment / Plan:    A 78-year-old postmenopausal woman with microinvasive left breast cancer, ER negative, NE negative, 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 diagnosed in early 2021  Her microinvasive breast cancer measure less than 1 mm  However, her DCIS measure 7 7 cm  She underwent mastectomy and sentinel lymph node biopsy, resulting in KORIN  She does not require any adjuvant therapy  She has history of ulcerative colitis as well as bowel resection in 2010  She has history of iron-deficiency anemia for which oral iron did not help  She was on IV iron in the past   This is due to the iron malabsorption associated with bowel resection  She has mild anemia with low ferritin  Therefore, I recommended her to have venofer monthly x3  She is in agreement with my recommendation  I will see her again in 4 months with CBC and ferritin                Subjective:      HPI:    A 78-year-old postmenopausal woman who was found to have abnormality in her left breast, based on a screening mammography  Therefore, she underwent left breast biopsy in December 17, 2020 which showed 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  DCIS port was ER negative, NE negative  She subsequently underwent left mastectomy with sentinel lymph node biopsy by Dr Sav Arredondo in February 12, 2021  She had 7 7 cm of ductal carcinoma in situ, grade 3  She also had micro invasion measuring less than 1 mm  Micro invasive part of cancer was ER negative, NE negative, HER2 3+ disease  2 sentinel lymph nodes were negative for metastatic disease  She did not have reconstruction  She presents today with her  to discuss possible adjuvant therapy  She feels well with no complaint of pain  Her weight is stable    She has history of essential tremor for which she had bilateral brain stimulator which improved her quality life substantially  She also has history of splenectomy back in 1999  She has no  Family history of breast cancer  She is nonsmoker  She is to have some ambulatory dysfunction for which she occasionally use walkers  Her performance status is 1/4 on ECOG scale            Interval History:   A 67-year-old postmenopausal woman with microinvasive left breast cancer, ER negative, MT negative, 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 diagnosed in early 2021  Her microinvasive breast cancer measure less than 1 mm  However, her DCIS measure 7 7 cm  She underwent mastectomy and sentinel lymph node biopsy, resulting in KORIN  She did not require any adjuvant therapy  She has history of ulcerative colitis as well as history of bowel resection in 2010  She has history of iron-deficiency anemia for which she was on iron infusion by Dr Inderjit Velez  She was referred to me by her primary care physician because her recent CBC showed mild anemia with hemoglobin 11 as well as ferritin 19  She has mild fatigue  Otherwise, she feels well  She told me that she was on oral iron which did not improve anemia in the past   Her performance status is normal              Objective:      Primary Diagnosis:       1  Left breast cancer, stage I A ( pT1a, pN0, M0) with microinvasion measuring less than 1 mm  ER negative, MT negative, HER2 3+ disease  Diagnosed in February 2021     2  Iron-deficiency anemia probably due to the bowel resection      Cancer Staging:  Cancer Staging  No matching staging information was found for the patient         Previous Hematologic/ Oncologic Treatment:            Current Hematologic/ Oncologic Treatment:         venofer monthly x3 to be started next week      Disease Status:        KORIN status post mastectomy and sentinel lymph node biopsy      Test Results:     Pathology:       7 7 cm of ductal carcinoma in situ, grade 3, ER negative, MT negative    Micro invasive cancer measuring less than 1 mm, ER negative, VT negative, HER2 3+ disease  2 sentinel lymph node was negative for metastatic disease  Stage I A ( pT1 a, pN0, M0)     Radiology:      chest x-ray was negative for pulmonary disease      Laboratory:       See below for CBC and ferritin      Physical Exam:        General Appearance:    Alert, oriented          Eyes:    PERRL   Ears:    Normal external ear canals, both ears   Nose:   Nares normal, septum midline   Throat:   Mucosa moist  Pharynx without injection  Neck:   Supple         Lungs:     Clear to auscultation bilaterally   Chest Wall:    No tenderness or deformity    Heart:    Regular rate and rhythm         Abdomen:     Soft, non-tender, bowel sounds +, no organomegaly               Extremities:   Extremities no cyanosis or edema         Skin:   no rash or icterus  Lymph nodes:   Cervical, supraclavicular, and axillary nodes normal   Neurologic:   CNII-XII intact, normal strength, sensation and reflexes     Throughout             Breast exam:     Status post mastectomy without reconstruction  There is no palpable abnormality in her left chest wall  Right breast exam is negative  ROS: Review of Systems   All other systems reviewed and are negative  Imaging: No results found        Labs:   Lab Results   Component Value Date    WBC 9 66 08/09/2022    HGB 11 1 (L) 08/09/2022    HCT 33 4 (L) 08/09/2022    MCV 92 08/09/2022     (H) 08/09/2022     Lab Results   Component Value Date     (L) 12/28/2015    K 4 7 08/09/2022     08/09/2022    CO2 31 08/09/2022    ANIONGAP 8 12/28/2015    BUN 11 08/09/2022    CREATININE 0 66 08/09/2022    GLUCOSE 112 10/17/2021    GLUF 110 (H) 08/09/2022    CALCIUM 9 2 08/09/2022    CORRECTEDCA 9 2 03/19/2022    AST 13 08/09/2022    ALT 11 08/09/2022    ALKPHOS 71 08/09/2022    PROT 6 9 11/16/2015    BILITOT 0 36 11/16/2015    EGFR 85 08/09/2022         Lab Results   Component Value Date IRON 57 08/09/2022    TIBC 361 08/09/2022    FERRITIN 18 08/09/2022       Lab Results   Component Value Date    UGBEXYRS32 650 12/06/2021         Current Medications: Reviewed  Allergies: Reviewed  PMH/FH/SH:  Reviewed      Vital Sign:    Body surface area is 1 73 meters squared      Wt Readings from Last 3 Encounters:   09/08/22 69 9 kg (154 lb)   09/02/22 68 kg (150 lb)   08/25/22 68 kg (150 lb)        Temp Readings from Last 3 Encounters:   09/08/22 (!) 96 9 °F (36 1 °C) (Tympanic)   08/25/22 97 6 °F (36 4 °C)   07/21/22 97 6 °F (36 4 °C)        BP Readings from Last 3 Encounters:   09/08/22 128/72   08/25/22 160/78   07/28/22 103/64         Pulse Readings from Last 3 Encounters:   09/08/22 (!) 54   08/25/22 63   07/28/22 64     @LASTSAO2(3)@

## 2022-09-12 ENCOUNTER — APPOINTMENT (OUTPATIENT)
Dept: LAB | Facility: AMBULARY SURGERY CENTER | Age: 77
End: 2022-09-12
Payer: MEDICARE

## 2022-09-12 ENCOUNTER — ANTICOAG VISIT (OUTPATIENT)
Dept: INTERNAL MEDICINE CLINIC | Facility: CLINIC | Age: 77
End: 2022-09-12

## 2022-09-15 ENCOUNTER — HOSPITAL ENCOUNTER (OUTPATIENT)
Dept: INFUSION CENTER | Facility: CLINIC | Age: 77
Discharge: HOME | End: 2022-09-15
Payer: MEDICARE

## 2022-09-15 VITALS
RESPIRATION RATE: 20 BRPM | DIASTOLIC BLOOD PRESSURE: 84 MMHG | SYSTOLIC BLOOD PRESSURE: 152 MMHG | TEMPERATURE: 97.4 F | HEART RATE: 65 BPM

## 2022-09-15 DIAGNOSIS — D50.9 IRON DEFICIENCY ANEMIA, UNSPECIFIED IRON DEFICIENCY ANEMIA TYPE: Primary | ICD-10-CM

## 2022-09-15 PROCEDURE — 96374 THER/PROPH/DIAG INJ IV PUSH: CPT

## 2022-09-15 RX ORDER — SODIUM CHLORIDE 9 MG/ML
20 INJECTION, SOLUTION INTRAVENOUS ONCE
Status: CANCELLED | OUTPATIENT
Start: 2022-10-13

## 2022-09-15 RX ORDER — SODIUM CHLORIDE 9 MG/ML
20 INJECTION, SOLUTION INTRAVENOUS ONCE
Status: COMPLETED | OUTPATIENT
Start: 2022-09-15 | End: 2022-09-15

## 2022-09-15 RX ADMIN — IRON SUCROSE 200 MG: 20 INJECTION, SOLUTION INTRAVENOUS at 10:11

## 2022-09-15 RX ADMIN — SODIUM CHLORIDE 20 ML/HR: 0.9 INJECTION, SOLUTION INTRAVENOUS at 10:05

## 2022-09-19 DIAGNOSIS — I10 ESSENTIAL HYPERTENSION: ICD-10-CM

## 2022-09-19 DIAGNOSIS — F41.9 ANXIETY: ICD-10-CM

## 2022-09-19 RX ORDER — DILTIAZEM HYDROCHLORIDE 180 MG/1
CAPSULE, COATED, EXTENDED RELEASE ORAL
Qty: 90 CAPSULE | Refills: 1 | Status: SHIPPED | OUTPATIENT
Start: 2022-09-19

## 2022-09-19 RX ORDER — ESCITALOPRAM OXALATE 20 MG/1
TABLET ORAL
Qty: 90 TABLET | Refills: 1 | Status: SHIPPED | OUTPATIENT
Start: 2022-09-19

## 2022-09-28 DIAGNOSIS — F41.9 ANXIETY: ICD-10-CM

## 2022-09-28 DIAGNOSIS — M54.16 LUMBAR RADICULOPATHY: ICD-10-CM

## 2022-09-28 RX ORDER — LORAZEPAM 1 MG/1
1 TABLET ORAL EVERY 6 HOURS PRN
Qty: 120 TABLET | Refills: 0 | Status: SHIPPED | OUTPATIENT
Start: 2022-09-28

## 2022-09-28 RX ORDER — HYDROCODONE BITARTRATE AND ACETAMINOPHEN 5; 325 MG/1; MG/1
1 TABLET ORAL EVERY 6 HOURS PRN
Qty: 120 TABLET | Refills: 0 | Status: SHIPPED | OUTPATIENT
Start: 2022-09-28

## 2022-10-04 ENCOUNTER — OFFICE VISIT (OUTPATIENT)
Dept: SURGICAL ONCOLOGY | Facility: CLINIC | Age: 77
End: 2022-10-04
Payer: MEDICARE

## 2022-10-04 VITALS
OXYGEN SATURATION: 93 % | SYSTOLIC BLOOD PRESSURE: 124 MMHG | BODY MASS INDEX: 27.82 KG/M2 | HEART RATE: 65 BPM | WEIGHT: 157 LBS | HEIGHT: 63 IN | DIASTOLIC BLOOD PRESSURE: 70 MMHG | TEMPERATURE: 97.3 F

## 2022-10-04 DIAGNOSIS — Z85.3 HISTORY OF LEFT BREAST CANCER: Primary | ICD-10-CM

## 2022-10-04 DIAGNOSIS — Z90.12 S/P LEFT MASTECTOMY: ICD-10-CM

## 2022-10-04 PROCEDURE — 99213 OFFICE O/P EST LOW 20 MIN: CPT

## 2022-10-04 NOTE — PROGRESS NOTES
Surgical Oncology Follow Up       [address]  [facility]  [address] [address] [facility] [address]  [facility] [address]    Rosangela Yaquelin  1945  [id number]  [address] [facility]  [facility] [place]  [address] [address]    Chief Complaint   Patient presents with    Follow-up       Assessment/Plan:   1  History of left breast cancer  - 6 month follow up  - Mammo diagnostic right w 3d & cad; Future    2  S/P left mastectomy  - Breast Prothesis    Discussion/Summary: Pt is a 65year old female presenting today for a six-month follow-up for left breast cancer diagnosed in December of 2020  Pathology revealed DCIS with microinvasion  Pathology was ER/AL negative HER2 positive  Patient had genetic testing which was negative  She underwent a left breast mastectomy with sentinel node biopsy with Dr Raiford Shone  Adjuvant therapy was not recommended  she will be due for mammogram in November  Patient requested a mastectomy bra at this time in order was placed  There were no concerns on her clinical breast exam  I will see the patient back in 6 months or sooner should the need arise  She was instructed to call with any questions or concerns prior to this time  All questions were answered today  History of Present Illness:     Oncology History   History of left breast cancer   12/17/2020 Biopsy    Left Breast stereotactic biopsy:  A  4 o'clock, posterior  Ductal carcinoma in situ with microinvasion  Grade 3  ER 0, AL 0, HER2 3+    B & C  5 o'clock, anterior with & without calcs  Ductal carcinoma in situ  Grade 3  ER 0, AL 0    Concordant  Malignancy appears unifocal; calcifications span 4 1 cm  Both clips migrated  No recent axillary US  Right breast clear       1/7/2021 Genetic Testing    The following genes were evaluated: CHARLOTTE, BRCA1, BRCA2, CDH1, CHEK2, PALB2, PTEN, STK11, Tp53; additional genes evaluated for a total of 20 genes   Negative result  No pathogenic sequence variants or deletions/dupllications identified  [employer]     2/12/2021 Surgery    Left breast mastectomy with sentinel lymph node biopsy  Micro-invasive carcinoma (less than 1 mm)  Extensive ductal carcinoma in situ (7 7 cm)  Grade 3  Margins negative  0/2 Lymph nodes  Anatomic/Prognostic Stage IA     4/7/2021 - 4/7/2021 Hormone Therapy    Consult with Dr Julia Barros  Adjuvant therapy not recommended          -Interval History: Pt is a 65year old female presenting today for a six-month follow-up for left breast cancer diagnosed in December of 2020  Patient denies changes on her breast exam  She denies persistent headaches, bone pain, back pain, shortness of breath, or abdominal pain  Review of Systems:  Review of Systems   Constitutional: Negative for activity change, appetite change, fatigue and unexpected weight change  Respiratory: Negative for cough and shortness of breath  Cardiovascular: Negative for chest pain  Gastrointestinal: Negative for abdominal pain, diarrhea, nausea and vomiting  Endocrine: Negative for heat intolerance  Musculoskeletal: Negative for arthralgias, back pain and myalgias  Skin: Negative for rash  Neurological: Negative for weakness and headaches  Hematological: Negative for adenopathy         Patient Active Problem List   Diagnosis    Portal vein thrombosis    Anxiety    Moderate episode of recurrent major depressive disorder (HCC)    Essential tremor    Hyperlipidemia    Essential hypertension    Hypomagnesemia    SIADH (syndrome of inappropriate ADH production) (HCC)    Acquired hypothyroidism    Insomnia    Iron deficiency anemia    Prediabetes    Peripheral neuropathy    Osteopenia    Osteoarthritis of right knee    Ulcerative colitis without complications (HCC)    Allergic rhinitis    GERD (gastroesophageal reflux disease)    Anemia    Mild intermittent asthma without complication    Diarrhea    [facility]'s [facility] ([facility]    Chronic salpingo-oophoritis    Overweight    Supraventricular tachycardia (HCC)    Unsteady gait    Lower extremity pain, left    Lumbar degenerative disc disease    Encounter for long-term (current) use of medications    S/P deep brain stimulator placement    Vitamin B12 deficiency    Vitamin D deficiency    History of left breast cancer    Closed nondisplaced fracture of neck of right radius     Past Medical History:   Diagnosis Date    Anemia     Anxiety     Arrhythmia     Arthritis     Asthma     Blood clot in vein     portal vein    Breast cancer (HCC) Left    Breast lump     [id number] RESOLVED    Cancer (HCC)     Depression     Disease of thyroid gland     DVT, lower extremity (HCC)     GERD (gastroesophageal reflux disease)     Hyperlipidemia     Hypertension     Hypokalemia     Hyponatremia     Hypothyroidism     Iron deficiency anemia     Irregular heart beat     Manic behavior (HCC)     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HCC)     Osteoarthritis     Palpitations     [id number]  RESOLVED    Paroxysmal supraventricular tachycardia (HCC)     PE (pulmonary thromboembolism) (HCC)     Sjoegren syndrome     Sleep apnea     Sleep difficulties     Spinal stenosis     Thrombocytosis     [id number]  RESOLVED    Tremors of nervous system     dbs implanted right and left chest    Ulcerative colitis ([facility]     Vertigo     [id number] RESOLVED     Past Surgical History:   Procedure Laterality Date    ABDOMINAL SURGERY      APPENDECTOMY      BREAST BIOPSY Left 11/07/2019    Stereo    BREAST EXCISIONAL BIOPSY Left     x many years    BREAST SURGERY      lumpectomy & biopsy    CARMELLA HOLE W/ STEREOTACTIC INSERTION OF DBS LEADS / INTRAOP MICROELECTRODE RECORDING      COLON SURGERY      COLONOSCOPY N/A 08/30/2017    Procedure: POUCHOSCOPY;  Surgeon: Marisa Hagen MD;  [facility];   Service: Colorectal    COLOPROCTECTOMY W/ ILEO J POUCH      ESOPHAGOGASTRODUODENOSCOPY      ONSET 10/17/11    FISTULA REPAIR      LLEOANAL FISTULA REPAIR TRANSPERIN TRANSABD APPROACH    HYSTERECTOMY      age 43    ILEOSTOMY CLOSURE      KNEE ARTHROSCOPY      Right    MAMMO STEREOTACTIC BREAST BIOPSY LEFT (ALL INC) Left 11/07/2019    MAMMO STEREOTACTIC BREAST BIOPSY LEFT (ALL INC) Left 12/17/2020    MAMMO STEREOTACTIC BREAST BIOPSY LEFT (ALL INC) EACH ADD Left 12/17/2020    MASTECTOMY Left     MASTECTOMY W/ SENTINEL NODE BIOPSY Left 02/12/2021    Procedure: BREAST MASTECTOMY WITH SENTINEL LYMPH NODE BIOPSY, LYMPHATIC MAPPING WITH BLUE DYE AND RADIAOCTIVE DYE (INJECT AT 1100 BY DR GILLESPIE IN THE OR);   Surgeon: Anupam Zimmerman MD;  [facility];  Service: Surgical Oncology    [place] IMP STIM,CRANIAL,SUBQ,1 ARRAY Right 06/20/2017    Procedure: DBS GENERATOR REPLACEMENT;  Surgeon: Clint Jimenez MD;  [facility];  Service: Neurosurgery    [place] IMP STIM,CRANIAL,SUBQ,1 ARRAY N/A 12/04/2019    Procedure: REPLACEMENT IMPLANTABLE PULSE GENERATOR FOR DEEP BRAIN STIMULATOR LEFT CHEST;  Surgeon: Ariadna Johnson MD;  [facility];  Service: Neurosurgery    SPLENECTOMY      TONSILLECTOMY       Family History   Problem Relation Age of Onset    Venous thrombosis Mother         ACUTE VENOUS THROMBOSIS OF DEEP VESSELS OF THE DISTAL LOWER EXTREMITY    Other Mother         PHLEBITIS    Hypertension Mother     Peripheral vascular disease Mother     COPD Father     Diabetes Father         MELLITUS    Stroke Father     Diabetes Sister         MELLITUS    Sjogren's syndrome Sister     No Known Problems Daughter     No Known Problems Maternal Grandmother     No Known Problems Maternal Grandfather     No Known Problems Paternal Grandmother     No Known Problems Paternal Grandfather     No Known Problems Sister     No Known Problems Maternal Aunt     No Known Problems Paternal Aunt     No Known Problems Son      Social History     Socioeconomic History    Marital status:      Spouse name: Not on file    Number of children: Not on file    Years of education: EDUCATION LEVEL 10TH GRADE    Highest education level: Not on file   Occupational History    Occupation: RETIRED   Tobacco Use    Smoking status: Former Smoker     Packs/day: 1 50     Years: 5 00     Pack years: 7 50     Quit date: 1965     Years since quittin 7    Smokeless tobacco: Never Used    Tobacco comment: Quit   Vaping Use    Vaping Use: Never used   Substance and Sexual Activity    Alcohol use:  Yes     Alcohol/week: 2 0 standard drinks     Types: 2 Cans of beer per week    Drug use: No    Sexual activity: Not Currently     Partners: Male     Birth control/protection: Post-menopausal   Other Topics Concern    Not on file   Social History Narrative    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ACTIVITIES INSIDE AND OUTSIDE OF HOME, MODERATE    CAFFEINE USE, DRINKS CAFEINATED TEA, DRINKS COFFEE    INADEQUATE EXERCISE    LIVES WITH ADULT CHILDREN     Social Determinants of Health     Financial Resource Strain: Not on file   Food Insecurity: Not on file   Transportation Needs: Not on file   Physical Activity: Not on file   Stress: Not on file   Social Connections: Not on file   Intimate Partner Violence: Not on file   Housing Stability: Not on file       Current Outpatient Medications:     acetaminophen (TYLENOL) 325 mg tablet, Take 2 tablets (650 mg total) by mouth every 6 (six) hours as needed for mild pain, Disp: , Rfl: 0    albuterol (PROVENTIL HFA,VENTOLIN HFA) 90 mcg/act inhaler, Inhale 2 puffs every 6 (six) hours as needed for wheezing, Disp: 1 Inhaler, Rfl: 1    amLODIPine (NORVASC) 2 5 mg tablet, Take 2 5 mg by mouth daily, Disp: , Rfl:     Calcium Carb-Cholecalciferol (CALCIUM 600-D PO), Take 1 tablet by mouth 2 (two) times a day, Disp: , Rfl:     Coenzyme Q10 (CO Q-10 PO), Take 1 capsule by mouth daily, Disp: , Rfl:     diltiazem (CARDIZEM CD) 180 mg 24 hr capsule, TAKE ONE CAPSULE BY MOUTH EVERY DAY, Disp: 90 capsule, Rfl: 1    diltiazem (CARDIZEM) 60 mg tablet, Take 1 tablet (60 mg total) by mouth daily, Disp: 90 tablet, Rfl: 1    escitalopram (LEXAPRO) 20 mg tablet, TAKE ONE TABLET BY MOUTH EVERY DAY, Disp: 90 tablet, Rfl: 1    fluticasone (FLONASE) 50 mcg/act nasal spray, INSTILL ONE SPRAY INTO EACH NOSTRIL ONCE DAILY AS NEEDED FOR RHINITIS, Disp: 48 g, Rfl: 1    fluticasone-salmeterol (Advair Diskus) 250-50 mcg/dose inhaler, Inhale 1 puff 2 (two) times a day Rinse mouth after use, Disp: 180 blister, Rfl: 3    gabapentin (NEURONTIN) 600 MG tablet, TAKE ONE TABLET IN THE MORNING, ONE TABLET IN THE AFTERNOON, AND 2 TABLETS AT BEDTIME, Disp: 360 tablet, Rfl: 2    HYDROcodone-acetaminophen (NORCO) 5-325 mg per tablet, Take 1 tablet by mouth every 6 (six) hours as needed for pain Max Daily Amount: 4 tablets, Disp: 120 tablet, Rfl: 0    lansoprazole (PREVACID) 15 mg capsule, Take 1 capsule (15 mg total) by mouth 2 (two) times a day before meals Take 1/2 to 1 hour before breakfast and dinner , Disp: 60 capsule, Rfl: 5    levothyroxine 50 mcg tablet, TAKE ONE TABLET BY MOUTH EVERY DAY, Disp: 90 tablet, Rfl: 1    lisinopril (ZESTRIL) 20 mg tablet, TAKE ONE TABLET BY MOUTH TWICE A DAY, Disp: 180 tablet, Rfl: 1    loperamide (IMODIUM) 2 mg capsule, Take 2 mg by mouth 4 (four) times a day as needed  , Disp: , Rfl:     LORazepam (ATIVAN) 1 mg tablet, Take 1 tablet (1 mg total) by mouth every 6 (six) hours as needed for anxiety, Disp: 120 tablet, Rfl: 0    MAGNESIUM PO, Take 1 tablet by mouth daily, Disp: , Rfl:     meclizine (ANTIVERT) 25 mg tablet, Take 1 tablet (25 mg total) by mouth every 6 (six) hours as needed for dizziness, Disp: 90 tablet, Rfl: 1    Multiple Vitamin (MULTIVITAMIN ADULT PO), Take 1 tablet by mouth daily, Disp: , Rfl:     naloxone (NARCAN) 4 mg/0 1 mL nasal spray, Administer 1 spray into a nostril   If no response after 2-3 minutes, give another dose in the other nostril using a new spray , Disp: 1 each, Rfl: 1    Omega-3 Fatty Acids (FISH OIL PO), Take 1 capsule by mouth daily, Disp: , Rfl:     potassium chloride (MICRO-K) 10 MEQ CR capsule, Take 2 capsules daily, Disp: 180 capsule, Rfl: 3    simvastatin (ZOCOR) 5 MG tablet, TAKE ONE TABLET BY MOUTH EVERY DAY, Disp: 90 tablet, Rfl: 1    sodium chloride 1 g tablet, Take 1 tablet (1 g total) by mouth 3 (three) times a day, Disp: 270 tablet, Rfl: 3    torsemide (DEMADEX) 10 mg tablet, Take 1 tablet (10 mg total) by mouth daily as needed (edema), Disp: 90 tablet, Rfl: 0    warfarin (COUMADIN) 5 mg tablet, Take 5 mg by mouth daily, Disp: , Rfl:     cetirizine (ZyrTEC) 10 mg tablet, Take 10 mg by mouth daily (Patient not taking: Reported on 10/4/2022), Disp: , Rfl:     nystatin (MYCOSTATIN) 500,000 units/5 mL suspension, , Disp: , Rfl:   Allergies   Allergen Reactions    Macrobid [Nitrofurantoin Monohyd Macro] Rash    Penicillins Rash     Annotation - [id number]: can take cephalosporins    Sulfa Antibiotics Rash    Indocin [Indomethacin] GI Intolerance and Dizziness     Vitals:    10/04/22 0823   BP: 124/70   Pulse: 65   Temp: (!) 97 3 °F (36 3 °C)   SpO2: 93%       Physical Exam  Constitutional:       General: She is not in acute distress  Appearance: Normal appearance  Cardiovascular:      Rate and Rhythm: Normal rate and regular rhythm  Pulses: Normal pulses  Heart sounds: Normal heart sounds  Pulmonary:      Effort: Pulmonary effort is normal       Breath sounds: Wheezing present  Chest:      Chest wall: No mass  Breasts:      Right: No swelling, bleeding, inverted nipple, mass, nipple discharge, skin change, tenderness, axillary adenopathy or supraclavicular adenopathy  Left: No swelling, bleeding, mass, skin change, tenderness, axillary adenopathy or supraclavicular adenopathy  Comments: Left breast mastectomy scar   No masses, nodularity, skin changes, nipple changes or discharge (of right), or adenopathy appreciated on physical exam      Abdominal:      General: Abdomen is flat  Palpations: Abdomen is soft  Lymphadenopathy:      Upper Body:      Right upper body: No supraclavicular, axillary or pectoral adenopathy  Left upper body: No supraclavicular, axillary or pectoral adenopathy  Skin:     General: Skin is warm  Neurological:      General: No focal deficit present  Mental Status: She is alert and oriented to person, place, and time  Psychiatric:         Mood and Affect: Mood normal          Behavior: Behavior normal            Results:    Imaging  No results found  I reviewed the above imaging data  Advance Care Planning/Advance Directives:  Discussed disease status, cancer treatment plans and/or cancer treatment goals with the patient

## 2022-10-08 ENCOUNTER — CLINICAL SUPPORT (OUTPATIENT)
Dept: INTERNAL MEDICINE CLINIC | Facility: CLINIC | Age: 77
End: 2022-10-08
Payer: MEDICARE

## 2022-10-08 DIAGNOSIS — M65.341 TRIGGER RING FINGER OF RIGHT HAND: Primary | ICD-10-CM

## 2022-10-08 DIAGNOSIS — Z23 IMMUNIZATION DUE: Primary | ICD-10-CM

## 2022-10-08 PROCEDURE — G0008 ADMIN INFLUENZA VIRUS VAC: HCPCS

## 2022-10-08 PROCEDURE — 90662 IIV NO PRSV INCREASED AG IM: CPT

## 2022-10-10 ENCOUNTER — TELEPHONE (OUTPATIENT)
Dept: INTERNAL MEDICINE CLINIC | Facility: CLINIC | Age: 77
End: 2022-10-10

## 2022-10-10 ENCOUNTER — OFFICE VISIT (OUTPATIENT)
Dept: NEUROLOGY | Facility: CLINIC | Age: 77
End: 2022-10-10
Payer: MEDICARE

## 2022-10-10 VITALS
BODY MASS INDEX: 27.81 KG/M2 | WEIGHT: 157 LBS | SYSTOLIC BLOOD PRESSURE: 134 MMHG | HEART RATE: 73 BPM | DIASTOLIC BLOOD PRESSURE: 64 MMHG | TEMPERATURE: 98.2 F

## 2022-10-10 DIAGNOSIS — E22.2 SIADH (SYNDROME OF INAPPROPRIATE ADH PRODUCTION) (HCC): Primary | ICD-10-CM

## 2022-10-10 DIAGNOSIS — G25.0 ESSENTIAL TREMOR: Primary | ICD-10-CM

## 2022-10-10 DIAGNOSIS — I81 PORTAL VEIN THROMBOSIS: ICD-10-CM

## 2022-10-10 DIAGNOSIS — I10 ESSENTIAL HYPERTENSION: ICD-10-CM

## 2022-10-10 DIAGNOSIS — Z96.89 S/P DEEP BRAIN STIMULATOR PLACEMENT: ICD-10-CM

## 2022-10-10 DIAGNOSIS — E53.8 VITAMIN B12 DEFICIENCY: ICD-10-CM

## 2022-10-10 DIAGNOSIS — E55.9 VITAMIN D DEFICIENCY: ICD-10-CM

## 2022-10-10 PROCEDURE — 95984 ALYS BRN NPGT PRGRMG ADDL 15: CPT | Performed by: PSYCHIATRY & NEUROLOGY

## 2022-10-10 PROCEDURE — 95983 ALYS BRN NPGT PRGRMG 15 MIN: CPT | Performed by: PSYCHIATRY & NEUROLOGY

## 2022-10-10 NOTE — PROGRESS NOTES
Review of Systems   Constitutional: Positive for fatigue  Negative for appetite change and fever  HENT: Negative  Negative for hearing loss, tinnitus and trouble swallowing  Eyes: Negative  Negative for photophobia, pain and visual disturbance  Respiratory: Negative  Negative for shortness of breath  Cardiovascular: Negative  Negative for palpitations  Gastrointestinal: Negative  Negative for nausea and vomiting  Endocrine: Negative  Negative for cold intolerance  Genitourinary: Negative  Negative for dysuria, frequency and urgency  Musculoskeletal: Negative for gait problem, myalgias and neck pain  Balance Issues     Skin: Negative  Negative for rash  Allergic/Immunologic: Negative  Neurological: Positive for speech difficulty (Trouble pronouncing words)  Negative for dizziness, tremors, seizures, syncope, facial asymmetry, weakness, light-headedness, numbness and headaches  Hematological: Bruises/bleeds easily  Psychiatric/Behavioral: Positive for sleep disturbance  Negative for confusion and hallucinations  All other systems reviewed and are negative

## 2022-10-10 NOTE — ASSESSMENT & PLAN NOTE
Tremor and speech are reported to have improved after last visit but in recent month speech has become more slurred  Tremors continue to be fairly well controlled with mild return of tremor  Over 30 minutes spent on deep brain stimulation programming, evaluation and documentation  Changes made to left lead with improved right hand tremor and mild improvement in speech  See body of note for clinical details  Will remain on these settings

## 2022-10-11 ENCOUNTER — APPOINTMENT (OUTPATIENT)
Dept: LAB | Facility: AMBULARY SURGERY CENTER | Age: 77
End: 2022-10-11
Payer: MEDICARE

## 2022-10-11 DIAGNOSIS — I47.1 SVT (SUPRAVENTRICULAR TACHYCARDIA) (HCC): ICD-10-CM

## 2022-10-11 LAB
25(OH)D3 SERPL-MCNC: 39.8 NG/ML (ref 30–100)
ALBUMIN SERPL BCP-MCNC: 3.1 G/DL (ref 3.5–5)
ALP SERPL-CCNC: 76 U/L (ref 46–116)
ALT SERPL W P-5'-P-CCNC: 22 U/L (ref 12–78)
ANION GAP SERPL CALCULATED.3IONS-SCNC: 9 MMOL/L (ref 4–13)
AST SERPL W P-5'-P-CCNC: 19 U/L (ref 5–45)
BASOPHILS # BLD AUTO: 0.1 THOUSANDS/ΜL (ref 0–0.1)
BASOPHILS NFR BLD AUTO: 2 % (ref 0–1)
BILIRUB SERPL-MCNC: 0.34 MG/DL (ref 0.2–1)
BUN SERPL-MCNC: 7 MG/DL (ref 5–25)
CALCIUM ALBUM COR SERPL-MCNC: 10.1 MG/DL (ref 8.3–10.1)
CALCIUM SERPL-MCNC: 9.4 MG/DL (ref 8.3–10.1)
CHLORIDE SERPL-SCNC: 102 MMOL/L (ref 96–108)
CO2 SERPL-SCNC: 25 MMOL/L (ref 21–32)
CREAT SERPL-MCNC: 0.75 MG/DL (ref 0.6–1.3)
EOSINOPHIL # BLD AUTO: 0.29 THOUSAND/ΜL (ref 0–0.61)
EOSINOPHIL NFR BLD AUTO: 4 % (ref 0–6)
ERYTHROCYTE [DISTWIDTH] IN BLOOD BY AUTOMATED COUNT: 17.3 % (ref 11.6–15.1)
GFR SERPL CREATININE-BSD FRML MDRD: 76 ML/MIN/1.73SQ M
GLUCOSE P FAST SERPL-MCNC: 82 MG/DL (ref 65–99)
HCT VFR BLD AUTO: 36.6 % (ref 34.8–46.1)
HGB BLD-MCNC: 11.4 G/DL (ref 11.5–15.4)
IMM GRANULOCYTES # BLD AUTO: 0.04 THOUSAND/UL (ref 0–0.2)
IMM GRANULOCYTES NFR BLD AUTO: 1 % (ref 0–2)
INR PPP: 3.56 (ref 0.84–1.19)
LYMPHOCYTES # BLD AUTO: 2.04 THOUSANDS/ΜL (ref 0.6–4.47)
LYMPHOCYTES NFR BLD AUTO: 30 % (ref 14–44)
MCH RBC QN AUTO: 29.6 PG (ref 26.8–34.3)
MCHC RBC AUTO-ENTMCNC: 31.1 G/DL (ref 31.4–37.4)
MCV RBC AUTO: 95 FL (ref 82–98)
MONOCYTES # BLD AUTO: 0.98 THOUSAND/ΜL (ref 0.17–1.22)
MONOCYTES NFR BLD AUTO: 14 % (ref 4–12)
NEUTROPHILS # BLD AUTO: 3.41 THOUSANDS/ΜL (ref 1.85–7.62)
NEUTS SEG NFR BLD AUTO: 49 % (ref 43–75)
NRBC BLD AUTO-RTO: 0 /100 WBCS
PLATELET # BLD AUTO: 486 THOUSANDS/UL (ref 149–390)
PMV BLD AUTO: 9.2 FL (ref 8.9–12.7)
POTASSIUM SERPL-SCNC: 4 MMOL/L (ref 3.5–5.3)
PROT SERPL-MCNC: 6.7 G/DL (ref 6.4–8.4)
PROTHROMBIN TIME: 35.9 SECONDS (ref 11.6–14.5)
RBC # BLD AUTO: 3.85 MILLION/UL (ref 3.81–5.12)
SODIUM SERPL-SCNC: 136 MMOL/L (ref 135–147)
TSH SERPL DL<=0.05 MIU/L-ACNC: 1.91 UIU/ML (ref 0.45–4.5)
VIT B12 SERPL-MCNC: 448 PG/ML (ref 100–900)
WBC # BLD AUTO: 6.86 THOUSAND/UL (ref 4.31–10.16)

## 2022-10-11 PROCEDURE — 82306 VITAMIN D 25 HYDROXY: CPT | Performed by: INTERNAL MEDICINE

## 2022-10-11 PROCEDURE — 80053 COMPREHEN METABOLIC PANEL: CPT

## 2022-10-11 PROCEDURE — 84443 ASSAY THYROID STIM HORMONE: CPT | Performed by: INTERNAL MEDICINE

## 2022-10-11 PROCEDURE — 82607 VITAMIN B-12: CPT | Performed by: INTERNAL MEDICINE

## 2022-10-11 PROCEDURE — 85610 PROTHROMBIN TIME: CPT | Performed by: INTERNAL MEDICINE

## 2022-10-11 PROCEDURE — 36415 COLL VENOUS BLD VENIPUNCTURE: CPT | Performed by: INTERNAL MEDICINE

## 2022-10-11 PROCEDURE — 85025 COMPLETE CBC W/AUTO DIFF WBC: CPT

## 2022-10-12 ENCOUNTER — ANTICOAG VISIT (OUTPATIENT)
Dept: INTERNAL MEDICINE CLINIC | Facility: CLINIC | Age: 77
End: 2022-10-12

## 2022-10-12 ENCOUNTER — TELEPHONE (OUTPATIENT)
Dept: INTERNAL MEDICINE CLINIC | Facility: CLINIC | Age: 77
End: 2022-10-12

## 2022-10-12 NOTE — TELEPHONE ENCOUNTER
INR elevated  Please take 5 mg x 3 days, 2 5 mg x 4 days  Repeat in 1 week  Please update flow sheet      The rest of your labs were normal

## 2022-10-13 ENCOUNTER — HOSPITAL ENCOUNTER (OUTPATIENT)
Dept: INFUSION CENTER | Facility: CLINIC | Age: 77
Discharge: HOME | End: 2022-10-13
Payer: MEDICARE

## 2022-10-13 VITALS
SYSTOLIC BLOOD PRESSURE: 126 MMHG | DIASTOLIC BLOOD PRESSURE: 54 MMHG | HEART RATE: 74 BPM | OXYGEN SATURATION: 94 % | TEMPERATURE: 97.5 F | RESPIRATION RATE: 18 BRPM

## 2022-10-13 DIAGNOSIS — D50.9 IRON DEFICIENCY ANEMIA, UNSPECIFIED IRON DEFICIENCY ANEMIA TYPE: Primary | ICD-10-CM

## 2022-10-13 PROCEDURE — 96374 THER/PROPH/DIAG INJ IV PUSH: CPT

## 2022-10-13 RX ORDER — SODIUM CHLORIDE 9 MG/ML
20 INJECTION, SOLUTION INTRAVENOUS ONCE
OUTPATIENT
Start: 2022-11-10

## 2022-10-13 RX ORDER — SODIUM CHLORIDE 9 MG/ML
20 INJECTION, SOLUTION INTRAVENOUS ONCE
Status: COMPLETED | OUTPATIENT
Start: 2022-10-13 | End: 2022-10-13

## 2022-10-13 RX ADMIN — IRON SUCROSE 200 MG: 20 INJECTION, SOLUTION INTRAVENOUS at 08:22

## 2022-10-13 RX ADMIN — SODIUM CHLORIDE 20 ML/HR: 0.9 INJECTION, SOLUTION INTRAVENOUS at 08:20

## 2022-10-13 NOTE — PROGRESS NOTES
Patient tolerated IV push Venofer today without complications   Patient verified upcoming appointment and declined AVS 
Sickle Cell Disease

## 2022-10-17 DIAGNOSIS — E87.6 HYPOKALEMIA: ICD-10-CM

## 2022-10-17 DIAGNOSIS — E03.9 ACQUIRED HYPOTHYROIDISM: ICD-10-CM

## 2022-10-17 RX ORDER — POTASSIUM CHLORIDE 750 MG/1
CAPSULE, EXTENDED RELEASE ORAL
Qty: 180 CAPSULE | Refills: 3 | Status: SHIPPED | OUTPATIENT
Start: 2022-10-17

## 2022-10-17 RX ORDER — LEVOTHYROXINE SODIUM 0.05 MG/1
50 TABLET ORAL DAILY
Qty: 90 TABLET | Refills: 1 | Status: SHIPPED | OUTPATIENT
Start: 2022-10-17 | End: 2022-10-28

## 2022-10-20 ENCOUNTER — APPOINTMENT (OUTPATIENT)
Dept: LAB | Facility: AMBULARY SURGERY CENTER | Age: 77
End: 2022-10-20
Payer: MEDICARE

## 2022-10-20 DIAGNOSIS — I81 PORTAL VEIN THROMBOSIS: ICD-10-CM

## 2022-10-20 LAB
INR PPP: 4.86 (ref 0.84–1.19)
PROTHROMBIN TIME: 45.7 SECONDS (ref 11.6–14.5)

## 2022-10-20 PROCEDURE — 36415 COLL VENOUS BLD VENIPUNCTURE: CPT

## 2022-10-20 PROCEDURE — 85610 PROTHROMBIN TIME: CPT

## 2022-10-21 ENCOUNTER — ANTICOAG VISIT (OUTPATIENT)
Dept: INTERNAL MEDICINE CLINIC | Facility: CLINIC | Age: 77
End: 2022-10-21

## 2022-10-24 ENCOUNTER — ANTICOAG VISIT (OUTPATIENT)
Dept: INTERNAL MEDICINE CLINIC | Facility: CLINIC | Age: 77
End: 2022-10-24

## 2022-10-24 ENCOUNTER — APPOINTMENT (OUTPATIENT)
Dept: LAB | Facility: AMBULARY SURGERY CENTER | Age: 77
End: 2022-10-24
Payer: MEDICARE

## 2022-10-24 DIAGNOSIS — I81 PORTAL VEIN THROMBOSIS: ICD-10-CM

## 2022-10-24 LAB
INR PPP: 1.77 (ref 0.84–1.19)
PROTHROMBIN TIME: 20.9 SECONDS (ref 11.6–14.5)

## 2022-10-24 PROCEDURE — 85610 PROTHROMBIN TIME: CPT

## 2022-10-24 PROCEDURE — 36415 COLL VENOUS BLD VENIPUNCTURE: CPT

## 2022-10-28 DIAGNOSIS — E03.9 ACQUIRED HYPOTHYROIDISM: ICD-10-CM

## 2022-10-28 RX ORDER — LEVOTHYROXINE SODIUM 0.05 MG/1
TABLET ORAL
Qty: 90 TABLET | Refills: 1 | Status: SHIPPED | OUTPATIENT
Start: 2022-10-28

## 2022-10-31 ENCOUNTER — LAB (OUTPATIENT)
Dept: LAB | Facility: AMBULARY SURGERY CENTER | Age: 77
End: 2022-10-31

## 2022-10-31 DIAGNOSIS — I81 PORTAL VEIN THROMBOSIS: ICD-10-CM

## 2022-10-31 LAB
INR PPP: 2.29 (ref 0.84–1.19)
PROTHROMBIN TIME: 25.5 SECONDS (ref 11.6–14.5)

## 2022-10-31 NOTE — PROGRESS NOTES
1  Sagittal band rupture at metacarpophalangeal joint, initial encounter  Ambulatory Referral to Occupational Therapy    XR hand 3+ vw right   2  Trigger ring finger of right hand  Ambulatory referral to Orthopedic Surgery   3  Hand arthritis  Ambulatory Referral to Rheumatology     Orders Placed This Encounter   Procedures   • XR hand 3+ vw right   • Ambulatory Referral to Occupational Therapy   • Ambulatory Referral to Rheumatology        Imaging Studies (I personally reviewed images in PACS and report):  Xray right hand 11/1/22: no acute abnormality  OA PIP diffuse and 5th DIP  IMPRESSION:  Right Ring Finger Sagittal Band Rupture      Repeat X-ray next visit: None    Return in about 6 weeks (around 12/13/2022)  Patient Instructions   Please obtain a custom made splint from Occupational Therapy for your right hand ring finger   Please keep the splint on for a total of 6 weeks   Do not remove splint at all as once splint is removed then injury will recur  Inspect for any ulceration with splint  If any rash, redness, or irritation please return to office immediately for evaluation  Reviewed alternative treatment including surgical referral  Recommended trial non-op treatment  CHIEF COMPLAINT:  Right hand pain/trigger finger    HPI:  Keturah Ramirez is a 65 y o  female  who presents for right hand pain/trigger finger      Visit 11/1/2022 :  4 weeks of right ring finger triggering  Denies any injury  Ring finger becomes flexed  Patient able to passively extend her finger  No pain             Review of Systems      Following history reviewed and update:    Past Medical History:   Diagnosis Date   • Anemia    • Anxiety    • Arrhythmia    • Arthritis    • Asthma    • Blood clot in vein     portal vein   • Breast cancer ([facility] ) Left   • Breast lump     [id number] RESOLVED   • Cancer (HCC)    • Depression    • Disease of thyroid gland    • DVT, lower extremity (HCC)    • GERD (gastroesophageal reflux disease)    • Hyperlipidemia    • Hypertension    • Hypokalemia    • Hyponatremia    • Hypothyroidism    • Iron deficiency anemia    • Irregular heart beat    • Manic behavior (HCC)    • 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HCC)    • Osteoarthritis    • Palpitations     [id number]  RESOLVED   • Paroxysmal supraventricular tachycardia (HCC)    • PE (pulmonary thromboembolism) (HCC)    • Sjoegren syndrome    • Sleep apnea    • Sleep difficulties    • Spinal stenosis    • Thrombocytosis     [id number]  RESOLVED   • Tremors of nervous system     dbs implanted right and left chest   • Ulcerative colitis ([address] )    • Vertigo     [id number] RESOLVED     Past Surgical History:   Procedure Laterality Date   • ABDOMINAL SURGERY     • APPENDECTOMY     • BREAST BIOPSY Left 11/07/2019    Stereo   • BREAST EXCISIONAL BIOPSY Left     x many years   • BREAST SURGERY      lumpectomy & biopsy   • CARMELLA HOLE W/ STEREOTACTIC INSERTION OF DBS LEADS / INTRAOP MICROELECTRODE RECORDING     • COLON SURGERY     • COLONOSCOPY N/A 08/30/2017    Procedure: Magdy Cedillo;  Surgeon: Chad Gresham MD;  [facility]; Service: Colorectal   • COLOPROCTECTOMY W/ ILEO J POUCH     • ESOPHAGOGASTRODUODENOSCOPY      ONSET 10/17/11   • FISTULA REPAIR      LLEOANAL FISTULA REPAIR TRANSPERIN TRANSABD APPROACH   • HYSTERECTOMY      age 43   • ILEOSTOMY CLOSURE     • KNEE ARTHROSCOPY      Right   • MAMMO STEREOTACTIC BREAST BIOPSY LEFT (ALL INC) Left 11/07/2019   • MAMMO STEREOTACTIC BREAST BIOPSY LEFT (ALL INC) Left 12/17/2020   • MAMMO STEREOTACTIC BREAST BIOPSY LEFT (ALL INC) EACH ADD Left 12/17/2020   • MASTECTOMY Left    • MASTECTOMY W/ SENTINEL NODE BIOPSY Left 02/12/2021    Procedure: BREAST MASTECTOMY WITH SENTINEL LYMPH NODE BIOPSY, LYMPHATIC MAPPING WITH BLUE DYE AND RADIAOCTIVE DYE (INJECT AT 1100 BY DR GILLESPIE IN THE OR);   Surgeon: Jeramy Whalen MD;  [facility];  Service: Surgical Oncology   • [place] IMP STIM,CRANIAL,SUBQ,1 ARRAY Right 06/20/2017 Procedure: DBS GENERATOR REPLACEMENT;  Surgeon: Ashtyn Hope MD;  Location:  MAIN OR;  Service: Neurosurgery   • [place] IMP STIM,CRANIAL,SUBQ,1 ARRAY N/A 2019    Procedure: REPLACEMENT IMPLANTABLE PULSE GENERATOR FOR DEEP BRAIN STIMULATOR LEFT CHEST;  Surgeon: Sohail Rowe MD;  [facility];  Service: Neurosurgery   • SPLENECTOMY     • TONSILLECTOMY       Social History   Social History     Substance and Sexual Activity   Alcohol Use Yes   • Alcohol/week: 2 0 standard drinks   • Types: 2 Cans of beer per week     Social History     Substance and Sexual Activity   Drug Use No     Social History     Tobacco Use   Smoking Status Former Smoker   • Packs/day: 1 50   • Years: 5 00   • Pack years: 7 50   • Quit date: 1965   • Years since quittin 8   Smokeless Tobacco Never Used   Tobacco Comment    Quit     Family History   Problem Relation Age of Onset   • Venous thrombosis Mother         ACUTE VENOUS THROMBOSIS OF DEEP VESSELS OF THE DISTAL LOWER EXTREMITY   • Other Mother         PHLEBITIS   • Hypertension Mother    • Peripheral vascular disease Mother    • COPD Father    • Diabetes Father         MELLITUS   • Stroke Father    • Diabetes Sister         MELLITUS   • Sjogren's syndrome Sister    • No Known Problems Daughter    • No Known Problems Maternal Grandmother    • No Known Problems Maternal Grandfather    • No Known Problems Paternal Grandmother    • No Known Problems Paternal Grandfather    • No Known Problems Sister    • No Known Problems Maternal Aunt    • No Known Problems Paternal Aunt    • No Known Problems Son      Allergies   Allergen Reactions   • Macrobid [Nitrofurantoin Monohyd Macro] Rash   • Penicillins Rash     Annotation - [id number]: can take cephalosporins   • Sulfa Antibiotics Rash   • Indocin [Indomethacin] GI Intolerance and Dizziness          Physical Exam  /80 (BP Location: Right arm, Patient Position: Sitting, Cuff Size: Adult)   Pulse 67   Ht 5' 3" (1 6 m) Wt 68 9 kg (152 lb)   BMI 26 93 kg/m²     Constitutional:  see vital signs  Gen: well-developed, normocephalic/atraumatic, well-groomed  Eyes: No inflammation or discharge of conjunctiva or lids; sclera clear   Pharynx: no inflammation, lesion, or mass of lips  Neck: supple, no masses, non-distended  MSK: no inflammation, lesion, mass, or clubbing of nails and digits except for other than mentioned below  SKIN: no visible rashes or skin lesions  Pulmonary/Chest: Effort normal  No respiratory distress     NEURO: cranial nerves grossly intact  PSYCH:  Alert and oriented to person, place, and time; recent and remote memory intact; mood normal, no depression, anxiety, or agitation, judgment and insight good and intact     Ortho Exam    Right Hand  no erythema  Swelling: none  Tenderness: none  Ramos test abnormal and +  rom fingers mcp, pip, dip intact without pain  No digital scissoring or deviation of fingers  no rotation of fingers  no joint laxity  strenght flexion and extension mcp, pip, dip 5/5  sensation intact  capillary refill intact   Procedures

## 2022-11-01 ENCOUNTER — OFFICE VISIT (OUTPATIENT)
Dept: OBGYN CLINIC | Facility: OTHER | Age: 77
End: 2022-11-01

## 2022-11-01 ENCOUNTER — APPOINTMENT (OUTPATIENT)
Dept: RADIOLOGY | Facility: OTHER | Age: 77
End: 2022-11-01

## 2022-11-01 ENCOUNTER — ANTICOAG VISIT (OUTPATIENT)
Dept: INTERNAL MEDICINE CLINIC | Facility: CLINIC | Age: 77
End: 2022-11-01

## 2022-11-01 VITALS
HEART RATE: 67 BPM | WEIGHT: 152 LBS | BODY MASS INDEX: 26.93 KG/M2 | DIASTOLIC BLOOD PRESSURE: 80 MMHG | SYSTOLIC BLOOD PRESSURE: 127 MMHG | HEIGHT: 63 IN

## 2022-11-01 DIAGNOSIS — S63.659A SAGITTAL BAND RUPTURE AT METACARPOPHALANGEAL JOINT, INITIAL ENCOUNTER: ICD-10-CM

## 2022-11-01 DIAGNOSIS — M65.341 TRIGGER RING FINGER OF RIGHT HAND: ICD-10-CM

## 2022-11-01 DIAGNOSIS — S63.659A SAGITTAL BAND RUPTURE AT METACARPOPHALANGEAL JOINT, INITIAL ENCOUNTER: Primary | ICD-10-CM

## 2022-11-01 DIAGNOSIS — M19.049 HAND ARTHRITIS: ICD-10-CM

## 2022-11-01 DIAGNOSIS — F41.9 ANXIETY: ICD-10-CM

## 2022-11-01 NOTE — PATIENT INSTRUCTIONS
Please obtain a custom made splint from Occupational Therapy for your right hand ring finger   Please keep the splint on for a total of 6 weeks   Do not remove splint at all as once splint is removed then injury will recur  Inspect for any ulceration with splint  If any rash, redness, or irritation please return to office immediately for evaluation  Reviewed alternative treatment including surgical referral  Recommended trial non-op treatment

## 2022-11-02 RX ORDER — LORAZEPAM 1 MG/1
TABLET ORAL
Qty: 120 TABLET | Refills: 0 | Status: SHIPPED | OUTPATIENT
Start: 2022-11-02

## 2022-11-03 ENCOUNTER — EVALUATION (OUTPATIENT)
Dept: OCCUPATIONAL THERAPY | Age: 77
End: 2022-11-03

## 2022-11-03 ENCOUNTER — OFFICE VISIT (OUTPATIENT)
Dept: OBGYN CLINIC | Facility: OTHER | Age: 77
End: 2022-11-03

## 2022-11-03 VITALS
HEIGHT: 63 IN | WEIGHT: 154 LBS | SYSTOLIC BLOOD PRESSURE: 144 MMHG | HEART RATE: 67 BPM | DIASTOLIC BLOOD PRESSURE: 70 MMHG | BODY MASS INDEX: 27.29 KG/M2

## 2022-11-03 DIAGNOSIS — M17.0 BILATERAL PRIMARY OSTEOARTHRITIS OF KNEE: ICD-10-CM

## 2022-11-03 DIAGNOSIS — S63.659A SAGITTAL BAND RUPTURE AT METACARPOPHALANGEAL JOINT, INITIAL ENCOUNTER: ICD-10-CM

## 2022-11-03 DIAGNOSIS — M17.11 PRIMARY OSTEOARTHRITIS OF RIGHT KNEE: Primary | ICD-10-CM

## 2022-11-03 NOTE — PATIENT INSTRUCTIONS
Ultrasound guided Right knee aspiration and viscosupplementation injection today  Follow up before [place] for steroid shots    Red flags and risks of injection include but are not limited to infection <0 072% as referenced in some sources, nerve or artery penetration, and if steroids are used-skin dimpling <1%, hypo-pigmentation <1%,  or even damage to the bone as referenced in some sources  contraindications include but are not limited to active infection, prosthetic joint, fracture, allergy to steroid if used or anesthetics such as lidocaine, and uncontrolled blood thinner medications such as coumadin/warfarin  Educated if any symptoms including fevers, chills, swelling, or worsening symptoms occur then to call office or go to hospital for immediate care if physician unavailable due to possible infection or other complication which is a serious medical problem  Patient expressed understanding and agreed to proceed with procedure  Treatment for knee osteoarthritis depends on severity of cartilage wear and pain levels  First line for mild arthritis is exercise to strengthen the knee and allow better joint movement, 5-10% weight loss if overweight, and topical anti-inflammatories such as diclofenac gel or topical analgesic pain relief creams such as capsaicin  Symptoms at this stage usually improve in 3 months  Moderate to severe arthritis or arthritis not responding to first line treatments may require oral medicine such as anti-inflammatories (NSAIDs) such as ibuprofen/motrin, and if failing treatment with oral NSAIDs, then may consider depression medicines such as duloxetine (cymbalta) which also have been shown to work on pain receptors and help to control pain  Other analgesics such as tylenol (acetaminophen) may be used but do not appear to offer significant pain relief  Supplements such as glucosamine and chondroitin supplements   Some studies do show benefit from taking glucosamine sulfate (1500 mg/day) and chondroitin (800 mg/day) but evidence is controversial  I recommend against a type of glucosamine known as "glucosamine hydrochloride" which appears not as effective as glucosamine sulfate  If no improvement with oral medicines, then patients may consider steroid injection into the knee joint which provides pain relief  Steroid injections can be given every 3 months  Any sooner than that can  result in possible deterioration or cartilage in your joint  Other injections are available such as as viscosupplementation or gel shots into the knee which provide nutrients for the cartilage and can result in pain reduction  These viscosupplementation injections are generally considered every 6 months but are controversial   The [address] recommends against viscosupplementation injection; however, the [employer] [facility] recommends for these injections  Beyond these injections there are other controversial treatments including stem cell as well as platelet rich plasma injection which are out of pocket usually up to a 1000 dollars per injection  Lastly, if you fail conservative measures and have persistent pain, then we may consider referral to surgeon for knee replacement  (MARTHA Jackson 2018)

## 2022-11-03 NOTE — PROGRESS NOTES
1  Primary osteoarthritis of right knee  Large joint arthrocentesis: R knee   2  Bilateral primary osteoarthritis of knee       Orders Placed This Encounter   Procedures   • Large joint arthrocentesis: R knee        Imaging Studies (I personally reviewed images in PACS and report):    Past Diagnostics:  Right knee x-ray 12/21/2021:  No acute osseous abnormality  Tricompartmental osteoarthritic changes most prominent in the lateral compartment  Left knee x-ray 12/21/2021:  No acute osseous abnormality  Tricompartmental osteoarthritic changes most prominent in the medial compartment  IMPRESSION:  Right knee moderate to severe lateral OA  Left knee mild-to-moderate medial OA      Repeat X-ray next visit: None    Return in about 7 weeks (around 12/19/2022) for Follow up for Knee CSI  Patient Instructions   Ultrasound guided Right knee aspiration and viscosupplementation injection today  Follow up before [place] for steroid shots    Red flags and risks of injection include but are not limited to infection <0 072% as referenced in some sources, nerve or artery penetration, and if steroids are used-skin dimpling <1%, hypo-pigmentation <1%,  or even damage to the bone as referenced in some sources  contraindications include but are not limited to active infection, prosthetic joint, fracture, allergy to steroid if used or anesthetics such as lidocaine, and uncontrolled blood thinner medications such as coumadin/warfarin  Educated if any symptoms including fevers, chills, swelling, or worsening symptoms occur then to call office or go to hospital for immediate care if physician unavailable due to possible infection or other complication which is a serious medical problem  Patient expressed understanding and agreed to proceed with procedure  Treatment for knee osteoarthritis depends on severity of cartilage wear and pain levels   First line for mild arthritis is exercise to strengthen the knee and allow better joint movement, 5-10% weight loss if overweight, and topical anti-inflammatories such as diclofenac gel or topical analgesic pain relief creams such as capsaicin  Symptoms at this stage usually improve in 3 months  Moderate to severe arthritis or arthritis not responding to first line treatments may require oral medicine such as anti-inflammatories (NSAIDs) such as ibuprofen/motrin, and if failing treatment with oral NSAIDs, then may consider depression medicines such as duloxetine (cymbalta) which also have been shown to work on pain receptors and help to control pain  Other analgesics such as tylenol (acetaminophen) may be used but do not appear to offer significant pain relief  Supplements such as glucosamine and chondroitin supplements  Some studies do show benefit from taking glucosamine sulfate (1500 mg/day) and chondroitin (800 mg/day) but evidence is controversial  I recommend against a type of glucosamine known as "glucosamine hydrochloride" which appears not as effective as glucosamine sulfate  If no improvement with oral medicines, then patients may consider steroid injection into the knee joint which provides pain relief  Steroid injections can be given every 3 months  Any sooner than that can  result in possible deterioration or cartilage in your joint  Other injections are available such as as viscosupplementation or gel shots into the knee which provide nutrients for the cartilage and can result in pain reduction  These viscosupplementation injections are generally considered every 6 months but are controversial   The [address] recommends against viscosupplementation injection; however, the [employer] [facility] recommends for these injections       Beyond these injections there are other controversial treatments including stem cell as well as platelet rich plasma injection which are out of pocket usually up to a 1000 dollars per injection  Lastly, if you fail conservative measures and have persistent pain, then we may consider referral to surgeon for knee replacement  ([facility] Iv[facility] Magdaleno 2018)  CHIEF COMPLAINT:  Bilateral knee pain    HPI:  Christopher Crowley is a 65 y o  female  who presents for follow-up evaluation of bilaterally osteoarthritis  Last visit in this regard 07/28/2022  Treatment course to date includes bilateral corticosteroid injections administered at this last visit as well as viscosupplementation 04/12/2022  Visit 11/3/2022 : Today, patient reports uncontrolled right knee pain  She has no pain on the left  Pain is localized answer laterally  And made worse with prolonged walking and when handling steps  Denies any new injuries  No paresthesias weakness  She had cortisone injection in July and had good effect on her pain control until roughly 2 weeks ago  She is interested in another injection today        Review of Systems      Following history reviewed and update:    Past Medical History:   Diagnosis Date   • Anemia    • Anxiety    • Arrhythmia    • Arthritis    • Asthma    • Blood clot in vein     portal vein   • Breast cancer ([facility] ) Left   • Breast lump     [id number] RESOLVED   • Cancer (HCC)    • Depression    • Disease of thyroid gland    • DVT, lower extremity (HCC)    • GERD (gastroesophageal reflux disease)    • Hyperlipidemia    • Hypertension    • Hypokalemia    • Hyponatremia    • Hypothyroidism    • Iron deficiency anemia    • Irregular heart beat    • Manic behavior (HCC)    • 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HCC)    • Osteoarthritis    • Palpitations     [id number]  RESOLVED   • Paroxysmal supraventricular tachycardia (HCC)    • PE (pulmonary thromboembolism) (HCC)    • Sjoegren syndrome    • Sleep apnea    • Sleep difficulties    • Spinal stenosis    • Thrombocytosis     [id number]  RESOLVED   • Tremors of nervous system     dbs implanted right and left chest   • Ulcerative colitis ([facility] )    • Vertigo     [id number] RESOLVED     Past Surgical History:   Procedure Laterality Date   • ABDOMINAL SURGERY     • APPENDECTOMY     • BREAST BIOPSY Left 11/07/2019    Stereo   • BREAST EXCISIONAL BIOPSY Left     x many years   • BREAST SURGERY      lumpectomy & biopsy   • CARMELLA HOLE W/ STEREOTACTIC INSERTION OF DBS LEADS / INTRAOP MICROELECTRODE RECORDING     • COLON SURGERY     • COLONOSCOPY N/A 08/30/2017    Procedure: Dakota Osei;  Surgeon: Marisol Madsen MD;  Location: [facility]; Service: Colorectal   • COLOPROCTECTOMY W/ ILEO J POUCH     • ESOPHAGOGASTRODUODENOSCOPY      ONSET 10/17/11   • FISTULA REPAIR      LLEOANAL FISTULA REPAIR TRANSPERIN TRANSABD APPROACH   • HYSTERECTOMY      age 43   • ILEOSTOMY CLOSURE     • KNEE ARTHROSCOPY      Right   • MAMMO STEREOTACTIC BREAST BIOPSY LEFT (ALL INC) Left 11/07/2019   • MAMMO STEREOTACTIC BREAST BIOPSY LEFT (ALL INC) Left 12/17/2020   • MAMMO STEREOTACTIC BREAST BIOPSY LEFT (ALL INC) EACH ADD Left 12/17/2020   • MASTECTOMY Left    • MASTECTOMY W/ SENTINEL NODE BIOPSY Left 02/12/2021    Procedure: BREAST MASTECTOMY WITH SENTINEL LYMPH NODE BIOPSY, LYMPHATIC MAPPING WITH BLUE DYE AND RADIAOCTIVE DYE (INJECT AT 1100 BY DR GILLESPIE IN THE OR);   Surgeon: Rolan Whatley MD;  Location[facility];  Service: Surgical Oncology   • ND IMP STIM,CRANIAL,SUBQ,1 ARRAY Right 06/20/2017    Procedure: DBS GENERATOR REPLACEMENT;  Surgeon: Jaylyn Hamm MD;  Location:  MAIN OR;  Service: Neurosurgery   • ND IMP STIM,CRANIAL,SUBQ,1 ARRAY N/A 12/04/2019    Procedure: REPLACEMENT IMPLANTABLE PULSE GENERATOR FOR DEEP BRAIN STIMULATOR LEFT CHEST;  Surgeon: Tomeka Neil MD;  Location: [facility];  Service: Neurosurgery   • SPLENECTOMY     • TONSILLECTOMY       Social History   Social History     Substance and Sexual Activity   Alcohol Use Yes   • Alcohol/week: 2 0 standard drinks   • Types: 2 Cans of beer per week     Social History     Substance and Sexual Activity   Drug Use No     Social History     Tobacco Use   Smoking Status Former Smoker   • Packs/day: 1 50   • Years: 5 00   • Pack years: 7 50   • Quit date: 1965   • Years since quittin 8   Smokeless Tobacco Never Used   Tobacco Comment    Quit     Family History   Problem Relation Age of Onset   • Venous thrombosis Mother         ACUTE VENOUS THROMBOSIS OF DEEP VESSELS OF THE DISTAL LOWER EXTREMITY   • Other Mother         PHLEBITIS   • Hypertension Mother    • Peripheral vascular disease Mother    • COPD Father    • Diabetes Father         MELLITUS   • Stroke Father    • Diabetes Sister         MELLITUS   • Sjogren's syndrome Sister    • No Known Problems Daughter    • No Known Problems Maternal Grandmother    • No Known Problems Maternal Grandfather    • No Known Problems Paternal Grandmother    • No Known Problems Paternal Grandfather    • No Known Problems Sister    • No Known Problems Maternal Aunt    • No Known Problems Paternal Aunt    • No Known Problems Son      Allergies   Allergen Reactions   • Indomethacin GI Intolerance and Dizziness   • Macrobid [Nitrofurantoin Monohyd Macro] Rash   • Nitrofurantoin Other (See Comments)   • Penicillins Rash     Annotation - [id number]: can take cephalosporins   • Sulfa Antibiotics Rash          Physical Exam  /70 (BP Location: Right arm, Patient Position: Sitting, Cuff Size: Adult)   Pulse 67   Ht 5' 3" (1 6 m)   Wt 69 9 kg (154 lb)   BMI 27 28 kg/m²     Constitutional:  see vital signs  Gen: well-developed, normocephalic/atraumatic, well-groomed  Eyes: No inflammation or discharge of conjunctiva or lids; sclera clear   Pharynx: no inflammation, lesion, or mass of lips  Neck: supple, no masses, non-distended  MSK: no inflammation, lesion, mass, or clubbing of nails and digits except for other than mentioned below  SKIN: no visible rashes or skin lesions  Pulmonary/Chest: Effort normal  No respiratory distress     NEURO: cranial nerves grossly intact  PSYCH:  Alert and oriented to person, place, and time; recent and remote memory intact; mood normal, no depression, anxiety, or agitation, judgment and insight good and intact     Ortho Exam  RIGHT KNEE:  Erythema: no  Swelling: no  Increased Warmth: no  Tenderness: + MJL and LJL  Flexion: intact  Extension: intact  Patellar Displacement:  Patellar Tilt:  Patellar Apprehension: negative  Patellar Grind Gifford's: negative  Lachman's: negative  Drawer: negative  Varus laxity: negative  Valgus laxity: negative  Catherine: negative   Thessaly Test:  Dial Test:    LEFT KNEE:  Erythema: no  Swelling: no  Increased Warmth: no  Tenderness: none  Flexion: intact  Extension: intact  Patellar Displacement:  Patellar Tilt:  Patellar Apprehension: negative  Patellar Grind Gifford's: negative  Lachman's: negative  Drawer: negative  Varus laxity: negative  Valgus laxity: negative  Catherine: negative   Thessaly Test:  Dial Darlene      Large joint arthrocentesis: R knee  [place] Protocol:  Procedure performed by: (Dr Geremias Barber)  Consent: Verbal consent obtained  Risks and benefits: risks, benefits and alternatives were discussed  Consent given by: patient  Time out: Immediately prior to procedure a "time out" was called to verify the correct patient, procedure, equipment, support staff and site/side marked as required    Patient understanding: patient states understanding of the procedure being performed  Site marked: the operative site was marked  Patient identity confirmed: verbally with patient    Supporting Documentation  Indications: pain and diagnostic evaluation   Procedure Details  Location: knee - R knee  Preparation: Patient was prepped and draped in the usual sterile fashion  Needle size: 18 G  Ultrasound guidance: yes  Approach: superior  Medications administered: 88 mg hyaluronan 88 MG/4ML    Aspirate amount: 15 mL  Aspirate: clear and yellow    Patient tolerance: patient tolerated the procedure well with no immediate complications  Dressing:  Sterile dressing applied    Naropin (Ropivacaine) 0 5%  27311-726-38  [id number]  01/26  5ml local anesthetic

## 2022-11-03 NOTE — PROGRESS NOTES
Orthosis    Diagnosis:   1  Sagittal band rupture at metacarpophalangeal joint, initial encounter  Ambulatory Referral to Occupational Therapy     Indication: Motion Blocking    Location: Right  ring finger  Supplies: Custom Fit Orthotic  Orthosis type: MCP Ext  Wearing Schedule: Remove for hygiene only  Describe Position: MCP in full ext, Ips free    Precautions: Universal (skin contact/breakdown)    Patient or Caregiver expresses understanding of wearing Schedule and Precautions? Yes  Patient or Caregiver able to don/doff orthotic independently? Yes    Written orders provided to patient?  No  Orders Obtained: Written  Orders Obtained from: Marie Dow    Return for evaluation and treatment No

## 2022-11-07 ENCOUNTER — APPOINTMENT (OUTPATIENT)
Dept: LAB | Facility: AMBULARY SURGERY CENTER | Age: 77
End: 2022-11-07

## 2022-11-07 DIAGNOSIS — M79.641 RIGHT HAND PAIN: ICD-10-CM

## 2022-11-07 DIAGNOSIS — M79.642 HAND PAIN, LEFT: ICD-10-CM

## 2022-11-07 LAB — CRP SERPL QL: <3 MG/L

## 2022-11-08 LAB
CCP AB SER IA-ACNC: 0.6
ENA SS-A AB SER-ACNC: <0.2 AI (ref 0–0.9)
ENA SS-B AB SER-ACNC: <0.2 AI (ref 0–0.9)
RHEUMATOID FACT SER QL LA: NEGATIVE

## 2022-11-10 ENCOUNTER — HOSPITAL ENCOUNTER (OUTPATIENT)
Dept: INFUSION CENTER | Facility: CLINIC | Age: 77
Discharge: HOME | End: 2022-11-10

## 2022-11-10 VITALS
SYSTOLIC BLOOD PRESSURE: 132 MMHG | RESPIRATION RATE: 18 BRPM | OXYGEN SATURATION: 96 % | DIASTOLIC BLOOD PRESSURE: 79 MMHG | HEART RATE: 68 BPM

## 2022-11-10 DIAGNOSIS — D50.9 IRON DEFICIENCY ANEMIA, UNSPECIFIED IRON DEFICIENCY ANEMIA TYPE: Primary | ICD-10-CM

## 2022-11-10 RX ORDER — SODIUM CHLORIDE 9 MG/ML
20 INJECTION, SOLUTION INTRAVENOUS ONCE
Status: CANCELLED | OUTPATIENT
Start: 2022-11-10

## 2022-11-10 RX ORDER — SODIUM CHLORIDE 9 MG/ML
20 INJECTION, SOLUTION INTRAVENOUS ONCE
Status: COMPLETED | OUTPATIENT
Start: 2022-11-10 | End: 2022-11-10

## 2022-11-10 RX ADMIN — IRON SUCROSE 200 MG: 20 INJECTION, SOLUTION INTRAVENOUS at 08:16

## 2022-11-10 RX ADMIN — SODIUM CHLORIDE 20 ML/HR: 0.9 INJECTION, SOLUTION INTRAVENOUS at 08:17

## 2022-11-10 NOTE — PROGRESS NOTES
Patient to infusion for Venofer  She offers no complaints  VSS  Patient tolerated treatment today without incident    Today was her last treatment ordered, she declined AVS

## 2022-11-17 ENCOUNTER — RA CDI HCC (OUTPATIENT)
Dept: OTHER | Facility: HOSPITAL | Age: 77
End: 2022-11-17

## 2022-11-21 ENCOUNTER — HOSPITAL ENCOUNTER (OUTPATIENT)
Dept: MAMMOGRAPHY | Facility: CLINIC | Age: 77
Discharge: HOME | End: 2022-11-21

## 2022-11-21 DIAGNOSIS — Z85.3 HISTORY OF LEFT BREAST CANCER: ICD-10-CM

## 2022-11-28 ENCOUNTER — OFFICE VISIT (OUTPATIENT)
Dept: INTERNAL MEDICINE CLINIC | Facility: CLINIC | Age: 77
End: 2022-11-28

## 2022-11-28 VITALS
HEIGHT: 63 IN | SYSTOLIC BLOOD PRESSURE: 124 MMHG | WEIGHT: 159 LBS | HEART RATE: 60 BPM | BODY MASS INDEX: 28.17 KG/M2 | OXYGEN SATURATION: 96 % | DIASTOLIC BLOOD PRESSURE: 72 MMHG | TEMPERATURE: 95.6 F

## 2022-11-28 DIAGNOSIS — G25.0 ESSENTIAL TREMOR: ICD-10-CM

## 2022-11-28 DIAGNOSIS — E53.8 VITAMIN B12 DEFICIENCY: ICD-10-CM

## 2022-11-28 DIAGNOSIS — I10 ESSENTIAL HYPERTENSION: ICD-10-CM

## 2022-11-28 DIAGNOSIS — I81 PORTAL VEIN THROMBOSIS: ICD-10-CM

## 2022-11-28 DIAGNOSIS — K51.80 OTHER ULCERATIVE COLITIS WITHOUT COMPLICATION (HCC): ICD-10-CM

## 2022-11-28 DIAGNOSIS — M54.16 LUMBAR RADICULOPATHY: ICD-10-CM

## 2022-11-28 DIAGNOSIS — E66.3 OVERWEIGHT: ICD-10-CM

## 2022-11-28 DIAGNOSIS — E83.42 HYPOMAGNESEMIA: ICD-10-CM

## 2022-11-28 DIAGNOSIS — F41.9 ANXIETY: ICD-10-CM

## 2022-11-28 DIAGNOSIS — E22.2 SIADH (SYNDROME OF INAPPROPRIATE ADH PRODUCTION) (HCC): ICD-10-CM

## 2022-11-28 DIAGNOSIS — E78.49 OTHER HYPERLIPIDEMIA: ICD-10-CM

## 2022-11-28 DIAGNOSIS — D50.9 IRON DEFICIENCY ANEMIA, UNSPECIFIED IRON DEFICIENCY ANEMIA TYPE: ICD-10-CM

## 2022-11-28 DIAGNOSIS — Z85.3 HISTORY OF LEFT BREAST CANCER: ICD-10-CM

## 2022-11-28 DIAGNOSIS — R73.03 PREDIABETES: ICD-10-CM

## 2022-11-28 DIAGNOSIS — M51.36 LUMBAR DEGENERATIVE DISC DISEASE: ICD-10-CM

## 2022-11-28 DIAGNOSIS — M79.644 PAIN OF FINGER OF RIGHT HAND: Primary | ICD-10-CM

## 2022-11-28 DIAGNOSIS — E55.9 VITAMIN D DEFICIENCY: ICD-10-CM

## 2022-11-28 DIAGNOSIS — M79.89 LEG SWELLING: ICD-10-CM

## 2022-11-28 DIAGNOSIS — J45.20 MILD INTERMITTENT ASTHMA WITHOUT COMPLICATION: ICD-10-CM

## 2022-11-28 DIAGNOSIS — K21.9 GASTROESOPHAGEAL REFLUX DISEASE, UNSPECIFIED WHETHER ESOPHAGITIS PRESENT: ICD-10-CM

## 2022-11-28 DIAGNOSIS — J45.21 MILD INTERMITTENT ASTHMA WITH ACUTE EXACERBATION: ICD-10-CM

## 2022-11-28 DIAGNOSIS — M35.00 SJOGREN'S SYNDROME, WITH UNSPECIFIED ORGAN INVOLVEMENT (HCC): ICD-10-CM

## 2022-11-28 DIAGNOSIS — F33.1 MODERATE EPISODE OF RECURRENT MAJOR DEPRESSIVE DISORDER (HCC): ICD-10-CM

## 2022-11-28 PROBLEM — S52.134A CLOSED NONDISPLACED FRACTURE OF NECK OF RIGHT RADIUS: Status: RESOLVED | Noted: 2021-10-18 | Resolved: 2022-11-28

## 2022-11-28 RX ORDER — HYDROCODONE BITARTRATE AND ACETAMINOPHEN 5; 325 MG/1; MG/1
1 TABLET ORAL EVERY 6 HOURS PRN
Qty: 120 TABLET | Refills: 0 | Status: SHIPPED | OUTPATIENT
Start: 2022-11-28

## 2022-11-28 RX ORDER — ALBUTEROL SULFATE 90 UG/1
2 AEROSOL, METERED RESPIRATORY (INHALATION) EVERY 6 HOURS PRN
Qty: 8 G | Refills: 1 | Status: SHIPPED | OUTPATIENT
Start: 2022-11-28

## 2022-11-28 RX ORDER — TOBRAMYCIN AND DEXAMETHASONE 3; 1 MG/ML; MG/ML
SUSPENSION/ DROPS OPHTHALMIC
COMMUNITY
Start: 2022-11-07

## 2022-11-28 NOTE — ASSESSMENT & PLAN NOTE
BP stable today    Taking diltirazem 60 mg in AM and 180 mg in PM, lisinopril 20 mg bid and amlodipine 2 5 mg in AM

## 2022-11-28 NOTE — PROGRESS NOTES
Assessment/Plan:    Essential hypertension  BP stable today  Taking diltirazem 60 mg in AM and 180 mg in PM, lisinopril 20 mg bid and amlodipine 2 5 mg in AM     Essential tremor  DBS recently adjusted  Per neurology  GERD (gastroesophageal reflux disease)  Taking PPI daily, changed to lansoprazole  Acquired hypothyroidism  Adequately replaced  Iron deficiency anemia  Completed Venofer x 3  Per hematology  Lumbar degenerative disc disease  Doing well since adjusting gabapentin ([phone])  Plan to gradually decrease this  Still on Vicodin at least bid  Injections scheduled next week  Anxiety  On escitalopram   Taking lorazepam at least 2-3x a day  Mild intermittent asthma without complication  May increase Advair to bid  Ulcerative colitis without complications (HCC)  No change  SIADH (syndrome of inappropriate ADH production) (HCC)  Taking NaCl bid  Pain of finger of right hand  Using finger splint  Follow up with Ortho, may need surgery  Osteoarthritis of right knee  S/p injection  Prediabetes  A1c 6 1%  Hyperlipidemia  On statin  Portal vein thrombosis  Repeat INR next week  Overweight  Gained 9 lbs since last visit  Sjogren's syndrome ([facility] )  Sees rheum, not on medication  Diagnoses and all orders for this visit:    Pain of finger of right hand    Leg swelling  Comments:  More frequent recently  Resume low salt diet  May take torsemide daily for a week  Lumbar radiculopathy  -     HYDROcodone-acetaminophen (NORCO) 5-325 mg per tablet; Take 1 tablet by mouth every 6 (six) hours as needed for pain Max Daily Amount: 4 tablets    Portal vein thrombosis  -     Protime-INR; Standing  -     Protime-INR    SIADH (syndrome of inappropriate ADH production) (HCC)    Iron deficiency anemia, unspecified iron deficiency anemia type    Hypomagnesemia  -     Magnesium; Future    Other hyperlipidemia  -     Comprehensive metabolic panel;  Future  -     Lipid panel; Future    Gastroesophageal reflux disease, unspecified whether esophagitis present    Essential tremor    Essential hypertension    Vitamin B12 deficiency  -     Vitamin B12; Future    Vitamin D deficiency  -     Vitamin D 25 hydroxy; Future    Prediabetes  -     Hemoglobin A1C; Future    Moderate episode of recurrent major depressive disorder (HCC)    Mild intermittent asthma without complication    Overweight    Anxiety    History of left breast cancer    Lumbar degenerative disc disease    Other ulcerative colitis without complication (HCC)    Mild intermittent asthma with acute exacerbation  Comments:  Lung sound improved after nebulizer  Start prednisone, maintain on Advair 250 for now  Instructed to use albuterol inhaler qHS  Orders:  -     albuterol (PROVENTIL HFA,VENTOLIN HFA) 90 mcg/act inhaler; Inhale 2 puffs every 6 (six) hours as needed for wheezing    Sjogren's syndrome, with unspecified organ involvement ([facility] )    Other orders  -     tobramycin-dexamethasone (TOBRADEX) ophthalmic suspension    Follow up in 5 months or as needed  Subjective:      Patient ID: Mary Smith is a 65 y o  female here for a follow up  She is feeling alright  She has been wearing a finger splint, hoping she does not need surgery  She has finished her iron infusions  Her reflux medication was changed, did not tolerate twice a day since it affected her voice  She noticed more frequent leg swelling the past week or so  She admits eating a lot of "junk " She took the water pill once but it did not help  She denies any shortness of breath, no chest pains  She is trying to take less Vicodin, at least 2 a day, sometimes 3  She is also trying to take lorazepam less often  She initially did not feel well with lower dose of gabapentin at noon time, felt neuropathy was worse  These symptoms settled down, doing okay now        The following portions of the patient's history were reviewed and updated as appropriate: allergies, current medications, past medical history, past social history and problem list     Review of Systems   Constitutional: Negative for appetite change and fatigue  HENT: Negative for congestion, ear pain and postnasal drip  Eyes: Negative for visual disturbance  Respiratory: Negative for cough and shortness of breath  Cardiovascular: Positive for leg swelling  Negative for chest pain  Gastrointestinal: Negative for abdominal pain, constipation and diarrhea  Genitourinary: Negative for dysuria and frequency  Musculoskeletal: Positive for arthralgias and back pain  Negative for gait problem and myalgias  Skin: Negative for rash and wound  Neurological: Negative for dizziness, numbness and headaches  Psychiatric/Behavioral: Negative for confusion  The patient is not nervous/anxious  Objective:      /72   Pulse 60   Temp (!) 95 6 °F (35 3 °C)   Ht 5' 3" (1 6 m)   Wt 72 1 kg (159 lb)   SpO2 96%   BMI 28 17 kg/m²          Physical Exam  Vitals and nursing note reviewed  Constitutional:       General: She is not in acute distress  Appearance: She is well-developed  HENT:      Head: Normocephalic and atraumatic  Eyes:      Pupils: Pupils are equal, round, and reactive to light  Cardiovascular:      Rate and Rhythm: Normal rate and regular rhythm  Heart sounds: Normal heart sounds  Pulmonary:      Effort: Pulmonary effort is normal       Breath sounds: Normal breath sounds  No wheezing  Abdominal:      General: Bowel sounds are normal       Palpations: Abdomen is soft  Musculoskeletal:         General: No swelling  Right lower le+ Edema present  Left lower le+ Edema present  Comments: Wearing splint R hand   Skin:     General: Skin is warm  Findings: No rash  Neurological:      General: No focal deficit present  Mental Status: She is alert and oriented to person, place, and time     Psychiatric:         Mood and Affect: Mood and affect normal  Mood is not anxious or depressed  Behavior: Behavior normal            Labs & imaging results reviewed with patient  BMI Counseling: Body mass index is 28 17 kg/m²  The BMI is above normal  Exercise recommendations include strength training exercises

## 2022-11-28 NOTE — ASSESSMENT & PLAN NOTE
Doing well since adjusting gabapentin ([phone])  Plan to gradually decrease this  Still on Vicodin at least bid  Injections scheduled next week

## 2022-12-01 DIAGNOSIS — I10 ESSENTIAL HYPERTENSION: Primary | ICD-10-CM

## 2022-12-05 ENCOUNTER — APPOINTMENT (OUTPATIENT)
Dept: LAB | Facility: CLINIC | Age: 77
End: 2022-12-05

## 2022-12-05 ENCOUNTER — APPOINTMENT (OUTPATIENT)
Dept: LAB | Facility: AMBULARY SURGERY CENTER | Age: 77
End: 2022-12-05

## 2022-12-05 DIAGNOSIS — Z79.01 LONG TERM (CURRENT) USE OF ANTICOAGULANTS: ICD-10-CM

## 2022-12-05 DIAGNOSIS — I81 PORTAL VEIN THROMBOSIS: ICD-10-CM

## 2022-12-05 LAB
INR PPP: 1.1 (ref 0.84–1.19)
PROTHROMBIN TIME: 14.4 SECONDS (ref 11.6–14.5)

## 2022-12-05 RX ORDER — AMLODIPINE BESYLATE 2.5 MG/1
TABLET ORAL
Qty: 90 TABLET | Refills: 3 | Status: SHIPPED | OUTPATIENT
Start: 2022-12-05

## 2022-12-06 ENCOUNTER — TELEPHONE (OUTPATIENT)
Dept: INTERNAL MEDICINE CLINIC | Facility: CLINIC | Age: 77
End: 2022-12-06

## 2022-12-06 DIAGNOSIS — I47.1 SUPRAVENTRICULAR TACHYCARDIA (HCC): ICD-10-CM

## 2022-12-06 DIAGNOSIS — M79.89 LEG SWELLING: Primary | ICD-10-CM

## 2022-12-06 NOTE — TELEPHONE ENCOUNTER
please call patient  Please ask how leg swelling is  Did you take the diuretic (torsemide) daily for the past week?

## 2022-12-06 NOTE — TELEPHONE ENCOUNTER
You may continue taking the diuretic daily  I need to check your electrolytes, labs ordered  You can do it with your INR in a week  Take warfarin as you were ( 5 mg x 1, 2 5 mg x 5 days)  I ordered an ultrasound of the heart, because of your ongoing leg swelling

## 2022-12-06 NOTE — TELEPHONE ENCOUNTER
Pt is taking the diuretic every day and leg swelling is down  If she stops taking it, the swelling returns  Does she continue to take diuretic daily  ? She had a back injection yesterday and stopped her Coumadin  She started taking it again today and wants clarification on dosage  She's taking 5 mg x 1 day and 2 5 mg x 6 days?

## 2022-12-07 DIAGNOSIS — I81 PORTAL VEIN THROMBOSIS: Primary | ICD-10-CM

## 2022-12-07 DIAGNOSIS — F41.9 ANXIETY: ICD-10-CM

## 2022-12-07 RX ORDER — WARFARIN SODIUM 5 MG/1
5 TABLET ORAL
Qty: 90 TABLET | Refills: 1 | Status: SHIPPED | OUTPATIENT
Start: 2022-12-07

## 2022-12-07 RX ORDER — LORAZEPAM 1 MG/1
1 TABLET ORAL EVERY 6 HOURS PRN
Qty: 120 TABLET | Refills: 0 | Status: SHIPPED | OUTPATIENT
Start: 2022-12-07

## 2022-12-12 ENCOUNTER — LAB (OUTPATIENT)
Dept: LAB | Facility: AMBULARY SURGERY CENTER | Age: 77
End: 2022-12-12

## 2022-12-12 ENCOUNTER — ANTICOAG VISIT (OUTPATIENT)
Dept: INTERNAL MEDICINE CLINIC | Facility: CLINIC | Age: 77
End: 2022-12-12

## 2022-12-12 ENCOUNTER — APPOINTMENT (OUTPATIENT)
Dept: LAB | Facility: AMBULARY SURGERY CENTER | Age: 77
End: 2022-12-12

## 2022-12-12 ENCOUNTER — TELEPHONE (OUTPATIENT)
Dept: HEMATOLOGY ONCOLOGY | Facility: CLINIC | Age: 77
End: 2022-12-12

## 2022-12-12 DIAGNOSIS — D50.9 IRON DEFICIENCY ANEMIA, UNSPECIFIED IRON DEFICIENCY ANEMIA TYPE: ICD-10-CM

## 2022-12-12 DIAGNOSIS — M79.89 LEG SWELLING: ICD-10-CM

## 2022-12-12 DIAGNOSIS — I81 PORTAL VEIN THROMBOSIS: ICD-10-CM

## 2022-12-12 LAB
ANION GAP SERPL CALCULATED.3IONS-SCNC: 7 MMOL/L (ref 4–13)
BASOPHILS # BLD AUTO: 0.01 THOUSANDS/ÂΜL (ref 0–0.1)
BASOPHILS NFR BLD AUTO: 0 % (ref 0–1)
BUN SERPL-MCNC: 14 MG/DL (ref 5–25)
CALCIUM SERPL-MCNC: 9.9 MG/DL (ref 8.3–10.1)
CHLORIDE SERPL-SCNC: 93 MMOL/L (ref 96–108)
CO2 SERPL-SCNC: 27 MMOL/L (ref 21–32)
CREAT SERPL-MCNC: 0.88 MG/DL (ref 0.6–1.3)
EOSINOPHIL # BLD AUTO: 0 THOUSAND/ÂΜL (ref 0–0.61)
EOSINOPHIL NFR BLD AUTO: 0 % (ref 0–6)
ERYTHROCYTE [DISTWIDTH] IN BLOOD BY AUTOMATED COUNT: 15.8 % (ref 11.6–15.1)
FERRITIN SERPL-MCNC: 76 NG/ML (ref 8–388)
GFR SERPL CREATININE-BSD FRML MDRD: 63 ML/MIN/1.73SQ M
GLUCOSE SERPL-MCNC: 130 MG/DL (ref 65–140)
HCT VFR BLD AUTO: 38.4 % (ref 34.8–46.1)
HGB BLD-MCNC: 12.7 G/DL (ref 11.5–15.4)
IMM GRANULOCYTES # BLD AUTO: 0.08 THOUSAND/UL (ref 0–0.2)
IMM GRANULOCYTES NFR BLD AUTO: 1 % (ref 0–2)
INR PPP: 1.57 (ref 0.84–1.19)
LYMPHOCYTES # BLD AUTO: 1.03 THOUSANDS/ÂΜL (ref 0.6–4.47)
LYMPHOCYTES NFR BLD AUTO: 11 % (ref 14–44)
MCH RBC QN AUTO: 31.5 PG (ref 26.8–34.3)
MCHC RBC AUTO-ENTMCNC: 33.1 G/DL (ref 31.4–37.4)
MCV RBC AUTO: 95 FL (ref 82–98)
MONOCYTES # BLD AUTO: 0.95 THOUSAND/ÂΜL (ref 0.17–1.22)
MONOCYTES NFR BLD AUTO: 11 % (ref 4–12)
NEUTROPHILS # BLD AUTO: 6.95 THOUSANDS/ÂΜL (ref 1.85–7.62)
NEUTS SEG NFR BLD AUTO: 77 % (ref 43–75)
NRBC BLD AUTO-RTO: 0 /100 WBCS
PLATELET # BLD AUTO: 426 THOUSANDS/UL (ref 149–390)
PMV BLD AUTO: 10.2 FL (ref 8.9–12.7)
POTASSIUM SERPL-SCNC: 5.1 MMOL/L (ref 3.5–5.3)
PROTHROMBIN TIME: 19 SECONDS (ref 11.6–14.5)
RBC # BLD AUTO: 4.03 MILLION/UL (ref 3.81–5.12)
SODIUM SERPL-SCNC: 127 MMOL/L (ref 135–147)
WBC # BLD AUTO: 9.02 THOUSAND/UL (ref 4.31–10.16)

## 2022-12-12 NOTE — TELEPHONE ENCOUNTER
Spoke with patient to make her aware that labs have been adjusted and are in process  She verbalized understanding and agreed to plan

## 2022-12-13 DIAGNOSIS — M51.36 LUMBAR DEGENERATIVE DISC DISEASE: ICD-10-CM

## 2022-12-13 RX ORDER — GABAPENTIN 600 MG/1
TABLET ORAL
Qty: 360 TABLET | Refills: 2 | Status: SHIPPED | OUTPATIENT
Start: 2022-12-13

## 2022-12-14 ENCOUNTER — TELEPHONE (OUTPATIENT)
Dept: INTERNAL MEDICINE CLINIC | Facility: CLINIC | Age: 77
End: 2022-12-14

## 2022-12-14 DIAGNOSIS — E22.2 SIADH (SYNDROME OF INAPPROPRIATE ADH PRODUCTION) (HCC): Primary | ICD-10-CM

## 2022-12-14 NOTE — TELEPHONE ENCOUNTER
Lab results: Your sodium is low at 127  How are you feeling? Have you been taking your salt tablet regularly 3x a day?     Repeat labs in a week

## 2022-12-15 NOTE — TELEPHONE ENCOUNTER
Spoke w/ pt  And adv'd  She is taking her salt tab as directed, but stopped using table salt completely  Is feeling good  Should she start using table salt again?

## 2022-12-15 NOTE — TELEPHONE ENCOUNTER
Diarrhea shouldn't affect it since you have had this for a long time  Instead of resuming table salt, we can increase your salt tablet to 3 times a day  Whatever you decide, please repeat labs next week

## 2022-12-15 NOTE — TELEPHONE ENCOUNTER
You can either resume using table salt or we can adjust your salt tablets  Are your legs still swollen?

## 2022-12-15 NOTE — TELEPHONE ENCOUNTER
Spoke w/ pt  She will resume table salt  She was taking Prednisone, which is finished now  Legs are not swollen when she takes her pill q day  Also, she has diarrhea a lot, taking Immodium  Said she has had diarrhea since 2000  Could that affecting her sodium level?

## 2022-12-19 ENCOUNTER — APPOINTMENT (OUTPATIENT)
Dept: LAB | Facility: CLINIC | Age: 77
End: 2022-12-19

## 2022-12-19 DIAGNOSIS — E22.2 SIADH (SYNDROME OF INAPPROPRIATE ADH PRODUCTION) (HCC): Primary | ICD-10-CM

## 2022-12-19 DIAGNOSIS — E22.2 SIADH (SYNDROME OF INAPPROPRIATE ADH PRODUCTION) (HCC): ICD-10-CM

## 2022-12-19 DIAGNOSIS — I81 PORTAL VEIN THROMBOSIS: ICD-10-CM

## 2022-12-19 LAB
ANION GAP SERPL CALCULATED.3IONS-SCNC: 5 MMOL/L (ref 4–13)
BUN SERPL-MCNC: 16 MG/DL (ref 5–25)
CALCIUM SERPL-MCNC: 9.6 MG/DL (ref 8.4–10.2)
CHLORIDE SERPL-SCNC: 94 MMOL/L (ref 96–108)
CO2 SERPL-SCNC: 31 MMOL/L (ref 21–32)
CREAT SERPL-MCNC: 0.81 MG/DL (ref 0.6–1.3)
GFR SERPL CREATININE-BSD FRML MDRD: 69 ML/MIN/1.73SQ M
GLUCOSE P FAST SERPL-MCNC: 104 MG/DL (ref 65–99)
INR PPP: 1.96 (ref 0.84–1.19)
POTASSIUM SERPL-SCNC: 4.2 MMOL/L (ref 3.5–5.3)
PROTHROMBIN TIME: 22.6 SECONDS (ref 11.6–14.5)
SODIUM SERPL-SCNC: 130 MMOL/L (ref 135–147)

## 2022-12-20 ENCOUNTER — OFFICE VISIT (OUTPATIENT)
Dept: HEMATOLOGY ONCOLOGY | Facility: CLINIC | Age: 77
End: 2022-12-20

## 2022-12-20 ENCOUNTER — ANTICOAG VISIT (OUTPATIENT)
Dept: INTERNAL MEDICINE CLINIC | Facility: CLINIC | Age: 77
End: 2022-12-20

## 2022-12-20 ENCOUNTER — TELEPHONE (OUTPATIENT)
Dept: HEMATOLOGY ONCOLOGY | Facility: CLINIC | Age: 77
End: 2022-12-20

## 2022-12-20 VITALS
OXYGEN SATURATION: 98 % | WEIGHT: 150 LBS | RESPIRATION RATE: 16 BRPM | DIASTOLIC BLOOD PRESSURE: 80 MMHG | SYSTOLIC BLOOD PRESSURE: 130 MMHG | BODY MASS INDEX: 26.58 KG/M2 | HEIGHT: 63 IN | HEART RATE: 75 BPM | TEMPERATURE: 98 F

## 2022-12-20 DIAGNOSIS — Z85.3 HISTORY OF LEFT BREAST CANCER: Primary | ICD-10-CM

## 2022-12-20 DIAGNOSIS — D50.9 IRON DEFICIENCY ANEMIA, UNSPECIFIED IRON DEFICIENCY ANEMIA TYPE: ICD-10-CM

## 2022-12-20 RX ORDER — SODIUM CHLORIDE 9 MG/ML
20 INJECTION, SOLUTION INTRAVENOUS ONCE
OUTPATIENT
Start: 2023-04-20

## 2022-12-20 RX ORDER — SODIUM CHLORIDE 9 MG/ML
20 INJECTION, SOLUTION INTRAVENOUS ONCE
Status: CANCELLED | OUTPATIENT
Start: 2023-04-20

## 2022-12-20 NOTE — PROGRESS NOTES
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 diagnosed in early 2021  Her microinvasive breast cancer measure less than 1 mm  However, her DCIS measure 7 7 cm  She underwent mastectomy and sentinel lymph node biopsy, resulting in KORIN  She does not require any adjuvant therapy  She has history of ulcerative colitis as well as bowel resection in 2010  Subsequently, she developed iron deficiency anemia, due to the malabsorption  She received 3 doses of Venofer, resulting in normal hemoglobin and ferritin  I recommended her to stay on maintenance Venofer 200 mg every 6 months, starting in April 2023  She is in agreement with moderate mentation  I will see her again in a year with CBC and ferritin      Subjective:      HPI:    A 34-year-old postmenopausal woman who was found to have abnormality in her left breast, based on a screening mammography  Therefore, she underwent left breast biopsy in December 17, 2020 which showed 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  DCIS port was ER negative, IN negative  She subsequently underwent left mastectomy with sentinel lymph node biopsy by Dr Brice Alvarado in February 12, 2021  She had 7 7 cm of ductal carcinoma in situ, grade 3  She also had micro invasion measuring less than 1 mm  Micro invasive part of cancer was ER negative, IN negative, HER2 3+ disease  2 sentinel lymph nodes were negative for metastatic disease  She did not have reconstruction  She presents today with her  to discuss possible adjuvant therapy  She feels well with no complaint of pain  Her weight is stable    She has history of essential tremor for which she had bilateral brain stimulator which improved her quality life substantially  She also has history of splenectomy back in 1999  She has no  Family history of breast cancer  She is nonsmoker  She is to have some ambulatory dysfunction for which she occasionally use walkers  Her performance status is 1/4 on ECOG scale            Interval History:   A 21-year-old postmenopausal woman with microinvasive left breast cancer, ER negative, CO negative, 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 diagnosed in early 2021  Her microinvasive breast cancer measure less than 1 mm  However, her DCIS measure 7 7 cm  She underwent mastectomy and sentinel lymph node biopsy, resulting in KORIN  She did not require any adjuvant therapy  She has history of ulcerative colitis as well as history of bowel resection in 2010  She developed iron deficiency anemia due to the malabsorption  She received 3 doses of Venna for with no infusion reaction   She presents today for routine follow-up  She feels much better  She has no exertional shortness of breath  She has a few pounds of interval weight loss  She denied any pain  Her performance status is normal      Objective:      Primary Diagnosis:       1  Left breast cancer, stage I A ( pT1a, pN0, M0) with microinvasion measuring less than 1 mm  ER negative, CO negative, HER2 3+ disease  Diagnosed in February 2021     2  Iron-deficiency anemia probably due to the bowel resection      Cancer Staging:  Cancer Staging  No matching staging information was found for the patient         Previous Hematologic/ Oncologic Treatment:            Current Hematologic/ Oncologic Treatment:      Maintenance Venofer every 6 months starting in April 2023      Disease Status:        KORIN status post mastectomy and sentinel lymph node biopsy      Test Results:     Pathology:       7 7 cm of ductal carcinoma in situ, grade 3, ER negative, CO negative  Micro invasive cancer measuring less than 1 mm, ER negative, CO negative, HER2 3+ disease    2 sentinel lymph node was negative for metastatic disease  Stage I A ( pT1 a, pN0, M0)     Radiology:      chest x-ray was negative for pulmonary disease      Laboratory:       See below for CBC and ferritin      Physical Exam:        General Appearance:    Alert, oriented          Eyes:    PERRL   Ears:    Normal external ear canals, both ears   Nose:   Nares normal, septum midline   Throat:   Mucosa moist  Pharynx without injection  Neck:   Supple         Lungs:     Clear to auscultation bilaterally   Chest Wall:    No tenderness or deformity    Heart:    Regular rate and rhythm         Abdomen:     Soft, non-tender, bowel sounds +, no organomegaly               Extremities:   Extremities no cyanosis or edema         Skin:   no rash or icterus  Lymph nodes:   Cervical, supraclavicular, and axillary nodes normal   Neurologic:   CNII-XII intact, normal strength, sensation and reflexes     Throughout             Breast exam:     Status post mastectomy without reconstruction  There is no palpable abnormality in her left chest wall  Right breast exam is negative  ROS: Review of Systems   All other systems reviewed and are negative  Imaging: No results found        Labs:   Lab Results   Component Value Date    WBC 9 02 12/12/2022    HGB 12 7 12/12/2022    HCT 38 4 12/12/2022    MCV 95 12/12/2022     (H) 12/12/2022     Lab Results   Component Value Date     (L) 12/28/2015    K 4 2 12/19/2022    CL 94 (L) 12/19/2022    CO2 31 12/19/2022    ANIONGAP 8 12/28/2015    BUN 16 12/19/2022    CREATININE 0 81 12/19/2022    GLUCOSE 112 10/17/2021    GLUF 104 (H) 12/19/2022    CALCIUM 9 6 12/19/2022    CORRECTEDCA 10 1 10/11/2022    AST 19 10/11/2022    ALT 22 10/11/2022    ALKPHOS 76 10/11/2022    PROT 6 9 11/16/2015    BILITOT 0 36 11/16/2015    EGFR 69 12/19/2022         Lab Results   Component Value Date    IRON 57 08/09/2022    TIBC 361 08/09/2022    FERRITIN 76 12/12/2022       Lab Results Component Value Date    [id number] 517 10/11/2022         Current Medications: Reviewed  Allergies: Reviewed  PMH/FH/SH:  Reviewed      Vital Sign:    Body surface area is 1 71 meters squared      Wt Readings from Last 3 Encounters:   12/20/22 68 kg (150 lb)   11/28/22 72 1 kg (159 lb)   11/03/22 69 9 kg (154 lb)        Temp Readings from Last 3 Encounters:   12/20/22 98 °F (36 7 °C) (Temporal)   11/28/22 (!) 95 6 °F (35 3 °C)   10/13/22 97 5 °F (36 4 °C) (Temporal)        BP Readings from Last 3 Encounters:   12/20/22 130/80   11/28/22 124/72   11/10/22 132/79         Pulse Readings from Last 3 Encounters:   12/20/22 75   11/28/22 60   11/10/22 68     @LASTSAO2(3)@

## 2022-12-20 NOTE — TELEPHONE ENCOUNTER
While we try to accommodate patient requests, our priority is to schedule treatment according to Doctor's orders and site availability  Does the Provider use the intake sheet or checkout note? What would be a preferred day of the week that would work best for your infusion appointment? Any day but Wednesday   Do you prefer mornings or afternoons for your appointments? Morning  Are there any days or dates that do not work for your schedule, including any upcoming vacations? January-March comes home April first   We are going to try our best to schedule you at the infusion center closest to your home  In the event that we are unable to what would be your next preferred infusion site or sites? 1  [facility]     Patient refuses to give another location  Do you have transportation to take you to all of your appointments?  Yes   Would you like the infusion center to draw labs from your port? (disregard if patient doesn't have a port or need labs for infusion appointment)

## 2022-12-22 ENCOUNTER — OFFICE VISIT (OUTPATIENT)
Dept: OBGYN CLINIC | Facility: OTHER | Age: 77
End: 2022-12-22

## 2022-12-22 VITALS
WEIGHT: 154 LBS | DIASTOLIC BLOOD PRESSURE: 79 MMHG | HEART RATE: 60 BPM | BODY MASS INDEX: 27.29 KG/M2 | SYSTOLIC BLOOD PRESSURE: 134 MMHG | HEIGHT: 63 IN

## 2022-12-22 DIAGNOSIS — M17.0 BILATERAL PRIMARY OSTEOARTHRITIS OF KNEE: Primary | ICD-10-CM

## 2022-12-22 DIAGNOSIS — S63.659A SAGITTAL BAND RUPTURE AT METACARPOPHALANGEAL JOINT, INITIAL ENCOUNTER: ICD-10-CM

## 2022-12-22 RX ORDER — TRIAMCINOLONE ACETONIDE 40 MG/ML
40 INJECTION, SUSPENSION INTRA-ARTICULAR; INTRAMUSCULAR
Status: COMPLETED | OUTPATIENT
Start: 2022-12-22 | End: 2022-12-22

## 2022-12-22 RX ADMIN — TRIAMCINOLONE ACETONIDE 40 MG: 40 INJECTION, SUSPENSION INTRA-ARTICULAR; INTRAMUSCULAR at 08:31

## 2022-12-22 NOTE — PROGRESS NOTES
1  Bilateral primary osteoarthritis of knee  Large joint arthrocentesis: bilateral knee      2  Sagittal band rupture at metacarpophalangeal joint, initial encounter          Orders Placed This Encounter   Procedures   • Large joint arthrocentesis: bilateral knee        IMAGING STUDIES: (I personally reviewed images in PACS and report):         PAST REPORTS:        ASSESSMENT/PLAN:  Bilateral knee osteoarthritis  Right ring finger sagittal band rupture    Repeat X-ray next visit: None    Return for Follow-up every 3 months as needed for injection  Patient Instructions   red flags and risks of injection include but are not limited to infection <0 072% as referenced in some sources, nerve or artery penetration, and if steroids are used-skin dimpling <1%, hypo-pigmentation <1%  Recommended no submerging underwater in a tub, pool, ocean, lake, jacuzzi, hot tub, or any other body of water for 1 week until needle wound closes due to risk of infection  May take showers  Clean needle site with soap and water and keep covered at all times with sterile bandage such as a band-aid until fully healed  Educated if any symptoms including fevers, chills, swelling, or worsening symptoms occur then to call office or go to hospital for immediate care if physician unavailable due to possible infection or other complication which is a serious medical problem  Patient expressed understanding and agreed to proceed with procedure             __________________________________________________________________________    HISTORY OF PRESENT ILLNESS:    Follow-up bilateral knee osteoarthritis: Uncontrolled  Last visit patient given viscosupplementation for the right knee which offered some relief but patient still continues to have pain  For patient's right ring finger sagittal band rupture she has worn her extension splint daily    As removing her splint today in examination room patient is now able to actively extend her ring finger  Review of Systems      Following history reviewed and update:    Past Medical History:   Diagnosis Date   • Anemia    • Anxiety    • Arrhythmia    • Arthritis    • Asthma    • Blood clot in vein     portal vein   • Breast cancer ([facility] [address] ) 02/12/2021   • Breast lump     [id number] RESOLVED   • Cancer (HCC)    • Depression    • Disease of thyroid gland    • DVT, lower extremity (HCC)    • GERD (gastroesophageal reflux disease)    • Hyperlipidemia    • Hypertension    • Hypokalemia    • Hyponatremia    • Hypothyroidism    • Iron deficiency anemia    • Irregular heart beat    • Manic behavior (HCC)    • 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HCC)    • Osteoarthritis    • Palpitations     [id number]  RESOLVED   • Paroxysmal supraventricular tachycardia (HCC)    • PE (pulmonary thromboembolism) (HCC)    • Sjoegren syndrome    • Sleep apnea    • Sleep difficulties    • Spinal stenosis    • Thrombocytosis     [id number]  RESOLVED   • Tremors of nervous system     dbs implanted right and left chest   • [facility]    • Vertigo     [id number] RESOLVED     Past Surgical History:   Procedure Laterality Date   • ABDOMINAL SURGERY     • APPENDECTOMY     • BREAST BIOPSY Left 11/07/2019    Stereo   • BREAST EXCISIONAL BIOPSY Left     x many years   • BREAST SURGERY      lumpectomy & biopsy   • CARMELLA HOLE W/ STEREOTACTIC INSERTION OF DBS LEADS / INTRAOP MICROELECTRODE RECORDING     • COLON SURGERY     • COLONOSCOPY N/A 08/30/2017    Procedure: Gabby Pandey;  Surgeon: Daryle Coach, MD;  Location: [facility];   Service: Colorectal   • COLOPROCTECTOMY W/ ILEO J POUCH     • ESOPHAGOGASTRODUODENOSCOPY      ONSET 10/17/11   • FISTULA REPAIR      LLEOANAL FISTULA REPAIR TRANSPERIN TRANSABD APPROACH   • HYSTERECTOMY      age 43   • ILEOSTOMY CLOSURE     • KNEE ARTHROSCOPY      Right   • MAMMO STEREOTACTIC BREAST BIOPSY LEFT (ALL INC) Left 11/07/2019   • MAMMO STEREOTACTIC BREAST BIOPSY LEFT (ALL INC) Left 12/17/2020   • MAMMO STEREOTACTIC BREAST BIOPSY LEFT (ALL INC) EACH ADD Left 2020   • MASTECTOMY Left 2021    left mastectomy- Dr Bisi Vila   • MASTECTOMY W/ SENTINEL NODE BIOPSY Left 2021    Procedure: BREAST MASTECTOMY WITH SENTINEL LYMPH NODE BIOPSY, LYMPHATIC MAPPING WITH BLUE DYE AND RADIAOCTIVE DYE (INJECT AT 1100 BY DR GILLESPIE IN THE OR);   Surgeon: Natasha Jamil MD;  Location[facility];  Service: Surgical Oncology   • DC IMP STIM,CRANIAL,SUBQ,1 ARRAY Right 2017    Procedure: DBS GENERATOR REPLACEMENT;  Surgeon: Ramona Blount MD;  Location: [facility];  Service: Neurosurgery   • DC IMP STIM,CRANIAL,SUBQ,1 ARRAY N/A 2019    Procedure: REPLACEMENT IMPLANTABLE PULSE GENERATOR FOR DEEP BRAIN STIMULATOR LEFT CHEST;  Surgeon: Veornique Chris MD;  Location[facility];  Service: Neurosurgery   • SPLENECTOMY     • TONSILLECTOMY       Social History   Social History     Substance and Sexual Activity   Alcohol Use Yes   • Alcohol/week: 2 0 standard drinks   • Types: 2 Cans of beer per week     Social History     Substance and Sexual Activity   Drug Use No     Social History     Tobacco Use   Smoking Status Former   • Packs/day: 1 50   • Years: 5 00   • Pack years: 7 50   • Types: Cigarettes   • Quit date: 1965   • Years since quittin 0   Smokeless Tobacco Never   Tobacco Comments    Quit     Family History   Problem Relation Age of Onset   • Venous thrombosis Mother         ACUTE VENOUS THROMBOSIS OF DEEP VESSELS OF THE DISTAL LOWER EXTREMITY   • Other Mother         PHLEBITIS   • Hypertension Mother    • Peripheral vascular disease Mother    • COPD Father    • Diabetes Father         MELLITUS   • Stroke Father    • Diabetes Sister         MELLITUS   • Sjogren's syndrome Sister    • No Known Problems Daughter    • No Known Problems Maternal Grandmother    • No Known Problems Maternal Grandfather    • No Known Problems Paternal Grandmother    • No Known Problems Paternal Grandfather    • No Known Problems Sister    • No Known Problems Maternal Aunt    • No Known Problems Paternal Aunt    • No Known Problems Son      Allergies   Allergen Reactions   • Indomethacin GI Intolerance and Dizziness   • Macrobid [Nitrofurantoin Monohyd Macro] Rash   • Nitrofurantoin Other (See Comments)   • Penicillins Rash     Annotation - [id number]: can take cephalosporins   • Sulfa Antibiotics Rash          Physical Exam  /79 (BP Location: Left arm, Patient Position: Sitting, Cuff Size: Standard)   Pulse 60   Ht 5' 3" (1 6 m)   Wt 69 9 kg (154 lb)   BMI 27 28 kg/m²     Constitutional:  see vital signs  Gen: well-developed, normocephalic/atraumatic, well-groomed  Eyes: No inflammation or discharge of conjunctiva or lids; sclera clear   Pharynx: no inflammation, lesion, or mass of lips  Neck: supple, no masses, non-distended  MSK: no inflammation, lesion, mass, or clubbing of nails and digits except for other than mentioned below  SKIN: no visible rashes or skin lesions  Pulmonary/Chest: Effort normal  No respiratory distress  NEURO: cranial nerves grossly intact  PSYCH:  Alert and oriented to person, place, and time; recent and remote memory intact; mood normal, no depression, anxiety, or agitation, judgment and insight good and intact     Ortho Exam  Right hand:  No swelling erythema or increased warmth  Ramos test normal  Nontender    RIGHT KNEE:  Erythema: no  Swelling: no  Increased Warmth: no  Tenderness: none  Flexion: seated at 90  Extension: intact  Drawer: negative  Varus laxity: negative  Valgus laxity: negative    LEFT KNEE:  Erythema: no  Swelling: no  Increased Warmth: no  Tenderness: none  Flexion: seated at 90  Extension: intact  Drawer: negative  Varus laxity: negative  Valgus laxity: negative    __________________________________________________________________________  Large joint arthrocentesis: bilateral knee  Universal Protocol:  Procedure performed by: (Dr Ashok Garcia)  Consent: Verbal consent obtained    Risks and benefits: risks, benefits and alternatives were discussed  Consent given by: patient  Time out: Immediately prior to procedure a "time out" was called to verify the correct patient, procedure, equipment, support staff and site/side marked as required  Patient understanding: patient states understanding of the procedure being performed  Site marked: the operative site was marked  Patient identity confirmed: verbally with patient    Supporting Documentation  Indications: pain   Procedure Details  Location: knee - bilateral knee  Preparation: Patient was prepped and draped in the usual sterile fashion (betadine if not allergic   alcohol before and after ethyl chloride spray)  Needle size: 22 G  Ultrasound guidance: no  Approach: anterolateral    Medications (Right): 40 mg triamcinolone acetonide 40 mg/mLMedications (Left): 40 mg triamcinolone acetonide 40 mg/mL   Patient tolerance: patient tolerated the procedure well with no immediate complications  Dressing:  Sterile dressing applied    [id number]  [id number]  01/26  4ml each knee

## 2022-12-23 ENCOUNTER — HOSPITAL ENCOUNTER (OUTPATIENT)
Dept: NON INVASIVE DIAGNOSTICS | Facility: CLINIC | Age: 77
Discharge: HOME | End: 2022-12-23

## 2022-12-23 VITALS
HEART RATE: 80 BPM | BODY MASS INDEX: 27.29 KG/M2 | HEIGHT: 63 IN | SYSTOLIC BLOOD PRESSURE: 134 MMHG | WEIGHT: 154 LBS | DIASTOLIC BLOOD PRESSURE: 79 MMHG

## 2022-12-23 DIAGNOSIS — I47.1 SUPRAVENTRICULAR TACHYCARDIA (HCC): ICD-10-CM

## 2022-12-23 DIAGNOSIS — M79.89 LEG SWELLING: ICD-10-CM

## 2022-12-23 LAB
AORTIC ROOT: 3.4 CM
APICAL FOUR CHAMBER EJECTION FRACTION: 68 %
E WAVE DECELERATION TIME: 265 MS
FRACTIONAL SHORTENING: 39 % (ref 28–44)
INTERVENTRICULAR SEPTUM IN DIASTOLE (PARASTERNAL SHORT AXIS VIEW): 1 CM
INTERVENTRICULAR SEPTUM: 1 CM (ref 0.6–1.1)
LAAS-AP2: 19.2 CM2
LAAS-AP4: 21.1 CM2
LEFT ATRIUM AREA SYSTOLE SINGLE PLANE A4C: 19.5 CM2
LEFT ATRIUM SIZE: 3.4 CM
LEFT INTERNAL DIMENSION IN SYSTOLE: 2.8 CM (ref 2.1–4)
LEFT VENTRICULAR INTERNAL DIMENSION IN DIASTOLE: 4.6 CM (ref 3.5–6)
LEFT VENTRICULAR POSTERIOR WALL IN END DIASTOLE: 1 CM
LEFT VENTRICULAR STROKE VOLUME: 68 ML
LVSV (TEICH): 68 ML
MV E'TISSUE VEL-SEP: 8 CM/S
MV PEAK A VEL: 0.89 M/S
MV PEAK E VEL: 85 CM/S
MV STENOSIS PRESSURE HALF TIME: 77 MS
MV VALVE AREA P 1/2 METHOD: 2.86 CM2
RA PRESSURE ESTIMATED: 5 MMHG
RIGHT ATRIUM AREA SYSTOLE A4C: 14.1 CM2
RIGHT VENTRICLE ID DIMENSION: 3.5 CM
SL CV LEFT ATRIUM LENGTH A2C: 5.7 CM
SL CV LV EF: 65
SL CV PED ECHO LEFT VENTRICLE DIASTOLIC VOLUME (MOD BIPLANE) 2D: 98 ML
SL CV PED ECHO LEFT VENTRICLE SYSTOLIC VOLUME (MOD BIPLANE) 2D: 30 ML

## 2022-12-27 ENCOUNTER — APPOINTMENT (OUTPATIENT)
Dept: LAB | Facility: AMBULARY SURGERY CENTER | Age: 77
End: 2022-12-27

## 2022-12-27 ENCOUNTER — TELEPHONE (OUTPATIENT)
Dept: INTERNAL MEDICINE CLINIC | Facility: CLINIC | Age: 77
End: 2022-12-27

## 2022-12-27 DIAGNOSIS — E22.2 SIADH (SYNDROME OF INAPPROPRIATE ADH PRODUCTION) (HCC): ICD-10-CM

## 2022-12-27 LAB
ANION GAP SERPL CALCULATED.3IONS-SCNC: 6 MMOL/L (ref 4–13)
BUN SERPL-MCNC: 15 MG/DL (ref 5–25)
CALCIUM SERPL-MCNC: 8.4 MG/DL (ref 8.3–10.1)
CHLORIDE SERPL-SCNC: 96 MMOL/L (ref 96–108)
CO2 SERPL-SCNC: 28 MMOL/L (ref 21–32)
CREAT SERPL-MCNC: 0.74 MG/DL (ref 0.6–1.3)
GFR SERPL CREATININE-BSD FRML MDRD: 77 ML/MIN/1.73SQ M
GLUCOSE P FAST SERPL-MCNC: 114 MG/DL (ref 65–99)
POTASSIUM SERPL-SCNC: 3.9 MMOL/L (ref 3.5–5.3)
SODIUM SERPL-SCNC: 130 MMOL/L (ref 135–147)

## 2022-12-27 NOTE — TELEPHONE ENCOUNTER
Pt  wants to know how often she should take Torsemide  Hasn't taken it for two days and legs are not swollen  She went for sodium bw this morning  Pt  aware Maria Del Carmen Murguia will be back in the office tomorrow

## 2022-12-28 NOTE — TELEPHONE ENCOUNTER
Sodium levels are still low but better than previous  It looks like her torsemide is daily as needed for swelling

## 2023-01-03 ENCOUNTER — TELEPHONE (OUTPATIENT)
Dept: INTERNAL MEDICINE CLINIC | Facility: CLINIC | Age: 78
End: 2023-01-03

## 2023-01-03 DIAGNOSIS — I81 PORTAL VEIN THROMBOSIS: Primary | ICD-10-CM

## 2023-01-03 NOTE — TELEPHONE ENCOUNTER
Shaina called the INR needs to say standing on it, so next to the once a week please put standing and then refax

## 2023-01-03 NOTE — TELEPHONE ENCOUNTER
[place]  Is in [place]  [facility] is requesting a new script for her PT, INR be faxed to [phone]  It should say that it is a standing order and she should have it drawn q week instead of q 4wks so that she doesn't have a problem if she needs it drawn sooner than 4wks

## 2023-01-04 LAB — INR PPP: 3.5 (ref 0.84–1.19)

## 2023-01-05 ENCOUNTER — TELEPHONE (OUTPATIENT)
Dept: INTERNAL MEDICINE CLINIC | Facility: CLINIC | Age: 78
End: 2023-01-05

## 2023-01-05 ENCOUNTER — ANTICOAG VISIT (OUTPATIENT)
Dept: INTERNAL MEDICINE CLINIC | Facility: CLINIC | Age: 78
End: 2023-01-05

## 2023-01-07 ENCOUNTER — HOSPITAL ENCOUNTER (INPATIENT)
Facility: HOSPITAL | Age: 78
LOS: 2 days | Discharge: HOME | End: 2023-01-10
Attending: EMERGENCY MEDICINE | Admitting: FAMILY MEDICINE

## 2023-01-07 ENCOUNTER — APPOINTMENT (EMERGENCY)
Dept: CT IMAGING | Facility: HOSPITAL | Age: 78
End: 2023-01-07

## 2023-01-07 DIAGNOSIS — K92.1 HEMATOCHEZIA: ICD-10-CM

## 2023-01-07 DIAGNOSIS — R79.1 SUPRATHERAPEUTIC INR: ICD-10-CM

## 2023-01-07 DIAGNOSIS — I81 PORTAL VEIN THROMBOSIS: ICD-10-CM

## 2023-01-07 DIAGNOSIS — K62.5 RECTAL BLEEDING: Primary | ICD-10-CM

## 2023-01-07 PROBLEM — B37.0 THRUSH: Status: ACTIVE | Noted: 2023-01-07

## 2023-01-07 LAB
ABO GROUP BLD: NORMAL
ALBUMIN SERPL BCP-MCNC: 3.7 G/DL (ref 3.5–5)
ALP SERPL-CCNC: 58 U/L (ref 34–104)
ALT SERPL W P-5'-P-CCNC: 15 U/L (ref 7–52)
ANION GAP SERPL CALCULATED.3IONS-SCNC: 6 MMOL/L (ref 4–13)
APTT PPP: 42 SECONDS (ref 23–37)
AST SERPL W P-5'-P-CCNC: 17 U/L (ref 13–39)
BASOPHILS # BLD AUTO: 0.02 THOUSANDS/ÂΜL (ref 0–0.1)
BASOPHILS NFR BLD AUTO: 0 % (ref 0–1)
BILIRUB SERPL-MCNC: 0.31 MG/DL (ref 0.2–1)
BLD GP AB SCN SERPL QL: NEGATIVE
BUN SERPL-MCNC: 16 MG/DL (ref 5–25)
CALCIUM SERPL-MCNC: 8.5 MG/DL (ref 8.4–10.2)
CHLORIDE SERPL-SCNC: 94 MMOL/L (ref 96–108)
CO2 SERPL-SCNC: 30 MMOL/L (ref 21–32)
CREAT SERPL-MCNC: 0.91 MG/DL (ref 0.6–1.3)
EOSINOPHIL # BLD AUTO: 0.01 THOUSAND/ÂΜL (ref 0–0.61)
EOSINOPHIL NFR BLD AUTO: 0 % (ref 0–6)
ERYTHROCYTE [DISTWIDTH] IN BLOOD BY AUTOMATED COUNT: 16.3 % (ref 11.6–15.1)
GFR SERPL CREATININE-BSD FRML MDRD: 60 ML/MIN/1.73SQ M
GLUCOSE SERPL-MCNC: 133 MG/DL (ref 65–140)
HCT VFR BLD AUTO: 33 % (ref 34.8–46.1)
HGB BLD-MCNC: 11.4 G/DL (ref 11.5–15.4)
HGB BLD-MCNC: 11.5 G/DL (ref 11.5–15.4)
IMM GRANULOCYTES # BLD AUTO: 0.09 THOUSAND/UL (ref 0–0.2)
IMM GRANULOCYTES NFR BLD AUTO: 1 % (ref 0–2)
INR PPP: 7.46 (ref 0.84–1.19)
LYMPHOCYTES # BLD AUTO: 0.64 THOUSANDS/ÂΜL (ref 0.6–4.47)
LYMPHOCYTES NFR BLD AUTO: 9 % (ref 14–44)
MCH RBC QN AUTO: 32.2 PG (ref 26.8–34.3)
MCHC RBC AUTO-ENTMCNC: 34.5 G/DL (ref 31.4–37.4)
MCV RBC AUTO: 93 FL (ref 82–98)
MONOCYTES # BLD AUTO: 0.5 THOUSAND/ÂΜL (ref 0.17–1.22)
MONOCYTES NFR BLD AUTO: 7 % (ref 4–12)
NEUTROPHILS # BLD AUTO: 6.3 THOUSANDS/ÂΜL (ref 1.85–7.62)
NEUTS SEG NFR BLD AUTO: 83 % (ref 43–75)
NRBC BLD AUTO-RTO: 0 /100 WBCS
PLATELET # BLD AUTO: 333 THOUSANDS/UL (ref 149–390)
PMV BLD AUTO: 8.2 FL (ref 8.9–12.7)
POTASSIUM SERPL-SCNC: 4.2 MMOL/L (ref 3.5–5.3)
PROT SERPL-MCNC: 5.8 G/DL (ref 6.4–8.4)
PROTHROMBIN TIME: 63.6 SECONDS (ref 11.6–14.5)
RBC # BLD AUTO: 3.54 MILLION/UL (ref 3.81–5.12)
RH BLD: POSITIVE
SODIUM SERPL-SCNC: 130 MMOL/L (ref 135–147)
SPECIMEN EXPIRATION DATE: NORMAL
WBC # BLD AUTO: 7.56 THOUSAND/UL (ref 4.31–10.16)

## 2023-01-07 RX ORDER — SODIUM CHLORIDE 1000 MG
1 TABLET, SOLUBLE MISCELLANEOUS 3 TIMES DAILY
Status: DISCONTINUED | OUTPATIENT
Start: 2023-01-07 | End: 2023-01-10 | Stop reason: HOSPADM

## 2023-01-07 RX ORDER — CHLORAL HYDRATE 500 MG
1000 CAPSULE ORAL DAILY
Status: DISCONTINUED | OUTPATIENT
Start: 2023-01-08 | End: 2023-01-10 | Stop reason: HOSPADM

## 2023-01-07 RX ORDER — FLUTICASONE FUROATE AND VILANTEROL 100; 25 UG/1; UG/1
1 POWDER RESPIRATORY (INHALATION)
Status: CANCELLED | OUTPATIENT
Start: 2023-01-08

## 2023-01-07 RX ORDER — DILTIAZEM HYDROCHLORIDE 180 MG/1
180 CAPSULE, COATED, EXTENDED RELEASE ORAL DAILY
Status: CANCELLED | OUTPATIENT
Start: 2023-01-08

## 2023-01-07 RX ORDER — LEVOTHYROXINE SODIUM 0.05 MG/1
50 TABLET ORAL DAILY
Status: CANCELLED | OUTPATIENT
Start: 2023-01-08

## 2023-01-07 RX ORDER — GABAPENTIN 600 MG/1
600 TABLET ORAL 3 TIMES DAILY
Status: CANCELLED | OUTPATIENT
Start: 2023-01-07

## 2023-01-07 RX ORDER — UREA 10 %
250 LOTION (ML) TOPICAL
Status: CANCELLED | OUTPATIENT
Start: 2023-01-07

## 2023-01-07 RX ORDER — AMLODIPINE BESYLATE 2.5 MG/1
2.5 TABLET ORAL DAILY
Status: CANCELLED | OUTPATIENT
Start: 2023-01-08

## 2023-01-07 RX ORDER — DILTIAZEM HYDROCHLORIDE 180 MG/1
180 CAPSULE, COATED, EXTENDED RELEASE ORAL
Status: DISCONTINUED | OUTPATIENT
Start: 2023-01-07 | End: 2023-01-10 | Stop reason: HOSPADM

## 2023-01-07 RX ORDER — CALCIUM CARBONATE 500(1250)
1 TABLET ORAL
Status: DISCONTINUED | OUTPATIENT
Start: 2023-01-08 | End: 2023-01-10 | Stop reason: HOSPADM

## 2023-01-07 RX ORDER — LISINOPRIL 20 MG/1
20 TABLET ORAL 2 TIMES DAILY
Status: DISCONTINUED | OUTPATIENT
Start: 2023-01-07 | End: 2023-01-10 | Stop reason: HOSPADM

## 2023-01-07 RX ORDER — HYDROCODONE BITARTRATE AND ACETAMINOPHEN 5; 325 MG/1; MG/1
1 TABLET ORAL EVERY 6 HOURS PRN
Status: CANCELLED | OUTPATIENT
Start: 2023-01-07

## 2023-01-07 RX ORDER — LORATADINE 10 MG/1
5 TABLET ORAL DAILY
Status: DISCONTINUED | OUTPATIENT
Start: 2023-01-08 | End: 2023-01-10 | Stop reason: HOSPADM

## 2023-01-07 RX ORDER — LOPERAMIDE HYDROCHLORIDE 2 MG/1
2 CAPSULE ORAL 4 TIMES DAILY PRN
Status: DISCONTINUED | OUTPATIENT
Start: 2023-01-07 | End: 2023-01-10 | Stop reason: HOSPADM

## 2023-01-07 RX ORDER — CALCIUM CARBONATE 500(1250)
1 TABLET ORAL
Status: CANCELLED | OUTPATIENT
Start: 2023-01-08

## 2023-01-07 RX ORDER — ACETAMINOPHEN 325 MG/1
650 TABLET ORAL EVERY 6 HOURS PRN
Status: CANCELLED | OUTPATIENT
Start: 2023-01-07

## 2023-01-07 RX ORDER — MECLIZINE HYDROCHLORIDE 25 MG/1
25 TABLET ORAL EVERY 6 HOURS PRN
Status: DISCONTINUED | OUTPATIENT
Start: 2023-01-07 | End: 2023-01-10 | Stop reason: HOSPADM

## 2023-01-07 RX ORDER — BUTALB/ACETAMINOPHEN/CAFFEINE 50-325-40
TABLET ORAL DAILY
Status: CANCELLED | OUTPATIENT
Start: 2023-01-08

## 2023-01-07 RX ORDER — ACETAMINOPHEN 325 MG/1
650 TABLET ORAL EVERY 6 HOURS PRN
Status: DISCONTINUED | OUTPATIENT
Start: 2023-01-07 | End: 2023-01-10 | Stop reason: HOSPADM

## 2023-01-07 RX ORDER — LORAZEPAM 1 MG/1
1 TABLET ORAL EVERY 6 HOURS PRN
Status: CANCELLED | OUTPATIENT
Start: 2023-01-07

## 2023-01-07 RX ORDER — DILTIAZEM HYDROCHLORIDE 60 MG/1
60 TABLET, FILM COATED ORAL
Status: DISCONTINUED | OUTPATIENT
Start: 2023-01-08 | End: 2023-01-10 | Stop reason: HOSPADM

## 2023-01-07 RX ORDER — ALBUTEROL SULFATE 90 UG/1
2 AEROSOL, METERED RESPIRATORY (INHALATION) EVERY 6 HOURS PRN
Status: DISCONTINUED | OUTPATIENT
Start: 2023-01-07 | End: 2023-01-10 | Stop reason: HOSPADM

## 2023-01-07 RX ORDER — TORSEMIDE 10 MG/1
10 TABLET ORAL DAILY PRN
Status: CANCELLED | OUTPATIENT
Start: 2023-01-07

## 2023-01-07 RX ORDER — FLUTICASONE PROPIONATE 50 MCG
1 SPRAY, SUSPENSION (ML) NASAL DAILY
Status: CANCELLED | OUTPATIENT
Start: 2023-01-08

## 2023-01-07 RX ORDER — LEVOTHYROXINE SODIUM 0.05 MG/1
50 TABLET ORAL DAILY
Status: DISCONTINUED | OUTPATIENT
Start: 2023-01-08 | End: 2023-01-10 | Stop reason: HOSPADM

## 2023-01-07 RX ORDER — POTASSIUM CHLORIDE 20 MEQ/1
20 TABLET, EXTENDED RELEASE ORAL DAILY
Status: DISCONTINUED | OUTPATIENT
Start: 2023-01-08 | End: 2023-01-10 | Stop reason: HOSPADM

## 2023-01-07 RX ORDER — ALBUTEROL SULFATE 90 UG/1
2 AEROSOL, METERED RESPIRATORY (INHALATION) EVERY 6 HOURS PRN
Status: CANCELLED | OUTPATIENT
Start: 2023-01-07

## 2023-01-07 RX ORDER — TORSEMIDE 10 MG/1
10 TABLET ORAL DAILY PRN
Status: DISCONTINUED | OUTPATIENT
Start: 2023-01-07 | End: 2023-01-10 | Stop reason: HOSPADM

## 2023-01-07 RX ORDER — ESCITALOPRAM OXALATE 20 MG/1
20 TABLET ORAL DAILY
Status: DISCONTINUED | OUTPATIENT
Start: 2023-01-08 | End: 2023-01-10 | Stop reason: HOSPADM

## 2023-01-07 RX ORDER — POTASSIUM CHLORIDE 20MEQ/15ML
20 LIQUID (ML) ORAL ONCE
Status: CANCELLED | OUTPATIENT
Start: 2023-01-08

## 2023-01-07 RX ORDER — LISINOPRIL 10 MG/1
20 TABLET ORAL 2 TIMES DAILY
Status: CANCELLED | OUTPATIENT
Start: 2023-01-07

## 2023-01-07 RX ORDER — FLUTICASONE FUROATE AND VILANTEROL 100; 25 UG/1; UG/1
1 POWDER RESPIRATORY (INHALATION)
Status: DISCONTINUED | OUTPATIENT
Start: 2023-01-08 | End: 2023-01-10 | Stop reason: HOSPADM

## 2023-01-07 RX ORDER — LORATADINE 10 MG/1
5 TABLET ORAL DAILY
Status: CANCELLED | OUTPATIENT
Start: 2023-01-08

## 2023-01-07 RX ORDER — FLUTICASONE PROPIONATE 50 MCG
1 SPRAY, SUSPENSION (ML) NASAL DAILY
Status: DISCONTINUED | OUTPATIENT
Start: 2023-01-08 | End: 2023-01-10 | Stop reason: HOSPADM

## 2023-01-07 RX ORDER — ESCITALOPRAM OXALATE 20 MG/1
20 TABLET ORAL DAILY
Status: CANCELLED | OUTPATIENT
Start: 2023-01-08

## 2023-01-07 RX ORDER — POTASSIUM CHLORIDE 20MEQ/15ML
20 LIQUID (ML) ORAL ONCE
Status: COMPLETED | OUTPATIENT
Start: 2023-01-07 | End: 2023-01-07

## 2023-01-07 RX ORDER — CHLORAL HYDRATE 500 MG
1000 CAPSULE ORAL DAILY
Status: CANCELLED | OUTPATIENT
Start: 2023-01-08

## 2023-01-07 RX ORDER — AMLODIPINE BESYLATE 2.5 MG/1
2.5 TABLET ORAL DAILY
Status: DISCONTINUED | OUTPATIENT
Start: 2023-01-08 | End: 2023-01-10 | Stop reason: HOSPADM

## 2023-01-07 RX ORDER — LORAZEPAM 1 MG/1
1 TABLET ORAL EVERY 6 HOURS PRN
Status: DISCONTINUED | OUTPATIENT
Start: 2023-01-07 | End: 2023-01-10 | Stop reason: HOSPADM

## 2023-01-07 RX ORDER — PRAVASTATIN SODIUM 10 MG
10 TABLET ORAL
Status: DISCONTINUED | OUTPATIENT
Start: 2023-01-07 | End: 2023-01-10 | Stop reason: HOSPADM

## 2023-01-07 RX ORDER — PRAVASTATIN SODIUM 10 MG
10 TABLET ORAL
Status: CANCELLED | OUTPATIENT
Start: 2023-01-08

## 2023-01-07 RX ORDER — GABAPENTIN 300 MG/1
600 CAPSULE ORAL 3 TIMES DAILY
Status: DISCONTINUED | OUTPATIENT
Start: 2023-01-07 | End: 2023-01-10 | Stop reason: HOSPADM

## 2023-01-07 RX ORDER — HYDROCODONE BITARTRATE AND ACETAMINOPHEN 5; 325 MG/1; MG/1
1 TABLET ORAL EVERY 6 HOURS PRN
Status: DISCONTINUED | OUTPATIENT
Start: 2023-01-07 | End: 2023-01-10 | Stop reason: HOSPADM

## 2023-01-07 RX ORDER — UREA 10 %
250 LOTION (ML) TOPICAL
Status: DISCONTINUED | OUTPATIENT
Start: 2023-01-07 | End: 2023-01-10 | Stop reason: HOSPADM

## 2023-01-07 RX ORDER — SODIUM CHLORIDE 1000 MG
1 TABLET, SOLUBLE MISCELLANEOUS 3 TIMES DAILY
Status: CANCELLED | OUTPATIENT
Start: 2023-01-07

## 2023-01-07 RX ORDER — LOPERAMIDE HYDROCHLORIDE 2 MG/1
2 CAPSULE ORAL 4 TIMES DAILY PRN
Status: CANCELLED | OUTPATIENT
Start: 2023-01-07

## 2023-01-07 RX ADMIN — LORAZEPAM 1 MG: 1 TABLET ORAL at 21:24

## 2023-01-07 RX ADMIN — DILTIAZEM HYDROCHLORIDE 180 MG: 180 CAPSULE, COATED, EXTENDED RELEASE ORAL at 18:22

## 2023-01-07 RX ADMIN — SODIUM CHLORIDE 1 G: 1 TABLET ORAL at 21:19

## 2023-01-07 RX ADMIN — LISINOPRIL 20 MG: 20 TABLET ORAL at 18:22

## 2023-01-07 RX ADMIN — PRAVASTATIN SODIUM 10 MG: 10 TABLET ORAL at 18:22

## 2023-01-07 RX ADMIN — PHYTONADIONE 10 MG: 10 INJECTION, EMULSION INTRAMUSCULAR; INTRAVENOUS; SUBCUTANEOUS at 16:15

## 2023-01-07 RX ADMIN — NYSTATIN 500000 UNITS: 100000 SUSPENSION ORAL at 18:23

## 2023-01-07 RX ADMIN — IOHEXOL 100 ML: 350 INJECTION, SOLUTION INTRAVENOUS at 14:04

## 2023-01-07 RX ADMIN — NYSTATIN 500000 UNITS: 100000 SUSPENSION ORAL at 21:19

## 2023-01-07 RX ADMIN — GABAPENTIN 600 MG: 300 CAPSULE ORAL at 21:19

## 2023-01-07 RX ADMIN — Medication 250 MG: at 18:22

## 2023-01-07 RX ADMIN — POTASSIUM CHLORIDE 20 MEQ: 20 SOLUTION ORAL at 18:23

## 2023-01-07 NOTE — ASSESSMENT & PLAN NOTE
Lab Results   Component Value Date    INR 7 46 (HH) 01/07/2023    INR 3 50 (A) 01/03/2023    INR 1 96 (H) 12/19/2022    PROTIME 63 6 (H) 01/07/2023    PROTIME 22 6 (H) 12/19/2022    PROTIME 19 0 (H) 12/12/2022     Patient currently on Coumadin 5 mg daily for portal vein thrombosis  On 1/5, patient has had supratherapeutic INR of 3 5  Patient instructed to take Coumadin 5 mg 3 days, 2 5 mg x 4 days and reassess in 3 weeks  Per patient, she was compliant with above recommendations  Current INR in ED today 7 46      Plan:  · Hold home Coumadin  · Type and screen  · Consent signed for blood products  · 10 mg vitamin K given in ED  · AM INR

## 2023-01-07 NOTE — ASSESSMENT & PLAN NOTE
On lifelong anticoagulation   Recent supratherapeutic INR in outpatient setting, Warfarin dose adjusted    Plan:  · Hold home warfarin  · Continue to monitor liver enzymes with daily labs

## 2023-01-07 NOTE — H&P
[facility]  H&P- Daniel Grayson [birth year], 65 y o  female MRN: [id number]  Unit/Bed#: W -20 Encounter: [id number]  Primary Care Provider: Lilibeth Jarrett MD   Date and time admitted to hospital: 1/7/2023 12:02 PM    Elevated INR  Assessment & Plan  Lab Results   Component Value Date    INR 7 46 (HH) 01/07/2023    INR 3 50 (A) 01/03/2023    INR 1 96 (H) 12/19/2022    PROTIME 63 6 (H) 01/07/2023    PROTIME 22 6 (H) 12/19/2022    PROTIME 19 0 (H) 12/12/2022     Patient currently on Coumadin 5 mg daily for portal vein thrombosis  On 1/5, patient has had supratherapeutic INR of 3 5  Patient instructed to take Coumadin 5 mg 3 days, 2 5 mg x 4 days and reassess in 3 weeks  Per patient, she was compliant with above recommendations  Current INR in ED today 7 46  Plan:  · Hold home Coumadin  · Type and screen  · Consent signed for blood products  · 10 mg vitamin K given in ED  · AM INR    * Rectal bleeding  Assessment & Plan  Patient states she has had 3 episodes of rectal bleeding over past 2 days, 1 additional episode in hospital   Describes bleeding as a sudden gush of bright red blood in the toilet while straining for bowel movement  Denies leakage of blood between bowel movements  Denies dark tarry stools  Denies dizziness, lightheadedness, sensation of racing heart, shortness of breath, chest pain, or other new or concerning features  LUCIUS in ED revealed mild to moderate amounts of bright red blood per rectum    Plan:  · AM consult to GI  · Hold home warfarin  · Clear liquid diet  · H&H every 12 hours  · Type and screen, consent for blood products signed  · Stool record at bedside    Portal vein thrombosis  Assessment & Plan  On lifelong anticoagulation   Recent supratherapeutic INR in outpatient setting, Warfarin dose adjusted    Plan:  · Hold home warfarin  · Continue to monitor liver enzymes with daily labs      [address]  Per patient, she has had on and off oral thrush for past several months  Patient uses Breo Ellipta inhaler as an outpatient, has recently run out of nystatin swish and swallow  Very mild oral thrush on physical examination, no impingement on airway    Plan:  · Nystatin swish swallow 4 times daily  · Mouth care after Breo Ellipta use    Hyponatremia  Assessment & Plan  Recent Labs     01/07/23  1251   SODIUM 130*     Known issue for this patient  Consistently hyponatremic on prior laboratory studies    Plan:  · Continue with home salt tablets    Mild intermittent asthma without complication  Assessment & Plan  Continue home Breo Ellipta with nystatin swish and swallow    Peripheral neuropathy  Assessment & Plan  Continue with home gabapentin regimen    Acquired hypothyroidism  Assessment & Plan  Patient on stable home dose levothyroxine    Plan:  · Continue home levothyroxine 25 mg every morning    Essential hypertension  Assessment & Plan  Blood pressure stable on home regimen  Blood pressure WNL at time of assessment    Plan:   · Norvasc 2 5 mg every morning  · Cardizem 180 every afternoon  · Cardizem 60 every morning  · Lisinopril 20 mg twice daily    Hyperlipidemia  Assessment & Plan  Continue home statin    VTE Pharmacologic Prophylaxis: VTE Score: 7 High Risk (Score >/= 5) - Pharmacological DVT Prophylaxis Contraindicated  Sequential Compression Devices Ordered  Code Status: Level 1 - Full Code   Discussion with family: Updated  () at bedside  Anticipated Length of Stay: Patient will be admitted on an observation basis with an anticipated length of stay of less than 2 midnights secondary to rectal bleeding and supratherpeutic INR      Chief Complaint: Rectal bleeding     History of Present Illness:  Sunil Carlos is a 65 y o  female with a PMH of ulcerative colitis status coloproctectomy with ileal J-pouch, essential tremor with bilateral DBS implant, acquired hypothyroidism, asthma, portal vein thrombosis on lifelong Coumadin anticoagulation who presents with rectal bleeding for last two days  Patient receives routine INR monitoring, was found to be supratherapeutic at last session with INR of 3 5  Patient was advised to taper down on coumadin, which she did  Patient states her bleeding began two days ago and occurred as three episodes which reminded her of a "hermorrhoid bursting"  Patient denied any pain or consistent bleeding, stating she only had episodes of bleeding when she attempted a bowel movement  Patient stated when straining for bowel movement, she noticed copious amounts of bright red blood in toilet bowl  Patient denied blood coating stool, protruding rectal mass, pain, or bleeding that continued after she stopped bearing down  After third session of rectal bleeding patient began to get worried and presented ED for assessment  In ED, patient was found to be mildly hypertensive but afebrile, not tachycardic, not tachypneic  LUCIUS in the ED revealed mild to moderate amounts of bright red blood in rectum  CT AP showed postsurgical changes, no evidence of obstruction or acute abnormality  Laboratory studies revealed mild hyponatremia within limits for this patient, normal hemoglobin of 11 4  Patient was found to have an INR of 7 46  Patient was administered 10 mg vitamin K, transferred to floor for further assessment    Review of Systems:  Review of Systems   Constitutional: Negative for chills and fever  HENT: Negative for ear pain and sore throat  Eyes: Negative for pain and visual disturbance  Respiratory: Negative for cough and shortness of breath  Cardiovascular: Negative for chest pain and palpitations  Gastrointestinal: Negative for abdominal pain and vomiting  Genitourinary: Negative for dysuria and hematuria  Musculoskeletal: Negative for arthralgias and back pain  Skin: Negative for color change and rash  Neurological: Negative for seizures and syncope     All other systems reviewed and are negative  Past Medical and Surgical History:   Past Medical History:   Diagnosis Date   • Anemia    • Anxiety    • Arrhythmia    • Arthritis    • Asthma    • Blood clot in vein     portal vein   • Breast cancer ([facility] [address] ) 02/12/2021   • Breast lump     [id number] RESOLVED   • Cancer (HCC)    • Depression    • Disease of thyroid gland    • DVT, lower extremity (HCC)    • GERD (gastroesophageal reflux disease)    • Hyperlipidemia    • Hypertension    • Hypokalemia    • Hyponatremia    • Hypothyroidism    • Iron deficiency anemia    • Irregular heart beat    • Manic behavior (HCC)    • 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HCC)    • Osteoarthritis    • Palpitations     [id number]  RESOLVED   • Paroxysmal supraventricular tachycardia (HCC)    • PE (pulmonary thromboembolism) (HCC)    • Sjoegren syndrome    • Sleep apnea    • Sleep difficulties    • Spinal stenosis    • Thrombocytosis     [id number]  RESOLVED   • Tremors of nervous system     dbs implanted right and left chest   • [facility]    • Vertigo     [id number] RESOLVED       Past Surgical History:   Procedure Laterality Date   • ABDOMINAL SURGERY     • APPENDECTOMY     • BREAST BIOPSY Left 11/07/2019    Stereo   • BREAST EXCISIONAL BIOPSY Left     x many years   • BREAST SURGERY      lumpectomy & biopsy   • CARMELLA HOLE W/ STEREOTACTIC INSERTION OF DBS LEADS / INTRAOP MICROELECTRODE RECORDING     • COLON SURGERY     • COLONOSCOPY N/A 08/30/2017    Procedure: Henrietta Choi;  Surgeon: Madeline Bernardo MD;  Location: [facility];   Service: Colorectal   • COLOPROCTECTOMY W/ ILEO J POUCH     • ESOPHAGOGASTRODUODENOSCOPY      ONSET 10/17/11   • FISTULA REPAIR      LLEOANAL FISTULA REPAIR TRANSPERIN TRANSABD APPROACH   • HYSTERECTOMY      age 43   • ILEOSTOMY CLOSURE     • KNEE ARTHROSCOPY      Right   • MAMMO STEREOTACTIC BREAST BIOPSY LEFT (ALL INC) Left 11/07/2019   • MAMMO STEREOTACTIC BREAST BIOPSY LEFT (ALL INC) Left 12/17/2020   • MAMMO STEREOTACTIC BREAST BIOPSY LEFT (ALL INC) EACH ADD Left 12/17/2020   • MASTECTOMY Left 02/12/2021    left mastectomy- Dr Raad Garcia   • MASTECTOMY W/ SENTINEL NODE BIOPSY Left 02/12/2021    Procedure: BREAST MASTECTOMY WITH SENTINEL LYMPH NODE BIOPSY, LYMPHATIC MAPPING WITH BLUE DYE AND RADIAOCTIVE DYE (INJECT AT 1100 BY DR GILLESPIE IN THE OR); Surgeon: Edilma Merritt MD;  Location[facility];  Service: Surgical Oncology   • ND INSJ/RPLCMT CRANIAL NEUROSTIM PULSE GENERATOR Right 06/20/2017    Procedure: DBS GENERATOR REPLACEMENT;  Surgeon: Ct Ulloa MD;  Location: [facility];  Service: Neurosurgery   • ND INSJ/RPLCMT CRANIAL NEUROSTIM PULSE GENERATOR N/A 12/04/2019    Procedure: REPLACEMENT IMPLANTABLE PULSE GENERATOR FOR DEEP BRAIN STIMULATOR LEFT CHEST;  Surgeon: Rasheeda Mobley MD;  Location: [facility];  Service: Neurosurgery   • SPLENECTOMY     • TONSILLECTOMY         Meds/Allergies:  Prior to Admission medications    Medication Sig Start Date End Date Taking?  Authorizing Provider   acetaminophen (TYLENOL) 325 mg tablet Take 2 tablets (650 mg total) by mouth every 6 (six) hours as needed for mild pain 10/18/21   Damien Ghotra PA-C   albuterol (PROVENTIL HFA,VENTOLIN HFA) 90 mcg/act inhaler Inhale 2 puffs every 6 (six) hours as needed for wheezing 11/28/22   Vinayak Roberto MD   amLODIPine (NORVASC) 2 5 mg tablet TAKE ONE TABLET BY MOUTH EVERY DAY 12/5/22   Leonora Simms MD   Calcium Carb-Cholecalciferol (CALCIUM 600-D PO) Take 1 tablet by mouth 2 (two) times a day    Historical Provider, MD   cetirizine (ZyrTEC) 10 mg tablet Take 10 mg by mouth daily    Historical Provider, MD   Coenzyme Q10 (CO Q-10 PO) Take 1 capsule by mouth daily    Historical Provider, MD   diltiazem (CARDIZEM CD) 180 mg 24 hr capsule TAKE ONE CAPSULE BY MOUTH EVERY DAY 9/19/22   Vinayak Roberto MD   diltiazem (CARDIZEM) 60 mg tablet Take 1 tablet (60 mg total) by mouth daily 8/25/22   Vinayak Roberto MD   escitalopram (LEXAPRO) 20 mg tablet TAKE ONE TABLET BY MOUTH EVERY DAY 9/19/22   Rosenda Lepe MD   fluticasone [facility]) 50 mcg/act nasal spray INSTILL ONE SPRAY INTO EACH NOSTRIL ONCE DAILY AS NEEDED FOR RHINITIS 5/31/22   Rosenda Lepe MD   fluticasone-salmeterol (Advair Diskus) 250-50 mcg/dose inhaler Inhale 1 puff 2 (two) times a day Rinse mouth after use 12/27/21   Rosenda Lepe MD   gabapentin (NEURONTIN) 600 MG tablet TAKE ONE TABLET BY MOUTH IN THE MORNING, ONE TABLET IN THE AFTERNNON, AND TWO TABLETS AT BEDTIME 12/13/22   Rosenda Lepe MD   HYDROcodone-acetaminophen [facility]) 5-325 mg per tablet Take 1 tablet by mouth every 6 (six) hours as needed for pain Max Daily Amount: 4 tablets 11/28/22   Rosenda Lepe MD   lansoprazole (PREVACID) 15 mg capsule Take 1 capsule (15 mg total) by mouth 2 (two) times a day before meals Take 1/2 to 1 hour before breakfast and dinner  9/2/22   Janee Paget, MD   levothyroxine 50 mcg tablet TAKE ONE TABLET BY MOUTH EVERY DAY 10/28/22   RODNEY Minor   lisinopril (ZESTRIL) 20 mg tablet TAKE ONE TABLET BY MOUTH TWICE A DAY 8/8/22   Rosenda Lepe MD   loperamide (IMODIUM) 2 mg capsule Take 2 mg by mouth 4 (four) times a day as needed      Historical Provider, MD   LORazepam (ATIVAN) 1 mg tablet Take 1 tablet (1 mg total) by mouth every 6 (six) hours as needed for anxiety 12/7/22   Rosenda Lepe MD   MAGNESIUM PO Take 1 tablet by mouth daily    Historical Provider, MD   meclizine (ANTIVERT) 25 mg tablet Take 1 tablet (25 mg total) by mouth every 6 (six) hours as needed for dizziness 12/20/21   Rosenda Lepe MD   Multiple Vitamin (MULTIVITAMIN ADULT PO) Take 1 tablet by mouth daily    Historical Provider, MD   naloxone (NARCAN) 4 mg/0 1 mL nasal spray Administer 1 spray into a nostril  If no response after 2-3 minutes, give another dose in the other nostril using a new spray   4/22/22   Rosenda Lepe MD   nystatin (MYCOSTATIN) 500,000 units/5 mL suspension  22   Historical Provider, MD   Omega-3 Fatty Acids (FISH OIL PO) Take 1 capsule by mouth daily    Historical Provider, MD   potassium chloride (MICRO-K) 10 MEQ CR capsule Take 2 capsules daily 10/17/22   Tony Paulino MD   simvastatin (ZOCOR) 5 MG tablet TAKE ONE TABLET BY MOUTH EVERY DAY 22   Tony Paulino MD   sodium chloride 1 g tablet Take 1 tablet (1 g total) by mouth 3 (three) times a day 5/10/22   Kevin Greco DO   tobramycin-dexamethasone [facility] APOPKA) ophthalmic suspension  22   Historical Provider, MD   torsemide (DEMADEX) 10 mg tablet Take 1 tablet (10 mg total) by mouth daily as needed (edema) 21   Tony Paulino MD   warfarin (COUMADIN) 5 mg tablet Take 1 tablet (5 mg total) by mouth daily Or as directed 22   Tony Paulino MD     I have reviewed home medications with patient personally  Allergies:    Allergies   Allergen Reactions   • Indomethacin GI Intolerance and Dizziness   • Macrobid [Nitrofurantoin Monohyd Macro] Rash   • Nitrofurantoin Other (See Comments)   • Penicillins Rash     Annotation - [id number]: can take cephalosporins   • Sulfa Antibiotics Rash       Social History:  Marital Status:    Occupation: Retired   Patient Pre-hospital Living Situation: Home, Apartment  Patient Pre-hospital Level of Mobility: walks with walker  Patient Pre-hospital Diet Restrictions: No dietary restrictions   Substance Use History:   Social History     Substance and Sexual Activity   Alcohol Use Yes   • Alcohol/week: 2 0 standard drinks   • Types: 2 Cans of beer per week     Social History     Tobacco Use   Smoking Status Former   • Packs/day: 1 50   • Years: 5 00   • Pack years: 7 50   • Types: Cigarettes   • Quit date: 1965   • Years since quittin 0   Smokeless Tobacco Never   Tobacco Comments    Quit     Social History     Substance and Sexual Activity   Drug Use No       Family History:  Family History Problem Relation Age of Onset   • Venous thrombosis Mother         ACUTE VENOUS THROMBOSIS OF DEEP VESSELS OF THE DISTAL LOWER EXTREMITY   • Other Mother         PHLEBITIS   • Hypertension Mother    • Peripheral vascular disease Mother    • COPD Father    • Diabetes Father         MELLITUS   • Stroke Father    • Diabetes Sister         MELLITUS   • Sjogren's syndrome Sister    • No Known Problems Daughter    • No Known Problems Maternal Grandmother    • No Known Problems Maternal Grandfather    • No Known Problems Paternal Grandmother    • No Known Problems Paternal Grandfather    • No Known Problems Sister    • No Known Problems Maternal Aunt    • No Known Problems Paternal Aunt    • No Known Problems Son        Physical Exam:     Vitals:   Blood Pressure: 150/75 (01/07/23 1737)  Pulse: 74 (01/07/23 1737)  Temperature: 97 6 °F (36 4 °C) (01/07/23 1737)  Temp Source: Oral (01/07/23 1135)  Respirations: 16 (01/07/23 1455)  SpO2: 95 % (01/07/23 1807)    Physical Exam  Vitals and nursing note reviewed  Constitutional:       General: She is not in acute distress  Appearance: Normal appearance  She is not ill-appearing  HENT:      Head: Normocephalic and atraumatic  Right Ear: External ear normal       Left Ear: External ear normal       Nose: Nose normal       Mouth/Throat:      Mouth: Mucous membranes are moist       Pharynx: Oropharyngeal exudate present  Comments: Scant oral thrush appreciated tonsillar regions  Eyes:      Extraocular Movements: Extraocular movements intact  Conjunctiva/sclera: Conjunctivae normal    Cardiovascular:      Rate and Rhythm: Normal rate and regular rhythm  Pulses: Normal pulses  Heart sounds: Normal heart sounds  No murmur heard  No friction rub  No gallop  Pulmonary:      Effort: Pulmonary effort is normal  No respiratory distress  Breath sounds: Normal breath sounds  No stridor  No wheezing or rales     Abdominal:      General: Abdomen is flat       Palpations: Abdomen is soft  Tenderness: There is no abdominal tenderness  There is no guarding  Musculoskeletal:         General: Normal range of motion  Cervical back: Normal range of motion and neck supple  Skin:     General: Skin is warm and dry  Capillary Refill: Capillary refill takes less than 2 seconds  Coloration: Skin is not pale  Findings: No bruising  Neurological:      General: No focal deficit present  Mental Status: She is alert  Psychiatric:         Mood and Affect: Mood normal           Additional Data:     Lab Results:  Results from last 7 days   Lab Units 01/07/23  1251   WBC Thousand/uL 7 56   HEMOGLOBIN g/dL 11 4*   HEMATOCRIT % 33 0*   PLATELETS Thousands/uL 333   NEUTROS PCT % 83*   LYMPHS PCT % 9*   MONOS PCT % 7   EOS PCT % 0     Results from last 7 days   Lab Units 01/07/23  1251   SODIUM mmol/L 130*   POTASSIUM mmol/L 4 2   CHLORIDE mmol/L 94*   CO2 mmol/L 30   BUN mg/dL 16   CREATININE mg/dL 0 91   ANION GAP mmol/L 6   CALCIUM mg/dL 8 5   ALBUMIN g/dL 3 7   TOTAL BILIRUBIN mg/dL 0 31   ALK PHOS U/L 58   ALT U/L 15   AST U/L 17   GLUCOSE RANDOM mg/dL 133     Results from last 7 days   Lab Units 01/07/23  1251   INR  7 46*                   Lines/Drains:  Invasive Devices     Peripheral Intravenous Line  Duration           Peripheral IV 01/07/23 Left Antecubital <1 day                    Imaging: Reviewed radiology reports from this admission including: abdominal/pelvic CT  CT abdomen pelvis with contrast   Final Result by Amelia Peck MD (01/07 0408)      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, similar in appearance to prior study  No evidence of obstruction or additional acute abnormality in the abdomen or pelvis  Workstation performed: [id number]             EKG and Other Studies Reviewed on Admission:   · EKG: No EKG obtained      ** Please Note: This note has been constructed using a voice recognition system   **

## 2023-01-07 NOTE — ED PROVIDER NOTES
History  Chief Complaint   Patient presents with   • Black or Bloody Stool     Pt reports onset of bloody stool yesterday, hx of fissures, denies abd pain     HPI   Patient is a 48-year-old female with history of ulcerative colitis status post coloproctectomy with ileal J-pouch as well as portal vein thrombosis on warfarin presenting with rectal bleeding for the last 3 days  Patient states that 3 days ago she had 2 episodes of diarrhea comprised mostly of bright red blood which subsequently resolved  Throughout the day yesterday, patient had been having normal caliber and consistency brown stools without any blood  This morning, patient woke up and use the restroom, noted that there was bright red blood when she wiped which prompted her to come to the ER  She denies any pain or cramping, no nausea, vomiting  Denies any fevers or chills recently  Does follow with colorectal surgery and PCP  Patient has been in [place] recently and says that she may have taken a 5 mg dose of Coumadin in place of a 2 5 mg dose, but only on 1 day and otherwise has adhered to her regimen  Prior to Admission Medications   Prescriptions Last Dose Informant Patient Reported? Taking?    Calcium Carb-Cholecalciferol (CALCIUM 600-D PO)   Yes No   Sig: Take 1 tablet by mouth 2 (two) times a day   Coenzyme Q10 (CO Q-10 PO)   Yes No   Sig: Take 1 capsule by mouth daily   HYDROcodone-acetaminophen (NORCO) 5-325 mg per tablet   No No   Sig: Take 1 tablet by mouth every 6 (six) hours as needed for pain Max Daily Amount: 4 tablets   LORazepam (ATIVAN) 1 mg tablet   No No   Sig: Take 1 tablet (1 mg total) by mouth every 6 (six) hours as needed for anxiety   MAGNESIUM PO   Yes No   Sig: Take 1 tablet by mouth daily   Multiple Vitamin (MULTIVITAMIN ADULT PO)   Yes No   Sig: Take 1 tablet by mouth daily   Omega-3 Fatty Acids (FISH OIL PO)   Yes No   Sig: Take 1 capsule by mouth daily   acetaminophen (TYLENOL) 325 mg tablet   No No   Sig: Take 2 tablets (650 mg total) by mouth every 6 (six) hours as needed for mild pain   albuterol (PROVENTIL HFA,VENTOLIN HFA) 90 mcg/act inhaler   No No   Sig: Inhale 2 puffs every 6 (six) hours as needed for wheezing   amLODIPine (NORVASC) 2 5 mg tablet   No No   Sig: TAKE ONE TABLET BY MOUTH EVERY DAY   cetirizine (ZyrTEC) 10 mg tablet   Yes No   Sig: Take 10 mg by mouth daily   diltiazem (CARDIZEM CD) 180 mg 24 hr capsule   No No   Sig: TAKE ONE CAPSULE BY MOUTH EVERY DAY   diltiazem (CARDIZEM) 60 mg tablet   No No   Sig: Take 1 tablet (60 mg total) by mouth daily   escitalopram (LEXAPRO) 20 mg tablet   No No   Sig: TAKE ONE TABLET BY MOUTH EVERY DAY   fluticasone (FLONASE) 50 mcg/act nasal spray   No No   Sig: INSTILL ONE SPRAY INTO EACH NOSTRIL ONCE DAILY AS NEEDED FOR RHINITIS   fluticasone-salmeterol (Advair Diskus) 250-50 mcg/dose inhaler   No No   Sig: Inhale 1 puff 2 (two) times a day Rinse mouth after use   gabapentin (NEURONTIN) 600 MG tablet   No No   Sig: TAKE ONE TABLET BY MOUTH IN THE MORNING, ONE TABLET IN THE AFTERNNON, AND TWO TABLETS AT BEDTIME   lansoprazole (PREVACID) 15 mg capsule   No No   Sig: Take 1 capsule (15 mg total) by mouth 2 (two) times a day before meals Take 1/2 to 1 hour before breakfast and dinner  levothyroxine 50 mcg tablet   No No   Sig: TAKE ONE TABLET BY MOUTH EVERY DAY   lisinopril (ZESTRIL) 20 mg tablet   No No   Sig: TAKE ONE TABLET BY MOUTH TWICE A DAY   loperamide (IMODIUM) 2 mg capsule   Yes No   Sig: Take 2 mg by mouth 4 (four) times a day as needed     meclizine (ANTIVERT) 25 mg tablet   No No   Sig: Take 1 tablet (25 mg total) by mouth every 6 (six) hours as needed for dizziness   naloxone (NARCAN) 4 mg/0 1 mL nasal spray   No No   Sig: Administer 1 spray into a nostril  If no response after 2-3 minutes, give another dose in the other nostril using a new spray     nystatin (MYCOSTATIN) 500,000 units/5 mL suspension   Yes No   potassium chloride (MICRO-K) 10 MEQ CR capsule   No No   Sig: Take 2 capsules daily   simvastatin (ZOCOR) 5 MG tablet   No No   Sig: TAKE ONE TABLET BY MOUTH EVERY DAY   sodium chloride 1 g tablet   No No   Sig: Take 1 tablet (1 g total) by mouth 3 (three) times a day   tobramycin-dexamethasone (TOBRADEX) ophthalmic suspension   Yes No   torsemide (DEMADEX) 10 mg tablet   No No   Sig: Take 1 tablet (10 mg total) by mouth daily as needed (edema)   warfarin (COUMADIN) 5 mg tablet   No No   Sig: Take 1 tablet (5 mg total) by mouth daily Or as directed      Facility-Administered Medications: None       Past Medical History:   Diagnosis Date   • Anemia    • Anxiety    • Arrhythmia    • Arthritis    • Asthma    • Blood clot in vein     portal vein   • Breast cancer (HCC) 02/12/2021   • Breast lump     [id number] RESOLVED   • Cancer ([facility] [address] )    • Depression    • Disease of thyroid gland    • DVT, lower extremity (HCC)    • GERD (gastroesophageal reflux disease)    • Hyperlipidemia    • Hypertension    • Hypokalemia    • Hyponatremia    • Hypothyroidism    • Iron deficiency anemia    • Irregular heart beat    • Manic behavior ([facility] [address] )    • 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HCC)    • Osteoarthritis    • Palpitations     [id number]  RESOLVED   • Paroxysmal supraventricular tachycardia (HCC)    • PE (pulmonary thromboembolism) ([facility] [address] )    • Sjoegren syndrome    • Sleep apnea    • Sleep difficulties    • Spinal stenosis    • Thrombocytosis     [id number]  RESOLVED   • Tremors of nervous system     dbs implanted right and left chest   • Ulcerative colitis ([facility] [address] )    • Vertigo     [id number] RESOLVED       Past Surgical History:   Procedure Laterality Date   • ABDOMINAL SURGERY     • APPENDECTOMY     • BREAST BIOPSY Left 11/07/2019    Stereo   • BREAST EXCISIONAL BIOPSY Left     x many years   • BREAST SURGERY      lumpectomy & biopsy   • CARMELLA HOLE W/ STEREOTACTIC INSERTION OF DBS LEADS / INTRAOP MICROELECTRODE RECORDING     • COLON SURGERY     • COLONOSCOPY N/A 08/30/2017    Procedure: POUCHOSCOPY;  Surgeon: Stefani Means MD;  Location: [facility]; Service: Colorectal   • COLOPROCTECTOMY W/ ILEO J POUCH     • ESOPHAGOGASTRODUODENOSCOPY      ONSET 10/17/11   • FISTULA REPAIR      LLEOANAL FISTULA REPAIR TRANSPERIN TRANSABD APPROACH   • HYSTERECTOMY      age 43   • ILEOSTOMY CLOSURE     • KNEE ARTHROSCOPY      Right   • MAMMO STEREOTACTIC BREAST BIOPSY LEFT (ALL INC) Left 11/07/2019   • MAMMO STEREOTACTIC BREAST BIOPSY LEFT (ALL INC) Left 12/17/2020   • MAMMO STEREOTACTIC BREAST BIOPSY LEFT (ALL INC) EACH ADD Left 12/17/2020   • MASTECTOMY Left 02/12/2021    left mastectomy- Dr Bisi Vila   • MASTECTOMY W/ SENTINEL NODE BIOPSY Left 02/12/2021    Procedure: BREAST MASTECTOMY WITH SENTINEL LYMPH NODE BIOPSY, LYMPHATIC MAPPING WITH BLUE DYE AND RADIAOCTIVE DYE (INJECT AT 1100 BY DR GILLESPIE IN THE OR);   Surgeon: Natasha Jamil MD;  Location[facility];  Service: Surgical Oncology   • SC INSJ/RPLCMT CRANIAL NEUROSTIM PULSE GENERATOR Right 06/20/2017    Procedure: DBS GENERATOR REPLACEMENT;  Surgeon: Ramona Blount MD;  Location: [facility];  Service: Neurosurgery   • SC INSJ/RPLCMT CRANIAL NEUROSTIM PULSE GENERATOR N/A 12/04/2019    Procedure: REPLACEMENT IMPLANTABLE PULSE GENERATOR FOR DEEP BRAIN STIMULATOR LEFT CHEST;  Surgeon: Veronique Chris MD;  Location[facility];  Service: Neurosurgery   • SPLENECTOMY     • TONSILLECTOMY         Family History   Problem Relation Age of Onset   • Venous thrombosis Mother         ACUTE VENOUS THROMBOSIS OF DEEP VESSELS OF THE DISTAL LOWER EXTREMITY   • Other Mother         PHLEBITIS   • Hypertension Mother    • Peripheral vascular disease Mother    • COPD Father    • Diabetes Father         MELLITUS   • Stroke Father    • Diabetes Sister         MELLITUS   • Sjogren's syndrome Sister    • No Known Problems Daughter    • No Known Problems Maternal Grandmother    • No Known Problems Maternal Grandfather    • No Known Problems Paternal Grandmother    • No Known Problems Paternal Grandfather    • No Known Problems Sister    • No Known Problems Maternal Aunt    • No Known Problems Paternal Aunt    • No Known Problems Son      I have reviewed and agree with the history as documented  E-Cigarette/Vaping   • E-Cigarette Use Never User      E-Cigarette/Vaping Substances   • Nicotine No    • THC No    • CBD No    • Flavoring No    • Other No    • Unknown No      Social History     Tobacco Use   • Smoking status: Former     Packs/day: 1 50     Years: 5 00     Pack years: 7 50     Types: Cigarettes     Quit date: 1965     Years since quittin 0   • Smokeless tobacco: Never   • Tobacco comments:     Quit   Vaping Use   • Vaping Use: Never used   Substance Use Topics   • Alcohol use: Yes     Alcohol/week: 2 0 standard drinks     Types: 2 Cans of beer per week   • Drug use: No        Review of Systems   Constitutional: Negative for chills and fever  HENT: Negative for ear pain and sore throat  Eyes: Negative for pain and visual disturbance  Respiratory: Negative for cough and shortness of breath  Cardiovascular: Negative for chest pain and palpitations  Gastrointestinal: Positive for blood in stool and diarrhea  Negative for abdominal pain, rectal pain and vomiting  Genitourinary: Negative for dysuria and hematuria  Musculoskeletal: Negative for arthralgias and back pain  Skin: Negative for color change and rash  Neurological: Negative for seizures and syncope  All other systems reviewed and are negative        Physical Exam  ED Triage Vitals   Temperature Pulse Respirations Blood Pressure SpO2   23 1135 23 1135 23 1135 23 1135 23 1135   98 1 °F (36 7 °C) 68 16 141/62 95 %      Temp Source Heart Rate Source Patient Position - Orthostatic VS BP Location FiO2 (%)   23 1135 23 1135 23 1135 23 1135 --   Oral Monitor Lying Right arm       Pain Score       23 1455       No Pain             Orthostatic Vital Signs  Vitals:    01/07/23 1135 01/07/23 1455 01/07/23 1500   BP: 141/62 158/74 (!) 171/76   Pulse: 68 74 70   Patient Position - Orthostatic VS: Lying         Physical Exam  Vitals and nursing note reviewed  Constitutional:       General: She is not in acute distress  Appearance: She is well-developed  HENT:      Head: Normocephalic and atraumatic  Eyes:      Conjunctiva/sclera: Conjunctivae normal    Cardiovascular:      Rate and Rhythm: Normal rate and regular rhythm  Heart sounds: No murmur heard  Pulmonary:      Effort: Pulmonary effort is normal  No respiratory distress  Breath sounds: Normal breath sounds  Abdominal:      Palpations: Abdomen is soft  Tenderness: There is no abdominal tenderness  Genitourinary:     Rectum: Guaiac result positive  Tenderness present  No mass, anal fissure or external hemorrhoid  Musculoskeletal:         General: No swelling  Cervical back: Neck supple  Skin:     General: Skin is warm and dry  Capillary Refill: Capillary refill takes less than 2 seconds  Neurological:      Mental Status: She is alert     Psychiatric:         Mood and Affect: Mood normal          ED Medications  Medications   phytonadione (AQUA-MEPHYTON) 10 mg/mL 10 mg in sodium chloride 0 9 % 50 mL IVPB (has no administration in time range)   iohexol (OMNIPAQUE) 350 MG/ML injection (SINGLE-DOSE) 100 mL (100 mL Intravenous Given 1/7/23 1404)       Diagnostic Studies  Results Reviewed     Procedure Component Value Units Date/Time    Protime-INR [[id number]]  (Abnormal) Collected: 01/07/23 1251    Lab Status: Final result Specimen: Blood from Arm, Left Updated: 01/07/23 1515     Protime 63 6 seconds      INR 7 46    APTT [[id number]]  (Abnormal) Collected: 01/07/23 1251    Lab Status: Final result Specimen: Blood from Arm, Left Updated: 01/07/23 1515     PTT 42 seconds     Comprehensive metabolic panel [[id number]]  (Abnormal) Collected: 01/07/23 1251    Lab Status: Final result Specimen: Blood from Arm, Left Updated: 01/07/23 1342     Sodium 130 mmol/L      Potassium 4 2 mmol/L      Chloride 94 mmol/L      CO2 30 mmol/L      ANION GAP 6 mmol/L      BUN 16 mg/dL      Creatinine 0 91 mg/dL      Glucose 133 mg/dL      Calcium 8 5 mg/dL      AST 17 U/L      ALT 15 U/L      Alkaline Phosphatase 58 U/L      Total Protein 5 8 g/dL      Albumin 3 7 g/dL      Total Bilirubin 0 31 mg/dL      eGFR 60 ml/min/1 73sq m     Narrative:      National Kid[employer] Disease Foundation guidelines for Chronic Kidney Disease (CKD):   •  Stage 1 with normal or high GFR (GFR > 90 mL/min/1 73 square meters)  •  Stage 2 Mild CKD (GFR = 60-89 mL/min/1 73 square meters)  •  Stage 3A Moderate CKD (GFR = 45-59 mL/min/1 73 square meters)  •  Stage 3B Moderate CKD (GFR = 30-44 mL/min/1 73 square meters)  •  Stage 4 Severe CKD (GFR = 15-29 mL/min/1 73 square meters)  •  Stage 5 End Stage CKD (GFR <15 mL/min/1 73 square meters)  Note: GFR calculation is accurate only with a steady state creatinine    CBC and differential [[id number]]  (Abnormal) Collected: 01/07/23 1251    Lab Status: Final result Specimen: Blood from Arm, Left Updated: 01/07/23 1312     WBC 7 56 Thousand/uL      RBC 3 54 Million/uL      Hemoglobin 11 4 g/dL      Hematocrit 33 0 %      MCV 93 fL      MCH 32 2 pg      MCHC 34 5 g/dL      RDW 16 3 %      MPV 8 2 fL      Platelets 383 Thousands/uL      nRBC 0 /100 WBCs      Neutrophils Relative 83 %      Immat GRANS % 1 %      Lymphocytes Relative 9 %      Monocytes Relative 7 %      Eosinophils Relative 0 %      Basophils Relative 0 %      Neutrophils Absolute 6 30 Thousands/µL      Immature Grans Absolute 0 09 Thousand/uL      Lymphocytes Absolute 0 64 Thousands/µL      Monocytes Absolute 0 50 Thousand/µL      Eosinophils Absolute 0 01 Thousand/µL      Basophils Absolute 0 02 Thousands/µL                  CT abdomen pelvis with contrast   Final Result by Amelia Peck MD (01/07 1428)      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, similar in appearance to prior study  No evidence of obstruction or additional acute abnormality in the abdomen or pelvis  Workstation performed: [id number]               Procedures  Procedures      ED Course                             SBIRT 22yo+    Flowsheet Row Most Recent Value   SBIRT (25 yo +)    In order to provide better care to our patients, we are screening all of our patients for alcohol and drug use  Would it be okay to ask you these screening questions? Unable to answer at this time Filed at: 01/07/2023 2786                Medical Decision Making  26-year-old female patient with history of ulcerative colitis status post coloproctectomy and history of portal vein thrombosis on warfarin presenting with rectal bleeding  Initial differential diagnosis included anal fissure, hemorrhoids, structural issue regarding ileal J-pouch  On initial evaluation, patient did not appear in any acute distress  Was not pale, dizzy  Was able to ambulate to the restroom on her own without any symptoms  Physical exam including rectal exam did show brie bright red blood in the rectum, although amount was minimal   Given the patient's high risk status given anticoagulation and bowel surgery, basic labs were ordered including CBC, CMP, coagulation studies which showed stable hemoglobin, but elevated INR at 7 46  CT scan of the abdomen and pelvis done was done which showed no acute abnormality  Although the patient is not experiencing any life-threatening bleeding at this time, given her high risk history, anticoagulation status did administer IV vitamin K and discussed admission with internal medicine team who accepted the patient under observation status  Rectal bleeding: acute illness or injury  Supratherapeutic INR: complicated acute illness or injury  Amount and/or Complexity of Data Reviewed  Labs: ordered    Radiology: ordered  Risk  Prescription drug management  Decision regarding hospitalization  Disposition  Final diagnoses:   Rectal bleeding   Supratherapeutic INR     Time reflects when diagnosis was documented in both MDM as applicable and the Disposition within this note     Time User Action Codes Description Comment    1/7/2023  3:52 PM Arby Jonnathan Add [K62 5] Rectal bleeding     1/7/2023  3:53 PM Arby Jonnathan Add [R79 1] Supratherapeutic INR       ED Disposition     ED Disposition   Admit    Condition   Stable    Date/Time   Sat Jan 7, 2023 4572    Comment   Case was discussed with Dr Maria De Jesus Santiago and the patient's admission status was agreed to be observation           Follow-up Information    None         Patient's Medications   Discharge Prescriptions    No medications on file     No discharge procedures on file  PDMP Review       Value Time User    PDMP Reviewed  Yes 12/7/2022  2:57 PM Francesco Barbosa MD           ED Provider  Attending physically available and evaluated Lino Torres  NATE managed the patient along with the ED Attending      Electronically Signed by         Nargis Melgar DO  01/07/23 3656

## 2023-01-07 NOTE — ASSESSMENT & PLAN NOTE
Recent Labs     01/07/23  1251   SODIUM 130*     Known issue for this patient    Consistently hyponatremic on prior laboratory studies    Plan:  · Continue with home salt tablets

## 2023-01-07 NOTE — ED ATTENDING ATTESTATION
1/7/2023  ILinnea MD, saw and evaluated the patient  I have discussed the patient with the resident/non-physician practitioner and agree with the resident's/non-physician practitioner's findings, Plan of Care, and MDM as documented in the resident's/non-physician practitioner's note, except where noted  All available labs and Radiology studies were reviewed  I was present for key portions of any procedure(s) performed by the resident/non-physician practitioner and I was immediately available to provide assistance  At this point I agree with the current assessment done in the Emergency Department  I have conducted an independent evaluation of this patient a history and physical is as follows:    S:  Chief Complaint   Patient presents with   • Black or Bloody Stool     Pt reports onset of bloody stool yesterday, hx of fissures, denies abd pain     Maye Rowell is a 65 y o  female who reports bright red blood per rectum that started yesterday  She reports she noticed it with a bowel movement yesterday but then had a normal bowel movement  This morning she had another bowel movement with blood in the toilet  She denies abdominal pain, nausea, vomiting, diarrhea, constipation  She has a history of UC with total colectomy in the remote past and hemmorrhoids  Additionally she is on warfarin and her last INR was elevated at 3 5  O:  ED Triage Vitals   Temperature Pulse Respirations Blood Pressure SpO2   01/07/23 1135 01/07/23 1135 01/07/23 1135 01/07/23 1135 01/07/23 1135   98 1 °F (36 7 °C) 68 16 141/62 95 %      Temp Source Heart Rate Source Patient Position - Orthostatic VS BP Location FiO2 (%)   01/07/23 1135 01/07/23 1135 01/07/23 1135 01/07/23 1135 --   Oral Monitor Lying Right arm       Pain Score       01/07/23 1455       No Pain         Physical Exam  Vitals and nursing note reviewed  Constitutional:       General: She is in acute distress (mild)        Appearance: She is well-developed  HENT:      Head: Normocephalic and atraumatic  Eyes:      Pupils: Pupils are equal, round, and reactive to light  Neck:      Vascular: No JVD  Cardiovascular:      Rate and Rhythm: Normal rate and regular rhythm  Heart sounds: Normal heart sounds  No murmur heard  No friction rub  No gallop  Pulmonary:      Effort: Pulmonary effort is normal  No respiratory distress  Breath sounds: Normal breath sounds  No wheezing or rales  Chest:      Chest wall: No tenderness  Musculoskeletal:         General: No tenderness  Normal range of motion  Cervical back: Normal range of motion  Skin:     General: Skin is warm and dry  Coloration: Skin is pale  Neurological:      General: No focal deficit present  Mental Status: She is alert and oriented to person, place, and time  Psychiatric:         Behavior: Behavior normal          Thought Content:  Thought content normal          Judgment: Judgment normal        A/P:  Background: 65 y o  female presents with rectal bleeding     Differential DX includes but is not limited to: hemorrhoids, enteritis, neoplasm, avm    Plan: cbc, cmp, inr, ct abdomen and pelvis with IV contrast      ED Course     Labs Reviewed   CBC AND DIFFERENTIAL - Abnormal       Result Value Ref Range Status    WBC 7 56  4 31 - 10 16 Thousand/uL Final    RBC 3 54 (*) 3 81 - 5 12 Million/uL Final    Hemoglobin 11 4 (*) 11 5 - 15 4 g/dL Final    Hematocrit 33 0 (*) 34 8 - 46 1 % Final    MCV 93  82 - 98 fL Final    MCH 32 2  26 8 - 34 3 pg Final    MCHC 34 5  31 4 - 37 4 g/dL Final    RDW 16 3 (*) 11 6 - 15 1 % Final    MPV 8 2 (*) 8 9 - 12 7 fL Final    Platelets 133  979 - 390 Thousands/uL Final    nRBC 0  /100 WBCs Final    Neutrophils Relative 83 (*) 43 - 75 % Final    Immat GRANS % 1  0 - 2 % Final    Lymphocytes Relative 9 (*) 14 - 44 % Final    Monocytes Relative 7  4 - 12 % Final    Eosinophils Relative 0  0 - 6 % Final    Basophils Relative 0  0 - 1 % Final    Neutrophils Absolute 6 30  1 85 - 7 62 Thousands/µL Final    Immature Grans Absolute 0 09  0 00 - 0 20 Thousand/uL Final    Lymphocytes Absolute 0 64  0 60 - 4 47 Thousands/µL Final    Monocytes Absolute 0 50  0 17 - 1 22 Thousand/µL Final    Eosinophils Absolute 0 01  0 00 - 0 61 Thousand/µL Final    Basophils Absolute 0 02  0 00 - 0 10 Thousands/µL Final   COMPREHENSIVE METABOLIC PANEL - Abnormal    Sodium 130 (*) 135 - 147 mmol/L Final    Potassium 4 2  3 5 - 5 3 mmol/L Final    Chloride 94 (*) 96 - 108 mmol/L Final    CO2 30  21 - 32 mmol/L Final    ANION GAP 6  4 - 13 mmol/L Final    BUN 16  5 - 25 mg/dL Final    Creatinine 0 91  0 60 - 1 30 mg/dL Final    Comment: Standardized to IDMS reference method    Glucose 133  65 - 140 mg/dL Final    Comment: If the patient is fasting, the ADA then defines impaired fasting glucose as > 100 mg/dL and diabetes as > or equal to 123 mg/dL  Specimen collection should occur prior to Sulfasalazine administration due to the potential for falsely depressed results  Specimen collection should occur prior to Sulfapyridine administration due to the potential for falsely elevated results  Calcium 8 5  8 4 - 10 2 mg/dL Final    AST 17  13 - 39 U/L Final    Comment: Specimen collection should occur prior to Sulfasalazine administration due to the potential for falsely depressed results  ALT 15  7 - 52 U/L Final    Comment: Specimen collection should occur prior to Sulfasalazine administration due to the potential for falsely depressed results       Alkaline Phosphatase 58  34 - 104 U/L Final    Total Protein 5 8 (*) 6 4 - 8 4 g/dL Final    Albumin 3 7  3 5 - 5 0 g/dL Final    Total Bilirubin 0 31  0 20 - 1 00 mg/dL Final    eGFR 60  ml/min/1 73sq m Final    Narrative:     [place] guidelines for Chronic Kidney Disease (CKD):   •  Stage 1 with normal or high GFR (GFR > 90 mL/min/1 73 square meters)  •  Stage 2 Mild CKD (GFR = 60-89 mL/min/1 73 square meters)  •  Stage 3A Moderate CKD (GFR = 45-59 mL/min/1 73 square meters)  •  Stage 3B Moderate CKD (GFR = 30-44 mL/min/1 73 square meters)  •  Stage 4 Severe CKD (GFR = 15-29 mL/min/1 73 square meters)  •  Stage 5 End Stage CKD (GFR <15 mL/min/1 73 square meters)  Note: GFR calculation is accurate only with a steady state creatinine   PROTIME-INR - Abnormal    Protime 63 6 (*) 11 6 - 14 5 seconds Final    INR 7 46 (*) 0 84 - 1 19 Final   APTT - Abnormal    PTT 42 (*) 23 - 37 seconds Final    Comment: Therapeutic Heparin Range =  60-90 seconds     CT abdomen pelvis with contrast   Final Result      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, similar in appearance to prior study  No evidence of obstruction or additional acute abnormality in the abdomen or pelvis              Workstation performed: [id number]           Medications   calcium carbonate (OYSTER SHELL,OSCAL) 500 mg tablet 1 tablet (has no administration in time range)   loratadine (CLARITIN) tablet 5 mg (has no administration in time range)   co-enzyme Q-10 capsule (has no administration in time range)   diltiazem (CARDIZEM) tablet 60 mg (has no administration in time range)   diltiazem (CARDIZEM CD) 24 hr capsule 180 mg (180 mg Oral Given 1/7/23 1822)   Fluticasone Furoate-Vilanterol 100-25 mcg/actuation 1 puff (has no administration in time range)   magnesium gluconate (MAGONATE) tablet 250 mg (250 mg Oral Given 1/7/23 1822)   multivitamin-minerals (CENTRUM) tablet 1 tablet (has no administration in time range)   fish oil capsule 1,000 mg (has no administration in time range)   sodium chloride tablet 1 g (has no administration in time range)   acetaminophen (TYLENOL) tablet 650 mg (has no administration in time range)   albuterol (PROVENTIL HFA,VENTOLIN HFA) inhaler 2 puff (has no administration in time range)   HYDROcodone-acetaminophen (NORCO) 5-325 mg per tablet 1 tablet (has no administration in time range) loperamide (IMODIUM) capsule 2 mg (has no administration in time range)   LORazepam (ATIVAN) tablet 1 mg (has no administration in time range)   meclizine (ANTIVERT) tablet 25 mg (has no administration in time range)   torsemide (DEMADEX) tablet 10 mg (has no administration in time range)   amLODIPine (NORVASC) tablet 2 5 mg (has no administration in time range)   escitalopram (LEXAPRO) tablet 20 mg (has no administration in time range)   fluticasone (FLONASE) 50 mcg/act nasal spray 1 spray (has no administration in time range)   gabapentin (NEURONTIN) capsule 600 mg (has no administration in time range)   levothyroxine tablet 50 mcg (has no administration in time range)   lisinopril (ZESTRIL) tablet 20 mg (20 mg Oral Given 1/7/23 1822)   nystatin (MYCOSTATIN) oral suspension 500,000 Units (500,000 Units Swish & Swallow Given 1/7/23 1823)   pravastatin (PRAVACHOL) tablet 10 mg (10 mg Oral Given 1/7/23 1822)   potassium chloride (K-DUR,KLOR-CON) CR tablet 20 mEq (has no administration in time range)   iohexol (OMNIPAQUE) 350 MG/ML injection (SINGLE-DOSE) 100 mL (100 mL Intravenous Given 1/7/23 1404)   phytonadione (AQUA-MEPHYTON) 10 mg/mL 10 mg in sodium chloride 0 9 % 50 mL IVPB (10 mg Intravenous New Bag 1/7/23 1615)   potassium chloride oral solution 20 mEq (20 mEq Oral Given 1/7/23 1823)         Critical Care Time  Procedures    Time reflects when diagnosis was documented in both MDM as applicable and the Disposition within this note     Time User Action Codes Description Comment    1/7/2023  3:52 PM Jacobmarquise Ramirez Add [K62 5] Rectal bleeding     1/7/2023  3:53 PM Jacob Ramirez Add [R79 1] Supratherapeutic INR       ED Disposition     ED Disposition   Admit    Condition   Stable    Date/Time   Sat Jan 7, 2023  3:52 PM    Comment   Case was discussed with Dr Benigno Du and the patient's admission status was agreed to be observation           Follow-up Information    None

## 2023-01-08 LAB
ANION GAP SERPL CALCULATED.3IONS-SCNC: 7 MMOL/L (ref 4–13)
BASOPHILS # BLD AUTO: 0.02 THOUSANDS/ÂΜL (ref 0–0.1)
BASOPHILS NFR BLD AUTO: 0 % (ref 0–1)
BUN SERPL-MCNC: 10 MG/DL (ref 5–25)
CALCIUM SERPL-MCNC: 8.7 MG/DL (ref 8.4–10.2)
CHLORIDE SERPL-SCNC: 99 MMOL/L (ref 96–108)
CO2 SERPL-SCNC: 30 MMOL/L (ref 21–32)
CREAT SERPL-MCNC: 0.64 MG/DL (ref 0.6–1.3)
EOSINOPHIL # BLD AUTO: 0.06 THOUSAND/ÂΜL (ref 0–0.61)
EOSINOPHIL NFR BLD AUTO: 1 % (ref 0–6)
ERYTHROCYTE [DISTWIDTH] IN BLOOD BY AUTOMATED COUNT: 16.4 % (ref 11.6–15.1)
GFR SERPL CREATININE-BSD FRML MDRD: 85 ML/MIN/1.73SQ M
GLUCOSE P FAST SERPL-MCNC: 94 MG/DL (ref 65–99)
GLUCOSE SERPL-MCNC: 94 MG/DL (ref 65–140)
HCT VFR BLD AUTO: 34.8 % (ref 34.8–46.1)
HGB BLD-MCNC: 11.2 G/DL (ref 11.5–15.4)
HGB BLD-MCNC: 11.8 G/DL (ref 11.5–15.4)
HGB BLD-MCNC: 12.5 G/DL (ref 11.5–15.4)
IMM GRANULOCYTES # BLD AUTO: 0.05 THOUSAND/UL (ref 0–0.2)
IMM GRANULOCYTES NFR BLD AUTO: 1 % (ref 0–2)
INR PPP: 1 (ref 0.84–1.19)
LYMPHOCYTES # BLD AUTO: 1.64 THOUSANDS/ÂΜL (ref 0.6–4.47)
LYMPHOCYTES NFR BLD AUTO: 24 % (ref 14–44)
MCH RBC QN AUTO: 31.3 PG (ref 26.8–34.3)
MCHC RBC AUTO-ENTMCNC: 33.9 G/DL (ref 31.4–37.4)
MCV RBC AUTO: 92 FL (ref 82–98)
MONOCYTES # BLD AUTO: 0.79 THOUSAND/ÂΜL (ref 0.17–1.22)
MONOCYTES NFR BLD AUTO: 12 % (ref 4–12)
NEUTROPHILS # BLD AUTO: 4.28 THOUSANDS/ÂΜL (ref 1.85–7.62)
NEUTS SEG NFR BLD AUTO: 62 % (ref 43–75)
NRBC BLD AUTO-RTO: 0 /100 WBCS
PLATELET # BLD AUTO: 370 THOUSANDS/UL (ref 149–390)
PMV BLD AUTO: 8.5 FL (ref 8.9–12.7)
POTASSIUM SERPL-SCNC: 3.5 MMOL/L (ref 3.5–5.3)
PROTHROMBIN TIME: 13.4 SECONDS (ref 11.6–14.5)
RBC # BLD AUTO: 3.77 MILLION/UL (ref 3.81–5.12)
SODIUM SERPL-SCNC: 136 MMOL/L (ref 135–147)
WBC # BLD AUTO: 6.84 THOUSAND/UL (ref 4.31–10.16)

## 2023-01-08 RX ADMIN — CALCIUM 1 TABLET: 500 TABLET ORAL at 08:39

## 2023-01-08 RX ADMIN — GABAPENTIN 600 MG: 300 CAPSULE ORAL at 16:35

## 2023-01-08 RX ADMIN — NYSTATIN 500000 UNITS: 100000 SUSPENSION ORAL at 17:36

## 2023-01-08 RX ADMIN — Medication 250 MG: at 16:35

## 2023-01-08 RX ADMIN — LISINOPRIL 20 MG: 20 TABLET ORAL at 08:39

## 2023-01-08 RX ADMIN — SODIUM CHLORIDE 1 G: 1 TABLET ORAL at 21:43

## 2023-01-08 RX ADMIN — NYSTATIN 500000 UNITS: 100000 SUSPENSION ORAL at 08:39

## 2023-01-08 RX ADMIN — NYSTATIN 500000 UNITS: 100000 SUSPENSION ORAL at 14:03

## 2023-01-08 RX ADMIN — AMLODIPINE BESYLATE 2.5 MG: 2.5 TABLET ORAL at 08:40

## 2023-01-08 RX ADMIN — LORATADINE 5 MG: 10 TABLET ORAL at 08:41

## 2023-01-08 RX ADMIN — Medication 250 MG: at 08:39

## 2023-01-08 RX ADMIN — OMEGA-3 FATTY ACIDS CAP 1000 MG 1000 MG: 1000 CAP at 08:40

## 2023-01-08 RX ADMIN — ESCITALOPRAM OXALATE 20 MG: 20 TABLET ORAL at 08:39

## 2023-01-08 RX ADMIN — GABAPENTIN 600 MG: 300 CAPSULE ORAL at 21:43

## 2023-01-08 RX ADMIN — DILTIAZEM HYDROCHLORIDE 180 MG: 180 CAPSULE, COATED, EXTENDED RELEASE ORAL at 16:35

## 2023-01-08 RX ADMIN — SODIUM CHLORIDE 1 G: 1 TABLET ORAL at 08:41

## 2023-01-08 RX ADMIN — MULTIPLE VITAMINS W/ MINERALS TAB 1 TABLET: TAB ORAL at 08:41

## 2023-01-08 RX ADMIN — DILTIAZEM HYDROCHLORIDE 60 MG: 60 TABLET, FILM COATED ORAL at 08:41

## 2023-01-08 RX ADMIN — FLUTICASONE FUROATE AND VILANTEROL TRIFENATATE 1 PUFF: 100; 25 POWDER RESPIRATORY (INHALATION) at 08:49

## 2023-01-08 RX ADMIN — SODIUM CHLORIDE 1 G: 1 TABLET ORAL at 16:35

## 2023-01-08 RX ADMIN — PRAVASTATIN SODIUM 10 MG: 10 TABLET ORAL at 16:35

## 2023-01-08 RX ADMIN — FLUTICASONE PROPIONATE 1 SPRAY: 50 SPRAY, METERED NASAL at 08:49

## 2023-01-08 RX ADMIN — LEVOTHYROXINE SODIUM 50 MCG: 50 TABLET ORAL at 08:39

## 2023-01-08 RX ADMIN — GABAPENTIN 600 MG: 300 CAPSULE ORAL at 08:39

## 2023-01-08 RX ADMIN — POTASSIUM CHLORIDE 20 MEQ: 1500 TABLET, EXTENDED RELEASE ORAL at 08:40

## 2023-01-08 RX ADMIN — Medication 30 MG: at 08:41

## 2023-01-08 RX ADMIN — NYSTATIN 500000 UNITS: 100000 SUSPENSION ORAL at 21:43

## 2023-01-08 RX ADMIN — LISINOPRIL 20 MG: 20 TABLET ORAL at 17:36

## 2023-01-08 NOTE — ASSESSMENT & PLAN NOTE
Per patient, she has had on and off oral thrush for past several months  Patient uses Breo Ellipta inhaler as an outpatient, has recently run out of nystatin swish and swallow   Very mild oral thrush on physical examination, no impingement on airway    Plan:  · Nystatin swish swallow 4 times daily  · Mouth care after [facility] use

## 2023-01-08 NOTE — ASSESSMENT & PLAN NOTE
Recent Labs     01/07/23  1251 01/08/23  0533   SODIUM 130* 136     Known issue for this patient    Consistently hyponatremic on prior laboratory studies    Plan:  · Continue with home salt tablets

## 2023-01-08 NOTE — PLAN OF CARE
Problem: PAIN - ADULT  Goal: Verbalizes/displays adequate comfort level or baseline comfort level  Description: Interventions:  - Encourage patient to monitor pain and request assistance  - Assess pain using appropriate pain scale  - Administer analgesics based on type and severity of pain and evaluate response  - Implement non-pharmacological measures as appropriate and evaluate response  - Consider cultural and social influences on pain and pain management  - Notify physician/advanced practitioner if interventions unsuccessful or patient reports new pain  Outcome: Progressing     Problem: HEMATOLOGIC - ADULT  Goal: Maintains hematologic stability  Description: INTERVENTIONS  - Assess for signs and symptoms of bleeding or hemorrhage  - Monitor labs  - Administer supportive blood products/factors as ordered and appropriate  Outcome: Progressing     Problem: CARDIOVASCULAR - ADULT  Goal: Maintains optimal cardiac output and hemodynamic stability  Description: INTERVENTIONS:  - Monitor I/O, vital signs and rhythm  - Monitor for S/S and trends of decreased cardiac output  - Administer and titrate ordered vasoactive medications to optimize hemodynamic stability  - Assess quality of pulses, skin color and temperature  - Assess for signs of decreased coronary artery perfusion  - Instruct patient to report change in severity of symptoms  Outcome: Progressing     Problem: INFECTION - ADULT  Goal: Absence or prevention of progression during hospitalization  Description: INTERVENTIONS:  - Assess and monitor for signs and symptoms of infection  - Monitor lab/diagnostic results  - Monitor all insertion sites, i e  indwelling lines, tubes, and drains  - Monitor endotracheal if appropriate and nasal secretions for changes in amount and color  - [place] appropriate cooling/warming therapies per order  - Administer medications as ordered  - Instruct and encourage patient and family to use good hand hygiene technique  - Identify and instruct in appropriate isolation precautions for identified infection/condition  Outcome: Progressing

## 2023-01-08 NOTE — ASSESSMENT & PLAN NOTE
Recent Labs     01/07/23  1251 01/07/23  2026 01/08/23  0533 01/08/23  1017   HGB 11 4* 11 5 11 8 12 5   MCV 93  --  92  --        Plan:  • Monitor CBC  • Consent for blood products already signed  • Transfuse if Hb < 7

## 2023-01-08 NOTE — PHYSICAL THERAPY NOTE
Physical Therapy Screen    Patient Name: Daniel HOPPER Date: 1/8/2023     Problem List  Principal Problem:    Rectal bleeding  Active Problems:    Portal vein thrombosis    Hyperlipidemia    Essential hypertension    Acquired hypothyroidism    Peripheral neuropathy    Anemia    Mild intermittent asthma without complication    Hyponatremia    Elevated INR    Thrush       01/08/23 0940   PT Last Visit   PT Visit Date 01/08/23   Note Type   Note type Screen   Assessment   Assessment Orders received, chart reviewed  Spoke with RN Tania Ladd who states patient has been ambulating in room without AD  Patient approached by PT, denies falls within the past year  She is independent at home, but states she keeps a RW near her bed at night when she is sleepy  She demonstrates ability to walk in room with no significant gait deviations  Patient demonstrates ability to weight shift safely and  small object from floor and return to standing safely  At this time, patient will be screened from current PT caseload  If there is a change in status please place new PT eval order       Antonio Trotter PT, DPT

## 2023-01-08 NOTE — ASSESSMENT & PLAN NOTE
Per patient, she has had on and off oral thrush for past several months  Patient uses Breo Ellipta inhaler as an outpatient, has recently run out of nystatin swish and swallow   Very mild oral thrush on physical examination, no impingement on airway    Plan:  · Nystatin swish swallow 4 times daily  · Mouth care after [facility] use

## 2023-01-08 NOTE — ASSESSMENT & PLAN NOTE
Per patient, she has had on and off oral thrush for past several months  Patient uses Breo Ellipta inhaler as an outpatient, has recently run out of nystatin swish and swallow   Very mild oral thrush on physical examination, no impingement on airway    Plan:  · Nystatin swish swallow 4 times daily  · Mouth care after [facility] use

## 2023-01-08 NOTE — ASSESSMENT & PLAN NOTE
Recent Labs     01/07/23  1251 01/07/23 2026 01/08/23  0533 01/08/23  1017 01/08/23 2031   HGB 11 4*   < > 11 8 12 5 11 2*   MCV 93  --  92  --   --     < > = values in this interval not displayed         Plan:  • Monitor CBC  • Consent for blood products already signed  • Transfuse if Hb < 7

## 2023-01-08 NOTE — ASSESSMENT & PLAN NOTE
Patient on stable home dose levothyroxine    Plan:  · Continue home levothyroxine 25 mg every morning

## 2023-01-08 NOTE — ASSESSMENT & PLAN NOTE
On lifelong anticoagulation   Recent supratherapeutic INR in outpatient setting, Warfarin dose adjusted    Plan:  · Hold home warfarin  · Continue to monitor liver enzymes with daily labs  · Consider switching to a DOAC prior to discharge

## 2023-01-08 NOTE — ASSESSMENT & PLAN NOTE
Lab Results   Component Value Date    INR 1 00 01/08/2023    INR 7 46 (HH) 01/07/2023    INR 3 50 (A) 01/03/2023    PROTIME 13 4 01/08/2023    PROTIME 63 6 (H) 01/07/2023    PROTIME 22 6 (H) 12/19/2022     Patient currently on Coumadin 5 mg daily for portal vein thrombosis  On 1/5, patient has had supratherapeutic INR of 3 5  Patient instructed to take Coumadin 5 mg 3 days, 2 5 mg x 4 days and reassess in 3 weeks  Per patient, she was compliant with above recommendations  INR in ED was 7 46   10 mg vitamin K given in ED   INR has since fallen to 1 00   (1/1)    Plan:  · Hold home Coumadin  · Type and screen  · Consent signed for blood products  · Continue to monitor INR  · Consider switching to a DOAC prior to discharge

## 2023-01-08 NOTE — ASSESSMENT & PLAN NOTE
Patient states she has had 3 episodes of rectal bleeding over past 2 days, 1 additional episode in hospital   Describes bleeding as a sudden gush of bright red blood in the toilet while straining for bowel movement  Denies leakage of blood between bowel movements  Denies dark tarry stools  Denies dizziness, lightheadedness, sensation of racing heart, shortness of breath, chest pain, or other new or concerning features    Direct rectal exam in ED revealed mild to moderate amounts of bright red blood per rectum  Patient reports brown loose stools, with no blood today (1/9)    Plan:  · Hold home warfarin  · Clear liquid diet, advance as tolerated, monitoring for active GI bleeding  · H&H every 12 hours  · Type and screen, consent for blood products signed  · Stool record at bedside  · Consider switching to [id number] upon discharge  · GI consulted: Suggest source of bleeding likely hemorrhoidal or outlet bleeding, but will proceed with pouchoscopy today (1/9)  · Continue to monitor INR  · Spoke with PCP, no preference for DOAC, can likely switch patient on Eliquis following price check  · If pouchoscopy normal and DOAC approved, can likely D/C patient tomorrow on DOAC

## 2023-01-08 NOTE — ASSESSMENT & PLAN NOTE
Patient states she has had 3 episodes of rectal bleeding over past 2 days, 1 additional episode in hospital   Describes bleeding as a sudden gush of bright red blood in the toilet while straining for bowel movement  Denies leakage of blood between bowel movements  Denies dark tarry stools  Denies dizziness, lightheadedness, sensation of racing heart, shortness of breath, chest pain, or other new or concerning features    LUCIUS in ED revealed mild to moderate amounts of bright red blood per rectum    Plan:  · AM consult to GI  · Hold home warfarin  · Clear liquid diet  · H&H every 12 hours  · Type and screen, consent for blood products signed  · Stool record at bedside

## 2023-01-08 NOTE — CONSULTS
Consultation - [address] [facility]  Michell Koch 65 y o  female MRN: [id number]  Unit/Bed#: W -01 Encounter: [id number]        Consults    Reason for Consult / Principal Problem: Rectal bleeding    HPI: Michell Koch is a 65y o  year old female reporting history of DVT/PE anticoagulated with warfarin, ulcerative colitis status post total proctocolectomy and ileoanal anastomosis/ileal J-pouch in 2000 who presented to the hospital with concerns for rectal bleeding  She says that on Friday she had 3 episodes of passing bright red blood per rectum which was noticed in the toilet bowl, she says 1 or 2 these episodes consisted of bright red blood without any stool although the other was mixed with stool  She says that the next day she had had brown-colored stool without any blood, but yesterday morning she had noted bright red blood on the tissue paper when wiping so she felt she should come in  Her INR was found to be supratherapeutic at 7 46, she was administered 10 mg of vitamin K IV and INR this morning is 1 00  Hemoglobin yesterday was 11 4 and this morning stable at 11 8, she says since coming here she had 2 more bowel movements which appeared to consist of blood although decreased in amount, she says the most recent one was darker in appearance as well rather than bright red as per previous appearance  Her CT scan of the abdomen and pelvis appeared normal     She says that she does chronically deal with loose frequent stools, manages with regularly dosed Imodium, which she says she had to increase recently  She also reports remote history of an anal fissure which she had "fixed"  She says she follows regularly with colorectal surgery and she gets ileoscopy every 3 years with Dr Kim Gage, she says to this effect she will be due this coming August for her next scope      She says she otherwise has not experienced any abdominal pain, nausea, vomiting or other GI symptoms with this episode  REVIEW OF SYSTEMS:    CONSTITUTIONAL: Denies any fever, chills, or rigors  Good appetite, and no recent weight loss  HEENT: No earache or tinnitus  Denies hearing loss or visual disturbances  CARDIOVASCULAR: No chest pain or palpitations  RESPIRATORY: Denies any cough, hemoptysis, shortness of breath or dyspnea on exertion  GASTROINTESTINAL: As noted in the History of Present Illness  GENITOURINARY: No problems with urination  Denies any hematuria or dysuria  NEUROLOGIC: No dizziness or vertigo, denies headaches  MUSCULOSKELETAL: Denies any muscle or joint pain  SKIN: Denies skin rashes or itching  ENDOCRINE: Denies excessive thirst  Denies intolerance to heat or cold  PSYCHOSOCIAL: Denies depression or anxiety  Denies any recent memory loss         Historical Information   Past Medical History:   Diagnosis Date   • Anemia    • Anxiety    • Arrhythmia    • Arthritis    • Asthma    • Blood clot in vein     portal vein   • Breast cancer ([email or web address] 75 ) 02/12/2021   • Breast lump     [id number] RESOLVED   • Cancer (HCC)    • Depression    • Disease of thyroid gland    • DVT, lower extremity (HCC)    • GERD (gastroesophageal reflux disease)    • Hyperlipidemia    • Hypertension    • Hypokalemia    • Hyponatremia    • Hypothyroidism    • Iron deficiency anemia    • Irregular heart beat    • Manic behavior (HCC)    • 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HCC)    • Osteoarthritis    • Palpitations     [id number]  RESOLVED   • Paroxysmal supraventricular tachycardia (HCC)    • PE (pulmonary thromboembolism) (HCC)    • Sjoegren syndrome    • Sleep apnea    • Sleep difficulties    • Spinal stenosis    • Thrombocytosis     [id number]  RESOLVED   • Tremors of nervous system     dbs implanted right and left chest   • Ulcerative colitis ([facility] [[email or web address] 75 )    • Vertigo     [id number] RESOLVED     Past Surgical History:   Procedure Laterality Date   • ABDOMINAL SURGERY     • APPENDECTOMY     • BREAST BIOPSY Left 11/07/2019    Stereo   • BREAST EXCISIONAL BIOPSY Left     x many years   • BREAST SURGERY      lumpectomy & biopsy   • CARMELLA HOLE W/ STEREOTACTIC INSERTION OF DBS LEADS / INTRAOP MICROELECTRODE RECORDING     • COLON SURGERY     • COLONOSCOPY N/A 08/30/2017    Procedure: Fabiano Baca;  Surgeon: Martha Davis MD;  Location: [facility]; Service: Colorectal   • COLOPROCTECTOMY W/ ILEO J POUCH     • ESOPHAGOGASTRODUODENOSCOPY      ONSET 10/17/11   • FISTULA REPAIR      LLEOANAL FISTULA REPAIR TRANSPERIN TRANSABD APPROACH   • HYSTERECTOMY      age 43   • ILEOSTOMY CLOSURE     • KNEE ARTHROSCOPY      Right   • MAMMO STEREOTACTIC BREAST BIOPSY LEFT (ALL INC) Left 11/07/2019   • MAMMO STEREOTACTIC BREAST BIOPSY LEFT (ALL INC) Left 12/17/2020   • MAMMO STEREOTACTIC BREAST BIOPSY LEFT (ALL INC) EACH ADD Left 12/17/2020   • MASTECTOMY Left 02/12/2021    left mastectomy- Dr Jyothi Akins   • MASTECTOMY W/ SENTINEL NODE BIOPSY Left 02/12/2021    Procedure: BREAST MASTECTOMY WITH SENTINEL LYMPH NODE BIOPSY, LYMPHATIC MAPPING WITH BLUE DYE AND RADIAOCTIVE DYE (INJECT AT 1100 BY DR GILLESPIE IN THE OR);   Surgeon: Ade Shepherd MD;  Location: [facility];  Service: Surgical Oncology   • MA INSJ/RPLCMT CRANIAL NEUROSTIM PULSE GENERATOR Right 06/20/2017    Procedure: DBS GENERATOR REPLACEMENT;  Surgeon: Christa Arvizu MD;  Location: [facility];  Service: Neurosurgery   • MA INSJ/RPLCMT CRANIAL NEUROSTIM PULSE GENERATOR N/A 12/04/2019    Procedure: REPLACEMENT IMPLANTABLE PULSE GENERATOR FOR DEEP BRAIN STIMULATOR LEFT CHEST;  Surgeon: Archana Hawkins MD;  Location: [facility];  Service: Neurosurgery   • SPLENECTOMY     • TONSILLECTOMY       Social History   Social History     Substance and Sexual Activity   Alcohol Use Yes   • Alcohol/week: 2 0 standard drinks   • Types: 2 Cans of beer per week     Social History     Substance and Sexual Activity   Drug Use No     Social History     Tobacco Use   Smoking Status Former   • Packs/day: 1 50   • Years: 5 00   • Pack years: 7 50 • Types: Cigarettes   • Quit date: 1965   • Years since quittin 0   Smokeless Tobacco Never   Tobacco Comments    Quit     Family History   Problem Relation Age of Onset   • Venous thrombosis Mother         ACUTE VENOUS THROMBOSIS OF DEEP VESSELS OF THE DISTAL LOWER EXTREMITY   • Other Mother         PHLEBITIS   • Hypertension Mother    • Peripheral vascular disease Mother    • COPD Father    • Diabetes Father         MELLITUS   • Stroke Father    • Diabetes Sister         MELLITUS   • Sjogren's syndrome Sister    • No Known Problems Daughter    • No Known Problems Maternal Grandmother    • No Known Problems Maternal Grandfather    • No Known Problems Paternal Grandmother    • No Known Problems Paternal Grandfather    • No Known Problems Sister    • No Known Problems Maternal Aunt    • No Known Problems Paternal Aunt    • No Known Problems Son        Meds/Allergies     Medications Prior to Admission   Medication   • acetaminophen (TYLENOL) 325 mg tablet   • amLODIPine (NORVASC) 2 5 mg tablet   • Calcium Carb-Cholecalciferol (CALCIUM 600-D PO)   • cetirizine (ZyrTEC) 10 mg tablet   • Coenzyme Q10 (CO Q-10 PO)   • diltiazem (CARDIZEM CD) 180 mg 24 hr capsule   • diltiazem (CARDIZEM) 60 mg tablet   • escitalopram (LEXAPRO) 20 mg tablet   • fluticasone (FLONASE) 50 mcg/act nasal spray   • fluticasone-salmeterol (Advair Diskus) 250-50 mcg/dose inhaler   • gabapentin (NEURONTIN) 600 MG tablet   • HYDROcodone-acetaminophen (NORCO) 5-325 mg per tablet   • levothyroxine 50 mcg tablet   • lisinopril (ZESTRIL) 20 mg tablet   • loperamide (IMODIUM) 2 mg capsule   • LORazepam (ATIVAN) 1 mg tablet   • MAGNESIUM PO   • Multiple Vitamin (MULTIVITAMIN ADULT PO)   • nystatin (MYCOSTATIN) 500,000 units/5 mL suspension   • Omega-3 Fatty Acids (FISH OIL PO)   • potassium chloride (MICRO-K) 10 MEQ CR capsule   • simvastatin (ZOCOR) 5 MG tablet   • sodium chloride 1 g tablet   • torsemide (DEMADEX) 10 mg tablet   • warfarin (COUMADIN) 5 mg tablet   • albuterol (PROVENTIL HFA,VENTOLIN HFA) 90 mcg/act inhaler   • lansoprazole (PREVACID) 15 mg capsule   • meclizine (ANTIVERT) 25 mg tablet   • naloxone (NARCAN) 4 mg/0 1 mL nasal spray   • tobramycin-dexamethasone (TOBRADEX) ophthalmic suspension     Current Facility-Administered Medications   Medication Dose Route Frequency   • acetaminophen (TYLENOL) tablet 650 mg  650 mg Oral Q6H PRN   • albuterol (PROVENTIL HFA,VENTOLIN HFA) inhaler 2 puff  2 puff Inhalation Q6H PRN   • amLODIPine (NORVASC) tablet 2 5 mg  2 5 mg Oral Daily   • calcium carbonate (OYSTER SHELL,OSCAL) 500 mg tablet 1 tablet  1 tablet Oral Daily With Breakfast   • co-enzyme Q-10 capsule   Oral Daily   • diltiazem (CARDIZEM CD) 24 hr capsule 180 mg  180 mg Oral Daily With Dinner   • diltiazem (CARDIZEM) tablet 60 mg  60 mg Oral Daily With Breakfast   • escitalopram (LEXAPRO) tablet 20 mg  20 mg Oral Daily   • fish oil capsule 1,000 mg  1,000 mg Oral Daily   • fluticasone (FLONASE) 50 mcg/act nasal spray 1 spray  1 spray Each Nare Daily   • Fluticasone Furoate-Vilanterol 100-25 mcg/actuation 1 puff  1 puff Inhalation Daily   • gabapentin (NEURONTIN) capsule 600 mg  600 mg Oral TID   • HYDROcodone-acetaminophen (NORCO) 5-325 mg per tablet 1 tablet  1 tablet Oral Q6H PRN   • levothyroxine tablet 50 mcg  50 mcg Oral Daily   • lisinopril (ZESTRIL) tablet 20 mg  20 mg Oral BID   • loperamide (IMODIUM) capsule 2 mg  2 mg Oral 4x Daily PRN   • loratadine (CLARITIN) tablet 5 mg  5 mg Oral Daily   • LORazepam (ATIVAN) tablet 1 mg  1 mg Oral Q6H PRN   • magnesium gluconate (MAGONATE) tablet 250 mg  250 mg Oral BID AC   • meclizine (ANTIVERT) tablet 25 mg  25 mg Oral Q6H PRN   • multivitamin-minerals (CENTRUM) tablet 1 tablet  1 tablet Oral Daily   • nystatin (MYCOSTATIN) oral suspension 500,000 Units  500,000 Units Swish & Swallow 4x Daily   • potassium chloride (K-DUR,KLOR-CON) CR tablet 20 mEq  20 mEq Oral Daily   • pravastatin (PRAVACHOL) tablet 10 mg  10 mg Oral Daily With Dinner   • sodium chloride tablet 1 g  1 g Oral TID   • torsemide (DEMADEX) tablet 10 mg  10 mg Oral Daily PRN       Allergies   Allergen Reactions   • Indomethacin GI Intolerance and Dizziness   • Macrobid [Nitrofurantoin Monohyd Macro] Rash   • Nitrofurantoin Other (See Comments)   • Penicillins Rash     Annotation - [id number]: can take cephalosporins   • Sulfa Antibiotics Rash           Objective     Blood pressure 149/79, pulse 67, temperature 98 8 °F (37 1 °C), resp  rate 17, SpO2 96 %  Intake/Output Summary (Last 24 hours) at 1/8/2023 1124  Last data filed at 1/8/2023 1029  Gross per 24 hour   Intake 120 ml   Output --   Net 120 ml         PHYSICAL EXAM     General Appearance:   Alert, cooperative, no distress, appears stated age    HEENT:   Normocephalic, atraumatic, anicteric      Neck:  Supple, symmetrical, trachea midline, no adenopathy;    thyroid: no enlargement/tenderness/nodules; no carotid  bruit or JVD    Lungs:   Clear to auscultation bilaterally; no rales, rhonchi or wheezing; respirations unlabored    Heart[de-identified]   S1 and S2 normal; regular rate and rhythm; no murmur, rub, or gallop     Abdomen:   Soft, non-tender, non-distended; normal bowel sounds; no masses, no organomegaly    Genitalia:   Deferred    Rectal:   Deferred    Extremities:  No cyanosis, clubbing or edema    Pulses:  2+ and symmetric all extremities    Skin:  Skin color, texture, turgor normal, no rashes or lesions    Lymph nodes:  No palpable cervical, axillary or inguinal lymphadenopathy        Lab Results:   Admission on 01/07/2023   Component Date Value   • WBC 01/07/2023 7 56    • RBC 01/07/2023 3 54 (L)    • Hemoglobin 01/07/2023 11 4 (L)    • Hematocrit 01/07/2023 33 0 (L)    • MCV 01/07/2023 93    • MCH 01/07/2023 32 2    • MCHC 01/07/2023 34 5    • RDW 01/07/2023 16 3 (H)    • MPV 01/07/2023 8 2 (L)    • Platelets 38/20/0995 333    • nRBC 01/07/2023 0    • Neutrophils Relative 01/07/2023 83 (H)    • Immat GRANS % 01/07/2023 1    • Lymphocytes Relative 01/07/2023 9 (L)    • Monocytes Relative 01/07/2023 7    • Eosinophils Relative 01/07/2023 0    • Basophils Relative 01/07/2023 0    • Neutrophils Absolute 01/07/2023 6 30    • Immature Grans Absolute 01/07/2023 0 09    • Lymphocytes Absolute 01/07/2023 0 64    • Monocytes Absolute 01/07/2023 0 50    • Eosinophils Absolute 01/07/2023 0 01    • Basophils Absolute 01/07/2023 0 02    • Sodium 01/07/2023 130 (L)    • Potassium 01/07/2023 4 2    • Chloride 01/07/2023 94 (L)    • CO2 01/07/2023 30    • ANION GAP 01/07/2023 6    • BUN 01/07/2023 16    • Creatinine 01/07/2023 0 91    • Glucose 01/07/2023 133    • Calcium 01/07/2023 8 5    • AST 01/07/2023 17    • ALT 01/07/2023 15    • Alkaline Phosphatase 01/07/2023 58    • Total Protein 01/07/2023 5 8 (L)    • Albumin 01/07/2023 3 7    • Total Bilirubin 01/07/2023 0 31    • eGFR 01/07/2023 60    • Protime 01/07/2023 63 6 (H)    • INR 01/07/2023 7 46 (HH)    • PTT 01/07/2023 42 (H)    • Hemoglobin 01/07/2023 11 5    • ABO Grouping 01/07/2023 O    • Rh Factor 01/07/2023 Positive    • Antibody Screen 01/07/2023 Negative    • Specimen Expiration Date 01/07/2023 [id number]    • Protime 01/08/2023 13 4    • INR 01/08/2023 1 00    • Sodium 01/08/2023 136    • Potassium 01/08/2023 3 5    • Chloride 01/08/2023 99    • CO2 01/08/2023 30    • ANION GAP 01/08/2023 7    • BUN 01/08/2023 10    • Creatinine 01/08/2023 0 64    • Glucose 01/08/2023 94    • Glucose, Fasting 01/08/2023 94    • Calcium 01/08/2023 8 7    • eGFR 01/08/2023 85    • WBC 01/08/2023 6 84    • RBC 01/08/2023 3 77 (L)    • Hemoglobin 01/08/2023 11 8    • Hematocrit 01/08/2023 34 8    • MCV 01/08/2023 92    • MCH 01/08/2023 31 3    • MCHC 01/08/2023 33 9    • RDW 01/08/2023 16 4 (H)    • MPV 01/08/2023 8 5 (L)    • Platelets 44/84/5701 370    • nRBC 01/08/2023 0    • Neutrophils Relative 01/08/2023 62    • Immat GRANS % 01/08/2023 1    • Lymphocytes Relative 01/08/2023 24    • Monocytes Relative 01/08/2023 12    • Eosinophils Relative 01/08/2023 1    • Basophils Relative 01/08/2023 0    • Neutrophils Absolute 01/08/2023 4 28    • Immature Grans Absolute 01/08/2023 0 05    • Lymphocytes Absolute 01/08/2023 1 64    • Monocytes Absolute 01/08/2023 0 79    • Eosinophils Absolute 01/08/2023 0 06    • Basophils Absolute 01/08/2023 0 02    • Hemoglobin 01/08/2023 12 5        Imaging Studies: I have personally reviewed pertinent reports  CT ABDOMEN AND PELVIS WITH IV CONTRAST     INDICATION:   LLQ abdominal pain  GI bleed in setting of colectomy with ileal j-pouch, r/o structural cause given surgical history      COMPARISON:  CT abdomen/pelvis 10/17/2020      TECHNIQUE:  CT examination of the abdomen and pelvis was performed  Axial, sagittal, and coronal 2D reformatted images were created from the source data and submitted for interpretation      Radiation dose length product (DLP) for this visit:  394 mGy-cm   This examination, like all CT scans performed in the [facility], was performed utilizing techniques to minimize radiation dose exposure, including the use of iterative   reconstruction and automated exposure control      IV Contrast:  100 mL of iohexol (OMNIPAQUE)  Enteric Contrast:  Enteric contrast was not administered      FINDINGS:     ABDOMEN     LOWER CHEST:  No clinically significant abnormality identified in the visualized lower chest      LIVER/BILIARY TREE:  Stable hepatomegaly  Otherwise, within normal limits      GALLBLADDER:  No calcified gallstones  No pericholecystic inflammatory change      SPLEEN:  Splenectomy with stable splenules in the left upper quadrant      PANCREAS:  Unremarkable      ADRENAL GLANDS:  Unremarkable      KIDNEYS/URETERS:  Unremarkable   No hydronephrosis      STOMACH AND BOWEL:  Postsurgical changes of total proctocolectomy with J-pouch formation, similar in appearance to the prior study      APPENDIX:  No findings to suggest appendicitis      ABDOMINOPELVIC CAVITY:  No ascites  No pneumoperitoneum  No lymphadenopathy      VESSELS:  Atherosclerotic changes are present  No evidence of aneurysm      PELVIS     REPRODUCTIVE ORGANS:  Surgical changes of prior hysterectomy      URINARY BLADDER:  Unremarkable      ABDOMINAL WALL/INGUINAL REGIONS:  Unremarkable      OSSEOUS STRUCTURES:  No acute fracture or destructive osseous lesion  Spinal degenerative changes are noted  Moderate right hip joint osteoarthritis  Stable chronic displaced left L2 and L3 transverse process fractures      IMPRESSION:     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, similar in appearance to prior study  No evidence of obstruction or additional acute abnormality in the abdomen or pelvis  ASSESSMENT/PLAN:     #1   Bright red blood per anus (patient is sans rectum/colon, reports total colectomy and ileal J-pouch/ileoanal anastomosis 20 years ago), in the setting of severe warfarin coagulopathy, appears to be resolving after correction of patient's INR  No evidence of active bleeding at this time and hemoglobin is stable    -We will advance to clear liquid diet, can advance further if she continues without any signs of active GI bleeding; if she remains without any signs of GI bleeding after INR has been corrected to therapeutic levels she may not necessarily require endoscopic evaluation, and can follow-up with her colorectal surgeon for her normal surveillance ileoscopy in August as previously planned    -Recommend avoiding straining with defecation, avoiding prolonged periods of time on the toilet with defecation    -For her chronic loose stool, she can continue Imodium, can also consider use of a bile acid sequestrant (e g cholestyramine);  I believe she will be less likely to experience problems with perianal irritation/fissure and associated bleeding if her stool frequency can be reduced    The patient was seen and examined by Dr Phong Villalobos, all shea medical decisions were made with Dr Phong Villalobos  Thank you for allowing us to participate in the care of this pleasant patient  We will follow up with you closely

## 2023-01-08 NOTE — DISCHARGE SUMMARY
[facility]  Discharge- Conchis Harsh [birth year], 65 y o  female MRN: [id number]  Unit/Bed#: W -62 Encounter: [id number]  Primary Care Provider: Vinayak Roberto MD   Date and time admitted to hospital: 1/7/2023 12:02 PM    * Rectal bleeding  Assessment & Plan  Patient states she has had 3 episodes of rectal bleeding over past 2 days, 1 additional episode in hospital   Describes bleeding as a sudden gush of bright red blood in the toilet while straining for bowel movement  Denies leakage of blood between bowel movements  Denies dark tarry stools  Denies dizziness, lightheadedness, sensation of racing heart, shortness of breath, chest pain, or other new or concerning features  Direct rectal exam in ED revealed mild to moderate amounts of bright red blood per rectum  Patient reports brown loose stools, with no blood today (1/9)    Plan:  · Hold home warfarin  · Clear liquid diet, advance as tolerated, monitoring for active GI bleeding  · H&H every 12 hours  · Type and screen, consent for blood products signed  · Stool record at bedside  · Consider switching to [address] upon discharge  · GI consulted: Suggest source of bleeding likely hemorrhoidal or outlet bleeding, but will proceed with pouchoscopy   · Pouchoscopy shows 1 cm superficial ulcer w/ no evidence of active bleeding  · Pt agrees with switching to Eliquis for anticoagulation following price check  · Can d/c pt today on Eliquis      Elevated INR  Assessment & Plan  Lab Results   Component Value Date    INR 0 99 01/10/2023    INR 1 00 01/08/2023    INR 7 46 (HH) 01/07/2023    PROTIME 13 3 01/10/2023    PROTIME 13 4 01/08/2023    PROTIME 63 6 (H) 01/07/2023     Patient currently on Coumadin 5 mg daily for portal vein thrombosis  On 1/5, patient has had supratherapeutic INR of 3 5  Patient instructed to take Coumadin 5 mg 3 days, 2 5 mg x 4 days and reassess in 3 weeks    Per patient, she was compliant with above recommendations  INR in ED was 7 46   10 mg vitamin K given in ED  INR has since fallen to 1 00   (1/1)    Plan:  · Hold home Coumadin  · Type and screen  · Consent signed for blood products  · Continue to monitor INR  · Switch to Eliquis    Anemia  Assessment & Plan  Recent Labs     01/07/23  1251 01/07/23 2026 01/08/23  0533 01/08/23  1017 01/08/23 2031 01/09/23 2017   HGB 11 4*   < > 11 8 12 5 11 2* 11 9   MCV 93  --  92  --   --   --     < > = values in this interval not displayed  Plan:  • Monitor CBC  • Consent for blood products already signed  • Transfuse if Hb < 7      Hyponatremia  Assessment & Plan  Recent Labs     01/07/23  1251 01/08/23  0533 01/10/23  0545   SODIUM 130* 136 134*     Known issue for this patient  Consistently hyponatremic on prior laboratory studies    Plan:  · Continue with home salt tablets    Warfarin-induced coagulopathy [facility])  Assessment & Plan  Recent Labs     01/07/23  1251 01/08/23  0534 01/10/23  0545   INR 7 46* 1 00 0 99     Prior to arrival patient's warfarin was supratherapeutic at 3 5  Patient's was told to taper her warfarin by PCP over the course of several weeks but came into the hospital 4 days into taper secondary to rectal bleeding  POA INR was 7 46  Patient was treated with 10 mg IV vitamin K  Repeat INR was 1 00  Her warfarin was held while hospitalized     Plan:  Switch to [address] [place] [address] [place]  Per patient, she has had on and off oral thrush for past several months  Patient uses Breo Ellipta inhaler as an outpatient, has recently run out of nystatin swish and swallow   Very mild oral thrush on physical examination, no impingement on airway    Plan:  · Nystatin swish swallow 4 times daily  · Mouth care after Breo Ellipta use    Mild intermittent asthma without complication  Assessment & Plan  Continue home Breo Ellipta with nystatin swish and swallow    Peripheral neuropathy  Assessment & Plan  Continue with home gabapentin regimen    Acquired hypothyroidism  Assessment & Plan  Patient on stable home dose levothyroxine    Plan:  · Continue home levothyroxine 25 mg every morning    Essential hypertension  Assessment & Plan  Blood pressure stable on home regimen  Blood pressure WNL at time of assessment    Plan:   · Norvasc 2 5 mg every morning  · Cardizem 180 every afternoon  · Cardizem 60 every morning  · Lisinopril 20 mg twice daily    Hyperlipidemia  Assessment & Plan  Continue home statin    Portal vein thrombosis  Assessment & Plan  On lifelong anticoagulation  Recent supratherapeutic INR in outpatient setting, Warfarin dose adjusted    Plan:  · Hold home warfarin  · Continue to monitor liver enzymes with daily labs  · Switch to Eliquis    Medical Problems     Resolved Problems  Date Reviewed: 1/9/2023   None       Discharging Resident: Helene Saravia MD  Discharging Attending: Deepthi Brower MD  PCP: Shauna Napier MD  Admission Date:   Admission Orders (From admission, onward)     Ordered        01/08/23 1236  Inpatient Admission  Once            01/07/23 1553  Place in Observation  Once                      Discharge Date: 01/10/23    Consultations During Hospital Stay:  · Gastroenterology    Procedures Performed:   · Flexible sigmoidoscopy    Significant Findings / Test Results:   • Flexible sigmoidoscopy (1/08/23): 1 cm linear superficial ulceration was noted at the anastomotic area status post biopsies  No evidence of any bleeding  Incidental Findings:   · None    Test Results Pending at Discharge (will require follow up):   · Pathology results from biopsy samples taken during flexible sigmoidoscopy     Outpatient Tests Requested:  · None    Complications: None    Reason for Admission: Rectal bleeding and elevated INR    Hospital Course:   Jt Lucero is a 65 y o  female  with a PMH of ulcerative colitis status coloproctectomy with ileal J-pouch, essential tremor with bilateral DBS implant, acquired hypothyroidism, asthma, portal vein thrombosis on lifelong Coumadin anticoagulation who originally presented to the hospital on 1/7/2023 due to rectal bleeding  Patient reported 3 episodes of rectal bleeding in the past 2 days prior to admission  Patient stated that while straining for bowel movement she noticed copious amounts of bright red blood in the toilet bowl  Patient receives routine INR monitoring and at her last session, four days prior to admission, was found to be supratherapeutic with an INR of 3 5  Patient was asked to taper on Coumadin  Upon coming to the ED, patient presented with initial INR of 7 46 and was given 10 mg of vitamin K  Her Coumadin was also held and her repeat INR was 1 00  She was placed on a clear liquid diet  Also on admission, patient's hemoglobin was 11 4  She was consented for blood products in the case of any needed transfusion, should her hemoglobin later fall below 7  Serial H&H's were ordered, which showed her hemoglobin was steadily rising  During admission, patient initially reported red, maroon-colored stools which later became brown stools without evidence of gross blood or red streaks  Her hemoglobin also remained stable throughout admission  GI was also consulted who saw no evidence of current active GI bleeding and suggested that the previous episodes of rectal bleeding were likely due to hemorrhoids or outlet bleeding related to straining during bowel movements in combination with elevated INR caused by warfarin induced coagulopathy  Patient also received flexible sigmoidoscopy to evaluate her ileal J-pouch  The pouchoscopy showed 1 cm superficial ulcer but no evidence of active bleeding  Standard biopsy samples were also taken and sent to pathology  Prior to discharge, we discussed switching patient from Coumadin to a DOAC which would have a lower risk of elevated INR and bleeding compared to warfarin  Patient was amenable to switch    Patient was discharged home on Eliquis for long-term anticoagulation and follow-up with PCP in 1-2 weeks for sigmoidoscopy biopsy results  Please see above list of diagnoses and related plan for additional information  Condition at Discharge: fair    Discharge Day Visit / Exam:   Subjective: Today, patient denies any acute overnight events  Patient reports loose stools due to her chronic diarrhea s/p coloproctectomy but denies any blood or red streaks in her stools  Vitals: Blood Pressure: 135/68 (01/10/23 0721)  Pulse: 69 (01/10/23 0721)  Temperature: 98 1 °F (36 7 °C) (01/10/23 0721)  Temp Source: Oral (01/10/23 0721)  Respirations: 17 (01/09/23 1523)  SpO2: 96 % (01/10/23 0721)  Exam:   Physical Exam  Vitals and nursing note reviewed  Constitutional:       General: She is not in acute distress  Appearance: She is well-developed  HENT:      Head: Normocephalic and atraumatic  Eyes:      Conjunctiva/sclera: Conjunctivae normal    Cardiovascular:      Rate and Rhythm: Normal rate and regular rhythm  Heart sounds: Normal heart sounds, S1 normal and S2 normal  No murmur heard  Pulmonary:      Effort: Pulmonary effort is normal  No respiratory distress  Breath sounds: Normal breath sounds  Abdominal:      Palpations: Abdomen is soft  Tenderness: There is no abdominal tenderness  Musculoskeletal:         General: No swelling  Cervical back: Neck supple  Skin:     General: Skin is warm and dry  Capillary Refill: Capillary refill takes less than 2 seconds  Neurological:      Mental Status: She is alert  Psychiatric:         Mood and Affect: Mood normal           Discussion with Family: Updated  (significant other) via phone  Discharge instructions/Information to patient and family:   See after visit summary for information provided to patient and family        Provisions for Follow-Up Care:  See after visit summary for information related to follow-up care and any pertinent home health orders  Disposition:   Home    Planned Readmission: None    Discharge Medications:  See after visit summary for reconciled discharge medications provided to patient and/or family        **Please Note: This note may have been constructed using a voice recognition system**

## 2023-01-08 NOTE — ASSESSMENT & PLAN NOTE
Blood pressure stable on home regimen    Blood pressure WNL at time of assessment    Plan:   · Norvasc 2 5 mg every morning  · Cardizem 180 every afternoon  · Cardizem 60 every morning  · Lisinopril 20 mg twice daily

## 2023-01-08 NOTE — ASSESSMENT & PLAN NOTE
Patient states she has had 3 episodes of rectal bleeding over past 2 days, 1 additional episode in hospital   Describes bleeding as a sudden gush of bright red blood in the toilet while straining for bowel movement  Denies leakage of blood between bowel movements  Denies dark tarry stools  Denies dizziness, lightheadedness, sensation of racing heart, shortness of breath, chest pain, or other new or concerning features  LUCIUS in ED revealed mild to moderate amounts of bright red blood per rectum    Plan:  · Hold home warfarin  · Clear liquid diet, advance as tolerated, monitoring for active GI bleeding  · H&H every 12 hours  · Type and screen, consent for blood products signed  · Stool record at bedside  · Consider switching to [address] upon discharge  · GI consulted: bright red blood per anus appears to be resolving following correction of the patient's INR   No evidence of active bleeding at this time and hemoglobin is stable, if patient remains without signs of GI bleeding after INR has been corrected to therapeutic levels she may not require endoscopic evaluation and can follow-up with colorectal surgeon outpatient, recommend continuing Imodium for loose stools, possibly adding a bile acid sequestrant, advance to clear liquid diet with further advancing as tolerated, continuing to monitor for any active GI bleeding following advance

## 2023-01-08 NOTE — PROGRESS NOTES
[facility]  Progress Note - Parveen Ort [birth year], 65 y o  female MRN: [id number]  Unit/Bed#: W -40 Encounter: [id number]  Primary Care Provider: Calvin Oliveira MD   Date and time admitted to hospital: 1/7/2023 12:02 PM    * Rectal bleeding  Assessment & Plan  Patient states she has had 3 episodes of rectal bleeding over past 2 days, 1 additional episode in hospital   Describes bleeding as a sudden gush of bright red blood in the toilet while straining for bowel movement  Denies leakage of blood between bowel movements  Denies dark tarry stools  Denies dizziness, lightheadedness, sensation of racing heart, shortness of breath, chest pain, or other new or concerning features  LUCIUS in ED revealed mild to moderate amounts of bright red blood per rectum    Plan:  · Hold home warfarin  · Clear liquid diet, advance as tolerated, monitoring for active GI bleeding  · H&H every 12 hours  · Type and screen, consent for blood products signed  · Stool record at bedside  · Consider switching to [address] upon discharge  · GI consulted: bright red blood per anus appears to be resolving following correction of the patient's INR   No evidence of active bleeding at this time and hemoglobin is stable, if patient remains without signs of GI bleeding after INR has been corrected to therapeutic levels she may not require endoscopic evaluation and can follow-up with colorectal surgeon outpatient, recommend continuing Imodium for loose stools, possibly adding a bile acid sequestrant, advance to clear liquid diet with further advancing as tolerated, continuing to monitor for any active GI bleeding following advance      Elevated INR  Assessment & Plan  Lab Results   Component Value Date    INR 1 00 01/08/2023    INR 7 46 (HH) 01/07/2023    INR 3 50 (A) 01/03/2023    PROTIME 13 4 01/08/2023    PROTIME 63 6 (H) 01/07/2023    PROTIME 22 6 (H) 12/19/2022     Patient currently on Coumadin 5 mg daily for portal vein thrombosis  On 1/5, patient has had supratherapeutic INR of 3 5  Patient instructed to take Coumadin 5 mg 3 days, 2 5 mg x 4 days and reassess in 3 weeks  Per patient, she was compliant with above recommendations  INR in ED was 7 46   10 mg vitamin K given in ED  INR has since fallen to 1 00   (1/1)    Plan:  · Hold home Coumadin  · Type and screen  · Consent signed for blood products  · Continue to monitor INR  · Consider switching to a DOAC prior to discharge    Anemia  Assessment & Plan  Recent Labs     01/07/23  1251 01/07/23  2026 01/08/23  0533 01/08/23  1017   HGB 11 4* 11 5 11 8 12 5   MCV 93  --  92  --        Plan:  • Monitor CBC  • Consent for blood products already signed  • Transfuse if Hb < 7      Hyponatremia  Assessment & Plan  Recent Labs     01/07/23  1251 01/08/23  0533   SODIUM 130* 136     Known issue for this patient  Consistently hyponatremic on prior laboratory studies    Plan:  · Continue with home salt tablets    [address]  Per patient, she has had on and off oral thrush for past several months  Patient uses Breo Ellipta inhaler as an outpatient, has recently run out of nystatin swish and swallow  Very mild oral thrush on physical examination, no impingement on airway    Plan:  · Nystatin swish swallow 4 times daily  · Mouth care after Breo Ellipta use    Mild intermittent asthma without complication  Assessment & Plan  Continue home Breo Ellipta with nystatin swish and swallow    Peripheral neuropathy  Assessment & Plan  Continue with home gabapentin regimen    Acquired hypothyroidism  Assessment & Plan  Patient on stable home dose levothyroxine    Plan:  · Continue home levothyroxine 25 mg every morning    Essential hypertension  Assessment & Plan  Blood pressure stable on home regimen    Blood pressure WNL at time of assessment    Plan:   · Norvasc 2 5 mg every morning  · Cardizem 180 every afternoon  · Cardizem 60 every morning  · Lisinopril 20 mg twice daily    Hyperlipidemia  Assessment & Plan  Continue home statin    Portal vein thrombosis  Assessment & Plan  On lifelong anticoagulation  Recent supratherapeutic INR in outpatient setting, Warfarin dose adjusted    Plan:  · Hold home warfarin  · Continue to monitor liver enzymes with daily labs  · Consider switching to a DOAC prior to discharge          VTE Pharmacologic Prophylaxis: VTE Score: 7 High Risk (Score >/= 5) - Pharmacological DVT Prophylaxis Contraindicated  Sequential Compression Devices Ordered  Though may consider switching to Eliquis or other DOAC  Patient Centered Rounds: I performed bedside rounds with nursing staff today  Discussions with Specialists or Other Care Team Provider: GI    Education and Discussions with Family / Patient: Updated  () at bedside  Current Length of Stay: 0 day(s)  Current Patient Status: Inpatient   Discharge Plan: Anticipate discharge in 24-48 hrs to home  Code Status: Level 1 - Full Code    Subjective: Today, patient denies any acute overnight events  Patient denies any chest pain, abdominal pain, shortness of breath, heart palpitations, fever chills, nausea or vomiting  Patient does states that she had 2 recent bowel movements of bloody, maroon-colored stools  Objective:     Vitals:   Temp (24hrs), Av 3 °F (36 8 °C), Min:97 6 °F (36 4 °C), Max:98 8 °F (37 1 °C)    Temp:  [97 6 °F (36 4 °C)-98 8 °F (37 1 °C)] 98 8 °F (37 1 °C)  HR:  [67-74] 67  Resp:  [16-18] 17  BP: (142-171)/(68-79) 149/79  SpO2:  [94 %-96 %] 96 %  There is no height or weight on file to calculate BMI  Input and Output Summary (last 24 hours): Intake/Output Summary (Last 24 hours) at 2023 1342  Last data filed at 2023 1029  Gross per 24 hour   Intake 120 ml   Output --   Net 120 ml       Physical Exam:   Physical Exam  Vitals and nursing note reviewed  Constitutional:       General: She is not in acute distress       Appearance: She is well-developed  HENT:      Head: Normocephalic and atraumatic  Eyes:      Conjunctiva/sclera: Conjunctivae normal    Cardiovascular:      Rate and Rhythm: Normal rate and regular rhythm  Heart sounds: Normal heart sounds, S1 normal and S2 normal  No murmur heard  Pulmonary:      Effort: Pulmonary effort is normal  No respiratory distress  Breath sounds: Normal breath sounds  Abdominal:      Palpations: Abdomen is soft  Tenderness: There is no abdominal tenderness  Musculoskeletal:         General: No swelling  Cervical back: Neck supple  Skin:     General: Skin is warm and dry  Capillary Refill: Capillary refill takes less than 2 seconds  Neurological:      Mental Status: She is alert     Psychiatric:         Mood and Affect: Mood normal           Additional Data:     Labs:  Results from last 7 days   Lab Units 01/08/23  1017 01/08/23  0533   WBC Thousand/uL  --  6 84   HEMOGLOBIN g/dL 12 5 11 8   HEMATOCRIT %  --  34 8   PLATELETS Thousands/uL  --  370   NEUTROS PCT %  --  62   LYMPHS PCT %  --  24   MONOS PCT %  --  12   EOS PCT %  --  1     Results from last 7 days   Lab Units 01/08/23  0533 01/07/23  1251   SODIUM mmol/L 136 130*   POTASSIUM mmol/L 3 5 4 2   CHLORIDE mmol/L 99 94*   CO2 mmol/L 30 30   BUN mg/dL 10 16   CREATININE mg/dL 0 64 0 91   ANION GAP mmol/L 7 6   CALCIUM mg/dL 8 7 8 5   ALBUMIN g/dL  --  3 7   TOTAL BILIRUBIN mg/dL  --  0 31   ALK PHOS U/L  --  58   ALT U/L  --  15   AST U/L  --  17   GLUCOSE RANDOM mg/dL 94 133     Results from last 7 days   Lab Units 01/08/23  0534   INR  1 00                   Lines/Drains:  Invasive Devices     Peripheral Intravenous Line  Duration           Peripheral IV 01/07/23 Left Antecubital 1 day                      Imaging: Reviewed radiology reports from this admission including: abdominal/pelvic CT    Recent Cultures (last 7 days):         Last 24 Hours Medication List:   Current Facility-Administered Medications Medication Dose Route Frequency Provider Last Rate   • acetaminophen  650 mg Oral Q6H PRN Reese Mariscal MD     • albuterol  2 puff Inhalation Q6H PRN Reese Mariscal MD     • amLODIPine  2 5 mg Oral Daily Reese Mariscal MD     • calcium carbonate  1 tablet Oral Daily With Breakfast Reese Mariscal MD     • co-enzyme Q-10   Oral Daily Reese Mariscal MD     • diltiazem  180 mg Oral Daily With Jonathan Pierson MD     • diltiazem  60 mg Oral Daily With Breakfast Reese Mariscal MD     • escitalopram  20 mg Oral Daily Reese Mariscal MD     • fish oil  1,000 mg Oral Daily Reese Mariscal MD     • fluticasone  1 spray Each Nare Daily Reese Mariscal MD     • Fluticasone Furoate-Vilanterol  1 puff Inhalation Daily Reese Mariscal MD     • gabapentin  600 mg Oral TID Reese Mariscal MD     • HYDROcodone-acetaminophen  1 tablet Oral Q6H PRN Reese Mariscal MD     • levothyroxine  50 mcg Oral Daily Reese Mariscal MD     • lisinopril  20 mg Oral BID Reese Mariscal MD     • loperamide  2 mg Oral 4x Daily PRN Reese Mariscal MD     • loratadine  5 mg Oral Daily Reese Marisacl MD     • LORazepam  1 mg Oral Q6H PRN Reese Mariscal MD     • magnesium gluconate  250 mg Oral BID AC Reese Mariscal MD     • meclizine  25 mg Oral Q6H PRN Reese Mariscal MD     • multivitamin-minerals  1 tablet Oral Daily Reese Mariscal MD     • nystatin  500,000 Units Swish & Swallow 4x Daily Reese Mariscal MD     • potassium chloride  20 mEq Oral Daily Clemencia Luz MD     • pravastatin  10 mg Oral Daily With Jonathan Pierson MD     • sodium chloride  1 g Oral TID Reese Mariscal MD     • torsemide  10 mg Oral Daily PRN Reese Mariscal MD          Today, Patient Was Seen By: Shashank Grewal MD    **Please Note: This note may have been constructed using a voice recognition system  **

## 2023-01-08 NOTE — ASSESSMENT & PLAN NOTE
On lifelong anticoagulation   Recent supratherapeutic INR in outpatient setting, Warfarin dose adjusted    Plan:  · Hold home warfarin  · Continue to monitor liver enzymes with daily labs  · Obtain price check for DOAC prior to discharge

## 2023-01-09 ENCOUNTER — ANESTHESIA EVENT (INPATIENT)
Dept: GASTROENTEROLOGY | Facility: HOSPITAL | Age: 78
End: 2023-01-09

## 2023-01-09 ENCOUNTER — ANESTHESIA (INPATIENT)
Dept: GASTROENTEROLOGY | Facility: HOSPITAL | Age: 78
End: 2023-01-09

## 2023-01-09 ENCOUNTER — APPOINTMENT (INPATIENT)
Dept: GASTROENTEROLOGY | Facility: HOSPITAL | Age: 78
End: 2023-01-09
Attending: INTERNAL MEDICINE

## 2023-01-09 PROBLEM — T45.515A WARFARIN-INDUCED COAGULOPATHY (HCC): Status: ACTIVE | Noted: 2023-01-09

## 2023-01-09 PROBLEM — D68.32 WARFARIN-INDUCED COAGULOPATHY (HCC): Status: ACTIVE | Noted: 2023-01-09

## 2023-01-09 LAB — HGB BLD-MCNC: 11.9 G/DL (ref 11.5–15.4)

## 2023-01-09 PROCEDURE — 0DBN8ZX EXCISION OF SIGMOID COLON, VIA NATURAL OR ARTIFICIAL OPENING ENDOSCOPIC, DIAGNOSTIC: ICD-10-PCS | Performed by: INTERNAL MEDICINE

## 2023-01-09 RX ORDER — PROPOFOL 10 MG/ML
INJECTION, EMULSION INTRAVENOUS CONTINUOUS PRN
Status: DISCONTINUED | OUTPATIENT
Start: 2023-01-09 | End: 2023-01-09

## 2023-01-09 RX ORDER — LIDOCAINE HYDROCHLORIDE 10 MG/ML
INJECTION, SOLUTION EPIDURAL; INFILTRATION; INTRACAUDAL; PERINEURAL AS NEEDED
Status: DISCONTINUED | OUTPATIENT
Start: 2023-01-09 | End: 2023-01-09

## 2023-01-09 RX ORDER — SODIUM CHLORIDE, SODIUM LACTATE, POTASSIUM CHLORIDE, CALCIUM CHLORIDE 600; 310; 30; 20 MG/100ML; MG/100ML; MG/100ML; MG/100ML
INJECTION, SOLUTION INTRAVENOUS CONTINUOUS PRN
Status: DISCONTINUED | OUTPATIENT
Start: 2023-01-09 | End: 2023-01-09

## 2023-01-09 RX ORDER — PROPOFOL 10 MG/ML
INJECTION, EMULSION INTRAVENOUS AS NEEDED
Status: DISCONTINUED | OUTPATIENT
Start: 2023-01-09 | End: 2023-01-09

## 2023-01-09 RX ADMIN — Medication 250 MG: at 18:21

## 2023-01-09 RX ADMIN — GABAPENTIN 600 MG: 300 CAPSULE ORAL at 21:11

## 2023-01-09 RX ADMIN — LORAZEPAM 1 MG: 1 TABLET ORAL at 06:20

## 2023-01-09 RX ADMIN — SODIUM CHLORIDE 1 G: 1 TABLET ORAL at 18:21

## 2023-01-09 RX ADMIN — APIXABAN 5 MG: 5 TABLET, FILM COATED ORAL at 18:21

## 2023-01-09 RX ADMIN — LISINOPRIL 20 MG: 20 TABLET ORAL at 09:16

## 2023-01-09 RX ADMIN — LIDOCAINE HYDROCHLORIDE 50 MG: 10 INJECTION, SOLUTION EPIDURAL; INFILTRATION; INTRACAUDAL at 13:03

## 2023-01-09 RX ADMIN — PROPOFOL 30 MG: 10 INJECTION, EMULSION INTRAVENOUS at 13:05

## 2023-01-09 RX ADMIN — DILTIAZEM HYDROCHLORIDE 60 MG: 60 TABLET, FILM COATED ORAL at 09:16

## 2023-01-09 RX ADMIN — SODIUM CHLORIDE, SODIUM LACTATE, POTASSIUM CHLORIDE, AND CALCIUM CHLORIDE: .6; .31; .03; .02 INJECTION, SOLUTION INTRAVENOUS at 12:57

## 2023-01-09 RX ADMIN — DILTIAZEM HYDROCHLORIDE 180 MG: 180 CAPSULE, COATED, EXTENDED RELEASE ORAL at 18:21

## 2023-01-09 RX ADMIN — ESCITALOPRAM OXALATE 20 MG: 20 TABLET ORAL at 09:16

## 2023-01-09 RX ADMIN — NYSTATIN 500000 UNITS: 100000 SUSPENSION ORAL at 21:11

## 2023-01-09 RX ADMIN — FLUTICASONE PROPIONATE 1 SPRAY: 50 SPRAY, METERED NASAL at 09:16

## 2023-01-09 RX ADMIN — GABAPENTIN 600 MG: 300 CAPSULE ORAL at 18:21

## 2023-01-09 RX ADMIN — SODIUM CHLORIDE 1 G: 1 TABLET ORAL at 21:10

## 2023-01-09 RX ADMIN — PROPOFOL 100 MG: 10 INJECTION, EMULSION INTRAVENOUS at 13:03

## 2023-01-09 RX ADMIN — PROPOFOL 80 MCG/KG/MIN: 10 INJECTION, EMULSION INTRAVENOUS at 13:03

## 2023-01-09 RX ADMIN — FLUTICASONE FUROATE AND VILANTEROL TRIFENATATE 1 PUFF: 100; 25 POWDER RESPIRATORY (INHALATION) at 09:16

## 2023-01-09 RX ADMIN — AMLODIPINE BESYLATE 2.5 MG: 2.5 TABLET ORAL at 09:16

## 2023-01-09 RX ADMIN — GABAPENTIN 600 MG: 300 CAPSULE ORAL at 09:16

## 2023-01-09 RX ADMIN — PRAVASTATIN SODIUM 10 MG: 10 TABLET ORAL at 18:21

## 2023-01-09 RX ADMIN — NYSTATIN 500000 UNITS: 100000 SUSPENSION ORAL at 18:20

## 2023-01-09 RX ADMIN — LORAZEPAM 1 MG: 1 TABLET ORAL at 20:25

## 2023-01-09 RX ADMIN — LEVOTHYROXINE SODIUM 50 MCG: 50 TABLET ORAL at 09:16

## 2023-01-09 RX ADMIN — LISINOPRIL 20 MG: 20 TABLET ORAL at 18:21

## 2023-01-09 RX ADMIN — NYSTATIN 500000 UNITS: 100000 SUSPENSION ORAL at 09:16

## 2023-01-09 NOTE — ANESTHESIA PREPROCEDURE EVALUATION
Procedure:  FLEXIBLE SIGMOIDOSCOPY    Relevant Problems   ANESTHESIA (within normal limits)      CARDIO   (+) Essential hypertension   (+) Hyperlipidemia   (+) Supraventricular tachycardia (HCC)      ENDO   (+) Acquired hypothyroidism      GI/HEPATIC   (+) GERD (gastroesophageal reflux disease)   (+) Rectal bleeding      HEMATOLOGY   (+) Anemia   (+) Iron deficiency anemia      MUSCULOSKELETAL   (+) Lumbar degenerative disc disease   (+) Osteoarthritis of right knee      NEURO/PSYCH   (+) Anxiety   (+) History of left breast cancer   (+) Moderate episode of recurrent major depressive disorder (HCC)      PULMONARY   (+) Mild intermittent asthma without complication      Nervous and Auditory   (+) Peripheral neuropathy      Other   (+) Hyponatremia      12/23/2022  Findings    Left Ventricle Left ventricular cavity size is normal  Wall thickness is normal  The left ventricular ejection fraction is 65%  Systolic function is normal   Diastolic function is mildly abnormal, consistent with grade I (abnormal) relaxation  Right Ventricle Right ventricular cavity size is normal  Systolic function is normal    Left Atrium The atrium is normal in size  Right Atrium The atrium is normal in size  Aortic Valve The aortic valve is trileaflet  The leaflets are not thickened  The leaflets are not calcified  The leaflets exhibit normal mobility  There is no evidence of regurgitation  The aortic valve has no significant stenosis  Mitral Valve There is no evidence of regurgitation  There is no evidence of stenosis  The mitral valve has normal structure and normal function  Tricuspid Valve Tricuspid valve structure is normal  There is no evidence of regurgitation  There is no evidence of stenosis  Pulmonic Valve The pulmonic valve was not well visualized  Ascending Aorta The aortic root is normal in size  IVC/SVC The right atrial pressure is estimated at 5 0 mmHg  The inferior vena cava is normal in size     Pericardium The pericardium is normal in appearance  Physical Exam    Airway    Mallampati score: II  TM Distance: >3 FB  Neck ROM: full     Dental   No notable dental hx     Cardiovascular  Rhythm: regular, Rate: normal, Cardiovascular exam normal    Pulmonary  Pulmonary exam normal Breath sounds clear to auscultation,     Other Findings        Anesthesia Plan  ASA Score- 3     Anesthesia Type- IV sedation with anesthesia with ASA Monitors  Additional Monitors:   Airway Plan:           Plan Factors-Exercise tolerance (METS): <4 METS  Exercise comment: Denies orthopnea/PND  Chart reviewed  EKG reviewed  Patient summary reviewed  Patient is not a current smoker  Induction-     Postoperative Plan-     Informed Consent- Anesthetic plan and risks discussed with patient  I personally reviewed this patient with the CRNA  Discussed and agreed on the Anesthesia Plan with the CRNA  Chay Mueller

## 2023-01-09 NOTE — CASE MANAGEMENT
Case Management Progress Note    Patient name Michell Koch  Location W /W -82 MRN [id number]  : 1945 Date 2023       LOS (days): 1  Geometric Mean LOS (GMLOS) (days): 4 50  Days to GMLOS:3 4        OBJECTIVE:        Current admission status: Inpatient  Preferred Pharmacy:   [facility] [id number] [id number] [place] [address]  Phone: [phone] Fax: [phone]    [facility]/pharmacy #[id number] [address]  Phone: [phone] Fax: [facility] [id number] [place] [address]  Phone: [phone] Fax: [phone]    Primary Care Provider: Funmilayo Rasmussen MD    Primary Insurance: MEDICARE  Secondary Insurance: BLUE CROSS    PROGRESS NOTE:  Per Pt's local pharmacy of [facility], the Pt would have a financial responsibility of $52 60  CM made Dr Bridgette Vogt aware

## 2023-01-09 NOTE — ASSESSMENT & PLAN NOTE
Recent Labs     01/07/23  1251 01/07/23 2026 01/08/23  0533 01/08/23  1017 01/08/23 2031 01/09/23 2017   HGB 11 4*   < > 11 8 12 5 11 2* 11 9   MCV 93  --  92  --   --   --     < > = values in this interval not displayed         Plan:  • Monitor CBC  • Consent for blood products already signed  • Transfuse if Hb < 7

## 2023-01-09 NOTE — ASSESSMENT & PLAN NOTE
On lifelong anticoagulation   Recent supratherapeutic INR in outpatient setting, Warfarin dose adjusted    Plan:  · Hold home warfarin  · Continue to monitor liver enzymes with daily labs  · Switch to Eliquis

## 2023-01-09 NOTE — PROGRESS NOTES
[facility]  Progress Note - Karen Bran [birth year], 65 y o  female MRN: [id number]  Unit/Bed#: W -13 Encounter: [id number]  Primary Care Provider: Adan Cobb MD   Date and time admitted to hospital: 1/7/2023 12:02 PM    * Rectal bleeding  Assessment & Plan  Patient states she has had 3 episodes of rectal bleeding over past 2 days, 1 additional episode in hospital   Describes bleeding as a sudden gush of bright red blood in the toilet while straining for bowel movement  Denies leakage of blood between bowel movements  Denies dark tarry stools  Denies dizziness, lightheadedness, sensation of racing heart, shortness of breath, chest pain, or other new or concerning features  Direct rectal exam in ED revealed mild to moderate amounts of bright red blood per rectum  Patient reports brown loose stools, with no blood today (1/9)    Plan:  · Hold home warfarin  · Clear liquid diet, advance as tolerated, monitoring for active GI bleeding  · H&H every 12 hours  · Type and screen, consent for blood products signed  · Stool record at bedside  · Consider switching to [address] upon discharge  · GI consulted: Suggest source of bleeding likely hemorrhoidal or outlet bleeding, but will proceed with pouchoscopy today (1/9)  · Continue to monitor INR  · Spoke with PCP, no preference for DOAC, can likely switch patient on Eliquis following price check  · If pouchoscopy normal and DOAC approved, can likely D/C patient tomorrow on DOAC      Elevated INR  Assessment & Plan  Lab Results   Component Value Date    INR 1 00 01/08/2023    INR 7 46 (HH) 01/07/2023    INR 3 50 (A) 01/03/2023    PROTIME 13 4 01/08/2023    PROTIME 63 6 (H) 01/07/2023    PROTIME 22 6 (H) 12/19/2022     Patient currently on Coumadin 5 mg daily for portal vein thrombosis  On 1/5, patient has had supratherapeutic INR of 3 5  Patient instructed to take Coumadin 5 mg 3 days, 2 5 mg x 4 days and reassess in 3 weeks    Per patient, she was compliant with above recommendations  INR in ED was 7 46   10 mg vitamin K given in ED  INR has since fallen to 1 00   (1/1)    Plan:  · Hold home Coumadin  · Type and screen  · Consent signed for blood products  · Continue to monitor INR  · Obtain price check for DOAC prior to discharge    Anemia  Assessment & Plan  Recent Labs     01/07/23  1251 01/07/23  2026 01/08/23  0533 01/08/23  1017 01/08/23  2031   HGB 11 4*   < > 11 8 12 5 11 2*   MCV 93  --  92  --   --     < > = values in this interval not displayed  Plan:  • Monitor CBC  • Consent for blood products already signed  • Transfuse if Hb < 7      Hyponatremia  Assessment & Plan  Recent Labs     01/07/23  1251 01/08/23  0533   SODIUM 130* 136     Known issue for this patient  Consistently hyponatremic on prior laboratory studies    Plan:  · Continue with home salt tablets    [address]  Per patient, she has had on and off oral thrush for past several months  Patient uses Breo Ellipta inhaler as an outpatient, has recently run out of nystatin swish and swallow  Very mild oral thrush on physical examination, no impingement on airway    Plan:  · Nystatin swish swallow 4 times daily  · Mouth care after Breo Ellipta use    Mild intermittent asthma without complication  Assessment & Plan  Continue home Breo Ellipta with nystatin swish and swallow    Peripheral neuropathy  Assessment & Plan  Continue with home gabapentin regimen    Acquired hypothyroidism  Assessment & Plan  Patient on stable home dose levothyroxine    Plan:  · Continue home levothyroxine 25 mg every morning    Essential hypertension  Assessment & Plan  Blood pressure stable on home regimen    Blood pressure WNL at time of assessment    Plan:   · Norvasc 2 5 mg every morning  · Cardizem 180 every afternoon  · Cardizem 60 every morning  · Lisinopril 20 mg twice daily    Hyperlipidemia  Assessment & Plan  Continue home statin    Portal vein thrombosis  Assessment & Plan  On lifelong anticoagulation  Recent supratherapeutic INR in outpatient setting, Warfarin dose adjusted    Plan:  · Hold home warfarin  · Continue to monitor liver enzymes with daily labs  · Obtain price check for DOAC prior to discharge          VTE Pharmacologic Prophylaxis: VTE Score: 7 High Risk (Score >/= 5) - Pharmacological DVT Prophylaxis Contraindicated  Sequential Compression Devices Ordered  Patient Centered Rounds: I performed bedside rounds with nursing staff today  Discussions with Specialists or Other Care Team Provider: GI    Education and Discussions with Family / Patient: Patient declined call to   Current Length of Stay: 1 day(s)  Current Patient Status: Inpatient   Discharge Plan: Anticipate discharge tomorrow to home  Code Status: Level 1 - Full Code    Subjective: Today, patient reports no acute overnight events  Patient reports diarrhea of loose brown stools but denies any blood or red streaks  Patient just states that she is hungry due to being n p o  for the past 2 days  Objective:     Vitals:   Temp (24hrs), Av 1 °F (36 7 °C), Min:97 9 °F (36 6 °C), Max:98 3 °F (36 8 °C)    Temp:  [97 9 °F (36 6 °C)-98 3 °F (36 8 °C)] 97 9 °F (36 6 °C)  HR:  [64-68] 68  Resp:  [18] 18  BP: (140-142)/(65-71) 140/70  SpO2:  [93 %-97 %] 97 %  There is no height or weight on file to calculate BMI  Input and Output Summary (last 24 hours): Intake/Output Summary (Last 24 hours) at 2023 1129  Last data filed at 2023 2200  Gross per 24 hour   Intake 360 ml   Output --   Net 360 ml       Physical Exam:   Physical Exam  Vitals and nursing note reviewed  Constitutional:       General: She is not in acute distress  Appearance: She is well-developed  HENT:      Head: Normocephalic and atraumatic  Eyes:      Conjunctiva/sclera: Conjunctivae normal    Cardiovascular:      Rate and Rhythm: Normal rate and regular rhythm        Heart sounds: Normal heart sounds, S1 normal and S2 normal  No murmur heard  Pulmonary:      Effort: Pulmonary effort is normal  No respiratory distress  Breath sounds: Normal breath sounds  Abdominal:      Palpations: Abdomen is soft  Tenderness: There is no abdominal tenderness  Musculoskeletal:         General: No swelling  Cervical back: Neck supple  Skin:     General: Skin is warm and dry  Capillary Refill: Capillary refill takes less than 2 seconds  Neurological:      Mental Status: She is alert  Psychiatric:         Mood and Affect: Mood normal           Additional Data:     Labs:  Results from last 7 days   Lab Units 01/08/23  2031 01/08/23  1017 01/08/23  0533   WBC Thousand/uL  --   --  6 84   HEMOGLOBIN g/dL 11 2*   < > 11 8   HEMATOCRIT %  --   --  34 8   PLATELETS Thousands/uL  --   --  370   NEUTROS PCT %  --   --  62   LYMPHS PCT %  --   --  24   MONOS PCT %  --   --  12   EOS PCT %  --   --  1    < > = values in this interval not displayed       Results from last 7 days   Lab Units 01/08/23  0533 01/07/23  1251   SODIUM mmol/L 136 130*   POTASSIUM mmol/L 3 5 4 2   CHLORIDE mmol/L 99 94*   CO2 mmol/L 30 30   BUN mg/dL 10 16   CREATININE mg/dL 0 64 0 91   ANION GAP mmol/L 7 6   CALCIUM mg/dL 8 7 8 5   ALBUMIN g/dL  --  3 7   TOTAL BILIRUBIN mg/dL  --  0 31   ALK PHOS U/L  --  58   ALT U/L  --  15   AST U/L  --  17   GLUCOSE RANDOM mg/dL 94 133     Results from last 7 days   Lab Units 01/08/23  0534   INR  1 00                   Lines/Drains:  Invasive Devices     Peripheral Intravenous Line  Duration           Peripheral IV 01/07/23 Left Antecubital 1 day                      Imaging: Reviewed radiology reports from this admission including: abdominal/pelvic CT    Recent Cultures (last 7 days):         Last 24 Hours Medication List:   Current Facility-Administered Medications   Medication Dose Route Frequency Provider Last Rate   • acetaminophen  650 mg Oral Q6H [facility] Dilip Rodriguez Jayme Whitlock MD     • albuterol  2 puff Inhalation Q6H PRN Cory Skaggs MD     • amLODIPine  2 5 mg Oral Daily Coyr Skaggs MD     • calcium carbonate  1 tablet Oral Daily With Breakfast Cory Skaggs MD     • co-enzyme Q-10   Oral Daily Cory Skaggs MD     • diltiazem  180 mg Oral Daily With Angie Li MD     • diltiazem  60 mg Oral Daily With Breakfast Cory Skaggs MD     • escitalopram  20 mg Oral Daily Cory Skaggs MD     • fish oil  1,000 mg Oral Daily Cory Skaggs MD     • fluticasone  1 spray Each Nare Daily Cory Skaggs MD     • Fluticasone Furoate-Vilanterol  1 puff Inhalation Daily Cory Skaggs MD     • gabapentin  600 mg Oral TID Cory Skaggs MD     • HYDROcodone-acetaminophen  1 tablet Oral Q6H PRN Cory Skaggs MD     • levothyroxine  50 mcg Oral Daily Cory Skaggs MD     • lisinopril  20 mg Oral BID Cory Skaggs MD     • loperamide  2 mg Oral 4x Daily PRN Cory Skaggs MD     • loratadine  5 mg Oral Daily Cory Skaggs MD     • LORazepam  1 mg Oral Q6H PRN Cory Skaggs MD     • magnesium gluconate  250 mg Oral BID AC Cory Skaggs MD     • meclizine  25 mg Oral Q6H PRN Cory Skaggs MD     • multivitamin-minerals  1 tablet Oral Daily Cory Skaggs MD     • nystatin  500,000 Units Swish & Swallow 4x Daily Cory Skaggs MD     • potassium chloride  20 mEq Oral Daily Meliton Luz MD     • pravastatin  10 mg Oral Daily With Angie Li MD     • sodium chloride  1 g Oral TID Cory Skaggs MD     • torsemide  10 mg Oral Daily PRN Cory Skaggs MD          Today, Patient Was Seen By: Keyana Gonsales MD    **Please Note: This note may have been constructed using a voice recognition system  **

## 2023-01-09 NOTE — PLAN OF CARE
Problem: Potential for Falls  Goal: Patient will remain free of falls  Description: INTERVENTIONS:  - Educate patient/family on patient safety including physical limitations  - Instruct patient to call for assistance with activity   - Consult OT/PT to assist with strengthening/mobility   - Keep Call bell within reach  - Keep bed low and locked with side rails adjusted as appropriate  - Keep care items and personal belongings within reach  - Initiate and maintain comfort rounds  - Make Fall Risk Sign visible to staff  - Offer Toileting every 2 Hours, in advance of need  - Initiate/Maintain alarm  - Obtain necessary fall risk management equipment  - Apply yellow socks and bracelet for high fall risk patients  - Consider moving patient to room near nurses station  Outcome: Progressing     Problem: PAIN - ADULT  Goal: Verbalizes/displays adequate comfort level or baseline comfort level  Description: Interventions:  - Encourage patient to monitor pain and request assistance  - Assess pain using appropriate pain scale  - Administer analgesics based on type and severity of pain and evaluate response  - Implement non-pharmacological measures as appropriate and evaluate response  - Consider cultural and social influences on pain and pain management  - Notify physician/advanced practitioner if interventions unsuccessful or patient reports new pain  Outcome: Progressing     Problem: INFECTION - ADULT  Goal: Absence or prevention of progression during hospitalization  Description: INTERVENTIONS:  - Assess and monitor for signs and symptoms of infection  - Monitor lab/diagnostic results  - Monitor all insertion sites, i e  indwelling lines, tubes, and drains  - Monitor endotracheal if appropriate and nasal secretions for changes in amount and color  - [place] appropriate cooling/warming therapies per order  - Administer medications as ordered  - Instruct and encourage patient and family to use good hand hygiene technique  - Identify and instruct in appropriate isolation precautions for identified infection/condition  Outcome: Progressing  Goal: Absence of fever/infection during neutropenic period  Description: INTERVENTIONS:  - Monitor WBC    Outcome: Progressing     Problem: SAFETY ADULT  Goal: Patient will remain free of falls  Description: INTERVENTIONS:  - Educate patient/family on patient safety including physical limitations  - Instruct patient to call for assistance with activity   - Consult OT/PT to assist with strengthening/mobility   - Keep Call bell within reach  - Keep bed low and locked with side rails adjusted as appropriate  - Keep care items and personal belongings within reach  - Initiate and maintain comfort rounds  - Make Fall Risk Sign visible to staff  - Offer Toileting every 2 Hours, in advance of need  - Initiate/Maintain alarm  - Obtain necessary fall risk management equipment:  - Apply yellow socks and bracelet for high fall risk patients  - Consider moving patient to room near nurses station  Outcome: Progressing  Goal: Maintain or return to baseline ADL function  Description: INTERVENTIONS:  -  Assess patient's ability to carry out ADLs; assess patient's baseline for ADL function and identify physical deficits which impact ability to perform ADLs (bathing, care of mouth/teeth, toileting, grooming, dressing, etc )  - Assess/evaluate cause of self-care deficits   - Assess range of motion  - Assess patient's mobility; develop plan if impaired  - Assess patient's need for assistive devices and provide as appropriate  - Encourage maximum independence but intervene and supervise when necessary  - Involve family in performance of ADLs  - Assess for home care needs following discharge   - Consider OT consult to assist with ADL evaluation and planning for discharge  - Provide patient education as appropriate  Outcome: Progressing  Goal: Maintains/Returns to pre admission functional level  Description: INTERVENTIONS:  - Perform BMAT or MOVE assessment daily    - Set and communicate daily mobility goal to care team and patient/family/caregiver  - Collaborate with rehabilitation services on mobility goals if consulted  - Perform Range of Motion 3 times a day  - Reposition patient every 2 hours  - Dangle patient 3 times a day  - Stand patient 3 times a day  - Ambulate patient 3 times a day  - Out of bed to chair 3 times a day   - Out of bed for meals 3 times a day  - Out of bed for toileting  - Record patient progress and toleration of activity level   Outcome: Progressing     Problem: DISCHARGE PLANNING  Goal: Discharge to home or other facility with appropriate resources  Description: INTERVENTIONS:  - Identify barriers to discharge w/patient and caregiver  - Arrange for needed discharge resources and transportation as appropriate  - Identify discharge learning needs (meds, wound care, etc )  - Arrange for interpretive services to assist at discharge as needed  - Refer to Case Management Department for coordinating discharge planning if the patient needs post-hospital services based on physician/advanced practitioner order or complex needs related to functional status, cognitive ability, or social support system  Outcome: Progressing     Problem: Knowledge Deficit  Goal: Patient/family/caregiver demonstrates understanding of disease process, treatment plan, medications, and discharge instructions  Description: Complete learning assessment and assess knowledge base    Interventions:  - Provide teaching at level of understanding  - Provide teaching via preferred learning methods  Outcome: Progressing     Problem: CARDIOVASCULAR - ADULT  Goal: Maintains optimal cardiac output and hemodynamic stability  Description: INTERVENTIONS:  - Monitor I/O, vital signs and rhythm  - Monitor for S/S and trends of decreased cardiac output  - Administer and titrate ordered vasoactive medications to optimize hemodynamic stability  - Assess quality of pulses, skin color and temperature  - Assess for signs of decreased coronary artery perfusion  - Instruct patient to report change in severity of symptoms  Outcome: Progressing  Goal: Absence of cardiac dysrhythmias or at baseline rhythm  Description: INTERVENTIONS:  - Continuous cardiac monitoring, vital signs, obtain 12 lead EKG if ordered  - Administer antiarrhythmic and heart rate control medications as ordered  - Monitor electrolytes and administer replacement therapy as ordered  Outcome: Progressing     Problem: HEMATOLOGIC - ADULT  Goal: Maintains hematologic stability  Description: INTERVENTIONS  - Assess for signs and symptoms of bleeding or hemorrhage  - Monitor labs  - Administer supportive blood products/factors as ordered and appropriate  Outcome: Progressing

## 2023-01-09 NOTE — ASSESSMENT & PLAN NOTE
Lab Results   Component Value Date    INR 0 99 01/10/2023    INR 1 00 01/08/2023    INR 7 46 (HH) 01/07/2023    PROTIME 13 3 01/10/2023    PROTIME 13 4 01/08/2023    PROTIME 63 6 (H) 01/07/2023     Patient currently on Coumadin 5 mg daily for portal vein thrombosis  On 1/5, patient has had supratherapeutic INR of 3 5  Patient instructed to take Coumadin 5 mg 3 days, 2 5 mg x 4 days and reassess in 3 weeks  Per patient, she was compliant with above recommendations  INR in ED was 7 46   10 mg vitamin K given in ED   INR has since fallen to 1 00   (1/1)    Plan:  · Hold home Coumadin  · Type and screen  · Consent signed for blood products  · Continue to monitor INR  · Switch to Eliquis

## 2023-01-09 NOTE — ASSESSMENT & PLAN NOTE
Recent Labs     01/07/23  1251 01/08/23  0534 01/10/23  0545   INR 7 46* 1 00 0 99     Prior to arrival patient's warfarin was supratherapeutic at 3 5  Patient's was told to taper her warfarin by PCP over the course of several weeks but came into the hospital 4 days into taper secondary to rectal bleeding  POA INR was 7 46  Patient was treated with 10 mg IV vitamin K  Repeat INR was 1 00   Her warfarin was held while hospitalized     Plan:  Switch to Eliquis

## 2023-01-09 NOTE — ASSESSMENT & PLAN NOTE
Per patient, she has had on and off oral thrush for past several months  Patient uses Breo Ellipta inhaler as an outpatient, has recently run out of nystatin swish and swallow   Very mild oral thrush on physical examination, no impingement on airway    Plan:  · Nystatin swish swallow 4 times daily  · Mouth care after [facility] use

## 2023-01-09 NOTE — ASSESSMENT & PLAN NOTE
Recent Labs     01/07/23  1251 01/08/23  0533 01/10/23  0545   SODIUM 130* 136 134*     Known issue for this patient    Consistently hyponatremic on prior laboratory studies    Plan:  · Continue with home salt tablets

## 2023-01-09 NOTE — ANESTHESIA POSTPROCEDURE EVALUATION
Post-Op Assessment Note    CV Status:  Stable    Pain management: adequate     Mental Status:  Sleepy   Hydration Status:  Euvolemic   PONV Controlled:  Controlled   Airway Patency:  Patent      Post Op Vitals Reviewed: Yes      Staff: CRNA         No notable events documented      BP   98/51   Temp      Pulse  55   Resp      SpO2   99

## 2023-01-09 NOTE — ASSESSMENT & PLAN NOTE
Patient states she has had 3 episodes of rectal bleeding over past 2 days, 1 additional episode in hospital   Describes bleeding as a sudden gush of bright red blood in the toilet while straining for bowel movement  Denies leakage of blood between bowel movements  Denies dark tarry stools  Denies dizziness, lightheadedness, sensation of racing heart, shortness of breath, chest pain, or other new or concerning features  Direct rectal exam in ED revealed mild to moderate amounts of bright red blood per rectum  Patient reports brown loose stools, with no blood today (1/9)    Plan:  · Hold home warfarin  · Clear liquid diet, advance as tolerated, monitoring for active GI bleeding  · H&H every 12 hours  · Type and screen, consent for blood products signed  · Stool record at bedside  · Consider switching to [address] upon discharge  · GI consulted: Suggest source of bleeding likely hemorrhoidal or outlet bleeding, but will proceed with pouchoscopy   · Pouchoscopy shows 1 cm superficial ulcer w/ no evidence of active bleeding    · Pt agrees with switching to Eliquis for anticoagulation following price check  · Can d/c pt today on Eliquis

## 2023-01-10 VITALS
DIASTOLIC BLOOD PRESSURE: 68 MMHG | WEIGHT: 154 LBS | HEART RATE: 69 BPM | HEIGHT: 63 IN | BODY MASS INDEX: 27.29 KG/M2 | TEMPERATURE: 98.1 F | SYSTOLIC BLOOD PRESSURE: 135 MMHG | RESPIRATION RATE: 17 BRPM | OXYGEN SATURATION: 96 %

## 2023-01-10 PROBLEM — R79.1 ELEVATED INR: Status: RESOLVED | Noted: 2023-01-07 | Resolved: 2023-01-10

## 2023-01-10 PROBLEM — K62.5 RECTAL BLEEDING: Status: RESOLVED | Noted: 2023-01-07 | Resolved: 2023-01-10

## 2023-01-10 LAB
ALBUMIN SERPL BCP-MCNC: 3.7 G/DL (ref 3.5–5)
ALP SERPL-CCNC: 49 U/L (ref 34–104)
ALT SERPL W P-5'-P-CCNC: 14 U/L (ref 7–52)
ANION GAP SERPL CALCULATED.3IONS-SCNC: 7 MMOL/L (ref 4–13)
AST SERPL W P-5'-P-CCNC: 16 U/L (ref 13–39)
BILIRUB SERPL-MCNC: 0.72 MG/DL (ref 0.2–1)
BUN SERPL-MCNC: 10 MG/DL (ref 5–25)
CALCIUM SERPL-MCNC: 8.9 MG/DL (ref 8.4–10.2)
CHLORIDE SERPL-SCNC: 100 MMOL/L (ref 96–108)
CO2 SERPL-SCNC: 27 MMOL/L (ref 21–32)
CREAT SERPL-MCNC: 0.56 MG/DL (ref 0.6–1.3)
GFR SERPL CREATININE-BSD FRML MDRD: 89 ML/MIN/1.73SQ M
GLUCOSE SERPL-MCNC: 132 MG/DL (ref 65–140)
HGB BLD-MCNC: 12.7 G/DL (ref 11.5–15.4)
INR PPP: 0.99 (ref 0.84–1.19)
POTASSIUM SERPL-SCNC: 4.1 MMOL/L (ref 3.5–5.3)
PROT SERPL-MCNC: 6 G/DL (ref 6.4–8.4)
PROTHROMBIN TIME: 13.3 SECONDS (ref 11.6–14.5)
SODIUM SERPL-SCNC: 134 MMOL/L (ref 135–147)

## 2023-01-10 RX ADMIN — AMLODIPINE BESYLATE 2.5 MG: 2.5 TABLET ORAL at 08:38

## 2023-01-10 RX ADMIN — APIXABAN 5 MG: 5 TABLET, FILM COATED ORAL at 08:38

## 2023-01-10 RX ADMIN — Medication 30 MG: at 08:38

## 2023-01-10 RX ADMIN — ESCITALOPRAM OXALATE 20 MG: 20 TABLET ORAL at 08:38

## 2023-01-10 RX ADMIN — LOPERAMIDE HYDROCHLORIDE 2 MG: 2 CAPSULE ORAL at 11:42

## 2023-01-10 RX ADMIN — CALCIUM 1 TABLET: 500 TABLET ORAL at 08:38

## 2023-01-10 RX ADMIN — POTASSIUM CHLORIDE 20 MEQ: 1500 TABLET, EXTENDED RELEASE ORAL at 08:38

## 2023-01-10 RX ADMIN — LEVOTHYROXINE SODIUM 50 MCG: 50 TABLET ORAL at 08:38

## 2023-01-10 RX ADMIN — OMEGA-3 FATTY ACIDS CAP 1000 MG 1000 MG: 1000 CAP at 08:38

## 2023-01-10 RX ADMIN — LOPERAMIDE HYDROCHLORIDE 2 MG: 2 CAPSULE ORAL at 07:20

## 2023-01-10 RX ADMIN — NYSTATIN 500000 UNITS: 100000 SUSPENSION ORAL at 11:42

## 2023-01-10 RX ADMIN — Medication 250 MG: at 08:38

## 2023-01-10 RX ADMIN — SODIUM CHLORIDE 1 G: 1 TABLET ORAL at 08:38

## 2023-01-10 RX ADMIN — LORAZEPAM 1 MG: 1 TABLET ORAL at 10:19

## 2023-01-10 RX ADMIN — LISINOPRIL 20 MG: 20 TABLET ORAL at 08:38

## 2023-01-10 RX ADMIN — MULTIPLE VITAMINS W/ MINERALS TAB 1 TABLET: TAB ORAL at 08:38

## 2023-01-10 RX ADMIN — FLUTICASONE FUROATE AND VILANTEROL TRIFENATATE 1 PUFF: 100; 25 POWDER RESPIRATORY (INHALATION) at 08:40

## 2023-01-10 RX ADMIN — DILTIAZEM HYDROCHLORIDE 60 MG: 60 TABLET, FILM COATED ORAL at 07:20

## 2023-01-10 RX ADMIN — GABAPENTIN 600 MG: 300 CAPSULE ORAL at 08:38

## 2023-01-10 RX ADMIN — FLUTICASONE PROPIONATE 1 SPRAY: 50 SPRAY, METERED NASAL at 08:40

## 2023-01-10 RX ADMIN — LOPERAMIDE HYDROCHLORIDE 2 MG: 2 CAPSULE ORAL at 01:11

## 2023-01-10 RX ADMIN — NYSTATIN 500000 UNITS: 100000 SUSPENSION ORAL at 08:38

## 2023-01-10 RX ADMIN — LORATADINE 5 MG: 10 TABLET ORAL at 08:38

## 2023-01-10 NOTE — DISCHARGE INSTR - AVS FIRST PAGE
Dear Parveen Levy,     It was our pleasure to care for you here at [facility]  It is our hope that we were always able to exceed the expected standards for your care during your stay  You were hospitalized due to rectal bleeding  You were cared for on the Med Surg floor by Georgie Florez MD under the service of Reuben James MD with the [facility] who covers for your primary care physician (PCP), Calvin Oliveira MD, while you were hospitalized  If you have any questions or concerns related to this hospitalization, you may contact us at [phone]  For follow up as well as any medication refills, we recommend that you follow up with your primary care physician  A registered nurse will reach out to you by phone within a few days after your discharge to answer any additional questions that you may have after going home  However, at this time we provide for you here, the most important instructions / recommendations at discharge:     Notable Medication Adjustments -   Take Eliquis (apixaban) 5 mg twice a day, for portal vein thrombosis anticoagulation  Discontinue warfarin  Continue all other medications as previously prescribed  Testing Required after Discharge -   None  Important follow up information -   Follow-up with your PCP in 1-2 weeks, for sigmoidoscopy biopsy results and general follow-up  Other Instructions -   None  Please review this entire after visit summary as additional general instructions including medication list, appointments, activity, diet, any pertinent wound care, and other additional recommendations from your care team that may be provided for you        Sincerely,     Georgie Florez MD

## 2023-01-10 NOTE — PLAN OF CARE
Problem: Potential for Falls  Goal: Patient will remain free of falls  Description: INTERVENTIONS:  - Educate patient/family on patient safety including physical limitations  - Instruct patient to call for assistance with activity   - Consult OT/PT to assist with strengthening/mobility   - Keep Call bell within reach  - Keep bed low and locked with side rails adjusted as appropriate  - Keep care items and personal belongings within reach  - Initiate and maintain comfort rounds  - Make Fall Risk Sign visible to staff  - Offer Toileting every 2 Hours, in advance of need  - Initiate/Maintain alarm  - Obtain necessary fall risk management equipment  - Apply yellow socks and bracelet for high fall risk patients  - Consider moving patient to room near nurses station  Outcome: Progressing     Problem: PAIN - ADULT  Goal: Verbalizes/displays adequate comfort level or baseline comfort level  Description: Interventions:  - Encourage patient to monitor pain and request assistance  - Assess pain using appropriate pain scale  - Administer analgesics based on type and severity of pain and evaluate response  - Implement non-pharmacological measures as appropriate and evaluate response  - Consider cultural and social influences on pain and pain management  - Notify physician/advanced practitioner if interventions unsuccessful or patient reports new pain  Outcome: Progressing     Problem: INFECTION - ADULT  Goal: Absence or prevention of progression during hospitalization  Description: INTERVENTIONS:  - Assess and monitor for signs and symptoms of infection  - Monitor lab/diagnostic results  - Monitor all insertion sites, i e  indwelling lines, tubes, and drains  - Monitor endotracheal if appropriate and nasal secretions for changes in amount and color  - [place] appropriate cooling/warming therapies per order  - Administer medications as ordered  - Instruct and encourage patient and family to use good hand hygiene technique  - Identify and instruct in appropriate isolation precautions for identified infection/condition  Outcome: Progressing  Goal: Absence of fever/infection during neutropenic period  Description: INTERVENTIONS:  - Monitor WBC    Outcome: Progressing     Problem: SAFETY ADULT  Goal: Patient will remain free of falls  Description: INTERVENTIONS:  - Educate patient/family on patient safety including physical limitations  - Instruct patient to call for assistance with activity   - Consult OT/PT to assist with strengthening/mobility   - Keep Call bell within reach  - Keep bed low and locked with side rails adjusted as appropriate  - Keep care items and personal belongings within reach  - Initiate and maintain comfort rounds  - Make Fall Risk Sign visible to staff  - Offer Toileting every 2 Hours, in advance of need  - Initiate/Maintain alarm  - Obtain necessary fall risk management equipment  - Apply yellow socks and bracelet for high fall risk patients  - Consider moving patient to room near nurses station  Outcome: Progressing  Goal: Maintain or return to baseline ADL function  Description: INTERVENTIONS:  -  Assess patient's ability to carry out ADLs; assess patient's baseline for ADL function and identify physical deficits which impact ability to perform ADLs (bathing, care of mouth/teeth, toileting, grooming, dressing, etc )  - Assess/evaluate cause of self-care deficits   - Assess range of motion  - Assess patient's mobility; develop plan if impaired  - Assess patient's need for assistive devices and provide as appropriate  - Encourage maximum independence but intervene and supervise when necessary  - Involve family in performance of ADLs  - Assess for home care needs following discharge   - Consider OT consult to assist with ADL evaluation and planning for discharge  - Provide patient education as appropriate  Outcome: Progressing  Goal: Maintains/Returns to pre admission functional level  Description: INTERVENTIONS:  - Perform BMAT or MOVE assessment daily    - Set and communicate daily mobility goal to care team and patient/family/caregiver  - Collaborate with rehabilitation services on mobility goals if consulted  - Perform Range of Motion 3 times a day  - Reposition patient every 2 hours  - Dangle patient 3 times a day  - Stand patient 3 times a day  - Ambulate patient 3 times a day  - Out of bed to chair 3 times a day   - Out of bed for meals 3 times a day  - Out of bed for toileting  - Record patient progress and toleration of activity level   Outcome: Progressing     Problem: DISCHARGE PLANNING  Goal: Discharge to home or other facility with appropriate resources  Description: INTERVENTIONS:  - Identify barriers to discharge w/patient and caregiver  - Arrange for needed discharge resources and transportation as appropriate  - Identify discharge learning needs (meds, wound care, etc )  - Arrange for interpretive services to assist at discharge as needed  - Refer to Case Management Department for coordinating discharge planning if the patient needs post-hospital services based on physician/advanced practitioner order or complex needs related to functional status, cognitive ability, or social support system  Outcome: Progressing     Problem: Knowledge Deficit  Goal: Patient/family/caregiver demonstrates understanding of disease process, treatment plan, medications, and discharge instructions  Description: Complete learning assessment and assess knowledge base    Interventions:  - Provide teaching at level of understanding  - Provide teaching via preferred learning methods  Outcome: Progressing     Problem: CARDIOVASCULAR - ADULT  Goal: Maintains optimal cardiac output and hemodynamic stability  Description: INTERVENTIONS:  - Monitor I/O, vital signs and rhythm  - Monitor for S/S and trends of decreased cardiac output  - Administer and titrate ordered vasoactive medications to optimize hemodynamic stability  - Assess quality of pulses, skin color and temperature  - Assess for signs of decreased coronary artery perfusion  - Instruct patient to report change in severity of symptoms  Outcome: Progressing  Goal: Absence of cardiac dysrhythmias or at baseline rhythm  Description: INTERVENTIONS:  - Continuous cardiac monitoring, vital signs, obtain 12 lead EKG if ordered  - Administer antiarrhythmic and heart rate control medications as ordered  - Monitor electrolytes and administer replacement therapy as ordered  Outcome: Progressing     Problem: HEMATOLOGIC - ADULT  Goal: Maintains hematologic stability  Description: INTERVENTIONS  - Assess for signs and symptoms of bleeding or hemorrhage  - Monitor labs  - Administer supportive blood products/factors as ordered and appropriate  Outcome: Progressing

## 2023-01-10 NOTE — PLAN OF CARE
Problem: Potential for Falls  Goal: Patient will remain free of falls  Description: INTERVENTIONS:  - Educate patient/family on patient safety including physical limitations  - Instruct patient to call for assistance with activity   - Consult OT/PT to assist with strengthening/mobility   - Keep Call bell within reach  - Keep bed low and locked with side rails adjusted as appropriate  - Keep care items and personal belongings within reach  - Initiate and maintain comfort rounds  - Make Fall Risk Sign visible to staff  - Offer Toileting every 2 Hours, in advance of need  - Initiate/Maintain alarm  - Obtain necessary fall risk management equipment  - Apply yellow socks and bracelet for high fall risk patients  - Consider moving patient to room near nurses station  1/10/2023 1200 by Libby Nunez  Outcome: Adequate for Discharge  1/10/2023 1031 by Libby Nunez  Outcome: Progressing     Problem: PAIN - ADULT  Goal: Verbalizes/displays adequate comfort level or baseline comfort level  Description: Interventions:  - Encourage patient to monitor pain and request assistance  - Assess pain using appropriate pain scale  - Administer analgesics based on type and severity of pain and evaluate response  - Implement non-pharmacological measures as appropriate and evaluate response  - Consider cultural and social influences on pain and pain management  - Notify physician/advanced practitioner if interventions unsuccessful or patient reports new pain  1/10/2023 1200 by Libby Nunez  Outcome: Adequate for Discharge  1/10/2023 1031 by Libby Nunez  Outcome: Progressing     Problem: INFECTION - ADULT  Goal: Absence or prevention of progression during hospitalization  Description: INTERVENTIONS:  - Assess and monitor for signs and symptoms of infection  - Monitor lab/diagnostic results  - Monitor all insertion sites, i e  indwelling lines, tubes, and drains  - Monitor endotracheal if appropriate and nasal secretions for changes in amount and color  - [place] appropriate cooling/warming therapies per order  - Administer medications as ordered  - Instruct and encourage patient and family to use good hand hygiene technique  - Identify and instruct in appropriate isolation precautions for identified infection/condition  1/10/2023 1200 by Mayelin Pablo  Outcome: Adequate for Discharge  1/10/2023 1031 by Mayelin Pablo  Outcome: Progressing  Goal: Absence of fever/infection during neutropenic period  Description: INTERVENTIONS:  - Monitor WBC    1/10/2023 1200 by Mayelin Pablo  Outcome: Adequate for Discharge  1/10/2023 1031 by Mayelin Pablo  Outcome: Progressing     Problem: SAFETY ADULT  Goal: Patient will remain free of falls  Description: INTERVENTIONS:  - Educate patient/family on patient safety including physical limitations  - Instruct patient to call for assistance with activity   - Consult OT/PT to assist with strengthening/mobility   - Keep Call bell within reach  - Keep bed low and locked with side rails adjusted as appropriate  - Keep care items and personal belongings within reach  - Initiate and maintain comfort rounds  - Make Fall Risk Sign visible to staff  - Offer Toileting every 2 Hours, in advance of need  - Initiate/Maintain alarm  - Obtain necessary fall risk management equipment  - Apply yellow socks and bracelet for high fall risk patients  - Consider moving patient to room near nurses station  1/10/2023 1200 by Mayelin Pablo  Outcome: Adequate for Discharge  1/10/2023 1031 by Mayelin Pablo  Outcome: Progressing  Goal: Maintain or return to baseline ADL function  Description: INTERVENTIONS:  -  Assess patient's ability to carry out ADLs; assess patient's baseline for ADL function and identify physical deficits which impact ability to perform ADLs (bathing, care of mouth/teeth, toileting, grooming, dressing, etc )  - Assess/evaluate cause of self-care deficits   - Assess range of motion  - Assess patient's mobility; develop plan if impaired  - Assess patient's need for assistive devices and provide as appropriate  - Encourage maximum independence but intervene and supervise when necessary  - Involve family in performance of ADLs  - Assess for home care needs following discharge   - Consider OT consult to assist with ADL evaluation and planning for discharge  - Provide patient education as appropriate  1/10/2023 1200 by Libby Nunez  Outcome: Adequate for Discharge  1/10/2023 1031 by Libby Nunez  Outcome: Progressing  Goal: Maintains/Returns to pre admission functional level  Description: INTERVENTIONS:  - Perform BMAT or MOVE assessment daily    - Set and communicate daily mobility goal to care team and patient/family/caregiver  - Collaborate with rehabilitation services on mobility goals if consulted  - Perform Range of Motion 3 times a day  - Reposition patient every 2 hours    - Dangle patient 3 times a day  - Stand patient 3 times a day  - Ambulate patient 3 times a day  - Out of bed to chair 3 times a day   - Out of bed for meals 3 times a day  - Out of bed for toileting  - Record patient progress and toleration of activity level   1/10/2023 1200 by Libby Nunez  Outcome: Adequate for Discharge  1/10/2023 1031 by Libby Nunez  Outcome: Progressing     Problem: DISCHARGE PLANNING  Goal: Discharge to home or other facility with appropriate resources  Description: INTERVENTIONS:  - Identify barriers to discharge w/patient and caregiver  - Arrange for needed discharge resources and transportation as appropriate  - Identify discharge learning needs (meds, wound care, etc )  - Arrange for interpretive services to assist at discharge as needed  - Refer to Case Management Department for coordinating discharge planning if the patient needs post-hospital services based on physician/advanced practitioner order or complex needs related to functional status, cognitive ability, or social support system  1/10/2023 1200 by Libby Nunez  Outcome: Adequate for Discharge  1/10/2023 1031 by Roseann Romero  Outcome: Progressing     Problem: Knowledge Deficit  Goal: Patient/family/caregiver demonstrates understanding of disease process, treatment plan, medications, and discharge instructions  Description: Complete learning assessment and assess knowledge base    Interventions:  - Provide teaching at level of understanding  - Provide teaching via preferred learning methods  1/10/2023 1200 by Roseann Romero  Outcome: Adequate for Discharge  1/10/2023 1031 by Roseann Romero  Outcome: Progressing     Problem: CARDIOVASCULAR - ADULT  Goal: Maintains optimal cardiac output and hemodynamic stability  Description: INTERVENTIONS:  - Monitor I/O, vital signs and rhythm  - Monitor for S/S and trends of decreased cardiac output  - Administer and titrate ordered vasoactive medications to optimize hemodynamic stability  - Assess quality of pulses, skin color and temperature  - Assess for signs of decreased coronary artery perfusion  - Instruct patient to report change in severity of symptoms  1/10/2023 1200 by Roseann Romero  Outcome: Adequate for Discharge  1/10/2023 1031 by Roseann Romero  Outcome: Progressing  Goal: Absence of cardiac dysrhythmias or at baseline rhythm  Description: INTERVENTIONS:  - Continuous cardiac monitoring, vital signs, obtain 12 lead EKG if ordered  - Administer antiarrhythmic and heart rate control medications as ordered  - Monitor electrolytes and administer replacement therapy as ordered  1/10/2023 1200 by Roseann Romero  Outcome: Adequate for Discharge  1/10/2023 1031 by Roseann Romero  Outcome: Progressing     Problem: HEMATOLOGIC - ADULT  Goal: Maintains hematologic stability  Description: INTERVENTIONS  - Assess for signs and symptoms of bleeding or hemorrhage  - Monitor labs  - Administer supportive blood products/factors as ordered and appropriate  1/10/2023 1200 by Roseann Romero  Outcome: Adequate for Discharge  1/10/2023 1031 by Roseann Romero  Outcome: Progressing

## 2023-01-10 NOTE — PROGRESS NOTES
Progress Note - Pj Hwang 65 y o  female MRN: [id number]    Unit/Bed#: W -01 Encounter: [id number]    Assessment and Plan: Active Problems:    Portal vein thrombosis    Hyperlipidemia    Essential hypertension    Acquired hypothyroidism    Peripheral neuropathy    Mild intermittent asthma without complication    Hyponatremia    Thrush    Warfarin-induced coagulopathy ([facility] [address] )    #1  Rectal bleeding: likely bleeding from anastomotic ulcer noted on ileoscopy yesterday in the setting of elevated INR  No further bleeding, no signs of blood on scope, s/p bx  -will follow up bx  -can resume anticoagulation, is being switched to Eliquis  -diet as tolerated  -can use imodium as needed for diarrhea in the setting of colectomy   -plans for d/c today     ----------------------------------------------------------------------------------------------------------------    Subjective:     No abdominal pain, had some nonbloody diarrhea  Eating well    Objective:     Vitals: Blood pressure 135/68, pulse 69, temperature 98 1 °F (36 7 °C), temperature source Oral, resp  rate 17, height 5' 3" (1 6 m), weight 69 9 kg (154 lb), SpO2 96 %  ,Body mass index is 27 28 kg/m²        Intake/Output Summary (Last 24 hours) at 1/10/2023 1132  Last data filed at 1/9/2023 1312  Gross per 24 hour   Intake 200 ml   Output --   Net 200 ml       Physical Exam:     General Appearance: Alert, appears stated age and cooperative  Lungs: Clear to auscultation bilaterally, no rales or rhonchi, no labored breathing/accessory muscle use  Heart: Regular rate and rhythm, S1, S2 normal, no murmur, click, rub or gallop  Abdomen: Soft, non-tender, non-distended; bowel sounds normal; no masses or no organomegaly  Extremities: No cyanosis, clubbing, or edema    Invasive Devices     Peripheral Intravenous Line  Duration           Peripheral IV 01/07/23 Left Antecubital 2 days                Lab Results:  Results from last 7 days   Lab Units 01/10/23  2814 01/08/23  1017 01/08/23  0533   WBC Thousand/uL  --   --  6 84   HEMOGLOBIN g/dL 12 7   < > 11 8   HEMATOCRIT %  --   --  34 8   PLATELETS Thousands/uL  --   --  370   NEUTROS PCT %  --   --  62   LYMPHS PCT %  --   --  24   MONOS PCT %  --   --  12   EOS PCT %  --   --  1    < > = values in this interval not displayed  Results from last 7 days   Lab Units 01/10/23  0545   POTASSIUM mmol/L 4 1   CHLORIDE mmol/L 100   CO2 mmol/L 27   BUN mg/dL 10   CREATININE mg/dL 0 56*   CALCIUM mg/dL 8 9   ALK PHOS U/L 49   ALT U/L 14   AST U/L 16     Invalid input(s): BILI  Results from last 7 days   Lab Units 01/10/23  0545   INR  0 99           Imaging Studies: I have personally reviewed pertinent imaging studies  CT abdomen pelvis with contrast    Result Date: 1/7/2023  Impression: 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

## 2023-01-11 ENCOUNTER — TRANSITIONAL CARE MANAGEMENT (OUTPATIENT)
Dept: INTERNAL MEDICINE CLINIC | Facility: CLINIC | Age: 78
End: 2023-01-11

## 2023-01-12 ENCOUNTER — TELEPHONE (OUTPATIENT)
Dept: OBGYN CLINIC | Facility: OTHER | Age: 78
End: 2023-01-12

## 2023-01-12 ENCOUNTER — TELEPHONE (OUTPATIENT)
Dept: OBGYN CLINIC | Facility: HOSPITAL | Age: 78
End: 2023-01-12

## 2023-01-12 ENCOUNTER — OFFICE VISIT (OUTPATIENT)
Dept: INTERNAL MEDICINE CLINIC | Facility: CLINIC | Age: 78
End: 2023-01-12

## 2023-01-12 ENCOUNTER — APPOINTMENT (OUTPATIENT)
Dept: RADIOLOGY | Age: 78
End: 2023-01-12
Attending: PHYSICIAN ASSISTANT

## 2023-01-12 ENCOUNTER — OFFICE VISIT (OUTPATIENT)
Dept: URGENT CARE | Age: 78
End: 2023-01-12

## 2023-01-12 VITALS
DIASTOLIC BLOOD PRESSURE: 60 MMHG | HEIGHT: 63 IN | OXYGEN SATURATION: 99 % | HEART RATE: 66 BPM | SYSTOLIC BLOOD PRESSURE: 133 MMHG | BODY MASS INDEX: 27.29 KG/M2 | WEIGHT: 154 LBS | RESPIRATION RATE: 16 BRPM | TEMPERATURE: 97.8 F

## 2023-01-12 VITALS
OXYGEN SATURATION: 96 % | BODY MASS INDEX: 26.93 KG/M2 | SYSTOLIC BLOOD PRESSURE: 138 MMHG | WEIGHT: 152 LBS | DIASTOLIC BLOOD PRESSURE: 78 MMHG | TEMPERATURE: 97.9 F | HEART RATE: 91 BPM | HEIGHT: 63 IN

## 2023-01-12 DIAGNOSIS — S52.592D OTHER CLOSED FRACTURE OF DISTAL END OF LEFT RADIUS WITH ROUTINE HEALING, SUBSEQUENT ENCOUNTER: ICD-10-CM

## 2023-01-12 DIAGNOSIS — F33.1 MODERATE EPISODE OF RECURRENT MAJOR DEPRESSIVE DISORDER (HCC): ICD-10-CM

## 2023-01-12 DIAGNOSIS — I10 ESSENTIAL HYPERTENSION: ICD-10-CM

## 2023-01-12 DIAGNOSIS — S40.012A CONTUSION OF LEFT SHOULDER, INITIAL ENCOUNTER: ICD-10-CM

## 2023-01-12 DIAGNOSIS — T45.515A WARFARIN-INDUCED COAGULOPATHY (HCC): ICD-10-CM

## 2023-01-12 DIAGNOSIS — E22.2 SIADH (SYNDROME OF INAPPROPRIATE ADH PRODUCTION) (HCC): ICD-10-CM

## 2023-01-12 DIAGNOSIS — F41.9 ANXIETY: ICD-10-CM

## 2023-01-12 DIAGNOSIS — S60.212A CONTUSION OF LEFT WRIST, INITIAL ENCOUNTER: ICD-10-CM

## 2023-01-12 DIAGNOSIS — B37.0 THRUSH: ICD-10-CM

## 2023-01-12 DIAGNOSIS — S52.502A CLOSED TRAUMATIC NONDISPLACED FRACTURE OF DISTAL END OF LEFT RADIUS, INITIAL ENCOUNTER: Primary | ICD-10-CM

## 2023-01-12 DIAGNOSIS — K62.5 RECTAL BLEEDING: Primary | ICD-10-CM

## 2023-01-12 DIAGNOSIS — J45.20 MILD INTERMITTENT ASTHMA WITHOUT COMPLICATION: ICD-10-CM

## 2023-01-12 DIAGNOSIS — D68.32 WARFARIN-INDUCED COAGULOPATHY (HCC): ICD-10-CM

## 2023-01-12 DIAGNOSIS — I81 PORTAL VEIN THROMBOSIS: ICD-10-CM

## 2023-01-12 PROBLEM — M79.644 PAIN OF FINGER OF RIGHT HAND: Status: RESOLVED | Noted: 2022-11-28 | Resolved: 2023-01-12

## 2023-01-12 RX ORDER — PREDNISONE 1 MG/1
TABLET ORAL
COMMUNITY
Start: 2023-01-05

## 2023-01-12 NOTE — PROGRESS NOTES
Assessment & Plan     1  Rectal bleeding  Comments:  Resolved  2  Thrush  Assessment & Plan:  Resolving  3  Warfarin-induced coagulopathy ([facility] [address] )  Assessment & Plan:  Now on Eliquis  CBC, INR normal       4  Portal vein thrombosis  Assessment & Plan:  Now on Eliquis  5  Other closed fracture of distal end of left radius with routine healing, subsequent encounter  Assessment & Plan:  Scheduled with Ortho  Has hydrocodone to take prn for pain  6  Anxiety  Assessment & Plan: On escitalopram   Discussed use of lorazepam       7  Moderate episode of recurrent major depressive disorder ([facility] [address] )  Assessment & Plan:  As above  8  SIADH (syndrome of inappropriate ADH production) ([facility])  Assessment & Plan:  Stable Na 134  Taking NaCl  9  Essential hypertension  Assessment & Plan:  BP stable  10  Mild intermittent asthma without complication  Assessment & Plan:  On Advair  Subjective     Transitional Care Management Review:   Arin Kaur is a 65 y o  female here for TCM follow up  During the TCM phone call patient stated:  TCM Call     Date and time call was made  1/11/2023 12:47 PM    Hospital care reviewed  Records reviewed    Patient was hospitialized at  [address] [place] [address]    Date of Admission  01/07/23    Date of discharge  01/10/23    Diagnosis  Rectal bleeding    Disposition  Home    Were the patients medications reviewed and updated  Yes    Current Symptoms  None      TCM Call     Post hospital issues  None    Should patient be enrolled in anticoag monitoring? Yes    Scheduled for follow up? Yes    Referrals needed  Stacy Khan CMA    Did you obtain your prescribed medications  Yes    Do you need help managing your prescriptions or medications  No    Is transportation to your appointment needed  No    I have advised the patient to call PCP with any new or worsening symptoms  Lillie Faye        She had rectal bleeding while in [place]    She drove back to [place] and was subsequently admitted  Her INR was very high, she had agreed to stop warfarin and change it to Eliquis  She has had no rectal bleeding since  She had a normal bowel movement earlier today  She is nervous about not having her INR checked  She had scheduled a follow-up appointment today and was planning to return back to [facility]  However, she slipped at home and broke her left wrist  She is left handed  She is very frustrated  She has an appointment with orthopedics next week  She reports pain is not as bad, did take Tylenol  She does feel very anxious, has taken 3 of her Ativan already  She said that she just woke up from a nap, feels very tired and sleepy  Review of Systems   Constitutional: Positive for activity change  Negative for appetite change and fatigue  Eyes: Negative for visual disturbance  Respiratory: Negative for cough, shortness of breath and wheezing  Cardiovascular: Negative for chest pain  Gastrointestinal: Negative for abdominal pain, blood in stool, constipation and diarrhea  Genitourinary: Negative for dysuria and frequency  Musculoskeletal: Negative for arthralgias and myalgias  Skin: Negative for rash and wound  Neurological: Positive for tremors  Negative for dizziness, numbness and headaches  Psychiatric/Behavioral: Negative for confusion  The patient is nervous/anxious  Objective     /78   Pulse 91   Temp 97 9 °F (36 6 °C)   Ht 5' 3" (1 6 m)   Wt 68 9 kg (152 lb)   SpO2 96%   BMI 26 93 kg/m²      Physical Exam  Vitals and nursing note reviewed  Constitutional:       General: She is not in acute distress  Appearance: Normal appearance  She is well-developed  She is not ill-appearing  HENT:      Head: Normocephalic and atraumatic  Eyes:      Conjunctiva/sclera: Conjunctivae normal    Cardiovascular:      Rate and Rhythm: Normal rate and regular rhythm  Heart sounds: No murmur heard    Pulmonary: Effort: Pulmonary effort is normal  No respiratory distress  Breath sounds: Normal breath sounds  Abdominal:      Palpations: Abdomen is soft  Tenderness: There is no abdominal tenderness  Musculoskeletal:         General: No swelling  Cervical back: Neck supple  Comments: (+) cast L wrist, forearm  No swelling of hand   Skin:     General: Skin is warm and dry  Neurological:      General: No focal deficit present  Mental Status: She is alert and oriented to person, place, and time  Psychiatric:         Mood and Affect: Mood is anxious  Behavior: Behavior normal        Medications have been reviewed by provider in current encounter    Lisa Zamudio MD     Reviewed hospital records, labs

## 2023-01-12 NOTE — TELEPHONE ENCOUNTER
Caller: Glenetta Fleischer, for patient     Doctor: [facility]    Reason for call: Appt for left wrist fx   Referral and images in chart    Call back#: [phone]

## 2023-01-12 NOTE — TELEPHONE ENCOUNTER
Caller: Patient caregiver Bill    Doctor: [facility]    Reason for call: Patient referred to [facility] by Jaswant Barcenas    Essentially nondisplaced comminuted intra-articular distal radial fracture  Xrays in Epic      Call back#: [phone]

## 2023-01-12 NOTE — TELEPHONE ENCOUNTER
Patient is being referred to a orthopedics  Please schedule accordingly      [address]   [phone]

## 2023-01-12 NOTE — PROGRESS NOTES
[employer] [facility]        NAME: Celia Ramos is a 65 y o  female  : 1945    MRN: [id number]  DATE: 2023  TIME: 12:02 PM    Assessment and Plan   Closed traumatic nondisplaced fracture of distal end of left radius, initial encounter [S52 502A]  1  Closed traumatic nondisplaced fracture of distal end of left radius, initial encounter  XR wrist 3+ vw left    Ambulatory Referral to Orthopedic Surgery      2  Contusion of left shoulder, initial encounter  XR shoulder 2+ vw left    Ambulatory Referral to Orthopedic Surgery            Patient Instructions       Follow up with PCP in 3-5 days  Proceed to  ER if symptoms worsen  Chief Complaint     Chief Complaint   Patient presents with   • Fall     Patient this morning fell at home  Took two tylenol for pain  History of Present Illness       Patient here for evaluation of a fall at home today  Patient was wearing slippers and tripped and fell landing on her left side injuring her left upper extremity  She denies any head injury  Fall        Review of Systems   Review of Systems   Constitutional: Negative  Musculoskeletal: Positive for arthralgias and joint swelling  Negative for back pain, gait problem, myalgias, neck pain and neck stiffness  Skin: Negative  Neurological: Negative            Current Medications       Current Outpatient Medications:   •  acetaminophen (TYLENOL) 325 mg tablet, Take 2 tablets (650 mg total) by mouth every 6 (six) hours as needed for mild pain, Disp: , Rfl: 0  •  albuterol (PROVENTIL HFA,VENTOLIN HFA) 90 mcg/act inhaler, Inhale 2 puffs every 6 (six) hours as needed for wheezing, Disp: 8 g, Rfl: 1  •  amLODIPine (NORVASC) 2 5 mg tablet, TAKE ONE TABLET BY MOUTH EVERY DAY, Disp: 90 tablet, Rfl: 3  •  apixaban (Eliquis) 5 mg, Take 1 tablet (5 mg total) by mouth 2 (two) times a day, Disp: 60 tablet, Rfl: 0  •  Calcium Carb-Cholecalciferol (CALCIUM 600-D PO), Take 1 tablet by mouth 2 (two) times a day, Disp: , Rfl:   •  Coenzyme Q10 (CO Q-10 PO), Take 1 capsule by mouth daily, Disp: , Rfl:   •  diltiazem (CARDIZEM CD) 180 mg 24 hr capsule, TAKE ONE CAPSULE BY MOUTH EVERY DAY, Disp: 90 capsule, Rfl: 1  •  diltiazem (CARDIZEM) 60 mg tablet, Take 1 tablet (60 mg total) by mouth daily, Disp: 90 tablet, Rfl: 1  •  escitalopram (LEXAPRO) 20 mg tablet, TAKE ONE TABLET BY MOUTH EVERY DAY, Disp: 90 tablet, Rfl: 1  •  fluticasone (FLONASE) 50 mcg/act nasal spray, INSTILL ONE SPRAY INTO EACH NOSTRIL ONCE DAILY AS NEEDED FOR RHINITIS, Disp: 48 g, Rfl: 1  •  fluticasone-salmeterol (Advair Diskus) 250-50 mcg/dose inhaler, Inhale 1 puff 2 (two) times a day Rinse mouth after use, Disp: 180 blister, Rfl: 3  •  gabapentin (NEURONTIN) 600 MG tablet, TAKE ONE TABLET BY MOUTH IN THE MORNING, ONE TABLET IN THE AFTERNNON, AND TWO TABLETS AT BEDTIME, Disp: 360 tablet, Rfl: 2  •  HYDROcodone-acetaminophen (NORCO) 5-325 mg per tablet, Take 1 tablet by mouth every 6 (six) hours as needed for pain Max Daily Amount: 4 tablets, Disp: 120 tablet, Rfl: 0  •  levothyroxine 50 mcg tablet, TAKE ONE TABLET BY MOUTH EVERY DAY, Disp: 90 tablet, Rfl: 1  •  lisinopril (ZESTRIL) 20 mg tablet, TAKE ONE TABLET BY MOUTH TWICE A DAY, Disp: 180 tablet, Rfl: 1  •  loperamide (IMODIUM) 2 mg capsule, Take 2 mg by mouth 4 (four) times a day as needed  , Disp: , Rfl:   •  LORazepam (ATIVAN) 1 mg tablet, Take 1 tablet (1 mg total) by mouth every 6 (six) hours as needed for anxiety, Disp: 120 tablet, Rfl: 0  •  MAGNESIUM PO, Take 1 tablet by mouth daily, Disp: , Rfl:   •  meclizine (ANTIVERT) 25 mg tablet, Take 1 tablet (25 mg total) by mouth every 6 (six) hours as needed for dizziness, Disp: 90 tablet, Rfl: 1  •  Multiple Vitamin (MULTIVITAMIN ADULT PO), Take 1 tablet by mouth daily, Disp: , Rfl:   •  nystatin (MYCOSTATIN) 500,000 units/5 mL suspension, , Disp: , Rfl:   •  Omega-3 Fatty Acids (FISH OIL PO), Take 1 capsule by mouth daily, Disp: , Rfl:   • potassium chloride (MICRO-K) 10 MEQ CR capsule, Take 2 capsules daily, Disp: 180 capsule, Rfl: 3  •  simvastatin (ZOCOR) 5 MG tablet, TAKE ONE TABLET BY MOUTH EVERY DAY, Disp: 90 tablet, Rfl: 1  •  sodium chloride 1 g tablet, Take 1 tablet (1 g total) by mouth 3 (three) times a day, Disp: 270 tablet, Rfl: 3  •  torsemide (DEMADEX) 10 mg tablet, Take 1 tablet (10 mg total) by mouth daily as needed (edema), Disp: 90 tablet, Rfl: 0  •  cetirizine (ZyrTEC) 10 mg tablet, Take 10 mg by mouth daily (Patient not taking: Reported on 1/12/2023), Disp: , Rfl:   •  tobramycin-dexamethasone (TOBRADEX) ophthalmic suspension, , Disp: , Rfl:     Current Allergies     Allergies as of 01/12/2023 - Reviewed 01/12/2023   Allergen Reaction Noted   • Indomethacin GI Intolerance and Dizziness 04/21/2012   • Macrobid [nitrofurantoin monohyd macro] Rash 09/08/2016   • Nitrofurantoin Other (See Comments) 11/03/2022   • Penicillins Rash 06/13/2012   • Sulfa antibiotics Rash 09/08/2016            The following portions of the patient's history were reviewed and updated as appropriate: allergies, current medications, past family history, past medical history, past social history, past surgical history and problem list      Past Medical History:   Diagnosis Date   • Anemia    • Anxiety    • Arrhythmia    • Arthritis    • Asthma    • Blood clot in vein     portal vein   • Breast cancer ([place] [address] ) 02/12/2021   • Breast lump     [id number] RESOLVED   • Cancer ([place] [address] )    • Depression    • Disease of thyroid gland    • DVT, lower extremity (HCC)    • GERD (gastroesophageal reflux disease)    • Hyperlipidemia    • Hypertension    • Hypokalemia    • Hyponatremia    • Hypothyroidism    • Iron deficiency anemia    • Irregular heart beat    • Manic behavior (HCC)    • 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HCC)    • Osteoarthritis    • Palpitations     [id number]  RESOLVED   • Paroxysmal supraventricular tachycardia (HCC)    • PE (pulmonary thromboembolism) ([place] [address] )    • Sjoegren syndrome    • Sleep apnea    • Sleep difficulties    • Spinal stenosis    • Thrombocytosis     [id number]  RESOLVED   • Tremors of nervous system     dbs implanted right and left chest   • Ulcerative colitis ([address] )    • Vertigo     [id number] RESOLVED       Past Surgical History:   Procedure Laterality Date   • ABDOMINAL SURGERY     • APPENDECTOMY     • BREAST BIOPSY Left 11/07/2019    Stereo   • BREAST EXCISIONAL BIOPSY Left     x many years   • BREAST SURGERY      lumpectomy & biopsy   • CARMELLA HOLE W/ STEREOTACTIC INSERTION OF DBS LEADS / INTRAOP MICROELECTRODE RECORDING     • COLON SURGERY     • COLONOSCOPY N/A 08/30/2017    Procedure: David Neil;  Surgeon: Kevin Platt MD;  Location: [facility]; Service: Colorectal   • COLOPROCTECTOMY W/ ILEO J POUCH     • ESOPHAGOGASTRODUODENOSCOPY      ONSET 10/17/11   • FISTULA REPAIR      LLEOANAL FISTULA REPAIR TRANSPERIN TRANSABD APPROACH   • HYSTERECTOMY      age 43   • ILEOSTOMY CLOSURE     • KNEE ARTHROSCOPY      Right   • MAMMO STEREOTACTIC BREAST BIOPSY LEFT (ALL INC) Left 11/07/2019   • MAMMO STEREOTACTIC BREAST BIOPSY LEFT (ALL INC) Left 12/17/2020   • MAMMO STEREOTACTIC BREAST BIOPSY LEFT (ALL INC) EACH ADD Left 12/17/2020   • MASTECTOMY Left 02/12/2021    left mastectomy- Dr Merritt Escobar   • MASTECTOMY W/ SENTINEL NODE BIOPSY Left 02/12/2021    Procedure: BREAST MASTECTOMY WITH SENTINEL LYMPH NODE BIOPSY, LYMPHATIC MAPPING WITH BLUE DYE AND RADIAOCTIVE DYE (INJECT AT 1100 BY DR GILLESPIE IN THE OR);   Surgeon: Madelyn Che MD;  Location: [facility];  Service: Surgical Oncology   • FL INSJ/RPLCMT CRANIAL NEUROSTIM PULSE GENERATOR Right 06/20/2017    Procedure: DBS GENERATOR REPLACEMENT;  Surgeon: Baltazar Pérez MD;  Location:  MAIN OR;  Service: Neurosurgery   • FL INSJ/RPLCMT CRANIAL NEUROSTIM PULSE GENERATOR N/A 12/04/2019    Procedure: REPLACEMENT IMPLANTABLE PULSE GENERATOR FOR DEEP BRAIN STIMULATOR LEFT CHEST;  Surgeon: Shannon Mckeon MD;  Location: [facility];  Service: Neurosurgery   • SPLENECTOMY     • TONSILLECTOMY         Family History   Problem Relation Age of Onset   • Venous thrombosis Mother         ACUTE VENOUS THROMBOSIS OF DEEP VESSELS OF THE DISTAL LOWER EXTREMITY   • Other Mother         PHLEBITIS   • Hypertension Mother    • Peripheral vascular disease Mother    • COPD Father    • Diabetes Father         MELLITUS   • Stroke Father    • Diabetes Sister         MELLITUS   • Sjogren's syndrome Sister    • No Known Problems Daughter    • No Known Problems Maternal Grandmother    • No Known Problems Maternal Grandfather    • No Known Problems Paternal Grandmother    • No Known Problems Paternal Grandfather    • No Known Problems Sister    • No Known Problems Maternal Aunt    • No Known Problems Paternal Aunt    • No Known Problems Son          Medications have been verified  Objective   /60   Pulse 66   Temp 97 8 °F (36 6 °C)   Resp 16   Ht 5' 3" (1 6 m)   Wt 69 9 kg (154 lb)   SpO2 99%   BMI 27 28 kg/m²   No LMP recorded  Patient is postmenopausal          Physical Exam     Physical Exam  Vitals and nursing note reviewed  Constitutional:       General: She is not in acute distress  Appearance: Normal appearance  She is well-developed  She is not ill-appearing, toxic-appearing or diaphoretic  HENT:      Head: Normocephalic and atraumatic  Eyes:      Extraocular Movements: Extraocular movements intact  Conjunctiva/sclera: Conjunctivae normal       Pupils: Pupils are equal, round, and reactive to light  Musculoskeletal:      Comments: Range of motion of the left shoulder intact but slightly limited with pain in the anterior superior aspect of the shoulder  No obvious deformity  No crepitation  Full range of motion of the left elbow without pain  No point tenderness    Range of motion of the left wrist intact but limited with focal soft tissue swelling and ecchymosis present along the radial aspect extending into the proximal hand  Neurovascular intact  Skin:     General: Skin is warm and dry  Findings: No rash  Neurological:      General: No focal deficit present  Mental Status: She is alert and oriented to person, place, and time  Psychiatric:         Mood and Affect: Mood normal          Behavior: Behavior normal          Thought Content: Thought content normal          Judgment: Judgment normal        X-ray of the left shoulder shows no acute fractures or findings  X-ray of the left wrist shows a nondisplaced comminuted distal radius fracture  Patient placed in a sugar-tong splint and will refer to orthopedics for further evaluation    Splint application    Date/Time: 1/12/2023 10:35 AM  Performed by: Shaka Kauffman PA-C  Authorized by: Shaka Kauffman PA-C   Universal Protocol:  Procedure performed by: Gaudencio Sauer)  Consent: Verbal consent obtained  Risks and benefits: risks, benefits and alternatives were discussed  Consent given by: patient  Patient understanding: patient states understanding of the procedure being performed  Patient identity confirmed: verbally with patient      Pre-procedure details:     Sensation:  Normal  Procedure details:     Laterality:  Left    Location:  Wrist    Wrist:  L wrist    Splint type:  Sugar tong    Supplies:  Cotton padding, Ortho-Glass and elastic bandage  Post-procedure details:     Pain:  Unchanged    Sensation:  Normal    Patient tolerance of procedure:   Tolerated well, no immediate complications

## 2023-01-12 NOTE — PATIENT INSTRUCTIONS
Rest injured body part  Ice 10-15 minutes off and on every 3-4 hours while awake for 48 hours after injury  After 48 hours you may start using warm compresses if appropriate  Keep splinted at all times until evaluated by orthopedics    Take Tylenol as needed     Elevate injured body part as able to help decrease pain and swelling  Follow-up with orthopedics for further evaluation   [phone]

## 2023-01-15 ENCOUNTER — APPOINTMENT (INPATIENT)
Dept: CT IMAGING | Facility: HOSPITAL | Age: 78
End: 2023-01-15

## 2023-01-15 ENCOUNTER — HOSPITAL ENCOUNTER (INPATIENT)
Facility: HOSPITAL | Age: 78
LOS: 2 days | Discharge: HOME | End: 2023-01-17
Attending: EMERGENCY MEDICINE | Admitting: INTERNAL MEDICINE

## 2023-01-15 DIAGNOSIS — S52.592A OTHER CLOSED FRACTURE OF DISTAL END OF LEFT RADIUS, INITIAL ENCOUNTER: ICD-10-CM

## 2023-01-15 DIAGNOSIS — K92.2 LOWER GI BLEED: Primary | ICD-10-CM

## 2023-01-15 DIAGNOSIS — T45.515A ADVERSE EFFECT OF ANTICOAGULANT, INITIAL ENCOUNTER: ICD-10-CM

## 2023-01-15 DIAGNOSIS — D62 ACUTE BLOOD LOSS ANEMIA: ICD-10-CM

## 2023-01-15 PROBLEM — S52.502A CLOSED FRACTURE OF LEFT DISTAL RADIUS: Status: ACTIVE | Noted: 2023-01-15

## 2023-01-15 LAB
ABO GROUP BLD: NORMAL
ALBUMIN SERPL BCP-MCNC: 3.6 G/DL (ref 3.5–5)
ALP SERPL-CCNC: 60 U/L (ref 34–104)
ALT SERPL W P-5'-P-CCNC: 13 U/L (ref 7–52)
ANION GAP SERPL CALCULATED.3IONS-SCNC: 7 MMOL/L (ref 4–13)
APTT PPP: 25 SECONDS (ref 23–37)
AST SERPL W P-5'-P-CCNC: 18 U/L (ref 13–39)
BASOPHILS # BLD AUTO: 0.03 THOUSANDS/ÂΜL (ref 0–0.1)
BASOPHILS NFR BLD AUTO: 0 % (ref 0–1)
BILIRUB SERPL-MCNC: 0.35 MG/DL (ref 0.2–1)
BLD GP AB SCN SERPL QL: NEGATIVE
BUN SERPL-MCNC: 9 MG/DL (ref 5–25)
CALCIUM SERPL-MCNC: 8.7 MG/DL (ref 8.4–10.2)
CHLORIDE SERPL-SCNC: 96 MMOL/L (ref 96–108)
CO2 SERPL-SCNC: 28 MMOL/L (ref 21–32)
CREAT SERPL-MCNC: 0.71 MG/DL (ref 0.6–1.3)
EOSINOPHIL # BLD AUTO: 0.06 THOUSAND/ÂΜL (ref 0–0.61)
EOSINOPHIL NFR BLD AUTO: 1 % (ref 0–6)
ERYTHROCYTE [DISTWIDTH] IN BLOOD BY AUTOMATED COUNT: 15.8 % (ref 11.6–15.1)
GFR SERPL CREATININE-BSD FRML MDRD: 81 ML/MIN/1.73SQ M
GLUCOSE SERPL-MCNC: 118 MG/DL (ref 65–140)
HCT VFR BLD AUTO: 32.5 % (ref 34.8–46.1)
HGB BLD-MCNC: 10.9 G/DL (ref 11.5–15.4)
IMM GRANULOCYTES # BLD AUTO: 0.05 THOUSAND/UL (ref 0–0.2)
IMM GRANULOCYTES NFR BLD AUTO: 1 % (ref 0–2)
INR PPP: 0.88 (ref 0.84–1.19)
LYMPHOCYTES # BLD AUTO: 1.4 THOUSANDS/ÂΜL (ref 0.6–4.47)
LYMPHOCYTES NFR BLD AUTO: 20 % (ref 14–44)
MCH RBC QN AUTO: 31.6 PG (ref 26.8–34.3)
MCHC RBC AUTO-ENTMCNC: 33.5 G/DL (ref 31.4–37.4)
MCV RBC AUTO: 94 FL (ref 82–98)
MONOCYTES # BLD AUTO: 0.84 THOUSAND/ÂΜL (ref 0.17–1.22)
MONOCYTES NFR BLD AUTO: 12 % (ref 4–12)
NEUTROPHILS # BLD AUTO: 4.54 THOUSANDS/ÂΜL (ref 1.85–7.62)
NEUTS SEG NFR BLD AUTO: 66 % (ref 43–75)
NRBC BLD AUTO-RTO: 0 /100 WBCS
PLATELET # BLD AUTO: 441 THOUSANDS/UL (ref 149–390)
PMV BLD AUTO: 8.6 FL (ref 8.9–12.7)
POTASSIUM SERPL-SCNC: 4.2 MMOL/L (ref 3.5–5.3)
PROT SERPL-MCNC: 6.1 G/DL (ref 6.4–8.4)
PROTHROMBIN TIME: 12.2 SECONDS (ref 11.6–14.5)
RBC # BLD AUTO: 3.45 MILLION/UL (ref 3.81–5.12)
RH BLD: POSITIVE
SODIUM SERPL-SCNC: 131 MMOL/L (ref 135–147)
SPECIMEN EXPIRATION DATE: NORMAL
WBC # BLD AUTO: 6.92 THOUSAND/UL (ref 4.31–10.16)

## 2023-01-15 RX ORDER — DILTIAZEM HYDROCHLORIDE 180 MG/1
180 CAPSULE, COATED, EXTENDED RELEASE ORAL DAILY
Status: DISCONTINUED | OUTPATIENT
Start: 2023-01-15 | End: 2023-01-17 | Stop reason: HOSPADM

## 2023-01-15 RX ORDER — LORAZEPAM 1 MG/1
1 TABLET ORAL EVERY 6 HOURS PRN
Status: DISCONTINUED | OUTPATIENT
Start: 2023-01-15 | End: 2023-01-17 | Stop reason: HOSPADM

## 2023-01-15 RX ORDER — LEVOTHYROXINE SODIUM 0.05 MG/1
50 TABLET ORAL DAILY
Status: DISCONTINUED | OUTPATIENT
Start: 2023-01-15 | End: 2023-01-17 | Stop reason: HOSPADM

## 2023-01-15 RX ORDER — TORSEMIDE 10 MG/1
10 TABLET ORAL DAILY PRN
Status: DISCONTINUED | OUTPATIENT
Start: 2023-01-15 | End: 2023-01-17 | Stop reason: HOSPADM

## 2023-01-15 RX ORDER — LISINOPRIL 20 MG/1
20 TABLET ORAL 2 TIMES DAILY
Status: DISCONTINUED | OUTPATIENT
Start: 2023-01-15 | End: 2023-01-17 | Stop reason: HOSPADM

## 2023-01-15 RX ORDER — ESCITALOPRAM OXALATE 20 MG/1
20 TABLET ORAL DAILY
Status: DISCONTINUED | OUTPATIENT
Start: 2023-01-15 | End: 2023-01-17 | Stop reason: HOSPADM

## 2023-01-15 RX ORDER — ACETAMINOPHEN 325 MG/1
650 TABLET ORAL EVERY 6 HOURS PRN
Status: DISCONTINUED | OUTPATIENT
Start: 2023-01-15 | End: 2023-01-17 | Stop reason: HOSPADM

## 2023-01-15 RX ORDER — GABAPENTIN 300 MG/1
600 CAPSULE ORAL 2 TIMES DAILY
Status: DISCONTINUED | OUTPATIENT
Start: 2023-01-15 | End: 2023-01-17 | Stop reason: HOSPADM

## 2023-01-15 RX ORDER — AMLODIPINE BESYLATE 2.5 MG/1
2.5 TABLET ORAL DAILY
Status: DISCONTINUED | OUTPATIENT
Start: 2023-01-15 | End: 2023-01-17 | Stop reason: HOSPADM

## 2023-01-15 RX ORDER — ONDANSETRON 2 MG/ML
4 INJECTION INTRAMUSCULAR; INTRAVENOUS ONCE
Status: COMPLETED | OUTPATIENT
Start: 2023-01-15 | End: 2023-01-15

## 2023-01-15 RX ORDER — PREDNISONE 1 MG/1
5 TABLET ORAL DAILY
Status: DISCONTINUED | OUTPATIENT
Start: 2023-01-15 | End: 2023-01-15

## 2023-01-15 RX ORDER — ALBUTEROL SULFATE 90 UG/1
2 AEROSOL, METERED RESPIRATORY (INHALATION) EVERY 6 HOURS PRN
Status: DISCONTINUED | OUTPATIENT
Start: 2023-01-15 | End: 2023-01-17 | Stop reason: HOSPADM

## 2023-01-15 RX ORDER — SODIUM CHLORIDE 1000 MG
1 TABLET, SOLUBLE MISCELLANEOUS 3 TIMES DAILY
Status: DISCONTINUED | OUTPATIENT
Start: 2023-01-15 | End: 2023-01-17 | Stop reason: HOSPADM

## 2023-01-15 RX ORDER — FLUTICASONE FUROATE AND VILANTEROL 200; 25 UG/1; UG/1
1 POWDER RESPIRATORY (INHALATION) DAILY
Status: DISCONTINUED | OUTPATIENT
Start: 2023-01-15 | End: 2023-01-17 | Stop reason: HOSPADM

## 2023-01-15 RX ORDER — GABAPENTIN 400 MG/1
1200 CAPSULE ORAL
Status: DISCONTINUED | OUTPATIENT
Start: 2023-01-15 | End: 2023-01-17 | Stop reason: HOSPADM

## 2023-01-15 RX ORDER — MECLIZINE HYDROCHLORIDE 25 MG/1
25 TABLET ORAL EVERY 6 HOURS PRN
Status: DISCONTINUED | OUTPATIENT
Start: 2023-01-15 | End: 2023-01-17 | Stop reason: HOSPADM

## 2023-01-15 RX ADMIN — GABAPENTIN 600 MG: 300 CAPSULE ORAL at 17:58

## 2023-01-15 RX ADMIN — IOHEXOL 100 ML: 350 INJECTION, SOLUTION INTRAVENOUS at 09:28

## 2023-01-15 RX ADMIN — SODIUM CHLORIDE 1 G: 1 TABLET ORAL at 17:58

## 2023-01-15 RX ADMIN — LISINOPRIL 20 MG: 20 TABLET ORAL at 17:58

## 2023-01-15 RX ADMIN — LEVOTHYROXINE SODIUM 50 MCG: 50 TABLET ORAL at 12:31

## 2023-01-15 RX ADMIN — MORPHINE SULFATE 2 MG: 2 INJECTION, SOLUTION INTRAMUSCULAR; INTRAVENOUS at 08:43

## 2023-01-15 RX ADMIN — ESCITALOPRAM OXALATE 20 MG: 20 TABLET ORAL at 12:31

## 2023-01-15 RX ADMIN — GABAPENTIN 1200 MG: 300 CAPSULE ORAL at 20:08

## 2023-01-15 RX ADMIN — SODIUM CHLORIDE 1 G: 1 TABLET ORAL at 12:31

## 2023-01-15 RX ADMIN — ONDANSETRON 4 MG: 2 INJECTION INTRAMUSCULAR; INTRAVENOUS at 08:43

## 2023-01-15 RX ADMIN — SODIUM CHLORIDE 1 G: 1 TABLET ORAL at 20:09

## 2023-01-15 NOTE — ASSESSMENT & PLAN NOTE
After recent fall on Thursday  Went to urgent care  Was splinted  Was redone in ER because it was too tight  Patient was supposed to see Ortho tomorrow in office  Will consult Ortho

## 2023-01-15 NOTE — ED PROVIDER NOTES
History  Chief Complaint   Patient presents with   • Rectal Bleeding     Pt complains of rectal bleeding  Pt was here last Friday for the same  Pt states that she was switched from Warfarin to Eliquis while she was in the hospital earlier this week  Pt states that rectal bleeding started again yesterday Pt states that the bleeding is only with bowel movements  65-year-old female comes in for evaluation of rectal bleeding  Patient has a history of ulcerative colitis, GERD as well as DVT and PE as well as multiple other medical problems  Patient states she was here last Friday for similar  Patient had been on warfarin for approximately 20 years however was switched over to Eliquis before she left according to the medical records " on warfarin who presented to the hospital due to rectal bleeding found to have an INR of 7  Patient did receive vitamin K with subsequent improvement in her INR  Initial hemoglobin of 11 and remained relatively stable during her stay  GI was consulted and patient underwent ileoscopy  It did demonstrate a small anastomotic ulcer which was likely the source of bleeding  Patient was transition to Eliquis prior to discharge "  Patient states she began having rectal bleeding again yesterday states the bleeding is only with bowel movements it is bright red blood  Patient denies any nausea vomiting abdominal pain or diarrhea  patient denies any headache or weakness  Of note patient also suffered a slip and fall after hospital stay and does have a forearm fracture that was in a splint from urgent care  We removed and replaced the splint because the splint was on too tight and she had quite a bit of swelling of her hand  History provided by:  Patient and medical records   used: No    Rectal Bleeding - Minor  Quality:  Bright red  Amount:   Moderate  Duration:  1 day  Timing:  Intermittent  Chronicity:  Recurrent  Context: defecation    Similar prior episodes: yes    Ineffective treatments:  None tried  Associated symptoms: no abdominal pain, no dizziness, no fever, no hematemesis, no light-headedness and no vomiting    Risk factors: anticoagulant use and hx of IBD        Prior to Admission Medications   Prescriptions Last Dose Informant Patient Reported? Taking?    Calcium Carb-Cholecalciferol (CALCIUM 600-D PO)   Yes No   Sig: Take 1 tablet by mouth 2 (two) times a day   Coenzyme Q10 (CO Q-10 PO)   Yes No   Sig: Take 1 capsule by mouth daily   HYDROcodone-acetaminophen (NORCO) 5-325 mg per tablet   No No   Sig: Take 1 tablet by mouth every 6 (six) hours as needed for pain Max Daily Amount: 4 tablets   LORazepam (ATIVAN) 1 mg tablet   No No   Sig: Take 1 tablet (1 mg total) by mouth every 6 (six) hours as needed for anxiety   MAGNESIUM PO   Yes No   Sig: Take 1 tablet by mouth daily   Multiple Vitamin (MULTIVITAMIN ADULT PO)   Yes No   Sig: Take 1 tablet by mouth daily   Omega-3 Fatty Acids (FISH OIL PO)   Yes No   Sig: Take 1 capsule by mouth daily   acetaminophen (TYLENOL) 325 mg tablet   No No   Sig: Take 2 tablets (650 mg total) by mouth every 6 (six) hours as needed for mild pain   albuterol (PROVENTIL HFA,VENTOLIN HFA) 90 mcg/act inhaler   No No   Sig: Inhale 2 puffs every 6 (six) hours as needed for wheezing   amLODIPine (NORVASC) 2 5 mg tablet   No No   Sig: TAKE ONE TABLET BY MOUTH EVERY DAY   apixaban (Eliquis) 5 mg   No No   Sig: Take 1 tablet (5 mg total) by mouth 2 (two) times a day   cetirizine (ZyrTEC) 10 mg tablet   Yes No   Sig: Take 10 mg by mouth daily   Patient not taking: Reported on 1/12/2023   diltiazem (CARDIZEM CD) 180 mg 24 hr capsule   No No   Sig: TAKE ONE CAPSULE BY MOUTH EVERY DAY   diltiazem (CARDIZEM) 60 mg tablet   No No   Sig: Take 1 tablet (60 mg total) by mouth daily   escitalopram (LEXAPRO) 20 mg tablet   No No   Sig: TAKE ONE TABLET BY MOUTH EVERY DAY   fluticasone (FLONASE) 50 mcg/act nasal spray   No No   Sig: INSTILL ONE SPRAY INTO EACH NOSTRIL ONCE DAILY AS NEEDED FOR RHINITIS   fluticasone-salmeterol (Advair Diskus) 250-50 mcg/dose inhaler   No No   Sig: Inhale 1 puff 2 (two) times a day Rinse mouth after use   gabapentin (NEURONTIN) 600 MG tablet   No No   Sig: TAKE ONE TABLET BY MOUTH IN THE MORNING, ONE TABLET IN THE AFTERNNON, AND TWO TABLETS AT BEDTIME   levothyroxine 50 mcg tablet   No No   Sig: TAKE ONE TABLET BY MOUTH EVERY DAY   lisinopril (ZESTRIL) 20 mg tablet   No No   Sig: TAKE ONE TABLET BY MOUTH TWICE A DAY   loperamide (IMODIUM) 2 mg capsule   Yes No   Sig: Take 2 mg by mouth 4 (four) times a day as needed     meclizine (ANTIVERT) 25 mg tablet   No No   Sig: Take 1 tablet (25 mg total) by mouth every 6 (six) hours as needed for dizziness   nystatin (MYCOSTATIN) 500,000 units/5 mL suspension   Yes No   potassium chloride (MICRO-K) 10 MEQ CR capsule   No No   Sig: Take 2 capsules daily   predniSONE 5 mg tablet   Yes No   simvastatin (ZOCOR) 5 MG tablet   No No   Sig: TAKE ONE TABLET BY MOUTH EVERY DAY   sodium chloride 1 g tablet   No No   Sig: Take 1 tablet (1 g total) by mouth 3 (three) times a day   tobramycin-dexamethasone (TOBRADEX) ophthalmic suspension   Yes No   Patient not taking: Reported on 1/12/2023   torsemide (DEMADEX) 10 mg tablet   No No   Sig: Take 1 tablet (10 mg total) by mouth daily as needed (edema)      Facility-Administered Medications: None       Past Medical History:   Diagnosis Date   • Anemia    • Anxiety    • Arrhythmia    • Arthritis    • Asthma    • Blood clot in vein     portal vein   • Breast cancer (HCC) 02/12/2021   • Breast lump     [id number] RESOLVED   • Cancer (HCC)    • Depression    • Disease of thyroid gland    • DVT, lower extremity (HCC)    • GERD (gastroesophageal reflux disease)    • Hyperlipidemia    • Hypertension    • Hypokalemia    • Hyponatremia    • Hypothyroidism    • Iron deficiency anemia    • Irregular heart beat    • Manic behavior (HCC)    • Mesenteric vein thrombosis [facility])    • Osteoarthritis    • Palpitations     [id number]  RESOLVED   • Paroxysmal supraventricular tachycardia (HCC)    • PE (pulmonary thromboembolism) (HCC)    • Sjoegren syndrome    • Sleep apnea    • Sleep difficulties    • Spinal stenosis    • Thrombocytosis     [id number]  RESOLVED   • Tremors of nervous system     dbs implanted right and left chest   • Ulcerative colitis ([address] )    • Vertigo     [id number] RESOLVED       Past Surgical History:   Procedure Laterality Date   • ABDOMINAL SURGERY     • APPENDECTOMY     • BREAST BIOPSY Left 11/07/2019    Stereo   • BREAST EXCISIONAL BIOPSY Left     x many years   • BREAST SURGERY      lumpectomy & biopsy   • CARMELLA HOLE W/ STEREOTACTIC INSERTION OF DBS LEADS / INTRAOP MICROELECTRODE RECORDING     • COLON SURGERY     • COLONOSCOPY N/A 08/30/2017    Procedure: [place];  Surgeon: Diania Mcburney, MD;  Location: [facility]; Service: Colorectal   • COLOPROCTECTOMY W/ ILEO J POUCH     • ESOPHAGOGASTRODUODENOSCOPY      ONSET 10/17/11   • FISTULA REPAIR      LLEOANAL FISTULA REPAIR TRANSPERIN TRANSABD APPROACH   • HYSTERECTOMY      age 43   • ILEOSTOMY CLOSURE     • KNEE ARTHROSCOPY      Right   • MAMMO STEREOTACTIC BREAST BIOPSY LEFT (ALL INC) Left 11/07/2019   • MAMMO STEREOTACTIC BREAST BIOPSY LEFT (ALL INC) Left 12/17/2020   • MAMMO STEREOTACTIC BREAST BIOPSY LEFT (ALL INC) EACH ADD Left 12/17/2020   • MASTECTOMY Left 02/12/2021    left mastectomy- Dr Leanne Carlson   • MASTECTOMY W/ SENTINEL NODE BIOPSY Left 02/12/2021    Procedure: BREAST MASTECTOMY WITH SENTINEL LYMPH NODE BIOPSY, LYMPHATIC MAPPING WITH BLUE DYE AND RADIAOCTIVE DYE (INJECT AT 1100 BY DR GILLESPIE IN THE OR);   Surgeon: Anderson Smith MD;  Location[facility];  Service: Surgical Oncology   • [place] INSJ/RPLCMT CRANIAL NEUROSTIM PULSE GENERATOR Right 06/20/2017    Procedure: DBS GENERATOR REPLACEMENT;  Surgeon: Sonya Velasco MD;  Location: [facility];  Service: Neurosurgery   • [place] INSJ/RPLCMT CRANIAL NEUROSTIM PULSE GENERATOR N/A 2019    Procedure: REPLACEMENT IMPLANTABLE PULSE GENERATOR FOR DEEP BRAIN STIMULATOR LEFT CHEST;  Surgeon: Benito Delgado MD;  Location[facility];  Service: Neurosurgery   • SPLENECTOMY     • TONSILLECTOMY         Family History   Problem Relation Age of Onset   • Venous thrombosis Mother         ACUTE VENOUS THROMBOSIS OF DEEP VESSELS OF THE DISTAL LOWER EXTREMITY   • Other Mother         PHLEBITIS   • Hypertension Mother    • Peripheral vascular disease Mother    • COPD Father    • Diabetes Father         MELLITUS   • Stroke Father    • Diabetes Sister         MELLITUS   • Sjogren's syndrome Sister    • No Known Problems Daughter    • No Known Problems Maternal Grandmother    • No Known Problems Maternal Grandfather    • No Known Problems Paternal Grandmother    • No Known Problems Paternal Grandfather    • No Known Problems Sister    • No Known Problems Maternal Aunt    • No Known Problems Paternal Aunt    • No Known Problems Son      I have reviewed and agree with the history as documented  E-Cigarette/Vaping   • E-Cigarette Use Never User      E-Cigarette/Vaping Substances   • Nicotine No    • THC No    • CBD No    • Flavoring No    • Other No    • Unknown No      Social History     Tobacco Use   • Smoking status: Former     Packs/day: 1 50     Years: 5 00     Pack years: 7 50     Types: Cigarettes     Quit date: 1965     Years since quittin 0   • Smokeless tobacco: Never   • Tobacco comments:     Quit   Vaping Use   • Vaping Use: Never used   Substance Use Topics   • Alcohol use: Yes     Alcohol/week: 2 0 standard drinks     Types: 2 Cans of beer per week   • Drug use: No       Review of Systems   Constitutional: Negative for chills and fever  HENT: Negative for ear pain and sore throat  Eyes: Negative for pain and visual disturbance  Respiratory: Negative for cough and shortness of breath  Cardiovascular: Negative for chest pain and palpitations     Gastrointestinal: Positive for anal bleeding, blood in stool and hematochezia  Negative for abdominal pain, hematemesis and vomiting  Genitourinary: Negative for dysuria and hematuria  Musculoskeletal: Negative for arthralgias and back pain  Skin: Negative for color change and rash  Neurological: Negative for dizziness, seizures, syncope and light-headedness  All other systems reviewed and are negative  Physical Exam  Physical Exam  Vitals and nursing note reviewed  Constitutional:       Appearance: Normal appearance  She is well-developed  She is not toxic-appearing  HENT:      Head: Normocephalic and atraumatic  Right Ear: Tympanic membrane and external ear normal       Left Ear: Tympanic membrane and external ear normal       Nose: Nose normal  No nasal deformity or rhinorrhea  Mouth/Throat:      Dentition: Normal dentition  Pharynx: Uvula midline  Eyes:      General: Lids are normal          Right eye: No discharge  Left eye: No discharge  Conjunctiva/sclera: Conjunctivae normal       Pupils: Pupils are equal, round, and reactive to light  Neck:      Vascular: No carotid bruit or JVD  Trachea: Trachea normal    Cardiovascular:      Rate and Rhythm: Normal rate and regular rhythm  No extrasystoles are present  Chest Wall: PMI is not displaced  Pulses: Normal pulses  Pulmonary:      Effort: Pulmonary effort is normal  No accessory muscle usage or respiratory distress  Breath sounds: Normal breath sounds  No wheezing, rhonchi or rales  Abdominal:      General: Bowel sounds are normal       Palpations: Abdomen is soft  Abdomen is not rigid  There is no mass  Tenderness: There is no abdominal tenderness  There is no guarding or rebound  Genitourinary:     Rectum: Guaiac result positive  Tenderness present  Musculoskeletal:      Right shoulder: No swelling, deformity or bony tenderness  Normal range of motion        Cervical back: Normal range of motion and neck supple  No deformity, tenderness or bony tenderness  Lymphadenopathy:      Cervical: No cervical adenopathy  Skin:     General: Skin is warm and dry  Findings: No rash  Neurological:      Mental Status: She is alert and oriented to person, place, and time  GCS: GCS eye subscore is 4  GCS verbal subscore is 5  GCS motor subscore is 6  Cranial Nerves: No cranial nerve deficit  Sensory: No sensory deficit  Deep Tendon Reflexes: Reflexes are normal and symmetric     Psychiatric:         Speech: Speech normal          Behavior: Behavior normal          Vital Signs  ED Triage Vitals   Temperature Pulse Respirations Blood Pressure SpO2   01/15/23 0612 01/15/23 0609 01/15/23 0609 01/15/23 0609 01/15/23 0609   98 4 °F (36 9 °C) 75 16 156/71 97 %      Temp Source Heart Rate Source Patient Position - Orthostatic VS BP Location FiO2 (%)   01/15/23 0612 -- 01/15/23 0609 01/15/23 0609 --   Oral  Lying Right arm       Pain Score       --                  Vitals:    01/15/23 0609 01/15/23 0800   BP: 156/71 142/66   Pulse: 75    Patient Position - Orthostatic VS: Lying          Visual Acuity      ED Medications  Medications   morphine injection 2 mg (has no administration in time range)   ondansetron (ZOFRAN) injection 4 mg (has no administration in time range)       Diagnostic Studies  Results Reviewed     Procedure Component Value Units Date/Time    Comprehensive metabolic panel [[id number]]  (Abnormal) Collected: 01/15/23 0637    Lab Status: Final result Specimen: Blood from Arm, Right Updated: 01/15/23 0735     Sodium 131 mmol/L      Potassium 4 2 mmol/L      Chloride 96 mmol/L      CO2 28 mmol/L      ANION GAP 7 mmol/L      BUN 9 mg/dL      Creatinine 0 71 mg/dL      Glucose 118 mg/dL      Calcium 8 7 mg/dL      AST 18 U/L      ALT 13 U/L      Alkaline Phosphatase 60 U/L      Total Protein 6 1 g/dL      Albumin 3 6 g/dL      Total Bilirubin 0 35 mg/dL      eGFR 81 ml/min/1 73sq m Narrative:      National Kidney Disease Foundation guidelines for Chronic Kidney Disease (CKD):   •  Stage 1 with normal or high GFR (GFR > 90 mL/min/1 73 square meters)  •  Stage 2 Mild CKD (GFR = 60-89 mL/min/1 73 square meters)  •  Stage 3A Moderate CKD (GFR = 45-59 mL/min/1 73 square meters)  •  Stage 3B Moderate CKD (GFR = 30-44 mL/min/1 73 square meters)  •  Stage 4 Severe CKD (GFR = 15-29 mL/min/1 73 square meters)  •  Stage 5 End Stage CKD (GFR <15 mL/min/1 73 square meters)  Note: GFR calculation is accurate only with a steady state creatinine    Protime-INR [[id number]]  (Normal) Collected: 01/15/23 0637    Lab Status: Final result Specimen: Blood from Arm, Right Updated: 01/15/23 0729     Protime 12 2 seconds      INR 0 88    APTT [[id number]]  (Normal) Collected: 01/15/23 0637    Lab Status: Final result Specimen: Blood from Arm, Right Updated: 01/15/23 0729     PTT 25 seconds     CBC and differential [[id number]]  (Abnormal) Collected: 01/15/23 0637    Lab Status: Final result Specimen: Blood from Arm, Right Updated: 01/15/23 0655     WBC 6 92 Thousand/uL      RBC 3 45 Million/uL      Hemoglobin 10 9 g/dL      Hematocrit 32 5 %      MCV 94 fL      MCH 31 6 pg      MCHC 33 5 g/dL      RDW 15 8 %      MPV 8 6 fL      Platelets 617 Thousands/uL      nRBC 0 /100 WBCs      Neutrophils Relative 66 %      Immat GRANS % 1 %      Lymphocytes Relative 20 %      Monocytes Relative 12 %      Eosinophils Relative 1 %      Basophils Relative 0 %      Neutrophils Absolute 4 54 Thousands/µL      Immature Grans Absolute 0 05 Thousand/uL      Lymphocytes Absolute 1 40 Thousands/µL      Monocytes Absolute 0 84 Thousand/µL      Eosinophils Absolute 0 06 Thousand/µL      Basophils Absolute 0 03 Thousands/µL                  CT high volume bleeding scan abdomen pelvis    (Results Pending)              Procedures  Procedures         ED Course                               SBIRT 22yo+    Flowsheet Row Most Recent Value   SBIRT (24 yo +)    In order to provide better care to our patients, we are screening all of our patients for alcohol and drug use  Would it be okay to ask you these screening questions? Yes Filed at: 01/15/2023 6602   Initial Alcohol Screen: US AUDIT-C     1  How often do you have a drink containing alcohol? 0 Filed at: 01/15/2023 0648   2  How many drinks containing alcohol do you have on a typical day you are drinking? 0 Filed at: 01/15/2023 0648   3b  FEMALE Any Age, or MALE 65+: How often do you have 4 or more drinks on one occassion? 0 Filed at: 01/15/2023 0648   Audit-C Score 0 Filed at: 01/15/2023 9249   TOMA: How many times in the past year have you    Used an illegal drug or used a prescription medication for non-medical reasons? Never Filed at: 01/15/2023 0023                    Medical Decision Making  Differential diagnosis includes but is not limited to internal hemorrhoids, diverticulosis, chronic ulcerative colitis flare, hemorrhagic disorder due to extrinsic circulating anticoagulants/adverse effects of anticoagulants    Disposition decision making/shared decision making discussed the case with patient and her , also gastroenterology and internal medicine with the decision to stay in the hospital for further evaluation and treatment  Chronic conditions affecting care: Chronic ulcerative colitis, history of PE and DVT, anticoagulant use      Acute blood loss anemia: complicated acute illness or injury  Adverse effect of anticoagulant, initial encounter: complicated acute illness or injury  closed fracture of distal end of left radius, subsequent encounter: acute illness or injury     Details: This happened on 1/12 she had been previously treated for this is awaiting outpatient orthopedic follow-up however we change the splint on it  Lower GI bleed: complicated acute illness or injury  Amount and/or Complexity of Data Reviewed  External Data Reviewed: labs and notes       Details: See HPI for details but review of last inpatient visit 1/7  Also reviewed last ast lab work on 1/12  Labs: ordered  Details: Hemoglobin is 10 9 today down from 12 7  Radiology: ordered  Decision-making details documented in ED Course  Details: Please see CT scan results from radiology  Discussion of management or test interpretation with external provider(s): Discussed with GI because her hemoglobin is now 10 9 from 12 7 they suggested CT bleeding scan which was ordered    Risk  Prescription drug management  Decision regarding hospitalization      Risk Details: Discussed with GI they felt patient should get that CT scan and then come to the hospital for further evaluation and treatment  Patient was given Zofran and morphine here in the emergency department for relief of pain and nausea        Disposition  Final diagnoses:   closed fracture of distal end of left radius, subsequent encounter   Lower GI bleed   Acute blood loss anemia   Adverse effect of anticoagulant, initial encounter     Time reflects when diagnosis was documented in both MDM as applicable and the Disposition within this note     Time User Action Codes Description Comment    1/15/2023  6:19 AM Mamadou Quezadaome Prom Add Sheron Simple Other closed fracture of distal end of left radius, initial encounter     1/15/2023  8:02 AM Todd Quezada Prom Modify [S52 592A] closed fracture of distal end of left radius, subsequent encounter     1/15/2023  8:03 AM Nikole Quezada K Add [K92 2] Lower GI bleed     1/15/2023  8:03 AM Todd Quezada Prom Modify [S52 592A] closed fracture of distal end of left radius, subsequent encounter     1/15/2023  8:03 AM Wilmar Quezada K Modify [K92 2] Lower GI bleed     1/15/2023  8:03 AM Wilmar Quezada K Add [D62] Acute blood loss anemia     1/15/2023  8:04 AM Darrall Staple Add [T45 515A] Adverse effect of anticoagulant, initial encounter       ED Disposition     ED Disposition   Admit    Condition   Stable    Date/Time   Sun Wisam 15, 2023  8:11 AM    Comment   Case was discussed with dr jorge and the patient's admission status was agreed to be Admission Status: inpatient status to the service of Dr Iwona Billingsley   Follow-up Information    None         Patient's Medications   Discharge Prescriptions    No medications on file       No discharge procedures on file      PDMP Review       Value Time User    PDMP Reviewed  Yes 12/7/2022  2:57 PM Funmilayo Rasmussen MD          ED Provider  Electronically Signed by           Tricia Dean, DO  01/15/23 [address],   01/15/23 0557

## 2023-01-15 NOTE — ASSESSMENT & PLAN NOTE
Blood with stool starting yesterday  Patient on Eliquis for portal vein thrombosis  Hemodynamically stable  Hemoglobin stable  No abdominal pain, nausea or vomiting  Recently here for similar complaint  Underwent sigmoidoscopy that showed anastomotic linear ulcer without any active bleeding  Will start the patient on clear liquid diet for now  Consult GI  Hold Eliquis

## 2023-01-15 NOTE — CONSULTS
Physician Requesting Consult: Earlene Essex, MD    Reason for Consult / Principal Problem: Lower GI bleed     Assessment/Plan:  50-year-old female with medical history of, GERD, DVTs and PE on Eliquis, iron deficient anemia, hypothyroidism, hypertension, hyperlipidemia, GERD, breast cancer, asthma, sjoegren syndrome, and anxiety who presented to [facility] on 1/15 with report of rectal bleeding  1   Lower GI bleed  Patient reported that she started having rectal bleeding on 1/14  Patient reports blood was mixed in with stool  Patient had previous episode the beginning of January at which time pouchoscopy was done  Pouchoscopy done 1/9/2023 which showed liquid to soft stool that was washed off as much as possible  Approximately 1 cm linear superficial ulcer was noted at the anastomosis area  Status post biopsy  No evidence of bleeding  Hemoglobin 12 7 on 1/10  Hemoglobin 10 3 today  Patient was recently switched from warfarin to Eliquis due to coagulopathy on warfarin  Blood in the rectal area may be secondary to ulceration, AVM, unlikely lesion, secondary to ulcerative colitis, or anticoagulation therapy  -Monitor for signs of GI bleed  -Monitor hemoglobin  -Transfuse blood products as needed to keep hemoglobin greater than 7  -Clear liquid diet   -N p o  after midnight for pouchoscopy tomorrow   -Schedule for pouchoscopy tomorrow  Prep and procedure explained to patient in detail further recommendations pending results of pouchoscopy   -Hold Eliquis  Thank you for the consult  Will follow  Case discussed with Dr Talib Lopez  HPI: Mallika Saini is a 65 y o  female  Rectal bleeding    This is a 50-year-old female with medical history of, GERD, DVTs and PE on Eliquis, iron deficient anemia, hypothyroidism, hypertension, hyperlipidemia, GERD, breast cancer, asthma, sjoegren syndrome, and anxiety who presented to [facility] on 1/15 with report of rectal bleeding  Patient was previously on warfarin for 20 years but recently switched to Eliquis due to GI bleed  Patient reported she started having rectal bleeding on 1/14  Patient reports that there will be brown stool and blood will be mixed in with the stool  Patient also reports acid reflux and is on pantoprazole for history of GERD  Patient does drink coffee and carbonated water  Patient denies nausea, vomiting, heartburn, dysphagia, epigastric or abdominal pain  Patient denies black tarry stool  Patient has J-pouch due to history of ulcerative colitis  Hemoglobin 12 7 on 1/10/2023 and hemoglobin 10 3 today  CT bleeding scan done which showed no CT evidence of active high-volume gastrointestinal hemorrhage  Patient reports she gets colonoscopies colorectal surgeon every 3 years  Pouchoscopy done 1/9/2023 which showed scope was passed beyond the anastomosis into the ileum  Noted to have have liquid to soft stool that was washed off as much as possible  Approximately 1 cm linear superficial ulcer was noted at the anastomosis area  Status post biopsy  No evidence of bleeding  Patient is a  Patient drinks approximately 2 cans of beer with no history of illicit drug use  No family history of gastric or colon cancer  Allergies:    Allergies   Allergen Reactions   • Indomethacin GI Intolerance and Dizziness   • Macrobid [Nitrofurantoin Monohyd Macro] Rash   • Nitrofurantoin Other (See Comments)   • Penicillins Rash     Annotation - [id number]: can take cephalosporins   • Sulfa Antibiotics Rash       Medications:  Current Facility-Administered Medications:   •  acetaminophen (TYLENOL) tablet 650 mg, 650 mg, Oral, Q6H PRN, Sanchez Meneses MD  •  albuterol (PROVENTIL HFA,VENTOLIN HFA) inhaler 2 puff, 2 puff, Inhalation, Q6H PRN, Sanchez Meneses MD  •  amLODIPine (NORVASC) tablet 2 5 mg, 2 5 mg, Oral, Daily, Sanchez Meneses MD  •  diltiazem (CARDIZEM CD) 24 hr capsule 180 mg, 180 mg, Oral, Daily, Chelsea Hector MD  •  escitalopram (LEXAPRO) tablet 20 mg, 20 mg, Oral, Daily, Chelsea Hector MD, 20 mg at 01/15/23 1231  •  fluticasone-vilanterol 200-25 mcg/actuation 1 puff, 1 puff, Inhalation, Daily, Chelsea Hector MD  •  gabapentin (NEURONTIN) capsule 1,200 mg, 1,200 mg, Oral, HS, Chelsea Hector MD  •  gabapentin (NEURONTIN) capsule 600 mg, 600 mg, Oral, BID, Chelsea Hector MD  •  levothyroxine tablet 50 mcg, 50 mcg, Oral, Daily, Chelsea Hector MD, 50 mcg at 01/15/23 1231  •  lisinopril (ZESTRIL) tablet 20 mg, 20 mg, Oral, BID, Chelsea Hector MD  •  LORazepam (ATIVAN) tablet 1 mg, 1 mg, Oral, Q6H PRN, Chelsea Hector MD  •  meclizine (ANTIVERT) tablet 25 mg, 25 mg, Oral, Q6H PRN, Chelsea Hector MD  •  sodium chloride tablet 1 g, 1 g, Oral, TID, Chelsea Hector MD, 1 g at 01/15/23 1231  •  torsemide (DEMADEX) tablet 10 mg, 10 mg, Oral, Daily PRN, Chelsea Hector MD    Past Medical history:  Past Medical History:   Diagnosis Date   • Anemia    • Anxiety    • Arrhythmia    • Arthritis    • Asthma    • Blood clot in vein     portal vein   • Breast cancer ([place] [address] ) 02/12/2021   • Breast lump     [id number] RESOLVED   • Cancer ([place] [address] )    • Depression    • Disease of thyroid gland    • DVT, lower extremity (HCC)    • GERD (gastroesophageal reflux disease)    • Hyperlipidemia    • Hypertension    • Hypokalemia    • Hyponatremia    • Hypothyroidism    • Iron deficiency anemia    • Irregular heart beat    • Manic behavior ([place] [address] )    • 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HCC)    • Osteoarthritis    • Palpitations     [id number]  RESOLVED   • Paroxysmal supraventricular tachycardia ([place] [address] )    • PE (pulmonary thromboembolism) ([place] [address] )    • Sjoegren syndrome    • Sleep apnea    • Sleep difficulties    • Spinal stenosis    • Thrombocytosis     [id number]  RESOLVED   • Tremors of nervous system     dbs implanted right and left chest   • Ulcerative colitis ([address] )    • Vertigo     [id number] RESOLVED       Past Surgical History:   Past Surgical History:   Procedure Laterality Date   • ABDOMINAL SURGERY     • APPENDECTOMY     • BREAST BIOPSY Left 11/07/2019    Stereo   • BREAST EXCISIONAL BIOPSY Left     x many years   • BREAST SURGERY      lumpectomy & biopsy   • CARMELLA HOLE W/ STEREOTACTIC INSERTION OF DBS LEADS / INTRAOP MICROELECTRODE RECORDING     • COLON SURGERY     • COLONOSCOPY N/A 08/30/2017    Procedure: Mali Mandel;  Surgeon: Duong Jo MD;  Location: [facility]; Service: Colorectal   • COLOPROCTECTOMY W/ ILEO J POUCH     • ESOPHAGOGASTRODUODENOSCOPY      ONSET 10/17/11   • FISTULA REPAIR      LLEOANAL FISTULA REPAIR TRANSPERIN TRANSABD APPROACH   • HYSTERECTOMY      age 43   • ILEOSTOMY CLOSURE     • KNEE ARTHROSCOPY      Right   • MAMMO STEREOTACTIC BREAST BIOPSY LEFT (ALL INC) Left 11/07/2019   • MAMMO STEREOTACTIC BREAST BIOPSY LEFT (ALL INC) Left 12/17/2020   • MAMMO STEREOTACTIC BREAST BIOPSY LEFT (ALL INC) EACH ADD Left 12/17/2020   • MASTECTOMY Left 02/12/2021    left mastectomy- Dr Eloina Perez   • MASTECTOMY W/ SENTINEL NODE BIOPSY Left 02/12/2021    Procedure: BREAST MASTECTOMY WITH SENTINEL LYMPH NODE BIOPSY, LYMPHATIC MAPPING WITH BLUE DYE AND RADIAOCTIVE DYE (INJECT AT 1100 BY DR GILLESPIE IN THE OR);   Surgeon: Jaycee Nazario MD;  Location[facility];  Service: Surgical Oncology   • UT INSJ/RPLCMT CRANIAL NEUROSTIM PULSE GENERATOR Right 06/20/2017    Procedure: DBS GENERATOR REPLACEMENT;  Surgeon: Shraddha Arredondo MD;  Location[facility];  Service: Neurosurgery   • UT INSJ/RPLCMT CRANIAL NEUROSTIM PULSE GENERATOR N/A 12/04/2019    Procedure: REPLACEMENT IMPLANTABLE PULSE GENERATOR FOR DEEP BRAIN STIMULATOR LEFT CHEST;  Surgeon: Marisa Saleh MD;  Location[facility];  Service: Neurosurgery   • SPLENECTOMY     • TONSILLECTOMY         Social history:   Social History     Tobacco Use   • Smoking status: Former     Packs/day: 1 50     Years: 5 00     Pack years: 7 50     Types: Cigarettes     Quit date: 1965     Years since quittin 0   • Smokeless tobacco: Never   • Tobacco comments:     Quit   Vaping Use   • Vaping Use: Never used   Substance Use Topics   • Alcohol use: Yes     Alcohol/week: 2 0 standard drinks     Types: 2 Cans of beer per week   • Drug use: No       Family history:   Family History   Problem Relation Age of Onset   • Venous thrombosis Mother         ACUTE VENOUS THROMBOSIS OF DEEP VESSELS OF THE DISTAL LOWER EXTREMITY   • Other Mother         PHLEBITIS   • Hypertension Mother    • Peripheral vascular disease Mother    • COPD Father    • Diabetes Father         MELLITUS   • Stroke Father    • Diabetes Sister         MELLITUS   • Sjogren's syndrome Sister    • No Known Problems Daughter    • No Known Problems Maternal Grandmother    • No Known Problems Maternal Grandfather    • No Known Problems Paternal Grandmother    • No Known Problems Paternal Grandfather    • No Known Problems Sister    • No Known Problems Maternal Aunt    • No Known Problems Paternal Aunt    • No Known Problems Son         Review of Systems: Review of Systems   Gastrointestinal: Positive for blood in stool  All other systems reviewed and are negative  Physical Exam: Physical Exam  Vitals and nursing note reviewed  Constitutional:       General: She is not in acute distress  HENT:      Head: Normocephalic and atraumatic  Cardiovascular:      Rate and Rhythm: Normal rate and regular rhythm  Pulses: Normal pulses  Heart sounds: Normal heart sounds  Pulmonary:      Effort: Pulmonary effort is normal  No respiratory distress  Breath sounds: Normal breath sounds  No stridor  No wheezing, rhonchi or rales  Abdominal:      General: Bowel sounds are normal  There is no distension  Palpations: Abdomen is soft  There is no mass  Tenderness: There is no abdominal tenderness  There is no guarding or rebound  Hernia: No hernia is present     Musculoskeletal:      Cervical back: Normal range of motion and neck supple  Right lower leg: No edema  Left lower leg: No edema  Skin:     General: Skin is warm and dry  Capillary Refill: Capillary refill takes less than 2 seconds  Coloration: Skin is not jaundiced or pale  Neurological:      Mental Status: She is alert and oriented to person, place, and time     Psychiatric:         Mood and Affect: Mood normal            Lab Results:   Recent Results (from the past 24 hour(s))   CBC and differential    Collection Time: 01/15/23  6:37 AM   Result Value Ref Range    WBC 6 92 4 31 - 10 16 Thousand/uL    RBC 3 45 (L) 3 81 - 5 12 Million/uL    Hemoglobin 10 9 (L) 11 5 - 15 4 g/dL    Hematocrit 32 5 (L) 34 8 - 46 1 %    MCV 94 82 - 98 fL    MCH 31 6 26 8 - 34 3 pg    MCHC 33 5 31 4 - 37 4 g/dL    RDW 15 8 (H) 11 6 - 15 1 %    MPV 8 6 (L) 8 9 - 12 7 fL    Platelets 413 (H) 208 - 390 Thousands/uL    nRBC 0 /100 WBCs    Neutrophils Relative 66 43 - 75 %    Immat GRANS % 1 0 - 2 %    Lymphocytes Relative 20 14 - 44 %    Monocytes Relative 12 4 - 12 %    Eosinophils Relative 1 0 - 6 %    Basophils Relative 0 0 - 1 %    Neutrophils Absolute 4 54 1 85 - 7 62 Thousands/µL    Immature Grans Absolute 0 05 0 00 - 0 20 Thousand/uL    Lymphocytes Absolute 1 40 0 60 - 4 47 Thousands/µL    Monocytes Absolute 0 84 0 17 - 1 22 Thousand/µL    Eosinophils Absolute 0 06 0 00 - 0 61 Thousand/µL    Basophils Absolute 0 03 0 00 - 0 10 Thousands/µL   Protime-INR    Collection Time: 01/15/23  6:37 AM   Result Value Ref Range    Protime 12 2 11 6 - 14 5 seconds    INR 0 88 0 84 - 1 19   APTT    Collection Time: 01/15/23  6:37 AM   Result Value Ref Range    PTT 25 23 - 37 seconds   Comprehensive metabolic panel    Collection Time: 01/15/23  6:37 AM   Result Value Ref Range    Sodium 131 (L) 135 - 147 mmol/L    Potassium 4 2 3 5 - 5 3 mmol/L    Chloride 96 96 - 108 mmol/L    CO2 28 21 - 32 mmol/L    ANION GAP 7 4 - 13 mmol/L    BUN 9 5 - 25 mg/dL    Creatinine 0 71 0 60 - 1 30 mg/dL    Glucose 118 65 - 140 mg/dL    Calcium 8 7 8 4 - 10 2 mg/dL    AST 18 13 - 39 U/L    ALT 13 7 - 52 U/L    Alkaline Phosphatase 60 34 - 104 U/L    Total Protein 6 1 (L) 6 4 - 8 4 g/dL    Albumin 3 6 3 5 - 5 0 g/dL    Total Bilirubin 0 35 0 20 - 1 00 mg/dL    eGFR 81 ml/min/1 73sq m   Type and screen    Collection Time: 01/15/23 11:20 AM   Result Value Ref Range    ABO Grouping O     Rh Factor Positive     Antibody Screen Negative     Specimen Expiration Date [id number]            Imaging Studies: XR shoulder 2+ vw left    Result Date: 1/12/2023  Narrative: LEFT SHOULDER INDICATION:   S40 012A: Contusion of left shoulder, initial encounter  COMPARISON:  None VIEWS:  XR SHOULDER 2+ VW LEFT FINDINGS: There is no acute fracture or dislocation  No significant degenerative changes  No lytic or blastic osseous lesion  Soft tissues are unremarkable  Neurostimulator device in the left anterior chest wall noted  Impression: No acute osseous abnormality  Workstation performed: [id number]     XR wrist 3+ vw left    Result Date: 1/12/2023  Narrative: LEFT WRIST INDICATION:   S60 212A: Contusion of left wrist, initial encounter  COMPARISON:  None VIEWS:  XR WRIST 3+ VW LEFT FINDINGS: Essentially nondisplaced comminuted intra-articular distal radial fracture  Degenerative changes of the 1st carpal metacarpal [place]) articulation  No lytic or blastic osseous lesion  Diffuse soft tissue swelling in the wrist   Vascular calcifications noted  Impression: Essentially nondisplaced comminuted intra-articular distal radial fracture  Workstation performed: [id number]     CT high volume bleeding scan abdomen pelvis    Result Date: 1/15/2023  Narrative: CT ABDOMEN AND PELVIS - WITHOUT AND WITH IV CONTRAST INDICATION:   brbpr   COMPARISON: 1/7/2023 TECHNIQUE:  CT examination of the abdomen and pelvis was performed both prior to and after the administration of intravenous contrast   Contrast was injected one time intravenously without immediate complication  Scanning through the abdomen was performed in arterial and delayed phases according a protocol spefically designed to evaluate upper abdominal viscera  Axial, sagittal, and coronal 2D reformatted images were created from the source data and submitted for interpretation  Radiation dose length product (DLP) for this visit:  1314 mGy-cm   This examination, like all CT scans performed in the [facility], was performed utilizing techniques to minimize radiation dose exposure, including the use of iterative reconstruction and automated exposure control  IV Contrast:  100 mL of iohexol (OMNIPAQUE) Enteric Contrast:  Enteric contrast was not administered  FINDINGS: ABDOMEN  BOWEL:  The patient is status post total proctocolectomy with J-pouch formation  There is no active extravasation of intravenous contrast into the lumen of stomach or bowel loops  There is no abnormal bowel wall thickening or pathologic bowel wall enhancement  Atherosclerotic changes of the vessels are noted  The inferior mesenteric artery is not visualized  There is an accessory branch to the upper pole of the left kidney  There is an apparent right hepatic artery origin directly from the abdominal aorta  LIVER/BILIARY TREE:  Unremarkable  GALLBLADDER:  No calcified gallstones  No pericholecystic inflammatory change  SPLEEN:  Splenectomy  Left upper quadrant splenules  PANCREAS:  Unremarkable  ADRENAL GLANDS:  Unremarkable  KIDNEYS/URETERS:  Unremarkable  No hydronephrosis  APPENDIX:  Resected ABDOMINOPELVIC CAVITY:  No ascites  No pneumoperitoneum  No lymphadenopathy  VESSELS:  Atherosclerotic changes are present  No evidence of aneurysm  PELVIS REPRODUCTIVE ORGANS:  Surgical changes of prior hysterectomy  URINARY BLADDER:  Unremarkable  ABDOMINAL WALL/INGUINAL REGIONS:  Unremarkable  OSSEOUS STRUCTURES:  No acute fracture or destructive osseous lesion    Spinal degenerative changes are noted  Impression: No CT evidence of active high volume gastrointestinal hemorrhage  Workstation performed: [id number]     CT abdomen pelvis with contrast    Result Date: 1/7/2023  Narrative: CT ABDOMEN AND PELVIS WITH IV CONTRAST INDICATION:   LLQ abdominal pain GI bleed in setting of colectomy with ileal j-pouch, r/o structural cause given surgical history  COMPARISON:  CT abdomen/pelvis 10/17/2020  TECHNIQUE:  CT examination of the abdomen and pelvis was performed  Axial, sagittal, and coronal 2D reformatted images were created from the source data and submitted for interpretation  Radiation dose length product (DLP) for this visit:  394 mGy-cm   This examination, like all CT scans performed in the [facility], was performed utilizing techniques to minimize radiation dose exposure, including the use of iterative reconstruction and automated exposure control  IV Contrast:  100 mL of iohexol (OMNIPAQUE) Enteric Contrast:  Enteric contrast was not administered  FINDINGS: ABDOMEN LOWER CHEST:  No clinically significant abnormality identified in the visualized lower chest  LIVER/BILIARY TREE:  Stable hepatomegaly  Otherwise, within normal limits  GALLBLADDER:  No calcified gallstones  No pericholecystic inflammatory change  SPLEEN:  Splenectomy with stable splenules in the left upper quadrant  PANCREAS:  Unremarkable  ADRENAL GLANDS:  Unremarkable  KIDNEYS/URETERS:  Unremarkable  No hydronephrosis  STOMACH AND BOWEL:  Postsurgical changes of total proctocolectomy with J-pouch formation, similar in appearance to the prior study  APPENDIX:  No findings to suggest appendicitis  ABDOMINOPELVIC CAVITY:  No ascites  No pneumoperitoneum  No lymphadenopathy  VESSELS:  Atherosclerotic changes are present  No evidence of aneurysm  PELVIS REPRODUCTIVE ORGANS:  Surgical changes of prior hysterectomy  URINARY BLADDER:  Unremarkable  ABDOMINAL WALL/INGUINAL REGIONS:  Unremarkable   OSSEOUS STRUCTURES:  No acute fracture or destructive osseous lesion  Spinal degenerative changes are noted  Moderate right hip joint osteoarthritis  Stable chronic displaced left L2 and L3 transverse process fractures  Impression: 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, similar in appearance to prior study  No evidence of obstruction or additional acute abnormality in the abdomen or pelvis  Workstation performed: [id number]     Flexible Sigmoidoscopy    Result Date: 1/9/2023  Narrative: Table formatting from the original result was not included  [facility] [address] [facility], [address] [phone] DATE OF SERVICE: 1/09/23 PHYSICIAN(S): Attending: Irvin Ramos MD Fellow: No Staff Documented INDICATION: Hematochezia POST-OP DIAGNOSIS: See the impression below  HISTORY: Prior colonoscopy: 5 years ago  BOWEL PREPARATION: Enema PREPROCEDURE: Informed consent was obtained for the procedure, including sedation  Risks including but not limited to bleeding, infection, perforation, adverse drug reaction and aspiration were explained in detail  Also explained about less than 100% sensitivity with the exam and other alternatives  The patient was placed in the left lateral decubitus position  Procedure: Colonoscopy DETAILS OF PROCEDURE: Patient was taken to the procedure room where a time out was performed to confirm correct patient and correct procedure  The patient underwent monitored anesthesia care, which was administered by an anesthesia professional  The patient's blood pressure, heart rate, level of consciousness, oxygen and respirations were monitored throughout the procedure  A digital rectal exam was performed  The scope was introduced through the anus and advanced to the terminal ileum  Retroflexion was performed in the rectum  The patient experienced no blood loss  The procedure was not difficult  The patient tolerated the procedure well   There were no apparent complications  ANESTHESIA INFORMATION: ASA: III Anesthesia Type: IV Sedation with Anesthesia MEDICATIONS: No administrations occurring from 1258 to 1314 on 01/09/23 FINDINGS: Scope was passed beyond the anastomosis into the ileum  Noted to take liquid to soft stool that was washed off as much as possible  About 1 cm linear superficial ulceration was noted at the anastomotic area status post biopsies  No evidence of any bleeding  EVENTS: Procedure Events Event Event Time ENDO SCOPE OUT TIME 1/9/2023  1:12 PM SPECIMENS: ID Type Source Tests Collected by Time Destination 1 : bx sigmoid anestomosis Tissue Large Intestine, Sigmoid Colon TISSUE EXAM Kay Liz MD 1/9/2023  1:08 PM  EQUIPMENT: Colonoscope -[id number]     Impression: As above RECOMMENDATION:  Await pathology results  No further screening sigmoidoscopies necessary   Kay Liz MD     Echo complete w/ contrast if indicated    Result Date: 12/23/2022  Narrative: •  Left Ventricle: Left ventricular cavity size is normal  Wall thickness is normal  The left ventricular ejection fraction is 65%  Systolic function is normal  Diastolic function is mildly abnormal, consistent with grade I (abnormal) relaxation         Problem List:   Patient Active Problem List   Diagnosis   • Portal vein thrombosis   • Anxiety   • Moderate episode of recurrent major depressive disorder (HCC)   • Essential tremor   • Hyperlipidemia   • Essential hypertension   • Hypomagnesemia   • SIADH (syndrome of inappropriate ADH production) ([facility])   • Acquired hypothyroidism   • Insomnia   • Iron deficiency anemia   • Prediabetes   • Peripheral neuropathy   • Osteopenia   • Osteoarthritis of right knee   • Ulcerative colitis without complications ([facility])   • Allergic rhinitis   • GERD (gastroesophageal reflux disease)   • Mild intermittent asthma without complication   • Diarrhea   • Sjogren's syndrome ([facility])   • Chronic salpingo-oophoritis   • Overweight   • Supraventricular tachycardia (HCC)   • Unsteady gait   • Lower extremity pain, left   • Lumbar degenerative disc disease   • Encounter for long-term (current) use of medications   • S/P deep brain stimulator placement   • Vitamin B12 deficiency   • Vitamin D deficiency   • Hyponatremia   • History of left breast cancer   • Rectal bleeding   • Thrush   • Warfarin-induced coagulopathy (HCC)   • Closed fracture of distal end of left radius   • Closed fracture of left distal radius         DeAnne Tracy Ormond, CRNP      Please Note: "This note has been constructed using a voice recognition system  Therefore there may be syntax, spelling, and/or grammatical errors   Please call if you have any questions  "**

## 2023-01-15 NOTE — ASSESSMENT & PLAN NOTE
Patient was previously on Coumadin  After recent hospitalization was switched to Eliquis  Continue to hold anticoagulation for now because of GI bleeding  Last dose of Eliquis yesterday evening

## 2023-01-15 NOTE — PLAN OF CARE
Problem: PAIN - ADULT  Goal: Verbalizes/displays adequate comfort level or baseline comfort level  Description: Interventions:  - Encourage patient to monitor pain and request assistance  - Assess pain using appropriate pain scale  - Administer analgesics based on type and severity of pain and evaluate response  - Implement non-pharmacological measures as appropriate and evaluate response  - Consider cultural and social influences on pain and pain management  - Notify physician/advanced practitioner if interventions unsuccessful or patient reports new pain  Outcome: Progressing     Problem: INFECTION - ADULT  Goal: Absence or prevention of progression during hospitalization  Description: INTERVENTIONS:  - Assess and monitor for signs and symptoms of infection  - Monitor lab/diagnostic results  - Monitor all insertion sites, i e  indwelling lines, tubes, and drains  - Monitor endotracheal if appropriate and nasal secretions for changes in amount and color  - [place] appropriate cooling/warming therapies per order  - Administer medications as ordered  - Instruct and encourage patient and family to use good hand hygiene technique  - Identify and instruct in appropriate isolation precautions for identified infection/condition  Outcome: Progressing  Goal: Absence of fever/infection during neutropenic period  Description: INTERVENTIONS:  - Monitor WBC    Outcome: Progressing     Goal: Maintain or return to baseline ADL function  Description: INTERVENTIONS:  -  Assess patient's ability to carry out ADLs; assess patient's baseline for ADL function and identify physical deficits which impact ability to perform ADLs (bathing, care of mouth/teeth, toileting, grooming, dressing, etc )  - Assess/evaluate cause of self-care deficits   - Assess range of motion  - Assess patient's mobility; develop plan if impaired  - Assess patient's need for assistive devices and provide as appropriate  - Encourage maximum independence but intervene and supervise when necessary  - Involve family in performance of ADLs  - Assess for home care needs following discharge   - Consider OT consult to assist with ADL evaluation and planning for discharge  - Provide patient education as appropriate  Outcome: Progressing  Problem: DISCHARGE PLANNING  Goal: Discharge to home or other facility with appropriate resources  Description: INTERVENTIONS:  - Identify barriers to discharge w/patient and caregiver  - Arrange for needed discharge resources and transportation as appropriate  - Identify discharge learning needs (meds, wound care, etc )  - Arrange for interpretive services to assist at discharge as needed  - Refer to Case Management Department for coordinating discharge planning if the patient needs post-hospital services based on physician/advanced practitioner order or complex needs related to functional status, cognitive ability, or social support system  Outcome: Progressing     Problem: Knowledge Deficit  Goal: Patient/family/caregiver demonstrates understanding of disease process, treatment plan, medications, and discharge instructions  Description: Complete learning assessment and assess knowledge base    Interventions:  - Provide teaching at level of understanding  - Provide teaching via preferred learning methods  Outcome: Progressing     Problem: Potential for Falls  Goal: Patient will remain free of falls  Description: INTERVENTIONS:  - Educate patient/family on patient safety including physical limitations  - Instruct patient to call for assistance with activity   - Consult OT/PT to assist with strengthening/mobility   - Keep Call bell within reach  - Keep bed low and locked with side rails adjusted as appropriate  - Keep care items and personal belongings within reach  - Initiate and maintain comfort rounds  - Make Fall Risk Sign visible to staff  - Offer Toileting every 2 hours   - Apply yellow socks and bracelet for high fall risk patients  - Consider moving patient to room near nurses station  Outcome: Progressing

## 2023-01-15 NOTE — H&P
[facility]&P- Michell Koch [birth year], 65 y o  female MRN: [id number]  Unit/Bed#: S -01 Encounter: [id number]  Primary Care Provider: Funmilayo Rasmussen MD   Date and time admitted to hospital: 1/15/2023  5:57 AM    * Rectal bleeding  Assessment & Plan  Blood with stool starting yesterday  Patient on Eliquis for portal vein thrombosis  Hemodynamically stable  Hemoglobin stable  No abdominal pain, nausea or vomiting  Recently here for similar complaint  Underwent sigmoidoscopy that showed anastomotic linear ulcer without any active bleeding  Will start the patient on clear liquid diet for now  Consult GI  Hold Eliquis  Closed fracture of left distal radius  Assessment & Plan  After recent fall on Thursday  Went to urgent care  Was splinted  Was redone in ER because it was too tight  Patient was supposed to see Ortho tomorrow in office  Will consult Ortho  Portal vein thrombosis  Assessment & Plan  Patient was previously on Coumadin  After recent hospitalization was switched to Eliquis  Continue to hold anticoagulation for now because of GI bleeding  Last dose of Eliquis yesterday evening  Supraventricular tachycardia (HCC)  Assessment & Plan  Stable  continue Cardizem    Ulcerative colitis without complications ([facility] [address] )  Assessment & Plan  Pt with known h/o ulcerative colitis status post total proctocolectomy with ileoanal J-pouch      Acquired hypothyroidism  Assessment & Plan  Continue levothyroxine    SIADH (syndrome of inappropriate ADH production) ([facility])  Assessment & Plan  Sodium 131 on admission  Stable  Continue salt tablets  Essential hypertension  Assessment & Plan  Stable    Continue amlodipine, Cardizem, lisinopril, torsemide    Anxiety  Assessment & Plan  Continue Lexapro  As needed Ativan          VTE Prophylaxis: Pharmacologic VTE Prophylaxis contraindicated due to gi bleed  / sequential compression device   Code Status: full  POLST: POLST form is not discussed and not completed at this time  Discussion with family: pt and     Anticipated Length of Stay:  Patient will be admitted on an Inpatient basis with an anticipated length of stay of  > 2 midnights  Justification for Hospital Stay: above    Total Time for Visit, including Counseling / Coordination of Care: 60 minutes  Greater than 50% of this total time spent on direct patient counseling and coordination of care  Chief Complaint:   Rectal bleeding    History of Present Illness:    Sandy Charles is a 65 y o  female who presents with blood in stool starting yesterday evening  Patient has history of ulcerative colitis, portal vein thrombosis on Eliquis  No abdominal pain, nausea or vomiting  Says she has blood in stool whenever she has a bowel movement  Review of Systems:    Review of Systems   Constitutional: Negative for activity change, chills and fatigue  HENT: Negative for congestion and sore throat  Respiratory: Negative for cough and shortness of breath  Cardiovascular: Negative for chest pain and palpitations  Gastrointestinal: Positive for blood in stool  Negative for abdominal pain, diarrhea, rectal pain and vomiting  Genitourinary: Negative for dysuria, flank pain, frequency and urgency  Musculoskeletal: Negative for arthralgias and myalgias  Skin: Negative for rash  Neurological: Negative for dizziness and light-headedness  Psychiatric/Behavioral: Negative for agitation, behavioral problems and confusion         Past Medical and Surgical History:     Past Medical History:   Diagnosis Date   • Anemia    • Anxiety    • Arrhythmia    • Arthritis    • Asthma    • Blood clot in vein     portal vein   • Breast cancer ([facility] [address] ) 02/12/2021   • Breast lump     [id number] RESOLVED   • Cancer (HCC)    • Depression    • Disease of thyroid gland    • DVT, lower extremity (HCC)    • GERD (gastroesophageal reflux disease)    • Hyperlipidemia    • Hypertension • Hypokalemia    • Hyponatremia    • Hypothyroidism    • Iron deficiency anemia    • Irregular heart beat    • Manic behavior (HCC)    • 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HCC)    • Osteoarthritis    • Palpitations     [id number]  RESOLVED   • Paroxysmal supraventricular tachycardia (HCC)    • PE (pulmonary thromboembolism) (HCC)    • Sjoegren syndrome    • Sleep apnea    • Sleep difficulties    • Spinal stenosis    • Thrombocytosis     [id number]  RESOLVED   • Tremors of nervous system     dbs implanted right and left chest   • Ulcerative colitis ([address] )    • Vertigo     [id number] RESOLVED       Past Surgical History:   Procedure Laterality Date   • ABDOMINAL SURGERY     • APPENDECTOMY     • BREAST BIOPSY Left 11/07/2019    Stereo   • BREAST EXCISIONAL BIOPSY Left     x many years   • BREAST SURGERY      lumpectomy & biopsy   • CARMELLA HOLE W/ STEREOTACTIC INSERTION OF DBS LEADS / INTRAOP MICROELECTRODE RECORDING     • COLON SURGERY     • COLONOSCOPY N/A 08/30/2017    Procedure: Opal Whitley;  Surgeon: Deanne Finnegan MD;  Location: [facility]; Service: Colorectal   • COLOPROCTECTOMY W/ ILEO J POUCH     • ESOPHAGOGASTRODUODENOSCOPY      ONSET 10/17/11   • FISTULA REPAIR      LLEOANAL FISTULA REPAIR TRANSPERIN TRANSABD APPROACH   • HYSTERECTOMY      age 43   • ILEOSTOMY CLOSURE     • KNEE ARTHROSCOPY      Right   • MAMMO STEREOTACTIC BREAST BIOPSY LEFT (ALL INC) Left 11/07/2019   • MAMMO STEREOTACTIC BREAST BIOPSY LEFT (ALL INC) Left 12/17/2020   • MAMMO STEREOTACTIC BREAST BIOPSY LEFT (ALL INC) EACH ADD Left 12/17/2020   • MASTECTOMY Left 02/12/2021    left mastectomy- Dr Wilbur Zuniga   • MASTECTOMY W/ SENTINEL NODE BIOPSY Left 02/12/2021    Procedure: BREAST MASTECTOMY WITH SENTINEL LYMPH NODE BIOPSY, LYMPHATIC MAPPING WITH BLUE DYE AND RADIAOCTIVE DYE (INJECT AT 1100 BY DR GILLESPIE IN THE OR);   Surgeon: Charanjit Mart MD;  Location[facility];  Service: Surgical Oncology   • MD INSJ/RPLCMT CRANIAL NEUROSTIM PULSE GENERATOR Right 06/20/2017    Procedure: DBS GENERATOR REPLACEMENT;  Surgeon: Gianluca Hampton MD;  Location: [facility];  Service: Neurosurgery   • TX INSJ/RPLCMT CRANIAL NEUROSTIM PULSE GENERATOR N/A 12/04/2019    Procedure: REPLACEMENT IMPLANTABLE PULSE GENERATOR FOR DEEP BRAIN STIMULATOR LEFT CHEST;  Surgeon: Kelly Rangel MD;  Location: [facility];  Service: Neurosurgery   • SPLENECTOMY     • TONSILLECTOMY         Meds/Allergies:    Prior to Admission medications    Medication Sig Start Date End Date Taking?  Authorizing Provider   acetaminophen (TYLENOL) 325 mg tablet Take 2 tablets (650 mg total) by mouth every 6 (six) hours as needed for mild pain 10/18/21   Derinda Barbara Ninette Seip, PA-C   albuterol (PROVENTIL HFA,VENTOLIN HFA) 90 mcg/act inhaler Inhale 2 puffs every 6 (six) hours as needed for wheezing 11/28/22   Tony Paulino MD   amLODIPine (NORVASC) 2 5 mg tablet TAKE ONE TABLET BY MOUTH EVERY DAY 12/5/22   Marianna Davila MD   apixaban (Eliquis) 5 mg Take 1 tablet (5 mg total) by mouth 2 (two) times a day 1/9/23   Eli Rodas MD   Calcium Carb-Cholecalciferol (CALCIUM 600-D PO) Take 1 tablet by mouth 2 (two) times a day    Historical Provider, MD   cetirizine (ZyrTEC) 10 mg tablet Take 10 mg by mouth daily  Patient not taking: Reported on 1/12/2023    Historical Provider, MD   Coenzyme Q10 (CO Q-10 PO) Take 1 capsule by mouth daily    Historical Provider, MD   diltiazem (CARDIZEM CD) 180 mg 24 hr capsule TAKE ONE CAPSULE BY MOUTH EVERY DAY 9/19/22   Tony Paulino MD   diltiazem (CARDIZEM) 60 mg tablet Take 1 tablet (60 mg total) by mouth daily 8/25/22   Tony Paulino MD   escitalopram (LEXAPRO) 20 mg tablet TAKE ONE TABLET BY MOUTH EVERY DAY 9/19/22   Tony Paulino MD   fluticasone [facility]) 50 mcg/act nasal spray INSTILL ONE SPRAY INTO EACH NOSTRIL ONCE DAILY AS NEEDED FOR RHINITIS 5/31/22   Tony Paulino MD   fluticasone-salmeterol (Advair Diskus) 250-50 mcg/dose inhaler Inhale 1 puff 2 (two) times a day Rinse mouth after use 12/27/21   Thalia Dubois MD   gabapentin (NEURONTIN) 600 MG tablet TAKE ONE TABLET BY MOUTH IN THE MORNING, ONE TABLET IN THE AFTERNNON, AND TWO TABLETS AT BEDTIME 12/13/22   Thalia Dubois MD   HYDROcodone-acetaminophen [facility]) 5-325 mg per tablet Take 1 tablet by mouth every 6 (six) hours as needed for pain Max Daily Amount: 4 tablets 11/28/22   Thalia Dubois MD   levothyroxine 50 mcg tablet TAKE ONE TABLET BY MOUTH EVERY DAY 10/28/22   RODNEY Correa   lisinopril (ZESTRIL) 20 mg tablet TAKE ONE TABLET BY MOUTH TWICE A DAY 8/8/22   Thalia Dubois MD   loperamide (IMODIUM) 2 mg capsule Take 2 mg by mouth 4 (four) times a day as needed      Historical Provider, MD   LORazepam (ATIVAN) 1 mg tablet Take 1 tablet (1 mg total) by mouth every 6 (six) hours as needed for anxiety 12/7/22   Thalia Dubois MD   MAGNESIUM PO Take 1 tablet by mouth daily    Historical Provider, MD   meclizine (ANTIVERT) 25 mg tablet Take 1 tablet (25 mg total) by mouth every 6 (six) hours as needed for dizziness 12/20/21   Thalia Dubois MD   Multiple Vitamin (MULTIVITAMIN ADULT PO) Take 1 tablet by mouth daily    Historical Provider, MD   nystatin (MYCOSTATIN) 500,000 units/5 mL suspension  9/2/22   Historical Provider, MD   Omega-3 Fatty Acids (FISH OIL PO) Take 1 capsule by mouth daily    Historical Provider, MD   potassium chloride (MICRO-K) 10 MEQ CR capsule Take 2 capsules daily 10/17/22   Thalia Dubois MD   predniSONE 5 mg tablet  1/5/23   Historical Provider, MD   simvastatin (ZOCOR) 5 MG tablet TAKE ONE TABLET BY MOUTH EVERY DAY 9/6/22   Thalia Dubois MD   sodium chloride 1 g tablet Take 1 tablet (1 g total) by mouth 3 (three) times a day 5/10/22   Ronald Torres DO   tobramycin-dexamethasone [facility] APOPKA) ophthalmic suspension  11/7/22   Historical Provider, MD   torsemide (DEMADEX) 10 mg tablet Take 1 tablet (10 mg total) by mouth daily as needed (edema) 21   Shauna Napier MD         Allergies:    Allergies   Allergen Reactions   • Indomethacin GI Intolerance and Dizziness   • Macrobid [Nitrofurantoin Monohyd Macro] Rash   • Nitrofurantoin Other (See Comments)   • Penicillins Rash     Annotation - [id number]: can take cephalosporins   • Sulfa Antibiotics Rash       Social History:     Marital Status:    Occupation:   Patient Pre-hospital Living Situation:   Patient Pre-hospital Level of Mobility:   Patient Pre-hospital Diet Restrictions:   Substance Use History:   Social History     Substance and Sexual Activity   Alcohol Use Yes   • Alcohol/week: 2 0 standard drinks   • Types: 2 Cans of beer per week     Social History     Tobacco Use   Smoking Status Former   • Packs/day: 1 50   • Years: 5 00   • Pack years: 7 50   • Types: Cigarettes   • Quit date: 1965   • Years since quittin 0   Smokeless Tobacco Never   Tobacco Comments    Quit     Social History     Substance and Sexual Activity   Drug Use No       Family History:    Family History   Problem Relation Age of Onset   • Venous thrombosis Mother         ACUTE VENOUS THROMBOSIS OF DEEP VESSELS OF THE DISTAL LOWER EXTREMITY   • Other Mother         PHLEBITIS   • Hypertension Mother    • Peripheral vascular disease Mother    • COPD Father    • Diabetes Father         MELLITUS   • Stroke Father    • Diabetes Sister         MELLITUS   • Sjogren's syndrome Sister    • No Known Problems Daughter    • No Known Problems Maternal Grandmother    • No Known Problems Maternal Grandfather    • No Known Problems Paternal Grandmother    • No Known Problems Paternal Grandfather    • No Known Problems Sister    • No Known Problems Maternal Aunt    • No Known Problems Paternal Aunt    • No Known Problems Son        Physical Exam:     Vitals:   Blood Pressure: 142/66 (01/15/23 0800)  Pulse: 75 (01/15/23 0609)  Temperature: 98 4 °F (36 9 °C) (01/15/23 0612)  Temp Source: Oral (01/15/23 6669)  Respirations: 16 (01/15/23 0609)  SpO2: 97 % (01/15/23 0609)    Physical Exam    Constitutional: Pt appears comfortable  Not in any acute distress  HENT:   Head: Normocephalic and atraumatic  Eyes: EOM are normal    Neck: Neck supple  Cardiovascular: Normal rate, regular rhythm, normal heart sounds  No murmur heard  Pulmonary/Chest: Effort normal, air entry b/l equal  No respiratory distress  Pt has no wheezes or crackles  Abdominal: Soft  Non-distended, Non-tender  Bowel sounds are normal    Musculoskeletal: Normal range of motion  L forearm splinted  Neurological: awake, alert  Moving all extremities spontaneously  Psychiatric: normal mood and affect  Additional Data:     Lab Results: I have personally reviewed pertinent reports  Results from last 7 days   Lab Units 01/15/23  0637   WBC Thousand/uL 6 92   HEMOGLOBIN g/dL 10 9*   HEMATOCRIT % 32 5*   PLATELETS Thousands/uL 441*   NEUTROS PCT % 66   LYMPHS PCT % 20   MONOS PCT % 12   EOS PCT % 1     Results from last 7 days   Lab Units 01/15/23  0637   SODIUM mmol/L 131*   POTASSIUM mmol/L 4 2   CHLORIDE mmol/L 96   CO2 mmol/L 28   BUN mg/dL 9   CREATININE mg/dL 0 71   ANION GAP mmol/L 7   CALCIUM mg/dL 8 7   ALBUMIN g/dL 3 6   TOTAL BILIRUBIN mg/dL 0 35   ALK PHOS U/L 60   ALT U/L 13   AST U/L 18   GLUCOSE RANDOM mg/dL 118     Results from last 7 days   Lab Units 01/15/23  0637   INR  0 88                   Imaging: I have personally reviewed pertinent reports  CT high volume bleeding scan abdomen pelvis   Final Result by Tee Cesar MD (01/15 1007)      No CT evidence of active high volume gastrointestinal hemorrhage  Workstation performed: [id number]             EKG, Pathology, and Other Studies Reviewed on Admission:   · EKG:     Allscripts / Epic Records Reviewed: Yes     ** Please Note: This note has been constructed using a voice recognition system   **

## 2023-01-15 NOTE — Clinical Note
Case was discussed with dr jorge and the patient's admission status was agreed to be Admission Status: inpatient status to the service of Dr  cardio

## 2023-01-16 ENCOUNTER — TELEPHONE (OUTPATIENT)
Dept: OBGYN CLINIC | Facility: MEDICAL CENTER | Age: 78
End: 2023-01-16

## 2023-01-16 ENCOUNTER — APPOINTMENT (INPATIENT)
Dept: GASTROENTEROLOGY | Facility: HOSPITAL | Age: 78
End: 2023-01-16

## 2023-01-16 ENCOUNTER — ANESTHESIA (INPATIENT)
Dept: GASTROENTEROLOGY | Facility: HOSPITAL | Age: 78
End: 2023-01-16

## 2023-01-16 ENCOUNTER — ANESTHESIA EVENT (INPATIENT)
Dept: GASTROENTEROLOGY | Facility: HOSPITAL | Age: 78
End: 2023-01-16

## 2023-01-16 LAB
ALBUMIN SERPL BCP-MCNC: 3.3 G/DL (ref 3.5–5)
ALP SERPL-CCNC: 46 U/L (ref 34–104)
ALT SERPL W P-5'-P-CCNC: 11 U/L (ref 7–52)
ANION GAP SERPL CALCULATED.3IONS-SCNC: 6 MMOL/L (ref 4–13)
AST SERPL W P-5'-P-CCNC: 13 U/L (ref 13–39)
BASOPHILS # BLD AUTO: 0.04 THOUSANDS/ÂΜL (ref 0–0.1)
BASOPHILS NFR BLD AUTO: 1 % (ref 0–1)
BILIRUB SERPL-MCNC: 0.36 MG/DL (ref 0.2–1)
BUN SERPL-MCNC: 5 MG/DL (ref 5–25)
CALCIUM ALBUM COR SERPL-MCNC: 9 MG/DL (ref 8.3–10.1)
CALCIUM SERPL-MCNC: 8.4 MG/DL (ref 8.4–10.2)
CHLORIDE SERPL-SCNC: 101 MMOL/L (ref 96–108)
CO2 SERPL-SCNC: 29 MMOL/L (ref 21–32)
CREAT SERPL-MCNC: 0.52 MG/DL (ref 0.6–1.3)
EOSINOPHIL # BLD AUTO: 0.08 THOUSAND/ÂΜL (ref 0–0.61)
EOSINOPHIL NFR BLD AUTO: 2 % (ref 0–6)
ERYTHROCYTE [DISTWIDTH] IN BLOOD BY AUTOMATED COUNT: 16.3 % (ref 11.6–15.1)
GFR SERPL CREATININE-BSD FRML MDRD: 91 ML/MIN/1.73SQ M
GLUCOSE SERPL-MCNC: 90 MG/DL (ref 65–140)
HCT VFR BLD AUTO: 33.5 % (ref 34.8–46.1)
HGB BLD-MCNC: 10.8 G/DL (ref 11.5–15.4)
IMM GRANULOCYTES # BLD AUTO: 0.05 THOUSAND/UL (ref 0–0.2)
IMM GRANULOCYTES NFR BLD AUTO: 1 % (ref 0–2)
LYMPHOCYTES # BLD AUTO: 1.27 THOUSANDS/ÂΜL (ref 0.6–4.47)
LYMPHOCYTES NFR BLD AUTO: 24 % (ref 14–44)
MCH RBC QN AUTO: 31.5 PG (ref 26.8–34.3)
MCHC RBC AUTO-ENTMCNC: 32.2 G/DL (ref 31.4–37.4)
MCV RBC AUTO: 98 FL (ref 82–98)
MONOCYTES # BLD AUTO: 0.72 THOUSAND/ÂΜL (ref 0.17–1.22)
MONOCYTES NFR BLD AUTO: 14 % (ref 4–12)
NEUTROPHILS # BLD AUTO: 3.09 THOUSANDS/ÂΜL (ref 1.85–7.62)
NEUTS SEG NFR BLD AUTO: 58 % (ref 43–75)
NRBC BLD AUTO-RTO: 0 /100 WBCS
PLATELET # BLD AUTO: 447 THOUSANDS/UL (ref 149–390)
PMV BLD AUTO: 8.7 FL (ref 8.9–12.7)
POTASSIUM SERPL-SCNC: 3.8 MMOL/L (ref 3.5–5.3)
PROT SERPL-MCNC: 5.5 G/DL (ref 6.4–8.4)
RBC # BLD AUTO: 3.43 MILLION/UL (ref 3.81–5.12)
SODIUM SERPL-SCNC: 136 MMOL/L (ref 135–147)
WBC # BLD AUTO: 5.25 THOUSAND/UL (ref 4.31–10.16)

## 2023-01-16 PROCEDURE — 0DBP8ZX EXCISION OF RECTUM, VIA NATURAL OR ARTIFICIAL OPENING ENDOSCOPIC, DIAGNOSTIC: ICD-10-PCS | Performed by: INTERNAL MEDICINE

## 2023-01-16 RX ORDER — LIDOCAINE HYDROCHLORIDE 10 MG/ML
INJECTION, SOLUTION EPIDURAL; INFILTRATION; INTRACAUDAL; PERINEURAL AS NEEDED
Status: DISCONTINUED | OUTPATIENT
Start: 2023-01-16 | End: 2023-01-16

## 2023-01-16 RX ORDER — LOPERAMIDE HYDROCHLORIDE 2 MG/1
2 CAPSULE ORAL 4 TIMES DAILY PRN
Status: DISCONTINUED | OUTPATIENT
Start: 2023-01-16 | End: 2023-01-17 | Stop reason: HOSPADM

## 2023-01-16 RX ORDER — PROPOFOL 10 MG/ML
INJECTION, EMULSION INTRAVENOUS AS NEEDED
Status: DISCONTINUED | OUTPATIENT
Start: 2023-01-16 | End: 2023-01-16

## 2023-01-16 RX ORDER — SODIUM CHLORIDE, SODIUM LACTATE, POTASSIUM CHLORIDE, CALCIUM CHLORIDE 600; 310; 30; 20 MG/100ML; MG/100ML; MG/100ML; MG/100ML
INJECTION, SOLUTION INTRAVENOUS CONTINUOUS PRN
Status: DISCONTINUED | OUTPATIENT
Start: 2023-01-16 | End: 2023-01-16

## 2023-01-16 RX ADMIN — LIDOCAINE HYDROCHLORIDE 50 MG: 10 INJECTION, SOLUTION EPIDURAL; INFILTRATION; INTRACAUDAL at 13:56

## 2023-01-16 RX ADMIN — LEVOTHYROXINE SODIUM 50 MCG: 50 TABLET ORAL at 08:31

## 2023-01-16 RX ADMIN — PROPOFOL 80 MG: 10 INJECTION, EMULSION INTRAVENOUS at 13:56

## 2023-01-16 RX ADMIN — SODIUM CHLORIDE 1 G: 1 TABLET ORAL at 21:22

## 2023-01-16 RX ADMIN — GABAPENTIN 1200 MG: 300 CAPSULE ORAL at 21:22

## 2023-01-16 RX ADMIN — LORAZEPAM 1 MG: 1 TABLET ORAL at 15:03

## 2023-01-16 RX ADMIN — DILTIAZEM HYDROCHLORIDE 180 MG: 180 CAPSULE, COATED, EXTENDED RELEASE ORAL at 08:31

## 2023-01-16 RX ADMIN — SODIUM CHLORIDE, SODIUM LACTATE, POTASSIUM CHLORIDE, AND CALCIUM CHLORIDE: .6; .31; .03; .02 INJECTION, SOLUTION INTRAVENOUS at 13:43

## 2023-01-16 RX ADMIN — GABAPENTIN 600 MG: 300 CAPSULE ORAL at 17:02

## 2023-01-16 RX ADMIN — AMLODIPINE BESYLATE 2.5 MG: 2.5 TABLET ORAL at 08:31

## 2023-01-16 RX ADMIN — LOPERAMIDE HYDROCHLORIDE 2 MG: 2 CAPSULE ORAL at 17:02

## 2023-01-16 RX ADMIN — LORAZEPAM 1 MG: 1 TABLET ORAL at 21:22

## 2023-01-16 RX ADMIN — PROPOFOL 20 MG: 10 INJECTION, EMULSION INTRAVENOUS at 14:00

## 2023-01-16 RX ADMIN — ESCITALOPRAM OXALATE 20 MG: 20 TABLET ORAL at 08:31

## 2023-01-16 RX ADMIN — APIXABAN 5 MG: 5 TABLET, FILM COATED ORAL at 17:02

## 2023-01-16 RX ADMIN — LISINOPRIL 20 MG: 20 TABLET ORAL at 08:31

## 2023-01-16 RX ADMIN — SODIUM CHLORIDE 1 G: 1 TABLET ORAL at 17:04

## 2023-01-16 RX ADMIN — LOPERAMIDE HYDROCHLORIDE 2 MG: 2 CAPSULE ORAL at 21:21

## 2023-01-16 RX ADMIN — GABAPENTIN 600 MG: 300 CAPSULE ORAL at 08:31

## 2023-01-16 RX ADMIN — PROPOFOL 20 MG: 10 INJECTION, EMULSION INTRAVENOUS at 13:58

## 2023-01-16 RX ADMIN — LISINOPRIL 20 MG: 20 TABLET ORAL at 17:02

## 2023-01-16 RX ADMIN — FLUTICASONE FUROATE AND VILANTEROL TRIFENATATE 1 PUFF: 200; 25 POWDER RESPIRATORY (INHALATION) at 08:34

## 2023-01-16 NOTE — DISCHARGE INSTR - AVS FIRST PAGE
Discharge Instructions - Shailesh Chika Day 65 y o  female MRN: [id number]  Unit/Bed#: S -01    Weight Bearing Status:                                           Nonweight bearing left wrist     DVT prophylaxis  Continue home meds per medicine and GI recommendation     Pain:  Continue analgesics as directed    Dressing Instructions:   Please keep clean, dry and intact until follow up     Appt Instructions:    Follow up on [facility] in 1 week     Contact the office sooner if you experience any increased numbness/tingling in the extremities

## 2023-01-16 NOTE — ASSESSMENT & PLAN NOTE
· After recent fall on Thursday  · Went to urgent care  Was splinted  Was redone in ER because it was too tight  · Patient was supposed to see Ortho today in office  Ortho consulted yesterday - to apply a cast today

## 2023-01-16 NOTE — ASSESSMENT & PLAN NOTE
· Patient was previously on Coumadin  After recent hospitalization was switched to Eliquis  · Continue to hold anticoagulation for now because of GI bleeding    · Last dose of Eliquis 1/14

## 2023-01-16 NOTE — PROGRESS NOTES
[facility]  Progress Note - Lisa Ford [id number], 65 y o  female MRN: [id number]  Unit/Bed#: S -01 Encounter: [id number]  Primary Care Provider: Kenya Blanton MD   Date and time admitted to hospital: 1/15/2023  5:57 AM    * Rectal bleeding  Assessment & Plan  · Blood with stool starting 1/14  · Patient on Eliquis for portal vein thrombosis  · Hemodynamically stable  · Hemoglobin stable  · No abdominal pain, nausea or vomiting  · Recently here for similar complaint  Underwent sigmoidoscopy that showed anastomotic linear ulcer without any active bleeding  · GI following  · For pouchoscopy today  · Holding Eliquis  Closed fracture of left distal radius  Assessment & Plan  · After recent fall on Thursday  · Went to urgent care  Was splinted  Was redone in ER because it was too tight  · Patient was supposed to see Ortho today in office  Ortho consulted yesterday - to apply a cast today  Supraventricular tachycardia (HCC)  Assessment & Plan  · Stable  continue Cardizem    Ulcerative colitis without complications ([facility] [address] )  Assessment & Plan  · Pt with known h/o ulcerative colitis status post total proctocolectomy with ileoanal J-pouch      Acquired hypothyroidism  Assessment & Plan  · Continue levothyroxine    SIADH (syndrome of inappropriate ADH production) ([facility])  Assessment & Plan  · Sodium 136  · Continue salt tablets  Essential hypertension  Assessment & Plan  · Stable  · Continue amlodipine, Cardizem, lisinopril, torsemide    Anxiety  Assessment & Plan  · Continue Lexapro  · As needed Ativan    Portal vein thrombosis  Assessment & Plan  · Patient was previously on Coumadin  After recent hospitalization was switched to Eliquis  · Continue to hold anticoagulation for now because of GI bleeding  · Last dose of Eliquis 1/14      VTE Pharmacologic Prophylaxis: VTE Score: 6 High Risk (Score >/= 5) - Pharmacological DVT Prophylaxis Contraindicated   Sequential Compression Devices Ordered  Patient Centered Rounds: I performed bedside rounds with nursing staff today  Discussions with Specialists or Other Care Team Provider: ky, rn    Education and Discussions with Family / Patient: Updated  () at bedside  Time Spent for Care: 45 minutes  More than 50% of total time spent on counseling and coordination of care as described above  Current Length of Stay: 1 day(s)  Current Patient Status: Inpatient   Certification Statement: The patient will continue to require additional inpatient hospital stay due to rectal bleed needs colonoscopy today  Discharge Plan: Anticipate discharge in 24-48 hrs to home  Code Status: Level 1 - Full Code    Subjective:   Feeling okay, still some arm pain  No further bleeding per patient    Objective:     Vitals:   Temp (24hrs), Av 9 °F (36 6 °C), Min:97 5 °F (36 4 °C), Max:98 3 °F (36 8 °C)    Temp:  [97 5 °F (36 4 °C)-98 3 °F (36 8 °C)] 98 3 °F (36 8 °C)  HR:  [69-80] 73  Resp:  [18] 18  BP: (125-143)/(60-65) 143/60  SpO2:  [90 %-96 %] 95 %  There is no height or weight on file to calculate BMI  Input and Output Summary (last 24 hours): Intake/Output Summary (Last 24 hours) at 2023 0903  Last data filed at 2023 0505  Gross per 24 hour   Intake 480 ml   Output --   Net 480 ml       Physical Exam:   Physical Exam  Vitals and nursing note reviewed  Constitutional:       General: She is not in acute distress  Appearance: Normal appearance  HENT:      Head: Normocephalic  Mouth/Throat:      Mouth: Mucous membranes are moist    Eyes:      General: No scleral icterus  Pupils: Pupils are equal, round, and reactive to light  Cardiovascular:      Rate and Rhythm: Normal rate and regular rhythm  Heart sounds: No murmur heard  Pulmonary:      Effort: Pulmonary effort is normal  No respiratory distress  Breath sounds: Normal breath sounds  No wheezing, rhonchi or rales  Abdominal:      General: Bowel sounds are normal  There is no distension  Palpations: Abdomen is soft  Tenderness: There is no abdominal tenderness  Musculoskeletal:         General: Signs of injury (L arm in sling) present  No swelling  Right lower leg: No edema  Left lower leg: No edema  Skin:     Capillary Refill: Capillary refill takes less than 2 seconds  Neurological:      General: No focal deficit present  Mental Status: She is alert and oriented to person, place, and time  Mental status is at baseline  Additional Data:     Labs:  Results from last 7 days   Lab Units 01/16/23  0525   WBC Thousand/uL 5 25   HEMOGLOBIN g/dL 10 8*   HEMATOCRIT % 33 5*   PLATELETS Thousands/uL 447*   NEUTROS PCT % 58   LYMPHS PCT % 24   MONOS PCT % 14*   EOS PCT % 2     Results from last 7 days   Lab Units 01/16/23  0525   SODIUM mmol/L 136   POTASSIUM mmol/L 3 8   CHLORIDE mmol/L 101   CO2 mmol/L 29   BUN mg/dL 5   CREATININE mg/dL 0 52*   ANION GAP mmol/L 6   CALCIUM mg/dL 8 4   ALBUMIN g/dL 3 3*   TOTAL BILIRUBIN mg/dL 0 36   ALK PHOS U/L 46   ALT U/L 11   AST U/L 13   GLUCOSE RANDOM mg/dL 90     Results from last 7 days   Lab Units 01/15/23  0637   INR  0 88                   Lines/Drains:  Invasive Devices     Peripheral Intravenous Line  Duration           Peripheral IV 01/15/23 Right Antecubital <1 day                      Imaging: No pertinent imaging reviewed      Recent Cultures (last 7 days):         Last 24 Hours Medication List:   Current Facility-Administered Medications   Medication Dose Route Frequency Provider Last Rate   • acetaminophen  650 mg Oral Q6H PRN Magno Mccarthy MD     • albuterol  2 puff Inhalation Q6H PRN Magno Mccarthy MD     • amLODIPine  2 5 mg Oral Daily Magno Mccarthy MD     • diltiazem  180 mg Oral Daily Magno Mccarthy MD     • escitalopram  20 mg Oral Daily Magno Mccarthy MD     • fluticasone-vilanterol  1 puff Inhalation Daily Artie Blanca Hernandez MD     • gabapentin  1,200 mg Oral HS Yaron Hopson MD     • gabapentin  600 mg Oral BID Yaron Hopson MD     • levothyroxine  50 mcg Oral Daily Yaron Hopson MD     • lisinopril  20 mg Oral BID Yaron Hopson MD     • LORazepam  1 mg Oral Q6H PRN Yaron Hopson MD     • meclizine  25 mg Oral Q6H PRN Yaron Hopson MD     • sodium chloride  1 g Oral TID Yaron Hopson MD     • torsemide  10 mg Oral Daily PRN Yaron Hopson MD          Today, Patient Was Seen By: Mayi Mckenzie PA-C    **Please Note: This note may have been constructed using a voice recognition system  **

## 2023-01-16 NOTE — TELEPHONE ENCOUNTER
Caller: Patients  Bill    Doctor: Benitez Yang    Reason for call:     Patient has been hospitalized and needs her appointment canceled   states she does not need to reschedule      Call back#: n/a

## 2023-01-16 NOTE — ANESTHESIA POSTPROCEDURE EVALUATION
Post-Op Assessment Note    CV Status:  Stable    Pain management: adequate     Mental Status:  Sleepy and arousable   Hydration Status:  Euvolemic   PONV Controlled:  Controlled   Airway Patency:  Patent      Post Op Vitals Reviewed: Yes      Staff: CRNA, Anesthesiologist         No notable events documented      /53 (01/16/23 1407)    Temp 97 9 °F (36 6 °C) (01/16/23 1407)    Pulse 73 (01/16/23 1407)   Resp 16 (01/16/23 1407)    SpO2 100 % (01/16/23 1407)

## 2023-01-16 NOTE — PROGRESS NOTES
Progress Note - Orthopedics   Yessica Sawyer 65 y o  female MRN: [id number]  Unit/Bed#: S -01      Subjective:    65 y  o female Patient seen and evaluated at bedside  She reports she is going for a pouchoscopy today  She was eager for transition into a cast today for her left wrist fracture  She notes some discomfort in her left wrist with movement of her left thumb  Otherwise no complaints today       Labs:  0   Lab Value Date/Time    HCT 33 5 (L) 01/16/2023 0525    HCT 32 5 (L) 01/15/2023 0637    HCT 34 8 01/08/2023 0533    HCT 40 4 11/16/2015 1302    HCT 37 3 10/06/2015 1204    HCT 39 5 04/27/2015 0630    HGB 10 8 (L) 01/16/2023 0525    HGB 10 9 (L) 01/15/2023 0637    HGB 12 7 01/10/2023 1053    HGB 13 3 11/16/2015 1302    HGB 12 7 10/06/2015 1204    HGB 13 4 04/27/2015 0630    INR 0 88 01/15/2023 0637    INR 2 39 (H) 12/28/2015 1233    WBC 5 25 01/16/2023 0525    WBC 6 92 01/15/2023 0637    WBC 6 84 01/08/2023 0533    WBC 6 23 11/16/2015 1302    WBC 5 87 10/06/2015 1204    WBC 8 01 04/27/2015 0630    ESR 35 (H) 11/07/2022 1201    ESR 7 12/30/2014 1101    CRP <3 0 11/07/2022 1201    CRP 4 6 (H) 12/30/2014 1101       Meds:    Current Facility-Administered Medications:   •  acetaminophen (TYLENOL) tablet 650 mg, 650 mg, Oral, Q6H PRN, Daniela Stewart MD  •  albuterol (PROVENTIL HFA,VENTOLIN HFA) inhaler 2 puff, 2 puff, Inhalation, Q6H PRN, Daniela Stewart MD  •  amLODIPine (NORVASC) tablet 2 5 mg, 2 5 mg, Oral, Daily, Daniela Stewart MD, 2 5 mg at 01/16/23 0831  •  diltiazem (CARDIZEM CD) 24 hr capsule 180 mg, 180 mg, Oral, Daily, Daniela Stewart MD, 180 mg at 01/16/23 0831  •  escitalopram (LEXAPRO) tablet 20 mg, 20 mg, Oral, Daily, Daniela Stewart MD, 20 mg at 01/16/23 0831  •  fluticasone-vilanterol 200-25 mcg/actuation 1 puff, 1 puff, Inhalation, Daily, Daniela Stewart MD, 1 puff at 01/16/23 0834  •  gabapentin (NEURONTIN) capsule 1,200 mg, 1,200 mg, Oral, HS, Daniela Stewart MD, 1,200 mg at 01/15/23 2008  •  gabapentin (NEURONTIN) capsule 600 mg, 600 mg, Oral, BID, Ephraim Romero MD, 600 mg at 01/16/23 0831  •  levothyroxine tablet 50 mcg, 50 mcg, Oral, Daily, Ephraim Romero MD, 50 mcg at 01/16/23 0831  •  lisinopril (ZESTRIL) tablet 20 mg, 20 mg, Oral, BID, Ephraim Romero MD, 20 mg at 01/16/23 0831  •  LORazepam (ATIVAN) tablet 1 mg, 1 mg, Oral, Q6H PRN, Ephraim Romero MD  •  meclizine (ANTIVERT) tablet 25 mg, 25 mg, Oral, Q6H PRN, Ephraim Romero MD  •  sodium chloride tablet 1 g, 1 g, Oral, TID, Ephraim Romero MD, 1 g at 01/15/23 2009  •  torsemide (DEMADEX) tablet 10 mg, 10 mg, Oral, Daily PRN, Ephraim Romero MD    Blood Culture:   Lab Results   Component Value Date    BLOODCX No Growth After 5 Days  02/23/2021       Wound Culture:   No results found for: WOUNDCULT    Ins and Outs:  I/O last 24 hours: In: 480 [P O :480]  Out: -           Physical:  Vitals:    01/16/23 0900   BP:    Pulse:    Resp:    Temp:    SpO2: 97%     Musculoskeletal: left Upper Extremity  · Visible skin intact  Splint in place  · She has visible edema on the dorsum of the hand along the MCP joints  · She verbalizes pain in the wrist with any movement of the thumb  · Sensation intact to median/radial/ulnar nerve distribution   · Motor intact anterior interosseous nerve/posterior interosseous nerve/median/radial/ulnar nerve distributions  · Digits warm and well perfused  · Capillary refill < 2 seconds  · No pain with passive stretch  Assessment:    65 y  o female with non displaced distal radius fracture  Plan:  · Non weight bearing to the left upper extremity in splint  · This is a non-operative fracture  · Will re-evaluate swelling appreciated on dorsum of hand tomorrow  · Recommend elevation, ice, and movement of the digits to help reduce swelling  · Should swelling improve, will plan to transition to cast    · Discussed with primary team and with patient     · Pain control per primary  · DVT ppx per primary  · Medical management per primary  · Dispo: Ortho will follow   · Patient reports post discharge she will be in [facility], and will follow with orthopedics specialist in that area       Brandie Garibay PA-C

## 2023-01-16 NOTE — ANESTHESIA PREPROCEDURE EVALUATION
Procedure:  FLEXIBLE SIGMOIDOSCOPY    Relevant Problems   CARDIO   (+) Essential hypertension   (+) Hyperlipidemia   (+) Portal vein thrombosis   (+) Supraventricular tachycardia (HCC)      ENDO   (+) Acquired hypothyroidism      GI/HEPATIC   (+) GERD (gastroesophageal reflux disease)   (+) Rectal bleeding      HEMATOLOGY   (+) Iron deficiency anemia   (+) Warfarin-induced coagulopathy (HCC)      MUSCULOSKELETAL   (+) Lumbar degenerative disc disease   (+) Osteoarthritis of right knee      NEURO/PSYCH   (+) Anxiety   (+) History of left breast cancer   (+) Moderate episode of recurrent major depressive disorder (HCC)      PULMONARY   (+) Mild intermittent asthma without complication      Endocrine   (+) SIADH (syndrome of inappropriate ADH production) (HCC)      Musculoskeletal and Integument   (+) Closed fracture of left distal radius        Physical Exam    Airway    Mallampati score: III    Neck ROM: full     Dental       Cardiovascular      Pulmonary      Other Findings        Anesthesia Plan  ASA Score- 3     Anesthesia Type- IV sedation with anesthesia with ASA Monitors  Additional Monitors:   Airway Plan:           Plan Factors-    Chart reviewed  Patient summary reviewed  Patient is not a current smoker  Obstructive sleep apnea risk education given perioperatively  Induction- intravenous  Postoperative Plan-     Informed Consent- Anesthetic plan and risks discussed with patient  I personally reviewed this patient with the CRNA  Discussed and agreed on the Anesthesia Plan with the CRNA  Brannon Sánchez

## 2023-01-16 NOTE — CONSULTS
Orthopedics   Sandy Charles 65 y o  female MRN: [id number]  Unit/Bed#: S -01      Chief Complaint:   left wrist pain    HPI:   65 y o  left hand dominant female status post mechanical fall on Thursday 1/12/23 complaining of left wrist pain  She was seen at [facility] and diagnosed with a distal radius fracture  She was placed in a splint and was to see Dr Chuck De Souza tomorrow  Patient presented to the ER today following rectal bleeding  She denies any other acute complaints  Her left shoulder was hurting after the fall, but is not too bothersome at this time  She denies any numbness or tingling      Review Of Systems:   · Skin: Normal  · Neuro: See HPI  · Musculoskeletal: See HPI  · 14 point review of systems negative except as stated above     Past Medical History:   Past Medical History:   Diagnosis Date   • Anemia    • Anxiety    • Arrhythmia    • Arthritis    • Asthma    • Blood clot in vein     portal vein   • Breast cancer ([facility] ) 02/12/2021   • Breast lump     [id number] RESOLVED   • Cancer (HCC)    • Depression    • Disease of thyroid gland    • DVT, lower extremity (HCC)    • GERD (gastroesophageal reflux disease)    • Hyperlipidemia    • Hypertension    • Hypokalemia    • Hyponatremia    • Hypothyroidism    • Iron deficiency anemia    • Irregular heart beat    • Manic behavior (HCC)    • 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HCC)    • Osteoarthritis    • Palpitations     [id number]  RESOLVED   • Paroxysmal supraventricular tachycardia (HCC)    • PE (pulmonary thromboembolism) (HCC)    • Sjoegren syndrome    • Sleep apnea    • Sleep difficulties    • Spinal stenosis    • Thrombocytosis     [id number]  RESOLVED   • Tremors of nervous system     dbs implanted right and left chest   • Ulcerative colitis ([facility] [address] )    • Vertigo     [id number] RESOLVED       Past Surgical History:   Past Surgical History:   Procedure Laterality Date   • ABDOMINAL SURGERY     • APPENDECTOMY     • BREAST BIOPSY Left 11/07/2019    Stereo   • BREAST EXCISIONAL BIOPSY Left     x many years   • BREAST SURGERY      lumpectomy & biopsy   • CARMELLA HOLE W/ STEREOTACTIC INSERTION OF DBS LEADS / INTRAOP MICROELECTRODE RECORDING     • COLON SURGERY     • COLONOSCOPY N/A 08/30/2017    Procedure: Nesha Lin;  Surgeon: Rashmi Reaves MD;  Location: [facility]; Service: Colorectal   • COLOPROCTECTOMY W/ ILEO J POUCH     • ESOPHAGOGASTRODUODENOSCOPY      ONSET 10/17/11   • FISTULA REPAIR      LLEOANAL FISTULA REPAIR TRANSPERIN TRANSABD APPROACH   • HYSTERECTOMY      age 43   • ILEOSTOMY CLOSURE     • KNEE ARTHROSCOPY      Right   • MAMMO STEREOTACTIC BREAST BIOPSY LEFT (ALL INC) Left 11/07/2019   • MAMMO STEREOTACTIC BREAST BIOPSY LEFT (ALL INC) Left 12/17/2020   • MAMMO STEREOTACTIC BREAST BIOPSY LEFT (ALL INC) EACH ADD Left 12/17/2020   • MASTECTOMY Left 02/12/2021    left mastectomy- Dr Nemo Braga   • MASTECTOMY W/ SENTINEL NODE BIOPSY Left 02/12/2021    Procedure: BREAST MASTECTOMY WITH SENTINEL LYMPH NODE BIOPSY, LYMPHATIC MAPPING WITH BLUE DYE AND RADIAOCTIVE DYE (INJECT AT 1100 BY DR GILLESPIE IN THE OR);   Surgeon: Shamar Gomez MD;  Location: [facility];  Service: Surgical Oncology   • CT INSJ/RPLCMT CRANIAL NEUROSTIM PULSE GENERATOR Right 06/20/2017    Procedure: DBS GENERATOR REPLACEMENT;  Surgeon: Mary Flores MD;  Location: [facility];  Service: Neurosurgery   • CT INSJ/RPLCMT CRANIAL NEUROSTIM PULSE GENERATOR N/A 12/04/2019    Procedure: REPLACEMENT IMPLANTABLE PULSE GENERATOR FOR DEEP BRAIN STIMULATOR LEFT CHEST;  Surgeon: Lauren Corrales MD;  Location: [facility];  Service: Neurosurgery   • SPLENECTOMY     • TONSILLECTOMY         Family History:  Family history reviewed and non-contributory  Family History   Problem Relation Age of Onset   • Venous thrombosis Mother         ACUTE VENOUS THROMBOSIS OF DEEP VESSELS OF THE DISTAL LOWER EXTREMITY   • Other Mother         PHLEBITIS   • Hypertension Mother    • Peripheral vascular disease Mother    • COPD Father    • Diabetes Father         MELLITUS   • Stroke Father    • Diabetes Sister         MELLITUS   • Sjogren's syndrome Sister    • No Known Problems Daughter    • No Known Problems Maternal Grandmother    • No Known Problems Maternal Grandfather    • No Known Problems Paternal Grandmother    • No Known Problems Paternal Grandfather    • No Known Problems Sister    • No Known Problems Maternal Aunt    • No Known Problems Paternal Aunt    • No Known Problems Son        Social History:  Social History     Socioeconomic History   • Marital status:      Spouse name: None   • Number of children: None   • Years of education: EDUCATION LEVEL 10TH GRADE   • Highest education level: None   Occupational History   • Occupation: RETIRED   Tobacco Use   • Smoking status: Former     Packs/day: 1 50     Years: 5 00     Pack years: 7 50     Types: Cigarettes     Quit date: 1965     Years since quittin 0   • Smokeless tobacco: Never   • Tobacco comments:     Quit   Vaping Use   • Vaping Use: Never used   Substance and Sexual Activity   • Alcohol use: Yes     Alcohol/week: 2 0 standard drinks     Types: 2 Cans of beer per week   • Drug use: No   • Sexual activity: Not Currently     Partners: Male     Birth control/protection: Post-menopausal   Other Topics Concern   • None   Social History Narrative    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ACTIVITIES INSIDE AND OUTSIDE OF HOME, MODERATE    CAFFEINE USE, DRINKS CAFEINATED TEA, DRINKS COFFEE    INADEQUATE EXERCISE    LIVES WITH ADULT CHILDREN     Social Determinants of Health     Financial Resource Strain: Not on file   Food Insecurity: Not on file   Transportation Needs: Not on file   Physical Activity: Not on file   Stress: Not on file   Social Connections: Not on file   Intimate Partner Violence: Not on file   Housing Stability: Not on file       Allergies:    Allergies   Allergen Reactions   • Indomethacin GI Intolerance and Dizziness   • Macrobid [Nitrofurantoin Monohyd Macro] Rash   • Nitrofurantoin Other (See Comments)   • Penicillins Rash     Annotation - [id number]: can take cephalosporins   • Sulfa Antibiotics Rash           Labs:  0   Lab Value Date/Time    HCT 32 5 (L) 01/15/2023 0637    HCT 34 8 01/08/2023 0533    HCT 33 0 (L) 01/07/2023 1251    HCT 40 4 11/16/2015 1302    HCT 37 3 10/06/2015 1204    HCT 39 5 04/27/2015 0630    HGB 10 9 (L) 01/15/2023 0637    HGB 12 7 01/10/2023 1053    HGB 11 9 01/09/2023 2017    HGB 13 3 11/16/2015 1302    HGB 12 7 10/06/2015 1204    HGB 13 4 04/27/2015 0630    INR 0 88 01/15/2023 0637    INR 2 39 (H) 12/28/2015 1233    WBC 6 92 01/15/2023 0637    WBC 6 84 01/08/2023 0533    WBC 7 56 01/07/2023 1251    WBC 6 23 11/16/2015 1302    WBC 5 87 10/06/2015 1204    WBC 8 01 04/27/2015 0630    ESR 35 (H) 11/07/2022 1201    ESR 7 12/30/2014 1101    CRP <3 0 11/07/2022 1201    CRP 4 6 (H) 12/30/2014 1101       Meds:    Current Facility-Administered Medications:   •  acetaminophen (TYLENOL) tablet 650 mg, 650 mg, Oral, Q6H PRN, Yaron Hopson MD  •  albuterol (PROVENTIL HFA,VENTOLIN HFA) inhaler 2 puff, 2 puff, Inhalation, Q6H PRN, Yaron Hopson MD  •  amLODIPine (NORVASC) tablet 2 5 mg, 2 5 mg, Oral, Daily, Yaron Hopson MD  •  diltiazem (CARDIZEM CD) 24 hr capsule 180 mg, 180 mg, Oral, Daily, Yaron Hopson MD  •  escitalopram (LEXAPRO) tablet 20 mg, 20 mg, Oral, Daily, Yaron Hopson MD, 20 mg at 01/15/23 1231  •  fluticasone-vilanterol 200-25 mcg/actuation 1 puff, 1 puff, Inhalation, Daily, Yaron Hopson MD  •  gabapentin (NEURONTIN) capsule 1,200 mg, 1,200 mg, Oral, HS, Yaron Hopson MD  •  gabapentin (NEURONTIN) capsule 600 mg, 600 mg, Oral, BID, Yaron Hopson MD, 600 mg at 01/15/23 1758  •  levothyroxine tablet 50 mcg, 50 mcg, Oral, Daily, Yaron Hopson MD, 50 mcg at 01/15/23 1231  •  lisinopril (ZESTRIL) tablet 20 mg, 20 mg, Oral, BID, Yaron Hopson MD, 20 mg at 01/15/23 1758  •  LORazepam (ATIVAN) tablet 1 mg, 1 mg, Oral, Q6H PRN, Kath Esposito MD  •  meclizine (ANTIVERT) tablet 25 mg, 25 mg, Oral, Q6H PRN, Kath Esposito MD  •  sodium chloride tablet 1 g, 1 g, Oral, TID, Kath Esposito MD, 1 g at 01/15/23 1758  •  torsemide (DEMADEX) tablet 10 mg, 10 mg, Oral, Daily PRN, Kath Esposito MD    Blood Culture:   Lab Results   Component Value Date    BLOODCX No Growth After 5 Days  02/23/2021       Wound Culture:   No results found for: WOUNDCULT    Ins and Outs:  No intake/output data recorded  Physical Exam:   /65   Pulse 80   Temp 97 5 °F (36 4 °C)   Resp 18   SpO2 90%   Gen: No acute distress, resting comfortably in bed  HEENT: Eyes clear, moist mucus membranes, hearing intact  Respiratory: No audible wheezing or stridor  Cardiovascular: Well Perfused peripherally, 2+ distal pulse  Abdomen: nondistended, no peritoneal signs  Musculoskeletal: left upper extremity  · Examined in splint  · Edema to affected area   · Sensation intact to median, radial, and ulnar distributions  · Motor intact to ain/pin/m/r/u  · Finger tips warm and well perfused  · Musculature is soft and compressible, no pain with passive stretch    Radiology:   I personally reviewed the films  X-rays AP/Lateral and lateral views left wrist shows nondisplaced distal radius fracture   _*_*_*_*_*_*_*_*_*_*_*_*_*_*_*_*_*_*_*_*_*_*_*_*_*_*_*_*_*_*_*_*_*_*_*_*_*_*_*_*_*    Assessment:  65 y  o female S/P Fall with left distal radius fracture    Plan:   · Non weight bearing left upper extremity in sugartong splint  · Analgesics for pain   · Ice and elevation  · This is a non-operative fracture   Will treat with splint and transition over to a cast        Josie Perez PA-C

## 2023-01-16 NOTE — ASSESSMENT & PLAN NOTE
· Blood with stool starting 1/14  · Patient on Eliquis for portal vein thrombosis  · Hemodynamically stable  · Hemoglobin stable  · No abdominal pain, nausea or vomiting  · Recently here for similar complaint  Underwent sigmoidoscopy that showed anastomotic linear ulcer without any active bleeding  · GI following  · For pouchoscopy today  · Holding Eliquis

## 2023-01-16 NOTE — PLAN OF CARE
Problem: Potential for Falls  Goal: Patient will remain free of falls  Description: INTERVENTIONS:  - Educate patient/family on patient safety including physical limitations  - Instruct patient to call for assistance with activity   - Consult OT/PT to assist with strengthening/mobility   - Keep Call bell within reach  - Keep bed low and locked with side rails adjusted as appropriate  - Keep care items and personal belongings within reach  - Initiate and maintain comfort rounds  - Make Fall Risk Sign visible to staff  - Offer Toileting every 2 Hours, in advance of need  - Initiate/Maintain bed alarm  - Apply yellow socks and bracelet for high fall risk patients  - Consider moving patient to room near nurses station  Outcome: Progressing     Problem: Prexisting or High Potential for Compromised Skin Integrity  Goal: Skin integrity is maintained or improved  Description: INTERVENTIONS:  - Identify patients at risk for skin breakdown  - Assess and monitor skin integrity  - Assess and monitor nutrition and hydration status  - Monitor labs   - Assess for incontinence   - Turn and reposition patient  - Assist with mobility/ambulation  - Relieve pressure over bony prominences  - Avoid friction and shearing  - Provide appropriate hygiene as needed including keeping skin clean and dry  - Evaluate need for skin moisturizer/barrier cream  - Collaborate with interdisciplinary team   - Patient/family teaching  - Consider wound care consult   Outcome: Progressing     Problem: PAIN - ADULT  Goal: Verbalizes/displays adequate comfort level or baseline comfort level  Description: Interventions:  - Encourage patient to monitor pain and request assistance  - Assess pain using appropriate pain scale  - Administer analgesics based on type and severity of pain and evaluate response  - Implement non-pharmacological measures as appropriate and evaluate response  - Consider cultural and social influences on pain and pain management  - Notify physician/advanced practitioner if interventions unsuccessful or patient reports new pain  Outcome: Progressing     Problem: INFECTION - ADULT  Goal: Absence or prevention of progression during hospitalization  Description: INTERVENTIONS:  - Assess and monitor for signs and symptoms of infection  - Monitor lab/diagnostic results  - Monitor all insertion sites, i e  indwelling lines, tubes, and drains  - Monitor endotracheal if appropriate and nasal secretions for changes in amount and color  - [place] appropriate cooling/warming therapies per order  - Administer medications as ordered  - Instruct and encourage patient and family to use good hand hygiene technique  - Identify and instruct in appropriate isolation precautions for identified infection/condition  Outcome: Progressing  Goal: Absence of fever/infection during neutropenic period  Description: INTERVENTIONS:  - Monitor WBC    Outcome: Progressing     Problem: SAFETY ADULT  Goal: Patient will remain free of falls  Description: INTERVENTIONS:  - Educate patient/family on patient safety including physical limitations  - Instruct patient to call for assistance with activity   - Consult OT/PT to assist with strengthening/mobility   - Keep Call bell within reach  - Keep bed low and locked with side rails adjusted as appropriate  - Keep care items and personal belongings within reach  - Initiate and maintain comfort rounds  - Make Fall Risk Sign visible to staff  - Offer Toileting every 2 Hours, in advance of need  - Initiate/Maintain bed alarm  - Apply yellow socks and bracelet for high fall risk patients  - Consider moving patient to room near nurses station  Outcome: Progressing  Goal: Maintain or return to baseline ADL function  Description: INTERVENTIONS:  -  Assess patient's ability to carry out ADLs; assess patient's baseline for ADL function and identify physical deficits which impact ability to perform ADLs (bathing, care of mouth/teeth, toileting, grooming, dressing, etc )  - Assess/evaluate cause of self-care deficits   - Assess range of motion  - Assess patient's mobility; develop plan if impaired  - Assess patient's need for assistive devices and provide as appropriate  - Encourage maximum independence but intervene and supervise when necessary  - Involve family in performance of ADLs  - Assess for home care needs following discharge   - Consider OT consult to assist with ADL evaluation and planning for discharge  - Provide patient education as appropriate  Outcome: Progressing  Goal: Maintains/Returns to pre admission functional level  Description: INTERVENTIONS:  - Perform BMAT or MOVE assessment daily    - Set and communicate daily mobility goal to care team and patient/family/caregiver  - Collaborate with rehabilitation services on mobility goals if consulted  - Perform Range of Motion 3 times a day  - Reposition patient every 2 hours    - Dangle patient 3 times a day  - Stand patient 3 times a day  - Ambulate patient 3 times a day  - Out of bed to chair 3 times a day   - Out of bed for meals 3 times a day  - Out of bed for toileting  - Record patient progress and toleration of activity level   Outcome: Progressing     Problem: DISCHARGE PLANNING  Goal: Discharge to home or other facility with appropriate resources  Description: INTERVENTIONS:  - Identify barriers to discharge w/patient and caregiver  - Arrange for needed discharge resources and transportation as appropriate  - Identify discharge learning needs (meds, wound care, etc )  - Arrange for interpretive services to assist at discharge as needed  - Refer to Case Management Department for coordinating discharge planning if the patient needs post-hospital services based on physician/advanced practitioner order or complex needs related to functional status, cognitive ability, or social support system  Outcome: Progressing     Problem: Knowledge Deficit  Goal: Patient/family/caregiver demonstrates understanding of disease process, treatment plan, medications, and discharge instructions  Description: Complete learning assessment and assess knowledge base  Interventions:  - Provide teaching at level of understanding  - Provide teaching via preferred learning methods  Outcome: Progressing     Problem: MOBILITY - ADULT  Goal: Maintain or return to baseline ADL function  Description: INTERVENTIONS:  -  Assess patient's ability to carry out ADLs; assess patient's baseline for ADL function and identify physical deficits which impact ability to perform ADLs (bathing, care of mouth/teeth, toileting, grooming, dressing, etc )  - Assess/evaluate cause of self-care deficits   - Assess range of motion  - Assess patient's mobility; develop plan if impaired  - Assess patient's need for assistive devices and provide as appropriate  - Encourage maximum independence but intervene and supervise when necessary  - Involve family in performance of ADLs  - Assess for home care needs following discharge   - Consider OT consult to assist with ADL evaluation and planning for discharge  - Provide patient education as appropriate  Outcome: Progressing  Goal: Maintains/Returns to pre admission functional level  Description: INTERVENTIONS:  - Perform BMAT or MOVE assessment daily    - Set and communicate daily mobility goal to care team and patient/family/caregiver  - Collaborate with rehabilitation services on mobility goals if consulted  - Perform Range of Motion 3 times a day  - Reposition patient every 2 hours    - Dangle patient 3 times a day  - Stand patient 3 times a day  - Ambulate patient 3 times a day  - Out of bed to chair 3 times a day   - Out of bed for meals 3 times a day  - Out of bed for toileting  - Record patient progress and toleration of activity level   Outcome: Progressing

## 2023-01-17 ENCOUNTER — TELEPHONE (OUTPATIENT)
Dept: INTERNAL MEDICINE CLINIC | Facility: CLINIC | Age: 78
End: 2023-01-17

## 2023-01-17 ENCOUNTER — TRANSITIONAL CARE MANAGEMENT (OUTPATIENT)
Dept: INTERNAL MEDICINE CLINIC | Facility: CLINIC | Age: 78
End: 2023-01-17

## 2023-01-17 VITALS
BODY MASS INDEX: 26.93 KG/M2 | TEMPERATURE: 98.6 F | HEIGHT: 63 IN | WEIGHT: 152 LBS | OXYGEN SATURATION: 95 % | HEART RATE: 68 BPM | DIASTOLIC BLOOD PRESSURE: 76 MMHG | RESPIRATION RATE: 18 BRPM | SYSTOLIC BLOOD PRESSURE: 157 MMHG

## 2023-01-17 LAB
BASOPHILS # BLD AUTO: 0.05 THOUSANDS/ÂΜL (ref 0–0.1)
BASOPHILS NFR BLD AUTO: 1 % (ref 0–1)
EOSINOPHIL # BLD AUTO: 0.07 THOUSAND/ÂΜL (ref 0–0.61)
EOSINOPHIL NFR BLD AUTO: 1 % (ref 0–6)
ERYTHROCYTE [DISTWIDTH] IN BLOOD BY AUTOMATED COUNT: 16 % (ref 11.6–15.1)
HCT VFR BLD AUTO: 32 % (ref 34.8–46.1)
HGB BLD-MCNC: 10.7 G/DL (ref 11.5–15.4)
IMM GRANULOCYTES # BLD AUTO: 0.03 THOUSAND/UL (ref 0–0.2)
IMM GRANULOCYTES NFR BLD AUTO: 0 % (ref 0–2)
LYMPHOCYTES # BLD AUTO: 1.22 THOUSANDS/ÂΜL (ref 0.6–4.47)
LYMPHOCYTES NFR BLD AUTO: 18 % (ref 14–44)
MCH RBC QN AUTO: 32.4 PG (ref 26.8–34.3)
MCHC RBC AUTO-ENTMCNC: 33.4 G/DL (ref 31.4–37.4)
MCV RBC AUTO: 97 FL (ref 82–98)
MONOCYTES # BLD AUTO: 0.82 THOUSAND/ÂΜL (ref 0.17–1.22)
MONOCYTES NFR BLD AUTO: 12 % (ref 4–12)
NEUTROPHILS # BLD AUTO: 4.57 THOUSANDS/ÂΜL (ref 1.85–7.62)
NEUTS SEG NFR BLD AUTO: 68 % (ref 43–75)
NRBC BLD AUTO-RTO: 0 /100 WBCS
PLATELET # BLD AUTO: 437 THOUSANDS/UL (ref 149–390)
PMV BLD AUTO: 8.6 FL (ref 8.9–12.7)
RBC # BLD AUTO: 3.3 MILLION/UL (ref 3.81–5.12)
WBC # BLD AUTO: 6.76 THOUSAND/UL (ref 4.31–10.16)

## 2023-01-17 PROCEDURE — 2W3DX2Z IMMOBILIZATION OF LEFT LOWER ARM USING CAST: ICD-10-PCS | Performed by: PHYSICIAN ASSISTANT

## 2023-01-17 RX ADMIN — LEVOTHYROXINE SODIUM 50 MCG: 50 TABLET ORAL at 08:24

## 2023-01-17 RX ADMIN — LISINOPRIL 20 MG: 20 TABLET ORAL at 08:24

## 2023-01-17 RX ADMIN — LOPERAMIDE HYDROCHLORIDE 2 MG: 2 CAPSULE ORAL at 11:36

## 2023-01-17 RX ADMIN — ESCITALOPRAM OXALATE 20 MG: 20 TABLET ORAL at 08:24

## 2023-01-17 RX ADMIN — AMLODIPINE BESYLATE 2.5 MG: 2.5 TABLET ORAL at 08:24

## 2023-01-17 RX ADMIN — LOPERAMIDE HYDROCHLORIDE 2 MG: 2 CAPSULE ORAL at 07:50

## 2023-01-17 RX ADMIN — LORAZEPAM 1 MG: 1 TABLET ORAL at 08:27

## 2023-01-17 RX ADMIN — GABAPENTIN 600 MG: 300 CAPSULE ORAL at 08:24

## 2023-01-17 RX ADMIN — SODIUM CHLORIDE 1 G: 1 TABLET ORAL at 08:24

## 2023-01-17 RX ADMIN — APIXABAN 5 MG: 5 TABLET, FILM COATED ORAL at 08:24

## 2023-01-17 RX ADMIN — FLUTICASONE FUROATE AND VILANTEROL TRIFENATATE 1 PUFF: 200; 25 POWDER RESPIRATORY (INHALATION) at 08:25

## 2023-01-17 RX ADMIN — DILTIAZEM HYDROCHLORIDE 180 MG: 180 CAPSULE, COATED, EXTENDED RELEASE ORAL at 08:24

## 2023-01-17 NOTE — PROGRESS NOTES
Progress Note - Gabe Guerra 65 y o  female MRN: [id number]    Unit/Bed#: S -01 Encounter: [id number]        Subjective:   Patient reports feeling well and denies having had any rectal bleeding since yesterday, she reports to be tolerating diet  Objective:     Vitals: Blood pressure 157/76, pulse 68, temperature 98 6 °F (37 °C), resp  rate 18, height 5' 3" (1 6 m), weight 68 9 kg (152 lb), SpO2 95 %  ,Body mass index is 26 93 kg/m²  Intake/Output Summary (Last 24 hours) at 1/17/2023 0843  Last data filed at 1/16/2023 1404  Gross per 24 hour   Intake 200 ml   Output --   Net 200 ml       Physical Exam:   General appearance: alert, appears stated age and cooperative  Lungs: clear to auscultation bilaterally, no labored breathing/accessory muscle use  Heart: regular rate and rhythm, S1, S2 normal, no murmur, click, rub or gallop  Abdomen: soft, non-tender; bowel sounds normal; no masses,  no organomegaly  Extremities: no edema    Invasive Devices     None                 Lab, Imaging and other studies: I have personally reviewed pertinent reports      Admission on 01/15/2023   Component Date Value   • WBC 01/15/2023 6 92    • RBC 01/15/2023 3 45 (L)    • Hemoglobin 01/15/2023 10 9 (L)    • Hematocrit 01/15/2023 32 5 (L)    • MCV 01/15/2023 94    • MCH 01/15/2023 31 6    • MCHC 01/15/2023 33 5    • RDW 01/15/2023 15 8 (H)    • MPV 01/15/2023 8 6 (L)    • Platelets 69/87/9770 441 (H)    • nRBC 01/15/2023 0    • Neutrophils Relative 01/15/2023 66    • Immat GRANS % 01/15/2023 1    • Lymphocytes Relative 01/15/2023 20    • Monocytes Relative 01/15/2023 12    • Eosinophils Relative 01/15/2023 1    • Basophils Relative 01/15/2023 0    • Neutrophils Absolute 01/15/2023 4 54    • Immature Grans Absolute 01/15/2023 0 05    • Lymphocytes Absolute 01/15/2023 1 40    • Monocytes Absolute 01/15/2023 0 84    • Eosinophils Absolute 01/15/2023 0 06    • Basophils Absolute 01/15/2023 0 03    • Protime 01/15/2023 12 2 • INR 01/15/2023 0 88    • PTT 01/15/2023 25    • ABO Grouping 01/15/2023 O    • Rh Factor 01/15/2023 Positive    • Antibody Screen 01/15/2023 Negative    • Specimen Expiration Date 01/15/2023 [id number]    • Sodium 01/15/2023 131 (L)    • Potassium 01/15/2023 4 2    • Chloride 01/15/2023 96    • CO2 01/15/2023 28    • ANION GAP 01/15/2023 7    • BUN 01/15/2023 9    • Creatinine 01/15/2023 0 71    • Glucose 01/15/2023 118    • Calcium 01/15/2023 8 7    • AST 01/15/2023 18    • ALT 01/15/2023 13    • Alkaline Phosphatase 01/15/2023 60    • Total Protein 01/15/2023 6 1 (L)    • Albumin 01/15/2023 3 6    • Total Bilirubin 01/15/2023 0 35    • eGFR 01/15/2023 81    • Sodium 01/16/2023 136    • Potassium 01/16/2023 3 8    • Chloride 01/16/2023 101    • CO2 01/16/2023 29    • ANION GAP 01/16/2023 6    • BUN 01/16/2023 5    • Creatinine 01/16/2023 0 52 (L)    • Glucose 01/16/2023 90    • Calcium 01/16/2023 8 4    • Corrected Calcium 01/16/2023 9 0    • AST 01/16/2023 13    • ALT 01/16/2023 11    • Alkaline Phosphatase 01/16/2023 46    • Total Protein 01/16/2023 5 5 (L)    • Albumin 01/16/2023 3 3 (L)    • Total Bilirubin 01/16/2023 0 36    • eGFR 01/16/2023 91    • WBC 01/16/2023 5 25    • RBC 01/16/2023 3 43 (L)    • Hemoglobin 01/16/2023 10 8 (L)    • Hematocrit 01/16/2023 33 5 (L)    • MCV 01/16/2023 98    • MCH 01/16/2023 31 5    • MCHC 01/16/2023 32 2    • RDW 01/16/2023 16 3 (H)    • MPV 01/16/2023 8 7 (L)    • Platelets 00/83/5297 447 (H)    • nRBC 01/16/2023 0    • Neutrophils Relative 01/16/2023 58    • Immat GRANS % 01/16/2023 1    • Lymphocytes Relative 01/16/2023 24    • Monocytes Relative 01/16/2023 14 (H)    • Eosinophils Relative 01/16/2023 2    • Basophils Relative 01/16/2023 1    • Neutrophils Absolute 01/16/2023 3 09    • Immature Grans Absolute 01/16/2023 0 05    • Lymphocytes Absolute 01/16/2023 1 27    • Monocytes Absolute 01/16/2023 0 72    • Eosinophils Absolute 01/16/2023 0 08    • Basophils Absolute 01/16/2023 0 04    • WBC 01/17/2023 6 76    • RBC 01/17/2023 3 30 (L)    • Hemoglobin 01/17/2023 10 7 (L)    • Hematocrit 01/17/2023 32 0 (L)    • MCV 01/17/2023 97    • MCH 01/17/2023 32 4    • MCHC 01/17/2023 33 4    • RDW 01/17/2023 16 0 (H)    • MPV 01/17/2023 8 6 (L)    • Platelets 57/74/2357 437 (H)    • nRBC 01/17/2023 0    • Neutrophils Relative 01/17/2023 68    • Immat GRANS % 01/17/2023 0    • Lymphocytes Relative 01/17/2023 18    • Monocytes Relative 01/17/2023 12    • Eosinophils Relative 01/17/2023 1    • Basophils Relative 01/17/2023 1    • Neutrophils Absolute 01/17/2023 4 57    • Immature Grans Absolute 01/17/2023 0 03    • Lymphocytes Absolute 01/17/2023 1 22    • Monocytes Absolute 01/17/2023 0 82    • Eosinophils Absolute 01/17/2023 0 07    • Basophils Absolute 01/17/2023 0 05       Latest Reference Range & Units 01/16/23 05:25 01/17/23 04:45   WBC 4 31 - 10 16 Thousand/uL 5 25 6 76   Red Blood Cell Count 3 81 - 5 12 Million/uL 3 43 (L) 3 30 (L)   Hemoglobin 11 5 - 15 4 g/dL 10 8 (L) 10 7 (L)   HCT 34 8 - 46 1 % 33 5 (L) 32 0 (L)   MCV 82 - 98 fL 98 97   MCH 26 8 - 34 3 pg 31 5 32 4   MCHC 31 4 - 37 4 g/dL 32 2 33 4   RDW 11 6 - 15 1 % 16 3 (H) 16 0 (H)   Platelet Count 486 - 390 Thousands/uL 447 (H) 437 (H)   MPV 8 9 - 12 7 fL 8 7 (L) 8 6 (L)   nRBC /100 WBCs 0 0   Neutrophils % 43 - 75 % 58 68   Immat GRANS % 0 - 2 % 1 0   Lymphocytes Relative 14 - 44 % 24 18   Monocytes Relative 4 - 12 % 14 (H) 12   Eosinophils 0 - 6 % 2 1   Basophils Relative 0 - 1 % 1 1   Immature Grans Absolute 0 00 - 0 20 Thousand/uL 0 05 0 03   Absolute Neutrophils 1 85 - 7 62 Thousands/µL 3 09 4 57   Lymphocytes Absolute 0 60 - 4 47 Thousands/µL 1 27 1 22   Absolute Monocytes 0 17 - 1 22 Thousand/µL 0 72 0 82   Absolute Eosinophils 0 00 - 0 61 Thousand/µL 0 08 0 07   Basophils Absolute 0 00 - 0 10 Thousands/µL 0 04 0 05   (L): Data is abnormally low  (H): Data is abnormally high      Assessment/Plan:    #1  Bright red blood per rectum which appears to be secondary to serration at the ileoanal anastomosis (patient has J-pouch due to history of ulcerative colitis status post total colectomy)    -Follow-up on biopsies from yesterday's pouchoscopy    -Diet as tolerated    -Avoid NSAIDs    -will discuss with attending, consider topical mesalamine for anastomotic ulcer, but this may potentially wait until biopsies are back and exclude viral infection    -for the future, consider colorectal surgery consultation if patient has significant recurrent bleeding despite conservative treatment

## 2023-01-17 NOTE — CASE MANAGEMENT
Case Management Discharge Planning Note    Patient name Niraj MARIEE /S -01 MRN [id number]  : 1945 Date 2023       Current Admission Date: 1/15/2023  Current Admission Diagnosis:Rectal bleeding   Patient Active Problem List    Diagnosis Date Noted   • Closed fracture of left distal radius 01/15/2023   • Closed fracture of distal end of left radius 2023   • Warfarin-induced coagulopathy ([facility] ) 2023   • Rectal bleeding 2023   • Thrush 2023   • History of left breast cancer 2020   • Hyponatremia 2019   • Vitamin B12 deficiency 2019   • Vitamin D deficiency 2019   • S/P deep brain stimulator placement 2018   • Encounter for long-term (current) use of medications 2018   • Lumbar degenerative disc disease 2018   • Portal vein thrombosis 2018   • Hypomagnesemia 2015   • Unsteady gait 2015   • Overweight 2015   • Essential tremor 2015   • Prediabetes 2015   • Lower extremity pain, left 2014   • Osteoarthritis of right knee 2014   • Iron deficiency anemia 2014   • Supraventricular tachycardia ([facility] ) 10/07/2013   • Diarrhea 2013   • Chronic salpingo-oophoritis 2012   • SIADH (syndrome of inappropriate ADH production) ([facility] ) 2012   • Peripheral neuropathy 2012   • GERD (gastroesophageal reflux disease) 2012   • Moderate episode of recurrent major depressive disorder ([facility] ) 2012   • Hyperlipidemia 2012   • Essential hypertension 2012   • Acquired hypothyroidism 2012   • Osteopenia 2012   • Ulcerative colitis without complications ([facility] )    • Allergic rhinitis 2012   • Mild intermittent asthma without complication    • Sjogren's syndrome ([facility] ) 2012   • Anxiety 2012   • Insomnia 2012      LOS (days): 2  Geometric Mean LOS (GMLOS) (days): 4 50  Days to GMLOS:2 4 OBJECTIVE:  Risk of Unplanned Readmission Score: 17 74         Current admission status: Inpatient   Preferred Pharmacy:   [facility] [id number] [place], [place] - [address]  [address] [address]  Phone: [phone] Fax: [phone]    [id number] [facility] [address] [facility]  [address] [place] [address]  Phone: [phone] Fax: [facility] [id number] Taat Mcdowell [address] [facility], [address]  Phone: [phone] Fax: [phone]    Primary Care Provider: Lilly Pena MD    Primary Insurance: MEDICARE  Secondary Insurance: BLUE CROSS    DISCHARGE DETAILS:                                                                                     IMM reviewed with patient, patient agrees with discharge determination    IMM Given (Date):: 01/17/23  IMM Given to[de-identified] Patient

## 2023-01-17 NOTE — PROCEDURES
Procedure- Kelsie Day 65 y o  female MRN: [id number]  Unit/Bed#: S -01    Procedure: left wrist short arm cast application    Volar wrist splint was removed  Skin was intact  There was minimal swelling in the fingers but no edema of the hand or wrist  Mild ecchymosis about the wrist  Stockinette, webril and fiberglass cast material were applied as a short arm cast to the left wrist  Pt tolerated the procedure well and was neurovascularly intact both pre and post procedure       Meg Ramos PA-C

## 2023-01-17 NOTE — TELEPHONE ENCOUNTER
Patient states they told her to stop zyrtec   She states she has already switched to Claridin a few months ago, is it OK to keep taking that ?

## 2023-01-17 NOTE — DISCHARGE SUMMARY
[facility]  Discharge- Niraj Lipa [birth year], 65 y o  female MRN: [id number]  Unit/Bed#: S -01 Encounter: [id number]  Primary Care Provider: Tucker Mcintyre MD   Date and time admitted to hospital: 1/15/2023  5:57 AM    * Rectal bleeding  Assessment & Plan  · Blood with stool starting 1/14  · Patient on Eliquis for portal vein thrombosis  · Hemodynamically stable  · Hemoglobin stable  · No abdominal pain, nausea or vomiting  · Recently here for similar complaint  Underwent sigmoidoscopy that showed anastomotic linear ulcer without any active bleeding  · GI following  Flex sig: "Ulcerated anastomosis in the rectum; performed cold forceps biopsy  Ileoanal anastomosis with J-pouch, ulceration within the J-pouch, appears to be an anastomotic ulcer  Normal evaluation of the small bowel  Multiple biopsies were taken from the ulceration"  · Resumed Eliquis  Closed fracture of left distal radius  Assessment & Plan  · After recent fall on Thursday  · Went to urgent care  Was splinted  Was redone in ER because it was too tight  · Patient was supposed to see Ortho in office  · Orthopedics following, if swelling has improved they will apply a cast today  If not she will follow-up in 1 week  Supraventricular tachycardia (HCC)  Assessment & Plan  · Stable  continue Cardizem    Ulcerative colitis without complications ([facility] )  Assessment & Plan  · Pt with known h/o ulcerative colitis status post total proctocolectomy with ileoanal J-pouch      Acquired hypothyroidism  Assessment & Plan  · Continue levothyroxine    SIADH (syndrome of inappropriate ADH production) ([facility])  Assessment & Plan  · Sodium 136  · Continue salt tablets  Essential hypertension  Assessment & Plan  · Stable    · Continue amlodipine, Cardizem, lisinopril, torsemide    Anxiety  Assessment & Plan  · Continue Lexapro  · As needed Ativan    Portal vein thrombosis  Assessment & Plan  · Patient was previously on Coumadin  After recent hospitalization was switched to Eliquis  · Resume Eliquis    Medical Problems     Resolved Problems  Date Reviewed: 1/16/2023   None       Discharging Physician / Practitioner: David Sue PA-C  PCP: Juliana Arora MD  Admission Date:   Admission Orders (From admission, onward)     Ordered        01/15/23 0811  INPATIENT ADMISSION  Once                      Discharge Date: 01/17/23    Consultations During Hospital Stay:  · GI     Procedures Performed:   · Flex sig as above    Significant Findings / Test Results:   · Stable hemoglobin    Incidental Findings:   · none     Test Results Pending at Discharge (will require follow up): · Biopsy of colon      Outpatient Tests Requested:  · none    Complications:  none    Reason for Admission: rectal bleed    Hospital Course:   Sheela Posadas is a 65 y o  female patient who originally presented to the hospital on 1/15/2023 due to bright blood per rectum  Patient had stable hemoglobin throughout her stay  Was seen by GI who recommended a flex sig which showed ulcerations as above  Initially her Eliquis was held and later resumed with a normal diet  She will follow-up on biopsy results as an outpatient  Also notably she was seen by orthopedics as an inpatient for her left arm fracture  They are deciding upon discharge further to casted if the swelling is improved, if not she will follow-up as an outpatient for this  Please see above list of diagnoses and related plan for additional information  Condition at Discharge: stable    Discharge Day Visit / Exam:   Subjective:  Feels good   Wants to go home  Vitals: Blood Pressure: 157/76 (01/17/23 0715)  Pulse: 68 (01/17/23 0715)  Temperature: 98 6 °F (37 °C) (01/17/23 0715)  Temp Source: Esophageal (01/16/23 1407)  Respirations: 18 (01/17/23 0715)  Height: 5' 3" (160 cm) (01/16/23 1316)  Weight - Scale: 68 9 kg (152 lb) (01/16/23 1316)  SpO2: 95 % (01/17/23 Linda Shellie  Exam:   Physical Exam  Vitals and nursing note reviewed  Constitutional:       General: She is not in acute distress  Appearance: Normal appearance  HENT:      Head: Normocephalic  Mouth/Throat:      Mouth: Mucous membranes are moist    Eyes:      General: No scleral icterus  Pupils: Pupils are equal, round, and reactive to light  Cardiovascular:      Rate and Rhythm: Normal rate and regular rhythm  Heart sounds: No murmur heard  Pulmonary:      Effort: Pulmonary effort is normal  No respiratory distress  Breath sounds: Normal breath sounds  No wheezing, rhonchi or rales  Abdominal:      General: Bowel sounds are normal  There is no distension  Palpations: Abdomen is soft  Tenderness: There is no abdominal tenderness  Musculoskeletal:         General: Signs of injury (L arm in sling) present  No swelling  Right lower leg: No edema  Left lower leg: No edema  Skin:     Capillary Refill: Capillary refill takes less than 2 seconds  Neurological:      General: No focal deficit present  Mental Status: She is alert and oriented to person, place, and time  Mental status is at baseline  Discussion with Family: Patient declined call to   Discharge instructions/Information to patient and family:   See after visit summary for information provided to patient and family  Provisions for Follow-Up Care:  See after visit summary for information related to follow-up care and any pertinent home health orders  Disposition:   Home    Planned Readmission: no     Discharge Statement:  I spent 45 minutes discharging the patient  This time was spent on the day of discharge  I had direct contact with the patient on the day of discharge   Greater than 50% of the total time was spent examining patient, answering all patient questions, arranging and discussing plan of care with patient as well as directly providing post-discharge instructions  Additional time then spent on discharge activities  Discharge Medications:  See after visit summary for reconciled discharge medications provided to patient and/or family        **Please Note: This note may have been constructed using a voice recognition system**

## 2023-01-17 NOTE — PROGRESS NOTES
Progress Note - Orthopedics   Noemí Anthony 65 y o  female MRN: [id number]  Unit/Bed#: S -01      Subjective:    65 y  o female Patient seen evaluated today  She reports mild pain in the left wrist, well controlled  Denies any numbness or tingling in Left hand      Labs:  0   Lab Value Date/Time    HCT 32 0 (L) 01/17/2023 0445    HCT 33 5 (L) 01/16/2023 0525    HCT 32 5 (L) 01/15/2023 0637    HCT 40 4 11/16/2015 1302    HCT 37 3 10/06/2015 1204    HCT 39 5 04/27/2015 0630    HGB 10 7 (L) 01/17/2023 0445    HGB 10 8 (L) 01/16/2023 0525    HGB 10 9 (L) 01/15/2023 0637    HGB 13 3 11/16/2015 1302    HGB 12 7 10/06/2015 1204    HGB 13 4 04/27/2015 0630    INR 0 88 01/15/2023 0637    INR 2 39 (H) 12/28/2015 1233    WBC 6 76 01/17/2023 0445    WBC 5 25 01/16/2023 0525    WBC 6 92 01/15/2023 0637    WBC 6 23 11/16/2015 1302    WBC 5 87 10/06/2015 1204    WBC 8 01 04/27/2015 0630    ESR 35 (H) 11/07/2022 1201    ESR 7 12/30/2014 1101    CRP <3 0 11/07/2022 1201    CRP 4 6 (H) 12/30/2014 1101       Meds:    Current Facility-Administered Medications:   •  acetaminophen (TYLENOL) tablet 650 mg, 650 mg, Oral, Q6H PRN, Lesley Goldsmith MD  •  albuterol (PROVENTIL HFA,VENTOLIN HFA) inhaler 2 puff, 2 puff, Inhalation, Q6H PRN, Lesley Goldsmith MD  •  amLODIPine (NORVASC) tablet 2 5 mg, 2 5 mg, Oral, Daily, Lesley Goldsmith MD, 2 5 mg at 01/17/23 1833  •  apixaban (ELIQUIS) tablet 5 mg, 5 mg, Oral, BID, Robert Hammond PA-C, 5 mg at 01/17/23 0194  •  diltiazem (CARDIZEM CD) 24 hr capsule 180 mg, 180 mg, Oral, Daily, Lesley Goldsmith MD, 180 mg at 01/17/23 0824  •  escitalopram (LEXAPRO) tablet 20 mg, 20 mg, Oral, Daily, Lesley Goldsmith MD, 20 mg at 01/17/23 0824  •  fluticasone-vilanterol 200-25 mcg/actuation 1 puff, 1 puff, Inhalation, Daily, Lesley Goldsmith MD, 1 puff at 01/17/23 0825  •  gabapentin (NEURONTIN) capsule 1,200 mg, 1,200 mg, Oral, HS, Lesley Goldsmith MD, 1,200 mg at 01/16/23 2122  •  gabapentin (NEURONTIN) capsule 600 mg, 600 mg, Oral, BID, Dede Smith MD, 600 mg at 01/17/23 1569  •  levothyroxine tablet 50 mcg, 50 mcg, Oral, Daily, eDde Smith MD, 50 mcg at 01/17/23 0563  •  lisinopril (ZESTRIL) tablet 20 mg, 20 mg, Oral, BID, Dede Smith MD, 20 mg at 01/17/23 2507  •  loperamide (IMODIUM) capsule 2 mg, 2 mg, Oral, 4x Daily PRN, Bre Jacobs PA-C, 2 mg at 01/17/23 1136  •  LORazepam (ATIVAN) tablet 1 mg, 1 mg, Oral, Q6H PRN, Dede Smith MD, 1 mg at 01/17/23 0827  •  meclizine (ANTIVERT) tablet 25 mg, 25 mg, Oral, Q6H PRN, Dede Smith MD  •  sodium chloride tablet 1 g, 1 g, Oral, TID, Dede Smith MD, 1 g at 01/17/23 0824  •  torsemide (DEMADEX) tablet 10 mg, 10 mg, Oral, Daily PRN, Dede Smith MD    Blood Culture:   Lab Results   Component Value Date    BLOODCX No Growth After 5 Days  02/23/2021       Wound Culture:   No results found for: WOUNDCULT    Ins and Outs:  I/O last 24 hours: In: 200 [I V :200]  Out: -           Physical:  Vitals:    01/17/23 0715   BP: 157/76   Pulse: 68   Resp: 18   Temp: 98 6 °F (37 °C)   SpO2: 95%     Musculoskeletal: left Upper Extremity  · Visible skin intact  Splint in place  · She has visible edema in the fingers but not dorsal hand  · She verbalizes pain in the wrist with any movement of the thumb  · Sensation intact to median/radial/ulnar nerve distribution   · Motor intact anterior interosseous nerve/posterior interosseous nerve/median/radial/ulnar nerve distributions  · Digits warm and well perfused  · Capillary refill < 2 seconds  · 2+ radial pulse  · No pain with passive stretch  Assessment:    65 y  o female with non displaced distal radius fracture       Plan:  · Non weight bearing to the left upper extremity   · Short arm cast applied today, see separate procedure note  · Elevation while at rest  · Pain control per primary  · DVT ppx per primary  · Medical management per primary  · Dispo: Ortho signing off   · Patient reports post discharge she will be in [facility], and will follow with orthopedics specialist in that area   If she remains in the area greater than 2-3 weeks, she should follow up with us in no more than 4 weeks    Santi Silva PA-C

## 2023-01-17 NOTE — ASSESSMENT & PLAN NOTE
· Patient was previously on Coumadin  After recent hospitalization was switched to Eliquis    · Resume Eliquis

## 2023-01-17 NOTE — TELEPHONE ENCOUNTER
Please continue Cardizem as you were taking it  Take 60 mg tonight  You can check your BP prior to taking it  You can take Tylenol instead of Vicodin for pain  Take Vicodin only for severe pain  Please schedule follow up appt with Ortho

## 2023-01-17 NOTE — PLAN OF CARE
Problem: Potential for Falls  Goal: Patient will remain free of falls  Description: INTERVENTIONS:  - Educate patient/family on patient safety including physical limitations  - Instruct patient to call for assistance with activity   - Consult OT/PT to assist with strengthening/mobility   - Keep Call bell within reach  - Keep bed low and locked with side rails adjusted as appropriate  - Keep care items and personal belongings within reach  - Initiate and maintain comfort rounds  - Make Fall Risk Sign visible to staff  - Offer Toileting every 2 Hours, in advance of need  - Initiate/Maintain bed alarm  - Apply yellow socks and bracelet for high fall risk patients  - Consider moving patient to room near nurses station  Outcome: Progressing     Problem: Prexisting or High Potential for Compromised Skin Integrity  Goal: Skin integrity is maintained or improved  Description: INTERVENTIONS:  - Identify patients at risk for skin breakdown  - Assess and monitor skin integrity  - Assess and monitor nutrition and hydration status  - Monitor labs   - Assess for incontinence   - Turn and reposition patient  - Assist with mobility/ambulation  - Relieve pressure over bony prominences  - Avoid friction and shearing  - Provide appropriate hygiene as needed including keeping skin clean and dry  - Evaluate need for skin moisturizer/barrier cream  - Collaborate with interdisciplinary team   - Patient/family teaching  - Consider wound care consult   Outcome: Progressing     Problem: PAIN - ADULT  Goal: Verbalizes/displays adequate comfort level or baseline comfort level  Description: Interventions:  - Encourage patient to monitor pain and request assistance  - Assess pain using appropriate pain scale  - Administer analgesics based on type and severity of pain and evaluate response  - Implement non-pharmacological measures as appropriate and evaluate response  - Consider cultural and social influences on pain and pain management  - Notify physician/advanced practitioner if interventions unsuccessful or patient reports new pain  Outcome: Progressing     Problem: INFECTION - ADULT  Goal: Absence or prevention of progression during hospitalization  Description: INTERVENTIONS:  - Assess and monitor for signs and symptoms of infection  - Monitor lab/diagnostic results  - Monitor all insertion sites, i e  indwelling lines, tubes, and drains  - Monitor endotracheal if appropriate and nasal secretions for changes in amount and color  - [place] appropriate cooling/warming therapies per order  - Administer medications as ordered  - Instruct and encourage patient and family to use good hand hygiene technique  - Identify and instruct in appropriate isolation precautions for identified infection/condition  Outcome: Progressing  Goal: Absence of fever/infection during neutropenic period  Description: INTERVENTIONS:  - Monitor WBC    Outcome: Progressing     Problem: SAFETY ADULT  Goal: Patient will remain free of falls  Description: INTERVENTIONS:  - Educate patient/family on patient safety including physical limitations  - Instruct patient to call for assistance with activity   - Consult OT/PT to assist with strengthening/mobility   - Keep Call bell within reach  - Keep bed low and locked with side rails adjusted as appropriate  - Keep care items and personal belongings within reach  - Initiate and maintain comfort rounds  - Make Fall Risk Sign visible to staff  - Offer Toileting every 2 Hours, in advance of need  - Initiate/Maintain bed alarm  - Apply yellow socks and bracelet for high fall risk patients  - Consider moving patient to room near nurses station  Outcome: Progressing  Goal: Maintain or return to baseline ADL function  Description: INTERVENTIONS:  -  Assess patient's ability to carry out ADLs; assess patient's baseline for ADL function and identify physical deficits which impact ability to perform ADLs (bathing, care of mouth/teeth, toileting, grooming, dressing, etc )  - Assess/evaluate cause of self-care deficits   - Assess range of motion  - Assess patient's mobility; develop plan if impaired  - Assess patient's need for assistive devices and provide as appropriate  - Encourage maximum independence but intervene and supervise when necessary  - Involve family in performance of ADLs  - Assess for home care needs following discharge   - Consider OT consult to assist with ADL evaluation and planning for discharge  - Provide patient education as appropriate  Outcome: Progressing  Goal: Maintains/Returns to pre admission functional level  Description: INTERVENTIONS:  - Perform BMAT or MOVE assessment daily    - Set and communicate daily mobility goal to care team and patient/family/caregiver  - Collaborate with rehabilitation services on mobility goals if consulted  - Perform Range of Motion 3 times a day  - Reposition patient every 2 hours    - Dangle patient 3 times a day  - Stand patient 3 times a day  - Ambulate patient 3 times a day  - Out of bed to chair 3 times a day   - Out of bed for meals 3 times a day  - Out of bed for toileting  - Record patient progress and toleration of activity level   Outcome: Progressing     Problem: DISCHARGE PLANNING  Goal: Discharge to home or other facility with appropriate resources  Description: INTERVENTIONS:  - Identify barriers to discharge w/patient and caregiver  - Arrange for needed discharge resources and transportation as appropriate  - Identify discharge learning needs (meds, wound care, etc )  - Arrange for interpretive services to assist at discharge as needed  - Refer to Case Management Department for coordinating discharge planning if the patient needs post-hospital services based on physician/advanced practitioner order or complex needs related to functional status, cognitive ability, or social support system  Outcome: Progressing     Problem: Knowledge Deficit  Goal: Patient/family/caregiver demonstrates understanding of disease process, treatment plan, medications, and discharge instructions  Description: Complete learning assessment and assess knowledge base  Interventions:  - Provide teaching at level of understanding  - Provide teaching via preferred learning methods  Outcome: Progressing     Problem: MOBILITY - ADULT  Goal: Maintain or return to baseline ADL function  Description: INTERVENTIONS:  -  Assess patient's ability to carry out ADLs; assess patient's baseline for ADL function and identify physical deficits which impact ability to perform ADLs (bathing, care of mouth/teeth, toileting, grooming, dressing, etc )  - Assess/evaluate cause of self-care deficits   - Assess range of motion  - Assess patient's mobility; develop plan if impaired  - Assess patient's need for assistive devices and provide as appropriate  - Encourage maximum independence but intervene and supervise when necessary  - Involve family in performance of ADLs  - Assess for home care needs following discharge   - Consider OT consult to assist with ADL evaluation and planning for discharge  - Provide patient education as appropriate  Outcome: Progressing  Goal: Maintains/Returns to pre admission functional level  Description: INTERVENTIONS:  - Perform BMAT or MOVE assessment daily    - Set and communicate daily mobility goal to care team and patient/family/caregiver  - Collaborate with rehabilitation services on mobility goals if consulted  - Perform Range of Motion 3 times a day  - Reposition patient every 2 hours    - Dangle patient 3 times a day  - Stand patient 3 times a day  - Ambulate patient 3 times a day  - Out of bed to chair 3 times a day   - Out of bed for meals 3 times a day  - Out of bed for toileting  - Record patient progress and toleration of activity level   Outcome: Progressing

## 2023-01-17 NOTE — TELEPHONE ENCOUNTER
You can try not taking Claritin for a few days  If you are having a lot of watery eyes, sneezing and runny nose, you can restart it

## 2023-01-17 NOTE — TELEPHONE ENCOUNTER
Patient was in hospital and discharged today and has questions about medications  States she takes Cardizem 60 mg in the AM and Cardizem 180 mg in the PM     They gave her Cardizem 180 mg this morning and told her to stop the 60 mg - is this right or a mistake? And also should she take a 60 mg tonight since she had a 180 mg this morning ?      They also mentioned to her to stop the Vicodin and take tylenol for her arm, would like your thoughts on this

## 2023-01-17 NOTE — ASSESSMENT & PLAN NOTE
· After recent fall on Thursday  · Went to urgent care  Was splinted  Was redone in ER because it was too tight  · Patient was supposed to see Ortho in office  · Orthopedics following, if swelling has improved they will apply a cast today  If not she will follow-up in 1 week

## 2023-01-17 NOTE — ASSESSMENT & PLAN NOTE
· Blood with stool starting 1/14  · Patient on Eliquis for portal vein thrombosis  · Hemodynamically stable  · Hemoglobin stable  · No abdominal pain, nausea or vomiting  · Recently here for similar complaint  Underwent sigmoidoscopy that showed anastomotic linear ulcer without any active bleeding  · GI following  Flex sig: "Ulcerated anastomosis in the rectum; performed cold forceps biopsy  Ileoanal anastomosis with J-pouch, ulceration within the J-pouch, appears to be an anastomotic ulcer  Normal evaluation of the small bowel  Multiple biopsies were taken from the ulceration"  · Resumed Eliquis

## 2023-01-20 ENCOUNTER — OFFICE VISIT (OUTPATIENT)
Age: 78
End: 2023-01-20

## 2023-01-20 VITALS — HEIGHT: 63 IN | WEIGHT: 152 LBS | BODY MASS INDEX: 26.93 KG/M2

## 2023-01-20 DIAGNOSIS — K51.80 OTHER ULCERATIVE COLITIS WITHOUT COMPLICATION (HCC): Primary | ICD-10-CM

## 2023-01-20 DIAGNOSIS — K51.80 OTHER ULCERATIVE COLITIS WITHOUT COMPLICATION (HCC): ICD-10-CM

## 2023-01-20 RX ORDER — MESALAMINE 500 MG/1
500 CAPSULE, EXTENDED RELEASE ORAL 4 TIMES DAILY
Qty: 120 CAPSULE | Refills: 3 | Status: SHIPPED | OUTPATIENT
Start: 2023-01-20 | End: 2023-01-20 | Stop reason: SDUPTHER

## 2023-01-20 RX ORDER — LANSOPRAZOLE 15 MG/1
CAPSULE, DELAYED RELEASE ORAL
COMMUNITY
Start: 2023-01-16

## 2023-01-20 RX ORDER — SUCRALFATE 1 G/1
1 TABLET ORAL 4 TIMES DAILY
Qty: 30 TABLET | Refills: 3 | Status: SHIPPED | OUTPATIENT
Start: 2023-01-20 | End: 2023-01-20 | Stop reason: SDUPTHER

## 2023-01-20 NOTE — PROGRESS NOTES
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  Plan:   Ulcerative colitis without complications [facility])  The patient has a recent history of GI bleeding  She had a work-up in hospital with 2 separate biopsies of the ileal pouch  The second 1 is pending but the first showed no evidence of specific Crohn's disease  There is some inflammatory change in the area  I recommend that she return as necessary  If she has significant bleeding, a clip could be applied to the area and hopefully that would have a more permanent treatment benefit  I started her on Carafate and Pentasa to cover any current inflammatory changes causing ulceration  Another possibility is that she has intussusception of the pouch  I made sure she is taking high fiber in her diet and that she is avoiding white trauma  She knows she can return to me at any time  At this time, the bleeding is intermittent and not severe but she knows to return should the bleeding become worse  Subjective     HPI    Karen Roach is a 65 y o  female who is here today for hospital follow up evaluation  Patient states that she continues to have bright red blood when she moves her bowels and upon wiping  She was hospitalized from 1/15/2023 - 1/17/2023 for rectal bleeding  Patient has Flexible sigmoidoscopy evaluation on 1/16/2023 with Dr Valencia Eisenmenger, which showed an anastomotic ulcer within the J-pouch, biopsy sent for CMV, HSV, inflammatory bowel disease  Proximal evaluation of the small bowel is normal  Healthy ileoanal anastomosis  No evidence of anal fissure or hemorrhoids  CT high volume bleeding scan abdomen pelvis on 1/15/2023, no CT evidence of active high volume gastrointestinal hemorrhage        Historical Information   Past Medical History:   Diagnosis Date   • Anemia    • Anxiety    • Arrhythmia    • Arthritis    • Asthma    • Blood clot in vein     portal vein   • Breast cancer (HCC) 02/12/2021   • Breast lump     [id number] RESOLVED   • Cancer [facility])    • Depression    • Disease of thyroid gland    • DVT, lower extremity (HCC)    • GERD (gastroesophageal reflux disease)    • Hyperlipidemia    • Hypertension    • Hypokalemia    • Hyponatremia    • Hypothyroidism    • Iron deficiency anemia    • Irregular heart beat    • Manic behavior (HCC)    • 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HCC)    • Osteoarthritis    • Palpitations     [id number]  RESOLVED   • Paroxysmal supraventricular tachycardia (HCC)    • PE (pulmonary thromboembolism) (HCC)    • Sjoegren syndrome    • Sleep apnea    • Sleep difficulties    • Spinal stenosis    • Thrombocytosis     [id number]  RESOLVED   • Tremors of nervous system     dbs implanted right and left chest   • Ulcerative colitis ([facility] [address] )    • Vertigo     [id number] RESOLVED     Past Surgical History:   Procedure Laterality Date   • ABDOMINAL SURGERY     • APPENDECTOMY     • BREAST BIOPSY Left 11/07/2019    Stereo   • BREAST EXCISIONAL BIOPSY Left     x many years   • BREAST SURGERY      lumpectomy & biopsy   • CARMELLA HOLE W/ STEREOTACTIC INSERTION OF DBS LEADS / INTRAOP MICROELECTRODE RECORDING     • COLON SURGERY     • COLONOSCOPY N/A 08/30/2017    Procedure: Laverne Smith;  Surgeon: Agustin Manzanares MD;  Location: [facility];   Service: Colorectal   • COLOPROCTECTOMY W/ ILEO J POUCH     • ESOPHAGOGASTRODUODENOSCOPY      ONSET 10/17/11   • FISTULA REPAIR      LLEOANAL FISTULA REPAIR TRANSPERIN TRANSABD APPROACH   • HYSTERECTOMY      age 43   • ILEOSTOMY CLOSURE     • KNEE ARTHROSCOPY      Right   • MAMMO STEREOTACTIC BREAST BIOPSY LEFT (ALL INC) Left 11/07/2019   • MAMMO STEREOTACTIC BREAST BIOPSY LEFT (ALL INC) Left 12/17/2020   • MAMMO STEREOTACTIC BREAST BIOPSY LEFT (ALL INC) EACH ADD Left 12/17/2020   • MASTECTOMY Left 02/12/2021    left mastectomy- Dr Selin Krishna   • MASTECTOMY W/ SENTINEL NODE BIOPSY Left 02/12/2021    Procedure: BREAST MASTECTOMY WITH SENTINEL LYMPH NODE BIOPSY, LYMPHATIC MAPPING WITH BLUE DYE AND RADIAOCTIVE DYE (INJECT AT 1100 BY DR GILLESPIE IN THE OR);   Surgeon: Lizzie Layne MD;  Location[facility];  Service: Surgical Oncology   • RI INSJ/RPLCMT CRANIAL NEUROSTIM PULSE GENERATOR Right 06/20/2017    Procedure: DBS GENERATOR REPLACEMENT;  Surgeon: Merly Malloy MD;  Location: [facility];  Service: Neurosurgery   • RI INSJ/RPLCMT CRANIAL NEUROSTIM PULSE GENERATOR N/A 12/04/2019    Procedure: REPLACEMENT IMPLANTABLE PULSE GENERATOR FOR DEEP BRAIN STIMULATOR LEFT CHEST;  Surgeon: Jacky Elmore MD;  Location: [facility];  Service: Neurosurgery   • SPLENECTOMY     • TONSILLECTOMY         Meds/Allergies       Current Outpatient Medications:   •  mesalamine (PENTASA) 500 mg CR capsule, Take 1 capsule (500 mg total) by mouth 4 (four) times a day, Disp: 120 capsule, Rfl: 3  •  sucralfate (CARAFATE) 1 g tablet, Take 1 tablet (1 g total) by mouth 4 (four) times a day, Disp: 30 tablet, Rfl: 3  •  acetaminophen (TYLENOL) 325 mg tablet, Take 2 tablets (650 mg total) by mouth every 6 (six) hours as needed for mild pain, Disp: , Rfl: 0  •  albuterol (PROVENTIL HFA,VENTOLIN HFA) 90 mcg/act inhaler, Inhale 2 puffs every 6 (six) hours as needed for wheezing, Disp: 8 g, Rfl: 1  •  amLODIPine (NORVASC) 2 5 mg tablet, TAKE ONE TABLET BY MOUTH EVERY DAY, Disp: 90 tablet, Rfl: 3  •  apixaban (Eliquis) 5 mg, Take 1 tablet (5 mg total) by mouth 2 (two) times a day, Disp: 60 tablet, Rfl: 0  •  Calcium Carb-Cholecalciferol (CALCIUM 600-D PO), Take 1 tablet by mouth 2 (two) times a day, Disp: , Rfl:   •  Coenzyme Q10 (CO Q-10 PO), Take 1 capsule by mouth daily, Disp: , Rfl:   •  diltiazem (CARDIZEM CD) 180 mg 24 hr capsule, TAKE ONE CAPSULE BY MOUTH EVERY DAY, Disp: 90 capsule, Rfl: 1  •  escitalopram (LEXAPRO) 20 mg tablet, TAKE ONE TABLET BY MOUTH EVERY DAY, Disp: 90 tablet, Rfl: 1  • fluticasone (FLONASE) 50 mcg/act nasal spray, INSTILL ONE SPRAY INTO EACH NOSTRIL ONCE DAILY AS NEEDED FOR RHINITIS, Disp: 48 g, Rfl: 1  •  fluticasone-salmeterol (Advair Diskus) 250-50 mcg/dose inhaler, Inhale 1 puff 2 (two) times a day Rinse mouth after use, Disp: 180 blister, Rfl: 3  •  gabapentin (NEURONTIN) 600 MG tablet, TAKE ONE TABLET BY MOUTH IN THE MORNING, ONE TABLET IN THE AFTERNNON, AND TWO TABLETS AT BEDTIME, Disp: 360 tablet, Rfl: 2  •  lansoprazole (PREVACID) 15 mg capsule, , Disp: , Rfl:   •  levothyroxine 50 mcg tablet, TAKE ONE TABLET BY MOUTH EVERY DAY, Disp: 90 tablet, Rfl: 1  •  lisinopril (ZESTRIL) 20 mg tablet, TAKE ONE TABLET BY MOUTH TWICE A DAY, Disp: 180 tablet, Rfl: 1  •  loperamide (IMODIUM) 2 mg capsule, Take 2 mg by mouth 4 (four) times a day as needed  , Disp: , Rfl:   •  LORazepam (ATIVAN) 1 mg tablet, Take 1 tablet (1 mg total) by mouth every 6 (six) hours as needed for anxiety, Disp: 120 tablet, Rfl: 0  •  MAGNESIUM PO, Take 1 tablet by mouth daily, Disp: , Rfl:   •  meclizine (ANTIVERT) 25 mg tablet, Take 1 tablet (25 mg total) by mouth every 6 (six) hours as needed for dizziness, Disp: 90 tablet, Rfl: 1  •  Multiple Vitamin (MULTIVITAMIN ADULT PO), Take 1 tablet by mouth daily, Disp: , Rfl:   •  Omega-3 Fatty Acids (FISH OIL PO), Take 1 capsule by mouth daily, Disp: , Rfl:   •  potassium chloride (MICRO-K) 10 MEQ CR capsule, Take 2 capsules daily, Disp: 180 capsule, Rfl: 3  •  predniSONE 5 mg tablet, , Disp: , Rfl:   •  simvastatin (ZOCOR) 5 MG tablet, TAKE ONE TABLET BY MOUTH EVERY DAY, Disp: 90 tablet, Rfl: 1  •  sodium chloride 1 g tablet, Take 1 tablet (1 g total) by mouth 3 (three) times a day, Disp: 270 tablet, Rfl: 3  •  torsemide (DEMADEX) 10 mg tablet, Take 1 tablet (10 mg total) by mouth daily as needed (edema), Disp: 90 tablet, Rfl: 0  Allergies   Allergen Reactions   • Indomethacin GI Intolerance and Dizziness   • Macrobid [Nitrofurantoin Monohyd Macro] Rash   • Nitrofurantoin Other (See Comments)   • Penicillins Rash     Annotation - [id number]: can take cephalosporins   • Sulfa Antibiotics Rash       Social History   Social History     Substance and Sexual Activity   Drug Use No     Social History     Tobacco Use   Smoking Status Former   • Packs/day: 1 50   • Years: 5 00   • Pack years: 7 50   • Types: Cigarettes   • Quit date: 1965   • Years since quittin 0   Smokeless Tobacco Never   Tobacco Comments    Quit         Family History   Problem Relation Age of Onset   • Venous thrombosis Mother         ACUTE VENOUS THROMBOSIS OF DEEP VESSELS OF THE DISTAL LOWER EXTREMITY   • Other Mother         PHLEBITIS   • Hypertension Mother    • Peripheral vascular disease Mother    • COPD Father    • Diabetes Father         MELLITUS   • Stroke Father    • Diabetes Sister         MELLITUS   • Sjogren's syndrome Sister    • No Known Problems Daughter    • No Known Problems Maternal Grandmother    • No Known Problems Maternal Grandfather    • No Known Problems Paternal Grandmother    • No Known Problems Paternal Grandfather    • No Known Problems Sister    • No Known Problems Maternal Aunt    • No Known Problems Paternal Aunt    • No Known Problems Son          Review of Systems    Objective   Current Vitals:  Vitals:    23 1317   Weight: 68 9 kg (152 lb)   Height: 5' 3" (1 6 m)         Physical Exam  Constitutional:       Comments: Somewhat frail   Abdominal:      General: Abdomen is flat  Palpations: Abdomen is soft  Tenderness: There is no abdominal tenderness  Neurological:      General: No focal deficit present  Mental Status: She is alert and oriented to person, place, and time     Psychiatric:         Mood and Affect: Mood normal          Behavior: Behavior normal

## 2023-01-20 NOTE — ASSESSMENT & PLAN NOTE
The patient has a recent history of GI bleeding  She had a work-up in hospital with 2 separate biopsies of the ileal pouch  The second 1 is pending but the first showed no evidence of specific Crohn's disease  There is some inflammatory change in the area  I recommend that she return as necessary  If she has significant bleeding, a clip could be applied to the area and hopefully that would have a more permanent treatment benefit  I started her on Carafate and Pentasa to cover any current inflammatory changes causing ulceration  Another possibility is that she has intussusception of the pouch  I made sure she is taking high fiber in her diet and that she is avoiding white trauma  She knows she can return to me at any time  At this time, the bleeding is intermittent and not severe but she knows to return should the bleeding become worse

## 2023-01-23 DIAGNOSIS — K21.9 GASTROESOPHAGEAL REFLUX DISEASE: ICD-10-CM

## 2023-01-23 DIAGNOSIS — K51.80 OTHER ULCERATIVE COLITIS WITHOUT COMPLICATION (HCC): ICD-10-CM

## 2023-01-23 RX ORDER — SUCRALFATE 1 G/1
1 TABLET ORAL 4 TIMES DAILY
Qty: 30 TABLET | Refills: 3 | Status: SHIPPED | OUTPATIENT
Start: 2023-01-23

## 2023-01-23 RX ORDER — PANTOPRAZOLE SODIUM 40 MG/1
40 TABLET, DELAYED RELEASE ORAL DAILY
Qty: 90 TABLET | Refills: 1 | OUTPATIENT
Start: 2023-01-23

## 2023-01-23 RX ORDER — MESALAMINE 500 MG/1
500 CAPSULE, EXTENDED RELEASE ORAL 4 TIMES DAILY
Qty: 120 CAPSULE | Refills: 3 | Status: SHIPPED | OUTPATIENT
Start: 2023-01-23 | End: 2023-01-23

## 2023-01-23 RX ORDER — MESALAMINE 500 MG/1
CAPSULE, EXTENDED RELEASE ORAL
Qty: 120 CAPSULE | Refills: 3 | Status: SHIPPED | OUTPATIENT
Start: 2023-01-23

## 2023-01-24 ENCOUNTER — TELEPHONE (OUTPATIENT)
Dept: OBGYN CLINIC | Facility: HOSPITAL | Age: 78
End: 2023-01-24

## 2023-01-24 NOTE — TELEPHONE ENCOUNTER
Caller: Zachary    Doctor: Andrei Gallo    Reason for call: Patient is having a lot of pain in the left wrist where her cast is  Please advise       Call back#: [phone]

## 2023-01-24 NOTE — TELEPHONE ENCOUNTER
She was admitted and had the cast applied during the admission  She may be seen for a cast change  She was originally scheduled with Dr Charlette Rees or she can see Dr Charla Deras in clinic  She was planning on following up with [facility] upon discharge   In the meantime, she should elevate the hand and wrist, swelling will cause pain in the cast

## 2023-01-25 ENCOUNTER — OFFICE VISIT (OUTPATIENT)
Dept: OBGYN CLINIC | Facility: CLINIC | Age: 78
End: 2023-01-25

## 2023-01-25 ENCOUNTER — HOSPITAL ENCOUNTER (OUTPATIENT)
Dept: RADIOLOGY | Facility: HOSPITAL | Age: 78
Discharge: HOME | End: 2023-01-25

## 2023-01-25 ENCOUNTER — APPOINTMENT (OUTPATIENT)
Dept: LAB | Facility: AMBULARY SURGERY CENTER | Age: 78
End: 2023-01-25

## 2023-01-25 VITALS
HEART RATE: 73 BPM | DIASTOLIC BLOOD PRESSURE: 67 MMHG | OXYGEN SATURATION: 95 % | WEIGHT: 153.2 LBS | SYSTOLIC BLOOD PRESSURE: 136 MMHG | HEIGHT: 63 IN | BODY MASS INDEX: 27.14 KG/M2

## 2023-01-25 DIAGNOSIS — S52.572A OTHER CLOSED INTRA-ARTICULAR FRACTURE OF DISTAL END OF LEFT RADIUS, INITIAL ENCOUNTER: ICD-10-CM

## 2023-01-25 DIAGNOSIS — M25.532 LEFT WRIST PAIN: ICD-10-CM

## 2023-01-25 DIAGNOSIS — M80.00XA OSTEOPOROSIS WITH PATHOLOGICAL FRACTURE, INITIAL ENCOUNTER: ICD-10-CM

## 2023-01-25 DIAGNOSIS — R93.89 ABNORMAL FINDINGS ON DIAGNOSTIC IMAGING OF OTHER SPECIFIED BODY STRUCTURES: ICD-10-CM

## 2023-01-25 DIAGNOSIS — M25.532 LEFT WRIST PAIN: Primary | ICD-10-CM

## 2023-01-25 LAB
ERYTHROCYTE [SEDIMENTATION RATE] IN BLOOD: 24 MM/HOUR (ref 0–29)
PTH-INTACT SERPL-MCNC: 42 PG/ML (ref 18.4–80.1)
TSH SERPL DL<=0.05 MIU/L-ACNC: 0.91 UIU/ML (ref 0.45–4.5)

## 2023-01-25 NOTE — PROGRESS NOTES
Assessment/Plan   Diagnoses and all orders for this visit:    Left wrist pain    Intra-articular fracture of distal radius, left  - Re-casted, short arm cast  - Follow up with Dr Kai Sorenson or Dr Aleida Daley in a week  - She may be in [facility] by then, but until she decides when she is leaving, we will plan for follow up here    Osteoporosis with pathological fracture, initial encounter  - Own the bone protocol labwork and Endocrine referral          Subjective   Patient ID: Sandra Valencia is a 65 y o  female  Vitals:    01/25/23 0949   BP: 136/67   Pulse: 73   SpO2: 95%     78yo female comes in for an evaluation of her left wrist   She was injured on 1-12-23 when she tripped over her shoes and fell at home  She was seen and splinted in an urgent care  The plan was to follow up with Dr Kai Sorenson, but on 1-15-23, she was admitted to the hospital for GI issues  She was treated by orthopedics as an inpatient and the fracture was determined to be non-surgical   She was changed from a splint to a cast on 1-17-23  She did not schedule follow up because she was expecting to be in [facility] after discharge, but is still here  She has been having some continued wrist pain and was concerned about whether this was "normal "  The pain is sharp in character, mild in severity, pain does not radiate and is not associated with numbness  She is still deciding on when they will be going back to [facility] for the winter          The following portions of the patient's history were reviewed and updated as appropriate: allergies, current medications, past family history, past medical history, past social history, past surgical history and problem list     Review of Systems  Ortho Exam  Past Medical History:   Diagnosis Date   • Anemia    • Anxiety    • Arrhythmia    • Arthritis    • Asthma    • Blood clot in vein     portal vein   • Breast cancer ([facility] ) 02/12/2021   • Breast lump     [id number] RESOLVED   • Cancer ([facility] ) • Depression    • Disease of thyroid gland    • DVT, lower extremity (HCC)    • GERD (gastroesophageal reflux disease)    • Hyperlipidemia    • Hypertension    • Hypokalemia    • Hyponatremia    • Hypothyroidism    • Iron deficiency anemia    • Irregular heart beat    • Manic behavior (HCC)    • 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HCC)    • Osteoarthritis    • Palpitations     [id number]  RESOLVED   • Paroxysmal supraventricular tachycardia (HCC)    • PE (pulmonary thromboembolism) (HCC)    • Sjoegren syndrome    • Sleep apnea    • Sleep difficulties    • Spinal stenosis    • Thrombocytosis     [id number]  RESOLVED   • Tremors of nervous system     dbs implanted right and left chest   • Ulcerative colitis ([address] )    • Vertigo     [id number] RESOLVED     Past Surgical History:   Procedure Laterality Date   • ABDOMINAL SURGERY     • APPENDECTOMY     • BREAST BIOPSY Left 11/07/2019    Stereo   • BREAST EXCISIONAL BIOPSY Left     x many years   • BREAST SURGERY      lumpectomy & biopsy   • CARMELLA HOLE W/ STEREOTACTIC INSERTION OF DBS LEADS / INTRAOP MICROELECTRODE RECORDING     • COLON SURGERY     • COLONOSCOPY N/A 08/30/2017    Procedure: Ana Laura Malik;  Surgeon: Annamarie Jara MD;  [facility];   Service: Colorectal   • COLOPROCTECTOMY W/ ILEO J POUCH     • ESOPHAGOGASTRODUODENOSCOPY      ONSET 10/17/11   • FISTULA REPAIR      LLEOANAL FISTULA REPAIR TRANSPERIN TRANSABD APPROACH   • HYSTERECTOMY      age 43   • ILEOSTOMY CLOSURE     • KNEE ARTHROSCOPY      Right   • MAMMO STEREOTACTIC BREAST BIOPSY LEFT (ALL INC) Left 11/07/2019   • MAMMO STEREOTACTIC BREAST BIOPSY LEFT (ALL INC) Left 12/17/2020   • MAMMO STEREOTACTIC BREAST BIOPSY LEFT (ALL INC) EACH ADD Left 12/17/2020   • MASTECTOMY Left 02/12/2021    left mastectomy- Dr Marlyn Walters   • MASTECTOMY W/ SENTINEL NODE BIOPSY Left 02/12/2021    Procedure: BREAST MASTECTOMY WITH SENTINEL LYMPH NODE BIOPSY, LYMPHATIC MAPPING WITH BLUE DYE AND RADIAOCTIVE DYE (INJECT AT 1100 BY  JOSE MIGUEL IN THE OR); Surgeon: Hiren Guevara MD;  [facility];  Service: Surgical Oncology   • KS INSJ/RPLCMT CRANIAL NEUROSTIM PULSE GENERATOR Right 2017    Procedure: DBS GENERATOR REPLACEMENT;  Surgeon: Jorje Anthony MD;  [facility];  Service: Neurosurgery   • KS INSJ/RPLCMT CRANIAL NEUROSTIM PULSE GENERATOR N/A 2019    Procedure: REPLACEMENT IMPLANTABLE PULSE GENERATOR FOR DEEP BRAIN STIMULATOR LEFT CHEST;  Surgeon: Benito Delgado MD;  [facility];  Service: Neurosurgery   • SPLENECTOMY     • TONSILLECTOMY       Family History   Problem Relation Age of Onset   • Venous thrombosis Mother         ACUTE VENOUS THROMBOSIS OF DEEP VESSELS OF THE DISTAL LOWER EXTREMITY   • Other Mother         PHLEBITIS   • Hypertension Mother    • Peripheral vascular disease Mother    • COPD Father    • Diabetes Father         MELLITUS   • Stroke Father    • Diabetes Sister         MELLITUS   • Sjogren's syndrome Sister    • No Known Problems Daughter    • No Known Problems Maternal Grandmother    • No Known Problems Maternal Grandfather    • No Known Problems Paternal Grandmother    • No Known Problems Paternal Grandfather    • No Known Problems Sister    • No Known Problems Maternal Aunt    • No Known Problems Paternal Aunt    • No Known Problems Son      Social History     Occupational History   • Occupation: RETIRED   Tobacco Use   • Smoking status: Former     Packs/day: 1 50     Years: 5 00     Pack years: 7 50     Types: Cigarettes     Quit date: 1965     Years since quittin 1   • Smokeless tobacco: Never   • Tobacco comments:     Quit   Vaping Use   • Vaping Use: Never used   Substance and Sexual Activity   • Alcohol use: Yes     Alcohol/week: 2 0 standard drinks     Types: 2 Cans of beer per week   • Drug use: No   • Sexual activity: Not Currently     Partners: Male     Birth control/protection: Post-menopausal       Review of Systems   Constitutional: Negative  HENT: Negative  Eyes: Negative  Respiratory: Negative  Cardiovascular: Negative  Gastrointestinal: Negative  Endocrine: Negative  Genitourinary: Negative  Musculoskeletal: As below      Allergic/Immunologic: Negative  Neurological: Negative  Hematological: Negative  Psychiatric/Behavioral: Negative  Objective   Physical Exam      · Constitutional: Awake, Alert, Oriented  · Eyes: EOMI  · Psych: Mood and affect appropriate  · Heart: regular rate   · Lungs: No audible wheezing  · Abdomen: No guarding  · Lymph: no lymphedema  • left wrist:  - Appearance  • The cast is in good condition  • With the cast removed: no open skin  - Palpation  o + generalized wrist tenderness  - ROM  o not examined due to fracture status  - Motor  o Median/ulnar/radial nerve motor intact  - NVI distally    I have personally reviewed pertinent films in PACS and my interpretation is unchanged compared to previous xray  Cast application    Date/Time: 1/25/2023 10:52 AM  Performed by: Leesa Go  Authorized by: Tristan Alvarez PA-C   Universal Protocol:  Consent: Verbal consent obtained  Risks and benefits: risks, benefits and alternatives were discussed  Consent given by: patient  Timeout called at: 1/25/2023 10:52 AM   Patient understanding: patient states understanding of the procedure being performed  Radiology Images displayed and confirmed  If images not available, report reviewed: imaging studies available  Patient identity confirmed: verbally with patient      Pre-procedure details:     Sensation:  Normal  Procedure details:     Laterality:  Left    Location:  Wrist    Wrist:  L wristCast type:  Short arm      Supplies:  Cotton padding and fiberglass  Post-procedure details:     Pain:  Improved    Sensation:  Normal    Patient tolerance of procedure:   Tolerated well, no immediate complications

## 2023-01-29 DIAGNOSIS — I10 ESSENTIAL HYPERTENSION: ICD-10-CM

## 2023-01-29 RX ORDER — LISINOPRIL 20 MG/1
TABLET ORAL
Qty: 180 TABLET | Refills: 1 | Status: SHIPPED | OUTPATIENT
Start: 2023-01-29

## 2023-01-31 DIAGNOSIS — K21.9 GASTROESOPHAGEAL REFLUX DISEASE, UNSPECIFIED WHETHER ESOPHAGITIS PRESENT: Primary | ICD-10-CM

## 2023-02-01 ENCOUNTER — OFFICE VISIT (OUTPATIENT)
Dept: OBGYN CLINIC | Facility: CLINIC | Age: 78
End: 2023-02-01

## 2023-02-01 VITALS
BODY MASS INDEX: 27.11 KG/M2 | SYSTOLIC BLOOD PRESSURE: 146 MMHG | WEIGHT: 153 LBS | OXYGEN SATURATION: 96 % | HEIGHT: 63 IN | DIASTOLIC BLOOD PRESSURE: 84 MMHG | HEART RATE: 79 BPM

## 2023-02-01 DIAGNOSIS — K21.9 GASTROESOPHAGEAL REFLUX DISEASE: ICD-10-CM

## 2023-02-01 DIAGNOSIS — S40.012A CONTUSION OF LEFT SHOULDER, INITIAL ENCOUNTER: ICD-10-CM

## 2023-02-01 DIAGNOSIS — S52.502A CLOSED TRAUMATIC NONDISPLACED FRACTURE OF DISTAL END OF LEFT RADIUS, INITIAL ENCOUNTER: ICD-10-CM

## 2023-02-01 DIAGNOSIS — I81 PORTAL VEIN THROMBOSIS: ICD-10-CM

## 2023-02-01 RX ORDER — PANTOPRAZOLE SODIUM 40 MG/1
40 TABLET, DELAYED RELEASE ORAL DAILY
Qty: 90 TABLET | Refills: 1 | OUTPATIENT
Start: 2023-02-01

## 2023-02-01 RX ORDER — PANTOPRAZOLE SODIUM 40 MG/1
TABLET, DELAYED RELEASE ORAL
Qty: 90 TABLET | Refills: 1 | Status: SHIPPED | OUTPATIENT
Start: 2023-02-01

## 2023-02-01 NOTE — TELEPHONE ENCOUNTER
Pt  called  Is going out of town and needs this asap  Pt  Also said she wants refill for Pantoprazole 40mg one daily  Said she stopped taking it because she started a different one but didn't like it so she started taking Pantoprazole again  Pt  wants to know if she can get samples of Eliquis

## 2023-02-01 NOTE — PROGRESS NOTES
[address] [facility] [address]  [phone]       Karen Blankenship  [id number]  1945    ORTHOPAEDIC SURGERY OUTPATIENT NOTE  2/1/2023      HISTORY:  65 y o  female presents for initial evaluation on her left wrist   She had a distal radius fracture 2 weeks ago  She was seen in the hospital was casted  She did have her cast changed and an x-ray last week  She reports no pain in this current cast   She feels well  She denies numbness or tingling in her hand      Past Medical History:   Diagnosis Date   • Anemia    • Anxiety    • Arrhythmia    • Arthritis    • Asthma    • Blood clot in vein     portal vein   • [facility] ([facility] ) 02/12/2021   • Breast lump     [id number] RESOLVED   • Cancer (HCC)    • Depression    • Disease of thyroid gland    • DVT, lower extremity (HCC)    • GERD (gastroesophageal reflux disease)    • Hyperlipidemia    • Hypertension    • Hypokalemia    • Hyponatremia    • Hypothyroidism    • Iron deficiency anemia    • Irregular heart beat    • Manic behavior ([facility] )    • 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HCC)    • Osteoarthritis    • Palpitations     [id number]  RESOLVED   • Paroxysmal supraventricular tachycardia (HCC)    • PE (pulmonary thromboembolism) ([facility] )    • Sjoegren syndrome    • Sleep apnea    • Sleep difficulties    • Spinal stenosis    • Thrombocytosis     [id number]  RESOLVED   • Tremors of nervous system     dbs implanted right and left chest   • Ulcerative colitis ([facility] )    • Vertigo     [id number] RESOLVED       Past Surgical History:   Procedure Laterality Date   • ABDOMINAL SURGERY     • APPENDECTOMY     • BREAST BIOPSY Left 11/07/2019    Stereo   • BREAST EXCISIONAL BIOPSY Left     x many years   • BREAST SURGERY      lumpectomy & biopsy   • CARMELLA HOLE W/ STEREOTACTIC INSERTION OF DBS LEADS / INTRAOP MICROELECTRODE RECORDING     • COLON SURGERY     • COLONOSCOPY N/A 08/30/2017    Procedure: POUCHOSCOPY;  Surgeon: Samuel Ace MD;  [facility]: [facility]; Service: Colorectal   • COLOPROCTECTOMY W/ ILEO J POUCH     • ESOPHAGOGASTRODUODENOSCOPY      ONSET 10/17/11   • FISTULA REPAIR      LLEOANAL FISTULA REPAIR TRANSPERIN TRANSABD APPROACH   • HYSTERECTOMY      age 43   • ILEOSTOMY CLOSURE     • KNEE ARTHROSCOPY      Right   • MAMMO STEREOTACTIC BREAST BIOPSY LEFT (ALL INC) Left 2019   • MAMMO STEREOTACTIC BREAST BIOPSY LEFT (ALL INC) Left 2020   • MAMMO STEREOTACTIC BREAST BIOPSY LEFT (ALL INC) EACH ADD Left 2020   • MASTECTOMY Left 2021    left mastectomy- Dr Raad Garcia   • MASTECTOMY W/ SENTINEL NODE BIOPSY Left 2021    Procedure: BREAST MASTECTOMY WITH SENTINEL LYMPH NODE BIOPSY, LYMPHATIC MAPPING WITH BLUE DYE AND RADIAOCTIVE DYE (INJECT AT 1100 BY DR GILLESPIE IN THE OR);   Surgeon: Edilma Merritt MD;  [facility];  Service: Surgical Oncology   • PA INSJ/RPLCMT CRANIAL NEUROSTIM PULSE GENERATOR Right 2017    Procedure: DBS GENERATOR REPLACEMENT;  Surgeon: Ct Ulloa MD;  [facility];  Service: Neurosurgery   • PA INSJ/RPLCMT CRANIAL NEUROSTIM PULSE GENERATOR N/A 2019    Procedure: REPLACEMENT IMPLANTABLE PULSE GENERATOR FOR DEEP BRAIN STIMULATOR LEFT CHEST;  Surgeon: Rasheeda Mobley MD;  [facility];  Service: Neurosurgery   • SPLENECTOMY     • TONSILLECTOMY         Social History     Socioeconomic History   • Marital status:      Spouse name: Not on file   • Number of children: Not on file   • Years of education: EDUCATION LEVEL 10TH GRADE   • Highest education level: Not on file   Occupational History   • Occupation: RETIRED   Tobacco Use   • Smoking status: Former     Packs/day: 1 50     Years: 5 00     Pack years: 7 50     Types: Cigarettes     Quit date: 1965     Years since quittin 1   • Smokeless tobacco: Never   • Tobacco comments:     Quit   Vaping Use   • Vaping Use: Never used   Substance and Sexual Activity • Alcohol use:  Yes     Alcohol/week: 2 0 standard drinks     Types: 2 Cans of beer per week   • Drug use: No   • Sexual activity: Not Currently     Partners: Male     Birth control/protection: Post-menopausal   Other Topics Concern   • Not on file   Social History Narrative    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ACTIVITIES INSIDE AND OUTSIDE OF HOME, MODERATE    CAFFEINE USE, DRINKS CAFEINATED TEA, DRINKS COFFEE    INADEQUATE EXERCISE    LIVES WITH ADULT CHILDREN     Social Determinants of Health     Financial Resource Strain: Not on file   Food Insecurity: Not on file   Transportation Needs: Not on file   Physical Activity: Not on file   Stress: Not on file   Social Connections: Not on file   Intimate Partner Violence: Not on file   Housing Stability: Not on file       Family History   Problem Relation Age of Onset   • Venous thrombosis Mother         ACUTE VENOUS THROMBOSIS OF DEEP VESSELS OF THE DISTAL LOWER EXTREMITY   • Other Mother         PHLEBITIS   • Hypertension Mother    • Peripheral vascular disease Mother    • COPD Father    • Diabetes Father         MELLITUS   • Stroke Father    • Diabetes Sister         MELLITUS   • Sjogren's syndrome Sister    • No Known Problems Daughter    • No Known Problems Maternal Grandmother    • No Known Problems Maternal Grandfather    • No Known Problems Paternal Grandmother    • No Known Problems Paternal Grandfather    • No Known Problems Sister    • No Known Problems Maternal Aunt    • No Known Problems Paternal Aunt    • No Known Problems Son         Patient's Medications   New Prescriptions    No medications on file   Previous Medications    ACETAMINOPHEN (TYLENOL) 325 MG TABLET    Take 2 tablets (650 mg total) by mouth every 6 (six) hours as needed for mild pain    ALBUTEROL (PROVENTIL HFA,VENTOLIN HFA) 90 MCG/ACT INHALER    Inhale 2 puffs every 6 (six) hours as needed for wheezing    AMLODIPINE (NORVASC) 2 5 MG TABLET    TAKE ONE TABLET BY MOUTH EVERY DAY    APIXABAN (ELIQUIS) 5 MG Take 1 tablet (5 mg total) by mouth 2 (two) times a day    CALCIUM CARB-CHOLECALCIFEROL (CALCIUM 600-D PO)    Take 1 tablet by mouth 2 (two) times a day    COENZYME Q10 (CO Q-10 PO)    Take 1 capsule by mouth daily    DILTIAZEM (CARDIZEM CD) 180 MG 24 HR CAPSULE    TAKE ONE CAPSULE BY MOUTH EVERY DAY    ESCITALOPRAM (LEXAPRO) 20 MG TABLET    TAKE ONE TABLET BY MOUTH EVERY DAY    FLUTICASONE (FLONASE) 50 MCG/ACT NASAL SPRAY    INSTILL ONE SPRAY INTO EACH NOSTRIL ONCE DAILY AS NEEDED FOR RHINITIS    FLUTICASONE-SALMETEROL (ADVAIR DISKUS) 250-50 MCG/DOSE INHALER    Inhale 1 puff 2 (two) times a day Rinse mouth after use    GABAPENTIN (NEURONTIN) 600 MG TABLET    TAKE ONE TABLET BY MOUTH IN THE MORNING, ONE TABLET IN THE AFTERNNON, AND TWO TABLETS AT BEDTIME    LANSOPRAZOLE (PREVACID) 15 MG CAPSULE        LEVOTHYROXINE 50 MCG TABLET    TAKE ONE TABLET BY MOUTH EVERY DAY    LISINOPRIL (ZESTRIL) 20 MG TABLET    TAKE ONE TABLET BY MOUTH TWICE A DAY    LOPERAMIDE (IMODIUM) 2 MG CAPSULE    Take 2 mg by mouth 4 (four) times a day as needed      LORAZEPAM (ATIVAN) 1 MG TABLET    Take 1 tablet (1 mg total) by mouth every 6 (six) hours as needed for anxiety    MAGNESIUM PO    Take 1 tablet by mouth daily    MECLIZINE (ANTIVERT) 25 MG TABLET    Take 1 tablet (25 mg total) by mouth every 6 (six) hours as needed for dizziness    MESALAMINE (PENTASA) 500 MG CR CAPSULE    TAKE 1 CAPSULE BY MOUTH FOUR TIMES A DAY    MULTIPLE VITAMIN (MULTIVITAMIN ADULT PO)    Take 1 tablet by mouth daily    OMEGA-3 FATTY ACIDS (FISH OIL PO)    Take 1 capsule by mouth daily    POTASSIUM CHLORIDE (MICRO-K) 10 MEQ CR CAPSULE    Take 2 capsules daily    PREDNISONE 5 MG TABLET        SIMVASTATIN (ZOCOR) 5 MG TABLET    TAKE ONE TABLET BY MOUTH EVERY DAY    SODIUM CHLORIDE 1 G TABLET    Take 1 tablet (1 g total) by mouth 3 (three) times a day    SUCRALFATE (CARAFATE) 1 G TABLET    Take 1 tablet (1 g total) by mouth 4 (four) times a day    TORSEMIDE (DEMADEX) 10 MG TABLET    Take 1 tablet (10 mg total) by mouth daily as needed (edema)   Modified Medications    No medications on file   Discontinued Medications    No medications on file       Allergies   Allergen Reactions   • Indomethacin GI Intolerance and Dizziness   • Macrobid [Nitrofurantoin Monohyd Macro] Rash   • Nitrofurantoin Other (See Comments)   • Penicillins Rash     Annotation - [id number]: can take cephalosporins   • Sulfa Antibiotics Rash        /84 (BP Location: Right arm, Patient Position: Sitting, Cuff Size: Standard)   Pulse 79   Ht 5' 3" (1 6 m)   Wt 69 4 kg (153 lb)   SpO2 96%   BMI 27 10 kg/m²      REVIEW OF SYSTEMS:  Constitutional: Negative  HEENT: Negative  Respiratory: Negative  Skin: Negative  Neurological: Negative  Psychiatric/Behavioral: Negative  Musculoskeletal: Negative except for that mentioned in the HPI  /84 (BP Location: Right arm, Patient Position: Sitting, Cuff Size: Standard)   Pulse 79   Ht 5' 3" (1 6 m)   Wt 69 4 kg (153 lb)   SpO2 96%   BMI 27 10 kg/m²   Gen: No acute distress, resting comfortably in bed  HEENT: Eyes clear, moist mucus membranes, hearing intact  Respiratory: No audible wheezing or stridor  Cardiovascular: Well Perfused peripherally, 2+ distal pulse  Abdomen: nondistended, no peritoneal signs     PHYSICAL EXAM:    Left wrist:  Patient is in a short arm cast  Sensation intact throughout the hand  Skin is intact  Brisk cap refill  Motor intact to the hand    IMAGING: Images from 125 were reviewed and these demonstrate stable alignment of her distal radius fracture    ASSESSMENT AND PLAN:  65 y o  female with a left distal radius fracture treated closed  I discussed with the patient and her  that she had an x-ray and her cast changed 1 week ago  Those x-rays were stable  They are going to [facility] tomorrow for the next 6 weeks or so and she had been scheduled to follow-up with orthopedics down there    She will maintain the cast and follow-up with orthopedics in [facility] in 2-3 weeks       Scribe Attestation      I,:  Krystal Bass PA-C am acting as a scribe while in the presence of the attending physician :       I,:  Lillie Holbrook personally performed the services described in this documentation    as scribed in my presence :

## 2023-02-01 NOTE — TELEPHONE ENCOUNTER
Last office visit- 1/17/23    Patient will be going out of town tomorrow and is out of medication, please send asap  Patient also asking if we can try a 90 day supply  If unable to be sent or covered under insurance in time, may patient have samples until she is back?

## 2023-02-08 DIAGNOSIS — I10 ESSENTIAL HYPERTENSION: ICD-10-CM

## 2023-02-08 RX ORDER — DILTIAZEM HYDROCHLORIDE 180 MG/1
180 CAPSULE, COATED, EXTENDED RELEASE ORAL DAILY
Qty: 90 CAPSULE | Refills: 1 | Status: SHIPPED | OUTPATIENT
Start: 2023-02-08

## 2023-02-10 DIAGNOSIS — I10 ESSENTIAL HYPERTENSION: ICD-10-CM

## 2023-02-10 RX ORDER — DILTIAZEM HYDROCHLORIDE 60 MG/1
60 TABLET, FILM COATED ORAL DAILY
Qty: 90 TABLET | Refills: 1 | Status: SHIPPED | OUTPATIENT
Start: 2023-02-10

## 2023-02-13 ENCOUNTER — TELEPHONE (OUTPATIENT)
Age: 78
End: 2023-02-13

## 2023-02-13 NOTE — TELEPHONE ENCOUNTER
Pt called ofc w/ newly onset rectal bleeding w/ b/m for the past 3-4 days  Pt also notes some anal irritation due to repeated leakage of stool  Pt advised to keep an eye on her symptoms and introduce Metamucil to her diet  Pt knows to call ofc back is symptoms worsen  Pt has OV scheduled for 3/15/2023

## 2023-02-15 ENCOUNTER — HOSPITAL ENCOUNTER (OUTPATIENT)
Facility: HOSPITAL | Age: 78
Setting detail: OBSERVATION
Discharge: HOME | End: 2023-02-16
Attending: EMERGENCY MEDICINE | Admitting: INTERNAL MEDICINE

## 2023-02-15 DIAGNOSIS — I49.3 PVC'S (PREMATURE VENTRICULAR CONTRACTIONS): ICD-10-CM

## 2023-02-15 DIAGNOSIS — K51.80 OTHER ULCERATIVE COLITIS WITHOUT COMPLICATION (HCC): ICD-10-CM

## 2023-02-15 DIAGNOSIS — I44.0 FIRST DEGREE AV BLOCK: ICD-10-CM

## 2023-02-15 DIAGNOSIS — K92.2 ACUTE GI BLEEDING: Primary | ICD-10-CM

## 2023-02-15 DIAGNOSIS — D64.9 ANEMIA: ICD-10-CM

## 2023-02-15 DIAGNOSIS — E87.1 HYPONATREMIA: ICD-10-CM

## 2023-02-15 PROBLEM — R53.83 FATIGUE: Status: ACTIVE | Noted: 2023-02-15

## 2023-02-15 LAB
ABO GROUP BLD: NORMAL
ALBUMIN SERPL BCP-MCNC: 3.2 G/DL (ref 3.5–5)
ALP SERPL-CCNC: 65 U/L (ref 46–116)
ALT SERPL W P-5'-P-CCNC: 25 U/L (ref 12–78)
ANION GAP SERPL CALCULATED.3IONS-SCNC: 5 MMOL/L (ref 4–13)
AST SERPL W P-5'-P-CCNC: 19 U/L (ref 5–45)
BASOPHILS # BLD AUTO: 0.06 THOUSANDS/ÂΜL (ref 0–0.1)
BASOPHILS NFR BLD AUTO: 1 % (ref 0–1)
BILIRUB SERPL-MCNC: 0.29 MG/DL (ref 0.2–1)
BLD GP AB SCN SERPL QL: NEGATIVE
BUN SERPL-MCNC: 5 MG/DL (ref 5–25)
CALCIUM ALBUM COR SERPL-MCNC: 9.4 MG/DL (ref 8.3–10.1)
CALCIUM SERPL-MCNC: 8.8 MG/DL (ref 8.3–10.1)
CHLORIDE SERPL-SCNC: 98 MMOL/L (ref 96–108)
CO2 SERPL-SCNC: 28 MMOL/L (ref 21–32)
CREAT SERPL-MCNC: 0.7 MG/DL (ref 0.6–1.3)
EOSINOPHIL # BLD AUTO: 0.16 THOUSAND/ÂΜL (ref 0–0.61)
EOSINOPHIL NFR BLD AUTO: 2 % (ref 0–6)
ERYTHROCYTE [DISTWIDTH] IN BLOOD BY AUTOMATED COUNT: 15 % (ref 11.6–15.1)
GFR SERPL CREATININE-BSD FRML MDRD: 83 ML/MIN/1.73SQ M
GLUCOSE SERPL-MCNC: 95 MG/DL (ref 65–140)
HCT VFR BLD AUTO: 32.7 % (ref 34.8–46.1)
HCT VFR BLD AUTO: 33.5 % (ref 34.8–46.1)
HGB BLD-MCNC: 10.7 G/DL (ref 11.5–15.4)
HGB BLD-MCNC: 10.8 G/DL (ref 11.5–15.4)
IMM GRANULOCYTES # BLD AUTO: 0.08 THOUSAND/UL (ref 0–0.2)
IMM GRANULOCYTES NFR BLD AUTO: 1 % (ref 0–2)
INR PPP: 0.96 (ref 0.84–1.19)
LYMPHOCYTES # BLD AUTO: 2.35 THOUSANDS/ÂΜL (ref 0.6–4.47)
LYMPHOCYTES NFR BLD AUTO: 25 % (ref 14–44)
MCH RBC QN AUTO: 31.7 PG (ref 26.8–34.3)
MCHC RBC AUTO-ENTMCNC: 33 G/DL (ref 31.4–37.4)
MCV RBC AUTO: 96 FL (ref 82–98)
MONOCYTES # BLD AUTO: 1.24 THOUSAND/ÂΜL (ref 0.17–1.22)
MONOCYTES NFR BLD AUTO: 13 % (ref 4–12)
NEUTROPHILS # BLD AUTO: 5.4 THOUSANDS/ÂΜL (ref 1.85–7.62)
NEUTS SEG NFR BLD AUTO: 58 % (ref 43–75)
NRBC BLD AUTO-RTO: 0 /100 WBCS
PLATELET # BLD AUTO: 432 THOUSANDS/UL (ref 149–390)
PMV BLD AUTO: 8.9 FL (ref 8.9–12.7)
POTASSIUM SERPL-SCNC: 4.2 MMOL/L (ref 3.5–5.3)
PROT SERPL-MCNC: 6 G/DL (ref 6.4–8.4)
PROTHROMBIN TIME: 13 SECONDS (ref 11.6–14.5)
RBC # BLD AUTO: 3.41 MILLION/UL (ref 3.81–5.12)
RH BLD: POSITIVE
SODIUM SERPL-SCNC: 131 MMOL/L (ref 135–147)
SPECIMEN EXPIRATION DATE: NORMAL
WBC # BLD AUTO: 9.29 THOUSAND/UL (ref 4.31–10.16)

## 2023-02-15 RX ORDER — AMLODIPINE BESYLATE 2.5 MG/1
2.5 TABLET ORAL DAILY
Status: DISCONTINUED | OUTPATIENT
Start: 2023-02-16 | End: 2023-02-16 | Stop reason: HOSPADM

## 2023-02-15 RX ORDER — LEVOTHYROXINE SODIUM 0.05 MG/1
50 TABLET ORAL
Status: DISCONTINUED | OUTPATIENT
Start: 2023-02-16 | End: 2023-02-16 | Stop reason: HOSPADM

## 2023-02-15 RX ORDER — DILTIAZEM HYDROCHLORIDE 60 MG/1
60 TABLET, FILM COATED ORAL DAILY
Status: DISCONTINUED | OUTPATIENT
Start: 2023-02-16 | End: 2023-02-16 | Stop reason: HOSPADM

## 2023-02-15 RX ORDER — FLUTICASONE FUROATE AND VILANTEROL 200; 25 UG/1; UG/1
1 POWDER RESPIRATORY (INHALATION)
Status: DISCONTINUED | OUTPATIENT
Start: 2023-02-16 | End: 2023-02-16 | Stop reason: HOSPADM

## 2023-02-15 RX ORDER — SUCRALFATE 1 G/1
2 TABLET ORAL 4 TIMES DAILY
Qty: 240 TABLET | Refills: 0 | Status: SHIPPED | OUTPATIENT
Start: 2023-02-15 | End: 2023-02-15 | Stop reason: SDUPTHER

## 2023-02-15 RX ORDER — LISINOPRIL 20 MG/1
20 TABLET ORAL 2 TIMES DAILY
Status: DISCONTINUED | OUTPATIENT
Start: 2023-02-16 | End: 2023-02-16 | Stop reason: HOSPADM

## 2023-02-15 RX ORDER — DILTIAZEM HYDROCHLORIDE 180 MG/1
180 CAPSULE, COATED, EXTENDED RELEASE ORAL DAILY
Status: DISCONTINUED | OUTPATIENT
Start: 2023-02-15 | End: 2023-02-16 | Stop reason: HOSPADM

## 2023-02-15 RX ORDER — LORAZEPAM 1 MG/1
1 TABLET ORAL EVERY 8 HOURS PRN
Status: DISCONTINUED | OUTPATIENT
Start: 2023-02-15 | End: 2023-02-16 | Stop reason: HOSPADM

## 2023-02-15 RX ORDER — SODIUM CHLORIDE 1000 MG
1 TABLET, SOLUBLE MISCELLANEOUS 3 TIMES DAILY
Status: DISCONTINUED | OUTPATIENT
Start: 2023-02-15 | End: 2023-02-16 | Stop reason: HOSPADM

## 2023-02-15 RX ORDER — ACETAMINOPHEN 325 MG/1
650 TABLET ORAL EVERY 6 HOURS PRN
Status: DISCONTINUED | OUTPATIENT
Start: 2023-02-15 | End: 2023-02-16 | Stop reason: HOSPADM

## 2023-02-15 RX ORDER — ESCITALOPRAM OXALATE 20 MG/1
20 TABLET ORAL DAILY
Status: DISCONTINUED | OUTPATIENT
Start: 2023-02-16 | End: 2023-02-16 | Stop reason: HOSPADM

## 2023-02-15 RX ORDER — GABAPENTIN 400 MG/1
1200 CAPSULE ORAL
Status: DISCONTINUED | OUTPATIENT
Start: 2023-02-15 | End: 2023-02-16 | Stop reason: HOSPADM

## 2023-02-15 RX ORDER — PANTOPRAZOLE SODIUM 40 MG/1
40 TABLET, DELAYED RELEASE ORAL
Status: DISCONTINUED | OUTPATIENT
Start: 2023-02-16 | End: 2023-02-16 | Stop reason: HOSPADM

## 2023-02-15 RX ORDER — SUCRALFATE 1 G/1
2 TABLET ORAL
Status: DISCONTINUED | OUTPATIENT
Start: 2023-02-15 | End: 2023-02-16 | Stop reason: HOSPADM

## 2023-02-15 RX ORDER — PRAVASTATIN SODIUM 10 MG
10 TABLET ORAL
Status: DISCONTINUED | OUTPATIENT
Start: 2023-02-15 | End: 2023-02-16 | Stop reason: HOSPADM

## 2023-02-15 RX ORDER — ALBUTEROL SULFATE 90 UG/1
2 AEROSOL, METERED RESPIRATORY (INHALATION) EVERY 6 HOURS PRN
Status: DISCONTINUED | OUTPATIENT
Start: 2023-02-15 | End: 2023-02-16 | Stop reason: HOSPADM

## 2023-02-15 RX ORDER — SUCRALFATE 1 G/1
2 TABLET ORAL 4 TIMES DAILY
Qty: 240 TABLET | Refills: 0 | Status: SHIPPED | OUTPATIENT
Start: 2023-02-15 | End: 2023-03-17

## 2023-02-15 RX ORDER — PREDNISONE 10 MG/1
10 TABLET ORAL DAILY
Status: DISCONTINUED | OUTPATIENT
Start: 2023-02-16 | End: 2023-02-15

## 2023-02-15 RX ORDER — GABAPENTIN 300 MG/1
600 CAPSULE ORAL 2 TIMES DAILY
Status: DISCONTINUED | OUTPATIENT
Start: 2023-02-15 | End: 2023-02-16 | Stop reason: HOSPADM

## 2023-02-15 RX ORDER — PANTOPRAZOLE SODIUM 40 MG/10ML
40 INJECTION, POWDER, LYOPHILIZED, FOR SOLUTION INTRAVENOUS ONCE
Status: COMPLETED | OUTPATIENT
Start: 2023-02-15 | End: 2023-02-15

## 2023-02-15 RX ADMIN — SUCRALFATE 2 G: 1 TABLET ORAL at 21:06

## 2023-02-15 RX ADMIN — DILTIAZEM HYDROCHLORIDE 180 MG: 180 CAPSULE, COATED, EXTENDED RELEASE ORAL at 17:15

## 2023-02-15 RX ADMIN — ACETAMINOPHEN 650 MG: 325 TABLET ORAL at 14:07

## 2023-02-15 RX ADMIN — GABAPENTIN 1200 MG: 400 CAPSULE ORAL at 21:06

## 2023-02-15 RX ADMIN — SODIUM CHLORIDE 1 G: 1 TABLET ORAL at 20:06

## 2023-02-15 RX ADMIN — MESALAMINE 500 MG: 250 CAPSULE ORAL at 21:06

## 2023-02-15 RX ADMIN — LORAZEPAM 1 MG: 1 TABLET ORAL at 20:06

## 2023-02-15 RX ADMIN — PANTOPRAZOLE SODIUM 40 MG: 40 INJECTION, POWDER, FOR SOLUTION INTRAVENOUS at 09:21

## 2023-02-15 RX ADMIN — SUCRALFATE 2 G: 1 TABLET ORAL at 17:15

## 2023-02-15 RX ADMIN — PRAVASTATIN SODIUM 10 MG: 10 TABLET ORAL at 17:15

## 2023-02-15 RX ADMIN — SODIUM CHLORIDE 1 G: 1 TABLET ORAL at 17:15

## 2023-02-15 RX ADMIN — MESALAMINE 500 MG: 250 CAPSULE ORAL at 17:15

## 2023-02-15 NOTE — ED ATTENDING ATTESTATION
2/15/2023  I, Remonia Snellen, MD, saw and evaluated the patient  I have discussed the patient with the resident/non-physician practitioner and agree with the resident's/non-physician practitioner's findings, Plan of Care, and MDM as documented in the resident's/non-physician practitioner's note, except where noted  All available labs and Radiology studies were reviewed  I was present for key portions of any procedure(s) performed by the resident/non-physician practitioner and I was immediately available to provide assistance  At this point I agree with the current assessment done in the Emergency Department  I have conducted an independent evaluation of this patient a history and physical is as follows:    OA: 64 y/o f with h/o UC s/p colectomy as well as DVT/portal vein thrombosis on anticoagulation, SVT, HTN and SIADH who presents with concerns for dark, maroon stools since January that initially improved but has since returned over the past few days  Had previous sigmoidoscopy with ulcer at J-pouch and started on Carafate as well as Pentasa however sxms have returned and continued  Denies any current n/v  No abd pain  No change in urinary patterns  Can tolerate PO but slightly less  No cp/sob/lightheadedness/dizziness but does admit to feeling fatigued and "wiped out"  PE, unwell but nontoxic appearing, mildly pale, NC/AT, MMM, neck supple/FROM, RR/-murmurs, lungs CTAb, -w/r, abd soft, +Bs, -r/g, - edema, intact pulses, AAO  A/p rectal bleed on eliquis  Dr Kaia Berrios aware  eval with labs including type and screen and INR  Discuss with Dr Kaia Berrios  Will likely require admission given ongoing bleeding and history of surgery  ED Course     Pt called to Dr Laura Muller office due to return of sxms x 3-4 days  ECHO from 12/23/22 shows EF of 65%    Pt with elevated [place] score of 19       Colorectal advises pt can be discharged, given elevated [place] score, on eliquis and fatigue, will offer observational admission  Colorectal advises okay to dc with increasing carafate  Discussion with  and pt regarding risks and benefits given on eliquis at this time with known ulcer and return of bleeding  Pt and  to discuss if they would prefer observational admission v discharge at this time      Critical Care Time  Procedures

## 2023-02-15 NOTE — ASSESSMENT & PLAN NOTE
· In December for feeling weak patient had 7-day course of prednisone taper  · After that she was off prednisone for about 2 weeks, about 10 days ago she was restarted on prednisone 5 mg, increased to 10 mg a few days ago    Hold prednisone

## 2023-02-15 NOTE — DISCHARGE INSTRUCTIONS
You were evaluated in the emergency department for: rectal bleeding  You can access your current and pending results through [facility]  A radiologist will take a second look at your X-Rays, if you had any, and you will be contacted with any new findings  You should follow-up with your primary care provider, as soon as possible, for re-evaluation  If you do not have a primary care provider, I have referred you to [address] [place] [address]  You will be contacted about scheduling an appointment  Their phone number is also included on this paperwork  You should also follow up with your surgeon in one week  Your workup revealed no emergent features at this time; however, many disease processes are dynamic:    Please, return to the emergency department if you experience new or worsening symptoms, fever, chest pain, shortness of breath, difficulty breathing, dizziness, abdominal pain, persistent nausea/vomiting, syncope or passing out, blood in your urine or stool, coughing up blood, leg swelling/pain, urinary retention, bowel or bladder incontinence, numbness between your legs  Additionally, your blood pressure was measured to be high  This is something that you should discuss with your primary care provider and have re-checked within one week

## 2023-02-15 NOTE — ED PROVIDER NOTES
History  Chief Complaint   Patient presents with   • Rectal Bleeding     Pt states she has been having rectal bleeding since the beginning of January  Pt denies any abd pain, n/v, cp or sob  Pt states she currently takes eliquis  Patient is a 69-year-old female, with a history significant for ulcerative colitis s/p colectomy over 20 years ago as well as portal vein thrombosis anticoagulated on Eliquis, who presents to the ED today due to intermittent maroon-colored rectal bleeding that started in January  Patient states that her symptoms have occurred intermittently, days at a time, since their onset  Patient has been hospitalized x2 for this since the bleeding began and she underwent flexible sigmoidoscopy on 1/15 that revealed a ulcerated anastomosis  Patient's Eliquis was resumed and she has followed up with Dr Bisi Vila from colorectal surgery who advised her to present to B if her symptoms recurreed  She has attempted Carafate, Metamucil to remit her symptoms  No clear exacerbating factors  Patient also states that she had diarrhea 2 to 3 days ago, which resolved with Imodium, that is usual for her  Patient denies associated chest pain, dyspnea, lightheadedness, abdominal pain, other concerns at this time  Flex sig on 1/15 notable for: "Ulcerated anastomosis in the rectum; performed cold forceps biopsy Ileoanal anastomosis with J-pouch, ulceration within the J-pouch, appears to be an anastomotic ulcer Normal evaluation of the small bowel Multiple biopsies were taken from the ulceration"          Prior to Admission Medications   Prescriptions Last Dose Informant Patient Reported? Taking?    Calcium Carb-Cholecalciferol (CALCIUM 600-D PO)   Yes No   Sig: Take 1 tablet by mouth 2 (two) times a day   Coenzyme Q10 (CO Q-10 PO)   Yes No   Sig: Take 1 capsule by mouth daily   LORazepam (ATIVAN) 1 mg tablet   No No   Sig: Take 1 tablet (1 mg total) by mouth every 6 (six) hours as needed for anxiety MAGNESIUM PO   Yes No   Sig: Take 1 tablet by mouth daily   Multiple Vitamin (MULTIVITAMIN ADULT PO)   Yes No   Sig: Take 1 tablet by mouth daily   Omega-3 Fatty Acids (FISH OIL PO)   Yes No   Sig: Take 1 capsule by mouth daily   acetaminophen (TYLENOL) 325 mg tablet   No No   Sig: Take 2 tablets (650 mg total) by mouth every 6 (six) hours as needed for mild pain   albuterol (PROVENTIL HFA,VENTOLIN HFA) 90 mcg/act inhaler   No No   Sig: Inhale 2 puffs every 6 (six) hours as needed for wheezing   amLODIPine (NORVASC) 2 5 mg tablet   No No   Sig: TAKE ONE TABLET BY MOUTH EVERY DAY   apixaban (Eliquis) 5 mg   No No   Sig: Take 1 tablet (5 mg total) by mouth 2 (two) times a day   diltiazem (CARDIZEM CD) 180 mg 24 hr capsule   No No   Sig: Take 1 capsule (180 mg total) by mouth daily   diltiazem (CARDIZEM) 60 mg tablet   No No   Sig: Take 1 tablet (60 mg total) by mouth daily   escitalopram (LEXAPRO) 20 mg tablet   No No   Sig: TAKE ONE TABLET BY MOUTH EVERY DAY   fluticasone (FLONASE) 50 mcg/act nasal spray   No No   Sig: INSTILL ONE SPRAY INTO EACH NOSTRIL ONCE DAILY AS NEEDED FOR RHINITIS   fluticasone-salmeterol (Advair Diskus) 250-50 mcg/dose inhaler   No No   Sig: Inhale 1 puff 2 (two) times a day Rinse mouth after use   gabapentin (NEURONTIN) 600 MG tablet   No No   Sig: TAKE ONE TABLET BY MOUTH IN THE MORNING, ONE TABLET IN THE AFTERNNON, AND TWO TABLETS AT BEDTIME   levothyroxine 50 mcg tablet   No No   Sig: TAKE ONE TABLET BY MOUTH EVERY DAY   lisinopril (ZESTRIL) 20 mg tablet   No No   Sig: TAKE ONE TABLET BY MOUTH TWICE A DAY   loperamide (IMODIUM) 2 mg capsule   Yes No   Sig: Take 2 mg by mouth 4 (four) times a day as needed     meclizine (ANTIVERT) 25 mg tablet   No No   Sig: Take 1 tablet (25 mg total) by mouth every 6 (six) hours as needed for dizziness   mesalamine (PENTASA) 500 mg CR capsule   No No   Sig: TAKE 1 CAPSULE BY MOUTH FOUR TIMES A DAY   pantoprazole (PROTONIX) 40 mg tablet   No No   Sig: TAKE ONE TABLET BY MOUTH EVERY DAY   potassium chloride (MICRO-K) 10 MEQ CR capsule   No No   Sig: Take 2 capsules daily   predniSONE 5 mg tablet   Yes No   simvastatin (ZOCOR) 5 MG tablet   No No   Sig: TAKE ONE TABLET BY MOUTH EVERY DAY   sodium chloride 1 g tablet   No No   Sig: Take 1 tablet (1 g total) by mouth 3 (three) times a day   sucralfate (CARAFATE) 1 g tablet   No No   Sig: Take 1 tablet (1 g total) by mouth 4 (four) times a day   sucralfate (CARAFATE) 1 g tablet   No Yes   Sig: Take 2 tablets (2 g total) by mouth 4 (four) times a day   sucralfate (CARAFATE) 1 g tablet   No Yes   Sig: Take 2 tablets (2 g total) by mouth 4 (four) times a day   torsemide (DEMADEX) 10 mg tablet   No No   Sig: Take 1 tablet (10 mg total) by mouth daily as needed (edema)      Facility-Administered Medications: None       Past Medical History:   Diagnosis Date   • Anemia    • Anxiety    • Arrhythmia    • Arthritis    • Asthma    • Blood clot in vein     portal vein   • Breast cancer (HCC) 02/12/2021   • Breast lump     [id number] RESOLVED   • Cancer (HCC)    • Depression    • Disease of thyroid gland    • DVT, lower extremity (HCC)    • GERD (gastroesophageal reflux disease)    • Hyperlipidemia    • Hypertension    • Hypokalemia    • Hyponatremia    • Hypothyroidism    • Iron deficiency anemia    • Irregular heart beat    • Manic behavior (HCC)    • 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HCC)    • Osteoarthritis    • Palpitations     [id number]  RESOLVED   • Paroxysmal supraventricular tachycardia (HCC)    • PE (pulmonary thromboembolism) (HCC)    • Sjoegren syndrome    • Sleep apnea    • Sleep difficulties    • Spinal stenosis    • Thrombocytosis     [id number]  RESOLVED   • Tremors of nervous system     dbs implanted right and left chest   • Ulcerative colitis ([facility] [address] )    • Vertigo     [id number] RESOLVED       Past Surgical History:   Procedure Laterality Date   • ABDOMINAL SURGERY     • APPENDECTOMY     • BREAST BIOPSY Left 11/07/2019 Stereo   • BREAST EXCISIONAL BIOPSY Left     x many years   • BREAST SURGERY      lumpectomy & biopsy   • CARMELLA HOLE W/ STEREOTACTIC INSERTION OF DBS LEADS / INTRAOP MICROELECTRODE RECORDING     • COLON SURGERY     • COLONOSCOPY N/A 08/30/2017    Procedure: Nesha Lin;  Surgeon: Rashmi Reaves MD;  Location: [facility]; Service: Colorectal   • COLOPROCTECTOMY W/ ILEO J POUCH     • ESOPHAGOGASTRODUODENOSCOPY      ONSET 10/17/11   • FISTULA REPAIR      LLEOANAL FISTULA REPAIR TRANSPERIN TRANSABD APPROACH   • HYSTERECTOMY      age 43   • ILEOSTOMY CLOSURE     • KNEE ARTHROSCOPY      Right   • MAMMO STEREOTACTIC BREAST BIOPSY LEFT (ALL INC) Left 11/07/2019   • MAMMO STEREOTACTIC BREAST BIOPSY LEFT (ALL INC) Left 12/17/2020   • MAMMO STEREOTACTIC BREAST BIOPSY LEFT (ALL INC) EACH ADD Left 12/17/2020   • MASTECTOMY Left 02/12/2021    left mastectomy- Dr Nemo Braga   • MASTECTOMY W/ SENTINEL NODE BIOPSY Left 02/12/2021    Procedure: BREAST MASTECTOMY WITH SENTINEL LYMPH NODE BIOPSY, LYMPHATIC MAPPING WITH BLUE DYE AND RADIAOCTIVE DYE (INJECT AT 1100 BY DR GILLESPIE IN THE OR);   Surgeon: Shamar Gomez MD;  Location[facility];  Service: Surgical Oncology   • MS INSJ/RPLCMT CRANIAL NEUROSTIM PULSE GENERATOR Right 06/20/2017    Procedure: DBS GENERATOR REPLACEMENT;  Surgeon: Mary Flores MD;  Location[facility];  Service: Neurosurgery   • MS INSJ/RPLCMT CRANIAL NEUROSTIM PULSE GENERATOR N/A 12/04/2019    Procedure: REPLACEMENT IMPLANTABLE PULSE GENERATOR FOR DEEP BRAIN STIMULATOR LEFT CHEST;  Surgeon: Lauren Corrales MD;  Location[facility];  Service: Neurosurgery   • SPLENECTOMY     • TONSILLECTOMY         Family History   Problem Relation Age of Onset   • Venous thrombosis Mother         ACUTE VENOUS THROMBOSIS OF DEEP VESSELS OF THE DISTAL LOWER EXTREMITY   • Other Mother         PHLEBITIS   • Hypertension Mother    • Peripheral vascular disease Mother    • COPD Father    • Diabetes Father         MELLITUS   • Stroke Father • Diabetes Sister         MELLITUS   • Sjogren's syndrome Sister    • No Known Problems Daughter    • No Known Problems Maternal Grandmother    • No Known Problems Maternal Grandfather    • No Known Problems Paternal Grandmother    • No Known Problems Paternal Grandfather    • No Known Problems Sister    • No Known Problems Maternal Aunt    • No Known Problems Paternal Aunt    • No Known Problems Son      I have reviewed and agree with the history as documented  E-Cigarette/Vaping   • E-Cigarette Use Never User      E-Cigarette/Vaping Substances   • Nicotine No    • THC No    • CBD No    • Flavoring No    • Other No    • Unknown No      Social History     Tobacco Use   • Smoking status: Former     Packs/day: 1 50     Years: 5 00     Pack years: 7 50     Types: Cigarettes     Quit date: 1965     Years since quittin 1   • Smokeless tobacco: Never   • Tobacco comments:     Quit   Vaping Use   • Vaping Use: Never used   Substance Use Topics   • Alcohol use: Yes     Alcohol/week: 2 0 standard drinks     Types: 2 Cans of beer per week   • Drug use: No        Review of Systems   Constitutional: Positive for fatigue  Negative for fever  Respiratory: Negative for shortness of breath  Cardiovascular: Negative for chest pain  Gastrointestinal: Positive for blood in stool  Negative for abdominal pain  Musculoskeletal: Negative for gait problem  Skin: Negative for rash  Allergic/Immunologic: Positive for environmental allergies  Neurological: Negative for weakness and numbness  Psychiatric/Behavioral: Negative for confusion  All other systems reviewed and are negative        Physical Exam  ED Triage Vitals   Temperature Pulse Respirations Blood Pressure SpO2   02/15/23 0832 02/15/23 0828 02/15/23 0828 02/15/23 0828 02/15/23 0828   99 1 °F (37 3 °C) 76 20 141/78 97 %      Temp Source Heart Rate Source Patient Position - Orthostatic VS BP Location FiO2 (%)   02/15/23 0609 02/15/23 8496 02/15/23 2564 02/15/23 0828 --   Oral Monitor Sitting Right arm       Pain Score       02/15/23 0828       No Pain             Orthostatic Vital Signs  Vitals:    02/15/23 0828 02/15/23 1100   BP: 141/78 157/71   Pulse: 76 64   Patient Position - Orthostatic VS: Sitting Lying       Physical Exam  Vitals and nursing note reviewed  Constitutional:       General: She is not in acute distress  Appearance: She is not toxic-appearing or diaphoretic  Comments: Patient appears comfortable during my evaluation    HENT:      Head: Normocephalic and atraumatic  Right Ear: External ear normal       Left Ear: External ear normal       Nose: Nose normal  No rhinorrhea  Mouth/Throat:      Mouth: Mucous membranes are moist       Pharynx: Oropharynx is clear  No oropharyngeal exudate or posterior oropharyngeal erythema  Comments: Uvula midline  No oropharyngeal or submandibular mass/swelling  Eyes:      General: No scleral icterus  Right eye: No discharge  Left eye: No discharge  Conjunctiva/sclera: Conjunctivae normal       Pupils: Pupils are equal, round, and reactive to light  Neck:      Comments: Patient is spontaneously rotating their neck to the left and right during the history and physical exam interaction without difficulty or apparent discomfort    Cardiovascular:      Rate and Rhythm: Normal rate and regular rhythm  Pulses: Normal pulses  Heart sounds: Normal heart sounds  No murmur heard  No friction rub  No gallop  Comments: 2+ Radial  Pulmonary:      Effort: Pulmonary effort is normal  No respiratory distress  Breath sounds: Normal breath sounds  No stridor  No wheezing, rhonchi or rales  Abdominal:      General: Abdomen is flat  There is no distension  Palpations: Abdomen is soft  Tenderness: There is no abdominal tenderness  There is no right CVA tenderness, left CVA tenderness, guarding or rebound  Musculoskeletal:         General: No deformity  Cervical back: Neck supple  No tenderness  No muscular tenderness  Lymphadenopathy:      Cervical: No cervical adenopathy  Skin:     General: Skin is warm and dry  Capillary Refill: Capillary refill takes less than 2 seconds  Coloration: Skin is pale  Neurological:      Mental Status: She is alert  Comments: Patient is speaking clearly in complete sentences  Patient is answering appropriately and able follow commands  Patient is moving all four extremities spontaneously  No facial droop  Tongue midline  Patient able to ambulate without difficulty        Psychiatric:         Mood and Affect: Mood normal          Behavior: Behavior normal          ED Medications  Medications   pantoprazole (PROTONIX) injection 40 mg (40 mg Intravenous Given 2/15/23 0921)       Diagnostic Studies  Results Reviewed     Procedure Component Value Units Date/Time    Comprehensive metabolic panel [[id number]]  (Abnormal) Collected: 02/15/23 0919    Lab Status: Final result Specimen: Blood from Arm, Left Updated: 02/15/23 0949     Sodium 131 mmol/L      Potassium 4 2 mmol/L      Chloride 98 mmol/L      CO2 28 mmol/L      ANION GAP 5 mmol/L      BUN 5 mg/dL      Creatinine 0 70 mg/dL      Glucose 95 mg/dL      Calcium 8 8 mg/dL      Corrected Calcium 9 4 mg/dL      AST 19 U/L      ALT 25 U/L      Alkaline Phosphatase 65 U/L      Total Protein 6 0 g/dL      Albumin 3 2 g/dL      Total Bilirubin 0 29 mg/dL      eGFR 83 ml/min/1 73sq m     Narrative:      [place] guidelines for Chronic Kidney Disease (CKD):   •  Stage 1 with normal or high GFR (GFR > 90 mL/min/1 73 square meters)  •  Stage 2 Mild CKD (GFR = 60-89 mL/min/1 73 square meters)  •  Stage 3A Moderate CKD (GFR = 45-59 mL/min/1 73 square meters)  •  Stage 3B Moderate CKD (GFR = 30-44 mL/min/1 73 square meters)  •  Stage 4 Severe CKD (GFR = 15-29 mL/min/1 73 square meters)  •  Stage 5 End Stage CKD (GFR <15 mL/min/1 73 square meters)  Note: GFR calculation is accurate only with a steady state creatinine    Protime-INR [[id number]]  (Normal) Collected: 02/15/23 0919    Lab Status: Final result Specimen: Blood from Arm, Left Updated: 02/15/23 0946     Protime 13 0 seconds      INR 0 96    CBC and differential [[id number]]  (Abnormal) Collected: 02/15/23 0919    Lab Status: Final result Specimen: Blood from Arm, Left Updated: 02/15/23 0930     WBC 9 29 Thousand/uL      RBC 3 41 Million/uL      Hemoglobin 10 8 g/dL      Hematocrit 32 7 %      MCV 96 fL      MCH 31 7 pg      MCHC 33 0 g/dL      RDW 15 0 %      MPV 8 9 fL      Platelets 503 Thousands/uL      nRBC 0 /100 WBCs      Neutrophils Relative 58 %      Immat GRANS % 1 %      Lymphocytes Relative 25 %      Monocytes Relative 13 %      Eosinophils Relative 2 %      Basophils Relative 1 %      Neutrophils Absolute 5 40 Thousands/µL      Immature Grans Absolute 0 08 Thousand/uL      Lymphocytes Absolute 2 35 Thousands/µL      Monocytes Absolute 1 24 Thousand/µL      Eosinophils Absolute 0 16 Thousand/µL      Basophils Absolute 0 06 Thousands/µL                  No orders to display         Procedures  Procedures      ED Course  ED Course as of 02/15/23 1305   Wed Feb 15, 2023   0930 Hemoglobin(!): 10 8  Baseline   1150 An extensive conversation was had with the patient regarding her work-up and risk for worsening of bleeding while anticoagulated as well as potential risk of apparent disability or death  Risks and benefits of both admission and discharge home were discussed and patient feels comfortable going home at this time  Patient states that she will return if she has recurrence of bleeding or develops symptoms of anemia  Patient appears to have capacity based upon our discussion and ability to manipulate information    Furthermore, patient was able to ambulate in the room and felt okay doing so and felt that she can perform all her activities at home that she needs to   01 41 28 69 59 Discussed case with Purnima from colorectal surgery: patient can be Dcd from their standpoint and they discussed follow up in one week with patient  1210 Patient now requesting to be admitted  [place] score recommends against discharge   1302 ECG per my independent interpretation: Normal rate, irregular rhythm, no ectopy, normal axis, no ST elevations or depressions, PVCs present, 1st degree AV block                                       Medical Decision Making  Patient is a 78-year-old female, with a history significant for ulcerative colitis s/p colectomy over 20 years ago as well as portal vein thrombosis anticoagulated on Eliquis, who presents to the ED today due to intermittent maroon-colored rectal bleeding that started in January  Patient states that her symptoms have occurred intermittently, days at a time, since their onset  Patient has been hospitalized x2 for this since the bleeding began and she underwent flexible sigmoidoscopy on 1/15 that revealed a ulcerated anastomosis  Patient's Eliquis was resumed and she has followed up with Dr Kim Gage from colorectal surgery who advised her to present to B if her symptoms recurreed  Complains of fatigue at this time  Patient is currently afebrile and hemodynamically stable  Her physical exam is notable for pallor but is otherwise unremarkable  This presentation is concerning for: Bleeding ulcer, GI bleed, anemia  Reason to suspect mesenteric ischemia at this time based upon history and physical exam   Will investigate with CBC, CMP, type and screen, INR, CT, colorectal patient  Will manage work-up/recommendations     - No language barrier    - History obtained from patient and her     - There are no limitations to the history obtained  - External record review performed of previous charting was performed by me     - I independently reviewed and interpreted ordered labs, ECGs, from this encounter   - Patient's medication regimen reviewed - eliquis relevant to GI bleed   - Patient's comorbidities factored into diagnosis considerations and disposition planning    - I discussed the patient's need for admission with Dr Abe Bassett  Acute GI bleeding: acute illness or injury  Anemia: acute illness or injury  Hyponatremia: self-limited or minor problem  Other ulcerative colitis without complication ([place] [address] ): self-limited or minor problem  Amount and/or Complexity of Data Reviewed  Labs: ordered  Decision-making details documented in ED Course  Risk  Prescription drug management  Decision regarding hospitalization  Disposition  Final diagnoses:   Acute GI bleeding   Anemia   Other ulcerative colitis without complication ([place] [address] )   Hyponatremia   First degree AV block   PVC's (premature ventricular contractions)     Time reflects when diagnosis was documented in both MDM as applicable and the Disposition within this note     Time User Action Codes Description Comment    2/15/2023  9:09 AM Shirley Roes A Add [K92 2] Acute GI bleeding     2/15/2023  9:30 AM Shirley Roes Add [D64 9] Anemia     2/15/2023 11:44 AM Fabienne Felton Add [K51 80] Other ulcerative colitis without complication ([place] [address] )     5/91/5555 11:47 AM Shirley Roes A Modify [K51 80] Other ulcerative colitis without complication ([place] [address] )     3/19/5034 11:51 AM Legare, Rhae Kingston A Add [E87 1] Hyponatremia     2/15/2023  1:03 PM Legare, Rhae Kingston A Add [I44 0] First degree AV block     2/15/2023  1:03 PM Legare, Rhae Kingston A Add [I49 3] PVC's (premature ventricular contractions)       ED Disposition     ED Disposition   Admit    Condition   Stable    Date/Time   Wed Feb 15, 2023 12:55 PM    Comment   Case was discussed with Jay Jay and the patient's admission status was agreed to be Admission Status: observation status to the service of Dr Abe Bassett              Follow-up Information     Follow up With Specialties Details Why Contact Info Juliana Arora MD Internal Medicine Schedule an appointment as soon as possible for a visit   [address] [place] [address],[address]  [address] [place] [address]  [phone]            Current Discharge Medication List      CONTINUE these medications which have CHANGED    Details   sucralfate (CARAFATE) 1 g tablet Take 2 tablets (2 g total) by mouth 4 (four) times a day  Qty: 240 tablet, Refills: 0    Associated Diagnoses: Other ulcerative colitis without complication ([address] )         CONTINUE these medications which have NOT CHANGED    Details   acetaminophen (TYLENOL) 325 mg tablet Take 2 tablets (650 mg total) by mouth every 6 (six) hours as needed for mild pain  Refills: 0    Comments: Do not take with Vicodin  Do not exceed 3 g of Tylenol daily  Associated Diagnoses: Fall, initial encounter      albuterol (PROVENTIL HFA,VENTOLIN HFA) 90 mcg/act inhaler Inhale 2 puffs every 6 (six) hours as needed for wheezing  Qty: 8 g, Refills: 1    Comments: Substitution to a formulary equivalent within the same pharmaceutical class is authorized    Associated Diagnoses: Mild intermittent asthma with acute exacerbation      amLODIPine (NORVASC) 2 5 mg tablet TAKE ONE TABLET BY MOUTH EVERY DAY  Qty: 90 tablet, Refills: 3    Associated Diagnoses: Essential hypertension      apixaban (Eliquis) 5 mg Take 1 tablet (5 mg total) by mouth 2 (two) times a day  Qty: 180 tablet, Refills: 0    Associated Diagnoses: Portal vein thrombosis      Calcium Carb-Cholecalciferol (CALCIUM 600-D PO) Take 1 tablet by mouth 2 (two) times a day      Coenzyme Q10 (CO Q-10 PO) Take 1 capsule by mouth daily      diltiazem (CARDIZEM CD) 180 mg 24 hr capsule Take 1 capsule (180 mg total) by mouth daily  Qty: 90 capsule, Refills: 1    Associated Diagnoses: Essential hypertension      diltiazem (CARDIZEM) 60 mg tablet Take 1 tablet (60 mg total) by mouth daily  Qty: 90 tablet, Refills: 1    Associated Diagnoses: Essential hypertension      escitalopram (LEXAPRO) 20 mg tablet TAKE ONE TABLET BY MOUTH EVERY DAY  Qty: 90 tablet, Refills: 1    Associated Diagnoses: Anxiety      fluticasone (FLONASE) 50 mcg/act nasal spray INSTILL ONE SPRAY INTO EACH NOSTRIL ONCE DAILY AS NEEDED FOR RHINITIS  Qty: 48 g, Refills: 1    Associated Diagnoses: Non-seasonal allergic rhinitis due to other allergic trigger      fluticasone-salmeterol (Advair Diskus) 250-50 mcg/dose inhaler Inhale 1 puff 2 (two) times a day Rinse mouth after use  Qty: 180 blister, Refills: 3    Comments: Substitution to a formulary equivalent within the same pharmaceutical class is authorized  Associated Diagnoses:  Moderate persistent asthma without complication      gabapentin (NEURONTIN) 600 MG tablet TAKE ONE TABLET BY MOUTH IN THE MORNING, ONE TABLET IN THE AFTERNNON, AND TWO TABLETS AT BEDTIME  Qty: 360 tablet, Refills: 2    Associated Diagnoses: Lumbar degenerative disc disease      levothyroxine 50 mcg tablet TAKE ONE TABLET BY MOUTH EVERY DAY  Qty: 90 tablet, Refills: 1    Associated Diagnoses: Acquired hypothyroidism      lisinopril (ZESTRIL) 20 mg tablet TAKE ONE TABLET BY MOUTH TWICE A DAY  Qty: 180 tablet, Refills: 1    Associated Diagnoses: Essential hypertension      loperamide (IMODIUM) 2 mg capsule Take 2 mg by mouth 4 (four) times a day as needed        LORazepam (ATIVAN) 1 mg tablet Take 1 tablet (1 mg total) by mouth every 6 (six) hours as needed for anxiety  Qty: 120 tablet, Refills: 0    Associated Diagnoses: Anxiety      MAGNESIUM PO Take 1 tablet by mouth daily      meclizine (ANTIVERT) 25 mg tablet Take 1 tablet (25 mg total) by mouth every 6 (six) hours as needed for dizziness  Qty: 90 tablet, Refills: 1    Associated Diagnoses: Dizziness      mesalamine (PENTASA) 500 mg CR capsule TAKE 1 CAPSULE BY MOUTH FOUR TIMES A DAY  Qty: 120 capsule, Refills: 3    Associated Diagnoses: Other ulcerative colitis without complication (HCC)      Multiple Vitamin (MULTIVITAMIN ADULT PO) Take 1 tablet by mouth daily Omega-3 Fatty Acids (FISH OIL PO) Take 1 capsule by mouth daily      pantoprazole (PROTONIX) 40 mg tablet TAKE ONE TABLET BY MOUTH EVERY DAY  Qty: 90 tablet, Refills: 1    Associated Diagnoses: Gastroesophageal reflux disease, unspecified whether esophagitis present      potassium chloride (MICRO-K) 10 MEQ CR capsule Take 2 capsules daily  Qty: 180 capsule, Refills: 3    Associated Diagnoses: Hypokalemia      predniSONE 5 mg tablet       simvastatin (ZOCOR) 5 MG tablet TAKE ONE TABLET BY MOUTH EVERY DAY  Qty: 90 tablet, Refills: 1    Associated Diagnoses: Other hyperlipidemia      sodium chloride 1 g tablet Take 1 tablet (1 g total) by mouth 3 (three) times a day  Qty: 270 tablet, Refills: 3    Associated Diagnoses: Hyponatremia      torsemide (DEMADEX) 10 mg tablet Take 1 tablet (10 mg total) by mouth daily as needed (edema)  Qty: 90 tablet, Refills: 0    Associated Diagnoses: Essential hypertension; Hyponatremia           No discharge procedures on file  PDMP Review       Value Time User    PDMP Reviewed  Yes 12/7/2022  2:57 PM Opal Buckley MD           ED Provider  Attending physically available and evaluated James Rosenda  NATE managed the patient along with the ED Attending      Electronically Signed by         Manda Faust MD  02/15/23 9724

## 2023-02-15 NOTE — ASSESSMENT & PLAN NOTE
· Restart amlodipine 2 5 mg daily tomorrow  · Continue Cardizem 180 mg daily at bedtime  · Restart diltiazem 60 mg AM tomorrow  · Restart lisinopril 20 mg 2 times daily tomorrow  · Blood pressure stable, hemodynamically stable, no active bleeding

## 2023-02-15 NOTE — CONSULTS
Consultation - Colorectal  Francois Sifuentes 65 y o  female MRN: [id number]  Unit/Bed#: ED 24 Encounter: [id number]        HPI:  Francois Sifuentes is a 65 y o  female who presents with rectal bleeding  Patient states she had some rectal bleeding yesterday  Patient states that the blood was mixed within her bowel movement  Patient states she has not had any rectal bleeding this morning or today as of yet  Patient has a history of ulcerative colitis prior subtotal colectomy with an ileal pouch  Patient has had multiple episodes of on and off rectal bleeding her last episode was January 15 where she was hospitalized at another campus from 1/15/2023 to 1/17/2023  She had a CTA which revealed no active bleed  She was scoped by Dr Malachi Esquivel on 1/16/2023 which showed an anastomotic ulcer  The biopsy of that showed active inflammation no dysplasia  After discharge patient was followed in the office with Dr Diego Marx and was placed on Carafate 1 g 4 times daily as well as Pentasa 500 mg 4 times daily  Patient has been on Eliquis she takes 5 mg twice daily for a prior history of portal vein thrombosis approximately 20 years ago  She initially was on Coumadin but had an INR of over 7 0 and therefore was switched to Eliquis  Patient states her bottom is raw at times from the leakage she has from the pouch  Patient states she has felt lethargic for the past 2 months  She did start taking prednisone 10 mg daily for the last week since she said prednisone usually causes her to feel much better and more active  She states 15 mg of prednisone is what makes her feel the best   Patient fell and has a recent fracture of her left wrist       Review of Systems   Constitutional: Positive for activity change and fatigue  Negative for appetite change, chills, diaphoresis, fever and unexpected weight change          Patient states that fatigue has started the last 2 months   HENT: Negative for congestion, dental problem, drooling, ear pain, mouth sores, nosebleeds, sore throat, trouble swallowing and voice change  Eyes: Negative for pain, discharge and visual disturbance  Respiratory: Negative for cough, chest tightness and stridor  Has a history of COPD, is on inhalers   Cardiovascular: Negative for chest pain, palpitations and leg swelling  Gastrointestinal: Positive for anal bleeding, diarrhea and rectal pain  Negative for abdominal distention, abdominal pain, constipation, nausea and vomiting  History of ulcerative colitis, prior ileoanal pouch, history of rectal leakage, bowel movements are always loose, history of soreness around rectal area  Prior history of rectal bleeding   Endocrine: Negative for cold intolerance, heat intolerance, polydipsia and polyphagia  Genitourinary: Positive for frequency  Negative for difficulty urinating, dysuria, hematuria, pelvic pain and vaginal bleeding  Musculoskeletal: Positive for arthralgias and myalgias  Negative for joint swelling, neck pain and neck stiffness  Recent fracture of left wrist   Skin: Negative for pallor, rash and wound  Allergic/Immunologic: Negative for environmental allergies and immunocompromised state  Neurological: Positive for weakness  Negative for dizziness, tremors, seizures, speech difficulty, light-headedness, numbness and headaches  Feels fatigued   Hematological: Negative for adenopathy  Bruises/bleeds easily  On Eliquis and does bruise easily   Psychiatric/Behavioral: Negative for agitation, confusion, hallucinations, self-injury, sleep disturbance and suicidal ideas         Historical Information   Past Medical History:   Diagnosis Date   • Anemia    • Anxiety    • Arrhythmia    • Arthritis    • Asthma    • Blood clot in vein     portal vein   • Breast cancer ([facility] ) 02/12/2021   • Breast lump     [id number] RESOLVED   • Cancer (HCC)    • Depression    • Disease of thyroid gland    • DVT, lower extremity (HCC)    • GERD (gastroesophageal reflux disease)    • Hyperlipidemia    • Hypertension    • Hypokalemia    • Hyponatremia    • Hypothyroidism    • Iron deficiency anemia    • Irregular heart beat    • Manic behavior (HCC)    • 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HCC)    • Osteoarthritis    • Palpitations     [id number]  RESOLVED   • Paroxysmal supraventricular tachycardia (HCC)    • PE (pulmonary thromboembolism) (HCC)    • Sjoegren syndrome    • Sleep apnea    • Sleep difficulties    • Spinal stenosis    • Thrombocytosis     [id number]  RESOLVED   • Tremors of nervous system     dbs implanted right and left chest   • Ulcerative colitis ([address] )    • Vertigo     [id number] RESOLVED     Past Surgical History:   Procedure Laterality Date   • ABDOMINAL SURGERY     • APPENDECTOMY     • BREAST BIOPSY Left 11/07/2019    Stereo   • BREAST EXCISIONAL BIOPSY Left     x many years   • BREAST SURGERY      lumpectomy & biopsy   • CARMELLA HOLE W/ STEREOTACTIC INSERTION OF DBS LEADS / INTRAOP MICROELECTRODE RECORDING     • COLON SURGERY     • COLONOSCOPY N/A 08/30/2017    Procedure: Nesha Lin;  Surgeon: Rashmi Reaves MD;  Location: [facility]; Service: Colorectal   • COLOPROCTECTOMY W/ ILEO J POUCH     • ESOPHAGOGASTRODUODENOSCOPY      ONSET 10/17/11   • FISTULA REPAIR      LLEOANAL FISTULA REPAIR TRANSPERIN TRANSABD APPROACH   • HYSTERECTOMY      age 43   • ILEOSTOMY CLOSURE     • KNEE ARTHROSCOPY      Right   • MAMMO STEREOTACTIC BREAST BIOPSY LEFT (ALL INC) Left 11/07/2019   • MAMMO STEREOTACTIC BREAST BIOPSY LEFT (ALL INC) Left 12/17/2020   • MAMMO STEREOTACTIC BREAST BIOPSY LEFT (ALL INC) EACH ADD Left 12/17/2020   • MASTECTOMY Left 02/12/2021    left mastectomy- Dr Nemo Braga   • MASTECTOMY W/ SENTINEL NODE BIOPSY Left 02/12/2021    Procedure: BREAST MASTECTOMY WITH SENTINEL LYMPH NODE BIOPSY, LYMPHATIC MAPPING WITH BLUE DYE AND RADIAOCTIVE DYE (INJECT AT 1100 BY DR GILLESPIE IN THE OR);   Surgeon: Shamar Gomez MD;  Location[facility];  Service: Surgical Oncology   • MA INSJ/RPLCMT CRANIAL NEUROSTIM PULSE GENERATOR Right 2017    Procedure: DBS GENERATOR REPLACEMENT;  Surgeon: Louisa Smith MD;  Location: [facility];  Service: Neurosurgery   • MA INSJ/RPLCMT CRANIAL NEUROSTIM PULSE GENERATOR N/A 2019    Procedure: REPLACEMENT IMPLANTABLE PULSE GENERATOR FOR DEEP BRAIN STIMULATOR LEFT CHEST;  Surgeon: Megha Miarnda MD;  Location: [facility];  Service: Neurosurgery   • SPLENECTOMY     • TONSILLECTOMY       Social History   Social History     Substance and Sexual Activity   Alcohol Use Yes   • Alcohol/week: 2 0 standard drinks   • Types: 2 Cans of beer per week     Social History     Substance and Sexual Activity   Drug Use No     Social History     Tobacco Use   Smoking Status Former   • Packs/day: 1 50   • Years: 5 00   • Pack years: 7 50   • Types: Cigarettes   • Quit date: 1965   • Years since quittin 1   Smokeless Tobacco Never   Tobacco Comments    Quit     Family History: non-contributory    Meds/Allergies   all current active meds have been reviewed  Allergies   Allergen Reactions   • Indomethacin GI Intolerance and Dizziness   • Macrobid [Nitrofurantoin Monohyd Macro] Rash   • Nitrofurantoin Other (See Comments)   • Penicillins Rash     Annotation - [id number]: can take cephalosporins   • Sulfa Antibiotics Rash       Objective   First Vitals:   Blood Pressure: 141/78 (02/15/23 08)  Pulse: 76 (02/15/23 08)  Temperature: 99 1 °F (37 3 °C) (02/15/23 0832)  Temp Source: Oral (02/15/23 0828)  Respirations: 20 (02/15/23 0828)  Weight - Scale: 69 4 kg (153 lb) (02/15/23 08)  SpO2: 97 % (02/15/23 0828)    Current Vitals:   Blood Pressure: 141/78 (02/15/23 08)  Pulse: 76 (02/15/23 0828)  Temperature: 99 1 °F (37 3 °C) (02/15/23 0832)  Temp Source: Oral (02/15/23 2364)  Respirations: 20 (02/15/23 08)  Weight - Scale: 69 4 kg (153 lb) (02/15/23 0828)  SpO2: 97 % (02/15/23 0828)    No intake or output data in the 24 hours ending 02/15/23 1003    Invasive Devices     Peripheral Intravenous Line  Duration           Peripheral IV 02/15/23 Left Antecubital <1 day                Physical Exam  Vitals reviewed  Constitutional:       General: She is not in acute distress  Appearance: Normal appearance  She is normal weight  She is not ill-appearing, toxic-appearing or diaphoretic  HENT:      Head: Normocephalic and atraumatic  Right Ear: External ear normal       Left Ear: External ear normal       Nose: No congestion or rhinorrhea  Mouth/Throat:      Mouth: Mucous membranes are moist       Pharynx: Oropharynx is clear  No oropharyngeal exudate or posterior oropharyngeal erythema  Eyes:      General: No scleral icterus  Left eye: No discharge  Conjunctiva/sclera: Conjunctivae normal    Neck:      Vascular: No carotid bruit  Cardiovascular:      Rate and Rhythm: Normal rate and regular rhythm  Heart sounds: No murmur heard  No friction rub  No gallop  Pulmonary:      Effort: No respiratory distress  Breath sounds: No wheezing, rhonchi or rales  Chest:      Chest wall: No tenderness  Abdominal:      General: Abdomen is flat  Bowel sounds are normal  There is no distension  Palpations: Abdomen is soft  There is no mass  Tenderness: There is no abdominal tenderness  There is no right CVA tenderness, left CVA tenderness, guarding or rebound  Hernia: No hernia is present  Comments: Rectal exam: Excoriations around the anal opening, small pad removed no blood on the pad, no blood seen in the anal opening  Abdomen is soft and nontender with no guarding no rigidity   Musculoskeletal:         General: No swelling or tenderness  Cervical back: No rigidity or tenderness  Right lower leg: No edema  Left lower leg: No edema        Comments: Left forearm is in a cast from her fractured wrist   Good sensation to her fingertips, no edema   Lymphadenopathy:      Cervical: No cervical adenopathy  Skin:     General: Skin is warm and dry  Coloration: Skin is not jaundiced or pale  Findings: No bruising, erythema, lesion or rash  Neurological:      General: No focal deficit present  Mental Status: She is alert and oriented to person, place, and time  Cranial Nerves: No cranial nerve deficit  Sensory: No sensory deficit  Motor: No weakness  Psychiatric:         Mood and Affect: Mood normal          Behavior: Behavior normal          Lab Results: Hemoglobin stable at 10 8 was 10 71-month ago  Imaging: No imaging done  EKG, Pathology, and Other Studies:     Assessment:  History of ulcerative colitis  On and off rectal bleeding  Anal excoriation    Plan:  1  Calmoseptine cream to the anal skin areas  2  Increase  Carafate to 2 g 4 times daily  Contnnue the Pentasa 500 mg 4 times daily  3  Would not scope at the present time since no active bleed and no bleeding seen on rectal exam and hemoglobin is stable  All vital signs stable  4  To discuss case with Dr Jose C Jose / Coordination of Care  Total floor / unit time spent today 60 minutes  Greater than 50% of total time was spent with the patient and / or family counseling and / or coordination of care  A description of the counseling / coordination of care: Yamel Corrigan

## 2023-02-16 ENCOUNTER — TELEPHONE (OUTPATIENT)
Dept: INTERNAL MEDICINE CLINIC | Facility: CLINIC | Age: 78
End: 2023-02-16

## 2023-02-16 ENCOUNTER — TRANSITIONAL CARE MANAGEMENT (OUTPATIENT)
Dept: INTERNAL MEDICINE CLINIC | Facility: CLINIC | Age: 78
End: 2023-02-16

## 2023-02-16 VITALS
WEIGHT: 153 LBS | HEART RATE: 74 BPM | DIASTOLIC BLOOD PRESSURE: 80 MMHG | OXYGEN SATURATION: 95 % | RESPIRATION RATE: 18 BRPM | TEMPERATURE: 97.7 F | BODY MASS INDEX: 27.1 KG/M2 | SYSTOLIC BLOOD PRESSURE: 149 MMHG

## 2023-02-16 LAB
ANION GAP SERPL CALCULATED.3IONS-SCNC: 6 MMOL/L (ref 4–13)
ATRIAL RATE: 60 BPM
BUN SERPL-MCNC: 11 MG/DL (ref 5–25)
CALCIUM SERPL-MCNC: 9 MG/DL (ref 8.3–10.1)
CHLORIDE SERPL-SCNC: 103 MMOL/L (ref 96–108)
CO2 SERPL-SCNC: 28 MMOL/L (ref 21–32)
CREAT SERPL-MCNC: 0.68 MG/DL (ref 0.6–1.3)
ERYTHROCYTE [DISTWIDTH] IN BLOOD BY AUTOMATED COUNT: 14.9 % (ref 11.6–15.1)
GFR SERPL CREATININE-BSD FRML MDRD: 84 ML/MIN/1.73SQ M
GLUCOSE SERPL-MCNC: 102 MG/DL (ref 65–140)
HCT VFR BLD AUTO: 34.4 % (ref 34.8–46.1)
HGB BLD-MCNC: 11.3 G/DL (ref 11.5–15.4)
MCH RBC QN AUTO: 31.4 PG (ref 26.8–34.3)
MCHC RBC AUTO-ENTMCNC: 32.8 G/DL (ref 31.4–37.4)
MCV RBC AUTO: 96 FL (ref 82–98)
P AXIS: 74 DEGREES
PLATELET # BLD AUTO: 447 THOUSANDS/UL (ref 149–390)
PMV BLD AUTO: 9.1 FL (ref 8.9–12.7)
POTASSIUM SERPL-SCNC: 3.6 MMOL/L (ref 3.5–5.3)
PR INTERVAL: 212 MS
QRS AXIS: 52 DEGREES
QRSD INTERVAL: 92 MS
QT INTERVAL: 450 MS
QTC INTERVAL: 450 MS
RBC # BLD AUTO: 3.6 MILLION/UL (ref 3.81–5.12)
SODIUM SERPL-SCNC: 137 MMOL/L (ref 135–147)
T WAVE AXIS: 57 DEGREES
VENTRICULAR RATE: 60 BPM
WBC # BLD AUTO: 7.29 THOUSAND/UL (ref 4.31–10.16)

## 2023-02-16 RX ADMIN — SUCRALFATE 2 G: 1 TABLET ORAL at 11:53

## 2023-02-16 RX ADMIN — LISINOPRIL 20 MG: 20 TABLET ORAL at 08:47

## 2023-02-16 RX ADMIN — GABAPENTIN 600 MG: 300 CAPSULE ORAL at 13:18

## 2023-02-16 RX ADMIN — AMLODIPINE BESYLATE 2.5 MG: 2.5 TABLET ORAL at 08:47

## 2023-02-16 RX ADMIN — GABAPENTIN 600 MG: 300 CAPSULE ORAL at 08:48

## 2023-02-16 RX ADMIN — SODIUM CHLORIDE 1 G: 1 TABLET ORAL at 08:48

## 2023-02-16 RX ADMIN — MESALAMINE 500 MG: 250 CAPSULE ORAL at 08:49

## 2023-02-16 RX ADMIN — FLUTICASONE FUROATE AND VILANTEROL TRIFENATATE 1 PUFF: 200; 25 POWDER RESPIRATORY (INHALATION) at 08:49

## 2023-02-16 RX ADMIN — DILTIAZEM HYDROCHLORIDE 60 MG: 60 TABLET, FILM COATED ORAL at 08:49

## 2023-02-16 RX ADMIN — LEVOTHYROXINE SODIUM 50 MCG: 50 TABLET ORAL at 05:46

## 2023-02-16 RX ADMIN — PANTOPRAZOLE SODIUM 40 MG: 40 TABLET, DELAYED RELEASE ORAL at 05:46

## 2023-02-16 RX ADMIN — ESCITALOPRAM OXALATE 20 MG: 20 TABLET ORAL at 08:48

## 2023-02-16 RX ADMIN — SUCRALFATE 2 G: 1 TABLET ORAL at 05:46

## 2023-02-16 RX ADMIN — MESALAMINE 500 MG: 250 CAPSULE ORAL at 13:18

## 2023-02-16 RX ADMIN — GABAPENTIN 600 MG: 300 CAPSULE ORAL at 05:46

## 2023-02-16 RX ADMIN — SUCRALFATE 2 G: 1 TABLET ORAL at 08:47

## 2023-02-16 NOTE — RESTORATIVE TECHNICIAN NOTE
Restorative Technician Note      Patient Name: Michell Koch     Restorative Tech Visit Date: 02/16/23  Note Type: Mobility  Patient Position Upon Consult: Seated edge of bed  Activity Performed: Ambulated  Assistive Device: Other (Comment) (none)  Patient Position at End of Consult: All needs within reach;  Seated edge of bed        Dominic Espino

## 2023-02-16 NOTE — TELEPHONE ENCOUNTER
Pt  was in the hospital yesterday for rectal bleeding  She came home today-just now  She took an Eliquis yesterday morning before she went into the hospital but none last night per Doctor's instructions at the hospital  She didn't take any today either  Was told to call pcp to see when she should resume Eliquis

## 2023-02-16 NOTE — DISCHARGE SUMMARY
Discharge Summary - [facility] Internal Medicine  Patient: Aris Crowe 65 y o  female   MRN: [id number]  PCP: Jayla River MD  Unit/Bed#: [facility] 931-01 Encounter: [id number]            Discharging Physician / Practitioner: Sandra Albert MD  PCP: Jayla River MD  Admission Date:   Admission Orders (From admission, onward)     Ordered        02/15/23 1255  Place in Observation  Once                      Discharge Date: 02/16/23      Reason for Admission:  Rectal bleed      Discharge Diagnoses:     Principal Problem:    Rectal bleeding with melena    Active Problems:    Portal vein thrombosis    Anxiety    Essential tremor    Hyperlipidemia    Essential hypertension    Hypothyroidism    Asthma    Hyponatremia    Resolved Problems:    * No resolved hospital problems  *        Consultations During Hospital Stay:  · Colorectal surgery        Hospital Course:     Aris Crowe is a 65 y o  female patient who originally presented to the hospital on 2/15/2023 due to complaints of a transient rectal bleed with melanotic stools  Of note her past medical history significant for ulcerative colitis status post subtotal colectomy with an ileal pouch  She has had prior episodes of rectal bleeding for which a CT angiogram last month was negative for continued/active bleeding  She was also scoped at the time which revealed evidence of an anastomotic ulcer with an unremarkable biopsy in regards to inflammation dysplasia  Her blood thinner (Eliquis) was temporarily held overnight and as patient reports no further episodes of bleeding with normal stools today, she will be discharged home  She remains hemodynamically stable with a stable hemoglobin  She will continue her home medications for chronic medical issues including anxiety, hyperlipidemia, hypertension, asthma, essential tremor, and hyponatremia  Her  was present at bedside during my encounter    She will follow-up with her PCP as instructed  Condition at Discharge: fair       Discharge Day Visit / Exam:     Vitals: Blood Pressure: 149/80 (02/16/23 0716)  Pulse: 74 (02/16/23 0716)  Temperature: 97 7 °F (36 5 °C) (02/16/23 0716)  Temp Source: Oral (02/15/23 1244)  Respirations: 18 (02/15/23 1358)  Weight - Scale: 69 4 kg (153 lb) (02/15/23 0828)  SpO2: 95 % (02/16/23 0716)      Physical exam - I had a face-to-face encounter with the patient on day of discharge  Discussion with Patient and/or Family:  The patient has been advised to return to the ER immediately if any symptoms recur or worsen  Discharge instructions/Information to Patient and/or Family:   See after visit summary for information provided to patient and/or family  Provisions for Follow-Up Care:  See after visit summary for information related to follow-up care and any pertinent home health orders  Disposition:   Home      Discharge Medications:  See after visit summary for reconciled discharge medications provided to patient and/or family  Discharge Statement:  I spent 38 minutes discharging the patient  This time was spent on the day of discharge  I had direct contact with the patient on the day of discharge  Greater than 50% of the total time was spent examining patient, answering all patient questions, arranging and discussing plan of care with patient as well as directly providing post-discharge instructions  Additional time then spent on discharge activities  ** Please Note: This note is constructed using a voice recognition dictation system  An occasional wrong word/phrase or “sound-a-like” substitution may have been picked up by dictation device due to the inherent limitations of voice recognition software  Read the chart carefully and recognize, using reasonable context, where substitutions may have occurred  **

## 2023-02-16 NOTE — CASE MANAGEMENT
Case Management Discharge Planning Note    Patient name Sheela Posadas  Location [facility]-01 MRN [id number]  : 1945 Date 2023       Current Admission Date: 2/15/2023  Current Admission Diagnosis:Rectal bleeding   Patient Active Problem List    Diagnosis Date Noted   • Fatigue 02/15/2023   • Osteoporosis with pathological fracture 2023   • Closed fracture of left distal radius 01/15/2023   • Closed fracture of distal end of left radius 2023   • Warfarin-induced coagulopathy ([facility] ) 2023   • Rectal bleeding 2023   • Thrush 2023   • History of left breast cancer 2020   • Hyponatremia 2019   • Vitamin B12 deficiency 2019   • Vitamin D deficiency 2019   • S/P deep brain stimulator placement 2018   • Encounter for long-term (current) use of medications 2018   • Lumbar degenerative disc disease 2018   • Portal vein thrombosis 2018   • Hypomagnesemia 2015   • Unsteady gait 2015   • Overweight 2015   • Essential tremor 2015   • Prediabetes 2015   • Lower extremity pain, left 2014   • Osteoarthritis of right knee 2014   • Iron deficiency anemia 2014   • Supraventricular tachycardia ([facility] ) 10/07/2013   • Diarrhea 2013   • Chronic salpingo-oophoritis 2012   • SIADH (syndrome of inappropriate ADH production) ([facility] ) 2012   • Peripheral neuropathy 2012   • GERD (gastroesophageal reflux disease) 2012   • Moderate episode of recurrent major depressive disorder ([facility] ) 2012   • Hyperlipidemia 2012   • Essential hypertension 2012   • Acquired hypothyroidism 2012   • Osteopenia 2012   • Ulcerative colitis without complications ([facility] )    • Allergic rhinitis 2012   • Mild intermittent asthma without complication    • Sjogren's syndrome ([facility] ) 2012   • Anxiety 2012   • Insomnia 2012      LOS (days): 0  Geometric Mean LOS (GMLOS) (days):   Days to GMLOS:     OBJECTIVE:            Current admission status: Observation   Preferred Pharmacy:   [facility] [address]  Phone: [phone] Fax: [phone]    [id number] [address] [address]  [address] [address]  Phone: [phone] Fax: [facility] [phone] [facility]  [address] [place] [address] [address] [facility], [address]  Phone: [phone] Fax: [phone]    Primary Care Provider: Funmilayo Rasmussen MD    Primary Insurance: MEDICARE  Secondary Insurance: BLUE CROSS    DISCHARGE DETAILS:       Additional Comments: Per provider, pt is medically clear for d/c; CM not made aware of any needs

## 2023-02-17 NOTE — TELEPHONE ENCOUNTER
Patient took Eliquis last night, because the bleeding had stopped  She has a bowel movement at 0200 and there was blood  Darker red in color  Patient did not take Eliquis this morning, patient is scare to continue on this medication and would prefer to start on [address] and get blood work  Please advise  Patient is unable to see Dr Kaur Bob until 3/15/23

## 2023-02-19 NOTE — TELEPHONE ENCOUNTER
You were on warfarin when you first bled, that is why you were switched to Eliquis  Have you bled over the weekend?

## 2023-02-20 NOTE — TELEPHONE ENCOUNTER
Patient is too worried to wait that long for an appointment  Moved patients TCM appointment to tomorrow  Patient also stated she would not take the medications today  I instructed that doctor did say to please take this medication

## 2023-02-20 NOTE — TELEPHONE ENCOUNTER
ryanime had gone up past 7  They stopped her medications and gave her vit k and started her on Eliquis  She stopped taking it over the weekend  She has less blood in her stool  Patient stated she is scared of this medication  She understands to pay better attention while on the [address]

## 2023-02-21 ENCOUNTER — OFFICE VISIT (OUTPATIENT)
Dept: INTERNAL MEDICINE CLINIC | Facility: CLINIC | Age: 78
End: 2023-02-21

## 2023-02-21 VITALS
HEIGHT: 63 IN | HEART RATE: 88 BPM | TEMPERATURE: 97 F | OXYGEN SATURATION: 97 % | BODY MASS INDEX: 26.75 KG/M2 | WEIGHT: 151 LBS | SYSTOLIC BLOOD PRESSURE: 130 MMHG | DIASTOLIC BLOOD PRESSURE: 78 MMHG

## 2023-02-21 DIAGNOSIS — I81 PORTAL VEIN THROMBOSIS: ICD-10-CM

## 2023-02-21 DIAGNOSIS — M35.00 SJOGREN'S SYNDROME, WITH UNSPECIFIED ORGAN INVOLVEMENT (HCC): ICD-10-CM

## 2023-02-21 DIAGNOSIS — K51.80 OTHER ULCERATIVE COLITIS WITHOUT COMPLICATION (HCC): ICD-10-CM

## 2023-02-21 DIAGNOSIS — I10 ESSENTIAL HYPERTENSION: ICD-10-CM

## 2023-02-21 DIAGNOSIS — S52.592D OTHER CLOSED FRACTURE OF DISTAL END OF LEFT RADIUS WITH ROUTINE HEALING, SUBSEQUENT ENCOUNTER: ICD-10-CM

## 2023-02-21 DIAGNOSIS — K62.5 RECTAL BLEEDING: Primary | ICD-10-CM

## 2023-02-21 DIAGNOSIS — E22.2 SIADH (SYNDROME OF INAPPROPRIATE ADH PRODUCTION) (HCC): ICD-10-CM

## 2023-02-21 DIAGNOSIS — J45.20 MILD INTERMITTENT ASTHMA WITHOUT COMPLICATION: ICD-10-CM

## 2023-02-21 PROBLEM — S52.502A CLOSED FRACTURE OF LEFT DISTAL RADIUS: Status: RESOLVED | Noted: 2023-01-15 | Resolved: 2023-02-21

## 2023-02-21 PROBLEM — T45.515A WARFARIN-INDUCED COAGULOPATHY (HCC): Status: RESOLVED | Noted: 2023-01-09 | Resolved: 2023-02-21

## 2023-02-21 PROBLEM — B37.0 THRUSH: Status: RESOLVED | Noted: 2023-01-07 | Resolved: 2023-02-21

## 2023-02-21 PROBLEM — D68.32 WARFARIN-INDUCED COAGULOPATHY (HCC): Status: RESOLVED | Noted: 2023-01-09 | Resolved: 2023-02-21

## 2023-02-21 NOTE — ASSESSMENT & PLAN NOTE
Long discussion regarding anticoagulation with warfarin vs Eliquis, discussed pros and cons  Recommend to continue Eliquis since shorter half life, no monitoring, no food restrictions etc   She feels more comfortable with warfarin since she has been on it for years, agrees to continue with Eliquis  Cost not an issue

## 2023-02-21 NOTE — ASSESSMENT & PLAN NOTE
Intermittent bleeding since early January 1/26 anastomotic ulcer, taking Pentasa 4x a day and Carafate 4x a day per colorectal   No evidence of active Crohn's  Keep scheduled appointment with Colorectal   CBC stable

## 2023-02-21 NOTE — ASSESSMENT & PLAN NOTE
Currently taking prednisone 5 mg bid, helped with pain  Discussed prednisone use with recent rectal bleeding / ulcer

## 2023-02-21 NOTE — PROGRESS NOTES
Assessment & Plan     1  Rectal bleeding  Assessment & Plan:  Intermittent bleeding since early January 1/26 anastomotic ulcer, taking Pentasa 4x a day and Carafate 4x a day per colorectal   No evidence of active Crohn's  Keep scheduled appointment with Colorectal   CBC stable  2  Portal vein thrombosis  Assessment & Plan:  Long discussion regarding anticoagulation with warfarin vs Eliquis, discussed pros and cons  Recommend to continue Eliquis since shorter half life, no monitoring, no food restrictions etc   She feels more comfortable with warfarin since she has been on it for years, agrees to continue with Eliquis  Cost not an issue  3  Other ulcerative colitis without complication ([facility])  Assessment & Plan:  Taking Carafate, Pentasa  As above  4  Other closed fracture of distal end of left radius with routine healing, subsequent encounter  Assessment & Plan:  Follow up with Orthopedics as scheduled  5  SIADH (syndrome of inappropriate ADH production) ([facility])  Assessment & Plan:  Na within normal   Continue NaCl  6  Sjogren's syndrome, with unspecified organ involvement ([facility] [address] )  Assessment & Plan:  Currently taking prednisone 5 mg bid, helped with pain  Discussed prednisone use with recent rectal bleeding / ulcer  7  Mild intermittent asthma without complication  Assessment & Plan:  Stable, on Advair  8  Essential hypertension  Assessment & Plan:  BP stable  Taking amlodipine 2 5 mg daily, diltiazem 240 mg daily and lisinopril 20 mg bid  Follow up as scheduled or as needed  Subjective     Transitional Care Management Review:   Elida Pope is a 65 y o  female here for TCM follow up       During the TCM phone call patient stated:  TCM Call     Date and time call was made  2/16/2023  4:48 PM    Hospital care reviewed  Records reviewed    Patient was hospitialized at  [facility]    Date of Admission  02/16/23    Date of discharge  02/15/23    Diagnosis RECTAL BLEEDING    Disposition  Home    Were the patients medications reviewed and updated  Yes    Current Symptoms  None      TCM Call     Post hospital issues  None    Should patient be enrolled in anticoag monitoring? No    Scheduled for follow up? Yes    Referrals needed  Can Moore CMA    Did you obtain your prescribed medications  Yes    Do you need help managing your prescriptions or medications  No    Is transportation to your appointment needed  No    I have advised the patient to call PCP with any new or worsening symptoms  [place]        She is here today with her   She reports no rectal bleeding for the past few days  She has some rectal soreness which is improving since she has been using Calmoseptine lotion on it  She has not taken Eliquis since 4 days ago (Thursday)  She started taking prednisone about 3 weeks ago, prescribed by her rheumatologist  She was instructed to take 1 to 2 tablets (5 mg) a day, as needed for pain  She found that 5 mg did "nothing" so she has been taking it twice a day  She has been on warfarin for years, does not mind getting her INR regularly  She feels more comfortable on it instead of Eliquis since her recent bleeding since last month  She saw Orthopedics recently, will have a repeat x-ray next week  She is hoping the cast will be removed  She reports no pain or discomfort, able to use her left hand  She would like to go to [facility], has not been due to recent hospitalizations  Review of Systems   Constitutional: Negative for activity change, appetite change and fatigue  HENT: Negative for congestion  Eyes: Negative for visual disturbance  Respiratory: Negative for cough and shortness of breath  Cardiovascular: Negative for chest pain  Gastrointestinal: Negative for abdominal pain, anal bleeding, blood in stool, constipation, diarrhea and rectal pain  Genitourinary: Negative for dysuria     Musculoskeletal: Negative for arthralgias  Skin: Negative for rash and wound  Neurological: Negative for dizziness and headaches  Hematological: Bruises/bleeds easily  Psychiatric/Behavioral: The patient is not nervous/anxious  Objective     /78   Pulse 88   Temp (!) 97 °F (36 1 °C)   Ht 5' 3" (1 6 m)   Wt 68 5 kg (151 lb)   SpO2 97%   BMI 26 75 kg/m²      Physical Exam  Constitutional:       General: She is not in acute distress  Appearance: Normal appearance  She is not ill-appearing  HENT:      Head: Normocephalic and atraumatic  Mouth/Throat:      Mouth: Mucous membranes are moist    Eyes:      Pupils: Pupils are equal, round, and reactive to light  Pulmonary:      Effort: Pulmonary effort is normal  No respiratory distress  Musculoskeletal:      Left hand: No swelling  Arms:    Neurological:      General: No focal deficit present  Mental Status: She is alert and oriented to person, place, and time  Psychiatric:         Mood and Affect: Mood normal        Medications have been reviewed by provider in current encounter    Aries Fuentes MD     Labs & imaging results reviewed with patient

## 2023-02-23 ENCOUNTER — HOSPITAL ENCOUNTER (OUTPATIENT)
Dept: RADIOLOGY | Facility: HOSPITAL | Age: 78
End: 2023-02-23
Attending: ORTHOPAEDIC SURGERY

## 2023-02-23 ENCOUNTER — OFFICE VISIT (OUTPATIENT)
Dept: OBGYN CLINIC | Facility: CLINIC | Age: 78
End: 2023-02-23

## 2023-02-23 VITALS
HEART RATE: 71 BPM | OXYGEN SATURATION: 98 % | WEIGHT: 151 LBS | DIASTOLIC BLOOD PRESSURE: 88 MMHG | BODY MASS INDEX: 26.75 KG/M2 | SYSTOLIC BLOOD PRESSURE: 159 MMHG | HEIGHT: 63 IN

## 2023-02-23 DIAGNOSIS — S52.502A CLOSED TRAUMATIC NONDISPLACED FRACTURE OF DISTAL END OF LEFT RADIUS, INITIAL ENCOUNTER: Primary | ICD-10-CM

## 2023-02-23 DIAGNOSIS — S52.502A CLOSED TRAUMATIC NONDISPLACED FRACTURE OF DISTAL END OF LEFT RADIUS, INITIAL ENCOUNTER: ICD-10-CM

## 2023-02-23 NOTE — PROGRESS NOTES
[id number] [facility] [address]  [phone]       Mireille Coleman  [id number]  1945    ORTHOPAEDIC SURGERY OUTPATIENT NOTE  2/23/2023      HISTORY:  65 y o  female presents for follow-up left wrist distal radius fracture  Patient has had no issues with the cast   She is here for cast removal and repeat x-ray  Denies any significant pain  Past Medical History:   Diagnosis Date   • Anemia    • Anxiety    • Arrhythmia    • Arthritis    • Asthma    • Blood clot in vein     portal vein   • Breast cancer ([facility] ) 02/12/2021   • Breast lump     [id number] RESOLVED   • Cancer [facility])    • Depression    • Disease of thyroid gland    • DVT, lower extremity (HCC)    • GERD (gastroesophageal reflux disease)    • Hyperlipidemia    • Hypertension    • Hypokalemia    • Hyponatremia    • Hypothyroidism    • Iron deficiency anemia    • Irregular heart beat    • Manic behavior ([facility] )    • 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HCC)    • Osteoarthritis    • Palpitations     [id number]  RESOLVED   • Paroxysmal supraventricular tachycardia (HCC)    • PE (pulmonary thromboembolism) ([facility])    • Sjoegren syndrome    • Sleep apnea    • Sleep difficulties    • Spinal stenosis    • Thrombocytosis     [id number]  RESOLVED   • Tremors of nervous system     dbs implanted right and left chest   • Ulcerative colitis ([facility] )    • Vertigo     [id number] RESOLVED       Past Surgical History:   Procedure Laterality Date   • ABDOMINAL SURGERY     • APPENDECTOMY     • BREAST BIOPSY Left 11/07/2019    Stereo   • BREAST EXCISIONAL BIOPSY Left     x many years   • BREAST SURGERY      lumpectomy & biopsy   • CARMELLA HOLE W/ STEREOTACTIC INSERTION OF DBS LEADS / INTRAOP MICROELECTRODE RECORDING     • COLON SURGERY     • COLONOSCOPY N/A 08/30/2017    Procedure: Patti Chaparro;  Surgeon: Huy Mccollum MD;  Location: [facility];   Service: Colorectal   • COLOPROCTECTOMY W/ ILEO J POUCH     • ESOPHAGOGASTRODUODENOSCOPY      ONSET 10/17/11   • FISTULA REPAIR      LLEOANAL FISTULA REPAIR TRANSPERIN TRANSABD APPROACH   • HYSTERECTOMY      age 43   • ILEOSTOMY CLOSURE     • KNEE ARTHROSCOPY      Right   • MAMMO STEREOTACTIC BREAST BIOPSY LEFT (ALL INC) Left 2019   • MAMMO STEREOTACTIC BREAST BIOPSY LEFT (ALL INC) Left 2020   • MAMMO STEREOTACTIC BREAST BIOPSY LEFT (ALL INC) EACH ADD Left 2020   • MASTECTOMY Left 2021    left mastectomy- Dr Rachid Rangel   • MASTECTOMY W/ SENTINEL NODE BIOPSY Left 2021    Procedure: BREAST MASTECTOMY WITH SENTINEL LYMPH NODE BIOPSY, LYMPHATIC MAPPING WITH BLUE DYE AND RADIAOCTIVE DYE (INJECT AT 1100 BY DR GILLESPIE IN THE OR); Surgeon: Wei Rajput MD;  Location: [facility];  Service: Surgical Oncology   • [facility] INSJ/RPLCMT CRANIAL NEUROSTIM PULSE GENERATOR Right 2017    Procedure: DBS GENERATOR REPLACEMENT;  Surgeon: Kilo Zamora MD;  Location: [facility];  Service: Neurosurgery   • [facility] INSJ/RPLCMT CRANIAL NEUROSTIM PULSE GENERATOR N/A 2019    Procedure: REPLACEMENT IMPLANTABLE PULSE GENERATOR FOR DEEP BRAIN STIMULATOR LEFT CHEST;  Surgeon: Keena Rice MD;  Location: [facility];  Service: Neurosurgery   • SPLENECTOMY     • TONSILLECTOMY         Social History     Socioeconomic History   • Marital status:      Spouse name: Not on file   • Number of children: Not on file   • Years of education: EDUCATION LEVEL 10TH GRADE   • Highest education level: Not on file   Occupational History   • Occupation: RETIRED   Tobacco Use   • Smoking status: Former     Packs/day: 1 50     Years: 5 00     Pack years: 7 50     Types: Cigarettes     Quit date: 1965     Years since quittin 1   • Smokeless tobacco: Never   • Tobacco comments:     Quit   Vaping Use   • Vaping Use: Never used   Substance and Sexual Activity   • Alcohol use:  Yes     Alcohol/week: 2 0 standard drinks     Types: 2 Cans of beer per week   • Drug use: No   • Sexual activity: Not Currently     Partners: Male     Birth control/protection: Post-menopausal   Other Topics Concern   • Not on file   Social History Narrative    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ACTIVITIES INSIDE AND OUTSIDE OF HOME, MODERATE    CAFFEINE USE, DRINKS CAFEINATED TEA, DRINKS COFFEE    INADEQUATE EXERCISE    LIVES WITH ADULT CHILDREN     Social Determinants of Health     Financial Resource Strain: Not on file   Food Insecurity: Not on file   Transportation Needs: Not on file   Physical Activity: Not on file   Stress: Not on file   Social Connections: Not on file   Intimate Partner Violence: Not on file   Housing Stability: Not on file       Family History   Problem Relation Age of Onset   • Venous thrombosis Mother         ACUTE VENOUS THROMBOSIS OF DEEP VESSELS OF THE DISTAL LOWER EXTREMITY   • Other Mother         PHLEBITIS   • Hypertension Mother    • Peripheral vascular disease Mother    • COPD Father    • Diabetes Father         MELLITUS   • Stroke Father    • Diabetes Sister         MELLITUS   • Sjogren's syndrome Sister    • No Known Problems Daughter    • No Known Problems Maternal Grandmother    • No Known Problems Maternal Grandfather    • No Known Problems Paternal Grandmother    • No Known Problems Paternal Grandfather    • No Known Problems Sister    • No Known Problems Maternal Aunt    • No Known Problems Paternal Aunt    • No Known Problems Son         Patient's Medications   New Prescriptions    No medications on file   Previous Medications    ACETAMINOPHEN (TYLENOL) 325 MG TABLET    Take 2 tablets (650 mg total) by mouth every 6 (six) hours as needed for mild pain    ALBUTEROL (PROVENTIL HFA,VENTOLIN HFA) 90 MCG/ACT INHALER    Inhale 2 puffs every 6 (six) hours as needed for wheezing    AMLODIPINE (NORVASC) 2 5 MG TABLET    TAKE ONE TABLET BY MOUTH EVERY DAY    APIXABAN (ELIQUIS) 5 MG    Take 1 tablet (5 mg total) by mouth 2 (two) times a day    CALCIUM CARB-CHOLECALCIFEROL (CALCIUM 600-D PO)    Take 1 tablet by mouth 2 (two) times a day    COENZYME Q10 (CO Q-10 PO)    Take 1 capsule by mouth daily    DILTIAZEM (CARDIZEM CD) 180 MG 24 HR CAPSULE    Take 1 capsule (180 mg total) by mouth daily    DILTIAZEM (CARDIZEM) 60 MG TABLET    Take 1 tablet (60 mg total) by mouth daily    ESCITALOPRAM (LEXAPRO) 20 MG TABLET    TAKE ONE TABLET BY MOUTH EVERY DAY    FLUTICASONE (FLONASE) 50 MCG/ACT NASAL SPRAY    INSTILL ONE SPRAY INTO EACH NOSTRIL ONCE DAILY AS NEEDED FOR RHINITIS    FLUTICASONE-SALMETEROL (ADVAIR DISKUS) 250-50 MCG/DOSE INHALER    Inhale 1 puff 2 (two) times a day Rinse mouth after use    GABAPENTIN (NEURONTIN) 600 MG TABLET    TAKE ONE TABLET BY MOUTH IN THE MORNING, ONE TABLET IN THE AFTERNNON, AND TWO TABLETS AT BEDTIME    LEVOTHYROXINE 50 MCG TABLET    TAKE ONE TABLET BY MOUTH EVERY DAY    LISINOPRIL (ZESTRIL) 20 MG TABLET    TAKE ONE TABLET BY MOUTH TWICE A DAY    LOPERAMIDE (IMODIUM) 2 MG CAPSULE    Take 2 mg by mouth 4 (four) times a day as needed      LORAZEPAM (ATIVAN) 1 MG TABLET    Take 1 tablet (1 mg total) by mouth every 6 (six) hours as needed for anxiety    MAGNESIUM PO    Take 1 tablet by mouth daily    MECLIZINE (ANTIVERT) 25 MG TABLET    Take 1 tablet (25 mg total) by mouth every 6 (six) hours as needed for dizziness    MESALAMINE (PENTASA) 500 MG CR CAPSULE    TAKE 1 CAPSULE BY MOUTH FOUR TIMES A DAY    MULTIPLE VITAMIN (MULTIVITAMIN ADULT PO)    Take 1 tablet by mouth daily    OMEGA-3 FATTY ACIDS (FISH OIL PO)    Take 1 capsule by mouth daily    PANTOPRAZOLE (PROTONIX) 40 MG TABLET    TAKE ONE TABLET BY MOUTH EVERY DAY    POTASSIUM CHLORIDE (MICRO-K) 10 MEQ CR CAPSULE    Take 2 capsules daily    PREDNISONE 5 MG TABLET        SIMVASTATIN (ZOCOR) 5 MG TABLET    TAKE ONE TABLET BY MOUTH EVERY DAY    SODIUM CHLORIDE 1 G TABLET    Take 1 tablet (1 g total) by mouth 3 (three) times a day    SUCRALFATE (CARAFATE) 1 G TABLET    Take 2 tablets (2 g total) by mouth 4 (four) times a day TORSEMIDE (DEMADEX) 10 MG TABLET    Take 1 tablet (10 mg total) by mouth daily as needed (edema)   Modified Medications    No medications on file   Discontinued Medications    No medications on file       Allergies   Allergen Reactions   • Indomethacin GI Intolerance and Dizziness   • Macrobid [Nitrofurantoin Monohyd Macro] Rash   • Nitrofurantoin Other (See Comments)   • Penicillins Rash     Annotation - [id number]: can take cephalosporins   • Sulfa Antibiotics Rash        /88 (BP Location: Right arm, Patient Position: Sitting, Cuff Size: Standard)   Pulse 71   Ht 5' 3" (1 6 m)   Wt 68 5 kg (151 lb)   SpO2 98%   BMI 26 75 kg/m²      REVIEW OF SYSTEMS:  Constitutional: Negative  HEENT: Negative  Respiratory: Negative  Skin: Negative  Neurological: Negative  Psychiatric/Behavioral: Negative  Musculoskeletal: Negative except for that mentioned in the HPI  PHYSICAL EXAM: Left wrist: Neurovascular intact  Active range of motion is decreased secondary to stiffness  There is no skin breakdown  IMAGING: X-ray left wrist reviewed by myself: Healed distal radius fracture with neutral alignment  ASSESSMENT AND PLAN:  65 y o  female left healed distal radius fracture  The patient was given a removable splint  Occupational therapy prescription was given  I believe at this time the patient can follow-up on an as-needed basis

## 2023-02-24 NOTE — TELEPHONE ENCOUNTER
BS CHECKED 74. NO COVERAGE NEEDED. Patient notified  She said she is feeling pretty good    Her blood pressures have khari 150/80

## 2023-02-26 DIAGNOSIS — E78.49 OTHER HYPERLIPIDEMIA: ICD-10-CM

## 2023-02-26 RX ORDER — SIMVASTATIN 5 MG
TABLET ORAL
Qty: 90 TABLET | Refills: 1 | Status: SHIPPED | OUTPATIENT
Start: 2023-02-26

## 2023-03-04 ENCOUNTER — TELEPHONE (OUTPATIENT)
Dept: INFUSION CENTER | Facility: CLINIC | Age: 78
End: 2023-03-04

## 2023-03-04 NOTE — TELEPHONE ENCOUNTER
LM for pt to make aware of schedule change for appt on 4/20/23  Phone number provided for pt if they have any further questions or concerns

## 2023-03-09 ENCOUNTER — TELEPHONE (OUTPATIENT)
Dept: INTERNAL MEDICINE CLINIC | Facility: CLINIC | Age: 78
End: 2023-03-09

## 2023-03-09 DIAGNOSIS — M54.16 LUMBAR RADICULOPATHY: ICD-10-CM

## 2023-03-09 RX ORDER — HYDROCODONE BITARTRATE AND ACETAMINOPHEN 5; 325 MG/1; MG/1
1 TABLET ORAL EVERY 6 HOURS PRN
Qty: 120 TABLET | Refills: 0 | Status: SHIPPED | OUTPATIENT
Start: 2023-03-09 | End: 2023-03-14 | Stop reason: SDUPTHER

## 2023-03-14 ENCOUNTER — TELEPHONE (OUTPATIENT)
Dept: OBGYN CLINIC | Facility: HOSPITAL | Age: 78
End: 2023-03-14

## 2023-03-14 DIAGNOSIS — M54.16 LUMBAR RADICULOPATHY: ICD-10-CM

## 2023-03-14 DIAGNOSIS — F41.9 ANXIETY: ICD-10-CM

## 2023-03-14 RX ORDER — HYDROCODONE BITARTRATE AND ACETAMINOPHEN 5; 325 MG/1; MG/1
1 TABLET ORAL EVERY 6 HOURS PRN
Qty: 120 TABLET | Refills: 0 | Status: SHIPPED | OUTPATIENT
Start: 2023-03-14

## 2023-03-14 RX ORDER — LORAZEPAM 1 MG/1
1 TABLET ORAL EVERY 6 HOURS PRN
Qty: 120 TABLET | Refills: 0 | Status: SHIPPED | OUTPATIENT
Start: 2023-03-14

## 2023-03-14 NOTE — TELEPHONE ENCOUNTER
Caller: Eulalio Watts  Doctor/office: Gabe//Gia KNAPP#: [phone]       called to start auth/delivery for VISCO/EUFLEXXA/Durolane injections    Body Part: Right knee  Date of last Gel Injection: 4/12/2022    Educated patient on Visco procedure   Medications must first be authorized and delivered to our office BEFORE we can schedule

## 2023-03-14 NOTE — TELEPHONE ENCOUNTER
Pt  States we sent Vicodin to wrong pharmacy  Would like it sent to [facility]  do not see it on her med list  Also would like refill for Lorazepam sent there

## 2023-03-15 DIAGNOSIS — M17.11 PRIMARY OSTEOARTHRITIS OF RIGHT KNEE: Primary | ICD-10-CM

## 2023-04-03 ENCOUNTER — HOSPITAL ENCOUNTER (OUTPATIENT)
Dept: NON INVASIVE DIAGNOSTICS | Facility: CLINIC | Age: 78
Discharge: HOME | End: 2023-04-03

## 2023-04-03 DIAGNOSIS — I45.10 INCOMPLETE RIGHT BUNDLE BRANCH BLOCK (RBBB): ICD-10-CM

## 2023-04-03 DIAGNOSIS — I10 ESSENTIAL HYPERTENSION: ICD-10-CM

## 2023-04-03 DIAGNOSIS — I10 HYPERTENSION, UNSPECIFIED TYPE: ICD-10-CM

## 2023-04-03 DIAGNOSIS — I47.1 SUPRAVENTRICULAR TACHYCARDIA (HCC): ICD-10-CM

## 2023-04-04 ENCOUNTER — OFFICE VISIT (OUTPATIENT)
Dept: SURGICAL ONCOLOGY | Facility: CLINIC | Age: 78
End: 2023-04-04

## 2023-04-04 VITALS
TEMPERATURE: 97.4 F | SYSTOLIC BLOOD PRESSURE: 118 MMHG | OXYGEN SATURATION: 96 % | HEIGHT: 63 IN | BODY MASS INDEX: 28.35 KG/M2 | RESPIRATION RATE: 15 BRPM | DIASTOLIC BLOOD PRESSURE: 64 MMHG | HEART RATE: 73 BPM | WEIGHT: 160 LBS

## 2023-04-04 DIAGNOSIS — Z85.3 HISTORY OF LEFT BREAST CANCER: Primary | ICD-10-CM

## 2023-04-04 NOTE — PROGRESS NOTES
Surgical Oncology Follow Up       [address]  [facility]  [address] [facility] [address]  [facility] [address]    Krystal Walters  1945  [id number]  [address] [facility]  [facility] [place]  [address] [address]    Chief Complaint   Patient presents with   • Follow-up       Assessment/Plan:  1  History of left breast cancer  - 6 month follow up  - Mammo diagnostic right w 3d & cad; Future       Discussion/Summary: Pt is a 65year old female presenting today for a six-month follow-up for left breast cancer diagnosed in December of 2020   Pathology revealed DCIS with microinvasive invasive carcinoma, ER/TN negative, HER2 positive  Patient had genetic testing which was negative  She underwent a left breast mastectomy with sentinel node biopsy with Dr Eleni Pham  Adjuvant therapy was not recommended   She had a right diagnostic mammogram on 11/21/2022 which was BI-RADS 2 category 2 density  She has had multiple hospitalizations this year for GI bleeds  She is wearing a holter monitor  There were no concerns on her cbe  I will see the patient back in 6 months or sooner should the need arise  She was instructed to call with any questions or concerns prior to this time  All questions were answered today  History of Present Illness:     Oncology History   History of left breast cancer   12/17/2020 Biopsy    Left Breast stereotactic biopsy:  A  4 o'clock, posterior  Ductal carcinoma in situ with microinvasion  Grade 3  ER 0, TN 0, HER2 3+    B & C  5 o'clock, anterior with & without calcs  Ductal carcinoma in situ  Grade 3  ER 0, TN 0    Concordant  Malignancy appears unifocal; calcifications span 4 1 cm  Both clips migrated  No recent axillary US  Right breast clear       1/7/2021 Genetic Testing    The following genes were evaluated: CHARLOTTE, BRCA1, BRCA2, CDH1, CHEK2, PALB2, PTEN, STK11, Tp53; additional genes evaluated for a total of 20 genes  Negative result  No pathogenic sequence variants or deletions/dupllications identified  [employer]     2/12/2021 Surgery    Left breast mastectomy with sentinel lymph node biopsy  Micro-invasive carcinoma (less than 1 mm)  Extensive ductal carcinoma in situ (7 7 cm)  Grade 3  Margins negative  0/2 Lymph nodes  Anatomic/Prognostic Stage IA     4/7/2021 - 4/7/2021 Hormone Therapy    Consult with Dr Jesse Hines  Adjuvant therapy not recommended          -Interval History: Pt is a 65year old female presenting today for a six-month follow-up for left breast cancer diagnosed in December of 2020  She is on obs  She had a right diagnostic mammogram on 11/21/2022 which was BI-RADS 2 category 2 density  Patient denies changes on her breast exam  She denies persistent headaches, bone pain, back pain, shortness of breath, or abdominal pain  Review of Systems:  Review of Systems   Constitutional: Negative for activity change, appetite change, fatigue and unexpected weight change  Respiratory: Negative for cough and shortness of breath  Cardiovascular: Negative for chest pain  Gastrointestinal: Negative for abdominal pain, diarrhea, nausea and vomiting  Endocrine: Negative for heat intolerance  Musculoskeletal: Negative for arthralgias, back pain and myalgias  Skin: Negative for rash  Neurological: Negative for weakness and headaches  Hematological: Negative for adenopathy         Patient Active Problem List   Diagnosis   • Portal vein thrombosis   • Anxiety   • Moderate episode of recurrent major depressive disorder ([facility])   • Essential tremor   • Hyperlipidemia   • Essential hypertension   • Hypomagnesemia   • SIADH (syndrome of inappropriate ADH production) ([facility])   • Acquired hypothyroidism   • Insomnia   • Iron deficiency anemia   • Prediabetes   • Peripheral neuropathy   • Osteopenia   • Osteoarthritis of right knee   • Ulcerative colitis without complications ([facility])   • Allergic rhinitis • GERD (gastroesophageal reflux disease)   • Mild intermittent asthma without complication   • Diarrhea   • Sjogren's syndrome (HCC)   • Chronic salpingo-oophoritis   • Overweight   • Supraventricular tachycardia (HCC)   • Unsteady gait   • Lumbar degenerative disc disease   • Encounter for long-term (current) use of medications   • S/P deep brain stimulator placement   • Vitamin B12 deficiency   • Vitamin D deficiency   • Hyponatremia   • History of left breast cancer   • Rectal bleeding   • Closed fracture of distal end of left radius   • Osteoporosis with pathological fracture   • Fatigue   • Traumatic closed nondisplaced fracture of distal end of left radius     Past Medical History:   Diagnosis Date   • Anemia    • Anxiety    • Arrhythmia    • Arthritis    • Asthma    • Blood clot in vein     portal vein   • Breast cancer ([email or web address] ) 02/12/2021   • Breast lump     [id number] RESOLVED   • Cancer (HCC)    • Depression    • Disease of thyroid gland    • DVT, lower extremity (HCC)    • GERD (gastroesophageal reflux disease)    • Hyperlipidemia    • Hypertension    • Hypokalemia    • Hyponatremia    • Hypothyroidism    • Iron deficiency anemia    • Irregular heart beat    • Manic behavior ([email or web address] )    • 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HCC)    • Osteoarthritis    • Palpitations     [id number]  RESOLVED   • Paroxysmal supraventricular tachycardia (HCC)    • PE (pulmonary thromboembolism) ([email or web address] )    • Sjoegren syndrome    • Sleep apnea    • Sleep difficulties    • Spinal stenosis    • Thrombocytosis     [id number]  RESOLVED   • Tremors of nervous system     dbs implanted right and left chest   • Ulcerative colitis ([facility] )    • Vertigo     [id number] RESOLVED     Past Surgical History:   Procedure Laterality Date   • ABDOMINAL SURGERY     • APPENDECTOMY     • BREAST BIOPSY Left 11/07/2019    Stereo   • BREAST EXCISIONAL BIOPSY Left     x many years   • BREAST SURGERY      lumpectomy & biopsy   • CARMELLA HOLE W/ STEREOTACTIC INSERTION OF DBS LEADS / INTRAOP MICROELECTRODE RECORDING     • COLON SURGERY     • COLONOSCOPY N/A 08/30/2017    Procedure: Iris Duque;  Surgeon: Albertina Geller MD;  Location: [facility]; Service: Colorectal   • COLOPROCTECTOMY W/ ILEO J POUCH     • ESOPHAGOGASTRODUODENOSCOPY      ONSET 10/17/11   • FISTULA REPAIR      LLEOANAL FISTULA REPAIR TRANSPERIN TRANSABD APPROACH   • HYSTERECTOMY      age 43   • ILEOSTOMY CLOSURE     • KNEE ARTHROSCOPY      Right   • MAMMO STEREOTACTIC BREAST BIOPSY LEFT (ALL INC) Left 11/07/2019   • MAMMO STEREOTACTIC BREAST BIOPSY LEFT (ALL INC) Left 12/17/2020   • MAMMO STEREOTACTIC BREAST BIOPSY LEFT (ALL INC) EACH ADD Left 12/17/2020   • MASTECTOMY Left 02/12/2021    left mastectomy- Dr Elvira Douglas   • MASTECTOMY W/ SENTINEL NODE BIOPSY Left 02/12/2021    Procedure: BREAST MASTECTOMY WITH SENTINEL LYMPH NODE BIOPSY, LYMPHATIC MAPPING WITH BLUE DYE AND RADIAOCTIVE DYE (INJECT AT 1100 BY DR GILLESPIE IN THE OR);   Surgeon: Muriel Haque MD;  Location: [facility];  Service: Surgical Oncology   • ID INSJ/RPLCMT CRANIAL NEUROSTIM PULSE GENERATOR Right 06/20/2017    Procedure: DBS GENERATOR REPLACEMENT;  Surgeon: Bettina Osei MD;  Location: [facility];  Service: Neurosurgery   • ID INSJ/RPLCMT CRANIAL NEUROSTIM PULSE GENERATOR N/A 12/04/2019    Procedure: REPLACEMENT IMPLANTABLE PULSE GENERATOR FOR DEEP BRAIN STIMULATOR LEFT CHEST;  Surgeon: Mc Alfaro MD;  Location[facility];  Service: Neurosurgery   • SPLENECTOMY     • TONSILLECTOMY       Family History   Problem Relation Age of Onset   • Venous thrombosis Mother         ACUTE VENOUS THROMBOSIS OF DEEP VESSELS OF THE DISTAL LOWER EXTREMITY   • Other Mother         PHLEBITIS   • Hypertension Mother    • Peripheral vascular disease Mother    • COPD Father    • Diabetes Father         MELLITUS   • Stroke Father    • Diabetes Sister         MELLITUS   • Sjogren's syndrome Sister    • No Known Problems Daughter    • No Known Problems Maternal Grandmother    • No Known Problems Maternal Grandfather    • No Known Problems Paternal Grandmother    • No Known Problems Paternal Grandfather    • No Known Problems Sister    • No Known Problems Maternal Aunt    • No Known Problems Paternal Aunt    • No Known Problems Son      Social History     Socioeconomic History   • Marital status:      Spouse name: Not on file   • Number of children: Not on file   • Years of education: EDUCATION LEVEL 10TH GRADE   • Highest education level: Not on file   Occupational History   • Occupation: RETIRED   Tobacco Use   • Smoking status: Former     Packs/day: 1 50     Years: 5 00     Pack years: 7 50     Types: Cigarettes     Quit date: 1965     Years since quittin 2   • Smokeless tobacco: Never   • Tobacco comments:     Quit   Vaping Use   • Vaping Use: Never used   Substance and Sexual Activity   • Alcohol use:  Yes     Alcohol/week: 2 0 standard drinks     Types: 2 Cans of beer per week   • Drug use: No   • Sexual activity: Not Currently     Partners: Male     Birth control/protection: Post-menopausal   Other Topics Concern   • Not on file   Social History Narrative    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ACTIVITIES INSIDE AND OUTSIDE OF HOME, MODERATE    CAFFEINE USE, DRINKS CAFEINATED TEA, DRINKS COFFEE    INADEQUATE EXERCISE    LIVES WITH ADULT CHILDREN     Social Determinants of Health     Financial Resource Strain: Not on file   Food Insecurity: Not on file   Transportation Needs: Not on file   Physical Activity: Not on file   Stress: Not on file   Social Connections: Not on file   Intimate Partner Violence: Not on file   Housing Stability: Not on file       Current Outpatient Medications:   •  acetaminophen (TYLENOL) 325 mg tablet, Take 2 tablets (650 mg total) by mouth every 6 (six) hours as needed for mild pain, Disp: , Rfl: 0  •  albuterol (PROVENTIL HFA,VENTOLIN HFA) 90 mcg/act inhaler, Inhale 2 puffs every 6 (six) hours as needed for wheezing, Disp: 8 g, Rfl: 1  •  amLODIPine (NORVASC) 2 5 mg tablet, TAKE ONE TABLET BY MOUTH EVERY DAY, Disp: 90 tablet, Rfl: 3  •  apixaban (Eliquis) 5 mg, Take 1 tablet (5 mg total) by mouth 2 (two) times a day, Disp: 180 tablet, Rfl: 0  •  Calcium Carb-Cholecalciferol (CALCIUM 600-D PO), Take 1 tablet by mouth 2 (two) times a day, Disp: , Rfl:   •  Coenzyme Q10 (CO Q-10 PO), Take 1 capsule by mouth daily, Disp: , Rfl:   •  diltiazem (CARDIZEM CD) 180 mg 24 hr capsule, Take 1 capsule (180 mg total) by mouth daily, Disp: 90 capsule, Rfl: 1  •  diltiazem (CARDIZEM) 60 mg tablet, Take 1 tablet (60 mg total) by mouth daily, Disp: 90 tablet, Rfl: 1  •  escitalopram (LEXAPRO) 20 mg tablet, TAKE ONE TABLET BY MOUTH EVERY DAY, Disp: 90 tablet, Rfl: 1  •  fluticasone (FLONASE) 50 mcg/act nasal spray, INSTILL ONE SPRAY INTO EACH NOSTRIL ONCE DAILY AS NEEDED FOR RHINITIS, Disp: 48 g, Rfl: 1  •  fluticasone-salmeterol (Advair Diskus) 250-50 mcg/dose inhaler, Inhale 1 puff 2 (two) times a day Rinse mouth after use, Disp: 180 blister, Rfl: 3  •  gabapentin (NEURONTIN) 600 MG tablet, TAKE ONE TABLET BY MOUTH IN THE MORNING, ONE TABLET IN THE AFTERNNON, AND TWO TABLETS AT BEDTIME, Disp: 360 tablet, Rfl: 2  •  HYDROcodone-acetaminophen (NORCO) 5-325 mg per tablet, Take 1 tablet by mouth every 6 (six) hours as needed for pain Max Daily Amount: 4 tablets, Disp: 120 tablet, Rfl: 0  •  levothyroxine 50 mcg tablet, TAKE ONE TABLET BY MOUTH EVERY DAY, Disp: 90 tablet, Rfl: 1  •  lisinopril (ZESTRIL) 20 mg tablet, TAKE ONE TABLET BY MOUTH TWICE A DAY, Disp: 180 tablet, Rfl: 1  •  loperamide (IMODIUM) 2 mg capsule, Take 2 mg by mouth 4 (four) times a day as needed  , Disp: , Rfl:   •  LORazepam (ATIVAN) 1 mg tablet, Take 1 tablet (1 mg total) by mouth every 6 (six) hours as needed for anxiety, Disp: 120 tablet, Rfl: 0  •  MAGNESIUM PO, Take 1 tablet by mouth daily, Disp: , Rfl:   •  meclizine (ANTIVERT) 25 mg tablet, Take 1 tablet (25 mg total) by mouth every 6 (six) hours as needed for dizziness, Disp: 90 tablet, Rfl: 1  •  mesalamine (PENTASA) 500 mg CR capsule, TAKE 1 CAPSULE BY MOUTH FOUR TIMES A DAY, Disp: 120 capsule, Rfl: 3  •  Multiple Vitamin (MULTIVITAMIN ADULT PO), Take 1 tablet by mouth daily, Disp: , Rfl:   •  Omega-3 Fatty Acids (FISH OIL PO), Take 1 capsule by mouth daily, Disp: , Rfl:   •  pantoprazole (PROTONIX) 40 mg tablet, TAKE ONE TABLET BY MOUTH EVERY DAY, Disp: 90 tablet, Rfl: 1  •  potassium chloride (MICRO-K) 10 MEQ CR capsule, Take 2 capsules daily, Disp: 180 capsule, Rfl: 3  •  predniSONE 5 mg tablet, , Disp: , Rfl:   •  simvastatin (ZOCOR) 5 MG tablet, TAKE ONE TABLET BY MOUTH EVERY DAY, Disp: 90 tablet, Rfl: 1  •  sodium chloride 1 g tablet, Take 1 tablet (1 g total) by mouth 3 (three) times a day, Disp: 270 tablet, Rfl: 3  •  torsemide (DEMADEX) 10 mg tablet, Take 1 tablet (10 mg total) by mouth daily as needed (edema), Disp: 90 tablet, Rfl: 0  •  sucralfate (CARAFATE) 1 g tablet, Take 2 tablets (2 g total) by mouth 4 (four) times a day, Disp: 240 tablet, Rfl: 0  Allergies   Allergen Reactions   • Indomethacin GI Intolerance and Dizziness   • Macrobid [Nitrofurantoin Monohyd Macro] Rash   • Nitrofurantoin Other (See Comments)   • Penicillins Rash     Annotation - [id number]: can take cephalosporins   • Sulfa Antibiotics Rash     There were no vitals filed for this visit  Physical Exam  Constitutional:       General: She is not in acute distress  Appearance: Normal appearance  Cardiovascular:      Rate and Rhythm: Normal rate and regular rhythm  Pulses: Normal pulses  Heart sounds: Normal heart sounds  Pulmonary:      Effort: Pulmonary effort is normal       Breath sounds: Normal breath sounds  Chest:      Chest wall: No mass  Breasts:     Right: No swelling, bleeding, inverted nipple, mass, nipple discharge, skin change or tenderness  Left: No swelling, bleeding, mass, skin change or tenderness         Abdominal:      General: Abdomen is flat       Palpations: Abdomen is soft  Lymphadenopathy:      Upper Body:      Right upper body: No supraclavicular, axillary or pectoral adenopathy  Left upper body: No supraclavicular, axillary or pectoral adenopathy  Skin:     General: Skin is warm  Neurological:      General: No focal deficit present  Mental Status: She is alert and oriented to person, place, and time  Psychiatric:         Mood and Affect: Mood normal          Behavior: Behavior normal            Results:    Imaging  No results found  I reviewed the above imaging data  Advance Care Planning/Advance Directives:  Discussed disease status, cancer treatment plans and/or cancer treatment goals with the patient

## 2023-04-07 ENCOUNTER — APPOINTMENT (OUTPATIENT)
Dept: LAB | Facility: AMBULARY SURGERY CENTER | Age: 78
End: 2023-04-07

## 2023-04-07 DIAGNOSIS — E55.9 VITAMIN D DEFICIENCY: ICD-10-CM

## 2023-04-07 DIAGNOSIS — E83.42 HYPOMAGNESEMIA: ICD-10-CM

## 2023-04-07 DIAGNOSIS — R73.03 PREDIABETES: ICD-10-CM

## 2023-04-07 DIAGNOSIS — E78.49 OTHER HYPERLIPIDEMIA: ICD-10-CM

## 2023-04-07 DIAGNOSIS — E53.8 VITAMIN B12 DEFICIENCY: ICD-10-CM

## 2023-04-07 LAB
25(OH)D3 SERPL-MCNC: 25.8 NG/ML (ref 30–100)
ALBUMIN SERPL BCP-MCNC: 3.1 G/DL (ref 3.5–5)
ALP SERPL-CCNC: 53 U/L (ref 46–116)
ALT SERPL W P-5'-P-CCNC: 22 U/L (ref 12–78)
ANION GAP SERPL CALCULATED.3IONS-SCNC: 4 MMOL/L (ref 4–13)
AST SERPL W P-5'-P-CCNC: 23 U/L (ref 5–45)
BILIRUB SERPL-MCNC: 0.26 MG/DL (ref 0.2–1)
BUN SERPL-MCNC: 12 MG/DL (ref 5–25)
CALCIUM ALBUM COR SERPL-MCNC: 9.9 MG/DL (ref 8.3–10.1)
CALCIUM SERPL-MCNC: 9.2 MG/DL (ref 8.3–10.1)
CHLORIDE SERPL-SCNC: 100 MMOL/L (ref 96–108)
CHOLEST SERPL-MCNC: 183 MG/DL
CO2 SERPL-SCNC: 29 MMOL/L (ref 21–32)
CREAT SERPL-MCNC: 0.87 MG/DL (ref 0.6–1.3)
EST. AVERAGE GLUCOSE BLD GHB EST-MCNC: 137 MG/DL
GFR SERPL CREATININE-BSD FRML MDRD: 64 ML/MIN/1.73SQ M
GLUCOSE P FAST SERPL-MCNC: 111 MG/DL (ref 65–99)
HBA1C MFR BLD: 6.4 %
HDLC SERPL-MCNC: 67 MG/DL
LDLC SERPL CALC-MCNC: 89 MG/DL (ref 0–100)
MAGNESIUM SERPL-MCNC: 1.8 MG/DL (ref 1.6–2.6)
NONHDLC SERPL-MCNC: 116 MG/DL
POTASSIUM SERPL-SCNC: 4.2 MMOL/L (ref 3.5–5.3)
PROT SERPL-MCNC: 6.2 G/DL (ref 6.4–8.4)
SODIUM SERPL-SCNC: 133 MMOL/L (ref 135–147)
TRIGL SERPL-MCNC: 137 MG/DL
VIT B12 SERPL-MCNC: 384 PG/ML (ref 100–900)

## 2023-04-20 ENCOUNTER — HOSPITAL ENCOUNTER (OUTPATIENT)
Dept: INFUSION CENTER | Facility: CLINIC | Age: 78
Discharge: HOME | End: 2023-04-20

## 2023-04-20 VITALS
TEMPERATURE: 96.7 F | RESPIRATION RATE: 18 BRPM | HEART RATE: 75 BPM | DIASTOLIC BLOOD PRESSURE: 52 MMHG | OXYGEN SATURATION: 95 % | SYSTOLIC BLOOD PRESSURE: 108 MMHG

## 2023-04-20 DIAGNOSIS — D50.9 IRON DEFICIENCY ANEMIA, UNSPECIFIED IRON DEFICIENCY ANEMIA TYPE: Primary | ICD-10-CM

## 2023-04-20 PROBLEM — M80.00XA OSTEOPOROSIS WITH PATHOLOGICAL FRACTURE: Status: RESOLVED | Noted: 2023-01-25 | Resolved: 2023-04-20

## 2023-04-20 PROBLEM — E87.1 HYPONATREMIA: Status: RESOLVED | Noted: 2019-11-27 | Resolved: 2023-04-20

## 2023-04-20 PROBLEM — R49.0 HOARSENESS OF VOICE: Status: ACTIVE | Noted: 2023-04-20

## 2023-04-20 PROBLEM — S52.502A TRAUMATIC CLOSED NONDISPLACED FRACTURE OF DISTAL END OF LEFT RADIUS: Status: RESOLVED | Noted: 2023-02-23 | Resolved: 2023-04-20

## 2023-04-20 PROBLEM — S52.502A CLOSED FRACTURE OF DISTAL END OF LEFT RADIUS: Status: RESOLVED | Noted: 2023-01-12 | Resolved: 2023-04-20

## 2023-04-20 RX ORDER — SODIUM CHLORIDE 9 MG/ML
20 INJECTION, SOLUTION INTRAVENOUS ONCE
Status: COMPLETED | OUTPATIENT
Start: 2023-04-20 | End: 2023-04-20

## 2023-04-20 RX ORDER — SODIUM CHLORIDE 9 MG/ML
20 INJECTION, SOLUTION INTRAVENOUS ONCE
OUTPATIENT
Start: 2023-10-17

## 2023-04-20 RX ADMIN — IRON SUCROSE 200 MG: 20 INJECTION, SOLUTION INTRAVENOUS at 10:43

## 2023-04-20 RX ADMIN — SODIUM CHLORIDE 20 ML/HR: 0.9 INJECTION, SOLUTION INTRAVENOUS at 10:43

## 2023-04-20 NOTE — PROGRESS NOTES
Patient here for IVP venofer  Patient resting with no complaints  Vitals stable  Call bell within reach  Patient tolerated infusion with no issue   She is aware of next appointment, provided AVS

## 2023-04-21 ENCOUNTER — TELEPHONE (OUTPATIENT)
Dept: OBGYN CLINIC | Facility: CLINIC | Age: 78
End: 2023-04-21

## 2023-04-21 NOTE — TELEPHONE ENCOUNTER
pharmacy on phone for [address] has several interactions with the Eliquis, Lexapro, and simvastatin she said it can elevate these other meds   should she still take the Diflucan?    Will tt

## 2023-04-24 ENCOUNTER — TELEPHONE (OUTPATIENT)
Dept: OBGYN CLINIC | Facility: HOSPITAL | Age: 78
End: 2023-04-24

## 2023-04-24 NOTE — TELEPHONE ENCOUNTER
No, if she is improving then I would hold off   If she continues to be symptomatic despite cream, we can order

## 2023-04-24 NOTE — TELEPHONE ENCOUNTER
Caller: Patient    Doctor: Mariaelena Cerna    Reason for call: Patient calls in stating that her gel injection did not help her  She was seen on 4/18 and she still has so much pain in her right knee and can barely walk   She said this is unusual      Call back#: [phone]

## 2023-04-24 NOTE — TELEPHONE ENCOUNTER
Called and spoke w/pt and it is right in joint where she bends knee  No redness, swelling or warmth to area  Pt can alt ice/heat or whichever on helps  She is usually in the recliner  Pt is taking Vicodan for pain which is helping a little  Informed pt that it takes a while for gel to work  Pt states she has had before and it helped within 2 days  She thinks taking the fluid off helped  The outer side laterally right knee is tender but not red  Please advise

## 2023-04-24 NOTE — TELEPHONE ENCOUNTER
Patient did not  the diflucan, she is using the triamcinolone ointment and feels a little better, she has to go back to get the nystatin today ,they had to order that,

## 2023-05-02 DIAGNOSIS — E03.9 ACQUIRED HYPOTHYROIDISM: ICD-10-CM

## 2023-05-02 RX ORDER — LEVOTHYROXINE SODIUM 0.05 MG/1
50 TABLET ORAL DAILY
Qty: 90 TABLET | Refills: 0 | Status: SHIPPED | OUTPATIENT
Start: 2023-05-02

## 2023-05-03 ENCOUNTER — ANTICOAG VISIT (OUTPATIENT)
Dept: INTERNAL MEDICINE CLINIC | Facility: CLINIC | Age: 78
End: 2023-05-03

## 2023-05-10 DIAGNOSIS — I10 ESSENTIAL HYPERTENSION: ICD-10-CM

## 2023-05-10 RX ORDER — DILTIAZEM HYDROCHLORIDE 180 MG/1
CAPSULE, COATED, EXTENDED RELEASE ORAL
Qty: 90 CAPSULE | Refills: 1 | Status: SHIPPED | OUTPATIENT
Start: 2023-05-10

## 2023-05-12 DIAGNOSIS — I10 ESSENTIAL HYPERTENSION: ICD-10-CM

## 2023-05-12 RX ORDER — DILTIAZEM HYDROCHLORIDE 180 MG/1
180 CAPSULE, COATED, EXTENDED RELEASE ORAL DAILY
Qty: 90 CAPSULE | Refills: 1 | OUTPATIENT
Start: 2023-05-12

## 2023-05-15 ENCOUNTER — PROCEDURE VISIT (OUTPATIENT)
Dept: NEUROLOGY | Facility: CLINIC | Age: 78
End: 2023-05-15

## 2023-05-15 VITALS
SYSTOLIC BLOOD PRESSURE: 134 MMHG | BODY MASS INDEX: 28.68 KG/M2 | WEIGHT: 161.9 LBS | DIASTOLIC BLOOD PRESSURE: 60 MMHG | TEMPERATURE: 97.8 F | HEART RATE: 72 BPM

## 2023-05-15 DIAGNOSIS — G62.9 PERIPHERAL POLYNEUROPATHY: ICD-10-CM

## 2023-05-15 DIAGNOSIS — G25.0 ESSENTIAL TREMOR: Primary | ICD-10-CM

## 2023-05-15 NOTE — PROGRESS NOTES
Patient ID: Db Martínez is a 65 y o  female    Assessment/Plan:    Essential tremor  Essential tremor complicated by mildly slurred speech which may be in part stimulation induced  She has mild breakthrough tremors with postural and action, there is been no worsening  Tremors are have been fairly stable  Increases in stimulation may worsen speech  She is satisfied with current stimulation settings in response  She is able to perform all activities of daily living independently  Deep brain stimulator was interrogated given patient had noted changes in charging time would be charging her stimulator  No changes were made  Impedance was okay on both IPG's  We discussed lower battery burden when it comes to the left IPG  She does not have to charge as daily  She could try charting this every 2 or 3 days instead  She has been accustomed to charging her devices daily  Peripheral neuropathy  Overall stable and not painful  She has mild imbalance  No recent falls  Diagnoses and all orders for this visit:    Essential tremor    Peripheral polyneuropathy        Subjective:      Evon Bay is a left handed female with peripheral neuropathy, spinal stenosis, anxiety and essential tremor s/p bilateral VIM DBS on 5/1/14 with right Activa RC 6/20/17, left RC 12/4/19, who presents for follow up  To review, tremors began in her late 52's with a mild intentional tremor on the left  This has progressed with time and spread to the right side as well  Tremors have been overall stable  She has mild breakthrough tremors of both hands which are not impacting her ability to eat, drink, or dress herself  She continues to have mildly slurred speech  Overall feels symptoms have been fairly well controlled on current settings for some time now  Has mild imbalance but no recent falls  No new neurological symptoms  She denies any changes in cognition    Overall satisfied with current response to stimulation  Objective:    /60 (BP Location: Right arm, Patient Position: Sitting, Cuff Size: Standard)   Pulse 72   Temp 97 8 °F (36 6 °C)   Wt 73 4 kg (161 lb 14 4 oz)   BMI 28 68 kg/m²       Physical Exam  Vitals reviewed  Eyes:      Extraocular Movements: Extraocular movements intact  Neurological:      Motor: Motor strength is normal    Psychiatric:         Speech: Speech normal          Neurological Exam  Mental Status   Oriented only to person, place and situation  Recent and remote memory are intact  Speech is normal  Speech: Mildly slurred  Follows complex commands  Attention and concentration are normal     Cranial Nerves  CN III, IV, VI: Extraocular movements intact bilaterally  Right pupil: 1 mm  Left pupil: 1 mm  CN V: Facial sensation is normal   CN VII: Full and symmetric facial movement  CN VIII: Hearing is normal   CN IX, X: Palate elevates symmetrically  CN XI: Shoulder shrug strength is normal   CN XII: Tongue midline without atrophy or fasciculations  Motor   Normal muscle tone  Strength is 5/5 throughout all four extremities  Coordination    Moderate amplitude postural tremor worse with hands outstretched bilaterally  Slight tremor on right finger-to-nose  Mild tremor on left finger-to-nose  No rest tremor  No bradykinesia  No head or vocal tremor  Mildly slurred speech       Gait   Unable to rise from chair without using arms  Slowed, mildly wide-based          Deep brain stimulator interrogation:  Changes noted in charging time on left     Right VIM   Activa RC  Implanted: June 20, 2017  HARMEET: June 17, 2031  Battery: 100%  Impedance: ok   Charging 20-30min daily, %, excellent coupling    Impedance:  P1:800 ohms, 5 4mA  P2: 995 ohms, 3 8mA     Group C  P1: 0+1-, PW90, 125Hz, 4 3V (3 3-4 5V)  P2: 0+2-, PW90, 125Hz, 3 8V     Left VIM  Activa RC [id number]  [id number]  Iplanted: DDec 4, 2019  HARMEET: Dec 1, 2033  Battery: 100%  Impedance: ok  Charging daily 5-25 min, 100%, excellent coupling    Program imp: 1029ohms, 2 4mA     3+1-, PW70, 185Hz, 2 5V (0-3 1)          Current Outpatient Medications on File Prior to Visit   Medication Sig Dispense Refill   • albuterol (PROVENTIL HFA,VENTOLIN HFA) 90 mcg/act inhaler Inhale 2 puffs every 6 (six) hours as needed for wheezing 8 g 1   • amLODIPine (NORVASC) 2 5 mg tablet TAKE ONE TABLET BY MOUTH EVERY DAY 90 tablet 3   • Calcium Carb-Cholecalciferol (CALCIUM 600-D PO) Take 1 tablet by mouth 2 (two) times a day     • Coenzyme Q10 (CO Q-10 PO) Take 1 capsule by mouth daily     • diltiazem (CARDIZEM CD) 180 mg 24 hr capsule TAKE ONE CAPSULE BY MOUTH EVERY DAY 90 capsule 1   • diltiazem (CARDIZEM) 60 mg tablet Take 1 tablet (60 mg total) by mouth daily 90 tablet 1   • Eliquis 5 MG TAKE ONE TABLET BY MOUTH TWICE A  tablet 1   • escitalopram (LEXAPRO) 20 mg tablet TAKE ONE TABLET BY MOUTH EVERY DAY 90 tablet 1   • fluticasone (FLONASE) 50 mcg/act nasal spray INSTILL ONE SPRAY INTO EACH NOSTRIL ONCE DAILY AS NEEDED FOR RHINITIS 48 g 1   • fluticasone-salmeterol (Advair Diskus) 250-50 mcg/dose inhaler Inhale 1 puff 2 (two) times a day Rinse mouth after use 180 blister 3   • gabapentin (NEURONTIN) 600 MG tablet TAKE ONE TABLET BY MOUTH IN THE MORNING, ONE TABLET IN THE AFTERNNON, AND TWO TABLETS AT BEDTIME 360 tablet 2   • HYDROcodone-acetaminophen (NORCO) 5-325 mg per tablet Take 1 tablet by mouth every 6 (six) hours as needed for pain Max Daily Amount: 4 tablets 120 tablet 0   • levothyroxine 50 mcg tablet Take 1 tablet (50 mcg total) by mouth daily 90 tablet 0   • lisinopril (ZESTRIL) 20 mg tablet TAKE ONE TABLET BY MOUTH TWICE A  tablet 1   • LORazepam (ATIVAN) 1 mg tablet Take 1 tablet (1 mg total) by mouth every 6 (six) hours as needed for anxiety 120 tablet 0   • MAGNESIUM PO Take 1 tablet by mouth daily     • Multiple Vitamin (MULTIVITAMIN ADULT PO) Take 1 tablet by mouth daily     • Omega-3 Fatty Acids (FISH OIL PO) Take 1 capsule by mouth daily     • pantoprazole (PROTONIX) 40 mg tablet TAKE ONE TABLET BY MOUTH EVERY DAY 90 tablet 1   • potassium chloride (MICRO-K) 10 MEQ CR capsule Take 2 capsules daily 180 capsule 3   • simvastatin (ZOCOR) 5 MG tablet TAKE ONE TABLET BY MOUTH EVERY DAY 90 tablet 1   • sodium chloride 1 g tablet Take 1 tablet (1 g total) by mouth 3 (three) times a day 270 tablet 3   • torsemide (DEMADEX) 10 mg tablet Take 1 tablet (10 mg total) by mouth daily as needed (edema) 90 tablet 0   • vitamin B-12 (VITAMIN B-12) 500 mcg tablet Take 1 tablet (500 mcg total) by mouth daily 90 tablet 1   • nystatin (MYCOSTATIN) ointment Apply topically 2 (two) times a day (Patient not taking: Reported on 5/15/2023) 30 g 0   • predniSONE 5 mg tablet  (Patient not taking: Reported on 5/15/2023)     • triamcinolone (KENALOG) 0 1 % ointment Apply topically 2 (two) times a day (Patient not taking: Reported on 5/15/2023) 30 g 0     Current Facility-Administered Medications on File Prior to Visit   Medication Dose Route Frequency Provider Last Rate Last Admin   • cyanocobalamin injection 1,000 mcg  1,000 mcg Intramuscular Q30 Days Rashaad Ramirez MD   1,000 mcg at 04/20/23 1540 [facility] MD Steven  Movement disorder physician  [address]

## 2023-05-15 NOTE — PROGRESS NOTES
Review of Systems   Constitutional: Negative  Negative for appetite change and fever  HENT: Positive for voice change (ongoing)  Negative for hearing loss, tinnitus and trouble swallowing  Eyes: Negative  Negative for photophobia, pain and visual disturbance  Respiratory: Negative  Negative for shortness of breath  Cardiovascular: Negative  Negative for palpitations  Gastrointestinal: Negative  Negative for nausea and vomiting  Endocrine: Negative  Negative for cold intolerance  Genitourinary: Negative  Negative for dysuria, frequency and urgency  Musculoskeletal: Negative for gait problem, myalgias and neck pain  Balance Issues a little bit which has stayed the same     Skin: Negative  Negative for rash  Allergic/Immunologic: Negative  Neurological: Negative  Negative for dizziness, tremors, seizures, syncope, facial asymmetry, speech difficulty, weakness, light-headedness, numbness and headaches  Hematological: Negative  Does not bruise/bleed easily  Psychiatric/Behavioral: Negative  Negative for confusion, hallucinations and sleep disturbance  All other systems reviewed and are negative

## 2023-05-15 NOTE — ASSESSMENT & PLAN NOTE
Essential tremor complicated by mildly slurred speech which may be in part stimulation induced  She has mild breakthrough tremors with postural and action, there is been no worsening  Tremors are have been fairly stable  Increases in stimulation may worsen speech  She is satisfied with current stimulation settings in response  She is able to perform all activities of daily living independently  Deep brain stimulator was interrogated given patient had noted changes in charging time would be charging her stimulator  No changes were made  Impedance was okay on both IPG's  We discussed lower battery burden when it comes to the left IPG  She does not have to charge as daily  She could try charting this every 2 or 3 days instead  She has been accustomed to charging her devices daily

## 2023-05-18 ENCOUNTER — OFFICE VISIT (OUTPATIENT)
Dept: INTERNAL MEDICINE CLINIC | Facility: CLINIC | Age: 78
End: 2023-05-18

## 2023-05-18 ENCOUNTER — TELEPHONE (OUTPATIENT)
Dept: FAMILY MEDICINE CLINIC | Facility: CLINIC | Age: 78
End: 2023-05-18

## 2023-05-18 VITALS
HEIGHT: 63 IN | DIASTOLIC BLOOD PRESSURE: 80 MMHG | TEMPERATURE: 99.2 F | HEART RATE: 80 BPM | WEIGHT: 159 LBS | OXYGEN SATURATION: 95 % | BODY MASS INDEX: 28.17 KG/M2 | SYSTOLIC BLOOD PRESSURE: 116 MMHG

## 2023-05-18 DIAGNOSIS — Z11.59 ENCOUNTER FOR SCREENING FOR OTHER VIRAL DISEASES: ICD-10-CM

## 2023-05-18 DIAGNOSIS — R05.1 ACUTE COUGH: Primary | ICD-10-CM

## 2023-05-18 DIAGNOSIS — M51.36 LUMBAR DEGENERATIVE DISC DISEASE: ICD-10-CM

## 2023-05-18 LAB
SARS-COV-2 AG UPPER RESP QL IA: NEGATIVE
VALID CONTROL: NORMAL

## 2023-05-18 NOTE — ASSESSMENT & PLAN NOTE
Instructed to decrease gabapentin to 600 mg qHS (instead of 1200 mg)  If still very sleepy in the morning, recommend to stop or decrease gabapentin AM dose  Limit hydrocodone use

## 2023-05-18 NOTE — PATIENT INSTRUCTIONS
Take guaifenesin ( regular Mucinex or Robitussin) 2x a day  Stay hydrated  Use albuterol inhaler as needed  Decrease gabapentin, take 300 mg (one capsule) at night instead of 2 capsules  If you are still very sleepy in the morning, you can skip morning dose of gabapentin (300 mg)

## 2023-05-18 NOTE — TELEPHONE ENCOUNTER
Patient called stating I feel terrible  Symptoms of cough, sore throat, headache, no fever since Monday  Taking cough syrup and Mucinex with   no relief  Requesting to be seen

## 2023-05-18 NOTE — PROGRESS NOTES
Name: Kaleb Downing      : [id number]      MRN: [id number]  Encounter Provider: Juliana Silva MD  Encounter Date: 2023   Encounter department: [facility] Magallanes     1  Acute cough  Comments:  Suspect viral, continue symptomatic tx  Recommend against codiene cough syrup since very sleepy in AM already  Monitor for fevers  Orders:  -     XR chest pa & lateral; Future; Expected date: 2023    2  Lumbar degenerative disc disease  Assessment & Plan:  Instructed to decrease gabapentin to 600 mg qHS (instead of 1200 mg)  If still very sleepy in the morning, recommend to stop or decrease gabapentin AM dose  Limit hydrocodone use  3  Encounter for screening for other viral diseases  Comments:  Negative rapid COVID  Orders:  -     POCT Rapid Covid Ag           Subjective      Some started 4 days ago with nasal congestion, occasional dry cough  She was with her 2 sisters last week, 1 was sick  1 had pneumonia and the other bronchitis  Since she was with them, she started to develop symptoms soon after  She reports a mild sore throat which has since resolved  She reports frequent coughing episodes during the day and at night  She also notes occasional wheezing  She does have an albuterol inhaler which she has been using as needed  She has been taking a decongestant and cough medication with minimal relief  She reports no fever or chills  Denies any GI symptoms  She reports feeling very tired even before she was sick  She has been sleeping during the day after her morning coffee  She tried not taking any of her pain medications for 3 days which did not really help  She had developed increased knee and back pain when she did that  She continues to take gabapentin regularly  Review of Systems   Constitutional: Positive for fatigue  Negative for chills and fever  HENT: Positive for congestion, rhinorrhea and sore throat   Negative for ear pain, sinus pressure and sinus pain  Respiratory: Positive for cough and wheezing  Negative for shortness of breath  Cardiovascular: Negative for chest pain  Gastrointestinal: Negative for diarrhea, nausea and vomiting  Genitourinary: Negative for dysuria  Neurological: Negative for dizziness and headaches  Psychiatric/Behavioral: Positive for sleep disturbance         Current Outpatient Medications on File Prior to Visit   Medication Sig   • albuterol (PROVENTIL HFA,VENTOLIN HFA) 90 mcg/act inhaler Inhale 2 puffs every 6 (six) hours as needed for wheezing   • amLODIPine (NORVASC) 2 5 mg tablet TAKE ONE TABLET BY MOUTH EVERY DAY   • Calcium Carb-Cholecalciferol (CALCIUM 600-D PO) Take 1 tablet by mouth 2 (two) times a day   • Coenzyme Q10 (CO Q-10 PO) Take 1 capsule by mouth daily   • diltiazem (CARDIZEM CD) 180 mg 24 hr capsule TAKE ONE CAPSULE BY MOUTH EVERY DAY   • diltiazem (CARDIZEM) 60 mg tablet Take 1 tablet (60 mg total) by mouth daily   • Eliquis 5 MG TAKE ONE TABLET BY MOUTH TWICE A DAY   • escitalopram (LEXAPRO) 20 mg tablet TAKE ONE TABLET BY MOUTH EVERY DAY   • fluticasone (FLONASE) 50 mcg/act nasal spray INSTILL ONE SPRAY INTO EACH NOSTRIL ONCE DAILY AS NEEDED FOR RHINITIS   • fluticasone-salmeterol (Advair Diskus) 250-50 mcg/dose inhaler Inhale 1 puff 2 (two) times a day Rinse mouth after use   • gabapentin (NEURONTIN) 600 MG tablet TAKE ONE TABLET BY MOUTH IN THE MORNING, ONE TABLET IN THE AFTERNNON, AND TWO TABLETS AT BEDTIME   • HYDROcodone-acetaminophen (NORCO) 5-325 mg per tablet Take 1 tablet by mouth every 6 (six) hours as needed for pain Max Daily Amount: 4 tablets   • levothyroxine 50 mcg tablet Take 1 tablet (50 mcg total) by mouth daily   • lisinopril (ZESTRIL) 20 mg tablet TAKE ONE TABLET BY MOUTH TWICE A DAY   • LORazepam (ATIVAN) 1 mg tablet Take 1 tablet (1 mg total) by mouth every 6 (six) hours as needed for anxiety   • MAGNESIUM PO Take 1 tablet by mouth daily   • "Multiple Vitamin (MULTIVITAMIN ADULT PO) Take 1 tablet by mouth daily   • nystatin (MYCOSTATIN) ointment Apply topically 2 (two) times a day (Patient not taking: Reported on 5/15/2023)   • Omega-3 Fatty Acids (FISH OIL PO) Take 1 capsule by mouth daily   • pantoprazole (PROTONIX) 40 mg tablet TAKE ONE TABLET BY MOUTH EVERY DAY   • potassium chloride (MICRO-K) 10 MEQ CR capsule Take 2 capsules daily   • predniSONE 5 mg tablet  (Patient not taking: Reported on 5/15/2023)   • simvastatin (ZOCOR) 5 MG tablet TAKE ONE TABLET BY MOUTH EVERY DAY   • sodium chloride 1 g tablet Take 1 tablet (1 g total) by mouth 3 (three) times a day   • torsemide (DEMADEX) 10 mg tablet Take 1 tablet (10 mg total) by mouth daily as needed (edema)   • triamcinolone (KENALOG) 0 1 % ointment Apply topically 2 (two) times a day (Patient not taking: Reported on 5/15/2023)   • vitamin B-12 (VITAMIN B-12) 500 mcg tablet Take 1 tablet (500 mcg total) by mouth daily       Objective     /80   Pulse 80   Temp 99 2 °F (37 3 °C)   Ht 5' 3\" (1 6 m)   Wt 72 1 kg (159 lb)   SpO2 95%   BMI 28 17 kg/m²     Physical Exam  Constitutional:       General: She is not in acute distress  Appearance: She is ill-appearing  HENT:      Head: Normocephalic and atraumatic  Right Ear: Tympanic membrane, ear canal and external ear normal       Left Ear: Tympanic membrane, ear canal and external ear normal       Mouth/Throat:      Mouth: Mucous membranes are moist    Eyes:      Pupils: Pupils are equal, round, and reactive to light  Cardiovascular:      Rate and Rhythm: Normal rate and regular rhythm  Pulmonary:      Effort: Pulmonary effort is normal  No respiratory distress  Skin:     General: Skin is warm  Neurological:      General: No focal deficit present  Mental Status: She is alert     Psychiatric:         Mood and Affect: Mood normal        Rashaad Ramirez MD  "

## 2023-05-19 DIAGNOSIS — J30.89 NON-SEASONAL ALLERGIC RHINITIS DUE TO OTHER ALLERGIC TRIGGER: ICD-10-CM

## 2023-05-19 RX ORDER — FLUTICASONE PROPIONATE 50 MCG
SPRAY, SUSPENSION (ML) NASAL
Qty: 48 G | Refills: 1 | Status: SHIPPED | OUTPATIENT
Start: 2023-05-19

## 2023-05-23 NOTE — TELEPHONE ENCOUNTER
Spoke to patient   Cough has improved and feeling better   Scheduled for booster shot on Saturday , should she still get it ?

## 2023-05-25 NOTE — TELEPHONE ENCOUNTER
You can still get it, as long as you are feeling back to your usual self and all better  You can also consider delaying it for 1-2 weeks

## 2023-05-30 DIAGNOSIS — F41.9 ANXIETY: ICD-10-CM

## 2023-05-30 DIAGNOSIS — J45.40 MODERATE PERSISTENT ASTHMA WITHOUT COMPLICATION: ICD-10-CM

## 2023-05-30 DIAGNOSIS — E78.49 OTHER HYPERLIPIDEMIA: ICD-10-CM

## 2023-05-30 RX ORDER — SIMVASTATIN 10 MG
5 TABLET ORAL DAILY
Qty: 45 TABLET | Refills: 1 | Status: SHIPPED | OUTPATIENT
Start: 2023-05-30

## 2023-05-30 RX ORDER — FLUTICASONE PROPIONATE AND SALMETEROL 250; 50 UG/1; UG/1
1 POWDER RESPIRATORY (INHALATION) 2 TIMES DAILY
Qty: 60 BLISTER | Refills: 5 | Status: SHIPPED | OUTPATIENT
Start: 2023-05-30

## 2023-05-30 RX ORDER — LORAZEPAM 1 MG/1
1 TABLET ORAL EVERY 6 HOURS PRN
Qty: 120 TABLET | Refills: 0 | Status: SHIPPED | OUTPATIENT
Start: 2023-05-30

## 2023-05-30 NOTE — TELEPHONE ENCOUNTER
Received the following Audio message     This is [facility] at [phone] calling for Patient Abiel Hamilton  Sherrilldhruv H as in [facility] A/B as in [facility] [facility]  Date of birth of 1945  Doctor had has the patient on simvastatin 5 milligram one a day  It's on the  back order and we can't seem to get it from our local stores  Didn't know if the doctor would send the prescription for simvastatin 10 milligram where the patient would take a half a day  Any questions? Again, you can call us at [phone]  Thank you

## 2023-06-01 DIAGNOSIS — I10 ESSENTIAL HYPERTENSION: ICD-10-CM

## 2023-06-01 DIAGNOSIS — E87.1 HYPONATREMIA: ICD-10-CM

## 2023-06-01 NOTE — TELEPHONE ENCOUNTER
Patient left a voicemail stating she needs a med refill  I called patient and patient stated she is having problems with fluid  Patient stated it is in her ankles and the top of her feet  Patient stated the left is worse  Please advise         rMedication Refills   Person Calling In  Name if not patient Patient    Medication name  Torsemide    Medication Dosage  Medication Frequency 10mg    Pharmacy & Location [facility] in [place]    Additional Information        rMedication Refills   Person Calling In  Name if not patient Patient    Medication name  Sodium Chloride    Medication Dosage  Medication Frequency 1 gr 3 times a day    Pharmacy & Location [facility] in [place]    Additional Information

## 2023-06-04 RX ORDER — TORSEMIDE 10 MG/1
10 TABLET ORAL DAILY PRN
Qty: 90 TABLET | Refills: 0 | OUTPATIENT
Start: 2023-06-04

## 2023-06-04 RX ORDER — SODIUM CHLORIDE 1 G/1
1 TABLET ORAL 3 TIMES DAILY
Qty: 270 TABLET | Refills: 3 | OUTPATIENT
Start: 2023-06-04

## 2023-06-06 ENCOUNTER — TELEPHONE (OUTPATIENT)
Dept: NEPHROLOGY | Facility: CLINIC | Age: 78
End: 2023-06-06

## 2023-06-06 NOTE — TELEPHONE ENCOUNTER
Received a call from patient- pt is asking if she should have labs prior to her appointment with Joseph Elias on 6/8 to check her sodium  Patient uses [facility]

## 2023-06-06 NOTE — TELEPHONE ENCOUNTER
Called patient and let her know that she needs an appointment as she has not been seen by Nephrology since 2020  Patient agreed and she has an appointment with Mariella Humphrey on 6/8

## 2023-06-07 ENCOUNTER — APPOINTMENT (OUTPATIENT)
Dept: LAB | Facility: CLINIC | Age: 78
End: 2023-06-07
Payer: MEDICARE

## 2023-06-07 DIAGNOSIS — I10 ESSENTIAL HYPERTENSION: Primary | ICD-10-CM

## 2023-06-07 DIAGNOSIS — E87.1 HYPONATREMIA: ICD-10-CM

## 2023-06-07 LAB
ALBUMIN SERPL BCP-MCNC: 4 G/DL (ref 3.5–5)
ANION GAP SERPL CALCULATED.3IONS-SCNC: 8 MMOL/L (ref 4–13)
BUN SERPL-MCNC: 13 MG/DL (ref 5–25)
CALCIUM SERPL-MCNC: 9.1 MG/DL (ref 8.4–10.2)
CHLORIDE SERPL-SCNC: 90 MMOL/L (ref 96–108)
CO2 SERPL-SCNC: 28 MMOL/L (ref 21–32)
CREAT SERPL-MCNC: 0.91 MG/DL (ref 0.6–1.3)
CREAT UR-MCNC: 64 MG/DL
GFR SERPL CREATININE-BSD FRML MDRD: 60 ML/MIN/1.73SQ M
GLUCOSE SERPL-MCNC: 170 MG/DL (ref 65–140)
MICROALBUMIN UR-MCNC: 8.7 MG/L (ref 0–20)
MICROALBUMIN/CREAT 24H UR: 14 MG/G CREATININE (ref 0–30)
PHOSPHATE SERPL-MCNC: 3.7 MG/DL (ref 2.3–4.1)
POTASSIUM SERPL-SCNC: 5 MMOL/L (ref 3.5–5.3)
SODIUM SERPL-SCNC: 126 MMOL/L (ref 135–147)
URATE SERPL-MCNC: 5.6 MG/DL (ref 2–7.5)

## 2023-06-07 PROCEDURE — 82043 UR ALBUMIN QUANTITATIVE: CPT

## 2023-06-07 PROCEDURE — 36415 COLL VENOUS BLD VENIPUNCTURE: CPT

## 2023-06-07 PROCEDURE — 80069 RENAL FUNCTION PANEL: CPT

## 2023-06-07 PROCEDURE — 84550 ASSAY OF BLOOD/URIC ACID: CPT

## 2023-06-07 PROCEDURE — 82570 ASSAY OF URINE CREATININE: CPT

## 2023-06-08 ENCOUNTER — TELEPHONE (OUTPATIENT)
Dept: NEPHROLOGY | Facility: CLINIC | Age: 78
End: 2023-06-08

## 2023-06-08 ENCOUNTER — OFFICE VISIT (OUTPATIENT)
Dept: NEPHROLOGY | Facility: CLINIC | Age: 78
End: 2023-06-08
Payer: MEDICARE

## 2023-06-08 VITALS
BODY MASS INDEX: 28 KG/M2 | WEIGHT: 158 LBS | OXYGEN SATURATION: 98 % | SYSTOLIC BLOOD PRESSURE: 146 MMHG | HEIGHT: 63 IN | HEART RATE: 65 BPM | DIASTOLIC BLOOD PRESSURE: 70 MMHG

## 2023-06-08 DIAGNOSIS — E87.1 HYPONATREMIA: Primary | ICD-10-CM

## 2023-06-08 DIAGNOSIS — R60.0 LOCALIZED EDEMA: ICD-10-CM

## 2023-06-08 DIAGNOSIS — I10 ESSENTIAL HYPERTENSION: ICD-10-CM

## 2023-06-08 PROCEDURE — 99214 OFFICE O/P EST MOD 30 MIN: CPT | Performed by: PHYSICIAN ASSISTANT

## 2023-06-08 RX ORDER — SODIUM CHLORIDE 1 G/1
1 TABLET ORAL 3 TIMES DAILY
Qty: 270 TABLET | Refills: 3 | Status: SHIPPED | OUTPATIENT
Start: 2023-06-08

## 2023-06-08 NOTE — PATIENT INSTRUCTIONS
Increase sodium pills to 1g 4x/day for the next 3 days   Repeat sodium blood test and urine tests next week and we will call you with the results     Follow up in 6 months with Dr Kareem Cook

## 2023-06-08 NOTE — TELEPHONE ENCOUNTER
Called patient and a voicemail stating the following: your sodium chloride was already sent to her  pharmacy  Advised patient to please call the office to let us know she received the message

## 2023-06-08 NOTE — TELEPHONE ENCOUNTER
Patient is requesting a 3 month supply of her Sodium- sent to [facility] on [facility]

## 2023-06-08 NOTE — PROGRESS NOTES
NEPHROLOGY OUTPATIENT CONSULTATION   Rizwan Aragon 65 y o  female MRN: [id number]    Reason for consultation: Hyponatremia    ASSESSMENT and PLAN:  1  Hyponatremia: Multifactorial from SIADH and history of UC with J-pouch and chronic loose bowel movements  Is on lexapro but has been on this chronically  Sodium typically runs around 130-137 but most recently dropped to 126  · Euvolemic but does tend to drink high amounts of liquid throughout the day  · Increase sodium chloride to 1 g 4 times a day  · Cut down fluid intake  · Repeat BMP, urine sodium and urine osmolality in 1 week  2  Hypertension: BP acceptable  Continue current medications  3  LE edema: none on today's exam  Uses torsemide 10mg as needed     Will call her after results of repeat labs next week   Follow up in 6 months with Dr Preston Reaves:  Patient originally seen in our office in 2019 but has not been seen since that time  She represents today for hyponatremia follow-up  Overall she feels she has been doing well recently  She continues to have very watery stools from her chronic bowel issues  She thinks that she is eating okay but does drink a lot of liquids to try to compensate for GI losses  Reports occasional edema and takes as needed torsemide  Review of systems:A complete review of systems was performed  Pertinent positives and negatives noted in the HPI  Otherwise the review of systems was negative        PAST MEDICAL HISTORY:  Past Medical History:   Diagnosis Date   • Anemia    • Anxiety    • Arrhythmia    • Arthritis    • Asthma    • Blood clot in vein     portal vein   • Breast cancer ([facility] ) 02/12/2021   • Breast lump     [id number] RESOLVED   • Cancer ([facility] )    • Depression    • Disease of thyroid gland    • DVT, lower extremity (HCC)    • GERD (gastroesophageal reflux disease)    • Hyperlipidemia    • Hypertension    • Hypokalemia    • Hyponatremia    • Hypothyroidism    • Iron deficiency anemia    • Irregular heart beat    • Manic behavior (HCC)    • 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HCC)    • Osteoarthritis    • Palpitations     [id number]  RESOLVED   • Paroxysmal supraventricular tachycardia (HCC)    • PE (pulmonary thromboembolism) (HCC)    • Sjoegren syndrome    • Sleep apnea    • Sleep difficulties    • Spinal stenosis    • Thrombocytosis     [id number]  RESOLVED   • Tremors of nervous system     dbs implanted right and left chest   • Ulcerative colitis ([address] )    • Vertigo     [id number] RESOLVED       PAST SURGICAL HISTORY:  Past Surgical History:   Procedure Laterality Date   • ABDOMINAL SURGERY     • APPENDECTOMY     • BREAST BIOPSY Left 11/07/2019    Stereo   • BREAST EXCISIONAL BIOPSY Left     x many years   • BREAST SURGERY      lumpectomy & biopsy   • CARMELLA HOLE W/ STEREOTACTIC INSERTION OF DBS LEADS / INTRAOP MICROELECTRODE RECORDING     • COLON SURGERY     • COLONOSCOPY N/A 08/30/2017    Procedure: Abraham Manzanares;  Surgeon: Naren Mehta MD;  Location: [facility]; Service: Colorectal   • COLOPROCTECTOMY W/ ILEO J POUCH     • ESOPHAGOGASTRODUODENOSCOPY      ONSET 10/17/11   • FISTULA REPAIR      LLEOANAL FISTULA REPAIR TRANSPERIN TRANSABD APPROACH   • HYSTERECTOMY      age 43   • ILEOSTOMY CLOSURE     • KNEE ARTHROSCOPY      Right   • MAMMO STEREOTACTIC BREAST BIOPSY LEFT (ALL INC) Left 11/07/2019   • MAMMO STEREOTACTIC BREAST BIOPSY LEFT (ALL INC) Left 12/17/2020   • MAMMO STEREOTACTIC BREAST BIOPSY LEFT (ALL INC) EACH ADD Left 12/17/2020   • MASTECTOMY Left 02/12/2021    left mastectomy- Dr Michelle Ng   • MASTECTOMY W/ SENTINEL NODE BIOPSY Left 02/12/2021    Procedure: BREAST MASTECTOMY WITH SENTINEL LYMPH NODE BIOPSY, LYMPHATIC MAPPING WITH BLUE DYE AND RADIAOCTIVE DYE (INJECT AT 1100 BY DR GILLESPIE IN THE OR);   Surgeon: Cally Irvin MD;  Location: [facility];  Service: Surgical Oncology   • MN INSJ/RPLCMT CRANIAL NEUROSTIM PULSE GENERATOR Right 06/20/2017    Procedure: DBS GENERATOR REPLACEMENT;  Surgeon: Gilda Rudd Marilynn Marrufo MD;  Location:  MAIN OR;  Service: Neurosurgery   • SC INSJ/RPLCMT CRANIAL NEUROSTIM PULSE GENERATOR N/A 2019    Procedure: REPLACEMENT IMPLANTABLE PULSE GENERATOR FOR DEEP BRAIN STIMULATOR LEFT CHEST;  Surgeon: Opal Mcknight MD;  Location: [facility];  Service: Neurosurgery   • SPLENECTOMY     • TONSILLECTOMY         ALLERGIES:  Allergies   Allergen Reactions   • Indomethacin GI Intolerance and Dizziness   • Macrobid [Nitrofurantoin Monohyd Macro] Rash   • Nitrofurantoin Other (See Comments)   • Penicillins Rash     Annotation - [id number]: can take cephalosporins   • Sulfa Antibiotics Rash       SOCIAL HISTORY:  Social History     Substance and Sexual Activity   Alcohol Use Yes   • Alcohol/week: 2 0 standard drinks of alcohol   • Types: 2 Cans of beer per week     Social History     Substance and Sexual Activity   Drug Use No     Social History     Tobacco Use   Smoking Status Former   • Packs/day: 1 50   • Years: 5 00   • Total pack years: 7 50   • Types: Cigarettes   • Quit date: 1965   • Years since quittin 4   Smokeless Tobacco Never   Tobacco Comments    Quit       FAMILY HISTORY:  Family History   Problem Relation Age of Onset   • Venous thrombosis Mother         ACUTE VENOUS THROMBOSIS OF DEEP VESSELS OF THE DISTAL LOWER EXTREMITY   • Other Mother         PHLEBITIS   • Hypertension Mother    • Peripheral vascular disease Mother    • COPD Father    • Diabetes Father         MELLITUS   • Stroke Father    • Diabetes Sister         MELLITUS   • Sjogren's syndrome Sister    • No Known Problems Daughter    • No Known Problems Maternal Grandmother    • No Known Problems Maternal Grandfather    • No Known Problems Paternal Grandmother    • No Known Problems Paternal Grandfather    • No Known Problems Sister    • No Known Problems Maternal Aunt    • No Known Problems Paternal Aunt    • No Known Problems Son        MEDICATIONS:    Current Outpatient Medications:   •  albuterol (PROVENTIL HFA,VENTOLIN HFA) 90 mcg/act inhaler, Inhale 2 puffs every 6 (six) hours as needed for wheezing, Disp: 8 g, Rfl: 1  •  amLODIPine (NORVASC) 2 5 mg tablet, TAKE ONE TABLET BY MOUTH EVERY DAY, Disp: 90 tablet, Rfl: 3  •  Calcium Carb-Cholecalciferol (CALCIUM 600-D PO), Take 1 tablet by mouth 2 (two) times a day, Disp: , Rfl:   •  Coenzyme Q10 (CO Q-10 PO), Take 1 capsule by mouth daily, Disp: , Rfl:   •  diltiazem (CARDIZEM CD) 180 mg 24 hr capsule, TAKE ONE CAPSULE BY MOUTH EVERY DAY, Disp: 90 capsule, Rfl: 1  •  diltiazem (CARDIZEM) 60 mg tablet, Take 1 tablet (60 mg total) by mouth daily, Disp: 90 tablet, Rfl: 1  •  Eliquis 5 MG, TAKE ONE TABLET BY MOUTH TWICE A DAY, Disp: 180 tablet, Rfl: 1  •  escitalopram (LEXAPRO) 20 mg tablet, TAKE ONE TABLET BY MOUTH EVERY DAY, Disp: 90 tablet, Rfl: 1  •  fluticasone (FLONASE) 50 mcg/act nasal spray, APPLY ONE SPRAY IN EACH NOSTRIL ONCE DAILY AS NEEDED FOR RHINITIS, Disp: 48 g, Rfl: 1  •  Fluticasone-Salmeterol (Advair Diskus) 250-50 mcg/dose inhaler, Inhale 1 puff 2 (two) times a day Rinse mouth after use, Disp: 60 blister, Rfl: 5  •  gabapentin (NEURONTIN) 600 MG tablet, TAKE ONE TABLET BY MOUTH IN THE MORNING, ONE TABLET IN THE AFTERNNON, AND TWO TABLETS AT BEDTIME, Disp: 360 tablet, Rfl: 2  •  HYDROcodone-acetaminophen (NORCO) 5-325 mg per tablet, Take 1 tablet by mouth every 6 (six) hours as needed for pain Max Daily Amount: 4 tablets, Disp: 120 tablet, Rfl: 0  •  levothyroxine 50 mcg tablet, Take 1 tablet (50 mcg total) by mouth daily, Disp: 90 tablet, Rfl: 0  •  lisinopril (ZESTRIL) 20 mg tablet, TAKE ONE TABLET BY MOUTH TWICE A DAY, Disp: 180 tablet, Rfl: 1  •  LORazepam (ATIVAN) 1 mg tablet, Take 1 tablet (1 mg total) by mouth every 6 (six) hours as needed for anxiety, Disp: 120 tablet, Rfl: 0  •  MAGNESIUM PO, Take 1 tablet by mouth daily, Disp: , Rfl:   •  Multiple Vitamin (MULTIVITAMIN ADULT PO), Take 1 tablet by mouth daily, Disp: , Rfl:   •  Omega-3 Fatty Acids "(FISH OIL PO), Take 1 capsule by mouth daily, Disp: , Rfl:   •  pantoprazole (PROTONIX) 40 mg tablet, TAKE ONE TABLET BY MOUTH EVERY DAY, Disp: 90 tablet, Rfl: 1  •  potassium chloride (MICRO-K) 10 MEQ CR capsule, Take 2 capsules daily, Disp: 180 capsule, Rfl: 3  •  simvastatin (ZOCOR) 10 mg tablet, Take 0 5 tablets (5 mg total) by mouth daily, Disp: 45 tablet, Rfl: 1  •  sodium chloride 1 g tablet, Take 1 tablet (1 g total) by mouth 3 (three) times a day, Disp: 270 tablet, Rfl: 3  •  torsemide (DEMADEX) 10 mg tablet, Take 1 tablet (10 mg total) by mouth daily as needed (edema), Disp: 90 tablet, Rfl: 0  •  vitamin B-12 (VITAMIN B-12) 500 mcg tablet, Take 1 tablet (500 mcg total) by mouth daily, Disp: 90 tablet, Rfl: 1  •  nystatin (MYCOSTATIN) ointment, Apply topically 2 (two) times a day (Patient not taking: Reported on 5/15/2023), Disp: 30 g, Rfl: 0  •  predniSONE 5 mg tablet, , Disp: , Rfl:   •  triamcinolone (KENALOG) 0 1 % ointment, Apply topically 2 (two) times a day (Patient not taking: Reported on 5/15/2023), Disp: 30 g, Rfl: 0    Current Facility-Administered Medications:   •  cyanocobalamin injection 1,000 mcg, 1,000 mcg, Intramuscular, Q30 Days, Sugey Huang MD, 1,000 mcg at 04/20/23 1012        PHYSICAL EXAM:  Vitals:    06/08/23 1031   BP: 146/70   BP Location: Left arm   Patient Position: Sitting   Cuff Size: Adult   Pulse: 65   SpO2: 98%   Weight: 71 7 kg (158 lb)   Height: 5' 3\" (1 6 m)     General:  appears comfortable and in no acute distress   Skin:  No rash  Eyes:  Sclerae anicteric, no periorbital edema   ENT:  Moist mucous membranes  Neck:  Trachea midline, symmetric   Chest:  Clear to auscultation bilaterally with no wheezes, rales or rhonchi  CVS:  Regular rate and rhythm  Abdomen:  Soft, nontender, nondistended  Neuro:  Awake and alert  Psych:  Appropriate affect  Extremities: no lower extremity edema     Laboratory results:   Results for orders placed or performed in visit on " 06/07/23   Albumin / creatinine urine ratio   Result Value Ref Range    Creatinine, Ur 64 0 mg/dL    Albumin,U,Random 8 7 0 0 - 20 0 mg/L    Albumin Creat Ratio 14 0 - 30 mg/g creatinine   Renal function panel   Result Value Ref Range    Albumin 4 0 3 5 - 5 0 g/dL    Calcium 9 1 8 4 - 10 2 mg/dL    Phosphorus 3 7 2 3 - 4 1 mg/dL    Glucose 170 (H) 65 - 140 mg/dL    BUN 13 5 - 25 mg/dL    Creatinine 0 91 0 60 - 1 30 mg/dL    Sodium 126 (L) 135 - 147 mmol/L    Potassium 5 0 3 5 - 5 3 mmol/L    Chloride 90 (L) 96 - 108 mmol/L    CO2 28 21 - 32 mmol/L    ANION GAP 8 4 - 13 mmol/L    eGFR 60 ml/min/1 73sq m   Uric acid   Result Value Ref Range    Uric Acid 5 6 2 0 - 7 5 mg/dL     *Note: Due to a large number of results and/or encounters for the requested time period, some results have not been displayed  A complete set of results can be found in Results Review

## 2023-06-14 ENCOUNTER — TELEPHONE (OUTPATIENT)
Dept: INTERNAL MEDICINE CLINIC | Facility: CLINIC | Age: 78
End: 2023-06-14

## 2023-06-14 NOTE — TELEPHONE ENCOUNTER
I don't see that she's had recent iron infusions, is she seeing hematology? They should be ordering follow up labs

## 2023-06-15 ENCOUNTER — TELEPHONE (OUTPATIENT)
Dept: HEMATOLOGY ONCOLOGY | Facility: CLINIC | Age: 78
End: 2023-06-15

## 2023-06-15 ENCOUNTER — APPOINTMENT (OUTPATIENT)
Dept: LAB | Facility: AMBULARY SURGERY CENTER | Age: 78
End: 2023-06-15
Payer: MEDICARE

## 2023-06-15 DIAGNOSIS — D50.9 IRON DEFICIENCY ANEMIA, UNSPECIFIED IRON DEFICIENCY ANEMIA TYPE: ICD-10-CM

## 2023-06-15 DIAGNOSIS — E87.1 HYPONATREMIA: ICD-10-CM

## 2023-06-15 DIAGNOSIS — D50.9 IRON DEFICIENCY ANEMIA, UNSPECIFIED IRON DEFICIENCY ANEMIA TYPE: Primary | ICD-10-CM

## 2023-06-15 LAB
ANION GAP SERPL CALCULATED.3IONS-SCNC: 2 MMOL/L (ref 4–13)
BASOPHILS # BLD AUTO: 0.07 THOUSANDS/ÂΜL (ref 0–0.1)
BASOPHILS NFR BLD AUTO: 1 % (ref 0–1)
BUN SERPL-MCNC: 6 MG/DL (ref 5–25)
CALCIUM SERPL-MCNC: 9.3 MG/DL (ref 8.3–10.1)
CHLORIDE SERPL-SCNC: 104 MMOL/L (ref 96–108)
CO2 SERPL-SCNC: 28 MMOL/L (ref 21–32)
CREAT SERPL-MCNC: 0.75 MG/DL (ref 0.6–1.3)
EOSINOPHIL # BLD AUTO: 0.26 THOUSAND/ÂΜL (ref 0–0.61)
EOSINOPHIL NFR BLD AUTO: 4 % (ref 0–6)
ERYTHROCYTE [DISTWIDTH] IN BLOOD BY AUTOMATED COUNT: 19.3 % (ref 11.6–15.1)
FERRITIN SERPL-MCNC: 9 NG/ML (ref 11–307)
GFR SERPL CREATININE-BSD FRML MDRD: 76 ML/MIN/1.73SQ M
GLUCOSE P FAST SERPL-MCNC: 99 MG/DL (ref 65–99)
HCT VFR BLD AUTO: 26.8 % (ref 34.8–46.1)
HGB BLD-MCNC: 8.3 G/DL (ref 11.5–15.4)
IMM GRANULOCYTES # BLD AUTO: 0.02 THOUSAND/UL (ref 0–0.2)
IMM GRANULOCYTES NFR BLD AUTO: 0 % (ref 0–2)
LYMPHOCYTES # BLD AUTO: 1.93 THOUSANDS/ÂΜL (ref 0.6–4.47)
LYMPHOCYTES NFR BLD AUTO: 27 % (ref 14–44)
MCH RBC QN AUTO: 25.3 PG (ref 26.8–34.3)
MCHC RBC AUTO-ENTMCNC: 31 G/DL (ref 31.4–37.4)
MCV RBC AUTO: 82 FL (ref 82–98)
MONOCYTES # BLD AUTO: 1.19 THOUSAND/ÂΜL (ref 0.17–1.22)
MONOCYTES NFR BLD AUTO: 17 % (ref 4–12)
NEUTROPHILS # BLD AUTO: 3.64 THOUSANDS/ÂΜL (ref 1.85–7.62)
NEUTS SEG NFR BLD AUTO: 51 % (ref 43–75)
NRBC BLD AUTO-RTO: 0 /100 WBCS
OSMOLALITY UR: 179 MMOL/KG
PLATELET # BLD AUTO: 563 THOUSANDS/UL (ref 149–390)
PMV BLD AUTO: 8.6 FL (ref 8.9–12.7)
POTASSIUM SERPL-SCNC: 4.1 MMOL/L (ref 3.5–5.3)
RBC # BLD AUTO: 3.28 MILLION/UL (ref 3.81–5.12)
SODIUM 24H UR-SCNC: 33 MOL/L
SODIUM SERPL-SCNC: 134 MMOL/L (ref 135–147)
WBC # BLD AUTO: 7.11 THOUSAND/UL (ref 4.31–10.16)

## 2023-06-15 PROCEDURE — 84300 ASSAY OF URINE SODIUM: CPT | Performed by: PHYSICIAN ASSISTANT

## 2023-06-15 PROCEDURE — 36415 COLL VENOUS BLD VENIPUNCTURE: CPT

## 2023-06-15 PROCEDURE — 80048 BASIC METABOLIC PNL TOTAL CA: CPT

## 2023-06-15 PROCEDURE — 83935 ASSAY OF URINE OSMOLALITY: CPT | Performed by: PHYSICIAN ASSISTANT

## 2023-06-15 PROCEDURE — 82728 ASSAY OF FERRITIN: CPT

## 2023-06-15 PROCEDURE — 85025 COMPLETE CBC W/AUTO DIFF WBC: CPT

## 2023-06-15 NOTE — TELEPHONE ENCOUNTER
Lab Inquiry   Who are you speaking with? Patient     If it is not the patient, are they listed on an active communication consent form? N/A   Name of ordering provider Dr Melodie Boyle   What is being requested? Lab orders need to be entered   Lab draw location [address] [place] [address]   What is the best call back number? [phone]                                                     Patient is stating the office is suppose to be putting in blood work for her  I asked the patient what is the blood work for and she said it was for infusions

## 2023-06-15 NOTE — TELEPHONE ENCOUNTER
Returned call to patient  Patient states she is feeling very tired and would like to see if her ferritin level has dropped  Lab orders entered    Patient aware

## 2023-06-16 ENCOUNTER — TELEPHONE (OUTPATIENT)
Dept: HEMATOLOGY ONCOLOGY | Facility: CLINIC | Age: 78
End: 2023-06-16

## 2023-06-16 ENCOUNTER — TELEPHONE (OUTPATIENT)
Dept: NEPHROLOGY | Facility: CLINIC | Age: 78
End: 2023-06-16

## 2023-06-16 DIAGNOSIS — D50.9 IRON DEFICIENCY ANEMIA, UNSPECIFIED IRON DEFICIENCY ANEMIA TYPE: Primary | ICD-10-CM

## 2023-06-16 RX ORDER — SODIUM CHLORIDE 9 MG/ML
20 INJECTION, SOLUTION INTRAVENOUS ONCE
Status: CANCELLED | OUTPATIENT
Start: 2023-06-22

## 2023-06-16 NOTE — TELEPHONE ENCOUNTER
Lab Inquiry   Who are you speaking with? Patient     If it is not the patient, are they listed on an active communication consent form? N/A   Name of ordering provider Dr Elsa Sanchez   What is being requested? Lab results to be reviewed   [facility]   What is the best call back number?  [phone]

## 2023-06-16 NOTE — TELEPHONE ENCOUNTER
What would be a preferred day of the week that would work best for your infusion appointment? Any day but Wednesday mornings  Do you prefer mornings or afternoons for your appointments? AM  Are there any days or dates that do not work for your schedule, including any upcoming vacations? N/a  We are going to try our best to schedule you at the infusion center closest to your home  In the event that we are unable to what would be your next preferred infusion site or sites? [facility]    1  [facility]  2  [facility]    Do you have transportation to take you to all of your appointments?  yes

## 2023-06-16 NOTE — TELEPHONE ENCOUNTER
Patient Call    Who are you speaking with? Patient    If it is not the patient, are they listed on an active communication consent form? N/A   What is the reason for this call? Pt called to check if Dr Jaron Navarrete reviewed her labs   Does this require a call back? Yes   If a call back is required, please list [facility] call back number [phone]   If a call back is required, advise that a message will be forwarded to their care team and someone will return their call as soon as possible  Did you relay this information to the patient?  Yes

## 2023-06-16 NOTE — TELEPHONE ENCOUNTER
Pt called and wanted to discuss lab results, pt seen results in my chart and doesn't understand what they mean if someone can call pt to explain

## 2023-06-16 NOTE — TELEPHONE ENCOUNTER
Returned call to patient with Dr Selam Damon recommendations for monthly venofer  Patient aware and agreeable for monthly venofer  Will have infusion schedulers reach out to schedule

## 2023-06-19 NOTE — TELEPHONE ENCOUNTER
Called patient and spoke with Danilo Lopez went over the following information: Her recent sodium level was better at 134  The urine tests she had done were just to help us see if we needed to change her management but they looked ok so no changes at this time  Bill expressed understanding  No further questions at this time

## 2023-06-20 ENCOUNTER — TELEPHONE (OUTPATIENT)
Dept: NEPHROLOGY | Facility: CLINIC | Age: 78
End: 2023-06-20

## 2023-06-21 DIAGNOSIS — M54.16 LUMBAR RADICULOPATHY: ICD-10-CM

## 2023-06-21 RX ORDER — HYDROCODONE BITARTRATE AND ACETAMINOPHEN 5; 325 MG/1; MG/1
1 TABLET ORAL EVERY 6 HOURS PRN
Qty: 120 TABLET | Refills: 0 | Status: SHIPPED | OUTPATIENT
Start: 2023-06-21

## 2023-06-22 ENCOUNTER — HOSPITAL ENCOUNTER (OUTPATIENT)
Facility: HOSPITAL | Age: 78
Setting detail: OBSERVATION
Discharge: HOME | End: 2023-06-23
Attending: EMERGENCY MEDICINE | Admitting: INTERNAL MEDICINE
Payer: MEDICARE

## 2023-06-22 ENCOUNTER — HOSPITAL ENCOUNTER (OUTPATIENT)
Dept: INFUSION CENTER | Facility: CLINIC | Age: 78
Discharge: HOME | End: 2023-06-22
Payer: MEDICARE

## 2023-06-22 ENCOUNTER — APPOINTMENT (EMERGENCY)
Dept: RADIOLOGY | Facility: HOSPITAL | Age: 78
End: 2023-06-22
Payer: MEDICARE

## 2023-06-22 ENCOUNTER — APPOINTMENT (EMERGENCY)
Dept: CT IMAGING | Facility: HOSPITAL | Age: 78
End: 2023-06-22
Payer: MEDICARE

## 2023-06-22 VITALS
OXYGEN SATURATION: 94 % | SYSTOLIC BLOOD PRESSURE: 119 MMHG | RESPIRATION RATE: 16 BRPM | TEMPERATURE: 98.7 F | HEART RATE: 80 BPM | DIASTOLIC BLOOD PRESSURE: 57 MMHG

## 2023-06-22 DIAGNOSIS — R50.9 FEVER: Primary | ICD-10-CM

## 2023-06-22 DIAGNOSIS — E87.6 HYPOKALEMIA: ICD-10-CM

## 2023-06-22 DIAGNOSIS — D50.8 OTHER IRON DEFICIENCY ANEMIA: ICD-10-CM

## 2023-06-22 DIAGNOSIS — D50.9 IRON DEFICIENCY ANEMIA, UNSPECIFIED IRON DEFICIENCY ANEMIA TYPE: Primary | ICD-10-CM

## 2023-06-22 DIAGNOSIS — E83.42 HYPOMAGNESEMIA: ICD-10-CM

## 2023-06-22 PROBLEM — R65.10 SIRS (SYSTEMIC INFLAMMATORY RESPONSE SYNDROME) (HCC): Status: ACTIVE | Noted: 2023-06-22

## 2023-06-22 LAB
ALBUMIN SERPL BCP-MCNC: 4.3 G/DL (ref 3.5–5)
ALP SERPL-CCNC: 67 U/L (ref 34–104)
ALT SERPL W P-5'-P-CCNC: 16 U/L (ref 7–52)
ANION GAP SERPL CALCULATED.3IONS-SCNC: 13 MMOL/L
APTT PPP: 30 SECONDS (ref 23–37)
AST SERPL W P-5'-P-CCNC: 26 U/L (ref 13–39)
BASE EX.OXY STD BLDV CALC-SCNC: 74.6 % (ref 60–80)
BASE EXCESS BLDV CALC-SCNC: 7.5 MMOL/L
BASOPHILS # BLD AUTO: 0.03 THOUSANDS/ÂΜL (ref 0–0.1)
BASOPHILS NFR BLD AUTO: 0 % (ref 0–1)
BILIRUB SERPL-MCNC: 0.61 MG/DL (ref 0.2–1)
BILIRUB UR QL STRIP: NEGATIVE
BUN SERPL-MCNC: 7 MG/DL (ref 5–25)
CALCIUM SERPL-MCNC: 9.7 MG/DL (ref 8.4–10.2)
CHLORIDE SERPL-SCNC: 97 MMOL/L (ref 96–108)
CLARITY UR: CLEAR
CO2 SERPL-SCNC: 30 MMOL/L (ref 21–32)
COLOR UR: COLORLESS
CREAT SERPL-MCNC: 0.84 MG/DL (ref 0.6–1.3)
D DIMER PPP FEU-MCNC: 1.33 UG/ML FEU
EOSINOPHIL # BLD AUTO: 0.19 THOUSAND/ÂΜL (ref 0–0.61)
EOSINOPHIL NFR BLD AUTO: 2 % (ref 0–6)
ERYTHROCYTE [DISTWIDTH] IN BLOOD BY AUTOMATED COUNT: 19.1 % (ref 11.6–15.1)
FLUAV RNA RESP QL NAA+PROBE: NEGATIVE
FLUBV RNA RESP QL NAA+PROBE: NEGATIVE
GFR SERPL CREATININE-BSD FRML MDRD: 66 ML/MIN/1.73SQ M
GLUCOSE SERPL-MCNC: 101 MG/DL (ref 65–140)
GLUCOSE UR STRIP-MCNC: NEGATIVE MG/DL
HCO3 BLDV-SCNC: 31.4 MMOL/L (ref 24–30)
HCT VFR BLD AUTO: 31 % (ref 34.8–46.1)
HGB BLD-MCNC: 9.6 G/DL (ref 11.5–15.4)
HGB UR QL STRIP.AUTO: NEGATIVE
IMM GRANULOCYTES # BLD AUTO: 0.03 THOUSAND/UL (ref 0–0.2)
IMM GRANULOCYTES NFR BLD AUTO: 0 % (ref 0–2)
INR PPP: 1.09 (ref 0.84–1.19)
KETONES UR STRIP-MCNC: NEGATIVE MG/DL
LACTATE SERPL-SCNC: 2 MMOL/L (ref 0.5–2)
LEUKOCYTE ESTERASE UR QL STRIP: NEGATIVE
LIPASE SERPL-CCNC: 18 U/L (ref 11–82)
LYMPHOCYTES # BLD AUTO: 1.3 THOUSANDS/ÂΜL (ref 0.6–4.47)
LYMPHOCYTES NFR BLD AUTO: 14 % (ref 14–44)
MAGNESIUM SERPL-MCNC: 1.4 MG/DL (ref 1.9–2.7)
MCH RBC QN AUTO: 24.2 PG (ref 26.8–34.3)
MCHC RBC AUTO-ENTMCNC: 31 G/DL (ref 31.4–37.4)
MCV RBC AUTO: 78 FL (ref 82–98)
MONOCYTES # BLD AUTO: 0.37 THOUSAND/ÂΜL (ref 0.17–1.22)
MONOCYTES NFR BLD AUTO: 4 % (ref 4–12)
NEUTROPHILS # BLD AUTO: 7.41 THOUSANDS/ÂΜL (ref 1.85–7.62)
NEUTS SEG NFR BLD AUTO: 80 % (ref 43–75)
NITRITE UR QL STRIP: NEGATIVE
NRBC BLD AUTO-RTO: 0 /100 WBCS
O2 CT BLDV-SCNC: 10.3 ML/DL
PCO2 BLDV: 41.9 MM HG (ref 42–50)
PH BLDV: 7.49 [PH] (ref 7.3–7.4)
PH UR STRIP.AUTO: 6 [PH]
PLATELET # BLD AUTO: 601 THOUSANDS/UL (ref 149–390)
PMV BLD AUTO: 8.5 FL (ref 8.9–12.7)
PO2 BLDV: 38.9 MM HG (ref 35–45)
POTASSIUM SERPL-SCNC: 3 MMOL/L (ref 3.5–5.3)
PROCALCITONIN SERPL-MCNC: 0.15 NG/ML
PROT SERPL-MCNC: 7.1 G/DL (ref 6.4–8.4)
PROT UR STRIP-MCNC: NEGATIVE MG/DL
PROTHROMBIN TIME: 14.3 SECONDS (ref 11.6–14.5)
RBC # BLD AUTO: 3.96 MILLION/UL (ref 3.81–5.12)
RSV RNA RESP QL NAA+PROBE: NEGATIVE
SARS-COV-2 RNA RESP QL NAA+PROBE: NEGATIVE
SODIUM SERPL-SCNC: 140 MMOL/L (ref 135–147)
SP GR UR STRIP.AUTO: 1.01 (ref 1–1.03)
UROBILINOGEN UR STRIP-ACNC: <2 MG/DL
WBC # BLD AUTO: 9.33 THOUSAND/UL (ref 4.31–10.16)

## 2023-06-22 PROCEDURE — 85610 PROTHROMBIN TIME: CPT

## 2023-06-22 PROCEDURE — 82805 BLOOD GASES W/O2 SATURATION: CPT

## 2023-06-22 PROCEDURE — 84443 ASSAY THYROID STIM HORMONE: CPT | Performed by: INTERNAL MEDICINE

## 2023-06-22 PROCEDURE — 83605 ASSAY OF LACTIC ACID: CPT

## 2023-06-22 PROCEDURE — 36415 COLL VENOUS BLD VENIPUNCTURE: CPT

## 2023-06-22 PROCEDURE — 96365 THER/PROPH/DIAG IV INF INIT: CPT

## 2023-06-22 PROCEDURE — 85025 COMPLETE CBC W/AUTO DIFF WBC: CPT

## 2023-06-22 PROCEDURE — 71045 X-RAY EXAM CHEST 1 VIEW: CPT

## 2023-06-22 PROCEDURE — 99285 EMERGENCY DEPT VISIT HI MDM: CPT

## 2023-06-22 PROCEDURE — 83735 ASSAY OF MAGNESIUM: CPT

## 2023-06-22 PROCEDURE — 85730 THROMBOPLASTIN TIME PARTIAL: CPT

## 2023-06-22 PROCEDURE — 84145 PROCALCITONIN (PCT): CPT

## 2023-06-22 PROCEDURE — 87040 BLOOD CULTURE FOR BACTERIA: CPT

## 2023-06-22 PROCEDURE — 96366 THER/PROPH/DIAG IV INF ADDON: CPT

## 2023-06-22 PROCEDURE — 96374 THER/PROPH/DIAG INJ IV PUSH: CPT

## 2023-06-22 PROCEDURE — 99285 EMERGENCY DEPT VISIT HI MDM: CPT | Performed by: EMERGENCY MEDICINE

## 2023-06-22 PROCEDURE — 93005 ELECTROCARDIOGRAM TRACING: CPT

## 2023-06-22 PROCEDURE — 80053 COMPREHEN METABOLIC PANEL: CPT

## 2023-06-22 PROCEDURE — 83690 ASSAY OF LIPASE: CPT

## 2023-06-22 PROCEDURE — 96368 THER/DIAG CONCURRENT INF: CPT

## 2023-06-22 PROCEDURE — 81003 URINALYSIS AUTO W/O SCOPE: CPT

## 2023-06-22 PROCEDURE — 96375 TX/PRO/DX INJ NEW DRUG ADDON: CPT

## 2023-06-22 PROCEDURE — 85379 FIBRIN DEGRADATION QUANT: CPT

## 2023-06-22 PROCEDURE — G1004 CDSM NDSC: HCPCS

## 2023-06-22 PROCEDURE — 0241U HB NFCT DS VIR RESP RNA 4 TRGT: CPT

## 2023-06-22 PROCEDURE — 71275 CT ANGIOGRAPHY CHEST: CPT

## 2023-06-22 RX ORDER — FLUTICASONE FUROATE AND VILANTEROL 100; 25 UG/1; UG/1
1 POWDER RESPIRATORY (INHALATION)
Status: DISCONTINUED | OUTPATIENT
Start: 2023-06-23 | End: 2023-06-23 | Stop reason: HOSPADM

## 2023-06-22 RX ORDER — HYDROCODONE BITARTRATE AND ACETAMINOPHEN 5; 325 MG/1; MG/1
1 TABLET ORAL EVERY 6 HOURS PRN
Status: DISCONTINUED | OUTPATIENT
Start: 2023-06-22 | End: 2023-06-23 | Stop reason: HOSPADM

## 2023-06-22 RX ORDER — PANTOPRAZOLE SODIUM 40 MG/1
40 TABLET, DELAYED RELEASE ORAL DAILY
Status: DISCONTINUED | OUTPATIENT
Start: 2023-06-23 | End: 2023-06-23 | Stop reason: HOSPADM

## 2023-06-22 RX ORDER — SODIUM CHLORIDE 9 MG/ML
20 INJECTION, SOLUTION INTRAVENOUS ONCE
Status: COMPLETED | OUTPATIENT
Start: 2023-06-22 | End: 2023-06-22

## 2023-06-22 RX ORDER — MAGNESIUM SULFATE HEPTAHYDRATE 40 MG/ML
2 INJECTION, SOLUTION INTRAVENOUS ONCE
Status: COMPLETED | OUTPATIENT
Start: 2023-06-22 | End: 2023-06-22

## 2023-06-22 RX ORDER — GABAPENTIN 300 MG/1
300 CAPSULE ORAL
Status: DISCONTINUED | OUTPATIENT
Start: 2023-06-23 | End: 2023-06-23 | Stop reason: HOSPADM

## 2023-06-22 RX ORDER — LOPERAMIDE HYDROCHLORIDE 2 MG/1
2 CAPSULE ORAL 3 TIMES DAILY PRN
Status: DISCONTINUED | OUTPATIENT
Start: 2023-06-22 | End: 2023-06-23 | Stop reason: HOSPADM

## 2023-06-22 RX ORDER — ACETAMINOPHEN 325 MG/1
975 TABLET ORAL ONCE
Status: COMPLETED | OUTPATIENT
Start: 2023-06-22 | End: 2023-06-22

## 2023-06-22 RX ORDER — POTASSIUM CHLORIDE 20 MEQ/1
40 TABLET, EXTENDED RELEASE ORAL ONCE
Status: COMPLETED | OUTPATIENT
Start: 2023-06-22 | End: 2023-06-22

## 2023-06-22 RX ORDER — LORAZEPAM 1 MG/1
1 TABLET ORAL EVERY 6 HOURS PRN
Status: DISCONTINUED | OUTPATIENT
Start: 2023-06-22 | End: 2023-06-23 | Stop reason: HOSPADM

## 2023-06-22 RX ORDER — LISINOPRIL 20 MG/1
20 TABLET ORAL 2 TIMES DAILY
Status: DISCONTINUED | OUTPATIENT
Start: 2023-06-22 | End: 2023-06-23 | Stop reason: HOSPADM

## 2023-06-22 RX ORDER — GABAPENTIN 300 MG/1
900 CAPSULE ORAL
Status: DISCONTINUED | OUTPATIENT
Start: 2023-06-22 | End: 2023-06-23 | Stop reason: HOSPADM

## 2023-06-22 RX ORDER — AMLODIPINE BESYLATE 2.5 MG/1
2.5 TABLET ORAL DAILY
Status: DISCONTINUED | OUTPATIENT
Start: 2023-06-23 | End: 2023-06-23 | Stop reason: HOSPADM

## 2023-06-22 RX ORDER — ESCITALOPRAM OXALATE 20 MG/1
20 TABLET ORAL DAILY
Status: DISCONTINUED | OUTPATIENT
Start: 2023-06-23 | End: 2023-06-23 | Stop reason: HOSPADM

## 2023-06-22 RX ORDER — SODIUM CHLORIDE 1 G/1
1 TABLET ORAL 3 TIMES DAILY
Status: DISCONTINUED | OUTPATIENT
Start: 2023-06-22 | End: 2023-06-23 | Stop reason: HOSPADM

## 2023-06-22 RX ORDER — DILTIAZEM HYDROCHLORIDE 180 MG/1
180 CAPSULE, COATED, EXTENDED RELEASE ORAL
Status: DISCONTINUED | OUTPATIENT
Start: 2023-06-22 | End: 2023-06-23 | Stop reason: HOSPADM

## 2023-06-22 RX ORDER — SODIUM CHLORIDE 9 MG/ML
20 INJECTION, SOLUTION INTRAVENOUS ONCE
OUTPATIENT
Start: 2023-07-19

## 2023-06-22 RX ORDER — LEVOTHYROXINE SODIUM 0.05 MG/1
50 TABLET ORAL
Status: DISCONTINUED | OUTPATIENT
Start: 2023-06-23 | End: 2023-06-23 | Stop reason: HOSPADM

## 2023-06-22 RX ORDER — PRAVASTATIN SODIUM 10 MG
10 TABLET ORAL
Status: DISCONTINUED | OUTPATIENT
Start: 2023-06-23 | End: 2023-06-23 | Stop reason: HOSPADM

## 2023-06-22 RX ORDER — ACETAMINOPHEN 325 MG/1
650 TABLET ORAL EVERY 6 HOURS PRN
Status: DISCONTINUED | OUTPATIENT
Start: 2023-06-22 | End: 2023-06-23 | Stop reason: HOSPADM

## 2023-06-22 RX ORDER — DILTIAZEM HYDROCHLORIDE 60 MG/1
60 TABLET, FILM COATED ORAL
Status: DISCONTINUED | OUTPATIENT
Start: 2023-06-23 | End: 2023-06-23 | Stop reason: HOSPADM

## 2023-06-22 RX ORDER — ALBUTEROL SULFATE 90 UG/1
2 AEROSOL, METERED RESPIRATORY (INHALATION) EVERY 6 HOURS PRN
Status: DISCONTINUED | OUTPATIENT
Start: 2023-06-22 | End: 2023-06-23 | Stop reason: HOSPADM

## 2023-06-22 RX ORDER — ONDANSETRON 2 MG/ML
4 INJECTION INTRAMUSCULAR; INTRAVENOUS ONCE
Status: COMPLETED | OUTPATIENT
Start: 2023-06-22 | End: 2023-06-22

## 2023-06-22 RX ORDER — GABAPENTIN 300 MG/1
600 CAPSULE ORAL
Status: DISCONTINUED | OUTPATIENT
Start: 2023-06-23 | End: 2023-06-23 | Stop reason: HOSPADM

## 2023-06-22 RX ADMIN — IRON SUCROSE 200 MG: 20 INJECTION, SOLUTION INTRAVENOUS at 08:39

## 2023-06-22 RX ADMIN — POTASSIUM CHLORIDE 40 MEQ: 1500 TABLET, EXTENDED RELEASE ORAL at 18:09

## 2023-06-22 RX ADMIN — GABAPENTIN 900 MG: 300 CAPSULE ORAL at 23:52

## 2023-06-22 RX ADMIN — ONDANSETRON 4 MG: 2 INJECTION INTRAMUSCULAR; INTRAVENOUS at 17:32

## 2023-06-22 RX ADMIN — IOHEXOL 85 ML: 350 INJECTION, SOLUTION INTRAVENOUS at 19:08

## 2023-06-22 RX ADMIN — APIXABAN 5 MG: 5 TABLET, FILM COATED ORAL at 22:16

## 2023-06-22 RX ADMIN — SODIUM CHLORIDE, SODIUM LACTATE, POTASSIUM CHLORIDE, AND CALCIUM CHLORIDE 500 ML: .6; .31; .03; .02 INJECTION, SOLUTION INTRAVENOUS at 17:31

## 2023-06-22 RX ADMIN — SODIUM CHLORIDE 20 ML/HR: 0.9 INJECTION, SOLUTION INTRAVENOUS at 08:39

## 2023-06-22 RX ADMIN — MAGNESIUM SULFATE HEPTAHYDRATE 2 G: 40 INJECTION, SOLUTION INTRAVENOUS at 18:15

## 2023-06-22 RX ADMIN — LOPERAMIDE HYDROCHLORIDE 2 MG: 2 CAPSULE ORAL at 23:52

## 2023-06-22 RX ADMIN — ACETAMINOPHEN 975 MG: 325 TABLET ORAL at 16:47

## 2023-06-22 RX ADMIN — LORAZEPAM 1 MG: 1 TABLET ORAL at 22:24

## 2023-06-22 RX ADMIN — SODIUM CHLORIDE 1 G: 1 TABLET ORAL at 22:16

## 2023-06-22 RX ADMIN — SODIUM CHLORIDE, SODIUM LACTATE, POTASSIUM CHLORIDE, AND CALCIUM CHLORIDE 500 ML: .6; .31; .03; .02 INJECTION, SOLUTION INTRAVENOUS at 16:46

## 2023-06-22 NOTE — ED PROVIDER NOTES
History  Chief Complaint   Patient presents with   • Fever     Pt reports having iron infusion today and around 2pm started with chills, headache, and nausea  Has had iron infusions in the past       HPI    Prior to Admission Medications   Prescriptions Last Dose Informant Patient Reported? Taking?    Calcium Carb-Cholecalciferol (CALCIUM 600-D PO)  Self Yes No   Sig: Take 1 tablet by mouth 2 (two) times a day   Coenzyme Q10 (CO Q-10 PO)  Self Yes No   Sig: Take 1 capsule by mouth daily   Eliquis 5 MG   No No   Sig: TAKE ONE TABLET BY MOUTH TWICE A DAY   Fluticasone-Salmeterol (Advair Diskus) 250-50 mcg/dose inhaler   No No   Sig: Inhale 1 puff 2 (two) times a day Rinse mouth after use   HYDROcodone-acetaminophen (NORCO) 5-325 mg per tablet   No No   Sig: Take 1 tablet by mouth every 6 (six) hours as needed for pain Max Daily Amount: 4 tablets   LORazepam (ATIVAN) 1 mg tablet   No No   Sig: Take 1 tablet (1 mg total) by mouth every 6 (six) hours as needed for anxiety   MAGNESIUM PO  Self Yes No   Sig: Take 1 tablet by mouth daily   Multiple Vitamin (MULTIVITAMIN ADULT PO)  Self Yes No   Sig: Take 1 tablet by mouth daily   Omega-3 Fatty Acids (FISH OIL PO)  Self Yes No   Sig: Take 1 capsule by mouth daily   albuterol (PROVENTIL HFA,VENTOLIN HFA) 90 mcg/act inhaler  Self No No   Sig: Inhale 2 puffs every 6 (six) hours as needed for wheezing   amLODIPine (NORVASC) 2 5 mg tablet  Self No No   Sig: TAKE ONE TABLET BY MOUTH EVERY DAY   diltiazem (CARDIZEM CD) 180 mg 24 hr capsule   No No   Sig: TAKE ONE CAPSULE BY MOUTH EVERY DAY   diltiazem (CARDIZEM) 60 mg tablet  Self No No   Sig: Take 1 tablet (60 mg total) by mouth daily   escitalopram (LEXAPRO) 20 mg tablet  Self No No   Sig: TAKE ONE TABLET BY MOUTH EVERY DAY   fluticasone (FLONASE) 50 mcg/act nasal spray   No No   Sig: APPLY ONE SPRAY IN EACH NOSTRIL ONCE DAILY AS NEEDED FOR RHINITIS   gabapentin (NEURONTIN) 600 MG tablet  Self No No   Sig: TAKE ONE TABLET BY MOUTH IN THE MORNING, ONE TABLET IN THE AFTERNNON, AND TWO TABLETS AT BEDTIME   levothyroxine 50 mcg tablet   No No   Sig: Take 1 tablet (50 mcg total) by mouth daily   lisinopril (ZESTRIL) 20 mg tablet  Self No No   Sig: TAKE ONE TABLET BY MOUTH TWICE A DAY   nystatin (MYCOSTATIN) ointment   No No   Sig: Apply topically 2 (two) times a day   Patient not taking: Reported on 5/15/2023   pantoprazole (PROTONIX) 40 mg tablet  Self No No   Sig: TAKE ONE TABLET BY MOUTH EVERY DAY   potassium chloride (MICRO-K) 10 MEQ CR capsule  Self No No   Sig: Take 2 capsules daily   predniSONE 5 mg tablet  Self Yes No   Patient not taking: Reported on 5/15/2023   simvastatin (ZOCOR) 10 mg tablet   No No   Sig: Take 0 5 tablets (5 mg total) by mouth daily   sodium chloride 1 g tablet   No No   Sig: Take 1 tablet (1 g total) by mouth 3 (three) times a day   torsemide (DEMADEX) 10 mg tablet  Self No No   Sig: Take 1 tablet (10 mg total) by mouth daily as needed (edema)   triamcinolone (KENALOG) 0 1 % ointment   No No   Sig: Apply topically 2 (two) times a day   Patient not taking: Reported on 5/15/2023   vitamin B-12 (VITAMIN B-12) 500 mcg tablet   No No   Sig: Take 1 tablet (500 mcg total) by mouth daily      Facility-Administered Medications Last Administration Doses Remaining   cyanocobalamin injection 1,000 mcg 4/20/2023 10:12 AM           Past Medical History:   Diagnosis Date   • Anemia    • Anxiety    • Arrhythmia    • Arthritis    • Asthma    • Blood clot in vein     portal vein   • Breast cancer ([facility] [address] ) 02/12/2021   • Breast lump     [id number] RESOLVED   • Cancer (HCC)    • Depression    • Disease of thyroid gland    • DVT, lower extremity (HCC)    • GERD (gastroesophageal reflux disease)    • Hyperlipidemia    • Hypertension    • Hypokalemia    • Hyponatremia    • Hypothyroidism    • Iron deficiency anemia    • Irregular heart beat    • Manic behavior (HCC)    • 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HCC)    • Osteoarthritis    • Palpitations [id number]  RESOLVED   • Paroxysmal supraventricular tachycardia (HCC)    • PE (pulmonary thromboembolism) (HCC)    • Sjoegren syndrome    • Sleep apnea    • Sleep difficulties    • Spinal stenosis    • Thrombocytosis     [id number]  RESOLVED   • Tremors of nervous system     dbs implanted right and left chest   • Ulcerative colitis ([address] )    • Vertigo     [id number] RESOLVED       Past Surgical History:   Procedure Laterality Date   • ABDOMINAL SURGERY     • APPENDECTOMY     • BREAST BIOPSY Left 11/07/2019    Stereo   • BREAST EXCISIONAL BIOPSY Left     x many years   • BREAST SURGERY      lumpectomy & biopsy   • CARMELLA HOLE W/ STEREOTACTIC INSERTION OF DBS LEADS / INTRAOP MICROELECTRODE RECORDING     • COLON SURGERY     • COLONOSCOPY N/A 08/30/2017    Procedure: Lavaughn Schirmer;  Surgeon: Jose Ernandez MD;  Location: [facility]; Service: Colorectal   • COLOPROCTECTOMY W/ ILEO J POUCH     • ESOPHAGOGASTRODUODENOSCOPY      ONSET 10/17/11   • FISTULA REPAIR      LLEOANAL FISTULA REPAIR TRANSPERIN TRANSABD APPROACH   • HYSTERECTOMY      age 43   • ILEOSTOMY CLOSURE     • KNEE ARTHROSCOPY      Right   • MAMMO STEREOTACTIC BREAST BIOPSY LEFT (ALL INC) Left 11/07/2019   • MAMMO STEREOTACTIC BREAST BIOPSY LEFT (ALL INC) Left 12/17/2020   • MAMMO STEREOTACTIC BREAST BIOPSY LEFT (ALL INC) EACH ADD Left 12/17/2020   • MASTECTOMY Left 02/12/2021    left mastectomy- Dr Newman Comes   • MASTECTOMY W/ SENTINEL NODE BIOPSY Left 02/12/2021    Procedure: BREAST MASTECTOMY WITH SENTINEL LYMPH NODE BIOPSY, LYMPHATIC MAPPING WITH BLUE DYE AND RADIAOCTIVE DYE (INJECT AT 1100 BY DR GILLESPIE IN THE OR);   Surgeon: Birgit Johnson MD;  Location: [facility];  Service: Surgical Oncology   • NV INSJ/RPLCMT CRANIAL NEUROSTIM PULSE GENERATOR Right 06/20/2017    Procedure: DBS GENERATOR REPLACEMENT;  Surgeon: Anny Perez MD;  Location: [facility];  Service: Neurosurgery   • NV INSJ/RPLCMT CRANIAL NEUROSTIM PULSE GENERATOR N/A 12/04/2019    Procedure: REPLACEMENT IMPLANTABLE PULSE GENERATOR FOR DEEP BRAIN STIMULATOR LEFT CHEST;  Surgeon: Dakota Curtis MD;  Location: [facility];  Service: Neurosurgery   • SPLENECTOMY     • TONSILLECTOMY         Family History   Problem Relation Age of Onset   • Venous thrombosis Mother         ACUTE VENOUS THROMBOSIS OF DEEP VESSELS OF THE DISTAL LOWER EXTREMITY   • Other Mother         PHLEBITIS   • Hypertension Mother    • Peripheral vascular disease Mother    • COPD Father    • Diabetes Father         MELLITUS   • Stroke Father    • Diabetes Sister         MELLITUS   • Sjogren's syndrome Sister    • No Known Problems Daughter    • No Known Problems Maternal Grandmother    • No Known Problems Maternal Grandfather    • No Known Problems Paternal Grandmother    • No Known Problems Paternal Grandfather    • No Known Problems Sister    • No Known Problems Maternal Aunt    • No Known Problems Paternal Aunt    • No Known Problems Son      I have reviewed and agree with the history as documented  E-Cigarette/Vaping   • E-Cigarette Use Never User      E-Cigarette/Vaping Substances   • Nicotine No    • THC No    • CBD No    • Flavoring No    • Other No    • Unknown No      Social History     Tobacco Use   • Smoking status: Former     Packs/day: 1 50     Years: 5 00     Total pack years: 7 50     Types: Cigarettes     Quit date: 1965     Years since quittin 5   • Smokeless tobacco: Never   • Tobacco comments:     Quit   Vaping Use   • Vaping Use: Never used   Substance Use Topics   • Alcohol use:  Yes     Alcohol/week: 2 0 standard drinks of alcohol     Types: 2 Cans of beer per week   • Drug use: No        Review of Systems    Physical Exam  ED Triage Vitals [23 1537]   Temperature Pulse Respirations Blood Pressure SpO2   (!) 102 5 °F (39 2 °C) 104 19 (!) 176/84 91 %      Temp Source Heart Rate Source Patient Position - Orthostatic VS BP Location FiO2 (%)   Oral Monitor Sitting Left arm --      Pain Score       4 Orthostatic Vital Signs  Vitals:    06/22/23 1537   BP: (!) 176/84   Pulse: 104   Patient Position - Orthostatic VS: Sitting       Physical Exam    ED Medications  Medications - No data to display    Diagnostic Studies  Results Reviewed     None                 No orders to display         Procedures  Procedures      ED Course  ED Course as of 06/22/23 1553   u Jun 22, 2023   1551 Blood Pressure(!): 176/84   1552 Temperature(!): 102 5 °F (39 2 °C)   1552 Pulse: 104   1552 Respirations: 19   1552 SpO2: 91 %   1552 66 yo F                                       MDM      Disposition  Final diagnoses:   None     ED Disposition     None      Follow-up Information    None         Patient's Medications   Discharge Prescriptions    No medications on file     No discharge procedures on file  PDMP Review       Value Time User    PDMP Reviewed  Yes 6/21/2023  2:34 PM Quique Jackman MD           ED Provider  Attending physically available and evaluated Whitney Amador I managed the patient along with the ED Attending      Electronically Signed by dry       Capillary Refill: Capillary refill takes less than 2 seconds  Neurological:      Mental Status: She is alert and oriented to person, place, and time  Cranial Nerves: Cranial nerves 2-12 are intact  Sensory: Sensation is intact  Motor: Motor function is intact  Psychiatric:         Mood and Affect: Mood normal          ED Medications  Medications   acetaminophen (TYLENOL) tablet 975 mg (975 mg Oral Given 6/22/23 1647)   lactated ringers bolus 500 mL (0 mL Intravenous Stopped 6/22/23 1731)   ondansetron (ZOFRAN) injection 4 mg (4 mg Intravenous Given 6/22/23 1732)   lactated ringers bolus 500 mL (0 mL Intravenous Stopped 6/22/23 2158)   potassium chloride (K-DUR,KLOR-CON) CR tablet 40 mEq (40 mEq Oral Given 6/22/23 1809)   magnesium sulfate 2 g/50 mL IVPB (premix) 2 g (0 g Intravenous Stopped 6/22/23 2100)   iohexol (OMNIPAQUE) 350 MG/ML injection (SINGLE-DOSE) 100 mL (85 mL Intravenous Given 6/22/23 1908)   potassium chloride (K-DUR,KLOR-CON) CR tablet 40 mEq (40 mEq Oral Given 6/23/23 0803)       Diagnostic Studies  Results Reviewed     Procedure Component Value Units Date/Time    Blood culture #1 [[id number]] Collected: 06/22/23 1608    Lab Status: Final result Specimen: Blood from Arm, Left Updated: 06/27/23 2101     Blood Culture No Growth After 5 Days  Blood culture #2 [[id number]] Collected: 06/22/23 1600    Lab Status: Final result Specimen: Blood from Arm, Left Updated: 06/27/23 2101     Blood Culture No Growth After 5 Days      TSH, 3rd generation [[id number]]  (Normal) Collected: 06/22/23 1600    Lab Status: Final result Specimen: Blood from Arm, Left Updated: 06/23/23 0058     TSH 3RD GENERATON 1 683 uIU/mL     UA w Reflex to Microscopic w Reflex to Culture [[id number]] Collected: 06/22/23 1743    Lab Status: Final result Specimen: Urine, Clean Catch Updated: 06/22/23 1814     Color, UA Colorless     Clarity, UA Clear     Specific Gravity, UA 1 006     pH, UA 6 0     Leukocytes, UA Negative     Nitrite, UA Negative     Protein, UA Negative mg/dl      Glucose, UA Negative mg/dl      Ketones, UA Negative mg/dl      Urobilinogen, UA <2 0 mg/dl      Bilirubin, UA Negative     Occult Blood, UA Negative    D-dimer, quantitative [[id number]]  (Abnormal) Collected: 06/22/23 1600    Lab Status: Final result Specimen: Blood from Arm, Left Updated: 06/22/23 1751     D-Dimer, Quant 1 33 ug/ml FEU     Narrative: In the evaluation for possible pulmonary embolism, in the appropriate (Well's Score of 4 or less) patient, the age adjusted d-dimer cutoff for this patient can be calculated as:    Age x 0 01 (in ug/mL) for Age-adjusted D-dimer exclusion threshold for a patient over 50 years  FLU/RSV/COVID - if FLU/RSV clinically relevant [[id number]]  (Normal) Collected: 06/22/23 1627    Lab Status: Final result Specimen: Nares from Nose Updated: 06/22/23 1729     SARS-CoV-2 Negative     INFLUENZA A PCR Negative     INFLUENZA B PCR Negative     RSV PCR Negative    Narrative:      FOR PEDIATRIC PATIENTS - copy/paste COVID Guidelines URL to browser: [email or web address]    SARS-CoV-2 assay is a Nucleic Acid Amplification assay intended for the  qualitative detection of nucleic acid from SARS-CoV-2 in nasopharyngeal  swabs  Results are for the presumptive identification of SARS-CoV-2 RNA  Positive results are indicative of infection with SARS-CoV-2, the virus  causing COVID-19, but do not rule out bacterial infection or co-infection  with other viruses  Laboratories within the [place] and its  territories are required to report all positive results to the appropriate  public health authorities  Negative results do not preclude SARS-CoV-2  infection and should not be used as the sole basis for treatment or other  patient management decisions   Negative results must be combined with  clinical observations, patient history, and epidemiological information  This test has not been FDA cleared or approved  This test has been authorized by FDA under an Emergency Use Authorization  (EUA)  This test is only authorized for the duration of time the  declaration that circumstances exist justifying the authorization of the  emergency use of an in vitro diagnostic tests for detection of SARS-CoV-2  virus and/or diagnosis of COVID-19 infection under section 564(b)(1) of  the Act, 21 U  S C  416KKT-8(G)(1), unless the authorization is terminated  or revoked sooner  The test has been validated but independent review by FDA  and CLIA is pending  Test performed using [employer]: This RT-PCR assay targets N2,  a region unique to SARS-CoV-2  A conserved region in the E-gene was chosen  for pan-Sarbecovirus detection which includes SARS-CoV-2  According to CMS-2020-01-R, this platform meets the definition of high-throughput technology      Procalcitonin [[id number]]  (Normal) Collected: 06/22/23 1600    Lab Status: Final result Specimen: Blood from Arm, Left Updated: 06/22/23 1708     Procalcitonin 0 15 ng/ml     Blood gas, venous [[id number]]  (Abnormal) Collected: 06/22/23 1646    Lab Status: Final result Specimen: Blood from Arm, Left Updated: 06/22/23 1706     pH, Pierre 7 493     pCO2, Pierre 41 9 mm Hg      pO2, Pierre 38 9 mm Hg      HCO3, Pierre 31 4 mmol/L      Base Excess, Pierre 7 5 mmol/L      O2 Content, Pierre 10 3 ml/dL      O2 HGB, VENOUS 74 6 %     Comprehensive metabolic panel [[id number]]  (Abnormal) Collected: 06/22/23 1600    Lab Status: Final result Specimen: Blood from Arm, Left Updated: 06/22/23 1701     Sodium 140 mmol/L      Potassium 3 0 mmol/L      Chloride 97 mmol/L      CO2 30 mmol/L      ANION GAP 13 mmol/L      BUN 7 mg/dL      Creatinine 0 84 mg/dL      Glucose 101 mg/dL      Calcium 9 7 mg/dL      AST 26 U/L      ALT 16 U/L      Alkaline Phosphatase 67 U/L      Total Protein 7 1 g/dL      Albumin 4 3 g/dL      Total Bilirubin 0 61 mg/dL      eGFR 77 ml/min/1 73sq m     Narrative:      [place] guidelines for Chronic Kidney Disease (CKD):   •  Stage 1 with normal or high GFR (GFR > 90 mL/min/1 73 square meters)  •  Stage 2 Mild CKD (GFR = 60-89 mL/min/1 73 square meters)  •  Stage 3A Moderate CKD (GFR = 45-59 mL/min/1 73 square meters)  •  Stage 3B Moderate CKD (GFR = 30-44 mL/min/1 73 square meters)  •  Stage 4 Severe CKD (GFR = 15-29 mL/min/1 73 square meters)  •  Stage 5 End Stage CKD (GFR <15 mL/min/1 73 square meters)  Note: GFR calculation is accurate only with a steady state creatinine    Lipase [[id number]]  (Normal) Collected: 06/22/23 1600    Lab Status: Final result Specimen: Blood from Arm, Left Updated: 06/22/23 1701     Lipase 18 u/L     Magnesium [[id number]]  (Abnormal) Collected: 06/22/23 1600    Lab Status: Final result Specimen: Blood from Arm, Left Updated: 06/22/23 1701     Magnesium 1 4 mg/dL     Lactic acid [[id number]]  (Normal) Collected: 06/22/23 1600    Lab Status: Final result Specimen: Blood from Arm, Left Updated: 06/22/23 1659     LACTIC ACID 2 0 mmol/L     Narrative:      Result may be elevated if tourniquet was used during collection      Protime-INR [[id number]]  (Normal) Collected: 06/22/23 1600    Lab Status: Final result Specimen: Blood from Arm, Left Updated: 06/22/23 1655     Protime 14 3 seconds      INR 1 09    APTT [[id number]]  (Normal) Collected: 06/22/23 1600    Lab Status: Final result Specimen: Blood from Arm, Left Updated: 06/22/23 1655     PTT 30 seconds     CBC and differential [[id number]]  (Abnormal) Collected: 06/22/23 1600    Lab Status: Final result Specimen: Blood from Arm, Left Updated: 06/22/23 1641     WBC 9 33 Thousand/uL      RBC 3 96 Million/uL      Hemoglobin 9 6 g/dL      Hematocrit 31 0 %      MCV 78 fL      MCH 24 2 pg      MCHC 31 0 g/dL      RDW 19 1 %      MPV 8 5 fL      Platelets 114 Thousands/uL      nRBC 0 /100 WBCs      Neutrophils Relative 80 %      Immat GRANS % 0 % Lymphocytes Relative 14 %      Monocytes Relative 4 %      Eosinophils Relative 2 %      Basophils Relative 0 %      Neutrophils Absolute 7 41 Thousands/µL      Immature Grans Absolute 0 03 Thousand/uL      Lymphocytes Absolute 1 30 Thousands/µL      Monocytes Absolute 0 37 Thousand/µL      Eosinophils Absolute 0 19 Thousand/µL      Basophils Absolute 0 03 Thousands/µL                  CTA ED chest PE study   Final Result by Ailyn Zhong MD (06/22 1931)      No pulmonary embolus  No acute pulmonary disease  Pulmonary artery enlargement which can be seen with pulmonary hypertension  Workstation performed: [id number]         XR chest portable   ED Interpretation by Maribell Harvey DO (06/22 4109)   No acute process visualized      Final Result by Karan Aguila MD (06/23 8051)      No radiographic evidence of acute intrathoracic process on this examination which is somewhat limited by low lung volumes  Workstation performed: [id number]               Procedures  ECG 12 Lead Documentation Only    Date/Time: 6/29/2023 5:19 PM    Performed by: Maribell Harvey DO  Authorized by: Maribell Harvey DO    Patient location:  ED  Previous ECG:     Previous ECG:  Compared to current    Comparison ECG info:  2/15/23: Sinus rhythm with sinus arrhythmia and first-degree AV block  PVCs present      Similarity:  Changes noted (Increased rate, ST depressions)  Quality:     Tracing quality:  Limited by artifact  Rate:     ECG rate:  103    ECG rate assessment: tachycardic    Rhythm:     Rhythm: sinus tachycardia and A-V block    Ectopy:     Ectopy: PVCs    QRS:     QRS axis:  Normal    QRS intervals:  Normal  Conduction:     Conduction: abnormal      Abnormal conduction: 1st degree    ST segments:     ST segments:  Abnormal    Depression:  II, III and aVF (Likely due to demand from febrile illness and tachycardia )          ED Course  ED Course as of 06/29/23 1329   u Jun 22, 2023   1551 Blood Pressure(!): 176/84   1552 Temperature(!): 102 5 °F (39 2 °C)   1552 Pulse: 104   1552 Respirations: 19   1552 SpO2: 91 %   1552 64 yo F presents to ED for fever s/p iron transfusion earlier today  Not first transfusion  Felt in normal state of health until now  Associated waves of nausea and constant headache  No CP, SOB, cough, rhinorrhea, abd pain, bowel changes, dysuria, hematuria  PE: tachycardic, tachypneic, wheezing in bilateral bases, abd soft nonttp, heart regular rhythm, appears to feel unwell  Concern for: Transfusion reaction versus viral syndrome versus pneumonia versus PE   1646 CBC and differential(!)  No leukocytosis or leukopenia  Hemoglobin 9 6, improved from prior labs  Appears to have a microcytic anemia, which is why she receives iron transfusions  Elevated platelets  1656 PTT: 30   1656 PROTIME: 14 3   1656 POCT INR: 1 09   1713 pH, Pierre(!): 7 493  Potentially from tachypnea/hyperventilation  Given that CO2 is low  1713 pCO2, Pierre(!): 41 9   1713 pO2, Pierre: 38 9   1713 HCO3, Pierre(!): 31 4   1713 Base Excess, Pierre: 7 5   1713 Magnesium(!): 1 4  We will replete   1713 LACTIC ACID: 2 0   1713 Comprehensive metabolic panel(!)  Hypokalemia 3 0  We will replete orally  Otherwise unremarkable CMP   1739 SARS-COV-2: Negative   1739 INFLU A PCR: Negative   1739 INFLU B PCR: Negative   1739 RSV PCR: Negative   1813 D-Dimer, Quant(!): 1 33  Will obtain CTA chest to rule out pulmonary embolism   1816 UA w Reflex to Microscopic w Reflex to Culture  Within normal limits  1859 XR chest portable  No acute process visualized   1932 CTA ED chest PE study  No pulmonary embolus      No acute pulmonary disease      Pulmonary artery enlargement which can be seen with pulmonary hypertension  36 Spoke to the admitting team who agreed with admission  This was relayed to patient  She expressed understanding was agreeable with this plan      Results were also discussed with patient at this time  Patient expressed understanding  Patient was given the opportunity ask questions emergency department  All questions and concerns were addressed the emergency department  Medical Decision Making  See ED course for more details on medical decision making    Fever: acute illness or injury  Hypokalemia: acute illness or injury  Hypomagnesemia: acute illness or injury  Amount and/or Complexity of Data Reviewed  Labs: ordered  Decision-making details documented in ED Course  Radiology: ordered and independent interpretation performed  Decision-making details documented in ED Course  Risk  OTC drugs  Prescription drug management  Decision regarding hospitalization  Disposition  Final diagnoses:   Fever   Hypokalemia   Hypomagnesemia     Time reflects when diagnosis was documented in both MDM as applicable and the Disposition within this note     Time User Action Codes Description Comment    6/22/2023  8:03 PM Ananda Reach Add [R50 9] Fever     6/22/2023  8:03 PM Ananda Reach Add [E87 6] Hypokalemia     6/22/2023  8:03 PM Ananda Reach Add [E83 42] Hypomagnesemia     6/23/2023 12:31 PM Jamie Jules Add [D50 8] Other iron deficiency anemia       ED Disposition     ED Disposition   Admit    Condition   Stable    Date/Time   Thu Jun 22, 2023  8:02 PM    Comment   Case was discussed with SLIM and the patient's admission status was agreed to be Admission Status: observation status to the service of Dr Ayo Zuñiga              Follow-up Information     Follow up With Specialties Details Why Contact Info    Rafat Fatima MD Internal Medicine Call in 1 week(s)  [address] [facility] [address]  [phone]            Discharge Medication List as of 6/23/2023  2:39 PM      CONTINUE these medications which have NOT CHANGED    Details   albuterol (PROVENTIL HFA,VENTOLIN HFA) 90 mcg/act inhaler Inhale 2 puffs every 6 (six) hours as needed for wheezing, Starting Mon 11/28/2022, Normal      amLODIPine (NORVASC) 2 5 mg tablet TAKE ONE TABLET BY MOUTH EVERY DAY, Normal      Calcium Carb-Cholecalciferol (CALCIUM 600-D PO) Take 1 tablet by mouth 2 (two) times a day, Historical Med      Coenzyme Q10 (CO Q-10 PO) Take 1 capsule by mouth daily, Historical Med      diltiazem (CARDIZEM CD) 180 mg 24 hr capsule TAKE ONE CAPSULE BY MOUTH EVERY DAY, Normal      diltiazem (CARDIZEM) 60 mg tablet Take 1 tablet (60 mg total) by mouth daily, Starting Fri 2/10/2023, Normal      Eliquis 5 MG TAKE ONE TABLET BY MOUTH TWICE A DAY, Normal      escitalopram (LEXAPRO) 20 mg tablet TAKE ONE TABLET BY MOUTH EVERY DAY, Normal      fluticasone (FLONASE) 50 mcg/act nasal spray APPLY ONE SPRAY IN EACH NOSTRIL ONCE DAILY AS NEEDED FOR RHINITIS, Normal      Fluticasone-Salmeterol (Advair Diskus) 250-50 mcg/dose inhaler Inhale 1 puff 2 (two) times a day Rinse mouth after use, Starting Tue 5/30/2023, Normal      HYDROcodone-acetaminophen (NORCO) 5-325 mg per tablet Take 1 tablet by mouth every 6 (six) hours as needed for pain Max Daily Amount: 4 tablets, Starting Wed 6/21/2023, Normal      levothyroxine 50 mcg tablet Take 1 tablet (50 mcg total) by mouth daily, Starting Tue 5/2/2023, Normal      lisinopril (ZESTRIL) 20 mg tablet TAKE ONE TABLET BY MOUTH TWICE A DAY, Normal      LORazepam (ATIVAN) 1 mg tablet Take 1 tablet (1 mg total) by mouth every 6 (six) hours as needed for anxiety, Starting Tue 5/30/2023, Normal      MAGNESIUM PO Take 1 tablet by mouth daily, Historical Med      Multiple Vitamin (MULTIVITAMIN ADULT PO) Take 1 tablet by mouth daily, Historical Med      nystatin (MYCOSTATIN) ointment Apply topically 2 (two) times a day, Starting Fri 4/21/2023, Normal      Omega-3 Fatty Acids (FISH OIL PO) Take 1 capsule by mouth daily, Historical Med      pantoprazole (PROTONIX) 40 mg tablet TAKE ONE TABLET BY MOUTH EVERY DAY, Normal potassium chloride (MICRO-K) 10 MEQ CR capsule Take 2 capsules daily, Normal      predniSONE 5 mg tablet Starting Thu 1/5/2023, Historical Med      simvastatin (ZOCOR) 10 mg tablet Take 0 5 tablets (5 mg total) by mouth daily, Starting Tue 5/30/2023, Normal      sodium chloride 1 g tablet Take 1 tablet (1 g total) by mouth 3 (three) times a day, Starting Thu 6/8/2023, Normal      torsemide (DEMADEX) 10 mg tablet Take 1 tablet (10 mg total) by mouth daily as needed (edema), Starting Tue 7/6/2021, Normal      triamcinolone (KENALOG) 0 1 % ointment Apply topically 2 (two) times a day, Starting Fri 4/21/2023, Normal      vitamin B-12 (VITAMIN B-12) 500 mcg tablet Take 1 tablet (500 mcg total) by mouth daily, Starting Thu 4/20/2023, Normal      gabapentin (NEURONTIN) 600 MG tablet TAKE ONE TABLET BY MOUTH IN THE MORNING, ONE TABLET IN THE AFTERNNON, AND TWO TABLETS AT BEDTIME, Normal               PDMP Review       Value Time User    PDMP Reviewed  Yes 6/21/2023  2:34 PM Jame García MD           ED Provider  Attending physically available and evaluated Tj Ryan  I managed the patient along with the ED Attending      Electronically Signed by         Nella Trujillo DO  06/29/23 [id number]

## 2023-06-22 NOTE — PROGRESS NOTES
Pt here for veonfer IV push  Pt offered no complaints today other then feeling tired/fatgiue  Pt tolerated well no adverse reaction noted  Pt aware of next months appt

## 2023-06-22 NOTE — ED ATTENDING ATTESTATION
6/22/2023  I, Verito Maya MD, saw and evaluated the patient  I have discussed the patient with the resident/non-physician practitioner and agree with the resident's/non-physician practitioner's findings, Plan of Care, and MDM as documented in the resident's/non-physician practitioner's note, except where noted  All available labs and Radiology studies were reviewed  I was present for key portions of any procedure(s) performed by the resident/non-physician practitioner and I was immediately available to provide assistance  At this point I agree with the current assessment done in the Emergency Department  I have conducted an independent evaluation of this patient a history and physical is as follows:    18-year-old female presented for acute onset fever today associated with chills and some nausea without vomiting  States she was feeling well earlier in the day and then suddenly this afternoon felt overall unwell  She had had an iron transfusion done today  Has had numerous of these in the past and has never had any type of reaction  Doubt allergy  Her oxygen saturation was 89% when I walked in the room  Placed on 2 L nasal cannula with improvement  Differential diagnosis includes respiratory infection, UTI, viral illness  Plan labs, chest x-ray, viral testing, reevaluate  ED Course  ED Course as of 06/22/23 1934   Thu Jun 22, 2023   1717 Magnesium(!): 1 4   1717 Potassium(!): 3 0  Plan electrolyte repletion in the emergency department  1747 SARS-COV-2: Negative   1747 INFLU A PCR: Negative   1747 INFLU B PCR: Negative   1747 RSV PCR: Negative   1748 No acute changes on chest x-ray  1825 D-Dimer, Quant(!): 1 33  Pending CTA chest    1934 CT chest shows:    No pulmonary embolus      No acute pulmonary disease      Pulmonary artery enlargement which can be seen with pulmonary hypertension       1934 Stable for admission to medical service for continued monitoring and work-up  Critical Care Time  Procedures

## 2023-06-23 ENCOUNTER — TELEPHONE (OUTPATIENT)
Dept: HEMATOLOGY ONCOLOGY | Facility: CLINIC | Age: 78
End: 2023-06-23

## 2023-06-23 VITALS
DIASTOLIC BLOOD PRESSURE: 52 MMHG | OXYGEN SATURATION: 92 % | RESPIRATION RATE: 18 BRPM | HEART RATE: 64 BPM | SYSTOLIC BLOOD PRESSURE: 118 MMHG | TEMPERATURE: 98.4 F

## 2023-06-23 LAB
ANION GAP SERPL CALCULATED.3IONS-SCNC: 6 MMOL/L
BUN SERPL-MCNC: 7 MG/DL (ref 5–25)
CALCIUM SERPL-MCNC: 8.3 MG/DL (ref 8.4–10.2)
CHLORIDE SERPL-SCNC: 97 MMOL/L (ref 96–108)
CO2 SERPL-SCNC: 31 MMOL/L (ref 21–32)
CREAT SERPL-MCNC: 0.81 MG/DL (ref 0.6–1.3)
ERYTHROCYTE [DISTWIDTH] IN BLOOD BY AUTOMATED COUNT: 18.9 % (ref 11.6–15.1)
GFR SERPL CREATININE-BSD FRML MDRD: 69 ML/MIN/1.73SQ M
GLUCOSE SERPL-MCNC: 108 MG/DL (ref 65–140)
HCT VFR BLD AUTO: 25 % (ref 34.8–46.1)
HCT VFR BLD AUTO: 26.8 % (ref 34.8–46.1)
HGB BLD-MCNC: 7.7 G/DL (ref 11.5–15.4)
HGB BLD-MCNC: 8.1 G/DL (ref 11.5–15.4)
MCH RBC QN AUTO: 24.4 PG (ref 26.8–34.3)
MCHC RBC AUTO-ENTMCNC: 30.8 G/DL (ref 31.4–37.4)
MCV RBC AUTO: 79 FL (ref 82–98)
PLATELET # BLD AUTO: 462 THOUSANDS/UL (ref 149–390)
PMV BLD AUTO: 8.4 FL (ref 8.9–12.7)
POTASSIUM SERPL-SCNC: 3.3 MMOL/L (ref 3.5–5.3)
PROCALCITONIN SERPL-MCNC: 2.03 NG/ML
RBC # BLD AUTO: 3.15 MILLION/UL (ref 3.81–5.12)
SODIUM SERPL-SCNC: 134 MMOL/L (ref 135–147)
TSH SERPL DL<=0.05 MIU/L-ACNC: 1.68 UIU/ML (ref 0.45–4.5)
WBC # BLD AUTO: 5.51 THOUSAND/UL (ref 4.31–10.16)

## 2023-06-23 PROCEDURE — 84145 PROCALCITONIN (PCT): CPT | Performed by: INTERNAL MEDICINE

## 2023-06-23 PROCEDURE — 80048 BASIC METABOLIC PNL TOTAL CA: CPT | Performed by: INTERNAL MEDICINE

## 2023-06-23 PROCEDURE — 85027 COMPLETE CBC AUTOMATED: CPT | Performed by: INTERNAL MEDICINE

## 2023-06-23 PROCEDURE — 85014 HEMATOCRIT: CPT

## 2023-06-23 PROCEDURE — 99236 HOSP IP/OBS SAME DATE HI 85: CPT | Performed by: INTERNAL MEDICINE

## 2023-06-23 PROCEDURE — 85018 HEMOGLOBIN: CPT

## 2023-06-23 RX ORDER — POTASSIUM CHLORIDE 20 MEQ/1
40 TABLET, EXTENDED RELEASE ORAL ONCE
Status: DISCONTINUED | OUTPATIENT
Start: 2023-06-23 | End: 2023-06-23 | Stop reason: HOSPADM

## 2023-06-23 RX ORDER — POTASSIUM CHLORIDE 20 MEQ/1
40 TABLET, EXTENDED RELEASE ORAL ONCE
Status: COMPLETED | OUTPATIENT
Start: 2023-06-23 | End: 2023-06-23

## 2023-06-23 RX ADMIN — APIXABAN 5 MG: 5 TABLET, FILM COATED ORAL at 08:03

## 2023-06-23 RX ADMIN — DILTIAZEM HYDROCHLORIDE 60 MG: 60 TABLET, FILM COATED ORAL at 05:35

## 2023-06-23 RX ADMIN — SODIUM CHLORIDE 1 G: 1 TABLET ORAL at 08:03

## 2023-06-23 RX ADMIN — GABAPENTIN 600 MG: 300 CAPSULE ORAL at 08:03

## 2023-06-23 RX ADMIN — LOPERAMIDE HYDROCHLORIDE 2 MG: 2 CAPSULE ORAL at 08:09

## 2023-06-23 RX ADMIN — AMLODIPINE BESYLATE 2.5 MG: 2.5 TABLET ORAL at 08:03

## 2023-06-23 RX ADMIN — ESCITALOPRAM OXALATE 20 MG: 20 TABLET ORAL at 08:03

## 2023-06-23 RX ADMIN — LORAZEPAM 1 MG: 1 TABLET ORAL at 08:09

## 2023-06-23 RX ADMIN — POTASSIUM CHLORIDE 40 MEQ: 1500 TABLET, EXTENDED RELEASE ORAL at 08:03

## 2023-06-23 RX ADMIN — LEVOTHYROXINE SODIUM 50 MCG: 50 TABLET ORAL at 05:36

## 2023-06-23 RX ADMIN — LISINOPRIL 20 MG: 20 TABLET ORAL at 08:03

## 2023-06-23 RX ADMIN — PANTOPRAZOLE SODIUM 40 MG: 40 TABLET, DELAYED RELEASE ORAL at 08:03

## 2023-06-23 RX ADMIN — FLUTICASONE FUROATE AND VILANTEROL TRIFENATATE 1 PUFF: 100; 25 POWDER RESPIRATORY (INHALATION) at 08:04

## 2023-06-23 NOTE — DISCHARGE INSTR - AVS FIRST PAGE
Dear Olive Gutierrez,     It was our pleasure to care for you here at [facility]  It is our hope that we were always able to exceed the expected standards for your care during your stay  You were hospitalized due to fever  You were cared for on the [place] 4th floor by Miladis Terrell DO under the service of [address] [facility] MD Lanette with the [facility] who covers for your primary care physician (PCP), Addison De Santiago MD, while you were hospitalized  If you have any questions or concerns related to this hospitalization, you may contact us at [phone]  For follow up as well as any medication refills, we recommend that you follow up with your primary care physician  A registered nurse will reach out to you by phone within a few days after your discharge to answer any additional questions that you may have after going home  However, at this time we provide for you here, the most important instructions / recommendations at discharge:     Notable Medication Adjustments -   None - please continue your home medications as prescribed prior to this hospitalization stay  Testing Required after Discharge -   None - your hemoglobin levels here at the hospital were 9 6, 7 7, and finally 8 1  We recommend messaging your hematologist, Dr Hipolito Stapleton, regarding these results and see if he would like to recheck your next hemoglobin levels sooner  Important follow up information -   Follow up with your primary care provider (PCP) within 1 week of hospital discharge  Follow up with your hematologist, Dr Hipolito Stapleton, for continual management of chronic anemia  We will follow up on your blood culture results  If they are positive, as discussed in the hospital, we request you return to the hospital for antibiotic management    Other Instructions -   If you experience any acute concerns of chest pain, shortness of breath, weakness, fatigue, or signs of bleeding, please return to the emergency department for further evaluation and management  Please review this entire after visit summary as additional general instructions including medication list, appointments, activity, diet, any pertinent wound care, and other additional recommendations from your care team that may be provided for you        Sincerely,     Marta Pierre, DO

## 2023-06-23 NOTE — TELEPHONE ENCOUNTER
I called Alonso Head in response to a referral that was received for patient to establish care with Hematology  Outreach was made to  Patient is an established patient with Dr Ousmane Hunter  Let her know she can make a follow up appt  Her next appt is 12/20/23     I left a voicemail explaining the reason for my call and advised patient to call [facility] at [phone]  The referral has been closed

## 2023-06-23 NOTE — ASSESSMENT & PLAN NOTE
Patient presents with fever, chills, nausea and headache started today around 2:30 PM   Symptoms lasted for about 3 hours  Patient had IV Venofer infusion at 8 AM   Patient has had Venofer before without any reactions  No identifiable source of infection on history or examination  Examination unremarkable  Vital stable  WBC within normal range  Procalcitonin, lactic acid normal   COVID/FLU negative  UA normal   CTA chest: No acute cardiopulmonary abnormalities  Plan  Monitor off antibiotics  Tylenol as needed for pain  Monitor temperature  Follow-up blood cultures    Follow-up TSH  Consider blood parasitic smears, Anaplasma, Lyme disease, EBV, CMV testing if fever persist

## 2023-06-23 NOTE — PLAN OF CARE
Problem: MOBILITY - ADULT  Goal: Maintain or return to baseline ADL function  Description: INTERVENTIONS:  -  Assess patient's ability to carry out ADLs; assess patient's baseline for ADL function and identify physical deficits which impact ability to perform ADLs (bathing, care of mouth/teeth, toileting, grooming, dressing, etc )  - Assess/evaluate cause of self-care deficits   - Assess range of motion  - Assess patient's mobility; develop plan if impaired  - Assess patient's need for assistive devices and provide as appropriate  - Encourage maximum independence but intervene and supervise when necessary  - Involve family in performance of ADLs  - Assess for home care needs following discharge   - Consider OT consult to assist with ADL evaluation and planning for discharge  - Provide patient education as appropriate  Outcome: Progressing  Goal: Maintains/Returns to pre admission functional level  Description: INTERVENTIONS:  - Perform BMAT or MOVE assessment daily    - Set and communicate daily mobility goal to care team and patient/family/caregiver  - Collaborate with rehabilitation services on mobility goals if consulted  - Perform Range of Motion 3 times a day  - Reposition patient every 3 hours    - Dangle patient 3 times a day  - Stand patient 4 times a day  - Ambulate patient 4 times a day  - Out of bed to chair 3 times a day   - Out of bed for meals 3 times a day  - Out of bed for toileting  - Record patient progress and toleration of activity level   Outcome: Progressing     Problem: PAIN - ADULT  Goal: Verbalizes/displays adequate comfort level or baseline comfort level  Description: Interventions:  - Encourage patient to monitor pain and request assistance  - Assess pain using appropriate pain scale  - Administer analgesics based on type and severity of pain and evaluate response  - Implement non-pharmacological measures as appropriate and evaluate response  - Consider cultural and social influences on pain and pain management  - Notify physician/advanced practitioner if interventions unsuccessful or patient reports new pain  Outcome: Progressing     Problem: INFECTION - ADULT  Goal: Absence or prevention of progression during hospitalization  Description: INTERVENTIONS:  - Assess and monitor for signs and symptoms of infection  - Monitor lab/diagnostic results  - Monitor all insertion sites, i e  indwelling lines, tubes, and drains  - Monitor endotracheal if appropriate and nasal secretions for changes in amount and color  - [place] appropriate cooling/warming therapies per order  - Administer medications as ordered  - Instruct and encourage patient and family to use good hand hygiene technique  - Identify and instruct in appropriate isolation precautions for identified infection/condition  Outcome: Progressing  Goal: Absence of fever/infection during neutropenic period  Description: INTERVENTIONS:  - Monitor WBC    Outcome: Progressing     Problem: SAFETY ADULT  Goal: Maintain or return to baseline ADL function  Description: INTERVENTIONS:  -  Assess patient's ability to carry out ADLs; assess patient's baseline for ADL function and identify physical deficits which impact ability to perform ADLs (bathing, care of mouth/teeth, toileting, grooming, dressing, etc )  - Assess/evaluate cause of self-care deficits   - Assess range of motion  - Assess patient's mobility; develop plan if impaired  - Assess patient's need for assistive devices and provide as appropriate  - Encourage maximum independence but intervene and supervise when necessary  - Involve family in performance of ADLs  - Assess for home care needs following discharge   - Consider OT consult to assist with ADL evaluation and planning for discharge  - Provide patient education as appropriate  Outcome: Progressing  Goal: Maintains/Returns to pre admission functional level  Description: INTERVENTIONS:  - Perform BMAT or MOVE assessment daily    - Set and communicate daily mobility goal to care team and patient/family/caregiver  - Collaborate with rehabilitation services on mobility goals if consulted  - Perform Range of Motion 3 times a day  - Reposition patient every 3 hours    - Dangle patient 3 times a day  - Stand patient 3 times a day  - Ambulate patient 4 times a day  - Out of bed to chair 4 times a day   - Out of bed for meals 3 times a day  - Out of bed for toileting  - Record patient progress and toleration of activity level   Outcome: Progressing  Goal: Patient will remain free of falls  Description: INTERVENTIONS:  -  Assess patient's ability to carry out ADLs; assess patient's baseline for ADL function and identify physical deficits which impact ability to perform ADLs (bathing, care of mouth/teeth, toileting, grooming, dressing, etc )  - Assess/evaluate cause of self-care deficits   - Assess range of motion  - Assess patient's mobility; develop plan if impaired  - Assess patient's need for assistive devices and provide as appropriate  - Encourage maximum independence but intervene and supervise when necessary  - Involve family in performance of ADLs  - Assess for home care needs following discharge   - Consider OT consult to assist with ADL evaluation and planning for discharge  - Provide patient education as appropriate  Outcome: Progressing

## 2023-06-23 NOTE — PROGRESS NOTES
[facility]  Progress Note  Name: Jone Ahumada  MRN: [id number]  Unit/Bed#: W -01 I Date of Admission: 6/22/2023   Date of Service: 6/23/2023 I Hospital Day: 0    Assessment/Plan   * Fever  Assessment & Plan  Patient presents with fever, chills, nausea and headache started today around 2:30 PM   Symptoms lasted for about 3 hours  Patient had IV Venofer infusion at 8 AM   Patient has had Venofer before without any reactions  No identifiable source of infection on history or examination  Examination unremarkable  Vital stable  WBC within normal range  Procalcitonin, lactic acid normal   COVID/FLU negative  UA normal   D dimer elevated, CT PE negative for PE  CT chest: no acute cardiopulmonary disease  Afebrile   TSH 1 683    Etiology likely reaction to venofer infusion    Plan  Monitor off antibiotics  Tylenol as needed for pain  Follow-up blood cultures  SIRS (systemic inflammatory response syndrome) (HCC)  Assessment & Plan  Patient met SIRS criteria with fever of 102 5 F, heart rate of 104 and respiratory rate of 20  No identifiable source of infection  Plan   see plan for #1    Anemia  Assessment & Plan  Patient had bowel resection in 2010 due to ulcerative colitis  Patient subsequently developed iron deficiency anemia due to malabsorption  Getting IV Venofer  Portal vein thrombosis  Assessment & Plan  Plan  Continue Eliquis    Essential hypertension  Assessment & Plan  Plan  Continue amlodipine 2 5 mg, lisinopril 20 mg, torsemide 10 mg   Monitor blood pressure    Essential tremor  Assessment & Plan  Plan  Continue gabapentin    Acquired hypothyroidism  Assessment & Plan  Plan  Continue levothyroxine      GERD (gastroesophageal reflux disease)  Assessment & Plan  Plan   continue Protonix 40 mg      Anxiety  Assessment & Plan  Plan  Continue Lexapro 20 mg and Ativan as needed    Mild intermittent asthma without complication  Assessment & Plan  Plan  Continue home inhalers  Supraventricular tachycardia ([address] )  Assessment & Plan  On diltiazem  Lumbar degenerative disc disease  Assessment & Plan  Plan  Continue hydrocodone       VTE Pharmacologic Prophylaxis: VTE Score: 7 High Risk (Score >/= 5) - Pharmacological DVT Prophylaxis Ordered: apixaban (Eliquis)  Sequential Compression Devices Ordered  Patient Centered Rounds: I performed bedside rounds with nursing staff today  Discussions with Specialists or Other Care Team Provider: None    Education and Discussions with Family / Patient: Updated  (significant other) at bedside  Current Length of Stay: 0 day(s)  Current Patient Status: Observation   Discharge Plan: Anticipate discharge in 24-48 hrs to home  Code Status: Level 1 - Full Code    Subjective:   Pt is pleasant and well appearing this morning  Pt feels relatively close to baseline except for some generalized weakness  Pt reports ongoing SOB on exertion  Pt has a nonproductive cough after starting incentive spirometry this morning  Pt denies headache, chills, chest pain, abdominal pain, vomiting, nausea  Pt is in no acute distress  Objective:     Vitals:   Temp (24hrs), Av 1 °F (37 8 °C), Min:98 4 °F (36 9 °C), Max:102 5 °F (39 2 °C)    Temp:  [98 4 °F (36 9 °C)-102 5 °F (39 2 °C)] 98 4 °F (36 9 °C)  HR:  [] 64  Resp:  [18-20] 18  BP: (102-190)/(45-85) 118/52  SpO2:  [89 %-94 %] 92 %  There is no height or weight on file to calculate BMI  Input and Output Summary (last 24 hours):   No intake or output data in the 24 hours ending 23 1308    Physical Exam:   Physical Exam  Constitutional:       Appearance: Normal appearance  HENT:      Head: Normocephalic and atraumatic  Right Ear: External ear normal       Left Ear: External ear normal       Nose: Nose normal       Mouth/Throat:      Mouth: Mucous membranes are moist    Eyes:      Pupils: Pupils are equal, round, and reactive to light     Cardiovascular: Rate and Rhythm: Normal rate and regular rhythm  Pulses: Normal pulses  Heart sounds: Normal heart sounds  Pulmonary:      Effort: Pulmonary effort is normal       Breath sounds: Normal breath sounds  Abdominal:      General: Abdomen is flat  Bowel sounds are normal       Palpations: Abdomen is soft  Musculoskeletal:         General: Normal range of motion  Cervical back: Normal range of motion  Right lower leg: Edema (trace) present  Left lower leg: Edema (trace) present  Skin:     General: Skin is warm  Capillary Refill: Capillary refill takes less than 2 seconds  Neurological:      General: No focal deficit present  Mental Status: She is alert and oriented to person, place, and time  Comments: Chronic neuropathy in b/l legs   Pt has DBS for essential tremor   Psychiatric:         Mood and Affect: Mood normal          Additional Data:     Labs:  Results from last 7 days   Lab Units 06/23/23  1157 06/23/23  0532 06/22/23  1600   WBC Thousand/uL  --  5 51 9 33   HEMOGLOBIN g/dL 8 1* 7 7* 9 6*   HEMATOCRIT % 26 8* 25 0* 31 0*   PLATELETS Thousands/uL  --  462* 601*   NEUTROS PCT %  --   --  80*   LYMPHS PCT %  --   --  14   MONOS PCT %  --   --  4   EOS PCT %  --   --  2     Results from last 7 days   Lab Units 06/23/23  0532 06/22/23  1600   SODIUM mmol/L 134* 140   POTASSIUM mmol/L 3 3* 3 0*   CHLORIDE mmol/L 97 97   CO2 mmol/L 31 30   BUN mg/dL 7 7   CREATININE mg/dL 0 81 0 84   ANION GAP mmol/L 6 13   CALCIUM mg/dL 8 3* 9 7   ALBUMIN g/dL  --  4 3   TOTAL BILIRUBIN mg/dL  --  0 61   ALK PHOS U/L  --  67   ALT U/L  --  16   AST U/L  --  26   GLUCOSE RANDOM mg/dL 108 101     Results from last 7 days   Lab Units 06/22/23  1600   INR  1 09             Results from last 7 days   Lab Units 06/23/23  0532 06/22/23  1600   LACTIC ACID mmol/L  --  2 0   PROCALCITONIN ng/ml 2 03* 0 15       Lines/Drains:  Invasive Devices     Peripheral Intravenous Line  Duration Peripheral IV 06/22/23 Left Antecubital <1 day                      Imaging: Reviewed radiology reports from this admission including: chest xray and CTA chest pe    Recent Cultures (last 7 days):   Results from last 7 days   Lab Units 06/22/23  1608 06/22/23  1600   BLOOD CULTURE  Received in Microbiology Lab  Culture in Progress  Received in Microbiology Lab  Culture in Progress         Last 24 Hours Medication List:   Current Facility-Administered Medications   Medication Dose Route Frequency Provider Last Rate   • acetaminophen  650 mg Oral Q6H PRN Sary Melgar MD     • albuterol  2 puff Inhalation Q6H PRN Sary Melgar MD     • amLODIPine  2 5 mg Oral Daily Sary Melgar MD     • apixaban  5 mg Oral BID Sary Melgar MD     • diltiazem  180 mg Oral HS Sary Melgar MD     • diltiazem  60 mg Oral Early Morning Sary Melgar MD     • escitalopram  20 mg Oral Daily Sary Melgar MD     • Fluticasone Furoate-Vilanterol  1 puff Inhalation Daily Sary Melgar MD     • gabapentin  300 mg Oral Daily With [address] Estiven Méndez MD     • gabapentin  600 mg Oral Early Morning Sary Melgar MD     • gabapentin  900 mg Oral HS Sary Melgar MD     • HYDROcodone-acetaminophen  1 tablet Oral Q6H PRN Sary Melgar MD     • levothyroxine  50 mcg Oral Early Morning Sary Melgar MD     • lisinopril  20 mg Oral BID Amy Rowe MD     • loperamide  2 mg Oral TID PRN Jhonathan Doss Alexis Lion MD     • LORazepam  1 mg Oral Q6H PRN Nando Adrian MD     • pantoprazole  40 mg Oral Daily Aditya Course Sarahy Lynn MD     • potassium chloride  40 mEq Oral Once Darian Vo DO     • pravastatin  10 mg Oral Daily With [address] Cassy Evangelista MD     • sodium chloride  1 g Oral TID Nando Adrian MD          Today, Patient Was Seen By: Solo Carlos    **Please Note: This note may have been constructed using a voice recognition system  **

## 2023-06-23 NOTE — H&P
[facility]  H&P  Name: Rainer Jones 65 y o  female I MRN: [id number]  Unit/Bed#: W -01 I Date of Admission: 6/22/2023   Date of Service: 6/23/2023 I Hospital Day: 0      Assessment/Plan   * Fever  Assessment & Plan  Patient presents with fever, chills, nausea and headache started today around 2:30 PM   Symptoms lasted for about 3 hours  Patient had IV Venofer infusion at 8 AM   Patient has had Venofer before without any reactions  No identifiable source of infection on history or examination  Examination unremarkable  Vital stable  WBC within normal range  Procalcitonin, lactic acid normal   COVID/FLU negative  UA normal   D dimer elevated, CT PE negative for PE  CT chest: no acute cardiopulmonary disease  Plan  Monitor off antibiotics  Tylenol as needed for pain  Monitor temperature  Follow-up blood cultures  Follow-up TSH  Consider blood parasitic smears, Anaplasma, Lyme disease, EBV, CMV testing if fever persist     SIRS (systemic inflammatory response syndrome) ([facility] [address] )  Assessment & Plan  Patient met SIRS criteria with fever of 102 5 F, heart rate of 104 and respiratory rate of 20  No identifiable source of infection  Plan   see plan for #1    Anemia  Assessment & Plan  Patient had bowel resection in 2010 due to ulcerative colitis  Patient subsequently developed iron deficiency anemia due to malabsorption  Getting IV Venofer  Essential hypertension  Assessment & Plan  Plan  Continue amlodipine 2 5 mg, lisinopril 20 mg, torsemide 10 mg   Monitor blood pressure    Lumbar degenerative disc disease  Assessment & Plan  Plan  Continue hydrocodone    Supraventricular tachycardia (HCC)  Assessment & Plan  On diltiazem  Mild intermittent asthma without complication  Assessment & Plan  Plan  Continue home inhalers      GERD (gastroesophageal reflux disease)  Assessment & Plan  Plan   continue Protonix 40 mg      Acquired hypothyroidism  Assessment & Plan  Plan  Continue levothyroxine  Essential tremor  Assessment & Plan  Plan  Continue gabapentin    Anxiety  Assessment & Plan  Plan  Continue Lexapro 20 mg and Ativan as needed    Portal vein thrombosis  Assessment & Plan  Plan  Continue Eliquis       VTE Pharmacologic Prophylaxis: VTE Score: 7 High Risk (Score >/= 5) - Pharmacological DVT Prophylaxis Ordered: apixaban (Eliquis)  Sequential Compression Devices Ordered  Code Status: Level 1 - Full Code   Discussion with family: Updated  () at bedside  Anticipated Length of Stay: Patient will be admitted on an observation basis with an anticipated length of stay of less than 2 midnights secondary to FEVER  Chief Complaint: Fever  History of Present Illness:  Anthony Marc is a 65 y o  female with a PMH of portal vein thrombosis on Eliquis, HTN, asthma, SVT, left breast cancer, s/p mastectomy, ulcerative colitis s/p bowel resection, iron deficiency anemia who presents with fever for 1 day  Patient received IV Venofer at 8:30 AM today  Patient has had IV Venofer few doses before without any reactions  Around 2 PM patient developed chills, fever, headache and nausea  Symptoms lasted for about 3 hours and improved with Tylenol  Denies difficulty breathing, SOB, angioedema, itching or skin rashes  Denies cough, sputum, sore throat, ear pain, photophobia, phonophobia, neck pain, chest pain, abdominal pain, vomiting, urinary symptoms, wounds, or skin rashes  Patient has chronic diarrhea since the bowel resection, no changes in bowel habits  No sick contacts or recent travels  Review of Systems:  Review of Systems   Constitutional: Positive for chills and fever  Negative for diaphoresis  HENT: Negative for congestion and sore throat  Eyes: Negative for photophobia and visual disturbance  Respiratory: Negative for cough and shortness of breath  Cardiovascular: Negative for chest pain, palpitations and leg swelling  Gastrointestinal: Positive for diarrhea and nausea  Negative for abdominal pain, constipation and vomiting  Genitourinary: Negative for dysuria, flank pain, frequency, pelvic pain and urgency  Musculoskeletal: Negative for back pain and joint swelling  Skin: Negative for rash and wound  Neurological: Positive for headaches  Negative for dizziness, syncope and light-headedness  Psychiatric/Behavioral: Negative for agitation, behavioral problems and confusion         Past Medical and Surgical History:   Past Medical History:   Diagnosis Date   • Anemia    • Anxiety    • Arrhythmia    • Arthritis    • Asthma    • Blood clot in vein     portal vein   • Breast cancer ([facility] ) 02/12/2021   • Breast lump     [id number] RESOLVED   • Cancer (HCC)    • Depression    • Disease of thyroid gland    • DVT, lower extremity (HCC)    • GERD (gastroesophageal reflux disease)    • Hyperlipidemia    • Hypertension    • Hypokalemia    • Hyponatremia    • Hypothyroidism    • Iron deficiency anemia    • Irregular heart beat    • Manic behavior (HCC)    • 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HCC)    • Osteoarthritis    • Palpitations     [id number]  RESOLVED   • Paroxysmal supraventricular tachycardia (HCC)    • PE (pulmonary thromboembolism) (HCC)    • Sjoegren syndrome    • Sleep apnea    • Sleep difficulties    • Spinal stenosis    • Thrombocytosis     [id number]  RESOLVED   • Tremors of nervous system     dbs implanted right and left chest   • Ulcerative colitis ([facility] )    • Vertigo     [id number] RESOLVED       Past Surgical History:   Procedure Laterality Date   • ABDOMINAL SURGERY     • APPENDECTOMY     • BREAST BIOPSY Left 11/07/2019    Stereo   • BREAST EXCISIONAL BIOPSY Left     x many years   • BREAST SURGERY      lumpectomy & biopsy   • CARMELLA HOLE W/ STEREOTACTIC INSERTION OF DBS LEADS / INTRAOP MICROELECTRODE RECORDING     • COLON SURGERY     • COLONOSCOPY N/A 08/30/2017    Procedure: Clement Oscar;  Surgeon: Barby Bajwa MD; Location: [facility]; Service: Colorectal   • COLOPROCTECTOMY W/ ILEO J POUCH     • ESOPHAGOGASTRODUODENOSCOPY      ONSET 10/17/11   • FISTULA REPAIR      LLEOANAL FISTULA REPAIR TRANSPERIN TRANSABD APPROACH   • HYSTERECTOMY      age 43   • ILEOSTOMY CLOSURE     • KNEE ARTHROSCOPY      Right   • MAMMO STEREOTACTIC BREAST BIOPSY LEFT (ALL INC) Left 11/07/2019   • MAMMO STEREOTACTIC BREAST BIOPSY LEFT (ALL INC) Left 12/17/2020   • MAMMO STEREOTACTIC BREAST BIOPSY LEFT (ALL INC) EACH ADD Left 12/17/2020   • MASTECTOMY Left 02/12/2021    left mastectomy- Dr Jhonatan Swanson   • MASTECTOMY W/ SENTINEL NODE BIOPSY Left 02/12/2021    Procedure: BREAST MASTECTOMY WITH SENTINEL LYMPH NODE BIOPSY, LYMPHATIC MAPPING WITH BLUE DYE AND RADIAOCTIVE DYE (INJECT AT 1100 BY DR GILLESPIE IN THE OR); Surgeon: Abhishek Alarcon MD;  Location: [facility];  Service: Surgical Oncology   • VT INSJ/RPLCMT CRANIAL NEUROSTIM PULSE GENERATOR Right 06/20/2017    Procedure: DBS GENERATOR REPLACEMENT;  Surgeon: Sabine Peck MD;  Location: [facility];  Service: Neurosurgery   • VT INSJ/RPLCMT CRANIAL NEUROSTIM PULSE GENERATOR N/A 12/04/2019    Procedure: REPLACEMENT IMPLANTABLE PULSE GENERATOR FOR DEEP BRAIN STIMULATOR LEFT CHEST;  Surgeon: Devang Dunham MD;  Location: [facility];  Service: Neurosurgery   • SPLENECTOMY     • TONSILLECTOMY         Meds/Allergies:  Prior to Admission medications    Medication Sig Start Date End Date Taking?  Authorizing Provider   albuterol (PROVENTIL HFA,VENTOLIN HFA) 90 mcg/act inhaler Inhale 2 puffs every 6 (six) hours as needed for wheezing 11/28/22   Paulino Rivera MD   amLODIPine (NORVASC) 2 5 mg tablet TAKE ONE TABLET BY MOUTH EVERY DAY 12/5/22   Robert Molina MD   Calcium Carb-Cholecalciferol (CALCIUM 600-D PO) Take 1 tablet by mouth 2 (two) times a day    Historical Provider, MD   Coenzyme Q10 (CO Q-10 PO) Take 1 capsule by mouth daily    Historical Provider, MD   diltiazem (CARDIZEM CD) 180 mg 24 hr capsule TAKE ONE CAPSULE BY MOUTH EVERY DAY 5/10/23   Landry Cuevas MD   diltiazem (CARDIZEM) 60 mg tablet Take 1 tablet (60 mg total) by mouth daily 2/10/23   Landry Cuevas MD   Eliquis 5 MG TAKE ONE TABLET BY MOUTH TWICE A DAY 5/2/23   Landry Cuevas MD   escitalopram (LEXAPRO) 20 mg tablet TAKE ONE TABLET BY MOUTH EVERY DAY 4/10/23   Landry Cuevas MD   fluticasone [facility]) 50 mcg/act nasal spray APPLY ONE SPRAY IN EACH NOSTRIL ONCE DAILY AS NEEDED FOR RHINITIS 5/19/23   Landry Cuevas MD   Fluticasone-Salmeterol (Advair Diskus) 250-50 mcg/dose inhaler Inhale 1 puff 2 (two) times a day Rinse mouth after use 5/30/23   Landry Cuevas MD   gabapentin (NEURONTIN) 600 MG tablet TAKE ONE TABLET BY MOUTH IN THE MORNING, ONE TABLET IN THE AFTERNNON, AND TWO TABLETS AT BEDTIME 12/13/22   Landry Cuevas MD   HYDROcodone-acetaminophen [facility]) 5-325 mg per tablet Take 1 tablet by mouth every 6 (six) hours as needed for pain Max Daily Amount: 4 tablets 6/21/23   Landry Cuevas MD   levothyroxine 50 mcg tablet Take 1 tablet (50 mcg total) by mouth daily 5/2/23   RODNEY Sommers   lisinopril (ZESTRIL) 20 mg tablet TAKE ONE TABLET BY MOUTH TWICE A DAY 1/29/23   Landry Cuevas MD   LORazepam (ATIVAN) 1 mg tablet Take 1 tablet (1 mg total) by mouth every 6 (six) hours as needed for anxiety 5/30/23   Landry Cuevas MD   MAGNESIUM PO Take 1 tablet by mouth daily    Historical Provider, MD   Multiple Vitamin (MULTIVITAMIN ADULT PO) Take 1 tablet by mouth daily    Historical Provider, MD   nystatin (MYCOSTATIN) ointment Apply topically 2 (two) times a day  Patient not taking: Reported on 5/15/2023 4/21/23   Celi Justin MD   Omega-3 Fatty Acids (FISH OIL PO) Take 1 capsule by mouth daily    Historical Provider, MD   pantoprazole (PROTONIX) 40 mg tablet TAKE ONE TABLET BY MOUTH EVERY DAY 2/1/23   Landry Cuevas MD   potassium chloride (MICRO-K) 10 MEQ CR capsule Take 2 capsules daily 10/17/22   Sean Fierro MD   predniSONE 5 mg tablet  23   Historical Provider, MD   simvastatin (ZOCOR) 10 mg tablet Take 0 5 tablets (5 mg total) by mouth daily 23   Sean Fierro MD   sodium chloride 1 g tablet Take 1 tablet (1 g total) by mouth 3 (three) times a day 23   Gama Orlando PA-C   torsemide [facility]) 10 mg tablet Take 1 tablet (10 mg total) by mouth daily as needed (edema) 21   Sean Fierro MD   triamcinolone (KENALOG) 0 1 % ointment Apply topically 2 (two) times a day  Patient not taking: Reported on 5/15/2023 4/21/23   Celi Justin MD   vitamin B-12 (VITAMIN B-12) 500 mcg tablet Take 1 tablet (500 mcg total) by mouth daily 23   Sean Fierro MD     I have reviewed home medications with patient personally  Allergies:    Allergies   Allergen Reactions   • Indomethacin GI Intolerance and Dizziness   • Macrobid [Nitrofurantoin Monohyd Macro] Rash   • Nitrofurantoin Other (See Comments)   • Penicillins Rash      - [id number]: can take cephalosporins   • Sulfa Antibiotics Rash       Social History:  Marital Status:    Occupation:   Patient Pre-hospital Living Situation: Home  Patient Pre-hospital Level of Mobility: walks with walker  Patient Pre-hospital Diet Restrictions:   Substance Use History:   Social History     Substance and Sexual Activity   Alcohol Use Yes   • Alcohol/week: 2 0 standard drinks of alcohol   • Types: 2 Cans of beer per week     Social History     Tobacco Use   Smoking Status Former   • Packs/day: 1 50   • Years: 5 00   • Total pack years: 7 50   • Types: Cigarettes   • Quit date: 1965   • Years since quittin 5   Smokeless Tobacco Never   Tobacco Comments    Quit     Social History     Substance and Sexual Activity   Drug Use No       Family History:  Family History   Problem Relation Age of Onset   • Venous thrombosis Mother         ACUTE VENOUS THROMBOSIS OF DEEP VESSELS OF THE DISTAL LOWER EXTREMITY   • Other Mother         PHLEBITIS   • Hypertension Mother    • Peripheral vascular disease Mother    • COPD Father    • Diabetes Father         MELLITUS   • Stroke Father    • Diabetes Sister         MELLITUS   • Sjogren's syndrome Sister    • No Known Problems Daughter    • No Known Problems Maternal Grandmother    • No Known Problems Maternal Grandfather    • No Known Problems Paternal Grandmother    • No Known Problems Paternal Grandfather    • No Known Problems Sister    • No Known Problems Maternal Aunt    • No Known Problems Paternal Aunt    • No Known Problems Son        Physical Exam:     Vitals:   Blood Pressure: 109/53 (06/22/23 2215)  Pulse: 77 (06/22/23 2215)  Temperature: 99 4 °F (37 4 °C) (06/22/23 2059)  Temp Source: Oral (06/22/23 1800)  Respirations: 19 (06/22/23 2059)  SpO2: (!) 89 % (06/22/23 2215)    Physical Exam  Constitutional:       General: She is not in acute distress  Appearance: Normal appearance  She is not ill-appearing, toxic-appearing or diaphoretic  HENT:      Head: Normocephalic  Nose: No congestion  Mouth/Throat:      Mouth: Mucous membranes are moist       Pharynx: Oropharynx is clear  Cardiovascular:      Rate and Rhythm: Normal rate and regular rhythm  Pulses: Normal pulses  Heart sounds: Normal heart sounds  Pulmonary:      Effort: Pulmonary effort is normal       Breath sounds: Normal breath sounds  Abdominal:      General: Bowel sounds are normal       Palpations: Abdomen is soft  Musculoskeletal:      Right lower leg: No edema  Left lower leg: No edema  Skin:     General: Skin is warm and dry  Capillary Refill: Capillary refill takes less than 2 seconds  Neurological:      General: No focal deficit present  Mental Status: She is alert and oriented to person, place, and time     Psychiatric:         Mood and Affect: Mood normal          Behavior: Behavior normal           Additional Data:     Lab Results:  Results from last 7 days   Lab Units 06/22/23  1600   WBC Thousand/uL 9 33   HEMOGLOBIN g/dL 9 6*   HEMATOCRIT % 31 0*   PLATELETS Thousands/uL 601*   NEUTROS PCT % 80*   LYMPHS PCT % 14   MONOS PCT % 4   EOS PCT % 2     Results from last 7 days   Lab Units 06/22/23  1600   SODIUM mmol/L 140   POTASSIUM mmol/L 3 0*   CHLORIDE mmol/L 97   CO2 mmol/L 30   BUN mg/dL 7   CREATININE mg/dL 0 84   ANION GAP mmol/L 13   CALCIUM mg/dL 9 7   ALBUMIN g/dL 4 3   TOTAL BILIRUBIN mg/dL 0 61   ALK PHOS U/L 67   ALT U/L 16   AST U/L 26   GLUCOSE RANDOM mg/dL 101     Results from last 7 days   Lab Units 06/22/23  1600   INR  1 09             Results from last 7 days   Lab Units 06/22/23  1600   LACTIC ACID mmol/L 2 0   PROCALCITONIN ng/ml 0 15       Lines/Drains:  Invasive Devices     Peripheral Intravenous Line  Duration           Peripheral IV 06/22/23 Left Antecubital <1 day                    Imaging: Reviewed radiology reports from this admission including: chest CT scan  CTA ED chest PE study   Final Result by Evi Fall MD (06/22 1931)      No pulmonary embolus  No acute pulmonary disease  Pulmonary artery enlargement which can be seen with pulmonary hypertension  Workstation performed: [id number]         XR chest portable   ED Interpretation by Kt Munguia DO (06/22 1859)   No acute process visualized          EKG and Other Studies Reviewed on Admission:   · EKG: No EKG obtained  ** Please Note: This note has been constructed using a voice recognition system   **

## 2023-06-23 NOTE — ASSESSMENT & PLAN NOTE
Patient presents with fever, chills, nausea and headache started today around 2:30 PM   Symptoms lasted for about 3 hours  Patient had IV Venofer infusion at 8 AM   Patient has had Venofer before without any reactions  No identifiable source of infection on history or examination  Examination unremarkable  Vital stable  WBC within normal range  Procalcitonin, lactic acid normal   COVID/FLU negative  UA normal   D dimer elevated, CT PE negative for PE  CT chest: no acute cardiopulmonary disease  Afebrile   TSH 1 683    Impression: Etiology unclear  Low suspicion reaction directly secondary to venofer infusion; given patient has had successful infusions in the past  Consider possible bacterial contamination  However, patient does report this time was a different brand and she will inquire about the brand next time  Plan  Monitor off antibiotics  Tylenol as needed for pain  Follow-up blood cultures - if positive, will call patient and ask her to return for abx; patient and family aware and agreeable

## 2023-06-23 NOTE — ASSESSMENT & PLAN NOTE
Patient had bowel resection in 2010 due to ulcerative colitis  Patient subsequently developed iron deficiency anemia due to malabsorption  Getting IV Venofer

## 2023-06-23 NOTE — ASSESSMENT & PLAN NOTE
Patient met SIRS criteria with fever of 102 5 F, heart rate of 104 and respiratory rate of 20  No identifiable source of infection    Plan   see plan for #1

## 2023-06-23 NOTE — DISCHARGE SUMMARY
[facility]  Discharge- Fontana Profit [birth year], 65 y o  female MRN: [id number]  Unit/Bed#: W -01 Encounter: [id number]  Primary Care Provider: Dar Pepe MD   Date and time admitted to hospital: 6/22/2023  3:45 PM    * Fever  Assessment & Plan  Patient presents with fever, chills, nausea and headache started today around 2:30 PM   Symptoms lasted for about 3 hours  Patient had IV Venofer infusion at 8 AM   Patient has had Venofer before without any reactions  No identifiable source of infection on history or examination  Examination unremarkable  Vital stable  WBC within normal range  Procalcitonin, lactic acid normal   COVID/FLU negative  UA normal   D dimer elevated, CT PE negative for PE  CT chest: no acute cardiopulmonary disease  Afebrile   TSH 1 683    Impression: Etiology unclear  Low suspicion reaction directly secondary to venofer infusion; given patient has had successful infusions in the past  Consider possible bacterial contamination  However, patient does report this time was a different brand and she will inquire about the brand next time  Plan  Monitor off antibiotics  Tylenol as needed for pain  Follow-up blood cultures - if positive, will call patient and ask her to return for abx; patient and family aware and agreeable  Anemia  Assessment & Plan  Patient had bowel resection in 2010 due to ulcerative colitis  Patient subsequently developed iron deficiency anemia due to malabsorption  Getting IV Venofer  Recommend patient to get in touch with hematologist, Dr Carlitos Russell, regarding current hemoglobin status and next steps in outpatient setting  SIRS (systemic inflammatory response syndrome) (HCC)  Assessment & Plan  Patient met SIRS criteria with fever of 102 5 F, heart rate of 104 and respiratory rate of 20  No identifiable source of infection    Plan   see plan for #1    Lumbar degenerative disc disease  Assessment & Plan  Plan  Continue hydrocodone    Supraventricular tachycardia (HCC)  Assessment & Plan  On diltiazem  Mild intermittent asthma without complication  Assessment & Plan  Plan  Continue home inhalers  GERD (gastroesophageal reflux disease)  Assessment & Plan  Plan   continue Protonix 40 mg      Acquired hypothyroidism  Assessment & Plan  Plan  Continue levothyroxine  Essential hypertension  Assessment & Plan  Plan  Continue amlodipine 2 5 mg, lisinopril 20 mg, torsemide 10 mg   Monitor blood pressure    Essential tremor  Assessment & Plan  Plan  Continue gabapentin    Anxiety  Assessment & Plan  Plan  Continue Lexapro 20 mg and Ativan as needed    Portal vein thrombosis  Assessment & Plan  Plan  Continue Eliquis      Medical Problems     Resolved Problems  Date Reviewed: 6/22/2023   None       Discharging Resident: Stephane Cobb DO  Discharging Attending: Carolyn Juarez MD  PCP: Quique Jackman MD  Admission Date:   Admission Orders (From admission, onward)     Ordered        06/22/23 2004  Place in Observation  Once                      Discharge Date: 06/23/23    Consultations During Hospital Stay:  · none    Procedures Performed:   · none    Significant Findings / Test Results:   · none    Incidental Findings:   · none    Test Results Pending at Discharge (will require follow up): · Blood cultures     Outpatient Tests Requested:  · none    Complications:  none    Reason for Admission: Fever    Hospital Course:   Whitney Amador is a 65 y o  female patient who originally presented to the hospital on 6/22/2023 due to chills, headache and nausea that started around 2pm on 6/22 after receiving Venofer infusion at 8:30 am      In the ED pt presented with fever of 102 5F, /84 and O2 sat 88% requiring 2L NC  Pt was given acetaminophen, eliquis, gabapentin, lactate ringers, MgSulfate, KCl  Procalcitonin level was 0 15 on admission and inc to 2 03  WBC was 9 33 on admission and 5 51 on 6/23   HgB dropped to 7 7 but pt is asymptomatic  Pt negative for RSV/Flu/COVID, Lipase and lactic acid also in normal range  D-Dimer elevated at 1 33  CTA Chest showed no PE or acute pulmonary disease  Pulmonary artery enlargement was appreciated  Chest Xray shows no intrathoracic process with low lung volume  Pt has no overnight events and feels at baseline  Pt has been afebrile since 9pm on 6/22  Fever likely from infusion reaction  Pt was continued on house diet and continued her home meds throughout the hospital stay  For further details, please see A/P above  The patient, initially admitted to the hospital as inpatient, was discharged earlier than expected given the following: afebrile and return to baseline  Please see above list of diagnoses and related plan for additional information  Condition at Discharge: good    Discharge Day Visit / Exam:   Subjective:  Pt is well appearing this morning and has no complaints other than chronic SOB with exertion  Pt feels ready to go home  Vitals: Blood Pressure: 118/52 (06/23/23 0745)  Pulse: 64 (06/23/23 0745)  Temperature: 98 4 °F (36 9 °C) (06/23/23 0745)  Temp Source: Oral (06/22/23 1800)  Respirations: 18 (06/23/23 0745)  SpO2: 92 % (06/23/23 0745)  Exam:   Physical Exam  Constitutional:       Appearance: Normal appearance  HENT:      Head: Normocephalic and atraumatic  Right Ear: External ear normal       Left Ear: External ear normal       Nose: Nose normal       Mouth/Throat:      Mouth: Mucous membranes are moist    Eyes:      Pupils: Pupils are equal, round, and reactive to light  Cardiovascular:      Rate and Rhythm: Normal rate and regular rhythm  Pulses: Normal pulses  Heart sounds: Normal heart sounds  Pulmonary:      Effort: Pulmonary effort is normal       Breath sounds: Normal breath sounds  Abdominal:      General: Abdomen is flat  Bowel sounds are normal       Palpations: Abdomen is soft     Musculoskeletal: General: Normal range of motion  Cervical back: Normal range of motion  Right lower leg: Edema (trace) present  Left lower leg: Edema (trace) present  Skin:     General: Skin is warm  Capillary Refill: Capillary refill takes less than 2 seconds  Findings: No rash  Neurological:      General: No focal deficit present  Mental Status: She is alert  Sensory: Sensory deficit (chronic neuropathy of lower limbs) present  Psychiatric:         Mood and Affect: Mood normal          Behavior: Behavior normal          Discussion with Family: Updated  (significant other) at bedside  Discharge instructions/Information to patient and family:   See after visit summary for information provided to patient and family  Provisions for Follow-Up Care:  See after visit summary for information related to follow-up care and any pertinent home health orders  Disposition:   Home    Planned Readmission:  no    Discharge Medications:  See after visit summary for reconciled discharge medications provided to patient and/or family        **Please Note: This note may have been constructed using a voice recognition system**

## 2023-06-23 NOTE — ASSESSMENT & PLAN NOTE
Patient had bowel resection in 2010 due to ulcerative colitis  Patient subsequently developed iron deficiency anemia due to malabsorption  Getting IV Venofer  Recommend patient to get in touch with hematologist, Dr Adilene Robertson, regarding current hemoglobin status and next steps in outpatient setting

## 2023-06-23 NOTE — TELEPHONE ENCOUNTER
Appointment Change  Cancel, Reschedule, Change to Virtual      Who are you speaking with? Patient   If it is not the patient, are they listed on an active communication consent form? N/A   Which provider is the appointment scheduled with? Dr Romaine Hunter   When is the appointment scheduled? Please list date and time 12/20/23 820   At which location is the appointment scheduled to take place? [facility]   Was the appointment rescheduled or changed from an in person visit to a virtual visit? If so, please list the details of the change  8/4/23 11am   What is the reason for the appointment change? Pt was recently in the hospital   Was [facility] transport scheduled for this visit? N/A   Does [facility] transport need to be scheduled for the new visit (if applicable) N/A   Does the patient need an infusion appointment rescheduled? N/A   Does the patient have an infusion appointment scheduled? If so, when? No   Is the patient undergoing chemotherapy? N/A   Was the no-show policy reviewed for appointments being changed with less then 24 hours of notice?  N/A

## 2023-06-25 LAB
BACTERIA BLD CULT: NORMAL
BACTERIA BLD CULT: NORMAL

## 2023-06-26 ENCOUNTER — TRANSITIONAL CARE MANAGEMENT (OUTPATIENT)
Dept: INTERNAL MEDICINE CLINIC | Facility: CLINIC | Age: 78
End: 2023-06-26

## 2023-06-26 DIAGNOSIS — M51.36 LUMBAR DEGENERATIVE DISC DISEASE: ICD-10-CM

## 2023-06-26 RX ORDER — GABAPENTIN 600 MG/1
TABLET ORAL
Qty: 360 TABLET | Refills: 2 | Status: SHIPPED | OUTPATIENT
Start: 2023-06-26

## 2023-06-27 ENCOUNTER — TELEPHONE (OUTPATIENT)
Dept: INFUSION CENTER | Facility: CLINIC | Age: 78
End: 2023-06-27

## 2023-06-27 LAB
BACTERIA BLD CULT: NORMAL
BACTERIA BLD CULT: NORMAL

## 2023-06-27 NOTE — TELEPHONE ENCOUNTER
Patient was asked if she could come in at 8:30 instead of 9:30 for her appointment on 720/23  Patient agreed and appointment has been switched to 8:30

## 2023-06-29 LAB
ATRIAL RATE: 103 BPM
P AXIS: 79 DEGREES
PR INTERVAL: 236 MS
QRS AXIS: 67 DEGREES
QRSD INTERVAL: 94 MS
QT INTERVAL: 364 MS
QTC INTERVAL: 476 MS
T WAVE AXIS: 38 DEGREES
VENTRICULAR RATE: 103 BPM

## 2023-06-29 PROCEDURE — 93010 ELECTROCARDIOGRAM REPORT: CPT | Performed by: INTERNAL MEDICINE

## 2023-07-03 ENCOUNTER — APPOINTMENT (OUTPATIENT)
Dept: LAB | Facility: AMBULARY SURGERY CENTER | Age: 78
End: 2023-07-03
Payer: MEDICARE

## 2023-07-03 ENCOUNTER — OFFICE VISIT (OUTPATIENT)
Dept: INTERNAL MEDICINE CLINIC | Facility: CLINIC | Age: 78
End: 2023-07-03
Payer: MEDICARE

## 2023-07-03 VITALS
WEIGHT: 152 LBS | OXYGEN SATURATION: 96 % | TEMPERATURE: 98.2 F | SYSTOLIC BLOOD PRESSURE: 128 MMHG | DIASTOLIC BLOOD PRESSURE: 68 MMHG | BODY MASS INDEX: 26.93 KG/M2 | HEIGHT: 63 IN | HEART RATE: 78 BPM

## 2023-07-03 DIAGNOSIS — D50.9 IRON DEFICIENCY ANEMIA, UNSPECIFIED IRON DEFICIENCY ANEMIA TYPE: Primary | ICD-10-CM

## 2023-07-03 DIAGNOSIS — R65.10 SIRS (SYSTEMIC INFLAMMATORY RESPONSE SYNDROME) (HCC): ICD-10-CM

## 2023-07-03 DIAGNOSIS — I10 ESSENTIAL HYPERTENSION: ICD-10-CM

## 2023-07-03 DIAGNOSIS — D50.9 IRON DEFICIENCY ANEMIA, UNSPECIFIED IRON DEFICIENCY ANEMIA TYPE: ICD-10-CM

## 2023-07-03 DIAGNOSIS — R50.9 FEVER, UNSPECIFIED FEVER CAUSE: Primary | ICD-10-CM

## 2023-07-03 LAB
ANION GAP SERPL CALCULATED.3IONS-SCNC: 6 MMOL/L
BASOPHILS # BLD AUTO: 0.06 THOUSANDS/ÂΜL (ref 0–0.1)
BASOPHILS NFR BLD AUTO: 1 % (ref 0–1)
BUN SERPL-MCNC: 12 MG/DL (ref 5–25)
CALCIUM SERPL-MCNC: 9.2 MG/DL (ref 8.3–10.1)
CHLORIDE SERPL-SCNC: 103 MMOL/L (ref 96–108)
CO2 SERPL-SCNC: 26 MMOL/L (ref 21–32)
CREAT SERPL-MCNC: 0.9 MG/DL (ref 0.6–1.3)
EOSINOPHIL # BLD AUTO: 0.01 THOUSAND/ÂΜL (ref 0–0.61)
EOSINOPHIL NFR BLD AUTO: 0 % (ref 0–6)
ERYTHROCYTE [DISTWIDTH] IN BLOOD BY AUTOMATED COUNT: 20.5 % (ref 11.6–15.1)
GFR SERPL CREATININE-BSD FRML MDRD: 61 ML/MIN/1.73SQ M
GLUCOSE P FAST SERPL-MCNC: 135 MG/DL (ref 65–99)
HCT VFR BLD AUTO: 28.8 % (ref 34.8–46.1)
HGB BLD-MCNC: 8.5 G/DL (ref 11.5–15.4)
IMM GRANULOCYTES # BLD AUTO: 0.04 THOUSAND/UL (ref 0–0.2)
IMM GRANULOCYTES NFR BLD AUTO: 1 % (ref 0–2)
LYMPHOCYTES # BLD AUTO: 1.56 THOUSANDS/ÂΜL (ref 0.6–4.47)
LYMPHOCYTES NFR BLD AUTO: 26 % (ref 14–44)
MAGNESIUM SERPL-MCNC: 1.9 MG/DL (ref 1.6–2.6)
MCH RBC QN AUTO: 24.3 PG (ref 26.8–34.3)
MCHC RBC AUTO-ENTMCNC: 29.5 G/DL (ref 31.4–37.4)
MCV RBC AUTO: 82 FL (ref 82–98)
MONOCYTES # BLD AUTO: 0.42 THOUSAND/ÂΜL (ref 0.17–1.22)
MONOCYTES NFR BLD AUTO: 7 % (ref 4–12)
NEUTROPHILS # BLD AUTO: 3.9 THOUSANDS/ÂΜL (ref 1.85–7.62)
NEUTS SEG NFR BLD AUTO: 65 % (ref 43–75)
NRBC BLD AUTO-RTO: 0 /100 WBCS
PLATELET # BLD AUTO: 817 THOUSANDS/UL (ref 149–390)
PMV BLD AUTO: 8.8 FL (ref 8.9–12.7)
POTASSIUM SERPL-SCNC: 4.8 MMOL/L (ref 3.5–5.3)
RBC # BLD AUTO: 3.5 MILLION/UL (ref 3.81–5.12)
SODIUM SERPL-SCNC: 135 MMOL/L (ref 135–147)
WBC # BLD AUTO: 5.99 THOUSAND/UL (ref 4.31–10.16)

## 2023-07-03 PROCEDURE — 80048 BASIC METABOLIC PNL TOTAL CA: CPT

## 2023-07-03 PROCEDURE — 85025 COMPLETE CBC W/AUTO DIFF WBC: CPT

## 2023-07-03 PROCEDURE — 83735 ASSAY OF MAGNESIUM: CPT

## 2023-07-03 PROCEDURE — 36415 COLL VENOUS BLD VENIPUNCTURE: CPT

## 2023-07-03 PROCEDURE — 99495 TRANSJ CARE MGMT MOD F2F 14D: CPT | Performed by: NURSE PRACTITIONER

## 2023-07-03 NOTE — ASSESSMENT & PLAN NOTE
Resolved. Drysol Pregnancy And Lactation Text: This medication is considered safe during pregnancy and breast feeding.

## 2023-07-03 NOTE — PROGRESS NOTES
Assessment & Plan     1. Fever, unspecified fever cause  Assessment & Plan:  Etiology unclear. Symptoms resolved on their own. Negative blood cultures. 2. SIRS (systemic inflammatory response syndrome) ([address])  Assessment & Plan:  Resolved. 3. Iron deficiency anemia, unspecified iron deficiency anemia type  Assessment & Plan:  Check CBC. Getting iron infusions. 4. Essential hypertension  Assessment & Plan:  Stable. Continue current medication regimen. Orders:  -     Basic metabolic panel; Future  -     Magnesium; Future  -     CBC and differential; Future       Subjective     Transitional Care Management Review:   Aarti Bejarano is a 65 y.o. female here for TCM follow up. She was hospitalized from 6/22-6/23 with a fever. She developed a fever shortly after receiving an iron infusion. They did not find a source of infection. The fever lasted a few hours along with nausea and a headache. It resolved and she has been feeling well since. She has a normal appetite. Her energy levels are improving. During the TCM phone call patient stated:  TCM Call     Date and time call was made  6/26/2023  9:29 AM    Hospital care reviewed  Records reviewed    Patient was hospitialized at  [facility]    Date of Admission  06/22/23    Date of discharge  06/23/23    Diagnosis  Fever    Disposition  Home    Were the patients medications reviewed and updated  Yes    Current Symptoms  None      TCM Call     Post hospital issues  None    Should patient be enrolled in anticoag monitoring? No    Scheduled for follow up?   Yes    Referrals needed  Radha Bustamante CMA    Did you obtain your prescribed medications  Yes    Do you need help managing your prescriptions or medications  No    Is transportation to your appointment needed  No    I have advised the patient to call PCP with any new or worsening symptoms  [place]        Review of Systems   Constitutional: Negative for activity change, appetite change, chills, fatigue, fever and unexpected weight change. Respiratory: Negative for cough and shortness of breath. Cardiovascular: Negative for chest pain and leg swelling. Gastrointestinal: Negative for abdominal pain, constipation and diarrhea. Genitourinary: Negative for difficulty urinating. Skin: Negative for rash. Neurological: Negative for dizziness, weakness, light-headedness and headaches. Psychiatric/Behavioral: Negative for sleep disturbance. Objective     /68   Pulse 78   Temp 98.2 °F (36.8 °C)   Ht 5' 3" (1.6 m)   Wt 68.9 kg (152 lb)   SpO2 96%   BMI 26.93 kg/m²      Physical Exam  Vitals reviewed. Constitutional:       Appearance: Normal appearance. HENT:      Head: Normocephalic and atraumatic. Eyes:      Conjunctiva/sclera: Conjunctivae normal.   Cardiovascular:      Rate and Rhythm: Normal rate and regular rhythm. Heart sounds: Normal heart sounds. Pulmonary:      Effort: Pulmonary effort is normal.      Breath sounds: Normal breath sounds. Musculoskeletal:      Right lower leg: No edema. Left lower leg: No edema. Skin:     General: Skin is warm and dry. Neurological:      Mental Status: She is alert and oriented to person, place, and time.    Psychiatric:         Mood and Affect: Mood normal.         Behavior: Behavior normal.       Medications have been reviewed by provider in current encounter    RODNEY Burch

## 2023-07-05 ENCOUNTER — HOSPITAL ENCOUNTER (OUTPATIENT)
Dept: MAMMOGRAPHY | Facility: CLINIC | Age: 78
Discharge: HOME | End: 2023-07-05

## 2023-07-05 DIAGNOSIS — Z85.3 HISTORY OF LEFT BREAST CANCER: ICD-10-CM

## 2023-07-07 ENCOUNTER — APPOINTMENT (OUTPATIENT)
Dept: LAB | Facility: AMBULARY SURGERY CENTER | Age: 78
End: 2023-07-07
Payer: MEDICARE

## 2023-07-07 DIAGNOSIS — E55.9 VITAMIN D DEFICIENCY, UNSPECIFIED: ICD-10-CM

## 2023-07-07 DIAGNOSIS — M35.01 SJOGREN SYNDROME WITH KERATOCONJUNCTIVITIS (HCC): ICD-10-CM

## 2023-07-07 LAB
25(OH)D3 SERPL-MCNC: 41.3 NG/ML (ref 30–100)
CRP SERPL QL: <3 MG/L

## 2023-07-07 PROCEDURE — 36415 COLL VENOUS BLD VENIPUNCTURE: CPT

## 2023-07-07 PROCEDURE — 82306 VITAMIN D 25 HYDROXY: CPT

## 2023-07-07 PROCEDURE — 86140 C-REACTIVE PROTEIN: CPT

## 2023-07-10 ENCOUNTER — APPOINTMENT (OUTPATIENT)
Dept: LAB | Facility: AMBULARY SURGERY CENTER | Age: 78
End: 2023-07-10
Payer: MEDICARE

## 2023-07-10 DIAGNOSIS — D50.9 IRON DEFICIENCY ANEMIA, UNSPECIFIED IRON DEFICIENCY ANEMIA TYPE: ICD-10-CM

## 2023-07-10 DIAGNOSIS — I81 PORTAL VEIN THROMBOSIS: ICD-10-CM

## 2023-07-10 LAB
BASOPHILS # BLD AUTO: 0.08 THOUSANDS/ÂΜL (ref 0–0.1)
BASOPHILS NFR BLD AUTO: 1 % (ref 0–1)
EOSINOPHIL # BLD AUTO: 0.24 THOUSAND/ÂΜL (ref 0–0.61)
EOSINOPHIL NFR BLD AUTO: 4 % (ref 0–6)
ERYTHROCYTE [DISTWIDTH] IN BLOOD BY AUTOMATED COUNT: 20.6 % (ref 11.6–15.1)
HCT VFR BLD AUTO: 27.5 % (ref 34.8–46.1)
HGB BLD-MCNC: 8.1 G/DL (ref 11.5–15.4)
IMM GRANULOCYTES # BLD AUTO: 0.01 THOUSAND/UL (ref 0–0.2)
IMM GRANULOCYTES NFR BLD AUTO: 0 % (ref 0–2)
LYMPHOCYTES # BLD AUTO: 2.04 THOUSANDS/ÂΜL (ref 0.6–4.47)
LYMPHOCYTES NFR BLD AUTO: 31 % (ref 14–44)
MCH RBC QN AUTO: 24.1 PG (ref 26.8–34.3)
MCHC RBC AUTO-ENTMCNC: 29.5 G/DL (ref 31.4–37.4)
MCV RBC AUTO: 82 FL (ref 82–98)
MONOCYTES # BLD AUTO: 1.06 THOUSAND/ÂΜL (ref 0.17–1.22)
MONOCYTES NFR BLD AUTO: 16 % (ref 4–12)
NEUTROPHILS # BLD AUTO: 3.12 THOUSANDS/ÂΜL (ref 1.85–7.62)
NEUTS SEG NFR BLD AUTO: 48 % (ref 43–75)
NRBC BLD AUTO-RTO: 0 /100 WBCS
PLATELET # BLD AUTO: 731 THOUSANDS/UL (ref 149–390)
PMV BLD AUTO: 8.6 FL (ref 8.9–12.7)
RBC # BLD AUTO: 3.36 MILLION/UL (ref 3.81–5.12)
WBC # BLD AUTO: 6.55 THOUSAND/UL (ref 4.31–10.16)

## 2023-07-10 PROCEDURE — 85025 COMPLETE CBC W/AUTO DIFF WBC: CPT

## 2023-07-14 ENCOUNTER — APPOINTMENT (OUTPATIENT)
Dept: LAB | Facility: CLINIC | Age: 78
End: 2023-07-14
Payer: MEDICARE

## 2023-07-14 ENCOUNTER — TELEPHONE (OUTPATIENT)
Dept: HEMATOLOGY ONCOLOGY | Facility: CLINIC | Age: 78
End: 2023-07-14

## 2023-07-14 DIAGNOSIS — D50.9 IRON DEFICIENCY ANEMIA, UNSPECIFIED IRON DEFICIENCY ANEMIA TYPE: Primary | ICD-10-CM

## 2023-07-14 DIAGNOSIS — D50.9 IRON DEFICIENCY ANEMIA, UNSPECIFIED IRON DEFICIENCY ANEMIA TYPE: ICD-10-CM

## 2023-07-14 LAB
BASOPHILS # BLD AUTO: 0.08 THOUSANDS/ÂΜL (ref 0–0.1)
BASOPHILS NFR BLD AUTO: 1 % (ref 0–1)
EOSINOPHIL # BLD AUTO: 0.2 THOUSAND/ÂΜL (ref 0–0.61)
EOSINOPHIL NFR BLD AUTO: 2 % (ref 0–6)
ERYTHROCYTE [DISTWIDTH] IN BLOOD BY AUTOMATED COUNT: 20.3 % (ref 11.6–15.1)
FERRITIN SERPL-MCNC: 18 NG/ML (ref 11–307)
HCT VFR BLD AUTO: 27.5 % (ref 34.8–46.1)
HGB BLD-MCNC: 8.2 G/DL (ref 11.5–15.4)
IMM GRANULOCYTES # BLD AUTO: 0.03 THOUSAND/UL (ref 0–0.2)
IMM GRANULOCYTES NFR BLD AUTO: 0 % (ref 0–2)
IRON SATN MFR SERPL: 3 % (ref 15–50)
IRON SERPL-MCNC: 13 UG/DL (ref 50–170)
LYMPHOCYTES # BLD AUTO: 2.62 THOUSANDS/ÂΜL (ref 0.6–4.47)
LYMPHOCYTES NFR BLD AUTO: 26 % (ref 14–44)
MCH RBC QN AUTO: 24.3 PG (ref 26.8–34.3)
MCHC RBC AUTO-ENTMCNC: 29.8 G/DL (ref 31.4–37.4)
MCV RBC AUTO: 81 FL (ref 82–98)
MONOCYTES # BLD AUTO: 1.39 THOUSAND/ÂΜL (ref 0.17–1.22)
MONOCYTES NFR BLD AUTO: 14 % (ref 4–12)
NEUTROPHILS # BLD AUTO: 5.62 THOUSANDS/ÂΜL (ref 1.85–7.62)
NEUTS SEG NFR BLD AUTO: 57 % (ref 43–75)
NRBC BLD AUTO-RTO: 0 /100 WBCS
PLATELET # BLD AUTO: 574 THOUSANDS/UL (ref 149–390)
PMV BLD AUTO: 8.3 FL (ref 8.9–12.7)
RBC # BLD AUTO: 3.38 MILLION/UL (ref 3.81–5.12)
TIBC SERPL-MCNC: 387 UG/DL (ref 250–450)
WBC # BLD AUTO: 9.94 THOUSAND/UL (ref 4.31–10.16)

## 2023-07-14 PROCEDURE — 83540 ASSAY OF IRON: CPT

## 2023-07-14 PROCEDURE — 36415 COLL VENOUS BLD VENIPUNCTURE: CPT

## 2023-07-14 PROCEDURE — 85025 COMPLETE CBC W/AUTO DIFF WBC: CPT

## 2023-07-14 PROCEDURE — 83550 IRON BINDING TEST: CPT

## 2023-07-14 PROCEDURE — 82728 ASSAY OF FERRITIN: CPT

## 2023-07-14 RX ORDER — SODIUM CHLORIDE 9 MG/ML
20 INJECTION, SOLUTION INTRAVENOUS ONCE
Status: CANCELLED | OUTPATIENT
Start: 2023-07-20

## 2023-07-14 NOTE — TELEPHONE ENCOUNTER
Left detailed message with updated schedule. Gave my Teams # to call back for any questions. Can view on [employer].

## 2023-07-14 NOTE — TELEPHONE ENCOUNTER
Left VM for patient to make her aware that pre medications have been added to her plan.     Please update infusion as this may need adjustment of time

## 2023-07-14 NOTE — TELEPHONE ENCOUNTER
Patient Call    Who are you speaking with? Patient    If it is not the patient, are they listed on an active communication consent form? N/A   What is the reason for this call? Patient calling in stating she has fears and concerns in regards to getting her infusion on Thursday as last infusion she had a bad reaction that she ended up in the hospital.   Does this require a call back? Yes   If a call back is required, please list best call back number [phone]   If a call back is required, advise that a message will be forwarded to their care team and someone will return their call as soon as possible. Did you relay this information to the patient?  Yes

## 2023-07-17 DIAGNOSIS — E87.6 HYPOKALEMIA: ICD-10-CM

## 2023-07-17 RX ORDER — POTASSIUM CHLORIDE 750 MG/1
CAPSULE, EXTENDED RELEASE ORAL
Qty: 180 CAPSULE | Refills: 3 | Status: SHIPPED | OUTPATIENT
Start: 2023-07-17

## 2023-07-18 DIAGNOSIS — K21.9 GASTROESOPHAGEAL REFLUX DISEASE, UNSPECIFIED WHETHER ESOPHAGITIS PRESENT: ICD-10-CM

## 2023-07-18 DIAGNOSIS — F41.9 ANXIETY: ICD-10-CM

## 2023-07-18 RX ORDER — PANTOPRAZOLE SODIUM 40 MG/1
40 TABLET, DELAYED RELEASE ORAL DAILY
Qty: 90 TABLET | Refills: 1 | Status: SHIPPED | OUTPATIENT
Start: 2023-07-18

## 2023-07-18 RX ORDER — LORAZEPAM 1 MG/1
1 TABLET ORAL EVERY 6 HOURS PRN
Qty: 120 TABLET | Refills: 0 | Status: SHIPPED | OUTPATIENT
Start: 2023-07-18

## 2023-07-18 NOTE — TELEPHONE ENCOUNTER
Patient left a message requesting refills of Lorazepam and Pantoprazole, [facility] [employer].

## 2023-07-20 ENCOUNTER — HOSPITAL ENCOUNTER (OUTPATIENT)
Dept: INFUSION CENTER | Facility: CLINIC | Age: 78
Discharge: HOME | End: 2023-07-20
Payer: MEDICARE

## 2023-07-20 VITALS
TEMPERATURE: 97 F | OXYGEN SATURATION: 95 % | HEART RATE: 71 BPM | DIASTOLIC BLOOD PRESSURE: 62 MMHG | RESPIRATION RATE: 18 BRPM | SYSTOLIC BLOOD PRESSURE: 120 MMHG

## 2023-07-20 DIAGNOSIS — D50.9 IRON DEFICIENCY ANEMIA, UNSPECIFIED IRON DEFICIENCY ANEMIA TYPE: Primary | ICD-10-CM

## 2023-07-20 PROCEDURE — 96365 THER/PROPH/DIAG IV INF INIT: CPT

## 2023-07-20 PROCEDURE — 96375 TX/PRO/DX INJ NEW DRUG ADDON: CPT

## 2023-07-20 RX ORDER — SODIUM CHLORIDE 9 MG/ML
20 INJECTION, SOLUTION INTRAVENOUS ONCE
Status: CANCELLED | OUTPATIENT
Start: 2023-08-17

## 2023-07-20 RX ORDER — SODIUM CHLORIDE 9 MG/ML
20 INJECTION, SOLUTION INTRAVENOUS ONCE
Status: COMPLETED | OUTPATIENT
Start: 2023-07-20 | End: 2023-07-20

## 2023-07-20 RX ADMIN — IRON SUCROSE 200 MG: 20 INJECTION, SOLUTION INTRAVENOUS at 11:45

## 2023-07-20 RX ADMIN — DIPHENHYDRAMINE HYDROCHLORIDE 25 MG: 50 INJECTION, SOLUTION INTRAMUSCULAR; INTRAVENOUS at 10:59

## 2023-07-20 RX ADMIN — SODIUM CHLORIDE 20 ML/HR: 0.9 INJECTION, SOLUTION INTRAVENOUS at 11:01

## 2023-07-20 RX ADMIN — HYDROCORTISONE SODIUM SUCCINATE 50 MG: 100 INJECTION, POWDER, FOR SOLUTION INTRAMUSCULAR; INTRAVENOUS at 11:37

## 2023-07-20 NOTE — PROGRESS NOTES
Patient presents today for IVP venofer. Patient reports mild fatigue today. Peripheral IV inserted without incident. Pre-meds given as per order. Venofer administered slow IVP as per order, tolerated without incident. Peripheral IV removed. Next appointment confirmed. AVS offered and declined.

## 2023-08-03 DIAGNOSIS — E03.9 ACQUIRED HYPOTHYROIDISM: ICD-10-CM

## 2023-08-03 RX ORDER — LEVOTHYROXINE SODIUM 0.05 MG/1
50 TABLET ORAL DAILY
Qty: 90 TABLET | Refills: 0 | Status: SHIPPED | OUTPATIENT
Start: 2023-08-03

## 2023-08-04 ENCOUNTER — TELEPHONE (OUTPATIENT)
Dept: HEMATOLOGY ONCOLOGY | Facility: CLINIC | Age: 78
End: 2023-08-04

## 2023-08-04 ENCOUNTER — OFFICE VISIT (OUTPATIENT)
Dept: HEMATOLOGY ONCOLOGY | Facility: CLINIC | Age: 78
End: 2023-08-04
Payer: MEDICARE

## 2023-08-04 VITALS
WEIGHT: 153 LBS | BODY MASS INDEX: 27.11 KG/M2 | TEMPERATURE: 97 F | HEART RATE: 74 BPM | OXYGEN SATURATION: 95 % | RESPIRATION RATE: 18 BRPM | HEIGHT: 63 IN | DIASTOLIC BLOOD PRESSURE: 72 MMHG | SYSTOLIC BLOOD PRESSURE: 122 MMHG

## 2023-08-04 DIAGNOSIS — D50.9 IRON DEFICIENCY ANEMIA, UNSPECIFIED IRON DEFICIENCY ANEMIA TYPE: Primary | ICD-10-CM

## 2023-08-04 PROCEDURE — 99214 OFFICE O/P EST MOD 30 MIN: CPT | Performed by: INTERNAL MEDICINE

## 2023-08-04 RX ORDER — SODIUM CHLORIDE 9 MG/ML
20 INJECTION, SOLUTION INTRAVENOUS ONCE
OUTPATIENT
Start: 2023-08-11

## 2023-08-04 NOTE — PROGRESS NOTES
Hematology / Oncology Outpatient Follow Up Note    Farida Thomas 65 y.o. 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 Her microinvasive breast cancer measure less than 1 mm. However, her DCIS measure 7.7 cm. She underwent mastectomy and sentinel lymph node biopsy, resulting in KORIN. She does not require any adjuvant therapy. She has history of ulcerative colitis as well as bowel resection in 2010. Subsequently, she developed iron deficiency anemia, due to the malabsorption. She received 3 doses of Venofer, resulting in normal hemoglobin and ferritin. She was on maintenance Venofer every 6 months, when she became anemic again. I recommended her to go back on weekly Venofer x10 doses. I will refer her to gastroenterology for possible EGD to rule out GI blood loss. We will monitor her CBC monthly. I will see her again in 3 months for routine follow-up. She and her  are in agreement with my recommendations.               Subjective:      HPI:    A 69-year-old postmenopausal woman who was found to have abnormality in her left breast, based on a screening mammography. 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 DCIS port was ER negative, KS negative. She subsequently underwent left mastectomy with sentinel lymph node biopsy by Dr. Pravin Yusuf in February 12, 2021. 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 She also had micro invasion measuring less than 1 mm. Micro invasive part of cancer was ER negative, KS negative, HER2 3+ disease. 2 sentinel lymph nodes were negative for metastatic disease. She did not have reconstruction.   She presents today with her  to discuss possible adjuvant therapy. She feels well with no complaint of pain. Her weight is stable. She has history of essential tremor for which she had bilateral brain stimulator which improved her quality life substantially. She also has history of splenectomy back in 1999. She has no  Family history of breast cancer. She is nonsmoker. She is to have some ambulatory dysfunction for which she occasionally use walkers. Her performance status is 1/4 on ECOG scale.           Interval History:   A 76-year-old postmenopausal woman with microinvasive left breast cancer, ER negative, OR negative, 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 diagnosed in early 2021. Her microinvasive breast cancer measure less than 1 mm. However, her DCIS measure 7.7 cm. She underwent mastectomy and sentinel lymph node biopsy, resulting in KORIN. She did not require any adjuvant therapy. She has history of ulcerative colitis as well as history of bowel resection in 2010. She developed iron deficiency anemia due to the malabsorption. She received 3 doses of Venofer for with no infusion reaction, resulting in normal hemoglobin. Therefore, she was placed on maintenance venofer every 6 months. However, she became anemic. She has reactive thrombocytosis. Ferritin was only 9. She is currently on monthly Venofer. However, her hemoglobin is now much improvement. She has no melena or hematochezia. Her weight is stable. She has moderate fatigue. Her performance status is 1 out of 4 on ECOG scale. Objective:      Primary Diagnosis:       1. Left breast cancer, stage I A ( pT1a, pN0, M0) with microinvasion measuring less than 1 mm. ER negative, OR negative, HER2 3+ disease.   Diagnosed in February 2021.    2. Iron-deficiency anemia probably due to the bowel resection.     Cancer Staging:  Cancer Staging  No matching staging information was found for the patient.        Previous Hematologic/ Oncologic Treatment:            Current Hematologic/ Oncologic Treatment:      Maintenance Venofer every 6 months starting in April 2023.     Disease Status:        KORIN status post mastectomy and sentinel lymph node biopsy.     Test Results:     Pathology:       7.7 cm of ductal carcinoma in situ, grade 3, ER negative, DE negative. Micro invasive cancer measuring less than 1 mm, ER negative, DE negative, HER2 3+ disease. 2 sentinel lymph node was negative for metastatic disease. Stage I A ( pT1 a, pN0, M0)     Radiology:      chest x-ray was negative for pulmonary disease.     Laboratory:       See below for CBC and ferritin.     Physical Exam:        General Appearance:    Alert, oriented          Eyes:    PERRL   Ears:    Normal external ear canals, both ears   Nose:   Nares normal, septum midline   Throat:   Mucosa moist. Pharynx without injection. Neck:   Supple         Lungs:     Clear to auscultation bilaterally   Chest Wall:    No tenderness or deformity    Heart:    Regular rate and rhythm         Abdomen:     Soft, non-tender, bowel sounds +, no organomegaly               Extremities:   Extremities no cyanosis or edema         Skin:   no rash or icterus. Lymph nodes:   Cervical, supraclavicular, and axillary nodes normal   Neurologic:   CNII-XII intact, normal strength, sensation and reflexes     Throughout             Breast exam:     Status post mastectomy without reconstruction. There is no palpable abnormality in her left chest wall. Right breast exam is negative. ROS: Review of Systems   All other systems reviewed and are negative. Imaging: No results found.       Labs:   Lab Results   Component Value Date    WBC 9.94 07/14/2023    HGB 8.2 (L) 07/14/2023    HCT 27.5 (L) 07/14/2023    MCV 81 (L) 07/14/2023     (H) 07/14/2023     Lab Results   Component Value Date     (L) 12/28/2015    K 4.8 07/03/2023     07/03/2023    CO2 26 07/03/2023    ANIONGAP 8 12/28/2015    BUN 12 07/03/2023    CREATININE 0.90 07/03/2023    GLUCOSE 112 10/17/2021    GLUF 135 (H) 07/03/2023    CALCIUM 9.2 07/03/2023    CORRECTEDCA 9.9 04/07/2023    AST 26 06/22/2023    ALT 16 06/22/2023    ALKPHOS 67 06/22/2023    PROT 6.9 11/16/2015    BILITOT 0.36 11/16/2015    EGFR 61 07/03/2023         Lab Results   Component Value Date    IRON 13 (L) 07/14/2023    TIBC 387 07/14/2023    FERRITIN 18 07/14/2023       Lab Results   Component Value Date    XTKEPSKV78 384 04/07/2023         Current Medications: Reviewed  Allergies: Reviewed  PMH/FH/SH:  Reviewed      Vital Sign:    Body surface area is 1.73 meters squared.     Wt Readings from Last 3 Encounters:   08/04/23 69.4 kg (153 lb)   07/03/23 68.9 kg (152 lb)   06/08/23 71.7 kg (158 lb)        Temp Readings from Last 3 Encounters:   08/04/23 (!) 97 °F (36.1 °C) (Tympanic)   07/20/23 (!) 97 °F (36.1 °C) (Temporal)   07/03/23 98.2 °F (36.8 °C)        BP Readings from Last 3 Encounters:   08/04/23 122/72   07/20/23 120/62   07/03/23 128/68         Pulse Readings from Last 3 Encounters:   08/04/23 74   07/20/23 71   07/03/23 78     @LASTSAO2(3)@

## 2023-08-08 ENCOUNTER — OFFICE VISIT (OUTPATIENT)
Dept: OBGYN CLINIC | Facility: OTHER | Age: 78
End: 2023-08-08
Payer: MEDICARE

## 2023-08-08 VITALS
BODY MASS INDEX: 27.46 KG/M2 | HEIGHT: 63 IN | SYSTOLIC BLOOD PRESSURE: 137 MMHG | DIASTOLIC BLOOD PRESSURE: 73 MMHG | HEART RATE: 69 BPM | WEIGHT: 155 LBS

## 2023-08-08 DIAGNOSIS — M17.11 PRIMARY OSTEOARTHRITIS OF RIGHT KNEE: Primary | ICD-10-CM

## 2023-08-08 PROCEDURE — 20611 DRAIN/INJ JOINT/BURSA W/US: CPT | Performed by: FAMILY MEDICINE

## 2023-08-08 RX ORDER — MESALAMINE 500 MG/1
1 CAPSULE, EXTENDED RELEASE ORAL 4 TIMES DAILY
COMMUNITY
End: 2023-08-10

## 2023-08-08 RX ORDER — BUPIVACAINE HYDROCHLORIDE 2.5 MG/ML
4 INJECTION, SOLUTION INFILTRATION; PERINEURAL
Status: COMPLETED | OUTPATIENT
Start: 2023-08-08 | End: 2023-08-08

## 2023-08-08 RX ORDER — MECOBALAMIN 5000 MCG
TABLET,DISINTEGRATING ORAL DAILY
COMMUNITY

## 2023-08-08 RX ORDER — FLUTICASONE PROPIONATE AND SALMETEROL 100; 50 UG/1; UG/1
POWDER RESPIRATORY (INHALATION)
COMMUNITY

## 2023-08-08 RX ORDER — TRIAMCINOLONE ACETONIDE 40 MG/ML
40 INJECTION, SUSPENSION INTRA-ARTICULAR; INTRAMUSCULAR
Status: COMPLETED | OUTPATIENT
Start: 2023-08-08 | End: 2023-08-08

## 2023-08-08 RX ADMIN — TRIAMCINOLONE ACETONIDE 40 MG: 40 INJECTION, SUSPENSION INTRA-ARTICULAR; INTRAMUSCULAR at 07:30

## 2023-08-08 RX ADMIN — BUPIVACAINE HYDROCHLORIDE 4 ML: 2.5 INJECTION, SOLUTION INFILTRATION; PERINEURAL at 07:30

## 2023-08-08 NOTE — PROGRESS NOTES
1. Primary osteoarthritis of right knee  Large joint arthrocentesis: R knee        Orders Placed This Encounter   Procedures   • Large joint arthrocentesis: R knee        IMAGING STUDIES: (I personally reviewed images in PACS and report):         PAST REPORTS:        ASSESSMENT/PLAN:  Right Knee Moderate to Severe Lateral Compartment OA    Repeat X-ray next visit: None    Return for Follow-up every 3 months as needed for injection. Patient instructions below verbally summarized in person during encounter:  Patient Instructions   red flags and risks of injection include but are not limited to infection <0.072% as referenced in some sources, nerve or artery penetration, and if steroids are used-skin dimpling <1%, hypo-pigmentation <1%. Recommended no submerging underwater in a tub, pool, ocean, lake, jacuzzi, hot tub, or any other body of water for 1 week until needle wound closes due to risk of infection. May take showers. Clean needle site with soap and water and keep covered at all times with sterile bandage such as a band-aid until fully healed. Educated if any symptoms including fevers, chills, swelling, or worsening symptoms occur then to call office or go to hospital for immediate care if physician unavailable due to possible infection or other complication which is a serious medical problem. Patient expressed understanding and agreed to proceed with procedure.            __________________________________________________________________________    HISTORY OF PRESENT ILLNESS:    Patient presents with knee pain. History, examination, and x-rays are consistent with diagnosis of Osteoarthritis.      Nonpharmacologic treatment: home exercise program placed on chart  Pain Score:   Up to 8-9/10  Pain from condition does interfere with activities of daily living  Previous Visco relief: 2 days of relief after April 2023 injection  Previous CSI relief: relief with last performed 12/2023              Review of Systems      Following history reviewed and update:    Past Medical History:   Diagnosis Date   • Anemia    • Anxiety    • Arrhythmia    • Arthritis    • Asthma    • Blood clot in vein     portal vein   • Breast cancer ([address]) 02/12/2021   • Breast lump     [id number] RESOLVED   • Cancer (HCC)    • Depression    • Disease of thyroid gland    • DVT, lower extremity (HCC)    • GERD (gastroesophageal reflux disease)    • Hyperlipidemia    • Hypertension    • Hypokalemia    • Hyponatremia    • Hypothyroidism    • Iron deficiency anemia    • Irregular heart beat    • Manic behavior (HCC)    • 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HCC)    • Osteoarthritis    • Palpitations     [id number]  RESOLVED   • Paroxysmal supraventricular tachycardia (HCC)    • PE (pulmonary thromboembolism) (HCC)    • Sjoegren syndrome    • Sleep apnea    • Sleep difficulties    • Spinal stenosis    • Thrombocytosis     [id number]  RESOLVED   • Tremors of nervous system     dbs implanted right and left chest   • Ulcerative colitis ([address])    • Vertigo     [id number] RESOLVED     Past Surgical History:   Procedure Laterality Date   • ABDOMINAL SURGERY     • APPENDECTOMY     • BREAST BIOPSY Left 11/07/2019    Stereo   • BREAST EXCISIONAL BIOPSY Left     x many years   • BREAST SURGERY      lumpectomy & biopsy   • CARMELLA HOLE W/ STEREOTACTIC INSERTION OF DBS LEADS / INTRAOP MICROELECTRODE RECORDING     • COLON SURGERY     • COLONOSCOPY N/A 08/30/2017    Procedure: Boris Toth;  Surgeon: Magi Garsia MD;  Location: [facility];   Service: Colorectal   • COLOPROCTECTOMY W/ ILEO J POUCH     • ESOPHAGOGASTRODUODENOSCOPY      ONSET 10/17/11   • FISTULA REPAIR      LLEOANAL FISTULA REPAIR TRANSPERIN TRANSABD APPROACH   • HYSTERECTOMY      age 43   • ILEOSTOMY CLOSURE     • KNEE ARTHROSCOPY      Right   • MAMMO STEREOTACTIC BREAST BIOPSY LEFT (ALL INC) Left 11/07/2019   • MAMMO STEREOTACTIC BREAST BIOPSY LEFT (ALL INC) Left 12/17/2020   • MAMMO STEREOTACTIC BREAST BIOPSY LEFT (ALL INC) EACH ADD Left 2020   • MASTECTOMY Left 2021    left mastectomy- Dr. Leonard Cheatham   • MASTECTOMY W/ SENTINEL NODE BIOPSY Left 2021    Procedure: BREAST MASTECTOMY WITH SENTINEL LYMPH NODE BIOPSY, LYMPHATIC MAPPING WITH BLUE DYE AND RADIAOCTIVE DYE (INJECT AT 1100 BY DR GILLESPIE IN THE OR);   Surgeon: Mcehe Aly MD;  Location: [facility];  Service: Surgical Oncology   • UT INSJ/RPLCMT CRANIAL NEUROSTIM PULSE GENERATOR Right 2017    Procedure: DBS GENERATOR REPLACEMENT;  Surgeon: Ananda Maier MD;  Location: [facility];  Service: Neurosurgery   • UT INSJ/RPLCMT CRANIAL NEUROSTIM PULSE GENERATOR N/A 2019    Procedure: REPLACEMENT IMPLANTABLE PULSE GENERATOR FOR DEEP BRAIN STIMULATOR LEFT CHEST;  Surgeon: Adia Boyd MD;  Location: [facility];  Service: Neurosurgery   • SPLENECTOMY     • TONSILLECTOMY       Social History   Social History     Substance and Sexual Activity   Alcohol Use Yes   • Alcohol/week: 2.0 standard drinks of alcohol   • Types: 2 Cans of beer per week     Social History     Substance and Sexual Activity   Drug Use No     Social History     Tobacco Use   Smoking Status Former   • Packs/day: 1.50   • Years: 5.00   • Total pack years: 7.50   • Types: Cigarettes   • Quit date: 1965   • Years since quittin.6   Smokeless Tobacco Never   Tobacco Comments    Quit     Family History   Problem Relation Age of Onset   • Venous thrombosis Mother         ACUTE VENOUS THROMBOSIS OF DEEP VESSELS OF THE DISTAL LOWER EXTREMITY   • Other Mother         PHLEBITIS   • Hypertension Mother    • Peripheral vascular disease Mother    • COPD Father    • Diabetes Father         MELLITUS   • Stroke Father    • Diabetes Sister         MELLITUS   • Sjogren's syndrome Sister    • No Known Problems Daughter    • No Known Problems Maternal Grandmother    • No Known Problems Maternal Grandfather    • No Known Problems Paternal Grandmother    • No Known Problems Paternal Grandfather    • No Known Problems Sister    • No Known Problems Maternal Aunt    • No Known Problems Paternal Aunt    • No Known Problems Son      Allergies   Allergen Reactions   • Indomethacin GI Intolerance and Dizziness   • Macrobid [Nitrofurantoin Monohyd Macro] Rash   • Nitrofurantoin Other (See Comments)   • Penicillins Rash     Annotation - [id number]: can take cephalosporins   • Sulfa Antibiotics Rash          Physical Exam  /73 (BP Location: Left arm, Patient Position: Sitting, Cuff Size: Adult)   Pulse 69   Ht 5' 3" (1.6 m) Comment: verbal  Wt 70.3 kg (155 lb)   BMI 27.46 kg/m²     Constitutional:  see vital signs  Gen: well-developed, normocephalic/atraumatic, well-groomed  Eyes: No inflammation or discharge of conjunctiva or lids; sclera clear   Pharynx: no inflammation, lesion, or mass of lips  Neck: supple, no masses, non-distended  MSK: no inflammation, lesion, mass, or clubbing of nails and digits except for other than mentioned below  SKIN: no visible rashes or skin lesions  Pulmonary/Chest: Effort normal. No respiratory distress. NEURO: cranial nerves grossly intact  PSYCH:  Alert and oriented to person, place, and time; recent and remote memory intact; mood normal, no depression, anxiety, or agitation, judgment and insight good and intact     Ortho Exam  RIGHT KNEE:  Erythema: no  Swelling: no  Increased Warmth: no  Tenderness: +ljl  Flexion: seated at 90  Extension: intact  Drawer: negative  Varus laxity: negative  Valgus laxity: negative    __________________________________________________________________________  Large joint arthrocentesis: R knee  [place] Protocol:  Procedure performed by: Kandy Carey MD)  Consent: Verbal consent obtained.   Risks and benefits: risks, benefits and alternatives were discussed  Consent given by: patient  Time out: Immediately prior to procedure a "time out" was called to verify the correct patient, procedure, equipment, support staff and site/side marked as required.   Patient understanding: patient states understanding of the procedure being performed  Site marked: the operative site was marked  Patient identity confirmed: verbally with patient    Supporting Documentation  Indications: pain and diagnostic evaluation   Procedure Details  Location: knee - R knee  Preparation: Patient was prepped and draped in the usual sterile fashion  Needle size: 22 G  Ultrasound guidance: yes  Approach: anterolateral  Medications administered: 40 mg triamcinolone acetonide 40 mg/mL; 4 mL bupivacaine 0.25 %    Patient tolerance: patient tolerated the procedure well with no immediate complications  Dressing:  Sterile dressing applied

## 2023-08-10 ENCOUNTER — OFFICE VISIT (OUTPATIENT)
Dept: CARDIOLOGY CLINIC | Facility: CLINIC | Age: 78
End: 2023-08-10

## 2023-08-10 VITALS
HEIGHT: 63 IN | WEIGHT: 155.8 LBS | SYSTOLIC BLOOD PRESSURE: 132 MMHG | BODY MASS INDEX: 27.61 KG/M2 | HEART RATE: 57 BPM | OXYGEN SATURATION: 98 % | DIASTOLIC BLOOD PRESSURE: 48 MMHG

## 2023-08-10 DIAGNOSIS — R06.02 SHORTNESS OF BREATH: Primary | ICD-10-CM

## 2023-08-10 DIAGNOSIS — I47.1 PAT (PAROXYSMAL ATRIAL TACHYCARDIA) (HCC): ICD-10-CM

## 2023-08-10 NOTE — PROGRESS NOTES
Cardiology Follow Up    Steffany Fisher  [birth year]  [id number]  [address] [id number] [phone]    1. Shortness of breath        2. PAT (paroxysmal atrial tachycardia) ([facility])            Interval History:   Ms Pablito Sepulveda presents to our office with complaints of a 2 + week history of shortness of breath with exertion such as walking up stairs and taking a shower. She admits to fatigue. Luh Silverio is undergoing iron infusions. Luh Silverio denies chest pain lightheadedness or dizziness    7/14/23 iron 13, ferritin 18 iron saturation 3 TIBC 387, hemoglobin 8.2 hematocrit 27.5 MCV 81 MCH 24.3 MCHC 29.8 RDW 20.3    Medical History   Primary Cardiologist Dr Yessica Light  Hypertension  Hyperlipidemia  Asthma  Left Breast CA ER negative, NE negative, 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 diagnosed in 2021 followed by Dr Yonathan Urena  Iron deficiency anemia     12/23/22 TTE: LVEF 65%, grad 1 abnormal relaxation. LA normal, right atrium normal, no evidence of aortic valve regurgitation no significant stenosis. No evidence of significant mitral valve or tricuspid valvular disease. Aortic root normal in size.     4/03/22 24-hour Holter monitor 8% burden of SVT, moderate to high burden of premature atrial contractions 23 runs of nonsustained paroxysmal atrial tachycardia    Patient Active Problem List   Diagnosis   • Portal vein thrombosis   • Anxiety   • Moderate episode of recurrent major depressive disorder (HCC)   • Essential tremor   • Hyperlipidemia   • Essential hypertension   • Hypomagnesemia   • SIADH (syndrome of inappropriate ADH production) ([facility])   • Acquired hypothyroidism   • Insomnia   • Iron deficiency anemia   • Prediabetes   • Peripheral neuropathy   • Osteopenia   • Osteoarthritis of right knee   • Ulcerative colitis without complications ([facility])   • Allergic rhinitis   • GERD (gastroesophageal reflux disease)   • Anemia   • Mild intermittent asthma without complication   • Diarrhea   • Sjogren's syndrome (HCC)   • Chronic salpingo-oophoritis   • Overweight   • Supraventricular tachycardia (HCC)   • Unsteady gait   • Lumbar degenerative disc disease   • Encounter for long-term (current) use of medications   • S/P deep brain stimulator placement   • Vitamin B12 deficiency   • Vitamin D deficiency   • History of left breast cancer   • Rectal bleeding   • Fatigue   • Hoarseness of voice   • Fever   • SIRS (systemic inflammatory response syndrome) (HCC)     Past Medical History:   Diagnosis Date   • Anemia    • Anxiety    • Arrhythmia    • Arthritis    • Asthma    • Blood clot in vein     portal vein   • Breast cancer ([address]) 02/12/2021   • Breast lump     [id number] RESOLVED   • Cancer (HCC)    • Depression    • Disease of thyroid gland    • DVT, lower extremity (HCC)    • GERD (gastroesophageal reflux disease)    • Hyperlipidemia    • Hypertension    • Hypokalemia    • Hyponatremia    • Hypothyroidism    • Iron deficiency anemia    • Irregular heart beat    • Manic behavior ([address])    • 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HCC)    • Osteoarthritis    • Palpitations     [id number]  RESOLVED   • Paroxysmal supraventricular tachycardia (HCC)    • PE (pulmonary thromboembolism) (HCC)    • Sjoegren syndrome    • Sleep apnea    • Sleep difficulties    • Spinal stenosis    • Thrombocytosis     [id number]  RESOLVED   • Tremors of nervous system     dbs implanted right and left chest   • Ulcerative colitis ([address])    • Vertigo     [id number] RESOLVED     Social History     Socioeconomic History   • Marital status:      Spouse name: Not on file   • Number of children: Not on file   • Years of education: EDUCATION LEVEL 10TH GRADE   • Highest education level: Not on file   Occupational History   • Occupation: RETIRED   Tobacco Use   • Smoking status: Former     Packs/day: 1.50     Years: 5.00     Total pack years: 7.50     Types: Cigarettes     Quit date: 1965     Years since quittin.6   • Smokeless tobacco: Never   • Tobacco comments:     Quit   Vaping Use   • Vaping Use: Never used   Substance and Sexual Activity   • Alcohol use: Yes     Alcohol/week: 2.0 standard drinks of alcohol     Types: 2 Cans of beer per week   • Drug use: No   • Sexual activity: Not Currently     Partners: Male     Birth control/protection: Post-menopausal   Other Topics Concern   • Not on file   Social History Narrative    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ACTIVITIES INSIDE AND OUTSIDE OF HOME, MODERATE    CAFFEINE USE, DRINKS CAFEINATED TEA, DRINKS COFFEE    INADEQUATE EXERCISE    LIVES WITH ADULT CHILDREN     Social Determinants of Health     Financial Resource Strain: Low Risk  (2023)    Overall Financial Resource Strain (CARDIA)    • Difficulty of Paying Living Expenses: Not very hard   Food Insecurity: Not on file   Transportation Needs: No Transportation Needs (2023)    PRAPARE - Transportation    • Lack of Transportation (Medical): No    • Lack of Transportation (Non-Medical):  No   Physical Activity: Not on file   Stress: Not on file   Social Connections: Not on file   Intimate Partner Violence: Not on file   Housing Stability: Not on file      Family History   Problem Relation Age of Onset   • Venous thrombosis Mother         ACUTE VENOUS THROMBOSIS OF DEEP VESSELS OF THE DISTAL LOWER EXTREMITY   • Other Mother         PHLEBITIS   • Hypertension Mother    • Peripheral vascular disease Mother    • COPD Father    • Diabetes Father         MELLITUS   • Stroke Father    • Diabetes Sister         MELLITUS   • Sjogren's syndrome Sister    • No Known Problems Daughter    • No Known Problems Maternal Grandmother    • No Known Problems Maternal Grandfather    • No Known Problems Paternal Grandmother    • No Known Problems Paternal Grandfather    • No Known Problems Sister    • No Known Problems Maternal Aunt    • No Known Problems Paternal Aunt    • No Known Problems Son      Past Surgical History:   Procedure Laterality Date   • ABDOMINAL SURGERY     • APPENDECTOMY     • BREAST BIOPSY Left 11/07/2019    Stereo   • BREAST EXCISIONAL BIOPSY Left     x many years   • BREAST SURGERY      lumpectomy & biopsy   • CARMELLA HOLE W/ STEREOTACTIC INSERTION OF DBS LEADS / INTRAOP MICROELECTRODE RECORDING     • COLON SURGERY     • COLONOSCOPY N/A 08/30/2017    Procedure: Pastor Júnior;  Surgeon: Wei Wells MD;  Location: [facility]; Service: Colorectal   • COLOPROCTECTOMY W/ ILEO J POUCH     • ESOPHAGOGASTRODUODENOSCOPY      ONSET 10/17/11   • FISTULA REPAIR      LLEOANAL FISTULA REPAIR TRANSPERIN TRANSABD APPROACH   • HYSTERECTOMY      age 43   • ILEOSTOMY CLOSURE     • KNEE ARTHROSCOPY      Right   • MAMMO STEREOTACTIC BREAST BIOPSY LEFT (ALL INC) Left 11/07/2019   • MAMMO STEREOTACTIC BREAST BIOPSY LEFT (ALL INC) Left 12/17/2020   • MAMMO STEREOTACTIC BREAST BIOPSY LEFT (ALL INC) EACH ADD Left 12/17/2020   • MASTECTOMY Left 02/12/2021    left mastectomy- Dr. Gabriela Hi   • MASTECTOMY W/ SENTINEL NODE BIOPSY Left 02/12/2021    Procedure: BREAST MASTECTOMY WITH SENTINEL LYMPH NODE BIOPSY, LYMPHATIC MAPPING WITH BLUE DYE AND RADIAOCTIVE DYE (INJECT AT 1100 BY DR GILLESPIE IN THE OR);   Surgeon: Delmar Yates MD;  Location: [facility];  Service: Surgical Oncology   • NC INSJ/RPLCMT CRANIAL NEUROSTIM PULSE GENERATOR Right 06/20/2017    Procedure: DBS GENERATOR REPLACEMENT;  Surgeon: Chloe Osorio MD;  Location: [facility];  Service: Neurosurgery   • NC INSJ/RPLCMT CRANIAL NEUROSTIM PULSE GENERATOR N/A 12/04/2019    Procedure: REPLACEMENT IMPLANTABLE PULSE GENERATOR FOR DEEP BRAIN STIMULATOR LEFT CHEST;  Surgeon: Carolyn Concepcion MD;  Location: [facility];  Service: Neurosurgery   • SPLENECTOMY     • TONSILLECTOMY         Current Outpatient Medications:   •  albuterol (PROVENTIL HFA,VENTOLIN HFA) 90 mcg/act inhaler, Inhale 2 puffs every 6 (six) hours as needed for wheezing, Disp: 8 g, Rfl: 1  • amLODIPine (NORVASC) 2.5 mg tablet, TAKE ONE TABLET BY MOUTH EVERY DAY, Disp: 90 tablet, Rfl: 3  •  Calcium Carb-Cholecalciferol (CALCIUM 600-D PO), Take 1 tablet by mouth 2 (two) times a day, Disp: , Rfl:   •  Coenzyme Q10 (CO Q-10 PO), Take 1 capsule by mouth daily, Disp: , Rfl:   •  diltiazem (CARDIZEM CD) 180 mg 24 hr capsule, TAKE ONE CAPSULE BY MOUTH EVERY DAY, Disp: 90 capsule, Rfl: 1  •  diltiazem (CARDIZEM) 60 mg tablet, Take 1 tablet (60 mg total) by mouth daily, Disp: 90 tablet, Rfl: 1  •  Eliquis 5 MG, TAKE ONE TABLET BY MOUTH TWICE A DAY, Disp: 180 tablet, Rfl: 1  •  escitalopram (LEXAPRO) 20 mg tablet, TAKE ONE TABLET BY MOUTH EVERY DAY, Disp: 90 tablet, Rfl: 1  •  fluticasone (FLONASE) 50 mcg/act nasal spray, APPLY ONE SPRAY IN EACH NOSTRIL ONCE DAILY AS NEEDED FOR RHINITIS, Disp: 48 g, Rfl: 1  •  Fluticasone-Salmeterol (Advair Diskus) 100-50 mcg/dose inhaler, , Disp: , Rfl:   •  Fluticasone-Salmeterol (Advair Diskus) 250-50 mcg/dose inhaler, Inhale 1 puff 2 (two) times a day Rinse mouth after use, Disp: 60 blister, Rfl: 5  •  gabapentin (NEURONTIN) 600 MG tablet, TAKE ONE TABLET BY MOUTH IN THE MORNING, ONE TABLET IN THE AFTERNNON, AND TWO TABLETS AT BEDTIME, Disp: 360 tablet, Rfl: 2  •  HYDROcodone-acetaminophen (NORCO) 5-325 mg per tablet, Take 1 tablet by mouth every 6 (six) hours as needed for pain Max Daily Amount: 4 tablets, Disp: 120 tablet, Rfl: 0  •  lansoprazole (PREVACID) 15 mg capsule, , Disp: , Rfl:   •  levothyroxine 50 mcg tablet, Take 1 tablet (50 mcg total) by mouth daily, Disp: 90 tablet, Rfl: 0  •  lisinopril (ZESTRIL) 20 mg tablet, TAKE ONE TABLET BY MOUTH TWICE A DAY, Disp: 180 tablet, Rfl: 1  •  LORazepam (ATIVAN) 1 mg tablet, Take 1 tablet (1 mg total) by mouth every 6 (six) hours as needed for anxiety, Disp: 120 tablet, Rfl: 0  •  MAGNESIUM PO, Take 1 tablet by mouth daily, Disp: , Rfl:   •  mesalamine (PENTASA) 500 mg CR capsule, Take 1 capsule by mouth 4 (four) times a day, Disp: , Rfl:   •  Multiple Vitamin (MULTIVITAMIN ADULT PO), Take 1 tablet by mouth daily, Disp: , Rfl:   •  nystatin (MYCOSTATIN) ointment, Apply topically 2 (two) times a day, Disp: 30 g, Rfl: 0  •  Omega-3 Fatty Acids (FISH OIL PO), Take 1 capsule by mouth daily, Disp: , Rfl:   •  pantoprazole (PROTONIX) 40 mg tablet, Take 1 tablet (40 mg total) by mouth daily, Disp: 90 tablet, Rfl: 1  •  potassium chloride (MICRO-K) 10 MEQ CR capsule, TAKE TWO CAPSULES BY MOUTH EVERY DAY, Disp: 180 capsule, Rfl: 3  •  predniSONE 5 mg tablet, , Disp: , Rfl:   •  simvastatin (ZOCOR) 10 mg tablet, Take 0.5 tablets (5 mg total) by mouth daily, Disp: 45 tablet, Rfl: 1  •  sodium chloride 1 g tablet, Take 1 tablet (1 g total) by mouth 3 (three) times a day, Disp: 270 tablet, Rfl: 3  •  torsemide (DEMADEX) 10 mg tablet, Take 1 tablet (10 mg total) by mouth daily as needed (edema), Disp: 90 tablet, Rfl: 0  •  triamcinolone (KENALOG) 0.1 % ointment, Apply topically 2 (two) times a day, Disp: 30 g, Rfl: 0  •  vitamin B-12 (VITAMIN B-12) 500 mcg tablet, Take 1 tablet (500 mcg total) by mouth daily, Disp: 90 tablet, Rfl: 1    Current Facility-Administered Medications:   •  cyanocobalamin injection 1,000 mcg, 1,000 mcg, Intramuscular, Q30 Days, Benito Parsons MD, 1,000 mcg at 04/20/23 1012  Allergies   Allergen Reactions   • Indomethacin GI Intolerance and Dizziness   • Macrobid [Nitrofurantoin Monohyd Macro] Rash   • Nitrofurantoin Other (See Comments)   • Penicillins Rash     Annotation - [id number]: can take cephalosporins   • Sulfa Antibiotics Rash       Labs:  Appointment on 07/14/2023   Component Date Value   • WBC 07/14/2023 9.94    • RBC 07/14/2023 3.38 (L)    • Hemoglobin 07/14/2023 8.2 (L)    • Hematocrit 07/14/2023 27.5 (L)    • MCV 07/14/2023 81 (L)    • MCH 07/14/2023 24.3 (L)    • MCHC 07/14/2023 29.8 (L)    • RDW 07/14/2023 20.3 (H)    • MPV 07/14/2023 8.3 (L)    • Platelets 99/40/9319 574 (H)    • nRBC 07/14/2023 0    • Neutrophils Relative 07/14/2023 57    • Immat GRANS % 07/14/2023 0    • Lymphocytes Relative 07/14/2023 26    • Monocytes Relative 07/14/2023 14 (H)    • Eosinophils Relative 07/14/2023 2    • Basophils Relative 07/14/2023 1    • Neutrophils Absolute 07/14/2023 5.62    • Immature Grans Absolute 07/14/2023 0.03    • Lymphocytes Absolute 07/14/2023 2.62    • Monocytes Absolute 07/14/2023 1.39 (H)    • Eosinophils Absolute 07/14/2023 0.20    • Basophils Absolute 07/14/2023 0.08    • Iron Saturation 07/14/2023 3 (L)    • TIBC 07/14/2023 387    • Iron 07/14/2023 13 (L)    • Ferritin 07/14/2023 18    Appointment on 07/10/2023   Component Date Value   • WBC 07/10/2023 6.55    • RBC 07/10/2023 3.36 (L)    • Hemoglobin 07/10/2023 8.1 (L)    • Hematocrit 07/10/2023 27.5 (L)    • MCV 07/10/2023 82    • MCH 07/10/2023 24.1 (L)    • MCHC 07/10/2023 29.5 (L)    • RDW 07/10/2023 20.6 (H)    • MPV 07/10/2023 8.6 (L)    • Platelets 97/17/8005 731 (H)    • nRBC 07/10/2023 0    • Neutrophils Relative 07/10/2023 48    • Immat GRANS % 07/10/2023 0    • Lymphocytes Relative 07/10/2023 31    • Monocytes Relative 07/10/2023 16 (H)    • Eosinophils Relative 07/10/2023 4    • Basophils Relative 07/10/2023 1    • Neutrophils Absolute 07/10/2023 3.12    • Immature Grans Absolute 07/10/2023 0.01    • Lymphocytes Absolute 07/10/2023 2.04    • Monocytes Absolute 07/10/2023 1.06    • Eosinophils Absolute 07/10/2023 0.24    • Basophils Absolute 07/10/2023 0.08    Appointment on 07/07/2023   Component Date Value   • CRP 07/07/2023 <3.0    • Vit D, 25-Hydroxy 07/07/2023 41.3    Transcribe Orders on 07/07/2023   Component Date Value   • Sed Rate 07/07/2023 11    Appointment on 07/03/2023   Component Date Value   • Sodium 07/03/2023 135    • Potassium 07/03/2023 4.8    • Chloride 07/03/2023 103    • CO2 07/03/2023 26    • ANION GAP 07/03/2023 6    • BUN 07/03/2023 12    • Creatinine 07/03/2023 0.90    • Glucose, Fasting 07/03/2023 135 (H)    • Calcium 07/03/2023 9.2    • eGFR 07/03/2023 61    • Magnesium 07/03/2023 1.9    • WBC 07/03/2023 5.99    • RBC 07/03/2023 3.50 (L)    • Hemoglobin 07/03/2023 8.5 (L)    • Hematocrit 07/03/2023 28.8 (L)    • MCV 07/03/2023 82    • MCH 07/03/2023 24.3 (L)    • MCHC 07/03/2023 29.5 (L)    • RDW 07/03/2023 20.5 (H)    • MPV 07/03/2023 8.8 (L)    • Platelets 52/40/1133 817 (H)    • nRBC 07/03/2023 0    • Neutrophils Relative 07/03/2023 65    • Immat GRANS % 07/03/2023 1    • Lymphocytes Relative 07/03/2023 26    • Monocytes Relative 07/03/2023 7    • Eosinophils Relative 07/03/2023 0    • Basophils Relative 07/03/2023 1    • Neutrophils Absolute 07/03/2023 3.90    • Immature Grans Absolute 07/03/2023 0.04    • Lymphocytes Absolute 07/03/2023 1.56    • Monocytes Absolute 07/03/2023 0.42    • Eosinophils Absolute 07/03/2023 0.01    • Basophils Absolute 07/03/2023 0.06    Admission on 06/22/2023, Discharged on 06/23/2023   Component Date Value   • WBC 06/22/2023 9.33    • RBC 06/22/2023 3.96    • Hemoglobin 06/22/2023 9.6 (L)    • Hematocrit 06/22/2023 31.0 (L)    • MCV 06/22/2023 78 (L)    • MCH 06/22/2023 24.2 (L)    • MCHC 06/22/2023 31.0 (L)    • RDW 06/22/2023 19.1 (H)    • MPV 06/22/2023 8.5 (L)    • Platelets 23/75/6259 601 (H)    • nRBC 06/22/2023 0    • Neutrophils Relative 06/22/2023 80 (H)    • Immat GRANS % 06/22/2023 0    • Lymphocytes Relative 06/22/2023 14    • Monocytes Relative 06/22/2023 4    • Eosinophils Relative 06/22/2023 2    • Basophils Relative 06/22/2023 0    • Neutrophils Absolute 06/22/2023 7.41    • Immature Grans Absolute 06/22/2023 0.03    • Lymphocytes Absolute 06/22/2023 1.30    • Monocytes Absolute 06/22/2023 0.37    • Eosinophils Absolute 06/22/2023 0.19    • Basophils Absolute 06/22/2023 0.03    • Sodium 06/22/2023 140    • Potassium 06/22/2023 3.0 (L)    • Chloride 06/22/2023 97    • CO2 06/22/2023 30    • ANION GAP 06/22/2023 13    • BUN 06/22/2023 7    • Creatinine 06/22/2023 0.84    • Glucose 06/22/2023 101    • Calcium 06/22/2023 9.7    • AST 06/22/2023 26    • ALT 06/22/2023 16    • Alkaline Phosphatase 06/22/2023 67    • Total Protein 06/22/2023 7.1    • Albumin 06/22/2023 4.3    • Total Bilirubin 06/22/2023 0.61    • eGFR 06/22/2023 66    • LACTIC ACID 06/22/2023 2.0    • Procalcitonin 06/22/2023 0.15    • Protime 06/22/2023 14.3    • INR 06/22/2023 1.09    • PTT 06/22/2023 30    • Blood Culture 06/22/2023 No Growth After 5 Days. • Blood Culture 06/22/2023 No Growth After 5 Days.     • Color, UA 06/22/2023 Colorless    • Clarity, UA 06/22/2023 Clear    • Specific Gravity, UA 06/22/2023 1.006    • pH, UA 06/22/2023 6.0    • Leukocytes, UA 06/22/2023 Negative    • Nitrite, UA 06/22/2023 Negative    • Protein, UA 06/22/2023 Negative    • Glucose, UA 06/22/2023 Negative    • Ketones, UA 06/22/2023 Negative    • Urobilinogen, UA 06/22/2023 <2.0    • Bilirubin, UA 06/22/2023 Negative    • Occult Blood, UA 06/22/2023 Negative    • Lipase 06/22/2023 18    • Magnesium 06/22/2023 1.4 (L)    • SARS-CoV-2 06/22/2023 Negative    • INFLUENZA A PCR 06/22/2023 Negative    • INFLUENZA B PCR 06/22/2023 Negative    • RSV PCR 06/22/2023 Negative    • pH, Pierre 06/22/2023 7.493 (H)    • pCO2, Pierre 06/22/2023 41.9 (L)    • pO2, Pierre 06/22/2023 38.9    • HCO3, Pierre 06/22/2023 31.4 (H)    • Base Excess, Pierre 06/22/2023 7.5    • O2 Content, Pierre 06/22/2023 10.3    • O2 HGB, VENOUS 06/22/2023 74.6    • D-Dimer, Quant 06/22/2023 1.33 (H)    • TSH 3RD GENERATON 06/22/2023 1.683    • WBC 06/23/2023 5.51    • RBC 06/23/2023 3.15 (L)    • Hemoglobin 06/23/2023 7.7 (L)    • Hematocrit 06/23/2023 25.0 (L)    • MCV 06/23/2023 79 (L)    • MCH 06/23/2023 24.4 (L)    • MCHC 06/23/2023 30.8 (L)    • RDW 06/23/2023 18.9 (H)    • Platelets 21/15/0767 462 (H)    • MPV 06/23/2023 8.4 (L)    • Sodium 06/23/2023 134 (L)    • Potassium 06/23/2023 3.3 (L)    • Chloride 06/23/2023 97    • CO2 06/23/2023 31    • ANION GAP 06/23/2023 6    • BUN 06/23/2023 7    • Creatinine 06/23/2023 0.81    • Glucose 06/23/2023 108    • Calcium 06/23/2023 8.3 (L)    • eGFR 06/23/2023 69    • Procalcitonin 06/23/2023 2.03 (H)    • Hemoglobin 06/23/2023 8.1 (L)    • Hematocrit 06/23/2023 26.8 (L)    • Ventricular Rate 06/22/2023 103    • Atrial Rate 06/22/2023 103    • VT Interval 06/22/2023 236    • QRSD Interval 06/22/2023 94    • QT Interval 06/22/2023 364    • QTC Interval 06/22/2023 476    • P Axis 06/22/2023 79    • QRS Axis 06/22/2023 67    • T Wave McDonald 06/22/2023 38    Appointment on 06/15/2023   Component Date Value   • WBC 06/15/2023 7.11    • RBC 06/15/2023 3.28 (L)    • Hemoglobin 06/15/2023 8.3 (L)    • Hematocrit 06/15/2023 26.8 (L)    • MCV 06/15/2023 82    • MCH 06/15/2023 25.3 (L)    • MCHC 06/15/2023 31.0 (L)    • RDW 06/15/2023 19.3 (H)    • MPV 06/15/2023 8.6 (L)    • Platelets 59/27/6371 563 (H)    • nRBC 06/15/2023 0    • Neutrophils Relative 06/15/2023 51    • Immat GRANS % 06/15/2023 0    • Lymphocytes Relative 06/15/2023 27    • Monocytes Relative 06/15/2023 17 (H)    • Eosinophils Relative 06/15/2023 4    • Basophils Relative 06/15/2023 1    • Neutrophils Absolute 06/15/2023 3.64    • Immature Grans Absolute 06/15/2023 0.02    • Lymphocytes Absolute 06/15/2023 1.93    • Monocytes Absolute 06/15/2023 1.19    • Eosinophils Absolute 06/15/2023 0.26    • Basophils Absolute 06/15/2023 0.07    • Ferritin 06/15/2023 9 (L)    Appointment on 06/15/2023   Component Date Value   • Sodium 06/15/2023 134 (L)    • Potassium 06/15/2023 4.1    • Chloride 06/15/2023 104    • CO2 06/15/2023 28    • ANION GAP 06/15/2023 2 (L)    • BUN 06/15/2023 6    • Creatinine 06/15/2023 0.75    • Glucose, Fasting 06/15/2023 99    • Calcium 06/15/2023 9.3    • eGFR 06/15/2023 76      Imaging: No results found. Review of Systems:  Review of Systems   Constitutional: Positive for fatigue. Respiratory: Positive for shortness of breath.     Musculoskeletal: Positive for arthralgias and myalgias. All other systems reviewed and are negative. Physical Exam:  Physical Exam  Vitals reviewed. Constitutional:       Appearance: She is well-developed. Pulmonary:      Effort: Pulmonary effort is normal.      Breath sounds: Normal breath sounds. Musculoskeletal:         General: Normal range of motion. Left lower leg: No edema. Skin:     General: Skin is warm and dry. Capillary Refill: Capillary refill takes less than 2 seconds. Neurological:      General: No focal deficit present. Mental Status: She is alert and oriented to person, place, and time. Psychiatric:         Mood and Affect: Mood normal.         Behavior: Behavior normal.         Discussion/Summary:  # Shortness of breath with exertion and fatigue, denies palpitations,  7/14/23 iron 13, ferritin 18 iron saturation 3 TIBC 387, hemoglobin 8.2 hematocrit 27.5 Due to iron deficiency anemia, undergoing iron infusions followed by Dr More Breaux   # PAT continue on Cardizem CD 180mg daily and Cardizem 60mg daily, 48 Hour Holter monitor ordered by Dr Renzo Tadeo to be done 3/2024. Denies palpitations.   Consider 48 Hour Holter monitor sooner if dyspnea does no improve with improvement in anemia  # hx of DVT, PE and mesenteric thrombosis continue on Eliquis 5mg BID

## 2023-08-11 ENCOUNTER — LAB (OUTPATIENT)
Dept: LAB | Facility: CLINIC | Age: 78
End: 2023-08-11
Payer: MEDICARE

## 2023-08-11 DIAGNOSIS — E03.9 ACQUIRED HYPOTHYROIDISM: ICD-10-CM

## 2023-08-11 DIAGNOSIS — I10 ESSENTIAL HYPERTENSION: ICD-10-CM

## 2023-08-11 DIAGNOSIS — E53.8 VITAMIN B12 DEFICIENCY: ICD-10-CM

## 2023-08-11 DIAGNOSIS — R73.03 PREDIABETES: ICD-10-CM

## 2023-08-11 LAB
ALBUMIN SERPL BCP-MCNC: 3.8 G/DL (ref 3.5–5)
ALP SERPL-CCNC: 56 U/L (ref 34–104)
ALT SERPL W P-5'-P-CCNC: 12 U/L (ref 7–52)
ANION GAP SERPL CALCULATED.3IONS-SCNC: 5 MMOL/L
AST SERPL W P-5'-P-CCNC: 12 U/L (ref 13–39)
BASOPHILS # BLD AUTO: 0 THOUSANDS/ÂΜL (ref 0–0.1)
BASOPHILS NFR BLD AUTO: 0 % (ref 0–1)
BILIRUB SERPL-MCNC: 0.48 MG/DL (ref 0.2–1)
BUN SERPL-MCNC: 18 MG/DL (ref 5–25)
CALCIUM SERPL-MCNC: 8.8 MG/DL (ref 8.4–10.2)
CHLORIDE SERPL-SCNC: 94 MMOL/L (ref 96–108)
CO2 SERPL-SCNC: 31 MMOL/L (ref 21–32)
CREAT SERPL-MCNC: 0.85 MG/DL (ref 0.6–1.3)
EOSINOPHIL # BLD AUTO: 0 THOUSAND/ÂΜL (ref 0–0.61)
EOSINOPHIL NFR BLD AUTO: 0 % (ref 0–6)
ERYTHROCYTE [DISTWIDTH] IN BLOOD BY AUTOMATED COUNT: 20.2 % (ref 11.6–15.1)
GFR SERPL CREATININE-BSD FRML MDRD: 65 ML/MIN/1.73SQ M
GLUCOSE P FAST SERPL-MCNC: 122 MG/DL (ref 65–99)
HCT VFR BLD AUTO: 29 % (ref 34.8–46.1)
HGB BLD-MCNC: 9.1 G/DL (ref 11.5–15.4)
IMM GRANULOCYTES # BLD AUTO: 0.07 THOUSAND/UL (ref 0–0.2)
IMM GRANULOCYTES NFR BLD AUTO: 1 % (ref 0–2)
LYMPHOCYTES # BLD AUTO: 1.24 THOUSANDS/ÂΜL (ref 0.6–4.47)
LYMPHOCYTES NFR BLD AUTO: 14 % (ref 14–44)
MCH RBC QN AUTO: 24.7 PG (ref 26.8–34.3)
MCHC RBC AUTO-ENTMCNC: 31.4 G/DL (ref 31.4–37.4)
MCV RBC AUTO: 79 FL (ref 82–98)
MONOCYTES # BLD AUTO: 1.28 THOUSAND/ÂΜL (ref 0.17–1.22)
MONOCYTES NFR BLD AUTO: 15 % (ref 4–12)
NEUTROPHILS # BLD AUTO: 6.18 THOUSANDS/ÂΜL (ref 1.85–7.62)
NEUTS SEG NFR BLD AUTO: 70 % (ref 43–75)
NRBC BLD AUTO-RTO: 0 /100 WBCS
PLATELET # BLD AUTO: 581 THOUSANDS/UL (ref 149–390)
PMV BLD AUTO: 8.6 FL (ref 8.9–12.7)
POTASSIUM SERPL-SCNC: 4.2 MMOL/L (ref 3.5–5.3)
PROT SERPL-MCNC: 5.9 G/DL (ref 6.4–8.4)
RBC # BLD AUTO: 3.69 MILLION/UL (ref 3.81–5.12)
SODIUM SERPL-SCNC: 130 MMOL/L (ref 135–147)
TSH SERPL DL<=0.05 MIU/L-ACNC: 0.46 UIU/ML (ref 0.45–4.5)
VIT B12 SERPL-MCNC: 576 PG/ML (ref 180–914)
WBC # BLD AUTO: 8.77 THOUSAND/UL (ref 4.31–10.16)

## 2023-08-11 PROCEDURE — 85025 COMPLETE CBC W/AUTO DIFF WBC: CPT | Performed by: INTERNAL MEDICINE

## 2023-08-11 PROCEDURE — 36415 COLL VENOUS BLD VENIPUNCTURE: CPT

## 2023-08-11 PROCEDURE — 84443 ASSAY THYROID STIM HORMONE: CPT

## 2023-08-11 PROCEDURE — 83036 HEMOGLOBIN GLYCOSYLATED A1C: CPT

## 2023-08-11 PROCEDURE — 82607 VITAMIN B-12: CPT

## 2023-08-11 PROCEDURE — 80053 COMPREHEN METABOLIC PANEL: CPT

## 2023-08-12 LAB
EST. AVERAGE GLUCOSE BLD GHB EST-MCNC: 131 MG/DL
HBA1C MFR BLD: 6.2 %

## 2023-08-13 DIAGNOSIS — I10 ESSENTIAL HYPERTENSION: ICD-10-CM

## 2023-08-13 RX ORDER — LISINOPRIL 20 MG/1
TABLET ORAL
Qty: 180 TABLET | Refills: 1 | Status: SHIPPED | OUTPATIENT
Start: 2023-08-13

## 2023-08-14 DIAGNOSIS — I10 ESSENTIAL HYPERTENSION: ICD-10-CM

## 2023-08-14 RX ORDER — LISINOPRIL 20 MG/1
TABLET ORAL
Qty: 180 TABLET | Refills: 1 | OUTPATIENT
Start: 2023-08-14

## 2023-08-17 ENCOUNTER — HOSPITAL ENCOUNTER (OUTPATIENT)
Dept: INFUSION CENTER | Facility: CLINIC | Age: 78
Discharge: HOME | End: 2023-08-17
Payer: MEDICARE

## 2023-08-17 ENCOUNTER — RA CDI HCC (OUTPATIENT)
Dept: OTHER | Facility: HOSPITAL | Age: 78
End: 2023-08-17

## 2023-08-17 VITALS
HEART RATE: 61 BPM | SYSTOLIC BLOOD PRESSURE: 131 MMHG | OXYGEN SATURATION: 98 % | DIASTOLIC BLOOD PRESSURE: 69 MMHG | RESPIRATION RATE: 18 BRPM | TEMPERATURE: 97.1 F

## 2023-08-17 DIAGNOSIS — D50.9 IRON DEFICIENCY ANEMIA, UNSPECIFIED IRON DEFICIENCY ANEMIA TYPE: Primary | ICD-10-CM

## 2023-08-17 PROCEDURE — 96365 THER/PROPH/DIAG IV INF INIT: CPT

## 2023-08-17 PROCEDURE — 96375 TX/PRO/DX INJ NEW DRUG ADDON: CPT

## 2023-08-17 RX ORDER — SODIUM CHLORIDE 9 MG/ML
20 INJECTION, SOLUTION INTRAVENOUS ONCE
Status: CANCELLED | OUTPATIENT
Start: 2023-08-24

## 2023-08-17 RX ORDER — SODIUM CHLORIDE 9 MG/ML
20 INJECTION, SOLUTION INTRAVENOUS ONCE
Status: COMPLETED | OUTPATIENT
Start: 2023-08-17 | End: 2023-08-17

## 2023-08-17 RX ADMIN — DIPHENHYDRAMINE HYDROCHLORIDE 50 MG: 50 INJECTION, SOLUTION INTRAMUSCULAR; INTRAVENOUS at 09:20

## 2023-08-17 RX ADMIN — HYDROCORTISONE SODIUM SUCCINATE 50 MG: 100 INJECTION, POWDER, FOR SOLUTION INTRAMUSCULAR; INTRAVENOUS at 09:48

## 2023-08-17 RX ADMIN — SODIUM CHLORIDE 20 ML/HR: 0.9 INJECTION, SOLUTION INTRAVENOUS at 09:18

## 2023-08-17 NOTE — PROGRESS NOTES
Patient arrives for Venofer infusion. PIV placed without issue. IVF infusing. Patient provided warm blankets and refreshments for comfort. Resting comfortably in recliner, call bell within reach.

## 2023-08-17 NOTE — PROGRESS NOTES
Patient tolerated treatment without incident. Patient very groggy post benadryl infusion. States she slept 12 hours after last benadryl dose. Some slurring of words, alert and oriented, patient assisted to lobby via wheelchair. Patient's  aware of benadryl dose and states patient has walker he will use to assist patient once home. Abi Patel RN in Dr. Savannah Hill office made aware and dose of benadryl as pre med to be decreased. AVS printed and given to patient.

## 2023-08-21 DIAGNOSIS — I10 ESSENTIAL HYPERTENSION: ICD-10-CM

## 2023-08-21 RX ORDER — DILTIAZEM HYDROCHLORIDE 60 MG/1
60 TABLET, FILM COATED ORAL DAILY
Qty: 90 TABLET | Refills: 1 | Status: SHIPPED | OUTPATIENT
Start: 2023-08-21

## 2023-08-22 ENCOUNTER — TELEPHONE (OUTPATIENT)
Dept: GASTROENTEROLOGY | Facility: AMBULARY SURGERY CENTER | Age: 78
End: 2023-08-22

## 2023-08-22 ENCOUNTER — CONSULT (OUTPATIENT)
Dept: GASTROENTEROLOGY | Facility: AMBULARY SURGERY CENTER | Age: 78
End: 2023-08-22
Payer: MEDICARE

## 2023-08-22 VITALS
DIASTOLIC BLOOD PRESSURE: 64 MMHG | BODY MASS INDEX: 26.97 KG/M2 | SYSTOLIC BLOOD PRESSURE: 116 MMHG | HEART RATE: 70 BPM | WEIGHT: 152.2 LBS | HEIGHT: 63 IN | OXYGEN SATURATION: 97 %

## 2023-08-22 DIAGNOSIS — D50.9 IRON DEFICIENCY ANEMIA, UNSPECIFIED IRON DEFICIENCY ANEMIA TYPE: ICD-10-CM

## 2023-08-22 DIAGNOSIS — K21.9 GASTROESOPHAGEAL REFLUX DISEASE, UNSPECIFIED WHETHER ESOPHAGITIS PRESENT: ICD-10-CM

## 2023-08-22 DIAGNOSIS — M54.16 LUMBAR RADICULOPATHY: ICD-10-CM

## 2023-08-22 DIAGNOSIS — D50.0 IRON DEFICIENCY ANEMIA DUE TO CHRONIC BLOOD LOSS: Primary | ICD-10-CM

## 2023-08-22 DIAGNOSIS — K51.80 OTHER ULCERATIVE COLITIS WITHOUT COMPLICATION (HCC): ICD-10-CM

## 2023-08-22 PROBLEM — R50.9 FEVER: Status: RESOLVED | Noted: 2023-06-22 | Resolved: 2023-08-22

## 2023-08-22 PROCEDURE — 99214 OFFICE O/P EST MOD 30 MIN: CPT | Performed by: PHYSICIAN ASSISTANT

## 2023-08-22 RX ORDER — HYDROCODONE BITARTRATE AND ACETAMINOPHEN 5; 325 MG/1; MG/1
1 TABLET ORAL EVERY 6 HOURS PRN
Qty: 120 TABLET | Refills: 0 | Status: SHIPPED | OUTPATIENT
Start: 2023-08-22

## 2023-08-22 RX ORDER — PANTOPRAZOLE SODIUM 40 MG/1
40 TABLET, DELAYED RELEASE ORAL 2 TIMES DAILY
Qty: 60 TABLET | Refills: 5 | Status: SHIPPED | OUTPATIENT
Start: 2023-08-22

## 2023-08-22 RX ORDER — PREDNISONE 5 MG/1
TABLET ORAL
COMMUNITY
Start: 2023-08-21

## 2023-08-22 NOTE — TELEPHONE ENCOUNTER
Our mutual patient is scheduled for procedure:  EGD     On: _08___/_28    _/_ 2023   _      With:  _____Kapoor ____    He/She is taking the following blood thinner:    Eliquis      Can this be stopped ___????___ days prior to the procedure?       Physician Approving clearance: ________________________    08/22/23      Thank you,        [facility]   [phone]

## 2023-08-22 NOTE — LETTER
August 22, 2023     Alvin Gould MD  [facility]  [address] [address]    Patient: Coral Gonzalez   YOB: 1945   Date of Visit: 8/22/2023       Dear Dr. Elizabet Peterson: Thank you for referring Gato Reed to me for evaluation. Below are my notes for this consultation. If you have questions, please do not hesitate to call me. I look forward to following your patient along with you. Sincerely,        Harriet Haskins PA-C        CC: No Recipients    Savanna Lopez  8/22/2023 10:36 AM  Incomplete  [facility] - Outpatient Progress Note  Coral Gonzalez 65 y.o. female MRN: [id number]  Encounter: [id number]    Assessment and Plan    1. Iron deficiency anemia due to chronic blood loss  - follows with Heme Onc  - last seen on 8/4/23 and requested EGD to be performed to help rule out upper GI bleeding  - Plan EGD  - need to be off Eliquis for 2 days prior (given by PCP Dr. Bala Irene)  - could consider Wireless Capsule Endoscopy (WCE)    2. Other ulcerative colitis without complication ([address])  - history of total colectomy with ileo-anal anastomosis  - last flex sig 1/16/23 with ulcerations near anastomosis (some acute enteritis on pathology    3. GERD  - has break through manly at night, but gets during the day as well  - Increase pantoprazole to 40mg twice daily      --------------------------------------------------------------------------------------------------------------------    Chief Complaint: Anemia    HPI: Coral Gonzalez is a 65 y.o. female who presents today for follow up for LUCILA. She has history of Ulcerative Colitis with total colectomy and ileoanal anastomosis. Last flex sig on 1/16/23 with ulceration (acute enteritis on pathology). History of DVT/PE on Eliquis. Seen by Cardiology 8/10/23 for SOB x 2 weeks. Previous labs from 7/14/23 with Iron 13, Ferritin 18, % sat 3, TIBC 387, Hg 8.2, MCV 81, MCH 24.3. 12/23/22 LVEF 65%. Seen recently by Heme/Onc 8/4/23. She has been getting Venofer transfusions (last 8/17/23) and was referred to GI for EGD. Years of known anemia - many years  Overt bleeding (eg  vomiting blood, coughing blood, urinating blood, black or bloody stools) - Rare red blood  History of bariatric surgery - No  History of bowel resection - 2000 total colectomy  History of blood transfusions - none recent  History of Iron supplementation - 8/17/23   Vegetarian - No  Menstrual Cycle - N/A  Last EGD - > 5 years  Last Colonoscopy - 1/2023 with ulceration (acute enteritis on pathology). Patient denies any nausea, vomiting, abdominal pain, dysphagia, unexpected weight loss, diarrhea, constipation, blood in stool, or black tarry stools. Endoscopy History:  EGD - > 5 years ago per patient, normal per patient. Colonoscopy - Post total colectomy with ileoanal anastomosis and J-pounch. Flex sig on 1/16/23 with ulceration (acute enteritis on pathology).     Review of Systems:   General: negative for fatigue, fever, night sweats or unexpected weight loss  Psychological: negative for anxiety or depression  Ophthalmic: negative for blurry vision or scleral icterus  ENT: negative for headaches, sore throat or dysphagia  Hematological and Lymphatic: negative for pallor or swollen lymph nodes  Respiratory: negative for cough, shortness of breath or wheezing  Cardiovascular: negative for chest pain, edema or murmur  Gastrointestinal: as mentioned in HPI  Genito-Urinary: negative for dysuria or incontinence  Musculoskeletal: negative for joint pain, joint stiffness or joint swelling  Dermatological: negative for pruritus, rash, or jaundice    Current Medications  Current Outpatient Medications   Medication Sig Dispense Refill   • albuterol (PROVENTIL HFA,VENTOLIN HFA) 90 mcg/act inhaler Inhale 2 puffs every 6 (six) hours as needed for wheezing 8 g 1   • amLODIPine (NORVASC) 2.5 mg tablet TAKE ONE TABLET BY MOUTH EVERY DAY 90 tablet 3   • Calcium Carb-Cholecalciferol (CALCIUM 600-D PO) Take 1 tablet by mouth 2 (two) times a day     • Coenzyme Q10 (CO Q-10 PO) Take 1 capsule by mouth daily     • diltiazem (CARDIZEM CD) 180 mg 24 hr capsule TAKE ONE CAPSULE BY MOUTH EVERY DAY 90 capsule 1   • diltiazem (CARDIZEM) 60 mg tablet TAKE ONE TABLET BY MOUTH EVERY DAY 90 tablet 1   • Eliquis 5 MG TAKE ONE TABLET BY MOUTH TWICE A  tablet 1   • escitalopram (LEXAPRO) 20 mg tablet TAKE ONE TABLET BY MOUTH EVERY DAY 90 tablet 1   • fluticasone (FLONASE) 50 mcg/act nasal spray APPLY ONE SPRAY IN EACH NOSTRIL ONCE DAILY AS NEEDED FOR RHINITIS 48 g 1   • Fluticasone-Salmeterol (Advair Diskus) 250-50 mcg/dose inhaler Inhale 1 puff 2 (two) times a day Rinse mouth after use 60 blister 5   • gabapentin (NEURONTIN) 600 MG tablet TAKE ONE TABLET BY MOUTH IN THE MORNING, ONE TABLET IN THE AFTERNNON, AND TWO TABLETS AT BEDTIME (Patient taking differently: TAKE ONE TABLET BY MOUTH IN THE MORNING, ONE half TABLET IN THE AFTERNNON, AND one and one half at hs TABLETS AT BEDTIME.) 360 tablet 2   • HYDROcodone-acetaminophen (NORCO) 5-325 mg per tablet Take 1 tablet by mouth every 6 (six) hours as needed for pain Max Daily Amount: 4 tablets 120 tablet 0   • levothyroxine 50 mcg tablet Take 1 tablet (50 mcg total) by mouth daily 90 tablet 0   • lisinopril (ZESTRIL) 20 mg tablet TAKE ONE TABLET BY MOUTH TWICE A  tablet 1   • LORazepam (ATIVAN) 1 mg tablet Take 1 tablet (1 mg total) by mouth every 6 (six) hours as needed for anxiety 120 tablet 0   • MAGNESIUM PO Take 1 tablet by mouth daily     • Multiple Vitamin (MULTIVITAMIN ADULT PO) Take 1 tablet by mouth daily     • Omega-3 Fatty Acids (FISH OIL PO) Take 1 capsule by mouth daily     • pantoprazole (PROTONIX) 40 mg tablet Take 1 tablet (40 mg total) by mouth daily 90 tablet 1   • potassium chloride (MICRO-K) 10 MEQ CR capsule TAKE TWO CAPSULES BY MOUTH EVERY DAY (Patient taking differently: TAKE THREE CAPSULES BY MOUTH EVERY DAY) 180 capsule 3   • predniSONE 5 mg tablet      • simvastatin (ZOCOR) 10 mg tablet Take 0.5 tablets (5 mg total) by mouth daily 45 tablet 1   • sodium chloride 1 g tablet Take 1 tablet (1 g total) by mouth 3 (three) times a day 270 tablet 3   • torsemide (DEMADEX) 10 mg tablet Take 1 tablet (10 mg total) by mouth daily as needed (edema) 90 tablet 0   • vitamin B-12 (VITAMIN B-12) 500 mcg tablet Take 1 tablet (500 mcg total) by mouth daily 90 tablet 1   • Fluticasone-Salmeterol (Advair Diskus) 100-50 mcg/dose inhaler      • lansoprazole (PREVACID) 15 mg capsule daily (Patient not taking: Reported on 8/22/2023)       Current Facility-Administered Medications   Medication Dose Route Frequency Provider Last Rate Last Admin   • cyanocobalamin injection 1,000 mcg  1,000 mcg Intramuscular Q30 Days Rhoda Gonzales MD   1,000 mcg at 04/20/23 1012       Past Medical History  Past Medical History:   Diagnosis Date   • Anemia    • Anxiety    • Arrhythmia    • Arthritis    • Asthma    • Blood clot in vein     portal vein   • Breast cancer ([address]) 02/12/2021   • Breast lump     [id number] RESOLVED   • Cancer ([address])    • Depression    • Disease of thyroid gland    • DVT, lower extremity (HCC)    • GERD (gastroesophageal reflux disease)    • Hyperlipidemia    • Hypertension    • Hypokalemia    • Hyponatremia    • Hypothyroidism    • Iron deficiency anemia    • Irregular heart beat    • Manic behavior ([address])    • 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HCC)    • Osteoarthritis    • Palpitations     [id number]  RESOLVED   • Paroxysmal supraventricular tachycardia (HCC)    • PE (pulmonary thromboembolism) (HCC)    • Sjoegren syndrome    • Sleep apnea    • Sleep difficulties    • Spinal stenosis    • Thrombocytosis     [id number]  RESOLVED   • Tremors of nervous system     dbs implanted right and left chest   • Ulcerative colitis ([address])    • Vertigo     [id number] RESOLVED       Past Surgical History  Past Surgical History:   Procedure Laterality Date   • ABDOMINAL SURGERY     • APPENDECTOMY     • BREAST BIOPSY Left 11/07/2019    Stereo   • BREAST EXCISIONAL BIOPSY Left     x many years   • BREAST SURGERY      lumpectomy & biopsy   • CARMELLA HOLE W/ STEREOTACTIC INSERTION OF DBS LEADS / INTRAOP MICROELECTRODE RECORDING     • COLON SURGERY     • COLONOSCOPY N/A 08/30/2017    Procedure: Ishmael Figuerao;  Surgeon: Blane Seaman MD;  [facility]; Service: Colorectal   • COLOPROCTECTOMY W/ ILEO J POUCH     • ESOPHAGOGASTRODUODENOSCOPY      ONSET 10/17/11   • FISTULA REPAIR      LLEOANAL FISTULA REPAIR TRANSPERIN TRANSABD APPROACH   • HYSTERECTOMY      age 43   • ILEOSTOMY CLOSURE     • KNEE ARTHROSCOPY      Right   • MAMMO STEREOTACTIC BREAST BIOPSY LEFT (ALL INC) Left 11/07/2019   • MAMMO STEREOTACTIC BREAST BIOPSY LEFT (ALL INC) Left 12/17/2020   • MAMMO STEREOTACTIC BREAST BIOPSY LEFT (ALL INC) EACH ADD Left 12/17/2020   • MASTECTOMY Left 02/12/2021    left mastectomy- Dr. Lynda Muhammad   • MASTECTOMY W/ SENTINEL NODE BIOPSY Left 02/12/2021    Procedure: BREAST MASTECTOMY WITH SENTINEL LYMPH NODE BIOPSY, LYMPHATIC MAPPING WITH BLUE DYE AND RADIAOCTIVE DYE (INJECT AT 1100 BY DR GILLESPIE IN THE OR);   Surgeon: Tonja Henderson MD;  [facility];  Service: Surgical Oncology   • RI INSJ/RPLCMT CRANIAL NEUROSTIM PULSE GENERATOR Right 06/20/2017    Procedure: DBS GENERATOR REPLACEMENT;  Surgeon: Derrek Costa MD;  [facility];  Service: Neurosurgery   • RI INSJ/RPLCMT CRANIAL NEUROSTIM PULSE GENERATOR N/A 12/04/2019    Procedure: REPLACEMENT IMPLANTABLE PULSE GENERATOR FOR DEEP BRAIN STIMULATOR LEFT CHEST;  Surgeon: José Zeng MD;  [facility];  Service: Neurosurgery   • SPLENECTOMY     • TONSILLECTOMY         Past Social History   Social History     Socioeconomic History   • Marital status:      Spouse name: None   • Number of children: None   • Years of education: EDUCATION LEVEL 10TH GRADE   • Highest education level: None   Occupational History   • Occupation: RETIRED   Tobacco Use   • Smoking status: Former     Packs/day: 1.50     Years: 5.00     Total pack years: 7.50     Types: Cigarettes     Quit date: 1965     Years since quittin.6   • Smokeless tobacco: Never   • Tobacco comments:     Quit   Vaping Use   • Vaping Use: Never used   Substance and Sexual Activity   • Alcohol use: Yes     Alcohol/week: 2.0 standard drinks of alcohol     Types: 2 Cans of beer per week   • Drug use: No   • Sexual activity: Not Currently     Partners: Male     Birth control/protection: Post-menopausal   Other Topics Concern   • None   Social History Narrative    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ACTIVITIES INSIDE AND OUTSIDE OF HOME, MODERATE    CAFFEINE USE, DRINKS CAFEINATED TEA, DRINKS COFFEE    INADEQUATE EXERCISE    LIVES WITH ADULT CHILDREN     Social Determinants of Health     Financial Resource Strain: Low Risk  (2023)    Overall Financial Resource Strain (CARDIA)    • Difficulty of Paying Living Expenses: Not very hard   Food Insecurity: Not on file   Transportation Needs: No Transportation Needs (2023)    PRAPARE - Transportation    • Lack of Transportation (Medical): No    • Lack of Transportation (Non-Medical):  No   Physical Activity: Not on file   Stress: Not on file   Social Connections: Not on file   Intimate Partner Violence: Not on file   Housing Stability: Not on file       Vital Signs  Vitals:    23 1022   BP: 116/64   BP Location: Right arm   Patient Position: Sitting   Cuff Size: Standard   Pulse: 70   SpO2: 97%   Weight: 69 kg (152 lb 3.2 oz)   Height: 5' 3" (1.6 m)       Physical Exam:  General appearance: alert, cooperative, no distress  HEENT: normocephalic, anicteric, no eye erythema or discharge, no oropharyngeal thrush  Neck: supple  Lungs: CTA b/l, no rales, rhonchi, or wheezing, unlabored respirations  Heart: RRR, no murmur, rubs, or gallops  Abdomen: soft, non-tender, non-distended, normal bowel sounds, no masses or organomegaly  Rectal: deferred  Extremities: no cyanosis, clubbing, or edema  Musculoskeletal: normal gait  Skin: color and texture normal, no jaundice, no rashes or lesions  Psychiatric: alert and oriented, normal affect and behavior                                                          Sukhwinder HERNANDEZ Pablo DavidCando, [place]  8/22/2023 10:28 AM  Sign when Signing Visit  [facility] - Outpatient Progress Note  Little Kovacs 65 y.o. female MRN: [id number]  Encounter: [id number]    Assessment and Plan    1. Iron deficiency anemia due to chronic blood loss  - follows with Heme Onc  - last seen on 8/4/23 and requested EGD to be performed to help rule out upper GI bleeding  - Plan EGD  - need to be off Eliquis for 2 days prior (Cardiologist Maria R Elise with [facility])  - could consider Wireless Capsule Endoscopy (WCE)    2. Other ulcerative colitis without complication ([address] )  - history of total colectomy with ileo-anal anastomosis  - last flex sig 1/16/23 with ulcerations near anastomosis (some acute enteritis on pathology      --------------------------------------------------------------------------------------------------------------------    Chief Complaint: ***    HPI: Little Kovacs is a 65 y.o. female who presents today for follow up for LUCILA. She has history of Ulcerative Colitis with total colectomy and ileoanal anastomosis. Last flex sig on 1/16/23 with ulceration (acute enteritis on pathology). History of DVT/PE on Eliquis. Seen by Cardiology 8/10/23 for SOB x 2 weeks. Previous labs from 7/14/23 with Iron 13, Ferritin 18, % sat 3, TIBC 387, Hg 8.2, MCV 81, MCH 24.3.   12/23/22 LVEF 65%. Seen recently by Heme/Onc 8/4/23. She has been getting Venofer transfusions (last 8/17/23) and was referred to GI for EGD.     Patient denies any nausea, vomiting, abdominal pain, dysphagia, unexpected weight loss, diarrhea, constipation, blood in stool, or black tarry stools. Endoscopy History:  EGD -   Colonoscopy - Post total colectomy with ileoanal anastomosis and J-pounch. Flex sig on 1/16/23 with ulceration (acute enteritis on pathology).     Review of Systems:   General: negative for fatigue, fever, night sweats or unexpected weight loss  Psychological: negative for anxiety or depression  Ophthalmic: negative for blurry vision or scleral icterus  ENT: negative for headaches, sore throat or dysphagia  Hematological and Lymphatic: negative for pallor or swollen lymph nodes  Respiratory: negative for cough, shortness of breath or wheezing  Cardiovascular: negative for chest pain, edema or murmur  Gastrointestinal: as mentioned in HPI  Genito-Urinary: negative for dysuria or incontinence  Musculoskeletal: negative for joint pain, joint stiffness or joint swelling  Dermatological: negative for pruritus, rash, or jaundice    Current Medications  Current Outpatient Medications   Medication Sig Dispense Refill   • albuterol (PROVENTIL HFA,VENTOLIN HFA) 90 mcg/act inhaler Inhale 2 puffs every 6 (six) hours as needed for wheezing 8 g 1   • amLODIPine (NORVASC) 2.5 mg tablet TAKE ONE TABLET BY MOUTH EVERY DAY 90 tablet 3   • Calcium Carb-Cholecalciferol (CALCIUM 600-D PO) Take 1 tablet by mouth 2 (two) times a day     • Coenzyme Q10 (CO Q-10 PO) Take 1 capsule by mouth daily     • diltiazem (CARDIZEM CD) 180 mg 24 hr capsule TAKE ONE CAPSULE BY MOUTH EVERY DAY 90 capsule 1   • diltiazem (CARDIZEM) 60 mg tablet TAKE ONE TABLET BY MOUTH EVERY DAY 90 tablet 1   • Eliquis 5 MG TAKE ONE TABLET BY MOUTH TWICE A  tablet 1   • escitalopram (LEXAPRO) 20 mg tablet TAKE ONE TABLET BY MOUTH EVERY DAY 90 tablet 1   • fluticasone (FLONASE) 50 mcg/act nasal spray APPLY ONE SPRAY IN EACH NOSTRIL ONCE DAILY AS NEEDED FOR RHINITIS 48 g 1   • Fluticasone-Salmeterol (Advair Diskus) 250-50 mcg/dose inhaler Inhale 1 puff 2 (two) times a day Rinse mouth after use 60 blister 5   • gabapentin (NEURONTIN) 600 MG tablet TAKE ONE TABLET BY MOUTH IN THE MORNING, ONE TABLET IN THE AFTERNNON, AND TWO TABLETS AT BEDTIME (Patient taking differently: TAKE ONE TABLET BY MOUTH IN THE MORNING, ONE half TABLET IN THE AFTERNNON, AND one and one half at hs TABLETS AT BEDTIME.) 360 tablet 2   • HYDROcodone-acetaminophen (NORCO) 5-325 mg per tablet Take 1 tablet by mouth every 6 (six) hours as needed for pain Max Daily Amount: 4 tablets 120 tablet 0   • levothyroxine 50 mcg tablet Take 1 tablet (50 mcg total) by mouth daily 90 tablet 0   • lisinopril (ZESTRIL) 20 mg tablet TAKE ONE TABLET BY MOUTH TWICE A  tablet 1   • LORazepam (ATIVAN) 1 mg tablet Take 1 tablet (1 mg total) by mouth every 6 (six) hours as needed for anxiety 120 tablet 0   • MAGNESIUM PO Take 1 tablet by mouth daily     • Multiple Vitamin (MULTIVITAMIN ADULT PO) Take 1 tablet by mouth daily     • Omega-3 Fatty Acids (FISH OIL PO) Take 1 capsule by mouth daily     • pantoprazole (PROTONIX) 40 mg tablet Take 1 tablet (40 mg total) by mouth daily 90 tablet 1   • potassium chloride (MICRO-K) 10 MEQ CR capsule TAKE TWO CAPSULES BY MOUTH EVERY DAY (Patient taking differently: TAKE THREE CAPSULES BY MOUTH EVERY DAY) 180 capsule 3   • predniSONE 5 mg tablet      • simvastatin (ZOCOR) 10 mg tablet Take 0.5 tablets (5 mg total) by mouth daily 45 tablet 1   • sodium chloride 1 g tablet Take 1 tablet (1 g total) by mouth 3 (three) times a day 270 tablet 3   • torsemide (DEMADEX) 10 mg tablet Take 1 tablet (10 mg total) by mouth daily as needed (edema) 90 tablet 0   • vitamin B-12 (VITAMIN B-12) 500 mcg tablet Take 1 tablet (500 mcg total) by mouth daily 90 tablet 1   • Fluticasone-Salmeterol (Advair Diskus) 100-50 mcg/dose inhaler      • lansoprazole (PREVACID) 15 mg capsule daily (Patient not taking: Reported on 8/22/2023)       Current Facility-Administered Medications   Medication Dose Route Frequency Provider Last Rate Last Admin   • cyanocobalamin injection 1,000 mcg  1,000 mcg Intramuscular Q30 Days Eduardo Thomas MD   1,000 mcg at 04/20/23 1012       Past Medical History  Past Medical History:   Diagnosis Date   • Anemia    • Anxiety    • Arrhythmia    • Arthritis    • Asthma    • Blood clot in vein     portal vein   • Breast cancer ([address]) 02/12/2021   • Breast lump     [id number] RESOLVED   • Cancer (HCC)    • Depression    • Disease of thyroid gland    • DVT, lower extremity (HCC)    • GERD (gastroesophageal reflux disease)    • Hyperlipidemia    • Hypertension    • Hypokalemia    • Hyponatremia    • Hypothyroidism    • Iron deficiency anemia    • Irregular heart beat    • Manic behavior ([address])    • 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HCC)    • Osteoarthritis    • Palpitations     [id number]  RESOLVED   • Paroxysmal supraventricular tachycardia (HCC)    • PE (pulmonary thromboembolism) (HCC)    • Sjoegren syndrome    • Sleep apnea    • Sleep difficulties    • Spinal stenosis    • Thrombocytosis     [id number]  RESOLVED   • Tremors of nervous system     dbs implanted right and left chest   • Ulcerative colitis ([address])    • Vertigo     [id number] RESOLVED       Past Surgical History  Past Surgical History:   Procedure Laterality Date   • ABDOMINAL SURGERY     • APPENDECTOMY     • BREAST BIOPSY Left 11/07/2019    Stereo   • BREAST EXCISIONAL BIOPSY Left     x many years   • BREAST SURGERY      lumpectomy & biopsy   • CARMELLA HOLE W/ STEREOTACTIC INSERTION OF DBS LEADS / INTRAOP MICROELECTRODE RECORDING     • COLON SURGERY     • COLONOSCOPY N/A 08/30/2017    Procedure: Wei Bates;  Surgeon: Karen Del Toro MD;  [facility];   Service: Colorectal   • COLOPROCTECTOMY W/ ILEO J POUCH     • ESOPHAGOGASTRODUODENOSCOPY      ONSET 10/17/11   • FISTULA REPAIR      LLEOANAL FISTULA REPAIR TRANSPERIN TRANSABD APPROACH   • HYSTERECTOMY      age 43   • ILEOSTOMY CLOSURE     • KNEE ARTHROSCOPY      Right   • MAMMO STEREOTACTIC BREAST BIOPSY LEFT (ALL INC) Left 2019   • MAMMO STEREOTACTIC BREAST BIOPSY LEFT (ALL INC) Left 2020   • MAMMO STEREOTACTIC BREAST BIOPSY LEFT (ALL INC) EACH ADD Left 2020   • MASTECTOMY Left 2021    left mastectomy- Dr. Plummer Bristol   • MASTECTOMY W/ SENTINEL NODE BIOPSY Left 2021    Procedure: BREAST MASTECTOMY WITH SENTINEL LYMPH NODE BIOPSY, LYMPHATIC MAPPING WITH BLUE DYE AND RADIAOCTIVE DYE (INJECT AT 1100 BY DR GILLESPIE IN THE OR); Surgeon: Gigi Kumar MD;  [facility];  Service: Surgical Oncology   • DE INSJ/RPLCMT CRANIAL NEUROSTIM PULSE GENERATOR Right 2017    Procedure: DBS GENERATOR REPLACEMENT;  Surgeon: Loretta Bowman MD;  [facility];  Service: Neurosurgery   • DE INSJ/RPLCMT CRANIAL NEUROSTIM PULSE GENERATOR N/A 2019    Procedure: REPLACEMENT IMPLANTABLE PULSE GENERATOR FOR DEEP BRAIN STIMULATOR LEFT CHEST;  Surgeon: Wicho Nixon MD;  [facility];  Service: Neurosurgery   • SPLENECTOMY     • TONSILLECTOMY         Past Social History   Social History     Socioeconomic History   • Marital status:      Spouse name: None   • Number of children: None   • Years of education: EDUCATION LEVEL 10TH GRADE   • Highest education level: None   Occupational History   • Occupation: RETIRED   Tobacco Use   • Smoking status: Former     Packs/day: 1.50     Years: 5.00     Total pack years: 7.50     Types: Cigarettes     Quit date: 1965     Years since quittin.6   • Smokeless tobacco: Never   • Tobacco comments:     Quit   Vaping Use   • Vaping Use: Never used   Substance and Sexual Activity   • Alcohol use:  Yes     Alcohol/week: 2.0 standard drinks of alcohol     Types: 2 Cans of beer per week   • Drug use: No   • Sexual activity: Not Currently     Partners: Male     Birth control/protection: Post-menopausal   Other Topics Concern   • None   Social History Narrative    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ACTIVITIES INSIDE AND OUTSIDE OF HOME, MODERATE    CAFFEINE USE, DRINKS CAFEINATED TEA, DRINKS COFFEE    INADEQUATE EXERCISE    LIVES WITH ADULT CHILDREN     Social Determinants of Health     Financial Resource Strain: Low Risk  (4/20/2023)    Overall Financial Resource Strain (CARDIA)    • Difficulty of Paying Living Expenses: Not very hard   Food Insecurity: Not on file   Transportation Needs: No Transportation Needs (4/20/2023)    PRAPARE - Transportation    • Lack of Transportation (Medical): No    • Lack of Transportation (Non-Medical): No   Physical Activity: Not on file   Stress: Not on file   Social Connections: Not on file   Intimate Partner Violence: Not on file   Housing Stability: Not on file       Vital Signs  Vitals:    08/22/23 1022   BP: 116/64   BP Location: Right arm   Patient Position: Sitting   Cuff Size: Standard   Pulse: 70   SpO2: 97%   Weight: 69 kg (152 lb 3.2 oz)   Height: 5' 3" (1.6 m)       Physical Exam:  General appearance: alert, cooperative, no distress  HEENT: normocephalic, anicteric, no eye erythema or discharge, no oropharyngeal thrush  Neck: supple  Lungs: CTA b/l, no rales, rhonchi, or wheezing, unlabored respirations  Heart: RRR, no murmur, rubs, or gallops  Abdomen: soft, non-tender, non-distended, normal bowel sounds, no masses or organomegaly  Rectal: deferred  Extremities: no cyanosis, clubbing, or edema  Musculoskeletal: normal gait  Skin: color and texture normal, no jaundice, no rashes or lesions  Psychiatric: alert and oriented, normal affect and behavior                                                          Kalyn Seymour PA-C

## 2023-08-22 NOTE — PROGRESS NOTES
[facility] - Outpatient Progress Note  Asael Phillips 65 y.o. female MRN: [id number]  Encounter: [id number]    Assessment and Plan    1. Iron deficiency anemia due to chronic blood loss  - follows with Heme Onc  - last seen on 8/4/23 and requested EGD to be performed to help rule out upper GI bleeding  - Plan EGD  - need to be off Eliquis for 2 days prior (given by PCP Dr. Jane Vicente)  - could consider Wireless Capsule Endoscopy (WCE)    2. Other ulcerative colitis without complication ([address])  - history of total colectomy with ileo-anal anastomosis  - last flex sig 1/16/23 with ulcerations near anastomosis (some acute enteritis on pathology    3. GERD  - has break through manly at night, but gets during the day as well  - Increase pantoprazole to 40mg twice daily      --------------------------------------------------------------------------------------------------------------------    Chief Complaint: Anemia    HPI: Asael Phillips is a 65 y.o. female who presents today for follow up for LUCILA. She has history of Ulcerative Colitis with total colectomy and ileoanal anastomosis. Last flex sig on 1/16/23 with ulceration (acute enteritis on pathology). History of DVT/PE on Eliquis. Seen by Cardiology 8/10/23 for SOB x 2 weeks. Previous labs from 7/14/23 with Iron 13, Ferritin 18, % sat 3, TIBC 387, Hg 8.2, MCV 81, MCH 24.3.   12/23/22 LVEF 65%. Seen recently by Heme/Onc 8/4/23. She has been getting Venofer transfusions (last 8/17/23) and was referred to GI for EGD.     Years of known anemia - many years  Overt bleeding (eg  vomiting blood, coughing blood, urinating blood, black or bloody stools) - Rare red blood  History of bariatric surgery - No  History of bowel resection - 2000 total colectomy  History of blood transfusions - none recent  History of Iron supplementation - 8/17/23   Vegetarian - No  Menstrual Cycle - N/A  Last EGD - > 5 years  Last Colonoscopy - 1/2023 with ulceration (acute enteritis on pathology). Patient denies any nausea, vomiting, abdominal pain, dysphagia, unexpected weight loss, diarrhea, constipation, blood in stool, or black tarry stools. Endoscopy History:  EGD - > 5 years ago per patient, normal per patient. Colonoscopy - Post total colectomy with ileoanal anastomosis and J-pounch. Flex sig on 1/16/23 with ulceration (acute enteritis on pathology).     Review of Systems:   General: negative for fatigue, fever, night sweats or unexpected weight loss  Psychological: negative for anxiety or depression  Ophthalmic: negative for blurry vision or scleral icterus  ENT: negative for headaches, sore throat or dysphagia  Hematological and Lymphatic: negative for pallor or swollen lymph nodes  Respiratory: negative for cough, shortness of breath or wheezing  Cardiovascular: negative for chest pain, edema or murmur  Gastrointestinal: as mentioned in HPI  Genito-Urinary: negative for dysuria or incontinence  Musculoskeletal: negative for joint pain, joint stiffness or joint swelling  Dermatological: negative for pruritus, rash, or jaundice    Current Medications  Current Outpatient Medications   Medication Sig Dispense Refill   • albuterol (PROVENTIL HFA,VENTOLIN HFA) 90 mcg/act inhaler Inhale 2 puffs every 6 (six) hours as needed for wheezing 8 g 1   • amLODIPine (NORVASC) 2.5 mg tablet TAKE ONE TABLET BY MOUTH EVERY DAY 90 tablet 3   • Calcium Carb-Cholecalciferol (CALCIUM 600-D PO) Take 1 tablet by mouth 2 (two) times a day     • Coenzyme Q10 (CO Q-10 PO) Take 1 capsule by mouth daily     • diltiazem (CARDIZEM CD) 180 mg 24 hr capsule TAKE ONE CAPSULE BY MOUTH EVERY DAY 90 capsule 1   • diltiazem (CARDIZEM) 60 mg tablet TAKE ONE TABLET BY MOUTH EVERY DAY 90 tablet 1   • Eliquis 5 MG TAKE ONE TABLET BY MOUTH TWICE A  tablet 1   • escitalopram (LEXAPRO) 20 mg tablet TAKE ONE TABLET BY MOUTH EVERY DAY 90 tablet 1   • fluticasone (FLONASE) 50 mcg/act nasal spray APPLY ONE SPRAY IN EACH NOSTRIL ONCE DAILY AS NEEDED FOR RHINITIS 48 g 1   • Fluticasone-Salmeterol (Advair Diskus) 250-50 mcg/dose inhaler Inhale 1 puff 2 (two) times a day Rinse mouth after use 60 blister 5   • gabapentin (NEURONTIN) 600 MG tablet TAKE ONE TABLET BY MOUTH IN THE MORNING, ONE TABLET IN THE AFTERNNON, AND TWO TABLETS AT BEDTIME (Patient taking differently: TAKE ONE TABLET BY MOUTH IN THE MORNING, ONE half TABLET IN THE AFTERNNON, AND one and one half at hs TABLETS AT BEDTIME.) 360 tablet 2   • HYDROcodone-acetaminophen (NORCO) 5-325 mg per tablet Take 1 tablet by mouth every 6 (six) hours as needed for pain Max Daily Amount: 4 tablets 120 tablet 0   • levothyroxine 50 mcg tablet Take 1 tablet (50 mcg total) by mouth daily 90 tablet 0   • lisinopril (ZESTRIL) 20 mg tablet TAKE ONE TABLET BY MOUTH TWICE A  tablet 1   • LORazepam (ATIVAN) 1 mg tablet Take 1 tablet (1 mg total) by mouth every 6 (six) hours as needed for anxiety 120 tablet 0   • MAGNESIUM PO Take 1 tablet by mouth daily     • Multiple Vitamin (MULTIVITAMIN ADULT PO) Take 1 tablet by mouth daily     • Omega-3 Fatty Acids (FISH OIL PO) Take 1 capsule by mouth daily     • pantoprazole (PROTONIX) 40 mg tablet Take 1 tablet (40 mg total) by mouth 2 (two) times a day 60 tablet 5   • potassium chloride (MICRO-K) 10 MEQ CR capsule TAKE TWO CAPSULES BY MOUTH EVERY DAY (Patient taking differently: TAKE THREE CAPSULES BY MOUTH EVERY DAY) 180 capsule 3   • predniSONE 5 mg tablet      • simvastatin (ZOCOR) 10 mg tablet Take 0.5 tablets (5 mg total) by mouth daily 45 tablet 1   • sodium chloride 1 g tablet Take 1 tablet (1 g total) by mouth 3 (three) times a day 270 tablet 3   • torsemide (DEMADEX) 10 mg tablet Take 1 tablet (10 mg total) by mouth daily as needed (edema) 90 tablet 0   • vitamin B-12 (VITAMIN B-12) 500 mcg tablet Take 1 tablet (500 mcg total) by mouth daily 90 tablet 1   • Fluticasone-Salmeterol (Advair Diskus) 100-50 mcg/dose inhaler      • lansoprazole (PREVACID) 15 mg capsule daily (Patient not taking: Reported on 8/22/2023)       Current Facility-Administered Medications   Medication Dose Route Frequency Provider Last Rate Last Admin   • cyanocobalamin injection 1,000 mcg  1,000 mcg Intramuscular Q30 Days Jesusita Gusman MD   1,000 mcg at 04/20/23 1012       Past Medical History  Past Medical History:   Diagnosis Date   • Anemia    • Anxiety    • Arrhythmia    • Arthritis    • Asthma    • Blood clot in vein     portal vein   • Breast cancer ([address]) 02/12/2021   • Breast lump     [id number] RESOLVED   • Cancer (HCC)    • Depression    • Disease of thyroid gland    • DVT, lower extremity (HCC)    • GERD (gastroesophageal reflux disease)    • Hyperlipidemia    • Hypertension    • Hypokalemia    • Hyponatremia    • Hypothyroidism    • Iron deficiency anemia    • Irregular heart beat    • Manic behavior ([address])    • 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HCC)    • Osteoarthritis    • Palpitations     [id number]  RESOLVED   • Paroxysmal supraventricular tachycardia (HCC)    • PE (pulmonary thromboembolism) (HCC)    • Sjoegren syndrome    • Sleep apnea    • Sleep difficulties    • Spinal stenosis    • Thrombocytosis     [id number]  RESOLVED   • Tremors of nervous system     dbs implanted right and left chest   • Ulcerative colitis ([address])    • Vertigo     [id number] RESOLVED       Past Surgical History  Past Surgical History:   Procedure Laterality Date   • ABDOMINAL SURGERY     • APPENDECTOMY     • BREAST BIOPSY Left 11/07/2019    Stereo   • BREAST EXCISIONAL BIOPSY Left     x many years   • BREAST SURGERY      lumpectomy & biopsy   • CARMELLA HOLE W/ STEREOTACTIC INSERTION OF DBS LEADS / INTRAOP MICROELECTRODE RECORDING     • COLON SURGERY     • COLONOSCOPY N/A 08/30/2017    Procedure: Marlo Lea;  Surgeon: Massiel Martin MD;  Location: [facility];   Service: Colorectal   • COLOPROCTECTOMY W/ ILEO J POUCH     • ESOPHAGOGASTRODUODENOSCOPY ONSET 10/17/11   • FISTULA REPAIR      LLEOANAL FISTULA REPAIR TRANSPERIN TRANSABD APPROACH   • HYSTERECTOMY      age 43   • ILEOSTOMY CLOSURE     • KNEE ARTHROSCOPY      Right   • MAMMO STEREOTACTIC BREAST BIOPSY LEFT (ALL INC) Left 2019   • MAMMO STEREOTACTIC BREAST BIOPSY LEFT (ALL INC) Left 2020   • MAMMO STEREOTACTIC BREAST BIOPSY LEFT (ALL INC) EACH ADD Left 2020   • MASTECTOMY Left 2021    left mastectomy- Dr. Samuel Rios   • MASTECTOMY W/ SENTINEL NODE BIOPSY Left 2021    Procedure: BREAST MASTECTOMY WITH SENTINEL LYMPH NODE BIOPSY, LYMPHATIC MAPPING WITH BLUE DYE AND RADIAOCTIVE DYE (INJECT AT 1100 BY DR GILLESPIE IN THE OR); Surgeon: Ana Washburn MD;  Location[facility];  Service: Surgical Oncology   • GA INSJ/RPLCMT CRANIAL NEUROSTIM PULSE GENERATOR Right 2017    Procedure: DBS GENERATOR REPLACEMENT;  Surgeon: David Turcios MD;  Location: [facility];  Service: Neurosurgery   • GA INSJ/RPLCMT CRANIAL NEUROSTIM PULSE GENERATOR N/A 2019    Procedure: REPLACEMENT IMPLANTABLE PULSE GENERATOR FOR DEEP BRAIN STIMULATOR LEFT CHEST;  Surgeon: Singh Muro MD;  Location: [facility];  Service: Neurosurgery   • SPLENECTOMY     • TONSILLECTOMY         Past Social History   Social History     Socioeconomic History   • Marital status:      Spouse name: None   • Number of children: None   • Years of education: EDUCATION LEVEL 10TH GRADE   • Highest education level: None   Occupational History   • Occupation: RETIRED   Tobacco Use   • Smoking status: Former     Packs/day: 1.50     Years: 5.00     Total pack years: 7.50     Types: Cigarettes     Quit date: 1965     Years since quittin.6   • Smokeless tobacco: Never   • Tobacco comments:     Quit   Vaping Use   • Vaping Use: Never used   Substance and Sexual Activity   • Alcohol use:  Yes     Alcohol/week: 2.0 standard drinks of alcohol     Types: 2 Cans of beer per week   • Drug use: No   • Sexual activity: Not Currently     Partners: Male     Birth control/protection: Post-menopausal   Other Topics Concern   • None   Social History Narrative    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ACTIVITIES INSIDE AND OUTSIDE OF HOME, MODERATE    CAFFEINE USE, DRINKS CAFEINATED TEA, DRINKS COFFEE    INADEQUATE EXERCISE    LIVES WITH ADULT CHILDREN     Social Determinants of Health     Financial Resource Strain: Low Risk  (4/20/2023)    Overall Financial Resource Strain (CARDIA)    • Difficulty of Paying Living Expenses: Not very hard   Food Insecurity: Not on file   Transportation Needs: No Transportation Needs (4/20/2023)    PRAPARE - Transportation    • Lack of Transportation (Medical): No    • Lack of Transportation (Non-Medical): No   Physical Activity: Not on file   Stress: Not on file   Social Connections: Not on file   Intimate Partner Violence: Not on file   Housing Stability: Not on file       Vital Signs  Vitals:    08/22/23 1022   BP: 116/64   BP Location: Right arm   Patient Position: Sitting   Cuff Size: Standard   Pulse: 70   SpO2: 97%   Weight: 69 kg (152 lb 3.2 oz)   Height: 5' 3" (1.6 m)       Physical Exam:  General appearance: alert, cooperative, no distress  HEENT: normocephalic, anicteric, no eye erythema or discharge, no oropharyngeal thrush  Neck: supple  Lungs: CTA b/l, no rales, rhonchi, or wheezing, unlabored respirations  Heart: RRR, no murmur, rubs, or gallops  Abdomen: soft, non-tender, non-distended, normal bowel sounds, no masses or organomegaly  Rectal: deferred  Extremities: no cyanosis, clubbing, or edema  Musculoskeletal: normal gait  Skin: color and texture normal, no jaundice, no rashes or lesions  Psychiatric: alert and oriented, normal affect and behavior                                                          Santos Jay Jay Moore PA-C

## 2023-08-22 NOTE — TELEPHONE ENCOUNTER
Our mutual patient is scheduled for procedure:  EGD     On: __08__/_28    _/_ 2023   _      With:  ___Jere ______    Physician Approving clearance: ________________________    08/22/23     Pt stated that her cardiologist  retired. The last time she saw [facility].          [facility]   [phone]

## 2023-08-24 ENCOUNTER — HOSPITAL ENCOUNTER (OUTPATIENT)
Dept: INFUSION CENTER | Facility: CLINIC | Age: 78
Discharge: HOME | End: 2023-08-24
Payer: MEDICARE

## 2023-08-24 VITALS
RESPIRATION RATE: 18 BRPM | DIASTOLIC BLOOD PRESSURE: 64 MMHG | TEMPERATURE: 97.6 F | SYSTOLIC BLOOD PRESSURE: 120 MMHG | HEART RATE: 71 BPM

## 2023-08-24 DIAGNOSIS — D50.9 IRON DEFICIENCY ANEMIA, UNSPECIFIED IRON DEFICIENCY ANEMIA TYPE: Primary | ICD-10-CM

## 2023-08-24 PROCEDURE — 96375 TX/PRO/DX INJ NEW DRUG ADDON: CPT

## 2023-08-24 PROCEDURE — 96365 THER/PROPH/DIAG IV INF INIT: CPT

## 2023-08-24 RX ORDER — SODIUM CHLORIDE 9 MG/ML
20 INJECTION, SOLUTION INTRAVENOUS ONCE
Status: CANCELLED | OUTPATIENT
Start: 2023-09-01

## 2023-08-24 RX ORDER — SODIUM CHLORIDE 9 MG/ML
20 INJECTION, SOLUTION INTRAVENOUS ONCE
Status: COMPLETED | OUTPATIENT
Start: 2023-08-24 | End: 2023-08-24

## 2023-08-24 RX ADMIN — DIPHENHYDRAMINE HYDROCHLORIDE 12.5 MG: 50 INJECTION, SOLUTION INTRAMUSCULAR; INTRAVENOUS at 15:08

## 2023-08-24 RX ADMIN — HYDROCORTISONE SODIUM SUCCINATE 50 MG: 100 INJECTION, POWDER, FOR SOLUTION INTRAMUSCULAR; INTRAVENOUS at 15:09

## 2023-08-24 RX ADMIN — SODIUM CHLORIDE 20 ML/HR: 0.9 INJECTION, SOLUTION INTRAVENOUS at 15:09

## 2023-08-25 ENCOUNTER — OFFICE VISIT (OUTPATIENT)
Dept: INTERNAL MEDICINE CLINIC | Facility: CLINIC | Age: 78
End: 2023-08-25
Payer: MEDICARE

## 2023-08-25 VITALS
WEIGHT: 150 LBS | DIASTOLIC BLOOD PRESSURE: 62 MMHG | SYSTOLIC BLOOD PRESSURE: 122 MMHG | TEMPERATURE: 96.4 F | OXYGEN SATURATION: 96 % | HEART RATE: 76 BPM | BODY MASS INDEX: 26.58 KG/M2 | HEIGHT: 63 IN

## 2023-08-25 DIAGNOSIS — M51.36 LUMBAR DEGENERATIVE DISC DISEASE: ICD-10-CM

## 2023-08-25 DIAGNOSIS — I47.1 SUPRAVENTRICULAR TACHYCARDIA (HCC): ICD-10-CM

## 2023-08-25 DIAGNOSIS — I10 ESSENTIAL HYPERTENSION: ICD-10-CM

## 2023-08-25 DIAGNOSIS — E22.2 SIADH (SYNDROME OF INAPPROPRIATE ADH PRODUCTION) (HCC): ICD-10-CM

## 2023-08-25 DIAGNOSIS — R49.0 HOARSENESS OF VOICE: ICD-10-CM

## 2023-08-25 DIAGNOSIS — F41.9 ANXIETY: ICD-10-CM

## 2023-08-25 DIAGNOSIS — J45.20 MILD INTERMITTENT ASTHMA WITHOUT COMPLICATION: ICD-10-CM

## 2023-08-25 DIAGNOSIS — E03.9 ACQUIRED HYPOTHYROIDISM: ICD-10-CM

## 2023-08-25 DIAGNOSIS — D50.0 IRON DEFICIENCY ANEMIA DUE TO CHRONIC BLOOD LOSS: ICD-10-CM

## 2023-08-25 DIAGNOSIS — I81 PORTAL VEIN THROMBOSIS: ICD-10-CM

## 2023-08-25 DIAGNOSIS — K21.9 GASTROESOPHAGEAL REFLUX DISEASE, UNSPECIFIED WHETHER ESOPHAGITIS PRESENT: ICD-10-CM

## 2023-08-25 DIAGNOSIS — E78.2 MIXED HYPERLIPIDEMIA: Primary | ICD-10-CM

## 2023-08-25 PROBLEM — K62.5 RECTAL BLEEDING: Status: RESOLVED | Noted: 2023-01-07 | Resolved: 2023-08-25

## 2023-08-25 PROBLEM — R65.10 SIRS (SYSTEMIC INFLAMMATORY RESPONSE SYNDROME) (HCC): Status: RESOLVED | Noted: 2023-06-22 | Resolved: 2023-08-25

## 2023-08-25 PROCEDURE — 99214 OFFICE O/P EST MOD 30 MIN: CPT | Performed by: INTERNAL MEDICINE

## 2023-08-25 NOTE — PROGRESS NOTES
Assessment/Plan:    Supraventricular tachycardia (HCC)  BP stable. No symptoms. On diltiazem 240 mg daily. Saw cardiology. SIADH (syndrome of inappropriate ADH production) ([facility])  Increase sodium chloride to 4 times a day, as instructed by nephrology. Repeat labs in a few weeks. Iron deficiency anemia  Ongoing iron infusions. EGD scheduled next week. Mild intermittent asthma without complication  No recent exacerbation. Using Advair bid. Continue using incentive spirometer daily. Lumbar degenerative disc disease  S/p SI joint injection. Taking hydrocodone at least twice a day. Portal vein thrombosis  On Eliquis, stopped today for EGD. Overweight  Lost 4 lbs since last visit. Reviewed diet, start [address] Hyperlipidemia  Repeat lipids due to recent change of diet. On simvastatin 5 mg. Sjogren's syndrome ([address])  Still taking prednisone 10 mg daily. Discussed prednisone use. GERD (gastroesophageal reflux disease)  Taking PPI daily. Essential hypertension  BP stable: on amlodipine 2.5 mg, lisinopril 20 mg and diltiazem. Acquired hypothyroidism  Adequately replaced. Anxiety  Stable. On Lexapro 20 mg. Taking lorazepam at least twice a day. Ulcerative colitis without complications (HCC)  No recent rectal bleeding. Essential tremor  On gabapentin. Hypomagnesemia  Taking Mg. Prediabetes  A1c improved to 6.2%. Osteopenia  Bone density due. Vitamin B12 deficiency  B12 levels improved, continue daily supplement. Osteoarthritis of right knee  S/p injection. Diagnoses and all orders for this visit:    Mixed hyperlipidemia  -     Lipid panel; Future  -     Comprehensive metabolic panel; Future    SIADH (syndrome of inappropriate ADH production) ([facility])  -     Basic metabolic panel;  Future    Lumbar degenerative disc disease    Mild intermittent asthma without complication    Portal vein thrombosis    Supraventricular tachycardia ([address])    Acquired hypothyroidism    Anxiety    Gastroesophageal reflux disease, unspecified whether esophagitis present    Iron deficiency anemia due to chronic blood loss    Essential hypertension    Hoarseness of voice  Comments:  No change. Follow up in 4 months or as needed. Subjective:      Patient ID: Vick Councilman is a 77 y.o. female here for a follow up. She has been doing fairly well recently. She started following the [employer]. She eats 3 regular meals every day, has protein with every meal.  She uses the incentive spirometer daily. She uses it before using the Advair. She feels her breathing is much better. She still gets tired. She continues to receive her iron infusions monthly. She has a EGD scheduled on Monday. She already stopped her Eliquis for today. She has only been taking her salt pills 3 times a day. She reports having a back injection to her SI joint about a week ago, received a knee injection the next day. She continues to take prednisone 10 mg daily. She would like to increase the dose. She has been taking hydrocodone and her Ativan at least twice a day only, rarely taking the 3rd pill for both. The following portions of the patient's history were reviewed and updated as appropriate: allergies, current medications, past medical history, past social history and problem list.    Review of Systems   Constitutional: Negative for activity change, appetite change and fatigue. HENT: Negative for congestion and postnasal drip. Eyes: Negative for visual disturbance. Respiratory: Negative for cough and shortness of breath. Cardiovascular: Negative for chest pain and leg swelling. Gastrointestinal: Negative for abdominal pain, blood in stool, constipation, diarrhea and rectal pain. Genitourinary: Negative for dysuria, frequency and urgency. Musculoskeletal: Positive for arthralgias. Negative for myalgias. Skin: Negative for rash and wound.    Neurological: Positive for tremors. Negative for dizziness, numbness and headaches. Hematological: Does not bruise/bleed easily. Psychiatric/Behavioral: Negative for confusion and sleep disturbance. The patient is not nervous/anxious. Objective:      /62   Pulse 76   Temp (!) 96.4 °F (35.8 °C)   Ht 5' 3" (1.6 m)   Wt 68 kg (150 lb)   SpO2 96%   BMI 26.57 kg/m²          Physical Exam  Vitals and nursing note reviewed. Constitutional:       General: She is not in acute distress. Appearance: She is well-developed. HENT:      Head: Normocephalic and atraumatic. Mouth/Throat:      Mouth: Mucous membranes are moist.   Eyes:      Pupils: Pupils are equal, round, and reactive to light. Cardiovascular:      Rate and Rhythm: Normal rate and regular rhythm. Heart sounds: Normal heart sounds. Pulmonary:      Effort: Pulmonary effort is normal.      Breath sounds: Normal breath sounds. No wheezing. Abdominal:      General: Bowel sounds are normal.      Palpations: Abdomen is soft. Musculoskeletal:         General: No swelling. Right lower leg: No edema. Left lower leg: No edema. Skin:     General: Skin is warm. Findings: No rash. Neurological:      General: No focal deficit present. Mental Status: She is alert and oriented to person, place, and time. Motor: Tremor present. Psychiatric:         Mood and Affect: Mood and affect normal. Mood is not anxious or depressed. Behavior: Behavior normal.         Labs & imaging results reviewed with patient.

## 2023-08-25 NOTE — PROGRESS NOTES
Assessment/Plan     Problem List Items Addressed This Visit        Digestive    GERD (gastroesophageal reflux disease)     Taking PPI daily. Endocrine    SIADH (syndrome of inappropriate ADH production) (HCC)     Increase sodium chloride to 4 times a day, as instructed by nephrology. Repeat labs in a few weeks. Relevant Orders    Basic metabolic panel    Acquired hypothyroidism     Adequately replaced. Respiratory    Mild intermittent asthma without complication     No recent exacerbation. Using Advair bid. Continue using incentive spirometer daily. Cardiovascular and Mediastinum    Portal vein thrombosis     On Eliquis, stopped today for EGD. Essential hypertension     BP stable: on amlodipine 2.5 mg, lisinopril 20 mg and diltiazem. Supraventricular tachycardia (HCC)     BP stable. No symptoms. On diltiazem 240 mg daily. Saw cardiology. Musculoskeletal and Integument    Lumbar degenerative disc disease     S/p SI joint injection. Taking hydrocodone at least twice a day. Other    Anxiety     Stable. On Lexapro 20 mg. Taking lorazepam at least twice a day. Hyperlipidemia - Primary     Repeat lipids due to recent change of diet. On simvastatin 5 mg. Relevant Orders    Lipid panel    Comprehensive metabolic panel    Iron deficiency anemia     Ongoing iron infusions. EGD scheduled next week. Hoarseness of voice      Opioid Management Plan      Subjective     Opioid Management:   Type of visit: Follow-up    Screening Tools/Assessments:    PHQ-2/9:  PHQ-9 score: 0    Brief Pain Inventory (BPI):  1) Throughout our lives, most of us have had pain from time to time (such as minor headaches, sprains, and toothaches). Have you had pain other than these everyday kinds of pain today? Yes  2) Where is your pain located?  right hip , knee and back  3) Rate your pain at its worst in the last 24 hours: 6  4) Rate your pain at its least in the last 24 hours: 7  5) Rate your average level of pain: 6  6) Rate your pain right now: 5  7) What treatments or medications are you receiving for your pain? narcotics and shots  8) In the past 24 hours, how much relief have pain treatments or medication provided?  30%  9) During the past 24 hours, pain has interfered with your:     A) General activity: 4     B) Mood: 2     C) Walking ability: 5     D) Normal work (work outside the home & housework): 4     E) Relations with other people: 2     F) Sleep: 5     G) Enjoyment of life: 3    COMM:  Current COMM Score: 6 (negative, low risk patient)    Opioid agreement:  Active Opioid agreement on file?: Yes    Opioid agreement signed date: 4/21/2023  Opioid agreement expiration date: 4/20/2024    Naloxone:  Currently prescribed Naloxone (Narcan): No      HPI  Pain Medications             escitalopram (LEXAPRO) 20 mg tablet TAKE ONE TABLET BY MOUTH EVERY DAY    gabapentin (NEURONTIN) 600 MG tablet TAKE ONE TABLET BY MOUTH IN THE MORNING, ONE TABLET IN THE AFTERNNON, AND TWO TABLETS AT BEDTIME    HYDROcodone-acetaminophen (NORCO) 5-325 mg per tablet Take 1 tablet by mouth every 6 (six) hours as needed for pain Max Daily Amount: 4 tablets    predniSONE 5 mg tablet          Outpatient Morphine Milligram Equivalents Per Day     8/25/23 and after 20 MME/Day    Order Name Dose Route Frequency Maximum MME/Day     HYDROcodone-acetaminophen (NORCO) 5-325 mg per tablet 1 tablet Oral Every 6 hours PRN 20 MME/Day    Total Potential Morphine Milligram Equivalents Per Day 20 MME/Day    Calculation Information        HYDROcodone-acetaminophen (NORCO) 5-325 mg per tablet    HYDROcodone-acetaminophen 5-325 mg Tabs: maximum daily dose of 20 mg of opioid in combo * morphine equivalence factor of 1 = 20 MME/Day                         PDMP Review       Value Time User    PDMP Reviewed  Yes 8/22/2023  3:25 PM Марина Tillman MD         Review of Systems  Objective     BP 122/62   Pulse 76   Temp (!) 96.4 °F (35.8 °C)   Ht 5' 3" (1.6 m)   Wt 68 kg (150 lb)   SpO2 96%   BMI 26.57 kg/m²     Physical Exam    Laura Suero MD

## 2023-08-28 ENCOUNTER — HOSPITAL ENCOUNTER (OUTPATIENT)
Dept: GASTROENTEROLOGY | Facility: HOSPITAL | Age: 78
Setting detail: OUTPATIENT SURGERY
Discharge: HOME | End: 2023-08-28
Payer: MEDICARE

## 2023-08-28 ENCOUNTER — ANESTHESIA EVENT (OUTPATIENT)
Dept: GASTROENTEROLOGY | Facility: HOSPITAL | Age: 78
End: 2023-08-28

## 2023-08-28 ENCOUNTER — ANESTHESIA (OUTPATIENT)
Dept: GASTROENTEROLOGY | Facility: HOSPITAL | Age: 78
End: 2023-08-28

## 2023-08-28 VITALS
OXYGEN SATURATION: 96 % | HEIGHT: 63 IN | WEIGHT: 148 LBS | TEMPERATURE: 98 F | BODY MASS INDEX: 26.22 KG/M2 | RESPIRATION RATE: 16 BRPM | SYSTOLIC BLOOD PRESSURE: 123 MMHG | DIASTOLIC BLOOD PRESSURE: 58 MMHG | HEART RATE: 60 BPM

## 2023-08-28 DIAGNOSIS — R49.0 HOARSENESS OF VOICE: Primary | ICD-10-CM

## 2023-08-28 DIAGNOSIS — B37.81 ESOPHAGEAL CANDIDIASIS (HCC): ICD-10-CM

## 2023-08-28 DIAGNOSIS — D50.0 IRON DEFICIENCY ANEMIA DUE TO CHRONIC BLOOD LOSS: ICD-10-CM

## 2023-08-28 PROCEDURE — 88305 TISSUE EXAM BY PATHOLOGIST: CPT | Performed by: PATHOLOGY

## 2023-08-28 PROCEDURE — 88112 CYTOPATH CELL ENHANCE TECH: CPT | Performed by: PATHOLOGY

## 2023-08-28 RX ORDER — PROPOFOL 10 MG/ML
INJECTION, EMULSION INTRAVENOUS AS NEEDED
Status: DISCONTINUED | OUTPATIENT
Start: 2023-08-28 | End: 2023-08-28

## 2023-08-28 RX ORDER — SODIUM CHLORIDE, SODIUM LACTATE, POTASSIUM CHLORIDE, CALCIUM CHLORIDE 600; 310; 30; 20 MG/100ML; MG/100ML; MG/100ML; MG/100ML
INJECTION, SOLUTION INTRAVENOUS CONTINUOUS PRN
Status: DISCONTINUED | OUTPATIENT
Start: 2023-08-28 | End: 2023-08-28

## 2023-08-28 RX ORDER — LIDOCAINE HYDROCHLORIDE 10 MG/ML
INJECTION, SOLUTION EPIDURAL; INFILTRATION; INTRACAUDAL; PERINEURAL AS NEEDED
Status: DISCONTINUED | OUTPATIENT
Start: 2023-08-28 | End: 2023-08-28

## 2023-08-28 RX ORDER — FLUCONAZOLE 200 MG/1
200 TABLET ORAL DAILY
Qty: 14 TABLET | Refills: 0 | Status: SHIPPED | OUTPATIENT
Start: 2023-08-28 | End: 2023-09-11

## 2023-08-28 RX ADMIN — PROPOFOL 30 MG: 10 INJECTION, EMULSION INTRAVENOUS at 11:25

## 2023-08-28 RX ADMIN — PROPOFOL 30 MG: 10 INJECTION, EMULSION INTRAVENOUS at 11:35

## 2023-08-28 RX ADMIN — PROPOFOL 30 MG: 10 INJECTION, EMULSION INTRAVENOUS at 11:30

## 2023-08-28 RX ADMIN — PROPOFOL 30 MG: 10 INJECTION, EMULSION INTRAVENOUS at 11:32

## 2023-08-28 RX ADMIN — SODIUM CHLORIDE, SODIUM LACTATE, POTASSIUM CHLORIDE, AND CALCIUM CHLORIDE: .6; .31; .03; .02 INJECTION, SOLUTION INTRAVENOUS at 11:18

## 2023-08-28 RX ADMIN — PROPOFOL 20 MG: 10 INJECTION, EMULSION INTRAVENOUS at 11:27

## 2023-08-28 RX ADMIN — PROPOFOL 80 MG: 10 INJECTION, EMULSION INTRAVENOUS at 11:22

## 2023-08-28 RX ADMIN — LIDOCAINE HYDROCHLORIDE 50 MG: 10 INJECTION, SOLUTION EPIDURAL; INFILTRATION; INTRACAUDAL; PERINEURAL at 11:22

## 2023-08-28 NOTE — ANESTHESIA POSTPROCEDURE EVALUATION
Post-Op Assessment Note    CV Status:  Stable  Pain Score: 0    Pain management: adequate     Mental Status:  Sleepy   Hydration Status:  Euvolemic   PONV Controlled:  Controlled   Airway Patency:  Patent      Post Op Vitals Reviewed: Yes      Staff: CRNA         No notable events documented.     BP   121/58   Temp      Pulse  58   Resp   12   SpO2   95%

## 2023-08-28 NOTE — ANESTHESIA PREPROCEDURE EVALUATION
Procedure:  EGD    Relevant Problems   CARDIO   (+) Essential hypertension   (+) Hyperlipidemia   (+) Supraventricular tachycardia (HCC)      ENDO   (+) Acquired hypothyroidism      GI/HEPATIC   (+) GERD (gastroesophageal reflux disease)      HEMATOLOGY   (+) Anemia   (+) Iron deficiency anemia      MUSCULOSKELETAL   (+) Lumbar degenerative disc disease   (+) Osteoarthritis of right knee      NEURO/PSYCH   (+) Anxiety   (+) Moderate episode of recurrent major depressive disorder (HCC)      PULMONARY   (+) Mild intermittent asthma without complication        Physical Exam    Airway    Mallampati score: II  TM Distance: >3 FB  Neck ROM: full     Dental       Cardiovascular  Rhythm: regular, No JVD,     Pulmonary  No wheezes,     Other Findings        Anesthesia Plan  ASA Score- 3     Anesthesia Type- IV sedation with anesthesia with ASA Monitors. Additional Monitors:   Airway Plan:           Plan Factors-    Chart reviewed. Existing labs reviewed. Patient summary reviewed. Patient is not a current smoker. Induction- intravenous. Postoperative Plan-     Informed Consent- Anesthetic plan and risks discussed with patient. I personally reviewed this patient with the CRNA. Discussed and agreed on the Anesthesia Plan with the CRNA. Amy Aggarwal

## 2023-08-28 NOTE — H&P
History and Physical - [facility]  Coral Gonzalez 77 y.o. female MRN: [id number]                  HPI: Coral Gonzalez is a 77y.o. year old female who presents for anemia. REVIEW OF SYSTEMS: Per the HPI, and otherwise unremarkable. Historical Information   Past Medical History:   Diagnosis Date   • Anemia    • Anxiety    • Arrhythmia    • Arthritis    • Asthma    • Blood clot in vein     portal vein   • Breast cancer ([address]) 02/12/2021   • Breast lump     [id number] RESOLVED   • Cancer (HCC)    • Depression    • Disease of thyroid gland    • DVT, lower extremity (HCC)    • GERD (gastroesophageal reflux disease)    • Hyperlipidemia    • Hypertension    • Hypokalemia    • Hyponatremia    • Hypothyroidism    • Iron deficiency anemia    • Irregular heart beat    • Manic behavior (HCC)    • 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HCC)    • Osteoarthritis    • Palpitations     [id number]  RESOLVED   • Paroxysmal supraventricular tachycardia (HCC)    • PE (pulmonary thromboembolism) (HCC)    • Sjoegren syndrome    • Sleep apnea    • Sleep difficulties    • Spinal stenosis    • Thrombocytosis     [id number]  RESOLVED   • Tremors of nervous system     dbs implanted right and left chest   • Ulcerative colitis ([address])    • Vertigo     [id number] RESOLVED     Past Surgical History:   Procedure Laterality Date   • ABDOMINAL SURGERY     • APPENDECTOMY     • BREAST BIOPSY Left 11/07/2019    Stereo   • BREAST EXCISIONAL BIOPSY Left     x many years   • BREAST SURGERY      lumpectomy & biopsy   • CARMELLA HOLE W/ STEREOTACTIC INSERTION OF DBS LEADS / INTRAOP MICROELECTRODE RECORDING     • COLON SURGERY     • COLONOSCOPY N/A 08/30/2017    Procedure: Devi Tello;  Surgeon: Dalene Habermann, MD;  Location: [facility];   Service: Colorectal   • COLOPROCTECTOMY W/ ILEO J POUCH     • ESOPHAGOGASTRODUODENOSCOPY      ONSET 10/17/11   • FISTULA REPAIR      LLEOANAL FISTULA REPAIR TRANSPERIN TRANSABD APPROACH   • HYSTERECTOMY      age 43 • ILEOSTOMY CLOSURE     • KNEE ARTHROSCOPY      Right   • MAMMO STEREOTACTIC BREAST BIOPSY LEFT (ALL INC) Left 2019   • MAMMO STEREOTACTIC BREAST BIOPSY LEFT (ALL INC) Left 2020   • MAMMO STEREOTACTIC BREAST BIOPSY LEFT (ALL INC) EACH ADD Left 2020   • MASTECTOMY Left 2021    left mastectomy- Dr. Kai Bales   • MASTECTOMY W/ SENTINEL NODE BIOPSY Left 2021    Procedure: BREAST MASTECTOMY WITH SENTINEL LYMPH NODE BIOPSY, LYMPHATIC MAPPING WITH BLUE DYE AND RADIAOCTIVE DYE (INJECT AT 1100 BY DR GILLESPIE IN THE OR);   Surgeon: Sebastian Andino MD;  Location[facility];  Service: Surgical Oncology   • [place] INSJ/RPLCMT CRANIAL NEUROSTIM PULSE GENERATOR Right 2017    Procedure: DBS GENERATOR REPLACEMENT;  Surgeon: Raeann Wilks MD;  Location: [facility];  Service: Neurosurgery   • [place] INSJ/RPLCMT CRANIAL NEUROSTIM PULSE GENERATOR N/A 2019    Procedure: REPLACEMENT IMPLANTABLE PULSE GENERATOR FOR DEEP BRAIN STIMULATOR LEFT CHEST;  Surgeon: Kevan Christian MD;  Location[facility];  Service: Neurosurgery   • SPLENECTOMY     • TONSILLECTOMY       Social History   Social History     Substance and Sexual Activity   Alcohol Use Yes   • Alcohol/week: 2.0 standard drinks of alcohol   • Types: 2 Cans of beer per week    Comment: 2or 3 beers a week     Social History     Substance and Sexual Activity   Drug Use No     Social History     Tobacco Use   Smoking Status Former   • Packs/day: 1.00   • Years: 15.00   • Total pack years: 15.00   • Types: Cigarettes   • Quit date: 1965   • Years since quittin.6   Smokeless Tobacco Never   Tobacco Comments    Quit     Family History   Problem Relation Age of Onset   • Venous thrombosis Mother         ACUTE VENOUS THROMBOSIS OF DEEP VESSELS OF THE DISTAL LOWER EXTREMITY   • Other Mother         PHLEBITIS   • Hypertension Mother    • Peripheral vascular disease Mother    • COPD Father    • Diabetes Father         MELLITUS   • Stroke Father    • Diabetes Sister         MELLITUS   • Sjogren's syndrome Sister    • No Known Problems Daughter    • No Known Problems Maternal Grandmother    • No Known Problems Maternal Grandfather    • No Known Problems Paternal Grandmother    • No Known Problems Paternal Grandfather    • No Known Problems Sister    • No Known Problems Maternal Aunt    • No Known Problems Paternal Aunt    • No Known Problems Son        Meds/Allergies       Current Outpatient Medications:   •  amLODIPine (NORVASC) 2.5 mg tablet  •  Calcium Carb-Cholecalciferol (CALCIUM 600-D PO)  •  Coenzyme Q10 (CO Q-10 PO)  •  diltiazem (CARDIZEM CD) 180 mg 24 hr capsule  •  diltiazem (CARDIZEM) 60 mg tablet  •  escitalopram (LEXAPRO) 20 mg tablet  •  fluticasone (FLONASE) 50 mcg/act nasal spray  •  Fluticasone-Salmeterol (Advair Diskus) 250-50 mcg/dose inhaler  •  gabapentin (NEURONTIN) 600 MG tablet  •  HYDROcodone-acetaminophen (NORCO) 5-325 mg per tablet  •  levothyroxine 50 mcg tablet  •  lisinopril (ZESTRIL) 20 mg tablet  •  LORazepam (ATIVAN) 1 mg tablet  •  MAGNESIUM PO  •  Multiple Vitamin (MULTIVITAMIN ADULT PO)  •  Omega-3 Fatty Acids (FISH OIL PO)  •  pantoprazole (PROTONIX) 40 mg tablet  •  potassium chloride (MICRO-K) 10 MEQ CR capsule  •  predniSONE 5 mg tablet  •  simvastatin (ZOCOR) 10 mg tablet  •  sodium chloride 1 g tablet  •  torsemide (DEMADEX) 10 mg tablet  •  vitamin B-12 (VITAMIN B-12) 500 mcg tablet  •  albuterol (PROVENTIL HFA,VENTOLIN HFA) 90 mcg/act inhaler  •  Eliquis 5 MG    Current Facility-Administered Medications:   •  cyanocobalamin injection 1,000 mcg, 1,000 mcg, Intramuscular, Q30 Days, 1,000 mcg at 04/20/23 1012    Allergies   Allergen Reactions   • Indomethacin GI Intolerance, Dizziness and Other (See Comments)   • Macrobid [Nitrofurantoin Monohyd Macro] Rash   • Nitrofurantoin Other (See Comments)   • Penicillins Rash and Other (See Comments)     Annotation - [id number]: can take cephalosporins   • Sulfa Antibiotics Rash and Other (See Comments)       Objective     /67   Pulse 56   Temp 98.5 °F (36.9 °C) (Temporal)   Resp 16   Ht 5' 3" (1.6 m)   Wt 67.1 kg (148 lb)   SpO2 95%   BMI 26.22 kg/m²       PHYSICAL EXAM    Gen: NAD  Head: NCAT  CV: RRR  CHEST: Clear  ABD: soft, NT/ND  EXT: no edema      ASSESSMENT/PLAN:  This is a 77y.o. year old female here for egd, and she is stable and optimized for her procedure.

## 2023-08-30 PROCEDURE — 88112 CYTOPATH CELL ENHANCE TECH: CPT | Performed by: PATHOLOGY

## 2023-08-31 PROCEDURE — 88305 TISSUE EXAM BY PATHOLOGIST: CPT | Performed by: PATHOLOGY

## 2023-09-01 ENCOUNTER — NURSE TRIAGE (OUTPATIENT)
Age: 78
End: 2023-09-01

## 2023-09-01 ENCOUNTER — APPOINTMENT (OUTPATIENT)
Dept: LAB | Facility: AMBULARY SURGERY CENTER | Age: 78
End: 2023-09-01
Payer: MEDICARE

## 2023-09-01 ENCOUNTER — HOSPITAL ENCOUNTER (OUTPATIENT)
Dept: INFUSION CENTER | Facility: CLINIC | Age: 78
End: 2023-09-01
Payer: MEDICARE

## 2023-09-01 ENCOUNTER — TELEPHONE (OUTPATIENT)
Age: 78
End: 2023-09-01

## 2023-09-01 ENCOUNTER — TELEPHONE (OUTPATIENT)
Dept: INTERNAL MEDICINE CLINIC | Facility: CLINIC | Age: 78
End: 2023-09-01

## 2023-09-01 VITALS
TEMPERATURE: 97.9 F | HEART RATE: 70 BPM | SYSTOLIC BLOOD PRESSURE: 124 MMHG | RESPIRATION RATE: 16 BRPM | OXYGEN SATURATION: 98 % | DIASTOLIC BLOOD PRESSURE: 67 MMHG

## 2023-09-01 DIAGNOSIS — M17.11 PRIMARY OSTEOARTHRITIS OF RIGHT KNEE: Primary | ICD-10-CM

## 2023-09-01 DIAGNOSIS — B37.81 ESOPHAGEAL CANDIDIASIS (HCC): Primary | ICD-10-CM

## 2023-09-01 DIAGNOSIS — E22.2 SIADH (SYNDROME OF INAPPROPRIATE ADH PRODUCTION) (HCC): ICD-10-CM

## 2023-09-01 DIAGNOSIS — D50.9 IRON DEFICIENCY ANEMIA, UNSPECIFIED IRON DEFICIENCY ANEMIA TYPE: Primary | ICD-10-CM

## 2023-09-01 DIAGNOSIS — E22.2 SIADH (SYNDROME OF INAPPROPRIATE ADH PRODUCTION) (HCC): Primary | ICD-10-CM

## 2023-09-01 LAB
ANION GAP SERPL CALCULATED.3IONS-SCNC: 6 MMOL/L
BUN SERPL-MCNC: 12 MG/DL (ref 5–25)
CALCIUM SERPL-MCNC: 8.7 MG/DL (ref 8.4–10.2)
CHLORIDE SERPL-SCNC: 94 MMOL/L (ref 96–108)
CO2 SERPL-SCNC: 26 MMOL/L (ref 21–32)
CREAT SERPL-MCNC: 0.71 MG/DL (ref 0.6–1.3)
GFR SERPL CREATININE-BSD FRML MDRD: 81 ML/MIN/1.73SQ M
GLUCOSE P FAST SERPL-MCNC: 98 MG/DL (ref 65–99)
POTASSIUM SERPL-SCNC: 5.1 MMOL/L (ref 3.5–5.3)
SODIUM SERPL-SCNC: 126 MMOL/L (ref 135–147)

## 2023-09-01 PROCEDURE — 36415 COLL VENOUS BLD VENIPUNCTURE: CPT

## 2023-09-01 PROCEDURE — 96375 TX/PRO/DX INJ NEW DRUG ADDON: CPT

## 2023-09-01 PROCEDURE — 96365 THER/PROPH/DIAG IV INF INIT: CPT

## 2023-09-01 PROCEDURE — 80048 BASIC METABOLIC PNL TOTAL CA: CPT

## 2023-09-01 RX ORDER — SODIUM CHLORIDE 9 MG/ML
20 INJECTION, SOLUTION INTRAVENOUS ONCE
Status: COMPLETED | OUTPATIENT
Start: 2023-09-01 | End: 2023-09-01

## 2023-09-01 RX ORDER — SODIUM CHLORIDE 9 MG/ML
20 INJECTION, SOLUTION INTRAVENOUS ONCE
Status: CANCELLED | OUTPATIENT
Start: 2023-09-08

## 2023-09-01 RX ADMIN — HYDROCORTISONE SODIUM SUCCINATE 50 MG: 100 INJECTION, POWDER, FOR SOLUTION INTRAMUSCULAR; INTRAVENOUS at 15:20

## 2023-09-01 RX ADMIN — SODIUM CHLORIDE 20 ML/HR: 0.9 INJECTION, SOLUTION INTRAVENOUS at 14:50

## 2023-09-01 RX ADMIN — DIPHENHYDRAMINE HYDROCHLORIDE 12.5 MG: 50 INJECTION, SOLUTION INTRAMUSCULAR; INTRAVENOUS at 14:50

## 2023-09-01 NOTE — TELEPHONE ENCOUNTER
Caller: Patient  Doctor/office: Dr. Lakhwinder Kimbrough  #:       Patient called to start auth/delivery for monovisc injections    Body Part: Right knee  Pain Level (scale 1-10): 7/10  Date of last Gel Injection: 4/18/23    Educated patient on Visco procedure.  Medications must first be authorized and delivered to our office BEFORE we can schedule

## 2023-09-01 NOTE — PLAN OF CARE
Problem: Potential for Falls  Goal: Patient will remain free of falls  Description: INTERVENTIONS:  - Educate patient/family on patient safety including physical limitations  - Instruct patient to call for assistance with activity   - Consult OT/PT to assist with strengthening/mobility   - Keep Call bell within reach  - Keep bed low and locked with side rails adjusted as appropriate  - Keep care items and personal belongings within reach  - Initiate and maintain comfort rounds  - Make Fall Risk Sign visible to staff  - Apply yellow socks and bracelet for high fall risk patients  - Consider moving patient to room near nurses station  9/1/2023 1536 by Heather Wong, RN  Outcome: Progressing  9/1/2023 1535 by Heather Wong, RN  Outcome: Progressing

## 2023-09-01 NOTE — TELEPHONE ENCOUNTER
Your sodium remains low. Are you taking your salt pills 3 times a day? Staying hydrated? Repeat labs in a few weeks. Ordered, printed. Please mail to patient.

## 2023-09-01 NOTE — PROGRESS NOTES
Patient tolerated treatment today without complications. Patient confirmed upcoming appointment and declined print out.

## 2023-09-01 NOTE — TELEPHONE ENCOUNTER
Regarding: med issues  ----- Message from Danny Buck MA sent at 9/1/2023  8:33 AM EDT -----  Pt called stating that she took fluconazole (DIFLUCAN) 200 mg tablet for the 1st time yesterday. Pt stating she woke up dizzy and disoriented. Please advise.

## 2023-09-05 ENCOUNTER — APPOINTMENT (OUTPATIENT)
Dept: LAB | Facility: CLINIC | Age: 78
End: 2023-09-05
Payer: MEDICARE

## 2023-09-05 ENCOUNTER — TELEPHONE (OUTPATIENT)
Dept: NEPHROLOGY | Facility: CLINIC | Age: 78
End: 2023-09-05

## 2023-09-05 DIAGNOSIS — D50.8 OTHER IRON DEFICIENCY ANEMIA: ICD-10-CM

## 2023-09-05 DIAGNOSIS — I10 ESSENTIAL HYPERTENSION: Primary | ICD-10-CM

## 2023-09-05 DIAGNOSIS — E87.1 HYPONATREMIA: Primary | ICD-10-CM

## 2023-09-05 DIAGNOSIS — E83.42 HYPOMAGNESEMIA: ICD-10-CM

## 2023-09-05 DIAGNOSIS — E22.2 SIADH (SYNDROME OF INAPPROPRIATE ADH PRODUCTION) (HCC): Primary | ICD-10-CM

## 2023-09-05 LAB
ANION GAP SERPL CALCULATED.3IONS-SCNC: 3 MMOL/L
BUN SERPL-MCNC: 8 MG/DL (ref 5–25)
CALCIUM SERPL-MCNC: 8.6 MG/DL (ref 8.4–10.2)
CHLORIDE SERPL-SCNC: 98 MMOL/L (ref 96–108)
CO2 SERPL-SCNC: 30 MMOL/L (ref 21–32)
CREAT SERPL-MCNC: 0.61 MG/DL (ref 0.6–1.3)
GFR SERPL CREATININE-BSD FRML MDRD: 86 ML/MIN/1.73SQ M
GLUCOSE SERPL-MCNC: 104 MG/DL (ref 65–140)
POTASSIUM SERPL-SCNC: 3.9 MMOL/L (ref 3.5–5.3)
SODIUM SERPL-SCNC: 131 MMOL/L (ref 135–147)

## 2023-09-05 PROCEDURE — 36415 COLL VENOUS BLD VENIPUNCTURE: CPT

## 2023-09-05 PROCEDURE — 80048 BASIC METABOLIC PNL TOTAL CA: CPT

## 2023-09-05 NOTE — TELEPHONE ENCOUNTER
Pt called concerned about her sodium levels. Her sodium was low at 126 on 9/1. She is having repeat blood work done soon, but would like to discuss her levels with Dr Tawanna Costa or clinical staff. Please advise pt. Thank you.

## 2023-09-05 NOTE — PROGRESS NOTES
Discussed with the patient. Recent sodium level up to 131. Previously 126. We will continue with sodium chloride tablets 4 times daily. Patient is also getting weekly iron infusions with good response. Still complaining of significant fatigue and tiredness. Recommend home blood pressure monitoring and call if there are concerns. Otherwise we will plan on repeating labs again in approximately 1 month.

## 2023-09-05 NOTE — TELEPHONE ENCOUNTER
I spoke with Greyson Fernandez. She recently increased her salt tablets to 4 times a day. She is currently on her way to have a repeat  BMP done this morning. We will wait on results for further recommendations. Thank you.

## 2023-09-05 NOTE — TELEPHONE ENCOUNTER
Spoke with patient. Pt is taking salt pills 4 times a day. She drinks four to five 15oz bottles of water daily. Will repeat labs.

## 2023-09-05 NOTE — TELEPHONE ENCOUNTER
The lab called requesting an order for the BMP be created by Dr Deborah Carpio. Pt is waiting at the lab to complete the test. Please create order, thanks!

## 2023-09-07 DIAGNOSIS — E87.1 HYPONATREMIA: ICD-10-CM

## 2023-09-07 RX ORDER — SODIUM CHLORIDE 1 G/1
1 TABLET ORAL 4 TIMES DAILY
Qty: 270 TABLET | Refills: 3 | Status: SHIPPED | OUTPATIENT
Start: 2023-09-07

## 2023-09-07 NOTE — TELEPHONE ENCOUNTER
Sodium improved to 131. Please continue the salt tablets 4 times a day. Limit fluids to 60 oz a day.

## 2023-09-08 ENCOUNTER — HOSPITAL ENCOUNTER (OUTPATIENT)
Dept: INFUSION CENTER | Facility: CLINIC | Age: 78
End: 2023-09-08
Payer: MEDICARE

## 2023-09-08 ENCOUNTER — APPOINTMENT (OUTPATIENT)
Dept: LAB | Facility: CLINIC | Age: 78
End: 2023-09-08
Payer: MEDICARE

## 2023-09-08 VITALS
HEART RATE: 67 BPM | TEMPERATURE: 97.8 F | RESPIRATION RATE: 18 BRPM | SYSTOLIC BLOOD PRESSURE: 144 MMHG | OXYGEN SATURATION: 99 % | DIASTOLIC BLOOD PRESSURE: 81 MMHG

## 2023-09-08 DIAGNOSIS — D50.9 IRON DEFICIENCY ANEMIA, UNSPECIFIED IRON DEFICIENCY ANEMIA TYPE: Primary | ICD-10-CM

## 2023-09-08 DIAGNOSIS — E03.9 ACQUIRED HYPOTHYROIDISM: ICD-10-CM

## 2023-09-08 DIAGNOSIS — D50.9 IRON DEFICIENCY ANEMIA, UNSPECIFIED IRON DEFICIENCY ANEMIA TYPE: ICD-10-CM

## 2023-09-08 LAB
BASOPHILS # BLD AUTO: 0.01 THOUSANDS/ÂΜL (ref 0–0.1)
BASOPHILS NFR BLD AUTO: 0 % (ref 0–1)
EOSINOPHIL # BLD AUTO: 0.03 THOUSAND/ÂΜL (ref 0–0.61)
EOSINOPHIL NFR BLD AUTO: 1 % (ref 0–6)
ERYTHROCYTE [DISTWIDTH] IN BLOOD BY AUTOMATED COUNT: 25.2 % (ref 11.6–15.1)
HCT VFR BLD AUTO: 31.7 % (ref 34.8–46.1)
HGB BLD-MCNC: 9.8 G/DL (ref 11.5–15.4)
IMM GRANULOCYTES # BLD AUTO: 0.07 THOUSAND/UL (ref 0–0.2)
IMM GRANULOCYTES NFR BLD AUTO: 1 % (ref 0–2)
LYMPHOCYTES # BLD AUTO: 1.17 THOUSANDS/ÂΜL (ref 0.6–4.47)
LYMPHOCYTES NFR BLD AUTO: 20 % (ref 14–44)
MCH RBC QN AUTO: 26.1 PG (ref 26.8–34.3)
MCHC RBC AUTO-ENTMCNC: 30.9 G/DL (ref 31.4–37.4)
MCV RBC AUTO: 85 FL (ref 82–98)
MONOCYTES # BLD AUTO: 0.71 THOUSAND/ÂΜL (ref 0.17–1.22)
MONOCYTES NFR BLD AUTO: 12 % (ref 4–12)
NEUTROPHILS # BLD AUTO: 3.93 THOUSANDS/ÂΜL (ref 1.85–7.62)
NEUTS SEG NFR BLD AUTO: 66 % (ref 43–75)
NRBC BLD AUTO-RTO: 0 /100 WBCS
PLATELET # BLD AUTO: 438 THOUSANDS/UL (ref 149–390)
PMV BLD AUTO: 8.4 FL (ref 8.9–12.7)
RBC # BLD AUTO: 3.75 MILLION/UL (ref 3.81–5.12)
WBC # BLD AUTO: 5.92 THOUSAND/UL (ref 4.31–10.16)

## 2023-09-08 PROCEDURE — 96375 TX/PRO/DX INJ NEW DRUG ADDON: CPT

## 2023-09-08 PROCEDURE — 96365 THER/PROPH/DIAG IV INF INIT: CPT

## 2023-09-08 PROCEDURE — 85025 COMPLETE CBC W/AUTO DIFF WBC: CPT

## 2023-09-08 PROCEDURE — 36415 COLL VENOUS BLD VENIPUNCTURE: CPT

## 2023-09-08 RX ORDER — SODIUM CHLORIDE 9 MG/ML
20 INJECTION, SOLUTION INTRAVENOUS ONCE
Status: COMPLETED | OUTPATIENT
Start: 2023-09-08 | End: 2023-09-08

## 2023-09-08 RX ORDER — SODIUM CHLORIDE 9 MG/ML
20 INJECTION, SOLUTION INTRAVENOUS ONCE
Status: CANCELLED | OUTPATIENT
Start: 2023-09-15

## 2023-09-08 RX ORDER — LEVOTHYROXINE SODIUM 0.05 MG/1
50 TABLET ORAL DAILY
Qty: 90 TABLET | Refills: 0 | Status: SHIPPED | OUTPATIENT
Start: 2023-09-08

## 2023-09-08 RX ADMIN — DIPHENHYDRAMINE HYDROCHLORIDE 12.5 MG: 50 INJECTION, SOLUTION INTRAMUSCULAR; INTRAVENOUS at 14:57

## 2023-09-08 RX ADMIN — SODIUM CHLORIDE 20 ML/HR: 0.9 INJECTION, SOLUTION INTRAVENOUS at 14:55

## 2023-09-08 RX ADMIN — HYDROCORTISONE SODIUM SUCCINATE 50 MG: 100 INJECTION, POWDER, FOR SOLUTION INTRAMUSCULAR; INTRAVENOUS at 14:54

## 2023-09-08 NOTE — PROGRESS NOTES
Patient tolerated IV premeds + Venofer IVP without issue. PIV removed intact, coban wrap in place. Patient aware of upcoming appts, declines AVS. Patient escorted upon DC with infusion staff to meet  in waiting room.

## 2023-09-11 ENCOUNTER — TELEPHONE (OUTPATIENT)
Dept: INTERNAL MEDICINE CLINIC | Facility: CLINIC | Age: 78
End: 2023-09-11

## 2023-09-11 DIAGNOSIS — E22.2 SIADH (SYNDROME OF INAPPROPRIATE ADH PRODUCTION) (HCC): Primary | ICD-10-CM

## 2023-09-11 DIAGNOSIS — D50.0 IRON DEFICIENCY ANEMIA DUE TO CHRONIC BLOOD LOSS: ICD-10-CM

## 2023-09-13 ENCOUNTER — TELEPHONE (OUTPATIENT)
Age: 78
End: 2023-09-13

## 2023-09-13 ENCOUNTER — APPOINTMENT (OUTPATIENT)
Dept: LAB | Facility: CLINIC | Age: 78
End: 2023-09-13
Payer: MEDICARE

## 2023-09-13 ENCOUNTER — OFFICE VISIT (OUTPATIENT)
Dept: INTERNAL MEDICINE CLINIC | Facility: CLINIC | Age: 78
End: 2023-09-13
Payer: MEDICARE

## 2023-09-13 VITALS
DIASTOLIC BLOOD PRESSURE: 80 MMHG | WEIGHT: 143 LBS | HEART RATE: 78 BPM | SYSTOLIC BLOOD PRESSURE: 126 MMHG | HEIGHT: 63 IN | TEMPERATURE: 97.1 F | BODY MASS INDEX: 25.34 KG/M2 | OXYGEN SATURATION: 98 %

## 2023-09-13 DIAGNOSIS — E22.2 SIADH (SYNDROME OF INAPPROPRIATE ADH PRODUCTION) (HCC): ICD-10-CM

## 2023-09-13 DIAGNOSIS — R05.1 ACUTE COUGH: ICD-10-CM

## 2023-09-13 DIAGNOSIS — I81 PORTAL VEIN THROMBOSIS: ICD-10-CM

## 2023-09-13 DIAGNOSIS — D50.0 IRON DEFICIENCY ANEMIA DUE TO CHRONIC BLOOD LOSS: Primary | ICD-10-CM

## 2023-09-13 LAB
ANION GAP SERPL CALCULATED.3IONS-SCNC: 4 MMOL/L
BASOPHILS # BLD AUTO: 0.05 THOUSANDS/ÂΜL (ref 0–0.1)
BASOPHILS NFR BLD AUTO: 1 % (ref 0–1)
BUN SERPL-MCNC: 11 MG/DL (ref 5–25)
CALCIUM SERPL-MCNC: 8.9 MG/DL (ref 8.4–10.2)
CHLORIDE SERPL-SCNC: 99 MMOL/L (ref 96–108)
CO2 SERPL-SCNC: 30 MMOL/L (ref 21–32)
CREAT SERPL-MCNC: 0.71 MG/DL (ref 0.6–1.3)
EOSINOPHIL # BLD AUTO: 0.06 THOUSAND/ÂΜL (ref 0–0.61)
EOSINOPHIL NFR BLD AUTO: 1 % (ref 0–6)
ERYTHROCYTE [DISTWIDTH] IN BLOOD BY AUTOMATED COUNT: 26.2 % (ref 11.6–15.1)
GFR SERPL CREATININE-BSD FRML MDRD: 81 ML/MIN/1.73SQ M
GLUCOSE SERPL-MCNC: 97 MG/DL (ref 65–140)
HCT VFR BLD AUTO: 34.7 % (ref 34.8–46.1)
HGB BLD-MCNC: 10.9 G/DL (ref 11.5–15.4)
IMM GRANULOCYTES # BLD AUTO: 0.07 THOUSAND/UL (ref 0–0.2)
IMM GRANULOCYTES NFR BLD AUTO: 1 % (ref 0–2)
LYMPHOCYTES # BLD AUTO: 1.81 THOUSANDS/ÂΜL (ref 0.6–4.47)
LYMPHOCYTES NFR BLD AUTO: 29 % (ref 14–44)
MCH RBC QN AUTO: 26.6 PG (ref 26.8–34.3)
MCHC RBC AUTO-ENTMCNC: 31.4 G/DL (ref 31.4–37.4)
MCV RBC AUTO: 85 FL (ref 82–98)
MONOCYTES # BLD AUTO: 0.85 THOUSAND/ÂΜL (ref 0.17–1.22)
MONOCYTES NFR BLD AUTO: 14 % (ref 4–12)
NEUTROPHILS # BLD AUTO: 3.4 THOUSANDS/ÂΜL (ref 1.85–7.62)
NEUTS SEG NFR BLD AUTO: 54 % (ref 43–75)
NRBC BLD AUTO-RTO: 0 /100 WBCS
PLATELET # BLD AUTO: 448 THOUSANDS/UL (ref 149–390)
PMV BLD AUTO: 8 FL (ref 8.9–12.7)
POTASSIUM SERPL-SCNC: 3.9 MMOL/L (ref 3.5–5.3)
RBC # BLD AUTO: 4.1 MILLION/UL (ref 3.81–5.12)
SODIUM SERPL-SCNC: 133 MMOL/L (ref 135–147)
WBC # BLD AUTO: 6.24 THOUSAND/UL (ref 4.31–10.16)

## 2023-09-13 PROCEDURE — 99213 OFFICE O/P EST LOW 20 MIN: CPT | Performed by: INTERNAL MEDICINE

## 2023-09-13 PROCEDURE — 85025 COMPLETE CBC W/AUTO DIFF WBC: CPT | Performed by: INTERNAL MEDICINE

## 2023-09-13 PROCEDURE — 80048 BASIC METABOLIC PNL TOTAL CA: CPT | Performed by: INTERNAL MEDICINE

## 2023-09-13 PROCEDURE — 36415 COLL VENOUS BLD VENIPUNCTURE: CPT | Performed by: INTERNAL MEDICINE

## 2023-09-13 NOTE — TELEPHONE ENCOUNTER
Your labs look good. Hemoglobin is actually better at 10.9 and your sodium also improved at 133. You might be coming down with something since you just developed a cough. Monitor for any fevers. If you are feeling worse, you can check for COVID.

## 2023-09-13 NOTE — TELEPHONE ENCOUNTER
Pt called again. She is still feeling very weak. She does have an appt this afternoon with Dr Hubert Stout that she wants to go to. Her  will be driving her. Pt states she does not need a nurse to call her back. No further action needed.

## 2023-09-13 NOTE — TELEPHONE ENCOUNTER
Patient called and states is very weak, very tired, sleeps a lot and wanted appointment, did schedule patient for 12:45 pm with PCP

## 2023-09-13 NOTE — PROGRESS NOTES
Name: Callum Henriquez      :       MRN: [id number]  Encounter Provider: Ricardo Jefferson MD  Encounter Date: 2023   Encounter department: [id number] [facility]     1. Iron deficiency anemia due to chronic blood loss  Assessment & Plan:  S/p iron infusion last week. She reports increased fatigue, weakness. Repeat CBC today. Orders:  -     CBC and differential    2. SIADH (syndrome of inappropriate ADH production) ([facility])  Assessment & Plan:  Taking salt tablets. Repeat labs today. Orders:  -     Basic metabolic panel    3. Acute cough  Comments:  Continue with Flonase, may use saline spray prn. Monitor for fevers, may take guaifenesin prn.    4. Portal vein thrombosis  Assessment & Plan:  On Eliquis. Subjective      She is here today with her . She reports not feeling well for several weeks, feels tired all the time. This week, she felt symptoms worse. She is sleeping all the time, has no energy. Denies any fever or chills, good appetite. She reports no black stools, no blood in the stools. No nausea, vomiting or abdominal pain. She complains of an occasional non productive cough which started a few days ago. She uses Flonase as needed only. She is worried about her anemia, had an infusion last week. She reports she did not feel better after the infusion. Review of Systems   Constitutional: Positive for fatigue. Negative for activity change, appetite change and fever. HENT: Positive for congestion. Negative for ear pain, sinus pressure, sinus pain and sore throat. Respiratory: Positive for cough. Negative for chest tightness and shortness of breath. Cardiovascular: Negative for chest pain. Neurological: Negative for dizziness and headaches. Psychiatric/Behavioral: Positive for sleep disturbance.        Current Outpatient Medications on File Prior to Visit   Medication Sig   • albuterol (PROVENTIL HFA,VENTOLIN HFA) 90 mcg/act inhaler Inhale 2 puffs every 6 (six) hours as needed for wheezing   • amLODIPine (NORVASC) 2.5 mg tablet TAKE ONE TABLET BY MOUTH EVERY DAY   • Calcium Carb-Cholecalciferol (CALCIUM 600-D PO) Take 1 tablet by mouth 2 (two) times a day   • Coenzyme Q10 (CO Q-10 PO) Take 1 capsule by mouth daily   • diltiazem (CARDIZEM CD) 180 mg 24 hr capsule TAKE ONE CAPSULE BY MOUTH EVERY DAY   • diltiazem (CARDIZEM) 60 mg tablet TAKE ONE TABLET BY MOUTH EVERY DAY   • Eliquis 5 MG TAKE ONE TABLET BY MOUTH TWICE A DAY   • escitalopram (LEXAPRO) 20 mg tablet TAKE ONE TABLET BY MOUTH EVERY DAY   • fluticasone (FLONASE) 50 mcg/act nasal spray APPLY ONE SPRAY IN EACH NOSTRIL ONCE DAILY AS NEEDED FOR RHINITIS   • Fluticasone-Salmeterol (Advair Diskus) 250-50 mcg/dose inhaler Inhale 1 puff 2 (two) times a day Rinse mouth after use   • gabapentin (NEURONTIN) 600 MG tablet TAKE ONE TABLET BY MOUTH IN THE MORNING, ONE TABLET IN THE AFTERNNON, AND TWO TABLETS AT BEDTIME (Patient taking differently: TAKE ONE TABLET BY MOUTH IN THE MORNING, ONE half TABLET IN THE AFTERNNON, AND one and one half at hs TABLETS AT BEDTIME.)   • HYDROcodone-acetaminophen (NORCO) 5-325 mg per tablet Take 1 tablet by mouth every 6 (six) hours as needed for pain Max Daily Amount: 4 tablets   • levothyroxine 50 mcg tablet TAKE ONE TABLET BY MOUTH EVERY DAY   • lisinopril (ZESTRIL) 20 mg tablet TAKE ONE TABLET BY MOUTH TWICE A DAY   • LORazepam (ATIVAN) 1 mg tablet Take 1 tablet (1 mg total) by mouth every 6 (six) hours as needed for anxiety   • MAGNESIUM PO Take 1 tablet by mouth daily   • Multiple Vitamin (MULTIVITAMIN ADULT PO) Take 1 tablet by mouth daily   • Omega-3 Fatty Acids (FISH OIL PO) Take 1 capsule by mouth daily   • pantoprazole (PROTONIX) 40 mg tablet Take 1 tablet (40 mg total) by mouth 2 (two) times a day   • potassium chloride (MICRO-K) 10 MEQ CR capsule TAKE TWO CAPSULES BY MOUTH EVERY DAY (Patient taking differently: TAKE THREE CAPSULES BY MOUTH EVERY DAY)   • predniSONE 5 mg tablet    • simvastatin (ZOCOR) 10 mg tablet Take 0.5 tablets (5 mg total) by mouth daily   • sodium chloride 1 g tablet Take 1 tablet (1 g total) by mouth 4 (four) times a day   • torsemide (DEMADEX) 10 mg tablet Take 1 tablet (10 mg total) by mouth daily as needed (edema)   • vitamin B-12 (VITAMIN B-12) 500 mcg tablet Take 1 tablet (500 mcg total) by mouth daily       Objective     /80   Pulse 78   Temp (!) 97.1 °F (36.2 °C)   Ht 5' 3" (1.6 m)   Wt 64.9 kg (143 lb)   SpO2 98%   BMI 25.33 kg/m²     Physical Exam  Constitutional:       General: She is not in acute distress. Appearance: She is ill-appearing. HENT:      Head: Normocephalic and atraumatic. Right Ear: Tympanic membrane, ear canal and external ear normal.      Left Ear: Tympanic membrane, ear canal and external ear normal.      Nose: Rhinorrhea present. No congestion. Mouth/Throat:      Mouth: Mucous membranes are moist.   Eyes:      Pupils: Pupils are equal, round, and reactive to light. Cardiovascular:      Rate and Rhythm: Normal rate and regular rhythm. Pulmonary:      Effort: Pulmonary effort is normal. No respiratory distress. Breath sounds: Normal breath sounds. Neurological:      General: No focal deficit present. Mental Status: She is alert and oriented to person, place, and time.    Psychiatric:         Mood and Affect: Mood normal.         Behavior: Behavior normal.       Kassidy Conley MD

## 2023-09-15 ENCOUNTER — HOSPITAL ENCOUNTER (OUTPATIENT)
Dept: INFUSION CENTER | Facility: CLINIC | Age: 78
End: 2023-09-15
Payer: MEDICARE

## 2023-09-15 VITALS
SYSTOLIC BLOOD PRESSURE: 152 MMHG | TEMPERATURE: 97.8 F | DIASTOLIC BLOOD PRESSURE: 76 MMHG | OXYGEN SATURATION: 97 % | HEART RATE: 75 BPM

## 2023-09-15 DIAGNOSIS — D50.9 IRON DEFICIENCY ANEMIA, UNSPECIFIED IRON DEFICIENCY ANEMIA TYPE: Primary | ICD-10-CM

## 2023-09-15 PROCEDURE — 96375 TX/PRO/DX INJ NEW DRUG ADDON: CPT

## 2023-09-15 PROCEDURE — 96365 THER/PROPH/DIAG IV INF INIT: CPT

## 2023-09-15 RX ORDER — SODIUM CHLORIDE 9 MG/ML
20 INJECTION, SOLUTION INTRAVENOUS ONCE
Status: CANCELLED | OUTPATIENT
Start: 2023-09-22

## 2023-09-15 RX ORDER — SODIUM CHLORIDE 9 MG/ML
20 INJECTION, SOLUTION INTRAVENOUS ONCE
Status: COMPLETED | OUTPATIENT
Start: 2023-09-15 | End: 2023-09-15

## 2023-09-15 RX ADMIN — HYDROCORTISONE SODIUM SUCCINATE 50 MG: 100 INJECTION, POWDER, FOR SOLUTION INTRAMUSCULAR; INTRAVENOUS at 14:15

## 2023-09-15 RX ADMIN — DIPHENHYDRAMINE HYDROCHLORIDE 12.5 MG: 50 INJECTION, SOLUTION INTRAMUSCULAR; INTRAVENOUS at 13:43

## 2023-09-15 RX ADMIN — SODIUM CHLORIDE 20 ML/HR: 0.9 INJECTION, SOLUTION INTRAVENOUS at 13:43

## 2023-09-15 NOTE — PROGRESS NOTES
Patient presents today for IVP venofer with pre meds. Patient complains of fatigue, per patient and documentation, provider aware. Peripheral IV inserted without incident.

## 2023-09-15 NOTE — PROGRESS NOTES
Patient tolerated treatment without incident. Peripheral IV removed. Next appointment confirmed. AVS printed and given to patient.

## 2023-09-19 ENCOUNTER — TELEPHONE (OUTPATIENT)
Dept: INTERNAL MEDICINE CLINIC | Facility: CLINIC | Age: 78
End: 2023-09-19

## 2023-09-19 ENCOUNTER — APPOINTMENT (OUTPATIENT)
Dept: LAB | Facility: CLINIC | Age: 78
End: 2023-09-19
Payer: MEDICARE

## 2023-09-19 ENCOUNTER — TELEPHONE (OUTPATIENT)
Dept: HEMATOLOGY ONCOLOGY | Facility: CLINIC | Age: 78
End: 2023-09-19

## 2023-09-19 DIAGNOSIS — E22.2 SIADH (SYNDROME OF INAPPROPRIATE ADH PRODUCTION) (HCC): ICD-10-CM

## 2023-09-19 LAB
ALBUMIN SERPL BCP-MCNC: 3.9 G/DL (ref 3.5–5)
ALP SERPL-CCNC: 64 U/L (ref 34–104)
ALT SERPL W P-5'-P-CCNC: 20 U/L (ref 7–52)
ANION GAP SERPL CALCULATED.3IONS-SCNC: 7 MMOL/L
AST SERPL W P-5'-P-CCNC: 18 U/L (ref 13–39)
BASOPHILS # BLD AUTO: 0.05 THOUSANDS/ÂΜL (ref 0–0.1)
BASOPHILS NFR BLD AUTO: 1 % (ref 0–1)
BILIRUB SERPL-MCNC: 0.37 MG/DL (ref 0.2–1)
BUN SERPL-MCNC: 12 MG/DL (ref 5–25)
CALCIUM SERPL-MCNC: 8.9 MG/DL (ref 8.4–10.2)
CHLORIDE SERPL-SCNC: 101 MMOL/L (ref 96–108)
CO2 SERPL-SCNC: 26 MMOL/L (ref 21–32)
CREAT SERPL-MCNC: 0.75 MG/DL (ref 0.6–1.3)
EOSINOPHIL # BLD AUTO: 0.07 THOUSAND/ÂΜL (ref 0–0.61)
EOSINOPHIL NFR BLD AUTO: 1 % (ref 0–6)
ERYTHROCYTE [DISTWIDTH] IN BLOOD BY AUTOMATED COUNT: 26.3 % (ref 11.6–15.1)
GFR SERPL CREATININE-BSD FRML MDRD: 76 ML/MIN/1.73SQ M
GLUCOSE P FAST SERPL-MCNC: 129 MG/DL (ref 65–99)
HCT VFR BLD AUTO: 37.2 % (ref 34.8–46.1)
HGB BLD-MCNC: 11.9 G/DL (ref 11.5–15.4)
IMM GRANULOCYTES # BLD AUTO: 0.08 THOUSAND/UL (ref 0–0.2)
IMM GRANULOCYTES NFR BLD AUTO: 1 % (ref 0–2)
LYMPHOCYTES # BLD AUTO: 2.08 THOUSANDS/ÂΜL (ref 0.6–4.47)
LYMPHOCYTES NFR BLD AUTO: 36 % (ref 14–44)
MCH RBC QN AUTO: 27.4 PG (ref 26.8–34.3)
MCHC RBC AUTO-ENTMCNC: 32 G/DL (ref 31.4–37.4)
MCV RBC AUTO: 86 FL (ref 82–98)
MONOCYTES # BLD AUTO: 0.5 THOUSAND/ÂΜL (ref 0.17–1.22)
MONOCYTES NFR BLD AUTO: 9 % (ref 4–12)
NEUTROPHILS # BLD AUTO: 3.08 THOUSANDS/ÂΜL (ref 1.85–7.62)
NEUTS SEG NFR BLD AUTO: 52 % (ref 43–75)
NRBC BLD AUTO-RTO: 0 /100 WBCS
PLATELET # BLD AUTO: 536 THOUSANDS/UL (ref 149–390)
PMV BLD AUTO: 8.4 FL (ref 8.9–12.7)
POTASSIUM SERPL-SCNC: 3.6 MMOL/L (ref 3.5–5.3)
PROT SERPL-MCNC: 6.1 G/DL (ref 6.4–8.4)
RBC # BLD AUTO: 4.35 MILLION/UL (ref 3.81–5.12)
SODIUM SERPL-SCNC: 134 MMOL/L (ref 135–147)
WBC # BLD AUTO: 5.86 THOUSAND/UL (ref 4.31–10.16)

## 2023-09-19 PROCEDURE — 80053 COMPREHEN METABOLIC PANEL: CPT

## 2023-09-19 PROCEDURE — 85025 COMPLETE CBC W/AUTO DIFF WBC: CPT

## 2023-09-19 NOTE — TELEPHONE ENCOUNTER
Patient informed    Patient still feeling "washed out"    Yes still having cough, but taking mustinex- doing better.

## 2023-09-19 NOTE — TELEPHONE ENCOUNTER
Lab Inquiry   Who are you speaking with? Patient     If it is not the patient, are they listed on an active communication consent form? N/A   Name of ordering provider Dr. Charlotte Fry   What is being requested? Lab orders need to be entered   Lab draw location [address] [place] [address]   What is the best call back number?  [phone]                                                 Confirmed labs ordered

## 2023-09-19 NOTE — TELEPHONE ENCOUNTER
Please call patient. Let her know her hemoglobin is now within normal.  Sodium is also better at 134. Ask how she is feeling? Does she still have cough or cold symptoms?

## 2023-09-22 ENCOUNTER — HOSPITAL ENCOUNTER (OUTPATIENT)
Dept: INFUSION CENTER | Facility: CLINIC | Age: 78
End: 2023-09-22
Payer: MEDICARE

## 2023-09-22 VITALS
SYSTOLIC BLOOD PRESSURE: 134 MMHG | DIASTOLIC BLOOD PRESSURE: 84 MMHG | TEMPERATURE: 97.2 F | HEART RATE: 70 BPM | OXYGEN SATURATION: 97 % | RESPIRATION RATE: 18 BRPM

## 2023-09-22 DIAGNOSIS — D50.9 IRON DEFICIENCY ANEMIA, UNSPECIFIED IRON DEFICIENCY ANEMIA TYPE: Primary | ICD-10-CM

## 2023-09-22 PROCEDURE — 96365 THER/PROPH/DIAG IV INF INIT: CPT

## 2023-09-22 PROCEDURE — 96375 TX/PRO/DX INJ NEW DRUG ADDON: CPT

## 2023-09-22 RX ORDER — SODIUM CHLORIDE 9 MG/ML
20 INJECTION, SOLUTION INTRAVENOUS ONCE
Status: CANCELLED | OUTPATIENT
Start: 2023-09-29

## 2023-09-22 RX ORDER — SODIUM CHLORIDE 9 MG/ML
20 INJECTION, SOLUTION INTRAVENOUS ONCE
Status: COMPLETED | OUTPATIENT
Start: 2023-09-22 | End: 2023-09-22

## 2023-09-22 RX ADMIN — DIPHENHYDRAMINE HYDROCHLORIDE 12.5 MG: 50 INJECTION, SOLUTION INTRAMUSCULAR; INTRAVENOUS at 15:04

## 2023-09-22 RX ADMIN — SODIUM CHLORIDE 20 ML/HR: 0.9 INJECTION, SOLUTION INTRAVENOUS at 15:04

## 2023-09-22 RX ADMIN — HYDROCORTISONE SODIUM SUCCINATE 50 MG: 100 INJECTION, POWDER, FOR SOLUTION INTRAMUSCULAR; INTRAVENOUS at 15:35

## 2023-09-22 NOTE — PROGRESS NOTES
Patient arrives for Venofer IVP + premeds. PIV inserted without issue. Patient resting on recliner chair, call bell within reach.

## 2023-09-22 NOTE — PROGRESS NOTES
Treatment tolerated well without complications. Pt is aware of upcoming appointment 9/29 @3pm. AVS declined.

## 2023-09-26 ENCOUNTER — TELEPHONE (OUTPATIENT)
Dept: HEMATOLOGY ONCOLOGY | Facility: CLINIC | Age: 78
End: 2023-09-26

## 2023-09-26 DIAGNOSIS — F41.9 ANXIETY: ICD-10-CM

## 2023-09-26 DIAGNOSIS — E87.6 HYPOKALEMIA: ICD-10-CM

## 2023-09-26 DIAGNOSIS — M54.16 LUMBAR RADICULOPATHY: ICD-10-CM

## 2023-09-26 RX ORDER — HYDROCODONE BITARTRATE AND ACETAMINOPHEN 5; 325 MG/1; MG/1
1 TABLET ORAL EVERY 6 HOURS PRN
Qty: 120 TABLET | Refills: 0 | Status: SHIPPED | OUTPATIENT
Start: 2023-09-26

## 2023-09-26 RX ORDER — LORAZEPAM 1 MG/1
1 TABLET ORAL EVERY 6 HOURS PRN
Qty: 120 TABLET | Refills: 0 | Status: SHIPPED | OUTPATIENT
Start: 2023-09-26

## 2023-09-26 RX ORDER — POTASSIUM CHLORIDE 750 MG/1
CAPSULE, EXTENDED RELEASE ORAL
Qty: 180 CAPSULE | Refills: 3 | Status: SHIPPED | OUTPATIENT
Start: 2023-09-26

## 2023-09-26 RX ORDER — ESCITALOPRAM OXALATE 20 MG/1
20 TABLET ORAL DAILY
Qty: 90 TABLET | Refills: 1 | Status: SHIPPED | OUTPATIENT
Start: 2023-09-26

## 2023-09-26 NOTE — TELEPHONE ENCOUNTER
Reason for call:   [x] Refill   [] Prior Auth  [] Other:     Office:   [x] PCP/Provider -   [] Speciality/Provider -     Medication: ativan and norco  Dose/Frequency: 1mg every 6 hrs prn  Quantity: 120    Pharmacy:   Does the patient have enough for 3 days?    [] Yes   [x] No - Send as HP to POD

## 2023-09-26 NOTE — TELEPHONE ENCOUNTER
Reason for call:   [x] Refill   [] Prior Auth  [] Other:     Office:   [x] [facility]  [] Speciality/Provider -     Medication(s):     Escitalopram 20 mg #90  Potassium Chloride 10 MEQ #180    Pharmacy: [address] [place] [address] [id number]    Does the patient have enough for 3 days?    [] Yes   [] No - Send as HP to POD

## 2023-09-26 NOTE — TELEPHONE ENCOUNTER
KAMAR  DR evelin HERRERA   Who are you speaking with? Patient   If it is not the patient, are they listed on an active communication consent form? N/A   Is this a KAMAR or DR evelin HERRERA   Which provider is patient currently scheduled or established with? Dr. Dionisio Anaya   What is the original appointment date and time? 12/20/23 @ 820   At which location is the appointment scheduled to take place? [facility]   Which provider is the patient transitioning care to? Mignon Faulkner PA-C   What is the new appointment date and time? 12/15/23 @ 830   At which location is the new appointment scheduled to take place?  [facility]   What is the reason for this change? provider

## 2023-09-27 ENCOUNTER — TELEPHONE (OUTPATIENT)
Dept: HEMATOLOGY ONCOLOGY | Facility: CLINIC | Age: 78
End: 2023-09-27

## 2023-09-27 NOTE — TELEPHONE ENCOUNTER
KAMAR  DR evelin HERRERA   Who are you speaking with? Patient   If it is not the patient, are they listed on an active communication consent form? N/A   Is this a KAMAR or DR evelin HERRERA KAMAR   Which provider is patient currently scheduled or established with? Dr. Drake Haynes   What is the original appointment date and time? 11/07/23 8:40AM   At which location is the appointment scheduled to take place? [facility]   Which provider is the patient transitioning care to? Dr. Niki New   What is the new appointment date and time? 11/13/23 8:40AM   At which location is the new appointment scheduled to take place? [facility]   What is the reason for this change?  Provider

## 2023-09-28 ENCOUNTER — APPOINTMENT (OUTPATIENT)
Dept: LAB | Facility: CLINIC | Age: 78
End: 2023-09-28
Payer: MEDICARE

## 2023-09-28 DIAGNOSIS — D50.8 OTHER IRON DEFICIENCY ANEMIA: ICD-10-CM

## 2023-09-28 DIAGNOSIS — E22.2 SIADH (SYNDROME OF INAPPROPRIATE ADH PRODUCTION) (HCC): ICD-10-CM

## 2023-09-28 LAB
ALBUMIN SERPL BCP-MCNC: 4 G/DL (ref 3.5–5)
ANION GAP SERPL CALCULATED.3IONS-SCNC: 5 MMOL/L
BUN SERPL-MCNC: 13 MG/DL (ref 5–25)
CALCIUM SERPL-MCNC: 9.7 MG/DL (ref 8.4–10.2)
CHLORIDE SERPL-SCNC: 101 MMOL/L (ref 96–108)
CO2 SERPL-SCNC: 30 MMOL/L (ref 21–32)
CREAT SERPL-MCNC: 0.72 MG/DL (ref 0.6–1.3)
ERYTHROCYTE [DISTWIDTH] IN BLOOD BY AUTOMATED COUNT: 27.3 % (ref 11.6–15.1)
GFR SERPL CREATININE-BSD FRML MDRD: 79 ML/MIN/1.73SQ M
GLUCOSE SERPL-MCNC: 96 MG/DL (ref 65–140)
HCT VFR BLD AUTO: 36.4 % (ref 34.8–46.1)
HGB BLD-MCNC: 11.4 G/DL (ref 11.5–15.4)
MCH RBC QN AUTO: 27.8 PG (ref 26.8–34.3)
MCHC RBC AUTO-ENTMCNC: 31.3 G/DL (ref 31.4–37.4)
MCV RBC AUTO: 89 FL (ref 82–98)
PHOSPHATE SERPL-MCNC: 3.2 MG/DL (ref 2.3–4.1)
PLATELET # BLD AUTO: 456 THOUSANDS/UL (ref 149–390)
PMV BLD AUTO: 8.8 FL (ref 8.9–12.7)
POTASSIUM SERPL-SCNC: 4 MMOL/L (ref 3.5–5.3)
RBC # BLD AUTO: 4.1 MILLION/UL (ref 3.81–5.12)
SODIUM SERPL-SCNC: 136 MMOL/L (ref 135–147)
WBC # BLD AUTO: 7.59 THOUSAND/UL (ref 4.31–10.16)

## 2023-09-28 PROCEDURE — 80069 RENAL FUNCTION PANEL: CPT

## 2023-09-28 PROCEDURE — 36415 COLL VENOUS BLD VENIPUNCTURE: CPT

## 2023-09-28 PROCEDURE — 85027 COMPLETE CBC AUTOMATED: CPT

## 2023-09-29 ENCOUNTER — TELEPHONE (OUTPATIENT)
Dept: HEMATOLOGY ONCOLOGY | Facility: CLINIC | Age: 78
End: 2023-09-29

## 2023-09-29 ENCOUNTER — HOSPITAL ENCOUNTER (OUTPATIENT)
Dept: INFUSION CENTER | Facility: CLINIC | Age: 78
End: 2023-09-29
Payer: MEDICARE

## 2023-09-29 VITALS
HEART RATE: 85 BPM | TEMPERATURE: 97.4 F | SYSTOLIC BLOOD PRESSURE: 160 MMHG | DIASTOLIC BLOOD PRESSURE: 78 MMHG | RESPIRATION RATE: 18 BRPM | OXYGEN SATURATION: 95 %

## 2023-09-29 DIAGNOSIS — D50.9 IRON DEFICIENCY ANEMIA, UNSPECIFIED IRON DEFICIENCY ANEMIA TYPE: Primary | ICD-10-CM

## 2023-09-29 PROCEDURE — 96365 THER/PROPH/DIAG IV INF INIT: CPT

## 2023-09-29 PROCEDURE — 96375 TX/PRO/DX INJ NEW DRUG ADDON: CPT

## 2023-09-29 RX ORDER — SODIUM CHLORIDE 9 MG/ML
20 INJECTION, SOLUTION INTRAVENOUS ONCE
Status: COMPLETED | OUTPATIENT
Start: 2023-09-29 | End: 2023-09-29

## 2023-09-29 RX ORDER — SODIUM CHLORIDE 9 MG/ML
20 INJECTION, SOLUTION INTRAVENOUS ONCE
Status: CANCELLED | OUTPATIENT
Start: 2023-10-06 | Stop reason: HOSPADM

## 2023-09-29 RX ADMIN — HYDROCORTISONE SODIUM SUCCINATE 50 MG: 100 INJECTION, POWDER, FOR SOLUTION INTRAMUSCULAR; INTRAVENOUS at 15:06

## 2023-09-29 RX ADMIN — DIPHENHYDRAMINE HYDROCHLORIDE 12.5 MG: 50 INJECTION, SOLUTION INTRAMUSCULAR; INTRAVENOUS at 15:05

## 2023-09-29 RX ADMIN — SODIUM CHLORIDE 20 ML/HR: 0.9 INJECTION, SOLUTION INTRAVENOUS at 15:02

## 2023-09-29 NOTE — TELEPHONE ENCOUNTER
Lab Inquiry   Who are you speaking with? Patient     If it is not the patient, are they listed on an active communication consent form? N/A   Name of ordering provider Dr. Adilene Pressley   What is being requested? Lab orders need to be entered   Lab draw location [facility]   What is the best call back number? [phone]                                                     Patient calling in regards to labs that she is supposed to get every month. Patient states she is supposed to have her iron and CBC checked every month. Patient would like clarification on when she should be getting labs done.

## 2023-09-29 NOTE — PLAN OF CARE
Problem: Potential for Falls  Goal: Patient will remain free of falls  Description: INTERVENTIONS:  - Educate patient/family on patient safety including physical limitations  - Instruct patient to call for assistance with activity   - Consult OT/PT to assist with strengthening/mobility   - Keep Call bell within reach  - Keep bed low and locked with side rails adjusted as appropriate  - Keep care items and personal belongings within reach  - Initiate and maintain comfort rounds  - Make Fall Risk Sign visible to staff  - - Apply yellow socks and bracelet for high fall risk patients  - Consider moving patient to room near nurses station  Outcome: Progressing     Problem: Knowledge Deficit  Goal: Patient/family/caregiver demonstrates understanding of disease process, treatment plan, medications, and discharge instructions  Description: Complete learning assessment and assess knowledge base.   Interventions:  - Provide teaching at level of understanding  - Provide teaching via preferred learning methods  Outcome: Progressing

## 2023-09-29 NOTE — PROGRESS NOTES
Patient tolerated IV push venofer today without complications. Patient confirmed upcoming appointment and declined print out.

## 2023-09-29 NOTE — TELEPHONE ENCOUNTER
Returned phone call to patient. I let her know that she is to get a CBCd every 4 weeks. She verbalized understanding and has no other questions or concerns at this moment.

## 2023-10-04 DIAGNOSIS — D50.9 IRON DEFICIENCY ANEMIA, UNSPECIFIED IRON DEFICIENCY ANEMIA TYPE: Primary | ICD-10-CM

## 2023-10-04 RX ORDER — SODIUM CHLORIDE 9 MG/ML
20 INJECTION, SOLUTION INTRAVENOUS ONCE
Status: CANCELLED | OUTPATIENT
Start: 2023-10-06

## 2023-10-06 ENCOUNTER — APPOINTMENT (OUTPATIENT)
Dept: LAB | Facility: CLINIC | Age: 78
End: 2023-10-06
Payer: MEDICARE

## 2023-10-06 ENCOUNTER — TRANSCRIBE ORDERS (OUTPATIENT)
Dept: LAB | Facility: CLINIC | Age: 78
End: 2023-10-06

## 2023-10-06 ENCOUNTER — HOSPITAL ENCOUNTER (OUTPATIENT)
Dept: INFUSION CENTER | Facility: CLINIC | Age: 78
End: 2023-10-06
Payer: MEDICARE

## 2023-10-06 VITALS
TEMPERATURE: 97.3 F | SYSTOLIC BLOOD PRESSURE: 129 MMHG | OXYGEN SATURATION: 96 % | RESPIRATION RATE: 18 BRPM | DIASTOLIC BLOOD PRESSURE: 66 MMHG | HEART RATE: 62 BPM

## 2023-10-06 DIAGNOSIS — E55.9 VITAMIN D DEFICIENCY: ICD-10-CM

## 2023-10-06 DIAGNOSIS — D50.9 IRON DEFICIENCY ANEMIA, UNSPECIFIED IRON DEFICIENCY ANEMIA TYPE: ICD-10-CM

## 2023-10-06 DIAGNOSIS — M35.01 SJOGREN SYNDROME WITH KERATOCONJUNCTIVITIS (HCC): Primary | ICD-10-CM

## 2023-10-06 DIAGNOSIS — D50.9 IRON DEFICIENCY ANEMIA, UNSPECIFIED IRON DEFICIENCY ANEMIA TYPE: Primary | ICD-10-CM

## 2023-10-06 LAB
BASOPHILS # BLD AUTO: 0.07 THOUSANDS/ÂΜL (ref 0–0.1)
BASOPHILS NFR BLD AUTO: 1 % (ref 0–1)
EOSINOPHIL # BLD AUTO: 0.12 THOUSAND/ÂΜL (ref 0–0.61)
EOSINOPHIL NFR BLD AUTO: 1 % (ref 0–6)
HCT VFR BLD AUTO: 37.4 % (ref 34.8–46.1)
HGB BLD-MCNC: 11.8 G/DL (ref 11.5–15.4)
IMM GRANULOCYTES # BLD AUTO: 0.06 THOUSAND/UL (ref 0–0.2)
IMM GRANULOCYTES NFR BLD AUTO: 1 % (ref 0–2)
LYMPHOCYTES # BLD AUTO: 2.35 THOUSANDS/ÂΜL (ref 0.6–4.47)
LYMPHOCYTES NFR BLD AUTO: 28 % (ref 14–44)
MCH RBC QN AUTO: 29.1 PG (ref 26.8–34.3)
MCHC RBC AUTO-ENTMCNC: 31.6 G/DL (ref 31.4–37.4)
MCV RBC AUTO: 92 FL (ref 82–98)
MONOCYTES # BLD AUTO: 1.12 THOUSAND/ÂΜL (ref 0.17–1.22)
MONOCYTES NFR BLD AUTO: 13 % (ref 4–12)
NEUTROPHILS # BLD AUTO: 4.63 THOUSANDS/ÂΜL (ref 1.85–7.62)
NEUTS SEG NFR BLD AUTO: 56 % (ref 43–75)
NRBC BLD AUTO-RTO: 0 /100 WBCS
PLATELET # BLD AUTO: 404 THOUSANDS/UL (ref 149–390)
PMV BLD AUTO: 9.4 FL (ref 8.9–12.7)
RBC # BLD AUTO: 4.06 MILLION/UL (ref 3.81–5.12)
WBC # BLD AUTO: 8.35 THOUSAND/UL (ref 4.31–10.16)

## 2023-10-06 PROCEDURE — 96365 THER/PROPH/DIAG IV INF INIT: CPT

## 2023-10-06 PROCEDURE — 85025 COMPLETE CBC W/AUTO DIFF WBC: CPT

## 2023-10-06 PROCEDURE — 96375 TX/PRO/DX INJ NEW DRUG ADDON: CPT

## 2023-10-06 PROCEDURE — 36415 COLL VENOUS BLD VENIPUNCTURE: CPT

## 2023-10-06 RX ORDER — SODIUM CHLORIDE 9 MG/ML
20 INJECTION, SOLUTION INTRAVENOUS ONCE
Status: COMPLETED | OUTPATIENT
Start: 2023-10-06 | End: 2023-10-06

## 2023-10-06 RX ORDER — SODIUM CHLORIDE 9 MG/ML
20 INJECTION, SOLUTION INTRAVENOUS ONCE
OUTPATIENT
Start: 2023-10-13

## 2023-10-06 RX ADMIN — HYDROCORTISONE SODIUM SUCCINATE 50 MG: 100 INJECTION, POWDER, FOR SOLUTION INTRAMUSCULAR; INTRAVENOUS at 15:25

## 2023-10-06 RX ADMIN — DIPHENHYDRAMINE HYDROCHLORIDE 12.5 MG: 50 INJECTION, SOLUTION INTRAMUSCULAR; INTRAVENOUS at 15:03

## 2023-10-06 RX ADMIN — SODIUM CHLORIDE 20 ML/HR: 0.9 INJECTION, SOLUTION INTRAVENOUS at 14:56

## 2023-10-10 ENCOUNTER — OFFICE VISIT (OUTPATIENT)
Dept: SURGICAL ONCOLOGY | Facility: CLINIC | Age: 78
End: 2023-10-10
Payer: MEDICARE

## 2023-10-10 VITALS
HEIGHT: 63 IN | OXYGEN SATURATION: 96 % | TEMPERATURE: 97.7 F | RESPIRATION RATE: 16 BRPM | DIASTOLIC BLOOD PRESSURE: 80 MMHG | WEIGHT: 147.5 LBS | BODY MASS INDEX: 26.13 KG/M2 | SYSTOLIC BLOOD PRESSURE: 110 MMHG | HEART RATE: 80 BPM

## 2023-10-10 DIAGNOSIS — Z85.3 HISTORY OF LEFT BREAST CANCER: Primary | ICD-10-CM

## 2023-10-10 DIAGNOSIS — Z90.11 HISTORY OF RIGHT MASTECTOMY: ICD-10-CM

## 2023-10-10 PROCEDURE — 99213 OFFICE O/P EST LOW 20 MIN: CPT

## 2023-10-10 NOTE — PROGRESS NOTES
Surgical Oncology Follow Up       [address] [facility]  [facility]  [address] [facility] [address]  [facility] [address]    Callum Henriquez  1945  [id number]  [address]  [facility] [address]    Chief Complaint   Patient presents with   • Follow-up       Assessment/Plan:  1. History of left breast cancer  - 6 mo follow up  - mammo is scheduled for next month    2. History of right mastectomy  - Breast Prothesis    Discussion/Summary: Pt is a 66 year old female presenting today for a six-month follow-up for left breast cancer diagnosed in December of 2020. 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 Patient had genetic testing which was negative. She underwent a left breast mastectomy with sentinel node biopsy with Dr. Cale Wade therapy was not recommended. She has a right diagnostic mammogram scheduled next month. There were no concerns on her cbe. I will see the patient back in 6 months or sooner should the need arise. She was instructed to call with any questions or concerns prior to this time. All questions were answered today.       History of Present Illness:     Oncology History   History of left breast cancer   12/17/2020 Biopsy    Left Breast stereotactic biopsy:  A. 4 o'clock, posterior  Ductal carcinoma in situ with microinvasion  Grade 3  ER 0, MD 0, HER2 3+    B & C. 5 o'clock, anterior with & without calcs  Ductal carcinoma in situ  Grade 3  ER 0, MD 0    Concordant. Malignancy appears unifocal; calcifications span 4.1 cm. Both clips migrated. No recent axillary US. Right breast clear. 1/7/2021 Genetic Testing    The following genes were evaluated: [place], BRCA1, BRCA2, CDH1, CHEK2, PALB2, PTEN, STK11, Tp53; additional genes evaluated for a total of 20 genes. Negative result.  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 Adilene Pressley  Adjuvant therapy not recommended          -Interval History Pt is a 66 year old female presenting today for a six-month follow-up for left breast cancer diagnosed in December of 2020. She is on obs. She has a right diagnostic mammogram scheduled next month. She notes fatigue from anemia, she gets iron infusions. Patient denies changes on her breast exam. She denies persistent headaches, bone pain, back pain, shortness of breath, or abdominal pain. Review of Systems:  Review of Systems   Constitutional: Positive for fatigue. Negative for activity change, appetite change and unexpected weight change. Respiratory: Negative for cough and shortness of breath. Cardiovascular: Negative for chest pain. Gastrointestinal: Negative for abdominal pain, diarrhea, nausea and vomiting. Endocrine: Negative for heat intolerance. Musculoskeletal: Negative for arthralgias, back pain and myalgias. Skin: Negative for rash. Neurological: Negative for weakness and headaches. Hematological: Negative for adenopathy.        Patient Active Problem List   Diagnosis   • Portal vein thrombosis   • Anxiety   • Moderate episode of recurrent major depressive disorder (HCC)   • Essential tremor   • Hyperlipidemia   • Essential hypertension   • Hypomagnesemia   • SIADH (syndrome of inappropriate ADH production) ([facility])   • Acquired hypothyroidism   • Insomnia   • Iron deficiency anemia   • Prediabetes   • Peripheral neuropathy   • Osteopenia   • Osteoarthritis of right knee   • Ulcerative colitis without complications ([facility])   • Allergic rhinitis   • GERD (gastroesophageal reflux disease)   • Mild intermittent asthma without complication   • Diarrhea   • Sjogren's syndrome (HCC)   • Chronic salpingo-oophoritis   • Overweight   • Supraventricular tachycardia   • Unsteady gait   • Lumbar degenerative disc disease   • Encounter for long-term (current) use of medications   • S/P deep brain stimulator placement   • Vitamin B12 deficiency   • Vitamin D deficiency   • History of left breast cancer   • Fatigue   • Hoarseness of voice   • Esophageal candidiasis (HCC)     Past Medical History:   Diagnosis Date   • Anemia    • Anxiety    • Arrhythmia    • Arthritis    • Asthma    • Blood clot in vein     portal vein   • Breast cancer (HCC) 02/12/2021   • Breast lump     [id number] RESOLVED   • Cancer (HCC)    • Depression    • Disease of thyroid gland    • DVT, lower extremity (HCC)    • GERD (gastroesophageal reflux disease)    • Hyperlipidemia    • Hypertension    • Hypokalemia    • Hyponatremia    • Hypothyroidism    • Iron deficiency anemia    • Irregular heart beat    • Manic behavior ([address])    • 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HCC)    • Osteoarthritis    • Palpitations     [id number]  RESOLVED   • Paroxysmal supraventricular tachycardia    • PE (pulmonary thromboembolism) (HCC)    • Sjoegren syndrome    • Sleep apnea    • Sleep difficulties    • Spinal stenosis    • Thrombocytosis     [id number]  RESOLVED   • Tremors of nervous system     dbs implanted right and left chest   • Ulcerative colitis ([address])    • Vertigo     [id number] RESOLVED     Past Surgical History:   Procedure Laterality Date   • ABDOMINAL SURGERY     • APPENDECTOMY     • BREAST BIOPSY Left 11/07/2019    Stereo   • BREAST EXCISIONAL BIOPSY Left     x many years   • BREAST SURGERY      lumpectomy & biopsy   • ACRMELLA HOLE W/ STEREOTACTIC INSERTION OF DBS LEADS / INTRAOP MICROELECTRODE RECORDING     • COLON SURGERY     • COLONOSCOPY N/A 08/30/2017    Procedure: POUCHOSCOPY;  Surgeon: Lolis Stern MD;  Location: [facility];   Service: Colorectal   • COLOPROCTECTOMY W/ ILEO J POUCH     • ESOPHAGOGASTRODUODENOSCOPY      ONSET 10/17/11   • FISTULA REPAIR      LLEOANAL FISTULA REPAIR TRANSPERIN TRANSABD APPROACH   • HYSTERECTOMY      age 43   • ILEOSTOMY CLOSURE     • KNEE ARTHROSCOPY      Right   • MAMMO STEREOTACTIC BREAST BIOPSY LEFT (ALL INC) Left 11/07/2019   • MAMMO STEREOTACTIC BREAST BIOPSY LEFT (ALL INC) Left 12/17/2020   • MAMMO STEREOTACTIC BREAST BIOPSY LEFT (ALL INC) EACH ADD Left 12/17/2020   • MASTECTOMY Left 02/12/2021    left mastectomy- Dr. Santo Trotter   • MASTECTOMY W/ SENTINEL NODE BIOPSY Left 02/12/2021    Procedure: BREAST MASTECTOMY WITH SENTINEL LYMPH NODE BIOPSY, LYMPHATIC MAPPING WITH BLUE DYE AND RADIAOCTIVE DYE (INJECT AT 1100 BY DR GILLESPEI IN THE OR);   Surgeon: Kain Buckley MD;  Location[facility];  Service: Surgical Oncology   • AR INSJ/RPLCMT CRANIAL NEUROSTIM PULSE GENERATOR Right 06/20/2017    Procedure: DBS GENERATOR REPLACEMENT;  Surgeon: Efrem Rangel MD;  Location: [facility];  Service: Neurosurgery   • AR INSJ/RPLCMT CRANIAL NEUROSTIM PULSE GENERATOR N/A 12/04/2019    Procedure: REPLACEMENT IMPLANTABLE PULSE GENERATOR FOR DEEP BRAIN STIMULATOR LEFT CHEST;  Surgeon: Taylor Segura MD;  Location: [facility];  Service: Neurosurgery   • SPLENECTOMY     • TONSILLECTOMY       Family History   Problem Relation Age of Onset   • Venous thrombosis Mother         ACUTE VENOUS THROMBOSIS OF DEEP VESSELS OF THE DISTAL LOWER EXTREMITY   • Other Mother         PHLEBITIS   • Hypertension Mother    • Peripheral vascular disease Mother    • COPD Father    • Diabetes Father         MELLITUS   • Stroke Father    • Diabetes Sister         MELLITUS   • Sjogren's syndrome Sister    • No Known Problems Daughter    • No Known Problems Maternal Grandmother    • No Known Problems Maternal Grandfather    • No Known Problems Paternal Grandmother    • No Known Problems Paternal Grandfather    • No Known Problems Sister    • No Known Problems Maternal Aunt    • No Known Problems Paternal Aunt    • No Known Problems Son      Social History     Socioeconomic History   • Marital status:      Spouse name: Not on file   • Number of children: Not on file   • Years of education: EDUCATION LEVEL 10TH GRADE   • Highest education level: Not on file   Occupational History   • Occupation: RETIRED   Tobacco Use   • Smoking status: Former     Packs/day: 1.00     Years: 15.00     Total pack years: 15.00     Types: Cigarettes     Quit date: 1965     Years since quittin.8   • Smokeless tobacco: Never   • Tobacco comments:     Quit   Vaping Use   • Vaping Use: Never used   Substance and Sexual Activity   • Alcohol use: Yes     Alcohol/week: 2.0 standard drinks of alcohol     Types: 2 Cans of beer per week     Comment: 2or 3 beers a week   • Drug use: No   • Sexual activity: Not Currently     Partners: Male     Birth control/protection: Post-menopausal   Other Topics Concern   • Not on file   Social History Narrative    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ACTIVITIES INSIDE AND OUTSIDE OF HOME, MODERATE    CAFFEINE USE, DRINKS CAFEINATED TEA, DRINKS COFFEE    INADEQUATE EXERCISE    LIVES WITH ADULT CHILDREN     Social Determinants of Health     Financial Resource Strain: Low Risk  (2023)    Overall Financial Resource Strain (CARDIA)    • Difficulty of Paying Living Expenses: Not very hard   Food Insecurity: Not on file   Transportation Needs: No Transportation Needs (2023)    PRAPARE - Transportation    • Lack of Transportation (Medical): No    • Lack of Transportation (Non-Medical):  No   Physical Activity: Not on file   Stress: Not on file   Social Connections: Not on file   Intimate Partner Violence: Not on file   Housing Stability: Not on file       Current Outpatient Medications:   •  albuterol (PROVENTIL HFA,VENTOLIN HFA) 90 mcg/act inhaler, Inhale 2 puffs every 6 (six) hours as needed for wheezing, Disp: 8 g, Rfl: 1  •  amLODIPine (NORVASC) 2.5 mg tablet, TAKE ONE TABLET BY MOUTH EVERY DAY, Disp: 90 tablet, Rfl: 3  •  Calcium Carb-Cholecalciferol (CALCIUM 600-D PO), Take 1 tablet by mouth 2 (two) times a day, Disp: , Rfl:   •  Coenzyme Q10 (CO Q-10 PO), Take 1 capsule by mouth daily, Disp: , Rfl:   •  diltiazem (CARDIZEM CD) 180 mg 24 hr capsule, TAKE ONE CAPSULE BY MOUTH EVERY DAY, Disp: 90 capsule, Rfl: 1  •  diltiazem (CARDIZEM) 60 mg tablet, TAKE ONE TABLET BY MOUTH EVERY DAY, Disp: 90 tablet, Rfl: 1  •  Eliquis 5 MG, TAKE ONE TABLET BY MOUTH TWICE A DAY, Disp: 180 tablet, Rfl: 1  •  escitalopram (LEXAPRO) 20 mg tablet, Take 1 tablet (20 mg total) by mouth daily, Disp: 90 tablet, Rfl: 1  •  fluticasone (FLONASE) 50 mcg/act nasal spray, APPLY ONE SPRAY IN EACH NOSTRIL ONCE DAILY AS NEEDED FOR RHINITIS, Disp: 48 g, Rfl: 1  •  Fluticasone-Salmeterol (Advair Diskus) 250-50 mcg/dose inhaler, Inhale 1 puff 2 (two) times a day Rinse mouth after use, Disp: 60 blister, Rfl: 5  •  gabapentin (NEURONTIN) 600 MG tablet, TAKE ONE TABLET BY MOUTH IN THE MORNING, ONE TABLET IN THE AFTERNNON, AND TWO TABLETS AT BEDTIME (Patient taking differently: TAKE ONE TABLET BY MOUTH IN THE MORNING, ONE half TABLET IN THE AFTERNNON, AND one and one half at hs TABLETS AT BEDTIME.), Disp: 360 tablet, Rfl: 2  •  HYDROcodone-acetaminophen (NORCO) 5-325 mg per tablet, Take 1 tablet by mouth every 6 (six) hours as needed for pain Max Daily Amount: 4 tablets, Disp: 120 tablet, Rfl: 0  •  levothyroxine 50 mcg tablet, TAKE ONE TABLET BY MOUTH EVERY DAY, Disp: 90 tablet, Rfl: 0  •  lisinopril (ZESTRIL) 20 mg tablet, TAKE ONE TABLET BY MOUTH TWICE A DAY, Disp: 180 tablet, Rfl: 1  •  LORazepam (ATIVAN) 1 mg tablet, Take 1 tablet (1 mg total) by mouth every 6 (six) hours as needed for anxiety, Disp: 120 tablet, Rfl: 0  •  MAGNESIUM PO, Take 1 tablet by mouth daily, Disp: , Rfl:   •  Multiple Vitamin (MULTIVITAMIN ADULT PO), Take 1 tablet by mouth daily, Disp: , Rfl:   •  Omega-3 Fatty Acids (FISH OIL PO), Take 1 capsule by mouth daily, Disp: , Rfl:   •  pantoprazole (PROTONIX) 40 mg tablet, Take 1 tablet (40 mg total) by mouth 2 (two) times a day, Disp: 60 tablet, Rfl: 5  •  potassium chloride (MICRO-K) 10 MEQ CR capsule, TAKE TWO CAPSULES BY MOUTH EVERY DAY, Disp: 180 capsule, Rfl: 3  •  predniSONE 5 mg tablet, , Disp: , Rfl:   •  simvastatin (ZOCOR) 10 mg tablet, Take 0.5 tablets (5 mg total) by mouth daily, Disp: 45 tablet, Rfl: 1  •  sodium chloride 1 g tablet, Take 1 tablet (1 g total) by mouth 4 (four) times a day, Disp: 270 tablet, Rfl: 3  •  torsemide (DEMADEX) 10 mg tablet, Take 1 tablet (10 mg total) by mouth daily as needed (edema), Disp: 90 tablet, Rfl: 0  •  vitamin B-12 (VITAMIN B-12) 500 mcg tablet, Take 1 tablet (500 mcg total) by mouth daily, Disp: 90 tablet, Rfl: 1    Current Facility-Administered Medications:   •  cyanocobalamin injection 1,000 mcg, 1,000 mcg, Intramuscular, Q30 Days, Chang Herrmann MD, 1,000 mcg at 04/20/23 1012  Allergies   Allergen Reactions   • Indomethacin GI Intolerance, Dizziness and Other (See Comments)   • Macrobid [Nitrofurantoin Monohyd Macro] Rash   • Nitrofurantoin Other (See Comments)   • Penicillins Rash and Other (See Comments)     Annotation - [id number]: can take cephalosporins   • Sulfa Antibiotics Rash and Other (See Comments)     Vitals:    10/10/23 0754   BP: 110/80   Pulse: 80   Resp: 16   Temp: 97.7 °F (36.5 °C)   SpO2: 96%       Physical Exam  Constitutional:       General: She is not in acute distress. Appearance: Normal appearance. Cardiovascular:      Rate and Rhythm: Normal rate and regular rhythm. Pulses: Normal pulses. Heart sounds: Normal heart sounds. Pulmonary:      Effort: Pulmonary effort is normal.      Breath sounds: Normal breath sounds. Chest:      Chest wall: No mass. Breasts:     Right: No swelling, bleeding, inverted nipple, mass, nipple discharge, skin change or tenderness.       Left: No swelling, bleeding, mass, skin change or tenderness. Abdominal:      General: Abdomen is flat. Palpations: Abdomen is soft. Lymphadenopathy:      Upper Body:      Right upper body: No supraclavicular, axillary or pectoral adenopathy. Left upper body: No supraclavicular, axillary or pectoral adenopathy. Skin:     General: Skin is warm. Neurological:      General: No focal deficit present. Mental Status: She is alert and oriented to person, place, and time. Psychiatric:         Mood and Affect: Mood normal.         Behavior: Behavior normal.           Results:    Imaging  No results found. I reviewed the above imaging data. Advance Care Planning/Advance Directives:  Discussed disease status, cancer treatment plans and/or cancer treatment goals with the patient.

## 2023-10-13 ENCOUNTER — HOSPITAL ENCOUNTER (OUTPATIENT)
Dept: INFUSION CENTER | Facility: CLINIC | Age: 78
End: 2023-10-13
Payer: MEDICARE

## 2023-10-13 VITALS
HEART RATE: 59 BPM | RESPIRATION RATE: 18 BRPM | TEMPERATURE: 97 F | DIASTOLIC BLOOD PRESSURE: 72 MMHG | SYSTOLIC BLOOD PRESSURE: 128 MMHG

## 2023-10-13 DIAGNOSIS — D50.9 IRON DEFICIENCY ANEMIA, UNSPECIFIED IRON DEFICIENCY ANEMIA TYPE: Primary | ICD-10-CM

## 2023-10-13 RX ORDER — SODIUM CHLORIDE 9 MG/ML
20 INJECTION, SOLUTION INTRAVENOUS ONCE
Status: CANCELLED | OUTPATIENT
Start: 2023-10-20

## 2023-10-13 RX ORDER — SODIUM CHLORIDE 9 MG/ML
20 INJECTION, SOLUTION INTRAVENOUS ONCE
Status: COMPLETED | OUTPATIENT
Start: 2023-10-13 | End: 2023-10-13

## 2023-10-13 RX ADMIN — HYDROCORTISONE SODIUM SUCCINATE 50 MG: 100 INJECTION, POWDER, FOR SOLUTION INTRAMUSCULAR; INTRAVENOUS at 15:07

## 2023-10-13 RX ADMIN — DIPHENHYDRAMINE HYDROCHLORIDE 12.5 MG: 50 INJECTION, SOLUTION INTRAMUSCULAR; INTRAVENOUS at 15:09

## 2023-10-13 RX ADMIN — SODIUM CHLORIDE 20 ML/HR: 0.9 INJECTION, SOLUTION INTRAVENOUS at 15:01

## 2023-10-13 NOTE — PROGRESS NOTES
Patient tolerated treatment today without complications. Patient confirmed next appointment and declined print out.

## 2023-10-16 ENCOUNTER — APPOINTMENT (OUTPATIENT)
Dept: LAB | Facility: AMBULARY SURGERY CENTER | Age: 78
End: 2023-10-16
Payer: MEDICARE

## 2023-10-16 DIAGNOSIS — E55.9 VITAMIN D DEFICIENCY: ICD-10-CM

## 2023-10-16 DIAGNOSIS — M35.01 SJOGREN SYNDROME WITH KERATOCONJUNCTIVITIS (HCC): ICD-10-CM

## 2023-10-16 LAB
25(OH)D3 SERPL-MCNC: 52 NG/ML (ref 30–100)
CRP SERPL QL: 2.1 MG/L
ERYTHROCYTE [SEDIMENTATION RATE] IN BLOOD: 19 MM/HOUR (ref 0–29)

## 2023-10-16 PROCEDURE — 82306 VITAMIN D 25 HYDROXY: CPT

## 2023-10-16 PROCEDURE — 86140 C-REACTIVE PROTEIN: CPT

## 2023-10-16 PROCEDURE — 85652 RBC SED RATE AUTOMATED: CPT

## 2023-10-16 PROCEDURE — 36415 COLL VENOUS BLD VENIPUNCTURE: CPT

## 2023-10-20 ENCOUNTER — TELEPHONE (OUTPATIENT)
Dept: HEMATOLOGY ONCOLOGY | Facility: CLINIC | Age: 78
End: 2023-10-20

## 2023-10-20 ENCOUNTER — HOSPITAL ENCOUNTER (OUTPATIENT)
Dept: INFUSION CENTER | Facility: CLINIC | Age: 78
End: 2023-10-20
Payer: MEDICARE

## 2023-10-20 VITALS
RESPIRATION RATE: 18 BRPM | SYSTOLIC BLOOD PRESSURE: 136 MMHG | DIASTOLIC BLOOD PRESSURE: 70 MMHG | HEART RATE: 71 BPM | TEMPERATURE: 96.2 F

## 2023-10-20 DIAGNOSIS — D50.9 IRON DEFICIENCY ANEMIA, UNSPECIFIED IRON DEFICIENCY ANEMIA TYPE: Primary | ICD-10-CM

## 2023-10-20 PROCEDURE — 96365 THER/PROPH/DIAG IV INF INIT: CPT

## 2023-10-20 PROCEDURE — 96375 TX/PRO/DX INJ NEW DRUG ADDON: CPT

## 2023-10-20 RX ORDER — SODIUM CHLORIDE 9 MG/ML
20 INJECTION, SOLUTION INTRAVENOUS ONCE
Status: CANCELLED | OUTPATIENT
Start: 2023-10-20

## 2023-10-20 RX ORDER — SODIUM CHLORIDE 9 MG/ML
20 INJECTION, SOLUTION INTRAVENOUS ONCE
Status: COMPLETED | OUTPATIENT
Start: 2023-10-20 | End: 2023-10-20

## 2023-10-20 RX ADMIN — HYDROCORTISONE SODIUM SUCCINATE 50 MG: 100 INJECTION, POWDER, FOR SOLUTION INTRAMUSCULAR; INTRAVENOUS at 15:14

## 2023-10-20 RX ADMIN — SODIUM CHLORIDE 20 ML/HR: 0.9 INJECTION, SOLUTION INTRAVENOUS at 15:10

## 2023-10-20 RX ADMIN — DIPHENHYDRAMINE HYDROCHLORIDE 12.5 MG: 50 INJECTION, SOLUTION INTRAMUSCULAR; INTRAVENOUS at 15:14

## 2023-10-20 NOTE — PATIENT INSTRUCTIONS
October 2023 Sunday Monday Tuesday Wednesday Thursday Friday Saturday   1     2     3     4     5     6    LAB WALK IN   1:45 PM   (5 min.)   AN MOB PHLEB CHAIR 2   [facility] [address] [facility] M[facility]    INF THERAPY PLAN   3:00 PM   (90 min.)   AN INF CHAIR [address] [facility] 7       8     9     10    OFFICE VISIT LONG PG   7:45 AM   (30 min.)   Oscar Wong, [address] [facility] [place] 11     12     13    INF THERAPY PLAN   3:00 PM   (90 min.)   AN INF CHAIR [address] [facility] 14       15     16    LAB WALK IN  10:35 AM   (5 min.)   AN SP PHLEB CHAIR 1   [facility] 17     18     19     20    INF THERAPY PLAN   3:00 PM   (90 min.)   AN INF CHAIR [address] [facility] 21       22     23     24    VISCO INJECTION PG   7:15 AM   (30 min.)   [address], DO   [address] [id number] 91     29     48     61       43     87     49                                     November 2023 Sunday Monday Tuesday Wednesday Thursday Friday Saturday                  1     2     3     4       5     6     7     8     9     10     11       12     13    FOLLOW UP PG   8:25 AM   (40 min.)   DO Skyl[facility] [place] 14     17     15     16     18       23     20    ANA DIAG RIGHT W DBT & CAD   9:00 AM   (30 min.)   [facility] [place] 76     15     50     84     78       81     36     03     59     44

## 2023-10-20 NOTE — PROGRESS NOTES
Patient here for final venofer dosing,she is well, no c/o or changes to report. She tolerated this without incident after receiving ordered premeds and was discharged. Further dosing at physician's discretion.

## 2023-10-20 NOTE — TELEPHONE ENCOUNTER
Patient ordered Q4 week CBC's. Placed a new order for a CBC so patient could have this done prior to her appt with Dr. Lizbeth Hyman in November.

## 2023-10-20 NOTE — TELEPHONE ENCOUNTER
Lab Inquiry   Who are you speaking with? Patient     If it is not the patient, are they listed on an active communication consent form? N/A   Name of ordering provider Dr. Altaf Carbajal   What is being requested? Lab results to be reviewed   Lab draw location [facility]   What is the best call back number?  [phone]                                                 Requested date needs to be changed to before 11/13

## 2023-10-23 ENCOUNTER — TELEPHONE (OUTPATIENT)
Dept: NEPHROLOGY | Facility: CLINIC | Age: 78
End: 2023-10-23

## 2023-10-23 ENCOUNTER — TELEPHONE (OUTPATIENT)
Age: 78
End: 2023-10-23

## 2023-10-23 NOTE — TELEPHONE ENCOUNTER
Caller: Patient     Doctor: Desmond     Reason for call: Asked if she can switch appointment with her partner, who was scheduled for 800 with Dr Ivonen Garcia tomorrow.      Call back#: [phone]

## 2023-10-23 NOTE — TELEPHONE ENCOUNTER
Pt called to ask if Dr Isidra Jones added a BMP for her to complete to her chart to check her sodium levels. I let her know there is one in the chart. She asked to confirm her next appt date and I let her know it is on 12/13.

## 2023-10-24 ENCOUNTER — PROCEDURE VISIT (OUTPATIENT)
Dept: OBGYN CLINIC | Facility: OTHER | Age: 78
End: 2023-10-24

## 2023-10-24 VITALS
HEIGHT: 63 IN | BODY MASS INDEX: 26.13 KG/M2 | HEART RATE: 74 BPM | DIASTOLIC BLOOD PRESSURE: 68 MMHG | WEIGHT: 147.49 LBS | SYSTOLIC BLOOD PRESSURE: 128 MMHG

## 2023-10-24 DIAGNOSIS — M17.11 PRIMARY OSTEOARTHRITIS OF RIGHT KNEE: Primary | ICD-10-CM

## 2023-10-24 RX ADMIN — BUPIVACAINE HYDROCHLORIDE 4 ML: 2.5 INJECTION, SOLUTION INFILTRATION; PERINEURAL at 08:00

## 2023-10-24 NOTE — PROGRESS NOTES
1. Primary osteoarthritis of right knee        Orders Placed This Encounter   Procedures   • Large joint arthrocentesis          IMAGING STUDIES: (I personally reviewed images in PACS and report):     PAST REPORTS:    XR left knee 12/21/2021  There is mild to moderate medial and patellofemoral compartment joint space narrowing. There are small marginal osteophytes at the medial, lateral and patellofemoral compartments. XR right knee 12/21/2021  Tricompartmental osteoarthritic degenerative changes of the right knee with moderate to severe lateral compartment joint space narrowing. PAST PROCEDURES:    8/8/2023 [facility] CSI right knee  4/18/2023 [facility] CSI right knee  12/22/22 [facility] CSI bilateral knee  11/3/2022 [facility] right knee  7/28/2022 [facility] CSI right knee      ASSESSMENT/PLAN:    Moderate to severe tricompartmental OA of right knee    Repeat X-ray next visit: None    Return for follow-up every 3 months as needed for injection. Patient instructions below verbally summarized in person during encounter:  Patient Instructions   Ultrasound-guided Monovisc injection to right knee administered today  Red flags and risks of injection include but are not limited to infection <0.072% as referenced in some sources, nerve or artery penetration, and if steroids are used-skin dimpling <1%, hypo-pigmentation <1%. Recommended no submerging underwater in a tub, pool, ocean, lake, jacuzzi, hot tub, or any other body of water for 1 week until needle wound closes due to risk of infection. May take showers. Clean needle site with soap and water and keep covered at all times with sterile bandage such as a band-aid until fully healed.   If any symptoms including fevers, chills, swelling, or worsening symptoms occur then to call office or go to hospital for immediate care if physician unavailable due to possible infection or other complication which is a serious medical problem. __________________________________________________________________________    HISTORY OF PRESENT ILLNESS:    77 y.o. female with moderate to severe tricompartmental OA of bilateral knee presents for [facility] Monovisc injection to right knee. Most recent visit by Dr. Tamara Lopez on 8/8/2023 was provided [facility] CSI to right suprapatellar bursa. Patient presents with knee pain. History, examination, and x-rays are consistent with diagnosis of Osteoarthritis.      Nonpharmacologic treatment: home exercise program placed on chart  Pain Score:   8/10  Pain from condition does interfere with activities of daily living  Previous CSI relief: yes, 2 weeks    Review of Systems    Following history reviewed and updated:    Past Medical History:   Diagnosis Date   • Anemia    • Anxiety    • Arrhythmia    • Arthritis    • Asthma    • Blood clot in vein     portal vein   • Breast cancer ([address]) 02/12/2021   • Breast lump     [id number] RESOLVED   • Cancer (HCC)    • Depression    • Disease of thyroid gland    • DVT, lower extremity (HCC)    • GERD (gastroesophageal reflux disease)    • Hyperlipidemia    • Hypertension    • Hypokalemia    • Hyponatremia    • Hypothyroidism    • Iron deficiency anemia    • Irregular heart beat    • Manic behavior ([address])    • 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HCC)    • Osteoarthritis    • Palpitations     [id number]  RESOLVED   • Paroxysmal supraventricular tachycardia    • PE (pulmonary thromboembolism) ([address])    • Sjoegren syndrome    • Sleep apnea    • Sleep difficulties    • Spinal stenosis    • Thrombocytosis     [id number]  RESOLVED   • Tremors of nervous system     dbs implanted right and left chest   • Ulcerative colitis ([address])    • Vertigo     [id number] RESOLVED     Past Surgical History:   Procedure Laterality Date   • ABDOMINAL SURGERY     • APPENDECTOMY     • BREAST BIOPSY Left 11/07/2019    Stereo   • BREAST EXCISIONAL BIOPSY Left     x many years   • BREAST SURGERY      lumpectomy & biopsy   • CARMELLA HOLE W/ STEREOTACTIC INSERTION OF DBS LEADS / INTRAOP MICROELECTRODE RECORDING     • COLON SURGERY     • COLONOSCOPY N/A 08/30/2017    Procedure: Suze Santana;  Surgeon: Lolis Stern MD;  Location: [facility]; Service: Colorectal   • COLOPROCTECTOMY W/ ILEO J POUCH     • ESOPHAGOGASTRODUODENOSCOPY      ONSET 10/17/11   • FISTULA REPAIR      LLEOANAL FISTULA REPAIR TRANSPERIN TRANSABD APPROACH   • HYSTERECTOMY      age 43   • ILEOSTOMY CLOSURE     • KNEE ARTHROSCOPY      Right   • MAMMO STEREOTACTIC BREAST BIOPSY LEFT (ALL INC) Left 11/07/2019   • MAMMO STEREOTACTIC BREAST BIOPSY LEFT (ALL INC) Left 12/17/2020   • MAMMO STEREOTACTIC BREAST BIOPSY LEFT (ALL INC) EACH ADD Left 12/17/2020   • MASTECTOMY Left 02/12/2021    left mastectomy- Dr. Plummer [place]   • MASTECTOMY W/ SENTINEL NODE BIOPSY Left 02/12/2021    Procedure: BREAST MASTECTOMY WITH SENTINEL LYMPH NODE BIOPSY, LYMPHATIC MAPPING WITH BLUE DYE AND RADIAOCTIVE DYE (INJECT AT 1100 BY DR GILLESPIE IN THE OR);   Surgeon: Gigi Kumar MD;  Location: [facility];  Service: Surgical Oncology   • MS INSJ/RPLCMT CRANIAL NEUROSTIM PULSE GENERATOR Right 06/20/2017    Procedure: DBS GENERATOR REPLACEMENT;  Surgeon: Loretta Bowman MD;  Location: [facility];  Service: Neurosurgery   • MS INSJ/RPLCMT CRANIAL NEUROSTIM PULSE GENERATOR N/A 12/04/2019    Procedure: REPLACEMENT IMPLANTABLE PULSE GENERATOR FOR DEEP BRAIN STIMULATOR LEFT CHEST;  Surgeon: Wicho Nixon MD;  Location: [facility];  Service: Neurosurgery   • SPLENECTOMY     • TONSILLECTOMY       Social History   Social History     Substance and Sexual Activity   Alcohol Use Yes   • Alcohol/week: 2.0 standard drinks of alcohol   • Types: 2 Cans of beer per week    Comment: 2or 3 beers a week     Social History     Substance and Sexual Activity   Drug Use No     Social History     Tobacco Use   Smoking Status Former   • Packs/day: 1.00   • Years: 15.00   • Total pack years: 15.00   • Types: Cigarettes   • Quit date: 1965   • Years since quittin.8   Smokeless Tobacco Never   Tobacco Comments    Quit     Family History   Problem Relation Age of Onset   • Venous thrombosis Mother         ACUTE VENOUS THROMBOSIS OF DEEP VESSELS OF THE DISTAL LOWER EXTREMITY   • Other Mother         PHLEBITIS   • Hypertension Mother    • Peripheral vascular disease Mother    • COPD Father    • Diabetes Father         MELLITUS   • Stroke Father    • Diabetes Sister         MELLITUS   • Sjogren's syndrome Sister    • No Known Problems Daughter    • No Known Problems Maternal Grandmother    • No Known Problems Maternal Grandfather    • No Known Problems Paternal Grandmother    • No Known Problems Paternal Grandfather    • No Known Problems Sister    • No Known Problems Maternal Aunt    • No Known Problems Paternal Aunt    • No Known Problems Son      Allergies   Allergen Reactions   • Indomethacin GI Intolerance, Dizziness and Other (See Comments)   • Macrobid [Nitrofurantoin Monohyd Macro] Rash   • Nitrofurantoin Other (See Comments)   • Penicillins Rash and Other (See Comments)     Annotation - [id number]: can take cephalosporins   • Sulfa Antibiotics Rash and Other (See Comments)          Physical Exam  /68   Pulse 74   Ht 5' 3" (1.6 m)   Wt 66.9 kg (147 lb 7.8 oz)   BMI 26.13 kg/m²     Constitutional:  Vitals and nursing note reviewed. General: Normal appearance. Not ill-appearing, toxic-appearing or diaphoretic. not in acute distress. HENT: Head is normocephalic and atraumatic. Bilateral external ear and nose normal  Eyes: No discharge. Extraocular movements intact. Cardiovascular: Normal rate  Pulmonary:  Pulmonary effort is normal. No respiratory distress. Musculoskeletal: No gross injury or deformity except for other than mention below. Skin: Skin is warm. Neurological: Awake, alert, and mental status is at baseline. No gross focal deficit present.   Psychiatric:  Mood normal. Behavior normal.     Ortho Exam    Right Knee Examination:    Patient ambulates with Normal gait pattern  Assistive Device: No    Skin is warm and dry to touch with no wounds, erythema, increased warmth, or ecchymosis   No dislocation / gross deformity  Effusion: none  Tenderness: + Lateral joint line    Flexor and extensor mechanisms are intact  Flexion ROM: 110/135° seated in chair, without pain  Extension ROM: 0/0° without pain  Strength: 5/5 throughout    Knee is stable to varus and valgus stress  Ellen's Test:  +   Lachman's Test:  1A  Posterior Drawer Test:  1A    Patella tracks centrally with palpable crepitus    Calf compartments are soft and supple    __________________________________________________________________________  Large joint arthrocentesis  Universal Protocol:  Procedure performed by: (Supervised by Dr Doni Will)  Consent: Verbal consent obtained. Risks and benefits: risks, benefits and alternatives were discussed  Consent given by: patient  Patient understanding: patient states understanding of the procedure being performed  Patient consent: the patient's understanding of the procedure matches consent given  Site marked: the operative site was marked  Radiology Images displayed and confirmed.  If images not available, report reviewed: imaging studies available  Required items: required blood products, implants, devices, and special equipment available  Patient identity confirmed: verbally with patient  Supporting Documentation  Indications: pain   Procedure Details  Location: knee -   Preparation: Patient was prepped and draped in the usual sterile fashion  Needle size: 22 G (22G-1.5in needle)  Ultrasound guidance: yes (Linear probe with sterile probe cover and sterile gel)  Approach: lateral  Medications administered: 4 mL bupivacaine 0.25 %; 88 mg hyaluronan 88 MG/4ML    Patient tolerance: patient tolerated the procedure well with no immediate complications  Dressing:  Sterile dressing applied    Injection site U/S and marked. Site cleaned with Betadine and alcohol prior to local anesthetic injection of 4cc Bupivacaine 0.25% using a sterile 25G-1.5in needle prior to beginning USG injection procedure. Site again cleaned with chlorhexidine prior to starting USG procedure. Sterile field, gloves, probe cover used for ultrasound-guided injection with visualization of needle in-line with short axis of linear-array transducer and filling of suprapatellar pouch with injectate. Patient tolerated procedure well with minimal bleeding and no immediate complications. Sterile bandage placed.

## 2023-10-24 NOTE — PATIENT INSTRUCTIONS
Ultrasound-guided Monovisc injection to right knee administered today  Red flags and risks of injection include but are not limited to infection <0.072% as referenced in some sources, nerve or artery penetration, and if steroids are used-skin dimpling <1%, hypo-pigmentation <1%. Recommended no submerging underwater in a tub, pool, ocean, lake, jacuzzi, hot tub, or any other body of water for 1 week until needle wound closes due to risk of infection. May take showers. Clean needle site with soap and water and keep covered at all times with sterile bandage such as a band-aid until fully healed. If any symptoms including fevers, chills, swelling, or worsening symptoms occur then to call office or go to hospital for immediate care if physician unavailable due to possible infection or other complication which is a serious medical problem.

## 2023-10-25 RX ORDER — BUPIVACAINE HYDROCHLORIDE 2.5 MG/ML
4 INJECTION, SOLUTION INFILTRATION; PERINEURAL
Status: COMPLETED | OUTPATIENT
Start: 2023-10-24 | End: 2023-10-24

## 2023-10-30 DIAGNOSIS — E78.49 OTHER HYPERLIPIDEMIA: ICD-10-CM

## 2023-10-30 DIAGNOSIS — I81 PORTAL VEIN THROMBOSIS: ICD-10-CM

## 2023-10-30 DIAGNOSIS — E03.9 ACQUIRED HYPOTHYROIDISM: ICD-10-CM

## 2023-10-30 RX ORDER — SIMVASTATIN 10 MG
5 TABLET ORAL DAILY
Qty: 45 TABLET | Refills: 1 | Status: SHIPPED | OUTPATIENT
Start: 2023-10-30

## 2023-10-30 RX ORDER — LEVOTHYROXINE SODIUM 0.05 MG/1
50 TABLET ORAL DAILY
Qty: 90 TABLET | Refills: 1 | Status: SHIPPED | OUTPATIENT
Start: 2023-10-30

## 2023-10-30 NOTE — TELEPHONE ENCOUNTER
Reason for call:   [x] Refill   [] Prior Auth  [] Other:     Office:   [x] PCP/Provider - Adilene Beach  [] Specialty/Provider -     Medication: Eliquis  Dose/Frequency: 5 mg twice a day  Quantity: 180    Medication: Levothyroxine  Dose/Frequency: 50 mcg daily  Quantity: 90    Medication: Simvastatin  Dose/Frequency: 10 mg 1/2 tablet daily  Quantity: 45    Pharmacy: [facility] [address]    Does the patient have enough for 3 days?    [x] Yes   [] No - Send as HP to POD

## 2023-11-01 DIAGNOSIS — F41.9 ANXIETY: ICD-10-CM

## 2023-11-01 DIAGNOSIS — M54.16 LUMBAR RADICULOPATHY: ICD-10-CM

## 2023-11-01 RX ORDER — HYDROCODONE BITARTRATE AND ACETAMINOPHEN 5; 325 MG/1; MG/1
1 TABLET ORAL EVERY 6 HOURS PRN
Qty: 120 TABLET | Refills: 0 | Status: SHIPPED | OUTPATIENT
Start: 2023-11-01

## 2023-11-01 RX ORDER — LORAZEPAM 1 MG/1
1 TABLET ORAL EVERY 6 HOURS PRN
Qty: 120 TABLET | Refills: 0 | Status: SHIPPED | OUTPATIENT
Start: 2023-11-01

## 2023-11-01 NOTE — TELEPHONE ENCOUNTER
Reason for call:   [x] Refill   [] Prior Auth  [] Other:     Office:   [x] PCP/Provider - Dr Lorie Eaton  [] Specialty/Provider -     Medication:   Norco 5/325 1q6 prn, 120  Lorazepam 1 mg , 1q6 prn, 120      Ph[facility]: [facility]    Does the patient have enough for 3 days?    [x] Yes   [] No - Send as HP to POD

## 2023-11-07 ENCOUNTER — TELEPHONE (OUTPATIENT)
Age: 78
End: 2023-11-07

## 2023-11-07 DIAGNOSIS — J45.20 MILD INTERMITTENT ASTHMA WITHOUT COMPLICATION: Primary | ICD-10-CM

## 2023-11-07 NOTE — TELEPHONE ENCOUNTER
Lolita Bad    Patient needs someone to call her regarding her medication and some changes    CB: [phone]

## 2023-11-08 ENCOUNTER — APPOINTMENT (OUTPATIENT)
Dept: LAB | Facility: CLINIC | Age: 78
End: 2023-11-08
Payer: MEDICARE

## 2023-11-08 DIAGNOSIS — Z85.3 HISTORY OF LEFT BREAST CANCER: ICD-10-CM

## 2023-11-08 DIAGNOSIS — D50.9 IRON DEFICIENCY ANEMIA, UNSPECIFIED IRON DEFICIENCY ANEMIA TYPE: ICD-10-CM

## 2023-11-08 DIAGNOSIS — I10 ESSENTIAL HYPERTENSION: ICD-10-CM

## 2023-11-08 DIAGNOSIS — E83.42 HYPOMAGNESEMIA: ICD-10-CM

## 2023-11-08 LAB
ANION GAP SERPL CALCULATED.3IONS-SCNC: 6 MMOL/L
BASOPHILS # BLD AUTO: 0.06 THOUSANDS/ÂΜL (ref 0–0.1)
BASOPHILS NFR BLD AUTO: 1 % (ref 0–1)
BUN SERPL-MCNC: 6 MG/DL (ref 5–25)
CALCIUM SERPL-MCNC: 8.8 MG/DL (ref 8.4–10.2)
CHLORIDE SERPL-SCNC: 95 MMOL/L (ref 96–108)
CO2 SERPL-SCNC: 29 MMOL/L (ref 21–32)
CREAT SERPL-MCNC: 0.65 MG/DL (ref 0.6–1.3)
EOSINOPHIL # BLD AUTO: 0.26 THOUSAND/ÂΜL (ref 0–0.61)
EOSINOPHIL NFR BLD AUTO: 4 % (ref 0–6)
ERYTHROCYTE [DISTWIDTH] IN BLOOD BY AUTOMATED COUNT: 21 % (ref 11.6–15.1)
FERRITIN SERPL-MCNC: 877 NG/ML (ref 11–307)
GFR SERPL CREATININE-BSD FRML MDRD: 84 ML/MIN/1.73SQ M
GLUCOSE SERPL-MCNC: 102 MG/DL (ref 65–140)
HCT VFR BLD AUTO: 39.8 % (ref 34.8–46.1)
HGB BLD-MCNC: 13.1 G/DL (ref 11.5–15.4)
IMM GRANULOCYTES # BLD AUTO: 0.04 THOUSAND/UL (ref 0–0.2)
IMM GRANULOCYTES NFR BLD AUTO: 1 % (ref 0–2)
LYMPHOCYTES # BLD AUTO: 2.4 THOUSANDS/ÂΜL (ref 0.6–4.47)
LYMPHOCYTES NFR BLD AUTO: 35 % (ref 14–44)
MCH RBC QN AUTO: 31.8 PG (ref 26.8–34.3)
MCHC RBC AUTO-ENTMCNC: 32.9 G/DL (ref 31.4–37.4)
MCV RBC AUTO: 97 FL (ref 82–98)
MONOCYTES # BLD AUTO: 1.11 THOUSAND/ÂΜL (ref 0.17–1.22)
MONOCYTES NFR BLD AUTO: 16 % (ref 4–12)
NEUTROPHILS # BLD AUTO: 2.92 THOUSANDS/ÂΜL (ref 1.85–7.62)
NEUTS SEG NFR BLD AUTO: 43 % (ref 43–75)
NRBC BLD AUTO-RTO: 0 /100 WBCS
PLATELET # BLD AUTO: 396 THOUSANDS/UL (ref 149–390)
PMV BLD AUTO: 9.1 FL (ref 8.9–12.7)
POTASSIUM SERPL-SCNC: 4 MMOL/L (ref 3.5–5.3)
RBC # BLD AUTO: 4.12 MILLION/UL (ref 3.81–5.12)
SODIUM SERPL-SCNC: 130 MMOL/L (ref 135–147)
WBC # BLD AUTO: 6.79 THOUSAND/UL (ref 4.31–10.16)

## 2023-11-08 PROCEDURE — 85025 COMPLETE CBC W/AUTO DIFF WBC: CPT

## 2023-11-08 PROCEDURE — 82728 ASSAY OF FERRITIN: CPT

## 2023-11-08 RX ORDER — FLUTICASONE FUROATE AND VILANTEROL 100; 25 UG/1; UG/1
1 POWDER RESPIRATORY (INHALATION) DAILY
Qty: 60 BLISTER | Refills: 5 | Status: SHIPPED | OUTPATIENT
Start: 2023-11-08 | End: 2024-05-06

## 2023-11-09 ENCOUNTER — TELEPHONE (OUTPATIENT)
Dept: NEPHROLOGY | Facility: CLINIC | Age: 78
End: 2023-11-09

## 2023-11-09 DIAGNOSIS — E87.1 HYPONATREMIA: Primary | ICD-10-CM

## 2023-11-09 NOTE — TELEPHONE ENCOUNTER
Florinda recent laboratory studies. Sodium level remains fairly stable at 130. Continue with current sodium chloride tablets fluid restriction plus loop diuretic therapy. Recommend repeating laboratory studies at the beginning of next month. Anticipated follow-up appointment on December 13.

## 2023-11-12 NOTE — PROGRESS NOTES
Elizabeth Day  1945  [facility] HEMATOLOGY ONCOLOGY SPECIALISTS [facility] [id number]    Chief Complaint   Patient presents with    Leatha Rosen MD, 3 MO FU        Assessment/plan:   1. Left breast cancer, stage IA ( pT1a, pN0, M0) with microinvasion measuring less than 1 mm. ER negative, FL negative, HER2 3+ disease. Diagnosed in February 2021. Lieutenant Kennedy is a 69-year-old postmenopausal woman with microinvasive left breast cancer, ER negative, FL negative, 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 diagnosed in early 2021. Her microinvasive breast cancer measure less than 1 mm. However, her DCIS measure 7.7 cm. She underwent mastectomy and sentinel lymph node biopsy, resulting in KORIN. She did not require any adjuvant therapy. She continues to do well from an oncologic perspective. No clinical evidence of disease recurrence. She has an upcoming appointment for right breast mammogram.    2. Iron-deficiency anemia probably due to the bowel resection. 8/22/2023: EGD:  Severe abnormal mucosa with plaque in the upper third of the esophagus and middle third of the esophagus; performed cold forceps biopsy; collected sample to send to pathology with brush  Abnormal mucosa; performed cold forceps biopsies  Single small angioectasia in the 2nd part of the duodenum; tissue ablated with argon plasma coagulation  Performed forceps biopsies in the duodenal bulb and 2nd part of the duodenum to rule out celiac disease    She has history of ulcerative colitis as well as bowel resection in 2010. Subsequently, she developed iron deficiency anemia, due to the malabsorption. She received 3 doses of Venofer, resulting in normal hemoglobin and ferritin. She was on maintenance Venofer every 6 months, when she became anemic again. She received Venofer 200mg x10 doses from 8/17/23-10/20/23.   Was evaluated by GI and underwent EGD requiring ablation of small angiectasia in the duodenum. Most recent hemoglobin and iron panel remained stable. At this juncture we will continue monitoring. She will be placed back on her Venofer schedule every 6 months. We will continue to monitor labs every 3 months. Oncology history:   Primary Diagnosis:  1. Left breast cancer, stage IA ( pT1a, pN0, M0) with microinvasion measuring less than 1 mm. ER negative, NM negative, HER2 3+ disease. Diagnosed in February 2021.   2. Iron-deficiency anemia probably due to the bowel resection. Previous Hematologic/ Oncologic Treatment:    S/p mastectomy and sentinel lymph node biopsy     Current Hematologic/ Oncologic Treatment:     Maintenance Venofer every 6 months starting in April 2023. Disease Status:    KORIN status post mastectomy and sentinel lymph node biopsy. 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 Micro invasive cancer measuring less than 1 mm, ER negative, NM negative, HER2 3+ disease. 2 sentinel lymph node was negative for metastatic disease. Stage IA ( pT1 a, pN0, M0)     Radiology:   chest x-ray was negative for pulmonary disease. History of present illness:   Jona Ellison is a 79-year-old postmenopausal female who initially found to have abnormality in her left breast based on a screening mammogram.  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 DCIS port was ER negative, NM negative. She subsequently underwent left mastectomy with sentinel lymph node biopsy by Dr. Mireya Tee in February 12, 2021. 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 She also had micro invasion measuring less than 1 mm. Micro invasive part of cancer was ER negative, NM negative, HER2 3+ disease. 2 sentinel lymph nodes were negative for metastatic disease. She did not have reconstruction.  She did not require any adjuvant therapy. She has history of ulcerative colitis as well as history of bowel resection in 2010. She developed iron deficiency anemia due to the malabsorption. She received 3 doses of Venofer for with no infusion reaction, resulting in normal hemoglobin. Therefore, she was placed on maintenance venofer every 6 months. However, she became anemic. She has reactive thrombocytosis. Ferritin was only 9. She was placed on monthly Venofer. She was previously following with Dr. Benito Silva and last seen in the office on 8/4/2023. She presents today for transfer of care/follow-up visit. Interval history: In the interim, she underwent EGD on 8/22/2023 which noted severe abnormal mucosa with plaque in the upper third of the esophagus and middle third of the esophagus. Single small angiectasia in the second part of duodenum; tissue ablated with argon plasma coagulation. Pt accompanied by  for visit today. Doing well. Denies any dark stools, blood in stools or hematuria. Denies any CP, SOB or bone pain. Review of systems:   Review of Systems   Constitutional:  Negative for chills, fatigue and fever. Eyes:  Negative for pain and visual disturbance. Respiratory:  Negative for cough and shortness of breath. Cardiovascular:  Negative for chest pain and palpitations. Gastrointestinal:  Negative for abdominal pain and vomiting. Genitourinary:  Negative for dysuria and hematuria. Musculoskeletal:  Negative for arthralgias and back pain. Skin:  Negative for color change and rash. Neurological:  Negative for seizures and syncope. All other systems reviewed and are negative.     Patient Active Problem List   Diagnosis    Portal vein thrombosis    Anxiety    Moderate episode of recurrent major depressive disorder (HCC)    Essential tremor    Hyperlipidemia    Essential hypertension    Hypomagnesemia    SIADH (syndrome of inappropriate ADH production) ([address])    Acquired hypothyroidism    Insomnia Iron deficiency anemia    Prediabetes    Peripheral neuropathy    Osteopenia    Osteoarthritis of right knee    Ulcerative colitis without complications (HCC)    Allergic rhinitis    GERD (gastroesophageal reflux disease)    Mild intermittent asthma without complication    Diarrhea    Sjogren's syndrome (HCC)    Chronic salpingo-oophoritis    Overweight    Supraventricular tachycardia    Unsteady gait    Lumbar degenerative disc disease    Encounter for long-term (current) use of medications    S/P deep brain stimulator placement    Vitamin B12 deficiency    Vitamin D deficiency    History of left breast cancer    Fatigue    Hoarseness of voice    Esophageal candidiasis ([address])    History of right mastectomy     Past Medical History:   Diagnosis Date    Anemia     Anxiety     Arrhythmia     Arthritis     Asthma     Blood clot in vein     portal vein    Breast cancer ([address]) 02/12/2021    Breast lump     [id number] RESOLVED    Cancer (HCC)     Depression     Disease of thyroid gland     DVT, lower extremity (HCC)     GERD (gastroesophageal reflux disease)     Hyperlipidemia     Hypertension     Hypokalemia     Hyponatremia     Hypothyroidism     Iron deficiency anemia     Irregular heart beat     Manic behavior ([address])     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HCC)     Osteoarthritis     Palpitations     [id number]  RESOLVED    Paroxysmal supraventricular tachycardia     PE (pulmonary thromboembolism) ([address])     Sjoegren syndrome     Sleep apnea     Sleep difficulties     Spinal stenosis     Thrombocytosis     [id number]  RESOLVED    Tremors of nervous system     dbs implanted right and left chest    Ulcerative colitis ([address])     Vertigo     [id number] RESOLVED     Past Surgical History:   Procedure Laterality Date    ABDOMINAL SURGERY      APPENDECTOMY      BREAST BIOPSY Left 11/07/2019    Stereo    BREAST EXCISIONAL BIOPSY Left     x many years    BREAST SURGERY      lumpectomy & biopsy    CARMELLA HOLE W/ STEREOTACTIC INSERTION OF DBS LEADS / INTRAOP MICROELECTRODE RECORDING      COLON SURGERY      COLONOSCOPY N/A 08/30/2017    Procedure: Ishmael Figueroa;  Surgeon: Blane Seaman MD;  Location: [facility]; Service: Colorectal    COLOPROCTECTOMY W/ ILEO J POUCH      ESOPHAGOGASTRODUODENOSCOPY      ONSET 10/17/11    FISTULA REPAIR      LLEOANAL FISTULA REPAIR TRANSPERIN TRANSABD APPROACH    HYSTERECTOMY      age 43    ILEOSTOMY CLOSURE      KNEE ARTHROSCOPY      Right    MAMMO STEREOTACTIC BREAST BIOPSY LEFT (ALL INC) Left 11/07/2019    MAMMO STEREOTACTIC BREAST BIOPSY LEFT (ALL INC) Left 12/17/2020    MAMMO STEREOTACTIC BREAST BIOPSY LEFT (ALL INC) EACH ADD Left 12/17/2020    MASTECTOMY Left 02/12/2021    left mastectomy- Dr. Nish Henriquez Left 02/12/2021    Procedure: BREAST MASTECTOMY WITH SENTINEL LYMPH NODE BIOPSY, LYMPHATIC MAPPING WITH BLUE DYE AND RADIAOCTIVE DYE (INJECT AT 1100 BY DR GILLESPIE IN THE OR);   Surgeon: Tonja Henderson MD;  Location[facility];  Service: Surgical Oncology    WI INSJ/RPLCMT CRANIAL NEUROSTIM PULSE GENERATOR Right 06/20/2017    Procedure: DBS GENERATOR REPLACEMENT;  Surgeon: Derrek Costa MD;  Location: [facility];  Service: Neurosurgery    WI INSJ/RPLCMT CRANIAL NEUROSTIM PULSE GENERATOR N/A 12/04/2019    Procedure: REPLACEMENT IMPLANTABLE PULSE GENERATOR FOR DEEP BRAIN STIMULATOR LEFT CHEST;  Surgeon: José Zeng MD;  Location: [facility];  Service: Neurosurgery    SPLENECTOMY      TONSILLECTOMY       Family History   Problem Relation Age of Onset    Venous thrombosis Mother         ACUTE VENOUS THROMBOSIS OF DEEP VESSELS OF THE DISTAL LOWER EXTREMITY    Other Mother         PHLEBITIS    Hypertension Mother     Peripheral vascular disease Mother     COPD Father     Diabetes Father         MELLITUS    Stroke Father     Diabetes Sister         MELLITUS    Sjogren's syndrome Sister     No Known Problems Daughter     No Known Problems Maternal Grandmother     No Known Problems Maternal Grandfather     No Known Problems Paternal Grandmother     No Known Problems Paternal Grandfather     No Known Problems Sister     No Known Problems Maternal Aunt     No Known Problems Paternal Aunt     No Known Problems Son      Social History     Socioeconomic History    Marital status:      Spouse name: Not on file    Number of children: Not on file    Years of education: EDUCATION LEVEL 10TH GRADE    Highest education level: Not on file   Occupational History    Occupation: RETIRED   Tobacco Use    Smoking status: Former     Packs/day: 1.00     Years: 15.00     Total pack years: 15.00     Types: Cigarettes     Quit date: 1965     Years since quittin.9    Smokeless tobacco: Never    Tobacco comments:     Quit   Vaping Use    Vaping Use: Never used   Substance and Sexual Activity    Alcohol use: Yes     Alcohol/week: 2.0 standard drinks of alcohol     Types: 2 Cans of beer per week     Comment: 2or 3 beers a week    Drug use: No    Sexual activity: Not Currently     Partners: Male     Birth control/protection: Post-menopausal   Other Topics Concern    Not on file   Social History Narrative    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ACTIVITIES INSIDE AND OUTSIDE OF HOME, MODERATE    CAFFEINE USE, DRINKS CAFEINATED TEA, DRINKS COFFEE    INADEQUATE EXERCISE    LIVES WITH ADULT CHILDREN     Social Determinants of Health     Financial Resource Strain: Low Risk  (2023)    Overall Financial Resource Strain (CARDIA)     Difficulty of Paying Living Expenses: Not very hard   Food Insecurity: Not on file   Transportation Needs: No Transportation Needs (2023)    PRAPARE - Transportation     Lack of Transportation (Medical): No     Lack of Transportation (Non-Medical):  No   Physical Activity: Not on file   Stress: Not on file   Social Connections: Not on file   Intimate Partner Violence: Not on file   Housing Stability: Not on file       Current Outpatient Medications:     albuterol (PROVENTIL HFA,VENTOLIN HFA) 90 mcg/act inhaler, Inhale 2 puffs every 6 (six) hours as needed for wheezing, Disp: 8 g, Rfl: 1    amLODIPine (NORVASC) 2.5 mg tablet, TAKE ONE TABLET BY MOUTH EVERY DAY, Disp: 90 tablet, Rfl: 3    ammonium lactate (LAC-HYDRIN) 12 % cream, , Disp: , Rfl:     apixaban (Eliquis) 5 mg, Take 1 tablet (5 mg total) by mouth 2 (two) times a day, Disp: 180 tablet, Rfl: 1    Calcium Carb-Cholecalciferol (CALCIUM 600-D PO), Take 1 tablet by mouth 2 (two) times a day, Disp: , Rfl:     Coenzyme Q10 (CO Q-10 PO), Take 1 capsule by mouth daily, Disp: , Rfl:     diltiazem (CARDIZEM CD) 180 mg 24 hr capsule, TAKE ONE CAPSULE BY MOUTH EVERY DAY, Disp: 90 capsule, Rfl: 1    diltiazem (CARDIZEM) 60 mg tablet, TAKE ONE TABLET BY MOUTH EVERY DAY, Disp: 90 tablet, Rfl: 1    escitalopram (LEXAPRO) 20 mg tablet, Take 1 tablet (20 mg total) by mouth daily, Disp: 90 tablet, Rfl: 1    fluticasone (FLONASE) 50 mcg/act nasal spray, APPLY ONE SPRAY IN EACH NOSTRIL ONCE DAILY AS NEEDED FOR RHINITIS, Disp: 48 g, Rfl: 1    Fluticasone Furoate-Vilanterol 100-25 mcg/actuation inhaler, Inhale 1 puff daily Rinse mouth after use., Disp: 60 blister, Rfl: 5    gabapentin (NEURONTIN) 600 MG tablet, TAKE ONE TABLET BY MOUTH IN THE MORNING, ONE TABLET IN THE AFTERNNON, AND TWO TABLETS AT BEDTIME (Patient taking differently: TAKE ONE TABLET BY MOUTH IN THE MORNING, ONE half TABLET IN THE AFTERNNON, AND one and one half at hs TABLETS AT BEDTIME.), Disp: 360 tablet, Rfl: 2    HYDROcodone-acetaminophen (NORCO) 5-325 mg per tablet, Take 1 tablet by mouth every 6 (six) hours as needed for pain Max Daily Amount: 4 tablets, Disp: 120 tablet, Rfl: 0    levothyroxine 50 mcg tablet, Take 1 tablet (50 mcg total) by mouth daily, Disp: 90 tablet, Rfl: 1    lisinopril (ZESTRIL) 20 mg tablet, TAKE ONE TABLET BY MOUTH TWICE A DAY, Disp: 180 tablet, Rfl: 1    LORazepam (ATIVAN) 1 mg tablet, Take 1 tablet (1 mg total) by mouth every 6 (six) hours as needed for anxiety, Disp: 120 tablet, Rfl: 0    MAGNESIUM PO, Take 1 tablet by mouth daily, Disp: , Rfl:     Multiple Vitamin (MULTIVITAMIN ADULT PO), Take 1 tablet by mouth daily, Disp: , Rfl:     Omega-3 Fatty Acids (FISH OIL PO), Take 1 capsule by mouth daily, Disp: , Rfl:     pantoprazole (PROTONIX) 40 mg tablet, Take 1 tablet (40 mg total) by mouth 2 (two) times a day, Disp: 60 tablet, Rfl: 5    potassium chloride (MICRO-K) 10 MEQ CR capsule, TAKE TWO CAPSULES BY MOUTH EVERY DAY, Disp: 180 capsule, Rfl: 3    predniSONE 5 mg tablet, , Disp: , Rfl:     simvastatin (ZOCOR) 10 mg tablet, Take 0.5 tablets (5 mg total) by mouth daily, Disp: 45 tablet, Rfl: 1    sodium chloride 1 g tablet, Take 1 tablet (1 g total) by mouth 4 (four) times a day, Disp: 270 tablet, Rfl: 3    torsemide (DEMADEX) 10 mg tablet, Take 1 tablet (10 mg total) by mouth daily as needed (edema), Disp: 90 tablet, Rfl: 0    vitamin B-12 (VITAMIN B-12) 500 mcg tablet, Take 1 tablet (500 mcg total) by mouth daily, Disp: 90 tablet, Rfl: 1    fluconazole (DIFLUCAN) 200 mg tablet, , Disp: , Rfl:     Current Facility-Administered Medications:     cyanocobalamin injection 1,000 mcg, 1,000 mcg, Intramuscular, Q30 Days, Alvin Gould MD, 1,000 mcg at 04/20/23 1012  Allergies   Allergen Reactions    Indomethacin GI Intolerance, Dizziness and Other (See Comments)    Macrobid [Nitrofurantoin Monohyd Macro] Rash    Nitrofurantoin Other (See Comments)    Penicillins Rash and Other (See Comments)     Annotation - [id number]: can take cephalosporins    Sulfa Antibiotics Rash and Other (See Comments)     Vitals:    11/13/23 0833   BP: 126/72   Pulse: 74   Resp: 16   Temp: 97.9 °F (36.6 °C)   SpO2: 97%     Wt Readings from Last 3 Encounters:   11/13/23 65.8 kg (145 lb)   10/24/23 66.9 kg (147 lb 7.8 oz)   10/10/23 66.9 kg (147 lb 8 oz)     Performance Status: ECOG/Zubrod/WHO: 0 - Asymptomatic  Physical Exam  Vitals reviewed. Constitutional:       General: She is not in acute distress. Appearance: Normal appearance. HENT:      Head: Normocephalic and atraumatic. Mouth/Throat:      Mouth: Mucous membranes are moist.   Eyes:      Extraocular Movements: Extraocular movements intact. Conjunctiva/sclera: Conjunctivae normal.   Cardiovascular:      Rate and Rhythm: Normal rate. Pulmonary:      Effort: Pulmonary effort is normal. No respiratory distress. Breath sounds: No wheezing, rhonchi or rales. Chest:      Comments: Left breast: S/p mastectomy without reconstruction. There is no palpable abnormality in her left chest wall. No skin erythema/thickening. No supra/infraclavicular/axillary LAD. Right breast: no palpable abn. No skin erythema/thickening. No supra/infraclavicular/axillary LAD. Abdominal:      General: Abdomen is flat. There is no distension. Palpations: Abdomen is soft. Musculoskeletal:      Cervical back: Normal range of motion and neck supple. Right lower leg: No edema. Left lower leg: No edema. Skin:     General: Skin is warm. Coloration: Skin is not pale. Neurological:      Mental Status: She is alert and oriented to person, place, and time. Mental status is at baseline. Cranial Nerves: No cranial nerve deficit. Psychiatric:         Thought Content: Thought content normal.       Labs:  11/8/2023: WBC: 6.79, H/H: 13.1/39.8, MCV: 97, platelets: 602. Creatinine: 0.65. Ferritin: 877    Orders entered this encounter:   - CBC and differential; Future  - Iron Panel (Includes Ferritin, Iron Sat%, Iron, and TIBC); Future  - CBC and differential; Future  - Iron Panel (Includes Ferritin, Iron Sat%, Iron, and TIBC); Future    Return in about 6 months (around 5/13/2024) for Office Visit. 1. Venofer every 6 months per care plan. 2. CBC and iron panel every 3 months. 3. FU in 6 months with labs prior.

## 2023-11-13 ENCOUNTER — TELEPHONE (OUTPATIENT)
Dept: HEMATOLOGY ONCOLOGY | Facility: CLINIC | Age: 78
End: 2023-11-13

## 2023-11-13 ENCOUNTER — OFFICE VISIT (OUTPATIENT)
Dept: HEMATOLOGY ONCOLOGY | Facility: CLINIC | Age: 78
End: 2023-11-13
Payer: MEDICARE

## 2023-11-13 VITALS
HEART RATE: 74 BPM | SYSTOLIC BLOOD PRESSURE: 126 MMHG | RESPIRATION RATE: 16 BRPM | BODY MASS INDEX: 25.69 KG/M2 | HEIGHT: 63 IN | TEMPERATURE: 97.9 F | OXYGEN SATURATION: 97 % | WEIGHT: 145 LBS | DIASTOLIC BLOOD PRESSURE: 72 MMHG

## 2023-11-13 DIAGNOSIS — D50.9 IRON DEFICIENCY ANEMIA, UNSPECIFIED IRON DEFICIENCY ANEMIA TYPE: Primary | ICD-10-CM

## 2023-11-13 DIAGNOSIS — Z85.3 HISTORY OF LEFT BREAST CANCER: ICD-10-CM

## 2023-11-13 PROCEDURE — 99215 OFFICE O/P EST HI 40 MIN: CPT | Performed by: INTERNAL MEDICINE

## 2023-11-13 RX ORDER — AMMONIUM LACTATE 12 G/100G
CREAM TOPICAL
COMMUNITY

## 2023-11-13 RX ORDER — SODIUM CHLORIDE 9 MG/ML
20 INJECTION, SOLUTION INTRAVENOUS ONCE
Status: CANCELLED | OUTPATIENT
Start: 2023-11-13

## 2023-11-13 RX ORDER — FLUCONAZOLE 200 MG/1
TABLET ORAL
COMMUNITY

## 2023-11-13 RX ORDER — SODIUM CHLORIDE 9 MG/ML
20 INJECTION, SOLUTION INTRAVENOUS ONCE
OUTPATIENT
Start: 2024-03-22

## 2023-11-13 NOTE — TELEPHONE ENCOUNTER
Check out comments: 1. Venofer every 6 months per care plan. *Please schedule Venofer for patient. Thank you!

## 2023-11-13 NOTE — TELEPHONE ENCOUNTER
Patient Call    Who are you speaking with? Patient    If it is not the patient, are they listed on an active communication consent form? N/A   What is the reason for this call? Patient calling in regards to her infusion scheduled 12/1/23. Patient thought she was supposed to have another infusion in 6 months, not in December. Patient would like a call back to confirm if she is supposed to get this infusion or not. Does this require a call back? Yes   If a call back is required, please list best call back number [phone]   If a call back is required, advise that a message will be forwarded to their care team and someone will return their call as soon as possible. Did you relay this information to the patient?  Yes

## 2023-11-16 DIAGNOSIS — I10 ESSENTIAL HYPERTENSION: ICD-10-CM

## 2023-11-16 RX ORDER — AMLODIPINE BESYLATE 2.5 MG/1
TABLET ORAL
Qty: 90 TABLET | Refills: 3 | Status: SHIPPED | OUTPATIENT
Start: 2023-11-16

## 2023-11-18 DIAGNOSIS — I10 ESSENTIAL HYPERTENSION: ICD-10-CM

## 2023-11-20 ENCOUNTER — HOSPITAL ENCOUNTER (OUTPATIENT)
Dept: MAMMOGRAPHY | Facility: CLINIC | Age: 78
Discharge: HOME | End: 2023-11-20
Payer: MEDICARE

## 2023-11-20 PROCEDURE — 77065 DX MAMMO INCL CAD UNI: CPT

## 2023-11-20 PROCEDURE — G0279 TOMOSYNTHESIS, MAMMO: HCPCS

## 2023-11-20 RX ORDER — DILTIAZEM HYDROCHLORIDE 180 MG/1
CAPSULE, COATED, EXTENDED RELEASE ORAL
Qty: 90 CAPSULE | Refills: 1 | Status: SHIPPED | OUTPATIENT
Start: 2023-11-20

## 2023-11-25 NOTE — TELEPHONE ENCOUNTER
Received Voice Mail:     This is Dwayne Machado [birth year]. I would like to have my doctor figure out [facility] order me a blood work for next week so I can see what my sodium is and my hemoglobin. I worry because I'm so sleepy all the time. I just wondered what it was. You can call me back if you need to. It's [phone]. Thank you.  marshall Flores. Yes

## 2023-11-28 ENCOUNTER — TELEPHONE (OUTPATIENT)
Dept: HEMATOLOGY ONCOLOGY | Facility: CLINIC | Age: 78
End: 2023-11-28

## 2023-11-28 ENCOUNTER — TELEPHONE (OUTPATIENT)
Dept: OTHER | Facility: OTHER | Age: 78
End: 2023-11-28

## 2023-11-28 DIAGNOSIS — Z90.12 S/P LEFT MASTECTOMY: Primary | ICD-10-CM

## 2023-11-28 DIAGNOSIS — Z85.3 HISTORY OF LEFT BREAST CANCER: ICD-10-CM

## 2023-11-28 NOTE — TELEPHONE ENCOUNTER
Patient Call    Who are you speaking with? Phylicia Prescott     If it is not the patient, are they listed on an active communication consent form? na   What is the reason for this call? Rep calling to request  Germaine Santiago send a script for a masectomy prosthesis quantity =1; please add diagnosis code on script. Please fax to [facility] [phone]    Does this require a call back? Only if there are questions   If a call back is required, please list best call back number [phone]   If a call back is required, advise that a message will be forwarded to their care team and someone will return their call as soon as possible. Did you relay this information to the patient?  Yes

## 2023-11-28 NOTE — TELEPHONE ENCOUNTER
Patient is calling in requesting a script for support hose for her legs to be sent to [facility] pharmacy    Please call patient back to discuss further

## 2023-12-03 NOTE — PROGRESS NOTES
Cardiology Consultation Visit       Jose Denny   [id number]  1945      [facility] [id number]      Physician Requesting Consult:   Tresea Essex, MD    Reason for Consultation / Principal Problem:  Mrs. Jose Denny is a 77 y.o. female and former smoker with a PMH significant for but not limited to PSVT/PAT, 1°AVB/IRBBB, HTN, HLD, Prediabetes, GERD, Sjogren, UC (s/p colectomy), Asthma, Hypothyroidism, Anemia, Essential Tremors (s/p brain stimulator 2014), and Overweight. She presents to clinic to establish care. History of Present Illness:  Mrs. Day was at her baseline state of health: independent of adl's, retired, though not exercising regularly, when she was seen by Hiram Espinoza on 08/10/23 for routine follow up after being followed closely by Dr. Brenda Snellen since 2013 for PSVT/PAT . She'd been treated with diltiazem for rate control and propanolol to control her tremors. Her beta blockade was discontinued and a brain stimulator was placed with improvement in her tremors in 2014. She had been doing well overall with HTN and Preventative care until August when she developed SOB. She was found to be anemic and has been followed by Hematology for LUCILA as well as by GI. She currently denies any cp, diaphoresis, n/v, palpitations, near syncope, syncope, orthopnea, or pnd. She has been treated with torsemide since 2012 for management of dependent ble edema. She denies any recent hospitalizations, family history of early cad, or family history of scd. She notes fair control of her diet and exercise habits. She reports a diet that is somewhat balanced but has room for improvement, close control of salt intake, sufficient daily water intake, and no dedicated exercise. She is otherwise compliant with medications, checks BP occasionally but does not maintain a detailed BP log.   Of note, the patient's cardiac risk factor(s) include: hypertension, dyslipidemia, sedentary lifestyle, and systemic inflammatory disease. Assessment & Plan   BLE Edema, long standing dependent  Patient notes dependent edema that is pitting but improves overnight, controlled with prn loop  TTE to reassess cardiac structure and function, no plans for change in medication regimen at this time, discussed benefits of exercise, compression hose, and ble elevation when resting  Monitoring routinely for symptomatic changes for now    DVT/PE, reports of prior portal vein thrombosis as well though imaging unavailable  Asymptomatic at this time, Tolerating doac without active bleeding  Cont eliquis, Cont ongoing anemia workup by heme/onc, Cont ongoing gi workup as well  Monitor routinely for symptomatic changes    PSVT, Abnormal PAC/PVC history ,  followed with serial holters in the past  Symptoms are all but resolved at this time  Cont current medical management at this time, Hold on repeat holter, TTE as noted above  Monitor routinely for symptomatic changes, record vitals if symptoms recur, ed/clinic precautions reviewed    HTN, long-standing  No home BP log for review, but clinic SBP in the 115-145 mmHg range  Cont current medication regimen, cont counseling on low-sodium diet  Monitor BP at home routinely and review log on next visit    HLD, recent FLP:  / TRI 90 / HDL 70 / LDL 97 on 12/09/23, 33.2% 10-Year ASCVD Estimated Risk  Stable, Well controlled, Tolerating statin without significant adverse reactions  Cont current medication regimen, cont counseling on diet and lifestyle modification  Monitor FLP routinely as ordered by PCP, will follow peripherally    Prediabetes, most recent A1c 6.2 on 08/11/23  No prior medical mgmt, diet controlled  Check A1c per PCP, Cont counseling on diet and lifestyle changes  Monitor labs as noted above, will reassess on next visit    Overweight, Body mass index is 26.08 kg/m².   Weight has been stable overall  Cont to review the importance of diet and lifestyle modification  Will monitor routinely at this time    Discussion / Summary:  Precautions and reasons to call our office or proceed to ER were discussed in detail. Patient expressed understanding and questions were answered. Plan on follow up in-clinic in 4 months. Office Visit Diagnosis:  1. PSVT (paroxysmal supraventricular tachycardia)  Echo complete w/ contrast if indicated      2. PAT (paroxysmal atrial tachycardia)  Echo complete w/ contrast if indicated      3. Hypertension, unspecified type        4. Hyperlipidemia, unspecified hyperlipidemia type        5. Prediabetes        6. Overweight (BMI 25.0-29.9)        7. History of venous thrombosis        8. Lumbar degenerative disc disease  gabapentin (NEURONTIN) 600 MG tablet      9. Peripheral vascular disease ([address])        10. Edema, unspecified type        11. History of left breast cancer        12. S/P left mastectomy        13. S/P deep brain stimulator placement            Subjective   Review of Systems   Constitutional: Negative for chills and fever. HENT:  Negative for congestion and sore throat. Eyes:  Negative for double vision and visual disturbance. Cardiovascular:  Positive for leg swelling (not currently wearing compression hose). Negative for chest pain, dyspnea on exertion, irregular heartbeat, near-syncope, orthopnea, palpitations, paroxysmal nocturnal dyspnea and syncope. Respiratory:  Positive for shortness of breath (improved overall). Negative for cough and wheezing. Hematologic/Lymphatic: Negative for bleeding problem. Does not bruise/bleed easily. Skin:  Negative for itching and rash. Musculoskeletal:  Positive for arthritis, back pain, joint pain (right knee) and joint swelling (right knee). Gastrointestinal:  Positive for diarrhea. Negative for abdominal pain, constipation, nausea and vomiting. Genitourinary:  Negative for dysuria and hematuria. Neurological:  Negative for numbness and paresthesias. Psychiatric/Behavioral:  Negative for depression. The patient is nervous/anxious (well controlled).       The following portions of the patient's history were reviewed and updated as appropriate: past medical history, past social history, past family history, past surgical history, current medications, allergies, past surgical history and problem list.  Past Medical History:   Diagnosis Date   • Anemia    • Anxiety    • Arrhythmia    • Arthritis    • Asthma    • Blood clot in vein     portal vein   • Breast cancer ([address]) 2021   • Breast lump     [id number] RESOLVED   • Cancer ([address])    • Depression    • Disease of thyroid gland    • DVT, lower extremity (HCC)    • GERD (gastroesophageal reflux disease)    • Hyperlipidemia    • Hypertension    • Hypokalemia    • Hyponatremia    • Hypothyroidism    • Iron deficiency anemia    • Irregular heart beat    • Manic behavior ([address])    • 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HCC)    • Osteoarthritis    • Palpitations     [id number]  RESOLVED   • Paroxysmal supraventricular tachycardia    • PE (pulmonary thromboembolism) (HCC)    • Sjoegren syndrome    • Sleep apnea    • Sleep difficulties    • Spinal stenosis    • Thrombocytosis     [id number]  RESOLVED   • Tremors of nervous system     dbs implanted right and left chest   • Ulcerative colitis ([address])    • Vertigo     [id number] RESOLVED     Social History     Socioeconomic History   • Marital status:      Spouse name: Not on file   • Number of children: Not on file   • Years of education: EDUCATION LEVEL 10TH GRADE   • Highest education level: Not on file   Occupational History   • Occupation: RETIRED   Tobacco Use   • Smoking status: Former     Current packs/day: 0.00     Average packs/day: 1 pack/day for 15.0 years (15.0 ttl pk-yrs)     Types: Cigarettes     Start date: 1950     Quit date: 1965     Years since quittin.9   • Smokeless tobacco: Never   • Tobacco comments:     Quit   Vaping Use   • Vaping status: Never Used   Substance and Sexual Activity   • Alcohol use: Yes     Alcohol/week: 2.0 standard drinks of alcohol     Types: 2 Cans of beer per week     Comment: 2or 3 beers a week   • Drug use: No   • Sexual activity: Not Currently     Partners: Male     Birth control/protection: Post-menopausal   Other Topics Concern   • Not on file   Social History Narrative    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ACTIVITIES INSIDE AND OUTSIDE OF HOME, MODERATE    CAFFEINE USE, DRINKS CAFEINATED TEA, DRINKS COFFEE    INADEQUATE EXERCISE    LIVES WITH ADULT CHILDREN     Social Determinants of Health     Financial Resource Strain: Low Risk  (4/20/2023)    Overall Financial Resource Strain (CARDIA)    • Difficulty of Paying Living Expenses: Not very hard   Food Insecurity: Not on file   Transportation Needs: No Transportation Needs (4/20/2023)    PRAPARE - Transportation    • Lack of Transportation (Medical): No    • Lack of Transportation (Non-Medical):  No   Physical Activity: Not on file   Stress: Not on file   Social Connections: Not on file   Intimate Partner Violence: Not on file   Housing Stability: Not on file     Family History   Problem Relation Age of Onset   • Venous thrombosis Mother         ACUTE VENOUS THROMBOSIS OF DEEP VESSELS OF THE DISTAL LOWER EXTREMITY   • Other Mother         PHLEBITIS   • Hypertension Mother    • Peripheral vascular disease Mother    • COPD Father    • Diabetes Father         MELLITUS   • Stroke Father    • Diabetes Sister         MELLITUS   • Sjogren's syndrome Sister    • No Known Problems Daughter    • No Known Problems Maternal Grandmother    • No Known Problems Maternal Grandfather    • No Known Problems Paternal Grandmother    • No Known Problems Paternal Grandfather    • No Known Problems Sister    • No Known Problems Maternal Aunt    • No Known Problems Paternal Aunt    • No Known Problems Son      Past Surgical History:   Procedure Laterality Date • ABDOMINAL SURGERY     • APPENDECTOMY     • BREAST BIOPSY Left 11/07/2019    Stereo   • BREAST EXCISIONAL BIOPSY Left     x many years   • BREAST SURGERY      lumpectomy & biopsy   • CARMELLA HOLE W/ STEREOTACTIC INSERTION OF DBS LEADS / INTRAOP MICROELECTRODE RECORDING     • COLON SURGERY     • COLONOSCOPY N/A 08/30/2017    Procedure: Ishmael Figueroa;  Surgeon: Blane Seaman MD;  Location: [facility]; Service: Colorectal   • COLOPROCTECTOMY W/ ILEO J POUCH     • ESOPHAGOGASTRODUODENOSCOPY      ONSET 10/17/11   • FISTULA REPAIR      LLEOANAL FISTULA REPAIR TRANSPERIN TRANSABD APPROACH   • HYSTERECTOMY      age 43   • ILEOSTOMY CLOSURE     • KNEE ARTHROSCOPY      Right   • MAMMO STEREOTACTIC BREAST BIOPSY LEFT (ALL INC) Left 11/07/2019   • MAMMO STEREOTACTIC BREAST BIOPSY LEFT (ALL INC) Left 12/17/2020   • MAMMO STEREOTACTIC BREAST BIOPSY LEFT (ALL INC) EACH ADD Left 12/17/2020   • MASTECTOMY Left 02/12/2021    left mastectomy- Dr. Lynda Muhammad   • MASTECTOMY W/ SENTINEL NODE BIOPSY Left 02/12/2021    Procedure: BREAST MASTECTOMY WITH SENTINEL LYMPH NODE BIOPSY, LYMPHATIC MAPPING WITH BLUE DYE AND RADIAOCTIVE DYE (INJECT AT 1100 BY DR GILLESPIE IN THE OR);   Surgeon: Tonja Henderson MD;  Location[facility];  Service: Surgical Oncology   • MD INSJ/RPLCMT CRANIAL NEUROSTIM PULSE GENERATOR Right 06/20/2017    Procedure: DBS GENERATOR REPLACEMENT;  Surgeon: Derrek Costa MD;  Location: [facility];  Service: Neurosurgery   • MD INSJ/RPLCMT CRANIAL NEUROSTIM PULSE GENERATOR N/A 12/04/2019    Procedure: REPLACEMENT IMPLANTABLE PULSE GENERATOR FOR DEEP BRAIN STIMULATOR LEFT CHEST;  Surgeon: José Zeng MD;  Location: [facility];  Service: Neurosurgery   • SPLENECTOMY     • TONSILLECTOMY         Current Outpatient Medications:   •  albuterol (PROVENTIL HFA,VENTOLIN HFA) 90 mcg/act inhaler, Inhale 2 puffs every 6 (six) hours as needed for wheezing, Disp: 8 g, Rfl: 1  •  amLODIPine (NORVASC) 2.5 mg tablet, TAKE ONE TABLET BY MOUTH EVERY DAY, Disp: 90 tablet, Rfl: 3  •  ammonium lactate (LAC-HYDRIN) 12 % cream, , Disp: , Rfl:   •  apixaban (Eliquis) 5 mg, Take 1 tablet (5 mg total) by mouth 2 (two) times a day, Disp: 180 tablet, Rfl: 1  •  Calcium Carb-Cholecalciferol (CALCIUM 600-D PO), Take 1 tablet by mouth 2 (two) times a day, Disp: , Rfl:   •  Coenzyme Q10 (CO Q-10 PO), Take 1 capsule by mouth daily, Disp: , Rfl:   •  diltiazem (CARDIZEM CD) 180 mg 24 hr capsule, TAKE ONE CAPSULE BY MOUTH EVERY DAY, Disp: 90 capsule, Rfl: 1  •  diltiazem (CARDIZEM) 60 mg tablet, TAKE ONE TABLET BY MOUTH EVERY DAY, Disp: 90 tablet, Rfl: 1  •  escitalopram (LEXAPRO) 20 mg tablet, Take 1 tablet (20 mg total) by mouth daily, Disp: 90 tablet, Rfl: 1  •  fluticasone (FLONASE) 50 mcg/act nasal spray, APPLY ONE SPRAY IN EACH NOSTRIL ONCE DAILY AS NEEDED FOR RHINITIS, Disp: 48 g, Rfl: 1  •  Fluticasone Furoate-Vilanterol 100-25 mcg/actuation inhaler, Inhale 1 puff daily Rinse mouth after use., Disp: 60 blister, Rfl: 5  •  gabapentin (NEURONTIN) 600 MG tablet, 600 mg in AM, 300 mg at Noon, 900 mg at bedtime, Disp: 360 tablet, Rfl: 2  •  HYDROcodone-acetaminophen (NORCO) 5-325 mg per tablet, Take 1 tablet by mouth every 6 (six) hours as needed for pain Max Daily Amount: 4 tablets, Disp: 120 tablet, Rfl: 0  •  levothyroxine 50 mcg tablet, Take 1 tablet (50 mcg total) by mouth daily, Disp: 90 tablet, Rfl: 1  •  lisinopril (ZESTRIL) 20 mg tablet, TAKE ONE TABLET BY MOUTH TWICE A DAY, Disp: 180 tablet, Rfl: 1  •  LORazepam (ATIVAN) 1 mg tablet, Take 1 tablet (1 mg total) by mouth every 6 (six) hours as needed for anxiety, Disp: 120 tablet, Rfl: 0  •  MAGNESIUM PO, Take 1 tablet by mouth daily, Disp: , Rfl:   •  Multiple Vitamin (MULTIVITAMIN ADULT PO), Take 1 tablet by mouth daily, Disp: , Rfl:   •  Omega-3 Fatty Acids (FISH OIL PO), Take 1 capsule by mouth daily, Disp: , Rfl:   •  pantoprazole (PROTONIX) 40 mg tablet, Take 1 tablet (40 mg total) by mouth 2 (two) times a day, Disp: 60 tablet, Rfl: 5  •  potassium chloride (MICRO-K) 10 MEQ CR capsule, TAKE TWO CAPSULES BY MOUTH EVERY DAY, Disp: 180 capsule, Rfl: 3  •  simvastatin (ZOCOR) 10 mg tablet, Take 0.5 tablets (5 mg total) by mouth daily, Disp: 45 tablet, Rfl: 1  •  sodium chloride 1 g tablet, Take 1 tablet (1 g total) by mouth 4 (four) times a day, Disp: 270 tablet, Rfl: 3  •  vitamin B-12 (VITAMIN B-12) 500 mcg tablet, Take 1 tablet (500 mcg total) by mouth daily, Disp: 90 tablet, Rfl: 1  •  torsemide (DEMADEX) 10 mg tablet, Take 1 tablet (10 mg total) by mouth daily as needed (edema), Disp: 90 tablet, Rfl: 2    Current Facility-Administered Medications:   •  cyanocobalamin injection 1,000 mcg, 1,000 mcg, Intramuscular, Q30 Days, Donna Fraser MD, 1,000 mcg at 04/20/23 1012  Allergies   Allergen Reactions   • Indomethacin GI Intolerance, Dizziness and Other (See Comments)   • Macrobid [Nitrofurantoin Monohyd Macro] Rash   • Nitrofurantoin Other (See Comments)   • Penicillins Rash and Other (See Comments)     Annotation - [id number]: can take cephalosporins   • Sulfa Antibiotics Rash and Other (See Comments)     Patient Active Problem List   Diagnosis   • Portal vein thrombosis   • Anxiety   • Moderate episode of recurrent major depressive disorder (HCC)   • Essential tremor   • Hyperlipidemia   • Essential hypertension   • Hypomagnesemia   • SIADH (syndrome of inappropriate ADH production) ([facility])   • Acquired hypothyroidism   • Insomnia   • Iron deficiency anemia   • Prediabetes   • Peripheral neuropathy   • Osteopenia   • Osteoarthritis of right knee   • Ulcerative colitis without complications ([facility])   • Allergic rhinitis   • GERD (gastroesophageal reflux disease)   • Mild intermittent asthma without complication   • Diarrhea   • Sjogren's syndrome (HCC)   • Chronic salpingo-oophoritis   • Overweight   • Supraventricular tachycardia   • Unsteady gait   • Lumbar degenerative disc disease   • Encounter for long-term (current) use of medications   • S/P deep brain stimulator placement   • Vitamin B12 deficiency   • Vitamin D deficiency   • History of left breast cancer   • Fatigue   • Hoarseness of voice   • Esophageal candidiasis (HCC)   • History of right mastectomy   • S/P left mastectomy   • Peripheral vascular disease (HCC)       Objective     Vitals:    12/06/23 0958   BP: 108/72   BP Location: Right arm   Patient Position: Sitting   Cuff Size: Large   Pulse: 66   Weight: 66.8 kg (147 lb 3.2 oz)   Height: 5' 3" (1.6 m)     Body mass index is 26.08 kg/m². ? Physical Exam  Constitutional:       General: She is not in acute distress. Appearance: She is not ill-appearing. HENT:      Head: Normocephalic and atraumatic. Right Ear: External ear normal.      Left Ear: External ear normal.      Nose: Nose normal.   Eyes:      General: No scleral icterus. Neck:      Vascular: No carotid bruit. Cardiovascular:      Rate and Rhythm: Normal rate and regular rhythm. Pulses: Normal pulses. Heart sounds: No murmur heard. No gallop. Pulmonary:      Effort: Pulmonary effort is normal.      Breath sounds: Normal breath sounds. Musculoskeletal:      Cervical back: Neck supple. Right lower leg: Edema (1+) present. Left lower leg: Edema (2+) present. Skin:     General: Skin is warm and dry. Capillary Refill: Capillary refill takes less than 2 seconds. Neurological:      Mental Status: She is alert and oriented to person, place, and time. Gait: Gait abnormal (ambulates with a walker only at night).    Psychiatric:         Mood and Affect: Mood normal.         Behavior: Behavior normal.         Labs:  Lab Results   Component Value Date     (L) 12/28/2015     11/16/2015     11/02/2015    K 3.5 12/09/2023    K 4.0 11/08/2023    K 4.0 09/28/2023    K 4.0 12/28/2015    K 3.8 11/16/2015    K 3.7 11/02/2015    CL 99 12/09/2023    CL 95 (L) 11/08/2023     09/28/2023    CL 95 (L) 12/28/2015    CL 99 11/16/2015     11/02/2015    CO2 31 12/09/2023    CO2 29 11/08/2023    CO2 30 09/28/2023    CO2 35 (H) 10/17/2021    CO2 28.5 12/28/2015    CO2 30.4 11/16/2015    CO2 30.7 11/02/2015    BUN 10 12/09/2023    BUN 6 11/08/2023    BUN 13 09/28/2023    BUN 7 12/28/2015    BUN 11 11/16/2015    BUN 8 11/02/2015    CREATININE 0.68 12/09/2023    CREATININE 0.65 11/08/2023    CREATININE 0.72 09/28/2023    CREATININE 0.76 12/28/2015    CREATININE 0.91 11/16/2015    CREATININE 0.85 11/02/2015    EGFR 83 12/09/2023    EGFR 84 11/08/2023    EGFR 79 09/28/2023    GLUC 102 11/08/2023    GLUC 96 09/28/2023    GLUC 97 09/13/2023    AST 23 12/09/2023    AST 18 09/19/2023    AST 12 (L) 08/11/2023    AST 23 11/16/2015    AST 24 10/06/2015    AST 18 04/27/2015    ALT 17 12/09/2023    ALT 20 09/19/2023    ALT 12 08/11/2023    ALT 31 11/16/2015    ALT 37 10/06/2015    ALT 21 04/27/2015    TBILI 0.40 12/09/2023    TBILI 0.37 09/19/2023    TBILI 0.48 08/11/2023    ALB 3.8 12/09/2023    ALB 4.0 09/28/2023    ALB 3.9 09/19/2023    ALB 3.8 11/16/2015    ALB 3.7 10/06/2015    ALB 3.6 04/27/2015    MG 1.9 07/03/2023    MG 1.4 (L) 06/22/2023    MG 1.8 04/07/2023    MG 1.5 (L) 12/17/2015    MG 1.4 (L) 10/06/2015    MG 1.8 08/20/2014    CALCIUM 9.4 12/09/2023    CALCIUM 8.8 11/08/2023    CALCIUM 9.7 09/28/2023    CALCIUM 8.7 12/28/2015    CALCIUM 8.7 11/16/2015    CALCIUM 8.6 11/02/2015      Lab Results   Component Value Date    WBC 6.79 11/08/2023    WBC 8.35 10/06/2023    WBC 7.59 09/28/2023    WBC 6.23 11/16/2015    WBC 5.87 10/06/2015    WBC 8.01 04/27/2015    HGB 13.1 11/08/2023    HGB 11.8 10/06/2023    HGB 11.4 (L) 09/28/2023    HGB 13.3 11/16/2015    HGB 12.7 10/06/2015    HGB 13.4 04/27/2015    HCT 39.8 11/08/2023    HCT 37.4 10/06/2023    HCT 36.4 09/28/2023    HCT 40.4 11/16/2015    HCT 37.3 10/06/2015    HCT 39.5 04/27/2015     (H) 11/08/2023     (H) 10/06/2023     (H) 09/28/2023  (H) 11/16/2015     (H) 10/06/2015     (H) 04/27/2015    INR 1.03 11/08/2023    INR 1.09 06/22/2023    INR 0.96 02/15/2023    INR 2.39 (H) 12/28/2015    INR 3.63 (H) 12/17/2015    INR 2.57 (H) 11/16/2015    IRON 13 (L) 07/14/2023    IRON 57 08/09/2022    IRON 90 11/24/2017    IRON 76 11/16/2015    IRON 90 04/27/2015    IRON 81 09/25/2014    FERRITIN 877 (H) 11/08/2023    FERRITIN 18 07/14/2023    FERRITIN 9 (L) 06/15/2023    FERRITIN 108 11/16/2015    FERRITIN 158.2 04/27/2015    FERRITIN 177.4 09/25/2014    TIBC 387 07/14/2023    TIBC 361 08/09/2022    TIBC 285 11/24/2017    TIBC 312 11/16/2015    TIBC 326 04/27/2015    TIBC 265 09/25/2014      Lab Results   Component Value Date    CHOLESTEROL 185 12/09/2023    CHOLESTEROL 183 04/07/2023    CHOLESTEROL 174 03/19/2022    TRIG 90 12/09/2023    TRIG 137 04/07/2023    TRIG 38 03/19/2022    TRIG 83 12/17/2015    TRIG 124 04/27/2015    TRIG 69 09/25/2014    HDL 70 12/09/2023    HDL 67 04/07/2023    HDL 74 03/19/2022    HDL 58 12/17/2015    HDL 59 04/27/2015    HDL 82 09/25/2014    LDLCALC 97 12/09/2023    LDLCALC 89 04/07/2023    LDLCALC 92 03/19/2022    LDLCALC 92 12/17/2015    LDLCALC 96 04/27/2015    LDLCALC 108 (H) 09/25/2014     Lab Results   Component Value Date    HGBA1C 6.2 (H) 08/11/2023    HGBA1C 6.4 (H) 04/07/2023    HGBA1C 6.1 (H) 08/09/2022    HGBA1C 5.6 12/17/2015    HGBA1C 6.2 (H) 09/14/2015    HGBA1C 6.1 (H) 09/03/2015    GLUF 101 (H) 12/09/2023    GLUF 129 (H) 09/19/2023    GLUF 98 09/01/2023    POCGLU 106 12/04/2019     No results found for: "TSH", "FT4"  No results found for: "HSTNI0", "HSTNI2", "HSTNI4", "TROPONINI"  No results found for: "BNP", "NTBNP"     Imaging:  I have personally reviewed pertinent reports. Mammo diagnostic right w 3d & cad    Result Date: 11/20/2023  Narrative: DIAGNOSIS:   TECHNIQUE: Digital diagnostic mammography was performed. Computer Aided Detection (CAD) analyzed all applicable images.  COMPARISONS: Prior breast imaging dated: 11/21/2022, 01/26/2022, 11/18/2021, 02/23/2021, 12/17/2020, 12/17/2020, 12/17/2020, 12/09/2020, 11/17/2020, 11/07/2019, 11/07/2019, 10/17/2019, 09/24/2019, 06/18/2018, 05/08/2017, 05/06/2016, 01/02/2015, 12/31/2014, 12/27/2013, 12/17/2012, and 12/16/2011 RELEVANT HISTORY: Family Breast Cancer History: No known family history of breast cancer. Family Medical History: No known relevant family medical history. Personal History: Hormone history includes hormone replacement therapy and birth control. Surgical history includes breast biopsy, mastectomy, and hysterectomy. Medical history includes breast cancer. RISK ASSESSMENT: [facility] risk assessment reporting was suppressed due to the patient's history and/or demographic factors. TISSUE DENSITY: The breasts are heterogeneously dense, which may obscure small masses. INDICATION: Kevin Morley is a 77 y.o. female presenting for annual right mammogram.  Patient is status post left mastectomy in February 2021. FINDINGS: There are no suspicious masses, grouped microcalcifications or areas of unexplained architectural distortion. The skin and nipple areolar complex are unremarkable. There are benign vascular calcifications. Deep brain stimulator projects over the right axilla and limits evaluation in this region. Impression:  Benign findings.   No mammographic evidence of malignancy in the right breast. ASSESSMENT/BI-RADS CATEGORY: Right: 2 - Benign Overall: 2 - Benign RECOMMENDATION:      - Diagnostic mammogram in 1 year for the right breast. Workstation ID: [id number]       Cardiac Studies:  EKG:   Date: 05/30/00, normal sinus rhythm  Date: 07/06/00, normal sinus rhythm  Date: 09/18/00, normal sinus rhythm, PAC, nonspecific T wave findings  Date: 06/04/03, normal sinus rhythm  Date: 06/10/08, normal sinus rhythm  Date: 09/01/12, normal sinus rhythm, 1st degree AV block, irbbb, rightward axis  Date: 04/18/14, normal sinus rhythm, 1st degree AV block, nonspecific ST findings  Date: 01/11/21, normal sinus rhythm, 1st degree AV block, PVCs  Date: 02/23/21, normal sinus rhythm, PAC, PVC  Date: 02/15/23, normal sinus rhythm, 1st degree AV block, PVCs  Date: 06/22/23, sinus tachycardia, 1st degree AV block, PACs, nonspecific ST and T waves findings    Tele:   No results found for this or any previous visit. Holter:   24H Holter Study Date: 04/03/23  Impression  Abnormal Holter  Low burden of premature ventricular contractions, and no ventricular runs  Moderate to high burden of premature atrial contractions with 23 runs of nonsustained paroxysmal atrial tachycardia    24H Holter Study Date: 12/09/21  IMPRESSION:  Sinus rhythm with rare PACs and PVCs. No significant arrhythmias identified. No SVT. 24H Holter Study Date: 08/24/21  IMPRESSION:  Abnormal 24 hour holter monitor. Patient in normal sinus rhythm throughout holter monitoring. Occasional premature ventricular contractions with no non-sustained ventricular tachycardia. Occasional premature atrial contractions with a 5 beat run of atrial tachycardia. No significant pauses. No symptoms noted in diary. 24H Holter Study Date: 09/04/20  IMPRESSION:  Predominantly normal sinus rhythm, with an average heart rate of 66 beats per minute  No evidence of SVT during monitoring period. Frequent premature atrial contractions, constituting 2.1% of total beats  Occasional premature ventricular contractions  No significant pauses or advanced degree heart block    24H Holter Study Date: 04/02/19  IMPRESSION:  1) Borderline Holter monitor of 24 hrs duration. 2) Normal burden of ventricular and supraventricular ectopic beats. 3) Brief asymptomatic runs of supraventricular ectopy, longest 6 beats in duration. 24H Holter Study Date: 10/18/17  IMPRESSION:  Unremarkable 24 hour Holter monitor    Recent Device Check:  No results found for this or any previous visit.     Previous EPS/Interventions:  No results found for this or any previous visit. Previous Cath/PCI:  No results found for this or any previous visit. Previous STRESS TEST:  No results found for this or any previous visit. ECHO:  Results for orders placed during the hospital encounter of 12/23/22    Echo complete w/ contrast if indicated    Interpretation Summary  •  Left Ventricle: Left ventricular cavity size is normal. Wall thickness is normal. The left ventricular ejection fraction is 65%. Systolic function is normal. Diastolic function is mildly abnormal, consistent with grade I (abnormal) relaxation. SAVANAH:  No results found for this or any previous visit. CMR:  No results found for this or any previous visit. Counseling / Coordination of Care:  During our visit, I spent 40 minutes with the patient, and greater than 50% of this time was spent on counseling and coordination of care, including addressing diagnostic results, prognosis, risks and benefits of treatment options, instructions for management, patient/family education, importance of treatment compliance, along with risk factor reductions. Dictation Disclaimer: This note was completed in part utilizing [employer] direct voice recognition software. Grammatical errors, random word insertion, spelling mistakes, and incomplete sentences may be an occasional consequence of the system secondary to software limitations, ambient noise and hardware issues. At the time of dictation, efforts were made to edit, clarify and /or correct errors. Please read the chart carefully and recognize, using context, where substitutions have occurred. If you have any questions or concerns about the context, text or information contained within the body of this dictation, please contact myself, the provider, for further clarification.      Mayco Joseph, DO 12/16/23

## 2023-12-04 ENCOUNTER — TELEPHONE (OUTPATIENT)
Dept: NEPHROLOGY | Facility: CLINIC | Age: 78
End: 2023-12-04

## 2023-12-05 ENCOUNTER — TELEPHONE (OUTPATIENT)
Dept: HEMATOLOGY ONCOLOGY | Facility: CLINIC | Age: 78
End: 2023-12-05

## 2023-12-05 NOTE — TELEPHONE ENCOUNTER
Patient Call    Who are you speaking with? Phylicia Prescott     If it is not the patient, are they listed on an active communication consent form? na   What is the reason for this call? Rep calling to request  Bernydne Bring send a script for a masectomy prosthesis quantity =1; please add diagnosis code on script. [facility] [phone]    Does this require a call back? Only if there are questions   If a call back is required, please list best call back number [phone]   If a call back is required, advise that a message will be forwarded to their care team and someone will return their call as soon as possible. Did you relay this information to the patient? Yes, Returning call that they never received the fax. Surgery Surgery Trauma Surgery

## 2023-12-06 ENCOUNTER — OFFICE VISIT (OUTPATIENT)
Dept: CARDIOLOGY CLINIC | Facility: CLINIC | Age: 78
End: 2023-12-06
Payer: MEDICARE

## 2023-12-06 VITALS
WEIGHT: 147.2 LBS | HEIGHT: 63 IN | DIASTOLIC BLOOD PRESSURE: 72 MMHG | HEART RATE: 66 BPM | SYSTOLIC BLOOD PRESSURE: 108 MMHG | BODY MASS INDEX: 26.08 KG/M2

## 2023-12-06 DIAGNOSIS — I10 HYPERTENSION, UNSPECIFIED TYPE: ICD-10-CM

## 2023-12-06 DIAGNOSIS — M51.36 LUMBAR DEGENERATIVE DISC DISEASE: ICD-10-CM

## 2023-12-06 DIAGNOSIS — Z85.3 HISTORY OF LEFT BREAST CANCER: ICD-10-CM

## 2023-12-06 DIAGNOSIS — I47.10 PSVT (PAROXYSMAL SUPRAVENTRICULAR TACHYCARDIA): Primary | ICD-10-CM

## 2023-12-06 DIAGNOSIS — I73.9 PERIPHERAL VASCULAR DISEASE (HCC): ICD-10-CM

## 2023-12-06 DIAGNOSIS — Z90.12 S/P LEFT MASTECTOMY: ICD-10-CM

## 2023-12-06 DIAGNOSIS — E66.3 OVERWEIGHT (BMI 25.0-29.9): ICD-10-CM

## 2023-12-06 DIAGNOSIS — E78.5 HYPERLIPIDEMIA, UNSPECIFIED HYPERLIPIDEMIA TYPE: ICD-10-CM

## 2023-12-06 DIAGNOSIS — I47.19 PAT (PAROXYSMAL ATRIAL TACHYCARDIA): ICD-10-CM

## 2023-12-06 DIAGNOSIS — R73.03 PREDIABETES: ICD-10-CM

## 2023-12-06 DIAGNOSIS — Z96.89 S/P DEEP BRAIN STIMULATOR PLACEMENT: ICD-10-CM

## 2023-12-06 DIAGNOSIS — R60.9 EDEMA, UNSPECIFIED TYPE: ICD-10-CM

## 2023-12-06 DIAGNOSIS — Z86.718 HISTORY OF VENOUS THROMBOSIS: ICD-10-CM

## 2023-12-06 PROCEDURE — 99214 OFFICE O/P EST MOD 30 MIN: CPT | Performed by: INTERNAL MEDICINE

## 2023-12-06 RX ORDER — GABAPENTIN 600 MG/1
TABLET ORAL
Qty: 360 TABLET | Refills: 2
Start: 2023-12-06

## 2023-12-09 ENCOUNTER — LAB (OUTPATIENT)
Dept: LAB | Facility: CLINIC | Age: 78
End: 2023-12-09
Payer: MEDICARE

## 2023-12-09 DIAGNOSIS — E87.1 HYPONATREMIA: ICD-10-CM

## 2023-12-09 DIAGNOSIS — E78.2 MIXED HYPERLIPIDEMIA: ICD-10-CM

## 2023-12-09 LAB
ALBUMIN SERPL BCP-MCNC: 3.8 G/DL (ref 3.5–5)
ALP SERPL-CCNC: 64 U/L (ref 34–104)
ALT SERPL W P-5'-P-CCNC: 17 U/L (ref 7–52)
ANION GAP SERPL CALCULATED.3IONS-SCNC: 7 MMOL/L
AST SERPL W P-5'-P-CCNC: 23 U/L (ref 13–39)
BILIRUB SERPL-MCNC: 0.4 MG/DL (ref 0.2–1)
BUN SERPL-MCNC: 10 MG/DL (ref 5–25)
CALCIUM SERPL-MCNC: 9.4 MG/DL (ref 8.4–10.2)
CHLORIDE SERPL-SCNC: 99 MMOL/L (ref 96–108)
CHOLEST SERPL-MCNC: 185 MG/DL
CO2 SERPL-SCNC: 31 MMOL/L (ref 21–32)
CREAT SERPL-MCNC: 0.68 MG/DL (ref 0.6–1.3)
GFR SERPL CREATININE-BSD FRML MDRD: 83 ML/MIN/1.73SQ M
GLUCOSE P FAST SERPL-MCNC: 101 MG/DL (ref 65–99)
HDLC SERPL-MCNC: 70 MG/DL
LDLC SERPL CALC-MCNC: 97 MG/DL (ref 0–100)
NONHDLC SERPL-MCNC: 115 MG/DL
POTASSIUM SERPL-SCNC: 3.5 MMOL/L (ref 3.5–5.3)
PROT SERPL-MCNC: 5.9 G/DL (ref 6.4–8.4)
SODIUM SERPL-SCNC: 137 MMOL/L (ref 135–147)
TRIGL SERPL-MCNC: 90 MG/DL

## 2023-12-09 PROCEDURE — 36415 COLL VENOUS BLD VENIPUNCTURE: CPT

## 2023-12-09 PROCEDURE — 80061 LIPID PANEL: CPT

## 2023-12-09 PROCEDURE — 80053 COMPREHEN METABOLIC PANEL: CPT

## 2023-12-11 ENCOUNTER — PROCEDURE VISIT (OUTPATIENT)
Dept: NEUROLOGY | Facility: CLINIC | Age: 78
End: 2023-12-11
Payer: MEDICARE

## 2023-12-11 VITALS
HEART RATE: 65 BPM | SYSTOLIC BLOOD PRESSURE: 134 MMHG | BODY MASS INDEX: 26.29 KG/M2 | WEIGHT: 148.4 LBS | DIASTOLIC BLOOD PRESSURE: 63 MMHG | HEIGHT: 63 IN | OXYGEN SATURATION: 97 % | TEMPERATURE: 97.9 F

## 2023-12-11 DIAGNOSIS — Z96.89 S/P DEEP BRAIN STIMULATOR PLACEMENT: ICD-10-CM

## 2023-12-11 DIAGNOSIS — G25.0 ESSENTIAL TREMOR: Primary | ICD-10-CM

## 2023-12-11 DIAGNOSIS — N39.0 URINARY TRACT INFECTION WITHOUT HEMATURIA, SITE UNSPECIFIED: ICD-10-CM

## 2023-12-11 PROCEDURE — 95983 ALYS BRN NPGT PRGRMG 15 MIN: CPT | Performed by: PSYCHIATRY & NEUROLOGY

## 2023-12-11 PROCEDURE — 99213 OFFICE O/P EST LOW 20 MIN: CPT | Performed by: PSYCHIATRY & NEUROLOGY

## 2023-12-11 PROCEDURE — 95984 ALYS BRN NPGT PRGRMG ADDL 15: CPT | Performed by: PSYCHIATRY & NEUROLOGY

## 2023-12-11 NOTE — ASSESSMENT & PLAN NOTE
Essential tremor complicated by dysarthria which may be in part stimulation induced. Mild breakthrough postural and action tremors on the left greater than right hand which have been affecting her carrying objects and daily activities. She wishes therefore to make small changes to optimize results. She understands that this may cause worsening speech. She is noted on exam to have mild right leg rest tremor only when upper extremities are activated. No associated bradykinesia or changes in gait to suggest another underlying neurodegenerative condition such as parkinsonism. Deep brain stimulator was interrogated. Mild changes were made to amplitude bilaterally with improvement in tremor. See body of the clinical note for details. Will contact the office prior to follow-up should any issues arise. Total time spent on deep brain stimulator interrogation, evaluation, programming, documentation was 30 minutes.

## 2023-12-11 NOTE — PROGRESS NOTES
Patient ID: Edi Cole is a 77 y.o. female    Assessment/Plan:    Essential tremor  Essential tremor complicated by dysarthria which may be in part stimulation induced. Mild breakthrough postural and action tremors on the left greater than right hand which have been affecting her carrying objects and daily activities. She wishes therefore to make small changes to optimize results. She understands that this may cause worsening speech. She is noted on exam to have mild right leg rest tremor only when upper extremities are activated. No associated bradykinesia or changes in gait to suggest another underlying neurodegenerative condition such as parkinsonism. Deep brain stimulator was interrogated. Mild changes were made to amplitude bilaterally with improvement in tremor. See body of the clinical note for details. Will contact the office prior to follow-up should any issues arise. Total time spent on deep brain stimulator interrogation, evaluation, programming, documentation was 30 minutes. Diagnoses and all orders for this visit:    Essential tremor    S/P deep brain stimulator placement        Subjective:      Jason Tejada is a left handed female with peripheral neuropathy, spinal stenosis, anxiety and essential tremor s/p bilateral VIM DBS on 5/1/14 with right Activa RC 6/20/17, left RC 12/4/19, who presents for follow up. To review, tremors began in her late 52's with a mild intentional tremor on the left. This has progressed with time and spread to the right side as well. Tremors noted to be slightly worse. Tremor is worse on the right. She has more difficulty to use left hand for activities such as eating. No dysphagia. Tremor is more bothersome than any speech changes and she wishes to make adjustments to optimize this. She also noted some changes in speech. She will find herself having to work to get the words out at times. No changes in gait. She has mild imbalance. No change. She has knee pain. Neuropathic symptoms are unchanged. No cognitive changes. Objective:    /63 (BP Location: Right arm, Patient Position: Sitting, Cuff Size: Adult)   Pulse 65   Temp 97.9 °F (36.6 °C) (Temporal)   Ht 5' 3" (1.6 m)   Wt 67.3 kg (148 lb 6.4 oz)   SpO2 97%   BMI 26.29 kg/m²       Physical Exam  Vitals reviewed. Eyes:      Extraocular Movements: Extraocular movements intact. Neurological:      Cranial Nerves: Dysarthria present. Motor: Motor strength is normal.        Neurological Exam  Mental Status   Oriented to person, place, time and situation. Recent and remote memory are intact. Mild dysarthria present. Follows complex commands. Attention and concentration are normal.    Cranial Nerves  CN III, IV, VI: Extraocular movements intact bilaterally. CN VII: Full and symmetric facial movement. CN VIII: Hearing is normal.  CN IX, X: Palate elevates symmetrically  CN XI: Shoulder shrug strength is normal.  CN XII: Tongue midline without atrophy or fasciculations. Motor   Normal muscle tone. Strength is 5/5 throughout all four extremities. Sensory  Light touch is normal in upper and lower extremities. Coordination  Right: Rapid alternating movement normal.Left: Rapid alternating movement normal.  FTN: Right 2 mild  Left 2 moderate  Mild postural tremor 1,1  Right right finger gets stuck with flexion due to tendon movement over knuckle (sees ortho)  No bradykinesia  Right leg rest tremor only when hands activated   . Gait  Casual gait is normal including stance, stride, and arm swing. Able to rise from chair without using arms.        Deep brain stimulator interrogation:     Right VIM   Activa RC    Implanted: June 20, 2017  HARMEET: June 17, 2031  Battery: 75%  Impedance:   Charging 75%  No info on charging     Impedance:  P1: 794ohms, 5.4 mA  P2: 984 ohms, 3.9 mA      Group C  P1: 0+1-, PW90, 125Hz, 4.3V (3.3-4.5V)  P2: 0+2-, PW90, 125Hz, 3.8V -  Increased with good control  Final:  P1: 0+1-, PW90, 125Hz, 4.4V (3.4-4.6V)  P2: 0+2-, PW90, 125Hz, 3.9V     Imp   P1   794ohms, 5.5mA  P2  989 ohms, 4.0mA     Left VIM  Activa [place] [id number]  [id number]  Iplanted: Dec 4, 2019  HARMEET: Nov 30, 2033  Battery: 100%  Impedance: ok     Program imp: 1014 ohms, 2.5 mA    Active Group B  3+1-, PW70, 185Hz, 2.5V (0-3.1)      Active Group B  3+1-, PW70, 185Hz, 2.6V (0-3.1)     Prior Group A  3+1-,  PW80, 185Hz, 2.3V  Imp 1011, 2.6mA           Bill Green MD  Movement disorder physician  [facility]

## 2023-12-11 NOTE — PROGRESS NOTES
Review of Systems   Constitutional:  Negative for appetite change, fatigue and fever. HENT: Negative. Negative for hearing loss, tinnitus, trouble swallowing and voice change. Eyes: Negative. Negative for photophobia, pain and visual disturbance. Respiratory: Negative. Negative for shortness of breath. Cardiovascular: Negative. Negative for palpitations. Gastrointestinal: Negative. Negative for nausea and vomiting. Endocrine: Negative. Negative for cold intolerance. Genitourinary: Negative. Negative for dysuria, frequency and urgency. Musculoskeletal:  Negative for back pain, gait problem, myalgias and neck pain. Skin: Negative. Negative for rash. Allergic/Immunologic: Negative. Neurological:  Positive for tremors. Negative for dizziness, seizures, syncope, facial asymmetry, speech difficulty, light-headedness and headaches. Hematological: Negative. Does not bruise/bleed easily. Psychiatric/Behavioral: Negative. Negative for confusion, hallucinations and sleep disturbance. All other systems reviewed and are negative.

## 2023-12-12 ENCOUNTER — APPOINTMENT (OUTPATIENT)
Dept: LAB | Facility: HOSPITAL | Age: 78
End: 2023-12-12
Payer: MEDICARE

## 2023-12-13 ENCOUNTER — TELEPHONE (OUTPATIENT)
Dept: NEPHROLOGY | Facility: CLINIC | Age: 78
End: 2023-12-13

## 2023-12-13 ENCOUNTER — OFFICE VISIT (OUTPATIENT)
Dept: NEPHROLOGY | Facility: CLINIC | Age: 78
End: 2023-12-13
Payer: MEDICARE

## 2023-12-13 VITALS
WEIGHT: 150.6 LBS | HEIGHT: 63 IN | SYSTOLIC BLOOD PRESSURE: 130 MMHG | BODY MASS INDEX: 26.68 KG/M2 | HEART RATE: 59 BPM | DIASTOLIC BLOOD PRESSURE: 60 MMHG

## 2023-12-13 DIAGNOSIS — I10 ESSENTIAL HYPERTENSION: ICD-10-CM

## 2023-12-13 DIAGNOSIS — R19.7 DIARRHEA, UNSPECIFIED TYPE: ICD-10-CM

## 2023-12-13 DIAGNOSIS — E87.1 HYPONATREMIA: ICD-10-CM

## 2023-12-13 DIAGNOSIS — E22.2 SIADH (SYNDROME OF INAPPROPRIATE ADH PRODUCTION) (HCC): Primary | ICD-10-CM

## 2023-12-13 PROCEDURE — 99214 OFFICE O/P EST MOD 30 MIN: CPT | Performed by: INTERNAL MEDICINE

## 2023-12-13 RX ORDER — TORSEMIDE 10 MG/1
10 TABLET ORAL DAILY PRN
Qty: 90 TABLET | Refills: 2 | Status: SHIPPED | OUTPATIENT
Start: 2023-12-13

## 2023-12-13 NOTE — TELEPHONE ENCOUNTER
Patients pharmacy called and stated that the patient has a possible allergy to the Torsemide. Please advise if you'd like to override the possible allergy and I will call the pharmacy back.

## 2023-12-13 NOTE — TELEPHONE ENCOUNTER
Called patients pharmacy back and let them know the following from Dr. Gianna Ladd:    She has been taking it without issue. Ok to refill. Pharmacy had no further questions.

## 2023-12-13 NOTE — PROGRESS NOTES
NEPHROLOGY OUTPATIENT PROGRESS NOTE   Domi Vargas 77 y.o. female MRN: [id number]  Reason for visit: Hyponatremia    ASSESSMENT and PLAN:  Hyponatremia: Multifactorial, suspecting underlying SIADH, history of ulcerative colitis with J-pouch and chronic loose bowel movements. Also with lower extremity edema at times. Baseline sodium has been fairly fluctuant between mid 120s to mid 130s. Recent value of 137. Recommend continuation of sodium chloride tablets currently taking 4 g daily. Left lower extremity edema, recommend more maintenance or scheduled dosing of torsemide at least 3-4 times per week  Hypertension blood pressure overall appears stable    Overall sodium level recently improved at 137  Recommend continued torsemide although again more scheduled dosing 3-4 times per week  Recommend repeating laboratory studies in 3 months assuming stable we will follow-up in 6 months with repeat labs at that time. SUBJECTIVE / INTERVAL HISTORY:  She has been doing reasonably well. Denies any recent hospitalizations. Denies any chest pain, shortness of breath. She does have chronic left lower extremity edema. OBJECTIVE:  /60 (BP Location: Left arm, Patient Position: Sitting, Cuff Size: Standard)   Pulse 59   Ht 5' 3" (1.6 m)   Wt 68.3 kg (150 lb 9.6 oz)   BMI 26.68 kg/m²   Vitals:    12/13/23 0859   Weight: 68.3 kg (150 lb 9.6 oz)       Physical Exam  Constitutional:       Appearance: She is not ill-appearing. Eyes:      General: No scleral icterus. Cardiovascular:      Rate and Rhythm: Normal rate and regular rhythm. Pulmonary:      Effort: Pulmonary effort is normal.      Breath sounds: Normal breath sounds. Abdominal:      General: There is no distension. Palpations: Abdomen is soft. Tenderness: There is no abdominal tenderness. Musculoskeletal:      Right lower leg: No edema. Left lower leg: Edema present. Skin:     General: Skin is warm and dry.       Findings: No rash.   Neurological:      Mental Status: She is alert and oriented to person, place, and time.            Medications:    Current Outpatient Medications:     albuterol (PROVENTIL HFA,VENTOLIN HFA) 90 mcg/act inhaler, Inhale 2 puffs every 6 (six) hours as needed for wheezing, Disp: 8 g, Rfl: 1    amLODIPine (NORVASC) 2.5 mg tablet, TAKE ONE TABLET BY MOUTH EVERY DAY, Disp: 90 tablet, Rfl: 3    ammonium lactate (LAC-HYDRIN) 12 % cream, , Disp: , Rfl:     apixaban (Eliquis) 5 mg, Take 1 tablet (5 mg total) by mouth 2 (two) times a day, Disp: 180 tablet, Rfl: 1    Calcium Carb-Cholecalciferol (CALCIUM 600-D PO), Take 1 tablet by mouth 2 (two) times a day, Disp: , Rfl:     Coenzyme Q10 (CO Q-10 PO), Take 1 capsule by mouth daily, Disp: , Rfl:     diltiazem (CARDIZEM CD) 180 mg 24 hr capsule, TAKE ONE CAPSULE BY MOUTH EVERY DAY, Disp: 90 capsule, Rfl: 1    diltiazem (CARDIZEM) 60 mg tablet, TAKE ONE TABLET BY MOUTH EVERY DAY, Disp: 90 tablet, Rfl: 1    escitalopram (LEXAPRO) 20 mg tablet, Take 1 tablet (20 mg total) by mouth daily, Disp: 90 tablet, Rfl: 1    fluticasone (FLONASE) 50 mcg/act nasal spray, APPLY ONE SPRAY IN EACH NOSTRIL ONCE DAILY AS NEEDED FOR RHINITIS, Disp: 48 g, Rfl: 1    Fluticasone Furoate-Vilanterol 100-25 mcg/actuation inhaler, Inhale 1 puff daily Rinse mouth after use., Disp: 60 blister, Rfl: 5    gabapentin (NEURONTIN) 600 MG tablet, 600 mg in AM, 300 mg at Noon, 900 mg at bedtime, Disp: 360 tablet, Rfl: 2    HYDROcodone-acetaminophen (NORCO) 5-325 mg per tablet, Take 1 tablet by mouth every 6 (six) hours as needed for pain Max Daily Amount: 4 tablets, Disp: 120 tablet, Rfl: 0    levothyroxine 50 mcg tablet, Take 1 tablet (50 mcg total) by mouth daily, Disp: 90 tablet, Rfl: 1    lisinopril (ZESTRIL) 20 mg tablet, TAKE ONE TABLET BY MOUTH TWICE A DAY, Disp: 180 tablet, Rfl: 1    LORazepam (ATIVAN) 1 mg tablet, Take 1 tablet (1 mg total) by mouth every 6 (six) hours as needed for anxiety, Disp: 120 tablet, Rfl: 0    MAGNESIUM PO, Take 1 tablet by mouth daily, Disp: , Rfl:     Multiple Vitamin (MULTIVITAMIN ADULT PO), Take 1 tablet by mouth daily, Disp: , Rfl:     Omega-3 Fatty Acids (FISH OIL PO), Take 1 capsule by mouth daily, Disp: , Rfl:     pantoprazole (PROTONIX) 40 mg tablet, Take 1 tablet (40 mg total) by mouth 2 (two) times a day, Disp: 60 tablet, Rfl: 5    potassium chloride (MICRO-K) 10 MEQ CR capsule, TAKE TWO CAPSULES BY MOUTH EVERY DAY, Disp: 180 capsule, Rfl: 3    simvastatin (ZOCOR) 10 mg tablet, Take 0.5 tablets (5 mg total) by mouth daily, Disp: 45 tablet, Rfl: 1    sodium chloride 1 g tablet, Take 1 tablet (1 g total) by mouth 4 (four) times a day, Disp: 270 tablet, Rfl: 3    torsemide (DEMADEX) 10 mg tablet, Take 1 tablet (10 mg total) by mouth daily as needed (edema), Disp: 90 tablet, Rfl: 2    vitamin B-12 (VITAMIN B-12) 500 mcg tablet, Take 1 tablet (500 mcg total) by mouth daily, Disp: 90 tablet, Rfl: 1    Current Facility-Administered Medications:     cyanocobalamin injection 1,000 mcg, 1,000 mcg, Intramuscular, Q30 Days, Kacie Ac MD, 1,000 mcg at 04/20/23 1012    Laboratory Results:  Results for orders placed or performed in visit on 12/09/23   Lipid panel   Result Value Ref Range    Cholesterol 185 See Comment mg/dL    Triglycerides 90 See Comment mg/dL    HDL, Direct 70 >=50 mg/dL    LDL Calculated 97 0 - 100 mg/dL    Non-HDL-Chol (CHOL-HDL) 115 mg/dl   Comprehensive metabolic panel   Result Value Ref Range    Sodium 137 135 - 147 mmol/L    Potassium 3.5 3.5 - 5.3 mmol/L    Chloride 99 96 - 108 mmol/L    CO2 31 21 - 32 mmol/L    ANION GAP 7 mmol/L    BUN 10 5 - 25 mg/dL    Creatinine 0.68 0.60 - 1.30 mg/dL    Glucose, Fasting 101 (H) 65 - 99 mg/dL    Calcium 9.4 8.4 - 10.2 mg/dL    AST 23 13 - 39 U/L    ALT 17 7 - 52 U/L    Alkaline Phosphatase 64 34 - 104 U/L    Total Protein 5.9 (L) 6.4 - 8.4 g/dL    Albumin 3.8 3.5 - 5.0 g/dL    Total Bilirubin 0.40 0.20 - 1.00 mg/dL eGFR 83 ml/min/1.73sq m     *Note: Due to a large number of results and/or encounters for the requested time period, some results have not been displayed. A complete set of results can be found in Results Review.

## 2023-12-14 LAB — BACTERIA UR CULT: NORMAL

## 2023-12-18 ENCOUNTER — RA CDI HCC (OUTPATIENT)
Dept: OTHER | Facility: HOSPITAL | Age: 78
End: 2023-12-18

## 2023-12-21 ENCOUNTER — HOSPITAL ENCOUNTER (OUTPATIENT)
Dept: NON INVASIVE DIAGNOSTICS | Facility: CLINIC | Age: 78
Discharge: HOME | End: 2023-12-21
Payer: MEDICARE

## 2023-12-21 VITALS
SYSTOLIC BLOOD PRESSURE: 130 MMHG | WEIGHT: 150 LBS | BODY MASS INDEX: 26.58 KG/M2 | HEART RATE: 58 BPM | DIASTOLIC BLOOD PRESSURE: 60 MMHG | HEIGHT: 63 IN

## 2023-12-21 DIAGNOSIS — I47.19 PAT (PAROXYSMAL ATRIAL TACHYCARDIA): ICD-10-CM

## 2023-12-21 DIAGNOSIS — I47.10 PSVT (PAROXYSMAL SUPRAVENTRICULAR TACHYCARDIA): ICD-10-CM

## 2023-12-21 LAB
AORTIC ROOT: 2.9 CM
APICAL FOUR CHAMBER EJECTION FRACTION: 60 %
E WAVE DECELERATION TIME: 208 MS
E/A RATIO: 1.42
FRACTIONAL SHORTENING: 41 (ref 28–44)
INTERVENTRICULAR SEPTUM IN DIASTOLE (PARASTERNAL SHORT AXIS VIEW): 0.9 CM
INTERVENTRICULAR SEPTUM: 0.9 CM (ref 0.6–1.1)
LAAS-AP2: 21.2 CM2
LAAS-AP4: 18.5 CM2
LEFT ATRIUM SIZE: 4.2 CM
LEFT ATRIUM VOLUME (MOD BIPLANE): 54 ML
LEFT ATRIUM VOLUME INDEX (MOD BIPLANE): 31.6 ML/M2
LEFT INTERNAL DIMENSION IN SYSTOLE: 2.4 CM (ref 2.1–4)
LEFT VENTRICULAR INTERNAL DIMENSION IN DIASTOLE: 4.1 CM (ref 3.5–6)
LEFT VENTRICULAR POSTERIOR WALL IN END DIASTOLE: 0.8 CM
LEFT VENTRICULAR STROKE VOLUME: 55 ML
LVSV (TEICH): 55 ML
MV E'TISSUE VEL-LAT: 11 CM/S
MV E'TISSUE VEL-SEP: 9 CM/S
MV PEAK A VEL: 0.76 M/S
MV PEAK E VEL: 108 CM/S
MV STENOSIS PRESSURE HALF TIME: 60 MS
MV VALVE AREA P 1/2 METHOD: 3.67
RA PRESSURE ESTIMATED: 10 MMHG
RIGHT ATRIUM AREA SYSTOLE A4C: 10.7 CM2
RIGHT VENTRICLE ID DIMENSION: 3.3 CM
RV PSP: 20 MMHG
SL CV LEFT ATRIUM LENGTH A2C: 6.5 CM
SL CV LV EF: 60
SL CV PED ECHO LEFT VENTRICLE DIASTOLIC VOLUME (MOD BIPLANE) 2D: 75 ML
SL CV PED ECHO LEFT VENTRICLE SYSTOLIC VOLUME (MOD BIPLANE) 2D: 20 ML
TR MAX PG: 10 MMHG
TR PEAK VELOCITY: 1.6 M/S
TRICUSPID ANNULAR PLANE SYSTOLIC EXCURSION: 2.2 CM
TRICUSPID VALVE PEAK REGURGITATION VELOCITY: 1.59 M/S

## 2023-12-21 PROCEDURE — 93306 TTE W/DOPPLER COMPLETE: CPT | Performed by: INTERNAL MEDICINE

## 2023-12-21 PROCEDURE — 93306 TTE W/DOPPLER COMPLETE: CPT

## 2023-12-22 ENCOUNTER — TELEPHONE (OUTPATIENT)
Dept: NEPHROLOGY | Facility: CLINIC | Age: 78
End: 2023-12-22

## 2023-12-22 NOTE — TELEPHONE ENCOUNTER
Received call from pt stating she has been dealing with edema bad in her left foot and now she has been experiencing pain when she walks and moves foot for the past two days, she has been taking torsemide since last visit with Dr. Smiley. Pt is concerned and would like call back with what to do. Please advise.

## 2023-12-25 NOTE — PROGRESS NOTES
1. Primary osteoarthritis of right knee        Orders Placed This Encounter   Procedures    Large joint arthrocentesis: R knee    Injection Procedure Prior Authorization     New Medications Ordered This Visit   Medications    bupivacaine (MARCAINE) 0.25 % injection 4 mL    triamcinolone acetonide (KENALOG-40) 40 mg/mL injection 40 mg        IMAGING STUDIES: (I personally reviewed images in PACS and report):     PAST REPORTS:    XR left knee 12/21/2021  There is mild to moderate medial and patellofemoral compartment joint space narrowing.  There are small marginal osteophytes at the medial, lateral and patellofemoral compartments.      XR right knee 12/21/2021  Tricompartmental osteoarthritic degenerative changes of the right knee with moderate to severe lateral compartment joint space narrowing.     ASSESSMENT/PLAN:  Moderate to severe tricompartmental OA of right knee     Repeat X-ray next visit: None    Return in about 4 months (around 4/26/2024) for Ultrasound guided visco supplement injection right knee.    Patient instructions below verbally summarized in person during encounter:  Patient Instructions     Ulrasound-guided Corticosteroid injection to right knee joint administered today.    Red flags and risks of injection include but are not limited to infection <0.072% as referenced in some sources, nerve or artery penetration, and if steroids are used-skin dimpling <1%, hypo-pigmentation <1%.  Keep the injection area clean and dry for the next 24 hours. Avoid submerging underwater in a tub, pool, ocean, lake, jacuzzi, hot tub, or any other body of water for 1 week until needle wound closes due to risk of infection. May take showers. Clean needle site with soap and water and keep covered at all times with sterile bandage such as a band-aid until fully healed.  If any symptoms including fevers, chills, swelling, or worsening symptoms occur then to call office or go to hospital for immediate care if physician  unavailable due to possible infection or other complication which is a serious medical problem.  May resume regular activities as tolerated and use local ice application or over-the-counter pain medications if there is any discomfort.     __________________________________________________________________________    HISTORY OF PRESENT ILLNESS:    78 y.o. female presents for 2-month follow-up of moderate-severe tricompartmental OA of right knee and for Visco injection of right knee.  Last visit on 10/24/2023 patient was provided [facility] Monovisc injection to her right knee and was recommended to follow-up every 3 months as needed for repeat injection.    10/24/2023 [facility] Monovisc right knee  8/8/2023 [facility] CSI right knee     Since then, patient reports no new injuries. Previous steroid injected provided her a few weeks of relief and visco-supplement provided about 3 months of relief.    12/26/2023:  Today pain is uncontrolled. 6/10 today.     Nonpharmacologic treatment: home exercise program placed on chart            Review of Systems    Following history reviewed and updated:    Past Medical History:   Diagnosis Date    Anemia     Anxiety     Arrhythmia     Arthritis     Asthma     Blood clot in vein     portal vein    Breast cancer (HCC) 02/12/2021    Breast lump     [id number] RESOLVED    Cancer (HCC)     Depression     Disease of thyroid gland     DVT, lower extremity (HCC)     GERD (gastroesophageal reflux disease)     Hyperlipidemia     Hypertension     Hypokalemia     Hyponatremia     Hypothyroidism     Iron deficiency anemia     Irregular heart beat     Manic behavior (HCC)     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HCC)     Osteoarthritis     Palpitations     [id number]  RESOLVED    Paroxysmal supraventricular tachycardia     PE (pulmonary thromboembolism) (HCC)     Sjoegren syndrome     Sleep apnea     Sleep difficulties     Spinal stenosis     Thrombocytosis     [id number]  RESOLVED    Tremors of nervous system     dbs  implanted right and left chest    Ulcerative colitis (HCC)     Vertigo     [id number] RESOLVED     Past Surgical History:   Procedure Laterality Date    ABDOMINAL SURGERY      APPENDECTOMY      BREAST BIOPSY Left 11/07/2019    Stereo    BREAST EXCISIONAL BIOPSY Left     x many years    BREAST SURGERY      lumpectomy & biopsy    CARMELLA HOLE W/ STEREOTACTIC INSERTION OF DBS LEADS / INTRAOP MICROELECTRODE RECORDING      COLON SURGERY      COLONOSCOPY N/A 08/30/2017    Procedure: POUCHOSCOPY;  Surgeon: VANDANA Waters MD;  Location: [facility];  Service: Colorectal    COLOPROCTECTOMY W/ ILEO J POUCH      ESOPHAGOGASTRODUODENOSCOPY      ONSET 10/17/11    FISTULA REPAIR      LLEOANAL FISTULA REPAIR TRANSPERIN TRANSABD APPROACH    HYSTERECTOMY      age 38    ILEOSTOMY CLOSURE      KNEE ARTHROSCOPY      Right    MAMMO STEREOTACTIC BREAST BIOPSY LEFT (ALL INC) Left 11/07/2019    MAMMO STEREOTACTIC BREAST BIOPSY LEFT (ALL INC) Left 12/17/2020    MAMMO STEREOTACTIC BREAST BIOPSY LEFT (ALL INC) EACH ADD Left 12/17/2020    MASTECTOMY Left 02/12/2021    left mastectomy- Dr. Gillespie    MASTECTOMY W/ SENTINEL NODE BIOPSY Left 02/12/2021    Procedure: BREAST MASTECTOMY WITH SENTINEL LYMPH NODE BIOPSY, LYMPHATIC MAPPING WITH BLUE DYE AND RADIAOCTIVE DYE (INJECT AT 1100 BY DR GILLESPIE IN THE OR);  Surgeon: Robbie Gillespie MD;  Location[facility];  Service: Surgical Oncology    ID INSJ/RPLCMT CRANIAL NEUROSTIM PULSE GENERATOR Right 06/20/2017    Procedure: DBS GENERATOR REPLACEMENT;  Surgeon: Marin Mao MD;  Location: [facility];  Service: Neurosurgery    ID INSJ/RPLCMT CRANIAL NEUROSTIM PULSE GENERATOR N/A 12/04/2019    Procedure: REPLACEMENT IMPLANTABLE PULSE GENERATOR FOR DEEP BRAIN STIMULATOR LEFT CHEST;  Surgeon: Damian Batres MD;  Location: [facility];  Service: Neurosurgery    SPLENECTOMY      TONSILLECTOMY       Social History   Social History     Substance and Sexual Activity   Alcohol Use Yes    Alcohol/week: 2.0 standard drinks  "of alcohol    Types: 2 Cans of beer per week    Comment: 2or 3 beers a week     Social History     Substance and Sexual Activity   Drug Use No     Social History     Tobacco Use   Smoking Status Former    Current packs/day: 0.00    Average packs/day: 1 pack/day for 15.0 years (15.0 ttl pk-yrs)    Types: Cigarettes    Start date: 1950    Quit date: 1965    Years since quittin.0   Smokeless Tobacco Never   Tobacco Comments    Quit     Family History   Problem Relation Age of Onset    Venous thrombosis Mother         ACUTE VENOUS THROMBOSIS OF DEEP VESSELS OF THE DISTAL LOWER EXTREMITY    Other Mother         PHLEBITIS    Hypertension Mother     Peripheral vascular disease Mother     COPD Father     Diabetes Father         MELLITUS    Stroke Father     Diabetes Sister         MELLITUS    Sjogren's syndrome Sister     No Known Problems Daughter     No Known Problems Maternal Grandmother     No Known Problems Maternal Grandfather     No Known Problems Paternal Grandmother     No Known Problems Paternal Grandfather     No Known Problems Sister     No Known Problems Maternal Aunt     No Known Problems Paternal Aunt     No Known Problems Son      Allergies   Allergen Reactions    Indomethacin GI Intolerance, Dizziness and Other (See Comments)    Macrobid [Nitrofurantoin Monohyd Macro] Rash    Nitrofurantoin Other (See Comments)    Penicillins Rash and Other (See Comments)     Annotation - [id number]: can take cephalosporins    Sulfa Antibiotics Rash and Other (See Comments)          Physical Exam  /66 (BP Location: Left arm, Patient Position: Sitting, Cuff Size: Standard)   Pulse 67   Ht 5' 3\" (1.6 m)   Wt 68 kg (150 lb)   BMI 26.57 kg/m²       Ortho Exam    RIGHT KNEE:  Erythema: no  Swelling: no  Increased Warmth: no  _________________________________________________________________________  Large joint arthrocentesis: R knee  [facility] Protocol:  Procedure performed by: (Dr Yang Phillips)  Consent: " "Verbal consent obtained.  Risks and benefits: risks, benefits and alternatives were discussed  Consent given by: patient  Time out: Immediately prior to procedure a \"time out\" was called to verify the correct patient, procedure, equipment, support staff and site/side marked as required.  Patient understanding: patient states understanding of the procedure being performed  Site marked: the operative site was marked  Patient identity confirmed: verbally with patient  Supporting Documentation  Indications: pain and diagnostic evaluation   Procedure Details  Location: knee - R knee  Preparation: Patient was prepped and draped in the usual sterile fashion  Needle size: 18 G  Ultrasound guidance: yes  Approach: superior  Medications administered: 40 mg triamcinolone acetonide 40 mg/mL; 4 mL bupivacaine 0.25 %    Aspirate amount: 15 mL  Aspirate: clear and yellow  Patient tolerance: patient tolerated the procedure well with no immediate complications  Dressing:  Sterile dressing applied                        "

## 2023-12-26 ENCOUNTER — OFFICE VISIT (OUTPATIENT)
Dept: OBGYN CLINIC | Facility: OTHER | Age: 78
End: 2023-12-26
Payer: MEDICARE

## 2023-12-26 VITALS
BODY MASS INDEX: 26.58 KG/M2 | DIASTOLIC BLOOD PRESSURE: 66 MMHG | HEART RATE: 67 BPM | HEIGHT: 63 IN | WEIGHT: 150 LBS | SYSTOLIC BLOOD PRESSURE: 112 MMHG

## 2023-12-26 DIAGNOSIS — M17.11 PRIMARY OSTEOARTHRITIS OF RIGHT KNEE: Primary | ICD-10-CM

## 2023-12-26 PROCEDURE — 20611 DRAIN/INJ JOINT/BURSA W/US: CPT | Performed by: FAMILY MEDICINE

## 2023-12-26 PROCEDURE — 99214 OFFICE O/P EST MOD 30 MIN: CPT | Performed by: FAMILY MEDICINE

## 2023-12-26 RX ORDER — TRIAMCINOLONE ACETONIDE 40 MG/ML
40 INJECTION, SUSPENSION INTRA-ARTICULAR; INTRAMUSCULAR
Status: COMPLETED | OUTPATIENT
Start: 2023-12-26 | End: 2023-12-26

## 2023-12-26 RX ORDER — BUPIVACAINE HYDROCHLORIDE 2.5 MG/ML
4 INJECTION, SOLUTION INFILTRATION; PERINEURAL
Status: COMPLETED | OUTPATIENT
Start: 2023-12-26 | End: 2023-12-26

## 2023-12-26 RX ADMIN — BUPIVACAINE HYDROCHLORIDE 4 ML: 2.5 INJECTION, SOLUTION INFILTRATION; PERINEURAL at 08:30

## 2023-12-26 RX ADMIN — TRIAMCINOLONE ACETONIDE 40 MG: 40 INJECTION, SUSPENSION INTRA-ARTICULAR; INTRAMUSCULAR at 08:30

## 2023-12-26 NOTE — PATIENT INSTRUCTIONS
Ulrasound-guided Corticosteroid injection to right knee joint administered today.    Red flags and risks of injection include but are not limited to infection <0.072% as referenced in some sources, nerve or artery penetration, and if steroids are used-skin dimpling <1%, hypo-pigmentation <1%.  Keep the injection area clean and dry for the next 24 hours. Avoid submerging underwater in a tub, pool, ocean, lake, jacuzzi, hot tub, or any other body of water for 1 week until needle wound closes due to risk of infection. May take showers. Clean needle site with soap and water and keep covered at all times with sterile bandage such as a band-aid until fully healed.  If any symptoms including fevers, chills, swelling, or worsening symptoms occur then to call office or go to hospital for immediate care if physician unavailable due to possible infection or other complication which is a serious medical problem.  May resume regular activities as tolerated and use local ice application or over-the-counter pain medications if there is any discomfort.

## 2023-12-27 ENCOUNTER — OFFICE VISIT (OUTPATIENT)
Dept: INTERNAL MEDICINE CLINIC | Facility: CLINIC | Age: 78
End: 2023-12-27
Payer: MEDICARE

## 2023-12-27 VITALS
HEART RATE: 72 BPM | BODY MASS INDEX: 26.05 KG/M2 | TEMPERATURE: 97.1 F | DIASTOLIC BLOOD PRESSURE: 58 MMHG | OXYGEN SATURATION: 97 % | WEIGHT: 147 LBS | SYSTOLIC BLOOD PRESSURE: 110 MMHG | HEIGHT: 63 IN

## 2023-12-27 DIAGNOSIS — G25.0 ESSENTIAL TREMOR: ICD-10-CM

## 2023-12-27 DIAGNOSIS — R73.03 PREDIABETES: Primary | ICD-10-CM

## 2023-12-27 DIAGNOSIS — F33.1 MODERATE EPISODE OF RECURRENT MAJOR DEPRESSIVE DISORDER (HCC): ICD-10-CM

## 2023-12-27 DIAGNOSIS — E22.2 SIADH (SYNDROME OF INAPPROPRIATE ADH PRODUCTION) (HCC): ICD-10-CM

## 2023-12-27 DIAGNOSIS — Z71.9 HEALTH COUNSELING: ICD-10-CM

## 2023-12-27 DIAGNOSIS — I10 ESSENTIAL HYPERTENSION: ICD-10-CM

## 2023-12-27 DIAGNOSIS — F11.20 CONTINUOUS OPIOID DEPENDENCE (HCC): ICD-10-CM

## 2023-12-27 DIAGNOSIS — I81 PORTAL VEIN THROMBOSIS: ICD-10-CM

## 2023-12-27 DIAGNOSIS — M35.00 SJOGREN'S SYNDROME, WITH UNSPECIFIED ORGAN INVOLVEMENT (HCC): ICD-10-CM

## 2023-12-27 DIAGNOSIS — K51.80 OTHER ULCERATIVE COLITIS WITHOUT COMPLICATION (HCC): ICD-10-CM

## 2023-12-27 DIAGNOSIS — J45.20 MILD INTERMITTENT ASTHMA WITHOUT COMPLICATION: ICD-10-CM

## 2023-12-27 DIAGNOSIS — I47.10 SUPRAVENTRICULAR TACHYCARDIA: ICD-10-CM

## 2023-12-27 DIAGNOSIS — E66.3 OVERWEIGHT: ICD-10-CM

## 2023-12-27 PROBLEM — R53.83 FATIGUE: Status: RESOLVED | Noted: 2023-02-15 | Resolved: 2023-12-27

## 2023-12-27 LAB — SL AMB POCT HEMOGLOBIN AIC: 5.6 (ref ?–6.5)

## 2023-12-27 PROCEDURE — 83036 HEMOGLOBIN GLYCOSYLATED A1C: CPT | Performed by: INTERNAL MEDICINE

## 2023-12-27 PROCEDURE — 99214 OFFICE O/P EST MOD 30 MIN: CPT | Performed by: INTERNAL MEDICINE

## 2023-12-27 NOTE — PROGRESS NOTES
Assessment/Plan:    Iron deficiency anemia  CBC stable.  No symptoms.  Follow up with hematology as scheduled.    SIADH (syndrome of inappropriate ADH production) (HCC)  Recent Na 137.  Taking NaCl 4x a day.  Sees nephrology.    Portal vein thrombosis  On Eliquis.    Supraventricular tachycardia (HCC)  Stable.  On diltiazem 240 mg.    Prediabetes  Improved, A1c today 5.6%.    Mild intermittent asthma without complication  Symptoms well controlled.  May use Breo every other day, monitor symptoms.    Lumbar degenerative disc disease  Stable, taking hydrocodone bid.    Hyperlipidemia  Stable, on simvastatin.    Essential tremor  DBS recently adjusted. On gabapentin.    Essential hypertension  BP stable. On amlodipine, lisinopril and diltiazem.    Acquired hypothyroidism  Stable.    Sjogren's syndrome (HCC)  Off prednisone.    Anxiety  Stable.  On Lexapro, lorazepam.    GERD (gastroesophageal reflux disease)  On daily PPI.  May take famotidine prn.    Hypomagnesemia  Taking Mg.    Vitamin B12 deficiency  Taking B12.    Ulcerative colitis without complications (HCC)  No recent symptoms.    Osteoarthritis of right knee  S/p injection, pain improved.    Diarrhea  May try Metamucil at bedtime.    Moderate episode of recurrent major depressive disorder (HCC)  As above.    Overweight  Stable weight.  Recommend to maintain current weight.     Diagnoses and all orders for this visit:    Prediabetes  -     POCT hemoglobin A1c    Continuous opioid dependence (HCC)    Essential tremor    Essential hypertension    Mild intermittent asthma without complication    Moderate episode of recurrent major depressive disorder (HCC)    Portal vein thrombosis    Overweight    Health counseling  Comments:  Recommend COVID, RSV vaccine.    Sjogren's syndrome, with unspecified organ involvement (HCC)    Supraventricular tachycardia    Other ulcerative colitis without complication (HCC)    SIADH (syndrome of inappropriate ADH production)  "([facility])      Follow up in 4 months or as needed.      Subjective:      Patient ID: Matilda Day is a 78 y.o. female here for a follow up.    She feels good, doing well.    She is unable to extend her right ring finger sometimes. No pain. She is worried about her knuckle. She saw Ortho, it was not trigger finger.    She reports moving her bowels at night, has had accidents. No blood in the stool.    She stumbled while going to the grocery store, stubbed her toe on the curb. She did podiatry, no fracture.    She has been taking the water pill daily. She reports leg swelling daily. She ordered compression stockings.    She has not been taking prednisone, does have it to take as needed.    She takes Tums as needed only, a few times a week. She avoid tomato sauce.      The following portions of the patient's history were reviewed and updated as appropriate: allergies, current medications, past medical history, past social history, and problem list.    Review of Systems   Constitutional:  Negative for appetite change and fatigue.   HENT:  Negative for congestion, ear pain and postnasal drip.    Eyes:  Negative for visual disturbance.   Respiratory:  Negative for cough and shortness of breath.    Cardiovascular:  Negative for chest pain and leg swelling.   Gastrointestinal:  Positive for diarrhea. Negative for abdominal pain, constipation and nausea.   Genitourinary:  Negative for dysuria, frequency and urgency.   Musculoskeletal:  Negative for arthralgias and myalgias.   Skin:  Negative for rash and wound.   Neurological:  Negative for dizziness, numbness and headaches.   Hematological:  Does not bruise/bleed easily.   Psychiatric/Behavioral:  Negative for confusion. The patient is not nervous/anxious.          Objective:      /58   Pulse 72   Temp (!) 97.1 °F (36.2 °C)   Ht 5' 3\" (1.6 m)   Wt 66.7 kg (147 lb)   SpO2 97%   BMI 26.04 kg/m²          Physical Exam  Vitals and nursing note reviewed. "   Constitutional:       General: She is not in acute distress.     Appearance: She is well-developed.   HENT:      Head: Normocephalic and atraumatic.      Mouth/Throat:      Mouth: Mucous membranes are moist.   Eyes:      Pupils: Pupils are equal, round, and reactive to light.   Cardiovascular:      Rate and Rhythm: Normal rate and regular rhythm.      Heart sounds: Normal heart sounds.   Pulmonary:      Effort: Pulmonary effort is normal.      Breath sounds: Normal breath sounds. No wheezing.   Abdominal:      General: Bowel sounds are normal.      Palpations: Abdomen is soft.   Musculoskeletal:         General: No swelling.      Right lower leg: No edema.      Left lower leg: No edema.      Comments: R 4th digit, unable to extend AROM   Skin:     General: Skin is warm.      Findings: No rash.   Neurological:      General: No focal deficit present.      Mental Status: She is alert and oriented to person, place, and time.      Motor: Tremor present.   Psychiatric:         Mood and Affect: Mood and affect normal. Mood is not anxious or depressed.         Behavior: Behavior normal.           Labs & imaging results reviewed with patient.

## 2023-12-28 ENCOUNTER — TELEPHONE (OUTPATIENT)
Age: 78
End: 2023-12-28

## 2023-12-28 DIAGNOSIS — M54.16 LUMBAR RADICULOPATHY: ICD-10-CM

## 2023-12-28 DIAGNOSIS — F41.9 ANXIETY: ICD-10-CM

## 2023-12-28 RX ORDER — LORAZEPAM 1 MG/1
1 TABLET ORAL EVERY 6 HOURS PRN
Qty: 120 TABLET | Refills: 0 | Status: SHIPPED | OUTPATIENT
Start: 2023-12-28

## 2023-12-28 RX ORDER — HYDROCODONE BITARTRATE AND ACETAMINOPHEN 5; 325 MG/1; MG/1
1 TABLET ORAL EVERY 6 HOURS PRN
Qty: 120 TABLET | Refills: 0 | Status: SHIPPED | OUTPATIENT
Start: 2023-12-28

## 2023-12-28 NOTE — TELEPHONE ENCOUNTER
Pt called in stating she's going away tomorrow for 3 months and she'll need a refill of HYDROcodone-acetaminophen (NORCO) 5-325 mg per tablet [[id number]] and LORazepam (ATIVAN) 1 mg tablet [[id number]] sent to:    [id number] - [address] [id number]  Phone: [phone]  Fax: [phone]

## 2024-01-09 DIAGNOSIS — M51.36 LUMBAR DEGENERATIVE DISC DISEASE: ICD-10-CM

## 2024-01-09 NOTE — TELEPHONE ENCOUNTER
Requested medication(s) are due for refill today: Yes  Patient has already received a courtesy refill: No  Other reason request has been forwarded to provider: Not  a cardiac med

## 2024-01-10 RX ORDER — GABAPENTIN 600 MG/1
TABLET ORAL
Qty: 360 TABLET | Refills: 0 | OUTPATIENT
Start: 2024-01-10

## 2024-02-07 NOTE — TELEPHONE ENCOUNTER
Detailed voicemail left with number below for patient to call and schedule med delivery.   INR elevated  Adjust warfarin, take 2 5 mg 4 days a week, 5 mg the rest of the week  Repeat in 2 weeks  Please update flow sheet

## 2024-02-15 DIAGNOSIS — I10 ESSENTIAL HYPERTENSION: ICD-10-CM

## 2024-02-15 RX ORDER — LISINOPRIL 20 MG/1
20 TABLET ORAL 2 TIMES DAILY
Qty: 180 TABLET | Refills: 1 | Status: SHIPPED | OUTPATIENT
Start: 2024-02-15

## 2024-02-19 DIAGNOSIS — I10 ESSENTIAL HYPERTENSION: ICD-10-CM

## 2024-02-19 RX ORDER — DILTIAZEM HYDROCHLORIDE 60 MG/1
60 TABLET, FILM COATED ORAL DAILY
Qty: 90 TABLET | Refills: 1 | Status: SHIPPED | OUTPATIENT
Start: 2024-02-19

## 2024-02-19 RX ORDER — DILTIAZEM HYDROCHLORIDE 60 MG/1
60 TABLET, FILM COATED ORAL DAILY
Qty: 90 TABLET | Refills: 1 | OUTPATIENT
Start: 2024-02-19

## 2024-02-19 NOTE — TELEPHONE ENCOUNTER
Reason for call:   [x] Refill   [] Prior Auth  [] Other:     Office:   [x] PCP/Provider -   [] Specialty/Provider -     DILTIAZEM  60 MG    [id number] - [place], [place] - [address] [place] [id number]  Phone: [phone]  Fax: [phone]     Does the patient have enough for 3 days?   [] Yes   [x] No - Send as HP to POD    
No

## 2024-02-20 ENCOUNTER — TELEPHONE (OUTPATIENT)
Dept: HEMATOLOGY ONCOLOGY | Facility: CLINIC | Age: 79
End: 2024-02-20

## 2024-02-20 NOTE — TELEPHONE ENCOUNTER
Patient Call    Who are you speaking with? Patient    If it is not the patient, are they listed on an active communication consent form? N/A   What is the reason for this call? Pt had labs done at [facility] in [place]. She asked if the staff and get the results   Does this require a call back? Yes   If a call back is required, please list best call back number [phone]    If a call back is required, advise that a message will be forwarded to their care team and someone will return their call as soon as possible.   Did you relay this information to the patient? Yes

## 2024-02-20 NOTE — TELEPHONE ENCOUNTER
Appointment Change  Cancel, Reschedule, Change to Virtual      Who are you speaking with? Patient   If it is not the patient, is the caller listed on the communication consent form? N/A   Which provider is the appointment scheduled with? Dr. Velasco   When was the original appointment scheduled?    Please list date and time 5/13/24 8am   At which location is the appointment scheduled to take place? [facility]   Was the appointment rescheduled?     Was the appointment changed from an in person visit to a virtual visit?    If so, please list the details of the change. 5/14/24 8am [facility]   What is the reason for the appointment change? provider       Was [facility] transport scheduled? N/A   Does [facility] transport need to be scheduled for the new visit (if applicable) N/A   Does the patient need an infusion appointment rescheduled? N/A   Does the patient have an upcoming infusion appointment scheduled? If so, when? No   Is the patient undergoing chemotherapy? N/A   For appointments cancelled with less than 24 hours:  Was the no-show policy reviewed? N/A

## 2024-02-26 DIAGNOSIS — F41.9 ANXIETY: ICD-10-CM

## 2024-02-26 DIAGNOSIS — K21.9 GASTROESOPHAGEAL REFLUX DISEASE, UNSPECIFIED WHETHER ESOPHAGITIS PRESENT: ICD-10-CM

## 2024-02-26 RX ORDER — PANTOPRAZOLE SODIUM 40 MG/1
40 TABLET, DELAYED RELEASE ORAL 2 TIMES DAILY
Qty: 180 TABLET | Refills: 1 | Status: CANCELLED | OUTPATIENT
Start: 2024-02-26

## 2024-02-26 RX ORDER — LORAZEPAM 1 MG/1
1 TABLET ORAL EVERY 6 HOURS PRN
Qty: 120 TABLET | Refills: 0 | Status: SHIPPED | OUTPATIENT
Start: 2024-02-26

## 2024-02-26 RX ORDER — PANTOPRAZOLE SODIUM 40 MG/1
40 TABLET, DELAYED RELEASE ORAL 2 TIMES DAILY
Qty: 60 TABLET | Refills: 5 | Status: SHIPPED | OUTPATIENT
Start: 2024-02-26

## 2024-02-26 NOTE — TELEPHONE ENCOUNTER
Reason for call:   [x] Refill   [] Prior Auth  [] Other:     Office:   [x] PCP/Provider - [facility]  [] Specialty/Provider -     Medication: Lorazepam 1 mg Q6H PRN anxiety #120    Medication: Pantoprazole 40 mg BID #180    Pharmacy: [facility]    Does the patient have enough for 3 days?   [x] Yes   [] No - Send as HP to POD

## 2024-02-26 NOTE — TELEPHONE ENCOUNTER
Pantoprazole managed by gastro provider. Medication routed separately to the gastro pod to be reviewed.

## 2024-02-26 NOTE — TELEPHONE ENCOUNTER
Reason for call:   [x] Refill   [] Prior Auth  [] Other:     Office:   [] PCP/Provider -   [x] Specialty/Provider - gastro/ [facility]     Medication: pantoprazole     Dose/Frequency: 40 mg/ twice daily     Quantity: 90 day supply     Pharmacy: [facility]     Does the patient have enough for 3 days?   [x] Yes   [] No - Send as HP to POD

## 2024-03-05 DIAGNOSIS — K21.9 GASTROESOPHAGEAL REFLUX DISEASE, UNSPECIFIED WHETHER ESOPHAGITIS PRESENT: ICD-10-CM

## 2024-03-05 RX ORDER — PANTOPRAZOLE SODIUM 40 MG/1
40 TABLET, DELAYED RELEASE ORAL DAILY
Qty: 90 TABLET | Refills: 1 | Status: SHIPPED | OUTPATIENT
Start: 2024-03-05

## 2024-03-05 NOTE — TELEPHONE ENCOUNTER
Sent to wrong pod for refill. Changed context and sending to correct pod. Pharmacy is requesting 90 days supply

## 2024-03-11 ENCOUNTER — APPOINTMENT (OUTPATIENT)
Dept: LAB | Facility: AMBULARY SURGERY CENTER | Age: 79
End: 2024-03-11
Payer: MEDICARE

## 2024-03-11 DIAGNOSIS — I47.10 SUPRAVENTRICULAR TACHYCARDIA: ICD-10-CM

## 2024-03-11 DIAGNOSIS — I10 ESSENTIAL HYPERTENSION: ICD-10-CM

## 2024-03-11 DIAGNOSIS — E78.2 MIXED HYPERLIPIDEMIA: ICD-10-CM

## 2024-03-11 DIAGNOSIS — R19.7 DIARRHEA, UNSPECIFIED TYPE: ICD-10-CM

## 2024-03-11 DIAGNOSIS — D50.9 IRON DEFICIENCY ANEMIA, UNSPECIFIED IRON DEFICIENCY ANEMIA TYPE: ICD-10-CM

## 2024-03-11 DIAGNOSIS — E22.2 SIADH (SYNDROME OF INAPPROPRIATE ADH PRODUCTION) (HCC): ICD-10-CM

## 2024-03-11 DIAGNOSIS — E87.1 HYPONATREMIA: ICD-10-CM

## 2024-03-11 DIAGNOSIS — M51.36 LUMBAR DEGENERATIVE DISC DISEASE: ICD-10-CM

## 2024-03-11 DIAGNOSIS — I47.10 SVT (SUPRAVENTRICULAR TACHYCARDIA): ICD-10-CM

## 2024-03-11 DIAGNOSIS — R73.9 ELEVATED BLOOD SUGAR LEVEL: ICD-10-CM

## 2024-03-11 LAB
ALBUMIN SERPL BCP-MCNC: 3.6 G/DL (ref 3.5–5)
ALP SERPL-CCNC: 53 U/L (ref 34–104)
ALT SERPL W P-5'-P-CCNC: 12 U/L (ref 7–52)
ANION GAP SERPL CALCULATED.3IONS-SCNC: 8 MMOL/L
AST SERPL W P-5'-P-CCNC: 19 U/L (ref 13–39)
BASOPHILS # BLD AUTO: 0.06 THOUSANDS/ÂΜL (ref 0–0.1)
BASOPHILS NFR BLD AUTO: 1 % (ref 0–1)
BILIRUB SERPL-MCNC: 0.51 MG/DL (ref 0.2–1)
BUN SERPL-MCNC: 8 MG/DL (ref 5–25)
CALCIUM SERPL-MCNC: 9.2 MG/DL (ref 8.4–10.2)
CHLORIDE SERPL-SCNC: 98 MMOL/L (ref 96–108)
CO2 SERPL-SCNC: 31 MMOL/L (ref 21–32)
CREAT SERPL-MCNC: 0.61 MG/DL (ref 0.6–1.3)
EOSINOPHIL # BLD AUTO: 0.36 THOUSAND/ÂΜL (ref 0–0.61)
EOSINOPHIL NFR BLD AUTO: 5 % (ref 0–6)
ERYTHROCYTE [DISTWIDTH] IN BLOOD BY AUTOMATED COUNT: 15.4 % (ref 11.6–15.1)
EST. AVERAGE GLUCOSE BLD GHB EST-MCNC: 128 MG/DL
FERRITIN SERPL-MCNC: 113 NG/ML (ref 11–307)
GFR SERPL CREATININE-BSD FRML MDRD: 86 ML/MIN/1.73SQ M
GLUCOSE P FAST SERPL-MCNC: 103 MG/DL (ref 65–99)
HBA1C MFR BLD: 6.1 %
HCT VFR BLD AUTO: 38.9 % (ref 34.8–46.1)
HGB BLD-MCNC: 12.3 G/DL (ref 11.5–15.4)
IMM GRANULOCYTES # BLD AUTO: 0.05 THOUSAND/UL (ref 0–0.2)
IMM GRANULOCYTES NFR BLD AUTO: 1 % (ref 0–2)
IRON SATN MFR SERPL: 21 % (ref 15–50)
IRON SERPL-MCNC: 66 UG/DL (ref 50–212)
LYMPHOCYTES # BLD AUTO: 2 THOUSANDS/ÂΜL (ref 0.6–4.47)
LYMPHOCYTES NFR BLD AUTO: 28 % (ref 14–44)
MCH RBC QN AUTO: 32.4 PG (ref 26.8–34.3)
MCHC RBC AUTO-ENTMCNC: 31.6 G/DL (ref 31.4–37.4)
MCV RBC AUTO: 102 FL (ref 82–98)
MONOCYTES # BLD AUTO: 1.02 THOUSAND/ÂΜL (ref 0.17–1.22)
MONOCYTES NFR BLD AUTO: 15 % (ref 4–12)
NEUTROPHILS # BLD AUTO: 3.56 THOUSANDS/ÂΜL (ref 1.85–7.62)
NEUTS SEG NFR BLD AUTO: 50 % (ref 43–75)
NRBC BLD AUTO-RTO: 0 /100 WBCS
PLATELET # BLD AUTO: 436 THOUSANDS/UL (ref 149–390)
PMV BLD AUTO: 9.2 FL (ref 8.9–12.7)
POTASSIUM SERPL-SCNC: 4 MMOL/L (ref 3.5–5.3)
PROT SERPL-MCNC: 5.4 G/DL (ref 6.4–8.4)
RBC # BLD AUTO: 3.8 MILLION/UL (ref 3.81–5.12)
SODIUM SERPL-SCNC: 137 MMOL/L (ref 135–147)
TIBC SERPL-MCNC: 314 UG/DL (ref 250–450)
UIBC SERPL-MCNC: 248 UG/DL (ref 155–355)
WBC # BLD AUTO: 7.05 THOUSAND/UL (ref 4.31–10.16)

## 2024-03-11 PROCEDURE — 85025 COMPLETE CBC W/AUTO DIFF WBC: CPT

## 2024-03-11 PROCEDURE — 83036 HEMOGLOBIN GLYCOSYLATED A1C: CPT

## 2024-03-11 PROCEDURE — 82728 ASSAY OF FERRITIN: CPT

## 2024-03-11 PROCEDURE — 36415 COLL VENOUS BLD VENIPUNCTURE: CPT

## 2024-03-11 PROCEDURE — 83550 IRON BINDING TEST: CPT

## 2024-03-11 PROCEDURE — 80053 COMPREHEN METABOLIC PANEL: CPT

## 2024-03-11 PROCEDURE — 83540 ASSAY OF IRON: CPT

## 2024-03-11 RX ORDER — GABAPENTIN 600 MG/1
TABLET ORAL
Qty: 360 TABLET | Refills: 1 | Status: SHIPPED | OUTPATIENT
Start: 2024-03-11

## 2024-03-13 ENCOUNTER — TELEPHONE (OUTPATIENT)
Dept: NEPHROLOGY | Facility: CLINIC | Age: 79
End: 2024-03-13

## 2024-03-13 NOTE — TELEPHONE ENCOUNTER
Reviewed laboratory studies, sodium level remained stable at 137.  Will continue with current sodium chloride dosing.  No concerns from her standpoint.

## 2024-03-15 DIAGNOSIS — F41.9 ANXIETY: ICD-10-CM

## 2024-03-15 RX ORDER — ESCITALOPRAM OXALATE 20 MG/1
20 TABLET ORAL DAILY
Qty: 90 TABLET | Refills: 1 | Status: SHIPPED | OUTPATIENT
Start: 2024-03-15

## 2024-04-02 ENCOUNTER — APPOINTMENT (OUTPATIENT)
Dept: LAB | Facility: AMBULARY SURGERY CENTER | Age: 79
End: 2024-04-02
Payer: MEDICARE

## 2024-04-02 DIAGNOSIS — M35.01 KERATOCONJUNCTIVITIS SICCA (HCC): ICD-10-CM

## 2024-04-02 LAB
CRP SERPL QL: 1.2 MG/L
ERYTHROCYTE [SEDIMENTATION RATE] IN BLOOD: 12 MM/HOUR (ref 0–29)

## 2024-04-02 PROCEDURE — 36415 COLL VENOUS BLD VENIPUNCTURE: CPT

## 2024-04-02 PROCEDURE — 85652 RBC SED RATE AUTOMATED: CPT

## 2024-04-02 PROCEDURE — 86140 C-REACTIVE PROTEIN: CPT

## 2024-04-03 DIAGNOSIS — D50.9 IRON DEFICIENCY ANEMIA, UNSPECIFIED IRON DEFICIENCY ANEMIA TYPE: Primary | ICD-10-CM

## 2024-04-03 RX ORDER — SODIUM CHLORIDE 9 MG/ML
20 INJECTION, SOLUTION INTRAVENOUS ONCE
Status: CANCELLED | OUTPATIENT
Start: 2024-04-05

## 2024-04-04 ENCOUNTER — HOSPITAL ENCOUNTER (OUTPATIENT)
Dept: NON INVASIVE DIAGNOSTICS | Facility: CLINIC | Age: 79
Discharge: HOME | End: 2024-04-04
Payer: MEDICARE

## 2024-04-04 ENCOUNTER — TELEPHONE (OUTPATIENT)
Dept: OBGYN CLINIC | Facility: HOSPITAL | Age: 79
End: 2024-04-04

## 2024-04-04 DIAGNOSIS — I47.10 SUPRAVENTRICULAR TACHYCARDIA: ICD-10-CM

## 2024-04-04 DIAGNOSIS — I47.10 SVT (SUPRAVENTRICULAR TACHYCARDIA): ICD-10-CM

## 2024-04-04 DIAGNOSIS — E78.2 MIXED HYPERLIPIDEMIA: ICD-10-CM

## 2024-04-04 DIAGNOSIS — R73.9 ELEVATED BLOOD SUGAR LEVEL: ICD-10-CM

## 2024-04-04 DIAGNOSIS — I10 ESSENTIAL HYPERTENSION: ICD-10-CM

## 2024-04-04 PROCEDURE — 93226 XTRNL ECG REC<48 HR SCAN A/R: CPT

## 2024-04-04 PROCEDURE — 93225 XTRNL ECG REC<48 HRS REC: CPT

## 2024-04-04 NOTE — TELEPHONE ENCOUNTER
Called and spoke to patient  Message related  Patient will be here on   Tuesday 4/30 for her Visco injection

## 2024-04-04 NOTE — TELEPHONE ENCOUNTER
Caller: Patient    Doctor: Gabe    Reason for call: Patient has an ultrasound guided Monovisc injection rt knee scheduled for April 30th. She was recently in [place] and her rt knee was really bothering her so they gave her a steroid injection on March 19th. She is hoping she can stil get the Monovisc as well. Please call patient to advise. Thank you.    Call back#: [phone]

## 2024-04-05 ENCOUNTER — HOSPITAL ENCOUNTER (OUTPATIENT)
Dept: INFUSION CENTER | Facility: CLINIC | Age: 79
End: 2024-04-05
Payer: MEDICARE

## 2024-04-05 VITALS
OXYGEN SATURATION: 97 % | SYSTOLIC BLOOD PRESSURE: 161 MMHG | HEART RATE: 68 BPM | TEMPERATURE: 97.6 F | RESPIRATION RATE: 18 BRPM | DIASTOLIC BLOOD PRESSURE: 78 MMHG

## 2024-04-05 DIAGNOSIS — D50.9 IRON DEFICIENCY ANEMIA, UNSPECIFIED IRON DEFICIENCY ANEMIA TYPE: Primary | ICD-10-CM

## 2024-04-05 RX ORDER — SODIUM CHLORIDE 9 MG/ML
20 INJECTION, SOLUTION INTRAVENOUS ONCE
OUTPATIENT
Start: 2024-10-02

## 2024-04-05 RX ORDER — SODIUM CHLORIDE 9 MG/ML
20 INJECTION, SOLUTION INTRAVENOUS ONCE
Status: COMPLETED | OUTPATIENT
Start: 2024-04-05 | End: 2024-04-05

## 2024-04-05 RX ADMIN — IRON SUCROSE 200 MG: 20 INJECTION, SOLUTION INTRAVENOUS at 08:50

## 2024-04-05 RX ADMIN — HYDROCORTISONE SODIUM SUCCINATE 50 MG: 100 INJECTION, POWDER, FOR SOLUTION INTRAMUSCULAR; INTRAVENOUS at 08:20

## 2024-04-05 RX ADMIN — SODIUM CHLORIDE 20 ML/HR: 0.9 INJECTION, SOLUTION INTRAVENOUS at 08:16

## 2024-04-05 RX ADMIN — DIPHENHYDRAMINE HYDROCHLORIDE 12.5 MG: 50 INJECTION, SOLUTION INTRAMUSCULAR; INTRAVENOUS at 08:16

## 2024-04-05 NOTE — PROGRESS NOTES
Patient presents today for venofer infusion. Patient offers no complaints. VSS. Peripheral IV inserted without incident.

## 2024-04-05 NOTE — PROGRESS NOTES
Venofer complete, tolerated without issue. Pt has next appt scheduled for 10/2024. Pt also to follow up with MD on 5/14 to determine if she needs more scheduled. AVS declined.

## 2024-04-09 PROCEDURE — 93227 XTRNL ECG REC<48 HR R&I: CPT | Performed by: INTERNAL MEDICINE

## 2024-04-16 ENCOUNTER — TELEPHONE (OUTPATIENT)
Age: 79
End: 2024-04-16

## 2024-04-16 ENCOUNTER — OFFICE VISIT (OUTPATIENT)
Dept: SURGICAL ONCOLOGY | Facility: CLINIC | Age: 79
End: 2024-04-16
Payer: MEDICARE

## 2024-04-16 VITALS
BODY MASS INDEX: 25.6 KG/M2 | OXYGEN SATURATION: 97 % | TEMPERATURE: 97.5 F | WEIGHT: 144.5 LBS | HEART RATE: 68 BPM | RESPIRATION RATE: 18 BRPM | SYSTOLIC BLOOD PRESSURE: 120 MMHG | DIASTOLIC BLOOD PRESSURE: 76 MMHG | HEIGHT: 63 IN

## 2024-04-16 DIAGNOSIS — Z90.12 S/P LEFT MASTECTOMY: ICD-10-CM

## 2024-04-16 DIAGNOSIS — Z85.3 HISTORY OF LEFT BREAST CANCER: Primary | ICD-10-CM

## 2024-04-16 PROCEDURE — 99213 OFFICE O/P EST LOW 20 MIN: CPT

## 2024-04-16 NOTE — TELEPHONE ENCOUNTER
Looking at the chart , there are outstanding labs but unsure if need to be done     Please advise

## 2024-04-16 NOTE — TELEPHONE ENCOUNTER
The patient would like a call from the office to verify if she needs a blood order before her appointment with the doctor.

## 2024-04-16 NOTE — PROGRESS NOTES
Surgical Oncology Follow Up       [address] [facility] SURGICAL ONCOLOGY [facility]  [address] [facility] [address]    Matilda A Barb  1945  [id number]  [address] [facility] SURGICAL ONCOLOGY [facility]  [address] [facility]  [facility] [address]    Chief Complaint   Patient presents with   • office visit       Assessment/Plan:  1. History of left breast cancer  - 6 mo f/u  - Mammo diagnostic right w 3d & cad; Future    2. S/P left mastectomy         Discussion/Summary: Pt is a 78 year old female presenting today for a six-month follow-up for left breast cancer diagnosed in December of 2020. 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 Patient had genetic testing which was negative. She underwent a left breast mastectomy with sentinel node biopsy with Dr. Hayes. Adjuvant therapy was not recommended. She had a dx mammogram of the right breast on 11/20/23 which was BIRADS 2 category 3 density. There were no concerns on her cbe. I will see the patient back in 6 months or sooner should the need arise. She was instructed to call with any questions or concerns prior to this time. All questions were answered today.       History of Present Illness:     Oncology History   History of left breast cancer   12/17/2020 Biopsy    Left Breast stereotactic biopsy:  A. 4 o'clock, posterior  Ductal carcinoma in situ with microinvasion  Grade 3  ER 0, NC 0, HER2 3+    B & C. 5 o'clock, anterior with & without calcs  Ductal carcinoma in situ  Grade 3  ER 0, NC 0    Concordant. Malignancy appears unifocal; calcifications span 4.1 cm. Both clips migrated. No recent axillary US. Right breast clear.     1/7/2021 Genetic Testing    The following genes were evaluated: CHARLOTTE, BRCA1, BRCA2, CDH1, CHEK2, PALB2, PTEN, STK11, Tp53; additional genes evaluated for a total of 20 genes.  Negative result. No pathogenic sequence variants or  deletions/dupllications identified  [employer]     2/12/2021 Surgery    Left breast mastectomy with sentinel lymph node biopsy  Micro-invasive carcinoma (less than 1 mm)  Extensive ductal carcinoma in situ (7.7 cm)  Grade 3  Margins negative  0/2 Lymph nodes  Anatomic/Prognostic Stage IA     4/7/2021 - 4/7/2021 Hormone Therapy    Consult with Dr. Miller  Adjuvant therapy not recommended          -Interval History: Pt is a 78 year old female presenting today for a six-month follow-up for left breast cancer diagnosed in December of 2020. She has been on observation. She had a dx mammogram of the right breast on 11/20/23 which was BIRADS 2 category 3 density. Patient denies changes on her breast exam. She denies persistent headaches, bone pain, back pain, shortness of breath, or abdominal pain.      Review of Systems:  Review of Systems   Constitutional:  Negative for activity change, appetite change, fatigue and unexpected weight change.   Respiratory:  Negative for cough and shortness of breath.    Cardiovascular:  Negative for chest pain.   Gastrointestinal:  Negative for abdominal pain, diarrhea, nausea and vomiting.   Endocrine: Negative for heat intolerance.   Musculoskeletal:  Negative for arthralgias, back pain and myalgias.   Skin:  Negative for rash.   Neurological:  Negative for weakness and headaches.   Hematological:  Negative for adenopathy.       Patient Active Problem List   Diagnosis   • Portal vein thrombosis   • Anxiety   • Moderate episode of recurrent major depressive disorder (HCC)   • Essential tremor   • Hyperlipidemia   • Essential hypertension   • Hypomagnesemia   • SIADH (syndrome of inappropriate ADH production) ([facility])   • Acquired hypothyroidism   • Insomnia   • Iron deficiency anemia   • Prediabetes   • Peripheral neuropathy   • Osteopenia   • Osteoarthritis of right knee   • Ulcerative colitis without complications ([facility])   • Allergic rhinitis   • GERD (gastroesophageal reflux disease)   •  Mild intermittent asthma without complication   • Diarrhea   • Sjogren's syndrome (HCC)   • Chronic salpingo-oophoritis   • Overweight   • Supraventricular tachycardia   • Unsteady gait   • Lumbar degenerative disc disease   • Encounter for long-term (current) use of medications   • S/P deep brain stimulator placement   • Vitamin B12 deficiency   • Vitamin D deficiency   • History of left breast cancer   • Hoarseness of voice   • Esophageal candidiasis (HCC)   • History of right mastectomy   • S/P left mastectomy   • Peripheral vascular disease (HCC)   • Continuous opioid dependence (HCC)     Past Medical History:   Diagnosis Date   • Anemia    • Anxiety    • Arrhythmia    • Arthritis    • Asthma    • Blood clot in vein     portal vein   • Breast cancer (HCC) 02/12/2021   • Breast lump     [id number] RESOLVED   • Cancer (HCC)    • Depression    • Disease of thyroid gland    • DVT, lower extremity (HCC)    • GERD (gastroesophageal reflux disease)    • Hyperlipidemia    • Hypertension    • Hypokalemia    • Hyponatremia    • Hypothyroidism    • Iron deficiency anemia    • Irregular heart beat    • Manic behavior (HCC)    • 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HCC)    • Osteoarthritis    • Palpitations     [id number]  RESOLVED   • Paroxysmal supraventricular tachycardia    • PE (pulmonary thromboembolism) (HCC)    • Sjoegren syndrome    • Sleep apnea    • Sleep difficulties    • Spinal stenosis    • Thrombocytosis     [id number]  RESOLVED   • Tremors of nervous system     dbs implanted right and left chest   • Ulcerative colitis (HCC)    • Vertigo     [id number] RESOLVED     Past Surgical History:   Procedure Laterality Date   • ABDOMINAL SURGERY     • APPENDECTOMY     • BREAST BIOPSY Left 11/07/2019    Stereo   • BREAST EXCISIONAL BIOPSY Left     x many years   • BREAST SURGERY      lumpectomy & biopsy   • CARMELLA HOLE W/ STEREOTACTIC INSERTION OF DBS LEADS / INTRAOP MICROELECTRODE RECORDING     • COLON SURGERY     • COLONOSCOPY N/A  08/30/2017    Procedure: POUCHOSCOPY;  Surgeon: VANDANA Waters MD;  Location: [facility];  Service: Colorectal   • COLOPROCTECTOMY W/ ILEO J POUCH     • ESOPHAGOGASTRODUODENOSCOPY      ONSET 10/17/11   • FISTULA REPAIR      LLEOANAL FISTULA REPAIR TRANSPERIN TRANSABD APPROACH   • HYSTERECTOMY      age 38   • ILEOSTOMY CLOSURE     • KNEE ARTHROSCOPY      Right   • MAMMO STEREOTACTIC BREAST BIOPSY LEFT (ALL INC) Left 11/07/2019   • MAMMO STEREOTACTIC BREAST BIOPSY LEFT (ALL INC) Left 12/17/2020   • MAMMO STEREOTACTIC BREAST BIOPSY LEFT (ALL INC) EACH ADD Left 12/17/2020   • MASTECTOMY Left 02/12/2021    left mastectomy- Dr. Gillespie   • MASTECTOMY W/ SENTINEL NODE BIOPSY Left 02/12/2021    Procedure: BREAST MASTECTOMY WITH SENTINEL LYMPH NODE BIOPSY, LYMPHATIC MAPPING WITH BLUE DYE AND RADIAOCTIVE DYE (INJECT AT 1100 BY DR GILLESPIE IN THE OR);  Surgeon: Robbie Gillespie MD;  [facility];  Service: Surgical Oncology   • DE INSJ/RPLCMT CRANIAL NEUROSTIM PULSE GENERATOR Right 06/20/2017    Procedure: DBS GENERATOR REPLACEMENT;  Surgeon: Marin Mao MD;  Location: [facility];  Service: Neurosurgery   • DE INSJ/RPLCMT CRANIAL NEUROSTIM PULSE GENERATOR N/A 12/04/2019    Procedure: REPLACEMENT IMPLANTABLE PULSE GENERATOR FOR DEEP BRAIN STIMULATOR LEFT CHEST;  Surgeon: Damian Batres MD;  [facility];  Service: Neurosurgery   • SPLENECTOMY     • TONSILLECTOMY       Family History   Problem Relation Age of Onset   • Venous thrombosis Mother         ACUTE VENOUS THROMBOSIS OF DEEP VESSELS OF THE DISTAL LOWER EXTREMITY   • Other Mother         PHLEBITIS   • Hypertension Mother    • Peripheral vascular disease Mother    • COPD Father    • Diabetes Father         MELLITUS   • Stroke Father    • Diabetes Sister         MELLITUS   • Sjogren's syndrome Sister    • No Known Problems Daughter    • No Known Problems Maternal Grandmother    • No Known Problems Maternal Grandfather    • No Known Problems Paternal Grandmother     • No Known Problems Paternal Grandfather    • No Known Problems Sister    • No Known Problems Maternal Aunt    • No Known Problems Paternal Aunt    • No Known Problems Son      Social History     Socioeconomic History   • Marital status:      Spouse name: Not on file   • Number of children: Not on file   • Years of education: EDUCATION LEVEL 10TH GRADE   • Highest education level: Not on file   Occupational History   • Occupation: RETIRED   Tobacco Use   • Smoking status: Former     Current packs/day: 0.00     Average packs/day: 1 pack/day for 15.0 years (15.0 ttl pk-yrs)     Types: Cigarettes     Start date: 1950     Quit date: 1965     Years since quittin.3   • Smokeless tobacco: Never   • Tobacco comments:     Quit   Vaping Use   • Vaping status: Never Used   Substance and Sexual Activity   • Alcohol use: Yes     Alcohol/week: 2.0 standard drinks of alcohol     Types: 2 Cans of beer per week     Comment: 2or 3 beers a week   • Drug use: No   • Sexual activity: Not Currently     Partners: Male     Birth control/protection: Post-menopausal   Other Topics Concern   • Not on file   Social History Narrative    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ACTIVITIES INSIDE AND OUTSIDE OF HOME, MODERATE    CAFFEINE USE, DRINKS CAFEINATED TEA, DRINKS COFFEE    INADEQUATE EXERCISE    LIVES WITH ADULT CHILDREN     Social Determinants of Health     Financial Resource Strain: Low Risk  (2023)    Overall Financial Resource Strain (CARDIA)    • Difficulty of Paying Living Expenses: Not very hard   Food Insecurity: Not on file   Transportation Needs: No Transportation Needs (2023)    PRAPARE - Transportation    • Lack of Transportation (Medical): No    • Lack of Transportation (Non-Medical): No   Physical Activity: Not on file   Stress: Not on file   Social Connections: Not on file   Intimate Partner Violence: Not on file   Housing Stability: Not on file       Current Outpatient Medications:   •  albuterol (PROVENTIL  HFA,VENTOLIN HFA) 90 mcg/act inhaler, Inhale 2 puffs every 6 (six) hours as needed for wheezing, Disp: 8 g, Rfl: 1  •  amLODIPine (NORVASC) 2.5 mg tablet, TAKE ONE TABLET BY MOUTH EVERY DAY, Disp: 90 tablet, Rfl: 3  •  ammonium lactate (LAC-HYDRIN) 12 % cream, , Disp: , Rfl:   •  apixaban (Eliquis) 5 mg, Take 1 tablet (5 mg total) by mouth 2 (two) times a day, Disp: 180 tablet, Rfl: 1  •  Calcium Carb-Cholecalciferol (CALCIUM 600-D PO), Take 1 tablet by mouth 2 (two) times a day, Disp: , Rfl:   •  Coenzyme Q10 (CO Q-10 PO), Take 1 capsule by mouth daily, Disp: , Rfl:   •  diltiazem (CARDIZEM CD) 180 mg 24 hr capsule, TAKE ONE CAPSULE BY MOUTH EVERY DAY, Disp: 90 capsule, Rfl: 1  •  diltiazem (CARDIZEM) 60 mg tablet, Take 1 tablet (60 mg total) by mouth daily, Disp: 90 tablet, Rfl: 1  •  escitalopram (LEXAPRO) 20 mg tablet, TAKE ONE TABLET BY MOUTH EVERY DAY, Disp: 90 tablet, Rfl: 1  •  fluticasone (FLONASE) 50 mcg/act nasal spray, APPLY ONE SPRAY IN EACH NOSTRIL ONCE DAILY AS NEEDED FOR RHINITIS, Disp: 48 g, Rfl: 1  •  Fluticasone Furoate-Vilanterol 100-25 mcg/actuation inhaler, Inhale 1 puff daily Rinse mouth after use., Disp: 60 blister, Rfl: 5  •  gabapentin (NEURONTIN) 600 MG tablet, TAKE ONE TABLET BY MOUTH EVERY MORNING , TAKE ONE TABLET BY MOUTH EVERY DAY IN THE AFTERNOON , AND TAKE TWO TABLETS BY MOUTH EVERY EVENING AT BEDTIME, Disp: 360 tablet, Rfl: 1  •  HYDROcodone-acetaminophen (NORCO) 5-325 mg per tablet, Take 1 tablet by mouth every 6 (six) hours as needed for pain Max Daily Amount: 4 tablets, Disp: 120 tablet, Rfl: 0  •  levothyroxine 50 mcg tablet, Take 1 tablet (50 mcg total) by mouth daily, Disp: 90 tablet, Rfl: 1  •  lisinopril (ZESTRIL) 20 mg tablet, TAKE ONE TABLET BY MOUTH TWO TIMES A DAY, Disp: 180 tablet, Rfl: 1  •  LORazepam (ATIVAN) 1 mg tablet, Take 1 tablet (1 mg total) by mouth every 6 (six) hours as needed for anxiety, Disp: 120 tablet, Rfl: 0  •  MAGNESIUM PO, Take 1 tablet by mouth  daily, Disp: , Rfl:   •  Multiple Vitamin (MULTIVITAMIN ADULT PO), Take 1 tablet by mouth daily, Disp: , Rfl:   •  Omega-3 Fatty Acids (FISH OIL PO), Take 1 capsule by mouth daily, Disp: , Rfl:   •  pantoprazole (PROTONIX) 40 mg tablet, TAKE ONE TABLET BY MOUTH EVERY DAY, Disp: 90 tablet, Rfl: 1  •  potassium chloride (MICRO-K) 10 MEQ CR capsule, TAKE TWO CAPSULES BY MOUTH EVERY DAY, Disp: 180 capsule, Rfl: 3  •  simvastatin (ZOCOR) 10 mg tablet, Take 0.5 tablets (5 mg total) by mouth daily, Disp: 45 tablet, Rfl: 1  •  sodium chloride 1 g tablet, Take 1 tablet (1 g total) by mouth 4 (four) times a day, Disp: 270 tablet, Rfl: 3  •  torsemide (DEMADEX) 10 mg tablet, Take 1 tablet (10 mg total) by mouth daily as needed (edema), Disp: 90 tablet, Rfl: 2  •  vitamin B-12 (VITAMIN B-12) 500 mcg tablet, Take 1 tablet (500 mcg total) by mouth daily, Disp: 90 tablet, Rfl: 1    Current Facility-Administered Medications:   •  cyanocobalamin injection 1,000 mcg, 1,000 mcg, Intramuscular, Q30 Days, Zaira Velasquez MD, 1,000 mcg at 04/20/23 1012  Allergies   Allergen Reactions   • Indomethacin GI Intolerance, Dizziness and Other (See Comments)   • Macrobid [Nitrofurantoin Monohyd Macro] Rash   • Nitrofurantoin Other (See Comments)   • Penicillins Rash and Other (See Comments)     Annotation - [id number]: can take cephalosporins   • Sulfa Antibiotics Rash and Other (See Comments)     Vitals:    04/16/24 0807   BP: 120/76   Pulse: 68   Resp: 18   Temp: 97.5 °F (36.4 °C)   SpO2: 97%       Physical Exam  Constitutional:       General: She is not in acute distress.     Appearance: Normal appearance.   Cardiovascular:      Rate and Rhythm: Normal rate and regular rhythm.      Pulses: Normal pulses.      Heart sounds: Normal heart sounds.   Pulmonary:      Effort: Pulmonary effort is normal.      Breath sounds: Normal breath sounds.   Chest:      Chest wall: No mass.   Breasts:     Right: No swelling, bleeding, inverted nipple, mass,  nipple discharge, skin change or tenderness.      Left: No swelling, bleeding, mass, skin change or tenderness.       Abdominal:      General: Abdomen is flat.      Palpations: Abdomen is soft.   Lymphadenopathy:      Upper Body:      Right upper body: No supraclavicular, axillary or pectoral adenopathy.      Left upper body: No supraclavicular, axillary or pectoral adenopathy.   Skin:     General: Skin is warm.   Neurological:      General: No focal deficit present.      Mental Status: She is alert and oriented to person, place, and time.   Psychiatric:         Mood and Affect: Mood normal.         Behavior: Behavior normal.           Results:    Imaging  Holter monitor    Result Date: 4/9/2024  Narrative: 24 HOUR HOLTER MONITOR INDICATION: PACs and SVT Average heart rate: 70 bpm Lowest heart rate: 57 bpm Highest heart rate: 100 bpm VENTRICULAR ECTOPY - 1.1% of all monitored beats Total number: 1122 Runs: 0 Ventricular Couplets: 40 Ventricular Triplets: 0 Bigeminy: 3 Trigeminy: 10 SUPRAVENTRICULAR ECTOPY - 2.0% of all monitored beats Total number: 2019 Runs: 3 Supraventricular Couplets: 302 Bigeminy: 26 Trigeminy: 190 Longest supraventricular run: 9 beats (heart rate 124 bpm) Fastest supraventricular run: 4 beats (heart rate 135 bpm) BRADYCARDIA Slowest episode: 1:28 pm, (minimum heart rate of 57 bpm).  This corresponded with sinus bradycardia with no evidence of heart block. TACHYCARDIA Fastest episode: occurred at 3:44 pm, (maximum heart rate of 103 bpm).  This corresponded with sinus tachycardia. SYMPTOMS The patient experienced no significant symptoms during the monitoring time period.     Impression: 1) Abnormal Holter monitor of 24 hrs duration. 2) Slightly increased burden of ventricular and supraventricular ectopic beats. 3) Brief runs of asymptomatic SVT, longest 9 beats in duration. Interpreting Physician: Nevaeh Casey MD, [facility]       I reviewed the above imaging data.      Advance Care  Planning/Advance Directives:  Discussed disease status, cancer treatment plans and/or cancer treatment goals with the patient.

## 2024-04-25 ENCOUNTER — HOSPITAL ENCOUNTER (OUTPATIENT)
Dept: RADIOLOGY | Facility: HOSPITAL | Age: 79
Discharge: HOME | End: 2024-04-25
Payer: MEDICARE

## 2024-04-25 DIAGNOSIS — M25.552 LEFT HIP PAIN: ICD-10-CM

## 2024-04-25 DIAGNOSIS — M25.551 RIGHT HIP PAIN: ICD-10-CM

## 2024-04-25 PROCEDURE — 73521 X-RAY EXAM HIPS BI 2 VIEWS: CPT

## 2024-04-26 DIAGNOSIS — E03.9 ACQUIRED HYPOTHYROIDISM: ICD-10-CM

## 2024-04-26 DIAGNOSIS — F41.9 ANXIETY: ICD-10-CM

## 2024-04-26 DIAGNOSIS — E78.49 OTHER HYPERLIPIDEMIA: ICD-10-CM

## 2024-04-26 DIAGNOSIS — I81 PORTAL VEIN THROMBOSIS: ICD-10-CM

## 2024-04-26 DIAGNOSIS — M54.16 LUMBAR RADICULOPATHY: ICD-10-CM

## 2024-04-26 RX ORDER — LEVOTHYROXINE SODIUM 0.05 MG/1
50 TABLET ORAL DAILY
Qty: 90 TABLET | Refills: 1 | Status: SHIPPED | OUTPATIENT
Start: 2024-04-26

## 2024-04-26 RX ORDER — APIXABAN 5 MG/1
5 TABLET, FILM COATED ORAL 2 TIMES DAILY
Qty: 180 TABLET | Refills: 1 | Status: SHIPPED | OUTPATIENT
Start: 2024-04-26

## 2024-04-26 RX ORDER — SIMVASTATIN 10 MG
15 TABLET ORAL DAILY
Qty: 45 TABLET | Refills: 1 | Status: SHIPPED | OUTPATIENT
Start: 2024-04-26

## 2024-04-26 NOTE — TELEPHONE ENCOUNTER
Reason for call:   [x] Refill   [] Prior Auth  [] Other:     Office:   [x] PCP/Provider - Eva  [] Specialty/Provider -     Medication:   HYDROcodone-acetaminophen (NORCO) 5-325 mg per tablet   LORazepam (ATIVAN) 1 mg tablet     Dose/Frequency:   1 tablet, Oral, Every 6 hours PRN   1 mg, Oral, Every 6 hours PRN     Quantity:   120  120    [id number] [place] [address]     Does the patient have enough for 3 days?   [x] Yes   [] No - Send as HP to POD

## 2024-04-29 RX ORDER — HYDROCODONE BITARTRATE AND ACETAMINOPHEN 5; 325 MG/1; MG/1
1 TABLET ORAL EVERY 6 HOURS PRN
Qty: 120 TABLET | Refills: 0 | Status: SHIPPED | OUTPATIENT
Start: 2024-04-29

## 2024-04-29 RX ORDER — LORAZEPAM 1 MG/1
1 TABLET ORAL EVERY 6 HOURS PRN
Qty: 120 TABLET | Refills: 0 | Status: SHIPPED | OUTPATIENT
Start: 2024-04-29

## 2024-04-29 NOTE — PROGRESS NOTES
1. Primary osteoarthritis of right knee      Orders Placed This Encounter   Procedures    Large joint arthrocentesis: R knee     PAST REPORTS:    XR right knee 3/17/2024 (outside image)  Tricompartmental osteoarthritic degenerative changes of the right knee with moderate to severe lateral compartment joint space narrowing.    XR left knee 12/21/2021  There is mild to moderate medial and patellofemoral compartment joint space narrowing.  There are small marginal osteophytes at the medial, lateral and patellofemoral compartments.      XR right knee 12/21/2021  Tricompartmental osteoarthritic degenerative changes of the right knee with moderate to severe lateral compartment joint space narrowing.     ASSESSMENT/PLAN:    Chronic Moderate-severe tricompartmental OA of right knee      USG Monovisc to right knee administered this visit  -20mL clear serous fluid aspirated    Repeat X-ray next visit: None    Return for follow-up every 3 months as needed for injection.    Patient instructions below verbally summarized in person during encounter:  Patient Instructions   Ultrasound-guided MONOVISC injection to right knee joint administered today.    Red flags and risks of injection include but are not limited to infection <0.072% as referenced in some sources, nerve or artery penetration, and if steroids are used-skin dimpling <1%, hypo-pigmentation <1%.  Keep the injection area clean and dry for the next 24 hours. Avoid submerging underwater in a tub, pool, ocean, lake, jacuzzi, hot tub, or any other body of water for 1 week until needle wound closes due to risk of infection. May take showers. Clean needle site with soap and water and keep covered at all times with sterile bandage such as a band-aid until fully healed.  If any symptoms including fevers, chills, swelling, or worsening symptoms occur then to call office or go to hospital for immediate care if physician unavailable due to possible infection or other complication  which is a serious medical problem.  May resume regular activities as tolerated and use local ice application or over-the-counter pain medications if there is any discomfort.        __________________________________________________________________________    HISTORY OF PRESENT ILLNESS:    78 y.o. female presents for 4-month follow-up of moderate-severe tricompartmental OA right knee.  Last visit on 12/26/2023 patient was provided USG CSI to her right knee and was recommended to follow-up in around 4 months for USG Visco injection.    12/26/2023 USG CSI right knee  8/8/2023  USG CSI right knee  4/18/2023  USG Visco right knee    4/30/2024:  Today patient reports good improvement of pain after Visco injection, not so much with CSI. Denies any new injury since last visit. Pain is 7-8/10 with weight bearing and ambulation.      Following history reviewed and updated:  Historical Information   Patient Active Problem List    Diagnosis Date Noted    Continuous opioid dependence ([facility]) 12/27/2023    Peripheral vascular disease (HCC) 12/06/2023    S/P left mastectomy 11/28/2023    History of right mastectomy 10/10/2023    Esophageal candidiasis (HCC) 08/28/2023    Hoarseness of voice 04/20/2023    History of left breast cancer 12/23/2020    Vitamin B12 deficiency 08/23/2019    Vitamin D deficiency 08/23/2019    S/P deep brain stimulator placement 11/26/2018    Encounter for long-term (current) use of medications 11/23/2018    Lumbar degenerative disc disease 04/20/2018    Portal vein thrombosis 01/29/2018    Hypomagnesemia 12/08/2015    Unsteady gait 12/08/2015    Overweight 07/16/2015    Essential tremor 07/13/2015    Prediabetes 06/16/2015    Osteoarthritis of right knee 09/05/2014    Iron deficiency anemia 04/30/2014    Supraventricular tachycardia 10/07/2013    Diarrhea 01/09/2013    Chronic salpingo-oophoritis 12/26/2012    SIADH (syndrome of inappropriate ADH production) ([facility]) 12/14/2012    Peripheral neuropathy  12/14/2012    GERD (gastroesophageal reflux disease) 08/23/2012    Moderate episode of recurrent major depressive disorder (HCC) 06/13/2012    Hyperlipidemia 06/13/2012    Essential hypertension 06/13/2012    Acquired hypothyroidism 06/13/2012    Osteopenia 06/13/2012    Ulcerative colitis without complications (HCC) 06/13/2012    Allergic rhinitis 06/13/2012    Mild intermittent asthma without complication 06/13/2012    Sjogren's syndrome (HCC) 06/13/2012    Anxiety 04/30/2012    Insomnia 04/30/2012     Past Medical History:   Diagnosis Date    Anemia     Anxiety     Arrhythmia     Arthritis     Asthma     Blood clot in vein     portal vein    Breast cancer (HCC) 02/12/2021    Breast lump     [id number] RESOLVED    Cancer (HCC)     Depression     Disease of thyroid gland     DVT, lower extremity (HCC)     GERD (gastroesophageal reflux disease)     Hyperlipidemia     Hypertension     Hypokalemia     Hyponatremia     Hypothyroidism     Iron deficiency anemia     Irregular heart beat     Manic behavior (HCC)     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HCC)     Osteoarthritis     Palpitations     [id number]  RESOLVED    Paroxysmal supraventricular tachycardia     PE (pulmonary thromboembolism) (HCC)     Sjoegren syndrome     Sleep apnea     Sleep difficulties     Spinal stenosis     Thrombocytosis     [id number]  RESOLVED    Tremors of nervous system     dbs implanted right and left chest    Ulcerative colitis (HCC)     Vertigo     [id number] RESOLVED     Past Surgical History:   Procedure Laterality Date    ABDOMINAL SURGERY      APPENDECTOMY      BREAST BIOPSY Left 11/07/2019    Stereo    BREAST EXCISIONAL BIOPSY Left     x many years    BREAST SURGERY      lumpectomy & biopsy    CARMELLA HOLE W/ STEREOTACTIC INSERTION OF DBS LEADS / INTRAOP MICROELECTRODE RECORDING      COLON SURGERY      COLONOSCOPY N/A 08/30/2017    Procedure: POUCHOSCOPY;  Surgeon: VANDANA Waters MD;  Location: [facility];  Service: Colorectal    COLOPROCTECTOMY  W/ ILEO J POUCH      ESOPHAGOGASTRODUODENOSCOPY      ONSET 10/17/11    FISTULA REPAIR      LLEOANAL FISTULA REPAIR TRANSPERIN TRANSABD APPROACH    HYSTERECTOMY      age 38    ILEOSTOMY CLOSURE      KNEE ARTHROSCOPY      Right    MAMMO STEREOTACTIC BREAST BIOPSY LEFT (ALL INC) Left 11/07/2019    MAMMO STEREOTACTIC BREAST BIOPSY LEFT (ALL INC) Left 12/17/2020    MAMMO STEREOTACTIC BREAST BIOPSY LEFT (ALL INC) EACH ADD Left 12/17/2020    MASTECTOMY Left 02/12/2021    left mastectomy- Dr. Hayes    MASTECTOMY W/ SENTINEL NODE BIOPSY Left 02/12/2021    Procedure: BREAST MASTECTOMY WITH SENTINEL LYMPH NODE BIOPSY, LYMPHATIC MAPPING WITH BLUE DYE AND RADIAOCTIVE DYE (INJECT AT 1100 BY DR HAYES IN THE OR);  Surgeon: Robbie Hayes MD;  Location: [facility];  Service: Surgical Oncology    UT INSJ/RPLCMT CRANIAL NEUROSTIM PULSE GENERATOR Right 06/20/2017    Procedure: DBS GENERATOR REPLACEMENT;  Surgeon: Marin Mao MD;  Location: [facility];  Service: Neurosurgery    UT INSJ/RPLCMT CRANIAL NEUROSTIM PULSE GENERATOR N/A 12/04/2019    Procedure: REPLACEMENT IMPLANTABLE PULSE GENERATOR FOR DEEP BRAIN STIMULATOR LEFT CHEST;  Surgeon: Damian Batres MD;  Location: [facility];  Service: Neurosurgery    SPLENECTOMY      TONSILLECTOMY       Current Outpatient Medications on File Prior to Visit   Medication Sig    albuterol (PROVENTIL HFA,VENTOLIN HFA) 90 mcg/act inhaler Inhale 2 puffs every 6 (six) hours as needed for wheezing    amLODIPine (NORVASC) 2.5 mg tablet TAKE ONE TABLET BY MOUTH EVERY DAY    ammonium lactate (LAC-HYDRIN) 12 % cream     Calcium Carb-Cholecalciferol (CALCIUM 600-D PO) Take 1 tablet by mouth 2 (two) times a day    Coenzyme Q10 (CO Q-10 PO) Take 1 capsule by mouth daily    diltiazem (CARDIZEM CD) 180 mg 24 hr capsule TAKE ONE CAPSULE BY MOUTH EVERY DAY    diltiazem (CARDIZEM) 60 mg tablet Take 1 tablet (60 mg total) by mouth daily    Eliquis 5 MG TAKE ONE TABLET BY MOUTH TWICE A DAY    escitalopram (LEXAPRO) 20  mg tablet TAKE ONE TABLET BY MOUTH EVERY DAY    fluticasone (FLONASE) 50 mcg/act nasal spray APPLY ONE SPRAY IN EACH NOSTRIL ONCE DAILY AS NEEDED FOR RHINITIS    Fluticasone Furoate-Vilanterol 100-25 mcg/actuation inhaler Inhale 1 puff daily Rinse mouth after use.    gabapentin (NEURONTIN) 600 MG tablet TAKE ONE TABLET BY MOUTH EVERY MORNING , TAKE ONE TABLET BY MOUTH EVERY DAY IN THE AFTERNOON , AND TAKE TWO TABLETS BY MOUTH EVERY EVENING AT BEDTIME    HYDROcodone-acetaminophen (NORCO) 5-325 mg per tablet Take 1 tablet by mouth every 6 (six) hours as needed for pain Max Daily Amount: 4 tablets    levothyroxine 50 mcg tablet TAKE ONE TABLET BY MOUTH EVERY DAY    lisinopril (ZESTRIL) 20 mg tablet TAKE ONE TABLET BY MOUTH TWO TIMES A DAY    LORazepam (ATIVAN) 1 mg tablet Take 1 tablet (1 mg total) by mouth every 6 (six) hours as needed for anxiety    MAGNESIUM PO Take 1 tablet by mouth daily    Multiple Vitamin (MULTIVITAMIN ADULT PO) Take 1 tablet by mouth daily    Omega-3 Fatty Acids (FISH OIL PO) Take 1 capsule by mouth daily    pantoprazole (PROTONIX) 40 mg tablet TAKE ONE TABLET BY MOUTH EVERY DAY    potassium chloride (MICRO-K) 10 MEQ CR capsule TAKE TWO CAPSULES BY MOUTH EVERY DAY    simvastatin (ZOCOR) 10 mg tablet TAKE ONE-HALF TABLET BY MOUTH DAILY    sodium chloride 1 g tablet Take 1 tablet (1 g total) by mouth 4 (four) times a day    torsemide (DEMADEX) 10 mg tablet Take 1 tablet (10 mg total) by mouth daily as needed (edema)    vitamin B-12 (VITAMIN B-12) 500 mcg tablet Take 1 tablet (500 mcg total) by mouth daily     Allergies   Allergen Reactions    Indomethacin GI Intolerance, Dizziness and Other (See Comments)    Macrobid [Nitrofurantoin Monohyd Macro] Rash    Nitrofurantoin Other (See Comments)    Penicillins Rash and Other (See Comments)     Annotation - [id number]: can take cephalosporins    Sulfa Antibiotics Rash and Other (See Comments)     Social History     Socioeconomic History    Marital  status:      Spouse name: Not on file    Number of children: Not on file    Years of education: EDUCATION LEVEL 10TH GRADE    Highest education level: Not on file   Occupational History    Occupation: RETIRED   Tobacco Use    Smoking status: Former     Current packs/day: 0.00     Average packs/day: 1 pack/day for 15.0 years (15.0 ttl pk-yrs)     Types: Cigarettes     Start date: 1950     Quit date: 1965     Years since quittin.3    Smokeless tobacco: Never    Tobacco comments:     Quit   Vaping Use    Vaping status: Never Used   Substance and Sexual Activity    Alcohol use: Yes     Alcohol/week: 2.0 standard drinks of alcohol     Types: 2 Cans of beer per week     Comment: 2or 3 beers a week    Drug use: No    Sexual activity: Not Currently     Partners: Male     Birth control/protection: Post-menopausal   Other Topics Concern    Not on file   Social History Narrative    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ACTIVITIES INSIDE AND OUTSIDE OF HOME, MODERATE    CAFFEINE USE, DRINKS CAFEINATED TEA, DRINKS COFFEE    INADEQUATE EXERCISE    LIVES WITH ADULT CHILDREN     Social Determinants of Health     Financial Resource Strain: Low Risk  (2023)    Overall Financial Resource Strain (CARDIA)     Difficulty of Paying Living Expenses: Not very hard   Food Insecurity: Not on file   Transportation Needs: No Transportation Needs (2023)    PRAPARE - Transportation     Lack of Transportation (Medical): No     Lack of Transportation (Non-Medical): No   Physical Activity: Not on file   Stress: Not on file   Social Connections: Not on file   Intimate Partner Violence: Not on file   Housing Stability: Not on file     Family History   Problem Relation Age of Onset    Venous thrombosis Mother         ACUTE VENOUS THROMBOSIS OF DEEP VESSELS OF THE DISTAL LOWER EXTREMITY    Other Mother         PHLEBITIS    Hypertension Mother     Peripheral vascular disease Mother     COPD Father     Diabetes Father         MELLITUS    Stroke  "Father     Diabetes Sister         MELLITUS    Sjogren's syndrome Sister     No Known Problems Daughter     No Known Problems Maternal Grandmother     No Known Problems Maternal Grandfather     No Known Problems Paternal Grandmother     No Known Problems Paternal Grandfather     No Known Problems Sister     No Known Problems Maternal Aunt     No Known Problems Paternal Aunt     No Known Problems Son         /64 (BP Location: Left arm, Patient Position: Sitting, Cuff Size: Standard)   Ht 5' 3\" (1.6 m)   Wt 64.4 kg (142 lb)   BMI 25.15 kg/m²     PHYSICAL EXAM:    RIGHT KNEE:  Erythema: no  Swelling: no  Increased Warmth: no  __________________________________________________________________________  Large joint arthrocentesis: R knee  [place] Protocol:  Procedure performed by: (Supervised by Dr Bernal)  Consent: Verbal consent obtained.  Risks and benefits: risks, benefits and alternatives were discussed  Consent given by: patient  Patient understanding: patient states understanding of the procedure being performed  Patient consent: the patient's understanding of the procedure matches consent given  Site marked: the operative site was marked  Radiology Images displayed and confirmed. If images not available, report reviewed: imaging studies available  Required items: required blood products, implants, devices, and special equipment available  Patient identity confirmed: verbally with patient  Supporting Documentation  Indications: pain   Procedure Details  Location: knee - R knee  Preparation: Patient was prepped and draped in the usual sterile fashion  Needle size: 22 G (22G-1.5in needle)  Ultrasound guidance: yes (Linear probe with sterile probe cover and sterile gel)  Approach: lateral  Medications administered: 4 mL bupivacaine 0.25 %; 88 mg hyaluronan 88 MG/4ML    Aspirate amount: 20 mL  Aspirate: clear and serous  Patient tolerance: patient tolerated the procedure well with no immediate " complications  Dressing:  Sterile dressing applied

## 2024-04-30 ENCOUNTER — OFFICE VISIT (OUTPATIENT)
Dept: OBGYN CLINIC | Facility: OTHER | Age: 79
End: 2024-04-30
Payer: MEDICARE

## 2024-04-30 ENCOUNTER — TELEPHONE (OUTPATIENT)
Age: 79
End: 2024-04-30

## 2024-04-30 VITALS
WEIGHT: 142 LBS | SYSTOLIC BLOOD PRESSURE: 128 MMHG | DIASTOLIC BLOOD PRESSURE: 64 MMHG | HEIGHT: 63 IN | BODY MASS INDEX: 25.16 KG/M2

## 2024-04-30 DIAGNOSIS — M17.11 PRIMARY OSTEOARTHRITIS OF RIGHT KNEE: Primary | ICD-10-CM

## 2024-04-30 PROCEDURE — 20611 DRAIN/INJ JOINT/BURSA W/US: CPT | Performed by: FAMILY MEDICINE

## 2024-04-30 RX ORDER — BUPIVACAINE HYDROCHLORIDE 2.5 MG/ML
4 INJECTION, SOLUTION INFILTRATION; PERINEURAL
Status: COMPLETED | OUTPATIENT
Start: 2024-04-30 | End: 2024-04-30

## 2024-04-30 RX ADMIN — BUPIVACAINE HYDROCHLORIDE 4 ML: 2.5 INJECTION, SOLUTION INFILTRATION; PERINEURAL at 07:30

## 2024-04-30 NOTE — PATIENT INSTRUCTIONS
Ultrasound-guided MONOVISC injection to right knee joint administered today.    Red flags and risks of injection include but are not limited to infection <0.072% as referenced in some sources, nerve or artery penetration, and if steroids are used-skin dimpling <1%, hypo-pigmentation <1%.  Keep the injection area clean and dry for the next 24 hours. Avoid submerging underwater in a tub, pool, ocean, lake, jacuzzi, hot tub, or any other body of water for 1 week until needle wound closes due to risk of infection. May take showers. Clean needle site with soap and water and keep covered at all times with sterile bandage such as a band-aid until fully healed.  If any symptoms including fevers, chills, swelling, or worsening symptoms occur then to call office or go to hospital for immediate care if physician unavailable due to possible infection or other complication which is a serious medical problem.  May resume regular activities as tolerated and use local ice application or over-the-counter pain medications if there is any discomfort.

## 2024-04-30 NOTE — TELEPHONE ENCOUNTER
Caller: Patient    Doctor: Dr. Logan    Reason for call: Patient calling stating that someone called to schedule her next USGI for her right knee.  Patient asking for call back from clinical team to get the appt scheduled.    Call back#: [phone]

## 2024-04-30 NOTE — TELEPHONE ENCOUNTER
Caller: Self    Doctor: Desmond    Reason for call: Returning call to schedule next [facility]    Call back#: [phone]

## 2024-05-02 ENCOUNTER — APPOINTMENT (OUTPATIENT)
Dept: LAB | Facility: AMBULARY SURGERY CENTER | Age: 79
End: 2024-05-02
Payer: MEDICARE

## 2024-05-02 DIAGNOSIS — Z85.3 HISTORY OF LEFT BREAST CANCER: ICD-10-CM

## 2024-05-02 DIAGNOSIS — D50.9 IRON DEFICIENCY ANEMIA, UNSPECIFIED IRON DEFICIENCY ANEMIA TYPE: ICD-10-CM

## 2024-05-02 LAB
BASOPHILS # BLD AUTO: 0.03 THOUSANDS/ÂΜL (ref 0–0.1)
BASOPHILS NFR BLD AUTO: 0 % (ref 0–1)
EOSINOPHIL # BLD AUTO: 0.06 THOUSAND/ÂΜL (ref 0–0.61)
EOSINOPHIL NFR BLD AUTO: 1 % (ref 0–6)
ERYTHROCYTE [DISTWIDTH] IN BLOOD BY AUTOMATED COUNT: 14 % (ref 11.6–15.1)
FERRITIN SERPL-MCNC: 63 NG/ML (ref 11–307)
HCT VFR BLD AUTO: 37 % (ref 34.8–46.1)
HGB BLD-MCNC: 12.1 G/DL (ref 11.5–15.4)
IMM GRANULOCYTES # BLD AUTO: 0.08 THOUSAND/UL (ref 0–0.2)
IMM GRANULOCYTES NFR BLD AUTO: 1 % (ref 0–2)
IRON SATN MFR SERPL: 18 % (ref 15–50)
IRON SERPL-MCNC: 67 UG/DL (ref 50–212)
LYMPHOCYTES # BLD AUTO: 1.5 THOUSANDS/ÂΜL (ref 0.6–4.47)
LYMPHOCYTES NFR BLD AUTO: 15 % (ref 14–44)
MCH RBC QN AUTO: 32.2 PG (ref 26.8–34.3)
MCHC RBC AUTO-ENTMCNC: 32.7 G/DL (ref 31.4–37.4)
MCV RBC AUTO: 98 FL (ref 82–98)
MONOCYTES # BLD AUTO: 1.12 THOUSAND/ÂΜL (ref 0.17–1.22)
MONOCYTES NFR BLD AUTO: 11 % (ref 4–12)
NEUTROPHILS # BLD AUTO: 7.08 THOUSANDS/ÂΜL (ref 1.85–7.62)
NEUTS SEG NFR BLD AUTO: 72 % (ref 43–75)
NRBC BLD AUTO-RTO: 0 /100 WBCS
PLATELET # BLD AUTO: 390 THOUSANDS/UL (ref 149–390)
PMV BLD AUTO: 9.3 FL (ref 8.9–12.7)
RBC # BLD AUTO: 3.76 MILLION/UL (ref 3.81–5.12)
TIBC SERPL-MCNC: 368 UG/DL (ref 250–450)
UIBC SERPL-MCNC: 301 UG/DL (ref 155–355)
WBC # BLD AUTO: 9.87 THOUSAND/UL (ref 4.31–10.16)

## 2024-05-02 PROCEDURE — 82728 ASSAY OF FERRITIN: CPT

## 2024-05-02 PROCEDURE — 83540 ASSAY OF IRON: CPT

## 2024-05-02 PROCEDURE — 36415 COLL VENOUS BLD VENIPUNCTURE: CPT

## 2024-05-02 PROCEDURE — 83550 IRON BINDING TEST: CPT

## 2024-05-02 PROCEDURE — 85025 COMPLETE CBC W/AUTO DIFF WBC: CPT

## 2024-05-08 ENCOUNTER — OFFICE VISIT (OUTPATIENT)
Dept: CARDIOLOGY CLINIC | Facility: CLINIC | Age: 79
End: 2024-05-08
Payer: MEDICARE

## 2024-05-08 VITALS
SYSTOLIC BLOOD PRESSURE: 112 MMHG | OXYGEN SATURATION: 94 % | BODY MASS INDEX: 25.69 KG/M2 | HEIGHT: 63 IN | WEIGHT: 145 LBS | HEART RATE: 73 BPM | DIASTOLIC BLOOD PRESSURE: 58 MMHG

## 2024-05-08 DIAGNOSIS — Z86.718 HISTORY OF VENOUS THROMBOSIS: ICD-10-CM

## 2024-05-08 DIAGNOSIS — E78.5 HYPERLIPIDEMIA, UNSPECIFIED HYPERLIPIDEMIA TYPE: ICD-10-CM

## 2024-05-08 DIAGNOSIS — E66.3 OVERWEIGHT (BMI 25.0-29.9): ICD-10-CM

## 2024-05-08 DIAGNOSIS — I73.9 PERIPHERAL VASCULAR DISEASE (HCC): ICD-10-CM

## 2024-05-08 DIAGNOSIS — I10 HYPERTENSION, UNSPECIFIED TYPE: ICD-10-CM

## 2024-05-08 DIAGNOSIS — R60.9 EDEMA, UNSPECIFIED TYPE: ICD-10-CM

## 2024-05-08 DIAGNOSIS — Z85.3 HISTORY OF LEFT BREAST CANCER: ICD-10-CM

## 2024-05-08 DIAGNOSIS — I47.10 SUPRAVENTRICULAR TACHYCARDIA: Primary | ICD-10-CM

## 2024-05-08 DIAGNOSIS — R73.03 PREDIABETES: ICD-10-CM

## 2024-05-08 PROCEDURE — 99214 OFFICE O/P EST MOD 30 MIN: CPT | Performed by: INTERNAL MEDICINE

## 2024-05-08 NOTE — PROGRESS NOTES
Cardiology Follow Up Visit       Matilda Day   [id number]  1945      [facility] [place]  [address] [place] [address]      Physician Requesting Consult:   Zaira Velasquez MD    Reason for Follow Up Visit / Principal Problem:  Mrs. Matilda Day is a 78 y.o. female and former smoker with a PMH significant for but not limited to PSVT/PAT, 1°AVB/IRBBB, HTN, HLD, Prediabetes, GERD, Sjogren, UC (s/p colectomy), Asthma, Hypothyroidism, Anemia, Essential Tremors (s/p brain stimulator 2014), and Overweight.  She presents to clinic for routine Follow Up.    Interval History:  Mrs. Day was originally seen in clinic on 12/06/23 to establish care. She'd been at her baseline state of health: independent of adl's, retired, though not exercising regularly, when she was seen by RODNEY George on 08/10/23 for routine follow up after being followed closely by Dr. Beltre since 2013 for PSVT/PAT .  She'd been treated with diltiazem for rate control and propanolol to control her tremors.  Her beta blockade was discontinued and a brain stimulator was placed with improvement in her tremors in 2014.  She been doing well overall with HTN and Preventative care until August when she developed SOB. She was found to be anemic and had been followed by Hematology for LUCILA as well as by GI.  We made not changes in her medical regimen, but we proceeded with a TTE that demonstrated no significant LV Dysfunction or Valvulopathy.  She denied any recent hospitalizations, family history of early cad, or family history of scd.     Since our last visit, she'd been well initially.  She was in [place] from Jan to April, but contracted a difficult cousrse of COVID that led to Bronchitis.  She finally was weaned of nebulizer treatments, but she notes the experience was challenging.  She is continuing her LUCILA mgmt with iron infusions: her last was in [facility] and she has another  planned for October.  She is also planning on working with dermatology for peeling skin on her left breast, seeing rheumatology for her sjogren symptoms, and returning to more regular exercise.  She notes that she gets about 2.5k steps per day currently, has a treadmill at home that she does not use, and routinely employs her walker a night for safe ambulation oob to bathroom.    She currently denies any cp, diaphoresis, n/v, palpitations, near syncope, syncope, orthopnea, or pnd.  She continue treatment with prn torsemide (since 2012) for management of dependent ble edema.  She notes fair control of her diet and exercise habits. She reports a diet that is well balanced, salt intake that is well controlled, water intake of 6-8 12 oz bottles, caffeine intake of 3-4 C/day, alcohol intake of 3 Beers / Week at the [facility], and no dedicated exercise. She is otherwise compliant with medications, checks BP occasionally but does not maintain a detailed BP log.  Of note, the patient's cardiac risk factor(s) include: hypertension, dyslipidemia, sedentary lifestyle, and systemic inflammatory disease.      Assessment & Plan   BLE Edema, long standing dependent edema at baseline on torsemide prn  Symptoms are stable, TTE without LV Dysfunction  Cont current medication regimen, Discussed benefits of exercise, compression hose, and ble elevation when resting  Monitoring routinely for symptomatic changes for now    DVT/PE, reports of prior portal vein thrombosis though imaging unavailable  Asymptomatic at this time, Tolerating doac without active bleeding  Cont eliquis, Cont ongoing anemia mgmt by heme/onc, Cont ongoing gi f/ul  Monitor routinely for symptomatic changes    PSVT, Abnormal PAC/PVC history ,  followed with serial holters in the past  Symptoms are all but resolved at this time  Cont current medical management at this time, Hold on repeat holter at this time  Monitor routinely for symptomatic changes, record vitals if  symptoms recur, ed/clinic precautions reviewed    HTN, long-standing  No home BP log for review, but clinic SBP in the 115-135 mmHg range  Cont current medication regimen: lisinopril/amlodipine/diltiazem, cont counseling on low-sodium diet, Review the importance of maintaining a home BP log  Monitor BP at home routinely and review log on next visit    HLD, recent FLP:  / TRI 90 / HDL 70 / LDL 97 on 12/09/23, 40% 10-Year ASCVD Estimated Risk  Stable, Well controlled, Tolerating statin without significant adverse reactions  Cont current medication regimen, cont counseling on diet and lifestyle modification  Monitor FLP routinely as ordered by PCP, will follow peripherally    Prediabetes, most recent A1c 6.1 on 03/11/24  No prior medical mgmt, diet controlled  Cont counseling on diet and lifestyle changes  Monitor Blood Sugar/A1c routinely as ordered by PCP, will follow peripherally    Overweight, Body mass index is 25.69 kg/m².  Weight has been stable overall: ±5 lbs over the last year   Cont to review the importance of diet and lifestyle modification  Will monitor routinely at this time    Discussion / Summary:  Precautions and reasons to call our office or proceed to ER were discussed in detail. Patient expressed understanding and questions were answered. Plan on follow up in-clinic in 4 months.    Office Visit Diagnosis:  1. Supraventricular tachycardia        2. Peripheral vascular disease (HCC)        3. Hypertension, unspecified type        4. Hyperlipidemia, unspecified hyperlipidemia type  Lipid Panel with Direct LDL reflex      5. Prediabetes        6. Edema, unspecified type        7. Overweight (BMI 25.0-29.9)        8. History of left breast cancer        9. History of venous thrombosis              Subjective   Review of Systems   Constitutional: Negative for chills and fever.   HENT:  Negative for congestion and sore throat.    Eyes:  Negative for blurred vision and double vision.    Cardiovascular:  Positive for leg swelling (improved no longer swollen since return from [facility]). Negative for chest pain, dyspnea on exertion, irregular heartbeat, near-syncope, orthopnea, palpitations, paroxysmal nocturnal dyspnea and syncope.   Respiratory:  Negative for cough, shortness of breath, sleep disturbances due to breathing, snoring and wheezing.    Hematologic/Lymphatic: Negative for bleeding problem. Does not bruise/bleed easily.   Skin:  Positive for itching (left breast). Negative for rash.   Musculoskeletal:  Positive for arthritis, back pain (s/p injection, also improved), joint pain (improved s/p injection) and joint swelling (right knee, improved s/p injection).   Gastrointestinal:  Positive for diarrhea (ongoing, always). Negative for abdominal pain, constipation, nausea and vomiting.   Genitourinary:  Negative for dysuria and hematuria.   Neurological:  Negative for numbness and paresthesias.   Psychiatric/Behavioral:  Negative for depression. The patient is nervous/anxious (stable).      The following portions of the patient's history were reviewed and updated as appropriate: past medical history, past social history, past family history, past surgical history, current medications, allergies, past surgical history and problem list.  Past Medical History:   Diagnosis Date   • Anemia    • Anxiety    • Arrhythmia    • Arthritis    • Asthma    • Blood clot in vein     portal vein   • Breast cancer (HCC) 02/12/2021   • Breast lump     [id number] RESOLVED   • Cancer (HCC)    • Depression    • Disease of thyroid gland    • DVT, lower extremity (HCC)    • GERD (gastroesophageal reflux disease)    • Hyperlipidemia    • Hypertension    • Hypokalemia    • Hyponatremia    • Hypothyroidism    • Iron deficiency anemia    • Irregular heart beat    • Manic behavior (HCC)    • 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HCC)    • Osteoarthritis    • Palpitations     [id number]  RESOLVED   • Paroxysmal supraventricular  tachycardia    • PE (pulmonary thromboembolism) (HCC)    • Sjoegren syndrome    • Sleep apnea    • Sleep difficulties    • Spinal stenosis    • Thrombocytosis     [id number]  RESOLVED   • Tremors of nervous system     dbs implanted right and left chest   • Ulcerative colitis (HCC)    • Vertigo     [id number] RESOLVED     Social History     Socioeconomic History   • Marital status:      Spouse name: Not on file   • Number of children: Not on file   • Years of education: EDUCATION LEVEL 10TH GRADE   • Highest education level: Not on file   Occupational History   • Occupation: RETIRED   Tobacco Use   • Smoking status: Former     Current packs/day: 0.00     Average packs/day: 1 pack/day for 15.0 years (15.0 ttl pk-yrs)     Types: Cigarettes     Start date: 1950     Quit date: 1965     Years since quittin.4   • Smokeless tobacco: Never   • Tobacco comments:     Quit   Vaping Use   • Vaping status: Never Used   Substance and Sexual Activity   • Alcohol use: Yes     Alcohol/week: 2.0 standard drinks of alcohol     Types: 2 Cans of beer per week     Comment: 2or 3 beers a week   • Drug use: No   • Sexual activity: Not Currently     Partners: Male     Birth control/protection: Post-menopausal   Other Topics Concern   • Not on file   Social History Narrative    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ACTIVITIES INSIDE AND OUTSIDE OF HOME, MODERATE    CAFFEINE USE, DRINKS CAFEINATED TEA, DRINKS COFFEE    INADEQUATE EXERCISE    LIVES WITH ADULT CHILDREN     Social Determinants of Health     Financial Resource Strain: Low Risk  (2023)    Overall Financial Resource Strain (CARDIA)    • Difficulty of Paying Living Expenses: Not very hard   Food Insecurity: No Food Insecurity (2024)    Hunger Vital Sign    • Worried About Running Out of Food in the Last Year: Never true    • Ran Out of Food in the Last Year: Never true   Transportation Needs: No Transportation Needs (2024)    PRAPARE - Transportation    • Lack of  Transportation (Medical): No    • Lack of Transportation (Non-Medical): No   Physical Activity: Not on file   Stress: Not on file   Social Connections: Not on file   Intimate Partner Violence: Not on file   Housing Stability: Low Risk  (5/31/2024)    Housing Stability Vital Sign    • Unable to Pay for Housing in the Last Year: No    • Number of Times Moved in the Last Year: 1    • Homeless in the Last Year: No     Family History   Problem Relation Age of Onset   • Venous thrombosis Mother         ACUTE VENOUS THROMBOSIS OF DEEP VESSELS OF THE DISTAL LOWER EXTREMITY   • Other Mother         PHLEBITIS   • Hypertension Mother    • Peripheral vascular disease Mother    • COPD Father    • Diabetes Father         MELLITUS   • Stroke Father    • Diabetes Sister         MELLITUS   • Sjogren's syndrome Sister    • No Known Problems Daughter    • No Known Problems Maternal Grandmother    • No Known Problems Maternal Grandfather    • No Known Problems Paternal Grandmother    • No Known Problems Paternal Grandfather    • No Known Problems Sister    • No Known Problems Maternal Aunt    • No Known Problems Paternal Aunt    • No Known Problems Son      Past Surgical History:   Procedure Laterality Date   • ABDOMINAL SURGERY     • APPENDECTOMY     • BREAST BIOPSY Left 11/07/2019    Stereo   • BREAST EXCISIONAL BIOPSY Left     x many years   • BREAST SURGERY      lumpectomy & biopsy   • CARMELLA HOLE W/ STEREOTACTIC INSERTION OF DBS LEADS / INTRAOP MICROELECTRODE RECORDING     • COLON SURGERY     • COLONOSCOPY N/A 08/30/2017    Procedure: POUCHOSCOPY;  Surgeon: VANDANA Waters MD;  Location: [facility];  Service: Colorectal   • COLOPROCTECTOMY W/ ILEO J POUCH     • ESOPHAGOGASTRODUODENOSCOPY      ONSET 10/17/11   • FISTULA REPAIR      LLEOANAL FISTULA REPAIR TRANSPERIN TRANSABD APPROACH   • HYSTERECTOMY      age 38   • ILEOSTOMY CLOSURE     • KNEE ARTHROSCOPY      Right   • MAMMO STEREOTACTIC BREAST BIOPSY LEFT (ALL INC) Left  11/07/2019   • MAMMO STEREOTACTIC BREAST BIOPSY LEFT (ALL INC) Left 12/17/2020   • MAMMO STEREOTACTIC BREAST BIOPSY LEFT (ALL INC) EACH ADD Left 12/17/2020   • MASTECTOMY Left 02/12/2021    left mastectomy- Dr. Hayes   • MASTECTOMY W/ SENTINEL NODE BIOPSY Left 02/12/2021    Procedure: BREAST MASTECTOMY WITH SENTINEL LYMPH NODE BIOPSY, LYMPHATIC MAPPING WITH BLUE DYE AND RADIAOCTIVE DYE (INJECT AT 1100 BY DR HAYES IN THE OR);  Surgeon: Robbie Hayes MD;  Location[facility];  Service: Surgical Oncology   • NV INSJ/RPLCMT CRANIAL NEUROSTIM PULSE GENERATOR Right 06/20/2017    Procedure: DBS GENERATOR REPLACEMENT;  Surgeon: Marin Mao MD;  Location: [facility];  Service: Neurosurgery   • NV INSJ/RPLCMT CRANIAL NEUROSTIM PULSE GENERATOR N/A 12/04/2019    Procedure: REPLACEMENT IMPLANTABLE PULSE GENERATOR FOR DEEP BRAIN STIMULATOR LEFT CHEST;  Surgeon: Damian Batres MD;  Location: [facility];  Service: Neurosurgery   • SPLENECTOMY     • TONSILLECTOMY         Current Outpatient Medications:   •  albuterol (PROVENTIL HFA,VENTOLIN HFA) 90 mcg/act inhaler, Inhale 2 puffs every 6 (six) hours as needed for wheezing, Disp: 8 g, Rfl: 1  •  amLODIPine (NORVASC) 2.5 mg tablet, TAKE ONE TABLET BY MOUTH EVERY DAY, Disp: 90 tablet, Rfl: 3  •  Calcium Carb-Cholecalciferol (CALCIUM 600-D PO), Take 1 tablet by mouth 2 (two) times a day, Disp: , Rfl:   •  Coenzyme Q10 (CO Q-10 PO), Take 1 capsule by mouth daily, Disp: , Rfl:   •  diltiazem (CARDIZEM) 60 mg tablet, Take 1 tablet (60 mg total) by mouth daily, Disp: 90 tablet, Rfl: 1  •  Eliquis 5 MG, TAKE ONE TABLET BY MOUTH TWICE A DAY, Disp: 180 tablet, Rfl: 1  •  escitalopram (LEXAPRO) 20 mg tablet, TAKE ONE TABLET BY MOUTH EVERY DAY, Disp: 90 tablet, Rfl: 1  •  fluticasone (FLONASE) 50 mcg/act nasal spray, APPLY ONE SPRAY IN EACH NOSTRIL ONCE DAILY AS NEEDED FOR RHINITIS, Disp: 48 g, Rfl: 1  •  gabapentin (NEURONTIN) 600 MG tablet, TAKE ONE TABLET BY MOUTH EVERY MORNING , TAKE ONE  TABLET BY MOUTH EVERY DAY IN THE AFTERNOON , AND TAKE TWO TABLETS BY MOUTH EVERY EVENING AT BEDTIME, Disp: 360 tablet, Rfl: 1  •  HYDROcodone-acetaminophen (NORCO) 5-325 mg per tablet, Take 1 tablet by mouth every 6 (six) hours as needed for pain Max Daily Amount: 4 tablets, Disp: 120 tablet, Rfl: 0  •  levothyroxine 50 mcg tablet, TAKE ONE TABLET BY MOUTH EVERY DAY, Disp: 90 tablet, Rfl: 1  •  lisinopril (ZESTRIL) 20 mg tablet, TAKE ONE TABLET BY MOUTH TWO TIMES A DAY, Disp: 180 tablet, Rfl: 1  •  LORazepam (ATIVAN) 1 mg tablet, Take 1 tablet (1 mg total) by mouth every 6 (six) hours as needed for anxiety, Disp: 120 tablet, Rfl: 0  •  MAGNESIUM PO, Take 1 tablet by mouth daily, Disp: , Rfl:   •  Multiple Vitamin (MULTIVITAMIN ADULT PO), Take 1 tablet by mouth daily, Disp: , Rfl:   •  Omega-3 Fatty Acids (FISH OIL PO), Take 1 capsule by mouth daily, Disp: , Rfl:   •  pantoprazole (PROTONIX) 40 mg tablet, TAKE ONE TABLET BY MOUTH EVERY DAY, Disp: 90 tablet, Rfl: 1  •  potassium chloride (MICRO-K) 10 MEQ CR capsule, TAKE TWO CAPSULES BY MOUTH EVERY DAY, Disp: 180 capsule, Rfl: 3  •  simvastatin (ZOCOR) 10 mg tablet, TAKE ONE-HALF TABLET BY MOUTH DAILY, Disp: 45 tablet, Rfl: 1  •  torsemide (DEMADEX) 10 mg tablet, Take 1 tablet (10 mg total) by mouth daily as needed (edema), Disp: 90 tablet, Rfl: 2  •  vitamin B-12 (VITAMIN B-12) 500 mcg tablet, Take 1 tablet (500 mcg total) by mouth daily, Disp: 90 tablet, Rfl: 1  •  al mag oxide-diphenhydramine-lidocaine viscous (MAGIC MOUTHWASH) 1:1:1 suspension, SWISH 10ML IN MOUTH FOR ONE MINUTE FOUR TIMES A DAY, Disp: , Rfl:   •  ammonium lactate (LAC-HYDRIN) 12 % cream, , Disp: , Rfl:   •  diltiazem (CARDIZEM CD) 180 mg 24 hr capsule, TAKE ONE CAPSULE BY MOUTH EVERY DAY, Disp: 90 capsule, Rfl: 1  •  Fluticasone Furoate-Vilanterol 100-25 mcg/actuation inhaler, Inhale 1 puff daily Rinse mouth after use., Disp: 60 blister, Rfl: 5  •  sodium chloride 1 g tablet, TAKE ONE TABLET BY  MOUTH FOUR TIMES A DAY, Disp: 270 tablet, Rfl: 1    Current Facility-Administered Medications:   •  cyanocobalamin injection 1,000 mcg, 1,000 mcg, Intramuscular, Q30 Days, Zaira Velasquez MD, 1,000 mcg at 04/20/23 1012  Allergies   Allergen Reactions   • Indomethacin GI Intolerance, Dizziness and Other (See Comments)   • Macrobid [Nitrofurantoin Monohyd Macro] Rash   • Nitrofurantoin Other (See Comments)   • Penicillins Rash and Other (See Comments)     Annotation - [id number]: can take cephalosporins   • Sulfa Antibiotics Rash and Other (See Comments)     Patient Active Problem List   Diagnosis   • Portal vein thrombosis   • Anxiety   • Moderate episode of recurrent major depressive disorder (HCC)   • Essential tremor   • Hyperlipidemia   • Essential hypertension   • Hypomagnesemia   • SIADH (syndrome of inappropriate ADH production) ([facility])   • Acquired hypothyroidism   • Iron deficiency anemia   • Prediabetes   • Peripheral neuropathy   • Osteopenia   • Osteoarthritis of right knee   • Ulcerative colitis without complications ([facility])   • Allergic rhinitis   • GERD (gastroesophageal reflux disease)   • Mild intermittent asthma without complication   • Diarrhea   • Sjogren's syndrome ([facility])   • Chronic salpingo-oophoritis   • Overweight   • Supraventricular tachycardia   • Unsteady gait   • Lumbar degenerative disc disease   • S/P deep brain stimulator placement   • Vitamin B12 deficiency   • Vitamin D deficiency   • History of left breast cancer   • Hoarseness of voice   • Esophageal candidiasis (HCC)   • History of right mastectomy   • S/P left mastectomy   • Peripheral vascular disease (HCC)   • Continuous opioid dependence (HCC)   • Malignant neoplasm of left breast in female, estrogen receptor negative, unspecified site of breast (HCC)       Objective     Vitals:    05/08/24 1457   BP: 112/58   BP Location: Right arm   Patient Position: Sitting   Cuff Size: Standard   Pulse: 73   SpO2: 94%   Weight: 65.8 kg  "(145 lb)   Height: 5' 3\" (1.6 m)     Body mass index is 25.69 kg/m².  ?  Physical Exam  Constitutional:       General: She is not in acute distress.     Appearance: She is not ill-appearing.   HENT:      Head: Normocephalic and atraumatic.      Right Ear: External ear normal.      Left Ear: External ear normal.      Nose: Nose normal.   Eyes:      General: No scleral icterus.  Neck:      Vascular: No carotid bruit.   Cardiovascular:      Rate and Rhythm: Normal rate and regular rhythm.      Pulses: Normal pulses.      Heart sounds: No murmur heard.     No gallop.   Pulmonary:      Effort: Pulmonary effort is normal.      Breath sounds: Normal breath sounds.   Musculoskeletal:      Cervical back: Neck supple.      Right lower leg: Edema (1+) present.      Left lower leg: Edema (1+) present.   Skin:     General: Skin is warm and dry.      Capillary Refill: Capillary refill takes less than 2 seconds.   Neurological:      Mental Status: She is alert and oriented to person, place, and time.      Gait: Gait abnormal (ambulates with a walker only at night).   Psychiatric:         Mood and Affect: Mood normal.         Behavior: Behavior normal.         Labs:  Lab Results   Component Value Date     (L) 12/28/2015     11/16/2015     11/02/2015    K 4.0 03/11/2024    K 3.5 12/09/2023    K 4.0 11/08/2023    K 4.0 12/28/2015    K 3.8 11/16/2015    K 3.7 11/02/2015    CL 98 03/11/2024    CL 99 12/09/2023    CL 95 (L) 11/08/2023    CL 95 (L) 12/28/2015    CL 99 11/16/2015     11/02/2015    CO2 31 03/11/2024    CO2 31 12/09/2023    CO2 29 11/08/2023    CO2 35 (H) 10/17/2021    CO2 28.5 12/28/2015    CO2 30.4 11/16/2015    CO2 30.7 11/02/2015    BUN 8 03/11/2024    BUN 10 12/09/2023    BUN 6 11/08/2023    BUN 7 12/28/2015    BUN 11 11/16/2015    BUN 8 11/02/2015    CREATININE 0.61 03/11/2024    CREATININE 0.68 12/09/2023    CREATININE 0.65 11/08/2023    CREATININE 0.76 12/28/2015    CREATININE 0.91 11/16/2015 "    CREATININE 0.85 11/02/2015    EGFR 86 03/11/2024    EGFR 83 12/09/2023    EGFR 84 11/08/2023    GLUC 102 11/08/2023    GLUC 96 09/28/2023    GLUC 97 09/13/2023    AST 19 03/11/2024    AST 23 12/09/2023    AST 18 09/19/2023    AST 23 11/16/2015    AST 24 10/06/2015    AST 18 04/27/2015    ALT 12 03/11/2024    ALT 17 12/09/2023    ALT 20 09/19/2023    ALT 31 11/16/2015    ALT 37 10/06/2015    ALT 21 04/27/2015    TBILI 0.51 03/11/2024    TBILI 0.40 12/09/2023    TBILI 0.37 09/19/2023    ALB 3.6 03/11/2024    ALB 3.8 12/09/2023    ALB 4.0 09/28/2023    ALB 3.8 11/16/2015    ALB 3.7 10/06/2015    ALB 3.6 04/27/2015    MG 1.9 07/03/2023    MG 1.4 (L) 06/22/2023    MG 1.8 04/07/2023    MG 1.5 (L) 12/17/2015    MG 1.4 (L) 10/06/2015    MG 1.8 08/20/2014    CALCIUM 9.2 03/11/2024    CALCIUM 9.4 12/09/2023    CALCIUM 8.8 11/08/2023    CALCIUM 8.7 12/28/2015    CALCIUM 8.7 11/16/2015    CALCIUM 8.6 11/02/2015      Lab Results   Component Value Date    WBC 9.87 05/02/2024    WBC 7.05 03/11/2024    WBC 6.79 11/08/2023    WBC 6.23 11/16/2015    WBC 5.87 10/06/2015    WBC 8.01 04/27/2015    HGB 12.1 05/02/2024    HGB 12.3 03/11/2024    HGB 13.1 11/08/2023    HGB 13.3 11/16/2015    HGB 12.7 10/06/2015    HGB 13.4 04/27/2015    HCT 37.0 05/02/2024    HCT 38.9 03/11/2024    HCT 39.8 11/08/2023    HCT 40.4 11/16/2015    HCT 37.3 10/06/2015    HCT 39.5 04/27/2015     05/02/2024     (H) 03/11/2024     (H) 11/08/2023     (H) 11/16/2015     (H) 10/06/2015     (H) 04/27/2015    INR 1.03 11/08/2023    INR 1.09 06/22/2023    INR 0.96 02/15/2023    INR 2.39 (H) 12/28/2015    INR 3.63 (H) 12/17/2015    INR 2.57 (H) 11/16/2015    IRON 67 05/02/2024    IRON 66 03/11/2024    IRON 13 (L) 07/14/2023    IRON 76 11/16/2015    IRON 90 04/27/2015    IRON 81 09/25/2014    FERRITIN 63 05/02/2024    FERRITIN 113 03/11/2024    FERRITIN 877 (H) 11/08/2023    FERRITIN 108 11/16/2015    FERRITIN 158.2 04/27/2015  "   FERRITIN 177.4 09/25/2014    TIBC 368 05/02/2024    TIBC 314 03/11/2024    TIBC 387 07/14/2023    TIBC 312 11/16/2015    TIBC 326 04/27/2015    TIBC 265 09/25/2014      Lab Results   Component Value Date    CHOLESTEROL 185 12/09/2023    CHOLESTEROL 183 04/07/2023    CHOLESTEROL 174 03/19/2022    TRIG 90 12/09/2023    TRIG 137 04/07/2023    TRIG 38 03/19/2022    TRIG 83 12/17/2015    TRIG 124 04/27/2015    TRIG 69 09/25/2014    HDL 70 12/09/2023    HDL 67 04/07/2023    HDL 74 03/19/2022    HDL 58 12/17/2015    HDL 59 04/27/2015    HDL 82 09/25/2014    LDLCALC 97 12/09/2023    LDLCALC 89 04/07/2023    LDLCALC 92 03/19/2022    LDLCALC 92 12/17/2015    LDLCALC 96 04/27/2015    LDLCALC 108 (H) 09/25/2014     Lab Results   Component Value Date    HGBA1C 6.1 (H) 03/11/2024    HGBA1C 5.6 12/27/2023    HGBA1C 6.2 (H) 08/11/2023    HGBA1C 6.4 (H) 04/07/2023    HGBA1C 5.6 12/17/2015    HGBA1C 6.2 (H) 09/14/2015    HGBA1C 6.1 (H) 09/03/2015    GLUF 103 (H) 03/11/2024    GLUF 101 (H) 12/09/2023    GLUF 129 (H) 09/19/2023    POCGLU 106 12/04/2019     No results found for: \"TSH\", \"FT4\"  No results found for: \"HSTNI0\", \"HSTNI2\", \"HSTNI4\", \"TROPONINI\"  No results found for: \"BNP\", \"NTBNP\"     Imaging:  I have personally reviewed pertinent reports.    Mammo diagnostic right w 3d & cad  Result Date: 11/20/2023  Impression:  Benign findings.  No mammographic evidence of malignancy in the right breast. ASSESSMENT/BI-RADS CATEGORY: Right: 2 - Benign Overall: 2 - Benign RECOMMENDATION:      - Diagnostic mammogram in 1 year for the right breast.    Cardiac Studies:  EKG:   Date: 06/22/23, sinus tachycardia, 1st degree AV block, PACs, nonspecific ST and T waves findings  Date: 02/15/23, normal sinus rhythm, 1st degree AV block, PVCs  Date: 02/23/21, normal sinus rhythm, PAC, PVC  Date: 01/11/21, normal sinus rhythm, 1st degree AV block, PVCs  Date: 04/18/14, normal sinus rhythm, 1st degree AV block, nonspecific ST findings  Date: " 09/01/12, normal sinus rhythm, 1st degree AV block, irbbb, rightward axis  Date: 06/10/08, normal sinus rhythm  Date: 06/04/03, normal sinus rhythm  Date: 09/18/00, normal sinus rhythm, PAC, nonspecific T wave findings  Date: 07/06/00, normal sinus rhythm  Date: 05/30/00, normal sinus rhythm    Tele:   No results found for this or any previous visit.     Holter:   24H Holter Study Date: 04/04/24  IMPRESSION:  1) Abnormal Holter monitor of 24 hrs duration.  2) Slightly increased burden of ventricular and supraventricular ectopic beats.  3) Brief runs of asymptomatic SVT, longest 9 beats in duration.    24H Holter Study Date: 04/03/23  Impression  Abnormal Holter  Low burden of premature ventricular contractions, and no ventricular runs  Moderate to high burden of premature atrial contractions with 23 runs of nonsustained paroxysmal atrial tachycardia    24H Holter Study Date: 12/09/21  IMPRESSION:  Sinus rhythm with rare PACs and PVCs.  No significant arrhythmias identified.  No SVT.    24H Holter Study Date: 08/24/21  IMPRESSION:  Abnormal 24 hour holter monitor.  Patient in normal sinus rhythm throughout holter monitoring.  Occasional premature ventricular contractions with no non-sustained ventricular tachycardia.  Occasional premature atrial contractions with a 5 beat run of atrial tachycardia.  No significant pauses.  No symptoms noted in diary.    24H Holter Study Date: 09/04/20  IMPRESSION:  Predominantly normal sinus rhythm, with an average heart rate of 66 beats per minute  No evidence of SVT during monitoring period.  Frequent premature atrial contractions, constituting 2.1% of total beats  Occasional premature ventricular contractions  No significant pauses or advanced degree heart block    24H Holter Study Date: 04/02/19  IMPRESSION:  1) Borderline Holter monitor of 24 hrs duration.  2) Normal burden of ventricular and supraventricular ectopic beats.   3) Brief asymptomatic runs of supraventricular  ectopy, longest 6 beats in duration.    24H Holter Study Date: 10/18/17  IMPRESSION:  Unremarkable 24 hour Holter monitor    Recent Device Check:  No results found for this or any previous visit.    Previous EPS/Interventions:  No results found for this or any previous visit.    Previous Cath/PCI:  No results found for this or any previous visit.    Previous STRESS TEST:  No results found for this or any previous visit.     ECHO:  Results for testing completed on the encounter of 12/21/23  Study: Echo complete w/ contrast if indicated  Interpreted Summary  •  Left Ventricle: Left ventricular cavity size is normal. Wall thickness is normal. The left ventricular ejection fraction is 60%. Systolic function is normal. Wall motion is normal. Diastolic function is mildly abnormal, consistent with grade I (abnormal) relaxation.  Left atrial filling pressure is normal.  •  Right Ventricle: Systolic function is normal.  •  Aortic Valve: The aortic valve has no significant stenosis.  •  Mitral Valve: There is mild annular calcification.  •  Tricuspid Valve: There is mild regurgitation. The right ventricular systolic pressure is normal. The estimated right ventricular systolic pressure is 20.00 mmHg.  •  Pericardium: There is no pericardial effusion.  •  Prior TTE study available for comparison. Prior study date: 12/23/2022. No significant changes noted compared to the prior study.    Results for orders placed during the hospital encounter of 12/23/22  Echo complete w/ contrast if indicated  Interpretation Summary  •  Left Ventricle: Left ventricular cavity size is normal. Wall thickness is normal. The left ventricular ejection fraction is 65%. Systolic function is normal. Diastolic function is mildly abnormal, consistent with grade I (abnormal) relaxation.    SAVANAH:  No results found for this or any previous visit.    CMR:  No results found for this or any previous visit.    Counseling / Coordination of Care:  During our visit,  I spent 20 minutes with the patient, and greater than 55% of this time was spent on counseling and coordination of care, including addressing diagnostic results, prognosis, risks and benefits of treatment options, instructions for management, patient/family education, importance of treatment compliance, along with risk factor reductions.    Dictation Disclaimer:  This note was completed in part utilizing [email or web address] direct voice recognition software. Grammatical errors, random word insertion, spelling mistakes, and incomplete sentences may be an occasional consequence of the system secondary to software limitations, ambient noise and hardware issues. At the time of dictation, efforts were made to edit, clarify and /or correct errors.  Please read the chart carefully and recognize, using context, where substitutions have occurred.  If you have any questions or concerns about the context, text or information contained within the body of this dictation, please contact myself, the provider, for further clarification.     Sary Nixon, DO 06/06/24

## 2024-05-11 NOTE — PROGRESS NOTES
HEMATOLOGY / ONCOLOGY CLINIC FOLLOW UP NOTE    Patient Matilda Day  MRN: [id number]  : 1945  Date of Encounter 2024      Referring Provider:  KAMAR Velasco 2023    Reason for Encounter: follow up HER 2 3+ left breast s/p mastectomy; ER/GA neg; no adjuvant therapy.      Iron deficiency secondary prior GI surgery/ulcerative colitis/ questionable AVMs       Oncology History   History of left breast cancer   2020 Biopsy    Left Breast stereotactic biopsy:  A. 4 o'clock, posterior  Ductal carcinoma in situ with microinvasion  Grade 3  ER 0, GA 0, HER2 3+    B & C. 5 o'clock, anterior with & without calcs  Ductal carcinoma in situ  Grade 3  ER 0, GA 0    Concordant. Malignancy appears unifocal; calcifications span 4.1 cm. Both clips migrated. No recent axillary US. Right breast clear.     2021 Genetic Testing    The following genes were evaluated: [place], BRCA1, BRCA2, CDH1, CHEK2, PALB2, PTEN, STK11, Tp53; additional genes evaluated for a total of 20 genes.  Negative result. 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 Miller  Adjuvant therapy not recommended             Assessment / Plan:      Left breast  ( pT1a, pN0, M0) with microinvasion measuring less than 1 mm.  ER negative, GA negative, HER2 3+ disease. S/p mastectomy/SLN 2021   Diagnosed in 2021.      Matilda Day is a 77-year-old postmenopausal woman with microinvasive left breast cancer, ER negative, GA negative, 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 diagnosed in early .  Her microinvasive breast cancer measure less than 1 mm.  However, her DCIS measure 7.7 cm.     She  underwent mastectomy and sentinel lymph node biopsy, resulting in KORIN.      She did not require any adjuvant therapy.      She continues to do well from an oncologic perspective.  No clinical evidence of disease recurrence.  She has an upcoming appointment for right breast mammogram.    Yearly Mammogram Nov 2024     2. Iron-deficiency anemia probably due to the bowel resection.     8/22/2023: EGD:  Severe abnormal mucosa with plaque in the upper third of the esophagus and middle third of the esophagus; performed cold forceps biopsy; collected sample to send to pathology with brush  Abnormal mucosa; performed cold forceps biopsies  Single small angioectasia in the 2nd part of the duodenum; tissue ablated with argon plasma coagulation  Performed forceps biopsies in the duodenal bulb and 2nd part of the duodenum to rule out celiac disease     She has history of ulcerative colitis as well as bowel resection in 2010.  Subsequently, she developed iron deficiency anemia, due to the malabsorption.      She received 3 doses of Venofer, resulting in normal hemoglobin and ferritin.  She was on maintenance Venofer every 6 months, when she became anemic again.      She received Venofer 200mg x10 doses from 8/17/23-10/20/23.  Was evaluated by GI and underwent EGD requiring ablation of small angiectasia in the duodenum.      Most recent hemoglobin and iron panel remained stable.  At this juncture we will continue monitoring.  She will be placed back on her Venofer schedule every 6 months.  We will continue to monitor labs every 3 months.    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  She underwent left breast biopsy in  December 17, 2020 which showed 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.  DCIS port was ER negative, AZ negative.  She subsequently underwent left mastectomy with sentinel lymph node biopsy by Dr. Hayes in February 12, 2021. 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 She also had micro invasion measuring less than 1 mm.  Micro invasive part of cancer was ER negative, AZ negative, HER2 3+ disease.   2 sentinel lymph nodes were negative for metastatic disease. She did not have reconstruction. She did not require any adjuvant therapy.  She has history of ulcerative colitis as well as history of bowel resection in 2010.  She developed iron deficiency anemia due to the malabsorption.  She received 3 doses of Venofer for with no infusion reaction, resulting in normal hemoglobin.  Therefore, she was placed on maintenance venofer every 6 months.  However, she became anemic.  She has reactive thrombocytosis.  Ferritin was only 9.  She was placed on monthly Venofer.  She was previously following with Dr. Miller and last seen in the office on 8/4/2023.  She presents today for transfer of care/follow-up visit.     Interval history:  In the interim, she underwent EGD on 8/22/2023 which noted severe abnormal mucosa with plaque in the upper third of the esophagus and middle third of the esophagus.  Single small angiectasia in the second part of duodenum; tissue ablated with argon plasma coagulation.  Pt accompanied by  for visit today. Doing well. Denies any dark stools, blood in stools or hematuria. Denies any CP, SOB or bone pain.           Interval History:  5/14/2024    Ms Day has no breast issues today.  She has noted BRBPR on several occasions, occasionally dark as well.  She has not had IV Venofer in almost one year per her account.  As above labs from 5/2/2024 with Hgb 12.1 WBC 9.9 plts 390 total iron 67 ferritin 63 TIBC 368 % sat 18.  She had an EGD in 2023  as noted above and appears  to have some AVMs in the small bowel area.  She did have ulcerative colitis s/p resection in 2010.  She was last treated with Venofer in Oct 2023. Will repeat labs in 3 months and at that point may need repeat treatment.  Her ROS is otherwise as [id number]        REVIEW OF SYSTEMS:  Please note that a 14-point review of systems was performed to include Constitutional, HEENT, Respiratory, CVS, GI, , Musculoskeletal, Integumentary, Neurologic, Rheumatologic, Endocrinologic, Psychiatric, Lymphatic, and Hematologic/Oncologic systems were reviewed and are negative unless otherwise stated in HPI. Positive and negative findings pertinent to this evaluation are incorporated into the history of present illness.      Primary Oncologic Diagnosis:  1. Left breast cancer, stage IA ( pT1a, pN0, M0) with microinvasion measuring less than 1 mm.  ER negative, CT negative, HER2 3+ disease.  Diagnosed in February 2021.   2. Iron-deficiency anemia probably due to the bowel resection.     Previous Hematologic/ Oncologic Treatment:    S/p mastectomy and sentinel lymph node biopsy     Current Hematologic/ Oncologic Treatment:     Maintenance Venofer every 6 months starting in April 2023.     Disease Status:    KORIN status post mastectomy and sentinel lymph node biopsy.     Test Results:   Pathology:   7.7 cm of ductal carcinoma in situ, grade 3, ER negative, CT negative.  Micro invasive cancer measuring less than 1 mm, ER negative, CT negative, HER2 3+ disease.  2 sentinel lymph node was negative for metastatic disease.  Stage IA ( pT1 a, pN0, M0)     Radiology:   chest x-ray was negative for pulmonary disease.     ECOG PS: 0-1    PROBLEM LIST:  Patient Active Problem List   Diagnosis    Portal vein thrombosis    Anxiety    Moderate episode of recurrent major depressive disorder (HCC)    Essential tremor    Hyperlipidemia    Essential hypertension    Hypomagnesemia    SIADH (syndrome of inappropriate ADH production) (HCC)    Acquired  hypothyroidism    Insomnia    Iron deficiency anemia    Prediabetes    Peripheral neuropathy    Osteopenia    Osteoarthritis of right knee    Ulcerative colitis without complications (HCC)    Allergic rhinitis    GERD (gastroesophageal reflux disease)    Mild intermittent asthma without complication    Diarrhea    Sjogren's syndrome (HCC)    Chronic salpingo-oophoritis    Overweight    Supraventricular tachycardia    Unsteady gait    Lumbar degenerative disc disease    Encounter for long-term (current) use of medications    S/P deep brain stimulator placement    Vitamin B12 deficiency    Vitamin D deficiency    History of left breast cancer    Hoarseness of voice    Esophageal candidiasis (HCC)    History of right mastectomy    S/P left mastectomy    Peripheral vascular disease (HCC)    Continuous opioid dependence (HCC)       Past Medical History:   has a past medical history of Anemia, Anxiety, Arrhythmia, Arthritis, Asthma, Blood clot in vein, Breast cancer (HCC) (02/12/2021), Breast lump, Cancer (HCC), Depression, Disease of thyroid gland, DVT, lower extremity (HCC), GERD (gastroesophageal reflux disease), Hyperlipidemia, Hypertension, Hypokalemia, Hyponatremia, Hypothyroidism, Iron deficiency anemia, Irregular heart beat, Manic behavior (HCC), 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HCC), Osteoarthritis, Palpitations, Paroxysmal supraventricular tachycardia, PE (pulmonary thromboembolism) (HCC), Sjoegren syndrome, Sleep apnea, Sleep difficulties, Spinal stenosis, Thrombocytosis, Tremors of nervous system, Ulcerative colitis (HCC), and Vertigo.    PAST SURGICAL HISTORY:   has a past surgical history that includes Splenectomy; Abdominal surgery; Colon surgery; Breast surgery; Appendectomy; Knee arthroscopy; pr insj/rplcmt cranial neurostim pulse generator (Right, 06/20/2017); Colonoscopy (N/A, 08/30/2017); Coloproctectomy w/ ileo J-pouch; Esophagogastroduodenoscopy; FISTULA REPAIR; Ileostomy closure; [place] hole w/  stereotactic insertion of DBS leads / intraop microelectrode recording; Hysterectomy; Mammo stereotactic breast biopsy left (all inc) (Left, 11/07/2019); pr insj/rplcmt cranial neurostim pulse generator (N/A, 12/04/2019); Breast biopsy (Left, 11/07/2019); Breast excisional biopsy (Left); Mammo stereotactic breast biopsy left (all inc) (Left, 12/17/2020); Mammo stereotactic breast biopsy left (all inc) each add (Left, 12/17/2020); Mastectomy w/ sentinel node biopsy (Left, 02/12/2021); Mastectomy (Left, 02/12/2021); and Tonsillectomy.    CURRENT MEDICATIONS  Current Outpatient Medications   Medication Sig Dispense Refill    albuterol (PROVENTIL HFA,VENTOLIN HFA) 90 mcg/act inhaler Inhale 2 puffs every 6 (six) hours as needed for wheezing 8 g 1    amLODIPine (NORVASC) 2.5 mg tablet TAKE ONE TABLET BY MOUTH EVERY DAY 90 tablet 3    ammonium lactate (LAC-HYDRIN) 12 % cream       Calcium Carb-Cholecalciferol (CALCIUM 600-D PO) Take 1 tablet by mouth 2 (two) times a day      Coenzyme Q10 (CO Q-10 PO) Take 1 capsule by mouth daily      diltiazem (CARDIZEM CD) 180 mg 24 hr capsule TAKE ONE CAPSULE BY MOUTH EVERY DAY 90 capsule 1    diltiazem (CARDIZEM) 60 mg tablet Take 1 tablet (60 mg total) by mouth daily 90 tablet 1    Eliquis 5 MG TAKE ONE TABLET BY MOUTH TWICE A  tablet 1    escitalopram (LEXAPRO) 20 mg tablet TAKE ONE TABLET BY MOUTH EVERY DAY 90 tablet 1    fluticasone (FLONASE) 50 mcg/act nasal spray APPLY ONE SPRAY IN EACH NOSTRIL ONCE DAILY AS NEEDED FOR RHINITIS 48 g 1    Fluticasone Furoate-Vilanterol 100-25 mcg/actuation inhaler Inhale 1 puff daily Rinse mouth after use. 60 blister 5    gabapentin (NEURONTIN) 600 MG tablet TAKE ONE TABLET BY MOUTH EVERY MORNING , TAKE ONE TABLET BY MOUTH EVERY DAY IN THE AFTERNOON , AND TAKE TWO TABLETS BY MOUTH EVERY EVENING AT BEDTIME 360 tablet 1    HYDROcodone-acetaminophen (NORCO) 5-325 mg per tablet Take 1 tablet by mouth every 6 (six) hours as needed for pain Max  Daily Amount: 4 tablets 120 tablet 0    levothyroxine 50 mcg tablet TAKE ONE TABLET BY MOUTH EVERY DAY 90 tablet 1    lisinopril (ZESTRIL) 20 mg tablet TAKE ONE TABLET BY MOUTH TWO TIMES A  tablet 1    LORazepam (ATIVAN) 1 mg tablet Take 1 tablet (1 mg total) by mouth every 6 (six) hours as needed for anxiety 120 tablet 0    MAGNESIUM PO Take 1 tablet by mouth daily      Multiple Vitamin (MULTIVITAMIN ADULT PO) Take 1 tablet by mouth daily      Omega-3 Fatty Acids (FISH OIL PO) Take 1 capsule by mouth daily      pantoprazole (PROTONIX) 40 mg tablet TAKE ONE TABLET BY MOUTH EVERY DAY 90 tablet 1    potassium chloride (MICRO-K) 10 MEQ CR capsule TAKE TWO CAPSULES BY MOUTH EVERY  capsule 3    simvastatin (ZOCOR) 10 mg tablet TAKE ONE-HALF TABLET BY MOUTH DAILY 45 tablet 1    sodium chloride 1 g tablet Take 1 tablet (1 g total) by mouth 4 (four) times a day 270 tablet 3    torsemide (DEMADEX) 10 mg tablet Take 1 tablet (10 mg total) by mouth daily as needed (edema) 90 tablet 2    vitamin B-12 (VITAMIN B-12) 500 mcg tablet Take 1 tablet (500 mcg total) by mouth daily 90 tablet 1     Current Facility-Administered Medications   Medication Dose Route Frequency Provider Last Rate Last Admin    cyanocobalamin injection 1,000 mcg  1,000 mcg Intramuscular Q30 Days Zaira Velasquez MD   1,000 mcg at 04/20/23 1012     [unfilled]    SOCIAL HISTORY:   reports that she quit smoking about 59 years ago. Her smoking use included cigarettes. She started smoking about 74 years ago. She has a 15 pack-year smoking history. She has never used smokeless tobacco. She reports current alcohol use of about 2.0 standard drinks of alcohol per week. She reports that she does not use drugs.     FAMILY HISTORY:  family history includes COPD in her father; Diabetes in her father and sister; Hypertension in her mother; No Known Problems in her daughter, maternal aunt, maternal grandfather, maternal grandmother, paternal aunt, paternal  grandfather, paternal grandmother, sister, and son; Other in her mother; Peripheral vascular disease in her mother; Sjogren's syndrome in her sister; Stroke in her father; Venous thrombosis in her mother.     ALLERGIES:  is allergic to indomethacin, macrobid [nitrofurantoin monohyd macro], nitrofurantoin, penicillins, and sulfa antibiotics.      Physical Exam:  Vital Signs:   Visit Vitals  OB Status Postmenopausal   Smoking Status Former     There is no height or weight on file to calculate BMI.  There is no height or weight on file to calculate BSA.    GEN: Alert, awake oriented x3, in no acute distress  HEENT- No pallor, icterus, cyanosis, no oral mucosal lesions,   LAD - no palpable cervical, clavicle, axillary, inguinal LAD  Heart- normal S1 S2, regular rate and rhythm, No murmur, rubs.   Lungs- clear breathing sound bilateral.   Abdomen- soft, Non tender, bowel sounds present  Extremities- No cyanosis, clubbing, edema  Neuro- No focal neurological deficit    Labs:  Lab Results   Component Value Date    WBC 9.87 05/02/2024    HGB 12.1 05/02/2024    HCT 37.0 05/02/2024    MCV 98 05/02/2024     05/02/2024     Lab Results   Component Value Date     (L) 12/28/2015    SODIUM 137 03/11/2024    K 4.0 03/11/2024    CL 98 03/11/2024    CO2 31 03/11/2024    ANIONGAP 8 12/28/2015    AGAP 8 03/11/2024    BUN 8 03/11/2024    CREATININE 0.61 03/11/2024    GLUC 102 11/08/2023    GLUF 103 (H) 03/11/2024    CALCIUM 9.2 03/11/2024    AST 19 03/11/2024    ALT 12 03/11/2024    ALKPHOS 53 03/11/2024    PROT 6.9 11/16/2015    TP 5.4 (L) 03/11/2024    BILITOT 0.36 11/16/2015    TBILI 0.51 03/11/2024    EGFR 86 03/11/2024      Latest Reference Range & Units 03/11/24 06:32 04/02/24 06:28 05/02/24 12:59   Sodium 135 - 147 mmol/L 137     Potassium 3.5 - 5.3 mmol/L 4.0     Chloride 96 - 108 mmol/L 98     Carbon Dioxide 21 - 32 mmol/L 31     ANION GAP mmol/L 8     BUN 5 - 25 mg/dL 8     Creatinine 0.60 - 1.30 mg/dL 0.61      GLUCOSE, FASTING 65 - 99 mg/dL 103 (H)     Calcium 8.4 - 10.2 mg/dL 9.2     AST 13 - 39 U/L 19     ALT 7 - 52 U/L 12     ALK PHOS 34 - 104 U/L 53     Total Protein 6.4 - 8.4 g/dL 5.4 (L)     Albumin 3.5 - 5.0 g/dL 3.6     Total Bilirubin 0.20 - 1.00 mg/dL 0.51     GFR, Calculated ml/min/1.73sq m 86     Iron 50 - 212 ug/dL 66  67   FERRITIN 11 - 307 ng/mL 113  63   Iron Saturation 15 - 50 % 21  18   TIBC 250 - 450 ug/dL 314  368   UIBC 155 - 355 ug/dL 248  301   WBC 4.31 - 10.16 Thousand/uL 7.05  9.87   RBC 3.81 - 5.12 Million/uL 3.80 (L)  3.76 (L)   Hemoglobin 11.5 - 15.4 g/dL 12.3  12.1   Hematocrit 34.8 - 46.1 % 38.9  37.0   MCV 82 - 98 fL 102 (H)  98   MCH 26.8 - 34.3 pg 32.4  32.2   MCHC 31.4 - 37.4 g/dL 31.6  32.7   RDW 11.6 - 15.1 % 15.4 (H)  14.0   Platelet Count 149 - 390 Thousands/uL 436 (H)  390   MPV 8.9 - 12.7 fL 9.2  9.3   nRBC /100 WBCs 0  0   Segmented % 43 - 75 % 50  72   Lymphocytes % 14 - 44 % 28  15   Monocytes % 4 - 12 % 15 (H)  11   Eosinophils % 0 - 6 % 5  1   Basophils % 0 - 1 % 1  0   Immature Grans % 0 - 2 % 1  1   Absolute Immature Grans 0.00 - 0.20 Thousand/uL 0.05  0.08   Absolute Neutrophils 1.85 - 7.62 Thousands/µL 3.56  7.08   Absolute Lymphocytes 0.60 - 4.47 Thousands/µL 2.00  1.50   Absolute Monocytes 0.17 - 1.22 Thousand/µL 1.02  1.12   Absolute Eosinophils 0.00 - 0.61 Thousand/µL 0.36  0.06   Absolute Basophils 0.00 - 0.10 Thousands/µL 0.06  0.03   Sed Rate 0 - 29 mm/hour  12    (H): Data is abnormally high  (L): Data is abnormally low      I spent 40 minutes on chart review, face to face counseling time, coordination of care and documentation.    Meenakshi Cunningham MD PhD

## 2024-05-13 DIAGNOSIS — I10 ESSENTIAL HYPERTENSION: ICD-10-CM

## 2024-05-14 ENCOUNTER — OFFICE VISIT (OUTPATIENT)
Dept: HEMATOLOGY ONCOLOGY | Facility: CLINIC | Age: 79
End: 2024-05-14
Payer: MEDICARE

## 2024-05-14 VITALS
HEIGHT: 63 IN | HEART RATE: 79 BPM | DIASTOLIC BLOOD PRESSURE: 64 MMHG | SYSTOLIC BLOOD PRESSURE: 122 MMHG | TEMPERATURE: 98.2 F | WEIGHT: 144 LBS | RESPIRATION RATE: 18 BRPM | OXYGEN SATURATION: 96 % | BODY MASS INDEX: 25.52 KG/M2

## 2024-05-14 DIAGNOSIS — Z85.3 HISTORY OF LEFT BREAST CANCER: ICD-10-CM

## 2024-05-14 DIAGNOSIS — C50.912 MALIGNANT NEOPLASM OF LEFT BREAST IN FEMALE, ESTROGEN RECEPTOR NEGATIVE, UNSPECIFIED SITE OF BREAST (HCC): ICD-10-CM

## 2024-05-14 DIAGNOSIS — D50.9 IRON DEFICIENCY ANEMIA, UNSPECIFIED IRON DEFICIENCY ANEMIA TYPE: Primary | ICD-10-CM

## 2024-05-14 DIAGNOSIS — Z17.1 MALIGNANT NEOPLASM OF LEFT BREAST IN FEMALE, ESTROGEN RECEPTOR NEGATIVE, UNSPECIFIED SITE OF BREAST (HCC): ICD-10-CM

## 2024-05-14 PROCEDURE — 99215 OFFICE O/P EST HI 40 MIN: CPT | Performed by: INTERNAL MEDICINE

## 2024-05-14 RX ORDER — DILTIAZEM HYDROCHLORIDE 180 MG/1
CAPSULE, COATED, EXTENDED RELEASE ORAL
Qty: 90 CAPSULE | Refills: 1 | Status: SHIPPED | OUTPATIENT
Start: 2024-05-14

## 2024-05-14 NOTE — TELEPHONE ENCOUNTER
Patient called to request a refill for their diltiazem. She was advised a refill was requested from [facility] on 05/13/24 and is pending approval. Patient verbalized understanding and is in agreement.

## 2024-05-25 DIAGNOSIS — E87.1 HYPONATREMIA: ICD-10-CM

## 2024-05-27 RX ORDER — SODIUM CHLORIDE 1 G/1
1 TABLET ORAL 4 TIMES DAILY
Qty: 270 TABLET | Refills: 1 | Status: SHIPPED | OUTPATIENT
Start: 2024-05-27

## 2024-05-31 ENCOUNTER — OFFICE VISIT (OUTPATIENT)
Dept: INTERNAL MEDICINE CLINIC | Facility: CLINIC | Age: 79
End: 2024-05-31
Payer: MEDICARE

## 2024-05-31 VITALS
SYSTOLIC BLOOD PRESSURE: 152 MMHG | TEMPERATURE: 97.1 F | HEIGHT: 63 IN | WEIGHT: 147.8 LBS | HEART RATE: 65 BPM | BODY MASS INDEX: 26.19 KG/M2 | OXYGEN SATURATION: 95 % | DIASTOLIC BLOOD PRESSURE: 72 MMHG

## 2024-05-31 DIAGNOSIS — M17.11 PRIMARY OSTEOARTHRITIS OF RIGHT KNEE: ICD-10-CM

## 2024-05-31 DIAGNOSIS — D50.0 IRON DEFICIENCY ANEMIA DUE TO CHRONIC BLOOD LOSS: Primary | ICD-10-CM

## 2024-05-31 DIAGNOSIS — I47.10 SUPRAVENTRICULAR TACHYCARDIA: ICD-10-CM

## 2024-05-31 DIAGNOSIS — M51.36 LUMBAR DEGENERATIVE DISC DISEASE: ICD-10-CM

## 2024-05-31 DIAGNOSIS — E53.8 VITAMIN B12 DEFICIENCY: ICD-10-CM

## 2024-05-31 DIAGNOSIS — E55.9 VITAMIN D DEFICIENCY: ICD-10-CM

## 2024-05-31 DIAGNOSIS — Z85.3 HISTORY OF LEFT BREAST CANCER: ICD-10-CM

## 2024-05-31 DIAGNOSIS — F11.20 CONTINUOUS OPIOID DEPENDENCE (HCC): ICD-10-CM

## 2024-05-31 DIAGNOSIS — E03.9 ACQUIRED HYPOTHYROIDISM: ICD-10-CM

## 2024-05-31 DIAGNOSIS — M85.89 OSTEOPENIA OF MULTIPLE SITES: ICD-10-CM

## 2024-05-31 DIAGNOSIS — M35.00 SJOGREN'S SYNDROME, WITH UNSPECIFIED ORGAN INVOLVEMENT (HCC): ICD-10-CM

## 2024-05-31 DIAGNOSIS — K21.9 GASTROESOPHAGEAL REFLUX DISEASE, UNSPECIFIED WHETHER ESOPHAGITIS PRESENT: ICD-10-CM

## 2024-05-31 DIAGNOSIS — E66.3 OVERWEIGHT: ICD-10-CM

## 2024-05-31 DIAGNOSIS — R49.0 HOARSENESS OF VOICE: ICD-10-CM

## 2024-05-31 DIAGNOSIS — R73.03 PREDIABETES: ICD-10-CM

## 2024-05-31 DIAGNOSIS — K51.80 OTHER ULCERATIVE COLITIS WITHOUT COMPLICATION (HCC): ICD-10-CM

## 2024-05-31 DIAGNOSIS — G25.0 ESSENTIAL TREMOR: ICD-10-CM

## 2024-05-31 DIAGNOSIS — E78.2 MIXED HYPERLIPIDEMIA: ICD-10-CM

## 2024-05-31 DIAGNOSIS — F41.9 ANXIETY: ICD-10-CM

## 2024-05-31 DIAGNOSIS — I81 PORTAL VEIN THROMBOSIS: ICD-10-CM

## 2024-05-31 DIAGNOSIS — J45.20 MILD INTERMITTENT ASTHMA WITHOUT COMPLICATION: ICD-10-CM

## 2024-05-31 DIAGNOSIS — Z00.00 HEALTH MAINTENANCE EXAMINATION: ICD-10-CM

## 2024-05-31 DIAGNOSIS — E22.2 SIADH (SYNDROME OF INAPPROPRIATE ADH PRODUCTION) (HCC): ICD-10-CM

## 2024-05-31 DIAGNOSIS — F33.1 MODERATE EPISODE OF RECURRENT MAJOR DEPRESSIVE DISORDER (HCC): ICD-10-CM

## 2024-05-31 DIAGNOSIS — I10 ESSENTIAL HYPERTENSION: ICD-10-CM

## 2024-05-31 DIAGNOSIS — E83.42 HYPOMAGNESEMIA: ICD-10-CM

## 2024-05-31 PROBLEM — B34.2 CORONAVIRUS INFECTION: Status: ACTIVE | Noted: 2024-01-12

## 2024-05-31 PROBLEM — B97.21 SARS-ASSOCIATED CORONAVIRUS AS THE CAUSE OF DISEASES CLASSIFIED ELSEWHERE: Status: ACTIVE | Noted: 2024-01-12

## 2024-05-31 PROBLEM — U07.1 COVID-19 VIRUS INFECTION: Status: ACTIVE | Noted: 2024-01-12

## 2024-05-31 PROBLEM — U07.1 COVID-19 VIRUS INFECTION: Status: RESOLVED | Noted: 2024-01-12 | Resolved: 2024-05-31

## 2024-05-31 PROBLEM — Z79.899 ENCOUNTER FOR LONG-TERM (CURRENT) USE OF MEDICATIONS: Status: RESOLVED | Noted: 2018-11-23 | Resolved: 2024-05-31

## 2024-05-31 PROCEDURE — 99214 OFFICE O/P EST MOD 30 MIN: CPT | Performed by: INTERNAL MEDICINE

## 2024-05-31 PROCEDURE — G0439 PPPS, SUBSEQ VISIT: HCPCS | Performed by: INTERNAL MEDICINE

## 2024-05-31 NOTE — PROGRESS NOTES
Ambulatory Visit  Name: Matilda Day      : 1945      MRN: [id number]  Encounter Provider: Zaira Velasquez MD  Encounter Date: 2024   Encounter department: [facility] INTERNAL MEDICINE    Assessment & Plan   1. Iron deficiency anemia due to chronic blood loss  Assessment & Plan:  Hgb stable.  No recent need for iron infusion.  Per hematology.  2. SIADH (syndrome of inappropriate ADH production) (HCC)  Assessment & Plan:  Na stable at 137.  Taking NaCl 4x a day.  Sees nephrology.  3. Supraventricular tachycardia  Assessment & Plan:  No recent symptoms. Reviewed echo, Holter.  Saw cardiology.  On diltiazem 240 mg daily.  4. Acquired hypothyroidism  Assessment & Plan:  Stable, TFTs due.  Orders:  -     TSH, 3rd generation with Free T4 reflex; Future; Expected date: 2024  5. Anxiety  Assessment & Plan:  Stable. On Lexapro and lorazepam.  Discussed lorazepam use, taking bid.  6. Essential hypertension  Assessment & Plan:  BP controlled: amlodipine 2.5 mg daily, diltiazem 240 mg daily and lisinopril 20 mg bid.  7. Essential tremor  Assessment & Plan:  On gabapentin.  Sees neurology.  8. Gastroesophageal reflux disease, unspecified whether esophagitis present  Assessment & Plan:  On daily PPI.  9. Mixed hyperlipidemia  Assessment & Plan:  On simvastatin 15 mg.  10. Hypomagnesemia  Assessment & Plan:  Taking Mg daily.  11. Mild intermittent asthma without complication  Assessment & Plan:  No recent exacerbation, using Breo at least every other day.  12. Lumbar degenerative disc disease  Assessment & Plan:  Sees pain.  Taking hydrocodone bid.  13. Portal vein thrombosis  Assessment & Plan:  On Eliquis.  14. Prediabetes  Assessment & Plan:  Repeat A1c due, previous was 6.1%.  Orders:  -     Hemoglobin A1C; Future  15. Primary osteoarthritis of right knee  Assessment & Plan:  S/p injections. Considering surgery.  Sees Ortho.  16. Overweight  Assessment & Plan:  Stable weight.  17. Vitamin  B12 deficiency  -     Vitamin B12; Future  18. Vitamin D deficiency  -     Vitamin D 25 hydroxy; Future  19. Continuous opioid dependence (HCC)  20. Sjogren's syndrome, with unspecified organ involvement (HCC)  21. Moderate episode of recurrent major depressive disorder (HCC)  22. Other ulcerative colitis without complication (HCC)  23. Osteopenia of multiple sites  Assessment & Plan:  Bone density due.  Orders:  -     DXA bone density spine hip and pelvis; Future; Expected date: 05/31/2024  24. History of left breast cancer  Assessment & Plan:  Mammogram scheduled.  25. Hoarseness of voice  Assessment & Plan:  Intermittent.    Treatment Plan: Decrease lorazepam use.  Continue Vicodin bid.    Treatment Goals: Maintain mobility and independence.    Opiate risks  There are risks associated with opioid medications, including dependence, addiction and tolerance. The patient understands and agrees to use these medications only as prescribed. Potential side effects of the medications include, but are not limited to, constipation, drowsiness, addiction, impaired judgment and risk of fatal overdose if not taken as prescribed. The patient was warned against driving while taking sedation medications.  Sharing medications is a felony. At this point in time, the patient is showing no signs of addiction, abuse, diversion or suicidal ideation.      Pateint is taking concurrent benzodiazepines. Has been counseled on the risks of taking opioids and benzodiazepines including sedation, increased fall risk, dizziness, addictive potential and death.      PDMP review  [place]MP or [place]  reviewed. No red flags were identified; safe to proceed with prescription            History of Present Illness     Opioid Management:   Type of visit: Follow-up    Interval history: Increased knee pain, has not increased Vicodin use, still bid    Screening Tools/Assessments:    PHQ-2/9:  PHQ-9 score: 0    Brief Pain Inventory (BPI):  1) Throughout our  "lives, most of us have had pain from time to time (such as minor headaches, sprains, and toothaches). Have you had pain other than these everyday kinds of pain today? Yes  2) Where is your pain located? back and knees  3) Rate your pain at its worst in the last 24 hours: 7  4) Rate your pain at its least in the last 24 hours: 3  5) Rate your average level of pain: 4  6) Rate your pain right now: 4  7) What treatments or medications are you receiving for your pain? hydrocodone  8) In the past 24 hours, how much relief have pain treatments or medication provided? 70%  9) During the past 24 hours, pain has interfered with your:     A) General activity: 1     B) Mood: 0     C) Walking ability: 0     D) Normal work (work outside the home & housework): 6     E) Relations with other people: 0     F) Sleep: 3     G) Enjoyment of life: 0    COMM:  Current COMM Score: 6 (negative, low risk patient)    Opioid agreement:  Active Opioid agreement on file?: No    Opioid agreement signed date: 4/21/2023  Opioid agreement expiration date: 4/20/2024    Naloxone:  Currently prescribed Naloxone (Narcan): No      Outpatient Morphine Milligram Equivalents Per Day     5/31/24 and after 20 MME/Day    Order Name Dose Route Frequency Maximum MME/Day     HYDROcodone-acetaminophen (NORCO) 5-325 mg per tablet 1 tablet Oral Every 6 hours PRN 20 MME/Day    Total Potential Morphine Milligram Equivalents Per Day 20 MME/Day    Calculation Information        HYDROcodone-acetaminophen (NORCO) 5-325 mg per tablet    HYDROcodone-acetaminophen 5-325 mg Tabs: maximum daily dose of 20 mg of opioid in combo * morphine equivalence factor of 1 = 20 MME/Day                         PDMP Review       Value Time User    PDMP Reviewed  Yes 4/29/2024 12:10 PM Zaira Velasquez MD         Review of Systems  Objective     /72   Pulse 65   Temp (!) 97.1 °F (36.2 °C)   Ht 5' 3\" (1.6 m)   Wt 67 kg (147 lb 12.8 oz)   SpO2 95%   BMI 26.18 kg/m² " "    Physical Exam  Administrative Statements                 Answers submitted by the patient for this visit:  AUDIT-C (Alcohol Use Disorders Identification Test) Screening (Submitted on 5/30/2024)  How often during the last year have you found that you were not able to stop drinking once you had started?: 0 - never  How often during the last year have you failed to do what was normally expected from you because of drinking?: 0 - never  How often during the last year have you needed a first drink in the morning to get yourself going after a heavy drinking session?: 0 - never  How often during the last year have you had a feeling of guilt or remorse after drinking?: 0 - never  How often during the last year have you been unable to remember what happened the night before because you had been drinking?: 0 - never  Have you or someone else been injured as a result of your drinking?: 0 - no  Has a relative or friend or a doctor or another health worker been concerned about your drinking or suggested you cut down?: 0 - no  Medicare Annual Wellness Visit (Submitted on 5/30/2024)  How would you rate your overall health?: good  Compared to last year, how is your physical health?: same  In general, how satisfied are you with your life?: very satisfied  Compared to last year, how is your eyesight?: same  Compared to last year, how is your hearing?: same  Compared to last year, how is your emotional/mental health?: same  How often is anger a problem for you?: never, rarely  How often do you feel unusually tired/fatigued?: sometimes  In the past 7 days, how much pain have you experienced?: some  If you answered \"some\" or \"a lot\", please rate the severity of your pain on a scale of 1 to 10 (1 being the least severe pain and 10 being the most intense pain).: 6/10  In the past 6 months, have you lost or gained 10 pounds without trying?: No  One or more falls in the last year: No  In the past 6 months, have you accidentally leaked " urine?: Yes  Do you have trouble with the stairs inside or outside your home?: No  Does your home have working smoke alarms?: Yes  Does your home have a carbon monoxide monitor?: Yes  Which safety hazards (if any) have you experienced in your home? Please select all that apply.: none  How would you describe your current diet? Please select all that apply.: Regular, Limited junk food  In addition to prescription medications, are you taking any over-the-counter supplements?: Yes  If yes, what supplements are you taking?: vitamains /[id number]  Can you manage your medications?: Yes  Are you currently taking any opioid medications?: Yes  Can you walk and transfer into and out of your bed and chair?: Yes  Can you dress and groom yourself?: Yes  Can you bathe or shower yourself?: Yes  Can you feed yourself?: Yes  Can you do your laundry/ housekeeping?: Yes  Can you manage your money, pay your bills, and track your expenses?: Yes  Can you make your own meals?: Yes  Can you do your own shopping?: Yes  Within the last 12 months, have you had any hospitalizations or Emergency Department visits?: No  Do you have a living will?: Yes  Do you have a Durable POA (Power of ) for healthcare decisions?: No  Do you have an Advanced Directive for end of life decisions?: Yes  How often have you used an illegal drug (including marijuana) or a prescription medication for non-medical reasons in the past year?: never  What is the typical number of drinks you consume in a day?: 3  What is the typical number of drinks you consume in a week?: 3  How often did you have a drink containing alcohol in the past year?: 2 to 4 times a month  How many drinks did you have on a typical day  when you were drinking in the past year?: 3 to 4  How often did you have 6 or more drinks on one occasion in the past year?: never

## 2024-05-31 NOTE — PROGRESS NOTES
Ambulatory Visit  Name: Matilda Day      : 1945      MRN: [id number]  Encounter Provider: Zaira Velasquez MD  Encounter Date: 2024   Encounter department: [facility] INTERNAL MEDICINE    Assessment & Plan   1. Iron deficiency anemia due to chronic blood loss  Assessment & Plan:  Hgb stable.  No recent need for iron infusion.  Per hematology.  2. SIADH (syndrome of inappropriate ADH production) (HCC)  Assessment & Plan:  Na stable at 137.  Taking NaCl 4x a day.  Sees nephrology.  3. Supraventricular tachycardia  Assessment & Plan:  No recent symptoms. Reviewed echo, Holter.  Saw cardiology.  On diltiazem 240 mg daily.  4. Acquired hypothyroidism  Assessment & Plan:  Stable, TFTs due.  Orders:  -     TSH, 3rd generation with Free T4 reflex; Future; Expected date: 2024  5. Anxiety  Assessment & Plan:  Stable. On Lexapro and lorazepam.  Discussed lorazepam use, taking bid.  6. Essential hypertension  Assessment & Plan:  BP controlled: amlodipine 2.5 mg daily, diltiazem 240 mg daily and lisinopril 20 mg bid.  7. Essential tremor  Assessment & Plan:  On gabapentin.  Sees neurology.  8. Gastroesophageal reflux disease, unspecified whether esophagitis present  Assessment & Plan:  On daily PPI.  9. Mixed hyperlipidemia  Assessment & Plan:  On simvastatin 15 mg.  10. Hypomagnesemia  Assessment & Plan:  Taking Mg daily.  11. Mild intermittent asthma without complication  Assessment & Plan:  No recent exacerbation, using Breo at least every other day.  12. Lumbar degenerative disc disease  Assessment & Plan:  Sees pain.  Taking hydrocodone bid.  13. Portal vein thrombosis  Assessment & Plan:  On Eliquis.  14. Prediabetes  Assessment & Plan:  Repeat A1c due, previous was 6.1%.  Orders:  -     Hemoglobin A1C; Future  15. Primary osteoarthritis of right knee  Assessment & Plan:  S/p injections. Considering surgery.  Sees Ortho.  16. Overweight  Assessment & Plan:  Stable weight.  17. Vitamin  B12 deficiency  -     Vitamin B12; Future  18. Vitamin D deficiency  -     Vitamin D 25 hydroxy; Future  19. Continuous opioid dependence (HCC)  20. Sjogren's syndrome, with unspecified organ involvement (HCC)  21. Moderate episode of recurrent major depressive disorder (HCC)  22. Other ulcerative colitis without complication (HCC)  23. Osteopenia of multiple sites  Assessment & Plan:  Bone density due.  Orders:  -     DXA bone density spine hip and pelvis; Future; Expected date: 05/31/2024  24. History of left breast cancer  Assessment & Plan:  Mammogram scheduled.  25. Hoarseness of voice  Assessment & Plan:  Intermittent.  26. Health maintenance examination    Follow up in 4 months or as needed.        Depression Screening and Follow-up Plan: Patient was screened for depression during today's encounter. They screened negative with a PHQ-9 score of 0.    Urinary Incontinence Plan of Care: counseling topics discussed: limiting fluid intake 3-4 hours before bed.       Preventive health issues were discussed with patient, and age appropriate screening tests were ordered as noted in patient's After Visit Summary. Personalized health advice and appropriate referrals for health education or preventive services given if needed, as noted in patient's After Visit Summary.    History of Present Illness     She compains of ongoing knee pain.  She has been receiving alternating gel and steroid shots.  She feels it helps sometimes.  She is not sure if she wants to undergo knee surgery.  She has been taking hydrocodone twice a day only.    She used a splint for her right hand but feels it is getting worse. She is unable to extend her right ring finger. She is planning surgery for it.    She was sick and tested (+) for COVID last January in [place]. She went to Urgent Care twice, did not feel well for about 2 or 3 weeks.    She is otherwise feeling well.  Denies any chest pain, shortness of breath or wheezing.  She has not used any  of her inhalers recently.  No headaches or dizziness.  No falls.       Patient Care Team:  Zaira Velasquez MD as PCP - General (Internal Medicine)  MD Eliu Lopez MD Michael Anthony Dunn, DO Samuel Giamber, MD James Boylan, MD Maria Lara Figueras, MD Joseph Skutches, MD Steven Falowski, MD W Terence Reilly, MD as Endoscopist  Robbie Hayes MD as Surgeon (Surgical Oncology)  Meenakshi Cunningham MD (Medical Oncology)    Review of Systems   Constitutional:  Negative for appetite change and fatigue.   HENT:  Positive for voice change. Negative for congestion, ear pain, postnasal drip and trouble swallowing.    Eyes:  Negative for visual disturbance.   Respiratory:  Negative for cough and shortness of breath.    Cardiovascular:  Negative for chest pain and leg swelling.   Gastrointestinal:  Negative for abdominal pain, constipation and diarrhea.   Genitourinary:  Negative for dysuria, frequency and urgency.   Musculoskeletal:  Positive for arthralgias. Negative for myalgias.   Skin:  Negative for rash and wound.   Neurological:  Negative for dizziness, numbness and headaches.   Hematological:  Does not bruise/bleed easily.   Psychiatric/Behavioral:  Negative for confusion. The patient is not nervous/anxious.      Medical History Reviewed by provider this encounter:       Annual Wellness Visit Questionnaire   Matilda is here for her Subsequent Wellness visit. Last Medicare Wellness visit information reviewed, patient interviewed and updates made to the record today.      Health Risk Assessment:   Patient rates overall health as good. Patient feels that their physical health rating is same. Patient is very satisfied with their life. Eyesight was rated as same. Hearing was rated as same. Patient feels that their emotional and mental health rating is same. Patients states they are never, rarely angry. Patient states they are sometimes unusually tired/fatigued. Pain experienced in the last 7  days has been some. Patient's pain rating has been 6/10. Patient states that she has experienced no weight loss or gain in last 6 months.     Depression Screening:   PHQ-9 Score: 0      Fall Risk Screening:   In the past year, patient has experienced: no history of falling in past year      Urinary Incontinence Screening:   Patient has leaked urine accidently in the last six months.     Home Safety:  Patient does not have trouble with stairs inside or outside of their home. Patient has working smoke alarms and has working carbon monoxide detector. Home safety hazards include: none.     Nutrition:   Current diet is Regular and Limited junk food.     Medications:   Patient is currently taking over-the-counter supplements. OTC medications include: see medication list. Patient is able to manage medications.     Activities of Daily Living (ADLs)/Instrumental Activities of Daily Living (IADLs):   Walk and transfer into and out of bed and chair?: Yes  Dress and groom yourself?: Yes    Bathe or shower yourself?: Yes    Feed yourself? Yes  Do your laundry/housekeeping?: Yes  Manage your money, pay your bills and track your expenses?: Yes  Make your own meals?: Yes    Do your own shopping?: Yes    Previous Hospitalizations:   Any hospitalizations or ED visits within the last 12 months?: No      Advance Care Planning:   Living will: Yes    Durable POA for healthcare: No    Advanced directive: Yes    End of Life Decisions reviewed with patient: Yes      Cognitive Screening:   Provider or family/friend/caregiver concerned regarding cognition?: No    PREVENTIVE SCREENINGS      Cardiovascular Screening:    General: History Lipid Disorder and Screening Current      Diabetes Screening:     General: Screening Current      Colorectal Cancer Screening:     General: Screening Current      Breast Cancer Screening:     General: History Breast Cancer and Screening Current      Cervical Cancer Screening:    General: Screening Not  Indicated      Osteoporosis Screening:      Due for: DXA Appendicular      Abdominal Aortic Aneurysm (AAA) Screening:        General: Screening Not Indicated      Lung Cancer Screening:     General: Screening Not Indicated      Hepatitis C Screening:    General: Screening Not Indicated    Screening, Brief Intervention, and Referral to Treatment (SBIRT)    Screening  Typical number of drinks in a day: 3  Typical number of drinks in a week: 3  Interpretation: Low risk drinking behavior.    AUDIT-C Screenin) How often did you have a drink containing alcohol in the past year? 2 to 4 times a month  2) How many drinks did you have on a typical day when you were drinking in the past year? 3 to 4  3) How often did you have 6 or more drinks on one occasion in the past year? never    AUDIT-C Score: 2  Interpretation: Score 0-2 (female): Negative screen for alcohol misuse    Single Item Drug Screening:  How often have you used an illegal drug (including marijuana) or a prescription medication for non-medical reasons in the past year? never    Single Item Drug Screen Score: 0  Interpretation: Negative screen for possible drug use disorder    Review of Current Opioid Use    Opioid Risk Tool (ORT) Interpretation: Complete Opioid Risk Tool (ORT)    Other Counseling Topics:   Calcium and vitamin D intake and regular weightbearing exercise.     Social Determinants of Health     Financial Resource Strain: Low Risk  (2023)    Overall Financial Resource Strain (CARDIA)     Difficulty of Paying Living Expenses: Not very hard   Food Insecurity: No Food Insecurity (2024)    Hunger Vital Sign     Worried About Running Out of Food in the Last Year: Never true     Ran Out of Food in the Last Year: Never true   Transportation Needs: No Transportation Needs (2024)    PRAPARE - Transportation     Lack of Transportation (Medical): No     Lack of Transportation (Non-Medical): No   Housing Stability: Low Risk  (2024)     "Housing Stability Vital Sign     Unable to Pay for Housing in the Last Year: No     Number of Times Moved in the Last Year: 1     Homeless in the Last Year: No   Utilities: Not At Risk (5/31/2024)    [facility] Utilities     Threatened with loss of utilities: No     No results found.    Objective     /72   Pulse 65   Temp (!) 97.1 °F (36.2 °C)   Ht 5' 3\" (1.6 m)   Wt 67 kg (147 lb 12.8 oz)   SpO2 95%   BMI 26.18 kg/m²     Physical Exam  Vitals and nursing note reviewed.   Constitutional:       General: She is not in acute distress.     Appearance: She is well-developed.   HENT:      Head: Normocephalic and atraumatic.      Mouth/Throat:      Mouth: Mucous membranes are moist.   Eyes:      Pupils: Pupils are equal, round, and reactive to light.   Cardiovascular:      Rate and Rhythm: Normal rate and regular rhythm.      Heart sounds: Normal heart sounds.   Pulmonary:      Effort: Pulmonary effort is normal.      Breath sounds: Normal breath sounds. No wheezing.   Abdominal:      General: Bowel sounds are normal.      Palpations: Abdomen is soft.   Musculoskeletal:         General: No swelling.      Right hand: Deformity present. Decreased strength.      Left hand: Deformity present.      Right lower leg: No edema.      Left lower leg: No edema.   Skin:     General: Skin is warm.      Findings: No rash.   Neurological:      General: No focal deficit present.      Mental Status: She is alert and oriented to person, place, and time.      Motor: Tremor present.   Psychiatric:         Mood and Affect: Mood and affect normal. Mood is not anxious or depressed.         Behavior: Behavior normal.       Administrative Statements         Labs & imaging results reviewed with patient.    "

## 2024-05-31 NOTE — ASSESSMENT & PLAN NOTE
BP controlled: amlodipine 2.5 mg daily, diltiazem 240 mg daily and lisinopril 20 mg bid.  
Bone density due.  
Hgb stable.  No recent need for iron infusion.  Per hematology.  
Intermittent.  
Mammogram scheduled.  
Na stable at 137.  Taking NaCl 4x a day.  Sees nephrology.  
No recent exacerbation, using Breo at least every other day.  
No recent symptoms. Reviewed echo, Holter.  Saw cardiology.  On diltiazem 240 mg daily.  
On Eliquis.  
On daily PPI.  
On gabapentin.  Sees neurology.  
On simvastatin 15 mg.  
Repeat A1c due, previous was 6.1%.  
S/p injections. Considering surgery.  Sees Ortho.  
Sees pain.  Taking hydrocodone bid.  
Stable weight.  
Stable, TFTs due.  
Stable. On Lexapro and lorazepam.  Discussed lorazepam use, taking bid.  
Taking Mg daily.  
Female

## 2024-06-01 ENCOUNTER — HOSPITAL ENCOUNTER (OUTPATIENT)
Dept: RADIOLOGY | Age: 79
Discharge: HOME | End: 2024-06-01
Payer: MEDICARE

## 2024-06-01 DIAGNOSIS — M85.89 OSTEOPENIA OF MULTIPLE SITES: ICD-10-CM

## 2024-06-01 PROCEDURE — 77080 DXA BONE DENSITY AXIAL: CPT

## 2024-06-03 NOTE — PROGRESS NOTES
1. Primary osteoarthritis of right knee        Orders Placed This Encounter   Procedures    Large joint arthrocentesis: R knee    Ambulatory Referral to Orthopedic Surgery     No orders of the defined types were placed in this encounter.       XR right knee 3/17/2024 (outside image)  Tricompartmental osteoarthritic degenerative changes of the right knee with moderate to severe lateral compartment joint space narrowing.     XR left knee 12/21/2021  There is mild to moderate medial and patellofemoral compartment joint space narrowing  There are small marginal osteophytes at the medial, lateral and patellofemoral compartments.      XR right knee 12/21/2021  Tricompartmental osteoarthritic degenerative changes of the right knee with moderate to severe lateral compartment joint space narrowing.         ASSESSMENT/PLAN:     Chronic bilateral knee pain  Moderate-severe tricompartmental OA of right knee    Repeat X-ray next visit: None    No follow-ups on file.    Patient instructions below verbally summarized in person during encounter:  There are no Patient Instructions on file for this visit.      __________________________________________________________________________    HISTORY OF PRESENT ILLNESS:    78 y.o. female presents for 4 weeks follow-up of moderate-severe tricompartmental OA of right knee.  Last visit on 4/30/2024 patient was provided [facility]G Monovisc to right knee and recommended to follow-up as needed.    4/30/2024 USG Monovisc right knee  12/26/2024 USG CSI right knee  8/8/2023 USG CSI right knee    6/4/2024:  Today patient reports unsatisfactory improvement of her symptoms after recent Monovisc injection. Has been adherence to treatment recommendation including home exercises. Denies any new injury since last visit.    Patient presents with knee pain. History, examination, and x-rays are consistent with diagnosis of Osteoarthritis.   Nonpharmacologic treatment: home exercise program  Pain Score:   7/10  Pain  from condition does interfere with activities of daily living  Previous Visco relief: good relief for a few week  Previous CSI relief: good relief for a few months        Following history reviewed and updated:  Historical Information   Patient Active Problem List    Diagnosis Date Noted Date Diagnosed    Malignant neoplasm of left breast in female, estrogen receptor negative, unspecified site of breast ([facility]) 05/14/2024     Continuous opioid dependence ([facility]) 12/27/2023     Peripheral vascular disease ([facility]) 12/06/2023     S/P left mastectomy 11/28/2023     History of right mastectomy 10/10/2023     Esophageal candidiasis ([facility]) 08/28/2023     Hoarseness of voice 04/20/2023     History of left breast cancer 12/23/2020     Vitamin B12 deficiency 08/23/2019     Vitamin D deficiency 08/23/2019     S/P deep brain stimulator placement 11/26/2018     Lumbar degenerative disc disease 04/20/2018     Portal vein thrombosis 01/29/2018     Hypomagnesemia 12/08/2015     Unsteady gait 12/08/2015     Overweight 07/16/2015     Essential tremor 07/13/2015     Prediabetes 06/16/2015     Osteoarthritis of right knee 09/05/2014     Iron deficiency anemia 04/30/2014     Supraventricular tachycardia 10/07/2013     Diarrhea 01/09/2013     Chronic salpingo-oophoritis 12/26/2012     SIADH (syndrome of inappropriate ADH production) ([facility]) 12/14/2012     Peripheral neuropathy 12/14/2012     GERD (gastroesophageal reflux disease) 08/23/2012     Moderate episode of recurrent major depressive disorder ([facility]) 06/13/2012     Hyperlipidemia 06/13/2012     Essential hypertension 06/13/2012     Acquired hypothyroidism 06/13/2012     Osteopenia 06/13/2012     Ulcerative colitis without complications ([facility]) 06/13/2012     Allergic rhinitis 06/13/2012     Mild intermittent asthma without complication 06/13/2012     Sjogren's syndrome ([facility]) 06/13/2012     Anxiety 04/30/2012      Current Outpatient Medications on File Prior to Visit   Medication Sig    al mag  oxide-diphenhydramine-lidocaine viscous (MAGIC MOUTHWASH) 1:1:1 suspension SWISH 10ML IN MOUTH FOR ONE MINUTE FOUR TIMES A DAY    albuterol (PROVENTIL HFA,VENTOLIN HFA) 90 mcg/act inhaler Inhale 2 puffs every 6 (six) hours as needed for wheezing    amLODIPine (NORVASC) 2.5 mg tablet TAKE ONE TABLET BY MOUTH EVERY DAY    ammonium lactate (LAC-HYDRIN) 12 % cream     Calcium Carb-Cholecalciferol (CALCIUM 600-D PO) Take 1 tablet by mouth 2 (two) times a day    Coenzyme Q10 (CO Q-10 PO) Take 1 capsule by mouth daily    diltiazem (CARDIZEM CD) 180 mg 24 hr capsule TAKE ONE CAPSULE BY MOUTH EVERY DAY    diltiazem (CARDIZEM) 60 mg tablet Take 1 tablet (60 mg total) by mouth daily    Eliquis 5 MG TAKE ONE TABLET BY MOUTH TWICE A DAY    escitalopram (LEXAPRO) 20 mg tablet TAKE ONE TABLET BY MOUTH EVERY DAY    fluticasone (FLONASE) 50 mcg/act nasal spray APPLY ONE SPRAY IN EACH NOSTRIL ONCE DAILY AS NEEDED FOR RHINITIS    gabapentin (NEURONTIN) 600 MG tablet TAKE ONE TABLET BY MOUTH EVERY MORNING , TAKE ONE TABLET BY MOUTH EVERY DAY IN THE AFTERNOON , AND TAKE TWO TABLETS BY MOUTH EVERY EVENING AT BEDTIME    HYDROcodone-acetaminophen (NORCO) 5-325 mg per tablet Take 1 tablet by mouth every 6 (six) hours as needed for pain Max Daily Amount: 4 tablets    levothyroxine 50 mcg tablet TAKE ONE TABLET BY MOUTH EVERY DAY    lisinopril (ZESTRIL) 20 mg tablet TAKE ONE TABLET BY MOUTH TWO TIMES A DAY    LORazepam (ATIVAN) 1 mg tablet Take 1 tablet (1 mg total) by mouth every 6 (six) hours as needed for anxiety    MAGNESIUM PO Take 1 tablet by mouth daily    Multiple Vitamin (MULTIVITAMIN ADULT PO) Take 1 tablet by mouth daily    Omega-3 Fatty Acids (FISH OIL PO) Take 1 capsule by mouth daily    pantoprazole (PROTONIX) 40 mg tablet TAKE ONE TABLET BY MOUTH EVERY DAY    potassium chloride (MICRO-K) 10 MEQ CR capsule TAKE TWO CAPSULES BY MOUTH EVERY DAY    simvastatin (ZOCOR) 10 mg tablet TAKE ONE-HALF TABLET BY MOUTH DAILY    sodium  chloride 1 g tablet TAKE ONE TABLET BY MOUTH FOUR TIMES A DAY    torsemide (DEMADEX) 10 mg tablet Take 1 tablet (10 mg total) by mouth daily as needed (edema)    vitamin B-12 (VITAMIN B-12) 500 mcg tablet Take 1 tablet (500 mcg total) by mouth daily    Fluticasone Furoate-Vilanterol 100-25 mcg/actuation inhaler Inhale 1 puff daily Rinse mouth after use.     Allergies   Allergen Reactions    Indomethacin GI Intolerance, Dizziness and Other (See Comments)    Macrobid [Nitrofurantoin Monohyd Macro] Rash    Nitrofurantoin Other (See Comments)    Penicillins Rash and Other (See Comments)     Annotation - [id number]: can take cephalosporins    Sulfa Antibiotics Rash and Other (See Comments)     Past Medical History:   Diagnosis Date    Anemia     Anxiety     Arrhythmia     Arthritis     Asthma     Blood clot in vein     portal vein    Breast cancer (HCC) 02/12/2021    Breast lump     [id number] RESOLVED    Cancer (HCC)     Depression     Disease of thyroid gland     DVT, lower extremity (HCC)     GERD (gastroesophageal reflux disease)     Hyperlipidemia     Hypertension     Hypokalemia     Hyponatremia     Hypothyroidism     Iron deficiency anemia     Irregular heart beat     Manic behavior (HCC)     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HCC)     Osteoarthritis     Palpitations     [id number]  RESOLVED    Paroxysmal supraventricular tachycardia     PE (pulmonary thromboembolism) (HCC)     Sjoegren syndrome     Sleep apnea     Sleep difficulties     Spinal stenosis     Thrombocytosis     [id number]  RESOLVED    Tremors of nervous system     dbs implanted right and left chest    Ulcerative colitis (HCC)     Vertigo     [id number] RESOLVED     Past Surgical History:   Procedure Laterality Date    ABDOMINAL SURGERY      APPENDECTOMY      BREAST BIOPSY Left 11/07/2019    Stereo    BREAST EXCISIONAL BIOPSY Left     x many years    BREAST SURGERY      lumpectomy & biopsy    CARMELLA HOLE W/ STEREOTACTIC INSERTION OF DBS LEADS / INTRAOP  MICROELECTRODE RECORDING      COLON SURGERY      COLONOSCOPY N/A 2017    Procedure: POUCHOSCOPY;  Surgeon: VANDANA Waters MD;  Location: [facility];  Service: Colorectal    COLOPROCTECTOMY W/ ILEO J POUCH      ESOPHAGOGASTRODUODENOSCOPY      ONSET 10/17/11    FISTULA REPAIR      LLEOANAL FISTULA REPAIR TRANSPERIN TRANSABD APPROACH    HYSTERECTOMY      age 38    ILEOSTOMY CLOSURE      KNEE ARTHROSCOPY      Right    MAMMO STEREOTACTIC BREAST BIOPSY LEFT (ALL INC) Left 2019    MAMMO STEREOTACTIC BREAST BIOPSY LEFT (ALL INC) Left 2020    MAMMO STEREOTACTIC BREAST BIOPSY LEFT (ALL INC) EACH ADD Left 2020    MASTECTOMY Left 2021    left mastectomy- Dr. Gillespie    MASTECTOMY W/ SENTINEL NODE BIOPSY Left 2021    Procedure: BREAST MASTECTOMY WITH SENTINEL LYMPH NODE BIOPSY, LYMPHATIC MAPPING WITH BLUE DYE AND RADIAOCTIVE DYE (INJECT AT 1100 BY DR GILLESPIE IN THE OR);  Surgeon: Robbie Gillespie MD;  Location: [facility];  Service: Surgical Oncology    NE INSJ/RPLCMT CRANIAL NEUROSTIM PULSE GENERATOR Right 2017    Procedure: DBS GENERATOR REPLACEMENT;  Surgeon: Marin Mao MD;  Location: [facility];  Service: Neurosurgery    NE INSJ/RPLCMT CRANIAL NEUROSTIM PULSE GENERATOR N/A 2019    Procedure: REPLACEMENT IMPLANTABLE PULSE GENERATOR FOR DEEP BRAIN STIMULATOR LEFT CHEST;  Surgeon: Damian Batres MD;  Location: [facility];  Service: Neurosurgery    SPLENECTOMY      TONSILLECTOMY       Social History     Socioeconomic History    Marital status:      Spouse name: Not on file    Number of children: Not on file    Years of education: EDUCATION LEVEL 10TH GRADE    Highest education level: Not on file   Occupational History    Occupation: RETIRED   Tobacco Use    Smoking status: Former     Current packs/day: 0.00     Average packs/day: 1 pack/day for 15.0 years (15.0 ttl pk-yrs)     Types: Cigarettes     Start date: 1950     Quit date: 1965     Years since quittin.4     Smokeless tobacco: Never    Tobacco comments:     Quit   Vaping Use    Vaping status: Never Used   Substance and Sexual Activity    Alcohol use: Yes     Alcohol/week: 2.0 standard drinks of alcohol     Types: 2 Cans of beer per week     Comment: 2or 3 beers a week    Drug use: No    Sexual activity: Not Currently     Partners: Male     Birth control/protection: Post-menopausal   Other Topics Concern    Not on file   Social History Narrative    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ACTIVITIES INSIDE AND OUTSIDE OF HOME, MODERATE    CAFFEINE USE, DRINKS CAFEINATED TEA, DRINKS COFFEE    INADEQUATE EXERCISE    LIVES WITH ADULT CHILDREN     Social Determinants of Health     Financial Resource Strain: Low Risk  (4/20/2023)    Overall Financial Resource Strain (CARDIA)     Difficulty of Paying Living Expenses: Not very hard   Food Insecurity: No Food Insecurity (5/31/2024)    Hunger Vital Sign     Worried About Running Out of Food in the Last Year: Never true     Ran Out of Food in the Last Year: Never true   Transportation Needs: No Transportation Needs (5/31/2024)    PRAPARE - Transportation     Lack of Transportation (Medical): No     Lack of Transportation (Non-Medical): No   Physical Activity: Not on file   Stress: Not on file   Social Connections: Not on file   Intimate Partner Violence: Not on file   Housing Stability: Low Risk  (5/31/2024)    Housing Stability Vital Sign     Unable to Pay for Housing in the Last Year: No     Number of Times Moved in the Last Year: 1     Homeless in the Last Year: No     Family History   Problem Relation Age of Onset    Venous thrombosis Mother         ACUTE VENOUS THROMBOSIS OF DEEP VESSELS OF THE DISTAL LOWER EXTREMITY    Other Mother         PHLEBITIS    Hypertension Mother     Peripheral vascular disease Mother     COPD Father     Diabetes Father         MELLITUS    Stroke Father     Diabetes Sister         MELLITUS    Sjogren's syndrome Sister     No Known Problems Daughter     No Known Problems  "Maternal Grandmother     No Known Problems Maternal Grandfather     No Known Problems Paternal Grandmother     No Known Problems Paternal Grandfather     No Known Problems Sister     No Known Problems Maternal Aunt     No Known Problems Paternal Aunt     No Known Problems Son            /76 (BP Location: Left arm)   Ht 5' 3\" (1.6 m)   Wt 66.7 kg (147 lb)   BMI 26.04 kg/m²     PHYSICAL EXAM:    RIGHT KNEE:  Erythema: no  Swelling: no  Increased Warmth: no      __________________________________________________________________________  Large joint arthrocentesis: R knee  [facility] Protocol:  Procedure performed by: (Hailey RIVAS Student Zeyad)  Consent: Verbal consent obtained.  Risks and benefits: risks, benefits and alternatives were discussed  Consent given by: patient  Time out: Immediately prior to procedure a \"time out\" was called to verify the correct patient, procedure, equipment, support staff and site/side marked as required.  Patient understanding: patient states understanding of the procedure being performed  Site marked: the operative site was marked  Patient identity confirmed: verbally with patient  Supporting Documentation  Indications: pain and diagnostic evaluation   Procedure Details  Location: knee - R knee  Preparation: Patient was prepped and draped in the usual sterile fashion  Needle size: 18 G  Ultrasound guidance: yes  Approach: anterolateral  Medications administered: 40 mg triamcinolone acetonide 40 mg/mL; 4 mL bupivacaine 0.25 %    Aspirate amount: 9 mL  Aspirate: blood-tinged  Patient tolerance: patient tolerated the procedure well with no immediate complications  Dressing:  Sterile dressing applied        "

## 2024-06-04 ENCOUNTER — OFFICE VISIT (OUTPATIENT)
Dept: OBGYN CLINIC | Facility: OTHER | Age: 79
End: 2024-06-04
Payer: MEDICARE

## 2024-06-04 VITALS
WEIGHT: 147 LBS | BODY MASS INDEX: 26.05 KG/M2 | HEIGHT: 63 IN | DIASTOLIC BLOOD PRESSURE: 76 MMHG | SYSTOLIC BLOOD PRESSURE: 146 MMHG

## 2024-06-04 DIAGNOSIS — M17.11 PRIMARY OSTEOARTHRITIS OF RIGHT KNEE: Primary | ICD-10-CM

## 2024-06-04 PROCEDURE — 99213 OFFICE O/P EST LOW 20 MIN: CPT | Performed by: FAMILY MEDICINE

## 2024-06-04 PROCEDURE — 20611 DRAIN/INJ JOINT/BURSA W/US: CPT | Performed by: FAMILY MEDICINE

## 2024-06-04 RX ORDER — TRIAMCINOLONE ACETONIDE 40 MG/ML
40 INJECTION, SUSPENSION INTRA-ARTICULAR; INTRAMUSCULAR
Status: COMPLETED | OUTPATIENT
Start: 2024-06-04 | End: 2024-06-04

## 2024-06-04 RX ORDER — BUPIVACAINE HYDROCHLORIDE 2.5 MG/ML
4 INJECTION, SOLUTION INFILTRATION; PERINEURAL
Status: COMPLETED | OUTPATIENT
Start: 2024-06-04 | End: 2024-06-04

## 2024-06-04 RX ADMIN — BUPIVACAINE HYDROCHLORIDE 4 ML: 2.5 INJECTION, SOLUTION INFILTRATION; PERINEURAL at 07:30

## 2024-06-04 RX ADMIN — TRIAMCINOLONE ACETONIDE 40 MG: 40 INJECTION, SUSPENSION INTRA-ARTICULAR; INTRAMUSCULAR at 07:30

## 2024-06-05 ENCOUNTER — TELEPHONE (OUTPATIENT)
Dept: INTERNAL MEDICINE CLINIC | Facility: CLINIC | Age: 79
End: 2024-06-05

## 2024-06-05 NOTE — TELEPHONE ENCOUNTER
You can start new medication after the surgery.  Were you instructed when to stop your Eliquis? 2 or 3 days prior to surgery?

## 2024-06-05 NOTE — TELEPHONE ENCOUNTER
Patient ok with sending Fosamax to [facility]   06/14 finger sx and was told to stop all her vitamins on the 7th.  Should she start this medication before or after the sx and she stated she will need to stop calcium and vitamin D as well

## 2024-06-05 NOTE — TELEPHONE ENCOUNTER
Please call patient.  Bone density now shows osteoporosis.  Recommend treatment with Fosamax (alendronate) once a week. Continue your calcium and vitamin  D3.  If swallowing issues or reflux symptoms with the pill, we can do an injection every 6 months (Prolia).    Ok to send to the pharmacy?

## 2024-06-07 ENCOUNTER — TELEPHONE (OUTPATIENT)
Age: 79
End: 2024-06-07

## 2024-06-07 ENCOUNTER — TELEPHONE (OUTPATIENT)
Dept: SURGICAL ONCOLOGY | Facility: CLINIC | Age: 79
End: 2024-06-07

## 2024-06-07 NOTE — TELEPHONE ENCOUNTER
Pt's pharmacy calling requesting two separate scripts:      Breast prosthesis  Quantity  1   Dx Z85.3, Z90.11      2.  Mastectomy Bra  Quant 6  Dx Z85.3, Z90.11    The insurance company also requires that the most recent office visit  be faxed with these scripts to [phone].

## 2024-06-07 NOTE — TELEPHONE ENCOUNTER
Left msg for pt regarding change in provider schedule. New appt is Mon 11/18 @ 12:30PM. Provided phone # [phone] if need to reschedule    Patient returned call, please call back at [phone].  Thank you.

## 2024-06-11 ENCOUNTER — APPOINTMENT (OUTPATIENT)
Dept: LAB | Facility: AMBULARY SURGERY CENTER | Age: 79
End: 2024-06-11
Payer: MEDICARE

## 2024-06-11 DIAGNOSIS — R73.03 PREDIABETES: ICD-10-CM

## 2024-06-11 DIAGNOSIS — E87.1 HYPONATREMIA: ICD-10-CM

## 2024-06-11 DIAGNOSIS — E53.8 VITAMIN B12 DEFICIENCY: ICD-10-CM

## 2024-06-11 DIAGNOSIS — E22.2 SIADH (SYNDROME OF INAPPROPRIATE ADH PRODUCTION) (HCC): ICD-10-CM

## 2024-06-11 DIAGNOSIS — I10 ESSENTIAL HYPERTENSION: ICD-10-CM

## 2024-06-11 DIAGNOSIS — E55.9 VITAMIN D DEFICIENCY: ICD-10-CM

## 2024-06-11 DIAGNOSIS — R19.7 DIARRHEA, UNSPECIFIED TYPE: ICD-10-CM

## 2024-06-11 LAB
25(OH)D3 SERPL-MCNC: 74.1 NG/ML (ref 30–100)
ALBUMIN SERPL BCP-MCNC: 3.9 G/DL (ref 3.5–5)
ALP SERPL-CCNC: 62 U/L (ref 34–104)
ALT SERPL W P-5'-P-CCNC: 12 U/L (ref 7–52)
ANION GAP SERPL CALCULATED.3IONS-SCNC: 10 MMOL/L (ref 4–13)
AST SERPL W P-5'-P-CCNC: 16 U/L (ref 13–39)
BILIRUB SERPL-MCNC: 0.5 MG/DL (ref 0.2–1)
BUN SERPL-MCNC: 14 MG/DL (ref 5–25)
CALCIUM SERPL-MCNC: 8.9 MG/DL (ref 8.4–10.2)
CHLORIDE SERPL-SCNC: 97 MMOL/L (ref 96–108)
CO2 SERPL-SCNC: 31 MMOL/L (ref 21–32)
CREAT SERPL-MCNC: 0.77 MG/DL (ref 0.6–1.3)
CREAT UR-MCNC: 43 MG/DL
ERYTHROCYTE [DISTWIDTH] IN BLOOD BY AUTOMATED COUNT: 14.9 % (ref 11.6–15.1)
EST. AVERAGE GLUCOSE BLD GHB EST-MCNC: 134 MG/DL
GFR SERPL CREATININE-BSD FRML MDRD: 73 ML/MIN/1.73SQ M
GLUCOSE P FAST SERPL-MCNC: 121 MG/DL (ref 65–99)
HBA1C MFR BLD: 6.3 %
HCT VFR BLD AUTO: 40.1 % (ref 34.8–46.1)
HGB BLD-MCNC: 13.3 G/DL (ref 11.5–15.4)
MCH RBC QN AUTO: 31.7 PG (ref 26.8–34.3)
MCHC RBC AUTO-ENTMCNC: 33.2 G/DL (ref 31.4–37.4)
MCV RBC AUTO: 96 FL (ref 82–98)
MICROALBUMIN UR-MCNC: <7 MG/L
MICROALBUMIN/CREAT 24H UR: <16 MG/G CREATININE (ref 0–30)
PHOSPHATE SERPL-MCNC: 3.5 MG/DL (ref 2.3–4.1)
PLATELET # BLD AUTO: 474 THOUSANDS/UL (ref 149–390)
PMV BLD AUTO: 9.2 FL (ref 8.9–12.7)
POTASSIUM SERPL-SCNC: 4 MMOL/L (ref 3.5–5.3)
PROT SERPL-MCNC: 5.8 G/DL (ref 6.4–8.4)
RBC # BLD AUTO: 4.2 MILLION/UL (ref 3.81–5.12)
SODIUM 24H UR-SCNC: 41 MOL/L
SODIUM SERPL-SCNC: 138 MMOL/L (ref 135–147)
URATE SERPL-MCNC: 6.1 MG/DL (ref 2–7.5)
VIT B12 SERPL-MCNC: 4328 PG/ML (ref 180–914)
WBC # BLD AUTO: 7.24 THOUSAND/UL (ref 4.31–10.16)

## 2024-06-11 PROCEDURE — 82570 ASSAY OF URINE CREATININE: CPT

## 2024-06-11 PROCEDURE — 36415 COLL VENOUS BLD VENIPUNCTURE: CPT

## 2024-06-11 PROCEDURE — 84100 ASSAY OF PHOSPHORUS: CPT

## 2024-06-11 PROCEDURE — 85027 COMPLETE CBC AUTOMATED: CPT

## 2024-06-11 PROCEDURE — 82043 UR ALBUMIN QUANTITATIVE: CPT

## 2024-06-11 PROCEDURE — 80053 COMPREHEN METABOLIC PANEL: CPT

## 2024-06-11 PROCEDURE — 82607 VITAMIN B-12: CPT

## 2024-06-11 PROCEDURE — 82306 VITAMIN D 25 HYDROXY: CPT

## 2024-06-11 PROCEDURE — 83036 HEMOGLOBIN GLYCOSYLATED A1C: CPT

## 2024-06-11 PROCEDURE — 84550 ASSAY OF BLOOD/URIC ACID: CPT

## 2024-06-11 PROCEDURE — 84300 ASSAY OF URINE SODIUM: CPT

## 2024-06-13 ENCOUNTER — TELEPHONE (OUTPATIENT)
Dept: CARDIOLOGY CLINIC | Facility: CLINIC | Age: 79
End: 2024-06-13

## 2024-06-13 DIAGNOSIS — M81.0 AGE-RELATED OSTEOPOROSIS WITHOUT CURRENT PATHOLOGICAL FRACTURE: Primary | ICD-10-CM

## 2024-06-13 RX ORDER — ALENDRONATE SODIUM 70 MG/1
70 TABLET ORAL
Qty: 4 TABLET | Refills: 2 | Status: SHIPPED | OUTPATIENT
Start: 2024-06-13

## 2024-06-13 NOTE — TELEPHONE ENCOUNTER
Spoke with patient Marina never called in for her to start after surgery [facility] to [facility]

## 2024-06-18 ENCOUNTER — TELEPHONE (OUTPATIENT)
Age: 79
End: 2024-06-18

## 2024-06-18 DIAGNOSIS — F41.9 ANXIETY: ICD-10-CM

## 2024-06-18 RX ORDER — LORAZEPAM 1 MG/1
1 TABLET ORAL EVERY 6 HOURS PRN
Qty: 120 TABLET | Refills: 0 | Status: SHIPPED | OUTPATIENT
Start: 2024-06-18

## 2024-06-18 NOTE — TELEPHONE ENCOUNTER
Spoke w/ Anna and adv'd that we did get the fax and that Dr. Velasquez is out of the office until 6/24. Anna said since Dr. Velasquez wrote the order she has to sign the fax, Ilene cannot. Anna states it can wait until Dr. Velasquez is back.

## 2024-06-18 NOTE — TELEPHONE ENCOUNTER
Reason for call:   [x] Refill   [] Prior Auth  [] Other:     Office:   [x] PCP/Provider -   [] Specialty/Provider -     Medication:   Lorazepam 1mg- take 1 tablet by mouth every 6 hours as needed    Pharmacy: [facility]    Does the patient have enough for 3 days?   [] Yes   [x] No - Send as HP to POD

## 2024-06-18 NOTE — TELEPHONE ENCOUNTER
Received call from Anna of [facility] to follow up on faxes to office on 6/10 & 6/14 for signature/date for Physician's order for patient's prosthesis and mastectomy bra. Please check for receipt of the fax and follow up with Anna if she needs to re-fax to office with alternate number.     Please fax orders to # [phone]

## 2024-06-20 ENCOUNTER — TELEPHONE (OUTPATIENT)
Dept: OTHER | Facility: HOSPITAL | Age: 79
End: 2024-06-20

## 2024-06-20 NOTE — TELEPHONE ENCOUNTER
Reason for call:   [x] Refill   [] Prior Auth  [x] Other: Pharmacy called to request a new script for gabapentin, due to the patients insurance they require a new script every 6 months so the refills left at the pharmacy can no be used. They need a new script in order for insurance to pay.    Office:   [x] PCP/Provider -   [] Specialty/Provider -     Medication: Gabapentin    Dose/Frequency: 600mg    Quantity: #360    Pharmacy: [facility]       20-Jun-2024 23:51

## 2024-06-20 NOTE — TELEPHONE ENCOUNTER
I reviewed recent laboratory studies.  Sodium level stable at 138.  She could decrease sodium chloride tablets to 1 tablet 3 times daily but would recommend a repeat BMP prior to her appointment in August.  Message left regarding the above.  She is to call if there is any questions.

## 2024-06-24 NOTE — PROGRESS NOTES
OT Evaluation     Today's date: 2024  Patient name: Matilda Day  : 1945  MRN: [id number]  Referring provider: Traci Molina CRNP  Dx: No diagnosis found.    Start Time: 1015  Stop Time: 1100  Total time in clinic (min): 45 minutes    Assessment  Impairments: abnormal or restricted ROM, activity intolerance, impaired physical strength, lacks appropriate home exercise program, pain with function and activity limitations    Assessment details: Matilda is referred to therapy s/p right ring finger tenosynovectomy/tenolysis, centralization, radial sagittal band repair, and partial EDC transfer on 24. She presents to therapy wearing a custom WHFO, and with dry dressings over the incision. The incision is healing well with no sign of infection. Her WHFO was modified today to allow for PIP flexion of the ring finger and adjacent digits. She presents with swelling throughout the wrist and hand. She is able to actively bend at the PIPs and DIPs, though is limited at this time. MP flexion was not assessed, nor was wrist ROM due to precautions. MP extension and PIP extension is also reduced due to stiffness and swelling. She will be progressed per protocol. She will benefit from skilled occupational therapy at a frequency of two times per week to address these deficits and to improve functional use of the right hand. See below for a detailed assessment.         Goals  STG: Patient will be compliant with post operative precautions and home exercise program in 1 week.  STG: Pain will not exceed a 1/10 during appropriate activity and during therapy in 4 weeks.  STG: Inflammation/edema will be reduced to 17cm at the wrist, 15.6cm at the DPC, and within 4mm of the contralateral side in the ring finger and patient will be independent with self management techniques in 4 weeks.  STG: Range of motion of right wrist will be improved to 50% of the contralateral side in 4 weeks.  STG: Active range of motion of the  ring finger will demonstrate MP flexion=65, PIP flexion=90, DIP flexion=50 in 4 weeks, with extension maintained.     LTG: Active range of motion of the ring finger will demonstrate MP flexion=80, PIP flexion=90, DIP flexion=50 by discharge with extension maintained.  LTG: Strength will be improved to 50% the contralateral side in 6-8 weeks or discharge.   LTG: Performance in ADLs and IADLS will be improved to prior level of function with the affected extremity within 6 weeks or discharge.   LTG: FOTO score increase by 20 points within 6 weeks or discharge.                 Plan  Patient would benefit from: skilled OT, OT eval and home program  Planned modality interventions: thermotherapy: hydrocollator packs    Planned therapy interventions: home exercise program, joint mobilization, manual therapy, therapeutic exercise, patient education, therapeutic activities and neuromuscular re-education    Frequency: 2x week  Plan of Care beginning date: 6/25/2024  Plan of Care expiration date: 8/26/2024  Treatment plan discussed with: patient  Plan details: Treatment to include modalities, manual therapy, PRE's, HEP, and orthotics as appropriate.         Subjective Evaluation    History of Present Illness  Date of surgery: 6/14/2024  Mechanism of injury: surgery  Mechanism of injury: Matilda reports that her right ring finger was subluxing and unable to extend for over a year. She reports the same thing is happening as well in her left ring finger.   Patient Goals  Patient goals for therapy: decreased edema, increased motion, increased strength, return to sport/leisure activities and independence with ADLs/IADLs    Pain  No pain reported    Hand dominance: left    Treatments  Current treatment: occupational therapy        Objective     Observations     Additional Observation Details  Sutures intact with no dehiscence. No drainage or sign of infection.     Active Range of Motion     Left Wrist   Wrist flexion: 65 degrees    Wrist extension: 77 degrees   Radial deviation: 25 degrees   Ulnar deviation: 5 degrees     Right Thumb   Kapandji score: 6 degrees    Right Digits   Flexion   Middle     PIP: 85  Ring     PIP: 82  Little     PIP: 80  Extension   Middle     MCP: -35    PIP: -32    DIP: 0  Ring     MCP: -10    PIP: -50    DIP: 0  Little     MCP: -15    PIP: -48    DIP: 0    Additional Active Range of Motion Details  ROM of the right wrist deferred at IE.     Passive Range of Motion     Right Digits   Extension   Middle     PIP: -2  Ring     PIP: -38  Little     PIP: -30    Swelling     Left Wrist/Hand   Circumference MCP: 16.5 cm  Circumference wrist: 14.8 cm    Additional Swelling Details  5.6 at PIP    Right Wrist/Hand   Ring     Proximal: 6.1 cm  Circumference MCP: 17.3 cm  Circumference wrist: 16.5 cm    Additional Swelling Details  6.2 at PIP             Precautions: DOS 6/14/24  First 3 weeks: AROM in orthosis. PROM to PIP and DIP joints in orthosis.  3 weeks: AROM of wrist  4 weeks: Short arc flexion of digits (with wrist in extension), EDC gliding, PROM for digit extension   5 weeks: Full arc AROM   6 weeks: PROM for the wrist and fingers   7 weeks: Combined wrist and digit flexion       Manuals 6/25            MEM                                                    Neuro Re-Ed                                                                                                        Ther Ex             HEP: In orthotic, PIP flexion and DIP flexion A/PROM            Hook fist pegs             Active digital extension             Towel scrunches with splint on                                                                  Ther Activity                                       Gait Training                                       Modalities             MHP PRN

## 2024-06-25 ENCOUNTER — EVALUATION (OUTPATIENT)
Dept: OCCUPATIONAL THERAPY | Facility: CLINIC | Age: 79
End: 2024-06-25
Payer: MEDICARE

## 2024-06-25 PROCEDURE — 97165 OT EVAL LOW COMPLEX 30 MIN: CPT | Performed by: OCCUPATIONAL THERAPIST

## 2024-06-25 PROCEDURE — 97110 THERAPEUTIC EXERCISES: CPT | Performed by: OCCUPATIONAL THERAPIST

## 2024-06-25 NOTE — LETTER
2024    Cruz Patton MD  [address] [place] [id number]    Patient: Matilda Day   YOB: 1945   Date of Visit: 2024   No diagnosis found.    Dear Dr. Patton:    Thank you for your recent referral of Matilda Day. Please review the attached evaluation summary from Matilda's recent visit.     Please verify that you agree with the plan of care by signing the attached order.     If you have any questions or concerns, please do not hesitate to call.     I sincerely appreciate the opportunity to share in the care of one of your patients and hope to have another opportunity to work with you in the near future.     Sincerely,    Samina Haile, OT      Referring Provider:     I certify that I have read the below Plan of Care and certify the need for these services furnished under this plan of treatment while under my care.                    Cruz Patton MD  [address] [place] [address]  Via Fax: [phone]        OT Evaluation     Today's date: 2024  Patient name: Matilda Day  : 1945  MRN: [id number]  Referring provider: Traci Molina CRNP  Dx: No diagnosis found.    Start Time: 1015  Stop Time: 1100  Total time in clinic (min): 45 minutes    Assessment  Impairments: abnormal or restricted ROM, activity intolerance, impaired physical strength, lacks appropriate home exercise program, pain with function and activity limitations    Assessment details: Matilda is referred to therapy s/p right ring finger tenosynovectomy/tenolysis, centralization, radial sagittal band repair, and partial EDC transfer on 24. She presents to therapy wearing a custom WHFO, and with dry dressings over the incision. The incision is healing well with no sign of infection. Her WHFO was modified today to allow for PIP flexion of the ring finger and adjacent digits. She presents with swelling throughout the wrist and hand. She is able to  actively bend at the PIPs and DIPs, though is limited at this time. MP flexion was not assessed, nor was wrist ROM due to precautions. MP extension and PIP extension is also reduced due to stiffness and swelling. She will be progressed per protocol. She will benefit from skilled occupational therapy at a frequency of two times per week to address these deficits and to improve functional use of the right hand. See below for a detailed assessment.         Goals  STG: Patient will be compliant with post operative precautions and home exercise program in 1 week.  STG: Pain will not exceed a 1/10 during appropriate activity and during therapy in 4 weeks.  STG: Inflammation/edema will be reduced to 17cm at the wrist, 15.6cm at the DPC, and within 4mm of the contralateral side in the ring finger and patient will be independent with self management techniques in 4 weeks.  STG: Range of motion of right wrist will be improved to 50% of the contralateral side in 4 weeks.  STG: Active range of motion of the ring finger will demonstrate MP flexion=65, PIP flexion=90, DIP flexion=50 in 4 weeks, with extension maintained.     LTG: Active range of motion of the ring finger will demonstrate MP flexion=80, PIP flexion=90, DIP flexion=50 by discharge with extension maintained.  LTG: Strength will be improved to 50% the contralateral side in 6-8 weeks or discharge.   LTG: Performance in ADLs and IADLS will be improved to prior level of function with the affected extremity within 6 weeks or discharge.   LTG: FOTO score increase by 20 points within 6 weeks or discharge.                 Plan  Patient would benefit from: skilled OT, OT eval and home program  Planned modality interventions: thermotherapy: hydrocollator packs    Planned therapy interventions: home exercise program, joint mobilization, manual therapy, therapeutic exercise, patient education, therapeutic activities and neuromuscular re-education    Frequency: 2x week  Plan of  Care beginning date: 6/25/2024  Plan of Care expiration date: 8/26/2024  Treatment plan discussed with: patient  Plan details: Treatment to include modalities, manual therapy, PRE's, HEP, and orthotics as appropriate.         Subjective Evaluation    History of Present Illness  Date of surgery: 6/14/2024  Mechanism of injury: surgery  Mechanism of injury: Matilda reports that her right ring finger was subluxing and unable to extend for over a year. She reports the same thing is happening as well in her left ring finger.   Patient Goals  Patient goals for therapy: decreased edema, increased motion, increased strength, return to sport/leisure activities and independence with ADLs/IADLs    Pain  No pain reported    Hand dominance: left    Treatments  Current treatment: occupational therapy        Objective     Observations     Additional Observation Details  Sutures intact with no dehiscence. No drainage or sign of infection.     Active Range of Motion     Left Wrist   Wrist flexion: 65 degrees   Wrist extension: 77 degrees   Radial deviation: 25 degrees   Ulnar deviation: 5 degrees     Right Thumb   Kapandji score: 6 degrees    Right Digits   Flexion   Middle     PIP: 85  Ring     PIP: 82  Little     PIP: 80  Extension   Middle     MCP: -35    PIP: -32    DIP: 0  Ring     MCP: -10    PIP: -50    DIP: 0  Little     MCP: -15    PIP: -48    DIP: 0    Additional Active Range of Motion Details  ROM of the right wrist deferred at IE.     Passive Range of Motion     Right Digits   Extension   Middle     PIP: -2  Ring     PIP: -38  Little     PIP: -30    Swelling     Left Wrist/Hand   Circumference MCP: 16.5 cm  Circumference wrist: 14.8 cm    Additional Swelling Details  5.6 at PIP    Right Wrist/Hand   Ring     Proximal: 6.1 cm  Circumference MCP: 17.3 cm  Circumference wrist: 16.5 cm    Additional Swelling Details  6.2 at PIP            Precautions: DOS 6/14/24  First 3 weeks: AROM in orthosis. PROM to PIP and DIP joints in  orthosis.  3 weeks: AROM of wrist  4 weeks: Short arc flexion of digits (with wrist in extension), EDC gliding, PROM for digit extension   5 weeks: Full arc AROM   6 weeks: PROM for the wrist and fingers   7 weeks: Combined wrist and digit flexion       Manuals 6/25            MEM                                                    Neuro Re-Ed                                                                                                        Ther Ex             HEP: In orthotic, PIP flexion and DIP flexion A/PROM            Hook fist pegs             Active digital extension             Towel scrunches with splint on                                                                  Ther Activity                                       Gait Training                                       Modalities             MHP PRN

## 2024-06-28 ENCOUNTER — OFFICE VISIT (OUTPATIENT)
Dept: OCCUPATIONAL THERAPY | Facility: CLINIC | Age: 79
End: 2024-06-28
Payer: MEDICARE

## 2024-06-28 DIAGNOSIS — M66.241 NONTRAUMATIC SUBLUXATION OF EXTENSOR TENDON AT METACARPOPHALANGEAL JOINT OF RIGHT HAND: Primary | ICD-10-CM

## 2024-06-28 PROCEDURE — 97110 THERAPEUTIC EXERCISES: CPT | Performed by: OCCUPATIONAL THERAPIST

## 2024-06-28 PROCEDURE — 97140 MANUAL THERAPY 1/> REGIONS: CPT | Performed by: OCCUPATIONAL THERAPIST

## 2024-06-28 NOTE — PROGRESS NOTES
"Daily Note     Today's date: 2024  Patient name: Matilda Day  : 1945  MRN: [id number]  Referring provider: Traci Molina CRNP  Dx:   Encounter Diagnosis     ICD-10-CM    1. Nontraumatic subluxation of extensor tendon at metacarpophalangeal joint of right hand  M66.241           Start Time: 0945  Stop Time: 1020  Total time in clinic (min): 35 minutes    Subjective: \"it doesn't go straight\"      Objective: See treatment diary below.       Assessment: Maintaining extension of the ring finger. Ulnar deviation and subluxation of the middle finger that was not present before. Added a gutter to the orthotic to keep the finger centralized.       Plan: Progress treament per protocol.      Precautions: DOS 24  First 3 weeks: AROM in orthosis. PROM to PIP and DIP joints in orthosis.  3 weeks: AROM of wrist  4 weeks: Short arc flexion of digits (with wrist in extension), EDC gliding, PROM for digit extension   5 weeks: Full arc AROM   6 weeks: PROM for the wrist and fingers   7 weeks: Combined wrist and digit flexion       Manuals            MEM  8'                                                    Neuro Re-Ed                                                                                                        Ther Ex               HEP: In orthotic, PIP flexion and DIP flexion A/PROM            Hook fist pegs             Active digital extension  With place and holds, 10x           Towel scrunches with splint on              Gentle intrinsic stretching   3'                                                  Ther Activity                                       Gait Training                                       Modalities             MHP PRN  5'                             "

## 2024-07-01 ENCOUNTER — PROCEDURE VISIT (OUTPATIENT)
Dept: NEUROLOGY | Facility: CLINIC | Age: 79
End: 2024-07-01
Payer: MEDICARE

## 2024-07-01 VITALS
HEART RATE: 68 BPM | SYSTOLIC BLOOD PRESSURE: 136 MMHG | BODY MASS INDEX: 25.51 KG/M2 | WEIGHT: 144 LBS | DIASTOLIC BLOOD PRESSURE: 64 MMHG

## 2024-07-01 DIAGNOSIS — G25.0 ESSENTIAL TREMOR: Primary | ICD-10-CM

## 2024-07-01 DIAGNOSIS — Z96.89 S/P DEEP BRAIN STIMULATOR PLACEMENT: ICD-10-CM

## 2024-07-01 DIAGNOSIS — R49.0 HOARSENESS OF VOICE: ICD-10-CM

## 2024-07-01 PROCEDURE — 95984 ALYS BRN NPGT PRGRMG ADDL 15: CPT | Performed by: PSYCHIATRY & NEUROLOGY

## 2024-07-01 PROCEDURE — 95983 ALYS BRN NPGT PRGRMG 15 MIN: CPT | Performed by: PSYCHIATRY & NEUROLOGY

## 2024-07-01 NOTE — PROGRESS NOTES
Patient ID: Matilda Day is a 78 y.o. female    Assessment/Plan:    Essential tremor  Essential tremor complicated by dysarthria which may be in part stimulation induced.  Overall satisfied with left hand tremor control and is able to perform ADL's. Main concern is mild worsening of speech which she states is bothersome and she would like to have an adjusted made.      Deep brain stimulator was interrogated. 30 minutes spent in DBS programming, review of prior programming notes  to assist with decision for current change and documentation.   Mild changes were made to amplitude left VIM lead with improvement in speech noted by the patient. See body of the note for clinical details and final settings. bilaterally with improvement in tremor.  See body of the clinical note for details.       Diagnoses and all orders for this visit:    Essential tremor    S/P deep brain stimulator placement    Hoarseness of voice        Subjective:      Matilda Day is a left handed female with peripheral neuropathy, spinal stenosis, anxiety and essential tremor s/p bilateral VIM DBS on 5/1/14 with right Activa RC 6/20/17, left RC 12/4/19, who presents for follow up. To review, tremors began in her late 50's with a mild intentional tremor on the left. This has progressed with time and spread to the right side as well.     She feels like he speech is worse. She has to work more to get words out.    Tremors are fairly well controlled. She has right and operation and recovering.   She is able to eat and drink with left hand.   Handwriting is tremulous so she prints.   No changes in gait. No changes in cognition.              Objective:    /64 (BP Location: Left arm, Patient Position: Sitting, Cuff Size: Standard)   Pulse 68   Wt 65.3 kg (144 lb)   BMI 25.51 kg/m²       Physical Exam  Vitals reviewed.   Eyes:      Extraocular Movements: Extraocular movements intact.      Pupils: Pupils are equal, round, and reactive to  light.   Neurological:      Motor: Motor strength is normal.  Psychiatric:         Speech: Speech normal.         Neurological Exam  Mental Status   Oriented to person, place, time and situation. Recent and remote memory are intact. Speech is normal. Follows complex commands. Attention and concentration are normal.    Cranial Nerves  CN II: Visual fields full to confrontation. Right funduscopic exam: not visualized. Left funduscopic exam: not visualized.  CN III, IV, VI: Extraocular movements intact bilaterally. Pupils equal round and reactive to light bilaterally.  CN V: Facial sensation is normal.  CN VII: Full and symmetric facial movement.  CN VIII: Hearing is normal.  CN IX, X: Palate elevates symmetrically  CN XI: Shoulder shrug strength is normal.  CN XII: Tongue midline without atrophy or fasciculations.    Motor   Normal muscle tone. Strength is 5/5 throughout all four extremities.    Sensory  Light touch is normal in upper and lower extremities.     Coordination  Right: Finger-to-nose normal.Left: Finger-to-nose normal.    Gait  Casual gait is normal including stance, stride, and arm swing. Able to rise from chair without using arms.    Deep brain stimulator interrogation:     Right VIM   Activa RC  [id number]     Implanted: June 20, 2017  HARMEET: June 17, 2031  Battery: 75%  Impedance: ok  Charging 100% Charges daily     P1: 0+1-, PW90, 125Hz, 4.4V (3.4-4.6mA)  Imp 807ohms, 5.5mA  P2:0+2-, PW90, 125Hz, 3.9V    Imp 989 ohms, 4.0mA    Left VIM  Left VIM  Activa  [id number]  [id number]  Iplanted: Dec 4, 2019  HARMEET: Nov 30, 2033  Battery: 100%  Impedance: ok  Imp 1013, 2.6mA     3+1-, PW70, 185Hz, 2.6V (0-3.1V)   Reduced amplitude 2.5 V and patient felt this helped with speech         Umu Harris MD  Movement disorder physician  [facility]

## 2024-07-01 NOTE — PROGRESS NOTES
Review of Systems   Constitutional:  Negative for appetite change, fatigue and fever.   HENT:  Positive for voice change. Negative for hearing loss, tinnitus and trouble swallowing.    Eyes: Negative.  Negative for photophobia, pain and visual disturbance.   Respiratory: Negative.  Negative for shortness of breath.    Cardiovascular: Negative.  Negative for palpitations.   Gastrointestinal: Negative.  Negative for nausea and vomiting.   Endocrine: Negative.  Negative for cold intolerance.   Genitourinary: Negative.  Negative for dysuria, frequency and urgency.   Musculoskeletal:  Negative for back pain, gait problem, myalgias, neck pain and neck stiffness.   Skin: Negative.  Negative for rash.   Allergic/Immunologic: Negative.    Neurological:  Positive for speech difficulty (some words). Negative for dizziness, tremors, seizures, syncope, facial asymmetry, weakness, light-headedness, numbness and headaches.   Hematological:  Bruises/bleeds easily (Bruise).   Psychiatric/Behavioral: Negative.  Negative for confusion, hallucinations and sleep disturbance.    All other systems reviewed and are negative.

## 2024-07-01 NOTE — ASSESSMENT & PLAN NOTE
Essential tremor complicated by dysarthria which may be in part stimulation induced.  Overall satisfied with left hand tremor control and is able to perform ADL's. Main concern is mild worsening of speech which she states is bothersome and she would like to have an adjusted made.      Deep brain stimulator was interrogated. 30 minutes spent in DBS programming, review of prior programming notes  to assist with decision for current change and documentation.   Mild changes were made to amplitude left VIM lead with improvement in speech noted by the patient. See body of the note for clinical details and final settings. bilaterally with improvement in tremor.  See body of the clinical note for details.

## 2024-07-02 ENCOUNTER — OFFICE VISIT (OUTPATIENT)
Dept: OCCUPATIONAL THERAPY | Facility: CLINIC | Age: 79
End: 2024-07-02
Payer: MEDICARE

## 2024-07-02 DIAGNOSIS — M66.241 NONTRAUMATIC SUBLUXATION OF EXTENSOR TENDON AT METACARPOPHALANGEAL JOINT OF RIGHT HAND: Primary | ICD-10-CM

## 2024-07-02 PROCEDURE — 97110 THERAPEUTIC EXERCISES: CPT | Performed by: OCCUPATIONAL THERAPIST

## 2024-07-02 NOTE — PROGRESS NOTES
"Daily Note     Today's date: 2024  Patient name: Matilda Day  : 1945  MRN: [id number]  Referring provider: Traci Molina CRNP  Dx:   Encounter Diagnosis     ICD-10-CM    1. Nontraumatic subluxation of extensor tendon at metacarpophalangeal joint of right hand  M66.241           Start Time: 930  Stop Time: 1010  Total time in clinic (min): 40 minutes    Subjective: \"I really work my finger\"      Objective: See treatment diary below.       Assessment: Fabricated a left hand relative motion splint for the ring finger to reduce subluxation. Right ring finger is improving in extension at the MP joint, and remaining full regarding passive motion at the PIP.       Plan: Progress treament per protocol. Next visit, AROM of the wrist     Precautions: DOS 24  First 3 weeks: AROM in orthosis. PROM to PIP and DIP joints in orthosis.  3 weeks: AROM of wrist  4 weeks: Short arc flexion of digits (with wrist in extension), EDC gliding, PROM for digit extension   5 weeks: Full arc AROM   6 weeks: PROM for the wrist and fingers   7 weeks: Combined wrist and digit flexion       Manuals           MEM  8' 8'                                                   Neuro Re-Ed                                                                                                        Ther Ex               HEP: In orthotic, PIP flexion and DIP flexion A/PROM            Hook fist pegs   With splint on          Active digital extension  With place and holds, 10x With place and holds, 10x          Towel scrunches with splint on    5x          Gentle intrinsic stretching   3' 3'                                                 Ther Activity                                       Gait Training                                       Modalities             MHP PRN  5' 5'                            "

## 2024-07-03 ENCOUNTER — OFFICE VISIT (OUTPATIENT)
Dept: OBGYN CLINIC | Facility: MEDICAL CENTER | Age: 79
End: 2024-07-03
Payer: MEDICARE

## 2024-07-03 ENCOUNTER — APPOINTMENT (OUTPATIENT)
Dept: RADIOLOGY | Facility: MEDICAL CENTER | Age: 79
End: 2024-07-03
Payer: MEDICARE

## 2024-07-03 VITALS
WEIGHT: 146.4 LBS | BODY MASS INDEX: 25.94 KG/M2 | SYSTOLIC BLOOD PRESSURE: 131 MMHG | HEIGHT: 63 IN | HEART RATE: 72 BPM | DIASTOLIC BLOOD PRESSURE: 71 MMHG

## 2024-07-03 DIAGNOSIS — M17.11 PRIMARY OSTEOARTHRITIS OF RIGHT KNEE: Primary | ICD-10-CM

## 2024-07-03 DIAGNOSIS — M16.11 PRIMARY OSTEOARTHRITIS OF ONE HIP, RIGHT: ICD-10-CM

## 2024-07-03 DIAGNOSIS — Z01.89 ENCOUNTER FOR LOWER EXTREMITY COMPARISON IMAGING STUDY: ICD-10-CM

## 2024-07-03 DIAGNOSIS — M25.561 CHRONIC PAIN OF RIGHT KNEE: ICD-10-CM

## 2024-07-03 DIAGNOSIS — M17.11 PRIMARY OSTEOARTHRITIS OF RIGHT KNEE: ICD-10-CM

## 2024-07-03 DIAGNOSIS — M25.551 PAIN IN RIGHT HIP: ICD-10-CM

## 2024-07-03 DIAGNOSIS — G89.29 CHRONIC PAIN OF RIGHT KNEE: ICD-10-CM

## 2024-07-03 PROCEDURE — 73560 X-RAY EXAM OF KNEE 1 OR 2: CPT

## 2024-07-03 PROCEDURE — 77073 BONE LENGTH STUDIES: CPT

## 2024-07-03 PROCEDURE — 99214 OFFICE O/P EST MOD 30 MIN: CPT | Performed by: ORTHOPAEDIC SURGERY

## 2024-07-03 PROCEDURE — 73564 X-RAY EXAM KNEE 4 OR MORE: CPT

## 2024-07-03 NOTE — PROGRESS NOTES
Assessment & Plan     1. Primary osteoarthritis of right knee    2. Encounter for lower extremity comparison imaging study    3. Primary osteoarthritis of one hip, right    4. Chronic pain of right knee    5. Pain in right hip      Orders Placed This Encounter   Procedures    XR knee 4+ vw right injury    XR knee 1 or 2 vw left    XR bone length (scanogram)         Ms. Day has severe right hip and severe right knee osteoarthritis.  We discussed non operative and operative treatment options in detail. We discussed that literature recommends performing a total hip first when a hip and knee are both indicated. Patient would like to pursue R FITO in October. Patient will select a date today.   Patient aware that she may go for a right hip IA CSI if she would like to help identify pain originating from the hip. Aware she must wait 3 months between CSI and FITO. At this time patient declined IA CSI and would like to pursue surgery.   DVT Prophylaxis: resume eliquis 5 mg BID  The patient will obtain clearance from: SOC, cardiology, will discuss with anesthesia any recs regarding brain stimulator   Continue pain regimen as prescribed  Continue activity as tolerated        Return in about 1 month (around 8/3/2024) for recheck, plan R FITO vs TKA.    I answered all of the patient's questions during the visit and provided education of the patient's condition during the visit.  The patient verbalized understanding of the information given and agrees with the plan.  This note was dictated using [employer] software.  It may contain errors including improperly dictated words.  Please contact physician directly for any questions.    History of Present Illness   Chief complaint:   Chief Complaint   Patient presents with    Right Knee - Pain       HPI: Matilda Day is a 78 y.o. female that c/o right knee pain.      Mechanism of Injury: none  Pain Description: constant, right knee pain primarily located on the medial and lateral  aspects of the knees described as achy  Palliating Factors: rest  Provoking Factors: weight bearing activity like prolonged standing   Associated Symptoms: notes a limp has developed due to pain  Medications: hydrocodone 5 mg as needed. Typically does not take any but will take 3 per day at the most on a bad day, prescribed by PCP for her back and knee.   Able to take NSAIDs? If not, why: no  Physical Therapy or Home Exercises: has completed PT in the past but none recently   Injections: patient has received both CSI and VS injections. CSI provided a few days to a week of relief. VS provided a few weeks of pain relief. Last CSI was on 6/4/24.   Previous Surgery: right knee scope many years ago, no complications   Miscellaneous: patient also notes she does have groin pain, particularly with prolonged weight bearing. She has not had any treatment for the hip previously. Denies pain radiating down the leg.       History of MRSA: no  History of blood clots: yes, eliquis 5 mg BID  Family history of blood clots: yes  History of Hepatitis C:no  History of HIV: no  Are you a smoker:no  Are you diabetic:no prediabetic last A1C 6.3  Do you see a cardiologist: yes  Have you seen a dentist in the past year: yes  Do you have a leg length discrepancy:  no        ROS:    See HPI for musculoskeletal review.   All other systems reviewed are negative     Historical Information   Past Medical History:   Diagnosis Date    Anemia     Anxiety     Arrhythmia     Arthritis     Asthma     Blood clot in vein     portal vein    Breast cancer (HCC) 02/12/2021    Breast lump     [id number] RESOLVED    Cancer (HCC)     Depression     Disease of thyroid gland     DVT, lower extremity (HCC)     GERD (gastroesophageal reflux disease)     Hyperlipidemia     Hypertension     Hypokalemia     Hyponatremia     Hypothyroidism     Iron deficiency anemia     Irregular heart beat     Manic behavior (HCC)     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HCC)      Osteoarthritis     Palpitations     [id number]  RESOLVED    Paroxysmal supraventricular tachycardia     PE (pulmonary thromboembolism) (HCC)     Sjoegren syndrome     Sleep apnea     Sleep difficulties     Spinal stenosis     Thrombocytosis     [id number]  RESOLVED    Tremors of nervous system     dbs implanted right and left chest    Ulcerative colitis (HCC)     Vertigo     [id number] RESOLVED     Past Surgical History:   Procedure Laterality Date    ABDOMINAL SURGERY      APPENDECTOMY      BREAST BIOPSY Left 11/07/2019    Stereo    BREAST EXCISIONAL BIOPSY Left     x many years    BREAST SURGERY      lumpectomy & biopsy    CARMELLA HOLE W/ STEREOTACTIC INSERTION OF DBS LEADS / INTRAOP MICROELECTRODE RECORDING      COLON SURGERY      COLONOSCOPY N/A 08/30/2017    Procedure: POUCHOSCOPY;  Surgeon: VANDANA Waters MD;  Location: [facility];  Service: Colorectal    COLOPROCTECTOMY W/ ILEO J POUCH      ESOPHAGOGASTRODUODENOSCOPY      ONSET 10/17/11    FISTULA REPAIR      LLEOANAL FISTULA REPAIR TRANSPERIN TRANSABD APPROACH    HYSTERECTOMY      age 38    ILEOSTOMY CLOSURE      KNEE ARTHROSCOPY      Right    MAMMO STEREOTACTIC BREAST BIOPSY LEFT (ALL INC) Left 11/07/2019    MAMMO STEREOTACTIC BREAST BIOPSY LEFT (ALL INC) Left 12/17/2020    MAMMO STEREOTACTIC BREAST BIOPSY LEFT (ALL INC) EACH ADD Left 12/17/2020    MASTECTOMY Left 02/12/2021    left mastectomy- Dr. Hayes    MASTECTOMY W/ SENTINEL NODE BIOPSY Left 02/12/2021    Procedure: BREAST MASTECTOMY WITH SENTINEL LYMPH NODE BIOPSY, LYMPHATIC MAPPING WITH BLUE DYE AND RADIAOCTIVE DYE (INJECT AT 1100 BY DR HAYES IN THE OR);  Surgeon: Robbie Hayes MD;  Location: [facility];  Service: Surgical Oncology    NM INSJ/RPLCMT CRANIAL NEUROSTIM PULSE GENERATOR Right 06/20/2017    Procedure: DBS GENERATOR REPLACEMENT;  Surgeon: Marin Mao MD;  Location: [facility];  Service: Neurosurgery    NM INSJ/RPLCMT CRANIAL NEUROSTIM PULSE GENERATOR N/A 12/04/2019    Procedure: REPLACEMENT  IMPLANTABLE PULSE GENERATOR FOR DEEP BRAIN STIMULATOR LEFT CHEST;  Surgeon: Damian Batres MD;  Location: [facility];  Service: Neurosurgery    SPLENECTOMY      TONSILLECTOMY       Social History   Social History     Substance and Sexual Activity   Alcohol Use Yes    Alcohol/week: 2.0 standard drinks of alcohol    Types: 2 Cans of beer per week    Comment: 2or 3 beers a week     Social History     Substance and Sexual Activity   Drug Use No     Social History     Tobacco Use   Smoking Status Former    Current packs/day: 0.00    Average packs/day: 1 pack/day for 15.0 years (15.0 ttl pk-yrs)    Types: Cigarettes    Start date: 1950    Quit date: 1965    Years since quittin.5   Smokeless Tobacco Never   Tobacco Comments    Quit     Family History:   Family History   Problem Relation Age of Onset    Venous thrombosis Mother         ACUTE VENOUS THROMBOSIS OF DEEP VESSELS OF THE DISTAL LOWER EXTREMITY    Other Mother         PHLEBITIS    Hypertension Mother     Peripheral vascular disease Mother     COPD Father     Diabetes Father         MELLITUS    Stroke Father     Diabetes Sister         MELLITUS    Sjogren's syndrome Sister     No Known Problems Daughter     No Known Problems Maternal Grandmother     No Known Problems Maternal Grandfather     No Known Problems Paternal Grandmother     No Known Problems Paternal Grandfather     No Known Problems Sister     No Known Problems Maternal Aunt     No Known Problems Paternal Aunt     No Known Problems Son        Current Outpatient Medications on File Prior to Visit   Medication Sig Dispense Refill    al mag oxide-diphenhydramine-lidocaine viscous (MAGIC MOUTHWASH) 1:1:1 suspension SWISH 10ML IN MOUTH FOR ONE MINUTE FOUR TIMES A DAY      albuterol (PROVENTIL HFA,VENTOLIN HFA) 90 mcg/act inhaler Inhale 2 puffs every 6 (six) hours as needed for wheezing 8 g 1    alendronate (Fosamax) 70 mg tablet Take 1 tablet (70 mg total) by mouth every 7 days 4 tablet 2     amLODIPine (NORVASC) 2.5 mg tablet TAKE ONE TABLET BY MOUTH EVERY DAY 90 tablet 3    ammonium lactate (LAC-HYDRIN) 12 % cream       Calcium Carb-Cholecalciferol (CALCIUM 600-D PO) Take 1 tablet by mouth 2 (two) times a day      Coenzyme Q10 (CO Q-10 PO) Take 1 capsule by mouth daily      diltiazem (CARDIZEM CD) 180 mg 24 hr capsule TAKE ONE CAPSULE BY MOUTH EVERY DAY 90 capsule 1    diltiazem (CARDIZEM) 60 mg tablet Take 1 tablet (60 mg total) by mouth daily 90 tablet 1    Eliquis 5 MG TAKE ONE TABLET BY MOUTH TWICE A  tablet 1    escitalopram (LEXAPRO) 20 mg tablet TAKE ONE TABLET BY MOUTH EVERY DAY 90 tablet 1    fluticasone (FLONASE) 50 mcg/act nasal spray APPLY ONE SPRAY IN EACH NOSTRIL ONCE DAILY AS NEEDED FOR RHINITIS 48 g 1    Fluticasone Furoate-Vilanterol 100-25 mcg/actuation inhaler Inhale 1 puff daily Rinse mouth after use. 60 blister 5    gabapentin (NEURONTIN) 600 MG tablet TAKE ONE TABLET BY MOUTH EVERY MORNING , TAKE ONE TABLET BY MOUTH EVERY DAY IN THE AFTERNOON , AND TAKE TWO TABLETS BY MOUTH EVERY EVENING AT BEDTIME 360 tablet 1    HYDROcodone-acetaminophen (NORCO) 5-325 mg per tablet Take 1 tablet by mouth every 6 (six) hours as needed for pain Max Daily Amount: 4 tablets 120 tablet 0    levothyroxine 50 mcg tablet TAKE ONE TABLET BY MOUTH EVERY DAY 90 tablet 1    lisinopril (ZESTRIL) 20 mg tablet TAKE ONE TABLET BY MOUTH TWO TIMES A  tablet 1    LORazepam (ATIVAN) 1 mg tablet TAKE ONE TABLET BY MOUTH EVERY 6 HOURS AS NEEDED FOR ANXIETY 120 tablet 0    MAGNESIUM PO Take 1 tablet by mouth daily      Multiple Vitamin (MULTIVITAMIN ADULT PO) Take 1 tablet by mouth daily      Omega-3 Fatty Acids (FISH OIL PO) Take 1 capsule by mouth daily      pantoprazole (PROTONIX) 40 mg tablet TAKE ONE TABLET BY MOUTH EVERY DAY 90 tablet 1    potassium chloride (MICRO-K) 10 MEQ CR capsule TAKE TWO CAPSULES BY MOUTH EVERY  capsule 3    simvastatin (ZOCOR) 10 mg tablet TAKE ONE-HALF TABLET BY  "MOUTH DAILY 45 tablet 1    sodium chloride 1 g tablet TAKE ONE TABLET BY MOUTH FOUR TIMES A DAY (Patient taking differently: Take 1 g by mouth 3 (three) times a day) 270 tablet 1    torsemide (DEMADEX) 10 mg tablet Take 1 tablet (10 mg total) by mouth daily as needed (edema) 90 tablet 2    vitamin B-12 (VITAMIN B-12) 500 mcg tablet Take 1 tablet (500 mcg total) by mouth daily 90 tablet 1     Current Facility-Administered Medications on File Prior to Visit   Medication Dose Route Frequency Provider Last Rate Last Admin    cyanocobalamin injection 1,000 mcg  1,000 mcg Intramuscular Q30 Days Zaira Velasquez MD   1,000 mcg at 04/20/23 1012     Allergies   Allergen Reactions    Indomethacin GI Intolerance, Dizziness and Other (See Comments)    Macrobid [Nitrofurantoin Monohyd Macro] Rash    Nitrofurantoin Other (See Comments)    Penicillins Rash and Other (See Comments)     Annotation - [id number]: can take cephalosporins    Sulfa Antibiotics Rash and Other (See Comments)       Objective   Vitals: Blood pressure 131/71, pulse 72, height 5' 3\" (1.6 m), weight 66.4 kg (146 lb 6.4 oz).,Body mass index is 25.93 kg/m².    PE:  AAOx 3  WDWN  Hearing intact, no drainage from eyes  Regular rate  no audible wheezing  no abdominal distension  LE compartments soft, skin intact    right hip:   No dislocation/deformity  negative [facility]  ROM: 0- 115, ER 30, IR 20  positive Bossman Test  negative Impingement test  No  TTP over greater trochanter  Abduction: 5/5  No  TTP over SIJ    rightLE:    Sensation grossly intact L4-S1  Palpable posterior tibial pulse  AT/GS intact    Right Knee:  No swelling  No ecchymosis  +varicosities  +TTP over the medial and lateral joint lines  AROM: 3- 115 degrees   Stable to varus and valgus stress      Imaging Studies:   I have personally reviewed pertinent films in PACS  X-rayright hip from 4/25/24:severe right hip osteoarthritis with complete bone on bone loss of joint space and subchondral " sclerosis   X-ray right knee: severe osteoarthritis with complete bone on bone loss of joint space and osteophyte formation         Scribe Attestation      I,:  Kerrie Cadena PA-C am acting as a scribe while in the presence of the attending physician.:       I,:  Gaviota Ford DO personally performed the services described in this documentation    as scribed in my presence.:

## 2024-07-05 ENCOUNTER — OFFICE VISIT (OUTPATIENT)
Dept: OCCUPATIONAL THERAPY | Facility: CLINIC | Age: 79
End: 2024-07-05
Payer: MEDICARE

## 2024-07-05 DIAGNOSIS — M66.241 NONTRAUMATIC SUBLUXATION OF EXTENSOR TENDON AT METACARPOPHALANGEAL JOINT OF RIGHT HAND: Primary | ICD-10-CM

## 2024-07-05 PROCEDURE — 97110 THERAPEUTIC EXERCISES: CPT | Performed by: OCCUPATIONAL THERAPIST

## 2024-07-05 PROCEDURE — 97140 MANUAL THERAPY 1/> REGIONS: CPT | Performed by: OCCUPATIONAL THERAPIST

## 2024-07-05 NOTE — PROGRESS NOTES
"Daily Note     Today's date: 2024  Patient name: Matilda Day  : 1945  MRN: [id number]  Referring provider: Traci Molina CRNP  Dx:   Encounter Diagnosis     ICD-10-CM    1. Nontraumatic subluxation of extensor tendon at metacarpophalangeal joint of right hand  M66.241           Start Time: 0815  Stop Time: 0900  Total time in clinic (min): 45 minutes    Subjective: \"I really work my finger\"      Objective: See treatment diary below.       Assessment: Minimal lag at the MP joint into extension, and also improving PIP lag. Full flexion of the digits within the splint. Issued wrist AROM for HEP today.       Plan: Progress treament per protocol.      Precautions: DOS 24  First 3 weeks: AROM in orthosis. PROM to PIP and DIP joints in orthosis.  3 weeks: AROM of wrist  4 weeks: Short arc flexion of digits (with wrist in extension), EDC gliding, PROM for digit extension   5 weeks: Full arc AROM   6 weeks: PROM for the wrist and fingers   7 weeks: Combined wrist and digit flexion       Manuals          MEM  8' 8' 8'                                                  Neuro Re-Ed                                                                                                        Ther Ex               HEP: In orthotic, PIP flexion and DIP flexion A/PROM   Wrist AROM         Hook fist pegs   With splint on With splint on         Active digital extension  With place and holds, 10x With place and holds, 10x With place and holds, 10x         Towel scrunches with splint on    5x 5x         Gentle intrinsic stretching   3' 3' 3'         keypegs    1x with IF and thumb                                   Ther Activity                                       Gait Training                                       Modalities             MHP PRN  5' 5' 5'                           "

## 2024-07-08 DIAGNOSIS — M54.16 LUMBAR RADICULOPATHY: ICD-10-CM

## 2024-07-08 RX ORDER — HYDROCODONE BITARTRATE AND ACETAMINOPHEN 5; 325 MG/1; MG/1
1 TABLET ORAL EVERY 6 HOURS PRN
Qty: 120 TABLET | Refills: 0 | Status: SHIPPED | OUTPATIENT
Start: 2024-07-08

## 2024-07-08 NOTE — TELEPHONE ENCOUNTER
Reason for call:   [x] Refill   [] Prior Auth  [] Other:     Office:   [x] PCP/Provider - Zaira Velasquez MD / [facility]  [] Specialty/Provider -     Medication: HYDROcodone-acetaminophen (NORCO) 5-325 mg per tablet     Dose/Frequency:  Take 1 tablet by mouth every 6 (six) hours as needed for pain     Quantity: 120    Pharmacy:   [id number] [address]        Does the patient have enough for 3 days?   [x] Yes   [] No - Send as HP to POD

## 2024-07-09 ENCOUNTER — OFFICE VISIT (OUTPATIENT)
Dept: OCCUPATIONAL THERAPY | Facility: CLINIC | Age: 79
End: 2024-07-09
Payer: MEDICARE

## 2024-07-09 DIAGNOSIS — M66.241 NONTRAUMATIC SUBLUXATION OF EXTENSOR TENDON AT METACARPOPHALANGEAL JOINT OF RIGHT HAND: Primary | ICD-10-CM

## 2024-07-09 PROCEDURE — 97110 THERAPEUTIC EXERCISES: CPT

## 2024-07-09 PROCEDURE — 97140 MANUAL THERAPY 1/> REGIONS: CPT

## 2024-07-09 NOTE — PROGRESS NOTES
Daily Note     Today's date: 2024  Patient name: Matilda Day  : 1945  MRN: [id number]  Referring provider: Traci Molina CRNP  Dx:   Encounter Diagnosis     ICD-10-CM    1. Nontraumatic subluxation of extensor tendon at metacarpophalangeal joint of right hand  M66.241                      Subjective: They feel a little stiff.      Objective: See treatment diary below.       Assessment: Pt doing well and compliant with HEP. No MCP ext lag, and PIP ext lag improves with exercises. Some difficulty with key pegs today, otherwise tolerated well.       Plan: Progress treament per protocol.      Precautions: DOS 24  First 3 weeks: AROM in orthosis. PROM to PIP and DIP joints in orthosis.  3 weeks: AROM of wrist (24)  4 weeks: Short arc flexion of digits (with wrist in extension), EDC gliding, PROM for digit extension (24)  5 weeks: Full arc AROM (24)  6 weeks: PROM for the wrist and fingers (24)  7 weeks: Combined wrist and digit flexion (24)      Manuals         MEM  8' 8' 8' 8'                                                 Neuro Re-Ed                                                                                                        Ther Ex               HEP: In orthotic, PIP flexion and DIP flexion A/PROM   Wrist AROM         Hook fist pegs   With splint on With splint on W/ splint on        Active digital extension  With place and holds, 10x With place and holds, 10x With place and holds, 10x W/ place and holds 10x        Towel scrunches with splint on    5x 5x 5x        Gentle intrinsic stretching   3' 3' 3' 3'        keypegs    1x with IF and thumb 1x w/ IF & thumb                                  Ther Activity                                                                              Modalities             MHP PRN  5' 5' 5' 5'

## 2024-07-10 ENCOUNTER — TELEPHONE (OUTPATIENT)
Age: 79
End: 2024-07-10

## 2024-07-10 DIAGNOSIS — M16.11 PRIMARY OSTEOARTHRITIS OF ONE HIP, RIGHT: Primary | ICD-10-CM

## 2024-07-10 NOTE — TELEPHONE ENCOUNTER
I placed an order for right hip IA CSI with Dr John. Can you please call patient to schedule? We have her on our schedule for R FITO on 10/21/24 so ideally she needs to have CSI prior to 7/21/24. If this is not possible, can you please let patient know she would have to post pone her surgery so there is 3 months between CSI and FITO. Let me know what patient decides. Thanks!

## 2024-07-10 NOTE — TELEPHONE ENCOUNTER
Caller: Patient- Matilda A Barb    Doctor: Dr Ford     Reason for call: Patient is calling in stating that she was seen in the office on 7/3 and the Dr suggested an injection for her right hip. She is asking as to what type injection that was and if the Dr would be the one administering the injection, please advise and reach back out to patient relating this.    Call back#: [phone]

## 2024-07-10 NOTE — TELEPHONE ENCOUNTER
Attempted to call and speak to patient. Left brief message before background noise was picked up and call dropped.     -Patient has severe Rt Hip arthritis (Bone on Bone) She can consider an intra-articular hip injection and Dr. John can perform this injection for her after Dr. Ford places an order in. If she does this, it would be 3 months before she can have a Right Total Hip replacement done. Whether or not patient would like to do the injection or just proceed with surgery in October is up to the patient.

## 2024-07-10 NOTE — TELEPHONE ENCOUNTER
Right Hip Injection scheduled 7/12/24    Reviewed instructions: , NPO 1 hour prior, loose-fitting/comfortable clothes, if ill/fever/infx/abx to call and reschedule.    Patient stated verbal understanding.

## 2024-07-10 NOTE — TELEPHONE ENCOUNTER
Caller: Matilda    Doctor: Logan    Reason for call: Returning call. Patient would like to proceed with the injection with Dr Ericson    Call back#: [phone]

## 2024-07-11 ENCOUNTER — TELEPHONE (OUTPATIENT)
Age: 79
End: 2024-07-11

## 2024-07-11 NOTE — TELEPHONE ENCOUNTER
Caller: Patient     Doctor: Alvaro     Reason for call: Patient having injection on 7/12 and did stop her blood thinners last night 7/10. She is wanting to know if this is what she is supposed to do ? If not she will continue to take them .    Please advise     Call back#: [phone]

## 2024-07-11 NOTE — TELEPHONE ENCOUNTER
S/W pt.  Pt scheduled for right hip injection on 7/12.  Advised her she does not hold her blood thinner.  She will take it now.  She verbalized understanding.

## 2024-07-12 ENCOUNTER — HOSPITAL ENCOUNTER (OUTPATIENT)
Dept: RADIOLOGY | Facility: MEDICAL CENTER | Age: 79
End: 2024-07-12
Payer: MEDICARE

## 2024-07-12 VITALS
SYSTOLIC BLOOD PRESSURE: 145 MMHG | RESPIRATION RATE: 16 BRPM | OXYGEN SATURATION: 93 % | TEMPERATURE: 97.3 F | HEART RATE: 59 BPM | DIASTOLIC BLOOD PRESSURE: 70 MMHG

## 2024-07-12 DIAGNOSIS — M16.11 PRIMARY OSTEOARTHRITIS OF ONE HIP, RIGHT: ICD-10-CM

## 2024-07-12 PROCEDURE — 77002 NEEDLE LOCALIZATION BY XRAY: CPT

## 2024-07-12 PROCEDURE — 20610 DRAIN/INJ JOINT/BURSA W/O US: CPT | Performed by: PHYSICAL MEDICINE & REHABILITATION

## 2024-07-12 PROCEDURE — 77002 NEEDLE LOCALIZATION BY XRAY: CPT | Performed by: PHYSICAL MEDICINE & REHABILITATION

## 2024-07-12 RX ORDER — ROPIVACAINE HYDROCHLORIDE 2 MG/ML
3 INJECTION, SOLUTION EPIDURAL; INFILTRATION; PERINEURAL ONCE
Status: COMPLETED | OUTPATIENT
Start: 2024-07-12 | End: 2024-07-12

## 2024-07-12 RX ORDER — METHYLPREDNISOLONE ACETATE 40 MG/ML
40 INJECTION, SUSPENSION INTRA-ARTICULAR; INTRALESIONAL; INTRAMUSCULAR; PARENTERAL; SOFT TISSUE ONCE
Status: COMPLETED | OUTPATIENT
Start: 2024-07-12 | End: 2024-07-12

## 2024-07-12 RX ADMIN — ROPIVACAINE HYDROCHLORIDE 3 ML: 2 INJECTION, SOLUTION EPIDURAL; INFILTRATION; PERINEURAL at 08:49

## 2024-07-12 RX ADMIN — IOHEXOL 2 ML: 300 INJECTION, SOLUTION INTRAVENOUS at 08:49

## 2024-07-12 RX ADMIN — METHYLPREDNISOLONE ACETATE 40 MG: 40 INJECTION, SUSPENSION INTRA-ARTICULAR; INTRALESIONAL; INTRAMUSCULAR; PARENTERAL; SOFT TISSUE at 08:49

## 2024-07-12 NOTE — DISCHARGE INSTRUCTIONS

## 2024-07-12 NOTE — H&P
History of Present Illness: The patient is a 78 y.o. female who presents with complaints of right hip pain    Past Medical History:   Diagnosis Date    Anemia     Anxiety     Arrhythmia     Arthritis     Asthma     Blood clot in vein     portal vein    Breast cancer (HCC) 02/12/2021    Breast lump     [id number] RESOLVED    Cancer (HCC)     Depression     Disease of thyroid gland     DVT, lower extremity (HCC)     GERD (gastroesophageal reflux disease)     Hyperlipidemia     Hypertension     Hypokalemia     Hyponatremia     Hypothyroidism     Iron deficiency anemia     Irregular heart beat     Manic behavior (HCC)     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HCC)     Osteoarthritis     Palpitations     [id number]  RESOLVED    Paroxysmal supraventricular tachycardia     PE (pulmonary thromboembolism) (HCC)     Sjoegren syndrome     Sleep apnea     Sleep difficulties     Spinal stenosis     Thrombocytosis     [id number]  RESOLVED    Tremors of nervous system     dbs implanted right and left chest    Ulcerative colitis (HCC)     Vertigo     [id number] RESOLVED       Past Surgical History:   Procedure Laterality Date    ABDOMINAL SURGERY      APPENDECTOMY      BREAST BIOPSY Left 11/07/2019    Stereo    BREAST EXCISIONAL BIOPSY Left     x many years    BREAST SURGERY      lumpectomy & biopsy    CARMELLA HOLE W/ STEREOTACTIC INSERTION OF DBS LEADS / INTRAOP MICROELECTRODE RECORDING      COLON SURGERY      COLONOSCOPY N/A 08/30/2017    Procedure: POUCHOSCOPY;  Surgeon: VANDANA Waters MD;  Location: [facility];  Service: Colorectal    COLOPROCTECTOMY W/ ILEO J POUCH      ESOPHAGOGASTRODUODENOSCOPY      ONSET 10/17/11    FISTULA REPAIR      LLEOANAL FISTULA REPAIR TRANSPERIN TRANSABD APPROACH    HYSTERECTOMY      age 38    ILEOSTOMY CLOSURE      KNEE ARTHROSCOPY      Right    MAMMO STEREOTACTIC BREAST BIOPSY LEFT (ALL INC) Left 11/07/2019    MAMMO STEREOTACTIC BREAST BIOPSY LEFT (ALL INC) Left 12/17/2020    MAMMO STEREOTACTIC BREAST BIOPSY  LEFT (ALL INC) EACH ADD Left 12/17/2020    MASTECTOMY Left 02/12/2021    left mastectomy- Dr. Hayes    MASTECTOMY W/ SENTINEL NODE BIOPSY Left 02/12/2021    Procedure: BREAST MASTECTOMY WITH SENTINEL LYMPH NODE BIOPSY, LYMPHATIC MAPPING WITH BLUE DYE AND RADIAOCTIVE DYE (INJECT AT 1100 BY DR HAYES IN THE OR);  Surgeon: Robbie Hayes MD;  Location: [facility];  Service: Surgical Oncology    NY INSJ/RPLCMT CRANIAL NEUROSTIM PULSE GENERATOR Right 06/20/2017    Procedure: DBS GENERATOR REPLACEMENT;  Surgeon: Marin Mao MD;  Location: [facility];  Service: Neurosurgery    NY INSJ/RPLCMT CRANIAL NEUROSTIM PULSE GENERATOR N/A 12/04/2019    Procedure: REPLACEMENT IMPLANTABLE PULSE GENERATOR FOR DEEP BRAIN STIMULATOR LEFT CHEST;  Surgeon: Damian Batres MD;  Location: [facility];  Service: Neurosurgery    SPLENECTOMY      TONSILLECTOMY           Current Outpatient Medications:     al mag oxide-diphenhydramine-lidocaine viscous (MAGIC MOUTHWASH) 1:1:1 suspension, SWISH 10ML IN MOUTH FOR ONE MINUTE FOUR TIMES A DAY, Disp: , Rfl:     albuterol (PROVENTIL HFA,VENTOLIN HFA) 90 mcg/act inhaler, Inhale 2 puffs every 6 (six) hours as needed for wheezing, Disp: 8 g, Rfl: 1    alendronate (Fosamax) 70 mg tablet, Take 1 tablet (70 mg total) by mouth every 7 days, Disp: 4 tablet, Rfl: 2    amLODIPine (NORVASC) 2.5 mg tablet, TAKE ONE TABLET BY MOUTH EVERY DAY, Disp: 90 tablet, Rfl: 3    ammonium lactate (LAC-HYDRIN) 12 % cream, , Disp: , Rfl:     Calcium Carb-Cholecalciferol (CALCIUM 600-D PO), Take 1 tablet by mouth 2 (two) times a day, Disp: , Rfl:     Coenzyme Q10 (CO Q-10 PO), Take 1 capsule by mouth daily, Disp: , Rfl:     diltiazem (CARDIZEM CD) 180 mg 24 hr capsule, TAKE ONE CAPSULE BY MOUTH EVERY DAY, Disp: 90 capsule, Rfl: 1    diltiazem (CARDIZEM) 60 mg tablet, Take 1 tablet (60 mg total) by mouth daily, Disp: 90 tablet, Rfl: 1    Eliquis 5 MG, TAKE ONE TABLET BY MOUTH TWICE A DAY, Disp: 180 tablet, Rfl: 1    escitalopram  (LEXAPRO) 20 mg tablet, TAKE ONE TABLET BY MOUTH EVERY DAY, Disp: 90 tablet, Rfl: 1    fluticasone (FLONASE) 50 mcg/act nasal spray, APPLY ONE SPRAY IN EACH NOSTRIL ONCE DAILY AS NEEDED FOR RHINITIS, Disp: 48 g, Rfl: 1    Fluticasone Furoate-Vilanterol 100-25 mcg/actuation inhaler, Inhale 1 puff daily Rinse mouth after use., Disp: 60 blister, Rfl: 5    gabapentin (NEURONTIN) 600 MG tablet, TAKE ONE TABLET BY MOUTH EVERY MORNING , TAKE ONE TABLET BY MOUTH EVERY DAY IN THE AFTERNOON , AND TAKE TWO TABLETS BY MOUTH EVERY EVENING AT BEDTIME, Disp: 360 tablet, Rfl: 1    HYDROcodone-acetaminophen (NORCO) 5-325 mg per tablet, Take 1 tablet by mouth every 6 (six) hours as needed for pain Max Daily Amount: 4 tablets, Disp: 120 tablet, Rfl: 0    levothyroxine 50 mcg tablet, TAKE ONE TABLET BY MOUTH EVERY DAY, Disp: 90 tablet, Rfl: 1    lisinopril (ZESTRIL) 20 mg tablet, TAKE ONE TABLET BY MOUTH TWO TIMES A DAY, Disp: 180 tablet, Rfl: 1    LORazepam (ATIVAN) 1 mg tablet, TAKE ONE TABLET BY MOUTH EVERY 6 HOURS AS NEEDED FOR ANXIETY, Disp: 120 tablet, Rfl: 0    MAGNESIUM PO, Take 1 tablet by mouth daily, Disp: , Rfl:     Multiple Vitamin (MULTIVITAMIN ADULT PO), Take 1 tablet by mouth daily, Disp: , Rfl:     Omega-3 Fatty Acids (FISH OIL PO), Take 1 capsule by mouth daily, Disp: , Rfl:     pantoprazole (PROTONIX) 40 mg tablet, TAKE ONE TABLET BY MOUTH EVERY DAY, Disp: 90 tablet, Rfl: 1    potassium chloride (MICRO-K) 10 MEQ CR capsule, TAKE TWO CAPSULES BY MOUTH EVERY DAY, Disp: 180 capsule, Rfl: 3    simvastatin (ZOCOR) 10 mg tablet, TAKE ONE-HALF TABLET BY MOUTH DAILY, Disp: 45 tablet, Rfl: 1    sodium chloride 1 g tablet, TAKE ONE TABLET BY MOUTH FOUR TIMES A DAY (Patient taking differently: Take 1 g by mouth 3 (three) times a day), Disp: 270 tablet, Rfl: 1    torsemide (DEMADEX) 10 mg tablet, Take 1 tablet (10 mg total) by mouth daily as needed (edema), Disp: 90 tablet, Rfl: 2    vitamin B-12 (VITAMIN B-12) 500 mcg tablet,  Take 1 tablet (500 mcg total) by mouth daily, Disp: 90 tablet, Rfl: 1    Current Facility-Administered Medications:     cyanocobalamin injection 1,000 mcg, 1,000 mcg, Intramuscular, Q30 Days, Zaira Velasquez MD, 1,000 mcg at 04/20/23 1012    Allergies   Allergen Reactions    Indomethacin GI Intolerance, Dizziness and Other (See Comments)    Macrobid [Nitrofurantoin Monohyd Macro] Rash    Nitrofurantoin Other (See Comments)    Penicillins Rash and Other (See Comments)     Annotation - [id number]: can take cephalosporins    Sulfa Antibiotics Rash and Other (See Comments)       Physical Exam:   Vitals:    07/12/24 0830   BP: 149/74   Pulse: 64   Resp: 20   Temp: (!) 97.3 °F (36.3 °C)   SpO2: 94%     General: Awake, Alert, Oriented x 3, Mood and affect appropriate  Respiratory: Respirations even and unlabored  Cardiovascular: Peripheral pulses intact; no edema  Musculoskeletal Exam: right hip pain    ASA Score: 3    Patient/Chart Verification  Patient ID Verified: Verbal  ID Band Applied: No  Consents Confirmed: Procedural, To be obtained in the Pre-Procedure area  H&P( within 30 days) Verified: To be obtained in the Pre-Procedure area  Interval H&P(within 24 hr) Complete (required for Outpatients and Surgery Admit only): To be obtained in the Pre-Procedure area  Allergies Reviewed: Yes  Anticoag/NSAID held?: NA (Takes Eliquis no need to hold)  Currently on antibiotics?: No    Assessment:   1. Primary osteoarthritis of one hip, right        Plan: right hip IA CSI under imaging guidance, right hip OA

## 2024-07-16 ENCOUNTER — OFFICE VISIT (OUTPATIENT)
Dept: OCCUPATIONAL THERAPY | Facility: CLINIC | Age: 79
End: 2024-07-16
Payer: MEDICARE

## 2024-07-16 DIAGNOSIS — J30.89 NON-SEASONAL ALLERGIC RHINITIS DUE TO OTHER ALLERGIC TRIGGER: ICD-10-CM

## 2024-07-16 DIAGNOSIS — M66.241 NONTRAUMATIC SUBLUXATION OF EXTENSOR TENDON AT METACARPOPHALANGEAL JOINT OF RIGHT HAND: Primary | ICD-10-CM

## 2024-07-16 PROCEDURE — 97110 THERAPEUTIC EXERCISES: CPT | Performed by: OCCUPATIONAL THERAPIST

## 2024-07-16 PROCEDURE — 97140 MANUAL THERAPY 1/> REGIONS: CPT | Performed by: OCCUPATIONAL THERAPIST

## 2024-07-16 RX ORDER — FLUTICASONE PROPIONATE 50 MCG
SPRAY, SUSPENSION (ML) NASAL
Qty: 48 G | Refills: 1 | Status: SHIPPED | OUTPATIENT
Start: 2024-07-16

## 2024-07-16 NOTE — PROGRESS NOTES
"Daily Note     Today's date: 2024  Patient name: Matilda Day  : 1945  MRN: [id number]  Referring provider: Traci Molina CRNP  Dx:   Encounter Diagnosis     ICD-10-CM    1. Nontraumatic subluxation of extensor tendon at metacarpophalangeal joint of right hand  M66.241             Start Time: 0845  Stop Time: 930  Total time in clinic (min): 45 minutes    Subjective: \"my fingers are so stiff in the morning\"       Objective: See treatment diary below.       Assessment: She sees the doctor tomorrow, and it is anticipated that she will be transitioned in to a relative motion splint. She was provided this today and will transition to using it based on physician recommendations. Continues with full MCP extension and no lag.       Plan: Progress treament per protocol.      Precautions: DOS 24  First 3 weeks: AROM in orthosis. PROM to PIP and DIP joints in orthosis.  3 weeks: AROM of wrist (24)  4 weeks: Short arc flexion of digits (with wrist in extension), EDC gliding, PROM for digit extension (24)  5 weeks: Full arc AROM (24)  6 weeks: PROM for the wrist and fingers (24)  7 weeks: Combined wrist and digit flexion (24)      Manuals        MEM  8' 8' 8' 8' 8'                                                Neuro Re-Ed                                                                                                        Ther Ex               HEP: In orthotic, PIP flexion and DIP flexion A/PROM   Wrist AROM  Short arc flexion of digits (with wrist in extension), EDC gliding       Hook fist pegs   With splint on With splint on W/ splint on No splint, 1x       Active digital extension  With place and holds, 10x With place and holds, 10x With place and holds, 10x W/ place and holds 10x Place and holds, 10x       Towel scrunches with splint on    5x 5x 5x        Gentle intrinsic stretching   3' 3' 3' 3' 2'       keypegs    1x with IF and thumb 1x w/ IF & " thumb All digits        Partial arc fist      Gripping large dowel, 10x       Colored pegs      Opposition with RF       Ther Activity                                                                              Modalities             MHP PRN  5' 5' 5' 5' 5'

## 2024-07-16 NOTE — TELEPHONE ENCOUNTER
Patient called to request a refill for fluticasone nasal spray. She was advised a refill was requested on 07/16/24 from [facility] pharmacy and is pending approval. Patient verbalized understanding and is in agreement.      She is out of med. Rerouting with high priority.  Patient prefers 90-day supply

## 2024-07-18 NOTE — PROGRESS NOTES
"Daily Note     Today's date: 2024  Patient name: Matilda Day  : 1945  MRN: [id number]  Referring provider: Traci Molina CRNP  Dx:   Encounter Diagnosis     ICD-10-CM    1. Nontraumatic subluxation of extensor tendon at metacarpophalangeal joint of right hand  M66.241               Start Time: 09  Stop Time: 1030  Total time in clinic (min): 45 minutes    Subjective: \"I think it looks good\"      Objective: See treatment diary below.       Assessment: Transitioned to relative motion splint today. Also initiated full arc flexion.       Plan: Progress treament per protocol.      Precautions: DOS 24  First 3 weeks: AROM in orthosis. PROM to PIP and DIP joints in orthosis.  3 weeks: AROM of wrist (24)  4 weeks: Short arc flexion of digits (with wrist in extension), EDC gliding, PROM for digit extension (24)  5 weeks: Full arc AROM (24)  6 weeks: PROM for the wrist and fingers (24)  7 weeks: Combined wrist and digit flexion (24)      Manuals       MEM  8' 8' 8' 8' 8' 5'                                               Neuro Re-Ed                                                                                                        Ther Ex               HEP: In orthotic, PIP flexion and DIP flexion A/PROM   Wrist AROM  Short arc flexion of digits (with wrist in extension), EDC gliding       Hook fist pegs   With splint on With splint on W/ splint on No splint, 1x No splint, 1x      Active digital extension  With place and holds, 10x With place and holds, 10x With place and holds, 10x W/ place and holds 10x Place and holds, 10x       Towel scrunches with splint on    5x 5x 5x        Gentle intrinsic stretching   3' 3' 3' 3' 2'       keypegs    1x with IF and thumb 1x w/ IF & thumb All digits        Partial arc fist      Gripping large dowel, 10x       Colored pegs      Opposition with RF Opposition with RF      Pencil progressive grasp       2x    "   EDC gliding        Small PVC 20x       Coordination balls        2'      Ball on walking       TB up/down, 2x      Ther Activity                                                                              Modalities             MHP PRN  5' 5' 5' 5' 5' 5'

## 2024-07-19 ENCOUNTER — OFFICE VISIT (OUTPATIENT)
Dept: OCCUPATIONAL THERAPY | Facility: CLINIC | Age: 79
End: 2024-07-19
Payer: MEDICARE

## 2024-07-19 DIAGNOSIS — M66.241 NONTRAUMATIC SUBLUXATION OF EXTENSOR TENDON AT METACARPOPHALANGEAL JOINT OF RIGHT HAND: Primary | ICD-10-CM

## 2024-07-19 PROCEDURE — 97110 THERAPEUTIC EXERCISES: CPT | Performed by: OCCUPATIONAL THERAPIST

## 2024-07-22 ENCOUNTER — TELEPHONE (OUTPATIENT)
Age: 79
End: 2024-07-22

## 2024-07-22 DIAGNOSIS — E22.2 SIADH (SYNDROME OF INAPPROPRIATE ADH PRODUCTION) (HCC): ICD-10-CM

## 2024-07-22 DIAGNOSIS — I10 ESSENTIAL HYPERTENSION: ICD-10-CM

## 2024-07-22 DIAGNOSIS — E87.1 HYPONATREMIA: Primary | ICD-10-CM

## 2024-07-22 NOTE — TELEPHONE ENCOUNTER
The pt called asking for lab work to be entered so she can get it done prior to her appt with Dr. Smiley on 8-7-2024. She will be going to a [facility] facility to get the labs done.

## 2024-07-22 NOTE — PROGRESS NOTES
Per Dr. Smiley message on 6/20 :recommend a repeat BMP prior to her appointment in August.  BMP added.

## 2024-07-22 NOTE — TELEPHONE ENCOUNTER
Per Dr. Smiley message on 6/20 :recommend a repeat BMP prior to her appointment in August.  BMP added.         Patient is aware. No further questions at this time.

## 2024-07-23 ENCOUNTER — OFFICE VISIT (OUTPATIENT)
Dept: OCCUPATIONAL THERAPY | Facility: CLINIC | Age: 79
End: 2024-07-23
Payer: MEDICARE

## 2024-07-23 DIAGNOSIS — M66.241 NONTRAUMATIC SUBLUXATION OF EXTENSOR TENDON AT METACARPOPHALANGEAL JOINT OF RIGHT HAND: Primary | ICD-10-CM

## 2024-07-23 PROCEDURE — 97110 THERAPEUTIC EXERCISES: CPT | Performed by: OCCUPATIONAL THERAPIST

## 2024-07-23 NOTE — PROGRESS NOTES
"Daily Note     Today's date: 2024  Patient name: Matilda Day  : 1945  MRN: [id number]  Referring provider: Traci Molina CRNP  Dx:   Encounter Diagnosis     ICD-10-CM    1. Nontraumatic subluxation of extensor tendon at metacarpophalangeal joint of right hand  M66.241                 Start Time: 1015  Stop Time: 1045  Total time in clinic (min): 30 minutes    Subjective: \"it is sore\" and \"it doesn't want to straighten out\"      Objective: See treatment diary below.       Assessment: Adjusted orthotic for comfort. Weakness with PIP extension. Potentially introduce reverse blocking next week.       Plan: Progress treament per protocol.      Precautions: DOS 24  First 3 weeks: AROM in orthosis. PROM to PIP and DIP joints in orthosis.  3 weeks: AROM of wrist (24)  4 weeks: Short arc flexion of digits (with wrist in extension), EDC gliding, PROM for digit extension (24)  5 weeks: Full arc AROM (24)  6 weeks: PROM for the wrist and fingers (24)  7 weeks: Combined wrist and digit flexion (24)      Manuals      MEM  8' 8' 8' 8' 8' 5'      STM        5'                                 Neuro Re-Ed                                                                                                        Ther Ex               HEP: In orthotic, PIP flexion and DIP flexion A/PROM   Wrist AROM  Short arc flexion of digits (with wrist in extension), EDC gliding       Hook fist pegs   With splint on With splint on W/ splint on No splint, 1x No splint, 1x 1x     Active digital extension  With place and holds, 10x With place and holds, 10x With place and holds, 10x W/ place and holds 10x Place and holds, 10x  10x     Towel scrunches with splint on    5x 5x 5x        Gentle intrinsic stretching   3' 3' 3' 3' 2'       keypegs    1x with IF and thumb 1x w/ IF & thumb All digits        Partial arc fist      Gripping large dowel, 10x       Colored pegs      " Opposition with RF Opposition with RF Opposition with RF     Pencil progressive grasp       2x 2x     EDC gliding        Small PVC 20x  Small PVC 20x      Coordination balls        2' 2'     Ball on walking       TB up/down, 2x TB up/down, 3x     Ther Activity                                                                              Modalities             MHP PRN  5' 5' 5' 5' 5' 5' 5'

## 2024-07-26 ENCOUNTER — TELEPHONE (OUTPATIENT)
Dept: PAIN MEDICINE | Facility: CLINIC | Age: 79
End: 2024-07-26

## 2024-07-26 ENCOUNTER — OFFICE VISIT (OUTPATIENT)
Dept: OCCUPATIONAL THERAPY | Facility: CLINIC | Age: 79
End: 2024-07-26
Payer: MEDICARE

## 2024-07-26 DIAGNOSIS — M66.241 NONTRAUMATIC SUBLUXATION OF EXTENSOR TENDON AT METACARPOPHALANGEAL JOINT OF RIGHT HAND: Primary | ICD-10-CM

## 2024-07-26 PROCEDURE — 97110 THERAPEUTIC EXERCISES: CPT

## 2024-07-26 NOTE — TELEPHONE ENCOUNTER
Caller: Matilda  Doctor/office: RIKI KNAPP#: [phone]    % of improvement: 100%  Pain Scale (1-10): 0/10

## 2024-07-26 NOTE — PROGRESS NOTES
"Daily Note     Today's date: 2024  Patient name: Matilda Day  : 1945  MRN: [id number]  Referring provider: Traci Molina CRNP  Dx:   Encounter Diagnosis     ICD-10-CM    1. Nontraumatic subluxation of extensor tendon at metacarpophalangeal joint of right hand  M66.241                            Subjective: \"The pinky is a bit sore from the splint\".       Objective: See treatment diary below.       Assessment: Completed exercises as outlined below. Patient tolerated exercises well. Slight discomfort from brace on 5th digit, but no evidence of rubbing.       Plan: Progress treament per protocol.      Precautions: DOS 24  First 3 weeks: AROM in orthosis. PROM to PIP and DIP joints in orthosis.  3 weeks: AROM of wrist (24)  4 weeks: Short arc flexion of digits (with wrist in extension), EDC gliding, PROM for digit extension (24)  5 weeks: Full arc AROM (24)  6 weeks: PROM for the wrist and fingers (24)  7 weeks: Combined wrist and digit flexion (24)      Manuals     MEM  8' 8' 8' 8' 8' 5'      STM        5'                                 Neuro Re-Ed                                                                                                        Ther Ex               HEP: In orthotic, PIP flexion and DIP flexion A/PROM   Wrist AROM  Short arc flexion of digits (with wrist in extension), EDC gliding       Hook fist pegs   With splint on With splint on W/ splint on No splint, 1x No splint, 1x 1x x    Active digital extension  With place and holds, 10x With place and holds, 10x With place and holds, 10x W/ place and holds 10x Place and holds, 10x  10x 10x    Towel scrunches with splint on    5x 5x 5x        Gentle intrinsic stretching   3' 3' 3' 3' 2'       keypegs    1x with IF and thumb 1x w/ IF & thumb All digits        Partial arc fist      Gripping large dowel, 10x       Colored pegs      Opposition with RF Opposition with RF " Opposition with RF Opposition with RF    Pencil progressive grasp       2x 2x 2x    EDC gliding        Small PVC 20x  Small PVC 20x  SmallPVC 20x    Coordination balls        2' 2' 2'    Ball on walking       TB up/down, 2x TB up/down, 3x TB up/odwn 3x    Ther Activity             Keypegs         x1                                                        Modalities             MHP PRN  5' 5' 5' 5' 5' 5' 5'

## 2024-07-29 NOTE — PROGRESS NOTES
"Daily Note     Today's date: 2024  Patient name: Matilda Day  : 1945  MRN: [id number]  Referring provider: Traci Molina CRNP  Dx:   Encounter Diagnosis     ICD-10-CM    1. Nontraumatic subluxation of extensor tendon at metacarpophalangeal joint of right hand  M66.241                   Start Time: 0800  Stop Time: 0835  Total time in clinic (min): 35 minutes    Subjective: \"I never had any pain\"      Objective: See treatment diary below.       Assessment: Full MCP extension. Also able to achieve full flexion.       Plan: Progress treament per protocol.      Precautions: DOS 24  First 3 weeks: AROM in orthosis. PROM to PIP and DIP joints in orthosis.  3 weeks: AROM of wrist (24)  4 weeks: Short arc flexion of digits (with wrist in extension), EDC gliding, PROM for digit extension (24)  5 weeks: Full arc AROM (24)  6 weeks: PROM for the wrist and fingers (24)  7 weeks: Combined wrist and digit flexion (24)      Manuals    MEM  8' 8' 8' 8' 8' 5'      STM        5'  5'                               Neuro Re-Ed                                                                                                        Ther Ex               HEP: In orthotic, PIP flexion and DIP flexion A/PROM   Wrist AROM  Short arc flexion of digits (with wrist in extension), EDC gliding       Hook fist pegs   With splint on With splint on W/ splint on No splint, 1x No splint, 1x 1x x    Active digital extension  With place and holds, 10x With place and holds, 10x With place and holds, 10x W/ place and holds 10x Place and holds, 10x  10x 10x 10x   Gentle PROM           2'   Towel scrunches with splint on    5x 5x 5x        Gentle intrinsic stretching   3' 3' 3' 3' 2'       keypegs    1x with IF and thumb 1x w/ IF & thumb All digits        Partial arc fist      Gripping large dowel, 10x       Colored pegs      Opposition with RF Opposition with RF " Opposition with RF Opposition with RF    Pencil progressive grasp       2x 2x 2x    EDC gliding        Small PVC 20x  Small PVC 20x  SmallPVC 20x Small PVC 20x   Coordination balls        2' 2' 2' 2'    Ball on walking       TB up/down, 2x TB up/down, 3x TB up/odwn 3x TB up/odwn 5x   Wrist maze          6x                Ther Activity             Keypegs         x1 1/2x with RF                                                       Modalities             MHP PRN  5' 5' 5' 5' 5' 5' 5'  5'

## 2024-07-30 ENCOUNTER — OFFICE VISIT (OUTPATIENT)
Dept: OCCUPATIONAL THERAPY | Facility: CLINIC | Age: 79
End: 2024-07-30
Payer: MEDICARE

## 2024-07-30 DIAGNOSIS — M66.241 NONTRAUMATIC SUBLUXATION OF EXTENSOR TENDON AT METACARPOPHALANGEAL JOINT OF RIGHT HAND: Primary | ICD-10-CM

## 2024-07-30 PROCEDURE — 97110 THERAPEUTIC EXERCISES: CPT | Performed by: OCCUPATIONAL THERAPIST

## 2024-07-31 NOTE — ASSESSMENT & PLAN NOTE
Phoned [facility] nursing   Spoke with Nel Tracy auth has not been submitted yet ,it may take 3-4 days    Pt informed and verbalized understanding.    · Patient with intermittent rectal bleeding since the beginning of January  · On Eliquis for history of portal vein thrombosis  · She was admitted twice in January underwent flex sigmoidoscopy x2  · Flex sigmoidoscopy 1/9 showed 1 cm linear superficial ulceration at the anastomotic area no evidence of any bleeding  · Flex sigmoidoscopy 1/16 showed anastomotic ulcer within the J-pouch, no evidence of anal fissure or hemorrhoids  · Continues to have intermittent episodes of maroon-colored stool that lasts 2 to 3 days and resolves on its own  She had maroon-colored stool for the past 3 days, but not today  · Evaluated by colorectal surgery in the ED  · Calmoseptine cream to the anal skin area  · Increase Carafate to 2 g 4 times daily, continue Pentasa 500 mg 4 times daily   · No need to rescope since patient is hemodynamically stable, hemoglobin is stable, bleeding resolved  · Admit overnight for observation  · Discussed with colorectal surgery, hold Eliquis until tomorrow a m

## 2024-08-01 NOTE — PROGRESS NOTES
OT Re-Evaluation     Today's date: 2024  Patient name: Matilda Day  : 1945  MRN: [id number]  Referring provider: Traci Molina CRNP  Dx:   Encounter Diagnosis     ICD-10-CM    1. Nontraumatic subluxation of extensor tendon at metacarpophalangeal joint of right hand  M66.241               Assessment  Impairments: abnormal or restricted ROM, activity intolerance, impaired physical strength, lacks appropriate home exercise program, pain with function and activity limitations    Assessment details: Matilda is referred to therapy s/p right ring finger tenosynovectomy/tenolysis, centralization, radial sagittal band repair, and partial EDC transfer on 24. She is progressing well, following protocol, and making progress towards all goals. She is currently using a relative motion splint. She demonstrates WFL of the wrist in all planes of motion. She is able to make a composite fist, and achieve ring finger flexion to the palm. There is minimal lag at the PIP and MP joints, which has not changed with the introduction of flexion. Swelling is resolving, and there is minimal scar tissue formation or adhesion. She has not yet begun strengthening exercises, though  strength was tested today and was slightly reduced from the contralateral side. She sees the doctor next week, and will likely be able to transition to a [facility] for strengthening at that time. See below for a detailed assessment.         Goals  STG: Patient will be compliant with post operative precautions and home exercise program in 1 week.- MET  STG: Pain will not exceed a 1/10 during appropriate activity and during therapy in 4 weeks.- MET  STG: Inflammation/edema will be reduced to 17cm at the wrist, 15.6cm at the DPC, and within 4mm of the contralateral side in the ring finger and patient will be independent with self management techniques in 4 weeks.- PARTIALLY MET  STG: Range of motion of right wrist will be improved to 50% of the  contralateral side in 4 weeks.- MET  STG: Active range of motion of the ring finger will demonstrate MP flexion=65, PIP flexion=90, DIP flexion=50 in 4 weeks, with extension maintained. - MET    LTG: Active range of motion of the ring finger will demonstrate MP flexion=80, PIP flexion=90, DIP flexion=50 by discharge with extension maintained.  LTG: Strength will be improved to 50% the contralateral side in 6-8 weeks or discharge. - MET  LTG: Performance in ADLs and IADLS will be improved to prior level of function with the affected extremity within 6 weeks or discharge.   LTG: FOTO score increase by 20 points within 6 weeks or discharge.                 Plan  Patient would benefit from: skilled OT, OT eval and home program  Planned modality interventions: thermotherapy: hydrocollator packs    Planned therapy interventions: home exercise program, joint mobilization, manual therapy, therapeutic exercise, patient education, therapeutic activities, neuromuscular re-education, IASTM, strengthening and stretching    Frequency: 2x week  Plan of Care beginning date: 8/2/2024  Plan of Care expiration date: 8/30/2024  Treatment plan discussed with: patient  Plan details: Treatment to include modalities, manual therapy, PRE's, HEP, and orthotics as appropriate.         Subjective Evaluation    History of Present Illness  Date of surgery: 6/14/2024  Mechanism of injury: surgery  Mechanism of injury: Matilda reports that her right ring finger was subluxing and unable to extend for over a year. She reports the same thing is happening as well in her left ring finger.   Patient Goals  Patient goals for therapy: decreased edema, increased motion, increased strength, return to sport/leisure activities and independence with ADLs/IADLs    Pain  No pain reported    Hand dominance: left    Treatments  Current treatment: occupational therapy        Objective     Active Range of Motion     Left Wrist   Wrist flexion: 65 degrees   Wrist  extension: 77 degrees   Radial deviation: 5 degrees   Ulnar deviation: 25 degrees     Right Wrist   Wrist flexion: 75 degrees   Wrist extension: 70 degrees   Radial deviation: 20 degrees   Ulnar deviation: 35 degrees     Right Thumb   Kapandji score: 9 degrees    Left Digits    Flexion   Ring     MCP: 90    PIP: 100    DIP: 30    Right Digits   Flexion   Ring     MCP: 72    PIP: 100    DIP: 60  Extension   Ring     MCP: -10    PIP: -22    DIP: 10    Passive Range of Motion     Right Digits   Extension   Ring     PIP: 0    Strength/Myotome Testing     Left Wrist/Hand      (2nd hand position)     Trial 1: 20.2    Right Wrist/Hand      (2nd hand position)     Trial 1: 18.9    Swelling     Left Wrist/Hand   Circumference MCP: 16.5 cm  Circumference wrist: 14.8 cm    Additional Swelling Details  5.6 at PIP    Right Wrist/Hand   Ring     Proximal: 5.5 cm  Circumference MCP: 17.2 cm  Circumference wrist: 15.5 cm    Additional Swelling Details  5.9 at PIP                Precautions: DOS 6/14/24  First 3 weeks: AROM in orthosis. PROM to PIP and DIP joints in orthosis.  3 weeks: AROM of wrist (7/5/24)  4 weeks: Short arc flexion of digits (with wrist in extension), EDC gliding, PROM for digit extension (7/12/24)  5 weeks: Full arc AROM (7/19/24)  6 weeks: PROM for the wrist and fingers (7/26/24)  7 weeks: Combined wrist and digit flexion (8/2/24)      Manuals 8/2 6/28 7/2 7/5 7/9 7/16 7/19 7/23 7/26 7/30   MEM  8' 8' 8' 8' 8' 5'      STM 5'       5'  5'                               Neuro Re-Ed                                                                                                        Ther Ex               Re-eval Performed             HEP:    Wrist AROM  Short arc flexion of digits (with wrist in extension), EDC gliding       Hook fist pegs   With splint on With splint on W/ splint on No splint, 1x No splint, 1x 1x x    Active digital extension  With place and holds, 10x With place and holds, 10x With  place and holds, 10x W/ place and holds 10x Place and holds, 10x  10x 10x 10x   Gentle PROM  3'         2'   Towel scrunches with splint on    5x 5x 5x        Gentle intrinsic stretching   3' 3' 3' 3' 2'       keypegs    1x with IF and thumb 1x w/ IF & thumb All digits        Partial arc fist      Gripping large dowel, 10x       Colored pegs      Opposition with RF Opposition with RF Opposition with RF Opposition with RF    Pencil progressive grasp       2x 2x 2x    EDC gliding  Small PVC 20x      Small PVC 20x  Small PVC 20x  SmallPVC 20x Small PVC 20x   Coordination balls  2'      2' 2' 2' 2'    Ball on walking TB up/odwn 5x      TB up/down, 2x TB up/down, 3x TB up/odwn 3x TB up/odwn 5x   Wrist maze 6x         6x                Ther Activity             Keypegs 1x        x1 1/2x with RF                                                       Modalities             MHP  5' 5' 5' 5' 5' 5' 5' 5'  5'

## 2024-08-02 ENCOUNTER — APPOINTMENT (OUTPATIENT)
Dept: LAB | Facility: CLINIC | Age: 79
End: 2024-08-02
Payer: MEDICARE

## 2024-08-02 ENCOUNTER — EVALUATION (OUTPATIENT)
Dept: OCCUPATIONAL THERAPY | Facility: CLINIC | Age: 79
End: 2024-08-02
Payer: MEDICARE

## 2024-08-02 DIAGNOSIS — E87.1 HYPONATREMIA: ICD-10-CM

## 2024-08-02 DIAGNOSIS — I10 ESSENTIAL HYPERTENSION: ICD-10-CM

## 2024-08-02 DIAGNOSIS — E22.2 SIADH (SYNDROME OF INAPPROPRIATE ADH PRODUCTION) (HCC): ICD-10-CM

## 2024-08-02 DIAGNOSIS — M66.241 NONTRAUMATIC SUBLUXATION OF EXTENSOR TENDON AT METACARPOPHALANGEAL JOINT OF RIGHT HAND: Primary | ICD-10-CM

## 2024-08-02 LAB
ANION GAP SERPL CALCULATED.3IONS-SCNC: 5 MMOL/L (ref 4–13)
BUN SERPL-MCNC: 8 MG/DL (ref 5–25)
CALCIUM SERPL-MCNC: 8.9 MG/DL (ref 8.4–10.2)
CHLORIDE SERPL-SCNC: 95 MMOL/L (ref 96–108)
CO2 SERPL-SCNC: 33 MMOL/L (ref 21–32)
CREAT SERPL-MCNC: 0.74 MG/DL (ref 0.6–1.3)
GFR SERPL CREATININE-BSD FRML MDRD: 77 ML/MIN/1.73SQ M
GLUCOSE SERPL-MCNC: 90 MG/DL (ref 65–140)
POTASSIUM SERPL-SCNC: 3.7 MMOL/L (ref 3.5–5.3)
SODIUM SERPL-SCNC: 133 MMOL/L (ref 135–147)

## 2024-08-02 PROCEDURE — 97140 MANUAL THERAPY 1/> REGIONS: CPT | Performed by: OCCUPATIONAL THERAPIST

## 2024-08-02 PROCEDURE — 36415 COLL VENOUS BLD VENIPUNCTURE: CPT

## 2024-08-02 PROCEDURE — 80048 BASIC METABOLIC PNL TOTAL CA: CPT

## 2024-08-02 PROCEDURE — 97110 THERAPEUTIC EXERCISES: CPT | Performed by: OCCUPATIONAL THERAPIST

## 2024-08-02 NOTE — LETTER
2024    Cruz Patton MD  [address] [place] [address]    Patient: Matilda Day   YOB: 1945   Date of Visit: 2024     Encounter Diagnosis     ICD-10-CM    1. Nontraumatic subluxation of extensor tendon at metacarpophalangeal joint of right hand  M66.241           Dear Dr. Patton:    Thank you for your recent referral of Matilda Day. Please review the attached evaluation summary from Matilda's recent visit.     Please verify that you agree with the plan of care by signing the attached order.     If you have any questions or concerns, please do not hesitate to call.     I sincerely appreciate the opportunity to share in the care of one of your patients and hope to have another opportunity to work with you in the near future.     Sincerely,    Samina Haile, OT      Referring Provider:     I certify that I have read the below Plan of Care and certify the need for these services furnished under this plan of treatment while under my care.                    Cruz Patton MD  [address] [place] [address]  Via Fax: [phone]        OT Re-Evaluation     Today's date: 2024  Patient name: Matilda Day  : 1945  MRN: [id number]  Referring provider: Traci Molina CRNP  Dx:   Encounter Diagnosis     ICD-10-CM    1. Nontraumatic subluxation of extensor tendon at metacarpophalangeal joint of right hand  M66.241               Assessment  Impairments: abnormal or restricted ROM, activity intolerance, impaired physical strength, lacks appropriate home exercise program, pain with function and activity limitations    Assessment details: Matilda is referred to therapy s/p right ring finger tenosynovectomy/tenolysis, centralization, radial sagittal band repair, and partial EDC transfer on 24. She is progressing well, following protocol, and making progress towards all goals. She is currently using a relative motion splint.  She demonstrates WFL of the wrist in all planes of motion. She is able to make a composite fist, and achieve ring finger flexion to the palm. There is minimal lag at the PIP and MP joints, which has not changed with the introduction of flexion. Swelling is resolving, and there is minimal scar tissue formation or adhesion. She has not yet begun strengthening exercises, though  strength was tested today and was slightly reduced from the contralateral side. She sees the doctor next week, and will likely be able to transition to a [facility] for strengthening at that time. See below for a detailed assessment.         Goals  STG: Patient will be compliant with post operative precautions and home exercise program in 1 week.- MET  STG: Pain will not exceed a 1/10 during appropriate activity and during therapy in 4 weeks.- MET  STG: Inflammation/edema will be reduced to 17cm at the wrist, 15.6cm at the DPC, and within 4mm of the contralateral side in the ring finger and patient will be independent with self management techniques in 4 weeks.- PARTIALLY MET  STG: Range of motion of right wrist will be improved to 50% of the contralateral side in 4 weeks.- MET  STG: Active range of motion of the ring finger will demonstrate MP flexion=65, PIP flexion=90, DIP flexion=50 in 4 weeks, with extension maintained. - MET    LTG: Active range of motion of the ring finger will demonstrate MP flexion=80, PIP flexion=90, DIP flexion=50 by discharge with extension maintained.  LTG: Strength will be improved to 50% the contralateral side in 6-8 weeks or discharge. - MET  LTG: Performance in ADLs and IADLS will be improved to prior level of function with the affected extremity within 6 weeks or discharge.   LTG: FOTO score increase by 20 points within 6 weeks or discharge.                 Plan  Patient would benefit from: skilled OT, OT eval and home program  Planned modality interventions: thermotherapy: hydrocollator packs    Planned therapy  interventions: home exercise program, joint mobilization, manual therapy, therapeutic exercise, patient education, therapeutic activities, neuromuscular re-education, IASTM, strengthening and stretching    Frequency: 2x week  Plan of Care beginning date: 8/2/2024  Plan of Care expiration date: 8/30/2024  Treatment plan discussed with: patient  Plan details: Treatment to include modalities, manual therapy, PRE's, HEP, and orthotics as appropriate.         Subjective Evaluation    History of Present Illness  Date of surgery: 6/14/2024  Mechanism of injury: surgery  Mechanism of injury: Matilda reports that her right ring finger was subluxing and unable to extend for over a year. She reports the same thing is happening as well in her left ring finger.   Patient Goals  Patient goals for therapy: decreased edema, increased motion, increased strength, return to sport/leisure activities and independence with ADLs/IADLs    Pain  No pain reported    Hand dominance: left    Treatments  Current treatment: occupational therapy        Objective     Active Range of Motion     Left Wrist   Wrist flexion: 65 degrees   Wrist extension: 77 degrees   Radial deviation: 5 degrees   Ulnar deviation: 25 degrees     Right Wrist   Wrist flexion: 75 degrees   Wrist extension: 70 degrees   Radial deviation: 20 degrees   Ulnar deviation: 35 degrees     Right Thumb   Kapandji score: 9 degrees    Left Digits    Flexion   Ring     MCP: 90    PIP: 100    DIP: 30    Right Digits   Flexion   Ring     MCP: 72    PIP: 100    DIP: 60  Extension   Ring     MCP: -10    PIP: -22    DIP: 10    Passive Range of Motion     Right Digits   Extension   Ring     PIP: 0    Strength/Myotome Testing     Left Wrist/Hand      (2nd hand position)     Trial 1: 20.2    Right Wrist/Hand      (2nd hand position)     Trial 1: 18.9    Swelling     Left Wrist/Hand   Circumference MCP: 16.5 cm  Circumference wrist: 14.8 cm    Additional Swelling Details  5.6 at  PIP    Right Wrist/Hand   Ring     Proximal: 5.5 cm  Circumference MCP: 17.2 cm  Circumference wrist: 15.5 cm    Additional Swelling Details  5.9 at PIP              Precautions: DOS 6/14/24  First 3 weeks: AROM in orthosis. PROM to PIP and DIP joints in orthosis.  3 weeks: AROM of wrist (7/5/24)  4 weeks: Short arc flexion of digits (with wrist in extension), EDC gliding, PROM for digit extension (7/12/24)  5 weeks: Full arc AROM (7/19/24)  6 weeks: PROM for the wrist and fingers (7/26/24)  7 weeks: Combined wrist and digit flexion (8/2/24)      Manuals 8/2 6/28 7/2 7/5 7/9 7/16 7/19 7/23 7/26 7/30   MEM  8' 8' 8' 8' 8' 5'      STM 5'       5'  5'                               Neuro Re-Ed                                                                                                        Ther Ex               Re-eval Performed             HEP:    Wrist AROM  Short arc flexion of digits (with wrist in extension), EDC gliding       Hook fist pegs   With splint on With splint on W/ splint on No splint, 1x No splint, 1x 1x x    Active digital extension  With place and holds, 10x With place and holds, 10x With place and holds, 10x W/ place and holds 10x Place and holds, 10x  10x 10x 10x   Gentle PROM  3'         2'   Towel scrunches with splint on    5x 5x 5x        Gentle intrinsic stretching   3' 3' 3' 3' 2'       keypegs    1x with IF and thumb 1x w/ IF & thumb All digits        Partial arc fist      Gripping large dowel, 10x       Colored pegs      Opposition with RF Opposition with RF Opposition with RF Opposition with RF    Pencil progressive grasp       2x 2x 2x    EDC gliding  Small PVC 20x      Small PVC 20x  Small PVC 20x  SmallPVC 20x Small PVC 20x   Coordination balls  2'      2' 2' 2' 2'    Ball on walking TB up/odwn 5x      TB up/down, 2x TB up/down, 3x TB up/odwn 3x TB up/odwn 5x   Wrist maze 6x         6x                Ther Activity             Keypegs 1x        x1 1/2x with RF                                                        Modalities             [facility]  5' 5' 5' 5' 5' 5' 5' 5'  5'

## 2024-08-05 DIAGNOSIS — I10 ESSENTIAL HYPERTENSION: ICD-10-CM

## 2024-08-06 ENCOUNTER — OFFICE VISIT (OUTPATIENT)
Dept: OCCUPATIONAL THERAPY | Facility: CLINIC | Age: 79
End: 2024-08-06
Payer: MEDICARE

## 2024-08-06 DIAGNOSIS — M66.241 NONTRAUMATIC SUBLUXATION OF EXTENSOR TENDON AT METACARPOPHALANGEAL JOINT OF RIGHT HAND: Primary | ICD-10-CM

## 2024-08-06 PROCEDURE — 97530 THERAPEUTIC ACTIVITIES: CPT

## 2024-08-06 PROCEDURE — 97110 THERAPEUTIC EXERCISES: CPT

## 2024-08-06 RX ORDER — LISINOPRIL 20 MG/1
20 TABLET ORAL 2 TIMES DAILY
Qty: 180 TABLET | Refills: 1 | Status: SHIPPED | OUTPATIENT
Start: 2024-08-06

## 2024-08-06 RX ORDER — DILTIAZEM HYDROCHLORIDE 60 MG/1
60 TABLET, FILM COATED ORAL DAILY
Qty: 90 TABLET | Refills: 1 | Status: SHIPPED | OUTPATIENT
Start: 2024-08-06

## 2024-08-06 NOTE — PROGRESS NOTES
Daily Note     Today's date: 2024  Patient name: Matilda Day  : 1945  MRN: [id number]  Referring provider: Traci Molina CRNP  Dx:   Encounter Diagnosis     ICD-10-CM    1. Nontraumatic subluxation of extensor tendon at metacarpophalangeal joint of right hand  M66.241                      Subjective: The splint is rubbing.      Objective: See treatment diary below      Assessment: Tolerated treatment well. Modified splint slightly to decrease rubbing on SF. Pt doing well with new ex. She is challenged with dexterity ex. Patient demonstrated fatigue post treatment and would benefit from continued OT      Plan: Continue per plan of care.  Progress treament per protocol.        Precautions: DOS 24  First 3 weeks: AROM in orthosis. PROM to PIP and DIP joints in orthosis.  3 weeks: AROM of wrist (24)  4 weeks: Short arc flexion of digits (with wrist in extension), EDC gliding, PROM for digit extension (24)  5 weeks: Full arc AROM (24)  6 weeks: PROM for the wrist and fingers (24)  7 weeks: Combined wrist and digit flexion (24)      Manuals 8 7 7   MEM   8' 8' 8' 8' 5'      STM 5' 5'      5'  5'                               Neuro Re-Ed                                                                                                        Ther Ex               Re-eval Performed             HEP:    Wrist AROM  Short arc flexion of digits (with wrist in extension), EDC gliding       Hook fist pegs   With splint on With splint on W/ splint on No splint, 1x No splint, 1x 1x x    Active digital extension   With place and holds, 10x With place and holds, 10x W/ place and holds 10x Place and holds, 10x  10x 10x 10x   Gentle PROM  3' 3'        2'   Towel scrunches with splint on    5x 5x 5x        Gentle intrinsic stretching    3' 3' 3' 2'       keypegs    1x with IF and thumb 1x w/ IF & thumb All digits        Partial arc fist      Gripping large  dowel, 10x       Colored pegs      Opposition with RF Opposition with RF Opposition with RF Opposition with RF    Pencil progressive grasp       2x 2x 2x    EDC gliding  Small PVC 20x Small PVC 20x     Small PVC 20x  Small PVC 20x  SmallPVC 20x Small PVC 20x   Coordination balls  2' 2'     2' 2' 2' 2'    Ball on walking TB up/odwn 5x TB 6x     TB up/down, 2x TB up/down, 3x TB up/odwn 3x TB up/odwn 5x   Wrist maze 6x 7x        6x                Ther Activity             Keypegs 1x 1x         x1 1/2x with RF                                                       Modalities             MHP  5' 5' 5' 5' 5' 5' 5' 5'  5'

## 2024-08-07 ENCOUNTER — OFFICE VISIT (OUTPATIENT)
Dept: NEPHROLOGY | Facility: CLINIC | Age: 79
End: 2024-08-07
Payer: MEDICARE

## 2024-08-07 VITALS
WEIGHT: 149 LBS | DIASTOLIC BLOOD PRESSURE: 64 MMHG | BODY MASS INDEX: 26.4 KG/M2 | HEIGHT: 63 IN | SYSTOLIC BLOOD PRESSURE: 132 MMHG | HEART RATE: 66 BPM

## 2024-08-07 DIAGNOSIS — E87.1 HYPONATREMIA: ICD-10-CM

## 2024-08-07 DIAGNOSIS — I10 ESSENTIAL HYPERTENSION: ICD-10-CM

## 2024-08-07 PROCEDURE — 99214 OFFICE O/P EST MOD 30 MIN: CPT | Performed by: INTERNAL MEDICINE

## 2024-08-07 RX ORDER — TORSEMIDE 20 MG/1
20 TABLET ORAL DAILY
Qty: 90 TABLET | Refills: 3 | Status: SHIPPED | OUTPATIENT
Start: 2024-08-07

## 2024-08-07 NOTE — PROGRESS NOTES
"NEPHROLOGY OUTPATIENT PROGRESS NOTE   Matilda Day 78 y.o. 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  Urine sodium 41  Continue with current sodium chloride tablets  Adjust torsemide to 20 mg daily  Bilateral lower extremity edema with history of diastolic dysfunction recommend increasing torsemide to 20 mg daily.  Hypertension blood pressure overall appears stable    Overall sodium level slightly low than her previous baseline at this point time given her slight increase in lower extremity swelling recommend adjusting torsemide to 20 mg daily.  Continue with current sodium chloride tablets.  Anticipated hip surgery in the near future would recommend avoiding hypotonic fluids intra and post-operatively  Recommend repeating BMP in 2 weeks assuming sodium level has improved we will repeat labs in 3 months and then again in 6 months with follow-up at that time..    SUBJECTIVE / INTERVAL HISTORY:  She has been doing reasonably well.  Denies any recent hospitalizations.  She does complain of right knee and hip pain and has anticipated to have a hip replacement later this year.  No reports of chest pain, shortness of breath.  Again noted increase in lower extremity swelling.      OBJECTIVE:  /64 (BP Location: Left arm, Patient Position: Sitting, Cuff Size: Standard)   Pulse 66   Ht 5' 3\" (1.6 m)   Wt 67.6 kg (149 lb)   BMI 26.39 kg/m²   Vitals:    08/07/24 1355   Weight: 67.6 kg (149 lb)       Physical Exam  Constitutional:       Appearance: She is not ill-appearing.   Cardiovascular:      Rate and Rhythm: Normal rate and regular rhythm.   Pulmonary:      Effort: Pulmonary effort is normal.      Breath sounds: Normal breath sounds.   Abdominal:      General: There is no distension.      Palpations: Abdomen is soft. "   Musculoskeletal:      Right lower leg: Edema present.      Left lower leg: Edema present.   Skin:     General: Skin is warm and dry.      Coloration: Skin is not jaundiced.      Findings: No rash.   Neurological:      Mental Status: She is alert and oriented to person, place, and time.           Medications:    Current Outpatient Medications:     al mag oxide-diphenhydramine-lidocaine viscous (MAGIC MOUTHWASH) 1:1:1 suspension, SWISH 10ML IN MOUTH FOR ONE MINUTE FOUR TIMES A DAY, Disp: , Rfl:     albuterol (PROVENTIL HFA,VENTOLIN HFA) 90 mcg/act inhaler, Inhale 2 puffs every 6 (six) hours as needed for wheezing, Disp: 8 g, Rfl: 1    alendronate (Fosamax) 70 mg tablet, Take 1 tablet (70 mg total) by mouth every 7 days, Disp: 4 tablet, Rfl: 2    amLODIPine (NORVASC) 2.5 mg tablet, TAKE ONE TABLET BY MOUTH EVERY DAY, Disp: 90 tablet, Rfl: 3    ammonium lactate (LAC-HYDRIN) 12 % cream, , Disp: , Rfl:     Calcium Carb-Cholecalciferol (CALCIUM 600-D PO), Take 1 tablet by mouth 2 (two) times a day, Disp: , Rfl:     Coenzyme Q10 (CO Q-10 PO), Take 1 capsule by mouth daily, Disp: , Rfl:     diltiazem (CARDIZEM CD) 180 mg 24 hr capsule, TAKE ONE CAPSULE BY MOUTH EVERY DAY, Disp: 90 capsule, Rfl: 1    diltiazem (CARDIZEM) 60 mg tablet, TAKE ONE TABLET BY MOUTH EVERY DAY, Disp: 90 tablet, Rfl: 1    Eliquis 5 MG, TAKE ONE TABLET BY MOUTH TWICE A DAY, Disp: 180 tablet, Rfl: 1    escitalopram (LEXAPRO) 20 mg tablet, TAKE ONE TABLET BY MOUTH EVERY DAY, Disp: 90 tablet, Rfl: 1    fluticasone (FLONASE) 50 mcg/act nasal spray, APPLY ONE SPRAY IN EACH NOSTRIL ONCE DAILY AS NEEDED FOR RUNNY NOSE, Disp: 48 g, Rfl: 1    gabapentin (NEURONTIN) 600 MG tablet, TAKE ONE TABLET BY MOUTH EVERY MORNING , TAKE ONE TABLET BY MOUTH EVERY DAY IN THE AFTERNOON , AND TAKE TWO TABLETS BY MOUTH EVERY EVENING AT BEDTIME, Disp: 360 tablet, Rfl: 1    HYDROcodone-acetaminophen (NORCO) 5-325 mg per tablet, Take 1 tablet by mouth every 6 (six) hours as  needed for pain Max Daily Amount: 4 tablets, Disp: 120 tablet, Rfl: 0    levothyroxine 50 mcg tablet, TAKE ONE TABLET BY MOUTH EVERY DAY, Disp: 90 tablet, Rfl: 1    lisinopril (ZESTRIL) 20 mg tablet, TAKE ONE TABLET BY MOUTH TWO TIMES A DAY, Disp: 180 tablet, Rfl: 1    LORazepam (ATIVAN) 1 mg tablet, TAKE ONE TABLET BY MOUTH EVERY 6 HOURS AS NEEDED FOR ANXIETY, Disp: 120 tablet, Rfl: 0    MAGNESIUM PO, Take 1 tablet by mouth daily, Disp: , Rfl:     Multiple Vitamin (MULTIVITAMIN ADULT PO), Take 1 tablet by mouth daily, Disp: , Rfl:     Omega-3 Fatty Acids (FISH OIL PO), Take 1 capsule by mouth daily, Disp: , Rfl:     pantoprazole (PROTONIX) 40 mg tablet, TAKE ONE TABLET BY MOUTH EVERY DAY, Disp: 90 tablet, Rfl: 1    potassium chloride (MICRO-K) 10 MEQ CR capsule, TAKE TWO CAPSULES BY MOUTH EVERY DAY, Disp: 180 capsule, Rfl: 3    simvastatin (ZOCOR) 10 mg tablet, TAKE ONE-HALF TABLET BY MOUTH DAILY, Disp: 45 tablet, Rfl: 1    sodium chloride 1 g tablet, TAKE ONE TABLET BY MOUTH FOUR TIMES A DAY (Patient taking differently: Take 1 g by mouth 3 (three) times a day), Disp: 270 tablet, Rfl: 1    torsemide (DEMADEX) 20 mg tablet, Take 1 tablet (20 mg total) by mouth daily, Disp: 90 tablet, Rfl: 3    vitamin B-12 (VITAMIN B-12) 500 mcg tablet, Take 1 tablet (500 mcg total) by mouth daily, Disp: 90 tablet, Rfl: 1    Fluticasone Furoate-Vilanterol 100-25 mcg/actuation inhaler, Inhale 1 puff daily Rinse mouth after use., Disp: 60 blister, Rfl: 5    Current Facility-Administered Medications:     cyanocobalamin injection 1,000 mcg, 1,000 mcg, Intramuscular, Q30 Days, Zaira Velasquez MD, 1,000 mcg at 04/20/23 1012    Laboratory Results:  Results for orders placed or performed in visit on 08/02/24   Basic metabolic panel   Result Value Ref Range    Sodium 133 (L) 135 - 147 mmol/L    Potassium 3.7 3.5 - 5.3 mmol/L    Chloride 95 (L) 96 - 108 mmol/L    CO2 33 (H) 21 - 32 mmol/L    ANION GAP 5 4 - 13 mmol/L    BUN 8 5 - 25  mg/dL    Creatinine 0.74 0.60 - 1.30 mg/dL    Glucose 90 65 - 140 mg/dL    Calcium 8.9 8.4 - 10.2 mg/dL    eGFR 77 ml/min/1.73sq m     *Note: Due to a large number of results and/or encounters for the requested time period, some results have not been displayed. A complete set of results can be found in Results Review.

## 2024-08-08 ENCOUNTER — OFFICE VISIT (OUTPATIENT)
Dept: OCCUPATIONAL THERAPY | Facility: CLINIC | Age: 79
End: 2024-08-08
Payer: MEDICARE

## 2024-08-08 DIAGNOSIS — M66.241 NONTRAUMATIC SUBLUXATION OF EXTENSOR TENDON AT METACARPOPHALANGEAL JOINT OF RIGHT HAND: Primary | ICD-10-CM

## 2024-08-08 PROCEDURE — 97110 THERAPEUTIC EXERCISES: CPT | Performed by: OCCUPATIONAL THERAPIST

## 2024-08-08 NOTE — PROGRESS NOTES
"Daily Note     Today's date: 2024  Patient name: Matilda Day  : 1945  MRN: [id number]  Referring provider: Traci Molina CRNP  Dx:   Encounter Diagnosis     ICD-10-CM    1. Nontraumatic subluxation of extensor tendon at metacarpophalangeal joint of right hand  M66.241                 Subjective: \"I think my hand has been weaker\"      Objective: See treatment diary below      Assessment: Tolerated treatment well. Patient demonstrated fatigue post treatment and would benefit from continued OT.       Plan: Continue per plan of care.  Progress treament per protocol. Will update POC and HEP after her follow up with MD next week.        Precautions: DOS 24  First 3 weeks: AROM in orthosis. PROM to PIP and DIP joints in orthosis.  3 weeks: AROM of wrist (24)  4 weeks: Short arc flexion of digits (with wrist in extension), EDC gliding, PROM for digit extension (24)  5 weeks: Full arc AROM (24)  6 weeks: PROM for the wrist and fingers (24)  7 weeks: Combined wrist and digit flexion (24)      Manuals 8 8/6 8/ 7   MEM    8' 8' 8' 5'      STM 5' 5'      5'  5'                               Neuro Re-Ed                                                                                                        Ther Ex               Re-eval Performed             HEP:    Wrist AROM  Short arc flexion of digits (with wrist in extension), EDC gliding       Hook fist pegs    With splint on W/ splint on No splint, 1x No splint, 1x 1x x                 Active digital extension    With place and holds, 10x W/ place and holds 10x Place and holds, 10x  10x 10x 10x   Gentle PROM  3' 3'        2'   Towel scrunches with splint on     5x 5x        Gentle intrinsic stretching     3' 3' 2'       keypegs    1x with IF and thumb 1x w/ IF & thumb All digits        Partial arc fist      Gripping large dowel, 10x       Colored pegs      Opposition with RF Opposition with RF " Opposition with RF Opposition with RF    Pencil progressive grasp       2x 2x 2x    EDC gliding  Small PVC 20x Small PVC 20x Small PVC 20x    Small PVC 20x  Small PVC 20x  SmallPVC 20x Small PVC 20x   Coordination balls  2' 2' 2'    2' 2' 2' 2'    Ball on walking TB up/odwn 5x TB 6x TB maze 5x    TB up/down, 2x TB up/down, 3x TB up/odwn 3x TB up/odwn 5x   Wrist maze 6x 7x 8x       6x   Light rubber band digital extension    20x          Digiflex   Yellow 10x                       Ther Activity             Keypegs 1x 1x x1        x1 1/2x with RF                                                       Modalities             MHP  5' 5' 5' 5' 5' 5' 5' 5'  5'

## 2024-08-09 ENCOUNTER — OFFICE VISIT (OUTPATIENT)
Dept: OBGYN CLINIC | Facility: MEDICAL CENTER | Age: 79
End: 2024-08-09
Payer: MEDICARE

## 2024-08-09 VITALS
BODY MASS INDEX: 25.87 KG/M2 | RESPIRATION RATE: 18 BRPM | SYSTOLIC BLOOD PRESSURE: 133 MMHG | WEIGHT: 146 LBS | HEART RATE: 59 BPM | HEIGHT: 63 IN | DIASTOLIC BLOOD PRESSURE: 64 MMHG

## 2024-08-09 DIAGNOSIS — M16.11 PRIMARY OSTEOARTHRITIS OF ONE HIP, RIGHT: Primary | ICD-10-CM

## 2024-08-09 DIAGNOSIS — Z01.818 PREOPERATIVE TESTING: ICD-10-CM

## 2024-08-09 DIAGNOSIS — M25.59 PAIN IN OTHER SPECIFIED JOINT: ICD-10-CM

## 2024-08-09 PROCEDURE — 99213 OFFICE O/P EST LOW 20 MIN: CPT | Performed by: ORTHOPAEDIC SURGERY

## 2024-08-09 RX ORDER — ACETAMINOPHEN 325 MG/1
975 TABLET ORAL ONCE
OUTPATIENT
Start: 2024-08-09 | End: 2024-08-09

## 2024-08-09 RX ORDER — ASCORBIC ACID 500 MG
500 TABLET ORAL DAILY
Qty: 30 TABLET | Refills: 1 | Status: SHIPPED | OUTPATIENT
Start: 2024-08-09 | End: 2024-10-08

## 2024-08-09 RX ORDER — TRANEXAMIC ACID 10 MG/ML
1000 INJECTION, SOLUTION INTRAVENOUS ONCE
OUTPATIENT
Start: 2024-08-09 | End: 2024-08-09

## 2024-08-09 RX ORDER — FOLIC ACID 1 MG/1
1 TABLET ORAL DAILY
Qty: 30 TABLET | Refills: 1 | Status: SHIPPED | OUTPATIENT
Start: 2024-08-09

## 2024-08-09 RX ORDER — CHLORHEXIDINE GLUCONATE ORAL RINSE 1.2 MG/ML
15 SOLUTION DENTAL ONCE
OUTPATIENT
Start: 2024-08-09 | End: 2024-08-09

## 2024-08-09 RX ORDER — CHLORHEXIDINE GLUCONATE 40 MG/ML
SOLUTION TOPICAL DAILY PRN
OUTPATIENT
Start: 2024-08-09

## 2024-08-09 RX ORDER — CEFAZOLIN SODIUM 2 G/50ML
2000 SOLUTION INTRAVENOUS ONCE
OUTPATIENT
Start: 2024-08-09 | End: 2024-08-09

## 2024-08-09 RX ORDER — GABAPENTIN 300 MG/1
300 CAPSULE ORAL ONCE
OUTPATIENT
Start: 2024-08-09 | End: 2024-08-09

## 2024-08-09 RX ORDER — SODIUM CHLORIDE 9 MG/ML
75 INJECTION, SOLUTION INTRAVENOUS CONTINUOUS
OUTPATIENT
Start: 2024-08-09

## 2024-08-09 RX ORDER — MULTIVITAMIN
1 TABLET ORAL DAILY
Qty: 30 TABLET | Refills: 1 | Status: SHIPPED | OUTPATIENT
Start: 2024-08-09

## 2024-08-09 RX ORDER — FERROUS SULFATE 324(65)MG
324 TABLET, DELAYED RELEASE (ENTERIC COATED) ORAL
Qty: 30 TABLET | Refills: 1 | Status: SHIPPED | OUTPATIENT
Start: 2024-08-09

## 2024-08-09 NOTE — PROGRESS NOTES
Assessment & Plan     1. Primary osteoarthritis of one hip, right    2. Preoperative testing    3. Pain in other specified joint      Orders Placed This Encounter   Procedures    Comprehensive metabolic panel    Hemoglobin A1C W/EAG Estimation    CBC and differential    Anemia Panel w/Reflex    Protime-INR    APTT    Ambulatory referral to Physical Therapy    Ambulatory referral to Cardiology    Ambulatory referral to Hematology / Oncology    Ambulatory referral to Internal Medicine    EKG 12 lead    Type and screen     Patient has severe right hip arthritis.  she has tried conservative treatment without adequate relief.  We discussed treatment options as well as risks and benefits of treatment options.  The patient would like to proceed with a right total hip arthroplasty.  The risks of surgery include, but are not limited to infection, blood clot, wound healing problems, blood loss, damage to blood vessels and nerves, persistent pain and stiffness, dislocation, fracture, leg-length discrepancy, need for additional surgery, need for revision surgery, failure of hardware, heart attack, stroke, death.  The patient understood and agreed to by oral and written consent.  I answered all questions regarding surgery.  Reviewed no driving for 4-6 weeks for right sided surgery once off narcotics.  No driving until off narcotics for left sided surgery.    Preoperative vitamins were prescribed.  Patient instructed to  what they are not already taking and start 30 days before surgery.  We let the patient know that some people experience constipation while on iron.  If that occurs can take iron every other day.  If still an issue can stop the iron.  Can also add in OTC medication and/or prune juice for constipation.    We also let the patient know that some people experience constipation after surgery while on narcotics.  We suggest to have OTC medications and/or prune juice available if needed.    DVT Prophylaxis: Resume  Eliquis 5 mg BID  The patient will obtain clearance from: SOC, Cardio, hematology  Patient can turn off her brain stimulator.  Will make anesthesia aware.    Patient knows how to turn off brain stimulator.   Patient would like hospital bed and she will find out from insurance how much hospital bed is and would like to stay over night.   Can can come back in 1 month for right knee CSI.       Return for postop appt.    I answered all of the patient's questions during the visit and provided education of the patient's condition during the visit.  The patient verbalized understanding of the information given and agrees with the plan.  This note was dictated using IGIGI software.  It may contain errors including improperly dictated words.  Please contact physician directly for any questions.    Subjective   Chief Complaint:   Chief Complaint   Patient presents with    Right Knee - Pain    Right Hip - Pain       HPI:  Matilda Day is a 78 y.o. female who presents for follow up for severe right hip osteoarthritis.  Patient was last seen 7/3/2024 where patient picked a tentative date for right FITO in October.  Patient states today she was able to see Dr. John where she had right hip IA CSI on 7/12/2024.  Patient states she has received symptomatic relief from right hip IA CSI.  Patient states she continues to take Tylenol as needed for pain control as she cannot take NSAIDs due to being on Eliquis.  Patient states she has a tentative date for right FITO on 10/21/2024.  Patient would like to proceed with discussing surgical interventions for right FITO scheduled for 10/21/2024.    History of MRSA: no  History of blood clots: yes, eliquis 5 mg BID  Family history of blood clots: yes  History of Hepatitis C:no  History of HIV: no  Are you a smoker:no  Are you diabetic:no prediabetic last A1C 6.3  Do you see a cardiologist: yes  Have you seen a dentist in the past year: yes  Do you have a leg length discrepancy:  no          Review of Systems  See HPI for musculoskeletal review.   All other systems reviewed are negative     History:  Past Medical History:   Diagnosis Date    Anemia     Anxiety     Arrhythmia     Arthritis     Asthma     Blood clot in vein     portal vein    Breast cancer (HCC) 02/12/2021    Breast lump     [id number] RESOLVED    Cancer (HCC)     Depression     Disease of thyroid gland     DVT, lower extremity (HCC)     GERD (gastroesophageal reflux disease)     Hyperlipidemia     Hypertension     Hypokalemia     Hyponatremia     Hypothyroidism     Iron deficiency anemia     Irregular heart beat     Manic behavior (HCC)     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HCC)     Osteoarthritis     Palpitations     [id number]  RESOLVED    Paroxysmal supraventricular tachycardia     PE (pulmonary thromboembolism) (HCC)     Sjoegren syndrome     Sleep apnea     Sleep difficulties     Spinal stenosis     Thrombocytosis     [id number]  RESOLVED    Tremors of nervous system     dbs implanted right and left chest    Ulcerative colitis (HCC)     Vertigo     [id number] RESOLVED     Past Surgical History:   Procedure Laterality Date    ABDOMINAL SURGERY      APPENDECTOMY      BREAST BIOPSY Left 11/07/2019    Stereo    BREAST EXCISIONAL BIOPSY Left     x many years    BREAST SURGERY      lumpectomy & biopsy    CARMELLA HOLE W/ STEREOTACTIC INSERTION OF DBS LEADS / INTRAOP MICROELECTRODE RECORDING      COLON SURGERY      COLONOSCOPY N/A 08/30/2017    Procedure: POUCHOSCOPY;  Surgeon: VANDANA Waters MD;  Location: [facility];  Service: Colorectal    COLOPROCTECTOMY W/ ILEO J POUCH      ESOPHAGOGASTRODUODENOSCOPY      ONSET 10/17/11    FISTULA REPAIR      LLEOANAL FISTULA REPAIR TRANSPERIN TRANSABD APPROACH    HYSTERECTOMY      age 38    ILEOSTOMY CLOSURE      KNEE ARTHROSCOPY      Right    MAMMO STEREOTACTIC BREAST BIOPSY LEFT (ALL INC) Left 11/07/2019    MAMMO STEREOTACTIC BREAST BIOPSY LEFT (ALL INC) Left 12/17/2020    MAMMO STEREOTACTIC BREAST  BIOPSY LEFT (ALL INC) EACH ADD Left 2020    MASTECTOMY Left 2021    left mastectomy- Dr. Hayes    MASTECTOMY W/ SENTINEL NODE BIOPSY Left 2021    Procedure: BREAST MASTECTOMY WITH SENTINEL LYMPH NODE BIOPSY, LYMPHATIC MAPPING WITH BLUE DYE AND RADIAOCTIVE DYE (INJECT AT 1100 BY DR HAYES IN THE OR);  Surgeon: Robbie Hayes MD;  Location[facility];  Service: Surgical Oncology    GA INSJ/RPLCMT CRANIAL NEUROSTIM PULSE GENERATOR Right 2017    Procedure: DBS GENERATOR REPLACEMENT;  Surgeon: Marin Mao MD;  Location: [facility];  Service: Neurosurgery    GA INSJ/RPLCMT CRANIAL NEUROSTIM PULSE GENERATOR N/A 2019    Procedure: REPLACEMENT IMPLANTABLE PULSE GENERATOR FOR DEEP BRAIN STIMULATOR LEFT CHEST;  Surgeon: Damian Batres MD;  Location: [facility];  Service: Neurosurgery    SPLENECTOMY      TONSILLECTOMY       Social History   Social History     Substance and Sexual Activity   Alcohol Use Yes    Alcohol/week: 2.0 standard drinks of alcohol    Types: 2 Cans of beer per week    Comment: 2or 3 beers a week     Social History     Substance and Sexual Activity   Drug Use No     Social History     Tobacco Use   Smoking Status Former    Current packs/day: 0.00    Average packs/day: 1 pack/day for 15.0 years (15.0 ttl pk-yrs)    Types: Cigarettes    Start date: 1950    Quit date: 1965    Years since quittin.6   Smokeless Tobacco Never   Tobacco Comments    Quit     Family History:   Family History   Problem Relation Age of Onset    Venous thrombosis Mother         ACUTE VENOUS THROMBOSIS OF DEEP VESSELS OF THE DISTAL LOWER EXTREMITY    Other Mother         PHLEBITIS    Hypertension Mother     Peripheral vascular disease Mother     COPD Father     Diabetes Father         MELLITUS    Stroke Father     Diabetes Sister         MELLITUS    Sjogren's syndrome Sister     No Known Problems Daughter     No Known Problems Maternal Grandmother     No Known Problems Maternal Grandfather      No Known Problems Paternal Grandmother     No Known Problems Paternal Grandfather     No Known Problems Sister     No Known Problems Maternal Aunt     No Known Problems Paternal Aunt     No Known Problems Son        Current Outpatient Medications on File Prior to Visit   Medication Sig Dispense Refill    al mag oxide-diphenhydramine-lidocaine viscous (MAGIC MOUTHWASH) 1:1:1 suspension SWISH 10ML IN MOUTH FOR ONE MINUTE FOUR TIMES A DAY      alendronate (Fosamax) 70 mg tablet Take 1 tablet (70 mg total) by mouth every 7 days 4 tablet 2    amLODIPine (NORVASC) 2.5 mg tablet TAKE ONE TABLET BY MOUTH EVERY DAY 90 tablet 3    ammonium lactate (LAC-HYDRIN) 12 % cream       Calcium Carb-Cholecalciferol (CALCIUM 600-D PO) Take 1 tablet by mouth 2 (two) times a day      Coenzyme Q10 (CO Q-10 PO) Take 1 capsule by mouth daily      diltiazem (CARDIZEM CD) 180 mg 24 hr capsule TAKE ONE CAPSULE BY MOUTH EVERY DAY 90 capsule 1    diltiazem (CARDIZEM) 60 mg tablet TAKE ONE TABLET BY MOUTH EVERY DAY 90 tablet 1    Eliquis 5 MG TAKE ONE TABLET BY MOUTH TWICE A  tablet 1    escitalopram (LEXAPRO) 20 mg tablet TAKE ONE TABLET BY MOUTH EVERY DAY 90 tablet 1    fluticasone (FLONASE) 50 mcg/act nasal spray APPLY ONE SPRAY IN EACH NOSTRIL ONCE DAILY AS NEEDED FOR RUNNY NOSE 48 g 1    gabapentin (NEURONTIN) 600 MG tablet TAKE ONE TABLET BY MOUTH EVERY MORNING , TAKE ONE TABLET BY MOUTH EVERY DAY IN THE AFTERNOON , AND TAKE TWO TABLETS BY MOUTH EVERY EVENING AT BEDTIME 360 tablet 1    HYDROcodone-acetaminophen (NORCO) 5-325 mg per tablet Take 1 tablet by mouth every 6 (six) hours as needed for pain Max Daily Amount: 4 tablets 120 tablet 0    levothyroxine 50 mcg tablet TAKE ONE TABLET BY MOUTH EVERY DAY 90 tablet 1    lisinopril (ZESTRIL) 20 mg tablet TAKE ONE TABLET BY MOUTH TWO TIMES A  tablet 1    LORazepam (ATIVAN) 1 mg tablet TAKE ONE TABLET BY MOUTH EVERY 6 HOURS AS NEEDED FOR ANXIETY 120 tablet 0    MAGNESIUM PO Take 1  "tablet by mouth daily      Multiple Vitamin (MULTIVITAMIN ADULT PO) Take 1 tablet by mouth daily      Omega-3 Fatty Acids (FISH OIL PO) Take 1 capsule by mouth daily      pantoprazole (PROTONIX) 40 mg tablet TAKE ONE TABLET BY MOUTH EVERY DAY 90 tablet 1    potassium chloride (MICRO-K) 10 MEQ CR capsule TAKE TWO CAPSULES BY MOUTH EVERY  capsule 3    simvastatin (ZOCOR) 10 mg tablet TAKE ONE-HALF TABLET BY MOUTH DAILY 45 tablet 1    sodium chloride 1 g tablet TAKE ONE TABLET BY MOUTH FOUR TIMES A DAY (Patient taking differently: Take 1 g by mouth 3 (three) times a day) 270 tablet 1    torsemide (DEMADEX) 20 mg tablet Take 1 tablet (20 mg total) by mouth daily 90 tablet 3    vitamin B-12 (VITAMIN B-12) 500 mcg tablet Take 1 tablet (500 mcg total) by mouth daily 90 tablet 1    albuterol (PROVENTIL HFA,VENTOLIN HFA) 90 mcg/act inhaler Inhale 2 puffs every 6 (six) hours as needed for wheezing (Patient not taking: Reported on 8/9/2024) 8 g 1    Fluticasone Furoate-Vilanterol 100-25 mcg/actuation inhaler Inhale 1 puff daily Rinse mouth after use. 60 blister 5     Current Facility-Administered Medications on File Prior to Visit   Medication Dose Route Frequency Provider Last Rate Last Admin    cyanocobalamin injection 1,000 mcg  1,000 mcg Intramuscular Q30 Days Zaira Velasquez MD   1,000 mcg at 04/20/23 1012     Allergies   Allergen Reactions    Indomethacin GI Intolerance, Dizziness and Other (See Comments)    Macrobid [Nitrofurantoin Monohyd Macro] Rash    Nitrofurantoin Other (See Comments)    Penicillins Rash and Other (See Comments)     Annotation - [id number]: can take cephalosporins    Sulfa Antibiotics Rash and Other (See Comments)        Objective     /64   Pulse 59   Resp 18   Ht 5' 3\" (1.6 m)   Wt 66.2 kg (146 lb)   BMI 25.86 kg/m²      PE:  AAOx 3  WDWN  Hearing intact, no drainage from eyes  no audible wheezing  no abdominal distension  LE compartments soft, skin intact    right hip:   No " dislocation/deformity  Neg. [facility]  ROM: 0- 115, ER 30, IR 20  POS. Bossman Test  Neg. Impingement test  No TTP over greater trochanter  Abduction: 5/5  Neg. Art's test  No TTP over SIJ    AT/GS intact    Imaging Studies: I have personally reviewed pertinent films in PACS  XRright hip: Severe right hip osteoarthritis with bone-on-bone contact.    Scribe Attestation      I,:  Ankit Lawson am acting as a scribe while in the presence of the attending physician.:       I,:  Gaviota Ford DO personally performed the services described in this documentation    as scribed in my presence.:

## 2024-08-13 ENCOUNTER — NURSE TRIAGE (OUTPATIENT)
Age: 79
End: 2024-08-13

## 2024-08-13 ENCOUNTER — APPOINTMENT (OUTPATIENT)
Dept: LAB | Facility: AMBULARY SURGERY CENTER | Age: 79
End: 2024-08-13
Payer: MEDICARE

## 2024-08-13 DIAGNOSIS — F41.9 ANXIETY: ICD-10-CM

## 2024-08-13 DIAGNOSIS — N89.8 VAGINAL ITCHING: Primary | ICD-10-CM

## 2024-08-13 DIAGNOSIS — M81.0 AGE-RELATED OSTEOPOROSIS WITHOUT CURRENT PATHOLOGICAL FRACTURE: ICD-10-CM

## 2024-08-13 DIAGNOSIS — Z85.3 HISTORY OF LEFT BREAST CANCER: ICD-10-CM

## 2024-08-13 DIAGNOSIS — D50.9 IRON DEFICIENCY ANEMIA, UNSPECIFIED IRON DEFICIENCY ANEMIA TYPE: ICD-10-CM

## 2024-08-13 DIAGNOSIS — M54.16 LUMBAR RADICULOPATHY: ICD-10-CM

## 2024-08-13 LAB
ALBUMIN SERPL BCG-MCNC: 3.6 G/DL (ref 3.5–5)
ALP SERPL-CCNC: 79 U/L (ref 34–104)
ALT SERPL W P-5'-P-CCNC: 11 U/L (ref 7–52)
ANION GAP SERPL CALCULATED.3IONS-SCNC: 10 MMOL/L (ref 4–13)
AST SERPL W P-5'-P-CCNC: 21 U/L (ref 13–39)
BASOPHILS # BLD AUTO: 0.14 THOUSANDS/ÂΜL (ref 0–0.1)
BASOPHILS NFR BLD AUTO: 2 % (ref 0–1)
BILIRUB SERPL-MCNC: 0.5 MG/DL (ref 0.2–1)
BUN SERPL-MCNC: 14 MG/DL (ref 5–25)
CALCIUM SERPL-MCNC: 9.7 MG/DL (ref 8.4–10.2)
CHLORIDE SERPL-SCNC: 97 MMOL/L (ref 96–108)
CO2 SERPL-SCNC: 29 MMOL/L (ref 21–32)
CREAT SERPL-MCNC: 0.86 MG/DL (ref 0.6–1.3)
EOSINOPHIL # BLD AUTO: 0.35 THOUSAND/ÂΜL (ref 0–0.61)
EOSINOPHIL NFR BLD AUTO: 5 % (ref 0–6)
ERYTHROCYTE [DISTWIDTH] IN BLOOD BY AUTOMATED COUNT: 14.4 % (ref 11.6–15.1)
FERRITIN SERPL-MCNC: 12 NG/ML (ref 11–307)
GFR SERPL CREATININE-BSD FRML MDRD: 64 ML/MIN/1.73SQ M
GLUCOSE P FAST SERPL-MCNC: 89 MG/DL (ref 65–99)
HCT VFR BLD AUTO: 35.3 % (ref 34.8–46.1)
HGB BLD-MCNC: 11.3 G/DL (ref 11.5–15.4)
IMM GRANULOCYTES # BLD AUTO: 0.01 THOUSAND/UL (ref 0–0.2)
IMM GRANULOCYTES NFR BLD AUTO: 0 % (ref 0–2)
IRON SATN MFR SERPL: 17 % (ref 15–50)
IRON SERPL-MCNC: 71 UG/DL (ref 50–212)
LDH SERPL-CCNC: 183 U/L (ref 140–271)
LYMPHOCYTES # BLD AUTO: 2.36 THOUSANDS/ÂΜL (ref 0.6–4.47)
LYMPHOCYTES NFR BLD AUTO: 36 % (ref 14–44)
MAGNESIUM SERPL-MCNC: 1.8 MG/DL (ref 1.9–2.7)
MCH RBC QN AUTO: 30.4 PG (ref 26.8–34.3)
MCHC RBC AUTO-ENTMCNC: 32 G/DL (ref 31.4–37.4)
MCV RBC AUTO: 95 FL (ref 82–98)
MONOCYTES # BLD AUTO: 1.16 THOUSAND/ÂΜL (ref 0.17–1.22)
MONOCYTES NFR BLD AUTO: 18 % (ref 4–12)
NEUTROPHILS # BLD AUTO: 2.47 THOUSANDS/ÂΜL (ref 1.85–7.62)
NEUTS SEG NFR BLD AUTO: 39 % (ref 43–75)
NRBC BLD AUTO-RTO: 0 /100 WBCS
PLATELET # BLD AUTO: 533 THOUSANDS/UL (ref 149–390)
PMV BLD AUTO: 9.2 FL (ref 8.9–12.7)
POTASSIUM SERPL-SCNC: 4 MMOL/L (ref 3.5–5.3)
PROT SERPL-MCNC: 6.4 G/DL (ref 6.4–8.4)
RBC # BLD AUTO: 3.72 MILLION/UL (ref 3.81–5.12)
RETICS # AUTO: ABNORMAL 10*3/UL (ref 14097–95744)
RETICS # CALC: 2.02 % (ref 0.37–1.87)
SODIUM SERPL-SCNC: 136 MMOL/L (ref 135–147)
TIBC SERPL-MCNC: 409 UG/DL (ref 250–450)
UIBC SERPL-MCNC: 338 UG/DL (ref 155–355)
WBC # BLD AUTO: 6.49 THOUSAND/UL (ref 4.31–10.16)

## 2024-08-13 PROCEDURE — 36415 COLL VENOUS BLD VENIPUNCTURE: CPT

## 2024-08-13 PROCEDURE — 80053 COMPREHEN METABOLIC PANEL: CPT

## 2024-08-13 PROCEDURE — 83735 ASSAY OF MAGNESIUM: CPT

## 2024-08-13 PROCEDURE — 85045 AUTOMATED RETICULOCYTE COUNT: CPT

## 2024-08-13 PROCEDURE — 83615 LACTATE (LD) (LDH) ENZYME: CPT

## 2024-08-13 PROCEDURE — 83540 ASSAY OF IRON: CPT

## 2024-08-13 PROCEDURE — 83550 IRON BINDING TEST: CPT

## 2024-08-13 PROCEDURE — 82728 ASSAY OF FERRITIN: CPT

## 2024-08-13 PROCEDURE — 85025 COMPLETE CBC W/AUTO DIFF WBC: CPT

## 2024-08-13 RX ORDER — HYDROCODONE BITARTRATE AND ACETAMINOPHEN 5; 325 MG/1; MG/1
1 TABLET ORAL EVERY 6 HOURS PRN
Qty: 120 TABLET | Refills: 0 | Status: SHIPPED | OUTPATIENT
Start: 2024-08-13

## 2024-08-13 RX ORDER — FLUCONAZOLE 150 MG/1
150 TABLET ORAL DAILY
Qty: 1 TABLET | Refills: 0 | Status: SHIPPED | OUTPATIENT
Start: 2024-08-13 | End: 2024-08-14

## 2024-08-13 RX ORDER — ALENDRONATE SODIUM 70 MG/1
70 TABLET ORAL
Qty: 12 TABLET | Refills: 1 | Status: SHIPPED | OUTPATIENT
Start: 2024-08-13

## 2024-08-13 RX ORDER — LORAZEPAM 1 MG/1
1 TABLET ORAL EVERY 6 HOURS PRN
Qty: 120 TABLET | Refills: 0 | Status: SHIPPED | OUTPATIENT
Start: 2024-08-13

## 2024-08-13 NOTE — TELEPHONE ENCOUNTER
"Incoming call from patient. Symptoms started about a week ago and include vaginal itching. Denies discharge changes, vaginal odor.     Diflucan x1 sent in to pharmacy on file. Patient will contact office if symptoms worsen or do not resolve after treatment.     \"How many yeast infections have you had in the past 2 years?\"    -If 1 or less, prescribe Diflucan 150mg PO. If no improvement after 1 dose treatment, please instruct patient to call back to schedule appointment. (should be seen within 3 days)    -If more than 4 or more yeast infections in a year or if they are recurrent, schedule appointment within 3 days.    For OB patients: if patient wishes to use over the counter monistat, recommend only the 7 day treatment.     Reason for Disposition   Symptoms of a yeast infection (i.e., itchy, white discharge, not bad smelling) and feels like prior vaginal yeast infections    Answer Assessment - Initial Assessment Questions  1. SYMPTOM: \"What's the main symptom you're concerned about?\" (e.g., pain, itching, dryness)      Vaginal itching - has tried hydrocortisone with no relief. Denies discharge changes and vaginal odor.     2. LOCATION: \"Where is the  symptoms located?\" (e.g., inside/outside, left/right)      Vagina    3. ONSET: \"When did the  symptoms  start?\"      1 week ago    4. PAIN: \"Is there any pain?\" If Yes, ask: \"How bad is it?\" (Scale: 1-10; mild, moderate, severe)      Denies     5. ITCHING: \"Is there any itching?\" If Yes, ask: \"How bad is it?\" (Scale: 1-10; mild, moderate, severe)      Yes, itching    6. CAUSE: \"What do you think is causing the discharge?\" \"Have you had the same problem before? What happened then?\"      Unknown    7. OTHER SYMPTOMS: \"Do you have any other symptoms?\" (e.g., fever, itching, vaginal bleeding, pain with urination, injury to genital area, vaginal foreign body)      Denies    Protocols used: Vaginal Symptoms-ADULT-[place]    "

## 2024-08-13 NOTE — TELEPHONE ENCOUNTER
Regarding: Severe Itching  ----- Message from Gaviota FRANCIS sent at 8/13/2024 11:24 AM EDT -----  Pt called stating she has been having severe itching in her private area. Tried to transfer to [facility] none available currently. Pt to please be called back to further discuss.

## 2024-08-13 NOTE — TELEPHONE ENCOUNTER
Patient is going away for 2 months and needs all three of these medications refilled to last her the whole time.     Reason for call:   [x] Refill   [] Prior Auth  [] Other:     Office:   [x] PCP/Provider - [place] [facility]  [] Specialty/Provider -     Medication:       Does the patient have enough for 3 days?   [x] Yes   [] No - Send as HP to POD

## 2024-08-14 ENCOUNTER — TELEPHONE (OUTPATIENT)
Age: 79
End: 2024-08-14

## 2024-08-14 NOTE — TELEPHONE ENCOUNTER
Patient was rescheduled for Dr. Cunningham on 08/19/24 to 09/17/24 and is worried about labs being low. No available appointments and patient will be leaving for 2 months on 08/22/24. Patient would like to know what she should do between now and appointment on 10/28/24 at 8:00 am with Dr. Cunningham. Please call patient back at [phone]

## 2024-08-15 ENCOUNTER — OFFICE VISIT (OUTPATIENT)
Dept: OCCUPATIONAL THERAPY | Facility: CLINIC | Age: 79
End: 2024-08-15
Payer: MEDICARE

## 2024-08-15 DIAGNOSIS — M66.241 NONTRAUMATIC SUBLUXATION OF EXTENSOR TENDON AT METACARPOPHALANGEAL JOINT OF RIGHT HAND: Primary | ICD-10-CM

## 2024-08-15 PROCEDURE — 97110 THERAPEUTIC EXERCISES: CPT | Performed by: OCCUPATIONAL THERAPIST

## 2024-08-15 NOTE — PROGRESS NOTES
"Daily Note     Today's date: 8/15/2024  Patient name: Matilda Day  : 1945  MRN: [id number]  Referring provider: Traci Molina CRNP  Dx:   Encounter Diagnosis     ICD-10-CM    1. Nontraumatic subluxation of extensor tendon at metacarpophalangeal joint of right hand  M66.241                 Subjective: \"I think my hand has been weaker\"      Objective: See treatment diary below      Assessment: Upgraded HEP today to include yellow theraputty for strength. Motion WFL in flexion and extension. No pain.       Plan: Will continue with HEP and return as needed.        Precautions: DOS 24      Manuals 8/2 8/6 8/8 8/15 7/9 7/16 7/19 7/23 7/26 7/30   MEM     8' 8' 5'      STM 5' 5'      5'  5'                               Neuro Re-Ed                                                                                                        Ther Ex               Re-eval Performed    Yellow theraputty          HEP:      Short arc flexion of digits (with wrist in extension), EDC gliding       Hook fist pegs     W/ splint on No splint, 1x No splint, 1x 1x x                 Active digital extension     W/ place and holds 10x Place and holds, 10x  10x 10x 10x   Gentle PROM  3' 3'  3'      2'   Towel scrunches with splint on      5x        Gentle intrinsic stretching      3' 2'       keypegs     1x w/ IF & thumb All digits        Partial arc fist      Gripping large dowel, 10x       Colored pegs      Opposition with RF Opposition with RF Opposition with RF Opposition with RF    Pencil progressive grasp       2x 2x 2x    EDC gliding  Small PVC 20x Small PVC 20x Small PVC 20x    Small PVC 20x  Small PVC 20x  SmallPVC 20x Small PVC 20x   Coordination balls  2' 2' 2' 2'   2' 2' 2' 2'    Ball on walking TB up/odwn 5x TB 6x TB maze 5x TB maze 6x   TB up/down, 2x TB up/down, 3x TB up/odwn 3x TB up/odwn 5x   Wrist maze 6x 7x 8x       6x   Light rubber band digital extension    20x          Digiflex   Yellow 10x Yellow 10x      "    Extension web    Yellow 10x         Ther Activity             Keypegs 1x 1x x1        x1 1/2x with RF   Gripping    RPW center, rim 20x          Jar turning     In yellow, 10x                                   Modalities             MHP  5' 5' 5' 5' 5' 5' 5' 5'  5'

## 2024-08-15 NOTE — TELEPHONE ENCOUNTER
Called and left a detailed message on patients voicemail relaying Dr. Cunningham's message. If she is willing to do IV iron before she goes away, we will have infusion reach out to her to get this set up. Left the hopeline number for her to call back and let us know if she is agreeable.

## 2024-08-16 ENCOUNTER — DOCUMENTATION (OUTPATIENT)
Dept: LABOR AND DELIVERY | Facility: HOSPITAL | Age: 79
End: 2024-08-16

## 2024-08-16 ENCOUNTER — NURSE TRIAGE (OUTPATIENT)
Age: 79
End: 2024-08-16

## 2024-08-16 ENCOUNTER — TELEPHONE (OUTPATIENT)
Age: 79
End: 2024-08-16

## 2024-08-16 DIAGNOSIS — D50.9 IRON DEFICIENCY ANEMIA, UNSPECIFIED IRON DEFICIENCY ANEMIA TYPE: Primary | ICD-10-CM

## 2024-08-16 DIAGNOSIS — N89.8 VAGINAL ITCHING: Primary | ICD-10-CM

## 2024-08-16 RX ORDER — FLUCONAZOLE 150 MG/1
150 TABLET ORAL ONCE
Qty: 1 TABLET | Refills: 0 | Status: SHIPPED | OUTPATIENT
Start: 2024-08-16 | End: 2024-08-16

## 2024-08-16 RX ORDER — SODIUM CHLORIDE 9 MG/ML
20 INJECTION, SOLUTION INTRAVENOUS ONCE
Status: CANCELLED | OUTPATIENT
Start: 2024-08-19

## 2024-08-16 NOTE — TELEPHONE ENCOUNTER
"Matilda was provided with diflucan on 8/13/2024 for symptoms of yeast infection. She initially improved. Itching returned Thursday night.     Reports itching of external labia into pubic area. Denies vaginal discharge or vaginal odor.  Denies rash, feels she is scratching herself raw. She is requesting additional dose of Diflucan.    Leaving for vacation on Wednesday.     Attempted to schedule appt for evaluation, no availability per book it.  Patient requesting message to provider for additional dose of Diflucan.     Encouraged wash with mild soap such as dove or ivory. Keep area clean and dry.     Last seen 4/2023 by Dr. Justin.   Please review and advise  ESC sent to Dr. Adams- on merlin  ESC reply, rx sent.    L/M on patient v/m to return call    Reason for Disposition   Symptoms of a yeast infection (i.e., itchy, white discharge, not bad smelling) and not improved > 3 days following CARE ADVICE    Answer Assessment - Initial Assessment Questions  1. SYMPTOM: \"What's the main symptom you're concerned about?\" (e.g., pain, itching, dryness)     External labial itching into pubic hair and near pubic bone  2. LOCATION: \"Where is the  itching located?\" (e.g., inside/outside, left/right)      External labia into pubic hair   3. ONSET: \"When did the  itching  start?\"      Over a week ago  4. PAIN: \"Is there any pain?\" If Yes, ask: \"How bad is it?\" (Scale: 1-10; mild, moderate, severe)      Pain from itching  5. ITCHING: \"Is there any itching?\" If Yes, ask: \"How bad is it?\" (Scale: 1-10; mild, moderate, severe)      Yes, moderate  6. CAUSE: \"What do you think is causing the discharge?\" \"Have you had the same problem before? What happened then?\"      Denies discharge  7. OTHER SYMPTOMS: \"Do you have any other symptoms?\" (e.g., fever, itching, vaginal bleeding, pain with urination, injury to genital area, vaginal foreign body)      Denies vaginal pain or itching  8. PREGNANCY: \"Is there any chance you are pregnant?\" " "\"When was your last menstrual period?\"      Post menopausal    Protocols used: Vaginal Symptoms-ADULT-[place]    "

## 2024-08-16 NOTE — TELEPHONE ENCOUNTER
Regarding: vaginal itching  ----- Message from Simran HOLLEY sent at 8/16/2024  1:05 PM EDT -----  Pt called reporting cont'd vaginal itching after taking Diflucan x1 on 8/14. Pt was instructed to F/U today if symptoms did not fully resolve.

## 2024-08-16 NOTE — TELEPHONE ENCOUNTER
Patient returned call. RN advised provider sent another round of the diflucan medication to pharmacy to help with clearing symptoms. Verified pharmacy. Advised to call back if symptoms still do not improve after 2nd dose. Pt verbalized an understanding. No further questions.

## 2024-08-16 NOTE — TELEPHONE ENCOUNTER
Matilda is returning a call from [facility] regarding iron infusions, Matilda is agreeable to the infusion.  Matilda stated that she is going away for 2 months, she is leaving on 8/21/24.  She would like the infusion to be scheduled at the [facility].

## 2024-08-19 ENCOUNTER — HOSPITAL ENCOUNTER (OUTPATIENT)
Dept: INFUSION CENTER | Facility: CLINIC | Age: 79
Discharge: HOME | End: 2024-08-19
Payer: MEDICARE

## 2024-08-19 VITALS
SYSTOLIC BLOOD PRESSURE: 114 MMHG | HEART RATE: 65 BPM | TEMPERATURE: 97.7 F | DIASTOLIC BLOOD PRESSURE: 65 MMHG | OXYGEN SATURATION: 94 %

## 2024-08-19 DIAGNOSIS — D50.9 IRON DEFICIENCY ANEMIA, UNSPECIFIED IRON DEFICIENCY ANEMIA TYPE: Primary | ICD-10-CM

## 2024-08-19 PROCEDURE — 96365 THER/PROPH/DIAG IV INF INIT: CPT

## 2024-08-19 PROCEDURE — 96367 TX/PROPH/DG ADDL SEQ IV INF: CPT

## 2024-08-19 PROCEDURE — 96375 TX/PRO/DX INJ NEW DRUG ADDON: CPT

## 2024-08-19 RX ORDER — SODIUM CHLORIDE 9 MG/ML
20 INJECTION, SOLUTION INTRAVENOUS ONCE
OUTPATIENT
Start: 2025-02-15

## 2024-08-19 RX ORDER — SODIUM CHLORIDE 9 MG/ML
20 INJECTION, SOLUTION INTRAVENOUS ONCE
Status: COMPLETED | OUTPATIENT
Start: 2024-08-19 | End: 2024-08-19

## 2024-08-19 RX ADMIN — HYDROCORTISONE SODIUM SUCCINATE 50 MG: 100 INJECTION, POWDER, FOR SOLUTION INTRAMUSCULAR; INTRAVENOUS at 10:36

## 2024-08-19 RX ADMIN — SODIUM CHLORIDE 20 ML/HR: 0.9 INJECTION, SOLUTION INTRAVENOUS at 10:36

## 2024-08-19 RX ADMIN — DIPHENHYDRAMINE HYDROCHLORIDE 12.5 MG: 50 INJECTION, SOLUTION INTRAMUSCULAR; INTRAVENOUS at 10:39

## 2024-08-19 RX ADMIN — IRON SUCROSE 200 MG: 20 INJECTION, SOLUTION INTRAVENOUS at 11:13

## 2024-08-19 NOTE — PLAN OF CARE
Problem: Potential for Falls  Goal: Patient will remain free of falls  Description: INTERVENTIONS:  - Educate patient/family on patient safety including physical limitations  - Instruct patient to call for assistance with activity   - Consult OT/PT to assist with strengthening/mobility   - Keep Call bell within reach  - Keep bed low and locked with side rails adjusted as appropriate  - Keep care items and personal belongings within reach  - Initiate and maintain comfort rounds  - Make Fall Risk Sign visible to staff  -- Apply yellow socks and bracelet for high fall risk patients  - Consider moving patient to room near nurses station  Outcome: Completed

## 2024-08-19 NOTE — PROGRESS NOTES
Pt to clinic for venofer infusion. pt tolerated without complications. No further appointments scheduled at this time. Pt going away for two months and has follow up with provider when she returns to discuss further treatment.

## 2024-08-20 ENCOUNTER — APPOINTMENT (OUTPATIENT)
Dept: LAB | Facility: AMBULARY SURGERY CENTER | Age: 79
End: 2024-08-20
Payer: MEDICARE

## 2024-08-20 DIAGNOSIS — I10 ESSENTIAL HYPERTENSION: ICD-10-CM

## 2024-08-20 DIAGNOSIS — E87.1 HYPONATREMIA: ICD-10-CM

## 2024-08-20 LAB
ANION GAP SERPL CALCULATED.3IONS-SCNC: 9 MMOL/L (ref 4–13)
BUN SERPL-MCNC: 13 MG/DL (ref 5–25)
CALCIUM SERPL-MCNC: 9 MG/DL (ref 8.4–10.2)
CHLORIDE SERPL-SCNC: 94 MMOL/L (ref 96–108)
CO2 SERPL-SCNC: 27 MMOL/L (ref 21–32)
CREAT SERPL-MCNC: 0.74 MG/DL (ref 0.6–1.3)
GFR SERPL CREATININE-BSD FRML MDRD: 77 ML/MIN/1.73SQ M
GLUCOSE P FAST SERPL-MCNC: 95 MG/DL (ref 65–99)
POTASSIUM SERPL-SCNC: 3.6 MMOL/L (ref 3.5–5.3)
SODIUM SERPL-SCNC: 130 MMOL/L (ref 135–147)

## 2024-08-20 PROCEDURE — 36415 COLL VENOUS BLD VENIPUNCTURE: CPT

## 2024-08-20 PROCEDURE — 80048 BASIC METABOLIC PNL TOTAL CA: CPT

## 2024-08-23 ENCOUNTER — TELEPHONE (OUTPATIENT)
Age: 79
End: 2024-08-23

## 2024-08-23 NOTE — TELEPHONE ENCOUNTER
PA for Hydrocodone-Acetaminophen (NORCO) 5-325 mg per tablet SUBMITTED     via    []CMM-KEY:   [x]Surescripts-Case ID #  [id number]    []Faxed to plan   []Other website   []Phone call Case ID #     Office notes sent, clinical questions answered. Awaiting determination    Turnaround time for your insurance to make a decision on your Prior Authorization can take 7-21 business days.

## 2024-08-23 NOTE — TELEPHONE ENCOUNTER
PA for Hydrocodone-Acetaminophen (NORCO) 5-325 mg per tablet APPROVED     Date(s) approved 1-1-2024 - 8-        Patient advised by          [x] [facility] Message  [] Phone call   []LMOM  []L/M to call office as no active Communication consent on file  []Unable to leave detailed message as VM not approved on Communication consent       Pharmacy advised by    [x]Fax  []Phone call    Approval letter scanned into Media Yes

## 2024-08-27 ENCOUNTER — NURSE TRIAGE (OUTPATIENT)
Age: 79
End: 2024-08-27

## 2024-08-27 DIAGNOSIS — E55.9 VITAMIN D DEFICIENCY: ICD-10-CM

## 2024-08-27 DIAGNOSIS — I10 ESSENTIAL HYPERTENSION: Primary | ICD-10-CM

## 2024-08-27 DIAGNOSIS — E83.42 HYPOMAGNESEMIA: ICD-10-CM

## 2024-08-27 NOTE — TELEPHONE ENCOUNTER
"Answer Assessment - Initial Assessment Questions  1. ONSET: \"When did the swelling start?\" (e.g., minutes, hours, days)      Since the beginning of the month    2. LOCATION: \"What part of the leg is swollen?\"  \"Are both legs swollen or just one leg?\"      Left- up to the knee, right- only to the ankle    3. SEVERITY: \"How bad is the swelling?\" (e.g., localized; mild, moderate, severe)   - Localized - small area of swelling localized to one leg   - MILD pedal edema - swelling limited to foot and ankle, pitting edema < 1/4 inch (6 mm) deep, rest and elevation eliminate most or all swelling   - MODERATE edema - swelling of lower leg to knee, pitting edema > 1/4 inch (6 mm) deep, rest and elevation only partially reduce swelling   - SEVERE edema - swelling extends above knee, facial or hand swelling present       Mild- moderate     4. REDNESS: \"Does the swelling look red or infected?\"      Denies     5. PAIN: \"Is the swelling painful to touch?\" If Yes, ask: \"How painful is it?\"   (Scale 1-10; mild, moderate or severe)      Denies, feet are tender on top but not painful     6. FEVER: \"Do you have a fever?\" If Yes, ask: \"What is it, how was it measured, and when did it start?\"       Denies     7. CAUSE: \"What do you think is causing the leg swelling?\"      Unsure, was seen 8/7 regarding this as well    8. MEDICAL HISTORY: \"Do you have a history of heart failure, kidney disease, liver failure, or cancer?\"      Had breast cancer in the past    9. RECURRENT SYMPTOM: \"Have you had leg swelling before?\" If Yes, ask: \"When was the last time?\" \"What happened that time?\"      Denies     10. OTHER SYMPTOMS: \"Do you have any other symptoms?\" (e.g., chest pain, difficulty breathing)        Denies    Protocols used: Leg Swelling and Edema-ADULT-[place]    "

## 2024-08-27 NOTE — TELEPHONE ENCOUNTER
Called patient back- she had seen Dr. Smiley back in the beginning of the month on 8/7 and reporting her leg swelling, which is when he prescribed Torsemide. He also asked her to call back if the Torsemide does not help the swelling.    She said she took it everyday since last Wednesday and even took extra (30 mg) yesterday and nothing works at all. Her left leg swells up to her knee and her right only swells into her ankle. She denies any redness or pain, just some tenderness on the tops of her feet. She denies any other symptoms besides the swelling.    She would like to know what she should try next? Please advise, [phone].

## 2024-08-27 NOTE — TELEPHONE ENCOUNTER
----- Message from Mercedes MAR sent at 8/27/2024  3:09 PM EDT -----  Redirecting message  please call pt.    ----- Message from Mercedes MAR sent at 8/27/2024  3:08 PM EDT -----  Patient called she is taking torsemide the  swelling in legs upto the knees one leg and to her ankle on the other    denies sob. gained 2 to 3 lbs over night then in the morning it goes down, by noon she is swollen again. she is taking 20mg torsemide and she has taken 30mg she feels not strong enough.

## 2024-08-28 DIAGNOSIS — R60.0 LOCALIZED EDEMA: Primary | ICD-10-CM

## 2024-08-28 NOTE — TELEPHONE ENCOUNTER
Patient called in said that she will go to [facility] in [place] and asks us to fax the lab orders there at [phone]. Please fax once ordered.

## 2024-08-28 NOTE — TELEPHONE ENCOUNTER
Patient returning call and made aware:     Vinicius Smiley, DO   to Adore Louis MA       8/28/24  8:29 AM   I would have her increase her torsemide to 40 mg daily.  Would also recommend venous Doppler given asymmetric swelling.  Please have her repeat a BMP.  I placed the order for venous Doppler.  Thank you.  Vinicius Smiley, DO   to Adore Louis MA     Patient verbalized understanding. States she is in [place] until October and will call back with a fax number to fax lab orders to [facility]. Patient states she does not know anywhere she can go in [place] for the doppler. Suggested she ask the staff at the lab or a nearby hospital.

## 2024-09-01 DIAGNOSIS — M81.0 AGE-RELATED OSTEOPOROSIS WITHOUT CURRENT PATHOLOGICAL FRACTURE: ICD-10-CM

## 2024-09-02 RX ORDER — ALENDRONATE SODIUM 70 MG/1
TABLET ORAL
Qty: 4 TABLET | Refills: 2 | Status: SHIPPED | OUTPATIENT
Start: 2024-09-02

## 2024-09-05 ENCOUNTER — TELEPHONE (OUTPATIENT)
Dept: OTHER | Facility: HOSPITAL | Age: 79
End: 2024-09-05

## 2024-09-05 DIAGNOSIS — I10 ESSENTIAL HYPERTENSION: Primary | ICD-10-CM

## 2024-09-05 DIAGNOSIS — M51.36 LUMBAR DEGENERATIVE DISC DISEASE: ICD-10-CM

## 2024-09-05 LAB
BUN SERPL-MCNC: 13 MG/DL (ref 7–25)
BUN/CREAT SERPL: 13 (CALC) (ref 6–22)
CALCIUM SERPL-MCNC: 9.1 MG/DL (ref 8.6–10.4)
CHLORIDE SERPL-SCNC: 95 MMOL/L (ref 98–110)
CO2 SERPL-SCNC: 30 MMOL/L (ref 20–32)
CREAT SERPL-MCNC: 0.97 MG/DL (ref 0.6–0.95)
GFR/BSA.PRED SERPLBLD CYS-BASED-ARV: 59 ML/MIN/1.73M2
GLUCOSE SERPL-MCNC: 129 MG/DL (ref 65–139)
POTASSIUM SERPL-SCNC: 4.5 MMOL/L (ref 3.5–5.3)
SODIUM SERPL-SCNC: 135 MMOL/L (ref 135–146)

## 2024-09-05 RX ORDER — TORSEMIDE 10 MG/1
TABLET ORAL
Qty: 90 TABLET | Refills: 1 | Status: SHIPPED | OUTPATIENT
Start: 2024-09-05

## 2024-09-05 RX ORDER — GABAPENTIN 600 MG/1
TABLET ORAL
Qty: 360 TABLET | Refills: 1 | Status: SHIPPED | OUTPATIENT
Start: 2024-09-05

## 2024-09-05 NOTE — TELEPHONE ENCOUNTER
Sodium level overall has normalized to 135.  Will continue with sodium chloride tablets plus loop diuretic therapy.  Swelling seems to have improved.

## 2024-09-11 NOTE — PROGRESS NOTES
Assessment/Plan:    Lumbar degenerative disc disease  Recommend to see pain and spine again  On gabapentin (700 mg in AM and noon, 1200 mg qHS), takes Vicodin 1-2x a day  PDMP reviewed  Hyponatremia  NaCl incrased to tid, Na within normal     Supraventricular tachycardia (HCC)  Holter scheduled  Moderate episode of recurrent major depressive disorder (HCC)  Stable, on bupropion and escitalopram   Takes lorazepam at least bid  Overweight  Lost 5 lbs since last visit  Ulcerative colitis without complications (HCC)  Stable, takes Imodium once a day only  Portal vein thrombosis  On lifelong anticoagulation  Essential hypertension  BP stable, on lisinopril, diltiazem and torsemide  Mild intermittent asthma without complication  Stable, uses Advair at least once a day  Essential tremor  Follow up with neurology  Acquired hypothyroidism  TSH due  Hyperlipidemia  Lipids due, on statin  Sjogren's syndrome ([facility] )  On Plaquenil  Followed by rheumatology  GERD (gastroesophageal reflux disease)  On daily PPI  Osteopenia  Bone density due 2019  Diagnoses and all orders for this visit:    Hyponatremia    Lumbar degenerative disc disease    Right inguinal pain  -     XR hip/pelv 2-3 vws right if performed; Future    Lumbar radiculopathy  -     HYDROcodone-acetaminophen (NORCO) 5-325 mg per tablet; Take 1 tablet by mouth every 6 (six) hours as needed for pain Max Daily Amount: 4 tablets    Acquired hypothyroidism  -     TSH, 3rd generation with Free T4 reflex    Overweight    Essential hypertension  -     CBC and differential    Essential tremor    Other hyperlipidemia  -     TSH, 3rd generation with Free T4 reflex  -     Lipid panel    Hypomagnesemia  -     Magnesium    Portal vein thrombosis  -     Protime-INR;  Standing  -     Protime-INR    Sjogren's syndrome, with unspecified organ involvement ([facility] )    Prediabetes  -     Hemoglobin A1C    Osteopenia of multiple sites  -     DXA bone Patient visible but isolative to self.  Patient did attempt to interact with female peer at group this evening. Patient cooperative and medication compliant. Behaviors controlled and appropriate.  Offered no complaints. No prn medication requested or required.  Uneventful shift.   density spine hip and pelvis; Future    Mild intermittent asthma without complication    Encounter for long-term (current) use of medications  -     Vitamin B12    Diarrhea, unspecified type    Need for shingles vaccine  -     Discontinue: Zoster Vac Recomb Adjuvanted 50 MCG/0 5ML SUSR; Inject 1 mL into a muscle once for 1 dose Repeat in 2-6 months IM  -     Zoster Vac Recomb Adjuvanted 50 MCG/0 5ML SUSR; Inject 1 mL into a muscle once for 1 dose Repeat in 2-6 months IM      Follow up in 5 months or as needed  Subjective:      Patient ID: Pauline Suggs is a 75 y o  female  Ms  Brandi Gonsales complains of right leg and groin pain  She reports pain has been gradually getting worse  She would apply heat on her thigh and leg at night to prevent it from cramping, helps with the pain  She takes gabapentin regularly  She takes Vicodin at least once a day, sometimes twice  She has had injections for her back in the past, has not had one for several years  Recently, she complains of right groin discomfort, would affect her posture  She reports that she does not experience any pain while walking on the treadmill, can do up to 40 min with no symptoms  Pain usually worse when resting and when laying down to sleep  She complains of frequent cramps  She reports loose stools are a little bit better  She is only taking Imodium once a day  She is taking her salt tablets 2 times a day as instructed  She reports her tremors are slightly worse, hurt herself a brushing his teeth recently  She has an appointment with Neurology  She stop drinking iced tea, reports vaginal irritation has improved  She did see Gynecology recently  The following portions of the patient's history were reviewed and updated as appropriate: allergies, current medications, past medical history, past social history and problem list     Review of Systems   Constitutional: Negative for appetite change and fatigue     HENT: Negative for congestion, ear pain and postnasal drip  Eyes: Negative for visual disturbance  Respiratory: Negative for cough and shortness of breath  Cardiovascular: Negative for chest pain and leg swelling  Gastrointestinal: Positive for diarrhea  Negative for abdominal pain, constipation and nausea  Genitourinary: Negative for dysuria, frequency and urgency  Musculoskeletal: Positive for arthralgias, back pain and gait problem  Negative for myalgias  Skin: Negative for rash and wound  Neurological: Negative for dizziness, numbness and headaches  Hematological: Does not bruise/bleed easily  Psychiatric/Behavioral: Negative for confusion  The patient is not nervous/anxious  Objective:      /70   Pulse 78   Temp 98 °F (36 7 °C)   Resp 18   Ht 5' 3" (1 6 m)   Wt 75 8 kg (167 lb)   SpO2 98%   BMI 29 58 kg/m²          Physical Exam   Constitutional: She is oriented to person, place, and time  She appears well-developed and well-nourished  HENT:   Head: Normocephalic and atraumatic  Nose: Nose normal    Eyes: Pupils are equal, round, and reactive to light  Conjunctivae are normal    Neck: Neck supple  Cardiovascular: Normal rate, regular rhythm and normal heart sounds  No edema  Pulmonary/Chest: Effort normal and breath sounds normal  She has no wheezes  She has no rales  Abdominal: Soft  Bowel sounds are normal    Musculoskeletal:   Antalgic gait  No trochanteric tenderness, R   Neurological: She is alert and oriented to person, place, and time  Mild tremors, L hand   Skin: Skin is warm  No rash noted  Psychiatric: She has a normal mood and affect  Her behavior is normal    Nursing note and vitals reviewed  Lab &/or imaging results reviewed with patient  36.9

## 2024-09-14 DIAGNOSIS — F41.9 ANXIETY: ICD-10-CM

## 2024-09-15 RX ORDER — ESCITALOPRAM OXALATE 20 MG/1
20 TABLET ORAL DAILY
Qty: 90 TABLET | Refills: 1 | Status: SHIPPED | OUTPATIENT
Start: 2024-09-15

## 2024-10-02 ENCOUNTER — TELEPHONE (OUTPATIENT)
Age: 79
End: 2024-10-02

## 2024-10-02 DIAGNOSIS — E87.1 HYPONATREMIA: ICD-10-CM

## 2024-10-02 NOTE — TELEPHONE ENCOUNTER
Patient called in requesting to have her Sodium chloride medication to a pharmacy in [place].    The address is as follows: [place] [address]    P:    [phone]  
soft/no masses palpable/no distention/bowel sounds normal/normal/nontender/no bruit

## 2024-10-03 DIAGNOSIS — E87.1 HYPONATREMIA: ICD-10-CM

## 2024-10-03 RX ORDER — SODIUM CHLORIDE 1 G/1
1 TABLET ORAL 3 TIMES DAILY
Qty: 90 TABLET | Refills: 0 | Status: SHIPPED | OUTPATIENT
Start: 2024-10-03 | End: 2024-10-10

## 2024-10-03 RX ORDER — SODIUM CHLORIDE 1 G/1
1 TABLET ORAL 3 TIMES DAILY
Qty: 90 TABLET | Refills: 3 | Status: SHIPPED | OUTPATIENT
Start: 2024-10-03 | End: 2024-10-03 | Stop reason: SDUPTHER

## 2024-10-03 RX ORDER — SODIUM CHLORIDE 1 G/1
1 TABLET ORAL 4 TIMES DAILY
Qty: 270 TABLET | Refills: 1 | Status: CANCELLED | OUTPATIENT
Start: 2024-10-03

## 2024-10-09 ENCOUNTER — TELEPHONE (OUTPATIENT)
Age: 79
End: 2024-10-09

## 2024-10-09 NOTE — TELEPHONE ENCOUNTER
Patient stated she was offered the 8:00 am appt with Dr Jenkins on 10/29, she stated she would prefer that. Please move the appt if this is still available and call her back to let her know either way of the appt time change. Best #[phone]

## 2024-10-10 ENCOUNTER — TELEPHONE (OUTPATIENT)
Age: 79
End: 2024-10-10

## 2024-10-10 DIAGNOSIS — E87.1 HYPONATREMIA: ICD-10-CM

## 2024-10-10 RX ORDER — SODIUM CHLORIDE 1 G/1
1 TABLET ORAL 4 TIMES DAILY
Qty: 270 TABLET | Refills: 1 | Status: SHIPPED | OUTPATIENT
Start: 2024-10-10

## 2024-10-10 NOTE — TELEPHONE ENCOUNTER
Pharmacy calling to confirm that the doctor wants patient to take sodium tablets when she is on torsemide?

## 2024-10-11 NOTE — TELEPHONE ENCOUNTER
Tere with [facility] calling for update. Informed per note that Dr. Smiley would like pt to take the sodium tablets with her torsemide.

## 2024-10-17 ENCOUNTER — TELEPHONE (OUTPATIENT)
Dept: OBGYN CLINIC | Facility: HOSPITAL | Age: 79
End: 2024-10-17

## 2024-10-17 NOTE — TELEPHONE ENCOUNTER
Caller: Patient    Doctor: Desmond    Reason for call: Patient has surgery on 11/04 and needs to reschedule [facility]    Call back#: [phone]

## 2024-10-18 ENCOUNTER — APPOINTMENT (OUTPATIENT)
Dept: LAB | Facility: AMBULARY SURGERY CENTER | Age: 79
End: 2024-10-18
Attending: ORTHOPAEDIC SURGERY
Payer: MEDICARE

## 2024-10-18 ENCOUNTER — LAB REQUISITION (OUTPATIENT)
Dept: LAB | Facility: HOSPITAL | Age: 79
End: 2024-10-18
Payer: MEDICARE

## 2024-10-18 DIAGNOSIS — Z01.818 ENCOUNTER FOR OTHER PREPROCEDURAL EXAMINATION: ICD-10-CM

## 2024-10-18 DIAGNOSIS — M16.11 PRIMARY OSTEOARTHRITIS OF ONE HIP, RIGHT: ICD-10-CM

## 2024-10-18 DIAGNOSIS — M25.59 PAIN IN OTHER SPECIFIED JOINT: ICD-10-CM

## 2024-10-18 DIAGNOSIS — Z01.818 PREOPERATIVE TESTING: ICD-10-CM

## 2024-10-18 LAB
ABO GROUP BLD: NORMAL
ALBUMIN SERPL BCG-MCNC: 3.9 G/DL (ref 3.5–5)
ALP SERPL-CCNC: 77 U/L (ref 34–104)
ALT SERPL W P-5'-P-CCNC: 10 U/L (ref 7–52)
ANION GAP SERPL CALCULATED.3IONS-SCNC: 8 MMOL/L (ref 4–13)
APTT PPP: 31 SECONDS (ref 23–34)
AST SERPL W P-5'-P-CCNC: 21 U/L (ref 13–39)
BASOPHILS # BLD AUTO: 0.09 THOUSANDS/ΜL (ref 0–0.1)
BASOPHILS NFR BLD AUTO: 2 % (ref 0–1)
BILIRUB SERPL-MCNC: 0.39 MG/DL (ref 0.2–1)
BLD GP AB SCN SERPL QL: NEGATIVE
BUN SERPL-MCNC: 13 MG/DL (ref 5–25)
CALCIUM SERPL-MCNC: 9.3 MG/DL (ref 8.4–10.2)
CHLORIDE SERPL-SCNC: 100 MMOL/L (ref 96–108)
CO2 SERPL-SCNC: 31 MMOL/L (ref 21–32)
CREAT SERPL-MCNC: 0.78 MG/DL (ref 0.6–1.3)
EOSINOPHIL # BLD AUTO: 0.23 THOUSAND/ΜL (ref 0–0.61)
EOSINOPHIL NFR BLD AUTO: 4 % (ref 0–6)
ERYTHROCYTE [DISTWIDTH] IN BLOOD BY AUTOMATED COUNT: 15.6 % (ref 11.6–15.1)
EST. AVERAGE GLUCOSE BLD GHB EST-MCNC: 123 MG/DL
FERRITIN SERPL-MCNC: 12 NG/ML (ref 11–307)
GFR SERPL CREATININE-BSD FRML MDRD: 72 ML/MIN/1.73SQ M
GLUCOSE P FAST SERPL-MCNC: 91 MG/DL (ref 65–99)
HBA1C MFR BLD: 5.9 %
HCT VFR BLD AUTO: 36.5 % (ref 34.8–46.1)
HGB BLD-MCNC: 11.7 G/DL (ref 11.5–15.4)
IMM GRANULOCYTES # BLD AUTO: 0 THOUSAND/UL (ref 0–0.2)
IMM GRANULOCYTES NFR BLD AUTO: 0 % (ref 0–2)
INR PPP: 1.13 (ref 0.85–1.19)
IRON SATN MFR SERPL: 21 % (ref 15–50)
IRON SERPL-MCNC: 85 UG/DL (ref 50–212)
LYMPHOCYTES # BLD AUTO: 2.26 THOUSANDS/ΜL (ref 0.6–4.47)
LYMPHOCYTES NFR BLD AUTO: 41 % (ref 14–44)
MCH RBC QN AUTO: 29.9 PG (ref 26.8–34.3)
MCHC RBC AUTO-ENTMCNC: 32.1 G/DL (ref 31.4–37.4)
MCV RBC AUTO: 93 FL (ref 82–98)
MONOCYTES # BLD AUTO: 0.85 THOUSAND/ΜL (ref 0.17–1.22)
MONOCYTES NFR BLD AUTO: 16 % (ref 4–12)
NEUTROPHILS # BLD AUTO: 2.02 THOUSANDS/ΜL (ref 1.85–7.62)
NEUTS SEG NFR BLD AUTO: 37 % (ref 43–75)
NRBC BLD AUTO-RTO: 0 /100 WBCS
PLATELET # BLD AUTO: 546 THOUSANDS/UL (ref 149–390)
PMV BLD AUTO: 9 FL (ref 8.9–12.7)
POTASSIUM SERPL-SCNC: 4.4 MMOL/L (ref 3.5–5.3)
PROT SERPL-MCNC: 6.7 G/DL (ref 6.4–8.4)
PROTHROMBIN TIME: 14.8 SECONDS (ref 12.3–15)
RBC # BLD AUTO: 3.91 MILLION/UL (ref 3.81–5.12)
RETICS # AUTO: ABNORMAL 10*3/UL (ref 14097–95744)
RETICS # CALC: 1.99 % (ref 0.37–1.87)
RH BLD: POSITIVE
SODIUM SERPL-SCNC: 139 MMOL/L (ref 135–147)
SPECIMEN EXPIRATION DATE: NORMAL
TIBC SERPL-MCNC: 397 UG/DL (ref 250–450)
UIBC SERPL-MCNC: 312 UG/DL (ref 155–355)
WBC # BLD AUTO: 5.45 THOUSAND/UL (ref 4.31–10.16)

## 2024-10-18 PROCEDURE — 86900 BLOOD TYPING SEROLOGIC ABO: CPT | Performed by: ORTHOPAEDIC SURGERY

## 2024-10-18 PROCEDURE — 80053 COMPREHEN METABOLIC PANEL: CPT

## 2024-10-18 PROCEDURE — 85025 COMPLETE CBC W/AUTO DIFF WBC: CPT

## 2024-10-18 PROCEDURE — 85610 PROTHROMBIN TIME: CPT

## 2024-10-18 PROCEDURE — 85730 THROMBOPLASTIN TIME PARTIAL: CPT

## 2024-10-18 PROCEDURE — 83540 ASSAY OF IRON: CPT

## 2024-10-18 PROCEDURE — 86850 RBC ANTIBODY SCREEN: CPT | Performed by: ORTHOPAEDIC SURGERY

## 2024-10-18 PROCEDURE — 36415 COLL VENOUS BLD VENIPUNCTURE: CPT

## 2024-10-18 PROCEDURE — 85045 AUTOMATED RETICULOCYTE COUNT: CPT

## 2024-10-18 PROCEDURE — 83036 HEMOGLOBIN GLYCOSYLATED A1C: CPT

## 2024-10-18 PROCEDURE — 82728 ASSAY OF FERRITIN: CPT

## 2024-10-18 PROCEDURE — 86901 BLOOD TYPING SEROLOGIC RH(D): CPT | Performed by: ORTHOPAEDIC SURGERY

## 2024-10-18 PROCEDURE — 83550 IRON BINDING TEST: CPT

## 2024-10-20 DIAGNOSIS — I81 PORTAL VEIN THROMBOSIS: ICD-10-CM

## 2024-10-20 DIAGNOSIS — E78.49 OTHER HYPERLIPIDEMIA: ICD-10-CM

## 2024-10-20 DIAGNOSIS — E03.9 ACQUIRED HYPOTHYROIDISM: ICD-10-CM

## 2024-10-21 DIAGNOSIS — E87.6 HYPOKALEMIA: ICD-10-CM

## 2024-10-21 RX ORDER — SIMVASTATIN 10 MG
15 TABLET ORAL DAILY
Qty: 45 TABLET | Refills: 1 | Status: SHIPPED | OUTPATIENT
Start: 2024-10-21

## 2024-10-21 RX ORDER — APIXABAN 5 MG/1
5 TABLET, FILM COATED ORAL 2 TIMES DAILY
Qty: 180 TABLET | Refills: 1 | Status: SHIPPED | OUTPATIENT
Start: 2024-10-21

## 2024-10-21 RX ORDER — LEVOTHYROXINE SODIUM 50 UG/1
50 TABLET ORAL DAILY
Qty: 90 TABLET | Refills: 0 | Status: SHIPPED | OUTPATIENT
Start: 2024-10-21

## 2024-10-22 ENCOUNTER — TELEPHONE (OUTPATIENT)
Dept: OBGYN CLINIC | Facility: HOSPITAL | Age: 79
End: 2024-10-22

## 2024-10-22 LAB
DME PARACHUTE DELIVERY DATE EXPECTED: NORMAL
DME PARACHUTE DELIVERY DATE REQUESTED: NORMAL
DME PARACHUTE DELIVERY NOTE: NORMAL
DME PARACHUTE ITEM DESCRIPTION: NORMAL
DME PARACHUTE ORDER STATUS: NORMAL
DME PARACHUTE SUPPLIER NAME: NORMAL
DME PARACHUTE SUPPLIER PHONE: NORMAL

## 2024-10-22 RX ORDER — POTASSIUM CHLORIDE 750 MG/1
CAPSULE, EXTENDED RELEASE ORAL
Qty: 180 CAPSULE | Refills: 1 | Status: SHIPPED | OUTPATIENT
Start: 2024-10-22

## 2024-10-22 NOTE — TELEPHONE ENCOUNTER
Preoperative Elective Admission Assessment- spoke to patient.     Living Situation:    Who does pt live with: significant other, Bill   What kind of home: multi-level  How do they enter the home: front  How many levels in home: 2 level    # of steps to enter home: 2 to enter   # of steps to second floor: 12-14 to 2nd floor   Are there handrails: Yes  Are there landings: No  Sleeping arrangement: first/entry floor  Where is Bathroom: First floor bath, 1/2 bath. Second floor full bath, step in tub.      First Floor Setup:   Is there a bathroom: Yes  Where would pt sleep: couch- inquiring on hospital bed.      DME: Rollator, rolling walker and cane (instructed to bring RW DOS).     Ordering Hospital Bed today.     We discussed clearing pathways in the home and making sure there is accessibly to use the walker, for example, removing throw rugs.      Patient's Current Level of Function: Ambulates: Independently and ADLs: Independent    Post-op Caregiver: significant other  Currently receive any HHC/aides/community supports: No     Post-op Transport: significant other  To/from hospital: significant other  To/from PT 2-3x/week: significant other  Uses community transport now: No     Outpatient Physical Therapy Site:  Site: [place]   pre and post-op appts scheduled? No- sent to [facility]      Medication Management: self  Preferred Pharmacy for Post-op Medications: [id number] [facility] [address] [[id number]]   Blood Management Vitamin Regimen: Pt confirms taking as prescribed  Post-op anticoagulant: to be determined by surgical team postoperatively     DC Plan: Pt plans to be discharged home  Pt was educated that our goal, if at all possible, is to appropriately discharge patient based off their post-op function while striving to maintain maximal independence. If possible, the goal is to discharge patient to home and for them to attend outpatient physical therapy.      Barriers to DC identified  preoperatively: none identified    BMI: 25.86      Patient Education:  Pt educated on post-op pain, early mobilization (POD0), LOS goals, OP PT goals, and preoperative bathing. Patient educated that our goal is to appropriately discharge patient based off their post-op function while striving to maintain maximal independence. The goal is to discharge patient to home and for them to attend outpatient physical therapy.    Assigned to care team? Yes

## 2024-10-23 ENCOUNTER — OFFICE VISIT (OUTPATIENT)
Dept: CARDIOLOGY CLINIC | Facility: CLINIC | Age: 79
End: 2024-10-23
Payer: MEDICARE

## 2024-10-23 VITALS
DIASTOLIC BLOOD PRESSURE: 48 MMHG | BODY MASS INDEX: 25.66 KG/M2 | SYSTOLIC BLOOD PRESSURE: 118 MMHG | HEIGHT: 63 IN | WEIGHT: 144.8 LBS | HEART RATE: 66 BPM

## 2024-10-23 DIAGNOSIS — E66.3 OVERWEIGHT (BMI 25.0-29.9): ICD-10-CM

## 2024-10-23 DIAGNOSIS — I47.10 PSVT (PAROXYSMAL SUPRAVENTRICULAR TACHYCARDIA) (HCC): ICD-10-CM

## 2024-10-23 DIAGNOSIS — Z85.3 HISTORY OF LEFT BREAST CANCER: ICD-10-CM

## 2024-10-23 DIAGNOSIS — I47.10 SUPRAVENTRICULAR TACHYCARDIA (HCC): ICD-10-CM

## 2024-10-23 DIAGNOSIS — I47.19 PAT (PAROXYSMAL ATRIAL TACHYCARDIA) (HCC): ICD-10-CM

## 2024-10-23 DIAGNOSIS — Z86.718 HISTORY OF VENOUS THROMBOSIS: ICD-10-CM

## 2024-10-23 DIAGNOSIS — R60.9 EDEMA, UNSPECIFIED TYPE: ICD-10-CM

## 2024-10-23 DIAGNOSIS — Z01.818 PREOPERATIVE TESTING: Primary | ICD-10-CM

## 2024-10-23 DIAGNOSIS — R73.03 PREDIABETES: ICD-10-CM

## 2024-10-23 DIAGNOSIS — I73.9 PERIPHERAL VASCULAR DISEASE (HCC): ICD-10-CM

## 2024-10-23 DIAGNOSIS — M16.11 PRIMARY OSTEOARTHRITIS OF ONE HIP, RIGHT: ICD-10-CM

## 2024-10-23 DIAGNOSIS — I10 HYPERTENSION, UNSPECIFIED TYPE: ICD-10-CM

## 2024-10-23 DIAGNOSIS — E78.5 HYPERLIPIDEMIA, UNSPECIFIED HYPERLIPIDEMIA TYPE: ICD-10-CM

## 2024-10-23 PROBLEM — I48.91 ATRIAL FIBRILLATION (HCC): Status: ACTIVE | Noted: 2024-10-23

## 2024-10-23 PROBLEM — I87.2 PERIPHERAL VENOUS INSUFFICIENCY: Status: ACTIVE | Noted: 2022-09-16

## 2024-10-23 PROCEDURE — 93000 ELECTROCARDIOGRAM COMPLETE: CPT | Performed by: INTERNAL MEDICINE

## 2024-10-23 PROCEDURE — 99214 OFFICE O/P EST MOD 30 MIN: CPT | Performed by: INTERNAL MEDICINE

## 2024-10-23 NOTE — LETTER
Cardiology Pre Operative Clearance      PRE OPERATIVE CARDIAC RISK ASSESSMENT    10/23/24    Matilda Day  1945  [id number]    Date of Surgery: 11/04/24    Type of Surgery: Hip Arthorplasty    Surgeon: Gaviota Ford DO    No Cardiac Contraindication for Planned Surgical Procedures    Anticoagulation: yes    Physician Comment: Patient is okay to proceed with planned surgery.  In attached note:  Preoperative Cardiac Risk Stratification for RLE Hip Arthroplasty planned for 11/04/24  Functionally, the patient is able to perform adls and can achieve at least 4 METs without symptoms  Clinical exam is unremarkable for acute cv instability or illness, Recent EKG on 10/23/24 shows no pathological Q waves or new BBB , Prior TTE on 12/21/23 shows no new systolic LV Dysfunction or RWMA  The patient's Jc preoperative analysis yields a 0.4% risk of myocardial infarction or cardiac arrest, intraoperatively or up to 30 days post-op  At this time, the patient is deemed a low risk and may proceed to surgery without further cardiac testing , Regarding preoperative medications, OAC may be held routinely with plans to resume thereafter    Electronically Signed: Danna Nixon DO

## 2024-10-23 NOTE — H&P (VIEW-ONLY)
Internal Medicine Pre-Operative Evaluation:     Reason for Visit: Pre-operative Evaluation for Risk Stratification and Optimization    Patient ID: Matilda Day is a 79 y.o. female.     Surgery: Arthroplasty of right Hip  Referring Provider: Dr. Ford      Recommendations to Proceed withSurgery    Patient is considered to be Low risk for Medium risk procedure.     After evaluation and discussion with patient with emphasis that all surgery has some degree of inherent risk it is acknowledged by patient this risk is Acceptable.    Patient is optimized and may proceed with planned procedure.     Assessment    Pre-operative Medical Evaluation for planned surgery  Recommendations as listed in PLAN section below  Contact surgical nurse  navigator with any questions regarding preoperative plan or schedule.      Assessment & Plan  Primary osteoarthritis of one hip, right  Failed outpatient conservative measures  Electing to undergo arthroplasty    Essential hypertension  Stable  Monitor post operative BP   Avoid hypotension if at all possible  Refer to PAT instructions regarding medication administration the morning of surgery    Portal vein thrombosis  Takes eliquis  Prediabetes  Hgb A1c 5.9  Recommend following DM diet  Monitor FBS    Continuous opioid dependence (HCC)  Takes NORCO 2x daily  Along with lorazepam 2x daily   PDMP was reviewed  If requires inpatient stay would recommend APS for post operative pain management  Atrial fibrillation, unspecified type (HCC)  Takes diltiazem for rate control  Eliquis for AC  Instructed to resume POD #1  Cleared by cardiology 10/23/2024  Preoperative clearance             Plan:     1. Further preoperative workup as follows:   - none no further testing required may proceed with surgery    2. Preoperative Medication Management Review performed by PAT nursing  YES    3. Patient requires further consultation with:   No Consults Required    4. Discharge Planning / Barriers to  Discharge  none identified - patients has post discharge therapy plan in place, transportation arranged for discharge day, adequate family support at home to assist with discharge to home.        Subjective:           History of Present Illness:     Matilda Day is a 79 y.o. female who presents to the office today for a preoperative consultation at the request of surgeon. The patient understands this is an elective procedure and not emergent. They are electing to undergo planned procedure with an understanding that all surgery has inherent risk. They have worked with their surgeon and failed conservative treatment measures. Today they present for preoperative risk assessment and recommendations for optimization in preparation for surgery.    Pre-op Exam    Pt was cleared by cardiology without any need for further testing on 10/23/2024    I reviewed her PDMP, a/t the patient she takes the NORCO 2x daily and only 3 if absolutely necessary. She does however also take the 1mg of ativan 2x daily as well. We would recommend APS consultation if patient stays overnight d/t chronic opioid use.     She was instructed to hold her eliquis x 3 days and resume the day after surgery.     Pt seen in surgical optimization center for upcoming proposed surgery. They have failed previous conservative measures and have elected surgical intervention.     Pt meets presurgical lab and BMI optimization goals.      Matilda Day has an IN HOSPITAL cardiac risk of RCI RISK CLASS I (0 risk factors, risk of major cardiac compl. appr. 0.5%) based on RCRI calculator    Cardiac Risk Estimation: per the Revised Cardiac Risk Index (Circ. 100:1043, 1999),           Previous history of bleeding disorders or clots?: Yes  Previous Anesthesia reaction?: No  Prolonged steroid use in the last 6 months?: No    Assessment of Cardiac Risk:   - Unstable or severe angina or MI in the last 6 weeks or history of stent placement in the last year?: No   -  "Decompensated heart failure (e.g. New onset heart failure, NYHA  Class IV heart failure, or worsening existing heart failure)?: No  - Significant arrhythmias such as high grade AV block, symptomatic ventricular arrhythmia, newly recognized ventricular tachycardia, supraventricular tachycardia with resting heart rate >100, or symptomatic bradycardia?: No  - Severe heart valve disease including aortic stenosis or symptomatic mitral stenosis?: No      Pre-operative Risk Factors:  Elevated-risk surgery: No    History of cerebrovascular disease: No    History of ischemic heart disease: No  Pre-operative treatment with insulin: No  Pre-operative creatinine >2 mg/dL: No    History of congestive heart failure: No    Duke Activity Status Index (DASI):   DASI Total Score: 18.95  METs: 5.1    Medications of Perioperative Concern:   Anti-coagulants (Coumadin, Xarelto, Pradaxa, Eliquis, Lixiana) and Calcium channel blockers        ROS: No TIA's or unusual headaches, no dysphagia.  No prolonged cough. No dyspnea or chest pain on exertion.  No abdominal pain, change in bowel habits, black or bloody stools.  No urinary tract or BPH symptoms.  Positive reported pain in arthritic joint. Positive difficulty with gait. No skin rashes or issues.      Objective:    /71   Pulse 60   Ht 5' 3\" (1.6 m)   Wt 65.3 kg (144 lb)   BMI 25.51 kg/m²       General Appearance: no distress, conversive  HEENT: PERRLA, conjuctiva normal; oropharynx clear; mucous membranes moist;   Neck:  Supple, no lymphadenopathy or thyromegaly  Lungs: breath sounds normal, normal respiratory effort, no retractions, expiratory effort normal  CV: normal heart sounds S1/S2, PMI normal   ABD: soft non tender, no masses , no hepatic or splenomegaly  EXT: DP pulses intact, no lymphadenopathy, no edema  Skin: normal turgor, normal texture, no rash  Psych: affect normal, mood normal  Neuro: AAOx3        The following portions of the patient's history were reviewed " and updated as appropriate: allergies, current medications, past family history, past medical history, past social history, past surgical history and problem list.     Past History:       Past Medical History:   Diagnosis Date    Anemia     Anxiety     Arrhythmia     Arthritis     Asthma     Blood clot in vein     portal vein    Breast cancer (HCC) 02/12/2021    Breast lump     [id number] RESOLVED    Cancer (HCC)     Depression     Disease of thyroid gland     DVT, lower extremity (HCC)     GERD (gastroesophageal reflux disease)     Hyperlipidemia     Hypertension     Hypokalemia     Hyponatremia     Hypothyroidism     Iron deficiency anemia     Irregular heart beat     Manic behavior (HCC)     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HCC)     Osteoarthritis     Palpitations     [id number]  RESOLVED    Paroxysmal supraventricular tachycardia (HCC)     PE (pulmonary thromboembolism) (HCC)     Pneumonia     Sjoegren syndrome     Sleep apnea     Sleep difficulties     Spinal stenosis     Thrombocytosis     [id number]  RESOLVED    Tremors of nervous system     dbs implanted right and left chest    Ulcerative colitis (HCC)     Vertigo     [id number] RESOLVED    Past Surgical History:   Procedure Laterality Date    ABDOMINAL SURGERY      APPENDECTOMY      BREAST BIOPSY Left 11/07/2019    Stereo    BREAST EXCISIONAL BIOPSY Left     x many years    BREAST SURGERY      lumpectomy & biopsy    CARMELLA HOLE W/ STEREOTACTIC INSERTION OF DBS LEADS / INTRAOP MICROELECTRODE RECORDING      COLON SURGERY      COLONOSCOPY N/A 08/30/2017    Procedure: POUCHOSCOPY;  Surgeon: VANDANA Waters MD;  Location: [facility];  Service: Colorectal    COLOPROCTECTOMY W/ ILEO J POUCH      ESOPHAGOGASTRODUODENOSCOPY      ONSET 10/17/11    FISTULA REPAIR      LLEOANAL FISTULA REPAIR TRANSPERIN TRANSABD APPROACH    HYSTERECTOMY      age 38    ILEOSTOMY CLOSURE      KNEE ARTHROSCOPY      Right    MAMMO STEREOTACTIC BREAST BIOPSY LEFT (ALL INC) Left 11/07/2019    MAMMO  STEREOTACTIC BREAST BIOPSY LEFT (ALL INC) Left 2020    MAMMO STEREOTACTIC BREAST BIOPSY LEFT (ALL INC) EACH ADD Left 2020    MASTECTOMY Left 2021    left mastectomy- Dr. Hayes    MASTECTOMY W/ SENTINEL NODE BIOPSY Left 2021    Procedure: BREAST MASTECTOMY WITH SENTINEL LYMPH NODE BIOPSY, LYMPHATIC MAPPING WITH BLUE DYE AND RADIAOCTIVE DYE (INJECT AT 1100 BY DR HAYES IN THE OR);  Surgeon: Robbie Hayes MD;  Location: [facility];  Service: Surgical Oncology    RI INSJ/RPLCMT CRANIAL NEUROSTIM PULSE GENERATOR Right 2017    Procedure: DBS GENERATOR REPLACEMENT;  Surgeon: Marin Mao MD;  Location: [facility];  Service: Neurosurgery    RI INSJ/RPLCMT CRANIAL NEUROSTIM PULSE GENERATOR N/A 2019    Procedure: REPLACEMENT IMPLANTABLE PULSE GENERATOR FOR DEEP BRAIN STIMULATOR LEFT CHEST;  Surgeon: Damian aBtres MD;  Location: [facility];  Service: Neurosurgery    SPLENECTOMY      TONSILLECTOMY            Social History     Tobacco Use    Smoking status: Former     Current packs/day: 0.00     Average packs/day: 1 pack/day for 15.0 years (15.0 ttl pk-yrs)     Types: Cigarettes     Start date: 1950     Quit date: 1965     Years since quittin.8    Smokeless tobacco: Never    Tobacco comments:     Quit   Vaping Use    Vaping status: Never Used   Substance Use Topics    Alcohol use: Yes     Alcohol/week: 2.0 standard drinks of alcohol     Types: 2 Cans of beer per week     Comment: 2or 3 beers a week    Drug use: No     Family History   Problem Relation Age of Onset    Venous thrombosis Mother         ACUTE VENOUS THROMBOSIS OF DEEP VESSELS OF THE DISTAL LOWER EXTREMITY    Other Mother         PHLEBITIS    Hypertension Mother     Peripheral vascular disease Mother     COPD Father     Diabetes Father         MELLITUS    Stroke Father     Diabetes Sister         MELLITUS    Sjogren's syndrome Sister     No Known Problems Daughter     No Known Problems Maternal Grandmother     No Known  Problems Maternal Grandfather     No Known Problems Paternal Grandmother     No Known Problems Paternal Grandfather     No Known Problems Sister     No Known Problems Maternal Aunt     No Known Problems Paternal Aunt     No Known Problems Son           Allergies:     Allergies   Allergen Reactions    Indomethacin GI Intolerance, Dizziness and Other (See Comments)    Macrobid [Nitrofurantoin Monohyd Macro] Rash    Nitrofurantoin Other (See Comments)    Penicillins Rash and Other (See Comments)     Annotation - [id number]: can take cephalosporins    Sulfa Antibiotics Rash and Other (See Comments)        Current Medications:     Current Outpatient Medications   Medication Instructions    albuterol (PROVENTIL HFA,VENTOLIN HFA) 90 mcg/act inhaler 2 puffs, Inhalation, Every 6 hours PRN    alendronate (FOSAMAX) 70 mg tablet TAKE 1 TABLET BY MOUTH EVERY 7 DAYS    amLODIPine (NORVASC) 2.5 mg tablet TAKE ONE TABLET BY MOUTH EVERY DAY    ammonium lactate (LAC-HYDRIN) 12 % cream     ascorbic acid (VITAMIN C) 500 mg, Oral, Daily, Begin 30 days prior to surgery    Calcium Carb-Cholecalciferol (CALCIUM 600-D PO) 1 tablet, Oral, 2 times daily    Coenzyme Q10 (CO Q-10 PO) 1 capsule, Oral, Daily    diltiazem (CARDIZEM CD) 180 mg 24 hr capsule TAKE ONE CAPSULE BY MOUTH EVERY DAY    diltiazem (CARDIZEM) 60 mg, Oral, Daily    Eliquis 5 mg, Oral, 2 times daily    escitalopram (LEXAPRO) 20 mg, Oral, Daily    ferrous sulfate 324 mg, Oral, Daily before breakfast, Begin 30 days prior to surgery    fluticasone (FLONASE) 50 mcg/act nasal spray APPLY ONE SPRAY IN EACH NOSTRIL ONCE DAILY AS NEEDED FOR RUNNY NOSE    Fluticasone Furoate-Vilanterol 100-25 mcg/actuation inhaler 1 puff, Inhalation, Daily, Rinse mouth after use.    folic acid (KP FOLIC ACID) 1 mg, Oral, Daily, Begin 30 days prior to surgery    gabapentin (NEURONTIN) 600 MG tablet TAKE ONE TABLET BY MOUTH EVERY MORNING , TAKE ONE TABLET BY MOUTH EVERY DAY IN THE AFTERNOON , AND TAKE TWO  "TABLETS BY MOUTH EVERY EVENING AT BEDTIME    HYDROcodone-acetaminophen (NORCO) 5-325 mg per tablet 1 tablet, Oral, Every 6 hours PRN    levothyroxine 50 mcg, Oral, Daily    lisinopril (ZESTRIL) 20 mg, Oral, 2 times daily    LORazepam (ATIVAN) 1 mg, Oral, Every 6 hours PRN    MAGNESIUM PO 1 tablet, Oral, Daily    Multiple Vitamin (multivitamin) tablet 1 tablet, Oral, Daily, Begin 30 days prior to surgery    Omega-3 Fatty Acids (FISH OIL PO) 1 capsule, Oral, Daily    pantoprazole (PROTONIX) 40 mg, Oral, Daily    potassium chloride (MICRO-K) 10 MEQ CR capsule TAKE TWO CAPSULES BY MOUTH EVERY DAY    simvastatin (ZOCOR) 15 mg, Oral, Daily    sodium chloride 1 g, Oral, 4 times daily    torsemide (DEMADEX) 10 mg tablet TAKE ONE TABLET BY MOUTH EVERY DAY AS NEEDED (EDEMA)    torsemide (DEMADEX) 20 mg, Oral, Daily    vitamin B-12 (VITAMIN B-12) 500 mcg, Oral, Daily           PRE-OP WORKSHEET DATA    Assessment of Pre-Operative Risks     MLJ Quality Hard Stops:    BMI (<40) : Estimated body mass index is 25.51 kg/m² as calculated from the following:    Height as of this encounter: 5' 3\" (1.6 m).    Weight as of this encounter: 65.3 kg (144 lb).    Hgb ( >11):   Lab Results   Component Value Date    HGB 11.7 10/18/2024    HGB 11.3 (L) 08/13/2024    HGB 13.3 06/11/2024       HbA1c (<7.5) :   Lab Results   Component Value Date    HGBA1C 5.9 (H) 10/18/2024       GFR (>60) (Less then 45 = Nephrology consult):    Lab Results   Component Value Date    EGFR 72 10/18/2024    EGFR 59 (L) 09/04/2024    EGFR 77 08/20/2024            Pre-Op Data Reviewed:       Laboratory Results: I have personally reviewed the pertinent laboratory results/reports     EKG:I have personally reviewed pertinent reports.  . I personally reviewed and interpreted available tracings in the electronic medical record          Impression    NSR / 1°AVB      Specimen Collected: 10/23/24             OLD RECORDS: reviewed old records in the chart review section if EHR " on day of visit.    Previous cardiopulmonary studies within the past year:  Echocardiogram: yes   Lab Results   Component Value Date    LVEF 60 12/21/2023     Cardiac Catheterization: no  Stress Test: no      Time of visit including pre-visit chart review, visit and post-visit coordination of plan and care , review of pre-surgical lab work, preparation and time spent documenting note in electronic medical record, time spent face-to-face in physical examination answering patient questions by care team 35 minutes             [facility]

## 2024-10-23 NOTE — ASSESSMENT & PLAN NOTE
Takes diltiazem for rate control  Eliquis for AC  Instructed to resume POD #1  Cleared by cardiology 10/23/2024

## 2024-10-23 NOTE — PATIENT INSTRUCTIONS
BEFORE SURGERY    Contact your surgical nurse  navigator with any questions regarding preoperative plan or schedule.  Stop all over the counter supplements, herbal, naturopathic  medications for 1 week prior to surgery UNLESS prescribed by your surgeon  Hold NSAIDS (i.e. advil, alleve, motrin, ibuprofen, celebrex) minimum 5 days prior to surgery  Follow presurgical medication instructions provided by preadmission nursing team reviewed during your presurgery phone call  Strategies for optimizing your surgery through breathing exercises, nutrition and physical activity can be found at [email or web address]  Call [phone] with any presurgical concerns or medications questions or use the messaging feature in your [employer] destinee to contact your provider  Hold eliquis for 3 days with last dose 11/1/2024 then resume your anticoagulant the morning after surgery or unless otherwise indicated by your surgeon      AFTER SURGERY    Recommend using Tylenol ( acetaminophen ) 1000 mg every eight hours during the first week post discharge along with icing the area for 20 mins every 3-4 hours while awake can be helpful in reducing your need for post operative opioid use. This opioid sparing plan can be used along side your surgeons pain plan.  Use stool softener over the counter (colace) daily after surgery during the first 1-2 weeks to avoid post operative constipation issues  If no bowel movement within 3 days after surgery then use over the counter Miralax in addition to your stool softener   If cleared by your surgical team for activity then early and often walking is encouraged and can be important in prevention of post surgical blood clots. Additionally spend as much time out of bed as possible and allowed by your surgical team  Use your incentive spirometer twice per hour in the first seven days after surgery to help prevent post surgery lung complications and infections  It is very important you follow the instructions from  your surgeon regarding any medications for after surgery blood clot prevention. Compliance with these medications is very important.  Call [phone] with any post discharge concerns or medical issues or use the messaging feature in your [employer] destinee to contact your provider

## 2024-10-23 NOTE — PROGRESS NOTES
Cardiology Follow Up Visit       Matilda Day   [id number]  1945      [facility]  [address] [place] [id number]        Physician Requesting Consult:   Zaira Velasquez MD    Reason for Follow Up Visit / Principal Problem:  Mrs. Matilda Day is a 79 y.o. female and former smoker with a PMH significant for but not limited to PSVT/PAT, 1°AVB/IRBBB, HTN, HLD, Prediabetes, GERD, Sjogren, UC (s/p colectomy), Asthma, Hypothyroidism, Anemia, Essential Tremors (s/p brain stimulator 2014), and Overweight.  She presents to clinic for routine Follow Up.    Interval History:  Mrs. Day was originally seen in clinic on 12/06/23 to establish care. She'd been at her baseline state of health: independent of adl's, retired, though not exercising regularly, when she was seen by RODNEY George on 08/10/23 for routine follow up after being followed closely by Dr. Beltre since 2013 for PSVT/PAT .  She'd been treated with diltiazem for rate control and propanolol to control her tremors.  Her beta blockade was discontinued and a brain stimulator was placed with improvement in her tremors in 2014.  She been doing well overall with HTN and Preventative care until August when she developed SOB. She was found to be anemic and had been followed by Hematology for LUCILA as well as by GI.  We made no changes in her medical regimen, but we proceeded with a TTE that demonstrated no significant LV Dysfunction or Valvulopathy.  She denied any recent hospitalizations, family history of early cad, or family history of scd.     During our 05/08/24 Follow Up, she'd been well initially.  She was in [place] from Jan to April, but contracted a difficult cousrse of COVID that led to Bronchitis.  She finally was weaned of nebulizer treatments, but she noted the experience was challenging.  She was continuing her LUCILA mgmt with iron infusions: her last had been in [facility] and  she had another planned for October.  She was also planning on working with dermatology for peeling skin on her left breast, seeing rheumatology for her sjogren symptoms, and returning to more regular exercise.  She noted that she'd get about 2.5k steps per day currently, had a treadmill at home that she did not use, and routinely employed her walker a night for safe ambulation oob to bathroom.    Since our last visit, she was diagnosed with progressive RLE Hip arthritis.  She is now scheduled for Hip Arthroplasty on 11/04/24.  She notes that from a cardiac standpoint, she continues to be well.  She currently denies any cp, diaphoresis, n/v, palpitations, near syncope, syncope, orthopnea, or pnd.  She notes labile control of her diet and exercise habits. She reports a diet that is well balanced and still no dedicated exercise. She is otherwise compliant with medications, checks BP occasionally but does not maintain a detailed BP log.  Of note, the patient's cardiac risk factor(s) include: hypertension, dyslipidemia, sedentary lifestyle, and systemic inflammatory disease.      Assessment & Plan   Preoperative Cardiac Risk Stratification for RLE Hip Arthroplasty planned for 11/04/24  Functionally, the patient is able to perform adls and can achieve at least 4 METs without symptoms  Clinical exam is unremarkable for acute cv instability or illness, Recent EKG on 10/23/24 shows no pathological Q waves or new BBB , Prior TTE on 12/21/23 shows no new systolic LV Dysfunction or RWMA  The patient's Jc preoperative analysis yields a 0.4% risk of myocardial infarction or cardiac arrest, intraoperatively or up to 30 days post-op  At this time, the patient is deemed a low risk and may proceed to surgery without further cardiac testing , Regarding preoperative medications, OAC may be held routinely with plans to resume thereafter    BLE Edema, long standing dependent edema at baseline on torsemide prn  Symptoms are stable,  TTE without LV Dysfunction, No changes in symptoms  Cont current medication regimen, Cont supportive efforts with exercise/compression  Monitoring routinely for symptomatic changes for now    DVT/PE, reports of prior portal vein thrombosis though imaging unavailable  Asymptomatic at this time, Tolerating doac without active bleeding  Cont eliquis, Routine surveillance per heme/gi  Monitor routinely for symptomatic changes    PSVT, Abnormal PAC/PVC history ,  followed with serial holters in the past  Improvement in frequency and severity of symptoms  Cont current medical management at this time, Hold on repeat holter  Monitor routinely for symptomatic changes, record vitals if symptoms recur, ed/clinic precautions reviewed    HTN, long-standing  No home BP log for review, but clinic SBP in the 115-135 mmHg range  Cont current medication regimen: lisinopril/amlodipine/diltiazem, cont counseling on low-sodium diet, Review the importance of maintaining a home BP log  Monitor BP at home routinely and review log on next visit    HLD, recent FLP:  / TRI 90 / HDL 70 / LDL 97 on 12/09/23, 40% 10-Year ASCVD Estimated Risk  Stable, Well controlled, Tolerating statin without significant adverse reactions  Cont current medication regimen, cont counseling on diet and lifestyle modification  Monitor FLP routinely as ordered by PCP, will follow peripherally    Prediabetes, most recent A1c 5.9 on 10/18/24  No prior medical mgmt, diet controlled  Cont counseling on diet and lifestyle changes  Monitor Blood Sugar/A1c routinely as ordered by PCP, will follow peripherally    Overweight, Body mass index is 25.65 kg/m².  Weight has been down-trending overall: down 10 lbs over the last year  Cont to review the importance of diet and lifestyle modification  Will monitor routinely at this time    Discussion / Summary:  Precautions and reasons to call our office or proceed to ER were discussed in detail. Patient expressed understanding  and questions were answered. Plan on follow up in-clinic in 6 months.    Office Visit Diagnosis:  1. Preoperative testing  Ambulatory referral to Cardiology    POCT ECG      2. Primary osteoarthritis of one hip, right  Ambulatory referral to Cardiology    POCT ECG      3. Supraventricular tachycardia (HCC)        4. Peripheral vascular disease (HCC)        5. Hypertension, unspecified type        6. Hyperlipidemia, unspecified hyperlipidemia type        7. Prediabetes        8. Edema, unspecified type        9. History of venous thrombosis        10. History of left breast cancer        11. PAT (paroxysmal atrial tachycardia) (HCC)        12. PSVT (paroxysmal supraventricular tachycardia) (HCC)        13. Overweight (BMI 25.0-29.9)              Subjective   Review of Systems   Constitutional: Negative for chills and fever.   HENT:  Negative for congestion and sore throat.    Eyes:  Negative for blurred vision and double vision.   Cardiovascular:  Negative for chest pain, dyspnea on exertion, irregular heartbeat, leg swelling (improved), near-syncope, orthopnea, palpitations, paroxysmal nocturnal dyspnea and syncope.   Respiratory:  Negative for cough, shortness of breath, sleep disturbances due to breathing, snoring and wheezing.    Hematologic/Lymphatic: Negative for bleeding problem. Does not bruise/bleed easily.   Skin:  Negative for itching and rash.   Musculoskeletal:  Positive for arthritis, back pain (ongoing), joint pain and joint swelling (hip pain, glad surgery has been moved up).   Gastrointestinal:  Positive for diarrhea (at baseline). Negative for abdominal pain, constipation, nausea and vomiting.   Genitourinary:  Negative for dysuria and hematuria.   Neurological:  Negative for numbness and paresthesias.   Psychiatric/Behavioral:  Negative for depression. The patient is nervous/anxious (stable).      The following portions of the patient's history were reviewed and updated as appropriate: past medical  history, past social history, past family history, past surgical history, current medications, allergies, past surgical history and problem list.  Past Medical History:   Diagnosis Date    Anemia     Anxiety     Arrhythmia     Arthritis     Asthma     Blood clot in vein     portal vein    Breast cancer (HCC) 2021    Breast lump     [id number] RESOLVED    Cancer (HCC)     Depression     Disease of thyroid gland     DVT, lower extremity (HCC)     GERD (gastroesophageal reflux disease)     Hyperlipidemia     Hypertension     Hypokalemia     Hyponatremia     Hypothyroidism     Iron deficiency anemia     Irregular heart beat     Manic behavior (HCC)     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HCC)     Osteoarthritis     Palpitations     [id number]  RESOLVED    Paroxysmal supraventricular tachycardia (HCC)     PE (pulmonary thromboembolism) (HCC)     Sjoegren syndrome     Sleep apnea     Sleep difficulties     Spinal stenosis     Thrombocytosis     [id number]  RESOLVED    Tremors of nervous system     dbs implanted right and left chest    Ulcerative colitis (HCC)     Vertigo     [id number] RESOLVED     Social History     Socioeconomic History    Marital status:      Spouse name: Not on file    Number of children: Not on file    Years of education: EDUCATION LEVEL 10TH GRADE    Highest education level: Not on file   Occupational History    Occupation: RETIRED   Tobacco Use    Smoking status: Former     Current packs/day: 0.00     Average packs/day: 1 pack/day for 15.0 years (15.0 ttl pk-yrs)     Types: Cigarettes     Start date: 1950     Quit date: 1965     Years since quittin.8    Smokeless tobacco: Never    Tobacco comments:     Quit   Vaping Use    Vaping status: Never Used   Substance and Sexual Activity    Alcohol use: Yes     Alcohol/week: 2.0 standard drinks of alcohol     Types: 2 Cans of beer per week     Comment: 2or 3 beers a week    Drug use: No    Sexual activity: Not Currently     Partners: Male      Birth control/protection: Post-menopausal   Other Topics Concern    Not on file   Social History Narrative    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ACTIVITIES INSIDE AND OUTSIDE OF HOME, MODERATE    CAFFEINE USE, DRINKS CAFEINATED TEA, DRINKS COFFEE    INADEQUATE EXERCISE    LIVES WITH ADULT CHILDREN     Social Determinants of Health     Financial Resource Strain: Low Risk  (4/20/2023)    Overall Financial Resource Strain (CARDIA)     Difficulty of Paying Living Expenses: Not very hard   Food Insecurity: No Food Insecurity (5/31/2024)    Nursing - Inadequate Food Risk Classification     Worried About Running Out of Food in the Last Year: Never true     Ran Out of Food in the Last Year: Never true     Ran Out of Food in the Last Year: Not on file   Transportation Needs: No Transportation Needs (5/31/2024)    PRAPARE - Transportation     Lack of Transportation (Medical): No     Lack of Transportation (Non-Medical): No   Physical Activity: Not on file   Stress: Not on file   Social Connections: Not on file   Intimate Partner Violence: Not on file   Housing Stability: Low Risk  (5/31/2024)    Housing Stability Vital Sign     Unable to Pay for Housing in the Last Year: No     Number of Times Moved in the Last Year: 1     Homeless in the Last Year: No     Family History   Problem Relation Age of Onset    Venous thrombosis Mother         ACUTE VENOUS THROMBOSIS OF DEEP VESSELS OF THE DISTAL LOWER EXTREMITY    Other Mother         PHLEBITIS    Hypertension Mother     Peripheral vascular disease Mother     COPD Father     Diabetes Father         MELLITUS    Stroke Father     Diabetes Sister         MELLITUS    Sjogren's syndrome Sister     No Known Problems Daughter     No Known Problems Maternal Grandmother     No Known Problems Maternal Grandfather     No Known Problems Paternal Grandmother     No Known Problems Paternal Grandfather     No Known Problems Sister     No Known Problems Maternal Aunt     No Known Problems Paternal Aunt     No  Known Problems Son      Past Surgical History:   Procedure Laterality Date    ABDOMINAL SURGERY      APPENDECTOMY      BREAST BIOPSY Left 11/07/2019    Stereo    BREAST EXCISIONAL BIOPSY Left     x many years    BREAST SURGERY      lumpectomy & biopsy    CARMELLA HOLE W/ STEREOTACTIC INSERTION OF DBS LEADS / INTRAOP MICROELECTRODE RECORDING      COLON SURGERY      COLONOSCOPY N/A 08/30/2017    Procedure: POUCHOSCOPY;  Surgeon: VANDANA Waters MD;  Location: [facility];  Service: Colorectal    COLOPROCTECTOMY W/ ILEO J POUCH      ESOPHAGOGASTRODUODENOSCOPY      ONSET 10/17/11    FISTULA REPAIR      LLEOANAL FISTULA REPAIR TRANSPERIN TRANSABD APPROACH    HYSTERECTOMY      age 38    ILEOSTOMY CLOSURE      KNEE ARTHROSCOPY      Right    MAMMO STEREOTACTIC BREAST BIOPSY LEFT (ALL INC) Left 11/07/2019    MAMMO STEREOTACTIC BREAST BIOPSY LEFT (ALL INC) Left 12/17/2020    MAMMO STEREOTACTIC BREAST BIOPSY LEFT (ALL INC) EACH ADD Left 12/17/2020    MASTECTOMY Left 02/12/2021    left mastectomy- Dr. Gillespie    MASTECTOMY W/ SENTINEL NODE BIOPSY Left 02/12/2021    Procedure: BREAST MASTECTOMY WITH SENTINEL LYMPH NODE BIOPSY, LYMPHATIC MAPPING WITH BLUE DYE AND RADIAOCTIVE DYE (INJECT AT 1100 BY DR GILLESPIE IN THE OR);  Surgeon: Robbie Gillespie MD;  Location[facility];  Service: Surgical Oncology    WI INSJ/RPLCMT CRANIAL NEUROSTIM PULSE GENERATOR Right 06/20/2017    Procedure: DBS GENERATOR REPLACEMENT;  Surgeon: Marin Mao MD;  Location: [facility];  Service: Neurosurgery    WI INSJ/RPLCMT CRANIAL NEUROSTIM PULSE GENERATOR N/A 12/04/2019    Procedure: REPLACEMENT IMPLANTABLE PULSE GENERATOR FOR DEEP BRAIN STIMULATOR LEFT CHEST;  Surgeon: Damian Batres MD;  Location: [facility];  Service: Neurosurgery    SPLENECTOMY      TONSILLECTOMY         Current Outpatient Medications:     amLODIPine (NORVASC) 2.5 mg tablet, TAKE ONE TABLET BY MOUTH EVERY DAY, Disp: 90 tablet, Rfl: 3    ammonium lactate (LAC-HYDRIN) 12 % cream, , Disp: , Rfl:      ascorbic acid (VITAMIN C) 500 MG tablet, Take 1 tablet (500 mg total) by mouth daily Begin 30 days prior to surgery, Disp: 30 tablet, Rfl: 1    Calcium Carb-Cholecalciferol (CALCIUM 600-D PO), Take 1 tablet by mouth 2 (two) times a day, Disp: , Rfl:     Coenzyme Q10 (CO Q-10 PO), Take 1 capsule by mouth daily, Disp: , Rfl:     diltiazem (CARDIZEM CD) 180 mg 24 hr capsule, TAKE ONE CAPSULE BY MOUTH EVERY DAY, Disp: 90 capsule, Rfl: 1    diltiazem (CARDIZEM) 60 mg tablet, TAKE ONE TABLET BY MOUTH EVERY DAY, Disp: 90 tablet, Rfl: 1    Eliquis 5 MG, TAKE ONE TABLET BY MOUTH TWICE A DAY, Disp: 180 tablet, Rfl: 1    escitalopram (LEXAPRO) 20 mg tablet, TAKE ONE TABLET BY MOUTH EVERY DAY, Disp: 90 tablet, Rfl: 1    ferrous sulfate 324 (65 Fe) mg, Take 1 tablet (324 mg total) by mouth daily before breakfast Begin 30 days prior to surgery, Disp: 30 tablet, Rfl: 1    fluticasone (FLONASE) 50 mcg/act nasal spray, APPLY ONE SPRAY IN EACH NOSTRIL ONCE DAILY AS NEEDED FOR RUNNY NOSE, Disp: 48 g, Rfl: 1    Fluticasone Furoate-Vilanterol 100-25 mcg/actuation inhaler, Inhale 1 puff daily Rinse mouth after use., Disp: 60 blister, Rfl: 5    folic acid (KP Folic Acid) 1 mg tablet, Take 1 tablet (1 mg total) by mouth daily Begin 30 days prior to surgery, Disp: 30 tablet, Rfl: 1    gabapentin (NEURONTIN) 600 MG tablet, TAKE ONE TABLET BY MOUTH EVERY MORNING , TAKE ONE TABLET BY MOUTH EVERY DAY IN THE AFTERNOON , AND TAKE TWO TABLETS BY MOUTH EVERY EVENING AT BEDTIME, Disp: 360 tablet, Rfl: 1    HYDROcodone-acetaminophen (NORCO) 5-325 mg per tablet, Take 1 tablet by mouth every 6 (six) hours as needed for pain Max Daily Amount: 4 tablets, Disp: 120 tablet, Rfl: 0    levothyroxine 50 mcg tablet, TAKE ONE TABLET BY MOUTH EVERY DAY, Disp: 90 tablet, Rfl: 0    lisinopril (ZESTRIL) 20 mg tablet, TAKE ONE TABLET BY MOUTH TWO TIMES A DAY, Disp: 180 tablet, Rfl: 1    LORazepam (ATIVAN) 1 mg tablet, Take 1 tablet (1 mg total) by mouth every 6  (six) hours as needed for anxiety, Disp: 120 tablet, Rfl: 0    MAGNESIUM PO, Take 1 tablet by mouth daily, Disp: , Rfl:     Multiple Vitamin (MULTIVITAMIN ADULT PO), Take 1 tablet by mouth daily, Disp: , Rfl:     Omega-3 Fatty Acids (FISH OIL PO), Take 1 capsule by mouth daily, Disp: , Rfl:     pantoprazole (PROTONIX) 40 mg tablet, TAKE ONE TABLET BY MOUTH EVERY DAY, Disp: 90 tablet, Rfl: 1    potassium chloride (MICRO-K) 10 MEQ CR capsule, TAKE TWO CAPSULES BY MOUTH EVERY DAY, Disp: 180 capsule, Rfl: 1    simvastatin (ZOCOR) 10 mg tablet, TAKE ONE-HALF TABLET BY MOUTH DAILY, Disp: 45 tablet, Rfl: 1    sodium chloride 1 g tablet, TAKE ONE TABLET BY MOUTH FOUR TIMES A DAY, Disp: 270 tablet, Rfl: 1    torsemide (DEMADEX) 10 mg tablet, TAKE ONE TABLET BY MOUTH EVERY DAY AS NEEDED (EDEMA), Disp: 90 tablet, Rfl: 1    torsemide (DEMADEX) 20 mg tablet, Take 1 tablet (20 mg total) by mouth daily, Disp: 90 tablet, Rfl: 3    vitamin B-12 (VITAMIN B-12) 500 mcg tablet, Take 1 tablet (500 mcg total) by mouth daily, Disp: 90 tablet, Rfl: 1    al mag oxide-diphenhydramine-lidocaine viscous (MAGIC MOUTHWASH) 1:1:1 suspension, SWISH 10ML IN MOUTH FOR ONE MINUTE FOUR TIMES A DAY (Patient not taking: Reported on 10/23/2024), Disp: , Rfl:     albuterol (PROVENTIL HFA,VENTOLIN HFA) 90 mcg/act inhaler, Inhale 2 puffs every 6 (six) hours as needed for wheezing (Patient not taking: Reported on 8/9/2024), Disp: 8 g, Rfl: 1    alendronate (FOSAMAX) 70 mg tablet, TAKE 1 TABLET BY MOUTH EVERY 7 DAYS (Patient not taking: Reported on 10/23/2024), Disp: 4 tablet, Rfl: 2    Multiple Vitamin (multivitamin) tablet, Take 1 tablet by mouth daily Begin 30 days prior to surgery (Patient not taking: Reported on 10/23/2024), Disp: 30 tablet, Rfl: 1    Current Facility-Administered Medications:     cyanocobalamin injection 1,000 mcg, 1,000 mcg, Intramuscular, Q30 Days, Zaira Cass Eva, MD, 1,000 mcg at 04/20/23 1012  Allergies   Allergen Reactions     "Indomethacin GI Intolerance, Dizziness and Other (See Comments)    Macrobid [Nitrofurantoin Monohyd Macro] Rash    Nitrofurantoin Other (See Comments)    Penicillins Rash and Other (See Comments)     Annotation - [id number]: can take cephalosporins    Sulfa Antibiotics Rash and Other (See Comments)     Patient Active Problem List   Diagnosis    Portal vein thrombosis    Anxiety    Moderate episode of recurrent major depressive disorder (HCC)    Essential tremor    Hyperlipidemia    Essential hypertension    Hypomagnesemia    SIADH (syndrome of inappropriate ADH production) (HCC)    Acquired hypothyroidism    Iron deficiency anemia    Prediabetes    Peripheral neuropathy    Osteopenia    Osteoarthritis of right knee    Ulcerative colitis without complications (HCC)    Allergic rhinitis    GERD (gastroesophageal reflux disease)    Mild intermittent asthma without complication    Diarrhea    Sjogren's syndrome (HCC)    Chronic salpingo-oophoritis    Overweight    Supraventricular tachycardia (HCC)    Unsteady gait    Lumbar degenerative disc disease    S/P deep brain stimulator placement    Vitamin B12 deficiency    Vitamin D deficiency    History of left breast cancer    Hoarseness of voice    Esophageal candidiasis (HCC)    History of right mastectomy    S/P left mastectomy    Peripheral vascular disease (HCC)    Continuous opioid dependence (HCC)    Malignant neoplasm of left breast in female, estrogen receptor negative, unspecified site of breast (HCC)    Primary osteoarthritis of one hip, right    Disorder of bursae of shoulder region    Enthesopathy of hip region    Peripheral venous insufficiency    Atrial fibrillation (HCC)       Objective     Vitals:    10/23/24 0958   BP: (!) 118/48   BP Location: Left arm   Patient Position: Sitting   Cuff Size: Large   Pulse: 66   Weight: 65.7 kg (144 lb 12.8 oz)   Height: 5' 3\" (1.6 m)     Body mass index is 25.65 kg/m².    Physical Exam  Constitutional:       General: She " is not in acute distress.     Appearance: She is not ill-appearing.   HENT:      Head: Normocephalic and atraumatic.      Right Ear: External ear normal.      Left Ear: External ear normal.      Nose: Nose normal.   Eyes:      General: No scleral icterus.  Neck:      Vascular: No carotid bruit.   Cardiovascular:      Rate and Rhythm: Normal rate and regular rhythm.      Pulses: Normal pulses.      Heart sounds: No murmur heard.     No gallop.   Pulmonary:      Effort: Pulmonary effort is normal.      Breath sounds: Normal breath sounds.   Musculoskeletal:      Cervical back: Neck supple.      Right lower leg: Edema (1+) present.      Left lower leg: Edema (1+) present.   Skin:     General: Skin is warm and dry.      Capillary Refill: Capillary refill takes less than 2 seconds.   Neurological:      Mental Status: She is alert and oriented to person, place, and time.      Gait: Gait abnormal (ambulates with a walker only at night).   Psychiatric:         Mood and Affect: Mood normal.         Behavior: Behavior normal.         Labs:  Lab Results   Component Value Date     (L) 12/28/2015     11/16/2015     11/02/2015    K 4.4 10/18/2024    K 4.5 09/04/2024    K 3.6 08/20/2024    K 4.0 08/13/2024    K 4.0 12/28/2015    K 3.8 11/16/2015    K 3.7 11/02/2015     10/18/2024    CL 95 (L) 09/04/2024    CL 94 (L) 08/20/2024    CL 97 08/13/2024    CL 95 (L) 12/28/2015    CL 99 11/16/2015     11/02/2015    CO2 31 10/18/2024    CO2 30 09/04/2024    CO2 27 08/20/2024    CO2 29 08/13/2024    CO2 35 (H) 10/17/2021    CO2 28.5 12/28/2015    CO2 30.4 11/16/2015    CO2 30.7 11/02/2015    BUN 13 10/18/2024    BUN 13 09/04/2024    BUN 13 08/20/2024    BUN 14 08/13/2024    BUN 7 12/28/2015    BUN 11 11/16/2015    BUN 8 11/02/2015    CREATININE 0.78 10/18/2024    CREATININE 0.97 (H) 09/04/2024    CREATININE 0.74 08/20/2024    CREATININE 0.86 08/13/2024    CREATININE 0.76 12/28/2015    CREATININE 0.91 11/16/2015     CREATININE 0.85 11/02/2015    EGFR 72 10/18/2024    EGFR 59 (L) 09/04/2024    EGFR 77 08/20/2024    EGFR 64 08/13/2024    GLUC 129 09/04/2024    GLUC 90 08/02/2024    GLUC 102 11/08/2023    GLUC 96 09/28/2023    AST 21 10/18/2024    AST 21 08/13/2024    AST 16 06/11/2024    AST 23 11/16/2015    AST 24 10/06/2015    AST 18 04/27/2015    ALT 10 10/18/2024    ALT 11 08/13/2024    ALT 12 06/11/2024    ALT 31 11/16/2015    ALT 37 10/06/2015    ALT 21 04/27/2015    TBILI 0.39 10/18/2024    TBILI 0.50 08/13/2024    TBILI 0.50 06/11/2024    ALB 3.9 10/18/2024    ALB 3.6 08/13/2024    ALB 3.9 06/11/2024    ALB 3.8 11/16/2015    ALB 3.7 10/06/2015    ALB 3.6 04/27/2015    MG 1.8 (L) 08/13/2024    MG 1.9 07/03/2023    MG 1.4 (L) 06/22/2023    MG 1.5 (L) 12/17/2015    MG 1.4 (L) 10/06/2015    MG 1.8 08/20/2014    CALCIUM 9.3 10/18/2024    CALCIUM 9.1 09/04/2024    CALCIUM 9.0 08/20/2024    CALCIUM 9.7 08/13/2024    CALCIUM 8.7 12/28/2015    CALCIUM 8.7 11/16/2015    CALCIUM 8.6 11/02/2015      Lab Results   Component Value Date    WBC 5.45 10/18/2024    WBC 6.49 08/13/2024    WBC 7.24 06/11/2024    WBC 6.23 11/16/2015    WBC 5.87 10/06/2015    WBC 8.01 04/27/2015    HGB 11.7 10/18/2024    HGB 11.3 (L) 08/13/2024    HGB 13.3 06/11/2024    HGB 13.3 11/16/2015    HGB 12.7 10/06/2015    HGB 13.4 04/27/2015    HCT 36.5 10/18/2024    HCT 35.3 08/13/2024    HCT 40.1 06/11/2024    HCT 40.4 11/16/2015    HCT 37.3 10/06/2015    HCT 39.5 04/27/2015     (H) 10/18/2024     (H) 08/13/2024     (H) 06/11/2024     (H) 11/16/2015     (H) 10/06/2015     (H) 04/27/2015    INR 1.13 10/18/2024    INR 1.03 11/08/2023    INR 1.09 06/22/2023    INR 2.39 (H) 12/28/2015    INR 3.63 (H) 12/17/2015    INR 2.57 (H) 11/16/2015    IRON 85 10/18/2024    IRON 71 08/13/2024    IRON 67 05/02/2024    IRON 76 11/16/2015    IRON 90 04/27/2015    IRON 81 09/25/2014    FERRITIN 12 10/18/2024    FERRITIN 12 08/13/2024     "FERRITIN 63 05/02/2024    FERRITIN 108 11/16/2015    FERRITIN 158.2 04/27/2015    FERRITIN 177.4 09/25/2014    TIBC 397 10/18/2024    TIBC 409 08/13/2024    TIBC 368 05/02/2024    TIBC 312 11/16/2015    TIBC 326 04/27/2015    TIBC 265 09/25/2014      Lab Results   Component Value Date    CHOLESTEROL 185 12/09/2023    CHOLESTEROL 183 04/07/2023    CHOLESTEROL 174 03/19/2022    TRIG 90 12/09/2023    TRIG 137 04/07/2023    TRIG 38 03/19/2022    TRIG 83 12/17/2015    TRIG 124 04/27/2015    TRIG 69 09/25/2014    HDL 70 12/09/2023    HDL 67 04/07/2023    HDL 74 03/19/2022    HDL 58 12/17/2015    HDL 59 04/27/2015    HDL 82 09/25/2014    LDLCALC 97 12/09/2023    LDLCALC 89 04/07/2023    LDLCALC 92 03/19/2022    LDLCALC 92 12/17/2015    LDLCALC 96 04/27/2015    LDLCALC 108 (H) 09/25/2014     Lab Results   Component Value Date    HGBA1C 5.9 (H) 10/18/2024    HGBA1C 6.3 (H) 06/11/2024    HGBA1C 6.1 (H) 03/11/2024    HGBA1C 5.6 12/17/2015    HGBA1C 6.2 (H) 09/14/2015    HGBA1C 6.1 (H) 09/03/2015    GLUF 91 10/18/2024    GLUF 95 08/20/2024    GLUF 89 08/13/2024    POCGLU 106 12/04/2019     No results found for: \"TSH\", \"FT4\"  No results found for: \"HSTNI0\", \"HSTNI2\", \"HSTNI4\", \"TROPONINI\"  No results found for: \"BNP\", \"NTBNP\"     Imaging:  I have personally reviewed pertinent reports.  No results found.    Cardiac Studies:  EKG:   Date: 10/23/24, normal sinus rhythm, 1st degree AV block  Date: 06/22/23, sinus tachycardia, 1st degree AV block, PACs, nonspecific ST and T waves findings  Date: 02/15/23, normal sinus rhythm, 1st degree AV block, PVCs  Date: 02/23/21, normal sinus rhythm, PAC, PVC  Date: 01/11/21, normal sinus rhythm, 1st degree AV block, PVCs  Date: 04/18/14, normal sinus rhythm, 1st degree AV block, nonspecific ST findings  Date: 09/01/12, normal sinus rhythm, 1st degree AV block, irbbb, rightward axis  Date: 06/10/08, normal sinus rhythm  Date: 06/04/03, normal sinus rhythm  Date: 09/18/00, normal sinus rhythm, " PAC, nonspecific T wave findings  Date: 07/06/00, normal sinus rhythm  Date: 05/30/00, normal sinus rhythm    Tele:   No results found for this or any previous visit.     Holter:   24H Holter Study Date: 04/04/24  IMPRESSION:  1) Abnormal Holter monitor of 24 hrs duration.  2) Slightly increased burden of ventricular and supraventricular ectopic beats.  3) Brief runs of asymptomatic SVT, longest 9 beats in duration.    24H Holter Study Date: 04/03/23  Impression  Abnormal Holter  Low burden of premature ventricular contractions, and no ventricular runs  Moderate to high burden of premature atrial contractions with 23 runs of nonsustained paroxysmal atrial tachycardia    24H Holter Study Date: 12/09/21  IMPRESSION:  Sinus rhythm with rare PACs and PVCs.  No significant arrhythmias identified.  No SVT.    24H Holter Study Date: 08/24/21  IMPRESSION:  Abnormal 24 hour holter monitor.  Patient in normal sinus rhythm throughout holter monitoring.  Occasional premature ventricular contractions with no non-sustained ventricular tachycardia.  Occasional premature atrial contractions with a 5 beat run of atrial tachycardia.  No significant pauses.  No symptoms noted in diary.    24H Holter Study Date: 09/04/20  IMPRESSION:  Predominantly normal sinus rhythm, with an average heart rate of 66 beats per minute  No evidence of SVT during monitoring period.  Frequent premature atrial contractions, constituting 2.1% of total beats  Occasional premature ventricular contractions  No significant pauses or advanced degree heart block    24H Holter Study Date: 04/02/19  IMPRESSION:  1) Borderline Holter monitor of 24 hrs duration.  2) Normal burden of ventricular and supraventricular ectopic beats.   3) Brief asymptomatic runs of supraventricular ectopy, longest 6 beats in duration.    24H Holter Study Date: 10/18/17  IMPRESSION:  Unremarkable 24 hour Holter monitor    Recent Device Check:  No results found for this or any previous  visit.    Previous EPS/Interventions:  No results found for this or any previous visit.    Previous Cath/PCI:  No results found for this or any previous visit.    Previous STRESS TEST:  No results found for this or any previous visit.     ECHO:  Results for testing completed on the encounter of 12/21/23  Study: Echo complete w/ contrast if indicated  Interpreted Summary    Left Ventricle: Left ventricular cavity size is normal. Wall thickness is normal. The left ventricular ejection fraction is 60%. Systolic function is normal. Wall motion is normal. Diastolic function is mildly abnormal, consistent with grade I (abnormal) relaxation.  Left atrial filling pressure is normal.    Right Ventricle: Systolic function is normal.    Aortic Valve: The aortic valve has no significant stenosis.    Mitral Valve: There is mild annular calcification.    Tricuspid Valve: There is mild regurgitation. The right ventricular systolic pressure is normal. The estimated right ventricular systolic pressure is 20.00 mmHg.    Pericardium: There is no pericardial effusion.    Prior TTE study available for comparison. Prior study date: 12/23/2022. No significant changes noted compared to the prior study.    Results for orders placed during the hospital encounter of 12/23/22  Echo complete w/ contrast if indicated  Interpretation Summary    Left Ventricle: Left ventricular cavity size is normal. Wall thickness is normal. The left ventricular ejection fraction is 65%. Systolic function is normal. Diastolic function is mildly abnormal, consistent with grade I (abnormal) relaxation.    SAVANAH:  No results found for this or any previous visit.    CMR:  No results found for this or any previous visit.    Counseling / Coordination of Care:  During our visit, I spent 20 minutes with the patient, and greater than 50% of this time was spent on counseling and coordination of care, including addressing diagnostic results, prognosis, risks and benefits of  treatment options, instructions for management, patient/family education, importance of treatment compliance, along with risk factor reductions.    Dictation Disclaimer:  This note was completed in part utilizing [employer] direct voice recognition software. Grammatical errors, random word insertion, spelling mistakes, and incomplete sentences may be an occasional consequence of the system secondary to software limitations, ambient noise and hardware issues. At the time of dictation, efforts were made to edit, clarify and /or correct errors.  Please read the chart carefully and recognize, using context, where substitutions have occurred.  If you have any questions or concerns about the context, text or information contained within the body of this dictation, please contact myself, the provider, for further clarification.     Sary Nixon, DO 10/23/24

## 2024-10-23 NOTE — PROGRESS NOTES
Internal Medicine Pre-Operative Evaluation:     Reason for Visit: Pre-operative Evaluation for Risk Stratification and Optimization    Patient ID: Matilda Day is a 79 y.o. female.     Surgery: Arthroplasty of right Hip  Referring Provider: Dr. Ford      Recommendations to Proceed withSurgery    Patient is considered to be Low risk for Medium risk procedure.     After evaluation and discussion with patient with emphasis that all surgery has some degree of inherent risk it is acknowledged by patient this risk is Acceptable.    Patient is optimized and may proceed with planned procedure.     Assessment    Pre-operative Medical Evaluation for planned surgery  Recommendations as listed in PLAN section below  Contact surgical nurse  navigator with any questions regarding preoperative plan or schedule.      Assessment & Plan  Primary osteoarthritis of one hip, right  Failed outpatient conservative measures  Electing to undergo arthroplasty    Essential hypertension  Stable  Monitor post operative BP   Avoid hypotension if at all possible  Refer to PAT instructions regarding medication administration the morning of surgery    Portal vein thrombosis  Takes eliquis  Prediabetes  Hgb A1c 5.9  Recommend following DM diet  Monitor FBS    Continuous opioid dependence (HCC)  Takes NORCO 2x daily  Along with lorazepam 2x daily   PDMP was reviewed  If requires inpatient stay would recommend APS for post operative pain management  Atrial fibrillation, unspecified type (HCC)  Takes diltiazem for rate control  Eliquis for AC  Instructed to resume POD #1  Cleared by cardiology 10/23/2024  Preoperative clearance             Plan:     1. Further preoperative workup as follows:   - none no further testing required may proceed with surgery    2. Preoperative Medication Management Review performed by PAT nursing  YES    3. Patient requires further consultation with:   No Consults Required    4. Discharge Planning / Barriers to  Discharge  none identified - patients has post discharge therapy plan in place, transportation arranged for discharge day, adequate family support at home to assist with discharge to home.        Subjective:           History of Present Illness:     Matilda Day is a 79 y.o. female who presents to the office today for a preoperative consultation at the request of surgeon. The patient understands this is an elective procedure and not emergent. They are electing to undergo planned procedure with an understanding that all surgery has inherent risk. They have worked with their surgeon and failed conservative treatment measures. Today they present for preoperative risk assessment and recommendations for optimization in preparation for surgery.    Pre-op Exam    Pt was cleared by cardiology without any need for further testing on 10/23/2024    I reviewed her PDMP, a/t the patient she takes the NORCO 2x daily and only 3 if absolutely necessary. She does however also take the 1mg of ativan 2x daily as well. We would recommend APS consultation if patient stays overnight d/t chronic opioid use.     She was instructed to hold her eliquis x 3 days and resume the day after surgery.     Pt seen in surgical optimization center for upcoming proposed surgery. They have failed previous conservative measures and have elected surgical intervention.     Pt meets presurgical lab and BMI optimization goals.      Matilda Day has an IN HOSPITAL cardiac risk of RCI RISK CLASS I (0 risk factors, risk of major cardiac compl. appr. 0.5%) based on RCRI calculator    Cardiac Risk Estimation: per the Revised Cardiac Risk Index (Circ. 100:1043, 1999),           Previous history of bleeding disorders or clots?: Yes  Previous Anesthesia reaction?: No  Prolonged steroid use in the last 6 months?: No    Assessment of Cardiac Risk:   - Unstable or severe angina or MI in the last 6 weeks or history of stent placement in the last year?: No   -  "Decompensated heart failure (e.g. New onset heart failure, NYHA  Class IV heart failure, or worsening existing heart failure)?: No  - Significant arrhythmias such as high grade AV block, symptomatic ventricular arrhythmia, newly recognized ventricular tachycardia, supraventricular tachycardia with resting heart rate >100, or symptomatic bradycardia?: No  - Severe heart valve disease including aortic stenosis or symptomatic mitral stenosis?: No      Pre-operative Risk Factors:  Elevated-risk surgery: No    History of cerebrovascular disease: No    History of ischemic heart disease: No  Pre-operative treatment with insulin: No  Pre-operative creatinine >2 mg/dL: No    History of congestive heart failure: No    Duke Activity Status Index (DASI):   DASI Total Score: 18.95  METs: 5.1    Medications of Perioperative Concern:   Anti-coagulants (Coumadin, Xarelto, Pradaxa, Eliquis, Lixiana) and Calcium channel blockers        ROS: No TIA's or unusual headaches, no dysphagia.  No prolonged cough. No dyspnea or chest pain on exertion.  No abdominal pain, change in bowel habits, black or bloody stools.  No urinary tract or BPH symptoms.  Positive reported pain in arthritic joint. Positive difficulty with gait. No skin rashes or issues.      Objective:    /71   Pulse 60   Ht 5' 3\" (1.6 m)   Wt 65.3 kg (144 lb)   BMI 25.51 kg/m²       General Appearance: no distress, conversive  HEENT: PERRLA, conjuctiva normal; oropharynx clear; mucous membranes moist;   Neck:  Supple, no lymphadenopathy or thyromegaly  Lungs: breath sounds normal, normal respiratory effort, no retractions, expiratory effort normal  CV: normal heart sounds S1/S2, PMI normal   ABD: soft non tender, no masses , no hepatic or splenomegaly  EXT: DP pulses intact, no lymphadenopathy, no edema  Skin: normal turgor, normal texture, no rash  Psych: affect normal, mood normal  Neuro: AAOx3        The following portions of the patient's history were reviewed " and updated as appropriate: allergies, current medications, past family history, past medical history, past social history, past surgical history and problem list.     Past History:       Past Medical History:   Diagnosis Date    Anemia     Anxiety     Arrhythmia     Arthritis     Asthma     Blood clot in vein     portal vein    Breast cancer (HCC) 02/12/2021    Breast lump     [id number] RESOLVED    Cancer (HCC)     Depression     Disease of thyroid gland     DVT, lower extremity (HCC)     GERD (gastroesophageal reflux disease)     Hyperlipidemia     Hypertension     Hypokalemia     Hyponatremia     Hypothyroidism     Iron deficiency anemia     Irregular heart beat     Manic behavior (HCC)     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HCC)     Osteoarthritis     Palpitations     [id number]  RESOLVED    Paroxysmal supraventricular tachycardia (HCC)     PE (pulmonary thromboembolism) (HCC)     Pneumonia     Sjoegren syndrome     Sleep apnea     Sleep difficulties     Spinal stenosis     Thrombocytosis     [id number]  RESOLVED    Tremors of nervous system     dbs implanted right and left chest    Ulcerative colitis (HCC)     Vertigo     [id number] RESOLVED    Past Surgical History:   Procedure Laterality Date    ABDOMINAL SURGERY      APPENDECTOMY      BREAST BIOPSY Left 11/07/2019    Stereo    BREAST EXCISIONAL BIOPSY Left     x many years    BREAST SURGERY      lumpectomy & biopsy    CARMELLA HOLE W/ STEREOTACTIC INSERTION OF DBS LEADS / INTRAOP MICROELECTRODE RECORDING      COLON SURGERY      COLONOSCOPY N/A 08/30/2017    Procedure: POUCHOSCOPY;  Surgeon: VANDANA Waters MD;  Location: [facility];  Service: Colorectal    COLOPROCTECTOMY W/ ILEO J POUCH      ESOPHAGOGASTRODUODENOSCOPY      ONSET 10/17/11    FISTULA REPAIR      LLEOANAL FISTULA REPAIR TRANSPERIN TRANSABD APPROACH    HYSTERECTOMY      age 38    ILEOSTOMY CLOSURE      KNEE ARTHROSCOPY      Right    MAMMO STEREOTACTIC BREAST BIOPSY LEFT (ALL INC) Left 11/07/2019    MAMMO  STEREOTACTIC BREAST BIOPSY LEFT (ALL INC) Left 2020    MAMMO STEREOTACTIC BREAST BIOPSY LEFT (ALL INC) EACH ADD Left 2020    MASTECTOMY Left 2021    left mastectomy- Dr. Hayes    MASTECTOMY W/ SENTINEL NODE BIOPSY Left 2021    Procedure: BREAST MASTECTOMY WITH SENTINEL LYMPH NODE BIOPSY, LYMPHATIC MAPPING WITH BLUE DYE AND RADIAOCTIVE DYE (INJECT AT 1100 BY DR HAYES IN THE OR);  Surgeon: Robbie Hayes MD;  Location: [facility];  Service: Surgical Oncology    ND INSJ/RPLCMT CRANIAL NEUROSTIM PULSE GENERATOR Right 2017    Procedure: DBS GENERATOR REPLACEMENT;  Surgeon: Marin Mao MD;  Location: [facility];  Service: Neurosurgery    ND INSJ/RPLCMT CRANIAL NEUROSTIM PULSE GENERATOR N/A 2019    Procedure: REPLACEMENT IMPLANTABLE PULSE GENERATOR FOR DEEP BRAIN STIMULATOR LEFT CHEST;  Surgeon: Damian Batres MD;  Location: [facility];  Service: Neurosurgery    SPLENECTOMY      TONSILLECTOMY            Social History     Tobacco Use    Smoking status: Former     Current packs/day: 0.00     Average packs/day: 1 pack/day for 15.0 years (15.0 ttl pk-yrs)     Types: Cigarettes     Start date: 1950     Quit date: 1965     Years since quittin.8    Smokeless tobacco: Never    Tobacco comments:     Quit   Vaping Use    Vaping status: Never Used   Substance Use Topics    Alcohol use: Yes     Alcohol/week: 2.0 standard drinks of alcohol     Types: 2 Cans of beer per week     Comment: 2or 3 beers a week    Drug use: No     Family History   Problem Relation Age of Onset    Venous thrombosis Mother         ACUTE VENOUS THROMBOSIS OF DEEP VESSELS OF THE DISTAL LOWER EXTREMITY    Other Mother         PHLEBITIS    Hypertension Mother     Peripheral vascular disease Mother     COPD Father     Diabetes Father         MELLITUS    Stroke Father     Diabetes Sister         MELLITUS    Sjogren's syndrome Sister     No Known Problems Daughter     No Known Problems Maternal Grandmother     No Known  Problems Maternal Grandfather     No Known Problems Paternal Grandmother     No Known Problems Paternal Grandfather     No Known Problems Sister     No Known Problems Maternal Aunt     No Known Problems Paternal Aunt     No Known Problems Son           Allergies:     Allergies   Allergen Reactions    Indomethacin GI Intolerance, Dizziness and Other (See Comments)    Macrobid [Nitrofurantoin Monohyd Macro] Rash    Nitrofurantoin Other (See Comments)    Penicillins Rash and Other (See Comments)     Annotation - [id number]: can take cephalosporins    Sulfa Antibiotics Rash and Other (See Comments)        Current Medications:     Current Outpatient Medications   Medication Instructions    albuterol (PROVENTIL HFA,VENTOLIN HFA) 90 mcg/act inhaler 2 puffs, Inhalation, Every 6 hours PRN    alendronate (FOSAMAX) 70 mg tablet TAKE 1 TABLET BY MOUTH EVERY 7 DAYS    amLODIPine (NORVASC) 2.5 mg tablet TAKE ONE TABLET BY MOUTH EVERY DAY    ammonium lactate (LAC-HYDRIN) 12 % cream     ascorbic acid (VITAMIN C) 500 mg, Oral, Daily, Begin 30 days prior to surgery    Calcium Carb-Cholecalciferol (CALCIUM 600-D PO) 1 tablet, Oral, 2 times daily    Coenzyme Q10 (CO Q-10 PO) 1 capsule, Oral, Daily    diltiazem (CARDIZEM CD) 180 mg 24 hr capsule TAKE ONE CAPSULE BY MOUTH EVERY DAY    diltiazem (CARDIZEM) 60 mg, Oral, Daily    Eliquis 5 mg, Oral, 2 times daily    escitalopram (LEXAPRO) 20 mg, Oral, Daily    ferrous sulfate 324 mg, Oral, Daily before breakfast, Begin 30 days prior to surgery    fluticasone (FLONASE) 50 mcg/act nasal spray APPLY ONE SPRAY IN EACH NOSTRIL ONCE DAILY AS NEEDED FOR RUNNY NOSE    Fluticasone Furoate-Vilanterol 100-25 mcg/actuation inhaler 1 puff, Inhalation, Daily, Rinse mouth after use.    folic acid (KP FOLIC ACID) 1 mg, Oral, Daily, Begin 30 days prior to surgery    gabapentin (NEURONTIN) 600 MG tablet TAKE ONE TABLET BY MOUTH EVERY MORNING , TAKE ONE TABLET BY MOUTH EVERY DAY IN THE AFTERNOON , AND TAKE TWO  "TABLETS BY MOUTH EVERY EVENING AT BEDTIME    HYDROcodone-acetaminophen (NORCO) 5-325 mg per tablet 1 tablet, Oral, Every 6 hours PRN    levothyroxine 50 mcg, Oral, Daily    lisinopril (ZESTRIL) 20 mg, Oral, 2 times daily    LORazepam (ATIVAN) 1 mg, Oral, Every 6 hours PRN    MAGNESIUM PO 1 tablet, Oral, Daily    Multiple Vitamin (multivitamin) tablet 1 tablet, Oral, Daily, Begin 30 days prior to surgery    Omega-3 Fatty Acids (FISH OIL PO) 1 capsule, Oral, Daily    pantoprazole (PROTONIX) 40 mg, Oral, Daily    potassium chloride (MICRO-K) 10 MEQ CR capsule TAKE TWO CAPSULES BY MOUTH EVERY DAY    simvastatin (ZOCOR) 15 mg, Oral, Daily    sodium chloride 1 g, Oral, 4 times daily    torsemide (DEMADEX) 10 mg tablet TAKE ONE TABLET BY MOUTH EVERY DAY AS NEEDED (EDEMA)    torsemide (DEMADEX) 20 mg, Oral, Daily    vitamin B-12 (VITAMIN B-12) 500 mcg, Oral, Daily           PRE-OP WORKSHEET DATA    Assessment of Pre-Operative Risks     MLJ Quality Hard Stops:    BMI (<40) : Estimated body mass index is 25.51 kg/m² as calculated from the following:    Height as of this encounter: 5' 3\" (1.6 m).    Weight as of this encounter: 65.3 kg (144 lb).    Hgb ( >11):   Lab Results   Component Value Date    HGB 11.7 10/18/2024    HGB 11.3 (L) 08/13/2024    HGB 13.3 06/11/2024       HbA1c (<7.5) :   Lab Results   Component Value Date    HGBA1C 5.9 (H) 10/18/2024       GFR (>60) (Less then 45 = Nephrology consult):    Lab Results   Component Value Date    EGFR 72 10/18/2024    EGFR 59 (L) 09/04/2024    EGFR 77 08/20/2024            Pre-Op Data Reviewed:       Laboratory Results: I have personally reviewed the pertinent laboratory results/reports     EKG:I have personally reviewed pertinent reports.  . I personally reviewed and interpreted available tracings in the electronic medical record          Impression    NSR / 1°AVB      Specimen Collected: 10/23/24             OLD RECORDS: reviewed old records in the chart review section if EHR " on day of visit.    Previous cardiopulmonary studies within the past year:  Echocardiogram: yes   Lab Results   Component Value Date    LVEF 60 12/21/2023     Cardiac Catheterization: no  Stress Test: no      Time of visit including pre-visit chart review, visit and post-visit coordination of plan and care , review of pre-surgical lab work, preparation and time spent documenting note in electronic medical record, time spent face-to-face in physical examination answering patient questions by care team 35 minutes             [facility]

## 2024-10-24 ENCOUNTER — ANESTHESIA EVENT (OUTPATIENT)
Dept: PERIOP | Facility: HOSPITAL | Age: 79
End: 2024-10-24
Payer: MEDICARE

## 2024-10-24 ENCOUNTER — OFFICE VISIT (OUTPATIENT)
Age: 79
End: 2024-10-24
Payer: MEDICARE

## 2024-10-24 VITALS
BODY MASS INDEX: 25.52 KG/M2 | SYSTOLIC BLOOD PRESSURE: 153 MMHG | HEART RATE: 60 BPM | DIASTOLIC BLOOD PRESSURE: 71 MMHG | WEIGHT: 144 LBS | HEIGHT: 63 IN

## 2024-10-24 DIAGNOSIS — I48.91 ATRIAL FIBRILLATION, UNSPECIFIED TYPE (HCC): ICD-10-CM

## 2024-10-24 DIAGNOSIS — M16.11 PRIMARY OSTEOARTHRITIS OF ONE HIP, RIGHT: ICD-10-CM

## 2024-10-24 DIAGNOSIS — I10 ESSENTIAL HYPERTENSION: ICD-10-CM

## 2024-10-24 DIAGNOSIS — Z01.818 PREOPERATIVE CLEARANCE: Primary | ICD-10-CM

## 2024-10-24 DIAGNOSIS — F11.20 CONTINUOUS OPIOID DEPENDENCE (HCC): ICD-10-CM

## 2024-10-24 DIAGNOSIS — R73.03 PREDIABETES: ICD-10-CM

## 2024-10-24 DIAGNOSIS — I81 PORTAL VEIN THROMBOSIS: ICD-10-CM

## 2024-10-24 PROCEDURE — 99215 OFFICE O/P EST HI 40 MIN: CPT | Performed by: NURSE PRACTITIONER

## 2024-10-24 PROCEDURE — G2211 COMPLEX E/M VISIT ADD ON: HCPCS | Performed by: NURSE PRACTITIONER

## 2024-10-24 NOTE — ASSESSMENT & PLAN NOTE
Takes NORCO 2x daily  Along with lorazepam 2x daily   PDMP was reviewed  If requires inpatient stay would recommend APS for post operative pain management

## 2024-10-24 NOTE — TELEPHONE ENCOUNTER
Caller: patient     Doctor: Gabe     Reason for call: would like the [facility] currently scheduled 11/5 for the knee done prior to her sx 11/4    Can she have an injection that close to sx?    Call back#: [phone]

## 2024-10-24 NOTE — PRE-PROCEDURE INSTRUCTIONS
Pre-Surgery Instructions:   Medication Instructions    albuterol (PROVENTIL HFA,VENTOLIN HFA) 90 mcg/act inhaler Uses PRN- OK to take day of surgery    amLODIPine (NORVASC) 2.5 mg tablet Take day of surgery.    ammonium lactate (LAC-HYDRIN) 12 % cream Hold day of surgery.    ascorbic acid (VITAMIN C) 500 MG tablet Hold day of surgery.    Calcium Carb-Cholecalciferol (CALCIUM 600-D PO) Stop taking 7 days prior to surgery.    Coenzyme Q10 (CO Q-10 PO) Stop taking 7 days prior to surgery.    diltiazem (CARDIZEM CD) 180 mg 24 hr capsule Take night before surgery    diltiazem (CARDIZEM) 60 mg tablet Take day of surgery.    Eliquis 5 MG Instructions provided by MD    escitalopram (LEXAPRO) 20 mg tablet Take night before surgery    ferrous sulfate 324 (65 Fe) mg Hold day of surgery.    fluticasone (FLONASE) 50 mcg/act nasal spray Uses PRN- OK to take day of surgery    Fluticasone Furoate-Vilanterol 100-25 mcg/actuation inhaler Continue to take these inhaler medications on your normal schedule up to and including the day of surgery.     folic acid (KP Folic Acid) 1 mg tablet Hold day of surgery.    gabapentin (NEURONTIN) 600 MG tablet Take day of surgery.    HYDROcodone-acetaminophen (NORCO) 5-325 mg per tablet Uses PRN- OK to take day of surgery    levothyroxine 50 mcg tablet Take night before surgery    lisinopril (ZESTRIL) 20 mg tablet Hold day of surgery.    LORazepam (ATIVAN) 1 mg tablet Take day of surgery.    MAGNESIUM PO Stop taking 7 days prior to surgery.    Multiple Vitamin (multivitamin) tablet Hold day of surgery.    Omega-3 Fatty Acids (FISH OIL PO) Stop taking 7 days prior to surgery.    pantoprazole (PROTONIX) 40 mg tablet Take day of surgery.    potassium chloride (MICRO-K) 10 MEQ CR capsule Hold day of surgery.    simvastatin (ZOCOR) 10 mg tablet Take night before surgery    sodium chloride 1 g tablet Hold day of surgery.    torsemide (DEMADEX) 10 mg tablet Hold day of surgery.    torsemide (DEMADEX) 20 mg  tablet Hold day of surgery.    vitamin B-12 (VITAMIN B-12) 500 mcg tablet Stop taking 7 days prior to surgery.   Medication instructions for day surgery reviewed. Please use only a sip of water to take your instructed medications. Avoid all over the counter vitamins, supplements and NSAIDS for one week prior to surgery per anesthesia guidelines. Tylenol is ok to take as needed.     You will receive a call one business day prior to surgery with an arrival time and hospital directions. If your surgery is scheduled on a Monday, the hospital will be calling you on the Friday prior to your surgery. If you have not heard from anyone by 8pm, please call the hospital supervisor through the hospital  at [phone]. ([place] [phone] or [place] [phone]).    Do not eat or drink anything after midnight the night before your surgery, including candy, mints, lifesavers, or chewing gum. Do not drink alcohol 24hrs before your surgery. Try not to smoke at least 24hrs before your surgery.       Follow the pre surgery showering instructions as listed in the “My Surgical Experience Booklet” or otherwise provided by your surgeon's office. Do not use a blade to shave the surgical area 1 week before surgery. It is okay to use a clean electric clippers up to 24 hours before surgery. Do not apply any lotions, creams, including makeup, cologne, deodorant, or perfumes after showering on the day of your surgery. Do not use dry shampoo, hair spray, hair gel, or any type of hair products.     No contact lenses, eye make-up, or artificial eyelashes. Remove nail polish, including gel polish, and any artificial, gel, or acrylic nails if possible. Remove all jewelry including rings and body piercing jewelry.     Wear causal clothing that is easy to take on and off. Consider your type of surgery.    Keep any valuables, jewelry, piercings at home. Please bring any specially ordered equipment (sling, braces) if  indicated.    Arrange for a responsible person to drive you to and from the hospital on the day of your surgery. Please confirm the visitor policy for the day of your procedure when you receive your phone call with an arrival time.     Call the surgeon's office with any new illnesses, exposures, or additional questions prior to surgery.    Please reference your “My Surgical Experience Booklet” for additional information to prepare for your upcoming surgery.    See Geriatric Assessment below...  Falls (last 6 months): No  Aron Total Score: 18  PHQ- 9 Depression Scale:0  Nutrition Assessment Score:14  METS:4.73  Health goals:  -What are your overall health goals? (quit smoking, wt. loss, rest, decrease stress)    Be in good health.  -What brings you strength? (family, friends, Taoism, health)    Family and friends  -What activities are important to you? (exercise, reading, travel, work)   Puzzles

## 2024-10-25 NOTE — TELEPHONE ENCOUNTER
Recommend USG injection to be done at least 2 weeks before her surgery if possible. I would recommend against injection to be done immediately after surgery.

## 2024-10-28 ENCOUNTER — EVALUATION (OUTPATIENT)
Dept: PHYSICAL THERAPY | Facility: CLINIC | Age: 79
End: 2024-10-28
Payer: MEDICARE

## 2024-10-28 ENCOUNTER — TELEPHONE (OUTPATIENT)
Age: 79
End: 2024-10-28

## 2024-10-28 DIAGNOSIS — M16.11 PRIMARY OSTEOARTHRITIS OF ONE HIP, RIGHT: ICD-10-CM

## 2024-10-28 DIAGNOSIS — Z01.818 PREOPERATIVE TESTING: ICD-10-CM

## 2024-10-28 DIAGNOSIS — Z01.818 PRE-OPERATIVE EXAM: ICD-10-CM

## 2024-10-28 DIAGNOSIS — M25.551 RIGHT HIP PAIN: Primary | ICD-10-CM

## 2024-10-28 PROCEDURE — 97110 THERAPEUTIC EXERCISES: CPT

## 2024-10-28 PROCEDURE — 97161 PT EVAL LOW COMPLEX 20 MIN: CPT

## 2024-10-28 NOTE — PROGRESS NOTES
PT Evaluation     Today's date: 10/28/2024  Patient name: Matilda Day  : 1945  MRN: [id number]  Referring provider: No ref. provider found  Dx:   Encounter Diagnosis     ICD-10-CM    1. Right hip pain  M25.551       2. Pre-operative exam  Z01.818           Start Time: 735  Stop Time: 08  Total time in clinic (min): 30 minutes    Assessment  Impairments: abnormal gait, abnormal or restricted ROM, abnormal movement, activity intolerance, impaired balance, pain with function, participation limitations, activity limitations and endurance  Symptom irritability: moderate    Assessment details: Matilda is an 79 year old patient presenting to OPPT for evaluation regarding pre-evaluation testing in preparation for R FITO that is scheduled for 24. Upon evaluation they show impairments in the following areas: decreased active and passive range of motion of R hip and R knee associated with pain at middle and end ranges of motion for all directions. Decreased R sided strength throughout proximal chain including both hip and knee musculature. Pain limiting patient's function as evidenced by increased fall risk per 5x STS and TUG testing. These impairments limit their ability to perform daily activities as well as their previous level of function causing decreased quality of life.Educated regarding anterior approach precautions, signs and symptoms of DVT/infection - patient verbalized understanding.    Patient requires continued skilled PT services in order to improve aforementioned impairments so that they are able to return to highest level of function possible. Without initiation of skilled PT services, patient will have significantly increased difficulty returning to PLOF following surgery.     Understanding of Dx/Px/POC: good     Prognosis: good    Goals  Short-Term Goals (4 weeks)   1.  Patient will decrease worst rating of pain by 2/10 to improve quality of life   2. Pt will increase strength by 0.5 MMT  proximal hip musculature to improve quality of life with improved efficiency of transfers and daily activities  3. Patient will be able to perform home and household duties with 2/10 reduction in pain indicating improved QOL   4. Patient will improve affected hip AROM by 10% compared to IE   5. Patient will improve affected hip PROM by 10% compared to IE      Long-Term Goals (8 weeks)   1. Patient's L/S will be WNL  indicating improvement in L/S AROM capacities   2. Patient will decrease pain by 4/10 at worst in comparison to IE indicating significant reduction in pain and improved quality of life   3. Patient will be able to increase maximal walking distance by 500   ft indicating improved respiratory and musculoskeletal function   4. Patient will be able to perform household and hobby activities without increase in pain > or equal to 2/10 indicating ability to independently manage pain symptoms to accomplish daily activities.   5. Patient will be independent with HEP with good form accomplished   6. Patient will have symmetrical hip passive and active range of motion       **Goal subject to change following surgery**           Plan  Patient would benefit from: skilled physical therapy  Planned modality interventions: cryotherapy and thermotherapy: hydrocollator packs    Planned therapy interventions: massage, manual therapy, neuromuscular re-education, nerve gliding, postural training, self care, strengthening, stretching, home exercise program, flexibility, functional ROM exercises, gait training and balance    Frequency: 2x week  Plan of Care beginning date: 10/28/2024  Plan of Care expiration date: 12/28/2024  Treatment plan discussed with: patient        Subjective Evaluation    History of Present Illness  Mechanism of injury: Matilda is an 79 year old patient presenting to OPPT for evaluation regarding pre-evaluation for R FITO scheduled to be performed on 11/4/24. Notes that the knee and the hip both on the R  side. Surgery scheduled on 24. Anterior approach scheduled for.     Ambulates with no AD. Likes to do puzzles. Feels comfortable with all the pre operative instructions. Cleared by cardiac. Only one left for clearance for blood testing. Has a walker, rollator, and a cane.     House setup below.     Blood pressure and Blood sugar has been okay. Has been on borderline of diabetic. Takes BP medicine for control.           Recurrent probem    Quality of life: good    Patient Goals  Patient goals for therapy: increased strength, improved balance and increased motion    Pain  Current pain rating: 3  At best pain ratin  At worst pain ratin  Location: Groin and posterior  Quality: dull ache  Relieving factors: heat  Aggravating factors: standing and walking    Social Support  Steps to enter house: yes  2  Stairs in house: yes (Hospital bed on the first floor)   14  Lives in: multiple-level home  Lives with: spouse    Employment status: not working  Hand dominance: left  Exercise history: Walking the trail - before the knee and hip started bothering her      Diagnostic Tests    FCE comments: RIGHT KNEE     INDICATION:   Unilateral primary osteoarthritis, right knee.        COMPARISON:  None.     VIEWS:  XR KNEE 4+ VW RIGHT INJURY      FINDINGS:     There is no acute fracture or dislocation.     There is a small joint effusion.     There is moderate to severe lateral compartment degenerative narrowing. Small osteophytes medial lateral tibial plateau and lateral femoral epicondyle. No significant valgus angulation. Patellofemoral space is preserved.     No lytic or blastic osseous lesion.     Soft tissues are unremarkable.     IMPRESSION:        Moderate to severe lateral compartment degenerative changes as above.  Valgus angulation secondarily.  Small effusion.  No fracture           Objective    Hip Active Range of Motion:   Flexion: 78   Extension: -2   Abduction: 38 pain   ER: 29 pain   IR: 31 pain      Hip  Passive Range of Motion:   Flexion:   Extension: 2   Abduction: 41 pain  ER: 35 pain   IR:  48 pain       Manual Muscle Testing:   Hip Flexion: R  3+/5  L  4/5   Hip Extension: R  3/5  L  4/5   Hip Abduction: R  3+/5  L  4/5   Hip Adduction: R  3+/5  L  4/5     Knee Extension: R  4-/5 (pain)  L 4+/5   Knee Flexion:  R  4-/5  L  4+/5      Palpation. Some tenderness to piriformis, hip flexor      Clinical Tests: Deferred due to pre op status   OPHELIA SOOD   Active SLR     Functional Testing   TUG 15.17 seconds  5x STS 29.4 seconds (BUE push) (10/28)     Antalgic pattern with decreased R stance time.      Precautions: Anxeity, Depression, Peripheral neuropathy, Osteopenia, Sjogren's Syndrome, Fall risk, Peripheral vascular disease, hx continuous opioid dependence, hx neoplasm, A-Fib       Manuals 10/28             R Hip PROM                                                     Neuro Re-Ed             Quad set              Quad set SLR              Clamshell                                                                  Ther Ex             Stand hip abd and ext              Hamstring Curl              Heel Slides              HR/TR              Ankle pumps                           Education  MH             Bike as able ROM              Ther Activity             Elevations                           Gait Training             LRAD                          Modalities

## 2024-10-29 ENCOUNTER — OFFICE VISIT (OUTPATIENT)
Dept: HEMATOLOGY ONCOLOGY | Facility: CLINIC | Age: 79
End: 2024-10-29
Payer: MEDICARE

## 2024-10-29 VITALS
HEIGHT: 63 IN | HEART RATE: 60 BPM | BODY MASS INDEX: 24.8 KG/M2 | WEIGHT: 140 LBS | TEMPERATURE: 97.3 F | RESPIRATION RATE: 16 BRPM | OXYGEN SATURATION: 96 %

## 2024-10-29 DIAGNOSIS — Z01.818 PREOPERATIVE TESTING: ICD-10-CM

## 2024-10-29 DIAGNOSIS — C50.912 MALIGNANT NEOPLASM OF LEFT BREAST IN FEMALE, ESTROGEN RECEPTOR NEGATIVE, UNSPECIFIED SITE OF BREAST (HCC): ICD-10-CM

## 2024-10-29 DIAGNOSIS — D50.9 IRON DEFICIENCY ANEMIA, UNSPECIFIED IRON DEFICIENCY ANEMIA TYPE: Primary | ICD-10-CM

## 2024-10-29 DIAGNOSIS — Z17.1 MALIGNANT NEOPLASM OF LEFT BREAST IN FEMALE, ESTROGEN RECEPTOR NEGATIVE, UNSPECIFIED SITE OF BREAST (HCC): ICD-10-CM

## 2024-10-29 DIAGNOSIS — D50.0 IRON DEFICIENCY ANEMIA DUE TO CHRONIC BLOOD LOSS: ICD-10-CM

## 2024-10-29 PROCEDURE — 99213 OFFICE O/P EST LOW 20 MIN: CPT | Performed by: STUDENT IN AN ORGANIZED HEALTH CARE EDUCATION/TRAINING PROGRAM

## 2024-10-29 RX ORDER — SODIUM CHLORIDE 9 MG/ML
20 INJECTION, SOLUTION INTRAVENOUS ONCE
OUTPATIENT
Start: 2024-11-01

## 2024-10-29 RX ORDER — SODIUM CHLORIDE 9 MG/ML
20 INJECTION, SOLUTION INTRAVENOUS ONCE
OUTPATIENT
Start: 2025-02-15

## 2024-10-29 NOTE — PROGRESS NOTES
HEMATOLOGY / ONCOLOGY CLINIC FOLLOW UP NOTE    Patient Matilda Day  MRN: [id number]  : 1945  Date of Encounter 10/29/2024      Referring Provider: Gaviota Ford [address] 2023;KAMAR Cunningham 2024    Reason for Encounter: follow up HER 2 3+ left breast s/p mastectomy; ER/MN neg; no adjuvant therapy.      Iron deficiency secondary prior GI surgery/ulcerative colitis/ questionable AVMs       Oncology History   History of left breast cancer   2020 Biopsy    Left Breast stereotactic biopsy:  A. 4 o'clock, posterior  Ductal carcinoma in situ with microinvasion  Grade 3  ER 0, MN 0, HER2 3+    B & C. 5 o'clock, anterior with & without calcs  Ductal carcinoma in situ  Grade 3  ER 0, MN 0    Concordant. Malignancy appears unifocal; calcifications span 4.1 cm. Both clips migrated. No recent axillary US. Right breast clear.     2021 Genetic Testing    The following genes were evaluated: [place], BRCA1, BRCA2, CDH1, CHEK2, PALB2, PTEN, STK11, Tp53; additional genes evaluated for a total of 20 genes.  Negative result. 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 Miller  Adjuvant therapy not recommended             Assessment / Plan:      Left breast  ( pT1a, pN0, M0) with microinvasion measuring less than 1 mm.  ER negative, MN negative, HER2 3+ disease. S/p mastectomy/SLN 2021  Diagnosed in 2021.      Matilda Day is a 77-year-old postmenopausal woman with microinvasive left breast cancer, ER negative, MN negative, 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 diagnosed in early .  Her microinvasive breast cancer measure less than 1 mm.  However, her DCIS measure 7.7 cm.    She underwent mastectomy and sentinel lymph node biopsy, resulting in KORIN.     She did not require any adjuvant therapy.    She continues to do well from an oncologic perspective.  No clinical evidence of disease recurrence.  She has an upcoming appointment for right breast mammogram on 11/25/2024.     Yearly Mammogram Nov 2024.      2. Iron-deficiency anemia probably due to the bowel resection.     8/22/2023: EGD:  Severe abnormal mucosa with plaque in the upper third of the esophagus and middle third of the esophagus; performed cold forceps biopsy; collected sample to send to pathology with brush  Abnormal mucosa; performed cold forceps biopsies  Single small angioectasia in the 2nd part of the duodenum; tissue ablated with argon plasma coagulation  Performed forceps biopsies in the duodenal bulb and 2nd part of the duodenum to rule out celiac disease     She has history of ulcerative colitis as well as bowel resection in 2010. Subsequently, she developed iron deficiency anemia, due to the malabsorption.     She received 3 doses of Venofer, resulting in normal hemoglobin and ferritin.  She was on maintenance Venofer every 6 months, when she became anemic again.      She received Venofer 200mg x10 doses from 8/17/23-10/20/23.  Was evaluated by GI and underwent EGD requiring ablation of small angiectasia in the duodenum.     She last received IV Venofer in 08/2024 as part of a protocol to receive IV iron every 6 months.     Most recent hemoglobin is stable. She is not anemic with a Hemoglobin of 11.7, but her Ferritin remains at 12. The rest of her iron panel has remained stable.  At this time, I will set her up for 3 Venofer infusions. She may benefit from monthly or q2-3 monthly Venofer infusions in the future but this can be decided at her next appointment. For now, she has iron infusions set up for every 6 months (see above).     She is pending a right hip arthroplasty on  11/4/2024. From a hematologic standpoint, she is stable for surgery. The goal will be to try and give her 1 dose of Venofer prior to surgery, and she can continue the remaining infusions post surgery.     She will follow up in 2 months with repeat CBC and iron panel with Ferritin.     Follow up     2 months              HPI  Dr Real     Matilda Day is a 78-year-old postmenopausal female who initially found to have abnormality in her left breast based on a screening mammogram.  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  DCIS port was ER negative, ND negative.  She subsequently underwent left mastectomy with sentinel lymph node biopsy by Dr. Hayes in February 12, 2021. 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 She also had micro invasion measuring less than 1 mm.  Micro invasive part of cancer was ER negative, ND negative, HER2 3+ disease.   2 sentinel lymph nodes were negative for metastatic disease. She did not have reconstruction. She did not require any adjuvant therapy.  She has history of ulcerative colitis as well as history of bowel resection in 2010.  She developed iron deficiency anemia due to the malabsorption.  She received 3 doses of Venofer for with no infusion reaction, resulting in normal hemoglobin.  Therefore, she was placed on maintenance venofer every 6 months.  However, she became anemic.  She has reactive thrombocytosis.  Ferritin was only 9.  She was placed on monthly Venofer.  She was previously following with Dr. Miller and last seen in the office on 8/4/2023.  She presents today for transfer of care/follow-up visit.     Interval history:  In the interim, she underwent EGD on 8/22/2023 which noted severe abnormal mucosa with plaque in the upper third of the esophagus and middle third of the esophagus.  Single small angiectasia in the second part of duodenum; tissue ablated with argon plasma  coagulation.  Pt accompanied by  for visit today. Doing well. Denies any dark stools, blood in stools or hematuria. Denies any CP, SOB or bone pain.           Interval History:  5/14/2024    Ms Day has no breast issues today.  She has noted BRBPR on several occasions, occasionally dark as well.  She has not had IV Venofer in almost one year per her account.  As above labs from 5/2/2024 with Hgb 12.1 WBC 9.9 plts 390 total iron 67 ferritin 63 TIBC 368 % sat 18.  She had an EGD in 2023  as noted above and appears to have some AVMs in the small bowel area.  She did have ulcerative colitis s/p resection in 2010.  She was last treated with Venofer in Oct 2023. Will repeat labs in 3 months and at that point may need repeat treatment.  Her ROS is otherwise as [id number]    Interval History, 10/29/2024  Ms. Day is here for follow up today for history of LUCILA in the setting of ulcerative colitis as well as history of microinvasive left breast cancer. She is doing well today. She is pending a right hip arthroplasty on 11/4/2024. She last received a dose of IV venofer on 08/19/2024.         REVIEW OF SYSTEMS:  Please note that a 14-point review of systems was performed to include Constitutional, HEENT, Respiratory, CVS, GI, , Musculoskeletal, Integumentary, Neurologic, Rheumatologic, Endocrinologic, Psychiatric, Lymphatic, and Hematologic/Oncologic systems were reviewed and are negative unless otherwise stated in HPI. Positive and negative findings pertinent to this evaluation are incorporated into the history of present illness.      Primary Oncologic Diagnosis:  1. Left breast cancer, stage IA ( pT1a, pN0, M0) with microinvasion measuring less than 1 mm.  ER negative, TX negative, HER2 3+ disease.  Diagnosed in February 2021.   2. Iron-deficiency anemia probably due to the bowel resection.     Previous Hematologic/ Oncologic Treatment:    S/p mastectomy and sentinel lymph node biopsy     Current Hematologic/  Oncologic Treatment:     Maintenance Venofer every 6 months starting in April 2023.     Disease Status:    KORIN status post mastectomy and sentinel lymph node biopsy.     Test Results:   Pathology:   7.7 cm of ductal carcinoma in situ, grade 3, ER negative, LA negative.  Micro invasive cancer measuring less than 1 mm, ER negative, LA negative, HER2 3+ disease.  2 sentinel lymph node was negative for metastatic disease.  Stage IA ( pT1 a, pN0, M0)     Radiology:   chest x-ray was negative for pulmonary disease.     ECOG PS: 0-1    PROBLEM LIST:  Patient Active Problem List   Diagnosis    Portal vein thrombosis    Anxiety    Moderate episode of recurrent major depressive disorder (HCC)    Essential tremor    Hyperlipidemia    Essential hypertension    Hypomagnesemia    SIADH (syndrome of inappropriate ADH production) (HCC)    Acquired hypothyroidism    Iron deficiency anemia    Prediabetes    Peripheral neuropathy    Osteopenia    Osteoarthritis of right knee    Ulcerative colitis without complications (HCC)    Allergic rhinitis    GERD (gastroesophageal reflux disease)    Mild intermittent asthma without complication    Diarrhea    Sjogren's syndrome (HCC)    Chronic salpingo-oophoritis    Overweight    Supraventricular tachycardia (HCC)    Unsteady gait    Lumbar degenerative disc disease    Preoperative testing    S/P deep brain stimulator placement    Vitamin B12 deficiency    Vitamin D deficiency    History of left breast cancer    Hoarseness of voice    Esophageal candidiasis (HCC)    History of right mastectomy    S/P left mastectomy    Peripheral vascular disease (HCC)    Continuous opioid dependence (HCC)    Malignant neoplasm of left breast in female, estrogen receptor negative, unspecified site of breast (HCC)    Primary osteoarthritis of one hip, right    Disorder of bursae of shoulder region    Enthesopathy of hip region    Peripheral venous insufficiency    Atrial fibrillation (HCC)       Past Medical  History:   has a past medical history of Anemia, Anxiety, Arrhythmia, Arthritis, Asthma, Blood clot in vein, Breast cancer (HCC) (02/12/2021), Breast lump, Cancer (HCC), Depression, Disease of thyroid gland, DVT, lower extremity (HCC), GERD (gastroesophageal reflux disease), Hyperlipidemia, Hypertension, Hypokalemia, Hyponatremia, Hypothyroidism, Iron deficiency anemia, Irregular heart beat, Manic behavior (HCC), 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HCC), Osteoarthritis, Palpitations, Paroxysmal supraventricular tachycardia (HCC), PE (pulmonary thromboembolism) (HCC), Pneumonia, Sjoegren syndrome, Sleep apnea, Sleep difficulties, Spinal stenosis, Thrombocytosis, Tremors of nervous system, Ulcerative colitis (HCC), and Vertigo.    PAST SURGICAL HISTORY:   has a past surgical history that includes Splenectomy; Abdominal surgery; Colon surgery; Breast surgery; Appendectomy; Knee arthroscopy; pr insj/rplcmt cranial neurostim pulse generator (Right, 06/20/2017); Colonoscopy (N/A, 08/30/2017); Coloproctectomy w/ ileo J-pouch; Esophagogastroduodenoscopy; FISTULA REPAIR; Ileostomy closure; [place] hole w/ stereotactic insertion of DBS leads / intraop microelectrode recording; Hysterectomy; Mammo stereotactic breast biopsy left (all inc) (Left, 11/07/2019); pr insj/rplcmt cranial neurostim pulse generator (N/A, 12/04/2019); Breast biopsy (Left, 11/07/2019); Breast excisional biopsy (Left); Mammo stereotactic breast biopsy left (all inc) (Left, 12/17/2020); Mammo stereotactic breast biopsy left (all inc) each add (Left, 12/17/2020); Mastectomy w/ sentinel node biopsy (Left, 02/12/2021); Mastectomy (Left, 02/12/2021); and Tonsillectomy.    CURRENT MEDICATIONS  Current Outpatient Medications   Medication Sig Dispense Refill    albuterol (PROVENTIL HFA,VENTOLIN HFA) 90 mcg/act inhaler Inhale 2 puffs every 6 (six) hours as needed for wheezing 8 g 1    amLODIPine (NORVASC) 2.5 mg tablet TAKE ONE TABLET BY MOUTH EVERY DAY 90 tablet 3     ascorbic acid (VITAMIN C) 500 MG tablet Take 1 tablet (500 mg total) by mouth daily Begin 30 days prior to surgery 30 tablet 1    Calcium Carb-Cholecalciferol (CALCIUM 600-D PO) Take 1 tablet by mouth 2 (two) times a day      Coenzyme Q10 (CO Q-10 PO) Take 1 capsule by mouth daily      diltiazem (CARDIZEM CD) 180 mg 24 hr capsule TAKE ONE CAPSULE BY MOUTH EVERY DAY (Patient taking differently: Take 180 mg by mouth daily with dinner) 90 capsule 1    diltiazem (CARDIZEM) 60 mg tablet TAKE ONE TABLET BY MOUTH EVERY DAY 90 tablet 1    Eliquis 5 MG TAKE ONE TABLET BY MOUTH TWICE A  tablet 1    escitalopram (LEXAPRO) 20 mg tablet TAKE ONE TABLET BY MOUTH EVERY DAY (Patient taking differently: Take 20 mg by mouth daily at bedtime) 90 tablet 1    ferrous sulfate 324 (65 Fe) mg Take 1 tablet (324 mg total) by mouth daily before breakfast Begin 30 days prior to surgery 30 tablet 1    fluticasone (FLONASE) 50 mcg/act nasal spray APPLY ONE SPRAY IN EACH NOSTRIL ONCE DAILY AS NEEDED FOR RUNNY NOSE 48 g 1    Fluticasone Furoate-Vilanterol 100-25 mcg/actuation inhaler Inhale 1 puff daily Rinse mouth after use. 60 blister 5    folic acid (KP Folic Acid) 1 mg tablet Take 1 tablet (1 mg total) by mouth daily Begin 30 days prior to surgery 30 tablet 1    gabapentin (NEURONTIN) 600 MG tablet TAKE ONE TABLET BY MOUTH EVERY MORNING , TAKE ONE TABLET BY MOUTH EVERY DAY IN THE AFTERNOON , AND TAKE TWO TABLETS BY MOUTH EVERY EVENING AT BEDTIME 360 tablet 1    HYDROcodone-acetaminophen (NORCO) 5-325 mg per tablet Take 1 tablet by mouth every 6 (six) hours as needed for pain Max Daily Amount: 4 tablets 120 tablet 0    levothyroxine 50 mcg tablet TAKE ONE TABLET BY MOUTH EVERY DAY (Patient taking differently: Take 50 mcg by mouth daily at bedtime) 90 tablet 0    lisinopril (ZESTRIL) 20 mg tablet TAKE ONE TABLET BY MOUTH TWO TIMES A  tablet 1    LORazepam (ATIVAN) 1 mg tablet Take 1 tablet (1 mg total) by mouth every 6 (six) hours  as needed for anxiety (Patient taking differently: Take 1 mg by mouth 2 (two) times a day) 120 tablet 0    MAGNESIUM PO Take 1 tablet by mouth daily      Multiple Vitamin (multivitamin) tablet Take 1 tablet by mouth daily Begin 30 days prior to surgery 30 tablet 1    Omega-3 Fatty Acids (FISH OIL PO) Take 1 capsule by mouth daily      pantoprazole (PROTONIX) 40 mg tablet TAKE ONE TABLET BY MOUTH EVERY DAY 90 tablet 1    potassium chloride (MICRO-K) 10 MEQ CR capsule TAKE TWO CAPSULES BY MOUTH EVERY  capsule 1    simvastatin (ZOCOR) 10 mg tablet TAKE ONE-HALF TABLET BY MOUTH DAILY (Patient taking differently: Take 15 mg by mouth daily at bedtime) 45 tablet 1    sodium chloride 1 g tablet TAKE ONE TABLET BY MOUTH FOUR TIMES A  tablet 1    torsemide (DEMADEX) 10 mg tablet TAKE ONE TABLET BY MOUTH EVERY DAY AS NEEDED (EDEMA) 90 tablet 1    torsemide (DEMADEX) 20 mg tablet Take 1 tablet (20 mg total) by mouth daily 90 tablet 3    vitamin B-12 (VITAMIN B-12) 500 mcg tablet Take 1 tablet (500 mcg total) by mouth daily 90 tablet 1    alendronate (FOSAMAX) 70 mg tablet TAKE 1 TABLET BY MOUTH EVERY 7 DAYS (Patient not taking: Reported on 10/23/2024) 4 tablet 2    ammonium lactate (LAC-HYDRIN) 12 % cream  (Patient not taking: Reported on 10/24/2024)       Current Facility-Administered Medications   Medication Dose Route Frequency Provider Last Rate Last Admin    cyanocobalamin injection 1,000 mcg  1,000 mcg Intramuscular Q30 Days Zaira Velasquez MD   1,000 mcg at 04/20/23 1012     [unfilled]    SOCIAL HISTORY:   reports that she quit smoking about 59 years ago. Her smoking use included cigarettes. She started smoking about 74 years ago. She has a 15 pack-year smoking history. She has never used smokeless tobacco. She reports current alcohol use of about 2.0 standard drinks of alcohol per week. She reports that she does not use drugs.     FAMILY HISTORY:  family history includes COPD in her father; Diabetes in  "her father and sister; Hypertension in her mother; No Known Problems in her daughter, maternal aunt, maternal grandfather, maternal grandmother, paternal aunt, paternal grandfather, paternal grandmother, sister, and son; Other in her mother; Peripheral vascular disease in her mother; Sjogren's syndrome in her sister; Stroke in her father; Venous thrombosis in her mother.     ALLERGIES:  is allergic to indomethacin, macrobid [nitrofurantoin monohyd macro], nitrofurantoin, penicillins, and sulfa antibiotics.      Physical Exam:  Vital Signs:   Visit Vitals  Pulse 60   Temp (!) 97.3 °F (36.3 °C)   Resp 16   Ht 5' 3\" (1.6 m)   Wt 63.5 kg (140 lb)   SpO2 96%   BMI 24.80 kg/m²   OB Status Postmenopausal   Smoking Status Former   BSA 1.66 m²     Body mass index is 24.8 kg/m².  Body surface area is 1.66 meters squared.    GEN: Alert, awake oriented x3, in no acute distress  HEENT- No pallor, icterus, cyanosis, no oral mucosal lesions,   LAD - no palpable cervical, clavicle, axillary, inguinal LAD  Heart- normal S1 S2, regular rate and rhythm, No murmur, rubs.   Lungs- clear breathing sound bilateral.   Breast: Right breast with no palpable abnormality; L mastecotmy site with no palpable abnormality   Abdomen- soft, Non tender, bowel sounds present  Extremities- No cyanosis, clubbing, edema  Neuro- No focal neurological deficit    Labs:  Lab Results   Component Value Date    WBC 5.45 10/18/2024    HGB 11.7 10/18/2024    HCT 36.5 10/18/2024    MCV 93 10/18/2024     (H) 10/18/2024               Component  Ref Range & Units 10/18/24  7:47 AM 9/4/24 12:30 PM 8/20/24 12:03 PM 8/13/24  6:20 AM 8/2/24 10:38 AM 6/11/24  6:23 AM 3/11/24  6:32 AM   Sodium  135 - 147 mmol/L 139 135 R 130 Low  136 133 Low  138 137   Potassium  3.5 - 5.3 mmol/L 4.4 4.5 3.6 4.0 3.7 4.0 4.0   Chloride  96 - 108 mmol/L 100 95 Low  R 94 Low  97 95 Low  97 98   CO2  21 - 32 mmol/L 31 30 R 27 29 33 High  31 31   ANION GAP  4 - 13 mmol/L 8  9 10 5 10 8 R "   BUN  5 - 25 mg/dL 13 13 R 13 14 8 14 8   Creatinine  0.60 - 1.30 mg/dL 0.78 0.97 High  R 0.74 CM 0.86 CM 0.74 CM 0.77 CM 0.61 CM   Comment: Standardized to IDMS reference method   Glucose, Fasting  65 - 99 mg/dL 91  95 89  121 High  103 High    Calcium  8.4 - 10.2 mg/dL 9.3 9.1 R 9.0 9.7 8.9 8.9 9.2   AST  13 - 39 U/L 21   21  16 19   ALT  7 - 52 U/L 10   11 CM  12 CM 12 CM   Comment: Specimen collection should occur prior to Sulfasalazine administration due to the potential for falsely depressed results.   Alkaline Phosphatase  34 - 104 U/L 77   79  62 53   Total Protein  6.4 - 8.4 g/dL 6.7   6.4  5.8 Low  5.4 Low    Albumin  3.5 - 5.0 g/dL 3.9   3.6  3.9 3.6   Total Bilirubin  0.20 - 1.00 mg/dL 0.39   0.50 CM  0.50 CM 0.51 CM   Comment: Use of this assay is not recommended for patients undergoing treatment with eltrombopag due to the potential for falsely elevated results.  N-acetyl-p-benzoquinone imine (metabolite of Acetaminophen) will generate erroneously low results in samples for patients that have taken an overdose of Acetaminophen.   eGFR  ml/min/1.73sq m 72 59 Low  R 77 64 77 73 86          Component  Ref Range & Units 10/18/24  7:47 AM 8/13/24  6:20 AM 5/2/24 12:59 PM 3/11/24  6:32 AM 11/8/23 11:33 AM 7/14/23  1:09 PM 6/15/23  1:32 PM   Ferritin  11 - 307 ng/mL 12 12 63 113 877 High  18 9 Low          Terese Jenkins MD

## 2024-10-31 ENCOUNTER — OFFICE VISIT (OUTPATIENT)
Dept: INTERNAL MEDICINE CLINIC | Facility: CLINIC | Age: 79
End: 2024-10-31
Payer: MEDICARE

## 2024-10-31 VITALS
SYSTOLIC BLOOD PRESSURE: 126 MMHG | OXYGEN SATURATION: 96 % | HEART RATE: 84 BPM | WEIGHT: 144 LBS | TEMPERATURE: 96.6 F | BODY MASS INDEX: 25.52 KG/M2 | DIASTOLIC BLOOD PRESSURE: 70 MMHG | HEIGHT: 63 IN

## 2024-10-31 DIAGNOSIS — K51.80 OTHER ULCERATIVE COLITIS WITHOUT COMPLICATION (HCC): ICD-10-CM

## 2024-10-31 DIAGNOSIS — E03.9 ACQUIRED HYPOTHYROIDISM: ICD-10-CM

## 2024-10-31 DIAGNOSIS — G25.0 ESSENTIAL TREMOR: ICD-10-CM

## 2024-10-31 DIAGNOSIS — K21.9 GASTROESOPHAGEAL REFLUX DISEASE, UNSPECIFIED WHETHER ESOPHAGITIS PRESENT: ICD-10-CM

## 2024-10-31 DIAGNOSIS — M54.16 LUMBAR RADICULOPATHY: ICD-10-CM

## 2024-10-31 DIAGNOSIS — E22.2 SIADH (SYNDROME OF INAPPROPRIATE ADH PRODUCTION) (HCC): ICD-10-CM

## 2024-10-31 DIAGNOSIS — M16.11 PRIMARY OSTEOARTHRITIS OF ONE HIP, RIGHT: ICD-10-CM

## 2024-10-31 DIAGNOSIS — I81 PORTAL VEIN THROMBOSIS: ICD-10-CM

## 2024-10-31 DIAGNOSIS — I10 ESSENTIAL HYPERTENSION: ICD-10-CM

## 2024-10-31 DIAGNOSIS — M51.362 DEGENERATION OF INTERVERTEBRAL DISC OF LUMBAR REGION WITH DISCOGENIC BACK PAIN AND LOWER EXTREMITY PAIN: ICD-10-CM

## 2024-10-31 DIAGNOSIS — E78.2 MIXED HYPERLIPIDEMIA: ICD-10-CM

## 2024-10-31 DIAGNOSIS — E83.42 HYPOMAGNESEMIA: ICD-10-CM

## 2024-10-31 DIAGNOSIS — J45.20 MILD INTERMITTENT ASTHMA WITHOUT COMPLICATION: ICD-10-CM

## 2024-10-31 DIAGNOSIS — R73.03 PREDIABETES: ICD-10-CM

## 2024-10-31 DIAGNOSIS — D50.0 IRON DEFICIENCY ANEMIA DUE TO CHRONIC BLOOD LOSS: Primary | ICD-10-CM

## 2024-10-31 DIAGNOSIS — F41.9 ANXIETY: ICD-10-CM

## 2024-10-31 DIAGNOSIS — M85.89 OSTEOPENIA OF MULTIPLE SITES: ICD-10-CM

## 2024-10-31 DIAGNOSIS — I47.10 SUPRAVENTRICULAR TACHYCARDIA (HCC): ICD-10-CM

## 2024-10-31 PROCEDURE — G2211 COMPLEX E/M VISIT ADD ON: HCPCS | Performed by: INTERNAL MEDICINE

## 2024-10-31 PROCEDURE — 99214 OFFICE O/P EST MOD 30 MIN: CPT | Performed by: INTERNAL MEDICINE

## 2024-10-31 RX ORDER — HYDROCODONE BITARTRATE AND ACETAMINOPHEN 5; 325 MG/1; MG/1
1 TABLET ORAL EVERY 6 HOURS PRN
Qty: 120 TABLET | Refills: 0 | Status: ON HOLD | OUTPATIENT
Start: 2024-10-31 | End: 2024-11-04

## 2024-10-31 RX ORDER — LORAZEPAM 1 MG/1
1 TABLET ORAL EVERY 6 HOURS PRN
Qty: 120 TABLET | Refills: 0 | Status: SHIPPED | OUTPATIENT
Start: 2024-10-31

## 2024-10-31 NOTE — ASSESSMENT & PLAN NOTE
Stable. On Lexapro and lorazepam.    Orders:    LORazepam (ATIVAN) 1 mg tablet; Take 1 tablet (1 mg total) by mouth every 6 (six) hours as needed for anxiety

## 2024-10-31 NOTE — ASSESSMENT & PLAN NOTE
Recent Na was 139.  Monitor Na post-op. On NaCl 4 times a day.    Orders:    Comprehensive metabolic panel; Future

## 2024-10-31 NOTE — PROGRESS NOTES
Ambulatory Visit  Name: Matilda Day      : 1945      MRN: [id number]  Encounter Provider: Zaira Velasquez MD  Encounter Date: 10/31/2024   Encounter department: [facility] INTERNAL MEDICINE    Assessment & Plan  Iron deficiency anemia due to chronic blood loss  Monitor Hgb, was not able to receive Venofer prior to surgery.  Follow up with hematology in 2 months.         Primary osteoarthritis of one hip, right  Surgery scheduled.  Saw cardiology.         Portal vein thrombosis  Will hold Eliquis prior to surgery as instructed.         Essential hypertension  BP controlled on current regimen: 2.5 mg daily, diltiazem 240 mg daily, lisinopril 20 mg bid and torsemide 30 mg daily.         Supraventricular tachycardia (HCC)  No symptoms.  On diltiazem.         SIADH (syndrome of inappropriate ADH production) (HCC)  Recent Na was 139.  Monitor Na post-op. On NaCl 4 times a day.    Orders:    Comprehensive metabolic panel; Future    Acquired hypothyroidism  TFTs due.    Orders:    TSH, 3rd generation with Free T4 reflex; Future    Anxiety  Stable. On Lexapro and lorazepam.    Orders:    LORazepam (ATIVAN) 1 mg tablet; Take 1 tablet (1 mg total) by mouth every 6 (six) hours as needed for anxiety    Essential tremor  On gabapentin.         Gastroesophageal reflux disease, unspecified whether esophagitis present  Taking PPI daily.         Mixed hyperlipidemia  Lipids due, on simvastatin 15 mg.    Orders:    Comprehensive metabolic panel; Future    Lipid panel; Future    Hypomagnesemia  Taking Mg.         Mild intermittent asthma without complication  No recent symptoms.  Using Breo prn only.         Degeneration of intervertebral disc of lumbar region with discogenic back pain and lower extremity pain  Taking hydrocodone more often due to pain.  PDMP reviewed, refilled.         Prediabetes  A1c 5.9%.         Osteopenia of multiple sites         Other ulcerative colitis without complication  "([facility])    Orders:    Vitamin D 25 hydroxy; Future    Lumbar radiculopathy    Orders:    HYDROcodone-acetaminophen (NORCO) 5-325 mg per tablet; Take 1 tablet by mouth every 6 (six) hours as needed for pain Max Daily Amount: 4 tablets     Follow up in 3 months or as needed.    History of Present Illness     She reports ongoing left groin and leg pain.  Recently, she was experiencing right low back pain as well.  She has hip surgery scheduled on Monday.  She is plan to stay overnight and go home afterwards.  She is waiting for her hospital bed to be delivered later today.  She was unable to receive an iron infusion prior to the surgery.    She has been taking her hydrocodone more often due to pain.  She is requesting a refill for this.  She has also been trying to take less of her lorazepam.  She does not eat well when she is in [place].    Denies any chest pain, shortness of breath, palpitations or other symptoms.  She reports feeling well except for the joint pains.        Review of Systems   Constitutional:  Negative for appetite change and fatigue.   HENT:  Negative for congestion, ear pain and postnasal drip.    Eyes:  Negative for visual disturbance.   Respiratory:  Negative for cough and shortness of breath.    Cardiovascular:  Negative for chest pain and leg swelling.   Gastrointestinal:  Negative for abdominal pain, constipation and diarrhea.   Genitourinary:  Negative for dysuria, frequency and urgency.   Musculoskeletal:  Positive for arthralgias, back pain and gait problem. Negative for myalgias.   Skin:  Negative for rash and wound.   Neurological:  Negative for dizziness, numbness and headaches.   Hematological:  Does not bruise/bleed easily.   Psychiatric/Behavioral:  Negative for confusion. The patient is not nervous/anxious.            Objective     /70   Pulse 84   Temp (!) 96.6 °F (35.9 °C)   Ht 5' 3\" (1.6 m)   Wt 65.3 kg (144 lb)   SpO2 96%   BMI 25.51 kg/m²     Physical Exam  Vitals " and nursing note reviewed.   Constitutional:       General: She is not in acute distress.     Appearance: She is well-developed.   HENT:      Head: Normocephalic and atraumatic.   Eyes:      Pupils: Pupils are equal, round, and reactive to light.   Cardiovascular:      Rate and Rhythm: Normal rate and regular rhythm.      Heart sounds: Normal heart sounds.   Pulmonary:      Effort: Pulmonary effort is normal.      Breath sounds: Normal breath sounds. No wheezing.   Abdominal:      General: Bowel sounds are normal.      Palpations: Abdomen is soft.   Musculoskeletal:         General: No swelling.      Right lower leg: No edema.      Left lower leg: No edema.   Skin:     General: Skin is warm.      Findings: No rash.   Neurological:      General: No focal deficit present.      Mental Status: She is alert and oriented to person, place, and time.   Psychiatric:         Mood and Affect: Mood and affect normal. Mood is not anxious or depressed.         Behavior: Behavior normal.           Lab results reviewed with patient.

## 2024-10-31 NOTE — ASSESSMENT & PLAN NOTE
Monitor Hgb, was not able to receive Venofer prior to surgery.  Follow up with hematology in 2 months.

## 2024-10-31 NOTE — ASSESSMENT & PLAN NOTE
"  SUBJECTIVE:   Carmen Bonner is a 73 year old female who presents to clinic today for the following health issues:  {Provider please address medication reconciliation discrepancies--rooming staff please delete if no med/rec issues}    Stomach issues    {additional problems for provider to add:766017}    Problem list and histories reviewed & adjusted, as indicated.  Additional history: {NONE - AS DOCUMENTED:582443::\"as documented\"}    {HIST REVIEW/ LINKS 2:764799}    Reviewed and updated as needed this visit by clinical staff       Reviewed and updated as needed this visit by Provider         {PROVIDER CHARTING PREFERENCE:612449}      " Will hold Eliquis prior to surgery as instructed.

## 2024-10-31 NOTE — ASSESSMENT & PLAN NOTE
Lipids due, on simvastatin 15 mg.    Orders:    Comprehensive metabolic panel; Future    Lipid panel; Future

## 2024-10-31 NOTE — ASSESSMENT & PLAN NOTE
BP controlled on current regimen: 2.5 mg daily, diltiazem 240 mg daily, lisinopril 20 mg bid and torsemide 30 mg daily.

## 2024-11-01 LAB

## 2024-11-02 DIAGNOSIS — I10 ESSENTIAL HYPERTENSION: ICD-10-CM

## 2024-11-02 RX ORDER — DILTIAZEM HYDROCHLORIDE 180 MG/1
CAPSULE, COATED, EXTENDED RELEASE ORAL
Qty: 90 CAPSULE | Refills: 1 | Status: SHIPPED | OUTPATIENT
Start: 2024-11-02

## 2024-11-04 ENCOUNTER — APPOINTMENT (OUTPATIENT)
Dept: RADIOLOGY | Facility: HOSPITAL | Age: 79
End: 2024-11-04
Payer: MEDICARE

## 2024-11-04 ENCOUNTER — TELEPHONE (OUTPATIENT)
Age: 79
End: 2024-11-04

## 2024-11-04 ENCOUNTER — HOSPITAL ENCOUNTER (OUTPATIENT)
Facility: HOSPITAL | Age: 79
Setting detail: OUTPATIENT SURGERY
Discharge: HOME | End: 2024-11-05
Attending: ORTHOPAEDIC SURGERY | Admitting: ORTHOPAEDIC SURGERY
Payer: MEDICARE

## 2024-11-04 ENCOUNTER — ANESTHESIA (OUTPATIENT)
Dept: PERIOP | Facility: HOSPITAL | Age: 79
End: 2024-11-04
Payer: MEDICARE

## 2024-11-04 DIAGNOSIS — E83.42 HYPOMAGNESEMIA: ICD-10-CM

## 2024-11-04 DIAGNOSIS — I48.91 ATRIAL FIBRILLATION, UNSPECIFIED TYPE (HCC): ICD-10-CM

## 2024-11-04 DIAGNOSIS — M54.16 LUMBAR RADICULOPATHY: ICD-10-CM

## 2024-11-04 DIAGNOSIS — E53.8 VITAMIN B12 DEFICIENCY: ICD-10-CM

## 2024-11-04 DIAGNOSIS — B37.81 ESOPHAGEAL CANDIDIASIS (HCC): ICD-10-CM

## 2024-11-04 DIAGNOSIS — Z96.89 S/P DEEP BRAIN STIMULATOR PLACEMENT: ICD-10-CM

## 2024-11-04 DIAGNOSIS — K51.80 OTHER ULCERATIVE COLITIS WITHOUT COMPLICATION (HCC): ICD-10-CM

## 2024-11-04 DIAGNOSIS — I73.9 PERIPHERAL VASCULAR DISEASE (HCC): ICD-10-CM

## 2024-11-04 DIAGNOSIS — E03.9 ACQUIRED HYPOTHYROIDISM: ICD-10-CM

## 2024-11-04 DIAGNOSIS — E78.2 MIXED HYPERLIPIDEMIA: ICD-10-CM

## 2024-11-04 DIAGNOSIS — K21.9 GASTROESOPHAGEAL REFLUX DISEASE WITHOUT ESOPHAGITIS: ICD-10-CM

## 2024-11-04 DIAGNOSIS — I10 ESSENTIAL HYPERTENSION: ICD-10-CM

## 2024-11-04 DIAGNOSIS — M35.00 SJOGREN'S SYNDROME, WITH UNSPECIFIED ORGAN INVOLVEMENT (HCC): ICD-10-CM

## 2024-11-04 DIAGNOSIS — E55.9 VITAMIN D DEFICIENCY: ICD-10-CM

## 2024-11-04 DIAGNOSIS — Z96.641 STATUS POST TOTAL HIP REPLACEMENT, RIGHT: Primary | ICD-10-CM

## 2024-11-04 DIAGNOSIS — F33.1 MODERATE EPISODE OF RECURRENT MAJOR DEPRESSIVE DISORDER (HCC): ICD-10-CM

## 2024-11-04 DIAGNOSIS — F41.9 ANXIETY: ICD-10-CM

## 2024-11-04 PROCEDURE — C1776 JOINT DEVICE (IMPLANTABLE): HCPCS | Performed by: ORTHOPAEDIC SURGERY

## 2024-11-04 PROCEDURE — 99222 1ST HOSP IP/OBS MODERATE 55: CPT | Performed by: INTERNAL MEDICINE

## 2024-11-04 PROCEDURE — C1713 ANCHOR/SCREW BN/BN,TIS/BN: HCPCS | Performed by: ORTHOPAEDIC SURGERY

## 2024-11-04 PROCEDURE — 0054T BONE SRGRY CMPTR FLUOR IMAGE: CPT | Performed by: PHYSICIAN ASSISTANT

## 2024-11-04 PROCEDURE — 73501 X-RAY EXAM HIP UNI 1 VIEW: CPT

## 2024-11-04 PROCEDURE — 73502 X-RAY EXAM HIP UNI 2-3 VIEWS: CPT

## 2024-11-04 PROCEDURE — 27130 TOTAL HIP ARTHROPLASTY: CPT | Performed by: PHYSICIAN ASSISTANT

## 2024-11-04 PROCEDURE — 0054T BONE SRGRY CMPTR FLUOR IMAGE: CPT | Performed by: ORTHOPAEDIC SURGERY

## 2024-11-04 PROCEDURE — 86920 COMPATIBILITY TEST SPIN: CPT

## 2024-11-04 PROCEDURE — 97163 PT EVAL HIGH COMPLEX 45 MIN: CPT

## 2024-11-04 PROCEDURE — 27130 TOTAL HIP ARTHROPLASTY: CPT | Performed by: ORTHOPAEDIC SURGERY

## 2024-11-04 DEVICE — ACTIS DUOFIX HIP PROSTHESIS (FEMORAL STEM 12/14 TAPER CEMENTLESS SIZE 7 HIGH COLLAR)  CE
Type: IMPLANTABLE DEVICE | Site: HIP | Status: FUNCTIONAL
Brand: ACTIS

## 2024-11-04 DEVICE — PINNACLE CANCELLOUS BONE SCREW 6.5MM X 20MM
Type: IMPLANTABLE DEVICE | Site: HIP | Status: FUNCTIONAL
Brand: PINNACLE

## 2024-11-04 DEVICE — BIOLOX DELTA CERAMIC FEMORAL HEAD 32MM DIA +1 12/14 TAPER
Type: IMPLANTABLE DEVICE | Site: HIP | Status: FUNCTIONAL
Brand: BIOLOX DELTA

## 2024-11-04 DEVICE — PINNACLE CANCELLOUS BONE SCREW 6.5MM X 15MM
Type: IMPLANTABLE DEVICE | Site: HIP | Status: FUNCTIONAL
Brand: PINNACLE

## 2024-11-04 DEVICE — PINNACLE POROCOAT ACETABULAR SHELL SECTOR II 50MM OD
Type: IMPLANTABLE DEVICE | Site: HIP | Status: FUNCTIONAL
Brand: PINNACLE POROCOAT

## 2024-11-04 DEVICE — PINNACLE HIP SOLUTIONS ALTRX POLYETHYLENE ACETABULAR LINER NEUTRAL 32MM ID 50MM OD
Type: IMPLANTABLE DEVICE | Site: HIP | Status: FUNCTIONAL
Brand: PINNACLE ALTRX

## 2024-11-04 RX ORDER — FERROUS SULFATE 325(65) MG
325 TABLET ORAL
Status: DISCONTINUED | OUTPATIENT
Start: 2024-11-05 | End: 2024-11-05 | Stop reason: HOSPADM

## 2024-11-04 RX ORDER — GABAPENTIN 300 MG/1
300 CAPSULE ORAL ONCE
Status: DISCONTINUED | OUTPATIENT
Start: 2024-11-04 | End: 2024-11-04 | Stop reason: HOSPADM

## 2024-11-04 RX ORDER — DILTIAZEM HYDROCHLORIDE 180 MG/1
180 CAPSULE, COATED, EXTENDED RELEASE ORAL
Status: DISCONTINUED | OUTPATIENT
Start: 2024-11-04 | End: 2024-11-05 | Stop reason: HOSPADM

## 2024-11-04 RX ORDER — CEPHALEXIN 500 MG/1
500 CAPSULE ORAL EVERY 12 HOURS SCHEDULED
Qty: 2 CAPSULE | Refills: 0 | Status: SHIPPED | OUTPATIENT
Start: 2024-11-04 | End: 2024-11-05

## 2024-11-04 RX ORDER — PROPOFOL 10 MG/ML
INJECTION, EMULSION INTRAVENOUS CONTINUOUS PRN
Status: DISCONTINUED | OUTPATIENT
Start: 2024-11-04 | End: 2024-11-04

## 2024-11-04 RX ORDER — FOLIC ACID 1 MG/1
1 TABLET ORAL DAILY
Status: DISCONTINUED | OUTPATIENT
Start: 2024-11-04 | End: 2024-11-05 | Stop reason: HOSPADM

## 2024-11-04 RX ORDER — SODIUM CHLORIDE, SODIUM LACTATE, POTASSIUM CHLORIDE, CALCIUM CHLORIDE 600; 310; 30; 20 MG/100ML; MG/100ML; MG/100ML; MG/100ML
50 INJECTION, SOLUTION INTRAVENOUS CONTINUOUS
Status: DISCONTINUED | OUTPATIENT
Start: 2024-11-04 | End: 2024-11-04

## 2024-11-04 RX ORDER — FENTANYL CITRATE/PF 50 MCG/ML
50 SYRINGE (ML) INJECTION
Status: DISCONTINUED | OUTPATIENT
Start: 2024-11-04 | End: 2024-11-04 | Stop reason: HOSPADM

## 2024-11-04 RX ORDER — GABAPENTIN 300 MG/1
600 CAPSULE ORAL 3 TIMES DAILY
Status: DISCONTINUED | OUTPATIENT
Start: 2024-11-04 | End: 2024-11-04

## 2024-11-04 RX ORDER — SODIUM CHLORIDE, SODIUM LACTATE, POTASSIUM CHLORIDE, CALCIUM CHLORIDE 600; 310; 30; 20 MG/100ML; MG/100ML; MG/100ML; MG/100ML
50 INJECTION, SOLUTION INTRAVENOUS CONTINUOUS
Status: DISCONTINUED | OUTPATIENT
Start: 2024-11-04 | End: 2024-11-05

## 2024-11-04 RX ORDER — LANOLIN ALCOHOL/MO/W.PET/CERES
1 CREAM (GRAM) TOPICAL 2 TIMES DAILY WITH MEALS
Status: DISCONTINUED | OUTPATIENT
Start: 2024-11-04 | End: 2024-11-05 | Stop reason: HOSPADM

## 2024-11-04 RX ORDER — SODIUM CHLORIDE, SODIUM LACTATE, POTASSIUM CHLORIDE, CALCIUM CHLORIDE 600; 310; 30; 20 MG/100ML; MG/100ML; MG/100ML; MG/100ML
125 INJECTION, SOLUTION INTRAVENOUS CONTINUOUS
Status: DISCONTINUED | OUTPATIENT
Start: 2024-11-04 | End: 2024-11-04

## 2024-11-04 RX ORDER — AMLODIPINE BESYLATE 2.5 MG/1
2.5 TABLET ORAL DAILY
Status: DISCONTINUED | OUTPATIENT
Start: 2024-11-04 | End: 2024-11-05 | Stop reason: HOSPADM

## 2024-11-04 RX ORDER — LIDOCAINE HYDROCHLORIDE 10 MG/ML
INJECTION, SOLUTION EPIDURAL; INFILTRATION; INTRACAUDAL; PERINEURAL AS NEEDED
Status: DISCONTINUED | OUTPATIENT
Start: 2024-11-04 | End: 2024-11-04

## 2024-11-04 RX ORDER — GABAPENTIN 400 MG/1
1200 CAPSULE ORAL
Status: DISCONTINUED | OUTPATIENT
Start: 2024-11-04 | End: 2024-11-05 | Stop reason: HOSPADM

## 2024-11-04 RX ORDER — TRANEXAMIC ACID 10 MG/ML
1000 INJECTION, SOLUTION INTRAVENOUS ONCE
Status: COMPLETED | OUTPATIENT
Start: 2024-11-04 | End: 2024-11-04

## 2024-11-04 RX ORDER — PANTOPRAZOLE SODIUM 40 MG/1
40 TABLET, DELAYED RELEASE ORAL DAILY
Status: DISCONTINUED | OUTPATIENT
Start: 2024-11-05 | End: 2024-11-05 | Stop reason: HOSPADM

## 2024-11-04 RX ORDER — CHLORHEXIDINE GLUCONATE ORAL RINSE 1.2 MG/ML
15 SOLUTION DENTAL ONCE
Status: COMPLETED | OUTPATIENT
Start: 2024-11-04 | End: 2024-11-04

## 2024-11-04 RX ORDER — ONDANSETRON 2 MG/ML
4 INJECTION INTRAMUSCULAR; INTRAVENOUS EVERY 6 HOURS PRN
Status: DISCONTINUED | OUTPATIENT
Start: 2024-11-04 | End: 2024-11-05 | Stop reason: HOSPADM

## 2024-11-04 RX ORDER — SENNOSIDES 8.6 MG
1 TABLET ORAL DAILY
Status: DISCONTINUED | OUTPATIENT
Start: 2024-11-04 | End: 2024-11-05 | Stop reason: HOSPADM

## 2024-11-04 RX ORDER — AMLODIPINE BESYLATE 2.5 MG/1
TABLET ORAL
Qty: 90 TABLET | Refills: 1 | Status: SHIPPED | OUTPATIENT
Start: 2024-11-04

## 2024-11-04 RX ORDER — ESCITALOPRAM OXALATE 10 MG/1
20 TABLET ORAL
Status: DISCONTINUED | OUTPATIENT
Start: 2024-11-04 | End: 2024-11-05 | Stop reason: HOSPADM

## 2024-11-04 RX ORDER — SODIUM CHLORIDE 1 G/1
1 TABLET ORAL
Status: DISCONTINUED | OUTPATIENT
Start: 2024-11-04 | End: 2024-11-05 | Stop reason: HOSPADM

## 2024-11-04 RX ORDER — HYDROMORPHONE HCL IN WATER/PF 6 MG/30 ML
0.2 PATIENT CONTROLLED ANALGESIA SYRINGE INTRAVENOUS EVERY 4 HOURS PRN
Status: DISCONTINUED | OUTPATIENT
Start: 2024-11-04 | End: 2024-11-05 | Stop reason: HOSPADM

## 2024-11-04 RX ORDER — CALCIUM CARBONATE 500 MG/1
1000 TABLET, CHEWABLE ORAL DAILY PRN
Status: DISCONTINUED | OUTPATIENT
Start: 2024-11-04 | End: 2024-11-05 | Stop reason: HOSPADM

## 2024-11-04 RX ORDER — FLUTICASONE FUROATE AND VILANTEROL 100; 25 UG/1; UG/1
1 POWDER RESPIRATORY (INHALATION) DAILY
Status: DISCONTINUED | OUTPATIENT
Start: 2024-11-05 | End: 2024-11-05 | Stop reason: HOSPADM

## 2024-11-04 RX ORDER — LORAZEPAM 1 MG/1
1 TABLET ORAL EVERY 6 HOURS PRN
Status: DISCONTINUED | OUTPATIENT
Start: 2024-11-04 | End: 2024-11-05 | Stop reason: HOSPADM

## 2024-11-04 RX ORDER — BISACODYL 10 MG
10 SUPPOSITORY, RECTAL RECTAL DAILY PRN
Status: DISCONTINUED | OUTPATIENT
Start: 2024-11-04 | End: 2024-11-05 | Stop reason: HOSPADM

## 2024-11-04 RX ORDER — ONDANSETRON 2 MG/ML
4 INJECTION INTRAMUSCULAR; INTRAVENOUS ONCE AS NEEDED
Status: DISCONTINUED | OUTPATIENT
Start: 2024-11-04 | End: 2024-11-04 | Stop reason: HOSPADM

## 2024-11-04 RX ORDER — LEVOTHYROXINE SODIUM 50 UG/1
50 TABLET ORAL DAILY
Status: DISCONTINUED | OUTPATIENT
Start: 2024-11-04 | End: 2024-11-05 | Stop reason: HOSPADM

## 2024-11-04 RX ORDER — PROPOFOL 10 MG/ML
INJECTION, EMULSION INTRAVENOUS AS NEEDED
Status: DISCONTINUED | OUTPATIENT
Start: 2024-11-04 | End: 2024-11-04

## 2024-11-04 RX ORDER — GABAPENTIN 300 MG/1
600 CAPSULE ORAL
Status: DISCONTINUED | OUTPATIENT
Start: 2024-11-04 | End: 2024-11-05 | Stop reason: HOSPADM

## 2024-11-04 RX ORDER — BUPIVACAINE HYDROCHLORIDE 7.5 MG/ML
INJECTION, SOLUTION INTRASPINAL AS NEEDED
Status: DISCONTINUED | OUTPATIENT
Start: 2024-11-04 | End: 2024-11-04

## 2024-11-04 RX ORDER — LANOLIN ALCOHOL/MO/W.PET/CERES
400 CREAM (GRAM) TOPICAL DAILY
Status: DISCONTINUED | OUTPATIENT
Start: 2024-11-04 | End: 2024-11-05 | Stop reason: HOSPADM

## 2024-11-04 RX ORDER — DILTIAZEM HYDROCHLORIDE 30 MG/1
60 TABLET, FILM COATED ORAL DAILY
Status: DISCONTINUED | OUTPATIENT
Start: 2024-11-04 | End: 2024-11-05 | Stop reason: HOSPADM

## 2024-11-04 RX ORDER — FLUTICASONE PROPIONATE 50 MCG
1 SPRAY, SUSPENSION (ML) NASAL DAILY
Status: DISCONTINUED | OUTPATIENT
Start: 2024-11-04 | End: 2024-11-05 | Stop reason: HOSPADM

## 2024-11-04 RX ORDER — DOCUSATE SODIUM 100 MG/1
100 CAPSULE, LIQUID FILLED ORAL 2 TIMES DAILY
Qty: 20 CAPSULE | Refills: 0 | Status: SHIPPED | OUTPATIENT
Start: 2024-11-04

## 2024-11-04 RX ORDER — ACETAMINOPHEN 325 MG/1
975 TABLET ORAL ONCE
Status: COMPLETED | OUTPATIENT
Start: 2024-11-04 | End: 2024-11-04

## 2024-11-04 RX ORDER — PROMETHAZINE HYDROCHLORIDE 12.5 MG/1
12.5 TABLET ORAL EVERY 6 HOURS PRN
Qty: 8 TABLET | Refills: 0 | Status: SHIPPED | OUTPATIENT
Start: 2024-11-04

## 2024-11-04 RX ORDER — HYDROCODONE BITARTRATE AND ACETAMINOPHEN 5; 325 MG/1; MG/1
1 TABLET ORAL EVERY 4 HOURS PRN
Qty: 42 TABLET | Refills: 0 | Status: SHIPPED | OUTPATIENT
Start: 2024-11-04

## 2024-11-04 RX ORDER — CEFAZOLIN SODIUM 2 G/50ML
2000 SOLUTION INTRAVENOUS EVERY 8 HOURS
Status: COMPLETED | OUTPATIENT
Start: 2024-11-04 | End: 2024-11-05

## 2024-11-04 RX ORDER — ONDANSETRON 2 MG/ML
INJECTION INTRAMUSCULAR; INTRAVENOUS AS NEEDED
Status: DISCONTINUED | OUTPATIENT
Start: 2024-11-04 | End: 2024-11-04

## 2024-11-04 RX ORDER — DILTIAZEM HYDROCHLORIDE 180 MG/1
180 CAPSULE, COATED, EXTENDED RELEASE ORAL DAILY
Status: DISCONTINUED | OUTPATIENT
Start: 2024-11-04 | End: 2024-11-04

## 2024-11-04 RX ORDER — ASCORBIC ACID 500 MG
500 TABLET ORAL DAILY
Status: DISCONTINUED | OUTPATIENT
Start: 2024-11-04 | End: 2024-11-05 | Stop reason: HOSPADM

## 2024-11-04 RX ORDER — BUTALB/ACETAMINOPHEN/CAFFEINE 50-325-40
TABLET ORAL DAILY
Status: DISCONTINUED | OUTPATIENT
Start: 2024-11-04 | End: 2024-11-04

## 2024-11-04 RX ORDER — PRAVASTATIN SODIUM 20 MG
20 TABLET ORAL
Status: DISCONTINUED | OUTPATIENT
Start: 2024-11-04 | End: 2024-11-05 | Stop reason: HOSPADM

## 2024-11-04 RX ORDER — MAGNESIUM HYDROXIDE 1200 MG/15ML
LIQUID ORAL AS NEEDED
Status: DISCONTINUED | OUTPATIENT
Start: 2024-11-04 | End: 2024-11-04 | Stop reason: HOSPADM

## 2024-11-04 RX ORDER — HYDROMORPHONE HCL IN WATER/PF 6 MG/30 ML
0.2 PATIENT CONTROLLED ANALGESIA SYRINGE INTRAVENOUS
Status: DISCONTINUED | OUTPATIENT
Start: 2024-11-04 | End: 2024-11-04 | Stop reason: HOSPADM

## 2024-11-04 RX ORDER — CHLORAL HYDRATE 500 MG
1000 CAPSULE ORAL DAILY
Status: DISCONTINUED | OUTPATIENT
Start: 2024-11-04 | End: 2024-11-05 | Stop reason: HOSPADM

## 2024-11-04 RX ORDER — SODIUM CHLORIDE 9 MG/ML
75 INJECTION, SOLUTION INTRAVENOUS CONTINUOUS
Status: DISCONTINUED | OUTPATIENT
Start: 2024-11-04 | End: 2024-11-04

## 2024-11-04 RX ORDER — MIDAZOLAM HYDROCHLORIDE 2 MG/2ML
INJECTION, SOLUTION INTRAMUSCULAR; INTRAVENOUS AS NEEDED
Status: DISCONTINUED | OUTPATIENT
Start: 2024-11-04 | End: 2024-11-04

## 2024-11-04 RX ORDER — HYDROCODONE BITARTRATE AND ACETAMINOPHEN 5; 325 MG/1; MG/1
0.5 TABLET ORAL EVERY 4 HOURS PRN
Status: DISCONTINUED | OUTPATIENT
Start: 2024-11-04 | End: 2024-11-05

## 2024-11-04 RX ORDER — ALBUTEROL SULFATE 90 UG/1
2 INHALANT RESPIRATORY (INHALATION) EVERY 6 HOURS PRN
Status: DISCONTINUED | OUTPATIENT
Start: 2024-11-04 | End: 2024-11-05 | Stop reason: HOSPADM

## 2024-11-04 RX ORDER — DOCUSATE SODIUM 100 MG/1
100 CAPSULE, LIQUID FILLED ORAL 2 TIMES DAILY
Status: DISCONTINUED | OUTPATIENT
Start: 2024-11-04 | End: 2024-11-05 | Stop reason: HOSPADM

## 2024-11-04 RX ORDER — HYDROCODONE BITARTRATE AND ACETAMINOPHEN 5; 325 MG/1; MG/1
1 TABLET ORAL EVERY 4 HOURS PRN
Status: DISCONTINUED | OUTPATIENT
Start: 2024-11-04 | End: 2024-11-05

## 2024-11-04 RX ORDER — CHLORHEXIDINE GLUCONATE 40 MG/ML
SOLUTION TOPICAL DAILY PRN
Status: DISCONTINUED | OUTPATIENT
Start: 2024-11-04 | End: 2024-11-04 | Stop reason: HOSPADM

## 2024-11-04 RX ORDER — CEFAZOLIN SODIUM 2 G/50ML
2000 SOLUTION INTRAVENOUS ONCE
Status: COMPLETED | OUTPATIENT
Start: 2024-11-04 | End: 2024-11-04

## 2024-11-04 RX ORDER — FENTANYL CITRATE 50 UG/ML
INJECTION, SOLUTION INTRAMUSCULAR; INTRAVENOUS AS NEEDED
Status: DISCONTINUED | OUTPATIENT
Start: 2024-11-04 | End: 2024-11-04

## 2024-11-04 RX ORDER — ACETAMINOPHEN 325 MG/1
650 TABLET ORAL EVERY 6 HOURS PRN
Status: DISCONTINUED | OUTPATIENT
Start: 2024-11-04 | End: 2024-11-05 | Stop reason: HOSPADM

## 2024-11-04 RX ADMIN — PRAVASTATIN SODIUM 20 MG: 20 TABLET ORAL at 16:10

## 2024-11-04 RX ADMIN — SODIUM CHLORIDE, SODIUM LACTATE, POTASSIUM CHLORIDE, AND CALCIUM CHLORIDE 50 ML/HR: .6; .31; .03; .02 INJECTION, SOLUTION INTRAVENOUS at 15:22

## 2024-11-04 RX ADMIN — FENTANYL CITRATE 25 MCG: 50 INJECTION, SOLUTION INTRAMUSCULAR; INTRAVENOUS at 11:29

## 2024-11-04 RX ADMIN — OMEGA-3 FATTY ACIDS CAP 1000 MG 1000 MG: 1000 CAP at 15:00

## 2024-11-04 RX ADMIN — LIDOCAINE HYDROCHLORIDE 2 ML: 10 INJECTION, SOLUTION EPIDURAL; INFILTRATION; INTRACAUDAL; PERINEURAL at 11:18

## 2024-11-04 RX ADMIN — CEFAZOLIN SODIUM 2000 MG: 2 SOLUTION INTRAVENOUS at 20:30

## 2024-11-04 RX ADMIN — IRON SUCROSE 300 MG: 20 INJECTION, SOLUTION INTRAVENOUS at 16:08

## 2024-11-04 RX ADMIN — PHENYLEPHRINE HYDROCHLORIDE 20 MCG/MIN: 10 INJECTION INTRAVENOUS at 11:22

## 2024-11-04 RX ADMIN — SODIUM CHLORIDE, SODIUM LACTATE, POTASSIUM CHLORIDE, AND CALCIUM CHLORIDE: .6; .31; .03; .02 INJECTION, SOLUTION INTRAVENOUS at 10:33

## 2024-11-04 RX ADMIN — FENTANYL CITRATE 50 MCG: 50 INJECTION, SOLUTION INTRAMUSCULAR; INTRAVENOUS at 11:10

## 2024-11-04 RX ADMIN — VITAM B12 500 MCG: 100 TAB at 15:00

## 2024-11-04 RX ADMIN — HYDROCODONE BITARTRATE AND ACETAMINOPHEN 1 TABLET: 5; 325 TABLET ORAL at 21:09

## 2024-11-04 RX ADMIN — BUPIVACAINE HYDROCHLORIDE IN DEXTROSE 1.8 ML: 7.5 INJECTION, SOLUTION SUBARACHNOID at 11:15

## 2024-11-04 RX ADMIN — TRANEXAMIC ACID 1000 MG: 10 INJECTION, SOLUTION INTRAVENOUS at 11:23

## 2024-11-04 RX ADMIN — ACETAMINOPHEN 650 MG: 325 TABLET ORAL at 08:59

## 2024-11-04 RX ADMIN — GABAPENTIN 1200 MG: 400 CAPSULE ORAL at 21:08

## 2024-11-04 RX ADMIN — Medication 2 G: at 21:11

## 2024-11-04 RX ADMIN — FOLIC ACID 1 MG: 1 TABLET ORAL at 14:59

## 2024-11-04 RX ADMIN — ONDANSETRON 4 MG: 2 INJECTION INTRAMUSCULAR; INTRAVENOUS at 13:23

## 2024-11-04 RX ADMIN — PROPOFOL 25 MG: 10 INJECTION, EMULSION INTRAVENOUS at 11:17

## 2024-11-04 RX ADMIN — PROPOFOL 50 MCG/KG/MIN: 10 INJECTION, EMULSION INTRAVENOUS at 11:18

## 2024-11-04 RX ADMIN — SODIUM CHLORIDE, SODIUM LACTATE, POTASSIUM CHLORIDE, AND CALCIUM CHLORIDE: .6; .31; .03; .02 INJECTION, SOLUTION INTRAVENOUS at 11:32

## 2024-11-04 RX ADMIN — Medication 400 MG: at 15:00

## 2024-11-04 RX ADMIN — MIDAZOLAM 1 MG: 1 INJECTION INTRAMUSCULAR; INTRAVENOUS at 11:10

## 2024-11-04 RX ADMIN — CHLORHEXIDINE GLUCONATE 0.12% ORAL RINSE 15 ML: 1.2 LIQUID ORAL at 09:01

## 2024-11-04 RX ADMIN — SODIUM CHLORIDE 1 G: 1 TABLET ORAL at 16:10

## 2024-11-04 RX ADMIN — SODIUM CHLORIDE 1 G: 1 TABLET ORAL at 21:08

## 2024-11-04 RX ADMIN — HYDROCODONE BITARTRATE AND ACETAMINOPHEN 1 TABLET: 5; 325 TABLET ORAL at 14:59

## 2024-11-04 RX ADMIN — ESCITALOPRAM OXALATE 20 MG: 10 TABLET ORAL at 21:09

## 2024-11-04 RX ADMIN — GABAPENTIN 600 MG: 300 CAPSULE ORAL at 15:01

## 2024-11-04 RX ADMIN — CEFAZOLIN SODIUM 2000 MG: 2 SOLUTION INTRAVENOUS at 11:17

## 2024-11-04 RX ADMIN — Medication 1 TABLET: at 16:10

## 2024-11-04 RX ADMIN — LORAZEPAM 1 MG: 1 TABLET ORAL at 21:09

## 2024-11-04 RX ADMIN — OXYCODONE HYDROCHLORIDE AND ACETAMINOPHEN 500 MG: 500 TABLET ORAL at 15:00

## 2024-11-04 RX ADMIN — DILTIAZEM HYDROCHLORIDE 180 MG: 180 CAPSULE, COATED, EXTENDED RELEASE ORAL at 14:59

## 2024-11-04 NOTE — CONSULTS
Consultation - Hospitalist   Name: Matilda Day 79 y.o. female I MRN: [id number]  Unit/Bed#: 7T [facility] 709-01 I Date of Admission: 11/4/2024   Date of Service: 11/4/2024 I Hospital Day: 0   Consults  Physician Requesting Evaluation: Gaviota Ford, DO   Reason for Evaluation / Principal Problem: Hypertension, essential tremor, asthma, hyponatremia, chronic, atrial fibrillation, SVT    Assessment & Plan  Status post total hip replacement, right  The day #0 from total right hip arthroplasty due to failed outpatient management of osteoarthritis medical clearance performed on 10/31/2024 by PCP  Meghna peralta, plans to restart postoperative by primary team  PT/OT iain post operatively, may need rehab  Continue pain control  Moderate episode of recurrent major depressive disorder (HCC)  Resume Lexapro, also takes Ativan as needed for anxiety  Essential hypertension  Home regimen includes the following  Diltiazem 60 mg in the morning and 180 mg at bedtime  Amlodipine  2.5 mg  Lisinopril 20 mg twice a day (currently on hold, may resume after morning chemistry is reviewed)  SIADH (syndrome of inappropriate ADH production) (HCC)  Continue salt tabs, takes 1 g 4 times a day  This has resulted in intermittent leg swelling for which she takes torsemide-does not take this for SIADH but rather for leg swelling  Acquired hypothyroidism  Resume levothyroxine  Prediabetes  No formal diagnosis of diabetes mellitus  Monitor on daily chemistry  Mild intermittent asthma without complication  Continue home inhaler therapy  No evidence of exacerbation  Supraventricular tachycardia (HCC)  Patient on regiment of 60 of diltiazem in the morning and 180 mg in the evening, continue the same for now  S/P deep brain stimulator placement  History of deep brain stimulator placement due to essential tremor  Symptoms controlled  Atrial fibrillation (HCC)  Ventricular rate controlled  Continue Cardizem  Continue Eliquis      VTE Prophylaxis: VTE  covered by:  apixaban, Oral     / sequential compression device     Recommendations for Discharge:  Tomorrow if blood pressure appropriate  Continue Venofer  Transfuse for hemoglobin less than 7  Review blood pressure management, patient is on 2 calcium channel blockers    Counseling / Coordination of Care Time: 1 hour.  Greater than 50% of total time spent on patient counseling and coordination of care.    Collaboration of Care: Were Recommendations Directly Discussed with Primary Treatment Team? - No     History of Present Illness:    Matilda Day is a 79 y.o. female who is originally admitted to the orthopedic service due to operative management for failed conservative management of osteoarthritis. We are consulted for medical management due to history of hypertension, depression, hypothyroidism, hyperlipidemia, asthma.  The patient has a past medical history of hypothyroidism, she is on levothyroxine 50 mcg.  She also has a history of asthma for which she takes her inhaler therapy as needed.  She reports no shortness of breath or difficulty breathing.  Her other medical problems include atrial fibrillation, her anticoagulation was held per PCP recommendations.  She also has a history of hypertension on 2 calcium channel blockers, amlodipine and diltiazem.  These have been indicated in the past due to blood pressure and supraventricular tachycardia.    At the time of evaluation the patient denies any chest pain, she has no shortness of breath or difficulty breathing.  She otherwise feels well.  She reports that she takes salt tablets for chronic hyponatremia, she does report that intermittently she does have leg swelling which she does take torsemide.  She reports her legs are not swollen today and are at baseline.    Review of Systems:    Review of Systems  A complete and comprehensive 14 point organ system review has been performed and all other systems are negative other than stated above.    Past Medical  and Surgical History:     Past Medical History:   Diagnosis Date    Anemia     Anxiety     Arrhythmia     Arthritis     Asthma     Blood clot in vein     portal vein    Breast cancer (HCC) 02/12/2021    Breast lump     [id number] RESOLVED    Cancer (HCC)     Depression     Disease of thyroid gland     DVT, lower extremity (HCC)     GERD (gastroesophageal reflux disease)     Hyperlipidemia     Hypertension     Hypokalemia     Hyponatremia     Hypothyroidism     Iron deficiency anemia     Irregular heart beat     Manic behavior (HCC)     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HCC)     Osteoarthritis     Palpitations     [id number]  RESOLVED    Paroxysmal supraventricular tachycardia (HCC)     PE (pulmonary thromboembolism) (HCC)     Pneumonia     Sjoegren syndrome     Sleep apnea     Sleep difficulties     Spinal stenosis     Thrombocytosis     [id number]  RESOLVED    Tremors of nervous system     dbs implanted right and left chest    Ulcerative colitis (HCC)     Vertigo     [id number] RESOLVED       Past Surgical History:   Procedure Laterality Date    ABDOMINAL SURGERY      APPENDECTOMY      BREAST BIOPSY Left 11/07/2019    Stereo    BREAST EXCISIONAL BIOPSY Left     x many years    BREAST SURGERY      lumpectomy & biopsy    CARMELLA HOLE W/ STEREOTACTIC INSERTION OF DBS LEADS / INTRAOP MICROELECTRODE RECORDING      COLON SURGERY      COLONOSCOPY N/A 08/30/2017    Procedure: POUCHOSCOPY;  Surgeon: VANDANA Waters MD;  Location: [facility];  Service: Colorectal    COLOPROCTECTOMY W/ ILEO J POUCH      ESOPHAGOGASTRODUODENOSCOPY      ONSET 10/17/11    FISTULA REPAIR      LLEOANAL FISTULA REPAIR TRANSPERIN TRANSABD APPROACH    HYSTERECTOMY      age 38    ILEOSTOMY CLOSURE      KNEE ARTHROSCOPY      Right    MAMMO STEREOTACTIC BREAST BIOPSY LEFT (ALL INC) Left 11/07/2019    MAMMO STEREOTACTIC BREAST BIOPSY LEFT (ALL INC) Left 12/17/2020    MAMMO STEREOTACTIC BREAST BIOPSY LEFT (ALL INC) EACH ADD Left 12/17/2020    MASTECTOMY Left  02/12/2021    left mastectomy- Dr. Hayes    MASTECTOMY W/ SENTINEL NODE BIOPSY Left 02/12/2021    Procedure: BREAST MASTECTOMY WITH SENTINEL LYMPH NODE BIOPSY, LYMPHATIC MAPPING WITH BLUE DYE AND RADIAOCTIVE DYE (INJECT AT 1100 BY DR HAYES IN THE OR);  Surgeon: Robbie Hayes MD;  Location[facility];  Service: Surgical Oncology    OK INSJ/RPLCMT CRANIAL NEUROSTIM PULSE GENERATOR Right 06/20/2017    Procedure: DBS GENERATOR REPLACEMENT;  Surgeon: Marin Mao MD;  Location: [facility];  Service: Neurosurgery    OK INSJ/RPLCMT CRANIAL NEUROSTIM PULSE GENERATOR N/A 12/04/2019    Procedure: REPLACEMENT IMPLANTABLE PULSE GENERATOR FOR DEEP BRAIN STIMULATOR LEFT CHEST;  Surgeon: Damian Batres MD;  Location: [facility];  Service: Neurosurgery    SPLENECTOMY      TONSILLECTOMY         Meds/Allergies:    PTA meds:   Prior to Admission Medications   Prescriptions Last Dose Informant Patient Reported? Taking?   Calcium Carb-Cholecalciferol (CALCIUM 600-D PO) 10/28/2024 Self Yes Yes   Sig: Take 1 tablet by mouth 2 (two) times a day   Coenzyme Q10 (CO Q-10 PO) 10/28/2024 Self Yes Yes   Sig: Take 1 capsule by mouth daily   Eliquis 5 MG 10/31/2024 Self No Yes   Sig: TAKE ONE TABLET BY MOUTH TWICE A DAY   Fluticasone Furoate-Vilanterol 100-25 mcg/actuation inhaler 11/4/2024 Self No Yes   Sig: Inhale 1 puff daily Rinse mouth after use.   HYDROcodone-acetaminophen (NORCO) 5-325 mg per tablet 11/4/2024 at 0630  No Yes   Sig: Take 1 tablet by mouth every 6 (six) hours as needed for pain Max Daily Amount: 4 tablets   LORazepam (ATIVAN) 1 mg tablet 11/4/2024 at 0630  No Yes   Sig: Take 1 tablet (1 mg total) by mouth every 6 (six) hours as needed for anxiety   MAGNESIUM PO 10/28/2024 Self Yes Yes   Sig: Take 1 tablet by mouth daily   Multiple Vitamin (multivitamin) tablet 11/3/2024 Self No Yes   Sig: Take 1 tablet by mouth daily Begin 30 days prior to surgery   Omega-3 Fatty Acids (FISH OIL PO) 10/28/2024 Self Yes Yes   Sig: Take 1  capsule by mouth daily   albuterol (PROVENTIL HFA,VENTOLIN HFA) 90 mcg/act inhaler More than a month Self No No   Sig: Inhale 2 puffs every 6 (six) hours as needed for wheezing   alendronate (FOSAMAX) 70 mg tablet  Self No No   Sig: TAKE 1 TABLET BY MOUTH EVERY 7 DAYS   Patient not taking: Reported on 10/23/2024   amLODIPine (NORVASC) 2.5 mg tablet   No No   Sig: TAKE ONE TABLET BY MOUTH EVERY DAY   ammonium lactate (LAC-HYDRIN) 12 % cream  Self Yes Yes   Patient not taking: Reported on 10/24/2024   ascorbic acid (VITAMIN C) 500 MG tablet 11/3/2024 Self No Yes   Sig: Take 1 tablet (500 mg total) by mouth daily Begin 30 days prior to surgery   diltiazem (CARDIZEM CD) 180 mg 24 hr capsule 11/3/2024  No Yes   Sig: TAKE ONE CAPSULE BY MOUTH EVERY DAY   diltiazem (CARDIZEM) 60 mg tablet 11/4/2024 Self No Yes   Sig: TAKE ONE TABLET BY MOUTH EVERY DAY   escitalopram (LEXAPRO) 20 mg tablet 11/3/2024 Self No Yes   Sig: TAKE ONE TABLET BY MOUTH EVERY DAY   Patient taking differently: Take 20 mg by mouth daily at bedtime   ferrous sulfate 324 (65 Fe) mg 11/3/2024 Self No Yes   Sig: Take 1 tablet (324 mg total) by mouth daily before breakfast Begin 30 days prior to surgery   fluticasone (FLONASE) 50 mcg/act nasal spray 11/3/2024 Self No Yes   Sig: APPLY ONE SPRAY IN EACH NOSTRIL ONCE DAILY AS NEEDED FOR RUNNY NOSE   folic acid (KP Folic Acid) 1 mg tablet 11/3/2024 Self No Yes   Sig: Take 1 tablet (1 mg total) by mouth daily Begin 30 days prior to surgery   gabapentin (NEURONTIN) 600 MG tablet 11/4/2024 at 0630 Self No Yes   Sig: TAKE ONE TABLET BY MOUTH EVERY MORNING , TAKE ONE TABLET BY MOUTH EVERY DAY IN THE AFTERNOON , AND TAKE TWO TABLETS BY MOUTH EVERY EVENING AT BEDTIME   levothyroxine 50 mcg tablet 11/3/2024 Self No Yes   Sig: TAKE ONE TABLET BY MOUTH EVERY DAY   Patient taking differently: Take 50 mcg by mouth daily at bedtime   lisinopril (ZESTRIL) 20 mg tablet 11/4/2024 at 0630 Self No Yes   Sig: TAKE ONE TABLET BY  MOUTH TWO TIMES A DAY   pantoprazole (PROTONIX) 40 mg tablet 2024 Self No Yes   Sig: TAKE ONE TABLET BY MOUTH EVERY DAY   potassium chloride (MICRO-K) 10 MEQ CR capsule 11/3/2024 Self No Yes   Sig: TAKE TWO CAPSULES BY MOUTH EVERY DAY   simvastatin (ZOCOR) 10 mg tablet 11/3/2024 Self No Yes   Sig: TAKE ONE-HALF TABLET BY MOUTH DAILY   Patient taking differently: Take 15 mg by mouth daily at bedtime   sodium chloride 1 g tablet 11/3/2024 Self No Yes   Sig: TAKE ONE TABLET BY MOUTH FOUR TIMES A DAY   torsemide (DEMADEX) 10 mg tablet More than a month Self No No   Sig: TAKE ONE TABLET BY MOUTH EVERY DAY AS NEEDED (EDEMA)   torsemide (DEMADEX) 20 mg tablet 2024 Self No Yes   Sig: Take 1 tablet (20 mg total) by mouth daily   vitamin B-12 (VITAMIN B-12) 500 mcg tablet 11/3/2024 Self No Yes   Sig: Take 1 tablet (500 mcg total) by mouth daily      Facility-Administered Medications Last Administration Doses Remaining   cyanocobalamin injection 1,000 mcg 2023 10:12 AM           Allergies:   Allergies   Allergen Reactions    Indomethacin GI Intolerance, Dizziness and Other (See Comments)    Macrobid [Nitrofurantoin Monohyd Macro] Rash    Nitrofurantoin Other (See Comments)    Penicillins Rash and Other (See Comments)     Annotation - [id number]: can take cephalosporins    Sulfa Antibiotics Rash and Other (See Comments)       Social History:     Marital Status:     Substance Use History:   Social History     Substance and Sexual Activity   Alcohol Use Yes    Alcohol/week: 2.0 standard drinks of alcohol    Types: 2 Cans of beer per week    Comment: 2or 3 beers a week     Social History     Tobacco Use   Smoking Status Former    Current packs/day: 0.00    Average packs/day: 1 pack/day for 15.0 years (15.0 ttl pk-yrs)    Types: Cigarettes    Start date: 1950    Quit date: 1965    Years since quittin.8   Smokeless Tobacco Never   Tobacco Comments    Quit     Social History     Substance and Sexual  "Activity   Drug Use No       Family History:    Family History   Problem Relation Age of Onset    Venous thrombosis Mother         ACUTE VENOUS THROMBOSIS OF DEEP VESSELS OF THE DISTAL LOWER EXTREMITY    Other Mother         PHLEBITIS    Hypertension Mother     Peripheral vascular disease Mother     COPD Father     Diabetes Father         MELLITUS    Stroke Father     Diabetes Sister         MELLITUS    Sjogren's syndrome Sister     No Known Problems Daughter     No Known Problems Maternal Grandmother     No Known Problems Maternal Grandfather     No Known Problems Paternal Grandmother     No Known Problems Paternal Grandfather     No Known Problems Sister     No Known Problems Maternal Aunt     No Known Problems Paternal Aunt     No Known Problems Son        Physical Exam:     Vitals:   Blood Pressure: 119/56 (11/04/24 1715)  Pulse: 64 (11/04/24 1715)  Temperature: 98.4 °F (36.9 °C) (11/04/24 1715)  Temp Source: Temporal (11/04/24 1715)  Respirations: 18 (11/04/24 1715)  Height: 5' 3\" (160 cm) (11/04/24 0839)  Weight - Scale: 63.2 kg (139 lb 6.4 oz) (11/04/24 0839)  SpO2: 95 % (11/04/24 1715)    Physical Exam    General: well appearing, no acute distress  HEENT: atraumatic, PERRLA, moist mucosa, normal pharynx, normal tonsils and adenoids, normal tongue, no fluid in sinuses  Neck: Trachea midline, no carotid bruit, no masses  Respiratory: normal chest wall expansion, CTA B, no r/r/w, no rubs  Cardiovascular: RRR, no m/r/g, Normal S1 and S2  Abdomen: Soft, non-tender, non-distended, normal bowel sounds in all quadrants, no hepatosplenomegaly, no tympany  Rectal: deferred  Musculoskeletal: Moves all  Integumentary: warm, dry, and pink, with no rash, purpura, or petechia  Heme/Lymph: no lymphadenopathy, no bruises  Neurological: Cranial Nerves II-XII grossly intact, no tics, normal sensation to pressure and light touch  Psychiatric: cooperative with normal mood, affect, and cognition    Additional Data:     Lab " Results: Results Review Statement: I personally reviewed the following image studies/reports in PACS and discussed pertinent findings with Radiology: xray(s). My interpretation of the radiology images/reports is: Hip x-rays previously showed osteoarthritis.                    Lab Results   Component Value Date/Time    HGBA1C 5.9 (H) 10/18/2024 07:49 AM    HGBA1C 6.3 (H) 06/11/2024 06:23 AM    HGBA1C 6.1 (H) 03/11/2024 06:32 AM    HGBA1C 5.6 12/17/2015 06:17 AM    HGBA1C 6.2 (H) 09/14/2015 12:24 PM    HGBA1C 6.1 (H) 09/03/2015 11:12 AM               Imaging: Results Review Statement: I personally reviewed the following image studies/reports in PACS and discussed pertinent findings with Radiology: Hip imaging. My interpretation of the radiology images/reports is: Total right hip arthroplasty.    XR hip/pelv 1 vw right if performed   Final Result by Eran Hsu MD (11/04 1609)      Satisfactory appearance of total right hip arthroplasty.            Workstation performed: [id number]         XR hip/pelv 2-3 vws right if performed    (Results Pending)       EKG, Pathology, and Other Studies Reviewed on Admission:   Reviewed PCP preoperative clearance note    ** Please Note: This note has been constructed using a voice recognition system. **

## 2024-11-04 NOTE — PLAN OF CARE
Problem: PHYSICAL THERAPY ADULT  Goal: Performs mobility at highest level of function for planned discharge setting.  See evaluation for individualized goals.  Description: Treatment/Interventions: Functional transfer training, LE strengthening/ROM, Elevations, Therapeutic exercise, Endurance training, Patient/family training, Equipment eval/education, Bed mobility, Gait training, Compensatory technique education, Spoke to nursing, Family  Equipment Recommended:  (pt already has RW)       See flowsheet documentation for full assessment, interventions and recommendations.  Note: Prognosis: Excellent  Problem List: Decreased strength, Decreased range of motion, Decreased endurance, Impaired balance, Decreased mobility, Orthopedic restrictions, Pain  Assessment: Pt is 79 y.o. female seen for a PT evaluation s/p admit to  [facility]  on 11/4/2024, s/p R FITO (anterior approach). Please see above for other active problem list / PMH. PT now consulted to assess functional mobility and needs for safe d/c planning. Prior to admission, pt was I w/ ambulation w/o AD during the day, used RW at nighttime, lives w/ spouse in a house w/ stairs. Currently pt requires MinAx1 for bed skills; MinAx1 for functional transfers; MinAX1 for ambulation w/ RW. Pt presents functioning below baseline and w/ overall mobility deficits 2* to: decreased LE strength; decreased R hip ROM; decreased endurance; impaired balance; gait deviations; pain; fatigue; forgetfulness; bed/chair alarms; multiple lines. These impairments place pt at risk for falls. Pt will continue to benefit from skilled PT interventions to address stated impairments; to maximize functional potential; for ongoing pt/ family training; and DME needs. PT is currently recommending HHPT, use of RW for mobility, + increased family support.        Rehab Resource Intensity Level, PT: III (Minimum Resource Intensity) (HHPT)    See flowsheet documentation for full assessment.

## 2024-11-04 NOTE — ASSESSMENT & PLAN NOTE
Patient on regiment of 60 of diltiazem in the morning and 180 mg in the evening, continue the same for now

## 2024-11-04 NOTE — ASSESSMENT & PLAN NOTE
The day #0 from total right hip arthroplasty due to failed outpatient management of osteoarthritis medical clearance performed on 10/31/2024 by PCP  Meghna peralta, plans to restart postoperative by primary team  PT/OT iain post operatively, may need rehab  Continue pain control

## 2024-11-04 NOTE — PLAN OF CARE
Problem: PAIN - ADULT  Goal: Verbalizes/displays adequate comfort level or baseline comfort level  Description: Interventions:  - Encourage patient to monitor pain and request assistance  - Assess pain using appropriate pain scale  - Administer analgesics based on type and severity of pain and evaluate response  - Implement non-pharmacological measures as appropriate and evaluate response  - Consider cultural and social influences on pain and pain management  - Notify physician/advanced practitioner if interventions unsuccessful or patient reports new pain  Outcome: Progressing     Problem: SAFETY ADULT  Goal: Patient will remain free of falls  Description: INTERVENTIONS:  - Educate patient/family on patient safety including physical limitations  - Instruct patient to call for assistance with activity   - Consult OT/PT to assist with strengthening/mobility   - Keep Call bell within reach  - Keep bed low and locked with side rails adjusted as appropriate  - Keep care items and personal belongings within reach  - Initiate and maintain comfort rounds  - Make Fall Risk Sign visible to staff  - Apply yellow socks and bracelet for high fall risk patients  - Consider moving patient to room near nurses station  Outcome: Progressing  Goal: Maintain or return to baseline ADL function  Description: INTERVENTIONS:  -  Assess patient's ability to carry out ADLs; assess patient's baseline for ADL function and identify physical deficits which impact ability to perform ADLs (bathing, care of mouth/teeth, toileting, grooming, dressing, etc.)  - Assess/evaluate cause of self-care deficits   - Assess range of motion  - Assess patient's mobility; develop plan if impaired  - Assess patient's need for assistive devices and provide as appropriate  - Encourage maximum independence but intervene and supervise when necessary  - Involve family in performance of ADLs  - Assess for home care needs following discharge   - Consider OT consult to  assist with ADL evaluation and planning for discharge  - Provide patient education as appropriate  Outcome: Progressing  Goal: Maintains/Returns to pre admission functional level  Description: INTERVENTIONS:  - Perform AM-PAC 6 Click Basic Mobility/ Daily Activity assessment daily.  - Set and communicate daily mobility goal to care team and patient/family/caregiver.   - Collaborate with rehabilitation services on mobility goals if consulted  - Out of bed for toileting  - Record patient progress and toleration of activity level   Outcome: Progressing     Problem: Knowledge Deficit  Goal: Patient/family/caregiver demonstrates understanding of disease process, treatment plan, medications, and discharge instructions  Description: Complete learning assessment and assess knowledge base.  Interventions:  - Provide teaching at level of understanding  - Provide teaching via preferred learning methods  Outcome: Progressing     Problem: DISCHARGE PLANNING  Goal: Discharge to home or other facility with appropriate resources  Description: INTERVENTIONS:  - Identify barriers to discharge w/patient and caregiver  - Arrange for needed discharge resources and transportation as appropriate  - Identify discharge learning needs (meds, wound care, etc.)  - Arrange for interpretive services to assist at discharge as needed  - Refer to Case Management Department for coordinating discharge planning if the patient needs post-hospital services based on physician/advanced practitioner order or complex needs related to functional status, cognitive ability, or social support system  Outcome: Progressing

## 2024-11-04 NOTE — ANESTHESIA PREPROCEDURE EVALUATION
Procedure:  ARTHROPLASTY HIP TOTAL, overnight (Right: Hip)    Relevant Problems   CARDIO   (+) Atrial fibrillation (HCC)   (+) Essential hypertension   (+) Hyperlipidemia   (+) Supraventricular tachycardia (HCC)      ENDO   (+) Acquired hypothyroidism      GI/HEPATIC   (+) GERD (gastroesophageal reflux disease)      GYN   (+) Malignant neoplasm of left breast in female, estrogen receptor negative, unspecified site of breast (HCC)      HEMATOLOGY   (+) Iron deficiency anemia      MUSCULOSKELETAL   (+) Lumbar degenerative disc disease   (+) Osteoarthritis of right knee   (+) Primary osteoarthritis of one hip, right      NEURO/PSYCH   (+) Anxiety   (+) Continuous opioid dependence (HCC)   (+) Moderate episode of recurrent major depressive disorder (HCC)      PULMONARY   (+) Mild intermittent asthma without complication        Physical Exam    Airway    Mallampati score: II  TM Distance: >3 FB  Neck ROM: full     Dental        Cardiovascular  Rhythm: regular, No JVD    Pulmonary   No wheezes    Other Findings  post-pubertal.      Anesthesia Plan  ASA Score- 3     Anesthesia Type- spinal with ASA Monitors.         Additional Monitors:     Airway Plan:            Plan Factors-    Chart reviewed.   Existing labs reviewed. Patient summary reviewed.    Patient is not a current smoker.              Induction- intravenous.    Postoperative Plan-         Informed Consent- Anesthetic plan and risks discussed with patient.  I personally reviewed this patient with the CRNA. Discussed and agreed on the Anesthesia Plan with the CRNA..

## 2024-11-04 NOTE — ASSESSMENT & PLAN NOTE
Continue salt tabs, takes 1 g 4 times a day  This has resulted in intermittent leg swelling for which she takes torsemide-does not take this for SIADH but rather for leg swelling

## 2024-11-04 NOTE — ASSESSMENT & PLAN NOTE
Home regimen includes the following  Diltiazem 60 mg in the morning and 180 mg at bedtime  Amlodipine  2.5 mg  Lisinopril 20 mg twice a day (currently on hold, may resume after morning chemistry is reviewed)

## 2024-11-04 NOTE — ANESTHESIA POSTPROCEDURE EVALUATION
Post-Op Assessment Note    CV Status:  Stable    Pain management: adequate    Multimodal analgesia used between 6 hours prior to anesthesia start to PACU discharge    Mental Status:  Awake   Hydration Status:  Stable   PONV Controlled:  None   Airway Patency:  Patent     Post Op Vitals Reviewed: Yes    No anethesia notable event occurred.    Staff: CRNA           Last Filed PACU Vitals:  Vitals Value Taken Time   Temp 99.6 °F (37.6 °C) 11/04/24 1353   Pulse 65 11/04/24 1357   /70 11/04/24 1354   Resp 18 11/04/24 1353   SpO2 93 % 11/04/24 1357   Vitals shown include unfiled device data.    Modified Tanisha:  No data recorded

## 2024-11-04 NOTE — PHYSICAL THERAPY NOTE
Physical Therapy Evaluation    Patient's Name: Matilda Day    Admitting Diagnosis  Primary osteoarthritis of one hip, right [M16.11]    Problem List  Patient Active Problem List   Diagnosis    Portal vein thrombosis    Anxiety    Moderate episode of recurrent major depressive disorder (HCC)    Essential tremor    Hyperlipidemia    Essential hypertension    Hypomagnesemia    SIADH (syndrome of inappropriate ADH production) (HCC)    Acquired hypothyroidism    Iron deficiency anemia    Prediabetes    Peripheral neuropathy    Osteopenia    Osteoarthritis of right knee    Ulcerative colitis without complications (HCC)    Allergic rhinitis    GERD (gastroesophageal reflux disease)    Mild intermittent asthma without complication    Diarrhea    Sjogren's syndrome (HCC)    Chronic salpingo-oophoritis    Overweight    Supraventricular tachycardia (HCC)    Unsteady gait    Lumbar degenerative disc disease    Preoperative testing    S/P deep brain stimulator placement    Vitamin B12 deficiency    Vitamin D deficiency    History of left breast cancer    Hoarseness of voice    Esophageal candidiasis (HCC)    History of right mastectomy    S/P left mastectomy    Peripheral vascular disease (HCC)    Continuous opioid dependence (HCC)    Malignant neoplasm of left breast in female, estrogen receptor negative, unspecified site of breast (HCC)    Primary osteoarthritis of one hip, right    Disorder of bursae of shoulder region    Enthesopathy of hip region    Peripheral venous insufficiency    Atrial fibrillation (HCC)    Status post total hip replacement, right       Past Medical History  Past Medical History:   Diagnosis Date    Anemia     Anxiety     Arrhythmia     Arthritis     Asthma     Blood clot in vein     portal vein    Breast cancer (HCC) 02/12/2021    Breast lump     [id number] RESOLVED    Cancer (HCC)     Depression     Disease of thyroid gland     DVT, lower extremity (HCC)     GERD (gastroesophageal reflux  disease)     Hyperlipidemia     Hypertension     Hypokalemia     Hyponatremia     Hypothyroidism     Iron deficiency anemia     Irregular heart beat     Manic behavior (HCC)     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HCC)     Osteoarthritis     Palpitations     [id number]  RESOLVED    Paroxysmal supraventricular tachycardia (HCC)     PE (pulmonary thromboembolism) (HCC)     Pneumonia     Sjoegren syndrome     Sleep apnea     Sleep difficulties     Spinal stenosis     Thrombocytosis     [id number]  RESOLVED    Tremors of nervous system     dbs implanted right and left chest    Ulcerative colitis (HCC)     Vertigo     [id number] RESOLVED       Past Surgical History  Past Surgical History:   Procedure Laterality Date    ABDOMINAL SURGERY      APPENDECTOMY      BREAST BIOPSY Left 11/07/2019    Stereo    BREAST EXCISIONAL BIOPSY Left     x many years    BREAST SURGERY      lumpectomy & biopsy    CARMELLA HOLE W/ STEREOTACTIC INSERTION OF DBS LEADS / INTRAOP MICROELECTRODE RECORDING      COLON SURGERY      COLONOSCOPY N/A 08/30/2017    Procedure: POUCHOSCOPY;  Surgeon: VANDANA Waters MD;  Location: [facility];  Service: Colorectal    COLOPROCTECTOMY W/ ILEO J POUCH      ESOPHAGOGASTRODUODENOSCOPY      ONSET 10/17/11    FISTULA REPAIR      LLEOANAL FISTULA REPAIR TRANSPERIN TRANSABD APPROACH    HYSTERECTOMY      age 38    ILEOSTOMY CLOSURE      KNEE ARTHROSCOPY      Right    MAMMO STEREOTACTIC BREAST BIOPSY LEFT (ALL INC) Left 11/07/2019    MAMMO STEREOTACTIC BREAST BIOPSY LEFT (ALL INC) Left 12/17/2020    MAMMO STEREOTACTIC BREAST BIOPSY LEFT (ALL INC) EACH ADD Left 12/17/2020    MASTECTOMY Left 02/12/2021    left mastectomy- Dr. Hayes    MASTECTOMY W/ SENTINEL NODE BIOPSY Left 02/12/2021    Procedure: BREAST MASTECTOMY WITH SENTINEL LYMPH NODE BIOPSY, LYMPHATIC MAPPING WITH BLUE DYE AND RADIAOCTIVE DYE (INJECT AT 1100 BY DR HAYES IN THE OR);  Surgeon: Robbie Hayes MD;  [facility];  Service: Surgical Oncology    MO INSJ/RPLCMT  CRANIAL NEUROSTIM PULSE GENERATOR Right 06/20/2017    Procedure: DBS GENERATOR REPLACEMENT;  Surgeon: Marin Mao MD;  Location: [facility];  Service: Neurosurgery    OK INSJ/RPLCMT CRANIAL NEUROSTIM PULSE GENERATOR N/A 12/04/2019    Procedure: REPLACEMENT IMPLANTABLE PULSE GENERATOR FOR DEEP BRAIN STIMULATOR LEFT CHEST;  Surgeon: Damian Batres MD;  Location: [facility];  Service: Neurosurgery    SPLENECTOMY      TONSILLECTOMY          11/04/24 1630   PT Last Visit   PT Visit Date 11/04/24   Note Type   Note type Evaluation  (+ treatment)   Pain Assessment   Pain Assessment Tool 0-10   Pain Score 4   Pain Location/Orientation Orientation: Right;Location: Hip   Hospital Pain Intervention(s) Cold applied;Elevated   Restrictions/Precautions   Weight Bearing Precautions Per Order Yes   RLE Weight Bearing Per Order WBAT   LLE Weight Bearing Per Order WBAT   Other Precautions THR;Fall Risk;Pain;Multiple lines  (anterior hip pxn (reviewed w/ pt))   Home Living   Type of Home House   Home Layout Two level;1/2 bath on main level;Stairs to enter without rails  (1 + 1 VIRAL; plans on initial FFSU w/ hospital bed + 1/2 bathroom)   Home Equipment Walker;Cane;Hospital bed  (+ rollator)   Prior Function   Level of [place] Independent with ADLs;Independent with functional mobility;Independent with IADLS  (I ambulation primarily w/o AD, uses RW at nighttime)   Lives With Spouse  (available to assist prn)   Receives Help From Family   Falls in the last 6 months 0   General   Family/Caregiver Present Yes  (spouse)   Cognition   Arousal/Participation Alert   Orientation Level Oriented X4   Comments forgetful   Subjective   Subjective Agreeable to mobilize.   RLE Assessment   RLE Assessment   (grossly 3-/5)   LLE Assessment   LLE Assessment WFL   Coordination   Sensation WFL   Bed Mobility   Supine to Sit 4  Minimal assistance   Additional items Assist x 1;HOB elevated;Bedrails;Increased time required;Verbal cues;LE management    Additional Comments Pt greeted in supine.   Transfers   Sit to Stand 4  Minimal assistance   Additional items Assist x 1;Increased time required;Verbal cues   Stand to Sit 4  Minimal assistance   Additional items Assist x 1;Increased time required;Verbal cues   Additional Comments RW   Ambulation/Elevation   Gait pattern Excessively slow;Short stride;Decreased R stance;Antalgic   Gait Assistance 4  Minimal assist   Additional items Assist x 1;Verbal cues;Tactile cues  (+ assistance for line management)   Assistive Device Rolling walker   Distance 50'x2   Stair Management Assistance Not tested   Balance   Static Sitting Fair +   Dynamic Sitting Fair   Static Standing Fair -   Dynamic Standing Poor +   Ambulatory Poor +  (RW)   Endurance Deficit   Endurance Deficit Yes   Endurance Deficit Description weakness, fatigue, pain   Activity Tolerance   Activity Tolerance Patient limited by fatigue   Nurse Made Aware yes - cleared + updated   Assessment   Prognosis Excellent   Problem List Decreased strength;Decreased range of motion;Decreased endurance;Impaired balance;Decreased mobility;Orthopedic restrictions;Pain   Assessment Pt is 79 y.o. female seen for a PT evaluation s/p admit to  [facility]  on 11/4/2024, s/p R FITO (anterior approach). Please see above for other active problem list / PMH.     PT now consulted to assess functional mobility and needs for safe d/c planning. Prior to admission, pt was I w/ ambulation w/o AD during the day, used RW at nighttime, lives w/ spouse in a house w/ stairs.     Currently pt requires MinAx1 for bed skills; MinAx1 for functional transfers; MinAX1 for ambulation w/ RW. Pt presents functioning below baseline and w/ overall mobility deficits 2* to: decreased LE strength; decreased R hip ROM; decreased endurance; impaired balance; gait deviations; pain; fatigue; forgetfulness; bed/chair alarms; multiple lines. These impairments place pt at risk for falls.     Pt will continue to  benefit from skilled PT interventions to address stated impairments; to maximize functional potential; for ongoing pt/ family training; and DME needs. PT is currently recommending HHPT, use of RW for mobility, + increased family support.   Goals   Patient Goals get HHPT   STG Expiration Date 11/14/24   Short Term Goal #1 In 10 days pt will complete: 1) Bed mobility skills with Sudhir to facilitate safe return to previous living environment. 2) Functional transfers with Sudhir to facilitate safe return to previous living environment. 3) Ambulation with ' w/ Sudhir without LOB for safe ambulation in home/community environment. 4) Improve balance scores by 1 grade to decrease fall risk. 5) Improve LE strength grades by 1 to increase independence w/ all functional mobility, transfers and gait. 6) PT for ongoing pt and family education; DME needs and D/C planning to promote highest level of function in least restrictive environment. 7) Stair training up/ down at least 2 steps with most appropriate technique and Sudhir for safe access to previous living environment and to increase community access.   Plan   Treatment/Interventions Functional transfer training;LE strengthening/ROM;Elevations;Therapeutic exercise;Endurance training;Patient/family training;Equipment eval/education;Bed mobility;Gait training;Compensatory technique education;Spoke to nursing;Family   PT Frequency Twice a day   Discharge Recommendation   Rehab Resource Intensity Level, PT III (Minimum Resource Intensity)  (HHPT)   Equipment Recommended   (pt already has RW)   AM-PAC Basic Mobility Inpatient   Turning in Flat Bed Without Bedrails 3   Lying on Back to Sitting on Edge of Flat Bed Without Bedrails 3   Moving Bed to Chair 3   Standing Up From Chair Using Arms 3   Walk in Room 3   Climb 3-5 Stairs With Railing 3   Basic Mobility Inpatient Raw Score 18   Basic Mobility Standardized Score 41.05   [facility] Highest Level Of Mobility   [facility] Goal 6:  Walk 10 steps or more   -[facility] Achieved 7: Walk 25 feet or more   End of Consult   Patient Position at End of Consult Bedside chair;All needs within reach  (BLE elevated, CP to R hip, all lines in tact, spouse at bedside)     Micki Decker, PT, DPT

## 2024-11-04 NOTE — TELEPHONE ENCOUNTER
Caller: [facility]    Doctor: irasema    Reason for call: pharmacy called regarding a possible allergy/sensitivity to Cefazolin, as the patient has an allergy to penicillins  Please advise pharmacy   Thank you    Call back#: [phone]

## 2024-11-04 NOTE — ANESTHESIA PROCEDURE NOTES
Spinal Block    Patient location during procedure: OR  Start time: 11/4/2024 11:15 AM  Reason for block: primary anesthetic  Staffing  Performed by: Jean Santana CRNA  Authorized by: Vinicius Pedraza MD    Preanesthetic Checklist  Completed: patient identified, IV checked, site marked, risks and benefits discussed, surgical consent, monitors and equipment checked, pre-op evaluation and timeout performed  Spinal Block  Patient position: sitting  Prep: ChloraPrep  Patient monitoring: heart rate, continuous pulse ox and frequent blood pressure checks  Approach: midline  Location: L3-4  Needle  Needle type: Pencan   Needle gauge: 24 G  Needle length: 3.5 in  Assessment  Sensory level: T4  Injection Assessment:  negative aspiration for heme, no paresthesia on injection and positive aspiration for clear CSF.

## 2024-11-04 NOTE — OP NOTE
OPERATIVE REPORT  PATIENT NAME: Matilda Day  : 1945  MRN: [id number]  Pt Location:   OR ROOM 12    Surgery Date: 2024    Surgeons and Role:     * Gaviota Ford, DO - Primary     * JESSENIA RutledgeC - Assisting      * Carlyle Denny, ATC - Assisting    Preop Diagnosis:  Primary osteoarthritis of one hip, right [M16.11]    Post-Op Diagnosis Codes:     * Primary osteoarthritis of one hip, right [M16.11]    Procedure(s):  Right - ARTHROPLASTY HIP TOTAL. overnight    Specimens:  * No specimens in log *    Estimated Blood Loss:   Minimal    Drains:  * No LDAs found *    Anesthesia Type:   Spinal     Operative Indications:  Primary osteoarthritis of one hip, right [M16.11]    Operative Findings:  See below    Complications:   None      Hip Approach: Direct anterior    Chronic Narcotic Use:  yes      Procedure and Technique:  Implants: Depuy  Oxford acetabular sector II cup size 50mm  Actis high offset stem size 7  Neutral acetabular liner  One 6.5 x 15mm screw, one 6.5 x 20mm screw  32mm +1 Biolox delta ceramic femoral head    INDICATIONS FOR PROCEDURE:  The patients is a 79 years old female who presented to the office with right hip arthritis.  Conservative treatments were tried and failed.  The patient had debilitating pain and decreased quality of life from their end-stage arthritis.  Surgery was then recommended.  Extensive counseling in regards to the reasons for surgical intervention as well as the risks and benefits of surgery were reviewed.  The risks include, but are not limited to infection, extensive blood clots, blood clots, wound healing problems, damage to blood vessels and nerves, dislocation, leg length discrepancy, fracture, the need for further surgery.  The patient understood and agreed to by oral and written consent.  Presurgical planning XRs were obtained.      OPERATIVE PROCEDURE:  The patient was identified as Matilda Day by Her ID bracelet by the surgical staff in  the preoperative area at [facility].  The patient was wheeled back to the surgical room and placed on the operative table.  Preoperative antibiotics were given.    Anesthesia was administered.  Fluoroscopic images were obtained and saved.      The patient was placed in the supine position on the hana table and all bony prominences were carefully protected.   The right leg was then prepped and draped in the usual sterile fashion.  A timeout was performed where the patient’s name and surgical site were once again identified.        An incision was made over  the anterior aspect of the patient’s right hip.  Dissection was made through the skin and subcutaneous tissue down to the fascia over the tensor fascia mariel.  The fascia was incised and the interval between the tensor fascia mariel and the sartorius was identified.  Retractors were placed.  Bleeders were cauterized.  We dissected down to the capsule.  A capsulotomy was made and the capsule was tagged with suture.  We released the capsule from the neck.  The femoral neck cut was made and the femoral head was removed.  Retractors were placed around the acetabulum.  The labrum was removed.  Sequential reaming took place and a size 50mm acetabular shell was found to be the best fit. Fluoroscopic images were taken while reaming and to evaluate the position of the cup.  The shell was impacted into place.   Fluoroscopic images were taken and saved.  The Hyperpot hip computer navigation system was used to evaluate the position of the cup.  One 6.5 x 15 mm screw and one 6.5 x 20mm screw was placed through the cup and the final liner was impacted into placed.  The liner was checked and found to be secure.       Attention was then paid to the femur.  The leg was manipulated for exposure of the femur proximally.  Soft tissue dissection was done to reveal the greater trochanter.  A canal finder were used to open the femoral canal and excess bone was removed  laterally.  Sequential broaching took place and it was found that a size 4 femoral broach to be the best fit.  The broach was left in place and a size 32mm +1 femoral head with a high offset neck were then trialed.  Fluoroscopic images were taken and saved.  The SpectraSensors hip computer navigation system was used to evaluate leg lengths and offset.  The leg was taken through a ROM to evaluate for stability.  Stability was adequate but the stem size was too small and leg length and offset were found to be short.  We then removed the trials and broached up to a size 7.  Based on where the stem sat we then placed the  final femoral stem.  It was impacted into place.   The final 32mm +1 femoral head was impacted securely into place.  The hip was reduced.  Fluoroscopic images were taken and saved.  The SpectraSensors hip computer navigation system was used to evaluate leg lengths and offset.  The leg was taken through a ROM to evaluate for stability.  Stability, leg length, and offset were found to be acceptable.The acetabulum was irrigated and the hip was carefully reduced.  Copious amounts of irrigation was used to irrigate the hip.  An irrisept wash followed by more irrigation was performed.  The capsule was repaired with a #5 Ethibond suture.  Irrigation was used throughout the closure.  The tensor fascia was repaired with #1-0 running vicryl suture.  The subcutaneous fat was closed with a running #1-0 vicryl suture.  The skin was closed with #2-0 vicryl and #3-0 monocryl subcutaneously.   Skin glue was placed on the incision and a mepilex dressing was placed.  The patient was transferred onto a hospital bed and awakened without difficulty.      I attest that I was present and performed this procedure.  Kerrie Cadena PA-C  and Carlyle Denny ATC were present for the entire procedure and provided essential assistance with limb position, patient prepping, and retraction.     A qualified resident physician was not available.    Patient  Disposition:  hemodynamically stable              SIGNATURE: Gaviota Ford,   DATE: November 4, 2024  TIME: 2:00 PM

## 2024-11-04 NOTE — INTERVAL H&P NOTE
H&P reviewed. After examining the patient I find no changes in the patients condition since the H&P had been written.  Heart:  regular rate  Lungs:  no audible wheezing  Abd:  nondistended  RLE:  EHL/AT/GS intact, sensation grossly intact L4, L5, S1, palpable pedal pulse  R hip:  skin intact, no signs of infection currently, small red bumps noted medial to area of skin incision.  Per patient they are a result of skin tags.  We discussed risks and benefits with proceeding with surgery.  Patient opted to go forward with surgery.      Vitals:    11/04/24 0837   BP: 149/81   Pulse: 58   Resp: 18   Temp: (!) 96.7 °F (35.9 °C)   SpO2: 97%

## 2024-11-04 NOTE — DISCHARGE INSTR - AVS FIRST PAGE
ANTERIOR TOTAL HIP REPLACEMENT DISCHARGE INSTRUCTIONS    Surgical Dressing:  You may remove your dressing 7 days from the date of your surgery then change your dressing daily until drainage stops.  Do not put any lotions or creams on your incision.  If there are any signs of infection such as drainage persisting beyond a few days, unusual looking drainage (yellow, green), increased redness around the incision, or fever/chills let your doctor know.      Medications:  Upon discharge you will be given a prescription for an anticoagulant (i.e. Ecotrin (Aspirin), Coumadin (Warfarin), Lovenox (Enoxaparin)) and a narcotic pain reliever.  Do not take any over the counter NSAIDs (i.e. Ibuprofen, Motrin, Advil, Naprosyn, Aleve) while on your anticoagulation medication.  Narcotic pain relievers cannot be refilled over the phone.  Please be mindful of the number of pills you have left so you can  a prescription for a refill if needed during office hours.        If you are on Coumadin (Warfarin) you will need your blood drawn every Monday and Thursday once you are home.  Initially your visiting nurse will draw your blood.  Later you will go to an outpatient lab.  You will receive a phone call the next day and your Coumadin (warfarin) will be dosed based on these results.  Take this dosage daily until you hear from us next.      Walking:  Use two crutches or a walker for EVERY step.  Gradually increase your walking daily.  You can progress to a cane as tolerated once advised by your physical therapist.      Sleeping Positions:  You may not sleep on your stomach.      Bathing:  No tub baths.  Do not submerge your incision.  You may shower.  You may sit on a shower chair or stand and shower briefly.  Use your crutches/walker to get in and out of the shower.      Sexual Relations:  Resume according to your comfort.    Swimming:  No swimming or hot tubs until approved by your physician.  Swimming will be allowed once your  incision is well healed.      Driving:  You may ride as a passenger now.  No driving until your follow-up appointment.  To get into the car use the front passenger seat with the seat pushed back as far as possible.  Scoot yourself back in the seat.  Use your hands to assist your legs into the car.      Physical therapy:  When you have finished with home visiting PT, you may begin outpatient PT.  Call your surgeon’s office if you have not already received a prescription.  A prescription can be faxed to the outpatient center of your choice.      Special considerations:  To minimize swelling, stiffness, and decrease pain use cold as needed, but not heat.  Ice 20 minutes at a time with a cloth between your skin and the ice.  Ice after walking, when you have pain, or after you have completed your exercises.  Limit your sitting to 60 minutes at one time.  Continue to wear your REID stockings at all times for 2 weeks after surgery, except when washing.  You can wear them as needed after that.      Follow up:  Call [phone] to make an appointment to see your surgeon within 2 weeks of your surgery if you haven’t done so already.  If you have any questions during business hours please call the direct office phone number [phone].  Otherwise please call [phone] for any concerns.      For the future:  Prior to any dental work, including routine cleanings, call the office for a prescription for antibiotics to be taken prior to your dental appointment.  For any invasive procedures (i.e. endoscopy, colonoscopy, etc.) inform the doctor performing the procedure that you have a total knee replacement and will antibiotics just prior to the procedure to protect it.

## 2024-11-05 ENCOUNTER — HOME HEALTH ADMISSION (OUTPATIENT)
Dept: HOME HEALTH SERVICES | Facility: HOME HEALTHCARE | Age: 79
End: 2024-11-05
Payer: MEDICARE

## 2024-11-05 VITALS
TEMPERATURE: 98.7 F | BODY MASS INDEX: 24.7 KG/M2 | HEART RATE: 85 BPM | OXYGEN SATURATION: 93 % | SYSTOLIC BLOOD PRESSURE: 118 MMHG | DIASTOLIC BLOOD PRESSURE: 51 MMHG | HEIGHT: 63 IN | WEIGHT: 139.4 LBS | RESPIRATION RATE: 18 BRPM

## 2024-11-05 LAB
ANION GAP SERPL CALCULATED.3IONS-SCNC: 7 MMOL/L (ref 4–13)
BUN SERPL-MCNC: 10 MG/DL (ref 5–25)
CALCIUM SERPL-MCNC: 8.1 MG/DL (ref 8.4–10.2)
CHLORIDE SERPL-SCNC: 101 MMOL/L (ref 96–108)
CO2 SERPL-SCNC: 27 MMOL/L (ref 21–32)
CREAT SERPL-MCNC: 0.64 MG/DL (ref 0.6–1.3)
ERYTHROCYTE [DISTWIDTH] IN BLOOD BY AUTOMATED COUNT: 16.1 % (ref 11.6–15.1)
GFR SERPL CREATININE-BSD FRML MDRD: 84 ML/MIN/1.73SQ M
GLUCOSE P FAST SERPL-MCNC: 129 MG/DL (ref 65–99)
GLUCOSE SERPL-MCNC: 129 MG/DL (ref 65–140)
HCT VFR BLD AUTO: 25.4 % (ref 34.8–46.1)
HGB BLD-MCNC: 8.5 G/DL (ref 11.5–15.4)
MCH RBC QN AUTO: 30.4 PG (ref 26.8–34.3)
MCHC RBC AUTO-ENTMCNC: 33.5 G/DL (ref 31.4–37.4)
MCV RBC AUTO: 91 FL (ref 82–98)
PLATELET # BLD AUTO: 344 THOUSANDS/UL (ref 149–390)
PMV BLD AUTO: 9.3 FL (ref 8.9–12.7)
POTASSIUM SERPL-SCNC: 3.4 MMOL/L (ref 3.5–5.3)
RBC # BLD AUTO: 2.8 MILLION/UL (ref 3.81–5.12)
SODIUM SERPL-SCNC: 135 MMOL/L (ref 135–147)
WBC # BLD AUTO: 9.81 THOUSAND/UL (ref 4.31–10.16)

## 2024-11-05 PROCEDURE — 97116 GAIT TRAINING THERAPY: CPT

## 2024-11-05 PROCEDURE — 80048 BASIC METABOLIC PNL TOTAL CA: CPT | Performed by: PHYSICIAN ASSISTANT

## 2024-11-05 PROCEDURE — G0008 ADMIN INFLUENZA VIRUS VAC: HCPCS | Performed by: ORTHOPAEDIC SURGERY

## 2024-11-05 PROCEDURE — 85027 COMPLETE CBC AUTOMATED: CPT | Performed by: PHYSICIAN ASSISTANT

## 2024-11-05 PROCEDURE — 99233 SBSQ HOSP IP/OBS HIGH 50: CPT | Performed by: INTERNAL MEDICINE

## 2024-11-05 PROCEDURE — 90662 IIV NO PRSV INCREASED AG IM: CPT | Performed by: ORTHOPAEDIC SURGERY

## 2024-11-05 PROCEDURE — 90677 PCV20 VACCINE IM: CPT | Performed by: ORTHOPAEDIC SURGERY

## 2024-11-05 PROCEDURE — 97110 THERAPEUTIC EXERCISES: CPT

## 2024-11-05 PROCEDURE — G0009 ADMIN PNEUMOCOCCAL VACCINE: HCPCS | Performed by: ORTHOPAEDIC SURGERY

## 2024-11-05 PROCEDURE — 99024 POSTOP FOLLOW-UP VISIT: CPT | Performed by: ORTHOPAEDIC SURGERY

## 2024-11-05 RX ORDER — CELECOXIB 100 MG/1
200 CAPSULE ORAL ONCE
Status: COMPLETED | OUTPATIENT
Start: 2024-11-05 | End: 2024-11-05

## 2024-11-05 RX ORDER — HYDROCODONE BITARTRATE AND ACETAMINOPHEN 5; 325 MG/1; MG/1
1.5 TABLET ORAL EVERY 4 HOURS PRN
Status: DISCONTINUED | OUTPATIENT
Start: 2024-11-05 | End: 2024-11-05 | Stop reason: HOSPADM

## 2024-11-05 RX ORDER — POTASSIUM CHLORIDE 1500 MG/1
40 TABLET, EXTENDED RELEASE ORAL ONCE
Status: COMPLETED | OUTPATIENT
Start: 2024-11-05 | End: 2024-11-05

## 2024-11-05 RX ORDER — HYDROCODONE BITARTRATE AND ACETAMINOPHEN 5; 325 MG/1; MG/1
1 TABLET ORAL EVERY 4 HOURS PRN
Status: DISCONTINUED | OUTPATIENT
Start: 2024-11-05 | End: 2024-11-05 | Stop reason: HOSPADM

## 2024-11-05 RX ADMIN — OXYCODONE HYDROCHLORIDE AND ACETAMINOPHEN 500 MG: 500 TABLET ORAL at 08:26

## 2024-11-05 RX ADMIN — LEVOTHYROXINE SODIUM 50 MCG: 50 TABLET ORAL at 05:17

## 2024-11-05 RX ADMIN — CEFAZOLIN SODIUM 2000 MG: 2 SOLUTION INTRAVENOUS at 02:49

## 2024-11-05 RX ADMIN — SODIUM CHLORIDE 1 G: 1 TABLET ORAL at 11:25

## 2024-11-05 RX ADMIN — HYDROMORPHONE HYDROCHLORIDE 0.2 MG: 0.2 INJECTION, SOLUTION INTRAMUSCULAR; INTRAVENOUS; SUBCUTANEOUS at 00:17

## 2024-11-05 RX ADMIN — FERROUS SULFATE TAB 325 MG (65 MG ELEMENTAL FE) 325 MG: 325 (65 FE) TAB at 08:26

## 2024-11-05 RX ADMIN — VITAM B12 500 MCG: 100 TAB at 08:25

## 2024-11-05 RX ADMIN — POTASSIUM CHLORIDE 40 MEQ: 1500 TABLET, EXTENDED RELEASE ORAL at 09:58

## 2024-11-05 RX ADMIN — APIXABAN 5 MG: 5 TABLET, FILM COATED ORAL at 08:26

## 2024-11-05 RX ADMIN — FLUTICASONE FUROATE AND VILANTEROL TRIFENATATE 1 PUFF: 100; 25 POWDER RESPIRATORY (INHALATION) at 08:48

## 2024-11-05 RX ADMIN — Medication 1 TABLET: at 15:38

## 2024-11-05 RX ADMIN — GABAPENTIN 600 MG: 300 CAPSULE ORAL at 08:26

## 2024-11-05 RX ADMIN — LORAZEPAM 1 MG: 1 TABLET ORAL at 05:17

## 2024-11-05 RX ADMIN — SODIUM CHLORIDE 1 G: 1 TABLET ORAL at 08:26

## 2024-11-05 RX ADMIN — Medication 1 TABLET: at 08:26

## 2024-11-05 RX ADMIN — LORAZEPAM 1 MG: 1 TABLET ORAL at 11:25

## 2024-11-05 RX ADMIN — CELECOXIB 200 MG: 100 CAPSULE ORAL at 15:04

## 2024-11-05 RX ADMIN — FLUTICASONE PROPIONATE 1 SPRAY: 50 SPRAY, METERED NASAL at 08:48

## 2024-11-05 RX ADMIN — HYDROCODONE BITARTRATE AND ACETAMINOPHEN 1 TABLET: 5; 325 TABLET ORAL at 09:58

## 2024-11-05 RX ADMIN — Medication 400 MG: at 08:26

## 2024-11-05 RX ADMIN — ACETAMINOPHEN 650 MG: 325 TABLET ORAL at 11:24

## 2024-11-05 RX ADMIN — HYDROCODONE BITARTRATE AND ACETAMINOPHEN 1 TABLET: 5; 325 TABLET ORAL at 02:49

## 2024-11-05 RX ADMIN — FOLIC ACID 1 MG: 1 TABLET ORAL at 08:26

## 2024-11-05 RX ADMIN — INFLUENZA A VIRUS A/VICTORIA/4897/2022 IVR-238 (H1N1) ANTIGEN (FORMALDEHYDE INACTIVATED), INFLUENZA A VIRUS A/CALIFORNIA/122/2022 SAN-022 (H3N2) ANTIGEN (FORMALDEHYDE INACTIVATED), AND INFLUENZA B VIRUS B/MICHIGAN/01/2021 ANTIGEN (FORMALDEHYDE INACTIVATED) 0.5 ML: 60; 60; 60 INJECTION, SUSPENSION INTRAMUSCULAR at 08:31

## 2024-11-05 RX ADMIN — SODIUM CHLORIDE 1 G: 1 TABLET ORAL at 15:39

## 2024-11-05 RX ADMIN — APIXABAN 5 MG: 5 TABLET, FILM COATED ORAL at 00:17

## 2024-11-05 RX ADMIN — PRAVASTATIN SODIUM 20 MG: 20 TABLET ORAL at 15:38

## 2024-11-05 RX ADMIN — OMEGA-3 FATTY ACIDS CAP 1000 MG 1000 MG: 1000 CAP at 08:26

## 2024-11-05 RX ADMIN — HYDROMORPHONE HYDROCHLORIDE 0.2 MG: 0.2 INJECTION, SOLUTION INTRAMUSCULAR; INTRAVENOUS; SUBCUTANEOUS at 05:17

## 2024-11-05 RX ADMIN — HYDROMORPHONE HYDROCHLORIDE 0.2 MG: 0.2 INJECTION, SOLUTION INTRAMUSCULAR; INTRAVENOUS; SUBCUTANEOUS at 10:54

## 2024-11-05 RX ADMIN — PNEUMOCOCCAL 20-VALENT CONJUGATE VACCINE 0.5 ML
2.2; 2.2; 2.2; 2.2; 2.2; 2.2; 2.2; 2.2; 2.2; 2.2; 2.2; 2.2; 2.2; 2.2; 2.2; 2.2; 4.4; 2.2; 2.2; 2.2 INJECTION, SUSPENSION INTRAMUSCULAR at 08:31

## 2024-11-05 RX ADMIN — GABAPENTIN 600 MG: 300 CAPSULE ORAL at 13:34

## 2024-11-05 RX ADMIN — PANTOPRAZOLE SODIUM 40 MG: 40 TABLET, DELAYED RELEASE ORAL at 05:17

## 2024-11-05 NOTE — CASE MANAGEMENT
Case Management Discharge Planning Note    Patient name Matilda Day  Location [facility] 709/[facility] 709-01 MRN [id number]  : 1945 Date 2024       Current Admission Date: 2024  Current Admission Diagnosis:Status post total hip replacement, right   Patient Active Problem List    Diagnosis Date Noted Date Diagnosed    Status post total hip replacement, right 2024     Atrial fibrillation (HCC) 10/23/2024     Primary osteoarthritis of one hip, right 2024     Malignant neoplasm of left breast in female, estrogen receptor negative, unspecified site of breast (HCC) 2024     Continuous opioid dependence (HCC) 2023     Peripheral vascular disease (HCC) 2023     S/P left mastectomy 2023     History of right mastectomy 10/10/2023     Esophageal candidiasis (HCC) 2023     Hoarseness of voice 2023     Peripheral venous insufficiency 2022     History of left breast cancer 2020     Vitamin B12 deficiency 2019     Vitamin D deficiency 2019     S/P deep brain stimulator placement 2018     Lumbar degenerative disc disease 2018     Preoperative testing 2018     Portal vein thrombosis 2018     Hypomagnesemia 2015     Unsteady gait 2015     Overweight 2015     Essential tremor 2015     Prediabetes 2015     Enthesopathy of hip region 2015     Osteoarthritis of right knee 2014     Iron deficiency anemia 2014     Supraventricular tachycardia (HCC) 10/07/2013     Diarrhea 2013     Chronic salpingo-oophoritis 2012     SIADH (syndrome of inappropriate ADH production) (HCC) 2012     Peripheral neuropathy 2012     Disorder of bursae of shoulder region 2012     GERD (gastroesophageal reflux disease) 2012     Moderate episode of recurrent major depressive disorder (HCC) 2012     Hyperlipidemia 2012     Essential hypertension 2012      Acquired hypothyroidism 06/13/2012     Osteopenia 06/13/2012     Ulcerative colitis without complications (HCC) 06/13/2012     Allergic rhinitis 06/13/2012     Mild intermittent asthma without complication 06/13/2012     Sjogren's syndrome (HCC) 06/13/2012     Anxiety 04/30/2012       LOS (days): 0  Geometric Mean LOS (GMLOS) (days):   Days to GMLOS:     OBJECTIVE:            Current admission status: Outpatient Surgery   Preferred Pharmacy:   [facility] [address] [place] [id number]  Phone: [phone] Fax: [phone]    [facility] [id number] [address] [address]  Phone: [phone] Fax: [phone]    Primary Care Provider: Zaira Velasquez MD    Primary Insurance: MEDICARE  Secondary Insurance: BLUE CROSS    DISCHARGE DETAILS:         Requested Home Health Care         Is the patient interested in HHC at discharge?: Yes  Home Health Discipline requested:: Physical Therapy  Home Health Agency Name:: [facility]  HHA External Referral Reason (only applicable if external HHA name selected): Patient has established relationship with provider  Home Health Follow-Up Provider:: Referring Provider  Home Health Services Needed:: Restore Joint Function Post Joint Replacement  Homebound Criteria Met:: Uses an Assist Device (i.e. cane, walker, etc), Requires the Assistance of Another Person for Safe Ambulation or to Leave the Home  Supporting Clincal Findings:: Limited Endurance    DME Referral Provided  Referral made for DME?: No (Patient owns DME: showerchair, cane, walker, hospital bed)    Other Referral/Resources/Interventions Provided:  Interventions: HHC      Treatment Team Recommendation: Home with Home Health Care  Discharge Destination Plan:: Home with Home Health Care  Transport at Discharge : Family

## 2024-11-05 NOTE — OCCUPATIONAL THERAPY NOTE
Occupational Therapy         Patient Name: Matilda Day  Today's Date: 11/5/2024              OT orders received. Pt c/o 12/10 pain, experiencing chills. Declined at this time.

## 2024-11-05 NOTE — TELEPHONE ENCOUNTER
Spoke with pharmacist. She tolerated ancef pre op so keflex is fine. No other questions at this time.

## 2024-11-05 NOTE — CONSULTS
Consultation - Hospitalist   Name: Matilda Day 79 y.o. female I MRN: [id number]  Unit/Bed#: 7T [facility] 709-01 I Date of Admission: 11/4/2024   Date of Service: 11/5/2024 I Hospital Day: 0   Inpatient consult to Internal Medicine  Consult performed by: Uday Le DO  Consult ordered by: Kerrie Cadena PA-C        Physician Requesting Evaluation: Gaviota Ford DO     See dictated consult note.

## 2024-11-05 NOTE — PROGRESS NOTES
Patient:    MRN:  [id number]    Aidin Request ID:  [id number]    Level of care reserved:    Partner Reserved:    Clinical needs requested:    Geography searched:  [id number]    Start of Service:    Request sent:  1:18pm EST on 11/5/2024 by Zaira Manzano`    Partner reserved:  by Zaira Manzano`    Choice list shared:  1:30pm EST on 11/5/2024 by Zaria Manzano`

## 2024-11-05 NOTE — PLAN OF CARE
"  Problem: PHYSICAL THERAPY ADULT  Goal: Performs mobility at highest level of function for planned discharge setting.  See evaluation for individualized goals.  Description: Treatment/Interventions: Functional transfer training, LE strengthening/ROM, Elevations, Therapeutic exercise, Endurance training, Patient/family training, Equipment eval/education, Bed mobility, Gait training, Compensatory technique education, Spoke to nursing, Family  Equipment Recommended:  (pt already has RW)       See flowsheet documentation for full assessment, interventions and recommendations.  Note: Prognosis: Fair  Problem List: Decreased strength, Decreased endurance, Decreased range of motion, Impaired balance, Decreased mobility, Decreased skin integrity, Pain, Orthopedic restrictions  Assessment: pt able to ambulate 100 feet with use of personal RW on tile surface needing minAx1. Pt fatigues easily with pain RLE and R hip area at rest and during WB and mobility. Pt able to perform bed mobility and transfers needing modAx1. Education and instruction for proper placement of BLE and B hands prior to transfer. Pt able to perform and complete BLE ther ex HEP sitting in chair post mobility AROM. Inc R groin and RLE pain during LAQ and during bed mobility. Cold pack application following treatment session. Education and instruction verbally and physically for single curb step with use of RW and gait sequence. Pt and pts  present during education and instruction of single curb step with head nod \"yes\" in understanding. Pt would cont to benefit from skilled inpt PT services to maximize functional independence and to dec caregiver burden.        Rehab Resource Intensity Level, PT: III (Minimum Resource Intensity) (HHPT)    See flowsheet documentation for full assessment.        "

## 2024-11-05 NOTE — PLAN OF CARE
Problem: PAIN - ADULT  Goal: Verbalizes/displays adequate comfort level or baseline comfort level  Description: Interventions:  - Encourage patient to monitor pain and request assistance  - Assess pain using appropriate pain scale  - Administer analgesics based on type and severity of pain and evaluate response  - Implement non-pharmacological measures as appropriate and evaluate response  - Consider cultural and social influences on pain and pain management  - Notify physician/advanced practitioner if interventions unsuccessful or patient reports new pain  Outcome: Progressing     Problem: SAFETY ADULT  Goal: Patient will remain free of falls  Description: INTERVENTIONS:  - Educate patient/family on patient safety including physical limitations  - Instruct patient to call for assistance with activity   - Consult OT/PT to assist with strengthening/mobility   - Keep Call bell within reach  - Keep bed low and locked with side rails adjusted as appropriate  - Keep care items and personal belongings within reach  - Initiate and maintain comfort rounds  - Make Fall Risk Sign visible to staff  - Apply yellow socks and bracelet for high fall risk patients  - Consider moving patient to room near nurses station  Outcome: Progressing     Problem: SAFETY ADULT  Goal: Maintain or return to baseline ADL function  Description: INTERVENTIONS:  -  Assess patient's ability to carry out ADLs; assess patient's baseline for ADL function and identify physical deficits which impact ability to perform ADLs (bathing, care of mouth/teeth, toileting, grooming, dressing, etc.)  - Assess/evaluate cause of self-care deficits   - Assess range of motion  - Assess patient's mobility; develop plan if impaired  - Assess patient's need for assistive devices and provide as appropriate  - Encourage maximum independence but intervene and supervise when necessary  - Involve family in performance of ADLs  - Assess for home care needs following discharge    - Consider OT consult to assist with ADL evaluation and planning for discharge  - Provide patient education as appropriate  Outcome: Progressing     Problem: Knowledge Deficit  Goal: Patient/family/caregiver demonstrates understanding of disease process, treatment plan, medications, and discharge instructions  Description: Complete learning assessment and assess knowledge base.  Interventions:  - Provide teaching at level of understanding  - Provide teaching via preferred learning methods  Outcome: Progressing     Problem: DISCHARGE PLANNING  Goal: Discharge to home or other facility with appropriate resources  Description: INTERVENTIONS:  - Identify barriers to discharge w/patient and caregiver  - Arrange for needed discharge resources and transportation as appropriate  - Identify discharge learning needs (meds, wound care, etc.)  - Arrange for interpretive services to assist at discharge as needed  - Refer to Case Management Department for coordinating discharge planning if the patient needs post-hospital services based on physician/advanced practitioner order or complex needs related to functional status, cognitive ability, or social support system  Outcome: Progressing

## 2024-11-05 NOTE — PLAN OF CARE
Problem: PAIN - ADULT  Goal: Verbalizes/displays adequate comfort level or baseline comfort level  Description: Interventions:  - Encourage patient to monitor pain and request assistance  - Assess pain using appropriate pain scale  - Administer analgesics based on type and severity of pain and evaluate response  - Implement non-pharmacological measures as appropriate and evaluate response  - Consider cultural and social influences on pain and pain management  - Notify physician/advanced practitioner if interventions unsuccessful or patient reports new pain  Outcome: Progressing     Problem: INFECTION - ADULT  Goal: Absence or prevention of progression during hospitalization  Description: INTERVENTIONS:  - Assess and monitor for signs and symptoms of infection  - Monitor lab/diagnostic results  - Monitor all insertion sites, i.e. indwelling lines, tubes, and drains  - Monitor endotracheal if appropriate and nasal secretions for changes in amount and color  - [place] appropriate cooling/warming therapies per order  - Administer medications as ordered  - Instruct and encourage patient and family to use good hand hygiene technique  - Identify and instruct in appropriate isolation precautions for identified infection/condition  Outcome: Progressing     Problem: SAFETY ADULT  Goal: Patient will remain free of falls  Description: INTERVENTIONS:  - Educate patient/family on patient safety including physical limitations  - Instruct patient to call for assistance with activity   - Consult OT/PT to assist with strengthening/mobility   - Keep Call bell within reach  - Keep bed low and locked with side rails adjusted as appropriate  - Keep care items and personal belongings within reach  - Initiate and maintain comfort rounds  - Make Fall Risk Sign visible to staff  - Apply yellow socks and bracelet for high fall risk patients  - Consider moving patient to room near nurses station  Outcome: Progressing  Goal: Maintain or  return to baseline ADL function  Description: INTERVENTIONS:  -  Assess patient's ability to carry out ADLs; assess patient's baseline for ADL function and identify physical deficits which impact ability to perform ADLs (bathing, care of mouth/teeth, toileting, grooming, dressing, etc.)  - Assess/evaluate cause of self-care deficits   - Assess range of motion  - Assess patient's mobility; develop plan if impaired  - Assess patient's need for assistive devices and provide as appropriate  - Encourage maximum independence but intervene and supervise when necessary  - Involve family in performance of ADLs  - Assess for home care needs following discharge   - Consider OT consult to assist with ADL evaluation and planning for discharge  - Provide patient education as appropriate  Outcome: Progressing  Goal: Maintains/Returns to pre admission functional level  Description: INTERVENTIONS:  - Perform AM-PAC 6 Click Basic Mobility/ Daily Activity assessment daily.  - Set and communicate daily mobility goal to care team and patient/family/caregiver.   - Collaborate with rehabilitation services on mobility goals if consulted  - Out of bed for toileting  - Record patient progress and toleration of activity level   Outcome: Progressing     Problem: DISCHARGE PLANNING  Goal: Discharge to home or other facility with appropriate resources  Description: INTERVENTIONS:  - Identify barriers to discharge w/patient and caregiver  - Arrange for needed discharge resources and transportation as appropriate  - Identify discharge learning needs (meds, wound care, etc.)  - Arrange for interpretive services to assist at discharge as needed  - Refer to Case Management Department for coordinating discharge planning if the patient needs post-hospital services based on physician/advanced practitioner order or complex needs related to functional status, cognitive ability, or social support system  Outcome: Progressing     Problem: Knowledge  Deficit  Goal: Patient/family/caregiver demonstrates understanding of disease process, treatment plan, medications, and discharge instructions  Description: Complete learning assessment and assess knowledge base.  Interventions:  - Provide teaching at level of understanding  - Provide teaching via preferred learning methods  Outcome: Progressing

## 2024-11-05 NOTE — PROGRESS NOTES
Progress Note - Hospitalist   Name: Matilda Day 79 y.o. female I MRN: [id number]  Unit/Bed#: 7T [facility] 709-01 I Date of Admission: 11/4/2024   Date of Service: 11/5/2024 I Hospital Day: 0    Assessment & Plan  Status post total hip replacement, right  The day #1 from total right hip arthroplasty due to failed outpatient management of osteoarthritis medical clearance performed on 10/31/2024 by PCP  Meghna peralta, plans to restart postoperative by primary team  PT/OT iain post operatively  Continue pain control  Moderate episode of recurrent major depressive disorder (HCC)  Resume Lexapro, also takes Ativan as needed for anxiety  Essential hypertension  Home regimen includes the following  Diltiazem 60 mg in the morning and 180 mg at bedtime  Amlodipine  2.5 mg  Lisinopril 20 mg twice a day (currently on hold, may resume at discharge)  SIADH (syndrome of inappropriate ADH production) (HCC)  Continue salt tabs, takes 1 g 4 times a day  This has resulted in intermittent leg swelling for which she takes torsemide-does not take this for SIADH but rather for leg swelling  Acquired hypothyroidism  Resume levothyroxine  Prediabetes  No formal diagnosis of diabetes mellitus  Monitor on daily chemistry  Mild intermittent asthma without complication  Continue home inhaler therapy  No evidence of exacerbation  Supraventricular tachycardia (HCC)  Patient on regiment of 60 of diltiazem in the morning and 180 mg in the evening, continue the same for now  S/P deep brain stimulator placement  History of deep brain stimulator placement due to essential tremor  Symptoms controlled  Atrial fibrillation (HCC)  Ventricular rate controlled  Continue Cardizem  Continue [facility] Course:     79-year-old female patient is postoperative day #1 from right total hip.  Blood pressure labile and low in the a.m.  Lisinopril held.  Denies any chest pain shortness of breath or difficulty breathing.  Would like to be discharged  home    Assessment:      Principal Problem:    Status post total hip replacement, right  Active Problems:    Moderate episode of recurrent major depressive disorder (HCC)    Essential hypertension    SIADH (syndrome of inappropriate ADH production) (HCC)    Acquired hypothyroidism    Prediabetes    Mild intermittent asthma without complication    Supraventricular tachycardia (HCC)    S/P deep brain stimulator placement    Atrial fibrillation (HCC)      Plan:    Okay to resume lisinopril at discharge, can resume tomorrow  No objection to discharge home with close PCP and orthopedic follow-up       VTE Pharmacologic Prophylaxis:   Pharmacologic: Apixaban (Eliquis)  Mechanical VTE Prophylaxis in Place: Yes    AM-PAC Basic Mobility:  Basic Mobility Inpatient Raw Score: 15    JH-HLM Achieved: 8: Walk 250 feet ot more  JH-HLM Goal: 4: Move to chair/commode    HLM Goal listed above. Continue with multidisciplinary rounding and encourage appropriate mobility to improve upon HLM goals.         Patient Centered Rounds: Case discussed and reviewed with nursing    Discussions with Specialists or Other Care Team Provider: Case management    Education and Discussions with Family / Patient: Patient  at bedside    Time Spent for Care: 80 minutes.  More than 50% of total time spent on counseling and coordination of care as described above.    Current Length of Stay: 0 day(s)    Current Patient Status: Outpatient Surgery   Certification Statement: The patient will continue to require additional inpatient hospital stay due to possible discharge later today after working with physical therapy    Discharge Plan / Estimated Discharge Date: Today    Code Status: Level 1 - Full Code      Subjective:   Seen and examined, no acute complaints.  No nausea no vomiting tolerating oral diet.    A complete and comprehensive 14 point organ system review has been performed and all other systems are negative other than stated  above.    Objective:     Vitals:   Temp (24hrs), Av.4 °F (36.9 °C), Min:96.8 °F (36 °C), Max:102 °F (38.9 °C)    Temp:  [96.8 °F (36 °C)-102 °F (38.9 °C)] 99.8 °F (37.7 °C)  HR:  [57-94] 94  Resp:  [12-20] 20  BP: (112-184)/(54-82) 156/72  SpO2:  [92 %-98 %] 93 %  Body mass index is 24.69 kg/m².     Input and Output Summary (last 24 hours):       Intake/Output Summary (Last 24 hours) at 2024 1250  Last data filed at 2024 0849  Gross per 24 hour   Intake 1160 ml   Output 200 ml   Net 960 ml       Physical Exam:     General: well appearing, no acute distress  HEENT: atraumatic, PERRLA, moist mucosa, normal pharynx, normal tonsils and adenoids, normal tongue, no fluid in sinuses  Neck: Trachea midline, no carotid bruit, no masses  Respiratory: normal chest wall expansion, CTA B, no r/r/w, no rubs  Cardiovascular: RRR, no m/r/g, Normal S1 and S2  Abdomen: Soft, non-tender, non-distended, normal bowel sounds in all quadrants, no hepatosplenomegaly, no tympany  Rectal: deferred  Musculoskeletal: Moves all, able to do steps   integumentary: warm, dry, and pink, with no rash, purpura, or petechia  Heme/Lymph: no lymphadenopathy, no bruises  Neurological: Cranial Nerves II-XII grossly intact  Psychiatric: cooperative with normal mood, affect, and cognition      Additional Data:     Labs:    Results from last 7 days   Lab Units 24  0527   WBC Thousand/uL 9.81   HEMOGLOBIN g/dL 8.5*   HEMATOCRIT % 25.4*   PLATELETS Thousands/uL 344     Results from last 7 days   Lab Units 24  0527   POTASSIUM mmol/L 3.4*   CHLORIDE mmol/L 101   CO2 mmol/L 27   BUN mg/dL 10   CREATININE mg/dL 0.64   CALCIUM mg/dL 8.1*           * I Have Reviewed All Lab Data Listed Above.  * Additional Pertinent Lab Tests Reviewed: All Labs For Current Hospital Admission Reviewed    Imaging:    Imaging Reports Reviewed Today Include: No new imaging  Imaging Personally Reviewed by Myself Includes: No new imaging    Recent Cultures (last  7 days):           Last 24 Hours Medication List:   Current Facility-Administered Medications   Medication Dose Route Frequency Provider Last Rate    acetaminophen  650 mg Oral Q6H PRN Kerrie Cadena PA-C      albuterol  2 puff Inhalation Q6H PRN Kerrie Cadena PA-C      amLODIPine  2.5 mg Oral Daily Kerrie Cadena PA-C      apixaban  5 mg Oral BID Kerrie Cadena PA-C      ascorbic acid  500 mg Oral Daily Kerrie Cadena PA-C      bisacodyl  10 mg Rectal Daily PRN Kerrie Cadena PA-C      calcium carbonate  1,000 mg Oral Daily PRN Kerrie Cadena PA-C      calcium carbonate-vitamin D  1 tablet Oral BID With Meals Kerrie Cadena PA-C      vitamin B-12  500 mcg Oral Daily Kerrie Cadena PA-C      Diclofenac Sodium  2 g Topical 4x Daily PRN Kerrie Cadena PA-C      diltiazem  180 mg Oral HS Uday Le DO      diltiazem  60 mg Oral Daily Kerrie Cadena PA-C      docusate sodium  100 mg Oral BID Kerrie Cadena PA-C      escitalopram  20 mg Oral HS Kerrie Cadena PA-C      ferrous sulfate  325 mg Oral Daily With Breakfast Kerrie Cadena PA-C      fish oil  1,000 mg Oral Daily Kerrie Cadena PA-C      fluticasone  1 spray Each Nare Daily Kerrie Cadena PA-C      Fluticasone Furoate-Vilanterol  1 puff Inhalation Daily Kerrie Cadena PA-C      folic acid  1 mg Oral Daily Kerrie Cadena PA-C      gabapentin  1,200 mg Oral HS Kerrie Cadena PA-C      gabapentin  600 mg Oral BID (AM & Afternoon) Kerrie Cadena PA-C      HYDROcodone-acetaminophen  1 tablet Oral Q4H PRN Kerrie Cadena PA-C      HYDROcodone-acetaminophen  1.5 tablet Oral Q4H PRN Kerrie Cadena PA-C      HYDROmorphone  0.2 mg Intravenous Q4H PRN Kerrie Cadena PA-C      lactated ringers  1,000 mL Intravenous Once PRN Kerrie Cadena PA-C      And    lactated ringers  1,000 mL Intravenous Once PRN Kerrie Cadena PA-C      lactated ringers  50 mL/hr Intravenous Continuous Kerrie Cadena PA-C 50 mL/hr (11/04/24 1522)    levothyroxine  50 mcg Oral Daily Kerrie Shanice  ROSANGELA Cadena      LORazepam  1 mg Oral Q6H PRN Kerrie Cadena PA-C      magnesium Oxide  400 mg Oral Daily Kerrie Cadena PA-C      ondansetron  4 mg Intravenous Q6H PRN Kerrie Cadena PA-C      pantoprazole  40 mg Oral Daily Kerrie Cadena PA-C      pravastatin  20 mg Oral Daily With Dinner Kerrie Cadena PA-C      senna  1 tablet Oral Daily Kerrie Cadena PA-C      sodium chloride  1,000 mL Intravenous Once PRN Kerrie Cadena PA-C      And    sodium chloride  1,000 mL Intravenous Once PRN Kerrie Cadena PA-C      sodium chloride  1 g Oral 4x Daily (with meals and at bedtime) Kerrie Cadena PA-C         AM-PAC Basic Mobility:  Basic Mobility Inpatient Raw Score: 15    JH-HLM Achieved: 8: Walk 250 feet ot more  JH-HLM Goal: 4: Move to chair/commode    HLM Goal listed above. Continue with multidisciplinary rounding and encourage appropriate mobility to improve upon HLM goals.     Today, Patient Was Seen By: Uday Le DO    ** Please Note: This note was completed in part utilizing [employer] Dragon One Medical software dictation.  Grammatical errors, random word insertions, spelling mistakes, and incomplete sentences may be an occasional consequence of this system secondary to software limitations, ambient noise, and hardware issues.  If you have any questions or concerns about the content, text, or information contained within the body of this dictation, please contact the provider for clarification. **

## 2024-11-05 NOTE — NURSING NOTE
Patient given discharge instructions and verbalized understanding. Belongings given to patient. Patient in no distress and has no concerns at this time. Patient left with family member.

## 2024-11-05 NOTE — CASE MANAGEMENT
Case Management Assessment & Discharge Planning Note    Patient name Matilda Day  Location 7T [facility] 709/7T [facility] 709-01 MRN [id number]  : 1945 Date 2024       Current Admission Date: 2024  Current Admission Diagnosis:Status post total hip replacement, right   Patient Active Problem List    Diagnosis Date Noted Date Diagnosed    Status post total hip replacement, right 2024     Atrial fibrillation (HCC) 10/23/2024     Primary osteoarthritis of one hip, right 2024     Malignant neoplasm of left breast in female, estrogen receptor negative, unspecified site of breast (HCC) 2024     Continuous opioid dependence (HCC) 2023     Peripheral vascular disease (HCC) 2023     S/P left mastectomy 2023     History of right mastectomy 10/10/2023     Esophageal candidiasis (HCC) 2023     Hoarseness of voice 2023     Peripheral venous insufficiency 2022     History of left breast cancer 2020     Vitamin B12 deficiency 2019     Vitamin D deficiency 2019     S/P deep brain stimulator placement 2018     Lumbar degenerative disc disease 2018     Preoperative testing 2018     Portal vein thrombosis 2018     Hypomagnesemia 2015     Unsteady gait 2015     Overweight 2015     Essential tremor 2015     Prediabetes 2015     Enthesopathy of hip region 2015     Osteoarthritis of right knee 2014     Iron deficiency anemia 2014     Supraventricular tachycardia (HCC) 10/07/2013     Diarrhea 2013     Chronic salpingo-oophoritis 2012     SIADH (syndrome of inappropriate ADH production) (HCC) 2012     Peripheral neuropathy 2012     Disorder of bursae of shoulder region 2012     GERD (gastroesophageal reflux disease) 2012     Moderate episode of recurrent major depressive disorder (HCC) 2012     Hyperlipidemia 2012     Essential  hypertension 06/13/2012     Acquired hypothyroidism 06/13/2012     Osteopenia 06/13/2012     Ulcerative colitis without complications (HCC) 06/13/2012     Allergic rhinitis 06/13/2012     Mild intermittent asthma without complication 06/13/2012     Sjogren's syndrome (HCC) 06/13/2012     Anxiety 04/30/2012       LOS (days): 0  Geometric Mean LOS (GMLOS) (days):   Days to GMLOS:     OBJECTIVE:              Current admission status: Outpatient Surgery       Preferred Pharmacy:   [id number] - [place], [place] - [address] [place] [id number]  Phone: [phone] Fax: [phone]    [facility] [id number] [facility] [address] [facility]  [address] [facility] [address]  Phone: [phone] Fax: [phone]    Primary Care Provider: Zaira Velasquez MD    Primary Insurance: MEDICARE  Secondary Insurance: BLUE CROSS    ASSESSMENT:  Active Health Care Proxies    There are no active Health Care Proxies on file.                 Readmission Root Cause  30 Day Readmission: No    Patient Information  Admitted from:: Home  Mental Status: Alert  During Assessment patient was accompanied by: Spouse  Assessment information provided by:: Patient, Spouse  Primary Caregiver: Self  Support Systems: Spouse/significant other, Family members  County of Residence: [place]  What city do you live in?: [place]  Home entry access options. Select all that apply.: Stairs  Number of steps to enter home.: 2  Do the steps have railings?: No  Type of Current Residence: Apartment  Floor Level: 1  Upon entering residence, is there a bedroom on the main floor (no further steps)?: Yes  Upon entering residence, is there a bathroom on the main floor (no further steps)?: Yes  Living Arrangements: Lives w/ Spouse/significant other  Is patient a ?: No    Activities of Daily Living Prior to Admission  Functional Status: Independent  Completes ADLs independently?: Yes  Ambulates  independently?: No  Level of ambulatory dependence: Assistance  Does patient use assisted devices?: Yes  Assisted Devices (DME) used: Hospital Bed, Shower Chair, Straight Cane, Walker  Does patient currently own DME?: Yes  What DME does the patient currently own?: Straight Cane, Walker, Hospital Bed, Shower Chair  Does patient have a history of Outpatient Therapy (PT/OT)?: Yes (OP PT & OT)  Does the patient have a history of Short-Term Rehab?: Yes ([facility] FOR PT)  Does patient have a history of HHC?: Yes ([facility])  Does patient currently have HHC?: No         Patient Information Continued  Income Source: SSI/SSD  Does patient have prescription coverage?: Yes  Does patient receive dialysis treatments?: No  Does patient have a history of substance abuse?: No  Does patient have a history of Mental Health Diagnosis?: No         Means of Transportation  Means of Transport to Appts:: Family transport      Social Determinants of Health (SDOH)      Flowsheet Row Most Recent Value   Housing Stability    In the last 12 months, was there a time when you were not able to pay the mortgage or rent on time? N   In the past 12 months, how many times have you moved where you were living? 0   At any time in the past 12 months, were you homeless or living in a shelter (including now)? N   Transportation Needs    In the past 12 months, has lack of transportation kept you from medical appointments or from getting medications? no   In the past 12 months, has lack of transportation kept you from meetings, work, or from getting things needed for daily living? No   Food Insecurity    Within the past 12 months, you worried that your food would run out before you got the money to buy more. Never true   Within the past 12 months, the food you bought just didn't last and you didn't have money to get more. Never true   Utilities    In the past 12 months has the electric, gas, oil, or water company threatened to shut off services in your home? No             DISCHARGE DETAILS:    Discharge planning discussed with:: Patient and spouse Bill @ Bedside  [place] of Choice: Yes  Comments - Freedom of Choice: Pt informed she will be provided choice list for C providers and is agreeable to [facility]  CM contacted family/caregiver?: No- see comments (Spoke w/ patient)  Were Treatment Team discharge recommendations reviewed with patient/caregiver?: Yes  Did patient/caregiver verbalize understanding of patient care needs?: Yes  Were patient/caregiver advised of the risks associated with not following Treatment Team discharge recommendations?: Yes    Contacts  Patient Contacts: Micki (Grandchild)  [phone]  Relationship to Patient:: Family    Transport at Discharge : Family      CM spoke with patient at bedside and introduced self and role. PTA patient was performing ADLs independently and ambulating with assisted devices. Patient owns DME: hospital bed, shower chair, cane and walker.   CM went over discharge planning and patient is agreeable with Home w/  [facility] recommendation. CM sent blanket referral for [facility] providers via [employer].  Pt's spouse: Zachary is available for transport @ time of D/C.  Pt will be provided with choice list. CM will continue to follow.

## 2024-11-05 NOTE — PROGRESS NOTES
Progress Note - Orthopedics   Name: Matilda Day 79 y.o. female I MRN: [id number]  Unit/Bed#: 7T [facility] 709-01 I Date of Admission: 11/4/2024   Date of Service: 11/5/2024 I Hospital Day: 0     Assessment & Plan  Status post total hip replacement, right  80 yo female POD1 status post right anterior FITO on 11/4/24 with Dr. Ford    Doing well.   Will follow up on BMP which is still pending.   ABLA- has hx of iron deficiency. Patient received IV venofer. Continue to monitor vitals and Hgb.   WBAT to RLE.   Anterior hip precautions.   PT/OT.   Pain control prn. We discussed her baseline opioid use and the risk of oversedation/confusion with opioids especially given her age.   DVT ppx- joshua stockings and eliquis.   Medical management per [facility]. Consult pending.   DC planning- likely home today if cleared by [facility] and PT.   Moderate episode of recurrent major depressive disorder (HCC)  Per [facility].   Essential hypertension  Per [facility].   SIADH (syndrome of inappropriate ADH production) (HCC)  Per [facility].   Acquired hypothyroidism  Per [facility].   Prediabetes  Per [facility].   Mild intermittent asthma without complication  Per [facility].   Supraventricular tachycardia (HCC)  Per [facility].   S/P deep brain stimulator placement  Per [facility].   Atrial fibrillation (HCC)  Per [facility].       Subjective   79 y.o.female POD1 sp R FITO. No acute events, no new complaints. Pain well controlled as of this morning but did struggle with pain overnight. Notes pain in the groin and right knee. Denies fevers, chills, lightheadedness, N/V, numbness or tingling. Patient reports no issues with urination or bowel movements.     Objective :  Temp:  [96.7 °F (35.9 °C)-99.6 °F (37.6 °C)] 97.6 °F (36.4 °C)  HR:  [57-83] 83  BP: (114-184)/(54-82) 141/64  Resp:  [12-20] 16  SpO2:  [92 %-98 %] 95 %  O2 Device: None (Room air)  Nasal Cannula O2 Flow Rate (L/min):  [2 L/min] 2 L/min    Physical Exam  HENT:      Head: Normocephalic and atraumatic.   Eyes:      Extraocular Movements:  "Extraocular movements intact.   Neurological:      Mental Status: She is alert and oriented to person, place, and time. Mental status is at baseline.   Psychiatric:         Mood and Affect: Mood normal.         Behavior: Behavior normal.       Musculoskeletal: rightlower  Mepilex dressing CDI  Area of ecchymosis over the distal aspect of the incision  No TTP  Swelling as expected  No pain with log roll  AT/GS/EHL intact  Sensation intact L4-S1  Palpable pedal pulse       Lab Results: I have reviewed the following results:  Recent Labs     11/05/24  0527   WBC 9.81   HGB 8.5*   HCT 25.4*      BUN 10   CREATININE 0.64     Blood Culture:    Lab Results   Component Value Date    BLOODCX No Growth After 5 Days. 06/22/2023     Wound Culture: No results found for: \"WOUNDCULT\"  "

## 2024-11-05 NOTE — ASSESSMENT & PLAN NOTE
Home regimen includes the following  Diltiazem 60 mg in the morning and 180 mg at bedtime  Amlodipine  2.5 mg  Lisinopril 20 mg twice a day (currently on hold, may resume at discharge)

## 2024-11-05 NOTE — ASSESSMENT & PLAN NOTE
The day #1 from total right hip arthroplasty due to failed outpatient management of osteoarthritis medical clearance performed on 10/31/2024 by PCP  Meghna peralta, plans to restart postoperative by primary team  PT/OT iain post operatively  Continue pain control

## 2024-11-05 NOTE — PROGRESS NOTES
Patient:    MRN:  [id number]    [place] Request ID:  [id number]    Level of care reserved:  Home Health Agency    Partner Reserved:  [facility], [place] [address] [phone]    Clinical needs requested:    Geography searched:  [id number]    Start of Service:    Request sent:  1:18pm EST on 11/5/2024 by Zaira Manzano`    Partner reserved:  3:06pm EST on 11/5/2024 by Zaira Manzano`    Choice list shared:  1:30pm EST on 11/5/2024 by Zaira Manzano`

## 2024-11-05 NOTE — PHYSICAL THERAPY NOTE
"   Physical Therapy Treatment Session:       11/05/24 1000   PT Last Visit   PT Visit Date 11/05/24   Note Type   Note Type Treatment   Pain Assessment   Pain Assessment Tool 0-10   Pain Score 8   Pain Location/Orientation Orientation: Right;Location: Leg;Location: Hip   Hospital Pain Intervention(s) Repositioned;Cold applied;Ambulation/increased activity;Elevated;Emotional support;Rest   Precautions   Total Hip Replacement Other (Comment)  (anterior hip)   Restrictions/Precautions   Weight Bearing Precautions Per Order Yes   RLE Weight Bearing Per Order WBAT   LLE Weight Bearing Per Order WBAT   Other Precautions THR;Chair Alarm;Bed Alarm;Multiple lines;Fall Risk;Pain;WBS   General   Chart Reviewed Yes   Cognition   Overall Cognitive Status WFL   Arousal/Participation Cooperative;Responsive;Alert   Attention Attends with cues to redirect   Orientation Level Oriented X4   Following Commands Follows one step commands with increased time or repetition   Comments 2* pain and discomfort   Subjective   Subjective Pt supine in bed resting comfortably; pt willing and agreeable to work with PT and to participate in therapy intervention. pts  present in room during PT tx session; \"I hurt, but I will try\".   Bed Mobility   Supine to Sit 3  Moderate assistance   Additional items Assist x 1;HOB elevated;Bedrails;Increased time required;LE management;Verbal cues   Sit to Supine 3  Moderate assistance   Additional items Assist x 1;HOB elevated;Bedrails;Increased time required;Verbal cues;LE management   Transfers   Sit to Stand 4  Minimal assistance   Additional items Assist x 1;Bedrails;Increased time required;Verbal cues  (education and instruction for proper placement of BLE and B hands prior to transfer)   Stand to Sit 3  Moderate assistance   Additional items Assist x 1;Armrests;Increased time required;Verbal cues  (A for control descent)   Additional Comments multiple sit<->stand transfers to and from various " "surfaces; from lower surfaces pt needs modAx1 for sit<->stand   Ambulation/Elevation   Gait pattern Poor UE support;Improper Weight shift;Antalgic;Narrow SHANEL;Forward Flexion;Decreased R stance;Inconsistent day;Foward flexed;Short stride;Ataxia   Gait Assistance 4  Minimal assist   Additional items Assist x 1;Verbal cues;Tactile cues   Assistive Device Rolling walker   Distance 100 feet with use of personal RW on tile surface; pt fatigues easily with pain R LE and R hip area during WB   Ambulation/Elevation Additional Comments verbal and physical education and instruction for performance of single curb step with use of RW. pt and pts  present during instruction and nodded head in \"yes\" motion with understanding   Balance   Static Sitting Fair +   Dynamic Sitting Poor   Static Standing Poor   Dynamic Standing Poor +   Ambulatory Poor +   Endurance Deficit   Endurance Deficit Yes   Endurance Deficit Description fatigues easily, pain   Activity Tolerance   Activity Tolerance Patient limited by fatigue;Patient limited by pain  (fair)   Nurse Made Aware yes (eric)   Exercises   Glute Sets Sitting;AROM;Bilateral  (x12 reps with 2-3 sec hold each rep)   Hip Flexion Sitting;AROM;Bilateral  (x12 reps)   Hip Adduction Sitting;AROM;Bilateral  (x12 reps with pillow inbetween B knees and 2-3 sec hold for each rep)   Knee AROM Long Arc Quad Sitting;AROM;Bilateral  (x12 reps)   Ankle Pumps Sitting;20 reps;AROM;Bilateral   Assessment   Prognosis Fair   Problem List Decreased strength;Decreased endurance;Decreased range of motion;Impaired balance;Decreased mobility;Decreased skin integrity;Pain;Orthopedic restrictions   Assessment pt able to ambulate 100 feet with use of personal RW on tile surface needing minAx1. Pt fatigues easily with pain RLE and R hip area at rest and during WB and mobility. Pt able to perform bed mobility and transfers needing modAx1. Education and instruction for proper placement of BLE and B hands " "prior to transfer. Pt able to perform and complete BLE ther ex HEP sitting in chair post mobility AROM. Inc R groin and RLE pain during LAQ and during bed mobility. Cold pack application following treatment session. Education and instruction verbally and physically for single curb step with use of RW and gait sequence. Pt and pts  present during education and instruction of single curb step with head nod \"yes\" in understanding. Pt would cont to benefit from skilled inpt PT services to maximize functional independence and to dec caregiver burden.   Goals   Patient Goals to get home and to get some sleep   STG Expiration Date 11/14/24   PT Treatment Day 1   Plan   Treatment/Interventions Functional transfer training;LE strengthening/ROM;Elevations;Therapeutic exercise;Endurance training;Patient/family training;Equipment eval/education;Bed mobility;Gait training;Continued evaluation;Spoke to nursing;Family   Progress Progressing toward goals   PT Frequency Twice a day   Discharge Recommendation   Rehab Resource Intensity Level, PT III (Minimum Resource Intensity)  (HHPT)   Equipment Recommended Walker  (pt owns RW for use upon DC)   AM-PAC Basic Mobility Inpatient   Turning in Flat Bed Without Bedrails 2   Lying on Back to Sitting on Edge of Flat Bed Without Bedrails 2   Moving Bed to Chair 3   Standing Up From Chair Using Arms 3   Walk in Room 3   Climb 3-5 Stairs With Railing 2   Basic Mobility Inpatient Raw Score 15   Basic Mobility Standardized Score 36.97   [facility] Highest Level Of Mobility   -HLM Goal 4: Move to chair/commode   -HLM Achieved 7: Walk 25 feet or more     "

## 2024-11-05 NOTE — ASSESSMENT & PLAN NOTE
80 yo female POD1 status post right anterior FITO on 11/4/24 with Dr. Ford    Doing well.   Will follow up on BMP which is still pending.   ABLA- has hx of iron deficiency. Patient received IV venofer. Continue to monitor vitals and Hgb.   WBAT to RLE.   Anterior hip precautions.   PT/OT.   Pain control prn. We discussed her baseline opioid use and the risk of oversedation/confusion with opioids especially given her age.   DVT ppx- joshua stockings and eliquis.   Medical management per SLIM. Consult pending.   DC planning- likely home today if cleared by SLIM and PT.

## 2024-11-05 NOTE — ANESTHESIA POSTPROCEDURE EVALUATION
Post-Op Assessment Note    Last Filed PACU Vitals:  Vitals Value Taken Time   Temp 99.6 °F (37.6 °C) 11/04/24 1353   Pulse 55 11/04/24 1419   /70 11/04/24 1415   Resp 12 11/04/24 1418   SpO2 99 % 11/04/24 1419   Vitals shown include unfiled device data.    Modified Tanisha:  Activity: 2 (11/4/2024  1:53 PM)  Respiration: 2 (11/4/2024  1:53 PM)  Circulation: 2 (11/4/2024  1:53 PM)  Consciousness: 2 (11/4/2024  1:53 PM)  Oxygen Saturation: 2 (11/4/2024  1:53 PM)  Modified Tanisha Score: 10 (11/4/2024  1:53 PM)

## 2024-11-06 ENCOUNTER — HOME CARE VISIT (OUTPATIENT)
Dept: HOME HEALTH SERVICES | Facility: HOME HEALTHCARE | Age: 79
End: 2024-11-06
Payer: MEDICARE

## 2024-11-06 ENCOUNTER — TELEPHONE (OUTPATIENT)
Dept: OBGYN CLINIC | Facility: HOSPITAL | Age: 79
End: 2024-11-06

## 2024-11-06 VITALS
OXYGEN SATURATION: 94 % | SYSTOLIC BLOOD PRESSURE: 118 MMHG | RESPIRATION RATE: 16 BRPM | HEART RATE: 78 BPM | TEMPERATURE: 98.9 F | DIASTOLIC BLOOD PRESSURE: 58 MMHG

## 2024-11-06 LAB
ABO GROUP BLD BPU: NORMAL
ABO GROUP BLD BPU: NORMAL
BPU ID: NORMAL
BPU ID: NORMAL
CROSSMATCH: NORMAL
CROSSMATCH: NORMAL
UNIT DISPENSE STATUS: NORMAL
UNIT DISPENSE STATUS: NORMAL
UNIT PRODUCT CODE: NORMAL
UNIT PRODUCT CODE: NORMAL
UNIT PRODUCT VOLUME: 350 ML
UNIT PRODUCT VOLUME: 350 ML
UNIT RH: NORMAL
UNIT RH: NORMAL

## 2024-11-06 PROCEDURE — 400013 VN SOC

## 2024-11-06 PROCEDURE — 10330081 VN NO-PAY CLAIM PROCEDURE

## 2024-11-06 PROCEDURE — G0151 HHCP-SERV OF PT,EA 15 MIN: HCPCS

## 2024-11-06 NOTE — TELEPHONE ENCOUNTER
"Patient contacted for a postoperative follow up assessment. Patient reports doing \"very good and much better today.\" Patient states pain is 5-6/10 with movement, when getting up and down. She is ambulating with RW. Patient denies increase in swelling, stating \"its about the same\" and dressing is clean, dry and intact. Patient is icing the site and we discussed postoperative swelling, continuing to ICE areas of pain/swelling, especially after increased activity over the next few weeks.      She was ordered [facility], they are coming out today.    We reviewed patients AVS medication list. Patient is taking and Norco every 4 hours 1 tablet, Keflex BID, Eliquis 5mg BID, and denies using a stool softener \"I don't have a colon\" and has J pouch. She is making stool.     Patient denies nausea, vomiting, abdominal pain, chest pain, shortness of breath, fever, dizziness and calf pain. Patient does not have any other questions or concerns at this time. Pt was encouraged to call with any questions, concerns or issues.    "

## 2024-11-06 NOTE — CASE COMMUNICATION
Medication discrepancies or Major drug interactions dilitazem with simvastatin and eliquis with dilitazen  Abnormal clinical findings none  This report is informational only, no response is needed  [facility] has Admitted your patient to Home Health service with the following disciplines: PT and OT  Patient stated goals of care to walk better and be able to do the steps to get to my bedroom  Potential barriers to goal achievement hx  of anxiety, pain, deconditioning  Primary focus of home health care:Musculoskeletal  Anticipated visit pattern and next visit date 3x2wks, 2x1wks  next visit 11/8  Thank you for allowing us to participate in the care of your patient.       Umu Flanagan PT

## 2024-11-07 ENCOUNTER — HOME CARE VISIT (OUTPATIENT)
Dept: HOME HEALTH SERVICES | Facility: HOME HEALTHCARE | Age: 79
End: 2024-11-07
Payer: MEDICARE

## 2024-11-07 VITALS — OXYGEN SATURATION: 92 % | DIASTOLIC BLOOD PRESSURE: 58 MMHG | HEART RATE: 73 BPM | SYSTOLIC BLOOD PRESSURE: 120 MMHG

## 2024-11-07 PROCEDURE — G0157 HHC PT ASSISTANT EA 15: HCPCS

## 2024-11-08 ENCOUNTER — HOME CARE VISIT (OUTPATIENT)
Dept: HOME HEALTH SERVICES | Facility: HOME HEALTHCARE | Age: 79
End: 2024-11-08
Payer: MEDICARE

## 2024-11-08 PROCEDURE — G0321 AUDIO-ONLY HHS: HCPCS

## 2024-11-09 ENCOUNTER — HOME CARE VISIT (OUTPATIENT)
Dept: HOME HEALTH SERVICES | Facility: HOME HEALTHCARE | Age: 79
End: 2024-11-09
Payer: MEDICARE

## 2024-11-09 VITALS
HEART RATE: 76 BPM | OXYGEN SATURATION: 98 % | SYSTOLIC BLOOD PRESSURE: 120 MMHG | RESPIRATION RATE: 20 BRPM | DIASTOLIC BLOOD PRESSURE: 60 MMHG

## 2024-11-09 PROCEDURE — G0151 HHCP-SERV OF PT,EA 15 MIN: HCPCS

## 2024-11-12 ENCOUNTER — HOME CARE VISIT (OUTPATIENT)
Dept: HOME HEALTH SERVICES | Facility: HOME HEALTHCARE | Age: 79
End: 2024-11-12
Payer: MEDICARE

## 2024-11-12 VITALS
SYSTOLIC BLOOD PRESSURE: 128 MMHG | DIASTOLIC BLOOD PRESSURE: 66 MMHG | RESPIRATION RATE: 20 BRPM | HEART RATE: 68 BPM | OXYGEN SATURATION: 99 %

## 2024-11-12 VITALS — SYSTOLIC BLOOD PRESSURE: 126 MMHG | DIASTOLIC BLOOD PRESSURE: 70 MMHG

## 2024-11-12 PROCEDURE — G0180 MD CERTIFICATION HHA PATIENT: HCPCS | Performed by: ORTHOPAEDIC SURGERY

## 2024-11-12 PROCEDURE — G0152 HHCP-SERV OF OT,EA 15 MIN: HCPCS

## 2024-11-12 PROCEDURE — G0151 HHCP-SERV OF PT,EA 15 MIN: HCPCS

## 2024-11-13 ENCOUNTER — TELEPHONE (OUTPATIENT)
Age: 79
End: 2024-11-13

## 2024-11-13 NOTE — TELEPHONE ENCOUNTER
Anna from [facility] called because the patient is requesting a script for a Breast Prosthesis quantity 1 and she would like the diagnosis codes on the script. She provided the codes below. She would like the script faxed to their pharmacy. She also needs a copy of the patients last office visit notes faxed over. Please advise. Thank you.    Diagnosis codes:Z90.12 & Z90.11  Fax:[phone]

## 2024-11-13 NOTE — TELEPHONE ENCOUNTER
Please call patient, to confirm that she had requested breast prosthesis.    If so, script in bin. Send May office note

## 2024-11-14 ENCOUNTER — HOME CARE VISIT (OUTPATIENT)
Dept: HOME HEALTH SERVICES | Facility: HOME HEALTHCARE | Age: 79
End: 2024-11-14
Payer: MEDICARE

## 2024-11-14 VITALS
HEART RATE: 76 BPM | RESPIRATION RATE: 20 BRPM | DIASTOLIC BLOOD PRESSURE: 60 MMHG | SYSTOLIC BLOOD PRESSURE: 126 MMHG | OXYGEN SATURATION: 98 %

## 2024-11-14 PROCEDURE — G0152 HHCP-SERV OF OT,EA 15 MIN: HCPCS

## 2024-11-14 PROCEDURE — G0151 HHCP-SERV OF PT,EA 15 MIN: HCPCS

## 2024-11-14 NOTE — TELEPHONE ENCOUNTER
"Anna from [facility] called to say that she received the order and note from Dr. Vargas but they need proof that the patient had a \"vesectomy\" for Medicare testing so they can cover the prosthesis. Please fax this information to [phone].  "

## 2024-11-16 ENCOUNTER — HOME CARE VISIT (OUTPATIENT)
Dept: HOME HEALTH SERVICES | Facility: HOME HEALTHCARE | Age: 79
End: 2024-11-16
Payer: MEDICARE

## 2024-11-16 PROCEDURE — G0151 HHCP-SERV OF PT,EA 15 MIN: HCPCS

## 2024-11-18 ENCOUNTER — OFFICE VISIT (OUTPATIENT)
Dept: SURGICAL ONCOLOGY | Facility: CLINIC | Age: 79
End: 2024-11-18
Payer: MEDICARE

## 2024-11-18 VITALS
WEIGHT: 151 LBS | HEIGHT: 63 IN | OXYGEN SATURATION: 96 % | DIASTOLIC BLOOD PRESSURE: 74 MMHG | HEART RATE: 80 BPM | TEMPERATURE: 97.3 F | BODY MASS INDEX: 26.75 KG/M2 | SYSTOLIC BLOOD PRESSURE: 128 MMHG | RESPIRATION RATE: 16 BRPM

## 2024-11-18 DIAGNOSIS — C50.912 MALIGNANT NEOPLASM OF LEFT BREAST IN FEMALE, ESTROGEN RECEPTOR NEGATIVE, UNSPECIFIED SITE OF BREAST (HCC): Primary | ICD-10-CM

## 2024-11-18 DIAGNOSIS — Z90.12 S/P LEFT MASTECTOMY: ICD-10-CM

## 2024-11-18 DIAGNOSIS — Z17.1 MALIGNANT NEOPLASM OF LEFT BREAST IN FEMALE, ESTROGEN RECEPTOR NEGATIVE, UNSPECIFIED SITE OF BREAST (HCC): Primary | ICD-10-CM

## 2024-11-18 PROCEDURE — 99213 OFFICE O/P EST LOW 20 MIN: CPT

## 2024-11-18 NOTE — PROGRESS NOTES
Name: Matilda Day      : 1945      MRN: [id number]  Encounter Provider: RODNEY Delgado  Encounter Date: 2024   Encounter department: [facility] [place]     Cancer Staging   Malignant neoplasm of left breast in female, estrogen receptor negative, unspecified site of breast (HCC)  Staging form: Breast, AJCC 8th Edition  - Pathologic stage from 2021: Stage IA (pT1mi, pN0, cM0, G3, ER-, NM-, HER2+) - Signed by RODNEY Delgado on 2024  :  Assessment & Plan  Malignant neoplasm of left breast in female, estrogen receptor negative, unspecified site of breast (HCC)  - 6 mo f/u  - right breast mammogram scheduled        S/P left mastectomy    Orders:    Breast Prothesis      History of Present Illness   Chief Complaint   Patient presents with    office visit     Pt is an 79 year old female presenting today for a six-month follow-up for left breast cancer diagnosed in 2020. 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 Patient had genetic testing which was negative. She underwent a left breast mastectomy with sentinel node biopsy with Dr. Hayes. Adjuvant therapy was not recommended. Her diagnostic right breast mammo is scheduled for . There were no concerns on her cbe. Patient denies changes on her breast exam. She had a total right hip replacement 2 weeks ago and is ambulating with a walker. She is undergoing PT. She denies persistent headaches, bone pain, back pain, shortness of breath, or abdominal pain. I will see the patient back in April or sooner should the need arise. She was instructed to call with any questions or concerns prior to this time. All questions were answered today.     Staging: stage IA  Treatment history: see below  Current treatment: on observation  Disease status: KORIN    Oncology History   Oncology History   History of left breast cancer    12/17/2020 Biopsy    Left Breast stereotactic biopsy:  A. 4 o'clock, posterior  Ductal carcinoma in situ with microinvasion  Grade 3  ER 0, DC 0, HER2 3+    B & C. 5 o'clock, anterior with & without calcs  Ductal carcinoma in situ  Grade 3  ER 0, DC 0    Concordant. Malignancy appears unifocal; calcifications span 4.1 cm. Both clips migrated. No recent axillary US. Right breast clear.     1/7/2021 Genetic Testing    The following genes were evaluated: [place], BRCA1, BRCA2, CDH1, CHEK2, PALB2, PTEN, STK11, Tp53; additional genes evaluated for a total of 20 genes.  Negative result. 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 Miller  Adjuvant therapy not recommended          Review of Systems   Constitutional:  Negative for activity change, appetite change, fatigue and unexpected weight change.   Respiratory:  Negative for cough and shortness of breath.    Cardiovascular:  Negative for chest pain.   Gastrointestinal:  Negative for abdominal pain, diarrhea, nausea and vomiting.   Endocrine: Negative for heat intolerance.   Musculoskeletal:  Positive for gait problem (walker). Negative for arthralgias, back pain and myalgias.   Skin:  Negative for rash.   Neurological:  Negative for weakness and headaches.   Hematological:  Negative for adenopathy.    A complete review of systems is negative other than that noted above in the HPI.       Current Outpatient Medications   Medication Sig Dispense Refill    albuterol (PROVENTIL HFA,VENTOLIN HFA) 90 mcg/act inhaler Inhale 2 puffs every 6 (six) hours as needed for wheezing 8 g 1    amLODIPine (NORVASC) 2.5 mg tablet TAKE ONE TABLET BY MOUTH EVERY  DAY 90 tablet 1    Ascorbic Acid 500 MG CAPS Take 500 mg by mouth daily.      Calcium Carb-Cholecalciferol (CALCIUM 600-D PO) Take 1 tablet by mouth 2 (two) times a day      Coenzyme Q10 (CO Q-10 PO) Take 1 capsule by mouth daily      diltiazem (CARDIZEM CD) 180 mg 24 hr capsule TAKE ONE CAPSULE BY MOUTH EVERY DAY (Patient taking differently: TAKE ONE CAPSULE BY MOUTH EVERY DAY at dinner) 90 capsule 1    diltiazem (CARDIZEM) 60 mg tablet TAKE ONE TABLET BY MOUTH EVERY DAY 90 tablet 1    docusate sodium (COLACE) 100 mg capsule Take 1 capsule (100 mg total) by mouth 2 (two) times a day (Patient not taking: Reported on 11/6/2024) 20 capsule 0    Eliquis 5 MG TAKE ONE TABLET BY MOUTH TWICE A  tablet 1    escitalopram (LEXAPRO) 20 mg tablet TAKE ONE TABLET BY MOUTH EVERY DAY (Patient taking differently: Take 20 mg by mouth daily at bedtime) 90 tablet 1    ferrous sulfate 324 (65 Fe) mg Take 1 tablet (324 mg total) by mouth daily before breakfast Begin 30 days prior to surgery 30 tablet 1    fluticasone (FLONASE) 50 mcg/act nasal spray APPLY ONE SPRAY IN EACH NOSTRIL ONCE DAILY AS NEEDED FOR RUNNY NOSE 48 g 1    Fluticasone Furoate-Vilanterol 100-25 mcg/actuation inhaler Inhale 1 puff daily Rinse mouth after use. 60 blister 5    folic acid (KP Folic Acid) 1 mg tablet Take 1 tablet (1 mg total) by mouth daily Begin 30 days prior to surgery 30 tablet 1    gabapentin (NEURONTIN) 600 MG tablet TAKE ONE TABLET BY MOUTH EVERY MORNING , TAKE ONE TABLET BY MOUTH EVERY DAY IN THE AFTERNOON , AND TAKE TWO TABLETS BY MOUTH EVERY EVENING AT BEDTIME 360 tablet 1    HYDROcodone-acetaminophen (NORCO) 5-325 mg per tablet Take 1 tablet by mouth every 4 (four) hours as needed for pain Do not exceed 3000 mg of acetaminophen per day. Max Daily Amount: 6 tablets 42 tablet 0    levothyroxine 50 mcg tablet TAKE ONE TABLET BY MOUTH EVERY DAY (Patient taking differently: Take 50 mcg by mouth daily at bedtime) 90 tablet 0    lisinopril  "(ZESTRIL) 20 mg tablet TAKE ONE TABLET BY MOUTH TWO TIMES A  tablet 1    LORazepam (ATIVAN) 1 mg tablet Take 1 tablet (1 mg total) by mouth every 6 (six) hours as needed for anxiety 120 tablet 0    MAGNESIUM PO Take 1 tablet by mouth daily 400 mg tablet       Multiple Vitamin (multivitamin) tablet Take 1 tablet by mouth daily Begin 30 days prior to surgery 30 tablet 1    Omega-3 Fatty Acids (FISH OIL PO) Take 1 capsule by mouth daily      pantoprazole (PROTONIX) 40 mg tablet TAKE ONE TABLET BY MOUTH EVERY DAY 90 tablet 1    potassium chloride (MICRO-K) 10 MEQ CR capsule TAKE TWO CAPSULES BY MOUTH EVERY  capsule 1    promethazine (PHENERGAN) 12.5 MG tablet Take 1 tablet (12.5 mg total) by mouth every 6 (six) hours as needed for nausea or vomiting 8 tablet 0    simvastatin (ZOCOR) 10 mg tablet TAKE ONE-HALF TABLET BY MOUTH DAILY (Patient taking differently: Take 15 mg by mouth daily at bedtime) 45 tablet 1    sodium chloride 1 g tablet TAKE ONE TABLET BY MOUTH FOUR TIMES A  tablet 1    torsemide (DEMADEX) 10 mg tablet TAKE ONE TABLET BY MOUTH EVERY DAY AS NEEDED (EDEMA) 90 tablet 1    torsemide (DEMADEX) 20 mg tablet Take 1 tablet (20 mg total) by mouth daily 90 tablet 3    vitamin B-12 (VITAMIN B-12) 500 mcg tablet Take 1 tablet (500 mcg total) by mouth daily 90 tablet 1     Current Facility-Administered Medications   Medication Dose Route Frequency Provider Last Rate Last Admin    cyanocobalamin injection 1,000 mcg  1,000 mcg Intramuscular Q30 Days Zaira Velasquez MD   1,000 mcg at 04/20/23 1012     Objective   /74 (Patient Position: Sitting, Cuff Size: Standard)   Pulse 80   Temp (!) 97.3 °F (36.3 °C) (Temporal)   Resp 16   Ht 5' 3\" (1.6 m)   Wt 68.5 kg (151 lb)   SpO2 96%   BMI 26.75 kg/m²     Vitals:    11/18/24 1228   BP: 128/74   Pulse: 80   Resp: 16   Temp: (!) 97.3 °F (36.3 °C)   SpO2: 96%     ECOG    Physical Exam  Constitutional:       General: She is not in acute " distress.     Appearance: Normal appearance.   Cardiovascular:      Rate and Rhythm: Normal rate and regular rhythm.      Pulses: Normal pulses.      Heart sounds: Normal heart sounds.   Pulmonary:      Effort: Pulmonary effort is normal.      Breath sounds: Normal breath sounds.   Chest:      Chest wall: No mass.   Breasts:     Right: No swelling, bleeding, inverted nipple, mass, nipple discharge, skin change or tenderness.      Left: No swelling, bleeding, mass, skin change or tenderness.       Abdominal:      General: Abdomen is flat.      Palpations: Abdomen is soft.   Lymphadenopathy:      Upper Body:      Right upper body: No supraclavicular, axillary or pectoral adenopathy.      Left upper body: No supraclavicular, axillary or pectoral adenopathy.   Skin:     General: Skin is warm.   Neurological:      General: No focal deficit present.      Mental Status: She is alert and oriented to person, place, and time.   Psychiatric:         Mood and Affect: Mood normal.         Behavior: Behavior normal.       benign  Constitutional: General appearance: The Patient is well-developed and well-nourished who appears the stated age in no acute distress. Patient is pleasant and talkative.  HEENT: Normocephalic. Sclerae are anicteric. Mucous membranes are moist. Neck is supple without adenopathy. No JVD.  Chest: The lungs are clear to auscultation.  Cardiac: Heart is regular rate.  Abdomen: Abdomen is soft, non-tender, non-distended and without masses.  Extremities: There is no clubbing or cyanosis. There is no edema. Symmetric.  Neuro: Grossly nonfocal. Gait is normal.  Lymphatic: No evidence of cervical adenopathy bilaterally.  No evidence of axillary adenopathy bilaterally. No evidence of inguinal adenopathy bilaterally.  Skin: Warm, anicteric.  Psych: Patient is pleasant and talkative    Labs: I have reviewed pertinent labs.       Pathology: I have reviewed pathology reports described above.    Administrative Statements    I have spent a total time of 20 minutes in caring for this patient on the day of the visit/encounter including Diagnostic results, Reviewing / ordering tests, medicine, procedures  , and Obtaining or reviewing history  .

## 2024-11-19 ENCOUNTER — HOME CARE VISIT (OUTPATIENT)
Dept: HOME HEALTH SERVICES | Facility: HOME HEALTHCARE | Age: 79
End: 2024-11-19
Payer: MEDICARE

## 2024-11-19 VITALS
DIASTOLIC BLOOD PRESSURE: 60 MMHG | HEART RATE: 66 BPM | RESPIRATION RATE: 20 BRPM | SYSTOLIC BLOOD PRESSURE: 120 MMHG | OXYGEN SATURATION: 94 %

## 2024-11-19 PROCEDURE — G0152 HHCP-SERV OF OT,EA 15 MIN: HCPCS

## 2024-11-19 PROCEDURE — G0151 HHCP-SERV OF PT,EA 15 MIN: HCPCS

## 2024-11-20 ENCOUNTER — OFFICE VISIT (OUTPATIENT)
Dept: OBGYN CLINIC | Facility: MEDICAL CENTER | Age: 79
End: 2024-11-20

## 2024-11-20 ENCOUNTER — APPOINTMENT (OUTPATIENT)
Dept: RADIOLOGY | Facility: MEDICAL CENTER | Age: 79
End: 2024-11-20
Payer: MEDICARE

## 2024-11-20 VITALS
SYSTOLIC BLOOD PRESSURE: 119 MMHG | DIASTOLIC BLOOD PRESSURE: 68 MMHG | HEIGHT: 63 IN | BODY MASS INDEX: 26.75 KG/M2 | HEART RATE: 69 BPM

## 2024-11-20 DIAGNOSIS — Z96.641 STATUS POST TOTAL HIP REPLACEMENT, RIGHT: ICD-10-CM

## 2024-11-20 DIAGNOSIS — Z96.641 STATUS POST TOTAL HIP REPLACEMENT, RIGHT: Primary | ICD-10-CM

## 2024-11-20 PROCEDURE — 73502 X-RAY EXAM HIP UNI 2-3 VIEWS: CPT

## 2024-11-20 PROCEDURE — 99024 POSTOP FOLLOW-UP VISIT: CPT | Performed by: ORTHOPAEDIC SURGERY

## 2024-11-20 NOTE — PROGRESS NOTES
Assessment/Plan:  1. Status post total hip replacement, right      Orders Placed This Encounter   Procedures    XR hip/pelv 2-3 vws right if performed    Ambulatory Referral to Physical Therapy       Patient is 2 weeks status post right FITO done on 11//24  Transition to outpatient PT  Pain control prn- oxycodone and tylenol, patient aware to wean away from narcotics  Continue with eliqius for DVT prophylaxis  REID stockings as needed for swelling  May shower and let water run over incision, do not submerge incision  Continue with anterior hip precautions  Patient can call ahead for abx prior to dental appts.     Return in about 4 weeks (around 12/18/2024) for PO2 appt  RTHA.    I answered all of the patient's questions during the visit and provided education of the patient's condition during the visit.  The patient verbalized understanding of the information given and agrees with the plan.  This note was dictated using [employer] software.  It may contain errors including improperly dictated words.  Please contact physician directly for any questions.    Subjective   Chief Complaint:   Chief Complaint   Patient presents with    Right Hip - Post-op       Matilda Day is a 79 y.o. female who presents for 2 week follow up s/p right FITO.  Patient states today she overall is doing okay with her right hip.  Patient states she does get occasional pain within the groin.  Patient states she has been taking hydrocodone 2-3 times a day and has been taking Eliquis for DVT prophylaxis.  Patient states she also takes gabapentin 3 times a day for her neuropathy.  Patient states she has been doing home PT 3 times a week and is going to initiate outpatient PT next week.      Review of Systems  ROS:    See HPI for musculoskeletal review.   All other systems reviewed are negative     History:  Past Medical History:   Diagnosis Date    Anemia     Anxiety     Arrhythmia     Arthritis     Asthma     Blood clot in vein     portal vein     Breast cancer (HCC) 02/12/2021    Breast lump     [id number] RESOLVED    Cancer (HCC)     Depression     Disease of thyroid gland     DVT, lower extremity (HCC)     GERD (gastroesophageal reflux disease)     Hyperlipidemia     Hypertension     Hypokalemia     Hyponatremia     Hypothyroidism     Iron deficiency anemia     Irregular heart beat     Manic behavior (HCC)     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HCC)     Osteoarthritis     Palpitations     [id number]  RESOLVED    Paroxysmal supraventricular tachycardia (HCC)     PE (pulmonary thromboembolism) (HCC)     Pneumonia     Sjoegren syndrome     Sleep apnea     Sleep difficulties     Spinal stenosis     Thrombocytosis     [id number]  RESOLVED    Tremors of nervous system     dbs implanted right and left chest    Ulcerative colitis (HCC)     Vertigo     [id number] RESOLVED     Past Surgical History:   Procedure Laterality Date    ABDOMINAL SURGERY      APPENDECTOMY      BREAST BIOPSY Left 11/07/2019    Stereo    BREAST EXCISIONAL BIOPSY Left     x many years    BREAST SURGERY      lumpectomy & biopsy    CARMELLA HOLE W/ STEREOTACTIC INSERTION OF DBS LEADS / INTRAOP MICROELECTRODE RECORDING      COLON SURGERY      COLONOSCOPY N/A 08/30/2017    Procedure: POUCHOSCOPY;  Surgeon: VANDANA Waters MD;  Location:  GI LAB;  Service: Colorectal    COLOPROCTECTOMY W/ ILEO J POUCH      ESOPHAGOGASTRODUODENOSCOPY      ONSET 10/17/11    FISTULA REPAIR      LLEOANAL FISTULA REPAIR TRANSPERIN TRANSABD APPROACH    HYSTERECTOMY      age 38    ILEOSTOMY CLOSURE      KNEE ARTHROSCOPY      Right    MAMMO STEREOTACTIC BREAST BIOPSY LEFT (ALL INC) Left 11/07/2019    MAMMO STEREOTACTIC BREAST BIOPSY LEFT (ALL INC) Left 12/17/2020    MAMMO STEREOTACTIC BREAST BIOPSY LEFT (ALL INC) EACH ADD Left 12/17/2020    MASTECTOMY Left 02/12/2021    left mastectomy- Dr. Hayes    MASTECTOMY W/ SENTINEL NODE BIOPSY Left 02/12/2021    Procedure: BREAST MASTECTOMY WITH SENTINEL LYMPH NODE BIOPSY, LYMPHATIC MAPPING  WITH BLUE DYE AND RADIAOCTIVE DYE (INJECT AT 1100 BY DR GILLESPIE IN THE OR);  Surgeon: Robbie Gillespie MD;  Location: [facility];  Service: Surgical Oncology    CA ARTHRP ACETBLR/PROX FEM PROSTC AGRFT/ALGRFT Right 2024    Procedure: ARTHROPLASTY HIP TOTAL, overnight;  Surgeon: Gaviota Ford DO;  Location:  MAIN OR;  Service: Orthopedics    CA INSJ/RPLCMT CRANIAL NEUROSTIM PULSE GENERATOR Right 2017    Procedure: DBS GENERATOR REPLACEMENT;  Surgeon: Marin Mao MD;  Location:  MAIN OR;  Service: Neurosurgery    CA INSJ/RPLCMT CRANIAL NEUROSTIM PULSE GENERATOR N/A 2019    Procedure: REPLACEMENT IMPLANTABLE PULSE GENERATOR FOR DEEP BRAIN STIMULATOR LEFT CHEST;  Surgeon: Damian Batres MD;  Location: [facility];  Service: Neurosurgery    SPLENECTOMY      TONSILLECTOMY       Social History   Social History     Substance and Sexual Activity   Alcohol Use Yes    Alcohol/week: 2.0 standard drinks of alcohol    Types: 2 Cans of beer per week    Comment: 2or 3 beers a week     Social History     Substance and Sexual Activity   Drug Use No     Social History     Tobacco Use   Smoking Status Former    Current packs/day: 0.00    Average packs/day: 1 pack/day for 15.0 years (15.0 ttl pk-yrs)    Types: Cigarettes    Start date: 1950    Quit date: 1965    Years since quittin.9   Smokeless Tobacco Never   Tobacco Comments    Quit     Family History:   Family History   Problem Relation Age of Onset    Venous thrombosis Mother         ACUTE VENOUS THROMBOSIS OF DEEP VESSELS OF THE DISTAL LOWER EXTREMITY    Other Mother         PHLEBITIS    Hypertension Mother     Peripheral vascular disease Mother     COPD Father     Diabetes Father         MELLITUS    Stroke Father     Diabetes Sister         MELLITUS    Sjogren's syndrome Sister     No Known Problems Daughter     No Known Problems Maternal Grandmother     No Known Problems Maternal Grandfather     No Known Problems Paternal Grandmother     No  Known Problems Paternal Grandfather     No Known Problems Sister     No Known Problems Maternal Aunt     No Known Problems Paternal Aunt     No Known Problems Son        Current Outpatient Medications on File Prior to Visit   Medication Sig Dispense Refill    albuterol (PROVENTIL HFA,VENTOLIN HFA) 90 mcg/act inhaler Inhale 2 puffs every 6 (six) hours as needed for wheezing 8 g 1    amLODIPine (NORVASC) 2.5 mg tablet TAKE ONE TABLET BY MOUTH EVERY DAY 90 tablet 1    Ascorbic Acid 500 MG CAPS Take 500 mg by mouth daily.      Calcium Carb-Cholecalciferol (CALCIUM 600-D PO) Take 1 tablet by mouth 2 (two) times a day      Coenzyme Q10 (CO Q-10 PO) Take 1 capsule by mouth daily      diltiazem (CARDIZEM CD) 180 mg 24 hr capsule TAKE ONE CAPSULE BY MOUTH EVERY DAY 90 capsule 1    diltiazem (CARDIZEM) 60 mg tablet TAKE ONE TABLET BY MOUTH EVERY DAY 90 tablet 1    docusate sodium (COLACE) 100 mg capsule Take 1 capsule (100 mg total) by mouth 2 (two) times a day (Patient not taking: Reported on 11/18/2024) 20 capsule 0    Eliquis 5 MG TAKE ONE TABLET BY MOUTH TWICE A  tablet 1    escitalopram (LEXAPRO) 20 mg tablet TAKE ONE TABLET BY MOUTH EVERY DAY 90 tablet 1    ferrous sulfate 324 (65 Fe) mg Take 1 tablet (324 mg total) by mouth daily before breakfast Begin 30 days prior to surgery (Patient not taking: Reported on 11/18/2024) 30 tablet 1    fluticasone (FLONASE) 50 mcg/act nasal spray APPLY ONE SPRAY IN EACH NOSTRIL ONCE DAILY AS NEEDED FOR RUNNY NOSE 48 g 1    Fluticasone Furoate-Vilanterol 100-25 mcg/actuation inhaler Inhale 1 puff daily Rinse mouth after use. 60 blister 5    folic acid (KP Folic Acid) 1 mg tablet Take 1 tablet (1 mg total) by mouth daily Begin 30 days prior to surgery 30 tablet 1    gabapentin (NEURONTIN) 600 MG tablet TAKE ONE TABLET BY MOUTH EVERY MORNING , TAKE ONE TABLET BY MOUTH EVERY DAY IN THE AFTERNOON , AND TAKE TWO TABLETS BY MOUTH EVERY EVENING AT BEDTIME 360 tablet 1     HYDROcodone-acetaminophen (NORCO) 5-325 mg per tablet Take 1 tablet by mouth every 4 (four) hours as needed for pain Do not exceed 3000 mg of acetaminophen per day. Max Daily Amount: 6 tablets 42 tablet 0    levothyroxine 50 mcg tablet TAKE ONE TABLET BY MOUTH EVERY DAY 90 tablet 0    lisinopril (ZESTRIL) 20 mg tablet TAKE ONE TABLET BY MOUTH TWO TIMES A  tablet 1    LORazepam (ATIVAN) 1 mg tablet Take 1 tablet (1 mg total) by mouth every 6 (six) hours as needed for anxiety 120 tablet 0    MAGNESIUM PO Take 1 tablet by mouth daily 400 mg tablet       Multiple Vitamin (multivitamin) tablet Take 1 tablet by mouth daily Begin 30 days prior to surgery 30 tablet 1    Omega-3 Fatty Acids (FISH OIL PO) Take 1 capsule by mouth daily      pantoprazole (PROTONIX) 40 mg tablet TAKE ONE TABLET BY MOUTH EVERY DAY 90 tablet 1    potassium chloride (MICRO-K) 10 MEQ CR capsule TAKE TWO CAPSULES BY MOUTH EVERY  capsule 1    promethazine (PHENERGAN) 12.5 MG tablet Take 1 tablet (12.5 mg total) by mouth every 6 (six) hours as needed for nausea or vomiting 8 tablet 0    simvastatin (ZOCOR) 10 mg tablet TAKE ONE-HALF TABLET BY MOUTH DAILY 45 tablet 1    sodium chloride 1 g tablet TAKE ONE TABLET BY MOUTH FOUR TIMES A  tablet 1    torsemide (DEMADEX) 10 mg tablet TAKE ONE TABLET BY MOUTH EVERY DAY AS NEEDED (EDEMA) 90 tablet 1    torsemide (DEMADEX) 20 mg tablet Take 1 tablet (20 mg total) by mouth daily 90 tablet 3    vitamin B-12 (VITAMIN B-12) 500 mcg tablet Take 1 tablet (500 mcg total) by mouth daily 90 tablet 1     Current Facility-Administered Medications on File Prior to Visit   Medication Dose Route Frequency Provider Last Rate Last Admin    cyanocobalamin injection 1,000 mcg  1,000 mcg Intramuscular Q30 Days Zaira Velasquez MD   1,000 mcg at 04/20/23 1012     Allergies   Allergen Reactions    Indomethacin GI Intolerance, Dizziness and Other (See Comments)    Macrobid [Nitrofurantoin Monohyd Macro] Rash  "   Nitrofurantoin Other (See Comments)    Penicillins Rash and Other (See Comments)     Annotation - [id number]: can take cephalosporins    Sulfa Antibiotics Rash and Other (See Comments)        Objective     /68   Pulse 69   Ht 5' 3\" (1.6 m)   BMI 26.75 kg/m²      PE:  AAOx 3  WDWN  Hearing intact, no drainage from eyes  no audible wheezing  no abdominal distension  LE compartments soft, AT/GS intact    Ortho Exam:  right hip:   INC: C/D/I, No erythema, mild swelling  No pain with ROM    Imaging Studies: Results Review Statement: I personally reviewed the following image studies in PACS and associated radiology reports: xray(s). My interpretation of the radiology images/reports is: S/p FITO, adequate alignment.        Scribe Attestation      I,:  Ankit Lawson am acting as a scribe while in the presence of the attending physician.:       I,:  Gaviota Ford DO personally performed the services described in this documentation    as scribed in my presence.:               "

## 2024-11-22 ENCOUNTER — HOME CARE VISIT (OUTPATIENT)
Dept: HOME HEALTH SERVICES | Facility: HOME HEALTHCARE | Age: 79
End: 2024-11-22
Payer: MEDICARE

## 2024-11-22 VITALS
HEART RATE: 70 BPM | RESPIRATION RATE: 20 BRPM | DIASTOLIC BLOOD PRESSURE: 60 MMHG | SYSTOLIC BLOOD PRESSURE: 118 MMHG | OXYGEN SATURATION: 96 %

## 2024-11-22 PROCEDURE — G0152 HHCP-SERV OF OT,EA 15 MIN: HCPCS

## 2024-11-22 PROCEDURE — G0151 HHCP-SERV OF PT,EA 15 MIN: HCPCS

## 2024-11-24 ENCOUNTER — HOSPITAL ENCOUNTER (EMERGENCY)
Facility: HOSPITAL | Age: 79
Discharge: HOME | End: 2024-11-25
Attending: EMERGENCY MEDICINE
Payer: MEDICARE

## 2024-11-24 ENCOUNTER — APPOINTMENT (EMERGENCY)
Dept: CT IMAGING | Facility: HOSPITAL | Age: 79
End: 2024-11-24
Payer: MEDICARE

## 2024-11-24 VITALS
HEART RATE: 85 BPM | OXYGEN SATURATION: 95 % | SYSTOLIC BLOOD PRESSURE: 181 MMHG | RESPIRATION RATE: 16 BRPM | DIASTOLIC BLOOD PRESSURE: 80 MMHG | TEMPERATURE: 99.4 F

## 2024-11-24 DIAGNOSIS — K62.5 RECTAL BLEEDING: Primary | ICD-10-CM

## 2024-11-24 LAB
ALBUMIN SERPL BCG-MCNC: 3.8 G/DL (ref 3.5–5)
ALP SERPL-CCNC: 136 U/L (ref 34–104)
ALT SERPL W P-5'-P-CCNC: 8 U/L (ref 7–52)
ANION GAP SERPL CALCULATED.3IONS-SCNC: 9 MMOL/L (ref 4–13)
APTT PPP: 33 SECONDS (ref 23–34)
AST SERPL W P-5'-P-CCNC: 18 U/L (ref 13–39)
BASOPHILS # BLD AUTO: 0.13 THOUSANDS/ΜL (ref 0–0.1)
BASOPHILS NFR BLD AUTO: 2 % (ref 0–1)
BILIRUB SERPL-MCNC: 0.33 MG/DL (ref 0.2–1)
BUN SERPL-MCNC: 8 MG/DL (ref 5–25)
CALCIUM SERPL-MCNC: 8.9 MG/DL (ref 8.4–10.2)
CHLORIDE SERPL-SCNC: 99 MMOL/L (ref 96–108)
CO2 SERPL-SCNC: 25 MMOL/L (ref 21–32)
CREAT SERPL-MCNC: 0.61 MG/DL (ref 0.6–1.3)
EOSINOPHIL # BLD AUTO: 0.36 THOUSAND/ΜL (ref 0–0.61)
EOSINOPHIL NFR BLD AUTO: 5 % (ref 0–6)
ERYTHROCYTE [DISTWIDTH] IN BLOOD BY AUTOMATED COUNT: 17.1 % (ref 11.6–15.1)
GFR SERPL CREATININE-BSD FRML MDRD: 85 ML/MIN/1.73SQ M
GLUCOSE SERPL-MCNC: 97 MG/DL (ref 65–140)
HCT VFR BLD AUTO: 30.4 % (ref 34.8–46.1)
HGB BLD-MCNC: 9.6 G/DL (ref 11.5–15.4)
IMM GRANULOCYTES # BLD AUTO: 0.03 THOUSAND/UL (ref 0–0.2)
IMM GRANULOCYTES NFR BLD AUTO: 0 % (ref 0–2)
INR PPP: 1.06 (ref 0.85–1.19)
LYMPHOCYTES # BLD AUTO: 3.06 THOUSANDS/ΜL (ref 0.6–4.47)
LYMPHOCYTES NFR BLD AUTO: 38 % (ref 14–44)
MCH RBC QN AUTO: 30 PG (ref 26.8–34.3)
MCHC RBC AUTO-ENTMCNC: 31.6 G/DL (ref 31.4–37.4)
MCV RBC AUTO: 95 FL (ref 82–98)
MONOCYTES # BLD AUTO: 1.12 THOUSAND/ΜL (ref 0.17–1.22)
MONOCYTES NFR BLD AUTO: 14 % (ref 4–12)
NEUTROPHILS # BLD AUTO: 3.31 THOUSANDS/ΜL (ref 1.85–7.62)
NEUTS SEG NFR BLD AUTO: 41 % (ref 43–75)
NRBC BLD AUTO-RTO: 0 /100 WBCS
PLATELET # BLD AUTO: 761 THOUSANDS/UL (ref 149–390)
PMV BLD AUTO: 8.2 FL (ref 8.9–12.7)
POTASSIUM SERPL-SCNC: 3.8 MMOL/L (ref 3.5–5.3)
PROT SERPL-MCNC: 6.8 G/DL (ref 6.4–8.4)
PROTHROMBIN TIME: 14.5 SECONDS (ref 12.3–15)
RBC # BLD AUTO: 3.2 MILLION/UL (ref 3.81–5.12)
SODIUM SERPL-SCNC: 133 MMOL/L (ref 135–147)
WBC # BLD AUTO: 8.01 THOUSAND/UL (ref 4.31–10.16)

## 2024-11-24 PROCEDURE — 93005 ELECTROCARDIOGRAM TRACING: CPT

## 2024-11-24 PROCEDURE — 99285 EMERGENCY DEPT VISIT HI MDM: CPT

## 2024-11-24 PROCEDURE — 85730 THROMBOPLASTIN TIME PARTIAL: CPT

## 2024-11-24 PROCEDURE — 80053 COMPREHEN METABOLIC PANEL: CPT | Performed by: EMERGENCY MEDICINE

## 2024-11-24 PROCEDURE — 85610 PROTHROMBIN TIME: CPT

## 2024-11-24 PROCEDURE — 36415 COLL VENOUS BLD VENIPUNCTURE: CPT

## 2024-11-24 PROCEDURE — 74177 CT ABD & PELVIS W/CONTRAST: CPT

## 2024-11-24 PROCEDURE — 85025 COMPLETE CBC W/AUTO DIFF WBC: CPT | Performed by: EMERGENCY MEDICINE

## 2024-11-24 RX ADMIN — IOHEXOL 85 ML: 350 INJECTION, SOLUTION INTRAVENOUS at 21:19

## 2024-11-25 ENCOUNTER — HOSPITAL ENCOUNTER (OUTPATIENT)
Dept: MAMMOGRAPHY | Facility: CLINIC | Age: 79
Discharge: HOME | End: 2024-11-25
Payer: MEDICARE

## 2024-11-25 VITALS — OXYGEN SATURATION: 97 % | DIASTOLIC BLOOD PRESSURE: 60 MMHG | HEART RATE: 68 BPM | SYSTOLIC BLOOD PRESSURE: 120 MMHG

## 2024-11-25 VITALS — WEIGHT: 145 LBS | HEIGHT: 63 IN | BODY MASS INDEX: 25.69 KG/M2

## 2024-11-25 DIAGNOSIS — Z85.3 HISTORY OF LEFT BREAST CANCER: ICD-10-CM

## 2024-11-25 LAB
ATRIAL RATE: 105 BPM
P AXIS: 23 DEGREES
PR INTERVAL: 154 MS
QRS AXIS: 146 DEGREES
QRSD INTERVAL: 78 MS
QT INTERVAL: 340 MS
QTC INTERVAL: 449 MS
T WAVE AXIS: 58 DEGREES
VENTRICULAR RATE: 105 BPM

## 2024-11-25 PROCEDURE — 93010 ELECTROCARDIOGRAM REPORT: CPT | Performed by: INTERNAL MEDICINE

## 2024-11-25 PROCEDURE — G0279 TOMOSYNTHESIS, MAMMO: HCPCS

## 2024-11-25 PROCEDURE — 77065 DX MAMMO INCL CAD UNI: CPT

## 2024-11-25 NOTE — DISCHARGE INSTRUCTIONS
Monitor symptoms and follow-up with your primary care provider in the next couple days.  Please do return to ED for new or worsening symptoms.

## 2024-11-25 NOTE — ED PROVIDER NOTES
Time reflects when diagnosis was documented in both MDM as applicable and the Disposition within this note       Time User Action Codes Description Comment    11/24/2024 11:59 PM Titi Pope Add [K62.5] Rectal bleeding           ED Disposition       ED Disposition   Discharge    Condition   Stable    Date/Time   Sun Nov 24, 2024 11:59 PM    Comment   Matilda Day discharge to home/self care.                   Assessment & Plan       Medical Decision Making  79-year-old female presents to ED for evaluation of rectal bleeding as seen in HPI.  On physical examination patient vital signs stable.  Mildly hypertensive.  Afebrile.  Nontachycardic.  Nonhypoxic.  No respiratory distress.  No murmur.  Normal breath sounds.  Nontoxic-appearing.  Abdomen soft, nontender.  Examination of rectal area without abnormality.  No external hemorrhoids.  No active bleeding.  No anal fissure.  Reassuring examination overall.  Obtained workup consisting of CBC, CMP, APTT, pro time INR, EKG, CT abdomen pelvis with IV contrast.   Differential diagnosis includes but not limited to acute blood loss anemia, internal hemorrhoids, diverticulitis, ulcerative colitis flare, viral GI infection    CBC returned demonstrating improved hemoglobin from 2 weeks ago. CBC does not suggest ABLA.  CMP without concerning values.  aPTT WNL.  Pro time INR WNL.  EKG without evidence of acute cardiac injury, arrhythmia.  CT abdomen pelvis with IV contrast returned without evidence of acute abdomen.    Patient reevaluated.  Patient reports she is no longer experiencing rectal bleeding.  Appears holding the evening dose of Eliquis proved beneficial as before.  Patient continues to be without abdominal pain, rectal pain, fever, chills, nausea, vomiting, etc. The Vrad interpretation did mention proctitis but patient is without infectious symptoms. Will hold off on abx.  Plan to discharge with instruction to follow-up with her primary care provider in the next  "couple days for a check in.  Return to ED for new or worsening symptoms.  Patient agreeable.  Patient discharged.     Prior to discharge, discharge instructions were discussed with patient at bedside. Patient was provided both verbal and written instructions. Patient is understanding of the discharge instructions and is agreeable to plan of care. Return precautions were discussed with patient bedside, patient verbalized understanding of signs and symptoms that would necessitate return to the ED. All questions were answered. Patient was comfortable with the plan of care and discharged to home.    Portions of this chart may have been written with voice recognition software.  Occasional grammatical errors, wrong word or \"sound a like\" substitutions may have occurred due to software limitations.  Please read carefully and use context to recognize where substitutions have occurred.     Amount and/or Complexity of Data Reviewed  Labs: ordered.  Radiology: ordered and independent interpretation performed.    Risk  Prescription drug management.             Medications   iohexol (OMNIPAQUE) 350 MG/ML injection (MULTI-DOSE) 85 mL (85 mL Intravenous Given 11/24/24 2119)       ED Risk Strat Scores                                               History of Present Illness       Chief Complaint   Patient presents with    Rectal Bleeding     Pt states this AM noticed bright red blood when she wiped after using the bathroom. Pt denies hx of hemorrhoids or sx at this time.        Past Medical History:   Diagnosis Date    Anemia     Anxiety     Arrhythmia     Arthritis     Asthma     Blood clot in vein     portal vein    Breast cancer (HCC) 02/12/2021    Breast lump     [id number] RESOLVED    Cancer (HCC)     Depression     Disease of thyroid gland     DVT, lower extremity (HCC)     GERD (gastroesophageal reflux disease)     Hyperlipidemia     Hypertension     Hypokalemia     Hyponatremia     Hypothyroidism     Iron deficiency anemia  "    Irregular heart beat     Manic behavior (HCC)     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HCC)     Osteoarthritis     Palpitations     [id number]  RESOLVED    Paroxysmal supraventricular tachycardia (HCC)     PE (pulmonary thromboembolism) (HCC)     Pneumonia     Sjoegren syndrome     Sleep apnea     Sleep difficulties     Spinal stenosis     Thrombocytosis     [id number]  RESOLVED    Tremors of nervous system     dbs implanted right and left chest    Ulcerative colitis (HCC)     Vertigo     [id number] RESOLVED      Past Surgical History:   Procedure Laterality Date    ABDOMINAL SURGERY      APPENDECTOMY      BREAST BIOPSY Left 11/07/2019    Stereo    BREAST EXCISIONAL BIOPSY Left     x many years    BREAST SURGERY      lumpectomy & biopsy    CARMELLA HOLE W/ STEREOTACTIC INSERTION OF DBS LEADS / INTRAOP MICROELECTRODE RECORDING      COLON SURGERY      COLONOSCOPY N/A 08/30/2017    Procedure: POUCHOSCOPY;  Surgeon: VANDANA Waters MD;  Location: [facility];  Service: Colorectal    COLOPROCTECTOMY W/ ILEO J POUCH      ESOPHAGOGASTRODUODENOSCOPY      ONSET 10/17/11    FISTULA REPAIR      LLEOANAL FISTULA REPAIR TRANSPERIN TRANSABD APPROACH    HYSTERECTOMY      age 38    ILEOSTOMY CLOSURE      KNEE ARTHROSCOPY      Right    MAMMO STEREOTACTIC BREAST BIOPSY LEFT (ALL INC) Left 11/07/2019    MAMMO STEREOTACTIC BREAST BIOPSY LEFT (ALL INC) Left 12/17/2020    MAMMO STEREOTACTIC BREAST BIOPSY LEFT (ALL INC) EACH ADD Left 12/17/2020    MASTECTOMY Left 02/12/2021    left mastectomy- Dr. Hayes    MASTECTOMY W/ SENTINEL NODE BIOPSY Left 02/12/2021    Procedure: BREAST MASTECTOMY WITH SENTINEL LYMPH NODE BIOPSY, LYMPHATIC MAPPING WITH BLUE DYE AND RADIAOCTIVE DYE (INJECT AT 1100 BY DR HAYES IN THE OR);  Surgeon: Robbie Hayes MD;  Location: [facility];  Service: Surgical Oncology    MI ARTHRP ACETBLR/PROX FEM PROSTC AGRFT/ALGRFT Right 11/4/2024    Procedure: ARTHROPLASTY HIP TOTAL, overnight;  Surgeon: Gaviota Ford DO;  Location:   MAIN OR;  Service: Orthopedics    CT INSJ/RPLCMT CRANIAL NEUROSTIM PULSE GENERATOR Right 2017    Procedure: DBS GENERATOR REPLACEMENT;  Surgeon: Marin Mao MD;  Location: [facility];  Service: Neurosurgery    CT INSJ/RPLCMT CRANIAL NEUROSTIM PULSE GENERATOR N/A 2019    Procedure: REPLACEMENT IMPLANTABLE PULSE GENERATOR FOR DEEP BRAIN STIMULATOR LEFT CHEST;  Surgeon: Damian Batres MD;  Location: [facility];  Service: Neurosurgery    SPLENECTOMY      TONSILLECTOMY        Family History   Problem Relation Age of Onset    Venous thrombosis Mother         ACUTE VENOUS THROMBOSIS OF DEEP VESSELS OF THE DISTAL LOWER EXTREMITY    Other Mother         PHLEBITIS    Hypertension Mother     Peripheral vascular disease Mother     COPD Father     Diabetes Father         MELLITUS    Stroke Father     Diabetes Sister         MELLITUS    Sjogren's syndrome Sister     No Known Problems Daughter     No Known Problems Maternal Grandmother     No Known Problems Maternal Grandfather     No Known Problems Paternal Grandmother     No Known Problems Paternal Grandfather     No Known Problems Sister     No Known Problems Maternal Aunt     No Known Problems Paternal Aunt     No Known Problems Son       Social History     Tobacco Use    Smoking status: Former     Current packs/day: 0.00     Average packs/day: 1 pack/day for 15.0 years (15.0 ttl pk-yrs)     Types: Cigarettes     Start date: 1950     Quit date: 1965     Years since quittin.9    Smokeless tobacco: Never    Tobacco comments:     Quit   Vaping Use    Vaping status: Never Used   Substance Use Topics    Alcohol use: Yes     Alcohol/week: 2.0 standard drinks of alcohol     Types: 2 Cans of beer per week     Comment: 2or 3 beers a week    Drug use: No      E-Cigarette/Vaping    E-Cigarette Use Never User       E-Cigarette/Vaping Substances    Nicotine No     THC No     CBD No     Flavoring No     Other No     Unknown No       I have reviewed and agree with  the history as documented.     79-year-old female with history of SIADH, hyperlipidemia, hypertension, GERD, Sjogren syndrome, A-fib presents to ED for evaluation of rectal bleeding.  Patient states that this morning she noticed blood on her toilet paper when wiping.  Throughout the day she has seen blood on her toilet paper when wiping. Patient has history of similar episodes of bright red blood per rectum.  Patient does take Eliquis so she held her evening dose.  Previously she was advised to do this when she had rectal bleeding.  Patient denies any current rectal pain, abdominal pain, nausea, vomiting, shortness of breath, chest pain.  Essentially asymptomatic besides the rectal bleeding.  Denies feeling any hemorrhoids  or lesions near the rectum when wiping.     Review of patient's chart demonstrates that she was briefly admitted on February 15, 2023 for GI bleed. She was monitored overnight with her Eliquis held. The bleeding stopped on its own without further intervention. Prior to this she had flexible sigmoidoscopy on January 15th, 2023 which showed ulcerated anastomosis without active bleeding. Overall the rectal bleeding started in early January 2023. Workup of the GI bleeding has been reassuring so far.               Review of Systems   Gastrointestinal:  Positive for anal bleeding.   All other systems reviewed and are negative.          Objective       ED Triage Vitals [11/24/24 1822]   Temperature Pulse Blood Pressure Respirations SpO2 Patient Position - Orthostatic VS   99.4 °F (37.4 °C) 79 (!) 190/75 16 97 % --      Temp Source Heart Rate Source BP Location FiO2 (%) Pain Score    Oral Monitor Right arm -- --      Vitals      Date and Time Temp Pulse SpO2 Resp BP Pain Score FACES Pain Rating User   11/24/24 2357 -- 85 95 % 16 181/80 -- -- MM   11/24/24 2107 -- 83 94 % 16 189/85 -- -- SG   11/24/24 1822 99.4 °F (37.4 °C) 79 97 % 16 190/75 -- -- PS            Physical Exam  Vitals and nursing note  reviewed. Exam conducted with a chaperone present.   Constitutional:       General: She is not in acute distress.     Appearance: Normal appearance. She is well-developed. She is not ill-appearing, toxic-appearing or diaphoretic.   HENT:      Head: Normocephalic and atraumatic.   Eyes:      General: No scleral icterus.        Right eye: No discharge.         Left eye: No discharge.      Extraocular Movements: Extraocular movements intact.      Conjunctiva/sclera: Conjunctivae normal.      Pupils: Pupils are equal, round, and reactive to light.   Cardiovascular:      Rate and Rhythm: Normal rate and regular rhythm.      Pulses: Normal pulses.      Heart sounds: Normal heart sounds. No murmur heard.     No friction rub. No gallop.   Pulmonary:      Effort: Pulmonary effort is normal. No respiratory distress.      Breath sounds: Normal breath sounds. No stridor. No wheezing, rhonchi or rales.   Abdominal:      General: There is no distension.      Palpations: Abdomen is soft. There is no mass.      Tenderness: There is no abdominal tenderness. There is no guarding.      Hernia: No hernia is present.   Genitourinary:     Rectum: Normal. No mass, tenderness, anal fissure or external hemorrhoid.   Musculoskeletal:         General: No swelling.      Cervical back: Normal range of motion and neck supple.   Skin:     General: Skin is warm and dry.      Capillary Refill: Capillary refill takes less than 2 seconds.      Coloration: Skin is not jaundiced or pale.      Findings: No rash.   Neurological:      General: No focal deficit present.      Mental Status: She is alert and oriented to person, place, and time. Mental status is at baseline.   Psychiatric:         Mood and Affect: Mood normal.         Results Reviewed       Procedure Component Value Units Date/Time    Protime-INR [[id number]]  (Normal) Collected: 11/24/24 2105    Lab Status: Final result Specimen: Blood from Arm, Left Updated: 11/24/24 2128     Protime 14.5  seconds      INR 1.06    Narrative:      INR Therapeutic Range    Indication                                             INR Range      Atrial Fibrillation                                               2.0-3.0  Hypercoagulable State                                    2.0.2.3  Left Ventricular Asist Device                            2.0-3.0  Mechanical Heart Valve                                  -    Aortic(with afib, MI, embolism, HF, LA enlargement,    and/or coagulopathy)                                     2.0-3.0 (2.5-3.5)     Mitral                                                             2.5-3.5  Prosthetic/Bioprosthetic Heart Valve               2.0-3.0  Venous thromboembolism (VTE: VT, PE        2.0-3.0    APTT [[id number]]  (Normal) Collected: 11/24/24 2105    Lab Status: Final result Specimen: Blood from Arm, Left Updated: 11/24/24 2128     PTT 33 seconds     Comprehensive metabolic panel [[id number]]  (Abnormal) Collected: 11/24/24 1827    Lab Status: Final result Specimen: Blood from Line, Venous Updated: 11/24/24 1903     Sodium 133 mmol/L      Potassium 3.8 mmol/L      Chloride 99 mmol/L      CO2 25 mmol/L      ANION GAP 9 mmol/L      BUN 8 mg/dL      Creatinine 0.61 mg/dL      Glucose 97 mg/dL      Calcium 8.9 mg/dL      AST 18 U/L      ALT 8 U/L      Alkaline Phosphatase 136 U/L      Total Protein 6.8 g/dL      Albumin 3.8 g/dL      Total Bilirubin 0.33 mg/dL      eGFR 85 ml/min/1.73sq m     Narrative:      [employer] guidelines for Chronic Kidney Disease (CKD):     Stage 1 with normal or high GFR (GFR > 90 mL/min/1.73 square meters)    Stage 2 Mild CKD (GFR = 60-89 mL/min/1.73 square meters)    Stage 3A Moderate CKD (GFR = 45-59 mL/min/1.73 square meters)    Stage 3B Moderate CKD (GFR = 30-44 mL/min/1.73 square meters)    Stage 4 Severe CKD (GFR = 15-29 mL/min/1.73 square meters)    Stage 5 End Stage CKD (GFR <15 mL/min/1.73 square meters)  Note: GFR calculation is accurate  "only with a steady state creatinine    CBC and differential [[id number]]  (Abnormal) Collected: 11/24/24 1827    Lab Status: Final result Specimen: Blood from Line, Venous Updated: 11/24/24 1844     WBC 8.01 Thousand/uL      RBC 3.20 Million/uL      Hemoglobin 9.6 g/dL      Hematocrit 30.4 %      MCV 95 fL      MCH 30.0 pg      MCHC 31.6 g/dL      RDW 17.1 %      MPV 8.2 fL      Platelets 761 Thousands/uL      nRBC 0 /100 WBCs      Segmented % 41 %      Immature Grans % 0 %      Lymphocytes % 38 %      Monocytes % 14 %      Eosinophils Relative 5 %      Basophils Relative 2 %      Absolute Neutrophils 3.31 Thousands/µL      Absolute Immature Grans 0.03 Thousand/uL      Absolute Lymphocytes 3.06 Thousands/µL      Absolute Monocytes 1.12 Thousand/µL      Eosinophils Absolute 0.36 Thousand/µL      Basophils Absolute 0.13 Thousands/µL             CT abdomen pelvis with contrast   ED Interpretation by Titi Pope PA-C (11/24 2332)   \"IMPRESSION: 1. Positive for acute proctitis. 2. Wall thickening of scattered segments of the GI tract; see DDx above. 3. Small hiatal hernia. 4. Possible mild mesenteric adenitis. 5. Asymmetrical infiltrative changes around the right hip; see DDx above. 6. Other abnormalities listed above are non-acute.\"  -[facility]          Procedures    ED Medication and Procedure Management   Prior to Admission Medications   Prescriptions Last Dose Informant Patient Reported? Taking?   Ascorbic Acid 500 MG CAPS   Yes No   Sig: Take 500 mg by mouth daily.   Calcium Carb-Cholecalciferol (CALCIUM 600-D PO)  Self Yes No   Sig: Take 1 tablet by mouth 2 (two) times a day   Coenzyme Q10 (CO Q-10 PO)  Self Yes No   Sig: Take 1 capsule by mouth daily   Eliquis 5 MG  Self No No   Sig: TAKE ONE TABLET BY MOUTH TWICE A DAY   Fluticasone Furoate-Vilanterol 100-25 mcg/actuation inhaler  Self No No   Sig: Inhale 1 puff daily Rinse mouth after use.   HYDROcodone-acetaminophen (NORCO) 5-325 mg per tablet   No No "   Sig: Take 1 tablet by mouth every 4 (four) hours as needed for pain Do not exceed 3000 mg of acetaminophen per day. Max Daily Amount: 6 tablets   LORazepam (ATIVAN) 1 mg tablet   No No   Sig: Take 1 tablet (1 mg total) by mouth every 6 (six) hours as needed for anxiety   MAGNESIUM PO  Self Yes No   Sig: Take 1 tablet by mouth daily 400 mg tablet    Multiple Vitamin (multivitamin) tablet  Self No No   Sig: Take 1 tablet by mouth daily Begin 30 days prior to surgery   Omega-3 Fatty Acids (FISH OIL PO)  Self Yes No   Sig: Take 1 capsule by mouth daily   albuterol (PROVENTIL HFA,VENTOLIN HFA) 90 mcg/act inhaler  Self No No   Sig: Inhale 2 puffs every 6 (six) hours as needed for wheezing   amLODIPine (NORVASC) 2.5 mg tablet   No No   Sig: TAKE ONE TABLET BY MOUTH EVERY DAY   diltiazem (CARDIZEM CD) 180 mg 24 hr capsule   No No   Sig: TAKE ONE CAPSULE BY MOUTH EVERY DAY   diltiazem (CARDIZEM) 60 mg tablet  Self No No   Sig: TAKE ONE TABLET BY MOUTH EVERY DAY   docusate sodium (COLACE) 100 mg capsule   No No   Sig: Take 1 capsule (100 mg total) by mouth 2 (two) times a day   Patient not taking: Reported on 11/18/2024   escitalopram (LEXAPRO) 20 mg tablet  Self No No   Sig: TAKE ONE TABLET BY MOUTH EVERY DAY   ferrous sulfate 324 (65 Fe) mg  Self No No   Sig: Take 1 tablet (324 mg total) by mouth daily before breakfast Begin 30 days prior to surgery   Patient not taking: Reported on 11/18/2024   fluticasone (FLONASE) 50 mcg/act nasal spray  Self No No   Sig: APPLY ONE SPRAY IN EACH NOSTRIL ONCE DAILY AS NEEDED FOR RUNNY NOSE   folic acid (KP Folic Acid) 1 mg tablet  Self No No   Sig: Take 1 tablet (1 mg total) by mouth daily Begin 30 days prior to surgery   gabapentin (NEURONTIN) 600 MG tablet  Self No No   Sig: TAKE ONE TABLET BY MOUTH EVERY MORNING , TAKE ONE TABLET BY MOUTH EVERY DAY IN THE AFTERNOON , AND TAKE TWO TABLETS BY MOUTH EVERY EVENING AT BEDTIME   levothyroxine 50 mcg tablet  Self No No   Sig: TAKE ONE  TABLET BY MOUTH EVERY DAY   lisinopril (ZESTRIL) 20 mg tablet  Self No No   Sig: TAKE ONE TABLET BY MOUTH TWO TIMES A DAY   pantoprazole (PROTONIX) 40 mg tablet  Self No No   Sig: TAKE ONE TABLET BY MOUTH EVERY DAY   potassium chloride (MICRO-K) 10 MEQ CR capsule  Self No No   Sig: TAKE TWO CAPSULES BY MOUTH EVERY DAY   promethazine (PHENERGAN) 12.5 MG tablet   No No   Sig: Take 1 tablet (12.5 mg total) by mouth every 6 (six) hours as needed for nausea or vomiting   simvastatin (ZOCOR) 10 mg tablet  Self No No   Sig: TAKE ONE-HALF TABLET BY MOUTH DAILY   sodium chloride 1 g tablet  Self No No   Sig: TAKE ONE TABLET BY MOUTH FOUR TIMES A DAY   torsemide (DEMADEX) 10 mg tablet  Self No No   Sig: TAKE ONE TABLET BY MOUTH EVERY DAY AS NEEDED (EDEMA)   torsemide (DEMADEX) 20 mg tablet  Self No No   Sig: Take 1 tablet (20 mg total) by mouth daily   vitamin B-12 (VITAMIN B-12) 500 mcg tablet  Self No No   Sig: Take 1 tablet (500 mcg total) by mouth daily      Facility-Administered Medications Last Administration Doses Remaining   cyanocobalamin injection 1,000 mcg 4/20/2023 10:12 AM         Discharge Medication List as of 11/24/2024 11:59 PM        CONTINUE these medications which have NOT CHANGED    Details   albuterol (PROVENTIL HFA,VENTOLIN HFA) 90 mcg/act inhaler Inhale 2 puffs every 6 (six) hours as needed for wheezing, Starting Mon 11/28/2022, Normal      amLODIPine (NORVASC) 2.5 mg tablet TAKE ONE TABLET BY MOUTH EVERY DAY, Normal      Ascorbic Acid 500 MG CAPS Take 500 mg by mouth daily., Historical Med      Calcium Carb-Cholecalciferol (CALCIUM 600-D PO) Take 1 tablet by mouth 2 (two) times a day, Historical Med      Coenzyme Q10 (CO Q-10 PO) Take 1 capsule by mouth daily, Historical Med      diltiazem (CARDIZEM CD) 180 mg 24 hr capsule TAKE ONE CAPSULE BY MOUTH EVERY DAY, Normal      diltiazem (CARDIZEM) 60 mg tablet TAKE ONE TABLET BY MOUTH EVERY DAY, Starting Tue 8/6/2024, Normal      docusate sodium (COLACE)  100 mg capsule Take 1 capsule (100 mg total) by mouth 2 (two) times a day, Starting Mon 11/4/2024, Normal      Eliquis 5 MG TAKE ONE TABLET BY MOUTH TWICE A DAY, Starting Mon 10/21/2024, Normal      escitalopram (LEXAPRO) 20 mg tablet TAKE ONE TABLET BY MOUTH EVERY DAY, Starting Sun 9/15/2024, Normal      ferrous sulfate 324 (65 Fe) mg Take 1 tablet (324 mg total) by mouth daily before breakfast Begin 30 days prior to surgery, Starting Fri 8/9/2024, Normal      fluticasone (FLONASE) 50 mcg/act nasal spray APPLY ONE SPRAY IN EACH NOSTRIL ONCE DAILY AS NEEDED FOR RUNNY NOSE, Normal      Fluticasone Furoate-Vilanterol 100-25 mcg/actuation inhaler Inhale 1 puff daily Rinse mouth after use., Starting Wed 11/8/2023, Until Mon 11/4/2024, Normal      folic acid (KP Folic Acid) 1 mg tablet Take 1 tablet (1 mg total) by mouth daily Begin 30 days prior to surgery, Starting Fri 8/9/2024, Normal      gabapentin (NEURONTIN) 600 MG tablet TAKE ONE TABLET BY MOUTH EVERY MORNING , TAKE ONE TABLET BY MOUTH EVERY DAY IN THE AFTERNOON , AND TAKE TWO TABLETS BY MOUTH EVERY EVENING AT BEDTIME, Normal      HYDROcodone-acetaminophen (NORCO) 5-325 mg per tablet Take 1 tablet by mouth every 4 (four) hours as needed for pain Do not exceed 3000 mg of acetaminophen per day. Max Daily Amount: 6 tablets, Starting Mon 11/4/2024, Normal      levothyroxine 50 mcg tablet TAKE ONE TABLET BY MOUTH EVERY DAY, Starting Mon 10/21/2024, Normal      lisinopril (ZESTRIL) 20 mg tablet TAKE ONE TABLET BY MOUTH TWO TIMES A DAY, Starting Tue 8/6/2024, Normal      LORazepam (ATIVAN) 1 mg tablet Take 1 tablet (1 mg total) by mouth every 6 (six) hours as needed for anxiety, Starting Thu 10/31/2024, Normal      MAGNESIUM PO Take 1 tablet by mouth daily 400 mg tablet , Historical Med      Multiple Vitamin (multivitamin) tablet Take 1 tablet by mouth daily Begin 30 days prior to surgery, Starting Fri 8/9/2024, Normal      Omega-3 Fatty Acids (FISH OIL PO) Take 1  capsule by mouth daily, Historical Med      pantoprazole (PROTONIX) 40 mg tablet TAKE ONE TABLET BY MOUTH EVERY DAY, Starting Tue 3/5/2024, Normal      potassium chloride (MICRO-K) 10 MEQ CR capsule TAKE TWO CAPSULES BY MOUTH EVERY DAY, Normal      promethazine (PHENERGAN) 12.5 MG tablet Take 1 tablet (12.5 mg total) by mouth every 6 (six) hours as needed for nausea or vomiting, Starting Mon 11/4/2024, Normal      simvastatin (ZOCOR) 10 mg tablet TAKE ONE-HALF TABLET BY MOUTH DAILY, Starting Mon 10/21/2024, Normal      sodium chloride 1 g tablet TAKE ONE TABLET BY MOUTH FOUR TIMES A DAY, Starting Thu 10/10/2024, Normal      !! torsemide (DEMADEX) 10 mg tablet TAKE ONE TABLET BY MOUTH EVERY DAY AS NEEDED (EDEMA), Normal      !! torsemide (DEMADEX) 20 mg tablet Take 1 tablet (20 mg total) by mouth daily, Starting Wed 8/7/2024, Normal      vitamin B-12 (VITAMIN B-12) 500 mcg tablet Take 1 tablet (500 mcg total) by mouth daily, Starting Thu 4/20/2023, Normal       !! - Potential duplicate medications found. Please discuss with provider.        No discharge procedures on file.  ED SEPSIS DOCUMENTATION   Time reflects when diagnosis was documented in both MDM as applicable and the Disposition within this note       Time User Action Codes Description Comment    11/24/2024 11:59 PM Titi Pope Add [K62.5] Rectal bleeding                  Titi Pope PA-C  11/25/24 0305

## 2024-11-26 ENCOUNTER — VBI (OUTPATIENT)
Dept: INTERNAL MEDICINE CLINIC | Facility: CLINIC | Age: 79
End: 2024-11-26

## 2024-11-26 NOTE — TELEPHONE ENCOUNTER
11/26/24 12:16 PM    Patient contacted post ED visit, first outreach attempt made. Message was left for patient to return a call to the V[facility] Department at [facility]: Phone [phone].    Thank you.  Gail Ellis MA  [facility]

## 2024-11-27 NOTE — TELEPHONE ENCOUNTER
11/27/24 9:26 AM    Patient contacted post ED visit, VBI department spoke with patient/caregiver and outreach was successful.    Thank you.  Gail Ellis MA  [facility]

## 2024-11-29 ENCOUNTER — TELEPHONE (OUTPATIENT)
Age: 79
End: 2024-11-29

## 2024-11-29 NOTE — TELEPHONE ENCOUNTER
Patient called to report that they would like to cancel their next appt with provider for DBS 6 month check    Patient stated they are doing well and feel they do not need to be seen or the DBS adjusted at this time     Patient Rs fro 7/7/2025 at 7 am                     Thank you!

## 2024-12-04 NOTE — PROGRESS NOTES
PT Evaluation     Today's date: 2024  Patient name: Matilda Day  : 1945  MRN: [id number]  Referring provider: Gaviota Ford,*  Dx:   Encounter Diagnosis     ICD-10-CM    1. Status post total hip replacement, right  Z96.641 Ambulatory Referral to Physical Therapy          Start Time: 0800  Stop Time: 0850  Total time in clinic (min): 50 minutes    Assessment  Impairments: abnormal gait, abnormal muscle firing, abnormal muscle tone, abnormal or restricted ROM, abnormal movement, activity intolerance, impaired balance, impaired physical strength, lacks appropriate home exercise program, pain with function, poor posture , poor body mechanics, participation limitations, activity limitations and endurance  Symptom irritability: moderate    Assessment details: Matilda Day is a 79 y.o. year old female s/p R FITO via anterior approach completed on 24. Patients primary impairments include decreased R hip flexion and abd PROM, decreased global LE strength (R>L). Noted impairments limit the patients ability to perform STS transfers, ascend/descend stairs safely and independent without UE assist, and ambulate without use of RW d/t fear of falls, LE weakness, and decreased activity tolerance. Patient would benefit from skilled PT to address noted impairments and promote return to PLOF. PT reviewed HEP with the patient where they demonstrated a good understanding of technique and dosage, and patient was instructed to discontinue exercises should symptoms be exacerbated.  Understanding of Dx/Px/POC: good     Prognosis: good    Goals  Short Terms Goals: (4 weeks)  Patient will be independent with HEP  Patient will report a 50% decrease in pain complaints.    Long Term Goals: (8-12 weeks)  Patient will be able to ambulate t/o her home independently with the least restrictive device necessary by d/c.   Pt will decrease 5XSTS score to at least <15s while demonstrating eccentric quad control to reduce  Patient : Mazin De La Rosa Age: 59 year old Sex: male   MRN: [id number] Encounter Date: 4/28/2020      History     Chief Complaint   Patient presents with   • Detox Evaluation     HPI  59-year-old male with history of alcohol dependence, diabetes, hypertension presenting to the emergency department for symptoms of alcohol withdrawal.  Last drink was yesterday, had 1.5 pints of vodka.  States that he normally drinks approximately 1 pint of vodka per day.  Currently complains of tremors, anxiety, mild nausea that is starting this morning.  Had similar symptoms in the past due to alcohol withdrawal.  He denies any history of withdrawal seizures, has never had to be admitted to the ICU before although he has been admitted as an inpatient for alcohol withdrawal.  He otherwise denies fevers, cough, vomiting, abdominal pain, chest pain, change in bowel movements, change with urination.  States that he wants to detox from alcohol and quit drinking.    No Known Allergies    Past Medical History:   Diagnosis Date   • Anxiety    • Diabetes mellitus (CMS/[facility])    • Essential (primary) hypertension        SurgHx: Denies      Social History     Tobacco Use   • Smoking status: Current Every Day Smoker     Packs/day: 1.00     Types: Cigarettes   Substance Use Topics   • Alcohol use: Yes     Comment: 1 pint daily   • Drug use: Never       Review of Systems   Constitutional: Negative.    HENT: Negative.    Eyes: Negative.    Respiratory: Negative.    Cardiovascular: Negative.    Gastrointestinal: Positive for nausea.   Genitourinary: Negative.    Musculoskeletal: Negative.    Skin: Negative.    Neurological: Positive for tremors.   Psychiatric/Behavioral: Negative for self-injury.   All other systems reviewed and are negative.      Physical Exam     ED Triage Vitals [04/28/20 0916]   ED Triage Vitals Group      Temp 97.9 °F (36.6 °C)      Heart Rate 75      Resp 20      /78      SpO2 98 %      EtCO2 mmHg       Height 6' 2\" (1.88  falls risk.  Patient will return to all recreational activities without restriction.  FOTO score will be greater than or equal to predicted value.     Plan  Patient would benefit from: skilled physical therapy  Planned modality interventions: cryotherapy, neuromuscular electric stimulation, TENS and thermotherapy: hydrocollator packs    Planned therapy interventions: abdominal trunk stabilization, activity modification, joint mobilization, IASTM, manual therapy, kinesiology taping, massage, Ferrari taping, balance, balance/weight bearing training, behavior modification, body mechanics training, nerve gliding, neuromuscular re-education, patient/caregiver education, postural training, strengthening, stretching, flexibility, functional ROM exercises, therapeutic activities, therapeutic exercise, home exercise program and IADL retraining    Frequency: 1-2x week  Duration in weeks: 12  Plan of Care beginning date: 12/4/2024  Treatment plan discussed with: patient  Plan details: Thank you for referring Matilda Day to PT at [facility] and for the opportunity to coordinate care.         Subjective Evaluation    History of Present Illness  Mechanism of injury: Matilda Day is an 79 y.o. F presenting to OPPT s/p R FITO (anterior approach) completed on 11/04/2024. Matilda completed Home Care PT where she worked on functional transfers, ambulation, leg strengthening, stair climbing from which pt was formally discharged on 11/05/2024. Pt is currently using a RW. States she has a SPC and that she trailed stair climbing using the SPC. Pt reports she would like to focus on improving strength, improving balance, and endurance. Per pt, she was advised to avoid laying on her stomach. Easing factors include heat, Vicodin, tylenol. States she ascends/descend the stairs with supervision from her  and uses UE support at the rail. States her  helps with laundry, supervises her on the stairs, assists with cooking &  m)      Weight 220 lb (99.8 kg)      Weight Scale Used ED Stated      BMI (Calculated) 28.25      IBW/kg (Calculated) 82.2       Physical Exam  Vitals signs reviewed.   Constitutional:       General: He is not in acute distress.     Comments: Tremulous   HENT:      Head: Normocephalic and atraumatic.      Mouth/Throat:      Mouth: Mucous membranes are dry.      Pharynx: No oropharyngeal exudate or posterior oropharyngeal erythema.   Eyes:      Conjunctiva/sclera: Conjunctivae normal.      Pupils: Pupils are equal, round, and reactive to light.   Cardiovascular:      Pulses: Normal pulses.      Heart sounds: No murmur.      Comments: Tachycardic. Regular rhythm  Pulmonary:      Effort: Pulmonary effort is normal. No respiratory distress.      Breath sounds: Normal breath sounds.   Abdominal:      General: There is no distension.      Palpations: Abdomen is soft.      Tenderness: There is no abdominal tenderness.   Musculoskeletal:         General: No swelling or tenderness.   Skin:     General: Skin is warm and dry.      Capillary Refill: Capillary refill takes less than 2 seconds.   Neurological:      General: No focal deficit present.      Mental Status: He is alert and oriented to person, place, and time.         ED Course     Procedures    Lab Results     Results for orders placed or performed during the hospital encounter of 04/28/20   CBC with Automated Differential   Result Value Ref Range    WBC 8.3 4.2 - 11.0 K/mcL    RBC 3.68 (L) 4.50 - 5.90 mil/mcL    HGB 12.3 (L) 13.0 - 17.0 g/dL    HCT 36.3 (L) 39.0 - 51.0 %    MCV 98.6 78.0 - 100.0 fl    MCH 33.4 26.0 - 34.0 pg    MCHC 33.9 32.0 - 36.5 g/dL    RDW-CV 15.1 (H) 11.0 - 15.0 %     140 - 450 K/mcL    NRBC 0 0 /100 WBC    DIFF TYPE AUTOMATED DIFFERENTIAL     Neutrophil 84 %    LYMPH 7 %    MONO 8 %    EOSIN 0 %    BASO 1 %    Percent Immature Granuloctyes 0 %    Absolute Neutrophil 7.0 1.8 - 7.7 K/mcL    Absolute Lymph 0.6 (L) 1.0 - 4.0 K/mcL     "cleaning.    PMHx: A-fib, iron deficiency anemia w/ monthly infusions  Quality of life: good    Patient Goals  Patient goals for therapy: decreased pain, improved balance, increased motion, return to sport/leisure activities, independence with ADLs/IADLs and increased strength    Pain  Current pain ratin  At best pain ratin  At worst pain ratin  Location: R groin    Social Support  Steps to enter house: yes  2  Stairs in house: yes (R)   12  Lives in: multiple-level home  Lives with: spouse      Diagnostic Tests    FCE comments: X-ray Hip/Pelvis (24): \"IMPRESSION: Satisfactory right hip arthroplasty as above.  Anatomic alignment without loosening.\"Treatments  Previous treatment: physical therapy  Discharged from (in last 30 days): inpatient hospitalization and home health care  Discharged from (in last 30 days) comments: Recent hospitalization see chart for more details.         Objective    Red Flags (night sweats, unrelenting night pain, fever, saddle anaesthesia, changes in B&B, unexplained weightloss): DENIES    Postural Findings: Forward head, rounded shoulders, increased t/s kyphosis, increased lumbar lordosis    Hip ROM:   IE   Joint/Motion Right Left   Hip Flexion  pain free WNL   Hip Ext Rot WNL WNL   Hip Int Rot WNL WNL   Hip Abd 40, stretch 45, stretch     Knee ROM: WNL b/l     Strength:   Joint / Motion  Right  Left    Hip ER  4- 4   Hip IR  4- 4   Hip F 4- 4   Hip Abd 4 4   Hip Add 4 4   Knee E 4- 4   Knee F 4- 4     NEURO Screen    Dermatomes: Intact b/l t/o    Reflexes  Right Left   Patellar (L3-L4) 2+ 2+   Achilles (S1-S2) 2+ 2+   Castaneda's neg neg       Palpation: TTP R anterolateral hip. Reports incision clean, dry intact. Denies foul discharge.    Additional Assessments:  TUs with RW  5xSTS: 18s to RW w/ b/l UE assist. Poor eccentric quad control with decent.   Gait: Decreased gait speed, decreased step length, use of RW. Presents with knee brace on R.  Stair Climbing: " Absolute Mono 0.6 0.3 - 0.9 K/mcL    Absolute Eos 0.0 (L) 0.1 - 0.5 K/mcL    Absolute Baso 0.1 0.0 - 0.3 K/mcL    Absolute Immature Granulocytes 0.0 0 - 0.2 K/mcl   Comprehensive Metabolic Panel   Result Value Ref Range    Sodium 136 135 - 145 mmol/L    Potassium 4.1 3.4 - 5.1 mmol/L    Chloride 102 98 - 107 mmol/L    Carbon Dioxide 19 (L) 21 - 32 mmol/L    Anion Gap 19 10 - 20 mmol/L    Glucose 206 (H) 65 - 99 mg/dL    BUN 26 (H) 6 - 20 mg/dL    Creatinine 0.87 0.67 - 1.17 mg/dL    GFR Estimate,  >90     GFR Estimate, Non African American >90     BUN/Creatinine Ratio 30 (H) 7 - 25    CALCIUM 9.1 8.4 - 10.2 mg/dL    TOTAL BILIRUBIN 1.2 (H) 0.2 - 1.0 mg/dL    AST/SGOT 67 (H) <38 Units/L    ALT/SGPT 54 <64 Units/L    ALK PHOSPHATASE 73 45 - 117 Units/L    TOTAL PROTEIN 8.3 (H) 6.4 - 8.2 g/dL    Albumin 3.9 3.6 - 5.1 g/dL    GLOBULIN 4.4 (H) 2.0 - 4.0 g/dL    A/G Ratio, Serum 0.9 (L) 1.0 - 2.4   Magnesium   Result Value Ref Range    MAGNESIUM 1.5 (L) 1.7 - 2.4 mg/dL   Alcohol   Result Value Ref Range    Alcohol Ethyl None detected. None detected. mg/dL   Phosphorus   Result Value Ref Range    PHOSPHORUS 3.0 2.4 - 4.7 mg/dL   Lipase   Result Value Ref Range    Lipase 219 73 - 393 Units/L   Troponin I Ultra Sensitive   Result Value Ref Range    TROPONIN I <0.02 <0.05 ng/mL   Metered blood glucose   Result Value Ref Range    Glucose Bedside  (H) 70 - 99 mg/dL       EKG Results         Radiology Results     Imaging Results          XR CHEST PA OR AP 1 VIEW (Final result)  Result time 04/28/20 10:06:07    Final result                 Impression:       Normal exam, unchanged compared to 12/17/2018.      Electronically Signed by: MICHELE MARK M.D.   Signed on: 4/28/2020 10:06 AM                Narrative:    EXAM: XR CHEST PA OR AP 1 VIEW    CLINICAL INDICATION: Alcohol withdrawal    COMPARISON: 12/17/2018    TECHNIQUE: Single frontal view of the chest was performed.    FINDINGS: Heart size and pulmonary  Step to approach with b/l UE support at rail. States supervision provided by  when ascending/descending.          Precautions: S/p L TKA performed on 11/04/24, Anterior approach  A-fib, Iron-deficiency anemia with monthly infusions (monitor SPO2 as needed), HTN    Manuals 12/04            Hip PROM                                                    Neuro Re-Ed             NBOS EO/EC             SLR 10x, partial range                                                                             Ther Ex             LAQ 20x            STS, elevated plinthe             Lateral stepping             Hip abd, standing             Heel slide             Bike w/u, when madyson             Step up             Mini squat             Ther Activity             Pt Education HEP, POC, pgx, dgx                         Gait Training             Ambulate with RW --> SPC with CG/sup                          Modalities                                             vascularity are normal.  The lungs are clear and well-expanded.  There is no pleural effusion or pneumothorax.                                ED Medication Orders (From admission, onward)    Ordered Start     Status Ordering Provider    04/28/20 1123 04/28/20 1130  LORazepam (ATIVAN) injection 1 mg  ONCE      Last MAR action:  Given KEISHA ANTONIO    04/28/20 1030 04/28/20 1045  metoPROLOL (LOPRESSOR) injection 5 mg  ONCE      Last MAR action:  Given KEISHA ANTONIO    04/28/20 1018 04/28/20 1030  magnesium sulfate 2 g in 50 mL premix IVPB  ONCE      Last MAR action:  Completed LAURI ALEXANDER    04/28/20 1012 04/28/20 1015    ONCE      Discontinued KEISHA ANTONIO    04/28/20 0944 04/28/20 0945  LORazepam (ATIVAN) injection 2 mg  ONCE      Last MAR action:  Given LAURI ALEXANDER    04/28/20 0928 04/28/20 0930  LORazepam (ATIVAN) injection 2 mg  ONCE      Last MAR action:  Given KEISHA ANTONIO    04/28/20 0928 04/28/20 0930  sodium chloride (NORMAL SALINE) 0.9 % bolus 1,000 mL  ONCE      Last MAR action:  Completed KEISHA ANTONIO               MDM  Vitals significant for tachycardia and hypertension on arrival.  Patient tremulous and appeared to be in acute alcohol withdrawal.  He was given 5 mg of lorazepam total here in the emergency department as well as 1 L of fluids with improvement of his vital signs and symptoms.  He did still have tachycardia after he symptomatically improved and was still in atrial fibrillation, was given 5 mg of IV metoprolol with further improvement.  Labs otherwise significant for mild hypomagnesemia which was repleted here in the emergency department.  Patient to be admitted to hospital for further evaluation and management of alcohol withdrawal and atrial fibrillation with rapid ventricular response.  Discussed case with hospitalist who is in agreement with plan, cardiology consulted also in agreement with plan.  Patient verbalized understanding and agreement with plan for admission.  Clinical  Impression     ED Diagnosis   1. Alcohol withdrawal syndrome with complication (CMS/HCC)         Disposition        Admit 4/28/2020 11:48 AM  Telemetry Bed?: No  Patient Class: Inpatient [1]  Level of Care: Acute [1]  Admission Diagnosis: Alcohol abuse [411320]  Admitting Physician: CHOLO LANDEROS [[id number]]  Requested Unit/Floor: admit to medical withdrawl unit  Transferring Patient to? Only adjust for transfers [facility] [[id number]]  Is this a telephone or verbal order?: This is a telephone order from the admitting physician                     Evelyne Diaz MD  Resident  04/28/20 3658

## 2024-12-05 ENCOUNTER — EVALUATION (OUTPATIENT)
Dept: PHYSICAL THERAPY | Facility: CLINIC | Age: 79
End: 2024-12-05
Payer: MEDICARE

## 2024-12-05 DIAGNOSIS — Z96.641 STATUS POST TOTAL HIP REPLACEMENT, RIGHT: ICD-10-CM

## 2024-12-05 PROCEDURE — 97161 PT EVAL LOW COMPLEX 20 MIN: CPT

## 2024-12-05 PROCEDURE — 97110 THERAPEUTIC EXERCISES: CPT

## 2024-12-06 ENCOUNTER — TELEPHONE (OUTPATIENT)
Age: 79
End: 2024-12-06

## 2024-12-06 NOTE — TELEPHONE ENCOUNTER
Patients GI provider:  Dr. Waters    Number to return call: [phone]    Reason for call: Pt called in and would like to know if patient is due to schedule Pouchoscopy. Please reach out to patient, thank you.    Scheduled procedure/appointment date if applicable: Apt/procedure n/a

## 2024-12-09 ENCOUNTER — NURSE TRIAGE (OUTPATIENT)
Age: 79
End: 2024-12-09

## 2024-12-09 NOTE — TELEPHONE ENCOUNTER
"Last OV 1/20/23 Dr. Jt Griffith of [facility]    Pt reports bouts of BRB mixed in with stool. Toilet water red over past 2 days with daily BM. Pt has J pouch and has liquid brown stools. On Eliquis. Went to ED 11/24/24 for same issue.     Denies abdominal pain, rectal pain, fever, chills, nausea, vomiting, dizziness. Appt scheduled 1/6/25 and placed on wait list. No care advice given per protocol as pt has not been seen in over 1 year.    Pt was advised to follow up with PCP in the interim.    Reason for Disposition   Rectal bleeding is a chronic symptom (recurrent or ongoing AND present > 4 weeks)    Answer Assessment - Initial Assessment Questions  1. APPEARANCE of BLOOD: \"What color is it?\" \"Is it passed separately, on the surface of the stool, or mixed in with the stool?\"       Red  2. AMOUNT: \"How much blood was passed?\"       With stool; toilet water red  3. FREQUENCY: \"How many times has blood been passed with the stools?\"       1x daily past 2 days  4. ONSET: \"When was the blood first seen in the stools?\" (Days or weeks)       Weeks - went to ED in Nov for same  5. DIARRHEA: \"Is there also some diarrhea?\" If Yes, ask: \"How many diarrhea stools in the past 24 hours?\"       J Pouch  6. CONSTIPATION: \"Do you have constipation?\" If Yes, ask: \"How bad is it?\"      Denies  7. RECURRENT SYMPTOMS: \"Have you had blood in your stools before?\" If Yes, ask: \"When was the last time?\" and \"What happened that time?\"       ED in Nov  8. BLOOD THINNERS: \"Do you take any blood thinners?\" (e.g., Coumadin/warfarin, Pradaxa/dabigatran, aspirin)      Eliquis  9. OTHER SYMPTOMS: \"Do you have any other symptoms?\"  (e.g., abdomen pain, vomiting, dizziness, fever)      Denies    Protocols used: Rectal Bleeding-Adult-[place]    "

## 2024-12-09 NOTE — TELEPHONE ENCOUNTER
Patients GI provider:  Dr. Waters    Number to return call: [phone]    Reason for call: Pt calling to sched apt, has had rectal bleeding for 2-3 wks. Doc's next available Jan 2025. Transferred to [facility] in triage for further assistance.     Scheduled procedure/appointment date if applicable: N/A

## 2024-12-10 ENCOUNTER — OFFICE VISIT (OUTPATIENT)
Dept: PHYSICAL THERAPY | Facility: CLINIC | Age: 79
End: 2024-12-10
Payer: MEDICARE

## 2024-12-10 DIAGNOSIS — Z96.641 STATUS POST TOTAL HIP REPLACEMENT, RIGHT: Primary | ICD-10-CM

## 2024-12-10 PROCEDURE — 97116 GAIT TRAINING THERAPY: CPT

## 2024-12-10 PROCEDURE — 97110 THERAPEUTIC EXERCISES: CPT

## 2024-12-10 NOTE — PROGRESS NOTES
Daily Note     Today's date: 12/10/2024  Patient name: Matilda Day  : 1945  MRN: [id number]  Referring provider: Gaviota Ford,*  Dx:   Encounter Diagnosis     ICD-10-CM    1. Status post total hip replacement, right  Z96.641           Start Time: 1026  Stop Time: 1054  Total time in clinic (min): 28 minutes    Subjective: Pt reports that she is doing well, has been performing all of HEP besides straight leg raise, as he it causes back pain since her bed is so soft. Reports that she tried tightnening her core, but back pain persisted during the exercise.       Objective: See treatment diary below      Assessment: Tolerated treatment well. Patient demonstrated fatigue post treatment, exhibited good technique with therapeutic exercises, and would benefit from continued PT. Pt ambulated with quad cane, as she has one at home, with modA verbal cues for proper step-to, then step-thru form at start; independent at end. Pt was most challenged with sit to stands from elevated surface.      Plan: Continue per plan of care.      Precautions: S/p L FITO performed on 24, Anterior approach  A-fib, Iron-deficiency anemia with monthly infusions (monitor SPO2 as needed), HTN    Manuals 12/04 12/10           Hip PROM                                                    Neuro Re-Ed             NBOS EO/EC             SLR 10x, partial range 2x10, partial range                                                                            Ther Ex             LAQ 20x x20           STS, elevated plinthe  2x10           Lateral stepping             Hip abd, standing  2x10           Heel slide             Bike w/u, when madyson             Step up  x10           Mini squat             Ther Activity             Pt Education HEP, POC, pgx, dgx                         Gait Training             Ambulate with RW --> SPC with CG/sup  X5 min w/ RW;  X6 min w/ Quad cane                        Modalities

## 2024-12-11 DIAGNOSIS — F41.9 ANXIETY: ICD-10-CM

## 2024-12-11 RX ORDER — LORAZEPAM 1 MG/1
1 TABLET ORAL EVERY 6 HOURS PRN
Qty: 120 TABLET | Refills: 0 | Status: SHIPPED | OUTPATIENT
Start: 2024-12-11

## 2024-12-11 NOTE — TELEPHONE ENCOUNTER
Reason for call:   [x] Refill   [] Prior Auth  [] Other:     Office:   [x] PCP/Provider -   [] Specialty/Provider -     Medication:     LORazepam (ATIVAN) 1 mg tablet       Dose/Frequency: Take 1 tablet (1 mg total) by mouth every 6 (six) hours as needed for anxiety     Quantity: 120 tab    Pharmacy: [id number] - [place], [place] - [address]     Does the patient have enough for 3 days?   [] Yes   [x] No - Send as HP to POD

## 2024-12-12 ENCOUNTER — APPOINTMENT (OUTPATIENT)
Dept: LAB | Facility: AMBULARY SURGERY CENTER | Age: 79
End: 2024-12-12
Payer: MEDICARE

## 2024-12-12 ENCOUNTER — TELEPHONE (OUTPATIENT)
Age: 79
End: 2024-12-12

## 2024-12-12 DIAGNOSIS — D50.0 IRON DEFICIENCY ANEMIA DUE TO CHRONIC BLOOD LOSS: Primary | ICD-10-CM

## 2024-12-12 DIAGNOSIS — D50.0 IRON DEFICIENCY ANEMIA DUE TO CHRONIC BLOOD LOSS: ICD-10-CM

## 2024-12-12 LAB
BASOPHILS # BLD AUTO: 0.11 THOUSANDS/ÂΜL (ref 0–0.1)
BASOPHILS NFR BLD AUTO: 2 % (ref 0–1)
EOSINOPHIL # BLD AUTO: 0.36 THOUSAND/ÂΜL (ref 0–0.61)
EOSINOPHIL NFR BLD AUTO: 5 % (ref 0–6)
ERYTHROCYTE [DISTWIDTH] IN BLOOD BY AUTOMATED COUNT: 15.8 % (ref 11.6–15.1)
FERRITIN SERPL-MCNC: 48 NG/ML (ref 11–307)
HCT VFR BLD AUTO: 31.5 % (ref 34.8–46.1)
HGB BLD-MCNC: 9.6 G/DL (ref 11.5–15.4)
IMM GRANULOCYTES # BLD AUTO: 0.01 THOUSAND/UL (ref 0–0.2)
IMM GRANULOCYTES NFR BLD AUTO: 0 % (ref 0–2)
IRON SATN MFR SERPL: 6 % (ref 15–50)
IRON SERPL-MCNC: 21 UG/DL (ref 50–212)
LYMPHOCYTES # BLD AUTO: 2.53 THOUSANDS/ÂΜL (ref 0.6–4.47)
LYMPHOCYTES NFR BLD AUTO: 38 % (ref 14–44)
MCH RBC QN AUTO: 29.6 PG (ref 26.8–34.3)
MCHC RBC AUTO-ENTMCNC: 30.5 G/DL (ref 31.4–37.4)
MCV RBC AUTO: 97 FL (ref 82–98)
MONOCYTES # BLD AUTO: 0.91 THOUSAND/ÂΜL (ref 0.17–1.22)
MONOCYTES NFR BLD AUTO: 14 % (ref 4–12)
NEUTROPHILS # BLD AUTO: 2.82 THOUSANDS/ÂΜL (ref 1.85–7.62)
NEUTS SEG NFR BLD AUTO: 41 % (ref 43–75)
NRBC BLD AUTO-RTO: 0 /100 WBCS
PLATELET # BLD AUTO: 633 THOUSANDS/UL (ref 149–390)
PMV BLD AUTO: 8.7 FL (ref 8.9–12.7)
RBC # BLD AUTO: 3.24 MILLION/UL (ref 3.81–5.12)
TIBC SERPL-MCNC: 346 UG/DL (ref 250–450)
UIBC SERPL-MCNC: 325 UG/DL (ref 155–355)
WBC # BLD AUTO: 6.74 THOUSAND/UL (ref 4.31–10.16)

## 2024-12-12 PROCEDURE — 85025 COMPLETE CBC W/AUTO DIFF WBC: CPT

## 2024-12-12 PROCEDURE — 83540 ASSAY OF IRON: CPT

## 2024-12-12 PROCEDURE — 83550 IRON BINDING TEST: CPT

## 2024-12-12 PROCEDURE — 82728 ASSAY OF FERRITIN: CPT

## 2024-12-12 PROCEDURE — 36415 COLL VENOUS BLD VENIPUNCTURE: CPT

## 2024-12-12 RX ORDER — LORAZEPAM 1 MG/1
1 TABLET ORAL EVERY 6 HOURS PRN
Qty: 120 TABLET | Refills: 0 | OUTPATIENT
Start: 2024-12-12

## 2024-12-12 NOTE — TELEPHONE ENCOUNTER
Patient called in for Hemoglobin and Ferritin labs to be placed. Patient stated has been bleeding a lot and cannot get into Dr Solorio till 01/06 to check levels. Please advise patient once order are placed. Thank you.

## 2024-12-12 NOTE — TELEPHONE ENCOUNTER
Please call Patient.  Ask when hemorrhoids started bleeding again.  Any dizziness, shortness of breath or other symptoms?  When did it start?    Labs ordered.

## 2024-12-12 NOTE — TELEPHONE ENCOUNTER
Hemorrhoids started bleeding 1 month ago. Patient said she is tired, but no other symptoms. Patient will go for labs.

## 2024-12-13 ENCOUNTER — OFFICE VISIT (OUTPATIENT)
Dept: PHYSICAL THERAPY | Facility: CLINIC | Age: 79
End: 2024-12-13
Payer: MEDICARE

## 2024-12-13 DIAGNOSIS — Z96.641 STATUS POST TOTAL HIP REPLACEMENT, RIGHT: Primary | ICD-10-CM

## 2024-12-13 PROCEDURE — 97112 NEUROMUSCULAR REEDUCATION: CPT

## 2024-12-13 PROCEDURE — 97110 THERAPEUTIC EXERCISES: CPT

## 2024-12-13 NOTE — TELEPHONE ENCOUNTER
Please Call patient.  Hemoglobin stable from previous.  Iron slightly low.  You have scheduled blood work prior to our appointment with hematology next month, please don't forget to do that.

## 2024-12-13 NOTE — PROGRESS NOTES
Daily Note     Today's date: 2024  Patient name: Matilda Day  : 1945  MRN: [id number]  Referring provider: Gaviota Ford,*  Dx:   Encounter Diagnosis     ICD-10-CM    1. Status post total hip replacement, right  Z96.641                      Subjective: Pt reports she is tired from not sleeping well, little to no pain in her R hip. Does c/o R knee pain.     Objective: See treatment diary below      Assessment: Tolerated treatment well. Patient would benefit from continued PT.     Plan: Continue per plan of care.      Precautions: S/p L FITO performed on 24, Anterior approach  A-fib, Iron-deficiency anemia with monthly infusions (monitor SPO2 as needed), HTN    Manuals 12/04 12/10 12/13          Hip PROM                                                    Neuro Re-Ed             NBOS EO/EC   1 min ea          SLR 10x, partial range 2x10, partial range 2x10                                                                            Ther Ex             LAQ 20x x20 x20          STS, elevated plinthe  2x10 2x5          Lateral stepping             Hip abd, standing  2x10 2x10 R          Heel slide   10x          Bike w/u, when madyson   5 min           Step up  x10           Mini squat   10x          Ther Activity             Pt Education HEP, POC, pgx, dgx                         Gait Training             Ambulate with RW --> SPC with CG/sup  X5 min w/ RW;  X6 min w/ Quad cane SPC                       Modalities

## 2024-12-16 NOTE — PROGRESS NOTES
Daily Note     Today's date: 2024  Patient name: Matilda Day  : 1945  MRN: [id number]  Referring provider: Gaviota Ford,*  Dx:   Encounter Diagnosis     ICD-10-CM    1. Status post total hip replacement, right  Z96.641             Start Time: 1041  Stop Time: 1105  Total time in clinic (min): 24 minutes    Subjective: Reports R knee pain. Feels she is getting stronger, but continues to feel limited d/t R quad weakness. States she has been walking short distances at home with her SPC though she feels she isnt as confidant as she should be.    Objective: See treatment diary below      Assessment: Tolerated treatment well. Reports fatigue t/o, requiring rest breaks t/o. Discussed benefits of using RW to assist with decreased endurance and to reduce falls risk to which pt understood. Patient would benefit from continued PT.     Plan: Continue per plan of care.      Precautions: S/p L FITO performed on 24, Anterior approach  A-fib, Iron-deficiency anemia with monthly infusions (monitor SPO2 as needed), HTN    Manuals 12/04 12/10 12/13 12/17         Hip PROM                                                    Neuro Re-Ed             NBOS EO/EC   1 min ea 1 min         SLR 10x, partial range 2x10, partial range 2x10  2x10                                                                          Ther Ex             LAQ 20x x20 x20 20x 1.5#         STS, elevated plinthe  2x10 2x5 2x5         Lateral stepping             Hip abd, standing  2x10 2x10 R 2x10 R          Heel slide   10x 15x         Bike w/u, when madyson   5 min  NT         Step up  x10           Mini squat   10x 10x         Ther Activity             Pt Education HEP, POC, pgx, dgx                         Gait Training             Ambulate with RW --> SPC with CG/sup  X5 min w/ RW;  X6 min w/ Quad cane SPC                       Modalities

## 2024-12-17 ENCOUNTER — OFFICE VISIT (OUTPATIENT)
Dept: PHYSICAL THERAPY | Facility: CLINIC | Age: 79
End: 2024-12-17
Payer: MEDICARE

## 2024-12-17 ENCOUNTER — TELEPHONE (OUTPATIENT)
Age: 79
End: 2024-12-17

## 2024-12-17 DIAGNOSIS — Z96.641 STATUS POST TOTAL HIP REPLACEMENT, RIGHT: Primary | ICD-10-CM

## 2024-12-17 PROCEDURE — 97110 THERAPEUTIC EXERCISES: CPT

## 2024-12-17 NOTE — TELEPHONE ENCOUNTER
Pt called stating orders for her diagnostic mammogram need to be placed that way she can schedule it for next yr.    Pt would like a call once its placed so she can make an apt. Thank you

## 2024-12-18 ENCOUNTER — TELEPHONE (OUTPATIENT)
Dept: SURGICAL ONCOLOGY | Facility: CLINIC | Age: 79
End: 2024-12-18

## 2024-12-18 NOTE — TELEPHONE ENCOUNTER
Called patient and spoke to Matilda. I did state that I received her message about needing the order for the diagnostic mammogram placed so she can schedule that for next year. I stated that I spoke with RODNEY Taylor and she will place that order after her visit with her in March. Patient forgot she had that appt and was fine with waiting until then. I confirmed her appt with her.

## 2024-12-20 ENCOUNTER — OFFICE VISIT (OUTPATIENT)
Dept: PHYSICAL THERAPY | Facility: CLINIC | Age: 79
End: 2024-12-20
Payer: MEDICARE

## 2024-12-20 DIAGNOSIS — Z96.641 STATUS POST TOTAL HIP REPLACEMENT, RIGHT: Primary | ICD-10-CM

## 2024-12-20 PROCEDURE — 97110 THERAPEUTIC EXERCISES: CPT

## 2024-12-20 PROCEDURE — 97112 NEUROMUSCULAR REEDUCATION: CPT

## 2024-12-20 NOTE — PROGRESS NOTES
"Daily Note     Today's date: 2024  Patient name: Matilad Day  : 1945  MRN: [id number]  Referring provider: Gaviota Ford,*  Dx:   Encounter Diagnosis     ICD-10-CM    1. Status post total hip replacement, right  Z96.641         Start Time: 1030  Stop Time: 1100  Total time in clinic (min): 30 minutes    Subjective: States her R hip has been feeling good and she has no issues with it lately. Reports that her R knee has been bothering her a lot and that she \"can't wait to get it done\".     Objective: See treatment diary below      Assessment: Tolerated treatment well. Continued with hip and quad strengthening today where pt able to tolerate incorporation of new exercises without difficulty. Requires rest breaks intermittently between sets. Pt would benefit from continued PT to work on strength, endurance, and balance. Patient would benefit from continued PT.     Plan: Continue per plan of care.      Precautions: S/p L FITO performed on 24, Anterior approach  A-fib, Iron-deficiency anemia with monthly infusions (monitor SPO2 as needed), HTN  Access Code: [email or web address]      Manuals 12/04 12/10 12/13 12/17 12/20        Hip PROM                                                    Neuro Re-Ed             NBOS EO/EC   1 min ea 1 min         SLR 10x, partial range 2x10, partial range 2x10  2x10 3x10                                                                         Ther Ex             Bridge     2x10        Supine clamshell     2x10 YTB        LAQ 20x x20 x20 20x 1.5# 20x 1.5#        STS, elevated plinthe  2x10 2x5 2x5 10x        Lateral stepping             Hip abd, standing  2x10 2x10 R 2x10 R  2x10 R        Heel slide   10x 15x NT        Bike w/u, when madyson   5 min  NT         Step up  x10           Mini squat   10x 10x 10x        TR     20x        Ther Activity             Pt Education HEP, POC, pgx, dgx                         Gait Training             Ambulate with RW --> SPC with CG/sup  " X5 min w/ RW;  X6 min w/ Quad cane SPC                       Modalities

## 2024-12-24 ENCOUNTER — APPOINTMENT (OUTPATIENT)
Dept: PHYSICAL THERAPY | Facility: CLINIC | Age: 79
End: 2024-12-24
Payer: MEDICARE

## 2024-12-27 ENCOUNTER — OFFICE VISIT (OUTPATIENT)
Dept: PHYSICAL THERAPY | Facility: CLINIC | Age: 79
End: 2024-12-27
Payer: MEDICARE

## 2024-12-27 DIAGNOSIS — Z96.641 STATUS POST TOTAL HIP REPLACEMENT, RIGHT: Primary | ICD-10-CM

## 2024-12-27 PROCEDURE — 97110 THERAPEUTIC EXERCISES: CPT

## 2024-12-27 NOTE — PROGRESS NOTES
Daily Note     Today's date: 2024  Patient name: Matilda Day  : 1945  MRN: [id number]  Referring provider: Gaviota Ford,*  Dx:   Encounter Diagnosis     ICD-10-CM    1. Status post total hip replacement, right  Z96.641           Start Time: 1000  Stop Time: 1030  Total time in clinic (min): 30 minutes    Subjective: Presents today using quad cane. Feels she is getting stronger.     Objective: See treatment diary below      Assessment: Tolerated treatment well. Pt able to tolerate increase in exercise prescription today without increase in sx or need for as many rest breaks indicating increased strength, endurance, and activity tolerance. Reintroduced recum bike where pt reported mild R knee discomfort which subsided with rest. Continue to progress with emphasis on strengthening program. Patient would benefit from continued PT.     Plan: Continue per plan of care.      Precautions: S/p L FITO performed on 24, Anterior approach  A-fib, Iron-deficiency anemia with monthly infusions (monitor SPO2 as needed), HTN  Access Code: [email or web address]      Manuals 12/04 12/10 12/13 12/17 12/20 12/27       Hip PROM                                                    Neuro Re-Ed             NBOS EO/EC   1 min ea 1 min         SLR 10x, partial range 2x10, partial range 2x10  2x10 3x10 2x10                                                                        Ther Ex             Bridge     2x10 2x10       Supine clamshell     2x10 YTB 2x10 YTB       LAQ 20x x20 x20 20x 1.5# 20x 1.5# 30x, 2#       STS  2x10 2x5 2x5 10x 10x       Lateral stepping             Hip abd, standing  2x10 2x10 R 2x10 R  2x10 R 3x10       Heel slide   10x 15x NT DC       Bike w/u, when madyson   5 min  NT  3'       Step up  x10           Mini squat   10x 10x 10x NV       Leg Press      NV?       TR     20x 20x       Ther Activity             Pt Education HEP, POC, pgx, dgx                         Gait Training             Ambulate with RW  --> SPC with CG/sup  X5 min w/ RW;  X6 min w/ Quad cane SPC                       Modalities

## 2024-12-27 NOTE — PROGRESS NOTES
Colon and Rectal Surgery   Matilda Day 79 y.o. female MRN [id number]  Encounter: [id number]  12/30/24 4:40 PM            Assessment: Matilda Day is a 79 y.o. female who has pouch/anal bleeding.      Plan:   Rectal bleeding  The patient has intermittent rectal bleeding that can be somewhat severe.  She went to the hospital a couple of weeks ago for evaluation.  CTA showed no evidence of active bleeding and the patient was discharged home.    She had a pouchoscopy in 2023.  I think this should be repeated.  She has a history of ulcerations and bleeding from these ulcerations and her ileal pouch anal anastomosis area.  Evaluation of the anus and surrounding structures can be done at the same time to evaluate for possible source of her bleeding.    Pouchoscopy risks, not limited to bleeding, perforated colon, need for surgery, and missed lesions were discussed. Alternatives were discussed. Questions were answered. She agreed to the procedure.     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  She has intermittent rectal bleeding and a history of rectal ulceration.        Subjective     HPI    Matilda Day is a 79 y.o. female with ulcerative colitis, who presents for rectal bleeding.     The patient states rectal bleeding comes and goes; painless bright red rectal bleeding with bowel movements, noticeable in the toilet bowl and upon wiping.  Having frequent, loose bowel movements daily.    Last seen in the office on 1/20/2023 for ileal pouch ulcer; rectal bleeding.   .  Flexible sigmoidoscopy on 1/9/2023 by Dr. Gonzalez.    Historical Information   Past Medical History:   Diagnosis Date    Anemia     Anxiety     Arrhythmia     Arthritis     Asthma     Blood clot in vein     portal vein    Breast cancer (HCC) 02/12/2021    Breast lump     [id number] RESOLVED    Cancer (HCC)     Depression     Disease of thyroid gland     DVT, lower extremity  (HCC)     GERD (gastroesophageal reflux disease)     Hyperlipidemia     Hypertension     Hypokalemia     Hyponatremia     Hypothyroidism     Iron deficiency anemia     Irregular heart beat     Manic behavior (HCC)     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HCC)     Osteoarthritis     Palpitations     [id number]  RESOLVED    Paroxysmal supraventricular tachycardia (HCC)     PE (pulmonary thromboembolism) (HCC)     Pneumonia     Sjoegren syndrome     Sleep apnea     Sleep difficulties     Spinal stenosis     Thrombocytosis     [id number]  RESOLVED    Tremors of nervous system     dbs implanted right and left chest    Ulcerative colitis (HCC)     Vertigo     [id number] RESOLVED     Past Surgical History:   Procedure Laterality Date    ABDOMINAL SURGERY      APPENDECTOMY      BREAST BIOPSY Left 11/07/2019    Stereo    BREAST EXCISIONAL BIOPSY Left     x many years    BREAST SURGERY      lumpectomy & biopsy    CARMELLA HOLE W/ STEREOTACTIC INSERTION OF DBS LEADS / INTRAOP MICROELECTRODE RECORDING      COLON SURGERY      COLONOSCOPY N/A 08/30/2017    Procedure: POUCHOSCOPY;  Surgeon: VANDANA Waters MD;  Location: [facility];  Service: Colorectal    COLOPROCTECTOMY W/ ILEO J POUCH      ESOPHAGOGASTRODUODENOSCOPY      ONSET 10/17/11    FISTULA REPAIR      LLEOANAL FISTULA REPAIR TRANSPERIN TRANSABD APPROACH    HYSTERECTOMY      age 38    ILEOSTOMY CLOSURE      KNEE ARTHROSCOPY      Right    MAMMO STEREOTACTIC BREAST BIOPSY LEFT (ALL INC) Left 11/07/2019    MAMMO STEREOTACTIC BREAST BIOPSY LEFT (ALL INC) Left 12/17/2020    MAMMO STEREOTACTIC BREAST BIOPSY LEFT (ALL INC) EACH ADD Left 12/17/2020    MASTECTOMY Left 02/12/2021    left mastectomy- Dr. Hayes    MASTECTOMY W/ SENTINEL NODE BIOPSY Left 02/12/2021    Procedure: BREAST MASTECTOMY WITH SENTINEL LYMPH NODE BIOPSY, LYMPHATIC MAPPING WITH BLUE DYE AND RADIAOCTIVE DYE (INJECT AT 1100 BY DR HAYES IN THE OR);  Surgeon: Robbie Hayes MD;  Location: [facility];  Service: Surgical Oncology    VT  ARTHRP ACETBLR/PROX FEM PROSTC AGRFT/ALGRFT Right 11/4/2024    Procedure: ARTHROPLASTY HIP TOTAL, overnight;  Surgeon: Gaviota Ford DO;  Location:  [facility];  Service: Orthopedics    ND INSJ/RPLCMT CRANIAL NEUROSTIM PULSE GENERATOR Right 06/20/2017    Procedure: DBS GENERATOR REPLACEMENT;  Surgeon: Marin Mao MD;  Location:  [facility] OR;  Service: Neurosurgery    ND INSJ/RPLCMT CRANIAL NEUROSTIM PULSE GENERATOR N/A 12/04/2019    Procedure: REPLACEMENT IMPLANTABLE PULSE GENERATOR FOR DEEP BRAIN STIMULATOR LEFT CHEST;  Surgeon: Damian Batres MD;  Location:  MAIN OR;  Service: Neurosurgery    SPLENECTOMY      TONSILLECTOMY         Meds/Allergies       Current Outpatient Medications:     Eliquis 5 MG, TAKE ONE TABLET BY MOUTH TWICE A DAY, Disp: 180 tablet, Rfl: 1    albuterol (PROVENTIL HFA,VENTOLIN HFA) 90 mcg/act inhaler, Inhale 2 puffs every 6 (six) hours as needed for wheezing, Disp: 8 g, Rfl: 1    amLODIPine (NORVASC) 2.5 mg tablet, TAKE ONE TABLET BY MOUTH EVERY DAY, Disp: 90 tablet, Rfl: 1    Ascorbic Acid 500 MG CAPS, Take 500 mg by mouth daily., Disp: , Rfl:     Calcium Carb-Cholecalciferol (CALCIUM 600-D PO), Take 1 tablet by mouth 2 (two) times a day, Disp: , Rfl:     Coenzyme Q10 (CO Q-10 PO), Take 1 capsule by mouth daily, Disp: , Rfl:     diltiazem (CARDIZEM CD) 180 mg 24 hr capsule, TAKE ONE CAPSULE BY MOUTH EVERY DAY, Disp: 90 capsule, Rfl: 1    diltiazem (CARDIZEM) 60 mg tablet, TAKE ONE TABLET BY MOUTH EVERY DAY, Disp: 90 tablet, Rfl: 1    docusate sodium (COLACE) 100 mg capsule, Take 1 capsule (100 mg total) by mouth 2 (two) times a day (Patient not taking: Reported on 11/18/2024), Disp: 20 capsule, Rfl: 0    escitalopram (LEXAPRO) 20 mg tablet, TAKE ONE TABLET BY MOUTH EVERY DAY, Disp: 90 tablet, Rfl: 1    ferrous sulfate 324 (65 Fe) mg, Take 1 tablet (324 mg total) by mouth daily before breakfast Begin 30 days prior to surgery (Patient not taking: Reported on 11/18/2024), Disp: 30  tablet, Rfl: 1    fluticasone (FLONASE) 50 mcg/act nasal spray, APPLY ONE SPRAY IN EACH NOSTRIL ONCE DAILY AS NEEDED FOR RUNNY NOSE, Disp: 48 g, Rfl: 1    Fluticasone Furoate-Vilanterol 100-25 mcg/actuation inhaler, Inhale 1 puff daily Rinse mouth after use., Disp: 60 blister, Rfl: 5    folic acid (KP Folic Acid) 1 mg tablet, Take 1 tablet (1 mg total) by mouth daily Begin 30 days prior to surgery, Disp: 30 tablet, Rfl: 1    gabapentin (NEURONTIN) 600 MG tablet, TAKE ONE TABLET BY MOUTH EVERY MORNING , TAKE ONE TABLET BY MOUTH EVERY DAY IN THE AFTERNOON , AND TAKE TWO TABLETS BY MOUTH EVERY EVENING AT BEDTIME, Disp: 360 tablet, Rfl: 1    HYDROcodone-acetaminophen (NORCO) 5-325 mg per tablet, Take 1 tablet by mouth every 4 (four) hours as needed for pain Do not exceed 3000 mg of acetaminophen per day. Max Daily Amount: 6 tablets, Disp: 42 tablet, Rfl: 0    levothyroxine 50 mcg tablet, TAKE ONE TABLET BY MOUTH EVERY DAY, Disp: 90 tablet, Rfl: 0    lisinopril (ZESTRIL) 20 mg tablet, TAKE ONE TABLET BY MOUTH TWO TIMES A DAY, Disp: 180 tablet, Rfl: 1    LORazepam (ATIVAN) 1 mg tablet, Take 1 tablet (1 mg total) by mouth every 6 (six) hours as needed for anxiety, Disp: 120 tablet, Rfl: 0    MAGNESIUM PO, Take 1 tablet by mouth daily 400 mg tablet , Disp: , Rfl:     Multiple Vitamin (multivitamin) tablet, Take 1 tablet by mouth daily Begin 30 days prior to surgery, Disp: 30 tablet, Rfl: 1    Omega-3 Fatty Acids (FISH OIL PO), Take 1 capsule by mouth daily, Disp: , Rfl:     pantoprazole (PROTONIX) 40 mg tablet, TAKE ONE TABLET BY MOUTH EVERY DAY, Disp: 90 tablet, Rfl: 1    potassium chloride (MICRO-K) 10 MEQ CR capsule, TAKE TWO CAPSULES BY MOUTH EVERY DAY, Disp: 180 capsule, Rfl: 1    promethazine (PHENERGAN) 12.5 MG tablet, Take 1 tablet (12.5 mg total) by mouth every 6 (six) hours as needed for nausea or vomiting, Disp: 8 tablet, Rfl: 0    simvastatin (ZOCOR) 10 mg tablet, TAKE ONE-HALF TABLET BY MOUTH DAILY, Disp: 45  tablet, Rfl: 1    sodium chloride 1 g tablet, TAKE ONE TABLET BY MOUTH FOUR TIMES A DAY, Disp: 270 tablet, Rfl: 1    torsemide (DEMADEX) 10 mg tablet, TAKE ONE TABLET BY MOUTH EVERY DAY AS NEEDED (EDEMA), Disp: 90 tablet, Rfl: 1    torsemide (DEMADEX) 20 mg tablet, Take 1 tablet (20 mg total) by mouth daily, Disp: 90 tablet, Rfl: 3    vitamin B-12 (VITAMIN B-12) 500 mcg tablet, Take 1 tablet (500 mcg total) by mouth daily, Disp: 90 tablet, Rfl: 1    Current Facility-Administered Medications:     cyanocobalamin injection 1,000 mcg, 1,000 mcg, Intramuscular, Q30 Days, Zaira Velasquez MD, 1,000 mcg at 23 1012  Allergies   Allergen Reactions    Indomethacin GI Intolerance, Dizziness and Other (See Comments)    Macrobid [Nitrofurantoin Monohyd Macro] Rash    Nitrofurantoin Other (See Comments)    Penicillins Rash and Other (See Comments)     Annotation - [id number]: can take cephalosporins    Sulfa Antibiotics Rash and Other (See Comments)       Social History   Social History     Substance and Sexual Activity   Drug Use No     Social History     Tobacco Use   Smoking Status Former    Current packs/day: 0.00    Average packs/day: 1 pack/day for 15.0 years (15.0 ttl pk-yrs)    Types: Cigarettes    Start date: 1950    Quit date: 1965    Years since quittin.0   Smokeless Tobacco Never   Tobacco Comments    Quit         Family History   Problem Relation Age of Onset    Venous thrombosis Mother         ACUTE VENOUS THROMBOSIS OF DEEP VESSELS OF THE DISTAL LOWER EXTREMITY    Other Mother         PHLEBITIS    Hypertension Mother     Peripheral vascular disease Mother     COPD Father     Diabetes Father         MELLITUS    Stroke Father     Diabetes Sister         MELLITUS    Sjogren's syndrome Sister     No Known Problems Daughter     No Known Problems Maternal Grandmother     No Known Problems Maternal Grandfather     No Known Problems Paternal Grandmother     No Known Problems Paternal Grandfather      "No Known Problems Sister     No Known Problems Maternal Aunt     No Known Problems Paternal Aunt     No Known Problems Son          Review of Systems   Constitutional: Negative.    Respiratory: Negative.     Cardiovascular: Negative.    Gastrointestinal:  Positive for blood in stool.       Objective   Current Vitals:  Vitals:    12/30/24 1556   Weight: 63.5 kg (140 lb)   Height: 5' 3\" (1.6 m)         Physical Exam  Constitutional:       Appearance: Normal appearance.      Comments: Frail.   Cardiovascular:      Rate and Rhythm: Normal rate and regular rhythm.   Pulmonary:      Effort: Pulmonary effort is normal.      Breath sounds: Normal breath sounds.   Abdominal:      General: Abdomen is flat.      Palpations: Abdomen is soft.   Neurological:      General: No focal deficit present.      Mental Status: She is alert and oriented to person, place, and time.               "

## 2024-12-30 ENCOUNTER — OFFICE VISIT (OUTPATIENT)
Age: 79
End: 2024-12-30
Payer: MEDICARE

## 2024-12-30 VITALS — BODY MASS INDEX: 24.8 KG/M2 | HEIGHT: 63 IN | WEIGHT: 140 LBS

## 2024-12-30 DIAGNOSIS — K62.5 RECTAL BLEEDING: Primary | ICD-10-CM

## 2024-12-30 DIAGNOSIS — K51.918 ULCERATIVE COLITIS, CHRONIC, OTHER COMPLICATION (HCC): ICD-10-CM

## 2024-12-30 PROCEDURE — 99203 OFFICE O/P NEW LOW 30 MIN: CPT | Performed by: COLON & RECTAL SURGERY

## 2024-12-30 NOTE — PROGRESS NOTES
Daily Note     Today's date: 2024  Patient name: Matilda Day  : 1945  MRN: [id number]  Referring provider: Gaviota Ford,*  Dx:   Encounter Diagnosis     ICD-10-CM    1. Status post total hip replacement, right  Z96.641             Start Time: 0900  Stop Time: 0932  Total time in clinic (min): 32 minutes    Subjective: Reports no changes since her LV.     Objective: See treatment diary below      Assessment: Tolerated treatment well. Pt appropriately challenged with balance exercises and tolerated leg press well. Did not do recum bike today d/t reports of knee pain last visit. Continue to progress with emphasis on strengthening program. Patient would benefit from continued PT.     Plan: Continue per plan of care.      Precautions: S/p L FITO performed on 24, Anterior approach  A-fib, Iron-deficiency anemia with monthly infusions (monitor SPO2 as needed), HTN  Access Code: [email or web address]      Manuals 12/04 12/10 12/13 12/17 12/20 12/27 12/31      Hip PROM                                                    Neuro Re-Ed             NBOS EO/EC   1 min ea 1 min   1 min      NBOS EO, foam       1 min      SLR 10x, partial range 2x10, partial range 2x10  2x10 3x10 2x10 2x10      Cone tap       20x      Step overs       4 passes                                             Ther Ex             Bridge     2x10 2x10 2x10      Supine clamshell     2x10 YTB 2x10 YTB 2x10 RTB      LAQ 20x x20 x20 20x 1.5# 20x 1.5# 30x, 2# 2x10 2#      STS  2x10 2x5 2x5 10x 10x 10x      Lateral stepping             Hip abd, standing  2x10 2x10 R 2x10 R  2x10 R 3x10 2x10 ea      Heel slide   10x 15x NT DC       Bike w/u, when madyson   5 min  NT  3' D/c      Step up  x10           Mini squat   10x 10x 10x NV 10x      Leg Press      NV? 10x 60#      TR     20x 20x 20x      Ther Activity             Pt Education HEP, POC, pgx, dgx                         Gait Training             Ambulate with RW --> SPC with CG/sup  X5 min w/  RW;  X6 min w/ Quad cane SPC                       Modalities

## 2024-12-30 NOTE — ASSESSMENT & PLAN NOTE
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  She has intermittent rectal bleeding and a history of rectal ulceration.

## 2024-12-30 NOTE — ASSESSMENT & PLAN NOTE
The patient has intermittent rectal bleeding that can be somewhat severe.  She went to the hospital a couple of weeks ago for evaluation.  CTA showed no evidence of active bleeding and the patient was discharged home.    She had a pouchoscopy in 2023.  I think this should be repeated.  She has a history of ulcerations and bleeding from these ulcerations and her ileal pouch anal anastomosis area.  Evaluation of the anus and surrounding structures can be done at the same time to evaluate for possible source of her bleeding.    Pouchoscopy risks, not limited to bleeding, perforated colon, need for surgery, and missed lesions were discussed. Alternatives were discussed. Questions were answered. She agreed to the procedure.

## 2024-12-31 ENCOUNTER — OFFICE VISIT (OUTPATIENT)
Dept: PHYSICAL THERAPY | Facility: CLINIC | Age: 79
End: 2024-12-31
Payer: MEDICARE

## 2024-12-31 DIAGNOSIS — Z96.641 STATUS POST TOTAL HIP REPLACEMENT, RIGHT: Primary | ICD-10-CM

## 2024-12-31 PROCEDURE — 97110 THERAPEUTIC EXERCISES: CPT

## 2024-12-31 PROCEDURE — 97112 NEUROMUSCULAR REEDUCATION: CPT

## 2025-01-02 ENCOUNTER — OFFICE VISIT (OUTPATIENT)
Dept: OBGYN CLINIC | Facility: MEDICAL CENTER | Age: 80
End: 2025-01-02

## 2025-01-02 ENCOUNTER — TELEPHONE (OUTPATIENT)
Age: 80
End: 2025-01-02

## 2025-01-02 VITALS — WEIGHT: 140 LBS | BODY MASS INDEX: 24.8 KG/M2 | HEIGHT: 63 IN

## 2025-01-02 DIAGNOSIS — Z96.641 STATUS POST TOTAL HIP REPLACEMENT, RIGHT: Primary | ICD-10-CM

## 2025-01-02 PROCEDURE — 99024 POSTOP FOLLOW-UP VISIT: CPT | Performed by: ORTHOPAEDIC SURGERY

## 2025-01-02 NOTE — LETTER
Dear Dr. Velasquez,     Please advise the number of days Matilda PENNY Day [birth year] can safely hold pouchoscopy prior to pouchoscopy.    Date of Surgery: 1/29/25  Type of Surgery: pouchoscopy      Thank you,      BLAINE Waters MD

## 2025-01-02 NOTE — LETTER
Matilda Day  [address]   [address]  [place] [address]        Location:  [place] [address]    Performing Physician: BLAINE Waters MD      DATE OF PROCEDURE: January 29, 2025      The OR/GI Lab will contact you the evening prior to your procedure with your exact arrival time.    We kindly ask that you immediately notify us of any need to cancel or reschedule your procedure.    DAY BEFORE THE PROCEDURE:  CLEAR liquids only for entire day prior. Nothing red, orange or purple.  You MAY have:  Soda  Water  Broth Gatorade  Jell-O  Popsicles Coffee/tea without  Milk/creamer     YOU MAY NOT HAVE:  Solid foods  Milk and milk products  Juice with pulp    Patient is to have nothing to eat or drink after midnight.    Arrangements must be made for a responsible party to drive you home from the procedure.  If you do not have a responsible family member or friend to drive you home, the procedure will not be done.  Lyft, uber, taxi, etc., is not acceptable.  It is important you notify our office of any insurance changes prior to your procedure and, if necessary, supply us with referrals from your primary care physician.    DAY OF THE PROCEDURE:  You may brush your teeth.  Leave all jewelry at home. Take out any facial piercings, if able.  Please arrive for your procedure as indicated by the OR / GI Lab / Endoscopy Unit. The hospital will contact you the day before with your exact arrival time.  Make sure you have arranged ahead of time for a responsible adult (18 or older) to accompany and drive you home after the procedure. Please discuss any transportation concerns with our staff prior to your procedure.  The effects of the anesthesia can persist for 24 hours. After receiving the sedation, you must exercise caution before engaging in any activity that could harm yourself and others (such as driving a car). Do not make any important decisions or do not drink any alcoholic  beverages during this time period.  After your procedure, you may have anything you’d like to eat or drink. You will probably want to start with something light. Please include plenty of fluids. Avoid items that cause gas such as sodas and salads.      SPECIAL INSTRUCTIONS:    For patients currently taking blood thinners and/or antiplatelet therapy our office will contact the prescribing provider. Our office will contact you with any required changes to your medication regimen.  Blood thinner (i.e. - Coumadin, Pradaxa, Lovenox, Xarelto, Eliquis)  You will receive a call to advise number of days.     Prescribed medications:  Do not stop your aspirin, or any of your other medications (unless instructed otherwise).  Take the rest of your prescribed medications with small sips of water at least 2 hours prior to your procedure    NOTHING TO EAT OR DRINK (INCLUDING CHEWING GUM) AFTER 12 MIDNIGHT PRIOR TO YOUR PROCEDURE EXCEPT YOUR BOWEL PREP.        For any questions or concerns related to your bowel preparation or pre-procedure instructions, please contact our office.  Thank you for choosing [facility]!    Revised 1/2023    [phone]

## 2025-01-02 NOTE — TELEPHONE ENCOUNTER
Patients GI provider:  Dr. Waters    Number to return call: [phone]    Reason for call: Pt calling re: she was told someone will be calling her to set up pouchoscopy. I called the office and was told clinical handles this.  Please reach out to pt on this, thank you.     Scheduled procedure/appointment date if applicable: N/A

## 2025-01-02 NOTE — PROGRESS NOTES
Assessment/Plan:  1. Status post total hip replacement, right      No orders of the defined types were placed in this encounter.      Continue PT  Pain control prn-   Patient should call ahead for abx prior to dental appts.     Return in about 3 weeks (around 1/23/2025) for 12 week follow up appointment.    I answered all of the patient's questions during the visit and provided education of the patient's condition during the visit.  The patient verbalized understanding of the information given and agrees with the plan.  This note was dictated using Chay[employer] software.  It may contain errors including improperly dictated words.  Please contact physician directly for any questions.    Subjective   Chief Complaint:   Chief Complaint   Patient presents with   • Right Hip - Post-op     Pt present for 2nd post-op visit for RT Hip replacement. Pt states Hip feeling much better. No visual/audible symptoms. Pt notes Rt leg experiencing random aches. No questions, comments, concerns.       Matilda Day is a 79 y.o. female who presents for 8 week follow up s/p right FITO. She is doing well.  Patient explains she was originally scheduled to follow up 2 weeks ago, however she was rescheduled per the office.  She denies pain of the hip but admits to continued achiness of the right thigh.  She admits to taking Norco about 3 times a week when she plans for excessive movement.  Patient admits to taking Norco at baseline as prescribed by her PCP.  She continues to take Eliquis as prescribed, which is her baseline.  Patient continues to attend physical therapy twice a week, ambulating with a cane in public for safety.    Patient expressed interest in moving forward with scheduling right total knee arthroplasty.  Her and her  explain leaving for [place] in April, returning in June.  Patient should follow up in about 3 weeks to finish out her post-op global period, at which time she plans to pick a date for a right total  knee arthroplasty sometime in the Summer.                  Review of Systems  ROS:    See HPI for musculoskeletal review.   All other systems reviewed are negative     History:  Past Medical History:   Diagnosis Date   • Anemia    • Anxiety    • Arrhythmia    • Arthritis    • Asthma    • Blood clot in vein     portal vein   • Breast cancer (HCC) 02/12/2021   • Breast lump     [id number] RESOLVED   • Cancer (HCC)    • Depression    • Disease of thyroid gland    • DVT, lower extremity (HCC)    • GERD (gastroesophageal reflux disease)    • Hyperlipidemia    • Hypertension    • Hypokalemia    • Hyponatremia    • Hypothyroidism    • Iron deficiency anemia    • Irregular heart beat    • Manic behavior (HCC)    • 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HCC)    • Osteoarthritis    • Palpitations     [id number]  RESOLVED   • Paroxysmal supraventricular tachycardia (HCC)    • PE (pulmonary thromboembolism) (HCC)    • Pneumonia    • Sjoegren syndrome    • Sleep apnea    • Sleep difficulties    • Spinal stenosis    • Thrombocytosis     [id number]  RESOLVED   • Tremors of nervous system     dbs implanted right and left chest   • Ulcerative colitis (HCC)    • Vertigo     [id number] RESOLVED     Past Surgical History:   Procedure Laterality Date   • ABDOMINAL SURGERY     • APPENDECTOMY     • BREAST BIOPSY Left 11/07/2019    Stereo   • BREAST EXCISIONAL BIOPSY Left     x many years   • BREAST SURGERY      lumpectomy & biopsy   • CARMELLA HOLE W/ STEREOTACTIC INSERTION OF DBS LEADS / INTRAOP MICROELECTRODE RECORDING     • COLON SURGERY     • COLONOSCOPY N/A 08/30/2017    Procedure: POUCHOSCOPY;  Surgeon: VANDANA Waters MD;  Location: [facility];  Service: Colorectal   • COLOPROCTECTOMY W/ ILEO J POUCH     • ESOPHAGOGASTRODUODENOSCOPY      ONSET 10/17/11   • FISTULA REPAIR      LLEOANAL FISTULA REPAIR TRANSPERIN TRANSABD APPROACH   • HYSTERECTOMY      age 38   • ILEOSTOMY CLOSURE     • KNEE ARTHROSCOPY      Right   • MAMMO STEREOTACTIC BREAST BIOPSY  LEFT (ALL INC) Left 2019   • MAMMO STEREOTACTIC BREAST BIOPSY LEFT (ALL INC) Left 2020   • MAMMO STEREOTACTIC BREAST BIOPSY LEFT (ALL INC) EACH ADD Left 2020   • MASTECTOMY Left 2021    left mastectomy- Dr. Gillespie   • MASTECTOMY W/ SENTINEL NODE BIOPSY Left 2021    Procedure: BREAST MASTECTOMY WITH SENTINEL LYMPH NODE BIOPSY, LYMPHATIC MAPPING WITH BLUE DYE AND RADIAOCTIVE DYE (INJECT AT 1100 BY DR GILLESPIE IN THE OR);  Surgeon: Robbie Gillespie MD;  [facility];  Service: Surgical Oncology   • IN ARTHRP ACETBLR/PROX FEM PROSTC AGRFT/ALGRFT Right 2024    Procedure: ARTHROPLASTY HIP TOTAL, overnight;  Surgeon: Gaviota Ford DO;  Location:  [facility];  Service: Orthopedics   • IN INSJ/RPLCMT CRANIAL NEUROSTIM PULSE GENERATOR Right 2017    Procedure: DBS GENERATOR REPLACEMENT;  Surgeon: Marin Mao MD;  Location:  [facility] OR;  Service: Neurosurgery   • IN INSJ/RPLCMT CRANIAL NEUROSTIM PULSE GENERATOR N/A 2019    Procedure: REPLACEMENT IMPLANTABLE PULSE GENERATOR FOR DEEP BRAIN STIMULATOR LEFT CHEST;  Surgeon: Damian Batres MD;  [facility];  Service: Neurosurgery   • SPLENECTOMY     • TONSILLECTOMY       Social History   Social History     Substance and Sexual Activity   Alcohol Use Yes   • Alcohol/week: 2.0 standard drinks of alcohol   • Types: 2 Cans of beer per week    Comment: 2or 3 beers a week     Social History     Substance and Sexual Activity   Drug Use No     Social History     Tobacco Use   Smoking Status Former   • Current packs/day: 0.00   • Average packs/day: 1 pack/day for 15.0 years (15.0 ttl pk-yrs)   • Types: Cigarettes   • Start date: 1950   • Quit date: 1965   • Years since quittin.0   Smokeless Tobacco Never   Tobacco Comments    Quit     Family History:   Family History   Problem Relation Age of Onset   • Venous thrombosis Mother         ACUTE VENOUS THROMBOSIS OF DEEP VESSELS OF THE DISTAL LOWER EXTREMITY   • Other  Mother         PHLEBITIS   • Hypertension Mother    • Peripheral vascular disease Mother    • COPD Father    • Diabetes Father         MELLITUS   • Stroke Father    • Diabetes Sister         MELLITUS   • Sjogren's syndrome Sister    • No Known Problems Daughter    • No Known Problems Maternal Grandmother    • No Known Problems Maternal Grandfather    • No Known Problems Paternal Grandmother    • No Known Problems Paternal Grandfather    • No Known Problems Sister    • No Known Problems Maternal Aunt    • No Known Problems Paternal Aunt    • No Known Problems Son        Current Outpatient Medications on File Prior to Visit   Medication Sig Dispense Refill   • albuterol (PROVENTIL HFA,VENTOLIN HFA) 90 mcg/act inhaler Inhale 2 puffs every 6 (six) hours as needed for wheezing 8 g 1   • amLODIPine (NORVASC) 2.5 mg tablet TAKE ONE TABLET BY MOUTH EVERY DAY 90 tablet 1   • Ascorbic Acid 500 MG CAPS Take 500 mg by mouth daily.     • Calcium Carb-Cholecalciferol (CALCIUM 600-D PO) Take 1 tablet by mouth 2 (two) times a day     • Coenzyme Q10 (CO Q-10 PO) Take 1 capsule by mouth daily     • diltiazem (CARDIZEM CD) 180 mg 24 hr capsule TAKE ONE CAPSULE BY MOUTH EVERY DAY 90 capsule 1   • diltiazem (CARDIZEM) 60 mg tablet TAKE ONE TABLET BY MOUTH EVERY DAY 90 tablet 1   • Eliquis 5 MG TAKE ONE TABLET BY MOUTH TWICE A  tablet 1   • escitalopram (LEXAPRO) 20 mg tablet TAKE ONE TABLET BY MOUTH EVERY DAY 90 tablet 1   • fluticasone (FLONASE) 50 mcg/act nasal spray APPLY ONE SPRAY IN EACH NOSTRIL ONCE DAILY AS NEEDED FOR RUNNY NOSE 48 g 1   • folic acid (KP Folic Acid) 1 mg tablet Take 1 tablet (1 mg total) by mouth daily Begin 30 days prior to surgery 30 tablet 1   • gabapentin (NEURONTIN) 600 MG tablet TAKE ONE TABLET BY MOUTH EVERY MORNING , TAKE ONE TABLET BY MOUTH EVERY DAY IN THE AFTERNOON , AND TAKE TWO TABLETS BY MOUTH EVERY EVENING AT BEDTIME 360 tablet 1   • HYDROcodone-acetaminophen (NORCO) 5-325 mg per tablet Take  1 tablet by mouth every 4 (four) hours as needed for pain Do not exceed 3000 mg of acetaminophen per day. Max Daily Amount: 6 tablets 42 tablet 0   • levothyroxine 50 mcg tablet TAKE ONE TABLET BY MOUTH EVERY DAY 90 tablet 0   • lisinopril (ZESTRIL) 20 mg tablet TAKE ONE TABLET BY MOUTH TWO TIMES A  tablet 1   • LORazepam (ATIVAN) 1 mg tablet Take 1 tablet (1 mg total) by mouth every 6 (six) hours as needed for anxiety 120 tablet 0   • MAGNESIUM PO Take 1 tablet by mouth daily 400 mg tablet      • Multiple Vitamin (multivitamin) tablet Take 1 tablet by mouth daily Begin 30 days prior to surgery 30 tablet 1   • Omega-3 Fatty Acids (FISH OIL PO) Take 1 capsule by mouth daily     • pantoprazole (PROTONIX) 40 mg tablet TAKE ONE TABLET BY MOUTH EVERY DAY 90 tablet 1   • potassium chloride (MICRO-K) 10 MEQ CR capsule TAKE TWO CAPSULES BY MOUTH EVERY  capsule 1   • promethazine (PHENERGAN) 12.5 MG tablet Take 1 tablet (12.5 mg total) by mouth every 6 (six) hours as needed for nausea or vomiting 8 tablet 0   • simvastatin (ZOCOR) 10 mg tablet TAKE ONE-HALF TABLET BY MOUTH DAILY 45 tablet 1   • sodium chloride 1 g tablet TAKE ONE TABLET BY MOUTH FOUR TIMES A  tablet 1   • torsemide (DEMADEX) 10 mg tablet TAKE ONE TABLET BY MOUTH EVERY DAY AS NEEDED (EDEMA) 90 tablet 1   • torsemide (DEMADEX) 20 mg tablet Take 1 tablet (20 mg total) by mouth daily 90 tablet 3   • vitamin B-12 (VITAMIN B-12) 500 mcg tablet Take 1 tablet (500 mcg total) by mouth daily 90 tablet 1   • docusate sodium (COLACE) 100 mg capsule Take 1 capsule (100 mg total) by mouth 2 (two) times a day (Patient not taking: Reported on 11/18/2024) 20 capsule 0   • ferrous sulfate 324 (65 Fe) mg Take 1 tablet (324 mg total) by mouth daily before breakfast Begin 30 days prior to surgery (Patient not taking: Reported on 11/18/2024) 30 tablet 1   • Fluticasone Furoate-Vilanterol 100-25 mcg/actuation inhaler Inhale 1 puff daily Rinse mouth after use.  "60 blister 5     Current Facility-Administered Medications on File Prior to Visit   Medication Dose Route Frequency Provider Last Rate Last Admin   • cyanocobalamin injection 1,000 mcg  1,000 mcg Intramuscular Q30 Days Zaira Velasquez MD   1,000 mcg at 04/20/23 1012     Allergies   Allergen Reactions   • Indomethacin GI Intolerance, Dizziness and Other (See Comments)   • Macrobid [Nitrofurantoin Monohyd Macro] Rash   • Nitrofurantoin Other (See Comments)   • Penicillins Rash and Other (See Comments)     Annotation - [id number]: can take cephalosporins   • Sulfa Antibiotics Rash and Other (See Comments)        Objective     Ht 5' 3\" (1.6 m)   Wt 63.5 kg (140 lb)   BMI 24.80 kg/m²      PE:  AAOx 3  WDWN  Hearing intact, no drainage from eyes  no audible wheezing  no abdominal distension  LE compartments soft, AT/GS intact    Ortho Exam:  right hip:   INC: healed, No erythema, mild swelling  No pain with ROM       "

## 2025-01-03 ENCOUNTER — PREP FOR PROCEDURE (OUTPATIENT)
Age: 80
End: 2025-01-03

## 2025-01-03 DIAGNOSIS — K51.918 ULCERATIVE COLITIS, CHRONIC, OTHER COMPLICATION (HCC): Primary | ICD-10-CM

## 2025-01-03 NOTE — PROGRESS NOTES
Assessment/Plan:  1. Status post total hip replacement, right      No orders of the defined types were placed in this encounter.      Continue PT  Pain control prn-   Patient should call ahead for abx prior to dental appts.     Return in about 3 weeks (around 1/23/2025) for 12 week follow up appointment.    I answered all of the patient's questions during the visit and provided education of the patient's condition during the visit.  The patient verbalized understanding of the information given and agrees with the plan.  This note was dictated using [employer] software.  It may contain errors including improperly dictated words.  Please contact physician directly for any questions.    Subjective   Chief Complaint:   Chief Complaint   Patient presents with    Right Hip - Post-op     Pt present for 2nd post-op visit for RT Hip replacement. Pt states Hip feeling much better. No visual/audible symptoms. Pt notes Rt leg experiencing random aches. No questions, comments, concerns.       Matilda Day is a 79 y.o. female who presents for 8 week follow up s/p right FITO. She is doing well.  Patient explains she was originally scheduled to follow up 2 weeks ago, however she was rescheduled per the office.  She denies pain of the hip but admits to continued achiness of the right thigh.  She admits to taking Norco about 3 times a week when she plans for excessive movement.  Patient admits to taking Norco at baseline as prescribed by her PCP.  She continues to take Eliquis as prescribed, which is her baseline.  Patient continues to attend physical therapy twice a week, ambulating with a cane in public for safety.    Patient expressed interest in moving forward with scheduling right total knee arthroplasty.  Her and her  explain leaving for [place] in April, returning in June.  Patient should follow up in about 3 weeks to finish out her post-op global period, at which time she plans to pick a date for a right total knee  arthroplasty sometime in the Summer.                  Review of Systems  ROS:    See HPI for musculoskeletal review.   All other systems reviewed are negative     History:  Past Medical History:   Diagnosis Date    Anemia     Anxiety     Arrhythmia     Arthritis     Asthma     Blood clot in vein     portal vein    Breast cancer (HCC) 02/12/2021    Breast lump     [id number] RESOLVED    Cancer (HCC)     Depression     Disease of thyroid gland     DVT, lower extremity (HCC)     GERD (gastroesophageal reflux disease)     Hyperlipidemia     Hypertension     Hypokalemia     Hyponatremia     Hypothyroidism     Iron deficiency anemia     Irregular heart beat     Manic behavior (HCC)     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HCC)     Osteoarthritis     Palpitations     [id number]  RESOLVED    Paroxysmal supraventricular tachycardia (HCC)     PE (pulmonary thromboembolism) (HCC)     Pneumonia     Sjoegren syndrome     Sleep apnea     Sleep difficulties     Spinal stenosis     Thrombocytosis     [id number]  RESOLVED    Tremors of nervous system     dbs implanted right and left chest    Ulcerative colitis (HCC)     Vertigo     [id number] RESOLVED     Past Surgical History:   Procedure Laterality Date    ABDOMINAL SURGERY      APPENDECTOMY      BREAST BIOPSY Left 11/07/2019    Stereo    BREAST EXCISIONAL BIOPSY Left     x many years    BREAST SURGERY      lumpectomy & biopsy    CARMELLA HOLE W/ STEREOTACTIC INSERTION OF DBS LEADS / INTRAOP MICROELECTRODE RECORDING      COLON SURGERY      COLONOSCOPY N/A 08/30/2017    Procedure: POUCHOSCOPY;  Surgeon: VANDANA Waters MD;  Location: [facility];  Service: Colorectal    COLOPROCTECTOMY W/ ILEO J POUCH      ESOPHAGOGASTRODUODENOSCOPY      ONSET 10/17/11    FISTULA REPAIR      LLEOANAL FISTULA REPAIR TRANSPERIN TRANSABD APPROACH    HYSTERECTOMY      age 38    ILEOSTOMY CLOSURE      KNEE ARTHROSCOPY      Right    MAMMO STEREOTACTIC BREAST BIOPSY LEFT (ALL INC) Left 11/07/2019    MAMMO STEREOTACTIC  BREAST BIOPSY LEFT (ALL INC) Left 2020    MAMMO STEREOTACTIC BREAST BIOPSY LEFT (ALL INC) EACH ADD Left 2020    MASTECTOMY Left 2021    left mastectomy- Dr. Gillespie    MASTECTOMY W/ SENTINEL NODE BIOPSY Left 2021    Procedure: BREAST MASTECTOMY WITH SENTINEL LYMPH NODE BIOPSY, LYMPHATIC MAPPING WITH BLUE DYE AND RADIAOCTIVE DYE (INJECT AT 1100 BY DR GILLESPIE IN THE OR);  Surgeon: Robbie Gillespie MD;  [facility];  Service: Surgical Oncology    MS ARTHRP ACETBLR/PROX FEM PROSTC AGRFT/ALGRFT Right 2024    Procedure: ARTHROPLASTY HIP TOTAL, overnight;  Surgeon: Gaviota Ford DO;  Location:  [facility];  Service: Orthopedics    MS INSJ/RPLCMT CRANIAL NEUROSTIM PULSE GENERATOR Right 2017    Procedure: DBS GENERATOR REPLACEMENT;  Surgeon: Marin Mao MD;  Location:  [facility];  Service: Neurosurgery    MS INSJ/RPLCMT CRANIAL NEUROSTIM PULSE GENERATOR N/A 2019    Procedure: REPLACEMENT IMPLANTABLE PULSE GENERATOR FOR DEEP BRAIN STIMULATOR LEFT CHEST;  Surgeon: Damian Batres MD;  [facility];  Service: Neurosurgery    SPLENECTOMY      TONSILLECTOMY       Social History   Social History     Substance and Sexual Activity   Alcohol Use Yes    Alcohol/week: 2.0 standard drinks of alcohol    Types: 2 Cans of beer per week    Comment: 2or 3 beers a week     Social History     Substance and Sexual Activity   Drug Use No     Social History     Tobacco Use   Smoking Status Former    Current packs/day: 0.00    Average packs/day: 1 pack/day for 15.0 years (15.0 ttl pk-yrs)    Types: Cigarettes    Start date: 1950    Quit date: 1965    Years since quittin.0   Smokeless Tobacco Never   Tobacco Comments    Quit     Family History:   Family History   Problem Relation Age of Onset    Venous thrombosis Mother         ACUTE VENOUS THROMBOSIS OF DEEP VESSELS OF THE DISTAL LOWER EXTREMITY    Other Mother         PHLEBITIS    Hypertension Mother     Peripheral vascular  disease Mother     COPD Father     Diabetes Father         MELLITUS    Stroke Father     Diabetes Sister         MELLITUS    Sjogren's syndrome Sister     No Known Problems Daughter     No Known Problems Maternal Grandmother     No Known Problems Maternal Grandfather     No Known Problems Paternal Grandmother     No Known Problems Paternal Grandfather     No Known Problems Sister     No Known Problems Maternal Aunt     No Known Problems Paternal Aunt     No Known Problems Son        Current Outpatient Medications on File Prior to Visit   Medication Sig Dispense Refill    albuterol (PROVENTIL HFA,VENTOLIN HFA) 90 mcg/act inhaler Inhale 2 puffs every 6 (six) hours as needed for wheezing 8 g 1    amLODIPine (NORVASC) 2.5 mg tablet TAKE ONE TABLET BY MOUTH EVERY DAY 90 tablet 1    Ascorbic Acid 500 MG CAPS Take 500 mg by mouth daily.      Calcium Carb-Cholecalciferol (CALCIUM 600-D PO) Take 1 tablet by mouth 2 (two) times a day      Coenzyme Q10 (CO Q-10 PO) Take 1 capsule by mouth daily      diltiazem (CARDIZEM CD) 180 mg 24 hr capsule TAKE ONE CAPSULE BY MOUTH EVERY DAY 90 capsule 1    diltiazem (CARDIZEM) 60 mg tablet TAKE ONE TABLET BY MOUTH EVERY DAY 90 tablet 1    Eliquis 5 MG TAKE ONE TABLET BY MOUTH TWICE A  tablet 1    escitalopram (LEXAPRO) 20 mg tablet TAKE ONE TABLET BY MOUTH EVERY DAY 90 tablet 1    fluticasone (FLONASE) 50 mcg/act nasal spray APPLY ONE SPRAY IN EACH NOSTRIL ONCE DAILY AS NEEDED FOR RUNNY NOSE 48 g 1    folic acid (KP Folic Acid) 1 mg tablet Take 1 tablet (1 mg total) by mouth daily Begin 30 days prior to surgery 30 tablet 1    gabapentin (NEURONTIN) 600 MG tablet TAKE ONE TABLET BY MOUTH EVERY MORNING , TAKE ONE TABLET BY MOUTH EVERY DAY IN THE AFTERNOON , AND TAKE TWO TABLETS BY MOUTH EVERY EVENING AT BEDTIME 360 tablet 1    HYDROcodone-acetaminophen (NORCO) 5-325 mg per tablet Take 1 tablet by mouth every 4 (four) hours as needed for pain Do not exceed 3000 mg of acetaminophen per  day. Max Daily Amount: 6 tablets 42 tablet 0    levothyroxine 50 mcg tablet TAKE ONE TABLET BY MOUTH EVERY DAY 90 tablet 0    lisinopril (ZESTRIL) 20 mg tablet TAKE ONE TABLET BY MOUTH TWO TIMES A  tablet 1    LORazepam (ATIVAN) 1 mg tablet Take 1 tablet (1 mg total) by mouth every 6 (six) hours as needed for anxiety 120 tablet 0    MAGNESIUM PO Take 1 tablet by mouth daily 400 mg tablet       Multiple Vitamin (multivitamin) tablet Take 1 tablet by mouth daily Begin 30 days prior to surgery 30 tablet 1    Omega-3 Fatty Acids (FISH OIL PO) Take 1 capsule by mouth daily      pantoprazole (PROTONIX) 40 mg tablet TAKE ONE TABLET BY MOUTH EVERY DAY 90 tablet 1    potassium chloride (MICRO-K) 10 MEQ CR capsule TAKE TWO CAPSULES BY MOUTH EVERY  capsule 1    promethazine (PHENERGAN) 12.5 MG tablet Take 1 tablet (12.5 mg total) by mouth every 6 (six) hours as needed for nausea or vomiting 8 tablet 0    simvastatin (ZOCOR) 10 mg tablet TAKE ONE-HALF TABLET BY MOUTH DAILY 45 tablet 1    sodium chloride 1 g tablet TAKE ONE TABLET BY MOUTH FOUR TIMES A  tablet 1    torsemide (DEMADEX) 10 mg tablet TAKE ONE TABLET BY MOUTH EVERY DAY AS NEEDED (EDEMA) 90 tablet 1    torsemide (DEMADEX) 20 mg tablet Take 1 tablet (20 mg total) by mouth daily 90 tablet 3    vitamin B-12 (VITAMIN B-12) 500 mcg tablet Take 1 tablet (500 mcg total) by mouth daily 90 tablet 1    docusate sodium (COLACE) 100 mg capsule Take 1 capsule (100 mg total) by mouth 2 (two) times a day (Patient not taking: Reported on 11/18/2024) 20 capsule 0    ferrous sulfate 324 (65 Fe) mg Take 1 tablet (324 mg total) by mouth daily before breakfast Begin 30 days prior to surgery (Patient not taking: Reported on 11/18/2024) 30 tablet 1    Fluticasone Furoate-Vilanterol 100-25 mcg/actuation inhaler Inhale 1 puff daily Rinse mouth after use. 60 blister 5     Current Facility-Administered Medications on File Prior to Visit   Medication Dose Route Frequency  "Provider Last Rate Last Admin    cyanocobalamin injection 1,000 mcg  1,000 mcg Intramuscular Q30 Days Zaira Velasquez MD   1,000 mcg at 04/20/23 1012     Allergies   Allergen Reactions    Indomethacin GI Intolerance, Dizziness and Other (See Comments)    Macrobid [Nitrofurantoin Monohyd Macro] Rash    Nitrofurantoin Other (See Comments)    Penicillins Rash and Other (See Comments)     Annotation - [id number]: can take cephalosporins    Sulfa Antibiotics Rash and Other (See Comments)        Objective     Ht 5' 3\" (1.6 m)   Wt 63.5 kg (140 lb)   BMI 24.80 kg/m²      PE:  AAOx 3  WDWN  Hearing intact, no drainage from eyes  no audible wheezing  no abdominal distension  LE compartments soft, AT/GS intact    Ortho Exam:  right hip:   INC: healed, No erythema, mild swelling  No pain with ROM       "

## 2025-01-06 ENCOUNTER — EVALUATION (OUTPATIENT)
Dept: PHYSICAL THERAPY | Facility: CLINIC | Age: 80
End: 2025-01-06
Payer: MEDICARE

## 2025-01-06 DIAGNOSIS — Z96.641 STATUS POST TOTAL HIP REPLACEMENT, RIGHT: Primary | ICD-10-CM

## 2025-01-06 PROCEDURE — 97164 PT RE-EVAL EST PLAN CARE: CPT

## 2025-01-06 PROCEDURE — 97110 THERAPEUTIC EXERCISES: CPT

## 2025-01-06 NOTE — PROGRESS NOTES
PT RE-EVALUATION & DISCHARGE    Today's date: 2025  Patient name: Matilda Day  : 1945  MRN: [id number]  Referring provider: Gaviota Ford,*  Dx:   Encounter Diagnosis     ICD-10-CM    1. Status post total hip replacement, right  Z96.641             Start Time: 1300  Stop Time: 1330  Total time in clinic (min): 30 minutes    Assessment  Impairments: abnormal gait, abnormal muscle firing, abnormal muscle tone, abnormal or restricted ROM, abnormal movement, activity intolerance, impaired balance, impaired physical strength, lacks appropriate home exercise program, pain with function, poor posture , poor body mechanics, participation limitations, activity limitations and endurance  Irritability comments: No sx irritability regarding R hip    Assessment details: Matilda Day is a 79 y.o. year old female s/p R FITO via anterior approach completed on 24. Patients primary impairments include decreased R hip flexion and abd PROM, decreased global LE strength (R>L). Noted impairments limit the patients ability to perform STS transfers, ascend/descend stairs safely and independent without UE assist, and ambulate without use of RW d/t fear of falls, LE weakness, and decreased activity tolerance. Patient would benefit from skilled PT to address noted impairments and promote return to PLOF. PT reviewed HEP with the patient where they demonstrated a good understanding of technique and dosage, and patient was instructed to discontinue exercises should symptoms be exacerbated.    Re-eval (25): Pt presents to PT for re-evaluation of the R hip. Pt has attended skilled PT for 8 total visits. Pt demonstrates improved R hip ROM, increased LE strength b/l, improved gait mechanics and improved balance, as well as reduced falls risk as indicated by 5xSTS. Pt has achieved all goals established at IE and feels she is ready to trial independent management as she is no longer limited d/t her R hip to which  PT is in agreement.   Understanding of Dx/Px/POC: good     Prognosis: good    Goals  Short Terms Goals: (4 weeks)  Patient will be independent with HEP - MET on 1/6/25  Patient will report a 50% decrease in pain complaints. - MET on 1/6/25    Long Term Goals: (8-12 weeks)  Patient will be able to ambulate t/o her home independently with the least restrictive device necessary by d/c. - MET on 1/6/25  Pt will decrease 5XSTS score to at least <15s while demonstrating eccentric quad control to reduce falls risk. - MET on 1/6/25  Patient will return to all recreational activities without restriction. - MET on 1/6/25  FOTO score will be greater than or equal to predicted value. - MET on 1/6/25    Plan  Patient would benefit from: skilled physical therapy  Planned modality interventions: cryotherapy, neuromuscular electric stimulation, TENS and thermotherapy: hydrocollator packs    Planned therapy interventions: abdominal trunk stabilization, activity modification, joint mobilization, IASTM, manual therapy, kinesiology taping, massage, Ferrari taping, balance, balance/weight bearing training, behavior modification, body mechanics training, nerve gliding, neuromuscular re-education, patient/caregiver education, postural training, strengthening, stretching, flexibility, functional ROM exercises, therapeutic activities, therapeutic exercise, home exercise program and IADL retraining    Frequency: Discharge.  Plan of Care beginning date: 12/4/2024  Treatment plan discussed with: patient  Plan details: Thank you for referring Matilda Day to PT at [facility] and for the opportunity to coordinate care.         Subjective Evaluation    History of Present Illness  Mechanism of injury: Matilda Day is an 79 y.o. F presenting to OPPT s/p R FITO (anterior approach) completed on 11/04/2024. Matilda completed [facility] PT where she worked on functional transfers, ambulation, leg strengthening, stair climbing from which pt was  "formally discharged on 2024. Pt is currently using a RW. States she has a SPC and that she trailed stair climbing using the SPC. Pt reports she would like to focus on improving strength, improving balance, and endurance. Per pt, she was advised to avoid laying on her stomach. Easing factors include heat, Vicodin, tylenol. States she ascends/descend the stairs with supervision from her  and uses UE support at the rail. States her  helps with laundry, supervises her on the stairs, assists with cooking & cleaning.    PMHx: A-fib, iron deficiency anemia w/ monthly infusions    Re-Eval (25): Pt states that she feels a lot better with regards to her hip since starting PT. States \"I can do almost anything\" such as walking, stair climbing, lay on her side, and use her cane rather than her RW. Feels at this time she is mostly limited d/t the R knee and she would like to discharge skilled PT at this time to travel and then get her knee done since she is no longer limited d/t her hip.   Quality of life: good    Patient Goals  Patient goals for therapy: decreased pain, improved balance, increased motion, return to sport/leisure activities, independence with ADLs/IADLs and increased strength    Pain  Current pain ratin  At best pain ratin  At worst pain ratin  Location: R groin    Social Support  Steps to enter house: yes  2  Stairs in house: yes (R)   12  Lives in: multiple-level home  Lives with: spouse      Diagnostic Tests    FCE comments: X-ray Hip/Pelvis (24): \"IMPRESSION: Satisfactory right hip arthroplasty as above.  Anatomic alignment without loosening.\"Treatments  Previous treatment: physical therapy  Discharged from (in last 30 days): inpatient hospitalization and home health care  Discharged from (in last 30 days) comments: Recent hospitalization see chart for more details.         Objective    Hip ROM:   IE RE (25)   Joint/Motion Right Left WNL & Pain free b/l   Hip " Flexion  pain free WNL    Hip Ext Rot WNL WNL    Hip Int Rot WNL WNL    Hip Abd 40, stretch 45, stretch      Strength:    IE RE   Joint / Motion  RIGHT LEFT RIGHT LEFT   Hip ER  4- 4 4 4   Hip IR  4- 4 4 4   Hip F 4- 4 4+ 4+   Hip Abd 4 4 4+ 4+   Hip Add 4 4 4+ 4+   Knee E 4- 4 4 4   Knee F 4- 4 4 4     Additional Assessments:  5xSTS: 18s to RW w/ b/l UE assist. Poor eccentric quad control with decent.----> RE (1/6/25) 13s with b/l UE assist.   Gait: Decreased gait speed, decreased step length, use of RW. Presents with knee brace on R. ----> RE (1/6/25): decreased gait speed, normal step length, use of quad cane.  Stair Climbing: Step to approach with b/l UE support at rail. States supervision provided by  when ascending/descending. ----> RE (1/6/25): Step to approach with UE support at rail and with quad cane. Uses lateral approach at times if needed. Does not require husbands assistance at this time per pt.          Precautions: S/p L TKA performed on 11/04/24, Anterior approach  A-fib, Iron-deficiency anemia with monthly infusions (monitor SPO2 as needed), HTN      Manuals 12/04 12/10 12/13 12/17 12/20 12/27 12/31 1/6     Hip PROM                                                    Neuro Re-Ed             NBOS EO/EC   1 min ea 1 min   1 min 1 min     NBOS EO, foam       1 min 1 min     SLR 10x, partial range 2x10, partial range 2x10  2x10 3x10 2x10 2x10 2x10     Cone tap       20x      Step overs       4 passes                                             Ther Ex             Re-eval        8 min     Bridge     2x10 2x10 2x10 2x10     Supine clamshell     2x10 YTB 2x10 YTB 2x10 RTB      LAQ 20x x20 x20 20x 1.5# 20x 1.5# 30x, 2# 2x10 2#      STS  2x10 2x5 2x5 10x 10x 10x 2x10, foam pad with 1 UE assist     Lateral stepping        3 passes     Hip abd, standing  2x10 2x10 R 2x10 R  2x10 R 3x10 2x10 ea 2x10 ea     Heel slide   10x 15x NT DC       Bike w/u, when madyson   5 min  NT  3' D/c      Step up  x10            Mini squat   10x 10x 10x NV 10x 10x     Leg Press      NV? 10x 60# 2x10 DL 60#     TR     20x 20x 20x 20x     Ther Activity             Pt Education HEP, POC, pgx, dgx                         Gait Training             Ambulate with RW --> SPC with CG/sup  X5 min w/ RW;  X6 min w/ Quad cane SPC                       Modalities

## 2025-01-07 ENCOUNTER — TELEPHONE (OUTPATIENT)
Age: 80
End: 2025-01-07

## 2025-01-07 NOTE — TELEPHONE ENCOUNTER
Caller: Patient     Doctor: Gabe     Reason for call: Patient would like to schedule gel injections for her knees     Call back#: [phone]

## 2025-01-09 ENCOUNTER — APPOINTMENT (OUTPATIENT)
Dept: PHYSICAL THERAPY | Facility: CLINIC | Age: 80
End: 2025-01-09
Payer: MEDICARE

## 2025-01-10 ENCOUNTER — TELEPHONE (OUTPATIENT)
Dept: SURGICAL ONCOLOGY | Facility: CLINIC | Age: 80
End: 2025-01-10

## 2025-01-10 NOTE — TELEPHONE ENCOUNTER
Left voicemail for patient in regards to mammogram order. Made patient aware that Bev will not order her mammogram until her next office visit in March. I provided patient with the hopeline number if she had any questions or concerns.

## 2025-01-14 ENCOUNTER — APPOINTMENT (OUTPATIENT)
Dept: PHYSICAL THERAPY | Facility: CLINIC | Age: 80
End: 2025-01-14
Payer: MEDICARE

## 2025-01-14 DIAGNOSIS — G89.29 CHRONIC PAIN OF RIGHT KNEE: Primary | ICD-10-CM

## 2025-01-14 DIAGNOSIS — M25.561 CHRONIC PAIN OF RIGHT KNEE: Primary | ICD-10-CM

## 2025-01-14 DIAGNOSIS — M17.11 PRIMARY OSTEOARTHRITIS OF RIGHT KNEE: ICD-10-CM

## 2025-01-15 ENCOUNTER — APPOINTMENT (OUTPATIENT)
Dept: PHYSICAL THERAPY | Facility: CLINIC | Age: 80
End: 2025-01-15
Payer: MEDICARE

## 2025-01-17 ENCOUNTER — PROCEDURE VISIT (OUTPATIENT)
Dept: OBGYN CLINIC | Facility: OTHER | Age: 80
End: 2025-01-17
Payer: MEDICARE

## 2025-01-17 ENCOUNTER — APPOINTMENT (OUTPATIENT)
Dept: PHYSICAL THERAPY | Facility: CLINIC | Age: 80
End: 2025-01-17
Payer: MEDICARE

## 2025-01-17 VITALS — HEIGHT: 63 IN | BODY MASS INDEX: 24.8 KG/M2 | WEIGHT: 140 LBS

## 2025-01-17 DIAGNOSIS — M25.561 CHRONIC PAIN OF RIGHT KNEE: ICD-10-CM

## 2025-01-17 DIAGNOSIS — M17.11 PRIMARY OSTEOARTHRITIS OF RIGHT KNEE: Primary | ICD-10-CM

## 2025-01-17 DIAGNOSIS — G89.29 CHRONIC PAIN OF RIGHT KNEE: ICD-10-CM

## 2025-01-17 PROCEDURE — 20611 DRAIN/INJ JOINT/BURSA W/US: CPT | Performed by: FAMILY MEDICINE

## 2025-01-17 NOTE — PATIENT INSTRUCTIONS
Injection with viscosupplementation carries a risk of inflammatory synovitis reaction similar to gout after injection with viscosupplementation that would require immediate same day evaluation and drainage of the knee to ensure there is no infection. If there is any worsening pain or symptoms after injection including but not limited to fever, redness, swelling, then patient is to contact physician immediately for further instruction or go to the Emergency department if physician unavailable as this could be a sign of severe infection which can lead to infection in the blood known as Sepsis and death.

## 2025-01-17 NOTE — PROGRESS NOTES
1. Primary osteoarthritis of right knee    2. Chronic pain of right knee          Orders Placed This Encounter   Procedures    Large joint arthrocentesis: R knee     No orders of the defined types were placed in this encounter.       XR right knee 3/17/2024 (outside image)  Tricompartmental osteoarthritic degenerative changes of the right knee with moderate to severe lateral compartment joint space narrowing.     XR left knee 12/21/2021  There is mild to moderate medial and patellofemoral compartment joint space narrowing  There are small marginal osteophytes at the medial, lateral and patellofemoral compartments.      XR right knee 12/21/2021  Tricompartmental osteoarthritic degenerative changes of the right knee with moderate to severe lateral compartment joint space narrowing.         ASSESSMENT/PLAN:  Moderate-severe tricompartmental OA of right knee    Repeat X-ray next visit: None    Return if symptoms worsen or fail to improve.    Patient instructions below verbally summarized in person during encounter:  Patient Instructions   Injection with viscosupplementation carries a risk of inflammatory synovitis reaction similar to gout after injection with viscosupplementation that would require immediate same day evaluation and drainage of the knee to ensure there is no infection. If there is any worsening pain or symptoms after injection including but not limited to fever, redness, swelling, then patient is to contact physician immediately for further instruction or go to the Emergency department if physician unavailable as this could be a sign of severe infection which can lead to infection in the blood known as Sepsis and death.         __________________________________________________________________________    HISTORY OF PRESENT ILLNESS:    LAST VISIT  Today patient reports unsatisfactory improvement of her symptoms after recent Monovisc injection. Has been adherence to treatment recommendation including home  exercises. Denies any new injury since last visit.    Patient presents with knee pain. History, examination, and x-rays are consistent with diagnosis of Osteoarthritis.   Nonpharmacologic treatment: home exercise program  Pain Score:   7/10  Pain from condition does interfere with activities of daily living  Previous Visco relief: good relief for a few week  Previous CSI relief: good relief for a few months    VISIT 1/17/25:  Patient presents with knee pain. History, examination, and x-rays are consistent with diagnosis of Osteoarthritis of RIGHT KNEE     Nonpharmacologic treatment: home exercise program  Pain Score:   4-7/10  Pain from condition does interfere with activities of daily living  Previous Visco relief: good relief for a few week  Previous CSI relief: good relief for a few months            Following history reviewed and updated:  Historical Information   Patient Active Problem List    Diagnosis Date Noted Date Diagnosed    Ulcerative colitis, chronic, other complication (HCC) 12/30/2024     Status post total hip replacement, right 11/04/2024     Atrial fibrillation (HCC) 10/23/2024     Primary osteoarthritis of one hip, right 07/12/2024     Malignant neoplasm of left breast in female, estrogen receptor negative, unspecified site of breast (HCC) 05/14/2024     Continuous opioid dependence (HCC) 12/27/2023     Peripheral vascular disease (HCC) 12/06/2023     S/P left mastectomy 11/28/2023     Esophageal candidiasis (HCC) 08/28/2023     Hoarseness of voice 04/20/2023     Rectal bleeding 01/07/2023     Peripheral venous insufficiency 09/16/2022     History of left breast cancer 12/23/2020     Vitamin B12 deficiency 08/23/2019     Vitamin D deficiency 08/23/2019     S/P deep brain stimulator placement 11/26/2018     Lumbar degenerative disc disease 04/20/2018     Preoperative testing 04/20/2018     Portal vein thrombosis 01/29/2018     Hypomagnesemia 12/08/2015     Unsteady gait 12/08/2015     Overweight  07/16/2015     Essential tremor 07/13/2015     Prediabetes 06/16/2015     Enthesopathy of hip region 01/05/2015     Osteoarthritis of right knee 09/05/2014     Iron deficiency anemia 04/30/2014     Supraventricular tachycardia ([facility]) 10/07/2013     Diarrhea 01/09/2013     Chronic salpingo-oophoritis 12/26/2012     SIADH (syndrome of inappropriate ADH production) ([facility]) 12/14/2012     Peripheral neuropathy 12/14/2012     Disorder of bursae of shoulder region 08/30/2012     GERD (gastroesophageal reflux disease) 08/23/2012     Moderate episode of recurrent major depressive disorder ([facility]) 06/13/2012     Hyperlipidemia 06/13/2012     Essential hypertension 06/13/2012     Acquired hypothyroidism 06/13/2012     Osteopenia 06/13/2012     Ulcerative colitis without complications ([facility]) 06/13/2012     Allergic rhinitis 06/13/2012     Mild intermittent asthma without complication 06/13/2012     Sjogren's syndrome ([facility]) 06/13/2012     Anxiety 04/30/2012      Current Outpatient Medications on File Prior to Visit   Medication Sig    albuterol (PROVENTIL HFA,VENTOLIN HFA) 90 mcg/act inhaler Inhale 2 puffs every 6 (six) hours as needed for wheezing    amLODIPine (NORVASC) 2.5 mg tablet TAKE ONE TABLET BY MOUTH EVERY DAY    Ascorbic Acid 500 MG CAPS Take 500 mg by mouth daily.    Calcium Carb-Cholecalciferol (CALCIUM 600-D PO) Take 1 tablet by mouth 2 (two) times a day    Coenzyme Q10 (CO Q-10 PO) Take 1 capsule by mouth daily    diltiazem (CARDIZEM CD) 180 mg 24 hr capsule TAKE ONE CAPSULE BY MOUTH EVERY DAY (Patient taking differently: Take 180 mg by mouth daily after dinner)    diltiazem (CARDIZEM) 60 mg tablet TAKE ONE TABLET BY MOUTH EVERY DAY    Eliquis 5 MG TAKE ONE TABLET BY MOUTH TWICE A DAY    escitalopram (LEXAPRO) 20 mg tablet TAKE ONE TABLET BY MOUTH EVERY DAY    fluticasone (FLONASE) 50 mcg/act nasal spray APPLY ONE SPRAY IN EACH NOSTRIL ONCE DAILY AS NEEDED FOR RUNNY NOSE    Fluticasone Furoate-Vilanterol 100-25  mcg/actuation inhaler Inhale 1 puff daily Rinse mouth after use.    folic acid (KP Folic Acid) 1 mg tablet Take 1 tablet (1 mg total) by mouth daily Begin 30 days prior to surgery    gabapentin (NEURONTIN) 600 MG tablet TAKE ONE TABLET BY MOUTH EVERY MORNING , TAKE ONE TABLET BY MOUTH EVERY DAY IN THE AFTERNOON , AND TAKE TWO TABLETS BY MOUTH EVERY EVENING AT BEDTIME    HYDROcodone-acetaminophen (NORCO) 5-325 mg per tablet Take 1 tablet by mouth every 4 (four) hours as needed for pain Do not exceed 3000 mg of acetaminophen per day. Max Daily Amount: 6 tablets    levothyroxine 50 mcg tablet TAKE ONE TABLET BY MOUTH EVERY DAY    lisinopril (ZESTRIL) 20 mg tablet TAKE ONE TABLET BY MOUTH TWO TIMES A DAY    LORazepam (ATIVAN) 1 mg tablet Take 1 tablet (1 mg total) by mouth every 6 (six) hours as needed for anxiety    MAGNESIUM PO Take 1 tablet by mouth daily 400 mg tablet     Multiple Vitamin (multivitamin) tablet Take 1 tablet by mouth daily Begin 30 days prior to surgery    Omega-3 Fatty Acids (FISH OIL PO) Take 1 capsule by mouth daily    pantoprazole (PROTONIX) 40 mg tablet TAKE ONE TABLET BY MOUTH EVERY DAY    potassium chloride (MICRO-K) 10 MEQ CR capsule TAKE TWO CAPSULES BY MOUTH EVERY DAY    promethazine (PHENERGAN) 12.5 MG tablet Take 1 tablet (12.5 mg total) by mouth every 6 (six) hours as needed for nausea or vomiting (Patient not taking: Reported on 1/16/2025)    simvastatin (ZOCOR) 10 mg tablet TAKE ONE-HALF TABLET BY MOUTH DAILY (Patient taking differently: Take 15 mg by mouth daily at bedtime)    sodium chloride 1 g tablet TAKE ONE TABLET BY MOUTH FOUR TIMES A DAY    torsemide (DEMADEX) 10 mg tablet TAKE ONE TABLET BY MOUTH EVERY DAY AS NEEDED (EDEMA) (Patient not taking: Reported on 1/16/2025)    torsemide (DEMADEX) 20 mg tablet Take 1 tablet (20 mg total) by mouth daily    vitamin B-12 (VITAMIN B-12) 500 mcg tablet Take 1 tablet (500 mcg total) by mouth daily     Allergies   Allergen Reactions     Indomethacin GI Intolerance, Dizziness and Other (See Comments)    Macrobid [Nitrofurantoin Monohyd Macro] Rash    Nitrofurantoin Other (See Comments)    Penicillins Rash and Other (See Comments)     Annotation - [id number]: can take cephalosporins    Sulfa Antibiotics Rash and Other (See Comments)     Past Medical History:   Diagnosis Date    Anemia     Anxiety     Arrhythmia     Arthritis     Asthma     Blood clot in vein     portal vein    Breast cancer (HCC) 02/12/2021    Breast lump     [id number] RESOLVED    Cancer (HCC)     Depression     Disease of thyroid gland     DVT, lower extremity (HCC)     GERD (gastroesophageal reflux disease)     Hyperlipidemia     Hypertension     Hypokalemia     Hyponatremia     Hypothyroidism     Iron deficiency anemia     Irregular heart beat     Manic behavior (HCC)     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HCC)     Osteoarthritis     Palpitations     [id number]  RESOLVED    Paroxysmal supraventricular tachycardia (HCC)     PE (pulmonary thromboembolism) (HCC)     Pneumonia     Sjoegren syndrome     Sleep apnea     mild    Sleep difficulties     Spinal stenosis     Thrombocytosis     [id number]  RESOLVED    Tremors of nervous system     dbs implanted right and left chest    Ulcerative colitis (HCC)     Vertigo     [id number] RESOLVED     Past Surgical History:   Procedure Laterality Date    ABDOMINAL SURGERY      APPENDECTOMY      BREAST BIOPSY Left 11/07/2019    Stereo    BREAST EXCISIONAL BIOPSY Left     x many years    BREAST SURGERY      lumpectomy & biopsy    CARMELLA HOLE W/ STEREOTACTIC INSERTION OF DBS LEADS / INTRAOP MICROELECTRODE RECORDING      COLON SURGERY      COLONOSCOPY N/A 08/30/2017    Procedure: POUCHOSCOPY;  Surgeon: VANDANA Waters MD;  Location: [facility];  Service: Colorectal    COLOPROCTECTOMY W/ ILEO J POUCH      ESOPHAGOGASTRODUODENOSCOPY      ONSET 10/17/11    FISTULA REPAIR      LLEOANAL FISTULA REPAIR TRANSPERIN TRANSABD APPROACH    HYSTERECTOMY      age 38     ILEOSTOMY CLOSURE      KNEE ARTHROSCOPY      Right    MAMMO STEREOTACTIC BREAST BIOPSY LEFT (ALL INC) Left 2019    MAMMO STEREOTACTIC BREAST BIOPSY LEFT (ALL INC) Left 2020    MAMMO STEREOTACTIC BREAST BIOPSY LEFT (ALL INC) EACH ADD Left 2020    MASTECTOMY Left 2021    left mastectomy- Dr. Gillespie    MASTECTOMY W/ SENTINEL NODE BIOPSY Left 2021    Procedure: BREAST MASTECTOMY WITH SENTINEL LYMPH NODE BIOPSY, LYMPHATIC MAPPING WITH BLUE DYE AND RADIAOCTIVE DYE (INJECT AT 1100 BY DR GILLESPIE IN THE OR);  Surgeon: Robbie Gillespie MD;  Location: [facility];  Service: Surgical Oncology    MI ARTHRP ACETBLR/PROX FEM PROSTC AGRFT/ALGRFT Right 2024    Procedure: ARTHROPLASTY HIP TOTAL, overnight;  Surgeon: Gaviota Ford DO;  Location:  MAIN OR;  Service: Orthopedics    MI INSJ/RPLCMT CRANIAL NEUROSTIM PULSE GENERATOR Right 2017    Procedure: DBS GENERATOR REPLACEMENT;  Surgeon: Marin Mao MD;  Location:  MAIN OR;  Service: Neurosurgery    MI INSJ/RPLCMT CRANIAL NEUROSTIM PULSE GENERATOR N/A 2019    Procedure: REPLACEMENT IMPLANTABLE PULSE GENERATOR FOR DEEP BRAIN STIMULATOR LEFT CHEST;  Surgeon: Damian Batres MD;  Location: [facility];  Service: Neurosurgery    SPLENECTOMY      TONSILLECTOMY      TOTAL HIP ARTHROPLASTY Right      Social History     Socioeconomic History    Marital status:      Spouse name: Not on file    Number of children: Not on file    Years of education: EDUCATION LEVEL 10TH GRADE    Highest education level: Not on file   Occupational History    Occupation: RETIRED   Tobacco Use    Smoking status: Former     Current packs/day: 0.00     Average packs/day: 1 pack/day for 15.0 years (15.0 ttl pk-yrs)     Types: Cigarettes     Start date: 1950     Quit date: 1965     Years since quittin.0    Smokeless tobacco: Never    Tobacco comments:     Quit   Vaping Use    Vaping status: Never Used   Substance and Sexual Activity    Alcohol  use: Yes     Alcohol/week: 2.0 standard drinks of alcohol     Types: 2 Cans of beer per week     Comment: 2or 3 beers a week    Drug use: No    Sexual activity: Not Currently     Partners: Male     Birth control/protection: Post-menopausal   Other Topics Concern    Not on file   Social History Narrative    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ACTIVITIES INSIDE AND OUTSIDE OF HOME, MODERATE    CAFFEINE USE, DRINKS CAFEINATED TEA, DRINKS COFFEE    INADEQUATE EXERCISE    LIVES WITH ADULT CHILDREN     Social Drivers of Health     Financial Resource Strain: Low Risk  (4/20/2023)    Overall Financial Resource Strain (CARDIA)     Difficulty of Paying Living Expenses: Not very hard   Food Insecurity: No Food Insecurity (11/5/2024)    Hunger Vital Sign     Worried About Running Out of Food in the Last Year: Never true     Ran Out of Food in the Last Year: Never true   Transportation Needs: No Transportation Needs (11/22/2024)    OASIS : Transportation     Lack of Transportation (Medical): No     Lack of Transportation (Non-Medical): No     Patient Unable or Declines to Respond: No   Physical Activity: Not on file   Stress: Not on file   Social Connections: Not on file   Intimate Partner Violence: Unknown (11/4/2024)    Nursing IPS     Feels Physically and Emotionally Safe: Not on file     Physically Hurt by Someone: Not on file     Humiliated or Emotionally Abused by Someone: Not on file     Physically Hurt by Someone: No     Hurt or Threatened by Someone: No   Housing Stability: Low Risk  (11/5/2024)    Housing Stability Vital Sign     Unable to Pay for Housing in the Last Year: No     Number of Times Moved in the Last Year: 0     Homeless in the Last Year: No     Family History   Problem Relation Age of Onset    Venous thrombosis Mother         ACUTE VENOUS THROMBOSIS OF DEEP VESSELS OF THE DISTAL LOWER EXTREMITY    Other Mother         PHLEBITIS    Hypertension Mother     Peripheral vascular disease Mother     COPD Father     Diabetes  "Father         MELLITUS    Stroke Father     Diabetes Sister         MELLITUS    Sjogren's syndrome Sister     No Known Problems Daughter     No Known Problems Maternal Grandmother     No Known Problems Maternal Grandfather     No Known Problems Paternal Grandmother     No Known Problems Paternal Grandfather     No Known Problems Sister     No Known Problems Maternal Aunt     No Known Problems Paternal Aunt     No Known Problems Son            Ht 5' 3\" (1.6 m)   Wt 63.5 kg (140 lb)   BMI 24.80 kg/m²     PHYSICAL EXAM:    RIGHT KNEE:  Erythema: no  Swelling: no  Increased Warmth: no  Tenderness: none  Flexion: seated at 90  Extension: intact  Lachman neg  Varus laxity: negative  Valgus laxity: negative        __________________________________________________________________________  Large joint arthrocentesis: R knee  [place] Protocol:  procedure performed by consultantConsent: Verbal consent obtained.  Risks and benefits: risks, benefits and alternatives were discussed  Consent given by: patient  Time out: Immediately prior to procedure a \"time out\" was called to verify the correct patient, procedure, equipment, support staff and site/side marked as required.  Patient understanding: patient states understanding of the procedure being performed  Site marked: the operative site was marked  Patient identity confirmed: verbally with patient  Supporting Documentation  Indications: pain and diagnostic evaluation   Procedure Details  Location: knee - R knee  Preparation: Patient was prepped and draped in the usual sterile fashion  Needle size: 18 G  Ultrasound guidance: yes  Approach: anterolateral  Medications administered: 88 mg hyaluronan 88 MG/4ML    Aspirate amount: 15 mL  Aspirate: clear and yellow  Patient tolerance: patient tolerated the procedure well with no immediate complications  Dressing:  Sterile dressing applied        "

## 2025-01-21 ENCOUNTER — ANNUAL EXAM (OUTPATIENT)
Dept: OBGYN CLINIC | Facility: CLINIC | Age: 80
End: 2025-01-21
Payer: MEDICARE

## 2025-01-21 ENCOUNTER — APPOINTMENT (OUTPATIENT)
Dept: PHYSICAL THERAPY | Facility: CLINIC | Age: 80
End: 2025-01-21
Payer: MEDICARE

## 2025-01-21 VITALS
SYSTOLIC BLOOD PRESSURE: 130 MMHG | BODY MASS INDEX: 26.24 KG/M2 | DIASTOLIC BLOOD PRESSURE: 50 MMHG | WEIGHT: 139 LBS | HEIGHT: 61 IN

## 2025-01-21 DIAGNOSIS — N90.89 VULVAR IRRITATION: Primary | ICD-10-CM

## 2025-01-21 PROCEDURE — 99213 OFFICE O/P EST LOW 20 MIN: CPT | Performed by: OBSTETRICS & GYNECOLOGY

## 2025-01-21 RX ORDER — HYDROCORTISONE 25 MG/G
CREAM TOPICAL
COMMUNITY
Start: 2025-01-02

## 2025-01-21 RX ORDER — NYSTATIN AND TRIAMCINOLONE ACETONIDE 100000; 1 [USP'U]/G; MG/G
OINTMENT TOPICAL 2 TIMES DAILY
Qty: 60 G | Refills: 3 | Status: SHIPPED | OUTPATIENT
Start: 2025-01-21 | End: 2025-02-04

## 2025-01-21 NOTE — PROGRESS NOTES
"Name: Matilda Day      : 1945      MRN: [id number]  Encounter Provider: Malia Peña MD  Encounter Date: 2025   Encounter department: [facility] [place]  :  Assessment & Plan  Vulvar irritation  Advised patient to change from Always pads as these are likely causing contact dermatitis.  Advised patient to call colorectal for advice on pad that will help with rectal incontinence as she feels incontinence pads are not long enough.  Orders:    nystatin-triamcinolone (MYCOLOG-II) ointment; Apply topically 2 (two) times a day for 14 days  Follow up PRN.      History of Present Illness   Gynecologic Exam      Matilda Day is a 79 y.o. female who presents with vulvar irritation.  She has a history of rectal incontinence for which she wears a pad every day.  She denies bleeding or urinary leakage.  She reports the rectal incontinence is not a lot and generally only happens when she is on the way to the bathroom anyway.  She denies more significant/persistent incontinence.  She has tried using pads designed for incontinence but feels they do not reach far enough back, so she usually uses Always.  Discussed that the lining of Always is irritating to women with sensitive skin and likely is the cause of her irritation.  Advised change to another dye-free, fragrance-free pad.  Also discussed that incontinence pads are meant to be worn 24/7 and therefore tend to irritate less.      Patient declines breast exam as she follows with surgical oncology for breast cancer.    History obtained from: patient    Review of Systems  Medical History Reviewed by provider this encounter:     .     Objective   /50 (BP Location: Left arm, Patient Position: Sitting, Cuff Size: Standard)   Ht 5' 1\" (1.549 m)   Wt 63 kg (139 lb)   BMI 26.26 kg/m²      Physical Exam  Genitourinary:         Comments: Diffuse erythema as noted; no obvious signs of intertrigo          "

## 2025-01-22 NOTE — PROGRESS NOTES
Cardiology Follow Up    Matilda Day  1945  [id number]  [facility]  [address]  [place] [address] [phone]      Assessment & Plan  Hypertension, unspecified type  RUE sitting 136/58   Continue Amlodipine 2.5 mg daily   Cardizem CD 180mg daily ? Cardizem 60mg daily   Lisinopril 20 mg twice daily  Potassium chloride 20 meq  every other day  Supraventricular tachycardia (HCC)  Admits to seconds of palpitations, continue to monitor symptoms  Continue on Cardizem CD 180mg daily ? Cardizem 60mg daily   Rectal bleeding  Follow-up with Dr. Hayes in the near future  SIADH (syndrome of inappropriate ADH production) (HCC)  Continue on sodium chloride 1 g 4 times a day follow-up with nephrology  Iron deficiency anemia due to chronic blood loss  History of iron infusions follow-up with Dr. Ramon in the near future        Interval History:   Ms Matilda Day presents to our office for a follow up visit.  She denies shortness of breath, lightheadedness, dizziness or CP.  Matilda admits to a short episode of palpitations lasting seconds. She admits to some bleeding in her pouch.  She will follow up with Dr Ramon and Dr Waters in the near future.      Medical History   Primary Cardiologist Dr Beltre/ Dr Nixon   PSVT/PAT  Hx of DVT on Eliquis    Hypertension  Hyperlipidemia 12/09/23 , TG 90, HDL 70, LDL 97   Asthma  Left Breast CA ER negative, MS negative, 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 diagnosed in 2021 followed by Dr Noriega  Iron deficiency anemia followed by Hematology   Rectal Pouch diarrhea   Ulcerative Colitis   Hx of Rectal bleeding   HgbA1C 5. 9 on 10/18/24   SIADH  Sp right hip replacement     12/23/22 TTE: LVEF 65%, grad 1 abnormal relaxation.  LA normal, right atrium normal, no evidence of aortic valve regurgitation no significant stenosis.  No evidence of significant mitral valve or  "tricuspid valvular disease.  Aortic root normal in size.    4/03/22 24-hour Holter monitor 8% burden of SVT, moderate to high burden of premature atrial contractions 23 runs of nonsustained paroxysmal atrial tachycardia    Flex sig: \"Ulcerated anastomosis in the rectum; performed cold forceps biopsy  Ileoanal anastomosis with J-pouch, ulceration within the J-pouch, appears to be an anastomotic ulcer    Patient Active Problem List   Diagnosis    Portal vein thrombosis    Anxiety    Moderate episode of recurrent major depressive disorder (HCC)    Essential tremor    Hyperlipidemia    Essential hypertension    Hypomagnesemia    SIADH (syndrome of inappropriate ADH production) (HCC)    Acquired hypothyroidism    Iron deficiency anemia    Prediabetes    Peripheral neuropathy    Osteopenia    Osteoarthritis of right knee    Ulcerative colitis without complications (HCC)    Allergic rhinitis    GERD (gastroesophageal reflux disease)    Mild intermittent asthma without complication    Diarrhea    Sjogren's syndrome (HCC)    Chronic salpingo-oophoritis    Overweight    Supraventricular tachycardia (HCC)    Unsteady gait    Lumbar degenerative disc disease    Preoperative testing    S/P deep brain stimulator placement    Vitamin B12 deficiency    Vitamin D deficiency    History of left breast cancer    Rectal bleeding    Hoarseness of voice    Esophageal candidiasis (HCC)    S/P left mastectomy    Peripheral vascular disease (HCC)    Continuous opioid dependence (HCC)    Malignant neoplasm of left breast in female, estrogen receptor negative, unspecified site of breast (HCC)    Primary osteoarthritis of one hip, right    Disorder of bursae of shoulder region    Enthesopathy of hip region    Peripheral venous insufficiency    Atrial fibrillation (HCC)    Status post total hip replacement, right    Ulcerative colitis, chronic, other complication (HCC)     Past Medical History:   Diagnosis Date    Anemia     Anxiety     " Arrhythmia     Arthritis     Asthma     Blood clot in vein     portal vein    Breast cancer (HCC) 2021    Breast lump     [id number] RESOLVED    Cancer (HCC)     Depression     Disease of thyroid gland     DVT, lower extremity (HCC)     GERD (gastroesophageal reflux disease)     Heart disease mother    Hyperlipidemia     Hypertension     Hypokalemia     Hyponatremia     Hypothyroidism     Iron deficiency anemia     Irregular heart beat     Manic behavior (HCC)     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HCC)     Osteoarthritis     Palpitations     [id number]  RESOLVED    Paroxysmal supraventricular tachycardia (HCC)     PE (pulmonary thromboembolism) (HCC)     Pneumonia     Sjoegren syndrome     Sleep apnea     mild    Sleep difficulties     Spinal stenosis     Thrombocytosis     [id number]  RESOLVED    Tremors of nervous system     dbs implanted right and left chest    Ulcerative colitis (HCC)     Vertigo     [id number] RESOLVED     Social History     Socioeconomic History    Marital status:      Spouse name: Not on file    Number of children: Not on file    Years of education: EDUCATION LEVEL 10TH GRADE    Highest education level: Not on file   Occupational History    Occupation: RETIRED   Tobacco Use    Smoking status: Former     Current packs/day: 0.00     Average packs/day: 1 pack/day for 15.0 years (15.0 ttl pk-yrs)     Types: Cigarettes     Start date: 1950     Quit date: 1965     Years since quittin.0    Smokeless tobacco: Never    Tobacco comments:     Quit   Vaping Use    Vaping status: Never Used   Substance and Sexual Activity    Alcohol use: Yes     Alcohol/week: 2.0 standard drinks of alcohol     Types: 2 Cans of beer per week     Comment: 2or 3 beers a week    Drug use: No    Sexual activity: Not Currently     Partners: Male     Birth control/protection: Post-menopausal   Other Topics Concern    Not on file   Social History Narrative    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ACTIVITIES INSIDE AND OUTSIDE OF HOME,  MODERATE    CAFFEINE USE, DRINKS CAFEINATED TEA, DRINKS COFFEE    INADEQUATE EXERCISE    LIVES WITH ADULT CHILDREN     Social Drivers of Health     Financial Resource Strain: Low Risk  (4/20/2023)    Overall Financial Resource Strain (CARDIA)     Difficulty of Paying Living Expenses: Not very hard   Food Insecurity: No Food Insecurity (11/5/2024)    Hunger Vital Sign     Worried About Running Out of Food in the Last Year: Never true     Ran Out of Food in the Last Year: Never true   Transportation Needs: No Transportation Needs (11/22/2024)    OASIS : Transportation     Lack of Transportation (Medical): No     Lack of Transportation (Non-Medical): No     Patient Unable or Declines to Respond: No   Physical Activity: Not on file   Stress: Not on file   Social Connections: Not on file   Intimate Partner Violence: Unknown (11/4/2024)    Nursing IPS     Feels Physically and Emotionally Safe: Not on file     Physically Hurt by Someone: Not on file     Humiliated or Emotionally Abused by Someone: Not on file     Physically Hurt by Someone: No     Hurt or Threatened by Someone: No   Housing Stability: Low Risk  (11/5/2024)    Housing Stability Vital Sign     Unable to Pay for Housing in the Last Year: No     Number of Times Moved in the Last Year: 0     Homeless in the Last Year: No      Family History   Problem Relation Age of Onset    Venous thrombosis Mother         ACUTE VENOUS THROMBOSIS OF DEEP VESSELS OF THE DISTAL LOWER EXTREMITY    Other Mother         PHLEBITIS    Hypertension Mother     Peripheral vascular disease Mother     COPD Father     Diabetes Father         MELLITUS    Stroke Father     Diabetes Sister         MELLITUS    Sjogren's syndrome Sister     No Known Problems Daughter     No Known Problems Maternal Grandmother     No Known Problems Maternal Grandfather     No Known Problems Paternal Grandmother     No Known Problems Paternal Grandfather     No Known Problems Sister     No Known Problems  Maternal Aunt     No Known Problems Paternal Aunt     No Known Problems Son      Past Surgical History:   Procedure Laterality Date    ABDOMINAL SURGERY      APPENDECTOMY      BREAST BIOPSY Left 11/07/2019    Stereo    BREAST EXCISIONAL BIOPSY Left     x many years    BREAST SURGERY      lumpectomy & biopsy    CARMELLA HOLE W/ STEREOTACTIC INSERTION OF DBS LEADS / INTRAOP MICROELECTRODE RECORDING      COLON SURGERY      COLONOSCOPY N/A 08/30/2017    Procedure: POUCHOSCOPY;  Surgeon: VANDANA Waters MD;  Location:  GI LAB;  Service: Colorectal    COLOPROCTECTOMY W/ ILEO J POUCH      DEEP BRAIN STIMULATOR PLACEMENT      ESOPHAGOGASTRODUODENOSCOPY      ONSET 10/17/11    FISTULA REPAIR      LLEOANAL FISTULA REPAIR TRANSPERIN TRANSABD APPROACH    HYSTERECTOMY      age 38    ILEOSTOMY CLOSURE      KNEE ARTHROSCOPY      Right    MAMMO STEREOTACTIC BREAST BIOPSY LEFT (ALL INC) Left 11/07/2019    MAMMO STEREOTACTIC BREAST BIOPSY LEFT (ALL INC) Left 12/17/2020    MAMMO STEREOTACTIC BREAST BIOPSY LEFT (ALL INC) EACH ADD Left 12/17/2020    MASTECTOMY Left 02/12/2021    left mastectomy- Dr. Gillespie    MASTECTOMY W/ SENTINEL NODE BIOPSY Left 02/12/2021    Procedure: BREAST MASTECTOMY WITH SENTINEL LYMPH NODE BIOPSY, LYMPHATIC MAPPING WITH BLUE DYE AND RADIAOCTIVE DYE (INJECT AT 1100 BY DR GILLESPIE IN THE OR);  Surgeon: Robbie Gillespie MD;  Location: [facility];  Service: Surgical Oncology    DE ARTHRP ACETBLR/PROX FEM PROSTC AGRFT/ALGRFT Right 11/04/2024    Procedure: ARTHROPLASTY HIP TOTAL, overnight;  Surgeon: Gaviota Ford DO;  Location:  MAIN OR;  Service: Orthopedics    DE INSJ/RPLCMT CRANIAL NEUROSTIM PULSE GENERATOR Right 06/20/2017    Procedure: DBS GENERATOR REPLACEMENT;  Surgeon: Marin Mao MD;  Location:  MAIN OR;  Service: Neurosurgery    DE INSJ/RPLCMT CRANIAL NEUROSTIM PULSE GENERATOR N/A 12/04/2019    Procedure: REPLACEMENT IMPLANTABLE PULSE GENERATOR FOR DEEP BRAIN STIMULATOR LEFT CHEST;  Surgeon: Damian MARIEE  MD Medardo;  Location: [facility];  Service: Neurosurgery    SPLENECTOMY      TONSILLECTOMY      TOTAL HIP ARTHROPLASTY Right        Current Outpatient Medications:     albuterol (PROVENTIL HFA,VENTOLIN HFA) 90 mcg/act inhaler, Inhale 2 puffs every 6 (six) hours as needed for wheezing, Disp: 8 g, Rfl: 1    amLODIPine (NORVASC) 2.5 mg tablet, TAKE ONE TABLET BY MOUTH EVERY DAY, Disp: 90 tablet, Rfl: 1    Ascorbic Acid 500 MG CAPS, Take 500 mg by mouth daily. (Patient not taking: Reported on 1/21/2025), Disp: , Rfl:     Calcium Carb-Cholecalciferol (CALCIUM 600-D PO), Take 1 tablet by mouth 2 (two) times a day, Disp: , Rfl:     Coenzyme Q10 (CO Q-10 PO), Take 1 capsule by mouth daily, Disp: , Rfl:     diltiazem (CARDIZEM CD) 180 mg 24 hr capsule, TAKE ONE CAPSULE BY MOUTH EVERY DAY, Disp: 90 capsule, Rfl: 1    diltiazem (CARDIZEM) 60 mg tablet, TAKE ONE TABLET BY MOUTH EVERY DAY, Disp: 90 tablet, Rfl: 1    Eliquis 5 MG, TAKE ONE TABLET BY MOUTH TWICE A DAY, Disp: 180 tablet, Rfl: 1    escitalopram (LEXAPRO) 20 mg tablet, TAKE ONE TABLET BY MOUTH EVERY DAY, Disp: 90 tablet, Rfl: 1    fluticasone (FLONASE) 50 mcg/act nasal spray, APPLY ONE SPRAY IN EACH NOSTRIL ONCE DAILY AS NEEDED FOR RUNNY NOSE, Disp: 48 g, Rfl: 1    Fluticasone Furoate-Vilanterol 100-25 mcg/actuation inhaler, Inhale 1 puff daily Rinse mouth after use. (Patient not taking: Reported on 1/21/2025), Disp: 60 blister, Rfl: 5    folic acid (KP Folic Acid) 1 mg tablet, Take 1 tablet (1 mg total) by mouth daily Begin 30 days prior to surgery (Patient not taking: Reported on 1/21/2025), Disp: 30 tablet, Rfl: 1    gabapentin (NEURONTIN) 600 MG tablet, TAKE ONE TABLET BY MOUTH EVERY MORNING , TAKE ONE TABLET BY MOUTH EVERY DAY IN THE AFTERNOON , AND TAKE TWO TABLETS BY MOUTH EVERY EVENING AT BEDTIME, Disp: 360 tablet, Rfl: 1    HYDROcodone-acetaminophen (NORCO) 5-325 mg per tablet, Take 1 tablet by mouth every 4 (four) hours as needed for pain Do not exceed 3000  mg of acetaminophen per day. Max Daily Amount: 6 tablets (Patient not taking: Reported on 1/21/2025), Disp: 42 tablet, Rfl: 0    hydrocortisone 2.5 % cream, , Disp: , Rfl:     levothyroxine 50 mcg tablet, TAKE ONE TABLET BY MOUTH EVERY DAY, Disp: 90 tablet, Rfl: 0    lisinopril (ZESTRIL) 20 mg tablet, TAKE ONE TABLET BY MOUTH TWO TIMES A DAY, Disp: 180 tablet, Rfl: 1    LORazepam (ATIVAN) 1 mg tablet, Take 1 tablet (1 mg total) by mouth every 6 (six) hours as needed for anxiety, Disp: 120 tablet, Rfl: 0    MAGNESIUM PO, Take 1 tablet by mouth daily 400 mg tablet , Disp: , Rfl:     Multiple Vitamin (multivitamin) tablet, Take 1 tablet by mouth daily Begin 30 days prior to surgery, Disp: 30 tablet, Rfl: 1    nystatin-triamcinolone (MYCOLOG-II) ointment, Apply topically 2 (two) times a day for 14 days, Disp: 60 g, Rfl: 3    Omega-3 Fatty Acids (FISH OIL PO), Take 1 capsule by mouth daily, Disp: , Rfl:     pantoprazole (PROTONIX) 40 mg tablet, TAKE ONE TABLET BY MOUTH EVERY DAY, Disp: 90 tablet, Rfl: 1    potassium chloride (MICRO-K) 10 MEQ CR capsule, TAKE TWO CAPSULES BY MOUTH EVERY DAY, Disp: 180 capsule, Rfl: 1    promethazine (PHENERGAN) 12.5 MG tablet, Take 1 tablet (12.5 mg total) by mouth every 6 (six) hours as needed for nausea or vomiting (Patient not taking: Reported on 1/21/2025), Disp: 8 tablet, Rfl: 0    simvastatin (ZOCOR) 10 mg tablet, TAKE ONE-HALF TABLET BY MOUTH DAILY, Disp: 45 tablet, Rfl: 1    sodium chloride 1 g tablet, TAKE ONE TABLET BY MOUTH FOUR TIMES A DAY, Disp: 270 tablet, Rfl: 1    torsemide (DEMADEX) 10 mg tablet, TAKE ONE TABLET BY MOUTH EVERY DAY AS NEEDED (EDEMA) (Patient not taking: No sig reported), Disp: 90 tablet, Rfl: 1    torsemide (DEMADEX) 20 mg tablet, Take 1 tablet (20 mg total) by mouth daily, Disp: 90 tablet, Rfl: 3    vitamin B-12 (VITAMIN B-12) 500 mcg tablet, Take 1 tablet (500 mcg total) by mouth daily, Disp: 90 tablet, Rfl: 1    Current Facility-Administered  Medications:     cyanocobalamin injection 1,000 mcg, 1,000 mcg, Intramuscular, Q30 Days, Zaira Velasquez MD, 1,000 mcg at 04/20/23 1012  Allergies   Allergen Reactions    Indomethacin GI Intolerance, Dizziness and Other (See Comments)    Macrobid [Nitrofurantoin Monohyd Macro] Rash    Nitrofurantoin Other (See Comments)    Penicillins Rash and Other (See Comments)     Annotation - [id number]: can take cephalosporins    Sulfa Antibiotics Rash and Other (See Comments)       Labs:  Appointment on 12/12/2024   Component Date Value    WBC 12/12/2024 6.74     RBC 12/12/2024 3.24 (L)     Hemoglobin 12/12/2024 9.6 (L)     Hematocrit 12/12/2024 31.5 (L)     MCV 12/12/2024 97     MCH 12/12/2024 29.6     MCHC 12/12/2024 30.5 (L)     RDW 12/12/2024 15.8 (H)     MPV 12/12/2024 8.7 (L)     Platelets 12/12/2024 633 (H)     nRBC 12/12/2024 0     Segmented % 12/12/2024 41 (L)     Immature Grans % 12/12/2024 0     Lymphocytes % 12/12/2024 38     Monocytes % 12/12/2024 14 (H)     Eosinophils Relative 12/12/2024 5     Basophils Relative 12/12/2024 2 (H)     Absolute Neutrophils 12/12/2024 2.82     Absolute Immature Grans 12/12/2024 0.01     Absolute Lymphocytes 12/12/2024 2.53     Absolute Monocytes 12/12/2024 0.91     Eosinophils Absolute 12/12/2024 0.36     Basophils Absolute 12/12/2024 0.11 (H)     Iron Saturation 12/12/2024 6 (L)     TIBC 12/12/2024 346     Iron 12/12/2024 21 (L)     UIBC 12/12/2024 325     Ferritin 12/12/2024 48    Admission on 11/24/2024, Discharged on 11/25/2024   Component Date Value    WBC 11/24/2024 8.01     RBC 11/24/2024 3.20 (L)     Hemoglobin 11/24/2024 9.6 (L)     Hematocrit 11/24/2024 30.4 (L)     MCV 11/24/2024 95     MCH 11/24/2024 30.0     MCHC 11/24/2024 31.6     RDW 11/24/2024 17.1 (H)     MPV 11/24/2024 8.2 (L)     Platelets 11/24/2024 761 (H)     nRBC 11/24/2024 0     Segmented % 11/24/2024 41 (L)     Immature Grans % 11/24/2024 0     Lymphocytes % 11/24/2024 38     Monocytes % 11/24/2024 14  (H)     Eosinophils Relative 11/24/2024 5     Basophils Relative 11/24/2024 2 (H)     Absolute Neutrophils 11/24/2024 3.31     Absolute Immature Grans 11/24/2024 0.03     Absolute Lymphocytes 11/24/2024 3.06     Absolute Monocytes 11/24/2024 1.12     Eosinophils Absolute 11/24/2024 0.36     Basophils Absolute 11/24/2024 0.13 (H)     Sodium 11/24/2024 133 (L)     Potassium 11/24/2024 3.8     Chloride 11/24/2024 99     CO2 11/24/2024 25     ANION GAP 11/24/2024 9     BUN 11/24/2024 8     Creatinine 11/24/2024 0.61     Glucose 11/24/2024 97     Calcium 11/24/2024 8.9     AST 11/24/2024 18     ALT 11/24/2024 8     Alkaline Phosphatase 11/24/2024 136 (H)     Total Protein 11/24/2024 6.8     Albumin 11/24/2024 3.8     Total Bilirubin 11/24/2024 0.33     eGFR 11/24/2024 85     Ventricular Rate 11/24/2024 105     Atrial Rate 11/24/2024 105     KS Interval 11/24/2024 154     QRSD Interval 11/24/2024 78     QT Interval 11/24/2024 340     QTC Interval 11/24/2024 449     P Axis 11/24/2024 23     QRS Axis 11/24/2024 146     T Wave [place] 11/24/2024 58     Protime 11/24/2024 14.5     INR 11/24/2024 1.06     PTT 11/24/2024 33      Imaging: No results found.    Review of Systems:  Review of Systems   Constitutional:  Positive for fatigue.   Respiratory:  Positive for shortness of breath.    Musculoskeletal:  Positive for arthralgias and myalgias.   All other systems reviewed and are negative.      Physical Exam:  Physical Exam  Vitals reviewed.   Constitutional:       Appearance: She is well-developed.   Pulmonary:      Effort: Pulmonary effort is normal.      Breath sounds: Normal breath sounds.   Musculoskeletal:         General: Normal range of motion.      Left lower leg: No edema.   Skin:     General: Skin is warm and dry.      Capillary Refill: Capillary refill takes less than 2 seconds.   Neurological:      General: No focal deficit present.      Mental Status: She is alert and oriented to person, place, and time.    Psychiatric:         Mood and Affect: Mood normal.         Behavior: Behavior normal.

## 2025-01-23 ENCOUNTER — OFFICE VISIT (OUTPATIENT)
Dept: CARDIOLOGY CLINIC | Facility: CLINIC | Age: 80
End: 2025-01-23
Payer: MEDICARE

## 2025-01-23 ENCOUNTER — OFFICE VISIT (OUTPATIENT)
Dept: OBGYN CLINIC | Facility: MEDICAL CENTER | Age: 80
End: 2025-01-23

## 2025-01-23 ENCOUNTER — RA CDI HCC (OUTPATIENT)
Dept: OTHER | Facility: HOSPITAL | Age: 80
End: 2025-01-23

## 2025-01-23 VITALS
OXYGEN SATURATION: 97 % | DIASTOLIC BLOOD PRESSURE: 58 MMHG | HEIGHT: 61 IN | SYSTOLIC BLOOD PRESSURE: 130 MMHG | WEIGHT: 140.5 LBS | HEART RATE: 68 BPM | BODY MASS INDEX: 26.53 KG/M2

## 2025-01-23 VITALS — BODY MASS INDEX: 26.26 KG/M2 | HEIGHT: 61 IN

## 2025-01-23 DIAGNOSIS — I10 HYPERTENSION, UNSPECIFIED TYPE: Primary | ICD-10-CM

## 2025-01-23 DIAGNOSIS — K62.5 RECTAL BLEEDING: ICD-10-CM

## 2025-01-23 DIAGNOSIS — M17.11 PRIMARY OSTEOARTHRITIS OF RIGHT KNEE: ICD-10-CM

## 2025-01-23 DIAGNOSIS — I47.10 SUPRAVENTRICULAR TACHYCARDIA (HCC): ICD-10-CM

## 2025-01-23 DIAGNOSIS — Z96.641 STATUS POST TOTAL HIP REPLACEMENT, RIGHT: Primary | ICD-10-CM

## 2025-01-23 DIAGNOSIS — E22.2 SIADH (SYNDROME OF INAPPROPRIATE ADH PRODUCTION) (HCC): ICD-10-CM

## 2025-01-23 DIAGNOSIS — D50.0 IRON DEFICIENCY ANEMIA DUE TO CHRONIC BLOOD LOSS: ICD-10-CM

## 2025-01-23 PROCEDURE — 99024 POSTOP FOLLOW-UP VISIT: CPT | Performed by: ORTHOPAEDIC SURGERY

## 2025-01-23 PROCEDURE — 99214 OFFICE O/P EST MOD 30 MIN: CPT | Performed by: NURSE PRACTITIONER

## 2025-01-23 NOTE — ASSESSMENT & PLAN NOTE
Admits to seconds of palpitations, continue to monitor symptoms  Continue on Cardizem CD 180mg daily ? Cardizem 60mg daily

## 2025-01-23 NOTE — PROGRESS NOTES
F33.1  HCC coding opportunities          Chart Reviewed number of suggestions sent to Provider: 1     Patients Insurance     Medicare Insurance: [employer]vantage

## 2025-01-23 NOTE — PROGRESS NOTES
Assessment/Plan:  1. Status post total hip replacement, right    2. Primary osteoarthritis of right knee      No orders of the defined types were placed in this encounter.      Approximate 11 weeks status post right anterior total FITO doing very well.  Pain control prn- OTC pain meds, only taking Tylenol.  Patient should call ahead for abx prior to dental appts.   She is completed physical therapy.  Patient did state today she would like to potentially have a right knee replacement in the fall 2025.  Her last cortisone injection was about a week ago with sports medicine.  She does understand she cannot have cortisone injections within 3 months of the operation.    Return in about 5 months (around 6/23/2025) for R TKA  Discussion/consent.    I answered all of the patient's questions during the visit and provided education of the patient's condition during the visit.  The patient verbalized understanding of the information given and agrees with the plan.  This note was dictated using [employer] software.  It may contain errors including improperly dictated words.  Please contact physician directly for any questions.    Subjective   Chief Complaint:   Chief Complaint   Patient presents with    Right Hip - Post-op, Follow-up       Matilda Day is a 79 y.o. female who presents for 11 week follow up s/p right FITO.  She is doing very well postoperatively.  She states no pain and only takes Tylenol as needed.  Patient did express the desire to have a right knee replacement in the fall of this year.  She does understand she cannot have cortisone injection within 3 months of that operation to increase infection risk.  She will continue to rehab her hip in the meantime.    Review of Systems  ROS:    See HPI for musculoskeletal review.   All other systems reviewed are negative     History:  Past Medical History:   Diagnosis Date    Anemia     Anxiety     Arrhythmia     Arthritis     Asthma     Blood clot in vein     portal vein     Breast cancer (HCC) 02/12/2021    Breast lump     [id number] RESOLVED    Cancer (HCC)     Depression     Disease of thyroid gland     DVT, lower extremity (HCC)     GERD (gastroesophageal reflux disease)     Heart disease mother    Hyperlipidemia     Hypertension     Hypokalemia     Hyponatremia     Hypothyroidism     Iron deficiency anemia     Irregular heart beat     Manic behavior (HCC)     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HCC)     Osteoarthritis     Palpitations     [id number]  RESOLVED    Paroxysmal supraventricular tachycardia (HCC)     PE (pulmonary thromboembolism) (HCC)     Pneumonia     Sjoegren syndrome     Sleep apnea     mild    Sleep difficulties     Spinal stenosis     Thrombocytosis     [id number]  RESOLVED    Tremors of nervous system     dbs implanted right and left chest    Ulcerative colitis (HCC)     Vertigo     [id number] RESOLVED     Past Surgical History:   Procedure Laterality Date    ABDOMINAL SURGERY      APPENDECTOMY      BREAST BIOPSY Left 11/07/2019    Stereo    BREAST EXCISIONAL BIOPSY Left     x many years    BREAST SURGERY      lumpectomy & biopsy    CARMELLA HOLE W/ STEREOTACTIC INSERTION OF DBS LEADS / INTRAOP MICROELECTRODE RECORDING      COLON SURGERY      COLONOSCOPY N/A 08/30/2017    Procedure: POUCHOSCOPY;  Surgeon: VANDANA Waters MD;  Location: [facility];  Service: Colorectal    COLOPROCTECTOMY W/ ILEO J POUCH      DEEP BRAIN STIMULATOR PLACEMENT      ESOPHAGOGASTRODUODENOSCOPY      ONSET 10/17/11    FISTULA REPAIR      LLEOANAL FISTULA REPAIR TRANSPERIN TRANSABD APPROACH    HYSTERECTOMY      age 38    ILEOSTOMY CLOSURE      KNEE ARTHROSCOPY      Right    MAMMO STEREOTACTIC BREAST BIOPSY LEFT (ALL INC) Left 11/07/2019    MAMMO STEREOTACTIC BREAST BIOPSY LEFT (ALL INC) Left 12/17/2020    MAMMO STEREOTACTIC BREAST BIOPSY LEFT (ALL INC) EACH ADD Left 12/17/2020    MASTECTOMY Left 02/12/2021    left mastectomy- Dr. Hayes    MASTECTOMY W/ SENTINEL NODE BIOPSY Left 02/12/2021     Procedure: BREAST MASTECTOMY WITH SENTINEL LYMPH NODE BIOPSY, LYMPHATIC MAPPING WITH BLUE DYE AND RADIAOCTIVE DYE (INJECT AT 1100 BY DR GILLESPIE IN THE OR);  Surgeon: Robbie Gillespie MD;  Location: [facility];  Service: Surgical Oncology    MS ARTHRP ACETBLR/PROX FEM PROSTC AGRFT/ALGRFT Right 2024    Procedure: ARTHROPLASTY HIP TOTAL, overnight;  Surgeon: Gaviota Ford DO;  Location:  [facility];  Service: Orthopedics    MS INSJ/RPLCMT CRANIAL NEUROSTIM PULSE GENERATOR Right 2017    Procedure: DBS GENERATOR REPLACEMENT;  Surgeon: Marin Mao MD;  Location:  [facility] OR;  Service: Neurosurgery    MS INSJ/RPLCMT CRANIAL NEUROSTIM PULSE GENERATOR N/A 2019    Procedure: REPLACEMENT IMPLANTABLE PULSE GENERATOR FOR DEEP BRAIN STIMULATOR LEFT CHEST;  Surgeon: Damian Batres MD;  Location: [facility];  Service: Neurosurgery    SPLENECTOMY      TONSILLECTOMY      TOTAL HIP ARTHROPLASTY Right      Social History   Social History     Substance and Sexual Activity   Alcohol Use Yes    Alcohol/week: 2.0 standard drinks of alcohol    Types: 2 Cans of beer per week    Comment: 2or 3 beers a week     Social History     Substance and Sexual Activity   Drug Use No     Social History     Tobacco Use   Smoking Status Former    Current packs/day: 0.00    Average packs/day: 1 pack/day for 15.0 years (15.0 ttl pk-yrs)    Types: Cigarettes    Start date: 1950    Quit date: 1965    Years since quittin.1   Smokeless Tobacco Never   Tobacco Comments    Quit     Family History:   Family History   Problem Relation Age of Onset    Venous thrombosis Mother         ACUTE VENOUS THROMBOSIS OF DEEP VESSELS OF THE DISTAL LOWER EXTREMITY    Other Mother         PHLEBITIS    Hypertension Mother     Peripheral vascular disease Mother     COPD Father     Diabetes Father         MELLITUS    Stroke Father     Diabetes Sister         MELLITUS    Sjogren's syndrome Sister     No Known Problems Daughter     No Known Problems  Maternal Grandmother     No Known Problems Maternal Grandfather     No Known Problems Paternal Grandmother     No Known Problems Paternal Grandfather     No Known Problems Sister     No Known Problems Maternal Aunt     No Known Problems Paternal Aunt     No Known Problems Son        Current Outpatient Medications on File Prior to Visit   Medication Sig Dispense Refill    albuterol (PROVENTIL HFA,VENTOLIN HFA) 90 mcg/act inhaler Inhale 2 puffs every 6 (six) hours as needed for wheezing 8 g 1    amLODIPine (NORVASC) 2.5 mg tablet TAKE ONE TABLET BY MOUTH EVERY DAY 90 tablet 1    Ascorbic Acid 500 MG CAPS Take 500 mg by mouth daily. (Patient not taking: Reported on 1/21/2025)      Calcium Carb-Cholecalciferol (CALCIUM 600-D PO) Take 1 tablet by mouth 2 (two) times a day      Coenzyme Q10 (CO Q-10 PO) Take 1 capsule by mouth daily      diltiazem (CARDIZEM CD) 180 mg 24 hr capsule TAKE ONE CAPSULE BY MOUTH EVERY DAY 90 capsule 1    diltiazem (CARDIZEM) 60 mg tablet TAKE ONE TABLET BY MOUTH EVERY DAY 90 tablet 1    Eliquis 5 MG TAKE ONE TABLET BY MOUTH TWICE A  tablet 1    escitalopram (LEXAPRO) 20 mg tablet TAKE ONE TABLET BY MOUTH EVERY DAY 90 tablet 1    fluticasone (FLONASE) 50 mcg/act nasal spray APPLY ONE SPRAY IN EACH NOSTRIL ONCE DAILY AS NEEDED FOR RUNNY NOSE 48 g 1    Fluticasone Furoate-Vilanterol 100-25 mcg/actuation inhaler Inhale 1 puff daily Rinse mouth after use. (Patient not taking: Reported on 1/21/2025) 60 blister 5    folic acid ( Folic Acid) 1 mg tablet Take 1 tablet (1 mg total) by mouth daily Begin 30 days prior to surgery (Patient not taking: Reported on 1/21/2025) 30 tablet 1    gabapentin (NEURONTIN) 600 MG tablet TAKE ONE TABLET BY MOUTH EVERY MORNING , TAKE ONE TABLET BY MOUTH EVERY DAY IN THE AFTERNOON , AND TAKE TWO TABLETS BY MOUTH EVERY EVENING AT BEDTIME 360 tablet 1    HYDROcodone-acetaminophen (NORCO) 5-325 mg per tablet Take 1 tablet by mouth every 4 (four) hours as needed for  pain Do not exceed 3000 mg of acetaminophen per day. Max Daily Amount: 6 tablets (Patient not taking: Reported on 1/21/2025) 42 tablet 0    hydrocortisone 2.5 % cream       levothyroxine 50 mcg tablet TAKE ONE TABLET BY MOUTH EVERY DAY 90 tablet 0    lisinopril (ZESTRIL) 20 mg tablet TAKE ONE TABLET BY MOUTH TWO TIMES A  tablet 1    LORazepam (ATIVAN) 1 mg tablet Take 1 tablet (1 mg total) by mouth every 6 (six) hours as needed for anxiety 120 tablet 0    MAGNESIUM PO Take 1 tablet by mouth daily 400 mg tablet       Multiple Vitamin (multivitamin) tablet Take 1 tablet by mouth daily Begin 30 days prior to surgery 30 tablet 1    nystatin-triamcinolone (MYCOLOG-II) ointment Apply topically 2 (two) times a day for 14 days 60 g 3    Omega-3 Fatty Acids (FISH OIL PO) Take 1 capsule by mouth daily      pantoprazole (PROTONIX) 40 mg tablet TAKE ONE TABLET BY MOUTH EVERY DAY 90 tablet 1    potassium chloride (MICRO-K) 10 MEQ CR capsule TAKE TWO CAPSULES BY MOUTH EVERY  capsule 1    promethazine (PHENERGAN) 12.5 MG tablet Take 1 tablet (12.5 mg total) by mouth every 6 (six) hours as needed for nausea or vomiting (Patient not taking: Reported on 1/21/2025) 8 tablet 0    simvastatin (ZOCOR) 10 mg tablet TAKE ONE-HALF TABLET BY MOUTH DAILY 45 tablet 1    sodium chloride 1 g tablet TAKE ONE TABLET BY MOUTH FOUR TIMES A  tablet 1    torsemide (DEMADEX) 10 mg tablet TAKE ONE TABLET BY MOUTH EVERY DAY AS NEEDED (EDEMA) (Patient not taking: No sig reported) 90 tablet 1    torsemide (DEMADEX) 20 mg tablet Take 1 tablet (20 mg total) by mouth daily 90 tablet 3    vitamin B-12 (VITAMIN B-12) 500 mcg tablet Take 1 tablet (500 mcg total) by mouth daily 90 tablet 1     Current Facility-Administered Medications on File Prior to Visit   Medication Dose Route Frequency Provider Last Rate Last Admin    cyanocobalamin injection 1,000 mcg  1,000 mcg Intramuscular Q30 Days Zaira Velasquez MD   1,000 mcg at 04/20/23 1012  "    Allergies   Allergen Reactions    Indomethacin GI Intolerance, Dizziness and Other (See Comments)    Macrobid [Nitrofurantoin Monohyd Macro] Rash    Nitrofurantoin Other (See Comments)    Penicillins Rash and Other (See Comments)     Annotation - [id number]: can take cephalosporins    Sulfa Antibiotics Rash and Other (See Comments)        Objective     Ht 5' 1\" (1.549 m)   BMI 26.26 kg/m²      PE:  AAOx 3  WDWN  Hearing intact, no drainage from eyes  no audible wheezing  no abdominal distension  LE compartments soft, AT/GS intact    Ortho Exam:  right hip:   INC: healed, No erythema  No pain with ROM  Flexion 120 degrees, internal rotation 30 degrees, external rotation 40 degrees.  Limb lengths right 2 mm short than the left secondary to valgus deformity of her knee.         "

## 2025-01-24 ENCOUNTER — APPOINTMENT (OUTPATIENT)
Dept: PHYSICAL THERAPY | Facility: CLINIC | Age: 80
End: 2025-01-24
Payer: MEDICARE

## 2025-01-27 ENCOUNTER — APPOINTMENT (OUTPATIENT)
Dept: LAB | Facility: AMBULARY SURGERY CENTER | Age: 80
End: 2025-01-27
Payer: MEDICARE

## 2025-01-27 ENCOUNTER — OFFICE VISIT (OUTPATIENT)
Dept: HEMATOLOGY ONCOLOGY | Facility: CLINIC | Age: 80
End: 2025-01-27
Payer: MEDICARE

## 2025-01-27 ENCOUNTER — TELEPHONE (OUTPATIENT)
Dept: HEMATOLOGY ONCOLOGY | Facility: CLINIC | Age: 80
End: 2025-01-27

## 2025-01-27 VITALS
BODY MASS INDEX: 26.62 KG/M2 | SYSTOLIC BLOOD PRESSURE: 134 MMHG | HEART RATE: 67 BPM | WEIGHT: 141 LBS | RESPIRATION RATE: 24 BRPM | DIASTOLIC BLOOD PRESSURE: 66 MMHG | TEMPERATURE: 98 F | OXYGEN SATURATION: 96 % | HEIGHT: 61 IN

## 2025-01-27 DIAGNOSIS — D50.9 IRON DEFICIENCY ANEMIA, UNSPECIFIED IRON DEFICIENCY ANEMIA TYPE: ICD-10-CM

## 2025-01-27 DIAGNOSIS — E78.2 MIXED HYPERLIPIDEMIA: ICD-10-CM

## 2025-01-27 DIAGNOSIS — I10 ESSENTIAL HYPERTENSION: ICD-10-CM

## 2025-01-27 DIAGNOSIS — E87.1 HYPONATREMIA: ICD-10-CM

## 2025-01-27 DIAGNOSIS — E78.5 HYPERLIPIDEMIA, UNSPECIFIED HYPERLIPIDEMIA TYPE: ICD-10-CM

## 2025-01-27 DIAGNOSIS — D50.0 IRON DEFICIENCY ANEMIA DUE TO CHRONIC BLOOD LOSS: ICD-10-CM

## 2025-01-27 DIAGNOSIS — Z17.1 MALIGNANT NEOPLASM OF LEFT BREAST IN FEMALE, ESTROGEN RECEPTOR NEGATIVE, UNSPECIFIED SITE OF BREAST (HCC): ICD-10-CM

## 2025-01-27 DIAGNOSIS — K51.80 OTHER ULCERATIVE COLITIS WITHOUT COMPLICATION (HCC): ICD-10-CM

## 2025-01-27 DIAGNOSIS — C50.912 MALIGNANT NEOPLASM OF LEFT BREAST IN FEMALE, ESTROGEN RECEPTOR NEGATIVE, UNSPECIFIED SITE OF BREAST (HCC): ICD-10-CM

## 2025-01-27 DIAGNOSIS — E22.2 SIADH (SYNDROME OF INAPPROPRIATE ADH PRODUCTION) (HCC): ICD-10-CM

## 2025-01-27 DIAGNOSIS — I81 PORTAL VEIN THROMBOSIS: Primary | ICD-10-CM

## 2025-01-27 DIAGNOSIS — E03.9 ACQUIRED HYPOTHYROIDISM: ICD-10-CM

## 2025-01-27 LAB
25(OH)D3 SERPL-MCNC: 55.5 NG/ML (ref 30–100)
ALBUMIN SERPL BCG-MCNC: 3.8 G/DL (ref 3.5–5)
ALP SERPL-CCNC: 81 U/L (ref 34–104)
ALT SERPL W P-5'-P-CCNC: 8 U/L (ref 7–52)
ANION GAP SERPL CALCULATED.3IONS-SCNC: 6 MMOL/L (ref 4–13)
AST SERPL W P-5'-P-CCNC: 15 U/L (ref 13–39)
BASOPHILS # BLD AUTO: 0.11 THOUSANDS/ΜL (ref 0–0.1)
BASOPHILS NFR BLD AUTO: 2 % (ref 0–1)
BILIRUB SERPL-MCNC: 0.29 MG/DL (ref 0.2–1)
BUN SERPL-MCNC: 12 MG/DL (ref 5–25)
CALCIUM SERPL-MCNC: 9.3 MG/DL (ref 8.4–10.2)
CHLORIDE SERPL-SCNC: 104 MMOL/L (ref 96–108)
CHOLEST SERPL-MCNC: 151 MG/DL (ref ?–200)
CO2 SERPL-SCNC: 28 MMOL/L (ref 21–32)
CREAT SERPL-MCNC: 0.8 MG/DL (ref 0.6–1.3)
CREAT UR-MCNC: 37.5 MG/DL
EOSINOPHIL # BLD AUTO: 0.36 THOUSAND/ΜL (ref 0–0.61)
EOSINOPHIL NFR BLD AUTO: 6 % (ref 0–6)
ERYTHROCYTE [DISTWIDTH] IN BLOOD BY AUTOMATED COUNT: 15.2 % (ref 11.6–15.1)
FERRITIN SERPL-MCNC: 5 NG/ML (ref 11–307)
GFR SERPL CREATININE-BSD FRML MDRD: 69 ML/MIN/1.73SQ M
GLUCOSE P FAST SERPL-MCNC: 108 MG/DL (ref 65–99)
HCT VFR BLD AUTO: 28.6 % (ref 34.8–46.1)
HDLC SERPL-MCNC: 57 MG/DL
HGB BLD-MCNC: 8.4 G/DL (ref 11.5–15.4)
IMM GRANULOCYTES # BLD AUTO: 0.01 THOUSAND/UL (ref 0–0.2)
IMM GRANULOCYTES NFR BLD AUTO: 0 % (ref 0–2)
IRON SATN MFR SERPL: 3 % (ref 15–50)
IRON SERPL-MCNC: 13 UG/DL (ref 50–212)
LDLC SERPL CALC-MCNC: 67 MG/DL (ref 0–100)
LYMPHOCYTES # BLD AUTO: 2.91 THOUSANDS/ΜL (ref 0.6–4.47)
LYMPHOCYTES NFR BLD AUTO: 46 % (ref 14–44)
MCH RBC QN AUTO: 26.3 PG (ref 26.8–34.3)
MCHC RBC AUTO-ENTMCNC: 29.4 G/DL (ref 31.4–37.4)
MCV RBC AUTO: 89 FL (ref 82–98)
MICROALBUMIN UR-MCNC: 9.1 MG/L
MICROALBUMIN/CREAT 24H UR: 24 MG/G CREATININE (ref 0–30)
MONOCYTES # BLD AUTO: 0.84 THOUSAND/ΜL (ref 0.17–1.22)
MONOCYTES NFR BLD AUTO: 14 % (ref 4–12)
NEUTROPHILS # BLD AUTO: 1.97 THOUSANDS/ΜL (ref 1.85–7.62)
NEUTS SEG NFR BLD AUTO: 32 % (ref 43–75)
NRBC BLD AUTO-RTO: 1 /100 WBCS
PHOSPHATE SERPL-MCNC: 4.2 MG/DL (ref 2.3–4.1)
PLATELET # BLD AUTO: 545 THOUSANDS/UL (ref 149–390)
PMV BLD AUTO: 8.9 FL (ref 8.9–12.7)
POTASSIUM SERPL-SCNC: 4.6 MMOL/L (ref 3.5–5.3)
PROT SERPL-MCNC: 6.3 G/DL (ref 6.4–8.4)
RBC # BLD AUTO: 3.2 MILLION/UL (ref 3.81–5.12)
SODIUM SERPL-SCNC: 138 MMOL/L (ref 135–147)
TIBC SERPL-MCNC: 457.8 UG/DL (ref 250–450)
TRANSFERRIN SERPL-MCNC: 327 MG/DL (ref 203–362)
TRIGL SERPL-MCNC: 133 MG/DL (ref ?–150)
TSH SERPL DL<=0.05 MIU/L-ACNC: 2.28 UIU/ML (ref 0.45–4.5)
UIBC SERPL-MCNC: 445 UG/DL (ref 155–355)
URATE SERPL-MCNC: 6.1 MG/DL (ref 2–7.5)
WBC # BLD AUTO: 6.2 THOUSAND/UL (ref 4.31–10.16)

## 2025-01-27 PROCEDURE — 85025 COMPLETE CBC W/AUTO DIFF WBC: CPT

## 2025-01-27 PROCEDURE — 84443 ASSAY THYROID STIM HORMONE: CPT

## 2025-01-27 PROCEDURE — 99214 OFFICE O/P EST MOD 30 MIN: CPT | Performed by: INTERNAL MEDICINE

## 2025-01-27 PROCEDURE — 82728 ASSAY OF FERRITIN: CPT

## 2025-01-27 PROCEDURE — 83540 ASSAY OF IRON: CPT

## 2025-01-27 PROCEDURE — 84100 ASSAY OF PHOSPHORUS: CPT

## 2025-01-27 PROCEDURE — 80053 COMPREHEN METABOLIC PANEL: CPT

## 2025-01-27 PROCEDURE — 83550 IRON BINDING TEST: CPT

## 2025-01-27 PROCEDURE — 82306 VITAMIN D 25 HYDROXY: CPT

## 2025-01-27 PROCEDURE — 84550 ASSAY OF BLOOD/URIC ACID: CPT

## 2025-01-27 PROCEDURE — 80061 LIPID PANEL: CPT

## 2025-01-27 PROCEDURE — 82043 UR ALBUMIN QUANTITATIVE: CPT

## 2025-01-27 PROCEDURE — 36415 COLL VENOUS BLD VENIPUNCTURE: CPT

## 2025-01-27 PROCEDURE — 82570 ASSAY OF URINE CREATININE: CPT

## 2025-01-27 RX ORDER — HYDROCORTISONE SODIUM SUCCINATE 100 MG/2ML
50 INJECTION INTRAMUSCULAR; INTRAVENOUS ONCE
Start: 2025-02-11 | End: 2025-02-10

## 2025-01-27 RX ORDER — SODIUM CHLORIDE 9 MG/ML
20 INJECTION, SOLUTION INTRAVENOUS ONCE
OUTPATIENT
Start: 2025-02-11

## 2025-01-27 RX ORDER — SODIUM CHLORIDE 9 MG/ML
20 INJECTION, SOLUTION INTRAVENOUS ONCE
Status: CANCELLED | OUTPATIENT
Start: 2025-02-10

## 2025-01-27 RX ORDER — HYDROCORTISONE SODIUM SUCCINATE 100 MG/2ML
50 INJECTION INTRAMUSCULAR; INTRAVENOUS ONCE
Status: CANCELLED
Start: 2025-02-10 | End: 2025-02-10

## 2025-01-27 NOTE — PROGRESS NOTES
Hematology Outpatient Follow - Up Note  Matilda Day 79 y.o. female MRN: @ Encounter: [id number]        Date:  1/27/2025        Assessment/ Plan:    Left breast  ( pT1a, pN0, M0) with microinvasion measuring less than 1 mm.  ER negative, GA negative, HER2 3+ disease. S/p mastectomy/SLN 2/2021   Diagnosed in February 2021.      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  Her microinvasive breast cancer measure less than 1 mm.  However, her DCIS measure 7.7 cm.      She underwent mastectomy and sentinel lymph node biopsy, resulting in KORIN.       She did not require any adjuvant therapy.       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 twice daily secondary to mesenteric vein thrombosis, she had a history of tremor, and high risk of fall        Labs and imaging studies are reviewed by ordering provider once results are available. If there are findings that need immediate attention, you will be contacted when results available.   Discussing results and the implication on your healthcare is best discussed in person at your follow-up visit.       HPI:  79-year-old postmenopausal female who initially found to have abnormality in her left breast based on a screening mammogram.  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  DCIS port was ER negative, GA negative.  She subsequently underwent left mastectomy with sentinel lymph node biopsy by Dr. Hayes in February 12, 2021. She had 7.7 cm of ductal carcinoma in situ,  grade 3. She also had micro invasion measuring less than 1 mm.  Micro invasive part of cancer was ER negative, MA negative, HER2 3+ disease.   2 sentinel lymph nodes were negative for metastatic disease. She did not have reconstruction. She did not require any adjuvant therapy.  She has history of ulcerative colitis as well as history of bowel resection in 2010.  She developed iron deficiency anemia due to the malabsorption.  She received 3 doses of Venofer for with no infusion reaction, resulting in normal hemoglobin.  Therefore, she was placed on maintenance venofer every 6 months.  However, she became anemic.  She has reactive thrombocytosis.  Ferritin was only 9.  She was placed on monthly Venofer.  She was previously following with Dr. Miller and last seen in the office on 8/4/2023.  She presents today for transfer of care/follow-up visit.     Interval History:        Previous Treatment:    Oncology History   History of left breast cancer   12/17/2020 Biopsy    Left Breast stereotactic biopsy:  A. 4 o'clock, posterior  Ductal carcinoma in situ with microinvasion  Grade 3  ER 0, MA 0, HER2 3+    B & C. 5 o'clock, anterior with & without calcs  Ductal carcinoma in situ  Grade 3  ER 0, MA 0    Concordant. Malignancy appears unifocal; calcifications span 4.1 cm. Both clips migrated. No recent axillary US. Right breast clear.     1/7/2021 Genetic Testing    The following genes were evaluated: [place]TE, BRCA1, BRCA2, CDH1, CHEK2, PALB2, PTEN, STK11, Tp53; additional genes evaluated for a total of 20 genes.  Negative result. 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 Miller  Adjuvant  therapy not recommended     Malignant neoplasm of left breast in female, estrogen receptor negative, unspecified site of breast (HCC)   2/12/2021 -  Cancer Staged    Staging form: Breast, AJCC 8th Edition  - Pathologic stage from 2/12/2021: Stage IA (pT1mi, pN0, cM0, G3, ER-, MS-, HER2+) - Signed by RODNEY Delgado on 11/18/2024  Stage prefix: Initial diagnosis  Multigene prognostic tests performed: None  Histologic grading system: 3 grade system       5/14/2024 Initial Diagnosis    Malignant neoplasm of left breast in female, estrogen receptor negative, unspecified site of breast (HCC)             Test Results:    Imaging: No results found.    Labs:   Lab Results   Component Value Date    WBC 6.20 01/27/2025    HGB 8.4 (L) 01/27/2025    HCT 28.6 (L) 01/27/2025    MCV 89 01/27/2025     (H) 01/27/2025     Lab Results   Component Value Date     (L) 12/28/2015    K 3.8 11/24/2024    CL 99 11/24/2024    CO2 25 11/24/2024    ANIONGAP 8 12/28/2015    BUN 8 11/24/2024    CREATININE 0.61 11/24/2024    GLUCOSE 112 10/17/2021    GLUF 129 (H) 11/05/2024    CALCIUM 8.9 11/24/2024    CORRECTEDCA 9.9 04/07/2023    AST 18 11/24/2024    ALT 8 11/24/2024    ALKPHOS 136 (H) 11/24/2024    PROT 6.9 11/16/2015    BILITOT 0.36 11/16/2015    EGFR 85 11/24/2024       Lab Results   Component Value Date    IRON 21 (L) 12/12/2024    TIBC 346 12/12/2024    FERRITIN 48 12/12/2024       Lab Results   Component Value Date    [id number] 4,328 (H) 06/11/2024         ROS: Review of Systems   Constitutional:  Negative for appetite change, chills, diaphoresis, fatigue and unexpected weight change.   HENT:   Negative for mouth sores, nosebleeds, sore throat, trouble swallowing and voice change.    Eyes:  Negative for eye problems and icterus.   Respiratory:  Negative for chest tightness, cough, hemoptysis and wheezing.    Cardiovascular:  Negative for chest pain, leg swelling and palpitations.   Gastrointestinal:  Negative  for abdominal distention, abdominal pain, blood in stool, constipation, diarrhea, nausea and vomiting.   Endocrine: Negative for hot flashes.   Genitourinary:  Negative for bladder incontinence, difficulty urinating, dyspareunia, dysuria and frequency.    Musculoskeletal:  Positive for arthralgias. Negative for back pain, gait problem, neck pain and neck stiffness.   Skin:  Negative for itching and rash.   Neurological:  Negative for dizziness, extremity weakness, gait problem, headaches, numbness, seizures and speech difficulty.   Hematological:  Negative for adenopathy. Does not bruise/bleed easily.   Psychiatric/Behavioral:  Negative for decreased concentration, depression, sleep disturbance and suicidal ideas. The patient is not nervous/anxious.           Current Medications: Reviewed  Allergies: Reviewed  PMH/FH/SH:  Reviewed      Physical Exam:    Body surface area is 1.63 meters squared.    Wt Readings from Last 3 Encounters:   01/27/25 64 kg (141 lb)   01/23/25 63.7 kg (140 lb 8 oz)   01/21/25 63 kg (139 lb)        Temp Readings from Last 3 Encounters:   01/27/25 98 °F (36.7 °C) (Temporal)   11/24/24 99.4 °F (37.4 °C) (Oral)   11/18/24 (!) 97.3 °F (36.3 °C) (Temporal)        BP Readings from Last 3 Encounters:   01/27/25 134/66   01/23/25 130/58   01/21/25 130/50         Pulse Readings from Last 3 Encounters:   01/27/25 67   01/23/25 68   11/24/24 85        Physical Exam  Vitals reviewed.   Constitutional:       General: She is not in acute distress.     Appearance: She is well-developed. She is not diaphoretic.   HENT:      Head: Normocephalic and atraumatic.   Eyes:      Conjunctiva/sclera: Conjunctivae normal.   Neck:      Trachea: No tracheal deviation.   Cardiovascular:      Rate and Rhythm: Normal rate and regular rhythm.      Heart sounds: No murmur heard.     No friction rub. No gallop.   Pulmonary:      Effort: Pulmonary effort is normal. No respiratory distress.      Breath sounds: Normal breath  sounds. No wheezing or rales.   Chest:      Chest wall: No tenderness.   Abdominal:      General: There is no distension.      Palpations: Abdomen is soft.      Tenderness: There is no abdominal tenderness.   Musculoskeletal:      Cervical back: Normal range of motion and neck supple.      Right lower leg: No edema.      Left lower leg: No edema.   Lymphadenopathy:      Cervical: No cervical adenopathy.   Skin:     General: Skin is warm and dry.      Coloration: Skin is not pale.      Findings: No erythema.   Neurological:      Mental Status: She is alert.   Psychiatric:         Behavior: Behavior normal.         Thought Content: Thought content normal.         ECOG PS:1    Goals and Barriers:  Current Goal: Minimize effects of disease.   Barriers: None.      Patient's Capacity to Self Care:  Patient is able to self care.    Code Status: @[facility]DESTATUS@

## 2025-01-29 ENCOUNTER — HOSPITAL ENCOUNTER (OUTPATIENT)
Dept: GASTROENTEROLOGY | Facility: HOSPITAL | Age: 80
Setting detail: OUTPATIENT SURGERY
Discharge: HOME | End: 2025-01-29
Attending: COLON & RECTAL SURGERY
Payer: MEDICARE

## 2025-01-29 ENCOUNTER — ANESTHESIA EVENT (OUTPATIENT)
Dept: GASTROENTEROLOGY | Facility: HOSPITAL | Age: 80
End: 2025-01-29
Payer: MEDICARE

## 2025-01-29 ENCOUNTER — ANESTHESIA (OUTPATIENT)
Dept: GASTROENTEROLOGY | Facility: HOSPITAL | Age: 80
End: 2025-01-29
Payer: MEDICARE

## 2025-01-29 VITALS
RESPIRATION RATE: 12 BRPM | WEIGHT: 134.6 LBS | HEIGHT: 61 IN | TEMPERATURE: 98.6 F | HEART RATE: 71 BPM | SYSTOLIC BLOOD PRESSURE: 163 MMHG | DIASTOLIC BLOOD PRESSURE: 76 MMHG | BODY MASS INDEX: 25.41 KG/M2 | OXYGEN SATURATION: 95 %

## 2025-01-29 DIAGNOSIS — K51.918 ULCERATIVE COLITIS, CHRONIC, OTHER COMPLICATION (HCC): ICD-10-CM

## 2025-01-29 PROCEDURE — 88305 TISSUE EXAM BY PATHOLOGIST: CPT | Performed by: STUDENT IN AN ORGANIZED HEALTH CARE EDUCATION/TRAINING PROGRAM

## 2025-01-29 PROCEDURE — 44386 ENDOSCOPY BOWEL POUCH/BIOP: CPT | Performed by: COLON & RECTAL SURGERY

## 2025-01-29 RX ORDER — PROPOFOL 10 MG/ML
INJECTION, EMULSION INTRAVENOUS AS NEEDED
Status: DISCONTINUED | OUTPATIENT
Start: 2025-01-29 | End: 2025-01-29

## 2025-01-29 RX ORDER — SODIUM CHLORIDE, SODIUM LACTATE, POTASSIUM CHLORIDE, CALCIUM CHLORIDE 600; 310; 30; 20 MG/100ML; MG/100ML; MG/100ML; MG/100ML
INJECTION, SOLUTION INTRAVENOUS CONTINUOUS PRN
Status: DISCONTINUED | OUTPATIENT
Start: 2025-01-29 | End: 2025-01-29

## 2025-01-29 RX ADMIN — SODIUM CHLORIDE, SODIUM LACTATE, POTASSIUM CHLORIDE, AND CALCIUM CHLORIDE: .6; .31; .03; .02 INJECTION, SOLUTION INTRAVENOUS at 09:57

## 2025-01-29 RX ADMIN — PROPOFOL 10 MG: 10 INJECTION, EMULSION INTRAVENOUS at 10:04

## 2025-01-29 RX ADMIN — PROPOFOL 20 MG: 10 INJECTION, EMULSION INTRAVENOUS at 10:03

## 2025-01-29 RX ADMIN — PROPOFOL 10 MG: 10 INJECTION, EMULSION INTRAVENOUS at 10:08

## 2025-01-29 RX ADMIN — PROPOFOL 30 MG: 10 INJECTION, EMULSION INTRAVENOUS at 10:02

## 2025-01-29 RX ADMIN — PROPOFOL 10 MG: 10 INJECTION, EMULSION INTRAVENOUS at 10:07

## 2025-01-29 RX ADMIN — PROPOFOL 10 MG: 10 INJECTION, EMULSION INTRAVENOUS at 10:05

## 2025-01-29 RX ADMIN — PROPOFOL 10 MG: 10 INJECTION, EMULSION INTRAVENOUS at 10:06

## 2025-01-29 NOTE — H&P
History and Physical   Colon and Rectal Surgery   Matilda Day 79 y.o. female MRN: [id number]  Unit/Bed#:  Encounter: [id number]  01/29/25   9:48 AM      CC:  Bleeding    History of Present Illness   HPI:  Matilda Day is a 79 y.o. female with bleeding. She had an IPAA.  Historical Information   Past Medical History:   Diagnosis Date    Anemia     Anxiety     Arrhythmia     Arthritis     Asthma     Blood clot in vein     portal vein    Breast cancer (HCC) 02/12/2021    Breast lump     [id number] RESOLVED    Cancer (HCC)     Depression     Disease of thyroid gland     DVT, lower extremity (HCC)     GERD (gastroesophageal reflux disease)     Heart disease mother    Hyperlipidemia     Hypertension     Hypokalemia     Hyponatremia     Hypothyroidism     Iron deficiency anemia     Irregular heart beat     Manic behavior (HCC)     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HCC)     Osteoarthritis     Palpitations     [id number]  RESOLVED    Paroxysmal supraventricular tachycardia (HCC)     PE (pulmonary thromboembolism) (HCC)     Pneumonia     Sjoegren syndrome     Sleep apnea     mild    Sleep difficulties     Spinal stenosis     Thrombocytosis     [id number]  RESOLVED    Tremors of nervous system     dbs implanted right and left chest    Ulcerative colitis (HCC)     Vertigo     [id number] RESOLVED     Past Surgical History:   Procedure Laterality Date    ABDOMINAL SURGERY      APPENDECTOMY      BREAST BIOPSY Left 11/07/2019    Stereo    BREAST EXCISIONAL BIOPSY Left     x many years    BREAST SURGERY      lumpectomy & biopsy    CARMELLA HOLE W/ STEREOTACTIC INSERTION OF DBS LEADS / INTRAOP MICROELECTRODE RECORDING      COLON SURGERY      COLONOSCOPY N/A 08/30/2017    Procedure: POUCHOSCOPY;  Surgeon: VANDANA Waters MD;  Location: [facility];  Service: Colorectal    COLOPROCTECTOMY W/ ILEO J POUCH      DEEP BRAIN STIMULATOR PLACEMENT      ESOPHAGOGASTRODUODENOSCOPY      ONSET 10/17/11    FISTULA REPAIR      LLEOANAL FISTULA REPAIR  TRANSPERIN TRANSABD APPROACH    HYSTERECTOMY      age 38    ILEOSTOMY CLOSURE      KNEE ARTHROSCOPY      Right    MAMMO STEREOTACTIC BREAST BIOPSY LEFT (ALL INC) Left 11/07/2019    MAMMO STEREOTACTIC BREAST BIOPSY LEFT (ALL INC) Left 12/17/2020    MAMMO STEREOTACTIC BREAST BIOPSY LEFT (ALL INC) EACH ADD Left 12/17/2020    MASTECTOMY Left 02/12/2021    left mastectomy- Dr. Gillespie    MASTECTOMY W/ SENTINEL NODE BIOPSY Left 02/12/2021    Procedure: BREAST MASTECTOMY WITH SENTINEL LYMPH NODE BIOPSY, LYMPHATIC MAPPING WITH BLUE DYE AND RADIAOCTIVE DYE (INJECT AT 1100 BY DR GILLESPIE IN THE OR);  Surgeon: Robbei Gillespie MD;  Location: [facility];  Service: Surgical Oncology    [place] ARTHRP ACETBLR/PROX FEM PROSTC AGRFT/ALGRFT Right 11/04/2024    Procedure: ARTHROPLASTY HIP TOTAL, overnight;  Surgeon: Gaviota Ford DO;  Location:  MAIN OR;  Service: Orthopedics    N[place] INSJ/RPLCMT CRANIAL NEUROSTIM PULSE GENERATOR Right 06/20/2017    Procedure: DBS GENERATOR REPLACEMENT;  Surgeon: Marin Mao MD;  Location: [facility];  Service: Neurosurgery    [place] INSJ/RPLCMT CRANIAL NEUROSTIM PULSE GENERATOR N/A 12/04/2019    Procedure: REPLACEMENT IMPLANTABLE PULSE GENERATOR FOR DEEP BRAIN STIMULATOR LEFT CHEST;  Surgeon: Damian Batres MD;  Location: [facility];  Service: Neurosurgery    SPLENECTOMY      TONSILLECTOMY      TOTAL HIP ARTHROPLASTY Right        Meds/Allergies     Not in a hospital admission.      Current Outpatient Medications:     amLODIPine (NORVASC) 2.5 mg tablet, TAKE ONE TABLET BY MOUTH EVERY DAY, Disp: 90 tablet, Rfl: 1    apixaban (Eliquis) 2.5 mg, Take 1 tablet (2.5 mg total) by mouth 2 (two) times a day, Disp: 60 tablet, Rfl: 5    Calcium Carb-Cholecalciferol (CALCIUM 600-D PO), Take 1 tablet by mouth 2 (two) times a day, Disp: , Rfl:     Coenzyme Q10 (CO Q-10 PO), Take 1 capsule by mouth daily, Disp: , Rfl:     diltiazem (CARDIZEM CD) 180 mg 24 hr capsule, TAKE ONE CAPSULE BY MOUTH EVERY DAY, Disp: 90 capsule,  Rfl: 1    diltiazem (CARDIZEM) 60 mg tablet, TAKE ONE TABLET BY MOUTH EVERY DAY, Disp: 90 tablet, Rfl: 1    escitalopram (LEXAPRO) 20 mg tablet, TAKE ONE TABLET BY MOUTH EVERY DAY, Disp: 90 tablet, Rfl: 1    fluticasone (FLONASE) 50 mcg/act nasal spray, APPLY ONE SPRAY IN EACH NOSTRIL ONCE DAILY AS NEEDED FOR RUNNY NOSE, Disp: 48 g, Rfl: 1    gabapentin (NEURONTIN) 600 MG tablet, TAKE ONE TABLET BY MOUTH EVERY MORNING , TAKE ONE TABLET BY MOUTH EVERY DAY IN THE AFTERNOON , AND TAKE TWO TABLETS BY MOUTH EVERY EVENING AT BEDTIME, Disp: 360 tablet, Rfl: 1    levothyroxine 50 mcg tablet, TAKE ONE TABLET BY MOUTH EVERY DAY, Disp: 90 tablet, Rfl: 0    lisinopril (ZESTRIL) 20 mg tablet, TAKE ONE TABLET BY MOUTH TWO TIMES A DAY, Disp: 180 tablet, Rfl: 1    LORazepam (ATIVAN) 1 mg tablet, Take 1 tablet (1 mg total) by mouth every 6 (six) hours as needed for anxiety, Disp: 120 tablet, Rfl: 0    MAGNESIUM PO, Take 1 tablet by mouth daily 400 mg tablet , Disp: , Rfl:     Multiple Vitamin (multivitamin) tablet, Take 1 tablet by mouth daily Begin 30 days prior to surgery, Disp: 30 tablet, Rfl: 1    Omega-3 Fatty Acids (FISH OIL PO), Take 1 capsule by mouth daily, Disp: , Rfl:     pantoprazole (PROTONIX) 40 mg tablet, TAKE ONE TABLET BY MOUTH EVERY DAY, Disp: 90 tablet, Rfl: 1    potassium chloride (MICRO-K) 10 MEQ CR capsule, TAKE TWO CAPSULES BY MOUTH EVERY DAY, Disp: 180 capsule, Rfl: 1    simvastatin (ZOCOR) 10 mg tablet, TAKE ONE-HALF TABLET BY MOUTH DAILY, Disp: 45 tablet, Rfl: 1    sodium chloride 1 g tablet, TAKE ONE TABLET BY MOUTH FOUR TIMES A DAY, Disp: 270 tablet, Rfl: 1    torsemide (DEMADEX) 10 mg tablet, TAKE ONE TABLET BY MOUTH EVERY DAY AS NEEDED (EDEMA), Disp: 90 tablet, Rfl: 1    torsemide (DEMADEX) 20 mg tablet, Take 1 tablet (20 mg total) by mouth daily, Disp: 90 tablet, Rfl: 3    vitamin B-12 (VITAMIN B-12) 500 mcg tablet, Take 1 tablet (500 mcg total) by mouth daily, Disp: 90 tablet, Rfl: 1    albuterol  (PROVENTIL HFA,VENTOLIN HFA) 90 mcg/act inhaler, Inhale 2 puffs every 6 (six) hours as needed for wheezing, Disp: 8 g, Rfl: 1    Fluticasone Furoate-Vilanterol 100-25 mcg/actuation inhaler, Inhale 1 puff daily Rinse mouth after use., Disp: 60 blister, Rfl: 5    hydrocortisone 2.5 % cream, , Disp: , Rfl:     nystatin-triamcinolone (MYCOLOG-II) ointment, Apply topically 2 (two) times a day for 14 days, Disp: 60 g, Rfl: 3    Current Facility-Administered Medications:     cyanocobalamin injection 1,000 mcg, 1,000 mcg, Intramuscular, Q30 Days, Zaira Velasquez MD, 1,000 mcg at 23 1012    Allergies   Allergen Reactions    Indomethacin GI Intolerance, Dizziness and Other (See Comments)    Macrobid [Nitrofurantoin Monohyd Macro] Rash    Nitrofurantoin Other (See Comments)    Penicillins Rash and Other (See Comments)     [facility] - [id number]: can take cephalosporins    Sulfa Antibiotics Rash and Other (See Comments)         Social History   Social History     Substance and Sexual Activity   Alcohol Use Yes    Alcohol/week: 2.0 standard drinks of alcohol    Types: 2 Cans of beer per week    Comment: 2or 3 beers a week     Social History     Substance and Sexual Activity   Drug Use No     Social History     Tobacco Use   Smoking Status Former    Current packs/day: 0.00    Average packs/day: 1 pack/day for 15.0 years (15.0 ttl pk-yrs)    Types: Cigarettes    Start date: 1950    Quit date: 1965    Years since quittin.1   Smokeless Tobacco Never   Tobacco Comments    Quit         Family History:   Family History   Problem Relation Age of Onset    Venous thrombosis Mother         ACUTE VENOUS THROMBOSIS OF DEEP VESSELS OF THE DISTAL LOWER EXTREMITY    Other Mother         PHLEBITIS    Hypertension Mother     Peripheral vascular disease Mother     COPD Father     Diabetes Father         MELLITUS    Stroke Father     Diabetes Sister         MELLITUS    Sjogren's syndrome Sister     No Known Problems Daughter  "    No Known Problems Maternal Grandmother     No Known Problems Maternal Grandfather     No Known Problems Paternal Grandmother     No Known Problems Paternal Grandfather     No Known Problems Sister     No Known Problems Maternal Aunt     No Known Problems Paternal Aunt     No Known Problems Son          Objective     Current Vitals:   Blood Pressure: 154/68 (01/29/25 0944)  Pulse: 69 (01/29/25 0944)  Temperature: (!) 97.4 °F (36.3 °C) (01/29/25 0944)  Respirations: 16 (01/29/25 0944)  Height: 5' 1\" (154.9 cm) (01/29/25 0944)  Weight - Scale: 61.1 kg (134 lb 9.6 oz) (01/29/25 0944)  SpO2: 95 % (01/29/25 0944)  No intake or output data in the 24 hours ending 01/29/25 0948    Physical Exam:  General:  Well nourished, no distress.  Neuro: Alert and oriented  Eyes:Sclera anicteric, conjunctiva pink.  Pulm: Clear to auscultation bilaterally. No respiratory Distress.   CV:  Regular rate and rhythm. No murmurs.  Abdomen:  Soft, flat, non-tender, without masses or hepatosplenomegaly.    Lab Results:       ASSESSMENT:  Matilda Day is a 79 y.o. female for diagnostic testing.  PLAN:  Colonoscopy.  Risks , including, but not limited to, bleeding, perforation, missed lesions, and potential need for surgery, were reviewed. Alternatives to colonoscopy were discussed.  BLAINE Waters MD  "

## 2025-01-29 NOTE — ANESTHESIA POSTPROCEDURE EVALUATION
Post-Op Assessment Note    CV Status:  Stable  Pain Score: 0    Pain management: adequate       Mental Status:  Sleepy and arousable   Hydration Status:  Euvolemic   PONV Controlled:  Controlled   Airway Patency:  Patent     Post Op Vitals Reviewed: Yes    No anethesia notable event occurred.    Staff: CRNA           Last Filed PACU Vitals:  Vitals Value Taken Time   Temp 98.6    Pulse 58    /68    Resp 16    SpO2 98

## 2025-01-29 NOTE — ANESTHESIA PREPROCEDURE EVALUATION
Procedure:  POUCHOSCOPY    Relevant Problems   ANESTHESIA (within normal limits)      CARDIO   (+) Atrial fibrillation (HCC)   (+) Essential hypertension   (+) Hyperlipidemia   (+) Peripheral vascular disease (HCC)   (+) Peripheral venous insufficiency   (+) Portal vein thrombosis   (+) Supraventricular tachycardia (HCC)      ENDO   (+) Acquired hypothyroidism      GI/HEPATIC   (+) GERD (gastroesophageal reflux disease)   (+) Rectal bleeding      HEMATOLOGY   (+) Iron deficiency anemia      MUSCULOSKELETAL   (+) Lumbar degenerative disc disease   (+) Osteoarthritis of right knee   (+) Primary osteoarthritis of one hip, right      NEURO/PSYCH   (+) Anxiety   (+) Continuous opioid dependence (HCC)   (+) Moderate episode of recurrent major depressive disorder (HCC)      PULMONARY   (+) Mild intermittent asthma without complication      Neurology/Sleep   (+) Essential tremor   (+) Peripheral neuropathy   (+) SIADH (syndrome of inappropriate ADH production) (HCC)      Oncology   (+) History of left breast cancer   (+) S/P left mastectomy      Rheumatology   (+) Sjogren's syndrome (HCC)      Surgery/Wound/Pain   (+) S/P deep brain stimulator placement      Orthopedic/Musculoskeletal   (+) Status post total hip replacement, right      FEN/Gastrointestinal   (+) Ulcerative colitis, chronic, other complication (HCC)      Findings    Left Ventricle Left ventricular cavity size is normal. Wall thickness is normal. The left ventricular ejection fraction is 60%. Systolic function is normal. Wall motion is normal. Diastolic function is mildly abnormal, consistent with grade I (abnormal) relaxation. Left atrial filling pressure is normal.   Right Ventricle Right ventricular cavity size is normal. Systolic function is normal.   Left Atrium The atrium is normal in size.   Right Atrium The atrium is normal in size.   Aortic Valve The aortic valve is trileaflet. The leaflets are mildly calcified. The leaflets exhibit normal mobility.  There is no evidence of regurgitation. The aortic valve has no significant stenosis.   Mitral Valve There is mild annular calcification.  There is trace regurgitation. There is no evidence of stenosis.   Tricuspid Valve Tricuspid valve structure is normal. There is mild regurgitation. There is no evidence of stenosis. The right ventricular systolic pressure is normal. The estimated right ventricular systolic pressure is 20.00 mmHg.   Pulmonic Valve Pulmonic valve structure is normal. There is no evidence of regurgitation. There is no evidence of stenosis.   Ascending Aorta The aortic root is normal in size.   IVC/SVC The right atrial pressure is estimated at 10.0 mmHg. The inferior vena cava is normal in size.   Pericardium There is no pericardial effusion.       Sinus tachycardia  Right ventricular hypertrophy  Abnormal ECG  When compared with ECG of 22-Jun-2023 17:19,  Premature ventricular complexes are no longer Present  TN interval has decreased  QRS axis Shifted right  T wave inversion no longer evident in Inferior leads  T wave inversion no longer evident in Lateral leads  Physical Exam    Airway    Mallampati score: II  TM Distance: >3 FB  Neck ROM: full     Dental        Cardiovascular  Rhythm: regular, Rate: normal, Cardiovascular exam normal    Pulmonary  Pulmonary exam normal Breath sounds clear to auscultation    Other Findings  post-pubertal.      Anesthesia Plan  ASA Score- 3     Anesthesia Type- IV sedation with anesthesia with ASA Monitors.         Additional Monitors:     Airway Plan:            Plan Factors-Exercise tolerance (METS): <4 METS.Exercise comment: Denies orthopnea/PND.    Chart reviewed. EKG reviewed.  Existing labs reviewed. Patient summary reviewed.    Patient is not a current smoker.              Induction-     Postoperative Plan-         Informed Consent- Anesthetic plan and risks discussed with patient.  I personally reviewed this patient with the CRNA. Discussed and agreed on the  Anesthesia Plan with the CRNA..      NPO Status:  No vitals data found for the desired time range.

## 2025-01-30 ENCOUNTER — TELEPHONE (OUTPATIENT)
Dept: CARDIOLOGY CLINIC | Facility: CLINIC | Age: 80
End: 2025-01-30

## 2025-01-30 ENCOUNTER — RESULTS FOLLOW-UP (OUTPATIENT)
Dept: CARDIOLOGY CLINIC | Facility: MEDICAL CENTER | Age: 80
End: 2025-01-30

## 2025-01-30 ENCOUNTER — OFFICE VISIT (OUTPATIENT)
Dept: INTERNAL MEDICINE CLINIC | Facility: CLINIC | Age: 80
End: 2025-01-30
Payer: MEDICARE

## 2025-01-30 ENCOUNTER — OFFICE VISIT (OUTPATIENT)
Dept: OBGYN CLINIC | Facility: HOSPITAL | Age: 80
End: 2025-01-30
Payer: MEDICARE

## 2025-01-30 VITALS
TEMPERATURE: 97.6 F | WEIGHT: 142 LBS | DIASTOLIC BLOOD PRESSURE: 74 MMHG | HEART RATE: 80 BPM | HEIGHT: 61 IN | SYSTOLIC BLOOD PRESSURE: 124 MMHG | OXYGEN SATURATION: 96 % | RESPIRATION RATE: 14 BRPM | BODY MASS INDEX: 26.81 KG/M2

## 2025-01-30 VITALS — HEIGHT: 61 IN | WEIGHT: 142 LBS | BODY MASS INDEX: 26.81 KG/M2

## 2025-01-30 DIAGNOSIS — I47.10 SUPRAVENTRICULAR TACHYCARDIA (HCC): ICD-10-CM

## 2025-01-30 DIAGNOSIS — F41.9 ANXIETY: ICD-10-CM

## 2025-01-30 DIAGNOSIS — M17.11 PRIMARY OSTEOARTHRITIS OF RIGHT KNEE: ICD-10-CM

## 2025-01-30 DIAGNOSIS — M25.561 CHRONIC PAIN OF RIGHT KNEE: Primary | ICD-10-CM

## 2025-01-30 DIAGNOSIS — M81.0 AGE-RELATED OSTEOPOROSIS WITHOUT CURRENT PATHOLOGICAL FRACTURE: ICD-10-CM

## 2025-01-30 DIAGNOSIS — G25.0 ESSENTIAL TREMOR: ICD-10-CM

## 2025-01-30 DIAGNOSIS — I10 ESSENTIAL HYPERTENSION: ICD-10-CM

## 2025-01-30 DIAGNOSIS — Z96.641 STATUS POST TOTAL HIP REPLACEMENT, RIGHT: ICD-10-CM

## 2025-01-30 DIAGNOSIS — K51.80 OTHER ULCERATIVE COLITIS WITHOUT COMPLICATION (HCC): ICD-10-CM

## 2025-01-30 DIAGNOSIS — F11.20 CONTINUOUS OPIOID DEPENDENCE (HCC): ICD-10-CM

## 2025-01-30 DIAGNOSIS — M51.362 DEGENERATION OF INTERVERTEBRAL DISC OF LUMBAR REGION WITH DISCOGENIC BACK PAIN AND LOWER EXTREMITY PAIN: ICD-10-CM

## 2025-01-30 DIAGNOSIS — J45.20 MILD INTERMITTENT ASTHMA WITHOUT COMPLICATION: ICD-10-CM

## 2025-01-30 DIAGNOSIS — E03.9 ACQUIRED HYPOTHYROIDISM: ICD-10-CM

## 2025-01-30 DIAGNOSIS — E78.2 MIXED HYPERLIPIDEMIA: ICD-10-CM

## 2025-01-30 DIAGNOSIS — D50.0 IRON DEFICIENCY ANEMIA DUE TO CHRONIC BLOOD LOSS: Primary | ICD-10-CM

## 2025-01-30 DIAGNOSIS — R73.03 PREDIABETES: ICD-10-CM

## 2025-01-30 DIAGNOSIS — G89.29 CHRONIC PAIN OF RIGHT KNEE: Primary | ICD-10-CM

## 2025-01-30 DIAGNOSIS — Z51.81 ANTICOAGULATION MANAGEMENT ENCOUNTER: ICD-10-CM

## 2025-01-30 DIAGNOSIS — E22.2 SIADH (SYNDROME OF INAPPROPRIATE ADH PRODUCTION) (HCC): ICD-10-CM

## 2025-01-30 DIAGNOSIS — M35.00 SJOGREN'S SYNDROME, WITH UNSPECIFIED ORGAN INVOLVEMENT (HCC): ICD-10-CM

## 2025-01-30 DIAGNOSIS — I81 PORTAL VEIN THROMBOSIS: ICD-10-CM

## 2025-01-30 DIAGNOSIS — E83.42 HYPOMAGNESEMIA: ICD-10-CM

## 2025-01-30 DIAGNOSIS — Z79.01 ANTICOAGULATION MANAGEMENT ENCOUNTER: ICD-10-CM

## 2025-01-30 DIAGNOSIS — F33.1 MODERATE EPISODE OF RECURRENT MAJOR DEPRESSIVE DISORDER (HCC): ICD-10-CM

## 2025-01-30 DIAGNOSIS — K21.9 GASTROESOPHAGEAL REFLUX DISEASE, UNSPECIFIED WHETHER ESOPHAGITIS PRESENT: ICD-10-CM

## 2025-01-30 PROBLEM — I48.91 ATRIAL FIBRILLATION (HCC): Status: RESOLVED | Noted: 2024-10-23 | Resolved: 2025-01-30

## 2025-01-30 PROBLEM — I73.9 PERIPHERAL VASCULAR DISEASE (HCC): Status: RESOLVED | Noted: 2023-12-06 | Resolved: 2025-01-30

## 2025-01-30 PROBLEM — I87.2 PERIPHERAL VENOUS INSUFFICIENCY: Status: RESOLVED | Noted: 2022-09-16 | Resolved: 2025-01-30

## 2025-01-30 LAB — SL AMB POCT HEMOGLOBIN AIC: 5.9 (ref ?–6.5)

## 2025-01-30 PROCEDURE — G2211 COMPLEX E/M VISIT ADD ON: HCPCS | Performed by: INTERNAL MEDICINE

## 2025-01-30 PROCEDURE — 83036 HEMOGLOBIN GLYCOSYLATED A1C: CPT | Performed by: INTERNAL MEDICINE

## 2025-01-30 PROCEDURE — 99214 OFFICE O/P EST MOD 30 MIN: CPT | Performed by: INTERNAL MEDICINE

## 2025-01-30 PROCEDURE — 99214 OFFICE O/P EST MOD 30 MIN: CPT | Performed by: ORTHOPAEDIC SURGERY

## 2025-01-30 RX ORDER — SODIUM CHLORIDE, SODIUM LACTATE, POTASSIUM CHLORIDE, CALCIUM CHLORIDE 600; 310; 30; 20 MG/100ML; MG/100ML; MG/100ML; MG/100ML
125 INJECTION, SOLUTION INTRAVENOUS CONTINUOUS
OUTPATIENT
Start: 2025-01-30

## 2025-01-30 RX ORDER — CEFAZOLIN SODIUM 2 G/50ML
2000 SOLUTION INTRAVENOUS ONCE
OUTPATIENT
Start: 2025-01-30 | End: 2025-01-30

## 2025-01-30 RX ORDER — LEVOTHYROXINE SODIUM 50 UG/1
50 TABLET ORAL DAILY
Qty: 90 TABLET | Refills: 1 | Status: SHIPPED | OUTPATIENT
Start: 2025-01-30

## 2025-01-30 RX ORDER — MUPIROCIN 20 MG/G
OINTMENT TOPICAL 2 TIMES DAILY
Qty: 15 G | Refills: 0 | Status: SHIPPED | OUTPATIENT
Start: 2025-01-30

## 2025-01-30 RX ORDER — TRANEXAMIC ACID 10 MG/ML
1000 INJECTION, SOLUTION INTRAVENOUS ONCE
OUTPATIENT
Start: 2025-01-30 | End: 2025-01-30

## 2025-01-30 RX ORDER — CHLORHEXIDINE GLUCONATE ORAL RINSE 1.2 MG/ML
15 SOLUTION DENTAL ONCE
OUTPATIENT
Start: 2025-01-30 | End: 2025-01-30

## 2025-01-30 RX ORDER — FOLIC ACID 1 MG/1
1 TABLET ORAL DAILY
Qty: 30 TABLET | Refills: 0 | Status: SHIPPED | OUTPATIENT
Start: 2025-01-30

## 2025-01-30 RX ORDER — ASCORBIC ACID 500 MG
500 TABLET ORAL DAILY
Qty: 30 TABLET | Refills: 0 | Status: SHIPPED | OUTPATIENT
Start: 2025-01-30

## 2025-01-30 RX ORDER — FERROUS SULFATE 324(65)MG
324 TABLET, DELAYED RELEASE (ENTERIC COATED) ORAL
Qty: 60 TABLET | Refills: 0 | Status: SHIPPED | OUTPATIENT
Start: 2025-01-30

## 2025-01-30 RX ORDER — DILTIAZEM HCL 60 MG
60 TABLET ORAL DAILY
Qty: 90 TABLET | Refills: 1 | Status: SHIPPED | OUTPATIENT
Start: 2025-01-30

## 2025-01-30 RX ORDER — LISINOPRIL 20 MG/1
20 TABLET ORAL 2 TIMES DAILY
Qty: 180 TABLET | Refills: 1 | Status: SHIPPED | OUTPATIENT
Start: 2025-01-30

## 2025-01-30 NOTE — PROGRESS NOTES
Assessment:  1. Chronic pain of right knee        2. Primary osteoarthritis of right knee            Plan:  Right knee osteoarthritis  The patient will be scheduled for right total knee arthroplasty.  We feel the patient will find significant relief per current symptoms, findings on imaging and exam.  Risks, benefits, precautions and expectations were discussed. Risks include blood loss, potential infection and blood clots.  Preoperative vitamins were prescribed.    Patient uses Eliquis   The patient should follow up after surgery.        To do next visit:  Return for post-op with x-rays.    The above stated was discussed in layman's terms and the patient expressed understanding.  All questions were answered to the patient's satisfaction.       Scribe Attestation      I,:  Quique Madden MA am acting as a scribe while in the presence of the attending physician.:       I,:  Lauri Power MD personally performed the services described in this documentation    as scribed in my presence.:               Subjective:   Matilda Day is a 79 y.o. female who presents for initial evaluation of right knee.  She has had symptoms for many years with no injury.  Today she complains of left lateral and generalized knee pain.  Most weight bearing activity aggravates while rest alleviates.  She has tried activity modification, oral medications, steroid and visco-supplement with limited benefit.        Review of systems negative unless otherwise specified in HPI    Past Medical History:   Diagnosis Date    Anemia     Anxiety     Arrhythmia     Arthritis     Asthma     Blood clot in vein     portal vein    Breast cancer (HCC) 02/12/2021    Breast lump     [id number] RESOLVED    Cancer (HCC)     Depression     Disease of thyroid gland     DVT, lower extremity (HCC)     GERD (gastroesophageal reflux disease)     Heart disease mother    Hyperlipidemia     Hypertension     Hypokalemia     Hyponatremia     Hypothyroidism     Iron  deficiency anemia     Irregular heart beat     Manic behavior (HCC)     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HCC)     Osteoarthritis     Palpitations     [id number]  RESOLVED    Paroxysmal supraventricular tachycardia (HCC)     PE (pulmonary thromboembolism) (HCC)     Pneumonia     Sjoegren syndrome     Sleep apnea     mild    Sleep difficulties     Spinal stenosis     Thrombocytosis     [id number]  RESOLVED    Tremors of nervous system     dbs implanted right and left chest    Ulcerative colitis (HCC)     Vertigo     [id number] RESOLVED       Past Surgical History:   Procedure Laterality Date    ABDOMINAL SURGERY      APPENDECTOMY      BREAST BIOPSY Left 11/07/2019    Stereo    BREAST EXCISIONAL BIOPSY Left     x many years    BREAST SURGERY      lumpectomy & biopsy    CARMELLA HOLE W/ STEREOTACTIC INSERTION OF DBS LEADS / INTRAOP MICROELECTRODE RECORDING      COLON SURGERY      COLONOSCOPY N/A 08/30/2017    Procedure: POUCHOSCOPY;  Surgeon: VANDANA Waters MD;  Location: [facility];  Service: Colorectal    COLOPROCTECTOMY W/ ILEO J POUCH      DEEP BRAIN STIMULATOR PLACEMENT      ESOPHAGOGASTRODUODENOSCOPY      ONSET 10/17/11    FISTULA REPAIR      LLEOANAL FISTULA REPAIR TRANSPERIN TRANSABD APPROACH    HYSTERECTOMY      age 38    ILEOSTOMY CLOSURE      KNEE ARTHROSCOPY      Right    MAMMO STEREOTACTIC BREAST BIOPSY LEFT (ALL INC) Left 11/07/2019    MAMMO STEREOTACTIC BREAST BIOPSY LEFT (ALL INC) Left 12/17/2020    MAMMO STEREOTACTIC BREAST BIOPSY LEFT (ALL INC) EACH ADD Left 12/17/2020    MASTECTOMY Left 02/12/2021    left mastectomy- Dr. Hayes    MASTECTOMY W/ SENTINEL NODE BIOPSY Left 02/12/2021    Procedure: BREAST MASTECTOMY WITH SENTINEL LYMPH NODE BIOPSY, LYMPHATIC MAPPING WITH BLUE DYE AND RADIAOCTIVE DYE (INJECT AT 1100 BY DR HAYES IN THE OR);  Surgeon: Robbie Hayes MD;  Location: [facility];  Service: Surgical Oncology    NE ARTHRP ACETBLR/PROX FEM PROSTC AGRFT/ALGRFT Right 11/04/2024    Procedure: ARTHROPLASTY HIP TOTAL,  overnight;  Surgeon: Gaviota Ford DO;  Location:  [facility];  Service: Orthopedics    ID INSJ/RPLCMT CRANIAL NEUROSTIM PULSE GENERATOR Right 2017    Procedure: DBS GENERATOR REPLACEMENT;  Surgeon: Marin Mao MD;  Location:  [facility] OR;  Service: Neurosurgery    ID INSJ/RPLCMT CRANIAL NEUROSTIM PULSE GENERATOR N/A 2019    Procedure: REPLACEMENT IMPLANTABLE PULSE GENERATOR FOR DEEP BRAIN STIMULATOR LEFT CHEST;  Surgeon: Damian Batres MD;  Location:  MAIN OR;  Service: Neurosurgery    SPLENECTOMY      TONSILLECTOMY      TOTAL HIP ARTHROPLASTY Right        Family History   Problem Relation Age of Onset    Venous thrombosis Mother         ACUTE VENOUS THROMBOSIS OF DEEP VESSELS OF THE DISTAL LOWER EXTREMITY    Other Mother         PHLEBITIS    Hypertension Mother     Peripheral vascular disease Mother     COPD Father     Diabetes Father         MELLITUS    Stroke Father     Diabetes Sister         MELLITUS    Sjogren's syndrome Sister     No Known Problems Daughter     No Known Problems Maternal Grandmother     No Known Problems Maternal Grandfather     No Known Problems Paternal Grandmother     No Known Problems Paternal Grandfather     No Known Problems Sister     No Known Problems Maternal Aunt     No Known Problems Paternal Aunt     No Known Problems Son        Social History     Occupational History    Occupation: RETIRED   Tobacco Use    Smoking status: Former     Current packs/day: 0.00     Average packs/day: 1 pack/day for 15.0 years (15.0 ttl pk-yrs)     Types: Cigarettes     Start date: 1950     Quit date: 1965     Years since quittin.1    Smokeless tobacco: Never    Tobacco comments:     Quit   Vaping Use    Vaping status: Never Used   Substance and Sexual Activity    Alcohol use: Yes     Alcohol/week: 2.0 standard drinks of alcohol     Types: 2 Cans of beer per week     Comment: 2or 3 beers a week    Drug use: No    Sexual activity: Not Currently     Partners: Male      Birth control/protection: Post-menopausal         Current Outpatient Medications:     albuterol (PROVENTIL HFA,VENTOLIN HFA) 90 mcg/act inhaler, Inhale 2 puffs every 6 (six) hours as needed for wheezing, Disp: 8 g, Rfl: 1    amLODIPine (NORVASC) 2.5 mg tablet, TAKE ONE TABLET BY MOUTH EVERY DAY, Disp: 90 tablet, Rfl: 1    apixaban (Eliquis) 2.5 mg, Take 1 tablet (2.5 mg total) by mouth 2 (two) times a day, Disp: 60 tablet, Rfl: 5    Calcium Carb-Cholecalciferol (CALCIUM 600-D PO), Take 1 tablet by mouth 2 (two) times a day, Disp: , Rfl:     Coenzyme Q10 (CO Q-10 PO), Take 1 capsule by mouth daily, Disp: , Rfl:     diltiazem (CARDIZEM CD) 180 mg 24 hr capsule, TAKE ONE CAPSULE BY MOUTH EVERY DAY, Disp: 90 capsule, Rfl: 1    diltiazem (CARDIZEM) 60 mg tablet, TAKE ONE TABLET BY MOUTH EVERY DAY, Disp: 90 tablet, Rfl: 1    escitalopram (LEXAPRO) 20 mg tablet, TAKE ONE TABLET BY MOUTH EVERY DAY, Disp: 90 tablet, Rfl: 1    fluticasone (FLONASE) 50 mcg/act nasal spray, APPLY ONE SPRAY IN EACH NOSTRIL ONCE DAILY AS NEEDED FOR RUNNY NOSE, Disp: 48 g, Rfl: 1    Fluticasone Furoate-Vilanterol 100-25 mcg/actuation inhaler, Inhale 1 puff daily Rinse mouth after use., Disp: 60 blister, Rfl: 5    gabapentin (NEURONTIN) 600 MG tablet, TAKE ONE TABLET BY MOUTH EVERY MORNING , TAKE ONE TABLET BY MOUTH EVERY DAY IN THE AFTERNOON , AND TAKE TWO TABLETS BY MOUTH EVERY EVENING AT BEDTIME, Disp: 360 tablet, Rfl: 1    hydrocortisone 2.5 % cream, , Disp: , Rfl:     levothyroxine 50 mcg tablet, Take 1 tablet (50 mcg total) by mouth daily, Disp: 90 tablet, Rfl: 1    lisinopril (ZESTRIL) 20 mg tablet, TAKE ONE TABLET BY MOUTH TWO TIMES A DAY, Disp: 180 tablet, Rfl: 1    LORazepam (ATIVAN) 1 mg tablet, Take 1 tablet (1 mg total) by mouth every 6 (six) hours as needed for anxiety, Disp: 120 tablet, Rfl: 0    MAGNESIUM PO, Take 1 tablet by mouth daily 400 mg tablet , Disp: , Rfl:     Multiple Vitamin (multivitamin) tablet, Take 1 tablet by mouth  daily Begin 30 days prior to surgery, Disp: 30 tablet, Rfl: 1    nystatin-triamcinolone (MYCOLOG-II) ointment, Apply topically 2 (two) times a day for 14 days, Disp: 60 g, Rfl: 3    Omega-3 Fatty Acids (FISH OIL PO), Take 1 capsule by mouth daily, Disp: , Rfl:     pantoprazole (PROTONIX) 40 mg tablet, TAKE ONE TABLET BY MOUTH EVERY DAY, Disp: 90 tablet, Rfl: 1    potassium chloride (MICRO-K) 10 MEQ CR capsule, TAKE TWO CAPSULES BY MOUTH EVERY DAY, Disp: 180 capsule, Rfl: 1    simvastatin (ZOCOR) 10 mg tablet, TAKE ONE-HALF TABLET BY MOUTH DAILY, Disp: 45 tablet, Rfl: 1    sodium chloride 1 g tablet, TAKE ONE TABLET BY MOUTH FOUR TIMES A DAY, Disp: 270 tablet, Rfl: 1    torsemide (DEMADEX) 10 mg tablet, TAKE ONE TABLET BY MOUTH EVERY DAY AS NEEDED (EDEMA), Disp: 90 tablet, Rfl: 1    torsemide (DEMADEX) 20 mg tablet, Take 1 tablet (20 mg total) by mouth daily, Disp: 90 tablet, Rfl: 3    vitamin B-12 (VITAMIN B-12) 500 mcg tablet, Take 1 tablet (500 mcg total) by mouth daily, Disp: 90 tablet, Rfl: 1    Current Facility-Administered Medications:     cyanocobalamin injection 1,000 mcg, 1,000 mcg, Intramuscular, Q30 Days, Zaira Velasquez MD, 1,000 mcg at 04/20/23 1012    Allergies   Allergen Reactions    Indomethacin GI Intolerance, Dizziness and Other (See Comments)    Macrobid [Nitrofurantoin Monohyd Macro] Rash    Nitrofurantoin Other (See Comments)    Penicillins Rash and Other (See Comments)     Annotation - [id number]: can take cephalosporins    Sulfa Antibiotics Rash and Other (See Comments)          There were no vitals filed for this visit.    Objective:  Physical exam  General: Awake, Alert, Oriented  Eyes: Pupils equal, round and reactive to light  Heart: regular rate and rhythm  Lungs: No audible wheezing  Abdomen: soft                    Ortho Exam  Right knee:  Valgus alignment   No erythema or ecchymosis  No effusion or swelling  Normal strength  Good ROM with crepitus   Calf compartments soft and  "supple  Sensation intact  Toes are warm sensate and mobile      Diagnostics, reviewed and taken today if performed as documented:    The attending physician has personally reviewed the pertinent films in PACS and interpretation is as follows:  Right knee x-ray:  Severe lateral and moderate medial and patellofemoral arthritic changes with lateral bone on bone and medial and lateral decreased joint space, subchondral sclerosis and osteophyte formation.    Procedures, if performed today:    Procedures    None performed      Portions of the record may have been created with voice recognition software.  Occasional wrong word or \"sound a like\" substitutions may have occurred due to the inherent limitations of voice recognition software.  Read the chart carefully and recognize, using context, where substitutions have occurred.    "

## 2025-01-30 NOTE — TELEPHONE ENCOUNTER
Pt was contacted and message as per Dr. Nixon was given,    Would you let her know that I reviewed her results and that they have look good. Thank you

## 2025-01-30 NOTE — ASSESSMENT & PLAN NOTE
BP stable: amlodipine 2.5 mg, diltiazem 240 mg daily, lisinopril 20 mg bid. Stopped torsemide on her own.

## 2025-01-30 NOTE — ASSESSMENT & PLAN NOTE
S/p pouchoscopy, pathology pending.  Sees colorectal.  Stools more formed since starting Metamucil.

## 2025-01-30 NOTE — ASSESSMENT & PLAN NOTE
Did not tolerate alendronate due to GI side effects.  Agrees to do Prolia, will start insurance approval.

## 2025-01-30 NOTE — PROGRESS NOTES
Name: Matilda Day      : 1945      MRN: [id number]  Encounter Provider: Zaira Velasquez MD  Encounter Date: 2025   Encounter department: [facility]  :  Assessment & Plan  Iron deficiency anemia due to chronic blood loss  Venofer scheduled for 5 doses.  Per hematology.       Other ulcerative colitis without complication (HCC)  S/p pouchoscopy, pathology pending.  Sees colorectal.  Stools more formed since starting Metamucil.       Status post total hip replacement, right  Doing well.       Portal vein thrombosis  Eliquis decreased to 2.5 mg bid.  Per hematology.       Primary osteoarthritis of right knee  Sees Ortho, planning for surgery in the fall.       Essential hypertension  BP stable: amlodipine 2.5 mg, diltiazem 240 mg daily, lisinopril 20 mg bid. Stopped torsemide on her own.         Supraventricular tachycardia (HCC)  No recent symptoms.  Saw cardiology.  On diltiazem.       SIADH (syndrome of inappropriate ADH production) (HCC)  Na 138.  Taking NaCl 4 times a day.  Per nephrology.       Anxiety  Stable. On Lexapro 20 mg.  Taking lorazepam at least bid.       Essential tremor  On gabapentin.       Gastroesophageal reflux disease, unspecified whether esophagitis present  Taking PPI daily.       Mixed hyperlipidemia  At goal, on simvastatin 15 mg daily.       Acquired hypothyroidism  Adequately replaced.       Hypomagnesemia  Taking Mg.       Mild intermittent asthma without complication  No recent exacerbation.       Degeneration of intervertebral disc of lumbar region with discogenic back pain and lower extremity pain  Takes hydrocodone at least bid.  PDMP reviewed.       Prediabetes  A1c 5.9% today.  Orders:  •  POCT hemoglobin A1c    Continuous opioid dependence (HCC)  PDMP reviewed.       Sjogren's syndrome, with unspecified organ involvement (HCC)  Non issue.       Moderate episode of recurrent major depressive disorder (HCC)  As above.          Age-related osteoporosis without current pathological fracture  Did not tolerate alendronate due to GI side effects.  Agrees to do Prolia, will start insurance approval.       Follow up in 3 months or as needed.         History of Present Illness   She started taking Metamcuil 3 times a day as instructed, after her procedure with colo rectal.  She reports she did not gradually increase the dose.  She was very surprised that instead of loose runny stools that she is experiencing for years, she has nice firm bowel movements.  She feels constipated, has mild discomfort in the lower abdominal area.  She is asking if she can take the relax today.  She was told she may have a fissure which is causing the bleeding.    She developed leg swelling while she was in [place] last August.  She went to urgent care center and was to take torsemide 80 mg for 1 dose.  This had really helped with the leg swelling.  She did call her kidney doctor prior to that, torsemide was increased to 30 mg which did not help.  Since then, she has not taken any torsemide even on an as-needed basis.    She continues to take her hydrocodone regularly.  She is planning for knee surgery in the fall.  She takes her Ativan at least twice a day, sometimes 3 times a day depending on the day.      Review of Systems   Constitutional:  Negative for activity change, appetite change and fatigue.   HENT:  Negative for congestion.    Eyes:  Negative for visual disturbance.   Respiratory:  Negative for cough, shortness of breath and wheezing.    Cardiovascular:  Negative for chest pain and leg swelling.   Gastrointestinal:  Positive for abdominal distention. Negative for abdominal pain, blood in stool, constipation, diarrhea and rectal pain.   Genitourinary:  Negative for dysuria, frequency and urgency.   Musculoskeletal:  Positive for arthralgias. Negative for myalgias.   Skin:  Negative for rash.   Neurological:  Negative for dizziness, numbness and  "headaches.   Hematological:  Does not bruise/bleed easily.   Psychiatric/Behavioral:  Negative for confusion and dysphoric mood. The patient is not nervous/anxious.        Objective   /74 (BP Location: Left arm, Patient Position: Sitting, Cuff Size: Standard)   Pulse 80   Temp 97.6 °F (36.4 °C)   Resp 14   Ht 5' 1\" (1.549 m)   Wt 64.4 kg (142 lb)   SpO2 96%   BMI 26.83 kg/m²      Physical Exam  Vitals and nursing note reviewed.   Constitutional:       General: She is not in acute distress.     Appearance: She is well-developed.   HENT:      Head: Normocephalic and atraumatic.      Mouth/Throat:      Mouth: Mucous membranes are moist.   Eyes:      Pupils: Pupils are equal, round, and reactive to light.   Cardiovascular:      Rate and Rhythm: Normal rate and regular rhythm.      Heart sounds: Normal heart sounds.   Pulmonary:      Effort: Pulmonary effort is normal.      Breath sounds: Normal breath sounds. No wheezing.   Abdominal:      General: Bowel sounds are normal.      Palpations: Abdomen is soft.      Tenderness: There is no abdominal tenderness.   Musculoskeletal:         General: No swelling.      Right lower leg: No edema.      Left lower leg: No edema.   Skin:     General: Skin is warm.      Findings: No rash.   Neurological:      General: No focal deficit present.      Mental Status: She is alert and oriented to person, place, and time.   Psychiatric:         Mood and Affect: Mood and affect normal. Mood is not anxious or depressed.         Behavior: Behavior normal.           Labs & imaging results reviewed with patient.    "

## 2025-01-31 PROCEDURE — 88305 TISSUE EXAM BY PATHOLOGIST: CPT | Performed by: STUDENT IN AN ORGANIZED HEALTH CARE EDUCATION/TRAINING PROGRAM

## 2025-02-03 ENCOUNTER — RESULTS FOLLOW-UP (OUTPATIENT)
Dept: OTHER | Facility: HOSPITAL | Age: 80
End: 2025-02-03

## 2025-02-03 DIAGNOSIS — F41.9 ANXIETY: ICD-10-CM

## 2025-02-03 RX ORDER — LORAZEPAM 1 MG/1
1 TABLET ORAL EVERY 6 HOURS PRN
Qty: 120 TABLET | Refills: 0 | Status: SHIPPED | OUTPATIENT
Start: 2025-02-03

## 2025-02-03 NOTE — LETTER
February 4, 2025     Matildapro Day  [address]   [address]  [place] [address]      Dear Ms. Day:    Below are the results from your recent visit: Pouchoscopy     Resulted Orders   Tissue Exam   Result Value Ref Range    Case Report       Surgical Pathology Report                         Case: [id number]                                  Authorizing Provider:  VANDANA Waters MD       Collected:           01/29/2025 1011              Ordering Location:     [facility]   Received:            01/29/2025 1622                                     Endoscopy                                                                    Pathologist:           Lai Vale MD                                                                           Specimen:    Colon, hot bx, anal transitional zone, r/o dysplasia                                       Final Diagnosis       RECTAL CUFF TRANSITIONAL ZONE, BIOPSY:    - Colonic-type mucosa with surface hyperplastic changes and reactive changes.    - Negative for colitis, granulomata, dysplasia or malignancy.      Additional Information       All reported additional testing was performed with appropriately reactive controls.  These tests were developed and their performance characteristics determined by [facility] or appropriate performing facility, though some tests may be performed on tissues which have not been validated for performance characteristics (such as staining performed on alcohol exposed cell blocks and decalcified tissues).  Results should be interpreted with caution and in the context of the patients’ clinical condition. These tests may not be cleared or approved by the U.S. Food and Drug Administration, though the FDA has determined that such clearance or approval is not necessary. These tests are used for clinical purposes and they should not  "be regarded as investigational or for research. This laboratory has been approved by CLIA 88, designated as a high-complexity laboratory and is qualified to perform these tests.    Interpretation performed at [facility], [address] [place] [address].      Gross Description       A. The specimen is received in formalin, labeled with the patient's name and hospital number, and is designated \" antral transitional zone biopsy, rule out dysplasia\".  The specimen consists of a single tan tissue fragment measuring 0.3 cm in greatest dimension.  The specimen is entirely submitted in a screened cassette.    Note: The estimated total formalin fixation time based upon information provided by the submitting clinician and the standard processing schedule is under 72 hours. Fani Day      Clinical Information       FINDINGS:  · Abnormal mucosa. Anal bleeding ceased without treeatment.  · Normal pouch, including efferent and afferent limbs. The afferent limb was intubated approximately 2 feet above the pouch.  · Performed forceps biopsies in the rectal cuff. JENN-r/o dysplasia       The test results shown above are benign.     If you have any questions or concerns, please don't hesitate to call.         Sincerely,        BLAINE Waters MD  "

## 2025-02-03 NOTE — TELEPHONE ENCOUNTER
Reason for call:   [x] Refill   [] Prior Auth  [] Other:     Office:   [x] PCP/Provider -   [] Specialty/Provider -     Medication: Lorazepam    Dose/Frequency: 1 mg    Quantity: 120 tablets    Pharmacy:   [id number] - [place] - [address] [phone]     Does the patient have enough for 3 days?   [] Yes   [x] No - Send as HP to POD

## 2025-02-04 ENCOUNTER — TELEPHONE (OUTPATIENT)
Dept: INTERNAL MEDICINE CLINIC | Facility: CLINIC | Age: 80
End: 2025-02-04

## 2025-02-04 DIAGNOSIS — M54.16 LUMBAR RADICULOPATHY: ICD-10-CM

## 2025-02-04 RX ORDER — HYDROCODONE BITARTRATE AND ACETAMINOPHEN 5; 325 MG/1; MG/1
1 TABLET ORAL EVERY 6 HOURS PRN
Qty: 120 TABLET | Refills: 0 | Status: SHIPPED | OUTPATIENT
Start: 2025-02-04

## 2025-02-04 NOTE — TELEPHONE ENCOUNTER
Patient called medication refill line asking for her Hydrocodone to be refilled. She states she has a really bad bach ache today. Medication is no longer on her active medication list. Please review and send in if possible.    Reason for call:   [x] Refill   [] Prior Auth  [] Other:     Office:   [x] PCP/Provider -   [] Specialty/Provider -     Medication: Hydrocodone-Acetaminophen     Dose/Frequency: 5-325 mg per tablet taken by mouth once every 6 hours PRN for pain     Pharmacy: [id number]  [place]  [address] [phone]     Does the patient have enough for 3 days?   [] Yes   [x] No - Send as HP to POD

## 2025-02-05 ENCOUNTER — OFFICE VISIT (OUTPATIENT)
Dept: NEPHROLOGY | Facility: CLINIC | Age: 80
End: 2025-02-05
Payer: MEDICARE

## 2025-02-05 VITALS
HEIGHT: 61 IN | HEART RATE: 74 BPM | BODY MASS INDEX: 26.62 KG/M2 | SYSTOLIC BLOOD PRESSURE: 130 MMHG | DIASTOLIC BLOOD PRESSURE: 66 MMHG | WEIGHT: 141 LBS

## 2025-02-05 DIAGNOSIS — D50.0 IRON DEFICIENCY ANEMIA DUE TO CHRONIC BLOOD LOSS: ICD-10-CM

## 2025-02-05 DIAGNOSIS — I10 ESSENTIAL HYPERTENSION: ICD-10-CM

## 2025-02-05 DIAGNOSIS — R60.1 GENERALIZED EDEMA: ICD-10-CM

## 2025-02-05 DIAGNOSIS — E87.1 HYPONATREMIA: Primary | ICD-10-CM

## 2025-02-05 PROCEDURE — 99214 OFFICE O/P EST MOD 30 MIN: CPT | Performed by: INTERNAL MEDICINE

## 2025-02-05 RX ORDER — TORSEMIDE 20 MG/1
20 TABLET ORAL DAILY PRN
Qty: 90 TABLET | Refills: 3 | Status: SHIPPED | OUTPATIENT
Start: 2025-02-05

## 2025-02-05 NOTE — ASSESSMENT & PLAN NOTE
Significant iron deficiency, following closely with hematology oncology.  Anticipating iron infusions next week.  Recommend repeating CBC post iron transfusions.

## 2025-02-05 NOTE — ASSESSMENT & PLAN NOTE
Blood pressure is stable, no changes in her current regimen.  Continue with diltiazem plus lisinopril.

## 2025-02-05 NOTE — PROGRESS NOTES
"NEPHROLOGY OUTPATIENT PROGRESS NOTE   Matilda Day 79 y.o. female MRN: [id number]  Reason for visit: Hyponatremia    Assessment & Plan  Hyponatremia  Chronic hyponatremia with multisystem factorial etiology.  Component due to underlying SIADH plus ongoing sodium loss from previous J-pouch from ulcerative colitis with chronic loose stools.  Overall has been stable, continue with sodium chloride tablets 1 g 4 times daily with loop diuretic therapy as needed  Generalized edema  Current volume status appears stable currently now only taking between 10-30 mg of torsemide only as needed.  Essential hypertension  Blood pressure is stable, no changes in her current regimen.  Continue with diltiazem plus lisinopril.  Iron deficiency anemia due to chronic blood loss  Significant iron deficiency, following closely with hematology oncology.  Anticipating iron infusions next week.  Recommend repeating CBC post iron transfusions.        SUBJECTIVE / INTERVAL HISTORY:  She has been complaining of increasing fatigue and tiredness.  Likely attributed to worsening anemia.  Scheduled for IV iron infusions next week.  Denies any chest pain, shortness of breath.  Denies any worsening lower extremity swelling.  Currently is now only taking torsemide as needed.      OBJECTIVE:  /66 (BP Location: Left arm, Patient Position: Sitting, Cuff Size: Standard)   Pulse 74   Ht 5' 1\" (1.549 m)   Wt 64 kg (141 lb)   BMI 26.64 kg/m²   Vitals:    02/05/25 1506   Weight: 64 kg (141 lb)       Physical Exam  Constitutional:       Appearance: She is not ill-appearing.   Eyes:      General: No scleral icterus.  Cardiovascular:      Rate and Rhythm: Normal rate and regular rhythm.   Pulmonary:      Effort: Pulmonary effort is normal.      Breath sounds: Normal breath sounds.   Abdominal:      General: There is no distension.      Palpations: Abdomen is soft.   Musculoskeletal:      Right lower leg: No edema.      Left lower leg: No edema. "   Skin:     General: Skin is warm and dry.      Findings: No rash.   Neurological:      Mental Status: She is alert and oriented to person, place, and time.           Medications:    Current Outpatient Medications:     albuterol (PROVENTIL HFA,VENTOLIN HFA) 90 mcg/act inhaler, Inhale 2 puffs every 6 (six) hours as needed for wheezing, Disp: 8 g, Rfl: 1    amLODIPine (NORVASC) 2.5 mg tablet, TAKE ONE TABLET BY MOUTH EVERY DAY, Disp: 90 tablet, Rfl: 1    apixaban (Eliquis) 2.5 mg, Take 1 tablet (2.5 mg total) by mouth 2 (two) times a day, Disp: 60 tablet, Rfl: 5    ascorbic acid (VITAMIN C) 500 MG tablet, Take 1 tablet (500 mg total) by mouth daily Start 30 days prior to surgery, Disp: 30 tablet, Rfl: 0    Calcium Carb-Cholecalciferol (CALCIUM 600-D PO), Take 1 tablet by mouth 2 (two) times a day, Disp: , Rfl:     Coenzyme Q10 (CO Q-10 PO), Take 1 capsule by mouth daily, Disp: , Rfl:     diltiazem (CARDIZEM CD) 180 mg 24 hr capsule, TAKE ONE CAPSULE BY MOUTH EVERY DAY, Disp: 90 capsule, Rfl: 1    diltiazem (CARDIZEM) 60 mg tablet, TAKE ONE TABLET BY MOUTH EVERY DAY, Disp: 90 tablet, Rfl: 1    escitalopram (LEXAPRO) 20 mg tablet, TAKE ONE TABLET BY MOUTH EVERY DAY, Disp: 90 tablet, Rfl: 1    ferrous sulfate 324 (65 Fe) mg, Take 1 tablet (324 mg total) by mouth daily before breakfast Start 30 days prior to surgery, Disp: 60 tablet, Rfl: 0    fluticasone (FLONASE) 50 mcg/act nasal spray, APPLY ONE SPRAY IN EACH NOSTRIL ONCE DAILY AS NEEDED FOR RUNNY NOSE, Disp: 48 g, Rfl: 1    folic acid (FOLVITE) 1 mg tablet, Take 1 tablet (1 mg total) by mouth daily Start 30 days prior to surgery, Disp: 30 tablet, Rfl: 0    gabapentin (NEURONTIN) 600 MG tablet, TAKE ONE TABLET BY MOUTH EVERY MORNING , TAKE ONE TABLET BY MOUTH EVERY DAY IN THE AFTERNOON , AND TAKE TWO TABLETS BY MOUTH EVERY EVENING AT BEDTIME, Disp: 360 tablet, Rfl: 1    HYDROcodone-acetaminophen (NORCO) 5-325 mg per tablet, Take 1 tablet by mouth every 6 (six) hours as  needed for pain Max Daily Amount: 4 tablets, Disp: 120 tablet, Rfl: 0    hydrocortisone 2.5 % cream, , Disp: , Rfl:     levothyroxine 50 mcg tablet, Take 1 tablet (50 mcg total) by mouth daily, Disp: 90 tablet, Rfl: 1    lisinopril (ZESTRIL) 20 mg tablet, TAKE ONE TABLET BY MOUTH TWO TIMES A DAY, Disp: 180 tablet, Rfl: 1    LORazepam (ATIVAN) 1 mg tablet, Take 1 tablet (1 mg total) by mouth every 6 (six) hours as needed for anxiety, Disp: 120 tablet, Rfl: 0    MAGNESIUM PO, Take 1 tablet by mouth daily 400 mg tablet , Disp: , Rfl:     Multiple Vitamin (multivitamin) tablet, Take 1 tablet by mouth daily Begin 30 days prior to surgery, Disp: 30 tablet, Rfl: 1    mupirocin (BACTROBAN) 2 % ointment, Apply topically 2 (two) times a day Apply pea size amount to each nostril 2x/day for 5 days prior to procedure, Disp: 15 g, Rfl: 0    Omega-3 Fatty Acids (FISH OIL PO), Take 1 capsule by mouth daily, Disp: , Rfl:     pantoprazole (PROTONIX) 40 mg tablet, TAKE ONE TABLET BY MOUTH EVERY DAY, Disp: 90 tablet, Rfl: 1    potassium chloride (MICRO-K) 10 MEQ CR capsule, TAKE TWO CAPSULES BY MOUTH EVERY DAY, Disp: 180 capsule, Rfl: 1    simvastatin (ZOCOR) 10 mg tablet, TAKE ONE-HALF TABLET BY MOUTH DAILY, Disp: 45 tablet, Rfl: 1    sodium chloride 1 g tablet, TAKE ONE TABLET BY MOUTH FOUR TIMES A DAY, Disp: 270 tablet, Rfl: 1    torsemide (DEMADEX) 10 mg tablet, TAKE ONE TABLET BY MOUTH EVERY DAY AS NEEDED (EDEMA), Disp: 90 tablet, Rfl: 1    torsemide (DEMADEX) 20 mg tablet, Take 1 tablet (20 mg total) by mouth daily as needed (edema) Taking 10mg-30mg daily as needed., Disp: 90 tablet, Rfl: 3    vitamin B-12 (VITAMIN B-12) 500 mcg tablet, Take 1 tablet (500 mcg total) by mouth daily, Disp: 90 tablet, Rfl: 1    Fluticasone Furoate-Vilanterol 100-25 mcg/actuation inhaler, Inhale 1 puff daily Rinse mouth after use., Disp: 60 blister, Rfl: 5    nystatin-triamcinolone (MYCOLOG-II) ointment, Apply topically 2 (two) times a day for 14  days, Disp: 60 g, Rfl: 3    Current Facility-Administered Medications:     cyanocobalamin injection 1,000 mcg, 1,000 mcg, Intramuscular, Q30 Days, Zaira Velasquez MD, 1,000 mcg at 04/20/23 1012    Laboratory Results:  Results for orders placed or performed in visit on 01/30/25   POCT hemoglobin A1c    Collection Time: 01/30/25  9:33 AM   Result Value Ref Range    Hemoglobin A1C 5.9 <=6.5     *Note: Due to a large number of results and/or encounters for the requested time period, some results have not been displayed. A complete set of results can be found in Results Review.

## 2025-02-11 ENCOUNTER — HOSPITAL ENCOUNTER (OUTPATIENT)
Dept: INFUSION CENTER | Facility: CLINIC | Age: 80
Discharge: HOME | End: 2025-02-11
Payer: MEDICARE

## 2025-02-11 VITALS
OXYGEN SATURATION: 98 % | DIASTOLIC BLOOD PRESSURE: 69 MMHG | TEMPERATURE: 98.6 F | HEART RATE: 64 BPM | SYSTOLIC BLOOD PRESSURE: 180 MMHG

## 2025-02-11 DIAGNOSIS — D50.9 IRON DEFICIENCY ANEMIA, UNSPECIFIED IRON DEFICIENCY ANEMIA TYPE: Primary | ICD-10-CM

## 2025-02-11 PROCEDURE — 96365 THER/PROPH/DIAG IV INF INIT: CPT

## 2025-02-11 PROCEDURE — 96367 TX/PROPH/DG ADDL SEQ IV INF: CPT

## 2025-02-11 PROCEDURE — 96375 TX/PRO/DX INJ NEW DRUG ADDON: CPT

## 2025-02-11 RX ORDER — SODIUM CHLORIDE 9 MG/ML
20 INJECTION, SOLUTION INTRAVENOUS ONCE
Status: COMPLETED | OUTPATIENT
Start: 2025-02-11 | End: 2025-02-11

## 2025-02-11 RX ORDER — SODIUM CHLORIDE 9 MG/ML
20 INJECTION, SOLUTION INTRAVENOUS ONCE
Status: CANCELLED | OUTPATIENT
Start: 2025-02-18

## 2025-02-11 RX ORDER — HYDROCORTISONE SODIUM SUCCINATE 100 MG/2ML
50 INJECTION INTRAMUSCULAR; INTRAVENOUS ONCE
Status: COMPLETED | OUTPATIENT
Start: 2025-02-11 | End: 2025-02-11

## 2025-02-11 RX ORDER — HYDROCORTISONE SODIUM SUCCINATE 100 MG/2ML
50 INJECTION INTRAMUSCULAR; INTRAVENOUS ONCE
Status: CANCELLED
Start: 2025-02-18 | End: 2025-02-18

## 2025-02-11 RX ADMIN — DIPHENHYDRAMINE HYDROCHLORIDE 50 MG: 50 INJECTION, SOLUTION INTRAMUSCULAR; INTRAVENOUS at 10:48

## 2025-02-11 RX ADMIN — SODIUM CHLORIDE 20 ML/HR: 9 INJECTION, SOLUTION INTRAVENOUS at 10:46

## 2025-02-11 RX ADMIN — HYDROCORTISONE SODIUM SUCCINATE 50 MG: 100 INJECTION, POWDER, FOR SOLUTION INTRAMUSCULAR; INTRAVENOUS at 10:47

## 2025-02-11 RX ADMIN — IRON SUCROSE 300 MG: 20 INJECTION, SOLUTION INTRAVENOUS at 11:13

## 2025-02-11 NOTE — PROGRESS NOTES
Patient tolerated treatment today without complications. Patient confirmed next appointment for 2/18 at 0900. Print out declined.

## 2025-02-18 ENCOUNTER — HOSPITAL ENCOUNTER (OUTPATIENT)
Dept: INFUSION CENTER | Facility: CLINIC | Age: 80
Discharge: HOME | End: 2025-02-18
Payer: MEDICARE

## 2025-02-18 VITALS
DIASTOLIC BLOOD PRESSURE: 63 MMHG | TEMPERATURE: 98.2 F | OXYGEN SATURATION: 97 % | SYSTOLIC BLOOD PRESSURE: 107 MMHG | HEART RATE: 75 BPM

## 2025-02-18 DIAGNOSIS — D50.9 IRON DEFICIENCY ANEMIA, UNSPECIFIED IRON DEFICIENCY ANEMIA TYPE: Primary | ICD-10-CM

## 2025-02-18 PROCEDURE — 96365 THER/PROPH/DIAG IV INF INIT: CPT

## 2025-02-18 PROCEDURE — 96367 TX/PROPH/DG ADDL SEQ IV INF: CPT

## 2025-02-18 PROCEDURE — 96375 TX/PRO/DX INJ NEW DRUG ADDON: CPT

## 2025-02-18 RX ORDER — SODIUM CHLORIDE 9 MG/ML
20 INJECTION, SOLUTION INTRAVENOUS ONCE
Status: CANCELLED | OUTPATIENT
Start: 2025-02-25

## 2025-02-18 RX ORDER — SODIUM CHLORIDE 9 MG/ML
20 INJECTION, SOLUTION INTRAVENOUS ONCE
Status: COMPLETED | OUTPATIENT
Start: 2025-02-18 | End: 2025-02-18

## 2025-02-18 RX ORDER — HYDROCORTISONE SODIUM SUCCINATE 100 MG/2ML
50 INJECTION INTRAMUSCULAR; INTRAVENOUS ONCE
Status: COMPLETED | OUTPATIENT
Start: 2025-02-18 | End: 2025-02-18

## 2025-02-18 RX ORDER — HYDROCORTISONE SODIUM SUCCINATE 100 MG/2ML
50 INJECTION INTRAMUSCULAR; INTRAVENOUS ONCE
Status: CANCELLED
Start: 2025-02-25 | End: 2025-02-25

## 2025-02-18 RX ADMIN — SODIUM CHLORIDE 20 ML/HR: 0.9 INJECTION, SOLUTION INTRAVENOUS at 09:38

## 2025-02-18 RX ADMIN — IRON SUCROSE 300 MG: 20 INJECTION, SOLUTION INTRAVENOUS at 10:11

## 2025-02-18 RX ADMIN — DIPHENHYDRAMINE HYDROCHLORIDE 50 MG: 50 INJECTION INTRAMUSCULAR; INTRAVENOUS at 09:37

## 2025-02-18 RX ADMIN — HYDROCORTISONE SODIUM SUCCINATE 50 MG: 100 INJECTION, POWDER, FOR SOLUTION INTRAMUSCULAR; INTRAVENOUS at 09:37

## 2025-02-18 NOTE — PROGRESS NOTES
Patient tolerated treatment without complications. Patient declined AVS. Reviewed upcoming appointments with patient. Patient is aware of next appointment scheduled at [facility] on  [facility]/25 at 0830.

## 2025-02-18 NOTE — PROGRESS NOTES
Patient here for venofer infusion with premeds. Patient resting with no complaints. Vitals stable. Call bell within reach.      cont home meds

## 2025-02-19 ENCOUNTER — CLINICAL SUPPORT (OUTPATIENT)
Dept: INTERNAL MEDICINE CLINIC | Facility: CLINIC | Age: 80
End: 2025-02-19
Payer: MEDICARE

## 2025-02-19 DIAGNOSIS — F41.9 ANXIETY: ICD-10-CM

## 2025-02-19 DIAGNOSIS — I10 ESSENTIAL HYPERTENSION: ICD-10-CM

## 2025-02-19 DIAGNOSIS — M81.0 OSTEOPOROSIS, UNSPECIFIED OSTEOPOROSIS TYPE, UNSPECIFIED PATHOLOGICAL FRACTURE PRESENCE: Primary | ICD-10-CM

## 2025-02-19 PROCEDURE — 96372 THER/PROPH/DIAG INJ SC/IM: CPT

## 2025-02-20 RX ORDER — ESCITALOPRAM OXALATE 20 MG/1
20 TABLET ORAL DAILY
Qty: 90 TABLET | Refills: 1 | Status: SHIPPED | OUTPATIENT
Start: 2025-02-20

## 2025-02-20 RX ORDER — LISINOPRIL 20 MG/1
20 TABLET ORAL 2 TIMES DAILY
Qty: 180 TABLET | Refills: 1 | Status: SHIPPED | OUTPATIENT
Start: 2025-02-20

## 2025-02-21 ENCOUNTER — NURSE TRIAGE (OUTPATIENT)
Age: 80
End: 2025-02-21

## 2025-02-21 DIAGNOSIS — M51.362 DEGENERATION OF INTERVERTEBRAL DISC OF LUMBAR REGION WITH DISCOGENIC BACK PAIN AND LOWER EXTREMITY PAIN: Primary | ICD-10-CM

## 2025-02-21 RX ORDER — CYCLOBENZAPRINE HCL 5 MG
5 TABLET ORAL 3 TIMES DAILY PRN
Qty: 60 TABLET | Refills: 0 | Status: SHIPPED | OUTPATIENT
Start: 2025-02-21

## 2025-02-21 NOTE — TELEPHONE ENCOUNTER
Regarding: back, R shoulder pain  ----- Message from Melisa ROSS sent at 2/21/2025  8:40 AM EST -----  Patient has terrible back and R shoulder pain, at the shoulder blade area off and on for a week. She stated is usually goes away, but this episode, she has no relief. She is requesting a muscle relaxer. She has been taking tylenol.

## 2025-02-21 NOTE — TELEPHONE ENCOUNTER
"Patient calls in this morning with a \"back spasm\", on the right side of her back, under her shoulder blade.  8/10.  She has tried Skelaxin and tylenol without relief.  She states that she has had this in the past.    She is requesting flexeril instead of skelaxin.  She states this has helped her better in the past.    Please advise.     Preferred Pharmacy:   [id number] - [facility] [address]  Phone: [phone]   Fax: [phone]     Reason for Disposition   Back pain    Answer Assessment - Initial Assessment Questions  1. ONSET: \"When did the pain begin?\" (e.g., minutes, hours, days)      A week ago   2. LOCATION: \"Where does it hurt?\" (upper, mid or lower back)      Right side under shoulder blade, has had before, muscle spasm   3. SEVERITY: \"How bad is the pain?\"  (e.g., Scale 1-10; mild, moderate, or severe)      8/10  4. PATTERN: \"Is the pain constant?\" (e.g., yes, no; constant, intermittent)       Intermittent   5. RADIATION: \"Does the pain shoot into your legs or somewhere else?\"      Denies   6. CAUSE:  \"What do you think is causing the back pain?\"       Spasm   7. BACK OVERUSE:  \"Any recent lifting of heavy objects, strenuous work or exercise?\"      History of spasms   8. MEDICINES: \"What have you taken so far for the pain?\" (e.g., nothing, acetaminophen, NSAIDS)      Skelaxin and tylenol   9. NEUROLOGIC SYMPTOMS: \"Do you have any weakness, numbness, or problems with bowel/bladder control?\"      Denies   10. OTHER SYMPTOMS: \"Do you have any other symptoms?\" (e.g., fever, abdomen pain, burning with urination, blood in urine)     Denies    Protocols used: Back Pain-Adult-[place]    "

## 2025-02-21 NOTE — TELEPHONE ENCOUNTER
Flexeril sent to the pharmacy.  Recommend to Try and apply warm moist compress in the area of spasm on her low back area.

## 2025-02-25 ENCOUNTER — HOSPITAL ENCOUNTER (OUTPATIENT)
Dept: INFUSION CENTER | Facility: CLINIC | Age: 80
Discharge: HOME | End: 2025-02-25
Payer: MEDICARE

## 2025-02-25 VITALS
OXYGEN SATURATION: 95 % | DIASTOLIC BLOOD PRESSURE: 73 MMHG | RESPIRATION RATE: 18 BRPM | SYSTOLIC BLOOD PRESSURE: 154 MMHG | TEMPERATURE: 97.4 F | HEART RATE: 68 BPM

## 2025-02-25 DIAGNOSIS — D50.9 IRON DEFICIENCY ANEMIA, UNSPECIFIED IRON DEFICIENCY ANEMIA TYPE: Primary | ICD-10-CM

## 2025-02-25 PROCEDURE — 96367 TX/PROPH/DG ADDL SEQ IV INF: CPT

## 2025-02-25 PROCEDURE — 96375 TX/PRO/DX INJ NEW DRUG ADDON: CPT

## 2025-02-25 PROCEDURE — 96365 THER/PROPH/DIAG IV INF INIT: CPT

## 2025-02-25 RX ORDER — HYDROCORTISONE SODIUM SUCCINATE 100 MG/2ML
50 INJECTION INTRAMUSCULAR; INTRAVENOUS ONCE
Status: CANCELLED
Start: 2025-03-04 | End: 2025-03-04

## 2025-02-25 RX ORDER — SODIUM CHLORIDE 9 MG/ML
20 INJECTION, SOLUTION INTRAVENOUS ONCE
Status: CANCELLED | OUTPATIENT
Start: 2025-03-04

## 2025-02-25 RX ORDER — HYDROCORTISONE SODIUM SUCCINATE 100 MG/2ML
50 INJECTION INTRAMUSCULAR; INTRAVENOUS ONCE
Status: COMPLETED | OUTPATIENT
Start: 2025-02-25 | End: 2025-02-25

## 2025-02-25 RX ORDER — SODIUM CHLORIDE 9 MG/ML
20 INJECTION, SOLUTION INTRAVENOUS ONCE
Status: COMPLETED | OUTPATIENT
Start: 2025-02-25 | End: 2025-02-25

## 2025-02-25 RX ADMIN — SODIUM CHLORIDE 20 ML/HR: 0.9 INJECTION, SOLUTION INTRAVENOUS at 09:12

## 2025-02-25 RX ADMIN — DIPHENHYDRAMINE HYDROCHLORIDE 50 MG: 50 INJECTION INTRAMUSCULAR; INTRAVENOUS at 09:17

## 2025-02-25 RX ADMIN — HYDROCORTISONE SODIUM SUCCINATE 50 MG: 100 INJECTION, POWDER, FOR SOLUTION INTRAMUSCULAR; INTRAVENOUS at 09:13

## 2025-02-25 RX ADMIN — IRON SUCROSE 300 MG: 20 INJECTION, SOLUTION INTRAVENOUS at 09:54

## 2025-02-25 NOTE — PROGRESS NOTES
Pt at [facility] for Venofer infusion. Pt reports no changes prior to infusion. After pre-meds pt is slurring words, pt states she does get very tired after benadryl infusion. Pt assessed and negative for face dropping or weakness, pt is alert and oriented. In basket sent to provider in regards to benadryl dose. Pt has no further questions at this time. Call bell within reach.

## 2025-02-25 NOTE — PROGRESS NOTES
Patient tolerated treatment today.  Pt states she feels well at time of discharge, just tired Next appointment 3/4 0830, she declined AVS

## 2025-03-03 DIAGNOSIS — M51.369 LUMBAR DEGENERATIVE DISC DISEASE: ICD-10-CM

## 2025-03-03 RX ORDER — GABAPENTIN 600 MG/1
TABLET ORAL
Qty: 360 TABLET | Refills: 1 | Status: SHIPPED | OUTPATIENT
Start: 2025-03-03

## 2025-03-04 ENCOUNTER — HOSPITAL ENCOUNTER (OUTPATIENT)
Dept: INFUSION CENTER | Facility: CLINIC | Age: 80
Discharge: HOME | End: 2025-03-04
Payer: MEDICARE

## 2025-03-04 VITALS
TEMPERATURE: 97.3 F | HEART RATE: 67 BPM | RESPIRATION RATE: 18 BRPM | DIASTOLIC BLOOD PRESSURE: 78 MMHG | SYSTOLIC BLOOD PRESSURE: 123 MMHG | OXYGEN SATURATION: 95 %

## 2025-03-04 DIAGNOSIS — D50.9 IRON DEFICIENCY ANEMIA, UNSPECIFIED IRON DEFICIENCY ANEMIA TYPE: Primary | ICD-10-CM

## 2025-03-04 PROCEDURE — 96366 THER/PROPH/DIAG IV INF ADDON: CPT

## 2025-03-04 PROCEDURE — 96365 THER/PROPH/DIAG IV INF INIT: CPT

## 2025-03-04 PROCEDURE — 96375 TX/PRO/DX INJ NEW DRUG ADDON: CPT

## 2025-03-04 PROCEDURE — 96367 TX/PROPH/DG ADDL SEQ IV INF: CPT

## 2025-03-04 RX ORDER — SODIUM CHLORIDE 9 MG/ML
20 INJECTION, SOLUTION INTRAVENOUS ONCE
Status: COMPLETED | OUTPATIENT
Start: 2025-03-04 | End: 2025-03-04

## 2025-03-04 RX ORDER — HYDROCORTISONE SODIUM SUCCINATE 100 MG/2ML
50 INJECTION INTRAMUSCULAR; INTRAVENOUS ONCE
Status: COMPLETED | OUTPATIENT
Start: 2025-03-04 | End: 2025-03-04

## 2025-03-04 RX ORDER — SODIUM CHLORIDE 9 MG/ML
20 INJECTION, SOLUTION INTRAVENOUS ONCE
Status: CANCELLED | OUTPATIENT
Start: 2025-03-11

## 2025-03-04 RX ORDER — HYDROCORTISONE SODIUM SUCCINATE 100 MG/2ML
50 INJECTION INTRAMUSCULAR; INTRAVENOUS ONCE
Status: CANCELLED
Start: 2025-03-11 | End: 2025-03-11

## 2025-03-04 RX ADMIN — DIPHENHYDRAMINE HYDROCHLORIDE 25 MG: 50 INJECTION INTRAMUSCULAR; INTRAVENOUS at 08:35

## 2025-03-04 RX ADMIN — HYDROCORTISONE SODIUM SUCCINATE 50 MG: 100 INJECTION, POWDER, FOR SOLUTION INTRAMUSCULAR; INTRAVENOUS at 08:35

## 2025-03-04 RX ADMIN — SODIUM CHLORIDE 20 ML/HR: 0.9 INJECTION, SOLUTION INTRAVENOUS at 08:35

## 2025-03-04 RX ADMIN — IRON SUCROSE 300 MG: 20 INJECTION, SOLUTION INTRAVENOUS at 09:09

## 2025-03-04 NOTE — PROGRESS NOTES
-Monitor   Pt tolerated treatment well. Pt declined AVS. Next appt scheduled next Tuesday at 0830.

## 2025-03-09 DIAGNOSIS — F41.9 ANXIETY: ICD-10-CM

## 2025-03-09 RX ORDER — ESCITALOPRAM OXALATE 20 MG/1
20 TABLET ORAL DAILY
Qty: 90 TABLET | Refills: 1 | Status: SHIPPED | OUTPATIENT
Start: 2025-03-09

## 2025-03-11 ENCOUNTER — HOSPITAL ENCOUNTER (OUTPATIENT)
Dept: INFUSION CENTER | Facility: CLINIC | Age: 80
Discharge: HOME | End: 2025-03-11
Payer: MEDICARE

## 2025-03-11 VITALS
HEART RATE: 65 BPM | TEMPERATURE: 97.6 F | RESPIRATION RATE: 18 BRPM | OXYGEN SATURATION: 95 % | SYSTOLIC BLOOD PRESSURE: 129 MMHG | DIASTOLIC BLOOD PRESSURE: 68 MMHG

## 2025-03-11 DIAGNOSIS — D50.9 IRON DEFICIENCY ANEMIA, UNSPECIFIED IRON DEFICIENCY ANEMIA TYPE: Primary | ICD-10-CM

## 2025-03-11 PROCEDURE — 96365 THER/PROPH/DIAG IV INF INIT: CPT

## 2025-03-11 PROCEDURE — 96375 TX/PRO/DX INJ NEW DRUG ADDON: CPT

## 2025-03-11 PROCEDURE — 96367 TX/PROPH/DG ADDL SEQ IV INF: CPT

## 2025-03-11 RX ORDER — SODIUM CHLORIDE 9 MG/ML
20 INJECTION, SOLUTION INTRAVENOUS ONCE
Status: CANCELLED | OUTPATIENT
Start: 2025-03-11

## 2025-03-11 RX ORDER — HYDROCORTISONE SODIUM SUCCINATE 100 MG/2ML
50 INJECTION INTRAMUSCULAR; INTRAVENOUS ONCE
Status: COMPLETED | OUTPATIENT
Start: 2025-03-11 | End: 2025-03-11

## 2025-03-11 RX ORDER — SODIUM CHLORIDE 9 MG/ML
20 INJECTION, SOLUTION INTRAVENOUS ONCE
Status: COMPLETED | OUTPATIENT
Start: 2025-03-11 | End: 2025-03-11

## 2025-03-11 RX ORDER — HYDROCORTISONE SODIUM SUCCINATE 100 MG/2ML
50 INJECTION INTRAMUSCULAR; INTRAVENOUS ONCE
Status: CANCELLED
Start: 2025-03-11 | End: 2025-03-11

## 2025-03-11 RX ADMIN — DIPHENHYDRAMINE HYDROCHLORIDE 25 MG: 50 INJECTION INTRAMUSCULAR; INTRAVENOUS at 08:43

## 2025-03-11 RX ADMIN — IRON SUCROSE 300 MG: 20 INJECTION, SOLUTION INTRAVENOUS at 09:25

## 2025-03-11 RX ADMIN — SODIUM CHLORIDE 20 ML/HR: 9 INJECTION, SOLUTION INTRAVENOUS at 08:43

## 2025-03-11 RX ADMIN — HYDROCORTISONE SODIUM SUCCINATE 50 MG: 100 INJECTION, POWDER, FOR SOLUTION INTRAMUSCULAR; INTRAVENOUS at 08:43

## 2025-03-11 NOTE — PROGRESS NOTES
Pt at [facility] for Venofer infusion. Pt tolerated tx well with no complaints. Pt will schedule next appt with . AVS declined.

## 2025-03-19 ENCOUNTER — TELEPHONE (OUTPATIENT)
Age: 80
End: 2025-03-19

## 2025-03-19 DIAGNOSIS — Z96.641 STATUS POST TOTAL HIP REPLACEMENT, RIGHT: Primary | ICD-10-CM

## 2025-03-19 RX ORDER — AZITHROMYCIN 500 MG/1
500 TABLET, FILM COATED ORAL
Qty: 3 TABLET | Refills: 0 | Status: SHIPPED | OUTPATIENT
Start: 2025-03-19 | End: 2025-03-20

## 2025-03-19 NOTE — TELEPHONE ENCOUNTER
Caller: Kathy Poole    Doctor: Dr. Ford    Reason for call: Kathy from Dr. Sebastian office called with questions if patient needs pre-medication prior to dental appointments, stated patient had appointment today for evaluation and requested a letter stating if premedication is needed and for how long after right hip replacement to be faxed to fax#: [phone]. Please advise.    Call back#: [phone]

## 2025-03-19 NOTE — TELEPHONE ENCOUNTER
Caller: Kathy Poole    Doctor: Dr. Ford    Reason for call: Kathy from Dr. Sebastian office called requested patients medication list be faxed to fax#: [phone]    Call back#: [phone]

## 2025-03-19 NOTE — TELEPHONE ENCOUNTER
Voice message left on patient's voicemail.  Zithromax 500 mg 1 hour prior to procedure was sent to her pharmacy to use for dental visits.

## 2025-03-24 DIAGNOSIS — K21.9 GASTROESOPHAGEAL REFLUX DISEASE, UNSPECIFIED WHETHER ESOPHAGITIS PRESENT: ICD-10-CM

## 2025-03-24 NOTE — TELEPHONE ENCOUNTER
Caller: noemi from     Doctor: Dr. Ford    Reason for call: please re fax pre med information to [phone]    Call back#: [phone]

## 2025-03-25 DIAGNOSIS — F41.9 ANXIETY: ICD-10-CM

## 2025-03-25 DIAGNOSIS — E87.6 HYPOKALEMIA: ICD-10-CM

## 2025-03-25 DIAGNOSIS — E78.49 OTHER HYPERLIPIDEMIA: ICD-10-CM

## 2025-03-25 RX ORDER — PANTOPRAZOLE SODIUM 40 MG/1
40 TABLET, DELAYED RELEASE ORAL 2 TIMES DAILY
Qty: 180 TABLET | Refills: 1 | Status: SHIPPED | OUTPATIENT
Start: 2025-03-25

## 2025-03-25 RX ORDER — POTASSIUM CHLORIDE 750 MG/1
CAPSULE, EXTENDED RELEASE ORAL
Qty: 180 CAPSULE | Refills: 0 | Status: SHIPPED | OUTPATIENT
Start: 2025-03-25

## 2025-03-25 RX ORDER — SIMVASTATIN 10 MG
15 TABLET ORAL DAILY
Qty: 45 TABLET | Refills: 0 | Status: SHIPPED | OUTPATIENT
Start: 2025-03-25

## 2025-03-25 RX ORDER — LORAZEPAM 1 MG/1
1 TABLET ORAL EVERY 6 HOURS PRN
Qty: 120 TABLET | Refills: 0 | Status: SHIPPED | OUTPATIENT
Start: 2025-03-25

## 2025-03-25 NOTE — TELEPHONE ENCOUNTER
Reason for call:   [x] Refill   [] Prior Auth  [] Other:     Office:   [x] PCP/Provider -  Zaira Velasquez MD   [] Specialty/Provider -     Medication:   LORazepam (ATIVAN) 1 mg tablet Take 1 tablet (1 mg total) by mouth every 6 (six) hours as needed for anxiety   120    potassium chloride (MICRO-K) 10 MEQ CR capsule TAKE TWO CAPSULES BY MOUTH EVERY DAY   180      simvastatin (ZOCOR) 10 mg tablet TAKE ONE-HALF TABLET BY MOUTH DAILY   45    Pharmacy: [id number] - [place], [place] - [address] [place] [address] [phone]     Local Pharmacy   Does the patient have enough for 3 days?   [] Yes   [x] No - Send as HP to POD    Mail Away Pharmacy   Does the patient have enough for 10 days?   [] Yes   [] No - Send as HP to POD

## 2025-03-31 RX ORDER — SIMVASTATIN 10 MG
5 TABLET ORAL DAILY
Qty: 45 TABLET | Refills: 0 | Status: SHIPPED | OUTPATIENT
Start: 2025-03-31

## 2025-03-31 NOTE — TELEPHONE ENCOUNTER
Patient is questioning the directions on the most recent prescription for simvastatin, dated 03/25/25. The directions state to take 1.5 tablets and she has always taken 0.5 tablet. However, notes from office visit 01/30/25 state simvastatin 15mg. Please confirm the correct dosage.    If appropriate, please send a new prescription for simvastatin 10mg, 0.5 tablet qd, #45 (90-day supply), to the [facility] on file.    Patient would like a follow-up phone call at #[phone]

## 2025-04-06 DIAGNOSIS — E87.6 HYPOKALEMIA: ICD-10-CM

## 2025-04-07 RX ORDER — POTASSIUM CHLORIDE 750 MG/1
20 CAPSULE, EXTENDED RELEASE ORAL DAILY
Qty: 180 CAPSULE | Refills: 1 | OUTPATIENT
Start: 2025-04-07

## 2025-04-09 ENCOUNTER — OFFICE VISIT (OUTPATIENT)
Dept: INTERNAL MEDICINE CLINIC | Facility: CLINIC | Age: 80
End: 2025-04-09
Payer: MEDICARE

## 2025-04-09 VITALS
BODY MASS INDEX: 25.69 KG/M2 | HEIGHT: 63 IN | HEART RATE: 72 BPM | DIASTOLIC BLOOD PRESSURE: 60 MMHG | OXYGEN SATURATION: 96 % | TEMPERATURE: 97.2 F | SYSTOLIC BLOOD PRESSURE: 130 MMHG | WEIGHT: 145 LBS

## 2025-04-09 DIAGNOSIS — E03.9 ACQUIRED HYPOTHYROIDISM: ICD-10-CM

## 2025-04-09 DIAGNOSIS — R73.03 PREDIABETES: ICD-10-CM

## 2025-04-09 DIAGNOSIS — D50.0 IRON DEFICIENCY ANEMIA DUE TO CHRONIC BLOOD LOSS: ICD-10-CM

## 2025-04-09 DIAGNOSIS — M51.362 DEGENERATION OF INTERVERTEBRAL DISC OF LUMBAR REGION WITH DISCOGENIC BACK PAIN AND LOWER EXTREMITY PAIN: ICD-10-CM

## 2025-04-09 DIAGNOSIS — E22.2 SIADH (SYNDROME OF INAPPROPRIATE ADH PRODUCTION) (HCC): ICD-10-CM

## 2025-04-09 DIAGNOSIS — M17.11 PRIMARY OSTEOARTHRITIS OF RIGHT KNEE: Primary | ICD-10-CM

## 2025-04-09 DIAGNOSIS — F33.1 MODERATE EPISODE OF RECURRENT MAJOR DEPRESSIVE DISORDER (HCC): ICD-10-CM

## 2025-04-09 DIAGNOSIS — K51.80 OTHER ULCERATIVE COLITIS WITHOUT COMPLICATION (HCC): ICD-10-CM

## 2025-04-09 DIAGNOSIS — I81 PORTAL VEIN THROMBOSIS: ICD-10-CM

## 2025-04-09 DIAGNOSIS — I10 ESSENTIAL HYPERTENSION: ICD-10-CM

## 2025-04-09 DIAGNOSIS — I47.10 SUPRAVENTRICULAR TACHYCARDIA (HCC): ICD-10-CM

## 2025-04-09 DIAGNOSIS — M81.0 AGE-RELATED OSTEOPOROSIS WITHOUT CURRENT PATHOLOGICAL FRACTURE: ICD-10-CM

## 2025-04-09 DIAGNOSIS — E78.2 MIXED HYPERLIPIDEMIA: ICD-10-CM

## 2025-04-09 DIAGNOSIS — K21.9 GASTROESOPHAGEAL REFLUX DISEASE, UNSPECIFIED WHETHER ESOPHAGITIS PRESENT: ICD-10-CM

## 2025-04-09 PROCEDURE — 99214 OFFICE O/P EST MOD 30 MIN: CPT | Performed by: INTERNAL MEDICINE

## 2025-04-09 PROCEDURE — G2211 COMPLEX E/M VISIT ADD ON: HCPCS | Performed by: INTERNAL MEDICINE

## 2025-04-09 RX ORDER — ROSUVASTATIN CALCIUM 5 MG/1
5 TABLET, COATED ORAL DAILY
Start: 2025-04-09

## 2025-04-09 RX ORDER — METAXALONE 800 MG/1
800 TABLET ORAL 4 TIMES DAILY
COMMUNITY
Start: 2025-02-22

## 2025-04-09 NOTE — ASSESSMENT & PLAN NOTE
Taking PPI at least once a day, bid prn only.  More frequent dysphagia. Refer back to GI for possible EGD.

## 2025-04-09 NOTE — PROGRESS NOTES
Name: Matilda Day      : 1945      MRN: [id number]  Encounter Provider: Zaira Velasquez MD  Encounter Date: 2025   Encounter department: [facility]  :  Assessment & Plan  Primary osteoarthritis of right knee  Surgery schedule in July.  Moderate risk for planned procedure.       Iron deficiency anemia due to chronic blood loss  Repeat CBC, iron studies today.  S/p Venofer x 5 doses.    Orders:  •  CBC and differential; Future  •  Iron Panel (Includes Ferritin, Iron Sat%, Iron, and TIBC); Future    Other ulcerative colitis without complication (HCC)  Normal pathology.  No recent issues.       Portal vein thrombosis  On Eliquis.       Essential hypertension  BP stable. On amlodipine 2.5 mg, diltiazem 240 mg daily, lisinopril 20 mg bid.        SIADH (syndrome of inappropriate ADH production) (HCC)  Taking NaCl 4x a day.  Per nephrology.       Supraventricular tachycardia (HCC)  Monitor symptoms.  On diltiazem.       Prediabetes  A1c 5.9%.       Moderate episode of recurrent major depressive disorder (HCC)  Stable, on Lexapro 20 mg.  Still taking lorazepam, at least bid.       Mixed hyperlipidemia  On simvastatin 5 mg daily, change to rosuvastatin due to insurance.  Orders:  •  rosuvastatin (CRESTOR) 5 mg tablet; Take 1 tablet (5 mg total) by mouth daily    Gastroesophageal reflux disease, unspecified whether esophagitis present  Taking PPI at least once a day, bid prn only.  More frequent dysphagia. Refer back to GI for possible EGD.       Acquired hypothyroidism  Stable.       Degeneration of intervertebral disc of lumbar region with discogenic back pain and lower extremity pain  Taking gabapentin tid, taking hydrocodone less often.  PDMP reviewed.       Age-related osteoporosis without current pathological fracture  Received Prolia #1 in february.       Follow up in 4 months or as needed.         History of Present Illness   She has been feeling well.  She has her knee  "surgery scheduled in July.  She has been taking Tylenol only as needed, does not take it every day.  She reports taking hydrocodone only if with severe pain.  She has had no recent issues with her back, knows when to stop what she is doing if it starts giving her pain.  She reports it had improved since having her hip surgery.  She is hoping it will continue to improve after her knee surgery.    She reports 1 episode of palpitations lasting for a few minutes only.  No other accompanying symptoms.  She reports food occasionally getting stuck when she swallows.  She noticed that if she is coughing more even when she is drinking fluids.  She takes her reflux medication at least once a day, takes an additional dose later in the day as needed only.    She continues to take her Ativan twice a day.      Review of Systems   Constitutional:  Negative for appetite change and fatigue.   HENT:  Negative for congestion and ear pain.    Eyes:  Negative for visual disturbance.   Respiratory:  Negative for cough and shortness of breath.    Cardiovascular:  Positive for palpitations. Negative for chest pain and leg swelling.   Gastrointestinal:  Negative for abdominal pain, constipation and diarrhea.   Genitourinary:  Negative for dysuria, frequency and urgency.   Musculoskeletal:  Positive for arthralgias and gait problem. Negative for myalgias.   Skin:  Negative for rash and wound.   Neurological:  Negative for dizziness, numbness and headaches.   Hematological:  Does not bruise/bleed easily.   Psychiatric/Behavioral:  Negative for confusion. The patient is not nervous/anxious.        Objective   /60   Pulse 72   Temp (!) 97.2 °F (36.2 °C)   Ht 5' 3\" (1.6 m)   Wt 65.8 kg (145 lb)   SpO2 96%   BMI 25.69 kg/m²      Physical Exam  Vitals and nursing note reviewed.   Constitutional:       General: She is not in acute distress.     Appearance: She is well-developed.   HENT:      Head: Normocephalic and atraumatic.      " Mouth/Throat:      Mouth: Mucous membranes are moist.   Eyes:      Pupils: Pupils are equal, round, and reactive to light.   Cardiovascular:      Rate and Rhythm: Normal rate and regular rhythm.      Heart sounds: Normal heart sounds.   Pulmonary:      Effort: Pulmonary effort is normal.      Breath sounds: Normal breath sounds. No wheezing.   Abdominal:      General: Bowel sounds are normal.      Palpations: Abdomen is soft.   Musculoskeletal:         General: No swelling.      Right lower leg: No edema.      Left lower leg: No edema.   Skin:     General: Skin is warm.      Findings: No rash.   Neurological:      General: No focal deficit present.      Mental Status: She is alert and oriented to person, place, and time.   Psychiatric:         Mood and Affect: Mood and affect normal. Mood is not anxious or depressed.         Behavior: Behavior normal.           Labs & imaging results reviewed with patient.

## 2025-04-09 NOTE — ASSESSMENT & PLAN NOTE
Repeat CBC, iron studies today.  S/p Venofer x 5 doses.    Orders:  •  CBC and differential; Future  •  Iron Panel (Includes Ferritin, Iron Sat%, Iron, and TIBC); Future

## 2025-04-09 NOTE — PATIENT INSTRUCTIONS
You may take acetaminophen  (Tylenol) 500 mg, 1 to 2 tablets 3 times a day, as needed for pain. No more than 6 tablets a day.  If it is 650 mg tablets, no more than 4 a day.

## 2025-04-09 NOTE — ASSESSMENT & PLAN NOTE
On simvastatin 5 mg daily, change to rosuvastatin due to insurance.  Orders:  •  rosuvastatin (CRESTOR) 5 mg tablet; Take 1 tablet (5 mg total) by mouth daily

## 2025-04-10 ENCOUNTER — OFFICE VISIT (OUTPATIENT)
Dept: SURGICAL ONCOLOGY | Facility: CLINIC | Age: 80
End: 2025-04-10
Payer: MEDICARE

## 2025-04-10 ENCOUNTER — APPOINTMENT (OUTPATIENT)
Dept: LAB | Facility: AMBULARY SURGERY CENTER | Age: 80
End: 2025-04-10
Payer: MEDICARE

## 2025-04-10 VITALS
TEMPERATURE: 97.7 F | SYSTOLIC BLOOD PRESSURE: 122 MMHG | OXYGEN SATURATION: 96 % | BODY MASS INDEX: 25.69 KG/M2 | HEIGHT: 63 IN | DIASTOLIC BLOOD PRESSURE: 50 MMHG | HEART RATE: 67 BPM

## 2025-04-10 DIAGNOSIS — Z08 ENCOUNTER FOR FOLLOW-UP EXAMINATION AFTER COMPLETED TREATMENT FOR MALIGNANT NEOPLASM: Primary | ICD-10-CM

## 2025-04-10 DIAGNOSIS — C50.912 MALIGNANT NEOPLASM OF LEFT BREAST IN FEMALE, ESTROGEN RECEPTOR NEGATIVE, UNSPECIFIED SITE OF BREAST (HCC): ICD-10-CM

## 2025-04-10 DIAGNOSIS — Z90.12 S/P LEFT MASTECTOMY: ICD-10-CM

## 2025-04-10 DIAGNOSIS — Z17.1 MALIGNANT NEOPLASM OF LEFT BREAST IN FEMALE, ESTROGEN RECEPTOR NEGATIVE, UNSPECIFIED SITE OF BREAST (HCC): ICD-10-CM

## 2025-04-10 DIAGNOSIS — D50.0 IRON DEFICIENCY ANEMIA DUE TO CHRONIC BLOOD LOSS: ICD-10-CM

## 2025-04-10 LAB
BASOPHILS # BLD AUTO: 0.12 THOUSANDS/ÂΜL (ref 0–0.1)
BASOPHILS NFR BLD AUTO: 2 % (ref 0–1)
EOSINOPHIL # BLD AUTO: 0.21 THOUSAND/ÂΜL (ref 0–0.61)
EOSINOPHIL NFR BLD AUTO: 4 % (ref 0–6)
ERYTHROCYTE [DISTWIDTH] IN BLOOD BY AUTOMATED COUNT: 23.1 % (ref 11.6–15.1)
FERRITIN SERPL-MCNC: 160 NG/ML (ref 30–307)
HCT VFR BLD AUTO: 38.9 % (ref 34.8–46.1)
HGB BLD-MCNC: 12.1 G/DL (ref 11.5–15.4)
IMM GRANULOCYTES # BLD AUTO: 0.02 THOUSAND/UL (ref 0–0.2)
IMM GRANULOCYTES NFR BLD AUTO: 0 % (ref 0–2)
IRON SATN MFR SERPL: 13 % (ref 15–50)
IRON SERPL-MCNC: 43 UG/DL (ref 50–212)
LYMPHOCYTES # BLD AUTO: 2.53 THOUSANDS/ÂΜL (ref 0.6–4.47)
LYMPHOCYTES NFR BLD AUTO: 44 % (ref 14–44)
MCH RBC QN AUTO: 29 PG (ref 26.8–34.3)
MCHC RBC AUTO-ENTMCNC: 31.1 G/DL (ref 31.4–37.4)
MCV RBC AUTO: 93 FL (ref 82–98)
MONOCYTES # BLD AUTO: 0.73 THOUSAND/ÂΜL (ref 0.17–1.22)
MONOCYTES NFR BLD AUTO: 13 % (ref 4–12)
NEUTROPHILS # BLD AUTO: 2.11 THOUSANDS/ÂΜL (ref 1.85–7.62)
NEUTS SEG NFR BLD AUTO: 37 % (ref 43–75)
NRBC BLD AUTO-RTO: 0 /100 WBCS
PLATELET # BLD AUTO: 457 THOUSANDS/UL (ref 149–390)
PMV BLD AUTO: 9 FL (ref 8.9–12.7)
RBC # BLD AUTO: 4.17 MILLION/UL (ref 3.81–5.12)
TIBC SERPL-MCNC: 333.2 UG/DL (ref 250–450)
TRANSFERRIN SERPL-MCNC: 238 MG/DL (ref 203–362)
UIBC SERPL-MCNC: 290 UG/DL (ref 155–355)
WBC # BLD AUTO: 5.72 THOUSAND/UL (ref 4.31–10.16)

## 2025-04-10 PROCEDURE — 99214 OFFICE O/P EST MOD 30 MIN: CPT

## 2025-04-10 PROCEDURE — 83540 ASSAY OF IRON: CPT

## 2025-04-10 PROCEDURE — 85025 COMPLETE CBC W/AUTO DIFF WBC: CPT

## 2025-04-10 PROCEDURE — 36415 COLL VENOUS BLD VENIPUNCTURE: CPT

## 2025-04-10 PROCEDURE — 83550 IRON BINDING TEST: CPT

## 2025-04-10 PROCEDURE — 82728 ASSAY OF FERRITIN: CPT

## 2025-04-10 NOTE — PROGRESS NOTES
Name: Matilda Day      : 1945      MRN: [id number]  Encounter Provider: RODNEY Delgado  Encounter Date: 4/10/2025   Encounter department: [facility] [place]  :  Assessment & Plan  Encounter for follow-up examination after completed treatment for malignant neoplasm  - hem onc 25  - annual gyn appt 26  - 1 year f/u  Malignant neoplasm of left breast in female, estrogen receptor negative, unspecified site of breast (HCC)  Pt is an 79 year old female presenting today for a follow-up for left breast cancer diagnosed in 2020. Pathology revealed DCIS with microinvasive invasive carcinoma, ER/NE negative, HER2 positive. Patient had genetic testing which was negative. She underwent a left breast mastectomy with sentinel node biopsy with Dr. Hayes. Adjuvant therapy was not recommended. She had a right breast mammo on 24 which was BIRADS 2 category 3 density. Mammo for this year is already scheduled. There were no concerns on her cbe. Patient denies changes on her breast exam. She denies persistent headaches, bone pain, back pain, shortness of breath, or abdominal pain. She is scheduled for a right knee replacement in July. I will see the patient back in April or sooner should the need arise. She was instructed to call with any questions or concerns prior to this time. All questions were answered today.   S/P left mastectomy      Oncology History   Cancer Staging   Malignant neoplasm of left breast in female, estrogen receptor negative, unspecified site of breast (HCC)  Staging form: Breast, AJCC 8th Edition  - Pathologic stage from 2021: Stage IA (pT1mi, pN0, cM0, G3, ER-, NE-, HER2+) - Signed by RODNEY Delgado on 2024  Stage prefix: Initial diagnosis  Multigene prognostic tests performed: None  Histologic grading system: 3 grade system  Oncology History   History of left breast cancer   2020 Biopsy     Left Breast stereotactic biopsy:  A. 4 o'clock, posterior  Ductal carcinoma in situ with microinvasion  Grade 3  ER 0, SD 0, HER2 3+    B & C. 5 o'clock, anterior with & without calcs  Ductal carcinoma in situ  Grade 3  ER 0, SD 0    Concordant. Malignancy appears unifocal; calcifications span 4.1 cm. Both clips migrated. No recent axillary US. Right breast clear.     1/7/2021 Genetic Testing    The following genes were evaluated: CHARLOTTE, BRCA1, BRCA2, CDH1, CHEK2, PALB2, PTEN, STK11, Tp53; additional genes evaluated for a total of 20 genes.  Negative result. 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 Miller  Adjuvant therapy not recommended     Malignant neoplasm of left breast in female, estrogen receptor negative, unspecified site of breast (HCC)   2/12/2021 -  Cancer Staged    Staging form: Breast, AJCC 8th Edition  - Pathologic stage from 2/12/2021: Stage IA (pT1mi, pN0, cM0, G3, ER-, SD-, HER2+) - Signed by RODNEY Delgado on 11/18/2024  Stage prefix: Initial diagnosis  Multigene prognostic tests performed: None  Histologic grading system: 3 grade system       5/14/2024 Initial Diagnosis    Malignant neoplasm of left breast in female, estrogen receptor negative, unspecified site of breast (HCC)        Review of Systems   Constitutional:  Negative for activity change, appetite change, fatigue and unexpected weight change.   Respiratory:  Negative for cough and shortness of breath.    Cardiovascular:  Negative for chest pain.   Gastrointestinal:  Negative for abdominal pain, diarrhea, nausea and vomiting.   Endocrine: Negative for heat intolerance.   Musculoskeletal:   "Negative for arthralgias, back pain and myalgias.   Skin:  Negative for rash.   Neurological:  Negative for weakness and headaches.   Hematological:  Negative for adenopathy.    A complete review of systems is negative other than that noted above in the HPI.    Objective   /50 (BP Location: Right arm, Patient Position: Sitting, Cuff Size: Standard)   Pulse 67   Temp 97.7 °F (36.5 °C) (Temporal)   Ht 5' 3\" (1.6 m)   SpO2 96%   BMI 25.69 kg/m²   Vitals:    04/10/25 1120   BP: 122/50   Pulse: 67   Temp: 97.7 °F (36.5 °C)   SpO2: 96%     Pain Screening:  Pain Score: 0-No pain  ECOG    Physical Exam  Constitutional:       General: She is not in acute distress.     Appearance: Normal appearance.   Cardiovascular:      Rate and Rhythm: Normal rate and regular rhythm.      Pulses: Normal pulses.      Heart sounds: Normal heart sounds.   Pulmonary:      Effort: Pulmonary effort is normal.      Breath sounds: Normal breath sounds.   Chest:      Chest wall: No mass.   Breasts:     Right: No swelling, bleeding, inverted nipple, mass, nipple discharge, skin change or tenderness.      Left: No swelling, bleeding, mass, skin change or tenderness.       Abdominal:      General: Abdomen is flat.      Palpations: Abdomen is soft.   Lymphadenopathy:      Upper Body:      Right upper body: No supraclavicular, axillary or pectoral adenopathy.      Left upper body: No supraclavicular, axillary or pectoral adenopathy.   Skin:     General: Skin is warm.   Neurological:      General: No focal deficit present.      Mental Status: She is alert and oriented to person, place, and time.   Psychiatric:         Mood and Affect: Mood normal.         Behavior: Behavior normal.        "

## 2025-04-10 NOTE — ASSESSMENT & PLAN NOTE
Pt is an 79 year old female presenting today for a follow-up for left breast cancer diagnosed in December of 2020. 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 Patient had genetic testing which was negative. She underwent a left breast mastectomy with sentinel node biopsy with Dr. Hayes. Adjuvant therapy was not recommended. She had a right breast mammo on 11/25/24 which was BIRADS 2 category 3 density. Mammo for this year is already scheduled. There were no concerns on her cbe. Patient denies changes on her breast exam. She denies persistent headaches, bone pain, back pain, shortness of breath, or abdominal pain. She is scheduled for a right knee replacement in July. I will see the patient back in April or sooner should the need arise. She was instructed to call with any questions or concerns prior to this time. All questions were answered today.

## 2025-04-11 ENCOUNTER — RESULTS FOLLOW-UP (OUTPATIENT)
Dept: INTERNAL MEDICINE CLINIC | Facility: CLINIC | Age: 80
End: 2025-04-11

## 2025-04-14 ENCOUNTER — TELEPHONE (OUTPATIENT)
Age: 80
End: 2025-04-14

## 2025-04-14 ENCOUNTER — NURSE TRIAGE (OUTPATIENT)
Age: 80
End: 2025-04-14

## 2025-04-14 NOTE — TELEPHONE ENCOUNTER
Patient called in with Symptoms. Patient was transferred over to [facility]  for further assistance.

## 2025-04-14 NOTE — TELEPHONE ENCOUNTER
"FOLLOW UP: FYI- appt scheduled    REASON FOR CONVERSATION: No chief complaint on file.    SYMPTOMS: difficulty swallowing pills, solids, liquids.  Increased coughing with swallowing     OTHER: Pt asked for appt in June or July because she will be out of town.   Appt sched on 6/23/25.    DISPOSITION: See Physician With Next Available Appointment (overriding See Today in Office)    Reason for Disposition   Symptoms of pill stuck in throat or esophagus (e.g., pain in throat or chest, FB sensation) and no relief after using Care Advice    Answer Assessment - Initial Assessment Questions  1. DESCRIPTION: \"Tell me more about this problem.\" \"Are you  having trouble swallowing liquids, solids, or both?\" \"Any trouble with swallowing saliva (spit)?\"     Everything sticks or causes coughing   2. SEVERITY: \"How bad is the swallowing difficulty?\"  (Scale 1-10; or mild, moderate, severe)        3. ONSET: \"When did the swallowing problems begin?\"       One month ago  4. CAUSE: \"What do you think is causing the problem?\"  (e.g., dry mouth, food or pill stuck in throat, mouth pain, sore throat, progression of disease process such as dementia or Parkinson's disease).       Unsure   5. CHRONIC or RECURRENT: \"Is this a new problem for you?\"  If No, ask: \"How long have you had this problem?\" (e.g., days, weeks, months)       No   6. OTHER SYMPTOMS: \"Do you have any other symptoms?\" (e.g., chest pain, difficulty breathing, mouth sores, sore throat, swollen tongue, chest pain)      Cough    Protocols used: Swallowing Difficulty-Adult-[place]    "

## 2025-04-15 DIAGNOSIS — I10 ESSENTIAL HYPERTENSION: ICD-10-CM

## 2025-04-15 DIAGNOSIS — E87.1 HYPONATREMIA: ICD-10-CM

## 2025-04-15 RX ORDER — TORSEMIDE 10 MG/1
10 TABLET ORAL DAILY
Qty: 90 TABLET | Refills: 1 | Status: SHIPPED | OUTPATIENT
Start: 2025-04-15

## 2025-04-15 RX ORDER — SODIUM CHLORIDE 1 G/1
1 TABLET ORAL 4 TIMES DAILY
Qty: 360 TABLET | Refills: 1 | Status: SHIPPED | OUTPATIENT
Start: 2025-04-15

## 2025-04-15 NOTE — TELEPHONE ENCOUNTER
Patient stated needs 3 month supply going out of town needs by Friday 04/18/25    Reason for call:   [x] Refill   [] Prior Auth  [] Other:     Office:   [] PCP/Provider -   [x] Specialty/Provider - Edilberto Claros    Medication: Sodium Chloride  Dose/Frequency: 1 g QID   Quantity: 360    Medication: Torsemide   Dose/Frequency: 10 mg Daily PRN  Quantity: 90      Pharmacy: [facility] [address]    Local Pharmacy   Does the patient have enough for 3 days?   [x] Yes   [] No - Send as HP to POD    Mail Away Pharmacy   Does the patient have enough for 10 days?   [] Yes   [] No - Send as HP to POD

## 2025-04-17 ENCOUNTER — TELEPHONE (OUTPATIENT)
Facility: HOSPITAL | Age: 80
End: 2025-04-17

## 2025-04-17 NOTE — TELEPHONE ENCOUNTER
Left message to schedule at the [facility] . Call back number is [phone]

## 2025-04-24 ENCOUNTER — TELEPHONE (OUTPATIENT)
Age: 80
End: 2025-04-24

## 2025-04-24 NOTE — TELEPHONE ENCOUNTER
[facility] calling in, stated they are again faxing over a certificate for medical necessity for this patient. Previously faxed on 4/18. Confirmed the fax # with them. They are also requesting the most recent visit note with Bev DINH be sent to [phone]

## 2025-04-26 DIAGNOSIS — I10 ESSENTIAL HYPERTENSION: ICD-10-CM

## 2025-04-26 RX ORDER — DILTIAZEM HYDROCHLORIDE 180 MG/1
180 CAPSULE, COATED, EXTENDED RELEASE ORAL DAILY
Qty: 90 CAPSULE | Refills: 1 | Status: SHIPPED | OUTPATIENT
Start: 2025-04-26

## 2025-04-28 RX ORDER — AMLODIPINE BESYLATE 2.5 MG/1
2.5 TABLET ORAL DAILY
Qty: 90 TABLET | Refills: 1 | Status: SHIPPED | OUTPATIENT
Start: 2025-04-28

## 2025-05-19 ENCOUNTER — TELEPHONE (OUTPATIENT)
Dept: INTERNAL MEDICINE CLINIC | Facility: CLINIC | Age: 80
End: 2025-05-19

## 2025-05-19 DIAGNOSIS — M51.362 DEGENERATION OF INTERVERTEBRAL DISC OF LUMBAR REGION WITH DISCOGENIC BACK PAIN AND LOWER EXTREMITY PAIN: ICD-10-CM

## 2025-05-19 DIAGNOSIS — M17.11 PRIMARY OSTEOARTHRITIS OF RIGHT KNEE: Primary | ICD-10-CM

## 2025-05-19 DIAGNOSIS — F41.9 ANXIETY: ICD-10-CM

## 2025-05-19 RX ORDER — LORAZEPAM 1 MG/1
1 TABLET ORAL EVERY 6 HOURS PRN
Qty: 120 TABLET | Refills: 0 | Status: SHIPPED | OUTPATIENT
Start: 2025-05-19

## 2025-05-19 NOTE — TELEPHONE ENCOUNTER
Patient called the RX Refill Line. Message is being forwarded to the office.     Patient is requesting prescription for physical therapy for legs very weak will be back in [place] sometime in June,     Please contact patient at [phone]

## 2025-05-19 NOTE — TELEPHONE ENCOUNTER
Patient requesting prescription be sent to [facility]     Reason for call:   [x] Refill   [] Prior Auth  [] Other:     Office:   [x] PCP/Provider - Zaira Velasquez  [] Specialty/Provider -     Medication: Lorazepam    Dose/Frequency: 1 mg Q 6 HRS PRN    Quantity: 120     Pharmacy: [email or web address]   Does the patient have enough for 3 days?   [] Yes   [x] No - Send as HP to POD    Mail Away Pharmacy   Does the patient have enough for 10 days?   [] Yes   [] No - Send as HP to POD

## 2025-05-28 DIAGNOSIS — M54.16 LUMBAR RADICULOPATHY: ICD-10-CM

## 2025-05-28 NOTE — TELEPHONE ENCOUNTER
Patient stated she has had medication sent to this pharmacy before.     Reason for call:   [x] Refill   [] Prior Auth  [] Other:     Office:   [x] PCP/Provider - Zaira Velasquez MD   [] Specialty/Provider -     Medication: HYDROcodone-acetaminophen (NORCO) 5-325 mg per tablet     Dose/Frequency: 1 tablet, Oral, Every 6 hours PRN     Quantity: 120    Pharmacy: [facility] [id number] - [address] [phone]     Local Pharmacy   Does the patient have enough for 3 days?   [x] Yes   [] No - Send as HP to POD    Mail Away Pharmacy   Does the patient have enough for 10 days?   [] Yes   [] No - Send as HP to POD

## 2025-05-29 RX ORDER — HYDROCODONE BITARTRATE AND ACETAMINOPHEN 5; 325 MG/1; MG/1
1 TABLET ORAL EVERY 6 HOURS PRN
Qty: 120 TABLET | Refills: 0 | Status: SHIPPED | OUTPATIENT
Start: 2025-05-29

## 2025-06-04 NOTE — TELEPHONE ENCOUNTER
Patient called again for this refill - advised E-Prescribing Status: Receipt confirmed by pharmacy (5/29/2025 12:20 PM EDT) at [facility].  She will call pharmacy for

## 2025-06-05 ENCOUNTER — TELEPHONE (OUTPATIENT)
Dept: INTERNAL MEDICINE CLINIC | Facility: CLINIC | Age: 80
End: 2025-06-05

## 2025-06-06 DIAGNOSIS — Z01.89 ENCOUNTER FOR GERIATRIC ASSESSMENT: Primary | ICD-10-CM

## 2025-06-06 NOTE — TELEPHONE ENCOUNTER
[place] Hydrocodone-Acetaminophen 5-325MG SUBMITTED    to [facility]      via    [x]CMM-KEY: [id number]  []Surescripts-Case ID #    []Availity-Auth ID #  NDC #    []Faxed to plan   []Other website    []Phone call Case ID #      [][place] sent as URGENT    All office notes, labs and other pertaining documents and studies sent. Clinical questions answered. Awaiting determination from insurance company.     Turnaround time for your insurance to make a decision on your Prior Authorization can take 7-21 business days.

## 2025-06-09 ENCOUNTER — TELEPHONE (OUTPATIENT)
Dept: ANESTHESIOLOGY | Facility: CLINIC | Age: 80
End: 2025-06-09

## 2025-06-09 NOTE — TELEPHONE ENCOUNTER
[place] Hydrocodone-Acetaminophen 5-325MG APPROVED     Date(s) approved until 12/31/2099    Case # [id number]    Patient advised by          []MyChart Message  []Phone call   []LMOM  []L/M to call office as no active Communication consent on file  []Unable to leave detailed message as VM not approved on Communication consent       Pharmacy advised by    [x]Fax  [x]Phone call  []Secure Chat    Specialty Pharmacy    []

## 2025-06-19 DIAGNOSIS — F41.9 ANXIETY: ICD-10-CM

## 2025-06-20 RX ORDER — LORAZEPAM 1 MG/1
1 TABLET ORAL EVERY 6 HOURS PRN
Qty: 120 TABLET | Refills: 0 | Status: SHIPPED | OUTPATIENT
Start: 2025-06-20

## 2025-06-23 ENCOUNTER — OFFICE VISIT (OUTPATIENT)
Dept: GASTROENTEROLOGY | Facility: AMBULARY SURGERY CENTER | Age: 80
End: 2025-06-23
Payer: MEDICARE

## 2025-06-23 ENCOUNTER — TELEPHONE (OUTPATIENT)
Dept: GASTROENTEROLOGY | Facility: AMBULARY SURGERY CENTER | Age: 80
End: 2025-06-23

## 2025-06-23 ENCOUNTER — TELEPHONE (OUTPATIENT)
Age: 80
End: 2025-06-23

## 2025-06-23 VITALS
HEIGHT: 63 IN | SYSTOLIC BLOOD PRESSURE: 144 MMHG | WEIGHT: 143.6 LBS | BODY MASS INDEX: 25.45 KG/M2 | DIASTOLIC BLOOD PRESSURE: 70 MMHG | HEART RATE: 59 BPM | OXYGEN SATURATION: 98 %

## 2025-06-23 DIAGNOSIS — R13.19 ESOPHAGEAL DYSPHAGIA: Primary | ICD-10-CM

## 2025-06-23 DIAGNOSIS — K21.9 GASTROESOPHAGEAL REFLUX DISEASE, UNSPECIFIED WHETHER ESOPHAGITIS PRESENT: ICD-10-CM

## 2025-06-23 PROCEDURE — G2211 COMPLEX E/M VISIT ADD ON: HCPCS | Performed by: PHYSICIAN ASSISTANT

## 2025-06-23 PROCEDURE — 99214 OFFICE O/P EST MOD 30 MIN: CPT | Performed by: PHYSICIAN ASSISTANT

## 2025-06-23 RX ORDER — SIMVASTATIN 10 MG
TABLET ORAL
COMMUNITY
Start: 2025-06-19

## 2025-06-23 RX ORDER — FAMOTIDINE 20 MG/1
20 TABLET, FILM COATED ORAL DAILY
Qty: 90 TABLET | Refills: 1 | Status: SHIPPED | OUTPATIENT
Start: 2025-06-23

## 2025-06-23 NOTE — H&P (VIEW-ONLY)
Name: Matilda Day      : 1945      MRN: [id number]  Encounter Provider: Delphine Wilkins PA-C  Encounter Date: 2025   Encounter department: [facility]  :  Assessment & Plan  Esophageal dysphagia  Esophageal dysphagia for last couple of months with broad differential diagnosis at this time including presbyesophagus, peptic stricture, less likely esophageal cancer (last EGD )    - Plan for EGD    - Advised patient to obtain clearance from her cardiologist/PCP to hold Eliquis for 48 hours prior to procedure    - Procedure was explained in detail to patient at this time including associated risks and benefits    - Will also check barium esophagram    - Continue pantoprazole, advised patient to use Pepcid on a regular basis daily rather than as needed at this time    - Advised patient about basic swallow precautions including taking small bites, chewing her food thoroughly, pacing herself with eating, exercising particular caution with breads and meats  Orders:    EGD; Future    FL barium swallow; Future        History of Present Illness   Matilda Day is a 79 y.o. female with history of breast cancer status post mastectomy, portal vein thrombosis anticoagulated with Eliquis, ulcerative colitis status post total proctocolectomy with ileoanal J-pouch in  presents for follow-up.  We had last seen her in  when she had EGD done with Dr. Oquendo as part of workup for anemia, showing severe esophageal candidiasis and a small AVM in D2 which was treated with APC.  There was some fluid pooling noted in the upper esophagus as well suggestive of dysmotility.      Her most recent pouchoscopy with Dr. Waters was done this January, biopsies not consistent with chronic colitis.    At this time, patient says that her main complaint is dysphagia, she says that for the last couple of months she has noticed feeling of food and pills getting caught at the base of her neck  with swallowing.  She does not think it is getting worse at this point.  She had also noticed the issue with liquids alone sometimes although she says that has actually been getting better lately.  She takes pantoprazole once daily and Pepcid on an as-needed basis for episodes of acid reflux, which she says have still generally been happening lately.  She believes her weight has been stable and appetite is otherwise good.  Denies any rectal bleeding or melena, denies any nausea or vomiting, or any abdominal pain.      HPI    Review of Systems   Constitutional:  Negative for activity change, appetite change, chills, fatigue, fever and unexpected weight change.   HENT:  Negative for congestion, nosebleeds, rhinorrhea, sore throat and trouble swallowing.    Eyes:  Negative for pain, itching and visual disturbance.   Respiratory:  Negative for cough, shortness of breath and wheezing.    Cardiovascular:  Negative for chest pain, palpitations and leg swelling.   Gastrointestinal: Negative.    Endocrine: Negative for cold intolerance, heat intolerance and polyuria.   Genitourinary:  Negative for difficulty urinating, dysuria, flank pain and hematuria.   Musculoskeletal:  Negative for arthralgias, back pain, myalgias and neck pain.   Skin:  Negative for color change, pallor and rash.   Neurological:  Negative for dizziness, speech difficulty, weakness, numbness and headaches.   Hematological:  Does not bruise/bleed easily.   Psychiatric/Behavioral:  Negative for dysphoric mood and sleep disturbance. The patient is not nervous/anxious.    All other systems reviewed and are negative.   A complete review of systems is negative other than that noted above in the HPI.      Current Medications[1]  Objective   There were no vitals taken for this visit.    Physical Exam  Constitutional:       General: She is not in acute distress.     Appearance: She is well-developed. She is not diaphoretic.   HENT:      Head: Normocephalic and  atraumatic.     Eyes:      Conjunctiva/sclera: Conjunctivae normal.      Pupils: Pupils are equal, round, and reactive to light.       Cardiovascular:      Rate and Rhythm: Normal rate and regular rhythm.      Heart sounds: Normal heart sounds. No murmur heard.     No friction rub. No gallop.   Pulmonary:      Effort: Pulmonary effort is normal. No respiratory distress.      Breath sounds: Normal breath sounds. No stridor. No wheezing or rales.   Abdominal:      General: Bowel sounds are normal. There is no distension.      Palpations: Abdomen is soft. There is no mass.      Tenderness: There is no abdominal tenderness. There is no guarding or rebound.     Musculoskeletal:         General: No tenderness. Normal range of motion.      Cervical back: Normal range of motion and neck supple.     Skin:     General: Skin is warm and dry.      Coloration: Skin is not pale.      Findings: No erythema or rash.     Neurological:      Mental Status: She is alert and oriented to person, place, and time.     Psychiatric:         Behavior: Behavior normal.            Lab Results: I personally reviewed relevant lab results.       Results for orders placed during the hospital encounter of 08/28/23    EGD    Impression  Severe abnormal mucosa with plaque in the upper third of the esophagus and middle third of the esophagus; performed cold forceps biopsy; collected sample to send to pathology with brush  Abnormal mucosa; performed cold forceps biopsies  Single small angioectasia in the 2nd part of the duodenum; tissue ablated with argon plasma coagulation  Performed forceps biopsies in the duodenal bulb and 2nd part of the duodenum to rule out celiac disease      RECOMMENDATION:  Await pathology results    Recommend fluconazole x 2 weeks- monitor for risk of bleeding given use of eliquis  Resume eliquis  Continue protonix  Follow up office visit  Resume regular diet  Discharge home          Cathi Oquendo MD             [1]   Current  Outpatient Medications   Medication Sig Dispense Refill    albuterol (PROVENTIL HFA,VENTOLIN HFA) 90 mcg/act inhaler Inhale 2 puffs every 6 (six) hours as needed for wheezing 8 g 1    amLODIPine (NORVASC) 2.5 mg tablet TAKE ONE TABLET BY MOUTH EVERY DAY 90 tablet 1    apixaban (Eliquis) 2.5 mg Take 1 tablet (2.5 mg total) by mouth 2 (two) times a day 60 tablet 5    ascorbic acid (VITAMIN C) 500 MG tablet Take 1 tablet (500 mg total) by mouth daily Start 30 days prior to surgery 30 tablet 0    Calcium Carb-Cholecalciferol (CALCIUM 600-D PO) Take 1 tablet by mouth 2 (two) times a day      Coenzyme Q10 (CO Q-10 PO) Take 1 capsule by mouth daily      cyclobenzaprine (FLEXERIL) 5 mg tablet Take 1 tablet (5 mg total) by mouth 3 (three) times a day as needed for muscle spasms 60 tablet 0    diltiazem (CARDIZEM CD) 180 mg 24 hr capsule TAKE ONE CAPSULE BY MOUTH EVERY DAY 90 capsule 1    diltiazem (CARDIZEM) 60 mg tablet TAKE ONE TABLET BY MOUTH EVERY DAY 90 tablet 1    escitalopram (LEXAPRO) 20 mg tablet TAKE ONE TABLET BY MOUTH EVERY DAY 90 tablet 1    ferrous sulfate 324 (65 Fe) mg Take 1 tablet (324 mg total) by mouth daily before breakfast Start 30 days prior to surgery 60 tablet 0    fluticasone (FLONASE) 50 mcg/act nasal spray APPLY ONE SPRAY IN EACH NOSTRIL ONCE DAILY AS NEEDED FOR RUNNY NOSE 48 g 1    Fluticasone Furoate-Vilanterol 100-25 mcg/actuation inhaler Inhale 1 puff daily Rinse mouth after use. 60 blister 5    folic acid (FOLVITE) 1 mg tablet Take 1 tablet (1 mg total) by mouth daily Start 30 days prior to surgery 30 tablet 0    gabapentin (NEURONTIN) 600 MG tablet TAKE ONE TABLET BY MOUTH EVERY MORNING , TAKE ONE TABLET BY MOUTH EVERY DAY IN THE AFTERNOON , AND TAKE TWO TABLETS BY MOUTH EVERY EVENING AT BEDTIME 360 tablet 1    HYDROcodone-acetaminophen (NORCO) 5-325 mg per tablet Take 1 tablet by mouth every 6 (six) hours as needed for pain Max Daily Amount: 4 tablets 120 tablet 0    hydrocortisone 2.5 %  cream       levothyroxine 50 mcg tablet Take 1 tablet (50 mcg total) by mouth daily 90 tablet 1    lisinopril (ZESTRIL) 20 mg tablet Take 1 tablet (20 mg total) by mouth 2 (two) times a day 180 tablet 1    LORazepam (ATIVAN) 1 mg tablet TAKE ONE TABLET BY MOUTH EVERY 6 HOURS AS NEEDED FOR ANXIETY 120 tablet 0    MAGNESIUM PO Take 1 tablet by mouth daily 400 mg tablet       metaxalone (SKELAXIN) 800 mg tablet Take 800 mg by mouth 4 (four) times a day      Multiple Vitamin (multivitamin) tablet Take 1 tablet by mouth daily Begin 30 days prior to surgery 30 tablet 1    mupirocin (BACTROBAN) 2 % ointment Apply topically 2 (two) times a day Apply pea size amount to each nostril 2x/day for 5 days prior to procedure 15 g 0    nystatin-triamcinolone (MYCOLOG-II) ointment Apply topically 2 (two) times a day for 14 days 60 g 3    Omega-3 Fatty Acids (FISH OIL PO) Take 1 capsule by mouth daily      pantoprazole (PROTONIX) 40 mg tablet TAKE ONE TABLET BY MOUTH TWICE A  tablet 1    potassium chloride (MICRO-K) 10 MEQ CR capsule TAKE TWO CAPSULES BY MOUTH EVERY  capsule 0    rosuvastatin (CRESTOR) 5 mg tablet Take 1 tablet (5 mg total) by mouth daily      sodium chloride 1 g tablet Take 1 tablet (1 g total) by mouth 4 (four) times a day 360 tablet 1    torsemide (DEMADEX) 10 mg tablet Take 1 tablet (10 mg total) by mouth daily 90 tablet 1    vitamin B-12 (VITAMIN B-12) 500 mcg tablet Take 1 tablet (500 mcg total) by mouth daily 90 tablet 1     Current Facility-Administered Medications   Medication Dose Route Frequency Provider Last Rate Last Admin    cyanocobalamin injection 1,000 mcg  1,000 mcg Intramuscular Q30 Days Zaira Velasquez MD   1,000 mcg at 04/20/23 1012

## 2025-06-23 NOTE — PATIENT INSTRUCTIONS
Scheduled date of EGD(as of today): July 2, 2025  Physician performing EGD: Dr. Oquendo   Location of EGD: [facility]   Instructions reviewed with patient by: QUINCY   Clearances: Meghna

## 2025-06-23 NOTE — PROGRESS NOTES
Name: Matilda Day      : 1945      MRN: [id number]  Encounter Provider: Delphine Wilkins PA-C  Encounter Date: 2025   Encounter department: [facility]  :  Assessment & Plan  Esophageal dysphagia  Esophageal dysphagia for last couple of months with broad differential diagnosis at this time including presbyesophagus, peptic stricture, less likely esophageal cancer (last EGD )    - Plan for EGD    - Advised patient to obtain clearance from her cardiologist/PCP to hold Eliquis for 48 hours prior to procedure    - Procedure was explained in detail to patient at this time including associated risks and benefits    - Will also check barium esophagram    - Continue pantoprazole, advised patient to use Pepcid on a regular basis daily rather than as needed at this time    - Advised patient about basic swallow precautions including taking small bites, chewing her food thoroughly, pacing herself with eating, exercising particular caution with breads and meats  Orders:    EGD; Future    FL barium swallow; Future        History of Present Illness   Matilda Day is a 79 y.o. female with history of breast cancer status post mastectomy, portal vein thrombosis anticoagulated with Eliquis, ulcerative colitis status post total proctocolectomy with ileoanal J-pouch in  presents for follow-up.  We had last seen her in  when she had EGD done with Dr. Oquendo as part of workup for anemia, showing severe esophageal candidiasis and a small AVM in D2 which was treated with APC.  There was some fluid pooling noted in the upper esophagus as well suggestive of dysmotility.      Her most recent pouchoscopy with Dr. Waters was done this January, biopsies not consistent with chronic colitis.    At this time, patient says that her main complaint is dysphagia, she says that for the last couple of months she has noticed feeling of food and pills getting caught at the base of her neck  with swallowing.  She does not think it is getting worse at this point.  She had also noticed the issue with liquids alone sometimes although she says that has actually been getting better lately.  She takes pantoprazole once daily and Pepcid on an as-needed basis for episodes of acid reflux, which she says have still generally been happening lately.  She believes her weight has been stable and appetite is otherwise good.  Denies any rectal bleeding or melena, denies any nausea or vomiting, or any abdominal pain.      HPI    Review of Systems   Constitutional:  Negative for activity change, appetite change, chills, fatigue, fever and unexpected weight change.   HENT:  Negative for congestion, nosebleeds, rhinorrhea, sore throat and trouble swallowing.    Eyes:  Negative for pain, itching and visual disturbance.   Respiratory:  Negative for cough, shortness of breath and wheezing.    Cardiovascular:  Negative for chest pain, palpitations and leg swelling.   Gastrointestinal: Negative.    Endocrine: Negative for cold intolerance, heat intolerance and polyuria.   Genitourinary:  Negative for difficulty urinating, dysuria, flank pain and hematuria.   Musculoskeletal:  Negative for arthralgias, back pain, myalgias and neck pain.   Skin:  Negative for color change, pallor and rash.   Neurological:  Negative for dizziness, speech difficulty, weakness, numbness and headaches.   Hematological:  Does not bruise/bleed easily.   Psychiatric/Behavioral:  Negative for dysphoric mood and sleep disturbance. The patient is not nervous/anxious.    All other systems reviewed and are negative.   A complete review of systems is negative other than that noted above in the HPI.      Current Medications[1]  Objective   There were no vitals taken for this visit.    Physical Exam  Constitutional:       General: She is not in acute distress.     Appearance: She is well-developed. She is not diaphoretic.   HENT:      Head: Normocephalic and  atraumatic.     Eyes:      Conjunctiva/sclera: Conjunctivae normal.      Pupils: Pupils are equal, round, and reactive to light.       Cardiovascular:      Rate and Rhythm: Normal rate and regular rhythm.      Heart sounds: Normal heart sounds. No murmur heard.     No friction rub. No gallop.   Pulmonary:      Effort: Pulmonary effort is normal. No respiratory distress.      Breath sounds: Normal breath sounds. No stridor. No wheezing or rales.   Abdominal:      General: Bowel sounds are normal. There is no distension.      Palpations: Abdomen is soft. There is no mass.      Tenderness: There is no abdominal tenderness. There is no guarding or rebound.     Musculoskeletal:         General: No tenderness. Normal range of motion.      Cervical back: Normal range of motion and neck supple.     Skin:     General: Skin is warm and dry.      Coloration: Skin is not pale.      Findings: No erythema or rash.     Neurological:      Mental Status: She is alert and oriented to person, place, and time.     Psychiatric:         Behavior: Behavior normal.            Lab Results: I personally reviewed relevant lab results.       Results for orders placed during the hospital encounter of 08/28/23    EGD    Impression  Severe abnormal mucosa with plaque in the upper third of the esophagus and middle third of the esophagus; performed cold forceps biopsy; collected sample to send to pathology with brush  Abnormal mucosa; performed cold forceps biopsies  Single small angioectasia in the 2nd part of the duodenum; tissue ablated with argon plasma coagulation  Performed forceps biopsies in the duodenal bulb and 2nd part of the duodenum to rule out celiac disease      RECOMMENDATION:  Await pathology results    Recommend fluconazole x 2 weeks- monitor for risk of bleeding given use of eliquis  Resume eliquis  Continue protonix  Follow up office visit  Resume regular diet  Discharge home          Cathi Oquendo MD             [1]   Current  Outpatient Medications   Medication Sig Dispense Refill    albuterol (PROVENTIL HFA,VENTOLIN HFA) 90 mcg/act inhaler Inhale 2 puffs every 6 (six) hours as needed for wheezing 8 g 1    amLODIPine (NORVASC) 2.5 mg tablet TAKE ONE TABLET BY MOUTH EVERY DAY 90 tablet 1    apixaban (Eliquis) 2.5 mg Take 1 tablet (2.5 mg total) by mouth 2 (two) times a day 60 tablet 5    ascorbic acid (VITAMIN C) 500 MG tablet Take 1 tablet (500 mg total) by mouth daily Start 30 days prior to surgery 30 tablet 0    Calcium Carb-Cholecalciferol (CALCIUM 600-D PO) Take 1 tablet by mouth 2 (two) times a day      Coenzyme Q10 (CO Q-10 PO) Take 1 capsule by mouth daily      cyclobenzaprine (FLEXERIL) 5 mg tablet Take 1 tablet (5 mg total) by mouth 3 (three) times a day as needed for muscle spasms 60 tablet 0    diltiazem (CARDIZEM CD) 180 mg 24 hr capsule TAKE ONE CAPSULE BY MOUTH EVERY DAY 90 capsule 1    diltiazem (CARDIZEM) 60 mg tablet TAKE ONE TABLET BY MOUTH EVERY DAY 90 tablet 1    escitalopram (LEXAPRO) 20 mg tablet TAKE ONE TABLET BY MOUTH EVERY DAY 90 tablet 1    ferrous sulfate 324 (65 Fe) mg Take 1 tablet (324 mg total) by mouth daily before breakfast Start 30 days prior to surgery 60 tablet 0    fluticasone (FLONASE) 50 mcg/act nasal spray APPLY ONE SPRAY IN EACH NOSTRIL ONCE DAILY AS NEEDED FOR RUNNY NOSE 48 g 1    Fluticasone Furoate-Vilanterol 100-25 mcg/actuation inhaler Inhale 1 puff daily Rinse mouth after use. 60 blister 5    folic acid (FOLVITE) 1 mg tablet Take 1 tablet (1 mg total) by mouth daily Start 30 days prior to surgery 30 tablet 0    gabapentin (NEURONTIN) 600 MG tablet TAKE ONE TABLET BY MOUTH EVERY MORNING , TAKE ONE TABLET BY MOUTH EVERY DAY IN THE AFTERNOON , AND TAKE TWO TABLETS BY MOUTH EVERY EVENING AT BEDTIME 360 tablet 1    HYDROcodone-acetaminophen (NORCO) 5-325 mg per tablet Take 1 tablet by mouth every 6 (six) hours as needed for pain Max Daily Amount: 4 tablets 120 tablet 0    hydrocortisone 2.5 %  cream       levothyroxine 50 mcg tablet Take 1 tablet (50 mcg total) by mouth daily 90 tablet 1    lisinopril (ZESTRIL) 20 mg tablet Take 1 tablet (20 mg total) by mouth 2 (two) times a day 180 tablet 1    LORazepam (ATIVAN) 1 mg tablet TAKE ONE TABLET BY MOUTH EVERY 6 HOURS AS NEEDED FOR ANXIETY 120 tablet 0    MAGNESIUM PO Take 1 tablet by mouth daily 400 mg tablet       metaxalone (SKELAXIN) 800 mg tablet Take 800 mg by mouth 4 (four) times a day      Multiple Vitamin (multivitamin) tablet Take 1 tablet by mouth daily Begin 30 days prior to surgery 30 tablet 1    mupirocin (BACTROBAN) 2 % ointment Apply topically 2 (two) times a day Apply pea size amount to each nostril 2x/day for 5 days prior to procedure 15 g 0    nystatin-triamcinolone (MYCOLOG-II) ointment Apply topically 2 (two) times a day for 14 days 60 g 3    Omega-3 Fatty Acids (FISH OIL PO) Take 1 capsule by mouth daily      pantoprazole (PROTONIX) 40 mg tablet TAKE ONE TABLET BY MOUTH TWICE A  tablet 1    potassium chloride (MICRO-K) 10 MEQ CR capsule TAKE TWO CAPSULES BY MOUTH EVERY  capsule 0    rosuvastatin (CRESTOR) 5 mg tablet Take 1 tablet (5 mg total) by mouth daily      sodium chloride 1 g tablet Take 1 tablet (1 g total) by mouth 4 (four) times a day 360 tablet 1    torsemide (DEMADEX) 10 mg tablet Take 1 tablet (10 mg total) by mouth daily 90 tablet 1    vitamin B-12 (VITAMIN B-12) 500 mcg tablet Take 1 tablet (500 mcg total) by mouth daily 90 tablet 1     Current Facility-Administered Medications   Medication Dose Route Frequency Provider Last Rate Last Admin    cyanocobalamin injection 1,000 mcg  1,000 mcg Intramuscular Q30 Days Zaira Velasquez MD   1,000 mcg at 04/20/23 1012

## 2025-06-23 NOTE — TELEPHONE ENCOUNTER
Caller: Patient    Doctor: Dr. Power    Reason for call:     Patient is scheduled for total right knee surgery on 7/28/25.  She is asking if the doctor does surgery on the side of the knee or from the front of the knee.  Please advise the patient...    Call back#: [phone]

## 2025-06-23 NOTE — TELEPHONE ENCOUNTER
Our mutual patient is scheduled for procedure: EGD    On: July 02 , 2025     With: Dr. Cathi Oquendo MD    He/She is taking the following blood thinner: Eliquis (Apixaban)    Can this be stopped  2 days prior to the procedure    Physician Approving clearance: Dr. Zaira Velasquez MD

## 2025-06-25 ENCOUNTER — OFFICE VISIT (OUTPATIENT)
Dept: CARDIOLOGY CLINIC | Facility: CLINIC | Age: 80
End: 2025-06-25
Payer: MEDICARE

## 2025-06-25 VITALS
BODY MASS INDEX: 24.98 KG/M2 | DIASTOLIC BLOOD PRESSURE: 82 MMHG | SYSTOLIC BLOOD PRESSURE: 130 MMHG | HEART RATE: 62 BPM | HEIGHT: 63 IN | WEIGHT: 141 LBS

## 2025-06-25 DIAGNOSIS — Z86.718 HISTORY OF VENOUS THROMBOSIS: ICD-10-CM

## 2025-06-25 DIAGNOSIS — E22.2 SIADH (SYNDROME OF INAPPROPRIATE ADH PRODUCTION) (HCC): ICD-10-CM

## 2025-06-25 DIAGNOSIS — R73.03 PREDIABETES: ICD-10-CM

## 2025-06-25 DIAGNOSIS — I47.10 PSVT (PAROXYSMAL SUPRAVENTRICULAR TACHYCARDIA) (HCC): ICD-10-CM

## 2025-06-25 DIAGNOSIS — I73.9 PERIPHERAL VASCULAR DISEASE (HCC): ICD-10-CM

## 2025-06-25 DIAGNOSIS — R60.9 EDEMA, UNSPECIFIED TYPE: ICD-10-CM

## 2025-06-25 DIAGNOSIS — Z01.818 PRE-OP TESTING: Primary | ICD-10-CM

## 2025-06-25 DIAGNOSIS — E78.5 HYPERLIPIDEMIA, UNSPECIFIED HYPERLIPIDEMIA TYPE: ICD-10-CM

## 2025-06-25 DIAGNOSIS — I10 HYPERTENSION, UNSPECIFIED TYPE: ICD-10-CM

## 2025-06-25 DIAGNOSIS — I47.19 PAT (PAROXYSMAL ATRIAL TACHYCARDIA) (HCC): ICD-10-CM

## 2025-06-25 DIAGNOSIS — D50.0 IRON DEFICIENCY ANEMIA DUE TO CHRONIC BLOOD LOSS: ICD-10-CM

## 2025-06-25 LAB
ATRIAL RATE: 64 BPM
P AXIS: 21 DEGREES
PR INTERVAL: 206 MS
QRS AXIS: 36 DEGREES
QRSD INTERVAL: 100 MS
QT INTERVAL: 438 MS
QTC INTERVAL: 451 MS
T WAVE AXIS: 11 DEGREES
VENTRICULAR RATE: 64 BPM

## 2025-06-25 PROCEDURE — 99214 OFFICE O/P EST MOD 30 MIN: CPT | Performed by: INTERNAL MEDICINE

## 2025-06-25 PROCEDURE — 93000 ELECTROCARDIOGRAM COMPLETE: CPT | Performed by: INTERNAL MEDICINE

## 2025-06-25 NOTE — PROGRESS NOTES
Cardiology Follow Up Visit       Matilda Day   [id number]  1945      [facility]ASC[facility]  [address] [place] [id number]      Physician Requesting Consult:   Zaira Velasquez MD    Reason for Follow Up Visit / Principal Problem:  Mrs. Matilda Day is a 79 y.o. female and former smoker with a PMH significant for but not limited to PSVT/PAT, 1°AVB/IRBBB, HTN, HLD, Prediabetes, GERD, Sjogren, UC (s/p colectomy), Asthma, Hypothyroidism, Anemia, Essential Tremors (s/p brain stimulator 2014), and Overweight.  She presents to clinic for Preoperative Risk Stratification.    Assessment & Plan   Preoperative Cardiac Risk Stratification for R TKR planned for 07/28/25  Functionally, the patient is able to perform adls and can achieve at least 4 METs without symptoms  Clinical exam is unremarkable for acute cv instability or illness, Recent EKG on 06/525/25 shows no pathological Q waves or new BBB , Prior TTE on 12/21/23 shows no new systolic LV Dysfunction or RWMA  The patient's Jc preoperative analysis yields a 0.4% risk of myocardial infarction or cardiac arrest, intraoperatively or up to 30 days post-op  At this time, the patient is deemed a low risk and may proceed to surgery without further cardiac testing , Regarding preoperative medications, OAC may be held routinely with plans to resume thereafter    BLE Edema, long standing dependent edema at baseline on torsemide prn  Symptoms are stable, Prior TTE without LV Dysfunction, No changes in symptoms  Cont current medication regimen, Cont supportive efforts with exercise/compression  Monitoring routinely for symptomatic changes for now    DVT/PE, reports of prior portal vein thrombosis though imaging unavailable  Asymptomatic at this time, Tolerating doac without active bleeding  Cont eliquis, Routine surveillance per heme/gi  Monitor routinely for symptomatic changes    PSVT, Abnormal PAC/PVC  history, followed with serial holters in the past  Improvement in frequency and severity of symptoms  Cont current medical management at this time, Hold on repeat holter  Monitor routinely for symptomatic changes, record vitals if symptoms recur, ed/clinic precautions reviewed    HTN, long-standing  No home BP log for review, but clinic SBP in the 115-135 mmHg range  Cont current medication regimen: lisinopril/amlodipine/diltiazem, cont counseling on low-sodium diet, Review the importance of maintaining a home BP log  Monitor BP at home routinely and review log on next visit    HLD, recent FLP:  /  / HDL 57 / LDL 67 on 01/27/25, 10-Year ASCVD Estimated Risk not available due to out of range input(s)  Stable, Well controlled, Tolerating statin without significant adverse reactions  Cont current medication regimen, cont counseling on diet and lifestyle modification  Monitor FLP routinely as ordered by PCP, will follow peripherally    Prediabetes, most recent A1c 5.9 on 01/30/25  No prior medical mgmt, diet controlled  Cont counseling on diet and lifestyle changes  Monitor Blood Sugar/A1c routinely as ordered by PCP, will follow peripherally    Overweight, Body mass index is 24.98 kg/m².  Weight has been stable overall: ±5 lbs over the last year   Cont to review the importance of diet and lifestyle modification  Will monitor routinely at this time    Discussion / Summary:  Precautions and reasons to call our office or proceed to ER were discussed in detail. Patient expressed understanding and questions were answered. Plan on follow up in-clinic in 6 months.    Office Visit Diagnosis:  1. Pre-op testing  POCT ECG      2. Hypertension, unspecified type        3. Hyperlipidemia, unspecified hyperlipidemia type        4. Prediabetes        5. Peripheral vascular disease (HCC)        6. Edema, unspecified type        7. History of venous thrombosis        8. PAT (paroxysmal atrial tachycardia) (HCC)        9.  PSVT (paroxysmal supraventricular tachycardia) ([facility])        10. SIADH (syndrome of inappropriate ADH production) ([facility])        11. Iron deficiency anemia due to chronic blood loss          Interval History:  Mrs. Day was originally seen in clinic on 12/06/23 to establish care. She'd been at her baseline state of health: independent of adl's, retired, though not exercising regularly, when she was seen by RODNEY George on 08/10/23 for routine follow up after being followed closely by Dr. Beltre since 2013 for PSVT/PAT.  She'd been treated with diltiazem for rate control and propanolol to control her tremors.  Her beta blockade was discontinued and a brain stimulator was placed with improvement in her tremors in 2014.  She been doing well overall with HTN and Preventative care until August when she developed SOB. She was found to be anemic and had been followed by Hematology for LUCILA as well as by GI.  We made no changes in her medical regimen, but we proceeded with a TTE that demonstrated no significant LV Dysfunction or Valvulopathy.  She denied any recent hospitalizations, family history of early cad, or family history of scd.     During our 05/08/24 Follow Up, she'd been well initially.  She was in [place] from Jan to April, but contracted a difficult cousrse of COVID that led to Bronchitis.  She finally was weaned of nebulizer treatments, but she noted the experience was challenging.  She was continuing her LUCILA mgmt with iron infusions: her last had been in [facility] and she had another planned for October.  She was also planning on working with dermatology for peeling skin on her left breast, seeing rheumatology for her sjogren symptoms, and returning to more regular exercise.  She noted that she'd get about 2.5k steps per day currently, had a treadmill at home that she did not use, and routinely employed her walker a night for safe ambulation oob to bathroom.    During our 10/23/24 follow up visit, she was  diagnosed with progressive RLE Hip arthritis.  She was scheduled for Hip Arthroplasty on 11/04/24, and she was cleared for surgery during that visit. She noted that from a cardiac standpoint, she'd continued to be well.  She was seen by RODNEY George on 01/23/25, with no new cahnges in medications made at that time.      Since our last visit, she's been well overall.  She is, however, ready to proceed with her TKR of her Right Knee this summer. She currently denies any cp, diaphoresis, n/v, progressive palpitations, near syncope, syncope, orthopnea, or pnd.  She notes labile control of her diet and exercise habits. She reports a diet that is well balanced and improved efforts toward exercise with PT. She is otherwise compliant with medications, checks BP occasionally but does not maintain a detailed BP log.  Of note, the patient's cardiac risk factor(s) include: hypertension, dyslipidemia, sedentary lifestyle, and systemic inflammatory disease.    Subjective   Review of Systems   Constitutional: Negative for chills and fever.   HENT:  Negative for congestion and sore throat.    Eyes:  Negative for blurred vision and double vision.   Cardiovascular:  Positive for leg swelling (improved with water pill) and palpitations (well controlled). Negative for chest pain, dyspnea on exertion, irregular heartbeat, near-syncope, orthopnea, paroxysmal nocturnal dyspnea and syncope.   Respiratory:  Negative for cough, shortness of breath, sleep disturbances due to breathing, snoring and wheezing.    Hematologic/Lymphatic: Negative for bleeding problem. Does not bruise/bleed easily.   Skin:  Negative for itching and rash.   Musculoskeletal:  Positive for arthritis, back pain, joint pain (legs ache) and joint swelling.   Gastrointestinal:  Positive for diarrhea (chronic). Negative for abdominal pain, constipation, nausea and vomiting.   Genitourinary:  Negative for dysuria and hematuria.   Neurological:  Negative for numbness and  paresthesias.   Psychiatric/Behavioral:  Negative for depression. The patient is nervous/anxious (at baseline).      The following portions of the patient's history were reviewed and updated as appropriate: past medical history, past social history, past family history, past surgical history, current medications, allergies, past surgical history and problem list.  Past Medical History:   Diagnosis Date   • Anemia    • Anxiety    • Arrhythmia    • Arthritis    • Asthma    • Blood clot in vein     portal vein   • Breast cancer (HCC) 2021   • Breast lump     [id number] RESOLVED   • Cancer (HCC)    • Depression    • Disease of thyroid gland    • DVT, lower extremity (HCC)    • GERD (gastroesophageal reflux disease)    • Heart disease mother   • Hyperlipidemia    • Hypertension    • Hypokalemia    • Hyponatremia    • Hypothyroidism    • Iron deficiency anemia    • Irregular heart beat    • Manic behavior (HCC)    • 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HCC)    • Osteoarthritis    • Palpitations     [id number]  RESOLVED   • Paroxysmal supraventricular tachycardia (HCC)    • PE (pulmonary thromboembolism) (HCC)    • Pneumonia    • Sjoegren syndrome    • Sleep apnea     mild   • Sleep difficulties    • Spinal stenosis    • Thrombocytosis     [id number]  RESOLVED   • Tremors of nervous system     dbs implanted right and left chest   • Ulcerative colitis (HCC)    • Vertigo     [id number] RESOLVED     Social History     Socioeconomic History   • Marital status:      Spouse name: Not on file   • Number of children: Not on file   • Years of education: EDUCATION LEVEL 10TH GRADE   • Highest education level: Not on file   Occupational History   • Occupation: RETIRED   Tobacco Use   • Smoking status: Former     Current packs/day: 0.00     Average packs/day: 1 pack/day for 15.0 years (15.0 ttl pk-yrs)     Types: Cigarettes     Start date: 1950     Quit date: 1965     Years since quittin.5   • Smokeless tobacco: Never    • Tobacco comments:     Quit   Vaping Use   • Vaping status: Never Used   Substance and Sexual Activity   • Alcohol use: Yes     Alcohol/week: 2.0 standard drinks of alcohol     Types: 2 Cans of beer per week     Comment: 2or 3 beers a week   • Drug use: No   • Sexual activity: Not Currently     Partners: Male     Birth control/protection: Post-menopausal   Other Topics Concern   • Not on file   Social History Narrative    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ACTIVITIES INSIDE AND OUTSIDE OF HOME, MODERATE    CAFFEINE USE, DRINKS CAFEINATED TEA, DRINKS COFFEE    INADEQUATE EXERCISE    LIVES WITH ADULT CHILDREN     Social Drivers of Health     Financial Resource Strain: Low Risk  (4/20/2023)    Overall Financial Resource Strain (CARDIA)    • Difficulty of Paying Living Expenses: Not very hard   Food Insecurity: No Food Insecurity (11/5/2024)    Nursing - Inadequate Food Risk Classification    • Worried About Running Out of Food in the Last Year: Never true    • Ran Out of Food in the Last Year: Never true    • Ran Out of Food in the Last Year: Never true   Transportation Needs: No Transportation Needs (11/22/2024)    OASIS : Transportation    • Lack of Transportation (Medical): No    • Lack of Transportation (Non-Medical): No    • Patient Unable or Declines to Respond: No   Physical Activity: Not on file   Stress: Not on file   Social Connections: Not on file   Intimate Partner Violence: Unknown (11/4/2024)    Nursing IPS    • Feels Physically and Emotionally Safe: Not on file    • Physically Hurt by Someone: Not on file    • Humiliated or Emotionally Abused by Someone: Not on file    • Physically Hurt by Someone: No    • Hurt or Threatened by Someone: No   Housing Stability: Low Risk  (11/5/2024)    Housing Stability Vital Sign    • Unable to Pay for Housing in the Last Year: No    • Number of Times Moved in the Last Year: 0    • Homeless in the Last Year: No     Family History   Problem Relation Name Age of Onset   • Venous  thrombosis Mother charlene Godfrey         ACUTE VENOUS THROMBOSIS OF DEEP VESSELS OF THE DISTAL LOWER EXTREMITY   • Other Mother charlene Godfrey         PHLEBITIS   • Hypertension Mother charlene Godfrey    • Peripheral vascular disease Mother charlene Godfrey    • COPD Father gali    • Diabetes Father gali         MELLITUS   • Stroke Father gali    • Diabetes Sister lew         MELLITUS   • Sjogren's syndrome Sister lew    • No Known Problems Daughter gee    • No Known Problems Maternal Grandmother     • No Known Problems Maternal Grandfather     • No Known Problems Paternal Grandmother     • No Known Problems Paternal Grandfather     • No Known Problems Sister jeremy    • No Known Problems Maternal Aunt     • No Known Problems Paternal Aunt     • No Known Problems Son       Past Surgical History:   Procedure Laterality Date   • ABDOMINAL SURGERY     • APPENDECTOMY     • BREAST BIOPSY Left 11/07/2019    Stereo   • BREAST EXCISIONAL BIOPSY Left     x many years   • BREAST SURGERY      lumpectomy & biopsy   • CARMELLA HOLE W/ STEREOTACTIC INSERTION OF DBS LEADS / INTRAOP MICROELECTRODE RECORDING     • COLON SURGERY     • COLONOSCOPY N/A 08/30/2017    Procedure: POUCHOSCOPY;  Surgeon: VANDANA Waters MD;  Location: [facility];  Service: Colorectal   • COLOPROCTECTOMY W/ ILEO J POUCH     • DEEP BRAIN STIMULATOR PLACEMENT     • ESOPHAGOGASTRODUODENOSCOPY      ONSET 10/17/11   • FISTULA REPAIR      LLEOANAL FISTULA REPAIR TRANSPERIN TRANSABD APPROACH   • HYSTERECTOMY      age 38   • ILEOSTOMY CLOSURE     • KNEE ARTHROSCOPY      Right   • MAMMO STEREOTACTIC BREAST BIOPSY LEFT (ALL INC) Left 11/07/2019   • MAMMO STEREOTACTIC BREAST BIOPSY LEFT (ALL INC) Left 12/17/2020   • MAMMO STEREOTACTIC BREAST BIOPSY LEFT (ALL INC) EACH ADD Left 12/17/2020   • MASTECTOMY Left 02/12/2021    left mastectomy- Dr. Hayes   • MASTECTOMY W/ SENTINEL NODE BIOPSY Left 02/12/2021    Procedure: BREAST MASTECTOMY WITH SENTINEL  LYMPH NODE BIOPSY, LYMPHATIC MAPPING WITH BLUE DYE AND RADIAOCTIVE DYE (INJECT AT 1100 BY DR GILLESPIE IN THE OR);  Surgeon: Robbie Gillespie MD;  Location: [facility];  Service: Surgical Oncology   • KS ARTHRP ACETBLR/PROX FEM PROSTC AGRFT/ALGRFT Right 11/04/2024    Procedure: ARTHROPLASTY HIP TOTAL, overnight;  Surgeon: Gaviota Ford DO;  Location:  MAIN OR;  Service: Orthopedics   • KS INSJ/RPLCMT CRANIAL NEUROSTIM PULSE GENERATOR Right 06/20/2017    Procedure: DBS GENERATOR REPLACEMENT;  Surgeon: Marin Mao MD;  Location:  MAIN OR;  Service: Neurosurgery   • KS INSJ/RPLCMT CRANIAL NEUROSTIM PULSE GENERATOR N/A 12/04/2019    Procedure: REPLACEMENT IMPLANTABLE PULSE GENERATOR FOR DEEP BRAIN STIMULATOR LEFT CHEST;  Surgeon: Damian Batres MD;  Location: [facility];  Service: Neurosurgery   • SPLENECTOMY     • TONSILLECTOMY     • TOTAL HIP ARTHROPLASTY Right        Current Outpatient Medications:   •  amLODIPine (NORVASC) 2.5 mg tablet, TAKE ONE TABLET BY MOUTH EVERY DAY, Disp: 90 tablet, Rfl: 1  •  apixaban (Eliquis) 2.5 mg, Take 1 tablet (2.5 mg total) by mouth 2 (two) times a day, Disp: 60 tablet, Rfl: 5  •  ascorbic acid (VITAMIN C) 500 MG tablet, Take 1 tablet (500 mg total) by mouth daily Start 30 days prior to surgery, Disp: 30 tablet, Rfl: 0  •  Calcium Carb-Cholecalciferol (CALCIUM 600-D PO), Take 1 tablet by mouth in the morning and 1 tablet before bedtime., Disp: , Rfl:   •  Coenzyme Q10 (CO Q-10 PO), Take 1 capsule by mouth in the morning., Disp: , Rfl:   •  diltiazem (CARDIZEM CD) 180 mg 24 hr capsule, TAKE ONE CAPSULE BY MOUTH EVERY DAY, Disp: 90 capsule, Rfl: 1  •  diltiazem (CARDIZEM) 60 mg tablet, TAKE ONE TABLET BY MOUTH EVERY DAY, Disp: 90 tablet, Rfl: 1  •  escitalopram (LEXAPRO) 20 mg tablet, TAKE ONE TABLET BY MOUTH EVERY DAY, Disp: 90 tablet, Rfl: 1  •  famotidine (PEPCID) 20 mg tablet, Take 1 tablet (20 mg total) by mouth daily, Disp: 90 tablet, Rfl: 1  •  fluticasone (FLONASE) 50  mcg/act nasal spray, APPLY ONE SPRAY IN EACH NOSTRIL ONCE DAILY AS NEEDED FOR RUNNY NOSE, Disp: 48 g, Rfl: 1  •  Fluticasone Furoate-Vilanterol 100-25 mcg/actuation inhaler, Inhale 1 puff daily Rinse mouth after use., Disp: 60 blister, Rfl: 5  •  gabapentin (NEURONTIN) 600 MG tablet, TAKE ONE TABLET BY MOUTH EVERY MORNING , TAKE ONE TABLET BY MOUTH EVERY DAY IN THE AFTERNOON , AND TAKE TWO TABLETS BY MOUTH EVERY EVENING AT BEDTIME, Disp: 360 tablet, Rfl: 1  •  HYDROcodone-acetaminophen (NORCO) 5-325 mg per tablet, Take 1 tablet by mouth every 6 (six) hours as needed for pain Max Daily Amount: 4 tablets, Disp: 120 tablet, Rfl: 0  •  hydrocortisone 2.5 % cream, , Disp: , Rfl:   •  levothyroxine 50 mcg tablet, Take 1 tablet (50 mcg total) by mouth daily, Disp: 90 tablet, Rfl: 1  •  lisinopril (ZESTRIL) 20 mg tablet, Take 1 tablet (20 mg total) by mouth 2 (two) times a day, Disp: 180 tablet, Rfl: 1  •  LORazepam (ATIVAN) 1 mg tablet, TAKE ONE TABLET BY MOUTH EVERY 6 HOURS AS NEEDED FOR ANXIETY, Disp: 120 tablet, Rfl: 0  •  MAGNESIUM PO, Take 1 tablet by mouth in the morning. 400 mg tablet ., Disp: , Rfl:   •  metaxalone (SKELAXIN) 800 mg tablet, Take 800 mg by mouth in the morning and 800 mg at noon and 800 mg in the evening and 800 mg before bedtime., Disp: , Rfl:   •  Multiple Vitamin (multivitamin) tablet, Take 1 tablet by mouth daily Begin 30 days prior to surgery, Disp: 30 tablet, Rfl: 1  •  mupirocin (BACTROBAN) 2 % ointment, Apply topically 2 (two) times a day Apply pea size amount to each nostril 2x/day for 5 days prior to procedure, Disp: 15 g, Rfl: 0  •  nystatin-triamcinolone (MYCOLOG-II) ointment, Apply topically 2 (two) times a day for 14 days, Disp: 60 g, Rfl: 3  •  Omega-3 Fatty Acids (FISH OIL PO), Take 1 capsule by mouth in the morning., Disp: , Rfl:   •  pantoprazole (PROTONIX) 40 mg tablet, TAKE ONE TABLET BY MOUTH TWICE A DAY, Disp: 180 tablet, Rfl: 1  •  potassium chloride (MICRO-K) 10 MEQ CR  capsule, TAKE TWO CAPSULES BY MOUTH EVERY DAY, Disp: 180 capsule, Rfl: 0  •  rosuvastatin (CRESTOR) 5 mg tablet, Take 1 tablet (5 mg total) by mouth daily, Disp: , Rfl:   •  simvastatin (ZOCOR) 10 mg tablet, , Disp: , Rfl:   •  sodium chloride 1 g tablet, Take 1 tablet (1 g total) by mouth 4 (four) times a day, Disp: 360 tablet, Rfl: 1  •  torsemide (DEMADEX) 10 mg tablet, Take 1 tablet (10 mg total) by mouth daily, Disp: 90 tablet, Rfl: 1  •  vitamin B-12 (VITAMIN B-12) 500 mcg tablet, Take 1 tablet (500 mcg total) by mouth daily, Disp: 90 tablet, Rfl: 1  •  albuterol (PROVENTIL HFA,VENTOLIN HFA) 90 mcg/act inhaler, Inhale 2 puffs every 6 (six) hours as needed for wheezing (Patient not taking: Reported on 6/23/2025), Disp: 8 g, Rfl: 1  •  cyclobenzaprine (FLEXERIL) 5 mg tablet, Take 1 tablet (5 mg total) by mouth 3 (three) times a day as needed for muscle spasms (Patient not taking: Reported on 6/23/2025), Disp: 60 tablet, Rfl: 0  •  ferrous sulfate 324 (65 Fe) mg, Take 1 tablet (324 mg total) by mouth daily before breakfast Start 30 days prior to surgery (Patient not taking: Reported on 6/23/2025), Disp: 60 tablet, Rfl: 0  •  folic acid (FOLVITE) 1 mg tablet, Take 1 tablet (1 mg total) by mouth daily Start 30 days prior to surgery (Patient not taking: Reported on 6/23/2025), Disp: 30 tablet, Rfl: 0    Current Facility-Administered Medications:   •  cyanocobalamin injection 1,000 mcg, 1,000 mcg, Intramuscular, Q30 Days, Zaira Velasquez MD, 1,000 mcg at 04/20/23 1012  Allergies   Allergen Reactions   • Indomethacin GI Intolerance, Dizziness and Other (See Comments)   • Macrobid [Nitrofurantoin Monohyd Macro] Rash   • Nitrofurantoin Other (See Comments)   • Penicillins Rash and Other (See Comments)     Annotation - [id number]: can take cephalosporins   • Sulfa Antibiotics Rash and Other (See Comments)     Patient Active Problem List   Diagnosis   • Portal vein thrombosis   • Anxiety   • Moderate episode of  "recurrent major depressive disorder (HCC)   • Essential tremor   • Hyperlipidemia   • Essential hypertension   • Hypomagnesemia   • SIADH (syndrome of inappropriate ADH production) (HCC)   • Acquired hypothyroidism   • Iron deficiency anemia   • Prediabetes   • Peripheral neuropathy   • Age-related osteoporosis without current pathological fracture   • Osteoarthritis of right knee   • Ulcerative colitis without complications (HCC)   • Allergic rhinitis   • GERD (gastroesophageal reflux disease)   • Mild intermittent asthma without complication   • Diarrhea   • Sjogren's syndrome (HCC)   • Chronic salpingo-oophoritis   • Overweight   • Supraventricular tachycardia (HCC)   • Unsteady gait   • Lumbar degenerative disc disease   • Preoperative testing   • S/P deep brain stimulator placement   • Vitamin B12 deficiency   • Vitamin D deficiency   • History of left breast cancer   • Rectal bleeding   • Hoarseness of voice   • Esophageal candidiasis (HCC)   • S/P left mastectomy   • Continuous opioid dependence ([facility])   • Malignant neoplasm of left breast in female, estrogen receptor negative, unspecified site of breast (HCC)   • Primary osteoarthritis of one hip, right   • Disorder of bursae of shoulder region   • Enthesopathy of hip region   • Status post total hip replacement, right   • Ulcerative colitis, chronic, other complication ([facility])       Objective     Vitals:    06/25/25 0945   BP: 130/82   BP Location: Left arm   Patient Position: Sitting   Cuff Size: Standard   Pulse: 62   Weight: 64 kg (141 lb)   Height: 5' 3\" (1.6 m)     Body mass index is 24.98 kg/m².    Physical Exam  Constitutional:       General: She is not in acute distress.     Appearance: She is not ill-appearing.   HENT:      Head: Normocephalic and atraumatic.      Right Ear: External ear normal.      Left Ear: External ear normal.      Nose: Nose normal.     Eyes:      General: No scleral icterus.    Neck:      Vascular: No carotid bruit. "     Cardiovascular:      Rate and Rhythm: Normal rate and regular rhythm.      Pulses: Normal pulses.      Heart sounds: No murmur heard.     No gallop.   Pulmonary:      Effort: Pulmonary effort is normal.      Breath sounds: Normal breath sounds.     Musculoskeletal:      Cervical back: Neck supple.      Right lower leg: No edema.      Left lower leg: No edema.     Skin:     General: Skin is warm and dry.      Capillary Refill: Capillary refill takes less than 2 seconds.     Neurological:      Mental Status: She is alert and oriented to person, place, and time.      Gait: Gait abnormal (amubulating without walker to day though slight favoring of right leg).     Psychiatric:         Mood and Affect: Mood normal.         Behavior: Behavior normal.         Labs:  Lab Results   Component Value Date     (L) 12/28/2015     11/16/2015     11/02/2015    K 4.6 01/27/2025    K 3.8 11/24/2024    K 3.4 (L) 11/05/2024    K 4.5 09/04/2024    K 4.0 12/28/2015    K 3.8 11/16/2015    K 3.7 11/02/2015     01/27/2025    CL 99 11/24/2024     11/05/2024    CL 95 (L) 09/04/2024    CL 95 (L) 12/28/2015    CL 99 11/16/2015     11/02/2015    CO2 28 01/27/2025    CO2 25 11/24/2024    CO2 27 11/05/2024    CO2 30 09/04/2024    CO2 35 (H) 10/17/2021    CO2 28.5 12/28/2015    CO2 30.4 11/16/2015    CO2 30.7 11/02/2015    BUN 12 01/27/2025    BUN 8 11/24/2024    BUN 10 11/05/2024    BUN 13 09/04/2024    BUN 7 12/28/2015    BUN 11 11/16/2015    BUN 8 11/02/2015    CREATININE 0.80 01/27/2025    CREATININE 0.61 11/24/2024    CREATININE 0.64 11/05/2024    CREATININE 0.76 12/28/2015    CREATININE 0.91 11/16/2015    CREATININE 0.85 11/02/2015    EGFR 69 01/27/2025    EGFR 85 11/24/2024    EGFR 84 11/05/2024    GLUC 97 11/24/2024    GLUC 129 11/05/2024    GLUC 129 09/04/2024    GLUC 90 08/02/2024    AST 15 01/27/2025    AST 18 11/24/2024    AST 21 10/18/2024    AST 23 11/16/2015    AST 24 10/06/2015    AST 18  04/27/2015    ALT 8 01/27/2025    ALT 8 11/24/2024    ALT 10 10/18/2024    ALT 31 11/16/2015    ALT 37 10/06/2015    ALT 21 04/27/2015    TBILI 0.29 01/27/2025    TBILI 0.33 11/24/2024    TBILI 0.39 10/18/2024    ALB 3.8 01/27/2025    ALB 3.8 11/24/2024    ALB 3.9 10/18/2024    ALB 3.8 11/16/2015    ALB 3.7 10/06/2015    ALB 3.6 04/27/2015    MG 1.8 (L) 08/13/2024    MG 1.9 07/03/2023    MG 1.4 (L) 06/22/2023    MG 1.5 (L) 12/17/2015    MG 1.4 (L) 10/06/2015    MG 1.8 08/20/2014    CALCIUM 9.3 01/27/2025    CALCIUM 8.9 11/24/2024    CALCIUM 8.1 (L) 11/05/2024    CALCIUM 9.1 09/04/2024    CALCIUM 8.7 12/28/2015    CALCIUM 8.7 11/16/2015    CALCIUM 8.6 11/02/2015      Lab Results   Component Value Date    WBC 5.72 04/10/2025    WBC 6.20 01/27/2025    WBC 6.74 12/12/2024    WBC 6.23 11/16/2015    WBC 5.87 10/06/2015    WBC 8.01 04/27/2015    HGB 12.1 04/10/2025    HGB 8.4 (L) 01/27/2025    HGB 9.6 (L) 12/12/2024    HGB 13.3 11/16/2015    HGB 12.7 10/06/2015    HGB 13.4 04/27/2015    HCT 38.9 04/10/2025    HCT 28.6 (L) 01/27/2025    HCT 31.5 (L) 12/12/2024    HCT 40.4 11/16/2015    HCT 37.3 10/06/2015    HCT 39.5 04/27/2015     (H) 04/10/2025     (H) 01/27/2025     (H) 12/12/2024     (H) 11/16/2015     (H) 10/06/2015     (H) 04/27/2015    INR 1.06 11/24/2024    INR 1.13 10/18/2024    INR 1.03 11/08/2023    INR 2.39 (H) 12/28/2015    INR 3.63 (H) 12/17/2015    INR 2.57 (H) 11/16/2015    IRON 43 (L) 04/10/2025    IRON 13 (L) 01/27/2025    IRON 21 (L) 12/12/2024    IRON 76 11/16/2015    IRON 90 04/27/2015    IRON 81 09/25/2014    FERRITIN 160 04/10/2025    FERRITIN 5 (L) 01/27/2025    FERRITIN 48 12/12/2024    FERRITIN 108 11/16/2015    FERRITIN 158.2 04/27/2015    FERRITIN 177.4 09/25/2014    TIBC 333.2 04/10/2025    TIBC 457.8 (H) 01/27/2025    TIBC 346 12/12/2024    TIBC 312 11/16/2015    TIBC 326 04/27/2015    TIBC 265 09/25/2014    TRANSFERRIN 238 04/10/2025    TRANSFERRIN 327  "01/27/2025      Lab Results   Component Value Date    CHOLESTEROL 151 01/27/2025    CHOLESTEROL 185 12/09/2023    CHOLESTEROL 183 04/07/2023    TRIG 133 01/27/2025    TRIG 90 12/09/2023    TRIG 137 04/07/2023    TRIG 83 12/17/2015    TRIG 124 04/27/2015    TRIG 69 09/25/2014    HDL 57 01/27/2025    HDL 70 12/09/2023    HDL 67 04/07/2023    HDL 58 12/17/2015    HDL 59 04/27/2015    HDL 82 09/25/2014    LDLCALC 67 01/27/2025    LDLCALC 97 12/09/2023    LDLCALC 89 04/07/2023    LDLCALC 92 12/17/2015    LDLCALC 96 04/27/2015    LDLCALC 108 (H) 09/25/2014     Lab Results   Component Value Date    HGBA1C 5.9 01/30/2025    HGBA1C 5.9 (H) 10/18/2024    HGBA1C 6.3 (H) 06/11/2024    HGBA1C 6.1 (H) 03/11/2024    HGBA1C 5.6 12/17/2015    HGBA1C 6.2 (H) 09/14/2015    HGBA1C 6.1 (H) 09/03/2015    GLUF 108 (H) 01/27/2025    GLUF 129 (H) 11/05/2024    GLUF 91 10/18/2024    POCGLU 106 12/04/2019     No results found for: \"TSH\", \"FT4\"  No results found for: \"HSTNI0\", \"HSTNI2\", \"HSTNI4\", \"TROPONINI\"  No results found for: \"BNP\", \"NTBNP\"     Imaging:  I have personally reviewed pertinent reports.  No results found.    Cardiac Studies:  EKG:   Date: 06/25/25, normal sinus rhythm, 1st degree AV block  Date: 10/23/24, normal sinus rhythm, 1st degree AV block  Date: 06/22/23, sinus tachycardia, 1st degree AV block, PACs, nonspecific ST and T waves findings  Date: 02/15/23, normal sinus rhythm, 1st degree AV block, PVCs  Date: 02/23/21, normal sinus rhythm, PAC, PVC  Date: 01/11/21, normal sinus rhythm, 1st degree AV block, PVCs  Date: 04/18/14, normal sinus rhythm, 1st degree AV block, nonspecific ST findings  Date: 09/01/12, normal sinus rhythm, 1st degree AV block, irbbb, rightward axis  Date: 06/10/08, normal sinus rhythm  Date: 06/04/03, normal sinus rhythm  Date: 09/18/00, normal sinus rhythm, PAC, nonspecific T wave findings  Date: 07/06/00, normal sinus rhythm  Date: 05/30/00, normal sinus rhythm    Tele:   No results found for this " or any previous visit.     Holter:   24H Holter Study Date: 04/04/24  IMPRESSION:  1) Abnormal Holter monitor of 24 hrs duration.  2) Slightly increased burden of ventricular and supraventricular ectopic beats.  3) Brief runs of asymptomatic SVT, longest 9 beats in duration.    24H Holter Study Date: 04/03/23  Impression  Abnormal Holter  Low burden of premature ventricular contractions, and no ventricular runs  Moderate to high burden of premature atrial contractions with 23 runs of nonsustained paroxysmal atrial tachycardia    24H Holter Study Date: 12/09/21  IMPRESSION:  Sinus rhythm with rare PACs and PVCs.  No significant arrhythmias identified.  No SVT.    24H Holter Study Date: 08/24/21  IMPRESSION:  Abnormal 24 hour holter monitor.  Patient in normal sinus rhythm throughout holter monitoring.  Occasional premature ventricular contractions with no non-sustained ventricular tachycardia.  Occasional premature atrial contractions with a 5 beat run of atrial tachycardia.  No significant pauses.  No symptoms noted in diary.    24H Holter Study Date: 09/04/20  IMPRESSION:  Predominantly normal sinus rhythm, with an average heart rate of 66 beats per minute  No evidence of SVT during monitoring period.  Frequent premature atrial contractions, constituting 2.1% of total beats  Occasional premature ventricular contractions  No significant pauses or advanced degree heart block    24H Holter Study Date: 04/02/19  IMPRESSION:  1) Borderline Holter monitor of 24 hrs duration.  2) Normal burden of ventricular and supraventricular ectopic beats.   3) Brief asymptomatic runs of supraventricular ectopy, longest 6 beats in duration.    24H Holter Study Date: 10/18/17  IMPRESSION:  Unremarkable 24 hour Holter monitor    Recent Device Check:  No results found for this or any previous visit.    Previous EPS/Interventions:  No results found for this or any previous visit.    Previous Cath/PCI:  No results found for this or any  previous visit.    Previous STRESS TEST:  No results found for this or any previous visit.     ECHO:  Results for testing completed on the encounter of 12/21/23  Study: Echo complete w/ contrast if indicated  Interpreted Summary  •  Left Ventricle: Left ventricular cavity size is normal. Wall thickness is normal. The left ventricular ejection fraction is 60%. Systolic function is normal. Wall motion is normal. Diastolic function is mildly abnormal, consistent with grade I (abnormal) relaxation.  Left atrial filling pressure is normal.  •  Right Ventricle: Systolic function is normal.  •  Aortic Valve: The aortic valve has no significant stenosis.  •  Mitral Valve: There is mild annular calcification.  •  Tricuspid Valve: There is mild regurgitation. The right ventricular systolic pressure is normal. The estimated right ventricular systolic pressure is 20.00 mmHg.  •  Pericardium: There is no pericardial effusion.  •  Prior TTE study available for comparison. Prior study date: 12/23/2022. No significant changes noted compared to the prior study.    Results for orders placed during the hospital encounter of 12/23/22  Echo complete w/ contrast if indicated  Interpretation Summary  •  Left Ventricle: Left ventricular cavity size is normal. Wall thickness is normal. The left ventricular ejection fraction is 65%. Systolic function is normal. Diastolic function is mildly abnormal, consistent with grade I (abnormal) relaxation.    SAVANAH:  No results found for this or any previous visit.    CMR:  No results found for this or any previous visit.    Counseling / Coordination of Care:  During our visit, I spent 20 minutes with the patient, and greater than 55% of this time was spent on counseling and coordination of care, including addressing diagnostic results, prognosis, risks and benefits of treatment options, instructions for management, patient/family education, importance of treatment compliance, along with risk factor  reductions.    Dictation Disclaimer:  This note was completed in part utilizing [facility] direct voice recognition software. Grammatical errors, random word insertion, spelling mistakes, and incomplete sentences may be an occasional consequence of the system secondary to software limitations, ambient noise and hardware issues. At the time of dictation, efforts were made to edit, clarify and /or correct errors.  Please read the chart carefully and recognize, using context, where substitutions have occurred.  If you have any questions or concerns about the context, text or information contained within the body of this dictation, please contact myself, the provider, for further clarification.     Sary Nixon, DO 06/25/25

## 2025-06-27 ENCOUNTER — TELEPHONE (OUTPATIENT)
Dept: OBGYN CLINIC | Facility: HOSPITAL | Age: 80
End: 2025-06-27

## 2025-06-27 NOTE — TELEPHONE ENCOUNTER
Preoperative Elective Admission Assessment, spoke to pt, confirmed prior assessment 10/22/24    Right FITO 11/4/24 at [facility], LOS 32 hours, DC home/self care w/ HHC    EKG/LAB/MRSA SWAB/CXR date: 7/3    Living Situation:    Who does pt live with: S/O Zachary   What kind of home: multi-level  How do they enter the home: front  How many levels in home: 2   # of steps to enter home: 2  # of steps to second floor: 11  Are there handrails: Yes  Are there landings: No  Sleeping arrangement: second floor  Where is Bathroom: first and second floor   Where is the tub or shower: second floor, step in tub/shower   Toilet height? 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 No DME ordered. Instructed to bring RW on DOS.   We discussed clearing pathways in the home and making sure there is accessibly to use the walker, for example, removing throw rugs.      Patient's Current Level of Function: Ambulates: Independently, Ambulates with walker, and ADLs: Independent, (only uses walker during the night hours)    Post-op Caregiver: S/Zachary LAL   Caregiver Name and phone number for Inpatient discharge needs: S/Zachary LAL   Currently receive any HHC/aides/community supports: No     Post-op Transport: S/OZachary  To/from hospital: S/OZachary   To/from PT 2-3x/week: S/Zachary LAL  Uses community transport now: No     Outpatient Physical Therapy Site:  Site: [place]  pre and post-op appts scheduled? Yes     Medication Management: self and/or assistance from S/O  Preferred Pharmacy for Post-op Medications: [id number] - [place] - [address] [[id number]]   Blood Management Vitamin Regimen: Pt confirms she has  preop vitamins at home, starting 30 days prior   Post-op anticoagulant: to be determined by surgical team postoperatively, currently on eliquis (Per pt, PCP prescribes but heme recently decreased from 5 mg to 2.5 mg)  Has Bactroban for 5 days preop: Yes  Educated on Preoperative Bathing Instructions, and use of Soap for 5 days before surgery.      DC Plan: Pt plans to be discharged home    Barriers to DC identified preoperatively: none identified    BMI: 24.98    Patient Education:  Pt educated on post-op pain, early mobilization (POD0), LOS goals, OP PT goals, and preoperative bathing. Patient educated that our goal is to appropriately discharge patient based off their post-op function while striving to maintain maximal independence. The goal is to discharge patient to home and for them to attend outpatient physical therapy.    Assigned to care team? Yes

## 2025-06-30 DIAGNOSIS — M25.552 PAIN IN LEFT HIP: Primary | ICD-10-CM

## 2025-07-01 ENCOUNTER — EVALUATION (OUTPATIENT)
Dept: PHYSICAL THERAPY | Facility: CLINIC | Age: 80
End: 2025-07-01
Attending: INTERNAL MEDICINE
Payer: MEDICARE

## 2025-07-01 DIAGNOSIS — M25.562 LEFT KNEE PAIN, UNSPECIFIED CHRONICITY: ICD-10-CM

## 2025-07-01 DIAGNOSIS — M51.362 DEGENERATION OF INTERVERTEBRAL DISC OF LUMBAR REGION WITH DISCOGENIC BACK PAIN AND LOWER EXTREMITY PAIN: ICD-10-CM

## 2025-07-01 DIAGNOSIS — M25.552 LEFT HIP PAIN: ICD-10-CM

## 2025-07-01 DIAGNOSIS — M17.11 PRIMARY OSTEOARTHRITIS OF RIGHT KNEE: Primary | ICD-10-CM

## 2025-07-01 PROCEDURE — 97110 THERAPEUTIC EXERCISES: CPT

## 2025-07-01 PROCEDURE — 97162 PT EVAL MOD COMPLEX 30 MIN: CPT

## 2025-07-01 NOTE — ASSESSMENT & PLAN NOTE
Hgb A1c   Lab Results   Component Value Date    HGBA1C 6.3 (H) 07/03/2025     Recommend following DM diet

## 2025-07-01 NOTE — PATIENT INSTRUCTIONS
Hold eliquis for 72 hours with last dose by 7am on 7/25/2025 then resume this medication the morning after surgery or unless otherwise instructed by your surgeon  Take amlodipine/cardizem/levothyroxine/pantoprazole/sodium chloride  Hold lisinopril/torsemide/potassium      BEFORE SURGERY    Contact your surgical nurse navigator or surgical provider with any questions regarding preoperative plan or schedule.  Stop all over the counter supplements, herbal, naturopathic  medications for 1 week prior to surgery UNLESS prescribed by your surgeon  Hold NSAIDS (i.e. advil, alleve, motrin, ibuprofen, celebrex) minimum 5 days prior to surgery  Follow presurgical medication instructions provided by preadmission nursing team reviewed during your presurgery phone call  Strategies for optimizing your surgery through breathing exercises, nutrition and physical activity can be found at [email or web address]  Call [phone] with any presurgical concerns or medications questions or use the messaging feature in your [employer] destinee to contact your provider    AFTER SURGERY    Recommend using Tylenol ( acetaminophen ) 1000 mg every eight hours during the first week post discharge along with icing the area for 20 mins every 3-4 hours while awake can be helpful in reducing your need for post operative opioid use. This opioid sparing plan can be used along side your surgeons pain plan.  Use stool softener over the counter (colace) daily after surgery during the first 1-2 weeks to avoid post operative constipation issues  If no bowel movement within 3 days after surgery then use over the counter Miralax in addition to your stool softener   If cleared by your surgical team for activity then early and often walking is encouraged and can be important in prevention of post surgical blood clots. Additionally spend as much time out of bed as possible and allowed by your surgical team  Use your incentive spirometer twice per hour in the first seven  days after surgery to help prevent post surgery lung complications and infections  It is very important you follow the instructions from your surgeon regarding any medications for after surgery blood clot prevention. Compliance with these medications or interventions is very important.  Call [phone] with any post discharge concerns or medical issues or use the messaging feature in your [employer] destinee to contact your provider

## 2025-07-01 NOTE — PROGRESS NOTES
Internal Medicine Pre-Operative Evaluation:     Reason for Visit: Pre-operative Evaluation for Risk Stratification and Optimization    Patient ID: Matilda Day is a 79 y.o. female.     Case: [id number] Date/Time: 07/28/25 0730   Procedure: ARTHROPLASTY KNEE TOTAL W ROBOT (Right: Knee)   Anesthesia type: Choice   Diagnosis:      Chronic pain of right knee [M25.561, G89.29]      Primary osteoarthritis of right knee [M17.11]   Pre-op diagnosis:      Chronic pain of right knee [M25.561, G89.29]      Primary osteoarthritis of right knee [M17.11]   Location: [facility] OR ROOM 04 / [facility]   Surgeons: Lauri Power MD         Recommendations to Proceed withSurgery    Patient is considered to be Low risk for Medium risk procedure.     After evaluation and discussion with patient with emphasis that all surgery has some degree of inherent risk it is acknowledged by patient this risk is Acceptable.    Patient is optimized and may proceed with planned procedure.     Assessment    Pre-operative Medical Evaluation for planned surgery  Recommendations as listed in PLAN section below  Contact surgical nurse  navigator with any questions regarding preoperative plan or schedule.      Assessment & Plan  Preoperative clearance    Primary osteoarthritis of right knee  Failed outpatient conservative measures  Electing to undergo surgical procedure as stated above    Portal vein thrombosis  Takes low dose eliquis for same  Essential tremor  stable  Essential hypertension  Stable   Refer to PAT instructions regarding medication administration the morning of surgery    SIADH (syndrome of inappropriate ADH production) (HCC)    Iron deficiency anemia due to chronic blood loss  Recent IV venofer infusion arranged by hematology    Prediabetes  Hgb A1c   Lab Results   Component Value Date    HGBA1C 6.3 (H) 07/03/2025     Recommend following DM diet    Sjogren's syndrome, with unspecified organ  involvement (HCC)  stable  Continuous opioid dependence (HCC)  Managed by PCP  PDMP reviewed  Status post total hip replacement, right  11/2025           Plan:     1. Further preoperative workup as follows:   - none no further testing required may proceed with surgery    2. Preoperative Medication Management Review performed by PAT nursing  YES    3. Patient requires further consultation with:   No Consults Required    4. Discharge Planning / Barriers to Discharge  none identified - patients has post discharge therapy plan in place, transportation arranged for discharge day, adequate family support at home to assist with discharge to home.        Subjective:           History of Present Illness:     Matilda Day is a 79 y.o. female who presents to the office today for a preoperative consultation at the request of surgeon. The patient understands this is an elective procedure and not emergent. They are electing to undergo planned procedure with an understanding that all surgery has inherent risk. They have worked with their surgeon and failed conservative treatment measures. Today they present for preoperative risk assessment and recommendations for optimization in preparation for surgery.    Pt seen in center for perioperative medicine for upcoming proposed surgery. They have failed previous conservative measures and have elected surgical intervention.     Pt meets presurgical lab and BMI optimization goals.    Pt had h/o FITO in November that she did well with post operatively. No issues and went home the next day.     She has a h/o portal vein thrombosis and takes eliquis at low dose 2.5mg bid and is f/b hematology.     She was also seen and cleared by cardiology to proceed with surgery.     Matilda Day has an IN HOSPITAL cardiac risk of RCI RISK CLASS I (0 risk factors, risk of major cardiac compl. appr. 0.5%) based on RCRI calculator    Cardiac Risk Estimation: per the Revised Cardiac Risk Index (Circ.  "100:1043, 1999),         Pre-op Exam    Previous history of bleeding disorders or clots?: Yes  Previous Anesthesia reaction?: No  Prolonged steroid use in the last 6 months?: No    Assessment of Cardiac Risk:   - Unstable or severe angina or MI in the last 6 weeks or history of stent placement in the last year?: No   - Decompensated heart failure (e.g. New onset heart failure, NYHA  Class IV heart failure, or worsening existing heart failure)?: No  - Significant arrhythmias such as high grade AV block, symptomatic ventricular arrhythmia, newly recognized ventricular tachycardia, supraventricular tachycardia with resting heart rate >100, or symptomatic bradycardia?: No  - Severe heart valve disease including aortic stenosis or symptomatic mitral stenosis?: No      Pre-operative Risk Factors:  Elevated-risk surgery: No    History of cerebrovascular disease: No    History of ischemic heart disease: No  Pre-operative treatment with insulin: No  Pre-operative creatinine >2 mg/dL: No    History of congestive heart failure: No    Duke Activity Status Index (DASI):   DASI Total Score: 23.45  METs: 5.6        ROS: No TIA's or unusual headaches, no dysphagia.  No prolonged cough. No dyspnea or chest pain on exertion.  No abdominal pain, change in bowel habits, black or bloody stools.  No urinary tract or BPH symptoms.  Positive reported pain in arthritic joint. Positive difficulty with gait. No skin rashes or issues.      Objective:    /66   Pulse 59   Ht 5' 3\" (1.6 m)   Wt 64 kg (141 lb)   SpO2 95%   BMI 24.98 kg/m²       General Appearance: no distress, conversive  HEENT: PERRLA, conjuctiva normal; oropharynx clear; mucous membranes moist;   Neck:  Supple, no lymphadenopathy or thyromegaly  Lungs: breath sounds normal, normal respiratory effort, no retractions, expiratory effort normal  CV: normal heart sounds S1/S2, PMI normal   ABD: soft non tender, flat + BS x 4  EXT: DP pulses intact, no lymphadenopathy, no " edema  Skin: normal turgor, normal texture, no rash  Psych: affect normal, mood normal  Neuro: AAOx3; frail      The following portions of the patient's history were reviewed and updated as appropriate: allergies, current medications, past family history, past medical history, past social history, past surgical history and problem list.     Past History:       Past Medical History[1] Past Surgical History[2]       Social History[3]  Family History[4]       Allergies:     Allergies[5]     Current Medications:     Current Outpatient Medications   Medication Instructions    albuterol (PROVENTIL HFA,VENTOLIN HFA) 90 mcg/act inhaler 2 puffs, Inhalation, Every 6 hours PRN    amLODIPine (NORVASC) 2.5 mg, Oral, Daily    apixaban (ELIQUIS) 2.5 mg, Oral, 2 times daily    ascorbic acid (VITAMIN C) 500 mg, Oral, Daily, Start 30 days prior to surgery    Calcium Carb-Cholecalciferol (CALCIUM 600-D PO) 1 tablet, 2 times daily    Coenzyme Q10 (CO Q-10 PO) 1 capsule, Daily    cyclobenzaprine (FLEXERIL) 5 mg, Oral, 3 times daily PRN    diltiazem (CARDIZEM CD) 180 mg, Oral, Daily    diltiazem (CARDIZEM) 60 mg, Oral, Daily    escitalopram (LEXAPRO) 20 mg, Oral, Daily    famotidine (PEPCID) 20 mg, Oral, Daily    ferrous sulfate 324 mg, Oral, Daily before breakfast, Start 30 days prior to surgery    fluticasone (FLONASE) 50 mcg/act nasal spray APPLY ONE SPRAY IN EACH NOSTRIL ONCE DAILY AS NEEDED FOR RUNNY NOSE    Fluticasone Furoate-Vilanterol 100-25 mcg/actuation inhaler 1 puff, Inhalation, Daily, Rinse mouth after use.    folic acid (FOLVITE) 1 mg, Oral, Daily, Start 30 days prior to surgery    gabapentin (NEURONTIN) 600 MG tablet TAKE ONE TABLET BY MOUTH EVERY MORNING , TAKE ONE TABLET BY MOUTH EVERY DAY IN THE AFTERNOON , AND TAKE TWO TABLETS BY MOUTH EVERY EVENING AT BEDTIME    HYDROcodone-acetaminophen (NORCO) 5-325 mg per tablet 1 tablet, Oral, Every 6 hours PRN    hydrocortisone 2.5 % cream     ipratropium-albuterol (DUO-NEB)  "0.5-2.5 mg/3 mL nebulizer solution for 10 days    levothyroxine 50 mcg, Oral, Daily    lisinopril (ZESTRIL) 20 mg, Oral, 2 times daily    LORazepam (ATIVAN) 1 mg, Oral, Every 6 hours PRN    MAGNESIUM PO 1 tablet, Daily    metaxalone (SKELAXIN) 800 mg, 4 times daily    Multiple Vitamin (multivitamin) tablet 1 tablet, Oral, Daily, Begin 30 days prior to surgery    mupirocin (BACTROBAN) 2 % ointment Topical, 2 times daily, Apply pea size amount to each nostril 2x/day for 5 days prior to procedure    nystatin-triamcinolone (MYCOLOG-II) ointment Topical, 2 times daily    Omega-3 Fatty Acids (FISH OIL PO) 1 capsule, Daily    pantoprazole (PROTONIX) 40 mg, Oral, 2 times daily    potassium chloride (MICRO-K) 10 MEQ CR capsule TAKE TWO CAPSULES BY MOUTH EVERY DAY    rosuvastatin (CRESTOR) 5 mg, Oral, Daily    sodium chloride 1 g, Oral, 4 times daily    torsemide (DEMADEX) 10 mg, Oral, Daily    vitamin B-12 (VITAMIN B-12) 500 mcg, Oral, Daily           PRE-OP WORKSHEET DATA    Assessment of Pre-Operative Risks     MLJ Quality Hard Stops:    BMI (<40) : Estimated body mass index is 24.98 kg/m² as calculated from the following:    Height as of this encounter: 5' 3\" (1.6 m).    Weight as of this encounter: 64 kg (141 lb).    Hgb ( >11):   Lab Results   Component Value Date    HGB 12.5 07/03/2025    HGB 12.1 04/10/2025    HGB 8.4 (L) 01/27/2025       HbA1c (<7.5) :   Lab Results   Component Value Date    HGBA1C 6.3 (H) 07/03/2025       GFR (>60) (Less then 45 = Nephrology consult):    Lab Results   Component Value Date    EGFR 73 07/03/2025    EGFR 69 01/27/2025    EGFR 85 11/24/2024            Pre-Op Data Reviewed:       Laboratory Results: I have personally reviewed the pertinent reports    EKG: I personally reviewed and interpreted available tracings in the electronic medical record      Narrative & Impression    Sinus tachycardia  Right ventricular hypertrophy  Abnormal ECG  When compared with ECG of 22-Jun-2023 " 17:19,  Premature ventricular complexes are no longer Present  SC interval has decreased  QRS axis Shifted right  T wave inversion no longer evident in Inferior leads  T wave inversion no longer evident in Lateral leads  Confirmed by Nevaeh Casey ([id number]) on 11/25/2024     OLD RECORDS: reviewed old records in the chart review section if EHR on day of visit.    Previous cardiopulmonary studies within the past year:  Echocardiogram:  Lab Results   Component Value Date    LVEF 60 12/21/2023       Previous STRESS TEST:  No results found for this or any previous visit.    Previous Cath/PCI:  No results found for this or any previous visit.      ECHO:  No results found for this or any previous visit.    Results for orders placed during the hospital encounter of 12/21/23    Echo complete w/ contrast if indicated    Interpretation Summary    Left Ventricle: Left ventricular cavity size is normal. Wall thickness is normal. The left ventricular ejection fraction is 60%. Systolic function is normal. Wall motion is normal. Diastolic function is mildly abnormal, consistent with grade I (abnormal) relaxation.  Left atrial filling pressure is normal.    Right Ventricle: Systolic function is normal.    Aortic Valve: The aortic valve has no significant stenosis.    Mitral Valve: There is mild annular calcification.    Tricuspid Valve: There is mild regurgitation. The right ventricular systolic pressure is normal. The estimated right ventricular systolic pressure is 20.00 mmHg.    Pericardium: There is no pericardial effusion.    Prior TTE study available for comparison. Prior study date: 12/23/2022. No significant changes noted compared to the prior study.          Time of visit including pre-visit chart review, visit and post-visit coordination of plan and care , review of pre-surgical lab work, preparation and time spent documenting note in electronic medical record, time spent face-to-face in physical examination answering  patient questions by care team was a minimum of  45 minutes             [facility]         [1]   Past Medical History:  Diagnosis Date    Anemia     Anxiety     Arrhythmia     Arthritis     Asthma     Blood clot in vein     portal vein    Breast cancer (HCC) 02/12/2021    Breast lump     [id number] RESOLVED    Cancer (HCC)     Depression     Disease of thyroid gland     DVT, lower extremity (HCC)     GERD (gastroesophageal reflux disease)     Heart disease mother    Hyperlipidemia     Hypertension     Hypokalemia     Hyponatremia     Hypothyroidism     Iron deficiency anemia     Irregular heart beat     Manic behavior (HCC)     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HCC)     Osteoarthritis     Palpitations     [id number]  RESOLVED    Paroxysmal supraventricular tachycardia (HCC)     PE (pulmonary thromboembolism) (HCC)     Pneumonia     Sjoegren syndrome     Sleep apnea     mild    Sleep difficulties     Spinal stenosis     Thrombocytosis     [id number]  RESOLVED    Tremors of nervous system     dbs implanted right and left chest    Ulcerative colitis (HCC)     Vertigo     [id number] RESOLVED   [2]   Past Surgical History:  Procedure Laterality Date    ABDOMINAL SURGERY      APPENDECTOMY      BREAST BIOPSY Left 11/07/2019    Stereo    BREAST EXCISIONAL BIOPSY Left     x many years    BREAST SURGERY      lumpectomy & biopsy    CARMELLA HOLE W/ STEREOTACTIC INSERTION OF DBS LEADS / INTRAOP MICROELECTRODE RECORDING      COLON SURGERY      COLONOSCOPY N/A 08/30/2017    Procedure: POUCHOSCOPY;  Surgeon: VANDANA Waters MD;  Location: [facility];  Service: Colorectal    COLOPROCTECTOMY W/ ILEO J POUCH      DEEP BRAIN STIMULATOR PLACEMENT      ESOPHAGOGASTRODUODENOSCOPY      ONSET 10/17/11    FISTULA REPAIR      LLEOANAL FISTULA REPAIR TRANSPERIN TRANSABD APPROACH    HYSTERECTOMY      age 38    ILEOSTOMY CLOSURE      KNEE ARTHROSCOPY      Right    MAMMO STEREOTACTIC BREAST BIOPSY LEFT (ALL INC) Left 11/07/2019     MAMMO STEREOTACTIC BREAST BIOPSY LEFT (ALL INC) Left 2020    MAMMO STEREOTACTIC BREAST BIOPSY LEFT (ALL INC) EACH ADD Left 2020    MASTECTOMY Left 2021    left mastectomy- Dr. Gillespie    MASTECTOMY W/ SENTINEL NODE BIOPSY Left 2021    Procedure: BREAST MASTECTOMY WITH SENTINEL LYMPH NODE BIOPSY, LYMPHATIC MAPPING WITH BLUE DYE AND RADIAOCTIVE DYE (INJECT AT 1100 BY DR GILLESPIE IN THE OR);  Surgeon: Robbie Gillespie MD;  Location: [facility];  Service: Surgical Oncology    OH ARTHRP ACETBLR/PROX FEM PROSTC AGRFT/ALGRFT Right 2024    Procedure: ARTHROPLASTY HIP TOTAL, overnight;  Surgeon: Gaviota Ford DO;  Location:  MAIN OR;  Service: Orthopedics    OH INSJ/RPLCMT CRANIAL NEUROSTIM PULSE GENERATOR Right 2017    Procedure: DBS GENERATOR REPLACEMENT;  Surgeon: Marin Mao MD;  Location: [facility];  Service: Neurosurgery    OH INSJ/RPLCMT CRANIAL NEUROSTIM PULSE GENERATOR N/A 2019    Procedure: REPLACEMENT IMPLANTABLE PULSE GENERATOR FOR DEEP BRAIN STIMULATOR LEFT CHEST;  Surgeon: Damian Batres MD;  Location: [facility];  Service: Neurosurgery    SPLENECTOMY      TONSILLECTOMY      TOTAL HIP ARTHROPLASTY Right    [3]   Social History  Tobacco Use    Smoking status: Former     Current packs/day: 0.00     Average packs/day: 1 pack/day for 15.0 years (15.0 ttl pk-yrs)     Types: Cigarettes     Start date: 1950     Quit date: 1965     Years since quittin.5    Smokeless tobacco: Never    Tobacco comments:     Quit   Vaping Use    Vaping status: Never Used   Substance Use Topics    Alcohol use: Yes     Alcohol/week: 2.0 standard drinks of alcohol     Types: 2 Cans of beer per week     Comment: 2or 3 beers a week    Drug use: No   [4]   Family History  Problem Relation Name Age of Onset    Venous thrombosis Mother charlene Godfrey         ACUTE VENOUS THROMBOSIS OF DEEP VESSELS OF THE DISTAL LOWER EXTREMITY    Other Mother charlene Godfrey         PHLEBITIS     Hypertension Mother charlene Godfrey     Peripheral vascular disease Mother charlene Godfrey     COPD Father gali     Diabetes Father gali         MELLITUS    Stroke Father gali     Diabetes Sister lew         MELLITUS    Sjogren's syndrome Sister lew     No Known Problems Daughter gee     No Known Problems Maternal Grandmother      No Known Problems Maternal Grandfather      No Known Problems Paternal Grandmother      No Known Problems Paternal Grandfather      No Known Problems Sister jeremy     No Known Problems Maternal Aunt      No Known Problems Paternal Aunt      No Known Problems Son     [5]   Allergies  Allergen Reactions    Indomethacin GI Intolerance, Dizziness and Other (See Comments)    Macrobid [Nitrofurantoin Monohyd Macro] Rash    Nitrofurantoin Other (See Comments)    Penicillins Rash and Other (See Comments)     Annotation - [id number]: can take cephalosporins    Sulfa Antibiotics Rash and Other (See Comments)

## 2025-07-01 NOTE — PROGRESS NOTES
PT Evaluation    Today's date: 2025   Patient name: Matilda Day  : 1945  MRN: [id number]  Referring provider: Zaira Velasquez MD  Dx:   Encounter Diagnosis     ICD-10-CM    1. Primary osteoarthritis of right knee  M17.11 Ambulatory Referral to Physical Therapy      2. Degeneration of intervertebral disc of lumbar region with discogenic back pain and lower extremity pain  M51.362 Ambulatory Referral to Physical Therapy      3. Left hip pain  M25.552       4. Left knee pain, unspecified chronicity  M25.562               Subjective Evaluation     History of Present Illness    Patient presents with c/o R knee pain, LBP, L hip pain and L knee pain. Pt reported that her sx started about 2 months ago without a SHAWN. Pt noted no recent falls. Pt noted that she is getting an x-ray of the L hip on Monday. Pt reported that her sx increase with going up the steps and walking.   Pt also reported that her sx decrease with resting and sitting. Pt has a PMH of R FITO (anterior, 2024), past fall leading to L wrist fx, asthja, high BP, supraventricular tachycardia, L breast CA (remission) and deep brain stimulator. Pt uses a walker with 2 wheels at night. Pt does not drive. Pt is scheduled for a R TKA on .     Neuro signs: Deep brain stimulator    Bowel and bladder sxs: Diarrhea 2/2 past colon surgery   Red flags: Previous fall  Occupation: Retired     Pain  At best pain ratin/10  At worst pain ratin/10  Location: B knee, L hip and L/S       Social Support  Lives with her partner in a 2 story home  Stairs: railing  Tub: grab bar    Patient Goals  Patient goals for therapy: Increase BLE strength in preparation for R TKA    STGs  1. Decrease pain by 20% in 2-4 weeks to improve standing tolerance.  2. Improve L hip/B knee ROM by 5-10 degrees and L/S ROM 10% in 2-4 weeks.   3.(I) with HEP within 2-4 week in order to improve PT outcomes.       LTGs  1. Decrease pain by 60% in 6-8 weeks.  2.  Improve walking tolerance to >30 minutes in 6-8 weeks to perform community ambulation.   3. Perform housework/dressing/showering activities independently without pain in 6-8 weeks.    4. Improve stairs tolerance to 1 flight  in 8 weeks.   5. Improve FOTO to greater than or equal to 47.        Objective Measurements:    Lumbar ROM R L Strength Knee R L   Flex  50%  Flex 4- 4   Ext 10%  Ext 4 4   SB 25% 25%      Rot 40% 50%              Hip PROM   Hip     Flex  114 deg  100 deg Flex 4 4   ER at 90 20 deg 34 deg ER     IR at 90 36 deg 18 deg IR     Abd 35 deg 22 deg Abd     Ext   Ext             Knee AROM   Ankle     Flex 106 deg 144 deg DF 4+ 4+   Ext -10 deg -8 deg PF 4+ 4+   B neg Hoffmans  No BLE clonus  BLE WNL sensation  BLE reflexes: 2+    TU sec    Rep seated L/S flexion: decreased LBP and L hip pain     Assessment:    Matilad Day is a pleasant 79 y.o. female who is attending skilled PT for B knee pain, L hip pain and LBP. Pt demonstrated with probable B knee OA, L hip OA, degenerative changes of the L/S and L/S neurogenic claudication. Pt demonstrated deficits in B knee ROM, BLE strength, L hip ROM and L/S ROM. Pt is at increased risk for future falls due to TUG. Pt scored a 29 on FOTO. Pt was educated and demonstrated understanding of HEP, POC, and benefit of skilled PT. Pt would benefit from further skilled PT in order to decrease pain, address deficits (ROM/MMT), help pt achieve goals, and progress program as tolerated.     Thank you for the referral!        Plan  Patient would benefit from:Skilled physical therapy  Planned therapy interventions: manual therapy, neuromuscular re-education, stretching, strengthening, therapeutic activities, therapeutic exercise, patient education, home exercise program, modalities to decrease pain, and activity modification.    Frequency: 2x week  Duration in weeks: 8  Treatment plan discussed with: patient       Goals: Patient's goal is to increase BLE strength.  "    Precautions: R FITO (anterior, November 4th, 2024), past fall, asthja, high BP, supraventricular tachycardia, L breast CA (remission) and deep brain stimulator.  Dx: LBP, L hip pain and B knee pain (R TKA scheduled for 7/28)  Access Code: [email or web address]   NO AUTH NEEDED   Insurance Billing Rule ReEval POC expires PT/OT + Visit Limit? Co-Insurance Misc   [facility] CMS 8/1 8/26  Yes. 20% No Auth Needed   [facility]                                        Visit/Unit Tracking  Date 7/1                    Used 1                    Remaining                            Daily Treatment Diary     Manuals 7/1/2025                                            Ther Ex         L BKFO 5\"x10        B quad set 5\"x10 B         Seated L/S flexion str 5\"x10        Seated knee flexion str 5\"x10 B                          Incline str         R. bike         Pt Edu HEP/POC        Neuro Re-ed         SLR         Clamshell         Hip add iso         Seated march         HR/TR         LAQ         Hip abd/ext         Mini squat                  Ther Activity                                    Gait Training                           Modalities                                 "

## 2025-07-02 ENCOUNTER — TELEPHONE (OUTPATIENT)
Dept: NEPHROLOGY | Facility: CLINIC | Age: 80
End: 2025-07-02

## 2025-07-02 ENCOUNTER — ANESTHESIA EVENT (OUTPATIENT)
Dept: GASTROENTEROLOGY | Facility: AMBULARY SURGERY CENTER | Age: 80
End: 2025-07-02
Payer: MEDICARE

## 2025-07-02 ENCOUNTER — HOSPITAL ENCOUNTER (OUTPATIENT)
Dept: GASTROENTEROLOGY | Facility: AMBULARY SURGERY CENTER | Age: 80
Setting detail: OUTPATIENT SURGERY
Discharge: HOME | End: 2025-07-02
Attending: PHYSICIAN ASSISTANT
Payer: MEDICARE

## 2025-07-02 VITALS
HEART RATE: 63 BPM | WEIGHT: 140 LBS | OXYGEN SATURATION: 95 % | HEIGHT: 63 IN | BODY MASS INDEX: 24.8 KG/M2 | SYSTOLIC BLOOD PRESSURE: 141 MMHG | TEMPERATURE: 97.9 F | RESPIRATION RATE: 18 BRPM | DIASTOLIC BLOOD PRESSURE: 67 MMHG

## 2025-07-02 DIAGNOSIS — R13.19 ESOPHAGEAL DYSPHAGIA: ICD-10-CM

## 2025-07-02 PROCEDURE — 43239 EGD BIOPSY SINGLE/MULTIPLE: CPT | Performed by: INTERNAL MEDICINE

## 2025-07-02 PROCEDURE — 88342 IMHCHEM/IMCYTCHM 1ST ANTB: CPT | Performed by: STUDENT IN AN ORGANIZED HEALTH CARE EDUCATION/TRAINING PROGRAM

## 2025-07-02 PROCEDURE — 88305 TISSUE EXAM BY PATHOLOGIST: CPT | Performed by: STUDENT IN AN ORGANIZED HEALTH CARE EDUCATION/TRAINING PROGRAM

## 2025-07-02 PROCEDURE — 88312 SPECIAL STAINS GROUP 1: CPT | Performed by: STUDENT IN AN ORGANIZED HEALTH CARE EDUCATION/TRAINING PROGRAM

## 2025-07-02 RX ORDER — SODIUM CHLORIDE, SODIUM LACTATE, POTASSIUM CHLORIDE, CALCIUM CHLORIDE 600; 310; 30; 20 MG/100ML; MG/100ML; MG/100ML; MG/100ML
20 INJECTION, SOLUTION INTRAVENOUS CONTINUOUS
Status: CANCELLED | OUTPATIENT
Start: 2025-07-02

## 2025-07-02 RX ORDER — PROPOFOL 10 MG/ML
INJECTION, EMULSION INTRAVENOUS AS NEEDED
Status: DISCONTINUED | OUTPATIENT
Start: 2025-07-02 | End: 2025-07-02

## 2025-07-02 RX ORDER — SODIUM CHLORIDE, SODIUM LACTATE, POTASSIUM CHLORIDE, CALCIUM CHLORIDE 600; 310; 30; 20 MG/100ML; MG/100ML; MG/100ML; MG/100ML
20 INJECTION, SOLUTION INTRAVENOUS CONTINUOUS
Status: DISCONTINUED | OUTPATIENT
Start: 2025-07-02 | End: 2025-07-06 | Stop reason: HOSPADM

## 2025-07-02 RX ORDER — LIDOCAINE HYDROCHLORIDE 10 MG/ML
0.5 INJECTION, SOLUTION EPIDURAL; INFILTRATION; INTRACAUDAL; PERINEURAL ONCE AS NEEDED
Status: DISCONTINUED | OUTPATIENT
Start: 2025-07-02 | End: 2025-07-06 | Stop reason: HOSPADM

## 2025-07-02 RX ORDER — LIDOCAINE HYDROCHLORIDE 10 MG/ML
INJECTION, SOLUTION EPIDURAL; INFILTRATION; INTRACAUDAL; PERINEURAL AS NEEDED
Status: DISCONTINUED | OUTPATIENT
Start: 2025-07-02 | End: 2025-07-02

## 2025-07-02 RX ORDER — GLYCOPYRROLATE 0.2 MG/ML
INJECTION INTRAMUSCULAR; INTRAVENOUS AS NEEDED
Status: DISCONTINUED | OUTPATIENT
Start: 2025-07-02 | End: 2025-07-02

## 2025-07-02 RX ORDER — LIDOCAINE HYDROCHLORIDE 10 MG/ML
0.5 INJECTION, SOLUTION EPIDURAL; INFILTRATION; INTRACAUDAL; PERINEURAL ONCE AS NEEDED
Status: CANCELLED | OUTPATIENT
Start: 2025-07-02

## 2025-07-02 RX ORDER — SODIUM CHLORIDE, SODIUM LACTATE, POTASSIUM CHLORIDE, CALCIUM CHLORIDE 600; 310; 30; 20 MG/100ML; MG/100ML; MG/100ML; MG/100ML
INJECTION, SOLUTION INTRAVENOUS CONTINUOUS PRN
Status: DISCONTINUED | OUTPATIENT
Start: 2025-07-02 | End: 2025-07-02

## 2025-07-02 RX ADMIN — LIDOCAINE HYDROCHLORIDE 100 MG: 10 INJECTION, SOLUTION EPIDURAL; INFILTRATION; INTRACAUDAL; PERINEURAL at 11:22

## 2025-07-02 RX ADMIN — SODIUM CHLORIDE, SODIUM LACTATE, POTASSIUM CHLORIDE, AND CALCIUM CHLORIDE 20 ML/HR: .6; .31; .03; .02 INJECTION, SOLUTION INTRAVENOUS at 10:54

## 2025-07-02 RX ADMIN — GLYCOPYRROLATE 0.2 MCG: 0.2 INJECTION, SOLUTION INTRAMUSCULAR; INTRAVENOUS at 11:26

## 2025-07-02 RX ADMIN — PROPOFOL 20 MG: 10 INJECTION, EMULSION INTRAVENOUS at 11:29

## 2025-07-02 RX ADMIN — SODIUM CHLORIDE, SODIUM LACTATE, POTASSIUM CHLORIDE, AND CALCIUM CHLORIDE: .6; .31; .03; .02 INJECTION, SOLUTION INTRAVENOUS at 11:02

## 2025-07-02 RX ADMIN — PROPOFOL 20 MG: 10 INJECTION, EMULSION INTRAVENOUS at 11:31

## 2025-07-02 RX ADMIN — PROPOFOL 90 MG: 10 INJECTION, EMULSION INTRAVENOUS at 11:25

## 2025-07-02 RX ADMIN — PROPOFOL 30 MG: 10 INJECTION, EMULSION INTRAVENOUS at 11:27

## 2025-07-02 NOTE — INTERVAL H&P NOTE
H&P reviewed. After examining the patient I find no changes in the patients condition since the H&P had been written.    Vitals:    07/02/25 1027   BP: 155/70   Pulse: 59   Resp: 18   Temp: 97.9 °F (36.6 °C)   SpO2: 95%

## 2025-07-02 NOTE — ANESTHESIA POSTPROCEDURE EVALUATION
Post-Op Assessment Note    CV Status:  Stable    Pain management: adequate       Mental Status:  Sleepy   Hydration Status:  Euvolemic   PONV Controlled:  Controlled   Airway Patency:  Patent     Post Op Vitals Reviewed: Yes    No anethesia notable event occurred.    Staff: Anesthesiologist           Last Filed PACU Vitals:  Vitals Value Taken Time   Temp     Pulse 54    /59    Resp 12    SpO2 100% on 6 lpm simple mask

## 2025-07-02 NOTE — ANESTHESIA PREPROCEDURE EVALUATION
Procedure:  EGD    Relevant Problems   CARDIO   (+) Essential hypertension   (+) Hyperlipidemia   (+) Portal vein thrombosis   (+) Supraventricular tachycardia (HCC)      ENDO   (+) Acquired hypothyroidism      GI/HEPATIC   (+) GERD (gastroesophageal reflux disease)      GYN   (+) Malignant neoplasm of left breast in female, estrogen receptor negative, unspecified site of breast (HCC)      HEMATOLOGY   (+) Iron deficiency anemia      NEURO/PSYCH   (+) Anxiety   (+) Continuous opioid dependence (HCC)   (+) Moderate episode of recurrent major depressive disorder (HCC)      PULMONARY   (+) Mild intermittent asthma without complication      FEN/Gastrointestinal   (+) Ulcerative colitis, chronic, other complication (HCC)      Denies recent fever, cough or other symptom of upper respiratory tract infection.    Confirmed NPO appropriate    Physical Exam    Airway     Mallampati score: III  TM Distance: >3 FB  Neck ROM: full  Mouth opening: >= 4 cm      Cardiovascular      Dental        Pulmonary      Neurological    She appears awake, alert and oriented x3.      Other Findings  post-pubertal.    12/21/23 TTE: Normal biventricular systolic function. Grade I diastolic dysfunction. Mild TR.    Anesthesia Plan  ASA Score- 3     Anesthesia Type- IV sedation with anesthesia with ASA Monitors.         Additional Monitors:     Airway Plan: natural airway.           Plan Factors-Exercise tolerance (METS): >4 METS.    Chart reviewed.        Patient is not a current smoker.  Patient did not smoke on day of surgery.            Induction- intravenous.    Postoperative Plan- .   Monitoring Plan - Monitoring plan - standard ASA monitoring  Post Operative Pain Plan - non-opiod analgesics        Informed Consent- Anesthetic plan and risks discussed with patient.        NPO Status:  Vitals Value Taken Time   Date of last liquid 07/02/25 07/02/25 10:25   Time of last liquid 0630 07/02/25 10:25   Date of last solid 07/01/25 07/02/25 10:25    Time of last solid 2000 07/02/25 10:25

## 2025-07-03 ENCOUNTER — APPOINTMENT (OUTPATIENT)
Dept: LAB | Facility: AMBULARY SURGERY CENTER | Age: 80
End: 2025-07-03
Attending: INTERNAL MEDICINE
Payer: MEDICARE

## 2025-07-03 ENCOUNTER — OFFICE VISIT (OUTPATIENT)
Dept: PHYSICAL THERAPY | Facility: CLINIC | Age: 80
End: 2025-07-03
Attending: INTERNAL MEDICINE
Payer: MEDICARE

## 2025-07-03 DIAGNOSIS — M25.562 LEFT KNEE PAIN, UNSPECIFIED CHRONICITY: ICD-10-CM

## 2025-07-03 DIAGNOSIS — D50.0 IRON DEFICIENCY ANEMIA DUE TO CHRONIC BLOOD LOSS: ICD-10-CM

## 2025-07-03 DIAGNOSIS — M25.561 CHRONIC PAIN OF RIGHT KNEE: ICD-10-CM

## 2025-07-03 DIAGNOSIS — R60.1 GENERALIZED EDEMA: ICD-10-CM

## 2025-07-03 DIAGNOSIS — E87.1 HYPONATREMIA: ICD-10-CM

## 2025-07-03 DIAGNOSIS — Z51.81 ANTICOAGULATION MANAGEMENT ENCOUNTER: ICD-10-CM

## 2025-07-03 DIAGNOSIS — I81 PORTAL VEIN THROMBOSIS: ICD-10-CM

## 2025-07-03 DIAGNOSIS — C50.912 MALIGNANT NEOPLASM OF LEFT BREAST IN FEMALE, ESTROGEN RECEPTOR NEGATIVE, UNSPECIFIED SITE OF BREAST (HCC): ICD-10-CM

## 2025-07-03 DIAGNOSIS — G89.29 CHRONIC PAIN OF RIGHT KNEE: ICD-10-CM

## 2025-07-03 DIAGNOSIS — D50.9 IRON DEFICIENCY ANEMIA, UNSPECIFIED IRON DEFICIENCY ANEMIA TYPE: ICD-10-CM

## 2025-07-03 DIAGNOSIS — M17.11 PRIMARY OSTEOARTHRITIS OF RIGHT KNEE: ICD-10-CM

## 2025-07-03 DIAGNOSIS — Z17.1 MALIGNANT NEOPLASM OF LEFT BREAST IN FEMALE, ESTROGEN RECEPTOR NEGATIVE, UNSPECIFIED SITE OF BREAST (HCC): ICD-10-CM

## 2025-07-03 DIAGNOSIS — Z79.01 ANTICOAGULATION MANAGEMENT ENCOUNTER: ICD-10-CM

## 2025-07-03 DIAGNOSIS — M25.552 LEFT HIP PAIN: ICD-10-CM

## 2025-07-03 DIAGNOSIS — I10 ESSENTIAL HYPERTENSION: ICD-10-CM

## 2025-07-03 DIAGNOSIS — M51.362 DEGENERATION OF INTERVERTEBRAL DISC OF LUMBAR REGION WITH DISCOGENIC BACK PAIN AND LOWER EXTREMITY PAIN: Primary | ICD-10-CM

## 2025-07-03 LAB
ALBUMIN SERPL BCG-MCNC: 4.1 G/DL (ref 3.5–5)
ALP SERPL-CCNC: 56 U/L (ref 34–104)
ALT SERPL W P-5'-P-CCNC: 10 U/L (ref 7–52)
ANION GAP SERPL CALCULATED.3IONS-SCNC: 9 MMOL/L (ref 4–13)
APTT PPP: 30 SECONDS (ref 23–34)
AST SERPL W P-5'-P-CCNC: 20 U/L (ref 13–39)
BASOPHILS # BLD AUTO: 0.1 THOUSANDS/ÂΜL (ref 0–0.1)
BASOPHILS NFR BLD AUTO: 1 % (ref 0–1)
BILIRUB SERPL-MCNC: 0.59 MG/DL (ref 0.2–1)
BUN SERPL-MCNC: 11 MG/DL (ref 5–25)
CALCIUM SERPL-MCNC: 9.7 MG/DL (ref 8.4–10.2)
CHLORIDE SERPL-SCNC: 98 MMOL/L (ref 96–108)
CO2 SERPL-SCNC: 30 MMOL/L (ref 21–32)
CREAT SERPL-MCNC: 0.77 MG/DL (ref 0.6–1.3)
EOSINOPHIL # BLD AUTO: 0.19 THOUSAND/ÂΜL (ref 0–0.61)
EOSINOPHIL NFR BLD AUTO: 3 % (ref 0–6)
ERYTHROCYTE [DISTWIDTH] IN BLOOD BY AUTOMATED COUNT: 15.2 % (ref 11.6–15.1)
EST. AVERAGE GLUCOSE BLD GHB EST-MCNC: 134 MG/DL
FERRITIN SERPL-MCNC: 60 NG/ML (ref 30–307)
FERRITIN SERPL-MCNC: 61 NG/ML (ref 30–307)
GFR SERPL CREATININE-BSD FRML MDRD: 73 ML/MIN/1.73SQ M
GLUCOSE P FAST SERPL-MCNC: 98 MG/DL (ref 65–99)
HBA1C MFR BLD: 6.3 %
HCT VFR BLD AUTO: 38.9 % (ref 34.8–46.1)
HGB BLD-MCNC: 12.5 G/DL (ref 11.5–15.4)
IMM GRANULOCYTES # BLD AUTO: 0.01 THOUSAND/UL (ref 0–0.2)
IMM GRANULOCYTES NFR BLD AUTO: 0 % (ref 0–2)
INR PPP: 0.99 (ref 0.85–1.19)
IRON SATN MFR SERPL: 20 % (ref 15–50)
IRON SATN MFR SERPL: 20 % (ref 15–50)
IRON SERPL-MCNC: 61 UG/DL (ref 50–212)
IRON SERPL-MCNC: 61 UG/DL (ref 50–212)
LYMPHOCYTES # BLD AUTO: 2.93 THOUSANDS/ÂΜL (ref 0.6–4.47)
LYMPHOCYTES NFR BLD AUTO: 41 % (ref 14–44)
MCH RBC QN AUTO: 30.5 PG (ref 26.8–34.3)
MCHC RBC AUTO-ENTMCNC: 32.1 G/DL (ref 31.4–37.4)
MCV RBC AUTO: 95 FL (ref 82–98)
MONOCYTES # BLD AUTO: 0.93 THOUSAND/ÂΜL (ref 0.17–1.22)
MONOCYTES NFR BLD AUTO: 13 % (ref 4–12)
NEUTROPHILS # BLD AUTO: 3.04 THOUSANDS/ÂΜL (ref 1.85–7.62)
NEUTS SEG NFR BLD AUTO: 42 % (ref 43–75)
NRBC BLD AUTO-RTO: 0 /100 WBCS
PLATELET # BLD AUTO: 440 THOUSANDS/UL (ref 149–390)
PMV BLD AUTO: 9.4 FL (ref 8.9–12.7)
POTASSIUM SERPL-SCNC: 4.2 MMOL/L (ref 3.5–5.3)
PROT SERPL-MCNC: 6.8 G/DL (ref 6.4–8.4)
PROTHROMBIN TIME: 13.4 SECONDS (ref 12.3–15)
RBC # BLD AUTO: 4.1 MILLION/UL (ref 3.81–5.12)
RETICS # AUTO: NORMAL 10*3/UL (ref 14097–95744)
RETICS # CALC: 1.72 % (ref 0.37–1.87)
SODIUM SERPL-SCNC: 137 MMOL/L (ref 135–147)
TIBC SERPL-MCNC: 312.2 UG/DL (ref 250–450)
TIBC SERPL-MCNC: 312.2 UG/DL (ref 250–450)
TRANSFERRIN SERPL-MCNC: 223 MG/DL (ref 203–362)
TRANSFERRIN SERPL-MCNC: 223 MG/DL (ref 203–362)
UIBC SERPL-MCNC: 251 UG/DL (ref 155–355)
UIBC SERPL-MCNC: 251 UG/DL (ref 155–355)
WBC # BLD AUTO: 7.2 THOUSAND/UL (ref 4.31–10.16)

## 2025-07-03 PROCEDURE — 83540 ASSAY OF IRON: CPT

## 2025-07-03 PROCEDURE — 85610 PROTHROMBIN TIME: CPT

## 2025-07-03 PROCEDURE — 83550 IRON BINDING TEST: CPT

## 2025-07-03 PROCEDURE — 36415 COLL VENOUS BLD VENIPUNCTURE: CPT

## 2025-07-03 PROCEDURE — 97110 THERAPEUTIC EXERCISES: CPT

## 2025-07-03 PROCEDURE — 82728 ASSAY OF FERRITIN: CPT

## 2025-07-03 PROCEDURE — 80053 COMPREHEN METABOLIC PANEL: CPT

## 2025-07-03 PROCEDURE — 85025 COMPLETE CBC W/AUTO DIFF WBC: CPT

## 2025-07-03 PROCEDURE — 83036 HEMOGLOBIN GLYCOSYLATED A1C: CPT

## 2025-07-03 PROCEDURE — 87081 CULTURE SCREEN ONLY: CPT

## 2025-07-03 PROCEDURE — 85045 AUTOMATED RETICULOCYTE COUNT: CPT

## 2025-07-03 PROCEDURE — 97112 NEUROMUSCULAR REEDUCATION: CPT

## 2025-07-03 PROCEDURE — 85730 THROMBOPLASTIN TIME PARTIAL: CPT

## 2025-07-03 NOTE — PROGRESS NOTES
"Daily Note     Today's date: 7/3/2025  Patient name: Matilda Day  : 1945  MRN: [id number]  Referring provider: Zaira Velasquez MD  Dx:   Encounter Diagnosis     ICD-10-CM    1. Degeneration of intervertebral disc of lumbar region with discogenic back pain and lower extremity pain  M51.362       2. Primary osteoarthritis of right knee  M17.11       3. Left hip pain  M25.552       4. Left knee pain, unspecified chronicity  M25.562           Start Time: 833  Stop Time: 857  Total time in clinic (min): 24 minutes    Subjective: Pt reported that her back felt a little loser with the HEP.       Objective: See treatment diary below      Assessment: Tolerated treatment well. Program started with her HEP. Progressions focused on increasing BLE strength. Pt was challenged with all exercises and required VC/TC throughout program in order to perform exercises with correct form. Patient would benefit from continued PT      Plan: Continue per plan of care.      Goals: Patient's goal is to increase BLE strength.     Precautions: R FITO (anterior, 2024), past fall, asthja, high BP, supraventricular tachycardia, L breast CA (remission) and deep brain stimulator.  Dx: LBP, L hip pain and B knee pain (R TKA scheduled for )  Access Code: [email or web address]   NO AUTH NEEDED   Insurance Billing Rule ReEval POC expires PT/OT + Visit Limit? Co-Insurance Misc   [facility] CMS   Yes. 20% No Auth Needed   Capital                                        Visit/Unit Tracking  Date 7/1 7/3                   Used 1 2                   Remaining                            Daily Treatment Diary     Manuals 2025 7/3                                           Ther Ex         L BKFO 5\"x10 5\"x10       B quad set 5\"x10 B  5\"x10 B       Seated L/S flexion str 5\"x10 5\"x10       Seated knee flexion str 5\"x10 B 5\"x10 B       Gastroc str with srap  15\"x4                Incline str         R. bike         Pt Edu HEP/POC HEP     " "  Neuro Re-ed         SLR  10x B       Clamshell  RTB 10x       Hip add iso  5\"x10       Seated march  15x B       HR/TR  20x each       LAQ         Hip abd/ext         Mini squat         TKE  NV       Ther Activity                                    Gait Training                           Modalities                                  "

## 2025-07-04 LAB — MRSA NOSE QL CULT: NORMAL

## 2025-07-07 ENCOUNTER — PROCEDURE VISIT (OUTPATIENT)
Dept: NEUROLOGY | Facility: CLINIC | Age: 80
End: 2025-07-07
Payer: MEDICARE

## 2025-07-07 ENCOUNTER — TELEPHONE (OUTPATIENT)
Age: 80
End: 2025-07-07

## 2025-07-07 ENCOUNTER — OFFICE VISIT (OUTPATIENT)
Dept: OBGYN CLINIC | Facility: HOSPITAL | Age: 80
End: 2025-07-07
Payer: MEDICARE

## 2025-07-07 ENCOUNTER — HOSPITAL ENCOUNTER (OUTPATIENT)
Dept: RADIOLOGY | Facility: HOSPITAL | Age: 80
Discharge: HOME | End: 2025-07-07
Attending: ORTHOPAEDIC SURGERY
Payer: MEDICARE

## 2025-07-07 VITALS
TEMPERATURE: 97.6 F | HEIGHT: 63 IN | WEIGHT: 142.5 LBS | SYSTOLIC BLOOD PRESSURE: 122 MMHG | BODY MASS INDEX: 25.25 KG/M2 | HEART RATE: 60 BPM | DIASTOLIC BLOOD PRESSURE: 74 MMHG | OXYGEN SATURATION: 98 %

## 2025-07-07 VITALS — WEIGHT: 140 LBS | BODY MASS INDEX: 24.8 KG/M2 | HEIGHT: 63 IN

## 2025-07-07 DIAGNOSIS — Z96.89 S/P DEEP BRAIN STIMULATOR PLACEMENT: ICD-10-CM

## 2025-07-07 DIAGNOSIS — M16.12 PRIMARY OSTEOARTHRITIS OF LEFT HIP: Primary | ICD-10-CM

## 2025-07-07 DIAGNOSIS — R26.81 UNSTEADY GAIT: ICD-10-CM

## 2025-07-07 DIAGNOSIS — M25.552 PAIN IN LEFT HIP: ICD-10-CM

## 2025-07-07 DIAGNOSIS — G25.0 ESSENTIAL TREMOR: Primary | ICD-10-CM

## 2025-07-07 PROCEDURE — 95983 ALYS BRN NPGT PRGRMG 15 MIN: CPT | Performed by: PSYCHIATRY & NEUROLOGY

## 2025-07-07 PROCEDURE — 73502 X-RAY EXAM HIP UNI 2-3 VIEWS: CPT

## 2025-07-07 PROCEDURE — 99213 OFFICE O/P EST LOW 20 MIN: CPT

## 2025-07-07 PROCEDURE — 95984 ALYS BRN NPGT PRGRMG ADDL 15: CPT | Performed by: PSYCHIATRY & NEUROLOGY

## 2025-07-07 RX ORDER — IPRATROPIUM BROMIDE AND ALBUTEROL SULFATE 2.5; .5 MG/3ML; MG/3ML
SOLUTION RESPIRATORY (INHALATION)
COMMUNITY

## 2025-07-07 NOTE — PROGRESS NOTES
Name: Matilda Day      : 1945      MRN: [id number]  Encounter Provider: Umu Harris MD  Encounter Date: 2025   Encounter department: [facility] [place]  :  Assessment & Plan  Essential tremor  Essential tremor status post bilateral VIM with good control of tremor.  Complicated by mild dysarthria which is in part related to stimulation.  Speech worsens when fatigue during the evening.  Approximately 30-minute spent with the patient on Deep brain stimulation interrogation, programming, evaluation and counseling.  The body of the clinical note for details.Dbs adjustment made to right lead/brain/ left body on Group D to allow for change to Group D during time where speech may be worse.    Can utilize patient  to change from Group C to Group D (speech group) when having difficulty.    No changes made to the left IPG/brain/ right body.    Patient and  expressed understanding.       S/P deep brain stimulator placement         Unsteady gait  She reports issues with gait and balance, which are being addressed through physical therapy. She has been attending physical therapy for the past 2 to 3 weeks. She is scheduled for a right knee replacement at the end of the month and plans to resume physical therapy post-surgery.         Assessment & Plan            History of Present Illness    Matilda Day is a left handed female with peripheral neuropathy, spinal stenosis, anxiety and essential tremor s/p bilateral VIM DBS on 14 with right Activa RC 17, left RC 19, who presents for follow up. To review, tremors began in her late 50's with a mild intentional tremor on the left. This has progressed with time and spread to the right side as well.        History of Present Illness  The patient presents for evaluation of speech issues and gait imbalance.    She reports a perceived deterioration in her speech since her last visit. It is noted that her  "speech seems to worsen when she is fatigued, particularly in the mornings and after prolonged talking. No changes have been made to her stimulator settings between visits. Her tremors are well-managed, and she does not experience any difficulty using her hands for eating or drinking. She is able to perform daily tasks such as dressing, buttoning, tying shoes, and putting on earrings without issue.    However, she reports problems with gait and balance. She has been attending physical therapy for these issues for the past 2 to 3 weeks. She is scheduled to have a right knee replacement at the end of the month and plans to resume physical therapy post-surgery. She also mentions a previous hip replacement.    INTERVAL: Since last visit, she reports worsening speech without changes to stimulator settings.    PAST SURGICAL HISTORY: Right hip replacement     Review of Systems I have personally reviewed the MA's review of systems and made changes as necessary.         Objective   /74 (BP Location: Right arm, Patient Position: Sitting, Cuff Size: Standard)   Pulse 60   Temp 97.6 °F (36.4 °C) (Temporal)   Ht 5' 3\" (1.6 m)   Wt 64.6 kg (142 lb 8 oz)   SpO2 98%   BMI 25.24 kg/m²     Physical Exam  Vitals reviewed.     Eyes:      Extraocular Movements: Extraocular movements intact.       Neurological:      Motor: Motor strength is normal.      Neurological Exam  Mental Status   Oriented only to person, place and situation. Recent and remote memory are intact. Follows complex commands. Attention and concentration are normal.    Cranial Nerves  CN III, IV, VI: Extraocular movements intact bilaterally.  CN VII: Full and symmetric facial movement.  CN VIII: Hearing is normal.  CN IX, X: Palate elevates symmetrically  CN XII: Tongue midline without atrophy or fasciculations.    Motor   Normal muscle tone. Strength is 5/5 throughout all four extremities.    Coordination    Left hand tremor controlled on finger-to-nose.  No " postural tremor with hand outstretched.  No rest tremor.    Right hand tremor with mild proximal greater than distal tremor on finger-to-nose.    Gait    Ambulating slowly with a right limp with right knee bowing..    Physical Exam    Deep brain stimulator interrogation:     Right VIM   Activa RC  [id number]     Implanted: June 20, 2017  HARMEET: June 17, 2031  Battery: 100%   Impedance: ok     Group C  P1: 0+1-, PW90, 125Hz, 4.4V (3.4-4.6mA)  Imp 807ohms, 5.5mA  P2:0+2-, PW90, 125Hz, 3.9V    Imp  989 ohms, 4.0mA    Group D- lowered amplitude, can use as speech group.   P1: 0+1-, PW90, 125Hz, 3.4V (3.4-4.6mA)  Imp 807ohms, 5.5mA  P2:0+2-, , 125Hz, 3.3V         Left VIM  Activa  [id number]  [id number]  Iplanted: Dec 4, 2019  HARMEET: Nov 30, 2033  Battery: 100%  Impedance: ok  Imp 1026 , 2.4mA      3+1-, PW70, 185Hz, 2.5V (0-3.1V)   Results

## 2025-07-07 NOTE — TELEPHONE ENCOUNTER
"Pt called to advise she left her appt with Dr. Vasquez this morning without her \"instructions\". She said the instructions were for her  to use a \"machine\". She'd like those instructions mailed to her today.  "

## 2025-07-07 NOTE — PATIENT INSTRUCTIONS
Dbs adjustment made to right lead/brain/ left body on Group D to allow for change to Group D during time where speech may be worse.    Can utilize patient  to change from Group C to Group D (speech group) .    No changes made to the left IPG/brain/ right body.

## 2025-07-07 NOTE — ASSESSMENT & PLAN NOTE
She reports issues with gait and balance, which are being addressed through physical therapy. She has been attending physical therapy for the past 2 to 3 weeks. She is scheduled for a right knee replacement at the end of the month and plans to resume physical therapy post-surgery.

## 2025-07-07 NOTE — PROGRESS NOTES
Name: Matilad Day      : 1945       MRN: [id number]   Encounter Provider: Milly Main PA-C   Encounter Date: 25  Encounter department: [facility] [place]     ASSESSMENT & PLAN:  Assessment & Plan  Primary osteoarthritis of left hip  Known osteoarthritis of the left hip, as well as lumbar spine  X-rays taken and examined today of the left hip indicate as above  Advised to continue working with physical therapy for bilateral lower extremity strengthening and back exercises up until surgery  Educated patient that in order to determine if the pain is coming from her hip, we recommend injection of corticosteroid with radiology to the left hip joint  She expressed understanding and wishes to hold off at this moment  Able to proceed with right total knee arthroplasty later this month without any concerns  Orders:  •  FL injection left hip (non arthrogram); Future         To do next visit:  Follow up for post-op knee appointments    _____________________________________________________  CHIEF COMPLAINT:  Chief Complaint   Patient presents with   • Left Hip - Pain         SUBJECTIVE:  Matilda Day is a 79 y.o. female who presents for evaluation of her left hip.  Patient has known osteoarthritis of the left hip which has received little treatment in the past.  She explains last month she noticed increased lumbar spine and left hip pain at the level of the groin.  She denies difficulty putting on shoe wear, getting in and out of a car due to pain of the left hip.  She denies any symptoms that extend the entirety of the left leg.  She is currently scheduled to undergo right total knee arthroplasty with us later this month.    PAST MEDICAL HISTORY:  Past Medical History[1]    PAST SURGICAL HISTORY:  Past Surgical History[2]    FAMILY HISTORY:  Family History[3]    SOCIAL HISTORY:  Social History[4]    MEDICATIONS:  Current  "Medications[5]    ALLERGIES:  Allergies[6]    LABS:  HgA1c:   Lab Results   Component Value Date    HGBA1C 6.3 (H) 07/03/2025     BMP:   Lab Results   Component Value Date    GLUCOSE 112 10/17/2021    CALCIUM 9.7 07/03/2025     (L) 12/28/2015    K 4.2 07/03/2025    CO2 30 07/03/2025    CL 98 07/03/2025    BUN 11 07/03/2025    CREATININE 0.77 07/03/2025     CBC: No components found for: \"CBC\"    _____________________________________________________  PHYSICAL EXAMINATION:  Vital signs: Ht 5' 3\" (1.6 m)   Wt 63.5 kg (140 lb)   BMI 24.80 kg/m²   General: No acute distress, awake and alert  Psychiatric: Mood and affect appear appropriate  HEENT: Trachea Midline, No torticollis, no apparent facial trauma  Cardiovascular: No audible murmurs; Extremities appear perfused  Pulmonary: No audible wheezing or stridor  Skin: No open lesions; see further details (if any) below    MUSCULOSKELETAL EXAMINATION:  Left Hip Exam     Tenderness   The patient is experiencing no tenderness.     Range of Motion   Abduction:  normal   Adduction:  normal   Extension:  normal   Flexion:  normal   External rotation:  normal   Internal rotation: normal     Other   Erythema: absent  Scars: absent    Comments:  Good range of motion at level of the hip without increased pain  No guarding with motion              ___________________________________________________  STUDIES REVIEWED:  I personally reviewed the images obtained in office today and my independent interpretation is as follows:    left hip x-ray:   Moderate osteoarthritis of ball and socket joint   Degenerative changes noted of femoral head and acetabulum   Subchondral sclerosis and osteophytes noted        PROCEDURES PERFORMED:    None preformed       Milly Main PA-C         [1]  Past Medical History:  Diagnosis Date   • Anemia    • Anxiety    • Arrhythmia    • Arthritis    • Asthma    • Blood clot in vein     portal vein   • Breast cancer (HCC) 02/12/2021   • Breast lump     " [id number] RESOLVED   • Cancer (HCC)    • Depression    • Disease of thyroid gland    • DVT, lower extremity (HCC)    • GERD (gastroesophageal reflux disease)    • Heart disease mother   • Hyperlipidemia    • Hypertension    • Hypokalemia    • Hyponatremia    • Hypothyroidism    • Iron deficiency anemia    • Irregular heart beat    • Manic behavior (HCC)    • 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HCC)    • Osteoarthritis    • Palpitations     [id number]  RESOLVED   • Paroxysmal supraventricular tachycardia (HCC)    • PE (pulmonary thromboembolism) (HCC)    • Pneumonia    • Sjoegren syndrome    • Sleep apnea     mild   • Sleep difficulties    • Spinal stenosis    • Thrombocytosis     [id number]  RESOLVED   • Tremors of nervous system     dbs implanted right and left chest   • Ulcerative colitis (HCC)    • Vertigo     [id number] RESOLVED   [2]  Past Surgical History:  Procedure Laterality Date   • ABDOMINAL SURGERY     • APPENDECTOMY     • BREAST BIOPSY Left 11/07/2019    Stereo   • BREAST EXCISIONAL BIOPSY Left     x many years   • BREAST SURGERY      lumpectomy & biopsy   • CARMELLA HOLE W/ STEREOTACTIC INSERTION OF DBS LEADS / INTRAOP MICROELECTRODE RECORDING     • COLON SURGERY     • COLONOSCOPY N/A 08/30/2017    Procedure: POUCHOSCOPY;  Surgeon: VANDANA Waters MD;  Location: [facility];  Service: Colorectal   • COLOPROCTECTOMY W/ ILEO J POUCH     • DEEP BRAIN STIMULATOR PLACEMENT     • ESOPHAGOGASTRODUODENOSCOPY      ONSET 10/17/11   • FISTULA REPAIR      LLEOANAL FISTULA REPAIR TRANSPERIN TRANSABD APPROACH   • HYSTERECTOMY      age 38   • ILEOSTOMY CLOSURE     • KNEE ARTHROSCOPY      Right   • MAMMO STEREOTACTIC BREAST BIOPSY LEFT (ALL INC) Left 11/07/2019   • MAMMO STEREOTACTIC BREAST BIOPSY LEFT (ALL INC) Left 12/17/2020   • MAMMO STEREOTACTIC BREAST BIOPSY LEFT (ALL INC) EACH ADD Left 12/17/2020   • MASTECTOMY Left 02/12/2021    left mastectomy- Dr. Hayes   • MASTECTOMY W/ SENTINEL NODE BIOPSY Left 02/12/2021    Procedure:  BREAST MASTECTOMY WITH SENTINEL LYMPH NODE BIOPSY, LYMPHATIC MAPPING WITH BLUE DYE AND RADIAOCTIVE DYE (INJECT AT 1100 BY DR GILLESPIE IN THE OR);  Surgeon: Robbie Gillespie MD;  Location: [facility];  Service: Surgical Oncology   • ME ARTHRP ACETBLR/PROX FEM PROSTC AGRFT/ALGRFT Right 11/04/2024    Procedure: ARTHROPLASTY HIP TOTAL, overnight;  Surgeon: Gaviota Ford DO;  Location:  [facility];  Service: Orthopedics   • ME INSJ/RPLCMT CRANIAL NEUROSTIM PULSE GENERATOR Right 06/20/2017    Procedure: DBS GENERATOR REPLACEMENT;  Surgeon: Marin Mao MD;  Location:  MAIN OR;  Service: Neurosurgery   • ME INSJ/RPLCMT CRANIAL NEUROSTIM PULSE GENERATOR N/A 12/04/2019    Procedure: REPLACEMENT IMPLANTABLE PULSE GENERATOR FOR DEEP BRAIN STIMULATOR LEFT CHEST;  Surgeon: Damian Batres MD;  Location: [facility];  Service: Neurosurgery   • SPLENECTOMY     • TONSILLECTOMY     • TOTAL HIP ARTHROPLASTY Right    [3]  Family History  Problem Relation Name Age of Onset   • Venous thrombosis Mother charlene Godfrey         ACUTE VENOUS THROMBOSIS OF DEEP VESSELS OF THE DISTAL LOWER EXTREMITY   • Other Mother charlene Godfrey         PHLEBITIS   • Hypertension Mother charlene Godfrey    • Peripheral vascular disease Mother charlene Godfrey    • COPD Father gali    • Diabetes Father gali         MELLITUS   • Stroke Father gali    • Diabetes Sister lew         MELLITUS   • Sjogren's syndrome Sister lew    • No Known Problems Daughter gee    • No Known Problems Maternal Grandmother     • No Known Problems Maternal Grandfather     • No Known Problems Paternal Grandmother     • No Known Problems Paternal Grandfather     • No Known Problems Sister jeremy    • No Known Problems Maternal Aunt     • No Known Problems Paternal Aunt     • No Known Problems Son     [4]  Social History  Tobacco Use   • Smoking status: Former     Current packs/day: 0.00     Average packs/day: 1 pack/day for 15.0 years (15.0 ttl pk-yrs)      Types: Cigarettes     Start date: 1950     Quit date: 1965     Years since quittin.5   • Smokeless tobacco: Never   • Tobacco comments:     Quit   Vaping Use   • Vaping status: Never Used   Substance Use Topics   • Alcohol use: Yes     Alcohol/week: 2.0 standard drinks of alcohol     Types: 2 Cans of beer per week     Comment: 2or 3 beers a week   • Drug use: No   [5]    Current Outpatient Medications:   •  albuterol (PROVENTIL HFA,VENTOLIN HFA) 90 mcg/act inhaler, Inhale 2 puffs every 6 (six) hours as needed for wheezing, Disp: 8 g, Rfl: 1  •  amLODIPine (NORVASC) 2.5 mg tablet, TAKE ONE TABLET BY MOUTH EVERY DAY, Disp: 90 tablet, Rfl: 1  •  apixaban (Eliquis) 2.5 mg, Take 1 tablet (2.5 mg total) by mouth 2 (two) times a day, Disp: 60 tablet, Rfl: 5  •  ascorbic acid (VITAMIN C) 500 MG tablet, Take 1 tablet (500 mg total) by mouth daily Start 30 days prior to surgery, Disp: 30 tablet, Rfl: 0  •  Calcium Carb-Cholecalciferol (CALCIUM 600-D PO), Take 1 tablet by mouth in the morning and 1 tablet before bedtime., Disp: , Rfl:   •  Coenzyme Q10 (CO Q-10 PO), Take 1 capsule by mouth in the morning., Disp: , Rfl:   •  cyclobenzaprine (FLEXERIL) 5 mg tablet, Take 1 tablet (5 mg total) by mouth 3 (three) times a day as needed for muscle spasms, Disp: 60 tablet, Rfl: 0  •  diltiazem (CARDIZEM CD) 180 mg 24 hr capsule, TAKE ONE CAPSULE BY MOUTH EVERY DAY, Disp: 90 capsule, Rfl: 1  •  diltiazem (CARDIZEM) 60 mg tablet, TAKE ONE TABLET BY MOUTH EVERY DAY, Disp: 90 tablet, Rfl: 1  •  escitalopram (LEXAPRO) 20 mg tablet, TAKE ONE TABLET BY MOUTH EVERY DAY, Disp: 90 tablet, Rfl: 1  •  famotidine (PEPCID) 20 mg tablet, Take 1 tablet (20 mg total) by mouth daily, Disp: 90 tablet, Rfl: 1  •  ferrous sulfate 324 (65 Fe) mg, Take 1 tablet (324 mg total) by mouth daily before breakfast Start 30 days prior to surgery, Disp: 60 tablet, Rfl: 0  •  fluticasone (FLONASE) 50 mcg/act nasal spray, APPLY ONE SPRAY IN EACH NOSTRIL  ONCE DAILY AS NEEDED FOR RUNNY NOSE, Disp: 48 g, Rfl: 1  •  Fluticasone Furoate-Vilanterol 100-25 mcg/actuation inhaler, Inhale 1 puff daily Rinse mouth after use., Disp: 60 blister, Rfl: 5  •  folic acid (FOLVITE) 1 mg tablet, Take 1 tablet (1 mg total) by mouth daily Start 30 days prior to surgery, Disp: 30 tablet, Rfl: 0  •  gabapentin (NEURONTIN) 600 MG tablet, TAKE ONE TABLET BY MOUTH EVERY MORNING , TAKE ONE TABLET BY MOUTH EVERY DAY IN THE AFTERNOON , AND TAKE TWO TABLETS BY MOUTH EVERY EVENING AT BEDTIME, Disp: 360 tablet, Rfl: 1  •  HYDROcodone-acetaminophen (NORCO) 5-325 mg per tablet, Take 1 tablet by mouth every 6 (six) hours as needed for pain Max Daily Amount: 4 tablets, Disp: 120 tablet, Rfl: 0  •  hydrocortisone 2.5 % cream, , Disp: , Rfl:   •  ipratropium-albuterol (DUO-NEB) 0.5-2.5 mg/3 mL nebulizer solution, for 10 days, Disp: , Rfl:   •  levothyroxine 50 mcg tablet, Take 1 tablet (50 mcg total) by mouth daily, Disp: 90 tablet, Rfl: 1  •  lisinopril (ZESTRIL) 20 mg tablet, Take 1 tablet (20 mg total) by mouth 2 (two) times a day, Disp: 180 tablet, Rfl: 1  •  LORazepam (ATIVAN) 1 mg tablet, TAKE ONE TABLET BY MOUTH EVERY 6 HOURS AS NEEDED FOR ANXIETY, Disp: 120 tablet, Rfl: 0  •  MAGNESIUM PO, Take 1 tablet by mouth in the morning. 400 mg tablet ., Disp: , Rfl:   •  metaxalone (SKELAXIN) 800 mg tablet, Take 800 mg by mouth in the morning and 800 mg at noon and 800 mg in the evening and 800 mg before bedtime., Disp: , Rfl:   •  Multiple Vitamin (multivitamin) tablet, Take 1 tablet by mouth daily Begin 30 days prior to surgery, Disp: 30 tablet, Rfl: 1  •  mupirocin (BACTROBAN) 2 % ointment, Apply topically 2 (two) times a day Apply pea size amount to each nostril 2x/day for 5 days prior to procedure, Disp: 15 g, Rfl: 0  •  nystatin-triamcinolone (MYCOLOG-II) ointment, Apply topically 2 (two) times a day for 14 days, Disp: 60 g, Rfl: 3  •  Omega-3 Fatty Acids (FISH OIL PO), Take 1 capsule by mouth in  the morning., Disp: , Rfl:   •  pantoprazole (PROTONIX) 40 mg tablet, TAKE ONE TABLET BY MOUTH TWICE A DAY, Disp: 180 tablet, Rfl: 1  •  potassium chloride (MICRO-K) 10 MEQ CR capsule, TAKE TWO CAPSULES BY MOUTH EVERY DAY, Disp: 180 capsule, Rfl: 0  •  rosuvastatin (CRESTOR) 5 mg tablet, Take 1 tablet (5 mg total) by mouth daily, Disp: , Rfl:   •  sodium chloride 1 g tablet, Take 1 tablet (1 g total) by mouth 4 (four) times a day, Disp: 360 tablet, Rfl: 1  •  torsemide (DEMADEX) 10 mg tablet, Take 1 tablet (10 mg total) by mouth daily, Disp: 90 tablet, Rfl: 1  •  vitamin B-12 (VITAMIN B-12) 500 mcg tablet, Take 1 tablet (500 mcg total) by mouth daily, Disp: 90 tablet, Rfl: 1    Current Facility-Administered Medications:   •  cyanocobalamin injection 1,000 mcg, 1,000 mcg, Intramuscular, Q30 Days, Zaira Velasquez MD, 1,000 mcg at 04/20/23 1012[6]  Allergies  Allergen Reactions   • Indomethacin GI Intolerance, Dizziness and Other (See Comments)   • Macrobid [Nitrofurantoin Monohyd Macro] Rash   • Nitrofurantoin Other (See Comments)   • Penicillins Rash and Other (See Comments)     Annotation - [id number]: can take cephalosporins   • Sulfa Antibiotics Rash and Other (See Comments)

## 2025-07-07 NOTE — ASSESSMENT & PLAN NOTE
Essential tremor status post bilateral VIM with good control of tremor.  Complicated by mild dysarthria which is in part related to stimulation.  Speech worsens when fatigue during the evening.  Approximately 30-minute spent with the patient on Deep brain stimulation interrogation, programming, evaluation and counseling.  The body of the clinical note for details.Dbs adjustment made to right lead/brain/ left body on Group D to allow for change to Group D during time where speech may be worse.    Can utilize patient  to change from Group C to Group D (speech group) when having difficulty.    No changes made to the left IPG/brain/ right body.    Patient and  expressed understanding.

## 2025-07-07 NOTE — PROGRESS NOTES
Name: Matilda Day      : 1945      MRN: [id number]  Encounter Provider: Umu Harris MD  Encounter Date: 2025   Encounter department: [facility] [place]  :  Assessment & Plan          History of Present Illness   HPI   Review of Systems   Constitutional:  Positive for fatigue. Negative for appetite change and fever.   HENT:  Positive for trouble swallowing (pills and food) and voice change (when talking alot). Negative for hearing loss and tinnitus.    Eyes: Negative.  Negative for photophobia, pain and visual disturbance.   Respiratory: Negative.  Negative for shortness of breath.    Cardiovascular: Negative.  Negative for palpitations.   Gastrointestinal: Negative.  Negative for nausea and vomiting.   Endocrine: Negative.  Negative for cold intolerance.   Genitourinary: Negative.  Negative for dysuria, frequency and urgency.   Musculoskeletal:  Negative for back pain, gait problem, myalgias, neck pain and neck stiffness.        Balance issues when walking   Skin: Negative.  Negative for rash.   Allergic/Immunologic: Negative.    Neurological:  Positive for speech difficulty. Negative for dizziness, tremors, seizures, syncope, facial asymmetry, weakness, light-headedness, numbness and headaches.   Hematological: Negative.  Does not bruise/bleed easily.   Psychiatric/Behavioral:  Positive for sleep disturbance (get up a lot throughout the night). Negative for confusion and hallucinations.     I have personally reviewed the MA's review of systems and made changes as necessary.         Objective   There were no vitals taken for this visit.    Physical Exam  Neurological Exam

## 2025-07-08 ENCOUNTER — HOSPITAL ENCOUNTER (OUTPATIENT)
Dept: RADIOLOGY | Facility: HOSPITAL | Age: 80
Discharge: HOME | End: 2025-07-08
Attending: PHYSICIAN ASSISTANT
Payer: MEDICARE

## 2025-07-08 ENCOUNTER — OFFICE VISIT (OUTPATIENT)
Dept: PHYSICAL THERAPY | Facility: CLINIC | Age: 80
End: 2025-07-08
Attending: INTERNAL MEDICINE
Payer: MEDICARE

## 2025-07-08 DIAGNOSIS — M51.362 DEGENERATION OF INTERVERTEBRAL DISC OF LUMBAR REGION WITH DISCOGENIC BACK PAIN AND LOWER EXTREMITY PAIN: Primary | ICD-10-CM

## 2025-07-08 DIAGNOSIS — R13.19 ESOPHAGEAL DYSPHAGIA: ICD-10-CM

## 2025-07-08 DIAGNOSIS — M17.11 PRIMARY OSTEOARTHRITIS OF RIGHT KNEE: ICD-10-CM

## 2025-07-08 DIAGNOSIS — M25.552 LEFT HIP PAIN: ICD-10-CM

## 2025-07-08 DIAGNOSIS — M25.562 LEFT KNEE PAIN, UNSPECIFIED CHRONICITY: ICD-10-CM

## 2025-07-08 PROCEDURE — 74220 X-RAY XM ESOPHAGUS 1CNTRST: CPT

## 2025-07-08 PROCEDURE — 97110 THERAPEUTIC EXERCISES: CPT

## 2025-07-08 PROCEDURE — 97112 NEUROMUSCULAR REEDUCATION: CPT

## 2025-07-08 NOTE — PROGRESS NOTES
"Daily Note     Today's date: 2025  Patient name: Matilda Day  : 1945  MRN: [id number]  Referring provider: Zaira Velasquez MD  Dx:   Encounter Diagnosis     ICD-10-CM    1. Degeneration of intervertebral disc of lumbar region with discogenic back pain and lower extremity pain  M51.362       2. Primary osteoarthritis of right knee  M17.11       3. Left hip pain  M25.552       4. Left knee pain, unspecified chronicity  M25.562           Start Time: 1030  Stop Time: 1100  Total time in clinic (min): 30 minutes    Subjective: Pt noted that her HEP has been helping. Pt also noted that she saw Dr. Jannet RIVAS yesterday and she was told the her hip pain was coming from the L/S. Pt also noted that she had a Barium swallow test his morning but noted that she felt up for therapy.       Objective: See treatment diary below      Assessment: Tolerated treatment well. Program started with her seated exercises. Progressions focused on increasing LE strength. Pt was challenged with all exercises. Patient would benefit from continued PT      Plan: Continue per plan of care.      Goals: Patient's goal is to increase BLE strength.     Precautions: R FITO (anterior, 2024), past fall, asthja, high BP, supraventricular tachycardia, L breast CA (remission) and deep brain stimulator.  Dx: LBP, L hip pain and B knee pain (R TKA scheduled for )  Access Code: [email or web address]   NO AUTH NEEDED   Insurance Billing Rule ReEval POC expires PT/OT + Visit Limit? Co-Insurance Misc   [facility] CMS   Yes. 20% No Auth Needed   Capital                                        Visit/Unit Tracking  Date 7/1 7/3 7/8                  Used 1 2 3                  Remaining                            Daily Treatment Diary     Manuals 2025 7/3 7/8                                          Ther Ex         L BKFO 5\"x10 5\"x10 5\"x10      B quad set 5\"x10 B  5\"x10 B 5\"x10 B      Seated L/S flexion str 5\"x10 5\"x10 5\"x10      Seated knee " "flexion str 5\"x10 B 5\"x10 B 5\"x10      Gastroc str with srap  15\"x4 15\"x4                Incline str         R. bike         Pt Edu HEP/POC HEP       Neuro Re-ed         SLR  10x B 10x B      Clamshell  RTB 10x RTB 15x      Hip add iso  5\"x10       Seated march  15x B 20x B       HR/TR  20x each 20x each B      LAQ   10x B      Hip abd/ext         Mini squat         TKE  NV RTB 5\"x10 B      Ther Activity                                    Gait Training                           Modalities                                    "

## 2025-07-09 ENCOUNTER — OFFICE VISIT (OUTPATIENT)
Dept: NEPHROLOGY | Facility: CLINIC | Age: 80
End: 2025-07-09
Payer: MEDICARE

## 2025-07-09 VITALS
WEIGHT: 140 LBS | DIASTOLIC BLOOD PRESSURE: 74 MMHG | HEIGHT: 63 IN | HEART RATE: 65 BPM | BODY MASS INDEX: 24.8 KG/M2 | SYSTOLIC BLOOD PRESSURE: 126 MMHG

## 2025-07-09 DIAGNOSIS — D50.0 IRON DEFICIENCY ANEMIA DUE TO CHRONIC BLOOD LOSS: ICD-10-CM

## 2025-07-09 DIAGNOSIS — E22.2 SIADH (SYNDROME OF INAPPROPRIATE ADH PRODUCTION) (HCC): ICD-10-CM

## 2025-07-09 DIAGNOSIS — R60.0 LOCALIZED EDEMA: ICD-10-CM

## 2025-07-09 DIAGNOSIS — E87.1 HYPONATREMIA: Primary | ICD-10-CM

## 2025-07-09 DIAGNOSIS — E87.6 HYPOKALEMIA: ICD-10-CM

## 2025-07-09 DIAGNOSIS — I10 ESSENTIAL HYPERTENSION: ICD-10-CM

## 2025-07-09 PROCEDURE — 99214 OFFICE O/P EST MOD 30 MIN: CPT | Performed by: INTERNAL MEDICINE

## 2025-07-09 NOTE — ASSESSMENT & PLAN NOTE
Recent iron saturation of 20% with a ferritin of 61  Recent hemoglobin markedly improved at 12.5.    Summary:  Overall sodium level appears quite stable at 137  Anticipated surgical intervention for her right knee in the next few weeks  Would continue with her current regimen including salt tablets loop diuretic therapy  Recommend repeating laboratory studies in 3 months and then again in 6 months with follow-up at that time.  Stable to proceed with surgical intervention from nephrology standpoint

## 2025-07-09 NOTE — PROGRESS NOTES
Name: Matilda Day      : 1945      MRN: [id number]  Encounter Provider: Vinicius Smiley DO  Encounter Date: 2025   Encounter department: [facility] [place]  :  Assessment & Plan  Hyponatremia  Chronic hyponatremia, etiology multifactorial.  Etiology secondary to likely underlying SIADH as well as from sodium loss from J-pouch from ulcerative colitis with chronic loose stools.  Continue with sodium chloride tablets 1 g 4 times daily with torsemide 10 mg daily as well as relative fluid restriction.  Recent sodium level stable at 137.  Essential hypertension  Under good control, continue with diltiazem, amlodipine lisinopril and torsemide.  SIADH (syndrome of inappropriate ADH production) (HCC)  Management as noted above.  Hypokalemia  Currently stable with ongoing supplementation.  Recent potassium 4.2.  Localized edema  Stable with ongoing loop diuretic therapy.  Iron deficiency anemia due to chronic blood loss  Recent iron saturation of 20% with a ferritin of 61  Recent hemoglobin markedly improved at 12.5.    Summary:  Overall sodium level appears quite stable at 137  Anticipated surgical intervention for her right knee in the next few weeks  Would continue with her current regimen including salt tablets loop diuretic therapy  Recommend repeating laboratory studies in 3 months and then again in 6 months with follow-up at that time.  Stable to proceed with surgical intervention from nephrology standpoint        History of Present Illness   HPI  Matilda Day is a 79 y.o. female who presents for nephrology follow-up given previous diagnosis of hyponatremia.  She has been doing reasonably well.  Denies any recent ER visits or hospital stays.  Most of her complaints are secondary to right knee pain and discomfort.    Scheduled for right knee replacement at the end of this month.  No reported chest pain shortness of breath.  Lower extremity swelling is under good control.  Denies  "any abdominal pain or bloating.  Appetite is stable.  Denies any dizziness lightheadedness or syncopal episodes.        Review of Systems       Objective   /74 (BP Location: Left arm, Patient Position: Sitting, Cuff Size: Standard)   Pulse 65   Ht 5' 3\" (1.6 m)   Wt 63.5 kg (140 lb)   BMI 24.80 kg/m²      Physical Exam  Constitutional:       Appearance: She is not ill-appearing.     Eyes:      General: No scleral icterus.      Cardiovascular:      Rate and Rhythm: Normal rate and regular rhythm.   Pulmonary:      Effort: Pulmonary effort is normal.      Breath sounds: Normal breath sounds.   Abdominal:      General: There is no distension.      Palpations: Abdomen is soft.     Musculoskeletal:      Right lower leg: No edema.      Left lower leg: No edema.     Skin:     General: Skin is warm and dry.      Coloration: Skin is not jaundiced.      Findings: No rash.     Neurological:      Mental Status: She is alert and oriented to person, place, and time.           "

## 2025-07-10 ENCOUNTER — CONSULT (OUTPATIENT)
Dept: ANESTHESIOLOGY | Facility: CLINIC | Age: 80
End: 2025-07-10
Payer: MEDICARE

## 2025-07-10 ENCOUNTER — OFFICE VISIT (OUTPATIENT)
Dept: PHYSICAL THERAPY | Facility: CLINIC | Age: 80
End: 2025-07-10
Payer: MEDICARE

## 2025-07-10 ENCOUNTER — OFFICE VISIT (OUTPATIENT)
Age: 80
End: 2025-07-10
Attending: ORTHOPAEDIC SURGERY
Payer: MEDICARE

## 2025-07-10 VITALS
SYSTOLIC BLOOD PRESSURE: 118 MMHG | OXYGEN SATURATION: 95 % | HEIGHT: 63 IN | WEIGHT: 141 LBS | DIASTOLIC BLOOD PRESSURE: 66 MMHG | BODY MASS INDEX: 24.98 KG/M2 | HEART RATE: 59 BPM

## 2025-07-10 DIAGNOSIS — G25.0 ESSENTIAL TREMOR: ICD-10-CM

## 2025-07-10 DIAGNOSIS — M51.362 DEGENERATION OF INTERVERTEBRAL DISC OF LUMBAR REGION WITH DISCOGENIC BACK PAIN AND LOWER EXTREMITY PAIN: Primary | ICD-10-CM

## 2025-07-10 DIAGNOSIS — Z01.818 PREOPERATIVE CLEARANCE: Primary | ICD-10-CM

## 2025-07-10 DIAGNOSIS — M35.00 SJOGREN'S SYNDROME, WITH UNSPECIFIED ORGAN INVOLVEMENT (HCC): ICD-10-CM

## 2025-07-10 DIAGNOSIS — I10 ESSENTIAL HYPERTENSION: ICD-10-CM

## 2025-07-10 DIAGNOSIS — R73.03 PREDIABETES: ICD-10-CM

## 2025-07-10 DIAGNOSIS — M17.11 PRIMARY OSTEOARTHRITIS OF RIGHT KNEE: ICD-10-CM

## 2025-07-10 DIAGNOSIS — M25.552 LEFT HIP PAIN: ICD-10-CM

## 2025-07-10 DIAGNOSIS — M25.561 CHRONIC PAIN OF RIGHT KNEE: ICD-10-CM

## 2025-07-10 DIAGNOSIS — D50.0 IRON DEFICIENCY ANEMIA DUE TO CHRONIC BLOOD LOSS: ICD-10-CM

## 2025-07-10 DIAGNOSIS — M25.562 LEFT KNEE PAIN, UNSPECIFIED CHRONICITY: ICD-10-CM

## 2025-07-10 DIAGNOSIS — Z01.89 ENCOUNTER FOR GERIATRIC ASSESSMENT: ICD-10-CM

## 2025-07-10 DIAGNOSIS — E22.2 SIADH (SYNDROME OF INAPPROPRIATE ADH PRODUCTION) (HCC): ICD-10-CM

## 2025-07-10 DIAGNOSIS — I81 PORTAL VEIN THROMBOSIS: ICD-10-CM

## 2025-07-10 DIAGNOSIS — F11.20 CONTINUOUS OPIOID DEPENDENCE (HCC): ICD-10-CM

## 2025-07-10 DIAGNOSIS — F33.1 MODERATE EPISODE OF RECURRENT MAJOR DEPRESSIVE DISORDER (HCC): Primary | ICD-10-CM

## 2025-07-10 DIAGNOSIS — Z96.641 STATUS POST TOTAL HIP REPLACEMENT, RIGHT: ICD-10-CM

## 2025-07-10 DIAGNOSIS — G89.29 CHRONIC PAIN OF RIGHT KNEE: ICD-10-CM

## 2025-07-10 PROCEDURE — 97112 NEUROMUSCULAR REEDUCATION: CPT

## 2025-07-10 PROCEDURE — 99212 OFFICE O/P EST SF 10 MIN: CPT | Performed by: NURSE PRACTITIONER

## 2025-07-10 PROCEDURE — 97110 THERAPEUTIC EXERCISES: CPT

## 2025-07-10 PROCEDURE — 99214 OFFICE O/P EST MOD 30 MIN: CPT | Performed by: NURSE PRACTITIONER

## 2025-07-10 NOTE — PROGRESS NOTES
"Daily Note     Today's date: 7/10/2025  Patient name: Matilda Day  : 1945  MRN: [id number]  Referring provider: No ref. provider found  Dx:   Encounter Diagnosis     ICD-10-CM    1. Degeneration of intervertebral disc of lumbar region with discogenic back pain and lower extremity pain  M51.362       2. Primary osteoarthritis of right knee  M17.11       3. Left hip pain  M25.552       4. Left knee pain, unspecified chronicity  M25.562           Start Time: 1630  Stop Time: 1712  Total time in clinic (min): 42 minutes    Subjective: Pt noted that she sees an improvement. Pt had her pre-op TKA doctor's appointment this week.       Objective: See treatment diary below  RAPT:  (no major barriers)    Assessment: Tolerated treatment well. Program focused on increasing BLE strength, decreasing sx and increasing B knee ROM. Pt was challenged with all exercises. Pt was educated and demonstrated understanding of HEP and TKA progress. Patient would benefit from continued PT      Plan: Continue per plan of care.      Goals: Patient's goal is to increase BLE strength.     Precautions: R FITO (anterior, 2024), past fall, asthja, high BP, supraventricular tachycardia, L breast CA (remission) and deep brain stimulator.  Dx: LBP, L hip pain and B knee pain (R TKA scheduled for )  Access Code: [email or web address]   NO AUTH NEEDED   Insurance Billing Rule ReEval POC expires PT/OT + Visit Limit? Co-Insurance Misc   [facility] CMS   Yes. 20% No Auth Needed   Capital                                        Visit/Unit Tracking  Date 7/1 7/3 7/8 7/10                 Used 1 2 3 4                 Remaining                            Daily Treatment Diary     Manuals 2025 7/3 7/8 7/10                                         Ther Ex         L BKFO 5\"x10 5\"x10 5\"x10 5\"x10     B quad set 5\"x10 B  5\"x10 B 5\"x10 B 5\"x10 B     Seated L/S flexion str 5\"x10 5\"x10 5\"x10 5\"x10     Seated knee flexion str 5\"x10 B 5\"x10 B 5\"x10 " "20x     Gastroc str with srap  15\"x4 15\"x4                Incline str         R. bike         Pt Edu HEP/POC HEP  R TKA process/  HEP     Neuro Re-ed         SLR  10x B 10x B 10x B     Clamshell  RTB 10x RTB 15x RTB 20x     Hip add iso  5\"x10       Seated march  15x B 20x B       HR/TR  20x each 20x each B      LAQ   10x B 20x B     Hip abd/ext         Mini squat         TKE  NV RTB 5\"x10 B RTB 5\"x10 B     Ther Activity                                    Gait Training                           Modalities                                      "

## 2025-07-10 NOTE — H&P (VIEW-ONLY)
SOC CONSULT   GERIATRIC SURGERY     Name: Matilda Day      : 1945      MRN: [id number]  Encounter Provider: RODNEY Herrera  Encounter Date: 7/10/2025   Encounter department: [facility]    ASSESSMENT   79  year old female referred to SOC for pre-surgery geriatric screening 2.2 age   Other consult concerns include: surgical optimization & BEST program   This SOC appointment is strictly for a geriatric assessment 2.2 advanced age.  Patient was previously seen in SOC by Dr. Langford and his team for medical clearance. Please refer to his note for medical history.  She is scheduled on   Case: [id number] Date/Time: 25   Procedure: ARTHROPLASTY KNEE TOTAL W ROBOT (Right: Knee)   Anesthesia type: Choice   Diagnosis:      Chronic pain of right knee [M25.561, G89.29]      Primary osteoarthritis of right knee [M17.11]   Pre-op diagnosis:      Chronic pain of right knee [M25.561, G89.29]      Primary osteoarthritis of right knee [M17.11]   Location: [facility] ROOM 04 / [facility]   Surgeons: Lauri Power MD     Assessment & Plan  Encounter for geriatric assessment  SEEN FOR HER GERIATRIC ASSESSMENT   NO IMMEDIATE GERIATRIC CONCERNS   NO COGNITIVE CONCERNS     HAD A SPINAL IN 2024 AND DID WELL   RECOMMEND SPINAL AGAIN   EDUCATION PROVIDED AND PATIENT AGREES  RECOMMEND GERIATRIC PAIN PROTOCOL  Orders:    Ambulatory Referral to Surgical Optimization    Moderate episode of recurrent major depressive disorder (HCC)  STABLE TODAY   DEPRESSION SCREENING LEVEL ZERO        Essential tremor  SEE FULL GERIATRIC SCREEN        Iron deficiency anemia due to chronic blood loss  STABLE TODAY ABOVE MLJ PROTOCOL     Surgical Site Infection Risk (SSI) Risk  LOW  Encouraged high protein diet   Encourage an extra 30 grams protein daily   Non smoker   Non diabetic   BMI <40 @  24           History of Present Illness   HPI  Matilda Day is a 79  y.o. female who presents to SOC for her geriatric assessment due to age 79, having surgery, and receiving anesthesia.  She has been managing ongoing right knee pain and is electing to have a right knee replacement.  She history of a hip replacement back in  November 2024 -she had spinal anesthesia and did well.  Plan to do the same thing.      I met him in the SOC.  She arrived with her .  This SOC appointment is strictly for her geriatric assessment. She was previously;y seen in the SOC with Dr Langford's team for medical clearance.   No cognitive concerns.  No geriatric consult.  Recommend spinal anesthesia.  Education provided.  Patient agrees.  Recommend geriatric pain protocol.  Recommend delirium precautions on admit.      History obtained from: patient    Review of Systems       Objective   There were no vitals taken for this visit.     Physical Exam  HENT:      Head: Normocephalic.      Nose: Nose normal.     Cardiovascular:      Rate and Rhythm: Normal rate.   Pulmonary:      Effort: Pulmonary effort is normal.     Musculoskeletal:         General: No swelling. Normal range of motion.      Cervical back: Normal range of motion.     Neurological:      General: No focal deficit present.      Mental Status: She is alert and oriented to person, place, and time. Mental status is at baseline.     Psychiatric:         Mood and Affect: Mood normal.         Behavior: Behavior normal.         Thought Content: Thought content normal.         Judgment: Judgment normal.                                                         [facility] ([facility]  CONSULT   Allergies reviewed today   PMH reviewed today   Medications reviewed today     See Geriatric Assessment below...  Cognitive Assessment:   CAM:   TUG <15 sec:  Falls (last 6 months): no  Hand  score: 16.67  -Attempt 1:16  -Attempt 2:18  -Attempt 3:16  Aron Total Score: 20  PHQ- 9 Depression Scale:0  Nutrition Assessment Score:  14  METS:5.38  DX SLEEP APNEA; YES   Health goals:  -What are your overall health goals? Walk better    -What brings you strength? Bill ( significant other)     -What activities are important to you? puzzles     As always we discussed having your BEST surgery, and BEST recovery.  Surgery goals reviewed today.      Breathing exercises   Patient was encouraged to begin lung exercises today.  This could be accomplished through deep breathing and cough exercises.  Patient was taught how to use an incentive spirometer.  Return demonstration provided.    Eating/nutrition   Encouraged patient to increase oral protein intake prior to surgery.  This can be accomplished by consuming chicken, fish, tuna fish, cottage cheese, cheese, eggs, Greek yogurt, and protein shakes as needed.  I encouraged use of protein shakes such ENLIVE.  I also recommended making your own protein shakes with protein powder.     Sleep/Stress management  Patient was encouraged to rest their body prior to surgery.  Encouraged attempting to get 8 hours of sleep at night.  Avoid stress.  Avoid sick contacts.  Encouraged to find a relaxing hobby such as reading, meditation, listening to music.  Training exercises  Patient was encouraged to remain active as possible.  Today bilateral lower extremity generic exercises were taught for muscle strengthening and balance.  All exercises to be done sitting down.

## 2025-07-10 NOTE — PROGRESS NOTES
SOC CONSULT   GERIATRIC SURGERY     Name: Matilda Day      : 1945      MRN: [id number]  Encounter Provider: RODNEY Herrera  Encounter Date: 7/10/2025   Encounter department: [facility]    ASSESSMENT   79  year old female referred to SOC for pre-surgery geriatric screening 2.2 age   Other consult concerns include: surgical optimization & BEST program   This SOC appointment is strictly for a geriatric assessment 2.2 advanced age.  Patient was previously seen in SOC by Dr. Langford and his team for medical clearance. Please refer to his note for medical history.  She is scheduled on   Case: [id number] Date/Time: 25   Procedure: ARTHROPLASTY KNEE TOTAL W ROBOT (Right: Knee)   Anesthesia type: Choice   Diagnosis:      Chronic pain of right knee [M25.561, G89.29]      Primary osteoarthritis of right knee [M17.11]   Pre-op diagnosis:      Chronic pain of right knee [M25.561, G89.29]      Primary osteoarthritis of right knee [M17.11]   Location: [facility] ROOM 04 / [facility]   Surgeons: Lauri Power MD     Assessment & Plan  Encounter for geriatric assessment  SEEN FOR HER GERIATRIC ASSESSMENT   NO IMMEDIATE GERIATRIC CONCERNS   NO COGNITIVE CONCERNS     HAD A SPINAL IN 2024 AND DID WELL   RECOMMEND SPINAL AGAIN   EDUCATION PROVIDED AND PATIENT AGREES  RECOMMEND GERIATRIC PAIN PROTOCOL  Orders:    Ambulatory Referral to Surgical Optimization    Moderate episode of recurrent major depressive disorder (HCC)  STABLE TODAY   DEPRESSION SCREENING LEVEL ZERO        Essential tremor  SEE FULL GERIATRIC SCREEN        Iron deficiency anemia due to chronic blood loss  STABLE TODAY ABOVE MLJ PROTOCOL     Surgical Site Infection Risk (SSI) Risk  LOW  Encouraged high protein diet   Encourage an extra 30 grams protein daily   Non smoker   Non diabetic   BMI <40 @  24           History of Present Illness   HPI  Matilda Day is a 79  y.o. female who presents to SOC for her geriatric assessment due to age 79, having surgery, and receiving anesthesia.  She has been managing ongoing right knee pain and is electing to have a right knee replacement.  She history of a hip replacement back in  November 2024 -she had spinal anesthesia and did well.  Plan to do the same thing.      I met him in the SOC.  She arrived with her .  This SOC appointment is strictly for her geriatric assessment. She was previously;y seen in the SOC with Dr Langford's team for medical clearance.   No cognitive concerns.  No geriatric consult.  Recommend spinal anesthesia.  Education provided.  Patient agrees.  Recommend geriatric pain protocol.  Recommend delirium precautions on admit.      History obtained from: patient    Review of Systems       Objective   There were no vitals taken for this visit.     Physical Exam  HENT:      Head: Normocephalic.      Nose: Nose normal.     Cardiovascular:      Rate and Rhythm: Normal rate.   Pulmonary:      Effort: Pulmonary effort is normal.     Musculoskeletal:         General: No swelling. Normal range of motion.      Cervical back: Normal range of motion.     Neurological:      General: No focal deficit present.      Mental Status: She is alert and oriented to person, place, and time. Mental status is at baseline.     Psychiatric:         Mood and Affect: Mood normal.         Behavior: Behavior normal.         Thought Content: Thought content normal.         Judgment: Judgment normal.                                                         [facility]  CONSULT   Allergies reviewed today   PMH reviewed today   Medications reviewed today     See Geriatric Assessment below...  Cognitive Assessment:   CAM:   TUG <15 sec:  Falls (last 6 months): no  Hand  score: 16.67  -Attempt 1:16  -Attempt 2:18  -Attempt 3:16  Aron Total Score: 20  PHQ- 9 Depression Scale:0  Nutrition Assessment Score:  14  METS:5.38  DX SLEEP APNEA; YES   Health goals:  -What are your overall health goals? Walk better    -What brings you strength? Bill ( significant other)     -What activities are important to you? puzzles     As always we discussed having your BEST surgery, and BEST recovery.  Surgery goals reviewed today.      Breathing exercises   Patient was encouraged to begin lung exercises today.  This could be accomplished through deep breathing and cough exercises.  Patient was taught how to use an incentive spirometer.  Return demonstration provided.    Eating/nutrition   Encouraged patient to increase oral protein intake prior to surgery.  This can be accomplished by consuming chicken, fish, tuna fish, cottage cheese, cheese, eggs, Greek yogurt, and protein shakes as needed.  I encouraged use of protein shakes such ENLIVE.  I also recommended making your own protein shakes with protein powder.     Sleep/Stress management  Patient was encouraged to rest their body prior to surgery.  Encouraged attempting to get 8 hours of sleep at night.  Avoid stress.  Avoid sick contacts.  Encouraged to find a relaxing hobby such as reading, meditation, listening to music.  Training exercises  Patient was encouraged to remain active as possible.  Today bilateral lower extremity generic exercises were taught for muscle strengthening and balance.  All exercises to be done sitting down.

## 2025-07-10 NOTE — ASSESSMENT & PLAN NOTE
STABLE TODAY ABOVE MLJ PROTOCOL     Surgical Site Infection Risk (SSI) Risk  LOW  Encouraged high protein diet   Encourage an extra 30 grams protein daily   Non smoker   Non diabetic   BMI <40 @  24

## 2025-07-11 ENCOUNTER — TELEPHONE (OUTPATIENT)
Dept: OBGYN CLINIC | Facility: CLINIC | Age: 80
End: 2025-07-11

## 2025-07-11 ENCOUNTER — APPOINTMENT (OUTPATIENT)
Dept: RADIOLOGY | Age: 80
End: 2025-07-11
Attending: STUDENT IN AN ORGANIZED HEALTH CARE EDUCATION/TRAINING PROGRAM
Payer: MEDICARE

## 2025-07-11 ENCOUNTER — OFFICE VISIT (OUTPATIENT)
Dept: OBGYN CLINIC | Facility: CLINIC | Age: 80
End: 2025-07-11
Payer: MEDICARE

## 2025-07-11 VITALS — BODY MASS INDEX: 24.98 KG/M2 | HEIGHT: 63 IN | WEIGHT: 141 LBS

## 2025-07-11 DIAGNOSIS — M17.11 PRIMARY OSTEOARTHRITIS OF RIGHT KNEE: ICD-10-CM

## 2025-07-11 DIAGNOSIS — M17.11 PRIMARY OSTEOARTHRITIS OF RIGHT KNEE: Primary | ICD-10-CM

## 2025-07-11 PROCEDURE — 73564 X-RAY EXAM KNEE 4 OR MORE: CPT

## 2025-07-11 PROCEDURE — 73562 X-RAY EXAM OF KNEE 3: CPT

## 2025-07-11 PROCEDURE — 88312 SPECIAL STAINS GROUP 1: CPT | Performed by: STUDENT IN AN ORGANIZED HEALTH CARE EDUCATION/TRAINING PROGRAM

## 2025-07-11 PROCEDURE — 88305 TISSUE EXAM BY PATHOLOGIST: CPT | Performed by: STUDENT IN AN ORGANIZED HEALTH CARE EDUCATION/TRAINING PROGRAM

## 2025-07-11 PROCEDURE — 88342 IMHCHEM/IMCYTCHM 1ST ANTB: CPT | Performed by: STUDENT IN AN ORGANIZED HEALTH CARE EDUCATION/TRAINING PROGRAM

## 2025-07-11 PROCEDURE — 99215 OFFICE O/P EST HI 40 MIN: CPT | Performed by: STUDENT IN AN ORGANIZED HEALTH CARE EDUCATION/TRAINING PROGRAM

## 2025-07-11 RX ORDER — SODIUM CHLORIDE, SODIUM LACTATE, POTASSIUM CHLORIDE, CALCIUM CHLORIDE 600; 310; 30; 20 MG/100ML; MG/100ML; MG/100ML; MG/100ML
20 INJECTION, SOLUTION INTRAVENOUS CONTINUOUS
Status: CANCELLED | OUTPATIENT
Start: 2025-07-23

## 2025-07-11 RX ORDER — ACETAMINOPHEN 325 MG/1
975 TABLET ORAL ONCE
Status: CANCELLED | OUTPATIENT
Start: 2025-07-11 | End: 2025-07-11

## 2025-07-11 RX ORDER — TRANEXAMIC ACID 10 MG/ML
1000 INJECTION, SOLUTION INTRAVENOUS ONCE
Status: CANCELLED | OUTPATIENT
Start: 2025-07-23 | End: 2025-07-11

## 2025-07-11 RX ORDER — CEFAZOLIN SODIUM 2 G/50ML
2000 SOLUTION INTRAVENOUS ONCE
Status: CANCELLED | OUTPATIENT
Start: 2025-07-23 | End: 2025-07-11

## 2025-07-11 RX ORDER — CHLORHEXIDINE GLUCONATE 40 MG/ML
SOLUTION TOPICAL DAILY PRN
Status: CANCELLED | OUTPATIENT
Start: 2025-07-11

## 2025-07-11 RX ORDER — CHLORHEXIDINE GLUCONATE ORAL RINSE 1.2 MG/ML
15 SOLUTION DENTAL ONCE
Status: CANCELLED | OUTPATIENT
Start: 2025-07-11 | End: 2025-07-11

## 2025-07-11 NOTE — PROGRESS NOTES
Date: 25  Matilda Day   MRN# [id number]  : 1945      Chief Complaint: Right knee pain    Assessment and Plan:    Primary osteoarthritis of right knee  Patient is a Elderly (Approx >64yo) female with with pain at rest or at night  , functional instability, no mechanical symptoms  and addressed modifiable risk factors.  Radiographic evaluation demonstrates severe degenerative changes in all compartments. Physical exam reveals fixed valgus and full range of motion. Given these findings, I recommend:     - Surgical treatment in the form of right total knee arthroplasty  -Thorough discussion was had with the patient regarding the risks and benefits of continued nonoperative care versus pursuing surgical treatment in the form of total knee arthroplasty.  All questions were answered to the patient's satisfaction.  Patient does wish to proceed with total knee arthroplasty at this time  -Informed consent obtained in clinic today  -Patient will be seen 2 weeks postop     KNEE REPLACEMENT INDICATIONS AND RISKS    We had a lengthy discussion with the patient regarding the potential options for treatment. The patient has had an extensive conservative management course up until this time and therefore I do not feel that any additional conservative management will provide additional relief. The patient's symptoms have progressively worsened to the point where they now limit the patient's daily activities and quality of life.     At this time, I feel that this patient would be an excellent candidate for a knee replacement. The patient has failed non-operative care and continues to have unacceptable symptoms. We discussed the treatment options and alternatives and the risks and benefits of surgery in great detail.    While no guarantees can be made, knee replacement has a very high success rate in terms of relieving a patient's knee pain and returning them to a more active, independent lifestyle for 10-15  years or more. All surgery carries some risk; for knee replacement, the complication rate is low but may include: death (very rare), infection, bleeding requiring transfusion, blood clots in legs traveling to lungs, nerve and/or blood vessel damage, bone fracture, persistent knee pain and/or stiffness, and repeat surgery(ies). The risk of a major complication is about 1-2 per 1000 cases. Knee replacement should only be done if conservative treatment has failed. The revision rate for total knee replacements is about 1-2% per year; in other words, 85-95% of knee replacements may last 10 years; 75-85% last 20 years; and so on, assuming no injury to the knee. Finally we discussed anesthesia related complications which will be discussed in greater detail with the anesthesia team before surgery.     IF patient is a smoker, I discussed with patient about the importance of quitting smoking to decrease risk of infection postoperatively and promote good wound healing.    Patient current BMI is Body mass index is 24.98 kg/m².. Patient was counseled about the importance of weight loss prior to surgery, IF BMI is >35 prior to surgery my recommendation is that the patient considers nutritional consultation and further weight loss prior to surgical management. These recommendations were discussed with the patient.     The patient was shown knee replacement booklets, diagrams and/or models and all of their questions have been answered at the present time. The patient may call or come in if they have any other concerns or questions. The patient also understands the post-operative rehabilitation process and the need for their cooperation and participation, and that their results may be compromised by their lack of compliance. The patient would like to proceed. Patient is encouraged to seek additional opinions if they so desire. The patient voiced their understanding of the surgical plan and potential complications and wishes to  "proceed with surgery.       Subjective:     Knee Pain  Patient complains of right knee pain. This is evaluated as a personal injury. The pain began several years ago. The pain is located lateral, anterior.  She describes the symptoms as aching, shooting, and stabbing. Symptoms improve with rest, the use of a walker, avoiding painful activities. The symptoms are worse with activity, deep knee bending, getting up from a chair, weight bearing. The knee has given out or felt unstable. The patient can bend and straighten the knee fully. Treatment to date has been ice, heat, Tylenol, knee sleeve/brace, cortisone injection, therapy, without significant relief.    External Records Reviewed: office notes, operative reports, and radiology reports    Allergy:  Allergies[1]  Medications:  all current active meds have been reviewed  Past Medical History:  Past Medical History[2]  Past Surgical History:  Past Surgical History[3]  Family History:  Family History[4]  Social History:  Social History     Substance and Sexual Activity   Alcohol Use Yes    Alcohol/week: 2.0 standard drinks of alcohol    Types: 2 Cans of beer per week    Comment: 2or 3 beers a week     Social History     Substance and Sexual Activity   Drug Use No     Tobacco Use History[5]        ROS:   Review of Systems   All other systems reviewed and are negative.       Objective:   BP Readings from Last 1 Encounters:   07/10/25 118/66      Wt Readings from Last 1 Encounters:   07/11/25 64 kg (141 lb)      Pulse Readings from Last 1 Encounters:   07/10/25 59      BMI: Estimated body mass index is 24.98 kg/m² as calculated from the following:    Height as of this encounter: 5' 3\" (1.6 m).    Weight as of this encounter: 64 kg (141 lb).        Physical Exam  Constitutional:       General: She is not in acute distress.  HENT:      Head: Normocephalic and atraumatic.     Eyes:      Conjunctiva/sclera: Conjunctivae normal.       Cardiovascular:      Comments: Extremities " well perfused   No LE edema    Pulmonary:      Effort: Pulmonary effort is normal.   Abdominal:      General: Abdomen is flat. Bowel sounds are normal.     Neurological:      Mental Status: She is alert. Mental status is at baseline.          Gait and Station:   antalgic      Right knee:     Inspection:  normal color, temperature, turgor and moisture    Overall limb alignment: valgus    Effusion: minimal    ROM 0 to 130 with pain    Extensor Lag: Absent    Palpation: Medial and Lateral joint line tenderness to palpation    stable to AP translation at 90 deg    Coronal plane stable in full extension    Coronal plane stable in mid-flexion     Motor: 5/5 EHL/FHL/TA/GS/Qd/Hs    Vascular: Toes WWP with BCR    Sensory: SILT DP/SP/Rafia/Saph/Tib    Images:    I personally reviewed relevant images in the PACS system and my interpretation is as follows:  X-rays of the right knee reveal severe degenerative changes with subchondral cysts, subchondral sclerosis, osteophyte formation, and bone-on-bone articulation primarily in the lateral compartment        Ever Rosales MD  Adult Reconstruction Specialist   [facility]        [1]   Allergies  Allergen Reactions    Indomethacin GI Intolerance, Dizziness and Other (See Comments)    Macrobid [Nitrofurantoin Monohyd Macro] Rash    Nitrofurantoin Other (See Comments)    Penicillins Rash and Other (See Comments)     Annotation - [id number]: can take cephalosporins    Sulfa Antibiotics Rash and Other (See Comments)   [2]   Past Medical History:  Diagnosis Date    Anemia     Anxiety     Arrhythmia     Arthritis     Asthma     Blood clot in vein     portal vein    Breast cancer (HCC) 02/12/2021    Breast lump     [id number] RESOLVED    Cancer (HCC)     Depression     Disease of thyroid gland     DVT, lower extremity (HCC)     GERD (gastroesophageal reflux disease)     Heart disease mother    Hyperlipidemia     Hypertension     Hypokalemia     Hyponatremia      Hypothyroidism     Iron deficiency anemia     Irregular heart beat     Manic behavior (HCC)     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HCC)     Osteoarthritis     Palpitations     [id number]  RESOLVED    Paroxysmal supraventricular tachycardia (HCC)     PE (pulmonary thromboembolism) (HCC)     Pneumonia     Sjoegren syndrome     Sleep apnea     mild    Sleep difficulties     Spinal stenosis     Thrombocytosis     [id number]  RESOLVED    Tremors of nervous system     dbs implanted right and left chest    Ulcerative colitis (HCC)     Vertigo     [id number] RESOLVED   [3]   Past Surgical History:  Procedure Laterality Date    ABDOMINAL SURGERY      APPENDECTOMY      BREAST BIOPSY Left 11/07/2019    Stereo    BREAST EXCISIONAL BIOPSY Left     x many years    BREAST SURGERY      lumpectomy & biopsy    CARMELLA HOLE W/ STEREOTACTIC INSERTION OF DBS LEADS / INTRAOP MICROELECTRODE RECORDING      COLON SURGERY      COLONOSCOPY N/A 08/30/2017    Procedure: POUCHOSCOPY;  Surgeon: VANDANA Waetrs MD;  Location: [facility];  Service: Colorectal    COLOPROCTECTOMY W/ ILEO J POUCH      DEEP BRAIN STIMULATOR PLACEMENT      ESOPHAGOGASTRODUODENOSCOPY      ONSET 10/17/11    FISTULA REPAIR      LLEOANAL FISTULA REPAIR TRANSPERIN TRANSABD APPROACH    HYSTERECTOMY      age 38    ILEOSTOMY CLOSURE      KNEE ARTHROSCOPY      Right    MAMMO STEREOTACTIC BREAST BIOPSY LEFT (ALL INC) Left 11/07/2019    MAMMO STEREOTACTIC BREAST BIOPSY LEFT (ALL INC) Left 12/17/2020    MAMMO STEREOTACTIC BREAST BIOPSY LEFT (ALL INC) EACH ADD Left 12/17/2020    MASTECTOMY Left 02/12/2021    left mastectomy- Dr. Hayes    MASTECTOMY W/ SENTINEL NODE BIOPSY Left 02/12/2021    Procedure: BREAST MASTECTOMY WITH SENTINEL LYMPH NODE BIOPSY, LYMPHATIC MAPPING WITH BLUE DYE AND RADIAOCTIVE DYE (INJECT AT 1100 BY DR HAYES IN THE OR);  Surgeon: Robbie Hayes MD;  Location: [facility];  Service: Surgical Oncology    TX ARTHRP ACETBLR/PROX FEM PROSTC AGRFT/ALGRFT Right 11/04/2024     Procedure: ARTHROPLASTY HIP TOTAL, overnight;  Surgeon: Gaviota Ford DO;  Location:  MAIN OR;  Service: Orthopedics    WI INSJ/RPLCMT CRANIAL NEUROSTIM PULSE GENERATOR Right 2017    Procedure: DBS GENERATOR REPLACEMENT;  Surgeon: Marin Mao MD;  Location:  MAIN OR;  Service: Neurosurgery    WI INSJ/RPLCMT CRANIAL NEUROSTIM PULSE GENERATOR N/A 2019    Procedure: REPLACEMENT IMPLANTABLE PULSE GENERATOR FOR DEEP BRAIN STIMULATOR LEFT CHEST;  Surgeon: Damian Batres MD;  Location:  MAIN OR;  Service: Neurosurgery    SPLENECTOMY      TONSILLECTOMY      TOTAL HIP ARTHROPLASTY Right    [4]   Family History  Problem Relation Name Age of Onset    Venous thrombosis Mother charlene Godfrey         ACUTE VENOUS THROMBOSIS OF DEEP VESSELS OF THE DISTAL LOWER EXTREMITY    Other Mother charlene Zunigamew         PHLEBITIS    Hypertension Mother charlene Zunigamew     Peripheral vascular disease Mother charlene Godfrey     COPD Father gali     Diabetes Father gali         MELLITUS    Stroke Father gali     Diabetes Sister lew         MELLITUS    Sjogren's syndrome Sister lew     No Known Problems Daughter gee     No Known Problems Maternal Grandmother      No Known Problems Maternal Grandfather      No Known Problems Paternal Grandmother      No Known Problems Paternal Grandfather      No Known Problems Sister jeremy     No Known Problems Maternal Aunt      No Known Problems Paternal Aunt      No Known Problems Son     [5]   Social History  Tobacco Use   Smoking Status Former    Current packs/day: 0.00    Average packs/day: 1 pack/day for 15.0 years (15.0 ttl pk-yrs)    Types: Cigarettes    Start date: 1950    Quit date: 1965    Years since quittin.5   Smokeless Tobacco Never   Tobacco Comments    Quit

## 2025-07-11 NOTE — TELEPHONE ENCOUNTER
Patient was seen by Dr. Rosales today for a second opinion on her right knee. Patient was scheduled for a R TKA with Dr. Power for 7/28. After seeing Dr. Cordero today, patient would like to proceed with Gil for the surgery instead. I put in a request to cx the case with Jannet and cancelled PO appts. I got her scheduled for surgery with Gil. Dr. Power's surgery coordinator, Trish Munoz, is aware of this.  
English

## 2025-07-14 NOTE — ASSESSMENT & PLAN NOTE
Patient is a Elderly (Approx >64yo) female with with pain at rest or at night  , functional instability, no mechanical symptoms  and addressed modifiable risk factors.  Radiographic evaluation demonstrates severe degenerative changes in all compartments. Physical exam reveals fixed valgus and full range of motion. Given these findings, I recommend:     - Surgical treatment in the form of right total knee arthroplasty  -Thorough discussion was had with the patient regarding the risks and benefits of continued nonoperative care versus pursuing surgical treatment in the form of total knee arthroplasty.  All questions were answered to the patient's satisfaction.  Patient does wish to proceed with total knee arthroplasty at this time  -Informed consent obtained in clinic today  -Patient will be seen 2 weeks postop

## 2025-07-15 ENCOUNTER — OFFICE VISIT (OUTPATIENT)
Dept: PHYSICAL THERAPY | Facility: CLINIC | Age: 80
End: 2025-07-15
Attending: INTERNAL MEDICINE
Payer: MEDICARE

## 2025-07-15 ENCOUNTER — TELEPHONE (OUTPATIENT)
Age: 80
End: 2025-07-15

## 2025-07-15 DIAGNOSIS — M25.562 LEFT KNEE PAIN, UNSPECIFIED CHRONICITY: ICD-10-CM

## 2025-07-15 DIAGNOSIS — M17.11 PRIMARY OSTEOARTHRITIS OF RIGHT KNEE: ICD-10-CM

## 2025-07-15 DIAGNOSIS — M25.552 LEFT HIP PAIN: ICD-10-CM

## 2025-07-15 DIAGNOSIS — M51.362 DEGENERATION OF INTERVERTEBRAL DISC OF LUMBAR REGION WITH DISCOGENIC BACK PAIN AND LOWER EXTREMITY PAIN: Primary | ICD-10-CM

## 2025-07-15 PROCEDURE — 97110 THERAPEUTIC EXERCISES: CPT

## 2025-07-15 PROCEDURE — 97112 NEUROMUSCULAR REEDUCATION: CPT

## 2025-07-16 ENCOUNTER — ANESTHESIA EVENT (OUTPATIENT)
Age: 80
End: 2025-07-16
Payer: MEDICARE

## 2025-07-16 DIAGNOSIS — M54.16 LUMBAR RADICULOPATHY: ICD-10-CM

## 2025-07-16 DIAGNOSIS — F41.9 ANXIETY: ICD-10-CM

## 2025-07-16 RX ORDER — HYDROCODONE BITARTRATE AND ACETAMINOPHEN 5; 325 MG/1; MG/1
1 TABLET ORAL EVERY 6 HOURS PRN
Qty: 120 TABLET | Refills: 0 | Status: SHIPPED | OUTPATIENT
Start: 2025-07-16 | End: 2025-07-24

## 2025-07-16 RX ORDER — LORAZEPAM 1 MG/1
1 TABLET ORAL EVERY 6 HOURS PRN
Qty: 120 TABLET | Refills: 0 | Status: SHIPPED | OUTPATIENT
Start: 2025-07-16

## 2025-07-16 NOTE — TELEPHONE ENCOUNTER
Reason for call:   [x] Refill   [] Prior Auth  [x] Other: SURGERY NEXT WEEK     Office:   [x] PCP/Provider - Zaira Velasquez /[facility]   [] Specialty/Provider -     Medication: HYDROcodone-acetaminophen (NORCO) 5-325 mg per tablet     Dose/Frequency: y: Take 1 tablet by mouth every 6 (six) hours as needed for pain Max Daily Amount: 4 tablets     Quantity: 120        Medication: LORazepam (ATIVAN) 1 mg tablet     Dose/Frequency:  TAKE ONE TABLET BY MOUTH EVERY 6 HOURS AS NEEDED FOR ANXIETY,     Quantity: 120      Pharmacy:     [id number] - [place] - [address]      Local Pharmacy   Does the patient have enough for 3 days?   [] Yes   [x] No - Send as HP to POD

## 2025-07-17 ENCOUNTER — TELEPHONE (OUTPATIENT)
Dept: HEMATOLOGY ONCOLOGY | Facility: CLINIC | Age: 80
End: 2025-07-17

## 2025-07-17 ENCOUNTER — OFFICE VISIT (OUTPATIENT)
Dept: PHYSICAL THERAPY | Facility: CLINIC | Age: 80
End: 2025-07-17
Attending: INTERNAL MEDICINE
Payer: MEDICARE

## 2025-07-17 DIAGNOSIS — M51.362 DEGENERATION OF INTERVERTEBRAL DISC OF LUMBAR REGION WITH DISCOGENIC BACK PAIN AND LOWER EXTREMITY PAIN: Primary | ICD-10-CM

## 2025-07-17 DIAGNOSIS — M25.552 LEFT HIP PAIN: ICD-10-CM

## 2025-07-17 DIAGNOSIS — M25.562 LEFT KNEE PAIN, UNSPECIFIED CHRONICITY: ICD-10-CM

## 2025-07-17 DIAGNOSIS — M17.11 PRIMARY OSTEOARTHRITIS OF RIGHT KNEE: ICD-10-CM

## 2025-07-17 PROCEDURE — 97112 NEUROMUSCULAR REEDUCATION: CPT

## 2025-07-17 PROCEDURE — 97110 THERAPEUTIC EXERCISES: CPT

## 2025-07-17 NOTE — PROGRESS NOTES
Discharge     Today's date: 2025  Patient name: Matilda Day  : 1945  MRN: [id number]  Referring provider: Zaira Velasquez MD  Dx:   Encounter Diagnosis     ICD-10-CM    1. Degeneration of intervertebral disc of lumbar region with discogenic back pain and lower extremity pain  M51.362       2. Primary osteoarthritis of right knee  M17.11       3. Left hip pain  M25.552       4. Left knee pain, unspecified chronicity  M25.562           Start Time: 830  Stop Time: 09  Total time in clinic (min): 30 minutes    Subjective Evaluation   : Pt reported that her pain has been ranging between 1-4/10 and also reported a subjective improvement percentage of 40%. Pt reported that she felt comfortable being Dc'd at this time due to having a R TKA scheduled for .     History of Present Illness    IE: Patient presents with c/o R knee pain, LBP, L hip pain and L knee pain. Pt reported that her sx started about 2 months ago without a SHAWN. Pt noted no recent falls. Pt noted that she is getting an x-ray of the L hip on Monday. Pt reported that her sx increase with going up the steps and walking.   Pt also reported that her sx decrease with resting and sitting. Pt has a PMH of R FITO (anterior, 2024), past fall leading to L wrist fx, asthja, high BP, supraventricular tachycardia, L breast CA (remission) and deep brain stimulator. Pt uses a walker with 2 wheels at night. Pt does not drive. Pt is scheduled for a R TKA on .     Neuro signs: Deep brain stimulator    Bowel and bladder sxs: Diarrhea 2/2 past colon surgery   Red flags: Previous fall  Occupation: Retired     Pain  At best pain ratin/10  At worst pain ratin/10  Location: B knee, L hip and L/S       Social Support  Lives with her partner in a 2 story home  Stairs: railing  Tub: grab bar    Patient Goals  Patient goals for therapy: Increase BLE strength in preparation for R TKA    STGs-Met  1. Decrease pain by 20% in 2-4  weeks to improve standing tolerance.  2. Improve L hip/B knee ROM by 5-10 degrees and L/S ROM 10% in 2-4 weeks.   3.(I) with HEP within 2-4 week in order to improve PT outcomes.       LTGs-Partially Met  1. Decrease pain by 60% in 6-8 weeks.  2. Improve walking tolerance to >30 minutes in 6-8 weeks to perform community ambulation.   3. Perform housework/dressing/showering activities independently without pain in 6-8 weeks.    4. Improve stairs tolerance to 1 flight  in 8 weeks.   5. Improve FOTO to greater than or equal to 47.-Met      Objective Measurements:    Lumbar ROM R L Strength Knee R L   Flex  90%  Flex 4 4+   Ext 20%  Ext 4+ 4+   SB 30% 30%      Rot 80% 75%              Hip PROM   Hip     Flex  114 deg  106 deg Flex 4+ 4+   ER at 90 20 deg 44 deg ER     IR at 90 36 deg 10 deg IR     Abd 35 deg 35 deg Abd     Ext   Ext             Knee AROM   Ankle     Flex 120 deg 144 deg DF 4+ 4+   Ext -16 deg -6 deg PF 4+ 4+     TU sec    Assessment:    Matilda Day is a pleasant 79 y.o. female who has attended 7 visits of skilled PT for B knee pain, L hip pain and LBP. Pt demonstrated improvements in R knee ROM, L hip ROM, L/S AROM and LE strength. Pt also demonstrated decreased fall risk with TUG. Pt scored a 55 on FOTO demonstrating subjective functional improvement. Program was modified due to measurements being taken. Pt is being Dc'd at this time due to pt having a R TKA on . Pt was educated and demonstrated understanding of HEP. Pt was also educated on contacting [facility] with any further questions.     Thank you for the referral!        Plan  Patient would benefit from:Skilled physical therapy  Planned therapy interventions: manual therapy, neuromuscular re-education, stretching, strengthening, therapeutic activities, therapeutic exercise, patient education, home exercise program, modalities to decrease pain, and activity modification.    DC  Treatment plan discussed with: patient       Goals:  "Patient's goal is to increase BLE strength.     Precautions: R FITO (anterior, November 4th, 2024), past fall, asthja, high BP, supraventricular tachycardia, L breast CA (remission) and deep brain stimulator.  Dx: LBP, L hip pain and B knee pain (R TKA scheduled for 7/23)  Access Code: [email or web address]   NO AUTH NEEDED   Insurance Billing Rule ReEval POC expires PT/OT + Visit Limit? Co-Insurance Misc   [facility] CMS 8/1 8/26  Yes. 20% No Auth Needed   [employer]                                        Visit/Unit Tracking  Date 7/1 7/3 7/8 7/10 7/15 7/17               Used 1 2 3 4 5 6               Remaining                            Daily Treatment Diary     Manuals  7/3 7/8 7/10 7/15 7/17                                       Ther Ex         L BKFO  5\"x10 5\"x10 5\"x10 5\"x10    B quad set  5\"x10 B 5\"x10 B 5\"x10 B 5\"x10 B 5\"x10 B   Seated L/S flexion str  5\"x10 5\"x10 5\"x10 5\"x10    Seated knee flexion str  5\"x10 B 5\"x10 20x 5\"x10 B Supine 5\"x10 R   Gastroc str with srap  15\"x4 15\"x4    R 15\"x4            Incline str         R. bike         Pt Edu  HEP  R TKA process/  HEP POC HEP   Neuro Re-ed         SLR  10x B 10x B 10x B 15x B 15x B   Clamshell  RTB 10x RTB 15x RTB 20x RTB 20x    Hip add iso  5\"x10       Seated march  15x B 20x B   20x B    HR/TR  20x each 20x each B      LAQ   10x B 20x B 20x B 20x B   Hip abd/ext         Mini squat         TKE  NV RTB 5\"x10 B RTB 5\"x10 B RTB 5\"x10 B    Ther Activity                                    Gait Training                           Modalities                                          "

## 2025-07-17 NOTE — TELEPHONE ENCOUNTER
Spoke w/ patient. Informed her that there was a change in Dr. Ramon's schedule and he will not be available at 920 on 7/22. RS pt's appt w/ Shereen Styles for 7/22 @ 1230. Patient understanding and confirmed new appt.    Waiting for leadership to schedule the new appt.

## 2025-07-18 ENCOUNTER — RESULTS FOLLOW-UP (OUTPATIENT)
Age: 80
End: 2025-07-18

## 2025-07-18 NOTE — ASSESSMENT & PLAN NOTE
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    11/2024 right sided mammogram benign BIRADS 2, follow with surg onc  DXA 6/2024- osteoporosis, continue brown D, calcium supplement  Declined breast exam today, does SBE at home, f/u with surgical oncology every year

## 2025-07-18 NOTE — ASSESSMENT & PLAN NOTE
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

## 2025-07-18 NOTE — PROGRESS NOTES
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, she had history of ulcerative colitis, she received IV iron in the past     S/p 5 doses of IV venofer 200 mg in  and 3/25, last dose 3/11/25, tolerated well    7/3/25 iron panel- ferritin 61, iron sat 20%  Cbc- hgb 12.5, hct 38.9    Recheck labs in 2-3 months prior to next visit and s/p knee arthroscopic sx 25    F/u in 3 months with ROSANGELA Colon, call sooner for sxs including extreme fatigue, sob, dizziness      Malignant neoplasm of left breast in female, estrogen receptor negative, unspecified site of breast (HCC)  Patient is an 79 year old female with a history of 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    2024 right sided mammogram benign BIRADS 2, follow with surg onc  DXA 2024- osteoporosis, continue brown D, calcium supplement  Declined breast exam today, does SBE at home, f/u with surgical oncology every year            Return in about 3 months (around 10/22/2025).    History of Present Illness   Chief Complaint   Patient presents with    Follow-up     Matilda Day is a 79-year-old postmenopausal woman with microinvasive left breast cancer, ER negative, HI negative, 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 diagnosed in early . Her microinvasive breast cancer measure less than 1 mm.  However, her DCIS measure 7.7 cm. She underwent mastectomy and sentinel lymph node biopsy, resulting in KORIN.  She did not  require any adjuvant therapy.     She also has a history of ulcerative colitis as well as bowel resection in 2010.  Subsequently, she developed iron deficiency anemia, due to the malabsorption. She has received IV Venofer in the past.      Oncology History   Cancer Staging   Malignant neoplasm of left breast in female, estrogen receptor negative, unspecified site of breast (HCC)  Staging form: Breast, AJCC 8th Edition  - Pathologic stage from 2/12/2021: Stage IA (pT1mi, pN0, cM0, G3, ER-, SC-, HER2+) - Signed by RODNEY Delgado on 11/18/2024  Stage prefix: Initial diagnosis  Multigene prognostic tests performed: None  Histologic grading system: 3 grade system  Oncology History   History of left breast cancer   12/17/2020 Biopsy    Left Breast stereotactic biopsy:  A. 4 o'clock, posterior  Ductal carcinoma in situ with microinvasion  Grade 3  ER 0, SC 0, HER2 3+    B & C. 5 o'clock, anterior with & without calcs  Ductal carcinoma in situ  Grade 3  ER 0, SC 0    Concordant. Malignancy appears unifocal; calcifications span 4.1 cm. Both clips migrated. No recent axillary US. Right breast clear.     1/7/2021 Genetic Testing    The following genes were evaluated: CHARLOTTE, BRCA1, BRCA2, CDH1, CHEK2, PALB2, PTEN, STK11, Tp53; additional genes evaluated for a total of 20 genes.  Negative result. 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 Miller  Adjuvant therapy not recommended     Malignant neoplasm of left breast in female, estrogen receptor negative, unspecified site of breast (HCC)   2/12/2021 -  Cancer Staged    Staging form: Breast, AJCC 8th Edition  - Pathologic stage from  "2/12/2021: Stage IA (pT1mi, pN0, cM0, G3, ER-, WA-, HER2+) - Signed by RODNEY Delgado on 11/18/2024  Stage prefix: Initial diagnosis  Multigene prognostic tests performed: None  Histologic grading system: 3 grade system       5/14/2024 Initial Diagnosis    Malignant neoplasm of left breast in female, estrogen receptor negative, unspecified site of breast (HCC)        Pertinent Medical History     07/22/25: Today pt reports she is feeling well, just nervous with her upcoming knee surgery tomorrow. She denies fatigue, sob, ab bleeding. No concerns for today         Review of Systems   Constitutional:  Negative for activity change and fatigue.   Respiratory:  Negative for shortness of breath.    Hematological:  Does not bruise/bleed easily.     Medications Ordered Prior to Encounter[1]   Social History[2]      Objective   /68 (BP Location: Left arm, Patient Position: Sitting, Cuff Size: Adult)   Pulse 61   Temp 97.7 °F (36.5 °C) (Temporal)   Resp 16   Ht 5' 3\" (1.6 m)   Wt 64.6 kg (142 lb 8 oz)   SpO2 93%   BMI 25.24 kg/m²     Pain Screening:  Pain Score: 0-No pain  ECOG ECOG Performance Status: 1 - Restricted in physically strenuous activity but ambulatory and able to carry out work of a light or sedentary nature, e.g., light house work, office work   Physical Exam  Vitals reviewed.   Constitutional:       General: She is not in acute distress.     Appearance: She is not toxic-appearing.   HENT:      Head: Normocephalic.   Pulmonary:      Effort: No respiratory distress.     Skin:     General: Skin is warm and dry.      Coloration: Skin is not pale.      Findings: No bruising.     Neurological:      General: No focal deficit present.      Mental Status: She is alert and oriented to person, place, and time.     Psychiatric:         Mood and Affect: Mood normal.         Labs: I have reviewed the following labs:  Lab Results   Component Value Date/Time    WBC 7.20 07/03/2025 08:33 AM    RBC " 4.10 07/03/2025 08:33 AM    Hemoglobin 12.5 07/03/2025 08:33 AM    Hematocrit 38.9 07/03/2025 08:33 AM    MCV 95 07/03/2025 08:33 AM    MCH 30.5 07/03/2025 08:33 AM    RDW 15.2 (H) 07/03/2025 08:33 AM    Platelets 440 (H) 07/03/2025 08:33 AM    Segmented % 42 (L) 07/03/2025 08:33 AM    Lymphocytes % 41 07/03/2025 08:33 AM    Monocytes % 13 (H) 07/03/2025 08:33 AM    Eosinophils Relative 3 07/03/2025 08:33 AM    Basophils Relative 1 07/03/2025 08:33 AM    Immature Grans % 0 07/03/2025 08:33 AM    Absolute Neutrophils 3.04 07/03/2025 08:33 AM     Lab Results   Component Value Date/Time    Potassium 4.2 07/03/2025 08:33 AM    Chloride 98 07/03/2025 08:33 AM    CO2 30 07/03/2025 08:33 AM    BUN 11 07/03/2025 08:33 AM    Creatinine 0.77 07/03/2025 08:33 AM    Glucose, Fasting 98 07/03/2025 08:33 AM    Calcium 9.7 07/03/2025 08:33 AM    AST 20 07/03/2025 08:33 AM    ALT 10 07/03/2025 08:33 AM    Alkaline Phosphatase 56 07/03/2025 08:33 AM    Total Protein 6.8 07/03/2025 08:33 AM    Albumin 4.1 07/03/2025 08:33 AM    Total Bilirubin 0.59 07/03/2025 08:33 AM    eGFR 73 07/03/2025 08:33 AM     Lab Results   Component Value Date/Time    Iron 61 07/03/2025 08:33 AM    Iron 61 07/03/2025 08:33 AM    Iron Saturation 20 07/03/2025 08:33 AM    Iron Saturation 20 07/03/2025 08:33 AM    Ferritin 60 07/03/2025 08:33 AM    Ferritin 61 07/03/2025 08:33 AM        Radiology Results Review: I have reviewed radiology reports from chart including: DXA bone scan and Mammogram.    Administrative Statements   I have spent a total time of 30 minutes in caring for this patient on the day of the visit/encounter including Diagnostic results, Prognosis, Risks and benefits of tx options, Instructions for management, Patient and family education, Impressions, Counseling / Coordination of care, Documenting in the medical record, Reviewing/placing orders in the medical record (including tests, medications, and/or procedures), and Obtaining or reviewing  history  .       [1]   Current Outpatient Medications on File Prior to Visit   Medication Sig Dispense Refill    albuterol (PROVENTIL HFA,VENTOLIN HFA) 90 mcg/act inhaler Inhale 2 puffs every 6 (six) hours as needed for wheezing 8 g 1    amLODIPine (NORVASC) 2.5 mg tablet TAKE ONE TABLET BY MOUTH EVERY DAY 90 tablet 1    apixaban (Eliquis) 2.5 mg Take 1 tablet (2.5 mg total) by mouth 2 (two) times a day 60 tablet 5    ascorbic acid (VITAMIN C) 500 MG tablet Take 1 tablet (500 mg total) by mouth daily Start 30 days prior to surgery 30 tablet 0    Calcium Carb-Cholecalciferol (CALCIUM 600-D PO) Take 1 tablet by mouth in the morning and 1 tablet before bedtime.      Coenzyme Q10 (CO Q-10 PO) Take 1 capsule by mouth in the morning.      cyclobenzaprine (FLEXERIL) 5 mg tablet Take 1 tablet (5 mg total) by mouth 3 (three) times a day as needed for muscle spasms 60 tablet 0    diltiazem (CARDIZEM CD) 180 mg 24 hr capsule TAKE ONE CAPSULE BY MOUTH EVERY DAY (Patient taking differently: Take 180 mg by mouth in the evening.) 90 capsule 1    diltiazem (CARDIZEM) 60 mg tablet TAKE ONE TABLET BY MOUTH EVERY DAY 90 tablet 1    escitalopram (LEXAPRO) 20 mg tablet TAKE ONE TABLET BY MOUTH EVERY DAY 90 tablet 1    famotidine (PEPCID) 20 mg tablet Take 1 tablet (20 mg total) by mouth daily 90 tablet 1    ferrous sulfate 324 (65 Fe) mg Take 1 tablet (324 mg total) by mouth daily before breakfast Start 30 days prior to surgery 60 tablet 0    fluticasone (FLONASE) 50 mcg/act nasal spray APPLY ONE SPRAY IN EACH NOSTRIL ONCE DAILY AS NEEDED FOR RUNNY NOSE 48 g 1    Fluticasone Furoate-Vilanterol 100-25 mcg/actuation inhaler Inhale 1 puff daily Rinse mouth after use. 60 blister 5    folic acid (FOLVITE) 1 mg tablet Take 1 tablet (1 mg total) by mouth daily Start 30 days prior to surgery 30 tablet 0    gabapentin (NEURONTIN) 600 MG tablet TAKE ONE TABLET BY MOUTH EVERY MORNING , TAKE ONE TABLET BY MOUTH EVERY DAY IN THE AFTERNOON , AND TAKE  TWO TABLETS BY MOUTH EVERY EVENING AT BEDTIME 360 tablet 1    HYDROcodone-acetaminophen (NORCO) 5-325 mg per tablet Take 1 tablet by mouth every 6 (six) hours as needed for pain Max Daily Amount: 4 tablets 120 tablet 0    hydrocortisone 2.5 % cream  (Patient not taking: Reported on 7/7/2025)      ipratropium-albuterol (DUO-NEB) 0.5-2.5 mg/3 mL nebulizer solution for 10 days      levothyroxine 50 mcg tablet Take 1 tablet (50 mcg total) by mouth daily 90 tablet 1    lisinopril (ZESTRIL) 20 mg tablet Take 1 tablet (20 mg total) by mouth 2 (two) times a day 180 tablet 1    LORazepam (ATIVAN) 1 mg tablet Take 1 tablet (1 mg total) by mouth every 6 (six) hours as needed for anxiety 120 tablet 0    MAGNESIUM PO Take 1 tablet by mouth in the morning. 400 mg tablet .      metaxalone (SKELAXIN) 800 mg tablet Take 800 mg by mouth in the morning and 800 mg at noon and 800 mg in the evening and 800 mg before bedtime.      Multiple Vitamin (multivitamin) tablet Take 1 tablet by mouth daily Begin 30 days prior to surgery 30 tablet 1    mupirocin (BACTROBAN) 2 % ointment Apply topically 2 (two) times a day Apply pea size amount to each nostril 2x/day for 5 days prior to procedure 15 g 0    nystatin-triamcinolone (MYCOLOG-II) ointment Apply topically 2 (two) times a day for 14 days 60 g 3    Omega-3 Fatty Acids (FISH OIL PO) Take 1 capsule by mouth in the morning.      pantoprazole (PROTONIX) 40 mg tablet TAKE ONE TABLET BY MOUTH TWICE A  tablet 1    potassium chloride (MICRO-K) 10 MEQ CR capsule TAKE TWO CAPSULES BY MOUTH EVERY  capsule 0    rosuvastatin (CRESTOR) 5 mg tablet Take 1 tablet (5 mg total) by mouth daily      sodium chloride 1 g tablet Take 1 tablet (1 g total) by mouth 4 (four) times a day 360 tablet 1    torsemide (DEMADEX) 10 mg tablet Take 1 tablet (10 mg total) by mouth daily 90 tablet 1    vitamin B-12 (VITAMIN B-12) 500 mcg tablet Take 1 tablet (500 mcg total) by mouth daily 90 tablet 1     Current  Facility-Administered Medications on File Prior to Visit   Medication Dose Route Frequency Provider Last Rate Last Admin    cyanocobalamin injection 1,000 mcg  1,000 mcg Intramuscular Q30 Days Zaira Velasquez MD   1,000 mcg at 23 1012   [2]   Social History  Tobacco Use    Smoking status: Former     Current packs/day: 0.00     Average packs/day: 1 pack/day for 15.0 years (15.0 ttl pk-yrs)     Types: Cigarettes     Start date: 1950     Quit date: 1965     Years since quittin.5    Smokeless tobacco: Never    Tobacco comments:     Quit   Vaping Use    Vaping status: Never Used   Substance and Sexual Activity    Alcohol use: Yes     Alcohol/week: 2.0 standard drinks of alcohol     Types: 2 Cans of beer per week     Comment: 2or 3 beers a week    Drug use: No    Sexual activity: Not Currently     Partners: Male     Birth control/protection: Post-menopausal

## 2025-07-21 ENCOUNTER — APPOINTMENT (OUTPATIENT)
Dept: PHYSICAL THERAPY | Facility: CLINIC | Age: 80
End: 2025-07-21
Attending: INTERNAL MEDICINE
Payer: MEDICARE

## 2025-07-22 ENCOUNTER — OFFICE VISIT (OUTPATIENT)
Dept: HEMATOLOGY ONCOLOGY | Facility: CLINIC | Age: 80
End: 2025-07-22
Payer: MEDICARE

## 2025-07-22 VITALS
SYSTOLIC BLOOD PRESSURE: 116 MMHG | BODY MASS INDEX: 25.25 KG/M2 | HEART RATE: 61 BPM | TEMPERATURE: 97.7 F | OXYGEN SATURATION: 93 % | WEIGHT: 142.5 LBS | HEIGHT: 63 IN | RESPIRATION RATE: 16 BRPM | DIASTOLIC BLOOD PRESSURE: 68 MMHG

## 2025-07-22 DIAGNOSIS — C50.912 MALIGNANT NEOPLASM OF LEFT BREAST IN FEMALE, ESTROGEN RECEPTOR NEGATIVE, UNSPECIFIED SITE OF BREAST (HCC): ICD-10-CM

## 2025-07-22 DIAGNOSIS — D50.0 IRON DEFICIENCY ANEMIA DUE TO CHRONIC BLOOD LOSS: Primary | ICD-10-CM

## 2025-07-22 DIAGNOSIS — Z17.1 MALIGNANT NEOPLASM OF LEFT BREAST IN FEMALE, ESTROGEN RECEPTOR NEGATIVE, UNSPECIFIED SITE OF BREAST (HCC): ICD-10-CM

## 2025-07-22 PROCEDURE — 99214 OFFICE O/P EST MOD 30 MIN: CPT | Performed by: PHYSICIAN ASSISTANT

## 2025-07-23 ENCOUNTER — APPOINTMENT (OUTPATIENT)
Age: 80
End: 2025-07-23
Payer: MEDICARE

## 2025-07-23 ENCOUNTER — ANESTHESIA (OUTPATIENT)
Age: 80
End: 2025-07-23
Payer: MEDICARE

## 2025-07-23 ENCOUNTER — HOSPITAL ENCOUNTER (OUTPATIENT)
Age: 80
Setting detail: OUTPATIENT SURGERY
Discharge: HOME | End: 2025-07-24
Attending: STUDENT IN AN ORGANIZED HEALTH CARE EDUCATION/TRAINING PROGRAM | Admitting: STUDENT IN AN ORGANIZED HEALTH CARE EDUCATION/TRAINING PROGRAM
Payer: MEDICARE

## 2025-07-23 ENCOUNTER — TELEPHONE (OUTPATIENT)
Age: 80
End: 2025-07-23

## 2025-07-23 DIAGNOSIS — M17.11 PRIMARY OSTEOARTHRITIS OF RIGHT KNEE: Primary | ICD-10-CM

## 2025-07-23 LAB — GLUCOSE SERPL-MCNC: 103 MG/DL (ref 65–140)

## 2025-07-23 PROCEDURE — A6250 SKIN SEAL PROTECT MOISTURIZR: HCPCS | Performed by: STUDENT IN AN ORGANIZED HEALTH CARE EDUCATION/TRAINING PROGRAM

## 2025-07-23 PROCEDURE — 99222 1ST HOSP IP/OBS MODERATE 55: CPT | Performed by: INTERNAL MEDICINE

## 2025-07-23 PROCEDURE — 97163 PT EVAL HIGH COMPLEX 45 MIN: CPT | Performed by: PHYSICAL THERAPIST

## 2025-07-23 PROCEDURE — 97167 OT EVAL HIGH COMPLEX 60 MIN: CPT

## 2025-07-23 PROCEDURE — C1713 ANCHOR/SCREW BN/BN,TIS/BN: HCPCS | Performed by: STUDENT IN AN ORGANIZED HEALTH CARE EDUCATION/TRAINING PROGRAM

## 2025-07-23 PROCEDURE — C1776 JOINT DEVICE (IMPLANTABLE): HCPCS | Performed by: STUDENT IN AN ORGANIZED HEALTH CARE EDUCATION/TRAINING PROGRAM

## 2025-07-23 PROCEDURE — 27447 TOTAL KNEE ARTHROPLASTY: CPT

## 2025-07-23 PROCEDURE — 73560 X-RAY EXAM OF KNEE 1 OR 2: CPT

## 2025-07-23 PROCEDURE — 82948 REAGENT STRIP/BLOOD GLUCOSE: CPT

## 2025-07-23 PROCEDURE — 27447 TOTAL KNEE ARTHROPLASTY: CPT | Performed by: STUDENT IN AN ORGANIZED HEALTH CARE EDUCATION/TRAINING PROGRAM

## 2025-07-23 DEVICE — IMPLANTABLE DEVICE: Type: IMPLANTABLE DEVICE | Site: KNEE | Status: FUNCTIONAL

## 2025-07-23 RX ORDER — CEFAZOLIN SODIUM 2 G/50ML
2000 SOLUTION INTRAVENOUS ONCE
Status: COMPLETED | OUTPATIENT
Start: 2025-07-23 | End: 2025-07-23

## 2025-07-23 RX ORDER — TRANEXAMIC ACID 10 MG/ML
1000 INJECTION, SOLUTION INTRAVENOUS ONCE
Status: COMPLETED | OUTPATIENT
Start: 2025-07-23 | End: 2025-07-23

## 2025-07-23 RX ORDER — SODIUM CHLORIDE, SODIUM LACTATE, POTASSIUM CHLORIDE, CALCIUM CHLORIDE 600; 310; 30; 20 MG/100ML; MG/100ML; MG/100ML; MG/100ML
20 INJECTION, SOLUTION INTRAVENOUS CONTINUOUS
Status: DISCONTINUED | OUTPATIENT
Start: 2025-07-23 | End: 2025-07-24 | Stop reason: HOSPADM

## 2025-07-23 RX ORDER — OXYCODONE HYDROCHLORIDE 5 MG/1
5 TABLET ORAL EVERY 4 HOURS PRN
Qty: 30 TABLET | Refills: 0 | Status: SHIPPED | OUTPATIENT
Start: 2025-07-23

## 2025-07-23 RX ORDER — LORAZEPAM 0.5 MG/1
1 TABLET ORAL EVERY 6 HOURS PRN
Status: DISCONTINUED | OUTPATIENT
Start: 2025-07-23 | End: 2025-07-24 | Stop reason: HOSPADM

## 2025-07-23 RX ORDER — BUPIVACAINE HYDROCHLORIDE 2.5 MG/ML
INJECTION, SOLUTION EPIDURAL; INFILTRATION; INTRACAUDAL; PERINEURAL
Status: COMPLETED | OUTPATIENT
Start: 2025-07-23 | End: 2025-07-23

## 2025-07-23 RX ORDER — HYDROMORPHONE HCL/PF 1 MG/ML
0.5 SYRINGE (ML) INJECTION EVERY 2 HOUR PRN
Status: DISCONTINUED | OUTPATIENT
Start: 2025-07-23 | End: 2025-07-24 | Stop reason: HOSPADM

## 2025-07-23 RX ORDER — DILTIAZEM HYDROCHLORIDE 30 MG/1
60 TABLET, FILM COATED ORAL DAILY
Status: DISCONTINUED | OUTPATIENT
Start: 2025-07-23 | End: 2025-07-24 | Stop reason: HOSPADM

## 2025-07-23 RX ORDER — ESCITALOPRAM OXALATE 10 MG/1
20 TABLET ORAL DAILY
Status: DISCONTINUED | OUTPATIENT
Start: 2025-07-23 | End: 2025-07-24 | Stop reason: HOSPADM

## 2025-07-23 RX ORDER — GABAPENTIN 300 MG/1
600 CAPSULE ORAL DAILY
Status: DISCONTINUED | OUTPATIENT
Start: 2025-07-24 | End: 2025-07-24 | Stop reason: HOSPADM

## 2025-07-23 RX ORDER — ACETAMINOPHEN 500 MG
1000 TABLET ORAL EVERY 8 HOURS PRN
Qty: 84 TABLET | Refills: 0 | Status: SHIPPED | OUTPATIENT
Start: 2025-07-23 | End: 2025-08-06

## 2025-07-23 RX ORDER — VANCOMYCIN HYDROCHLORIDE 1 G/20ML
INJECTION, POWDER, LYOPHILIZED, FOR SOLUTION INTRAVENOUS AS NEEDED
Status: DISCONTINUED | OUTPATIENT
Start: 2025-07-23 | End: 2025-07-23 | Stop reason: HOSPADM

## 2025-07-23 RX ORDER — TORSEMIDE 10 MG/1
10 TABLET ORAL DAILY
Status: DISCONTINUED | OUTPATIENT
Start: 2025-07-23 | End: 2025-07-24 | Stop reason: HOSPADM

## 2025-07-23 RX ORDER — ACETAMINOPHEN 325 MG/1
975 TABLET ORAL ONCE
Status: COMPLETED | OUTPATIENT
Start: 2025-07-23 | End: 2025-07-23

## 2025-07-23 RX ORDER — HYDROMORPHONE HCL/PF 1 MG/ML
0.5 SYRINGE (ML) INJECTION
Status: DISCONTINUED | OUTPATIENT
Start: 2025-07-23 | End: 2025-07-23 | Stop reason: HOSPADM

## 2025-07-23 RX ORDER — ACETAMINOPHEN 325 MG/1
650 TABLET ORAL EVERY 6 HOURS PRN
Status: DISCONTINUED | OUTPATIENT
Start: 2025-07-23 | End: 2025-07-24 | Stop reason: HOSPADM

## 2025-07-23 RX ORDER — MAGNESIUM HYDROXIDE/ALUMINUM HYDROXICE/SIMETHICONE 120; 1200; 1200 MG/30ML; MG/30ML; MG/30ML
30 SUSPENSION ORAL EVERY 6 HOURS PRN
Status: DISCONTINUED | OUTPATIENT
Start: 2025-07-23 | End: 2025-07-24 | Stop reason: HOSPADM

## 2025-07-23 RX ORDER — METAXALONE 800 MG/1
800 TABLET ORAL 4 TIMES DAILY
Status: DISCONTINUED | OUTPATIENT
Start: 2025-07-23 | End: 2025-07-24 | Stop reason: HOSPADM

## 2025-07-23 RX ORDER — SENNOSIDES 8.6 MG
1 TABLET ORAL DAILY
Status: DISCONTINUED | OUTPATIENT
Start: 2025-07-23 | End: 2025-07-24 | Stop reason: HOSPADM

## 2025-07-23 RX ORDER — CEFAZOLIN SODIUM 2 G/50ML
2000 SOLUTION INTRAVENOUS EVERY 8 HOURS
Status: COMPLETED | OUTPATIENT
Start: 2025-07-23 | End: 2025-07-24

## 2025-07-23 RX ORDER — FENTANYL CITRATE/PF 50 MCG/ML
50 SYRINGE (ML) INJECTION
Status: DISCONTINUED | OUTPATIENT
Start: 2025-07-23 | End: 2025-07-23 | Stop reason: HOSPADM

## 2025-07-23 RX ORDER — MAGNESIUM HYDROXIDE 1200 MG/15ML
LIQUID ORAL AS NEEDED
Status: DISCONTINUED | OUTPATIENT
Start: 2025-07-23 | End: 2025-07-23 | Stop reason: HOSPADM

## 2025-07-23 RX ORDER — AMLODIPINE BESYLATE 2.5 MG/1
2.5 TABLET ORAL DAILY
Status: DISCONTINUED | OUTPATIENT
Start: 2025-07-24 | End: 2025-07-24 | Stop reason: HOSPADM

## 2025-07-23 RX ORDER — PRAVASTATIN SODIUM 40 MG
40 TABLET ORAL
Status: DISCONTINUED | OUTPATIENT
Start: 2025-07-23 | End: 2025-07-24 | Stop reason: HOSPADM

## 2025-07-23 RX ORDER — SODIUM CHLORIDE, SODIUM LACTATE, POTASSIUM CHLORIDE, CALCIUM CHLORIDE 600; 310; 30; 20 MG/100ML; MG/100ML; MG/100ML; MG/100ML
1.5 INJECTION, SOLUTION INTRAVENOUS CONTINUOUS
Status: DISCONTINUED | OUTPATIENT
Start: 2025-07-23 | End: 2025-07-24 | Stop reason: HOSPADM

## 2025-07-23 RX ORDER — FAMOTIDINE 20 MG/1
20 TABLET, FILM COATED ORAL DAILY
Status: DISCONTINUED | OUTPATIENT
Start: 2025-07-23 | End: 2025-07-24 | Stop reason: HOSPADM

## 2025-07-23 RX ORDER — CYCLOBENZAPRINE HCL 5 MG
5 TABLET ORAL 3 TIMES DAILY PRN
Status: DISCONTINUED | OUTPATIENT
Start: 2025-07-23 | End: 2025-07-24 | Stop reason: HOSPADM

## 2025-07-23 RX ORDER — DOCUSATE SODIUM 100 MG/1
100 CAPSULE, LIQUID FILLED ORAL 2 TIMES DAILY
Status: DISCONTINUED | OUTPATIENT
Start: 2025-07-23 | End: 2025-07-24 | Stop reason: HOSPADM

## 2025-07-23 RX ORDER — FENTANYL CITRATE 50 UG/ML
INJECTION, SOLUTION INTRAMUSCULAR; INTRAVENOUS AS NEEDED
Status: DISCONTINUED | OUTPATIENT
Start: 2025-07-23 | End: 2025-07-23

## 2025-07-23 RX ORDER — LEVOTHYROXINE SODIUM 25 UG/1
50 TABLET ORAL
Status: DISCONTINUED | OUTPATIENT
Start: 2025-07-23 | End: 2025-07-24 | Stop reason: HOSPADM

## 2025-07-23 RX ORDER — CHLORHEXIDINE GLUCONATE ORAL RINSE 1.2 MG/ML
15 SOLUTION DENTAL ONCE
Status: COMPLETED | OUTPATIENT
Start: 2025-07-23 | End: 2025-07-23

## 2025-07-23 RX ORDER — CALCIUM CARBONATE 500 MG/1
1000 TABLET, CHEWABLE ORAL DAILY PRN
Status: DISCONTINUED | OUTPATIENT
Start: 2025-07-23 | End: 2025-07-24 | Stop reason: HOSPADM

## 2025-07-23 RX ORDER — LABETALOL HYDROCHLORIDE 5 MG/ML
10 INJECTION, SOLUTION INTRAVENOUS EVERY 4 HOURS PRN
Status: DISCONTINUED | OUTPATIENT
Start: 2025-07-23 | End: 2025-07-24 | Stop reason: HOSPADM

## 2025-07-23 RX ORDER — GABAPENTIN 300 MG/1
300 CAPSULE ORAL
Status: DISCONTINUED | OUTPATIENT
Start: 2025-07-23 | End: 2025-07-24 | Stop reason: HOSPADM

## 2025-07-23 RX ORDER — ONDANSETRON 2 MG/ML
4 INJECTION INTRAMUSCULAR; INTRAVENOUS ONCE AS NEEDED
Status: DISCONTINUED | OUTPATIENT
Start: 2025-07-23 | End: 2025-07-23 | Stop reason: HOSPADM

## 2025-07-23 RX ORDER — ONDANSETRON 2 MG/ML
4 INJECTION INTRAMUSCULAR; INTRAVENOUS EVERY 6 HOURS PRN
Status: DISCONTINUED | OUTPATIENT
Start: 2025-07-23 | End: 2025-07-24 | Stop reason: HOSPADM

## 2025-07-23 RX ORDER — OXYCODONE HYDROCHLORIDE 10 MG/1
10 TABLET ORAL EVERY 4 HOURS PRN
Status: DISCONTINUED | OUTPATIENT
Start: 2025-07-23 | End: 2025-07-24 | Stop reason: HOSPADM

## 2025-07-23 RX ORDER — DILTIAZEM HYDROCHLORIDE 180 MG/1
180 CAPSULE, COATED, EXTENDED RELEASE ORAL DAILY
Status: DISCONTINUED | OUTPATIENT
Start: 2025-07-23 | End: 2025-07-24 | Stop reason: HOSPADM

## 2025-07-23 RX ORDER — CHLORHEXIDINE GLUCONATE 40 MG/ML
SOLUTION TOPICAL DAILY PRN
Status: DISCONTINUED | OUTPATIENT
Start: 2025-07-23 | End: 2025-07-23 | Stop reason: HOSPADM

## 2025-07-23 RX ORDER — HYDRALAZINE HYDROCHLORIDE 20 MG/ML
5 INJECTION INTRAMUSCULAR; INTRAVENOUS EVERY 4 HOURS PRN
Status: DISCONTINUED | OUTPATIENT
Start: 2025-07-23 | End: 2025-07-24 | Stop reason: HOSPADM

## 2025-07-23 RX ORDER — OXYCODONE HYDROCHLORIDE 5 MG/1
5 TABLET ORAL EVERY 4 HOURS PRN
Status: DISCONTINUED | OUTPATIENT
Start: 2025-07-23 | End: 2025-07-24 | Stop reason: HOSPADM

## 2025-07-23 RX ORDER — SODIUM CHLORIDE, SODIUM LACTATE, POTASSIUM CHLORIDE, CALCIUM CHLORIDE 600; 310; 30; 20 MG/100ML; MG/100ML; MG/100ML; MG/100ML
50 INJECTION, SOLUTION INTRAVENOUS CONTINUOUS
OUTPATIENT
Start: 2025-07-23

## 2025-07-23 RX ORDER — LISINOPRIL 10 MG/1
20 TABLET ORAL 2 TIMES DAILY
Status: DISCONTINUED | OUTPATIENT
Start: 2025-07-23 | End: 2025-07-24 | Stop reason: HOSPADM

## 2025-07-23 RX ORDER — PROPOFOL 10 MG/ML
INJECTION, EMULSION INTRAVENOUS CONTINUOUS PRN
Status: DISCONTINUED | OUTPATIENT
Start: 2025-07-23 | End: 2025-07-23

## 2025-07-23 RX ORDER — DEXAMETHASONE SODIUM PHOSPHATE 10 MG/ML
INJECTION, SOLUTION INTRAMUSCULAR; INTRAVENOUS AS NEEDED
Status: DISCONTINUED | OUTPATIENT
Start: 2025-07-23 | End: 2025-07-23

## 2025-07-23 RX ADMIN — FENTANYL CITRATE 50 MCG: 50 INJECTION INTRAMUSCULAR; INTRAVENOUS at 12:13

## 2025-07-23 RX ADMIN — FENTANYL CITRATE 50 MCG: 50 INJECTION INTRAMUSCULAR; INTRAVENOUS at 14:27

## 2025-07-23 RX ADMIN — ESCITALOPRAM OXALATE 20 MG: 10 TABLET ORAL at 22:36

## 2025-07-23 RX ADMIN — DEXAMETHASONE SODIUM PHOSPHATE 10 MG: 10 INJECTION, SOLUTION INTRAMUSCULAR; INTRAVENOUS at 12:37

## 2025-07-23 RX ADMIN — SODIUM CHLORIDE, SODIUM LACTATE, POTASSIUM CHLORIDE, AND CALCIUM CHLORIDE 20 ML/HR: .6; .31; .03; .02 INJECTION, SOLUTION INTRAVENOUS at 10:51

## 2025-07-23 RX ADMIN — CEFAZOLIN SODIUM 2000 MG: 2 SOLUTION INTRAVENOUS at 20:25

## 2025-07-23 RX ADMIN — ACETAMINOPHEN 975 MG: 325 TABLET ORAL at 10:51

## 2025-07-23 RX ADMIN — PRAVASTATIN SODIUM 40 MG: 40 TABLET ORAL at 16:54

## 2025-07-23 RX ADMIN — ACETAMINOPHEN 650 MG: 325 TABLET ORAL at 22:32

## 2025-07-23 RX ADMIN — TRANEXAMIC ACID 1000 MG: 10 INJECTION, SOLUTION INTRAVENOUS at 12:23

## 2025-07-23 RX ADMIN — TRANEXAMIC ACID 1000 MG: 10 INJECTION, SOLUTION INTRAVENOUS at 13:12

## 2025-07-23 RX ADMIN — CHLORHEXIDINE GLUCONATE 15 ML: 1.2 SOLUTION ORAL at 10:51

## 2025-07-23 RX ADMIN — HYDROMORPHONE HYDROCHLORIDE 0.5 MG: 1 INJECTION, SOLUTION INTRAMUSCULAR; INTRAVENOUS; SUBCUTANEOUS at 19:45

## 2025-07-23 RX ADMIN — CEFAZOLIN SODIUM 2000 MG: 2 SOLUTION INTRAVENOUS at 12:22

## 2025-07-23 RX ADMIN — DEXAMETHASONE SODIUM PHOSPHATE 10 MG: 10 INJECTION, SOLUTION INTRAMUSCULAR; INTRAVENOUS at 12:23

## 2025-07-23 RX ADMIN — FENTANYL CITRATE 50 MCG: 50 INJECTION INTRAMUSCULAR; INTRAVENOUS at 14:41

## 2025-07-23 RX ADMIN — SODIUM CHLORIDE, SODIUM LACTATE, POTASSIUM CHLORIDE, AND CALCIUM CHLORIDE 20 ML/HR: .6; .31; .03; .02 INJECTION, SOLUTION INTRAVENOUS at 14:33

## 2025-07-23 RX ADMIN — OXYCODONE HYDROCHLORIDE 10 MG: 10 TABLET ORAL at 23:38

## 2025-07-23 RX ADMIN — MEPIVACAINE HYDROCHLORIDE 3 ML: 15 INJECTION, SOLUTION EPIDURAL; INFILTRATION at 12:17

## 2025-07-23 RX ADMIN — METAXALONE 800 MG: 800 TABLET ORAL at 18:05

## 2025-07-23 RX ADMIN — LORAZEPAM 1 MG: 0.5 TABLET ORAL at 22:33

## 2025-07-23 RX ADMIN — PROPOFOL 70 MCG/KG/MIN: 10 INJECTION, EMULSION INTRAVENOUS at 12:22

## 2025-07-23 RX ADMIN — METAXALONE 800 MG: 800 TABLET ORAL at 22:33

## 2025-07-23 RX ADMIN — SODIUM CHLORIDE, SODIUM LACTATE, POTASSIUM CHLORIDE, AND CALCIUM CHLORIDE 1.5 ML/KG/HR: .6; .31; .03; .02 INJECTION, SOLUTION INTRAVENOUS at 16:55

## 2025-07-23 RX ADMIN — GABAPENTIN 300 MG: 300 CAPSULE ORAL at 22:33

## 2025-07-23 RX ADMIN — BUPIVACAINE HYDROCHLORIDE 20 ML: 2.5 INJECTION, SOLUTION EPIDURAL; INFILTRATION; INTRACAUDAL; PERINEURAL at 12:03

## 2025-07-23 RX ADMIN — FENTANYL CITRATE 50 MCG: 50 INJECTION INTRAMUSCULAR; INTRAVENOUS at 12:02

## 2025-07-23 RX ADMIN — BUPIVACAINE 10 ML: 13.3 INJECTION, SUSPENSION, LIPOSOMAL INFILTRATION at 12:03

## 2025-07-23 RX ADMIN — OXYCODONE HYDROCHLORIDE 5 MG: 5 TABLET ORAL at 16:54

## 2025-07-23 NOTE — ANESTHESIA PROCEDURE NOTES
Peripheral Block    Patient location during procedure: holding area  Start time: 7/23/2025 12:03 PM  Reason for block: at surgeon's request and post-op pain management  Staffing  Performed by: Jean Beyer MD  Authorized by: Jean Beyer MD    Preanesthetic Checklist  Completed: patient identified, IV checked, site marked, risks and benefits discussed, surgical consent, monitors and equipment checked, pre-op evaluation and timeout performed  Peripheral Block  Patient position: supine  Prep: ChloraPrep  Patient monitoring: frequent blood pressure checks, continuous pulse oximetry and heart rate  Block type: Adductor Canal  Laterality: right  Injection technique: single-shot  Procedures: ultrasound guided, Ultrasound guidance required for the procedure to increase accuracy and safety of medication placement and decrease risk of complications.  Ultrasound permanent image saved  bupivacaine (PF) (MARCAINE) 0.25 % injection 20 mL - Perineural   20 mL - 7/23/2025 12:03:00 PM  bupivacaine liposomal (EXPAREL) 1.3 % injection 20 mL - Perineural   10 mL - 7/23/2025 12:03:00 PM  Needle  Needle type: Stimuplex   Needle gauge: 20 G  Needle length: 4 in  Needle localization: anatomical landmarks and ultrasound guidance  Assessment  Injection assessment: incremental injection, frequent aspiration, injected with ease, negative aspiration, negative for heart rate change, no paresthesia on injection, no symptoms of intraneural/intravenous injection and needle tip visualized at all times  Paresthesia pain: none  Post-procedure:  site cleaned  patient tolerated the procedure well with no immediate complications  Additional Notes  10 mL of total 30 mL admixture of local anesthetics administered to branches of AFCN upon needle retraction. See saved US image

## 2025-07-23 NOTE — OCCUPATIONAL THERAPY NOTE
Occupational Therapy Evaluation     Patient Name: Matilda Day  Today's Date: 7/23/2025  Problem List  Principal Problem:    Primary osteoarthritis of right knee    Past Medical History  Past Medical History[1]  Past Surgical History  Past Surgical History[2]        07/23/25 2441   OT Last Visit   OT Visit Date 07/23/25   Note Type   Note type Evaluation   Additional Comments pt greeted in supine, agreeable to OT evaluation   Pain Assessment   Pain Assessment Tool 0-10   Pain Score 4   Pain Location/Orientation Orientation: Right;Location: Knee   Hospital Pain Intervention(s) Repositioned;Ambulation/increased activity;Emotional support;Elevated;Rest   Restrictions/Precautions   Weight Bearing Precautions Per Order Yes   RLE Weight Bearing Per Order WBAT   Other Precautions Chair Alarm;Bed Alarm;WBS;Multiple lines;Pain;Fall Risk   Home Living   Type of Home House   Home Layout Multi-level;1/2 bath on main level;Bed/bath upstairs;Stairs to enter without rails;Laundry in basement  (1+1 VIRAL)   Bathroom Shower/Tub Tub/shower unit   Bathroom Toilet Standard  (w/ raised toilet seat with grab bars)   Bathroom Equipment Grab bars in shower;Shower chair;Toilet raiser;Grab bars around toilet   Bathroom Accessibility Accessible   Home Equipment Walker;Grab bars;Hospital bed   Additional Comments Pt plans to stay on main level with 1/2 bath and hospital bed. Main bedroom and bathroom upstairs.   Prior Function   Level of [place] Independent with ADLs;Independent with functional mobility;Needs assistance with IADLS   Lives With Spouse   IADLs Family/Friend/Other provides transportation;Family/Friend/Other provides meals;Independent with medication management   Falls in the last 6 months 0   Vocational Retired   Comments (-) , spouse drives for pt, use of RW in the night time but no AD during the day.   Lifestyle   [place] Independent with all ADLs. needs assistance with IADLs, use of RW in the night time but no  AD during the day.   Reciprocal Relationships Spouse   Service to Others Retired   Intrinsic Gratification travel to [facility]   Family/Caregiver Present Yes   ADL   Where Assessed Edge of bed   Eating Assistance 5  Supervision/Setup   Grooming Assistance 5  Supervision/Setup   UB Bathing Assistance 5  Supervision/Setup   LB Bathing Assistance 4  Minimal Assistance   UB Dressing Assistance 5  Supervision/Setup   LB Dressing Assistance 4  Minimal Assistance   Toileting Assistance  4  Minimal Assistance   Bed Mobility   Supine to Sit 5  Supervision   Additional items Increased time required;Verbal cues;LE management   Sit to Supine 5  Supervision   Additional items Increased time required;Verbal cues   Transfers   Sit to Stand 5  Supervision   Additional items Increased time required;Verbal cues  (RW)   Stand to Sit 5  Supervision   Additional items Increased time required;Verbal cues  (RW)   Additional Comments verbal cues for hand placement and safety with use of RW   Functional Mobility   Functional Mobility 5  Supervision   Additional Comments Pt completed functional mobility functional household distance with use of RW and S   Additional items Rolling walker   Balance   Static Sitting Good   Dynamic Sitting Fair +   Static Standing Fair   Dynamic Standing Fair -   Ambulatory Fair -   Activity Tolerance   Activity Tolerance Patient tolerated treatment well   Medical Staff Made Aware PT Ali, Pt seen for co-evaluation/treatment with skilled Physical Therapy due to pt's medical complexity, decreased endurance, overall functional level, overall safety, and post surgical day #0.   Nurse Made Aware NOBLE BALTAZAR Assessment   RUE Assessment WFL   LUE Assessment   LUE Assessment WFL   Hand Function   Gross Motor Coordination Functional   Fine Motor Coordination Functional   Cognition   Overall Cognitive Status WFL   Arousal/Participation Alert;Cooperative   Attention Within functional limits   Orientation  Level Oriented X4   Memory Within functional limits   Following Commands Follows all commands and directions without difficulty   Comments Cooperative and pleasant   Assessment   Limitation Decreased ADL status;Decreased endurance;Decreased self-care trans;Decreased high-level ADLs   Prognosis Good   Assessment Pt is a 79 y.o. female seen for OT evaluation s/p adm to [facility] on [facility]/23/[facility]25 w/ Primary osteoarthritis of right knee . Comorbidities affecting pt’s functional performance include a significant PMH of anemia, anxiety, arthritis, breast cancer, DVT, HTN, PE, BHARGAVI, spinal stenosis, mesenteric vein thrombosis, irregular heart beat, Sjogren's syndrome, deep brain stimulator placement. Pt with active OT orders and activity orders for Activity beginning POD #0. Pt lives w/ s/o in a multilevel house with 2 VIRAL. Pt has 1/2 bath and hospital bed on main level. Tub/shower upstairs with standard toilet - RTS over. At baseline, pt was independent with all ADLs/IADLs. Pt completed supine to sit with S. Sit to stand with S and use of RW. Pt completed functional mobility functional household distance with use of RW and S. Pt educated on LE management and safety with use of RW. Verbal cues for hand placement. Upon evaluation, pt currently requires S for UB ADLs, Nahum for LB ADLs, Nahum for toileting, S for bed mobility, S for functional mobility, and S for transfers 2* the following deficits impacting occupational performance: weakness, decreased strength , decreased balance, decreased activity tolerance, increased pain, and orthopedic restrictions. These impairments, as well at pt’s personal factors of: VIRAL home environment, steps within home environment, difficulty performing ADLs, difficulty performing IADLs, difficulty performing transfers/mobility, WBS, fall risk , and new use of AD for functional transfers/mobility limit pt’s ability to safely engage in all baseline areas of occupation. Based on the  aforementioned OT evaluation, functional performance deficits, and assessments, pt has been identified as a high complexity evaluation. Pt to continue to benefit from continued acute OT services during hospital stay to address defined deficits and to maximize level of functional independence in the following Occupational Performance areas: grooming, bathing/shower, toilet hygiene, dressing, health maintenance, functional mobility, community mobility, clothing management, cleaning, household maintenance, and job performance/volunteering. From OT standpoint, recommend III; Minimum Resource Intensity upon D/C. OT will continue to follow pt 3-5x/wk.   Goals   Patient Goals to walk   STG Time Frame 1-3   Short Term Goal #1 Pt will improve activity tolerance to G for min 30 min treatment sessions for increase engagement in functional tasks   Short Term Goal #2 Pt will complete bed mobility at a mod I level w/ G balance/safety demonstrated to decrease caregiver assistance required   Short Term Goal  Pt will complete LB dressing/self care w/ mod I using adaptive device and DME as needed   LTG Time Frame 3-7   Long Term Goal #1 Pt will complete toileting w/ mod I w/ G hygiene/thoroughness using DME as needed   Long Term Goal #2 Pt will improve functional transfers to mod I on/off all surfaces using DME as needed w/ G balance/safety   Long Term Goal Pt will improve functional mobility during ADL/IADL/leisure tasks to mod I using DME as needed w/ G balance/safety   Plan   Treatment Interventions ADL retraining;Functional transfer training;Endurance training;Patient/family training;Equipment evaluation/education;Compensatory technique education;Continued evaluation;Energy conservation;Activityengagement   Goal Expiration Date 07/30/25   OT Treatment Day 0   OT Frequency 3-5x/wk   Discharge Recommendation   Rehab Resource Intensity Level, OT III (Minimum Resource Intensity)   Additional Comments  The patient's raw score on the  AM-PAC Daily Activity Inpatient Short Form is 20 . A raw score of greater than or equal to 19 suggests the patient may benefit from discharge to home. Please refer to the recommendation of the Occupational Therapist for safe discharge planning.   AM-PAC Daily Activity Inpatient   Lower Body Dressing 3   Bathing 3   Toileting 3   Upper Body Dressing 4   Grooming 3   Eating 4   Daily Activity Raw Score 20   Daily Activity Standardized Score (Calc for Raw Score >=11) 42.03   AM-PAC Applied Cognition Inpatient   Following a Speech/Presentation 4   Understanding Ordinary Conversation 4   Taking Medications 4   Remembering Where Things Are Placed or Put Away 4   Remembering List of 4-5 Errands 4   Taking Care of Complicated Tasks 4   Applied Cognition Raw Score 24   Applied Cognition Standardized Score 62.21   End of Consult   Education Provided Yes;Family or social support of family present for education by provider   Patient Position at End of Consult Supine;Bed/Chair alarm activated;All needs within reach   Nurse Communication Nurse aware of consult   End of Consult Comments Pt lying supine with bed alarm activated at end of session. Call bell and phone within reach. All needs met and pt reports no further questions for OT at this time.   Carmencita Olivares, OT              [1]   Past Medical History:  Diagnosis Date    Anemia     Anxiety     Arrhythmia     Arthritis     Asthma     Blood clot in vein     portal vein    Breast cancer (HCC) 02/12/2021    Breast lump     [id number] RESOLVED    Cancer (HCC)     Depression     Disease of thyroid gland     DVT, lower extremity (HCC)     GERD (gastroesophageal reflux disease)     Heart disease mother    Hyperlipidemia     Hypertension     Hypokalemia     Hyponatremia     Hypothyroidism     Iron deficiency anemia     Irregular heart beat     Manic behavior (HCC)     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HCC)     Osteoarthritis     Palpitations     [id number]  RESOLVED    Paroxysmal  supraventricular tachycardia (HCC)     PE (pulmonary thromboembolism) (HCC)     Pneumonia     Sjoegren syndrome     Sleep apnea     mild    Sleep difficulties     Spinal stenosis     Thrombocytosis     [id number]  RESOLVED    Tremors of nervous system     dbs implanted right and left chest    Ulcerative colitis (HCC)     Vertigo     [id number] RESOLVED   [2]   Past Surgical History:  Procedure Laterality Date    ABDOMINAL SURGERY      APPENDECTOMY      BREAST BIOPSY Left 11/07/2019    Stereo    BREAST EXCISIONAL BIOPSY Left     x many years    BREAST SURGERY      lumpectomy & biopsy    CARMELLA HOLE W/ STEREOTACTIC INSERTION OF DBS LEADS / INTRAOP MICROELECTRODE RECORDING      COLON SURGERY      COLONOSCOPY N/A 08/30/2017    Procedure: POUCHOSCOPY;  Surgeon: VANDANA Waters MD;  Location:  GI LAB;  Service: Colorectal    COLOPROCTECTOMY W/ ILEO J POUCH      DEEP BRAIN STIMULATOR PLACEMENT      ESOPHAGOGASTRODUODENOSCOPY      ONSET 10/17/11    FISTULA REPAIR      LLEOANAL FISTULA REPAIR TRANSPERIN TRANSABD APPROACH    HYSTERECTOMY      age 38    ILEOSTOMY CLOSURE      KNEE ARTHROSCOPY      Right    MAMMO STEREOTACTIC BREAST BIOPSY LEFT (ALL INC) Left 11/07/2019    MAMMO STEREOTACTIC BREAST BIOPSY LEFT (ALL INC) Left 12/17/2020    MAMMO STEREOTACTIC BREAST BIOPSY LEFT (ALL INC) EACH ADD Left 12/17/2020    MASTECTOMY Left 02/12/2021    left mastectomy- Dr. Hayes    MASTECTOMY W/ SENTINEL NODE BIOPSY Left 02/12/2021    Procedure: BREAST MASTECTOMY WITH SENTINEL LYMPH NODE BIOPSY, LYMPHATIC MAPPING WITH BLUE DYE AND RADIAOCTIVE DYE (INJECT AT 1100 BY DR HAYES IN THE OR);  Surgeon: Robbie Hayes MD;  Location: [facility];  Service: Surgical Oncology    AZ ARTHRP ACETBLR/PROX FEM PROSTC AGRFT/ALGRFT Right 11/04/2024    Procedure: ARTHROPLASTY HIP TOTAL, overnight;  Surgeon: Gaviota Ford DO;  Location:  MAIN OR;  Service: Orthopedics    AZ INSJ/RPLCMT CRANIAL NEUROSTIM PULSE GENERATOR Right 06/20/2017    Procedure:  DBS GENERATOR REPLACEMENT;  Surgeon: Marin Mao MD;  Location:  MAIN OR;  Service: Neurosurgery    ME INSJ/RPLCMT CRANIAL NEUROSTIM PULSE GENERATOR N/A 12/04/2019    Procedure: REPLACEMENT IMPLANTABLE PULSE GENERATOR FOR DEEP BRAIN STIMULATOR LEFT CHEST;  Surgeon: Damian Batres MD;  Location: [facility];  Service: Neurosurgery    SPLENECTOMY      TONSILLECTOMY      TOTAL HIP ARTHROPLASTY Right

## 2025-07-23 NOTE — ASSESSMENT & PLAN NOTE
79 year old female s/p right TKA    Plan:  - WBAT on RLE  - PT/OT  - OOB/ambulate   - DVT ppx  - Trend labs   - Monitor vitals   - Bowel regimen   - Dispo pending PT/OT

## 2025-07-23 NOTE — ANESTHESIA POSTPROCEDURE EVALUATION
Post-Op Assessment Note    CV Status:  Stable  Pain Score: 0    Pain management: adequate       Mental Status:  Alert and awake   Hydration Status:  Euvolemic   PONV Controlled:  Controlled   Airway Patency:  Patent     Post Op Vitals Reviewed: Yes    No anethesia notable event occurred.    Staff: Anesthesiologist, CRNA           Last Filed PACU Vitals:  Vitals Value Taken Time   Temp 97.8 °F (36.6 °C) 07/23/25 13:47   Pulse 55 07/23/25 13:49   /63 07/23/25 13:48   Resp 11 07/23/25 13:49   SpO2 98 % 07/23/25 13:49   Vitals shown include unfiled device data.    Modified Tanisha:     Vitals Value Taken Time   Activity 1 07/23/25 13:47   Respiration 2 07/23/25 13:47   Circulation 2 07/23/25 13:47   Consciousness 1 07/23/25 13:47   Oxygen Saturation 1 07/23/25 13:47     Modified Tanisha Score: 7

## 2025-07-23 NOTE — ASSESSMENT & PLAN NOTE
- Hold ACEi, ARBs and diuretics to decrease risl of DALI  - Add Hydralazine/Labetalol for increase in SBP   - Continue Amlodipine

## 2025-07-23 NOTE — PLAN OF CARE
Problem: OCCUPATIONAL THERAPY ADULT  Goal: Performs self-care activities at highest level of function for planned discharge setting.  See evaluation for individualized goals.  Description: Treatment Interventions: ADL retraining, Functional transfer training, Endurance training, Patient/family training, Equipment evaluation/education, Compensatory technique education, Continued evaluation, Energy conservation, Activityengagement          See flowsheet documentation for full assessment, interventions and recommendations.   Note: Limitation: Decreased ADL status, Decreased endurance, Decreased self-care trans, Decreased high-level ADLs  Prognosis: Good  Assessment: Pt is a 79 y.o. female seen for OT evaluation s/p adm to [facility] on 7/23/2025 w/ Primary osteoarthritis of right knee . Comorbidities affecting pt’s functional performance include a significant PMH of anemia, anxiety, arthritis, breast cancer, DVT, HTN, PE, BHARGAVI, spinal stenosis, mesenteric vein thrombosis, irregular heart beat, Sjogren's syndrome, deep brain stimulator placement. Pt with active OT orders and activity orders for Activity beginning POD #0. Pt lives w/ s/o in a multilevel house with 2 VIRAL. Pt has 1/2 bath and hospital bed on main level. Tub/shower upstairs with standard toilet - RTS over. At baseline, pt was independent with all ADLs/IADLs. Pt completed supine to sit with S. Sit to stand with S and use of RW. Pt completed functional mobility functional household distance with use of RW and S. Pt educated on LE management and safety with use of RW. Verbal cues for hand placement. Upon evaluation, pt currently requires S for UB ADLs, Nahum for LB ADLs, Nahum for toileting, S for bed mobility, S for functional mobility, and S for transfers 2* the following deficits impacting occupational performance: weakness, decreased strength , decreased balance, decreased activity tolerance, increased pain, and orthopedic restrictions. These  impairments, as well at pt’s personal factors of: VIRAL home environment, steps within home environment, difficulty performing ADLs, difficulty performing IADLs, difficulty performing transfers/mobility, WBS, fall risk , and new use of AD for functional transfers/mobility limit pt’s ability to safely engage in all baseline areas of occupation. Based on the aforementioned OT evaluation, functional performance deficits, and assessments, pt has been identified as a high complexity evaluation. Pt to continue to benefit from continued acute OT services during hospital stay to address defined deficits and to maximize level of functional independence in the following Occupational Performance areas: grooming, bathing/shower, toilet hygiene, dressing, health maintenance, functional mobility, community mobility, clothing management, cleaning, household maintenance, and job performance/volunteering. From OT standpoint, recommend III; Minimum Resource Intensity upon D/C. OT will continue to follow pt 3-5x/wk.     Rehab Resource Intensity Level, OT: III (Minimum Resource Intensity)

## 2025-07-23 NOTE — DISCHARGE INSTR - AVS FIRST PAGE
Ever Rosales MD  Adult Reconstruction Specialist   [facility]  Office Phone: [phone]    Discharge Instructions - Knee Replacement    What to Expect/Activity  You are weight bearing as tolerated to your operative leg with assistive devices.  Please use crutches/walker when ambulating as needed until your follow-up  Significant swelling and discomfort in the knee is normal for several days to weeks after surgery. You may use ice around the knee to help as desired. This can be used for 20-30 minutes every 1-2 hours  To optimally control swelling, your leg should be elevated above heart level as often as necessary. This can greatly improve post operative pain levels, due to uncontrolled swelling.   For the first several weeks after surgery, it is normal to experience redness, swelling, brusing and pain to the operative knee. This is generally not a sign of concern or an abnormal finding.    Post-operative Objectives  In effort to restore your knee range of motion, it is essential to immediately begin flexing your knee. Do so several times a day with the goal of achieving at or around 90 degrees by the time we see you 2 weeks post operatively.   There are no restrictions as to how often you can perform this knee motion. Your knee implant and incision are able to withstand these types of movement  You may utilize the physical therapy exercise packet provided to you by the physical therapy team. This should have been given to you during their initial assessment in the hospital.  If we feel it is appropriate, we will initiate formal physical therapy 2 weeks after surgery. Once we see you at the first post operative visit, we will decide if this is the best course of action and of benefit to you.   If you have any questions regarding the above objectives, please do not hesistate to call our office for clarification.        Dressing/Wound Care/Bathing  After surgery, your knee will be  wrapped in an ace wrap, toe to mid thigh, with a gray waterproof adhesive dressing underneath protecting the incision  You may remove your ace wrap 5 days after surgery and may be re-applied and/or tightened/loosened as necessary. I recommend to re-wrap your leg if swelling is an issue.   You may start showering 5 days after surgery. When drying off, please pat the dressing dry. If you notice the dressing appears saturated or is starting to come off, please over-wrap with dry dressing.  If you shower too early, there is a higher chance of infection and wound healing complications.  No baths, swimming or submerging until cleared by Dr. Rosales    Pain Management/Medications  You may resume your usual medications.  Please take the following medications:  Anti-coagulation (blood clot prevention) - Aspirin 81 mg, 1 tablet twice daily for 6 weeks  Antibiotics - none  Pain medication:  Narcotic Medication: Oxycodone 5 mg, 1 tablet every 4-6 hours as needed for severe pain  NSAID (Anti-inflammatory): Celebrex 200 mg, 1 tablet twice a day as needed for mild to moderate pain  Tylenol 1000mg up to three times daily as needed for mild to moderate pain. Do not exceed 3000mg daily   If you have questions or pain concerns, please contact the office. Pain medication cannot remove all post-operative pain    Follow up/Call if:  The findings of your surgery will be explained to you and your family immediately after surgery. However, in the post-operative period, during recovery from anesthesia you may not fully remember or fully understand what was said. This will be again gone over when you return for your post-op appointment.  Please contact Dr. Rosales's office if you experience the following:  Excessive bleeding (bleeding through your dressing)  Fever greater than 101 degrees F after 48 hours (low grade fevers the day or two after surgery are normal)  Persistent nausea or vomiting  Decreased sensation or discoloration of  the operative limb  Pain or swelling that is getting worse and not better with medication      Dr. Rosales's Office Contact: [phone]

## 2025-07-23 NOTE — PROGRESS NOTES
"Progress Note - Internal Medicine   Name: Matilda Day 79 y.o. female I MRN: [id number]  Unit/Bed#: [facility] -01 I Date of Admission: 7/23/2025   Date of Service: 7/23/2025 I Hospital Day: 0     Assessment & Plan  Primary osteoarthritis of right knee  79 year old female s/p right TKA. Placed on supplemental oxygen overnight 2/2 to low O2 saturation levels. Did well overnight and currently sitting upright in chair without oxygen saturating 94%    Plan:  - Replete electrolytes  - WBAT on RLE  - PT/OT  - OOB/ambulate   - DVT ppx  - Trend labs   - Monitor vitals   - Bowel regimen   - Dispo pending PT/OT  Anxiety  - Continue home medications   Disease of thyroid gland  - Levothyroxine   DVT, lower extremity (HCC)  - DVT ppx w/ Eliquis   GERD (gastroesophageal reflux disease)  - PPI  - Pepcid PRN  Hyperlipidemia  - Statin therapy   - Low cholesterol diet   Hypertension  - Hold ACEi, ARBs and diuretics to decrease risl of DALI  - Add Hydralazine/Labetalol for increase in SBP   - Continue Amlodipine   Irregular heart beat  - Monitor vitals   - Continue home diltiazem and HTN regimen   - Monitor for symptoms   PE (pulmonary thromboembolism) (HCC)  - DVT ppx as mentioned above     24 Hour Events : No acute overnight events   Subjective : Patient in moderate amount of pain this morning in the RLE that she rates a 6 out of 10. She describes the pain mostly in the anterior portion of the knee but sometimes radiates posteriorly. She denies any nausea, vomiting, fevers or chills overnight. She continues to walk to bathroom and throughout hallways with walker and minimal assistance     Objective :  Visit Vitals  /67   Pulse 74   Temp 98.3 °F (36.8 °C)   Resp (!) 11   Ht 5' 3\" (1.6 m)   Wt 62.5 kg (137 lb 12.8 oz)   SpO2 (!) 88%   BMI 24.41 kg/m²   OB Status Postmenopausal   Smoking Status Former   BSA 1.65 m²        Physical Exam  Constitutional:       General: She is not in acute distress.     Appearance: Normal " appearance. She is not ill-appearing, toxic-appearing or diaphoretic.     Cardiovascular:      Rate and Rhythm: Normal rate and regular rhythm.      Pulses: Normal pulses.      Heart sounds: Normal heart sounds.   Pulmonary:      Effort: Pulmonary effort is normal. No respiratory distress.      Breath sounds: Normal breath sounds.   Abdominal:      General: Abdomen is flat. There is no distension.      Palpations: Abdomen is soft.      Tenderness: There is no abdominal tenderness.     Skin:     General: Skin is warm and dry.      Coloration: Skin is not jaundiced.      Findings: No erythema.     Neurological:      General: No focal deficit present.      Mental Status: She is alert and oriented to person, place, and time.     RLE: ACE bandages are clean, dry and intact without any proximal or distal erythema or drainage. Mild RLE edema near incision site, but area remains soft and appropriate. Neurovascularly intact.        Lab Results: I have reviewed the following results:  Recent Labs     07/24/25  0503   WBC 9.36   HGB 11.6   *   SODIUM 136   K 3.3*   CL 99   CO2 30   BUN 11   CREATININE 0.55*   GLUC 160*   CALCIUM 9.1          VTE Pharmacologic Prophylaxis: VTE covered by:  [START ON 7/24/2025] apixaban, Oral     VTE Mechanical Prophylaxis: sequential compression device

## 2025-07-23 NOTE — TELEPHONE ENCOUNTER
Caller: [facility]     Doctor: Dr. Rosales     Reason for call:  Asked if you are aware patient has picked up Lorazepam  and hydrocodone within the month and asked if you wanted to move forward with her script, please call    Call back#:   [id number] - [place] - [address] [address]  Phone: [phone]  Fax: [phone]  ANTONIO #: --

## 2025-07-23 NOTE — PHYSICAL THERAPY NOTE
PT EVALUATION    Pt. Name: Matilda Day  Pt. Age: 79 y.o.  MRN: [id number]  LENGTH OF STAY: 0    Problem List[1]    Admitting Diagnoses:   Primary osteoarthritis of right knee [M17.11]    Past Medical History[2]    Past Surgical History[3]    Imaging Studies:  XR knee right 1 or 2 views    (Results Pending)         07/23/25 8422   PT Last Visit   PT Visit Date 07/23/25   Note Type   Note type Evaluation   Additional Comments greeted in supine   Pain Assessment   Pain Assessment Tool 0-10   Pain Score 4   Pain Location/Orientation Orientation: Right;Location: Knee   Hospital Pain Intervention(s) Rest;Repositioned;Ambulation/increased activity;Elevated;Emotional support   Restrictions/Precautions   Weight Bearing Precautions Per Order Yes   RLE Weight Bearing Per Order WBAT   Other Precautions Fall Risk;Pain;WBS;Chair Alarm;Bed Alarm   Home Living   Type of Home House   Home Layout Multi-level;Laundry in basement;Performs ADLs on one level;Able to live on main level with bedroom/bathroom;Stairs to enter with rails  (1+1 VIRAL)   Bathroom Shower/Tub Tub/shower unit   Bathroom Toilet Standard   Bathroom Equipment   (with raised toilet seat and bars)   Bathroom Accessibility Accessible   Home Equipment Walker;Grab bars;Hospital bed   Additional Comments Pt plans to stay on main level with 1/2 bath and hospital bed. Main bedroom and bathroom upstairs.   Prior Function   Level of [place] Independent with ADLs;Independent with functional mobility;Needs assistance with IADLS   Lives With Spouse   Receives Help From Family   IADLs Family/Friend/Other provides transportation;Family/Friend/Other provides meals;Independent with medication management   Falls in the last 6 months 0   Vocational Retired   Comments (-) , spouse drives for pt, use of RW in the night time but no AD during the day.   General   Family/Caregiver Present Yes   Cognition   Overall Cognitive Status WFL   Arousal/Participation Alert    Orientation Level Oriented X4   Following Commands Follows all commands and directions without difficulty   RUE Assessment   RUE Assessment WFL   LUE Assessment   LUE Assessment WFL   RLE Assessment   RLE Assessment X  (did not assess knee due to post op, able to flex hip and move ankle through normal ROM)   LLE Assessment   LLE Assessment WFL   Light Touch   RLE Light Touch Grossly intact   LLE Light Touch Grossly intact   Bed Mobility   Supine to Sit 5  Supervision   Additional items Increased time required;Verbal cues  (RW)   Sit to Supine 5  Supervision   Additional items Increased time required;Verbal cues   Transfers   Sit to Stand 5  Supervision   Additional items Increased time required;Verbal cues  (RW)   Stand to Sit 5  Supervision   Additional items Increased time required;Verbal cues  (RW)   Additional Comments cues for hand placement   Ambulation/Elevation   Gait pattern Improper Weight shift;Antalgic;Decreased R stance;Step to;Excessively slow;Decreased toe off;Decreased heel strike   Gait Assistance 5  Supervision   Additional items Verbal cues   Assistive Device Rolling walker   Distance 50'x2   Stair Management Assistance Not tested   Ambulation/Elevation Additional Comments cues for gait pattern with RW   Balance   Static Sitting Good   Dynamic Sitting Fair +   Static Standing Fair   Dynamic Standing Fair -   Ambulatory Fair -   Endurance Deficit   Endurance Deficit No   Activity Tolerance   Activity Tolerance Patient tolerated treatment well   Medical Staff Made Aware RN Rhoda, VANESSA Tse   Nurse Made Aware yes   Assessment   Prognosis Good   Problem List Decreased strength;Decreased range of motion;Decreased endurance;Impaired balance;Decreased mobility;Orthopedic restrictions;Pain   Assessment Pt is a 79 y.o. female who presented [facility]25 s/p R TKA done by Dr. Rosales. Precautions include WBAT. Pt  has a past medical history of Anemia, Anxiety, Arrhythmia, Arthritis, Asthma, Blood  clot in vein, Breast cancer (HCC) (02/12/2021), Breast lump, Cancer (HCC), Depression, Disease of thyroid gland, DVT, lower extremity (HCC), GERD, Heart disease (mother). Pt greeted at bedside for PT evaluation on 07/23/25. Pt referred to PT for functional mobility evaluation & D/C planning w/ orders of activity beginning POD #0 and activity as tolerated. PTA, pt reports being I w/o AD and I w/ IADLs. Personal factors affecting pt at time of IE include: lives in 2 story house and stairs to enter home. Please find objective findings from PT assessment regarding body systems outlined above with impairments and limitations including weakness, decreased ROM, impaired balance, decreased endurance, gait deviations, pain, decreased activity tolerance, decreased functional mobility tolerance, fall risk, and orthopedic restrictions.  Please see flow sheet above for objective findings and level of assistance required for safe completion of functional tasks. Pt able to perform supine<>sit with S. Pt able to perform sit<>stand with RW and S. Pt able to ambulate 50'x2 with RW and S. Pt demonstrated dec endurance and tolerance to activity. Denies reports of dizziness or SOB t/o session. Patient was left supine in bed with call bell and all needs within reach. Pt was educated on fall precautions and reinforced w/ good understanding. Based on pt presentation and impaired function, pt would benefit from level III, (minimal resource intensity) at D/C. From PT/mobility standpoint, pt would benefit from OPPT to address deficits as defined above and maximize level of independence and return pt to PLOF. HEP given and reviewed with patient, no questions at this time. CM to follow. Nsg staff to continue to mobilized pt (OOB in chair for all meals & ambulate in room/unit) as tolerated to prevent further decline in function. Nsg notified. Co-eval performed with OT to complete this evaluation for the pts best interest given pts medical  complexity and functional level.   Barriers to Discharge Inaccessible home environment  (VIRAL)   Goals   Patient Goals to go home   STG Expiration Date 07/30/25   Short Term Goal #1 1).  Pt will perform bed mobility with Sudhir demonstrating appropriate technique 100% of the time in order to improve function.2)  Perform all transfers with Sudhir demonstrating safe and appropriate technique 100% of the time in order to improve ability to negotiate safely in home environment.3) Amb with least restrictive AD > 200'x1 with Sudhir in order to demonstrate ability to negotiate in home environment.4)  Improve overall strength and balance 1/2 grade in order to optimize ability to perform functional tasks and reduce fall risk.5) Increase activity tolerance to 45 minutes in order to improve endurance to functional tasks.6)  Negotiate stairs using most appropriate technique and Sudhir in order to be able to negotiate safely in home environment. 7) PT for ongoing patient and family/caregiver education, DME needs and d/c planning in order to promote highest level of function in least restrictive environment.   Plan   Treatment/Interventions Functional transfer training;LE strengthening/ROM;Elevations;Therapeutic exercise;Endurance training;Bed mobility;Gait training;Spoke to nursing;OT;Family   PT Frequency Twice a day  (prn)   Discharge Recommendation   Rehab Resource Intensity Level, PT III (Minimum Resource Intensity)   Equipment Recommended Walker  (pt owns)   Additional Comments The patient's AM-PAC Basic Mobility Inpatient Short Form Raw Score is 18. A Raw score of greater than 16 suggests the patient may benefit from discharge to home. Please also refer to the recommendation of the Physical Therapist for safe discharge planning.   AM-PAC Basic Mobility Inpatient   Turning in Flat Bed Without Bedrails 3   Lying on Back to Sitting on Edge of Flat Bed Without Bedrails 3   Moving Bed to Chair 3   Standing Up From Chair Using Arms 3    Walk in Room 3   Climb 3-5 Stairs With Railing 3   Basic Mobility Inpatient Raw Score 18   Basic Mobility Standardized Score 41.05   [facility] Highest Level Of Mobility   -HLM Goal 6: Walk 10 steps or more   -HL Achieved 7: Walk 25 feet or more   End of Consult   Patient Position at End of Consult Supine;Bed/Chair alarm activated;All needs within reach   End of Consult Comments pt stable left in supine with family in room. RN updated     Hx/personal factors: co-morbidities, home alone, advanced age, pain, WB restrictions, and fall risk  Examination: dec mobility, dec balance, dec endurance, dec amb, risk for falls, pain, assessed body system, balance, endurance, amb, D/C disposition & fall risk, WB restrictions, impairements in locomotion, musculoskeletal, balance, endurance, posture, coordination  Clinical: unpredictable (ongoing medical status, risk for falls, POD #0, and pain mgt)  Complexity: high  Lisa Almaguer PT         [1]   Patient Active Problem List  Diagnosis    Portal vein thrombosis    Anxiety    Moderate episode of recurrent major depressive disorder (HCC)    Essential tremor    Hyperlipidemia    Essential hypertension    Hypomagnesemia    SIADH (syndrome of inappropriate ADH production) (HCC)    Acquired hypothyroidism    Iron deficiency anemia    Prediabetes    Peripheral neuropathy    Age-related osteoporosis without current pathological fracture    Primary osteoarthritis of right knee    Ulcerative colitis without complications (HCC)    Allergic rhinitis    GERD (gastroesophageal reflux disease)    Mild intermittent asthma without complication    Diarrhea    Sjogren's syndrome (HCC)    Chronic salpingo-oophoritis    Overweight    Supraventricular tachycardia (HCC)    Unsteady gait    Lumbar degenerative disc disease    Preoperative testing    S/P deep brain stimulator placement    Vitamin B12 deficiency    Vitamin D deficiency    History of left breast cancer    Rectal bleeding     Hoarseness of voice    Esophageal candidiasis (HCC)    S/P left mastectomy    Continuous opioid dependence (HCC)    Malignant neoplasm of left breast in female, estrogen receptor negative, unspecified site of breast (HCC)    Primary osteoarthritis of one hip, right    Disorder of bursae of shoulder region    Enthesopathy of hip region    Status post total hip replacement, right    Ulcerative colitis, chronic, other complication (HCC)   [2]   Past Medical History:  Diagnosis Date    Anemia     Anxiety     Arrhythmia     Arthritis     Asthma     Blood clot in vein     portal vein    Breast cancer (HCC) 02/12/2021    Breast lump     [id number] RESOLVED    Cancer (HCC)     Depression     Disease of thyroid gland     DVT, lower extremity (HCC)     GERD (gastroesophageal reflux disease)     Heart disease mother    Hyperlipidemia     Hypertension     Hypokalemia     Hyponatremia     Hypothyroidism     Iron deficiency anemia     Irregular heart beat     Manic behavior (HCC)     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HCC)     Osteoarthritis     Palpitations     [id number]  RESOLVED    Paroxysmal supraventricular tachycardia (HCC)     PE (pulmonary thromboembolism) (HCC)     Pneumonia     Sjoegren syndrome     Sleep apnea     mild    Sleep difficulties     Spinal stenosis     Thrombocytosis     [id number]  RESOLVED    Tremors of nervous system     dbs implanted right and left chest    Ulcerative colitis (HCC)     Vertigo     [id number] RESOLVED   [3]   Past Surgical History:  Procedure Laterality Date    ABDOMINAL SURGERY      APPENDECTOMY      BREAST BIOPSY Left 11/07/2019    Stereo    BREAST EXCISIONAL BIOPSY Left     x many years    BREAST SURGERY      lumpectomy & biopsy    CARMELLA HOLE W/ STEREOTACTIC INSERTION OF DBS LEADS / INTRAOP MICROELECTRODE RECORDING      COLON SURGERY      COLONOSCOPY N/A 08/30/2017    Procedure: POUCHOSCOPY;  Surgeon: VANDANA Waters MD;  Location: [facility];  Service: Colorectal    COLOPROCTECTOMY W/ ILEO J  POUCH      DEEP BRAIN STIMULATOR PLACEMENT      ESOPHAGOGASTRODUODENOSCOPY      ONSET 10/17/11    FISTULA REPAIR      LLEOANAL FISTULA REPAIR TRANSPERIN TRANSABD APPROACH    HYSTERECTOMY      age 38    ILEOSTOMY CLOSURE      KNEE ARTHROSCOPY      Right    MAMMO STEREOTACTIC BREAST BIOPSY LEFT (ALL INC) Left 11/07/2019    MAMMO STEREOTACTIC BREAST BIOPSY LEFT (ALL INC) Left 12/17/2020    MAMMO STEREOTACTIC BREAST BIOPSY LEFT (ALL INC) EACH ADD Left 12/17/2020    MASTECTOMY Left 02/12/2021    left mastectomy- Dr. Gillespie    MASTECTOMY W/ SENTINEL NODE BIOPSY Left 02/12/2021    Procedure: BREAST MASTECTOMY WITH SENTINEL LYMPH NODE BIOPSY, LYMPHATIC MAPPING WITH BLUE DYE AND RADIAOCTIVE DYE (INJECT AT 1100 BY DR GILLESPIE IN THE OR);  Surgeon: Robbie Gillespie MD;  Location: [facility];  Service: Surgical Oncology    MN ARTHRP ACETBLR/PROX FEM PROSTC AGRFT/ALGRFT Right 11/04/2024    Procedure: ARTHROPLASTY HIP TOTAL, overnight;  Surgeon: Gaviota Ford DO;  Location:  MAIN OR;  Service: Orthopedics    MN INSJ/RPLCMT CRANIAL NEUROSTIM PULSE GENERATOR Right 06/20/2017    Procedure: DBS GENERATOR REPLACEMENT;  Surgeon: Marin Mao MD;  Location:  MAIN OR;  Service: Neurosurgery    MN INSJ/RPLCMT CRANIAL NEUROSTIM PULSE GENERATOR N/A 12/04/2019    Procedure: REPLACEMENT IMPLANTABLE PULSE GENERATOR FOR DEEP BRAIN STIMULATOR LEFT CHEST;  Surgeon: Damian Batres MD;  [facility];  Service: Neurosurgery    SPLENECTOMY      TONSILLECTOMY      TOTAL HIP ARTHROPLASTY Right

## 2025-07-23 NOTE — ASSESSMENT & PLAN NOTE
79 year old female s/p right TKA. Placed on supplemental oxygen overnight 2/2 to low O2 saturation levels. Did well overnight and currently sitting upright in chair without oxygen saturating 94%    Plan:  - Replete electrolytes  - WBAT on RLE  - PT/OT  - OOB/ambulate   - DVT ppx  - Trend labs   - Monitor vitals   - Bowel regimen   - Dispo pending PT/OT

## 2025-07-23 NOTE — PLAN OF CARE
Problem: PAIN - ADULT  Goal: Verbalizes/displays adequate comfort level or baseline comfort level  Description: Interventions:  - Encourage patient to monitor pain and request assistance  - Assess pain using appropriate pain scale  - Administer analgesics as ordered based on type and severity of pain and evaluate response  - Implement non-pharmacological measures as appropriate and evaluate response  - Consider cultural and social influences on pain and pain management  - Notify physician/advanced practitioner if interventions unsuccessful or patient reports new pain  - Educate patient/family on pain management process including their role and importance of  reporting pain   - Provide non-pharmacologic/complimentary pain relief interventions  Outcome: Progressing     Problem: INFECTION - ADULT  Goal: Absence or prevention of progression during hospitalization  Description: INTERVENTIONS:  - Assess and monitor for signs and symptoms of infection  - Monitor lab/diagnostic results  - Monitor all insertion sites, i.e. indwelling lines, tubes, and drains  - Monitor endotracheal if appropriate and nasal secretions for changes in amount and color  - Slick appropriate cooling/warming therapies per order  - Administer medications as ordered  - Instruct and encourage patient and family to use good hand hygiene technique  - Identify and instruct in appropriate isolation precautions for identified infection/condition  Outcome: Progressing  Goal: Absence of fever/infection during neutropenic period  Description: INTERVENTIONS:  - Monitor WBC  - Perform strict hand hygiene  - Limit to healthy visitors only  - No plants, dried, fresh or silk flowers with griggs in patient room  Outcome: Progressing     Problem: SAFETY ADULT  Goal: Patient will remain free of falls  Description: INTERVENTIONS:  - Educate patient/family on patient safety including physical limitations  - Instruct patient to call for assistance with activity   -  Consider consulting OT/PT to assist with strengthening/mobility based on AM PAC & JH-HLM score  - Consult OT/PT to assist with strengthening/mobility   - Keep Call bell within reach  - Keep bed low and locked with side rails adjusted as appropriate  - Keep care items and personal belongings within reach  - Initiate and maintain comfort rounds  - Make Fall Risk Sign visible to staff  - Offer Toileting every 2 Hours, in advance of need  - Initiate/Maintain bed/chair alarm  - Obtain necessary fall risk management equipment: walker   - Apply yellow socks and bracelet for high fall risk patients  - Consider moving patient to room near nurses station  Outcome: Progressing  Goal: Maintain or return to baseline ADL function  Description: INTERVENTIONS:  -  Assess patient's ability to carry out ADLs; assess patient's baseline for ADL function and identify physical deficits which impact ability to perform ADLs (bathing, care of mouth/teeth, toileting, grooming, dressing, etc.)  - Assess/evaluate cause of self-care deficits   - Assess range of motion  - Assess patient's mobility; develop plan if impaired  - Assess patient's need for assistive devices and provide as appropriate  - Encourage maximum independence but intervene and supervise when necessary  - Involve family in performance of ADLs  - Assess for home care needs following discharge   - Consider OT consult to assist with ADL evaluation and planning for discharge  - Provide patient education as appropriate  - Monitor functional capacity and physical performance, use of AM PAC & JH-HLM   - Monitor gait, balance and fatigue with ambulation    Outcome: Progressing  Goal: Maintains/Returns to pre admission functional level  Description: INTERVENTIONS:  - Perform AM-PAC 6 Click Basic Mobility/ Daily Activity assessment daily.  - Set and communicate daily mobility goal to care team and patient/family/caregiver.   - Collaborate with rehabilitation services on mobility goals  if consulted  - Perform Range of Motion 4 times a day.  - Reposition patient every 2 hours.  - Dangle patient 3 times a day  - Stand patient 3 times a day  - Ambulate patient 3 times a day  - Out of bed to chair 3 times a day   - Out of bed for meals 3 times a day  - Out of bed for toileting  - Record patient progress and toleration of activity level   Outcome: Progressing     Problem: DISCHARGE PLANNING  Goal: Discharge to home or other facility with appropriate resources  Description: INTERVENTIONS:  - Identify barriers to discharge w/patient and caregiver  - Arrange for needed discharge resources and transportation as appropriate  - Identify discharge learning needs (meds, wound care, etc.)  - Arrange for interpretive services to assist at discharge as needed  - Refer to Case Management Department for coordinating discharge planning if the patient needs post-hospital services based on physician/advanced practitioner order or complex needs related to functional status, cognitive ability, or social support system  Outcome: Progressing     Problem: Knowledge Deficit  Goal: Patient/family/caregiver demonstrates understanding of disease process, treatment plan, medications, and discharge instructions  Description: Complete learning assessment and assess knowledge base.  Interventions:  - Provide teaching at level of understanding  - Provide teaching via preferred learning methods  Outcome: Progressing

## 2025-07-23 NOTE — CONSULTS
Consultation - Internal Medicine  Name: Matilda Day 79 y.o. female I MRN: [id number]  Unit/Bed#: WE 2 N -01 I Date of Admission: 7/23/2025   Date of Service: 7/23/2025 I Hospital Day: 0   Inpatient consult to Internal Medicine  Consult performed by: Boni Smith PA-C  Consult ordered by: Dex Rod PA-C        Physician Requesting Evaluation: Ever Rosales MD   Reason for Evaluation / Principal Problem: Primary osteoarthritis of right knee    Assessment & Plan  Primary osteoarthritis of right knee  79 year old female s/p right TKA    Plan:  - WBAT on RLE  - PT/OT  - OOB/ambulate   - DVT ppx  - Trend labs   - Monitor vitals   - Bowel regimen   - Dispo pending PT/OT  Anxiety  - Continue home medications   Disease of thyroid gland  - Levothyroxine   DVT, lower extremity (HCC)  - DVT ppx w/ Eliquis   GERD (gastroesophageal reflux disease)  - PPI  - Pepcid PRN  Hyperlipidemia  - Statin therapy   - Low cholesterol diet   Hypertension  - Hold ACEi, ARBs and diuretics to decrease risl of DALI  - Add Hydralazine/Labetalol for increase in SBP   - Continue Amlodipine   Irregular heart beat  - Monitor vitals   - Continue home diltiazem and HTN regimen   - Monitor for symptoms   PE (pulmonary thromboembolism) (HCC)  - DVT ppx as mentioned above   Please contact the SecureChat role for the Internal Medicine service with any questions/concerns.    History of Present Illness   Matilda Day is a 79 y.o. female with multiple medical comorbidities who is post-op day 0 s/p right total knee arthroplasty. Prior to my evaluation patient worked well with physical and occupational therapy where she walked multiple feet in the hallways and around room with minimal assistance and walker. Upon my evaluation, patient in minimal amount of pain located near the incision sites of the right knee. She states movement worsens the pain, but knows it is necessary to continue to move. She states the pain is relieved with medications  and is content at this time. She denies any numbness or tingling at this time. She states she is able to move bilateral lower extremities.    Review of Systems   Constitutional: Negative.    Respiratory: Negative.     Cardiovascular: Negative.    Gastrointestinal: Negative.    Genitourinary: Negative.    Musculoskeletal: Negative.    Neurological: Negative.    Psychiatric/Behavioral: Negative.       Historical Information   Past Medical History[1]  Past Surgical History[2]  Social History[3]  E-Cigarette/Vaping    E-Cigarette Use Never User      E-Cigarette/Vaping Substances    Nicotine No     THC No     CBD No     Flavoring No     Other No     Unknown No      Family History[4]  Social History[5]    Current Facility-Administered Medications:     acetaminophen (TYLENOL) tablet 650 mg, Q6H PRN    aluminum-magnesium hydroxide-simethicone (MAALOX) oral suspension 30 mL, Q6H PRN    [START ON 7/24/2025] amLODIPine (NORVASC) tablet 2.5 mg, Daily    [START ON 7/24/2025] apixaban (ELIQUIS) tablet 2.5 mg, BID    calcium carbonate (TUMS) chewable tablet 1,000 mg, Daily PRN    ceFAZolin (ANCEF) IVPB (premix in dextrose) 2,000 mg 50 mL, Q8H    cyclobenzaprine (FLEXERIL) tablet 5 mg, TID PRN    diltiazem (CARDIZEM CD) 24 hr capsule 180 mg, Daily    diltiazem (CARDIZEM) tablet 60 mg, Daily    docusate sodium (COLACE) capsule 100 mg, BID    escitalopram (LEXAPRO) tablet 20 mg, Daily    famotidine (PEPCID) tablet 20 mg, Daily    gabapentin (NEURONTIN) capsule 300 mg, HS    [START ON 7/24/2025] gabapentin (NEURONTIN) capsule 600 mg, Daily    hydrALAZINE (APRESOLINE) injection 5 mg, Q4H PRN    HYDROmorphone (DILAUDID) injection 0.5 mg, Q2H PRN    labetalol (NORMODYNE) injection 10 mg, Q4H PRN    lactated ringers bolus 1,000 mL, Once PRN **AND** lactated ringers bolus 1,000 mL, Once PRN    lactated ringers infusion, Continuous, Last Rate: Stopped (07/23/25 1525)    lactated ringers infusion, Continuous, Last Rate: 1.5 mL/kg/hr  (07/23/25 1655)    levothyroxine tablet 50 mcg, Early Morning    [Held by provider] lisinopril (ZESTRIL) tablet 20 mg, BID    LORazepam (ATIVAN) tablet 1 mg, Q6H PRN    metaxalone (SKELAXIN) tablet 800 mg, 4x Daily    ondansetron (ZOFRAN) injection 4 mg, Q6H PRN    oxyCODONE (ROXICODONE) immediate release tablet 10 mg, Q4H PRN    oxyCODONE (ROXICODONE) IR tablet 5 mg, Q4H PRN    pravastatin (PRAVACHOL) tablet 40 mg, Daily With Dinner    senna (SENOKOT) tablet 8.6 mg, Daily    sodium chloride 0.9 % bolus 1,000 mL, Once PRN **AND** sodium chloride 0.9 % bolus 1,000 mL, Once PRN    [Held by provider] torsemide (DEMADEX) tablet 10 mg, Daily  Prior to Admission Medications   Prescriptions Last Dose Informant Patient Reported? Taking?   Fluticasone Furoate-Vilanterol 100-25 mcg/actuation inhaler  Self No No   Sig: Inhale 1 puff daily Rinse mouth after use.   HYDROcodone-acetaminophen (NORCO) 5-325 mg per tablet Past Month  No Yes   Sig: Take 1 tablet by mouth every 6 (six) hours as needed for pain Max Daily Amount: 4 tablets   LORazepam (ATIVAN) 1 mg tablet 7/23/2025 Morning  No Yes   Sig: Take 1 tablet (1 mg total) by mouth every 6 (six) hours as needed for anxiety   MAGNESIUM PO 7/22/2025 Self Yes Yes   Sig: Take 1 tablet by mouth in the morning. 400 mg tablet .   Multiple Vitamin (multivitamin) tablet 7/23/2025 Morning Self No Yes   Sig: Take 1 tablet by mouth daily Begin 30 days prior to surgery   Omega-3 Fatty Acids (FISH OIL PO) 7/9/2025 Self Yes Yes   Sig: Take 1 capsule by mouth in the morning.   amLODIPine (NORVASC) 2.5 mg tablet 7/23/2025 at  7:30 AM Self No Yes   Sig: TAKE ONE TABLET BY MOUTH EVERY DAY   apixaban (Eliquis) 2.5 mg 7/19/2025 Self No Yes   Sig: Take 1 tablet (2.5 mg total) by mouth 2 (two) times a day   cyclobenzaprine (FLEXERIL) 5 mg tablet More than a month Self No No   Sig: Take 1 tablet (5 mg total) by mouth 3 (three) times a day as needed for muscle spasms   diltiazem (CARDIZEM CD) 180 mg 24  hr capsule 7/22/2025 Self No Yes   Sig: TAKE ONE CAPSULE BY MOUTH EVERY DAY   Patient taking differently: Take 180 mg by mouth in the evening.   diltiazem (CARDIZEM) 60 mg tablet 7/22/2025 Self No Yes   Sig: TAKE ONE TABLET BY MOUTH EVERY DAY   escitalopram (LEXAPRO) 20 mg tablet 7/22/2025 Self No Yes   Sig: TAKE ONE TABLET BY MOUTH EVERY DAY   famotidine (PEPCID) 20 mg tablet 7/22/2025 Self No Yes   Sig: Take 1 tablet (20 mg total) by mouth daily   ferrous sulfate 324 (65 Fe) mg 7/23/2025 Morning Self No Yes   Sig: Take 1 tablet (324 mg total) by mouth daily before breakfast Start 30 days prior to surgery   fluticasone (FLONASE) 50 mcg/act nasal spray More than a month Self No No   Sig: APPLY ONE SPRAY IN EACH NOSTRIL ONCE DAILY AS NEEDED FOR RUNNY NOSE   folic acid (FOLVITE) 1 mg tablet 7/23/2025 Morning Self No Yes   Sig: Take 1 tablet (1 mg total) by mouth daily Start 30 days prior to surgery   gabapentin (NEURONTIN) 600 MG tablet 7/23/2025 Morning Self No Yes   Sig: TAKE ONE TABLET BY MOUTH EVERY MORNING , TAKE ONE TABLET BY MOUTH EVERY DAY IN THE AFTERNOON , AND TAKE TWO TABLETS BY MOUTH EVERY EVENING AT BEDTIME   hydrocortisone 2.5 % cream  Self Yes No   Patient not taking: Reported on 7/7/2025   ipratropium-albuterol (DUO-NEB) 0.5-2.5 mg/3 mL nebulizer solution More than a month  Yes No   Sig: for 10 days   levothyroxine 50 mcg tablet 7/22/2025 Self No Yes   Sig: Take 1 tablet (50 mcg total) by mouth daily   lisinopril (ZESTRIL) 20 mg tablet 7/22/2025 Self No Yes   Sig: Take 1 tablet (20 mg total) by mouth 2 (two) times a day   metaxalone (SKELAXIN) 800 mg tablet 7/22/2025 Self Yes Yes   Sig: Take 800 mg by mouth in the morning and 800 mg at noon and 800 mg in the evening and 800 mg before bedtime.   mupirocin (BACTROBAN) 2 % ointment 7/23/2025 Morning Self No Yes   Sig: Apply topically 2 (two) times a day Apply pea size amount to each nostril 2x/day for 5 days prior to procedure   nystatin-triamcinolone  (MYCOLOG-II) ointment  Self No No   Sig: Apply topically 2 (two) times a day for 14 days   pantoprazole (PROTONIX) 40 mg tablet 7/23/2025 Morning Self No Yes   Sig: TAKE ONE TABLET BY MOUTH TWICE A DAY   potassium chloride (MICRO-K) 10 MEQ CR capsule 7/22/2025 Self No Yes   Sig: TAKE TWO CAPSULES BY MOUTH EVERY DAY   rosuvastatin (CRESTOR) 5 mg tablet 7/22/2025 Self No Yes   Sig: Take 1 tablet (5 mg total) by mouth daily   sodium chloride 1 g tablet 7/22/2025 Self No Yes   Sig: Take 1 tablet (1 g total) by mouth 4 (four) times a day   torsemide (DEMADEX) 10 mg tablet 7/22/2025 Self No Yes   Sig: Take 1 tablet (10 mg total) by mouth daily   vitamin B-12 (VITAMIN B-12) 500 mcg tablet 7/9/2025 Self No Yes   Sig: Take 1 tablet (500 mcg total) by mouth daily      Facility-Administered Medications: None     Indomethacin, Macrobid [nitrofurantoin monohyd macro], Nitrofurantoin, Penicillins, and Sulfa antibiotics    Objective :  Temp:  [97.6 °F (36.4 °C)-98 °F (36.7 °C)] 97.6 °F (36.4 °C)  HR:  [54-63] 62  BP: (134-171)/(63-79) 145/66  Resp:  [11-20] 11  SpO2:  [90 %-98 %] 94 %  O2 Device: Nasal cannula  Nasal Cannula O2 Flow Rate (L/min):  [2 L/min] 2 L/min    I/O         07/21 0701 07/22 0700 07/22 0701  07/23 0700 07/23 0701 07/24 0700    I.V. (mL/kg)   1050 (16.8)    Total Intake(mL/kg)   1050 (16.8)    Urine (mL/kg/hr)   1400    Total Output   1400    Net   -350                   Physical Exam  Constitutional:       General: She is not in acute distress.     Appearance: Normal appearance. She is not ill-appearing, toxic-appearing or diaphoretic.     Cardiovascular:      Rate and Rhythm: Normal rate and regular rhythm.      Pulses: Normal pulses.      Heart sounds: Normal heart sounds.   Pulmonary:      Effort: Pulmonary effort is normal. No respiratory distress.      Breath sounds: Normal breath sounds.   Abdominal:      General: Abdomen is flat. There is no distension.      Palpations: Abdomen is soft.       "Tenderness: There is no abdominal tenderness.     Skin:     General: Skin is warm and dry.      Coloration: Skin is not jaundiced.      Findings: No erythema.     Neurological:      General: No focal deficit present.      Mental Status: She is alert and oriented to person, place, and time.     RLE: ACE bandages are clean, dry and intact without any proximal or distal erythema, edema or drainage. Sensation and motor function grossly intact. Neurovascularly intact. Toes warm and pink.      Lab Results: I have reviewed the following results:  No results for input(s): \"WBC\", \"HGB\", \"HCT\", \"PLT\", \"BANDSPCT\", \"SODIUM\", \"K\", \"CL\", \"CO2\", \"BUN\", \"CREATININE\", \"GLUC\", \"CAIONIZED\", \"MG\", \"PHOS\", \"AST\", \"ALT\", \"ALB\", \"TBILI\", \"DBILI\", \"ALKPHOS\", \"PTT\", \"INR\", \"HSTNI0\", \"HSTNI2\", \"BNP\", \"LACTICACID\" in the last 72 hours.    Imaging Results Review: No pertinent imaging studies reviewed.  Other Study Results Review: No additional pertinent studies reviewed.    VTE Prophylaxis: VTE covered by:  [START ON 7/24/2025] apixaban, Oral            [1]   Past Medical History:  Diagnosis Date    Anemia     Anxiety     Arrhythmia     Arthritis     Asthma     Blood clot in vein     portal vein    Breast cancer (HCC) 02/12/2021    Breast lump     [id number] RESOLVED    Cancer (HCC)     Depression     Disease of thyroid gland     DVT, lower extremity (HCC)     GERD (gastroesophageal reflux disease)     Heart disease mother    Hyperlipidemia     Hypertension     Hypokalemia     Hyponatremia     Hypothyroidism     Iron deficiency anemia     Irregular heart beat     Manic behavior (HCC)     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HCC)     Osteoarthritis     Palpitations     [id number]  RESOLVED    Paroxysmal supraventricular tachycardia (HCC)     PE (pulmonary thromboembolism) (HCC)     Pneumonia     Sjoegren syndrome     Sleep apnea     mild    Sleep difficulties     Spinal stenosis     Thrombocytosis     [id number]  RESOLVED    Tremors of nervous system     " dbs implanted right and left chest    Ulcerative colitis (HCC)     Vertigo     [id number] RESOLVED   [2]   Past Surgical History:  Procedure Laterality Date    ABDOMINAL SURGERY      APPENDECTOMY      BREAST BIOPSY Left 11/07/2019    Stereo    BREAST EXCISIONAL BIOPSY Left     x many years    BREAST SURGERY      lumpectomy & biopsy    CARMELLA HOLE W/ STEREOTACTIC INSERTION OF DBS LEADS / INTRAOP MICROELECTRODE RECORDING      COLON SURGERY      COLONOSCOPY N/A 08/30/2017    Procedure: POUCHOSCOPY;  Surgeon: VANDANA Waters MD;  Location: [facility];  Service: Colorectal    COLOPROCTECTOMY W/ ILEO J POUCH      DEEP BRAIN STIMULATOR PLACEMENT      ESOPHAGOGASTRODUODENOSCOPY      ONSET 10/17/11    FISTULA REPAIR      LLEOANAL FISTULA REPAIR TRANSPERIN TRANSABD APPROACH    HYSTERECTOMY      age 38    ILEOSTOMY CLOSURE      KNEE ARTHROSCOPY      Right    MAMMO STEREOTACTIC BREAST BIOPSY LEFT (ALL INC) Left 11/07/2019    MAMMO STEREOTACTIC BREAST BIOPSY LEFT (ALL INC) Left 12/17/2020    MAMMO STEREOTACTIC BREAST BIOPSY LEFT (ALL INC) EACH ADD Left 12/17/2020    MASTECTOMY Left 02/12/2021    left mastectomy- Dr. Gillespie    MASTECTOMY W/ SENTINEL NODE BIOPSY Left 02/12/2021    Procedure: BREAST MASTECTOMY WITH SENTINEL LYMPH NODE BIOPSY, LYMPHATIC MAPPING WITH BLUE DYE AND RADIAOCTIVE DYE (INJECT AT 1100 BY DR GILLESPIE IN THE OR);  Surgeon: Robbie Gillespie MD;  Location: [facility];  Service: Surgical Oncology    VA ARTHRP ACETBLR/PROX FEM PROSTC AGRFT/ALGRFT Right 11/04/2024    Procedure: ARTHROPLASTY HIP TOTAL, overnight;  Surgeon: Gaviota Ford DO;  Location:  MAIN OR;  Service: Orthopedics    VA INSJ/RPLCMT CRANIAL NEUROSTIM PULSE GENERATOR Right 06/20/2017    Procedure: DBS GENERATOR REPLACEMENT;  Surgeon: Marin Mao MD;  Location:  MAIN OR;  Service: Neurosurgery    VA INSJ/RPLCMT CRANIAL NEUROSTIM PULSE GENERATOR N/A 12/04/2019    Procedure: REPLACEMENT IMPLANTABLE PULSE GENERATOR FOR DEEP BRAIN STIMULATOR LEFT  CHEST;  Surgeon: Damian Batres MD;  Location: [facility];  Service: Neurosurgery    SPLENECTOMY      TONSILLECTOMY      TOTAL HIP ARTHROPLASTY Right    [3]   Social History  Tobacco Use    Smoking status: Former     Current packs/day: 0.00     Average packs/day: 1 pack/day for 15.0 years (15.0 ttl pk-yrs)     Types: Cigarettes     Start date: 1950     Quit date: 1965     Years since quittin.5    Smokeless tobacco: Never    Tobacco comments:     Quit   Vaping Use    Vaping status: Never Used   Substance and Sexual Activity    Alcohol use: Yes     Alcohol/week: 2.0 standard drinks of alcohol     Types: 2 Cans of beer per week     Comment: 2or 3 beers a week    Drug use: No    Sexual activity: Not Currently     Partners: Male     Birth control/protection: Post-menopausal   [4]   Family History  Problem Relation Name Age of Onset    Venous thrombosis Mother charlene Godfrey         ACUTE VENOUS THROMBOSIS OF DEEP VESSELS OF THE DISTAL LOWER EXTREMITY    Other Mother charlene Godfrey         PHLEBITIS    Hypertension Mother charlene Godfrey     Peripheral vascular disease Mother charlene Godfrey     COPD Father gali     Diabetes Father gali         MELLITUS    Stroke Father gali     Diabetes Sister lew         MELLITUS    Sjogren's syndrome Sister lew     No Known Problems Daughter gee     No Known Problems Maternal Grandmother      No Known Problems Maternal Grandfather      No Known Problems Paternal Grandmother      No Known Problems Paternal Grandfather      No Known Problems Sister jeremy     No Known Problems Maternal Aunt      No Known Problems Paternal Aunt      No Known Problems Son     [5]   Social History  Tobacco Use    Smoking status: Former     Current packs/day: 0.00     Average packs/day: 1 pack/day for 15.0 years (15.0 ttl pk-yrs)     Types: Cigarettes     Start date: 1950     Quit date: 1965     Years since quittin.5    Smokeless tobacco: Never    Tobacco  comments:     Quit   Vaping Use    Vaping status: Never Used   Substance and Sexual Activity    Alcohol use: Yes     Alcohol/week: 2.0 standard drinks of alcohol     Types: 2 Cans of beer per week     Comment: 2or 3 beers a week    Drug use: No    Sexual activity: Not Currently     Partners: Male     Birth control/protection: Post-menopausal

## 2025-07-23 NOTE — ANESTHESIA PREPROCEDURE EVALUATION
Procedure:  ARTHROPLASTY KNEE TOTAL, RIGHT (Right: Knee)    Relevant Problems   CARDIO   (+) Essential hypertension   (+) Hyperlipidemia   (+) Portal vein thrombosis   (+) Supraventricular tachycardia (HCC)      ENDO   (+) Acquired hypothyroidism      GI/HEPATIC   (+) GERD (gastroesophageal reflux disease)   (+) Rectal bleeding      GYN   (+) Malignant neoplasm of left breast in female, estrogen receptor negative, unspecified site of breast (HCC)      HEMATOLOGY   (+) Iron deficiency anemia      MUSCULOSKELETAL   (+) Primary osteoarthritis of one hip, right   (+) Primary osteoarthritis of right knee      NEURO/PSYCH   (+) Anxiety   (+) Continuous opioid dependence (HCC)   (+) Moderate episode of recurrent major depressive disorder (HCC)      PULMONARY   (+) Mild intermittent asthma without complication        Physical Exam    Airway     Mallampati score: II  TM Distance: >3 FB  Neck ROM: full      Cardiovascular      Dental   Comment: Partial upper and lower dentures     Pulmonary      Neurological      Other Findings  post-pubertal.      Anesthesia Plan  ASA Score- 3     Anesthesia Type- spinal with ASA Monitors.         Additional Monitors:     Airway Plan:     Comment: Plan for long acting adductor canal and anterior femoral cutaneous nerve blocks for postoperative analgesia discussed with risks/benefits/alternatives. Patient understands that these blocks are intended to be motor sparing, allowing for immediate postoperative physical therapy. Consequentially, this combination of blocks is not expected to provide complete analgesia to the joint and is primarily intended to reduce postoperative opioid consumption. Surgeon to infiltrate posterior capsule in the field for additional analgesia.     Discussed plan for spinal anesthetic with risks/benefits/alternatives, including extremely low risk of spinal hematoma, infection, peripheral nerve damage, and paralysis. Patient aware of benefits including improved  postoperative analgesia, decreased intraoperative bleeding, decreased intraoperative DVT risk, and avoidance of general anesthetic. Discussed possibility of general anesthesia as a back-up to neuraxial anesthetic given frequent concomitant degenerative disease of the lumbar spine  .       Plan Factors-Exercise tolerance (METS): >4 METS.    Chart reviewed.   Existing labs reviewed. Patient summary reviewed.                  Induction-     Postoperative Plan- Plan for postoperative opioid use.   Monitoring Plan - Monitoring plan - standard ASA monitoring  Post Operative Pain Plan - plan for postoperative opioid use, peripheral nerve block and multimodal analgesia        Informed Consent- Anesthetic plan and risks discussed with patient.  I personally reviewed this patient with the CRNA. Discussed and agreed on the Anesthesia Plan with the CRNA..      NPO Status:  No vitals data found for the desired time range.

## 2025-07-23 NOTE — PLAN OF CARE
Problem: PHYSICAL THERAPY ADULT  Goal: Performs mobility at highest level of function for planned discharge setting.  See evaluation for individualized goals.  Description: Treatment/Interventions: Functional transfer training, LE strengthening/ROM, Elevations, Therapeutic exercise, Endurance training, Bed mobility, Gait training, Spoke to nursing, OT, Family  Equipment Recommended: Walker (pt owns)       See flowsheet documentation for full assessment, interventions and recommendations.  Note: Prognosis: Good  Problem List: Decreased strength, Decreased range of motion, Decreased endurance, Impaired balance, Decreased mobility, Orthopedic restrictions, Pain  Assessment: Pt is a 79 y.o. female who [facility] s/p R TKA done by Dr. Rosales. Precautions include WBAT. Pt  has a past medical history of Anemia, Anxiety, Arrhythmia, Arthritis, Asthma, Blood clot in vein, Breast cancer (HCC) (02/12/2021), Breast lump, Cancer (HCC), Depression, Disease of thyroid gland, DVT, lower extremity (HCC), GERD, Heart disease (mother). Pt greeted at bedside for PT evaluation on 07/23/25. Pt referred to PT for functional mobility evaluation & D/C planning w/ orders of activity beginning POD #0 and activity as tolerated. PTA, pt reports being I w/o AD and I w/ IADLs. Personal factors affecting pt at time of IE include: lives in 2 story house and stairs to enter home. Please find objective findings from PT assessment regarding body systems outlined above with impairments and limitations including weakness, decreased ROM, impaired balance, decreased endurance, gait deviations, pain, decreased activity tolerance, decreased functional mobility tolerance, fall risk, and orthopedic restrictions.  Please see flow sheet above for objective findings and level of assistance required for safe completion of functional tasks. Pt able to perform supine<>sit with S. Pt able to perform sit<>stand with RW and S. Pt able to  ambulate 50'x2 with RW and S. Pt demonstrated dec endurance and tolerance to activity. Denies reports of dizziness or SOB t/o session. Patient was left supine in bed with call bell and all needs within reach. Pt was educated on fall precautions and reinforced w/ good understanding. Based on pt presentation and impaired function, pt would benefit from level III, (minimal resource intensity) at D/C. From PT/mobility standpoint, pt would benefit from OPPT to address deficits as defined above and maximize level of independence and return pt to PLOF. HEP given and reviewed with patient, no questions at this time. CM to follow. Nsg staff to continue to mobilized pt (OOB in chair for all meals & ambulate in room/unit) as tolerated to prevent further decline in function. Nsg notified. Co-eval performed with OT to complete this evaluation for the pts best interest given pts medical complexity and functional level.  Barriers to Discharge: Inaccessible home environment (VIRAL)     Rehab Resource Intensity Level, PT: III (Minimum Resource Intensity)    See flowsheet documentation for full assessment.

## 2025-07-23 NOTE — INTERVAL H&P NOTE
H&P reviewed. After examining the patient I find no changes in the patients condition since the H&P had been written.    Vitals:    07/23/25 1041   BP: 158/71   Pulse: 56   Resp: 17   Temp: 98 °F (36.7 °C)   SpO2: 96%

## 2025-07-24 ENCOUNTER — APPOINTMENT (OUTPATIENT)
Dept: PHYSICAL THERAPY | Facility: CLINIC | Age: 80
End: 2025-07-24
Attending: INTERNAL MEDICINE
Payer: MEDICARE

## 2025-07-24 VITALS
HEIGHT: 63 IN | HEART RATE: 60 BPM | SYSTOLIC BLOOD PRESSURE: 134 MMHG | RESPIRATION RATE: 11 BRPM | DIASTOLIC BLOOD PRESSURE: 55 MMHG | TEMPERATURE: 97.9 F | WEIGHT: 137.8 LBS | OXYGEN SATURATION: 94 % | BODY MASS INDEX: 24.41 KG/M2

## 2025-07-24 DIAGNOSIS — I10 ESSENTIAL HYPERTENSION: ICD-10-CM

## 2025-07-24 LAB
ANION GAP SERPL CALCULATED.3IONS-SCNC: 7 MMOL/L (ref 4–13)
BUN SERPL-MCNC: 11 MG/DL (ref 5–25)
CALCIUM SERPL-MCNC: 9.1 MG/DL (ref 8.4–10.2)
CHLORIDE SERPL-SCNC: 99 MMOL/L (ref 96–108)
CO2 SERPL-SCNC: 30 MMOL/L (ref 21–32)
CREAT SERPL-MCNC: 0.55 MG/DL (ref 0.6–1.3)
ERYTHROCYTE [DISTWIDTH] IN BLOOD BY AUTOMATED COUNT: 14.7 % (ref 11.6–15.1)
GFR SERPL CREATININE-BSD FRML MDRD: 88 ML/MIN/1.73SQ M
GLUCOSE SERPL-MCNC: 160 MG/DL (ref 65–140)
HCT VFR BLD AUTO: 33.3 % (ref 34.8–46.1)
HGB BLD-MCNC: 11.6 G/DL (ref 11.5–15.4)
MCH RBC QN AUTO: 31.8 PG (ref 26.8–34.3)
MCHC RBC AUTO-ENTMCNC: 34.8 G/DL (ref 31.4–37.4)
MCV RBC AUTO: 91 FL (ref 82–98)
PLATELET # BLD AUTO: 435 THOUSANDS/UL (ref 149–390)
PMV BLD AUTO: 8.9 FL (ref 8.9–12.7)
POTASSIUM SERPL-SCNC: 3.3 MMOL/L (ref 3.5–5.3)
RBC # BLD AUTO: 3.65 MILLION/UL (ref 3.81–5.12)
SODIUM SERPL-SCNC: 136 MMOL/L (ref 135–147)
WBC # BLD AUTO: 9.36 THOUSAND/UL (ref 4.31–10.16)

## 2025-07-24 PROCEDURE — 97535 SELF CARE MNGMENT TRAINING: CPT

## 2025-07-24 PROCEDURE — 99232 SBSQ HOSP IP/OBS MODERATE 35: CPT | Performed by: INTERNAL MEDICINE

## 2025-07-24 PROCEDURE — 85027 COMPLETE CBC AUTOMATED: CPT

## 2025-07-24 PROCEDURE — 97116 GAIT TRAINING THERAPY: CPT

## 2025-07-24 PROCEDURE — 80048 BASIC METABOLIC PNL TOTAL CA: CPT

## 2025-07-24 PROCEDURE — 97530 THERAPEUTIC ACTIVITIES: CPT

## 2025-07-24 PROCEDURE — 99024 POSTOP FOLLOW-UP VISIT: CPT | Performed by: PHYSICIAN ASSISTANT

## 2025-07-24 RX ORDER — POTASSIUM CHLORIDE 1500 MG/1
20 TABLET, EXTENDED RELEASE ORAL ONCE
Status: COMPLETED | OUTPATIENT
Start: 2025-07-24 | End: 2025-07-24

## 2025-07-24 RX ORDER — POTASSIUM CITRATE 1080 MG/1
20 TABLET, EXTENDED RELEASE ORAL
Status: DISCONTINUED | OUTPATIENT
Start: 2025-07-24 | End: 2025-07-24

## 2025-07-24 RX ADMIN — CEFAZOLIN SODIUM 2000 MG: 2 SOLUTION INTRAVENOUS at 04:55

## 2025-07-24 RX ADMIN — METAXALONE 800 MG: 800 TABLET ORAL at 09:33

## 2025-07-24 RX ADMIN — AMLODIPINE BESYLATE 2.5 MG: 2.5 TABLET ORAL at 09:34

## 2025-07-24 RX ADMIN — POTASSIUM CHLORIDE 20 MEQ: 1500 TABLET, EXTENDED RELEASE ORAL at 07:38

## 2025-07-24 RX ADMIN — OXYCODONE HYDROCHLORIDE 5 MG: 5 TABLET ORAL at 03:33

## 2025-07-24 RX ADMIN — LEVOTHYROXINE SODIUM 50 MCG: 0.03 TABLET ORAL at 05:00

## 2025-07-24 RX ADMIN — GABAPENTIN 600 MG: 300 CAPSULE ORAL at 09:31

## 2025-07-24 RX ADMIN — DILTIAZEM HYDROCHLORIDE 60 MG: 30 TABLET, FILM COATED ORAL at 09:32

## 2025-07-24 RX ADMIN — APIXABAN 2.5 MG: 2.5 TABLET, FILM COATED ORAL at 09:33

## 2025-07-24 RX ADMIN — FAMOTIDINE 20 MG: 20 TABLET, FILM COATED ORAL at 09:34

## 2025-07-24 RX ADMIN — OXYCODONE HYDROCHLORIDE 10 MG: 10 TABLET ORAL at 07:38

## 2025-07-24 NOTE — TELEPHONE ENCOUNTER
Pt called stated she is returning a call for results. Relayed info from Delphine Wilkins. 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  Let us know if any concerns or questions and hope all is well otherwise.  FL barium swallow

## 2025-07-24 NOTE — TELEPHONE ENCOUNTER
Caller: Opal from [facility] pharmacy    Doctor: Dr. Rosales    Reason for call: Calling to check if any updates on previous message  Please advise     Call back#: [phone]

## 2025-07-24 NOTE — ASSESSMENT & PLAN NOTE
Plan:  Pain control prn  PT/OT  WBAT RLE   Hemoglobin 11.6 this morning. No signs of bleeding in the operative extremity at this time. Will continue to monitor.  Eliquis 2.5mg bid/ SCDs for DVT prophylaxis.  Patient should be discharged with Elioquis 2.5mg bid  Ancef x 24h  Pillow under achilles, not knee for extension.  Patient is stable from an orthopedic standpoint.    She will require follow-up with Dr. Rosales in the office in 10-14 days.

## 2025-07-24 NOTE — OCCUPATIONAL THERAPY NOTE
Occupational Therapy Progress Note     Patient Name: Matilda Day  Today's Date: 7/24/2025  Problem List  Principal Problem:    Primary osteoarthritis of right knee  Active Problems:    Anxiety    Hyperlipidemia    Hypertension    GERD (gastroesophageal reflux disease)    Disease of thyroid gland    DVT, lower extremity (HCC)    Irregular heart beat    PE (pulmonary thromboembolism) (HCC)          07/24/25 0838   OT Last Visit   OT Visit Date 07/24/25   Note Type   Note Type Treatment   Pain Assessment   Pain Assessment Tool 0-10   Pain Score 3   Pain Location/Orientation Orientation: Right;Location: Knee   Hospital Pain Intervention(s) Repositioned;Ambulation/increased activity;Elevated;Emotional support;Rest   Restrictions/Precautions   Weight Bearing Precautions Per Order Yes   RLE Weight Bearing Per Order WBAT   Other Precautions WBS;Multiple lines;Fall Risk;Pain   Lifestyle   Autonomy Independent with all ADLs. needs assistance with IADLs, use of RW in the night time but no AD during the day.   Reciprocal Relationships Spouse   Service to Others Retired   Intrinsic Gratification travel to [place]   ADL   Where Assessed Commode   Grooming Assistance 5  Supervision/Setup   Grooming Deficit Setup;Verbal cueing;Supervision/safety;Increased time to complete;Standing with assistive device   UB Dressing Assistance 5  Supervision/Setup   UB Dressing Deficit Setup;Verbal cueing;Supervision/safety;Increased time to complete   LB Dressing Assistance 5  Supervision/Setup   LB Dressing Deficit Setup;Steadying;Requires assistive device for steadying;Supervision/safety;Verbal cueing;Increased time to complete   Toileting Assistance  5  Supervision/Setup   Toileting Deficit Setup;Verbal cueing;Supervison/safety;Increased time to complete;Grab bar use;Other (Comment)  (BSC over standard toilet)   Functional Standing Tolerance   Time ~7 min   Activity standing for grooming at [facility] with use of RW for stability and use  "of sink for leaning at times. Completed brushing teeth, cleaning face, washing hanbs   Comments use of RW   Bed Mobility   Supine to Sit Unable to assess   Sit to Supine Unable to assess   Additional Comments pt greeted OOB in recliner   Transfers   Sit to Stand 5  Supervision   Additional items Armrests;Increased time required;Verbal cues  (RW)   Stand to Sit 5  Supervision   Additional items Armrests;Increased time required;Verbal cues  (RW)   Toilet transfer 5  Supervision   Additional items Increased time required;Verbal cues;Standard toilet;Commode;Other  (BSC over standard toilet, use of RW)   Additional Comments verbal cues for hand placement   Functional Mobility   Functional Mobility 5  Supervision   Additional Comments Pt completed functional mobility functional in room distance with use of RW and S   Additional items Rolling walker   Toilet Transfers   Toilet Transfer From Other (Comment)  (Chair)   Toilet Transfer Type To and from   Toilet Transfer to Standard toilet   Toilet Transfer Technique Ambulating   Toilet Transfers Supervision   Toilet Transfers Comments use of BSC over standard toilet, RW   Subjective   Subjective \"I didn't sleep much\"   Cognition   Overall Cognitive Status WFL   Arousal/Participation Alert;Cooperative   Attention Within functional limits   Orientation Level Oriented X4   Memory Within functional limits   Following Commands Follows all commands and directions without difficulty   Comments Cooperative and pleasant - increased fatigue during session due to not sleeping   Activity Tolerance   Activity Tolerance Patient limited by fatigue   Medical Staff Made Aware NOBLE Mcdonald   Assessment   Assessment Pt seen for OT treatment session focusing on ADLs/IADLs, functional mobility, functional standing tolerance, functional transfers, patient education, continued evaluation. Pt greeted OOB in recliner at start of session. Pt alert and cooperative throughout session. Pt with good sitting " balance and fair - dynamic standing balance. Pt tolerated treatment well. Pt completed sit to stand from chair with use of armrests with S. Pt completed functional mobility functional in room distance with use of RW and S from chair to bathroom. Pt completed toileting and toilet transfer with S [place] [facility] [place] to simulate home environment. Pt completed don of underwear and pants seated on toilet with S, verbal cues for proper technique, then standing to pull over waist with use of RW for stability. Pt completed don of bra and shirt seated on toilet. Pt completed functional mobility from toilet to sink with use of RW and S. Pt completed brushing teeth, washing face, and washing hands standing at sink with use of RW and sink for balance. Pt with good standing balance ~7 min standing at sink. Pt educated on proper technique and use of chair if fatigued at home. Pt completed functional mobility back to chair to with use of RW and S. Pt stand to sit into chair with S. Don of socks and shoes with modA, pt reporting spouse can assist at home with ADLs/IADLs. Spouse supportive and present during session. Pt educated on ADLs/IADLs, sleeping techniques, bathing, and LE management for independence and safety at home. Pt reports 3/10 pain and no dizziness. Pt's vitals include: stable.     Pt ended session seated OOB in recliner. Call bell and phone within reach. All needs met and pt reports no further questions at this time. Continue to recommend III; Minimum Resource Intensity when medically cleared. OT will continue to follow pt on caseload.   Plan   Treatment Interventions ADL retraining;Functional transfer training;Endurance training;Patient/family training;Equipment evaluation/education;Compensatory technique education;Continued evaluation;Energy conservation;Activityengagement   Goal Expiration Date 07/30/25   OT Treatment Day 0   OT Frequency 3-5x/wk   Discharge Recommendation   Rehab Resource Intensity  Level, OT III (Minimum Resource Intensity)   Additional Comments  The patient's raw score on the -PAC Daily Activity Inpatient Short Form is 21. A raw score of greater than or equal to 19 suggests the patient may benefit from discharge to home. Please refer to the recommendation of the Occupational Therapist for safe discharge planning.   AM-PAC Daily Activity Inpatient   Lower Body Dressing 3   Bathing 3   Toileting 3   Upper Body Dressing 4   Grooming 4   Eating 4   Daily Activity Raw Score 21   Daily Activity Standardized Score (Calc for Raw Score >=11) 44.27   AM-PAC Applied Cognition Inpatient   Following a Speech/Presentation 4   Understanding Ordinary Conversation 4   Taking Medications 4   Remembering Where Things Are Placed or Put Away 4   Remembering List of 4-5 Errands 4   Taking Care of Complicated Tasks 4   Applied Cognition Raw Score 24   Applied Cognition Standardized Score 62.21   End of Consult   Education Provided Yes;Family or social support of family present for education by provider   Patient Position at End of Consult Bedside chair;All needs within reach;Bed/Chair alarm activated   Nurse Communication Nurse aware of consult   End of Consult Comments Pt seated in chair with bed alarm activated at end of session. Call bell and phone within reach. All needs met and pt reports no further questions for OT at this time.   Carmencita Olivares, OT

## 2025-07-24 NOTE — PLAN OF CARE
Problem: PHYSICAL THERAPY ADULT  Goal: Performs mobility at highest level of function for planned discharge setting.  See evaluation for individualized goals.  Description: Treatment/Interventions: Functional transfer training, LE strengthening/ROM, Elevations, Therapeutic exercise, Endurance training, Bed mobility, Gait training, Spoke to nursing, OT, Family  Equipment Recommended: Walker (pt owns)       See flowsheet documentation for full assessment, interventions and recommendations.  Outcome: Adequate for Discharge  Note: Prognosis: Good  Problem List: Decreased strength, Decreased range of motion, Decreased endurance, Impaired balance, Decreased mobility, Pain, Orthopedic restrictions  Assessment: Matilda is seen for progression of PT.  Demonstrates S for transfers and ambulation with RW support. Increased tolerance to distances. Progression from step to into step through pattern.  Able to navigate one step with just RW support using step to pattern and Nahum.  Spouse present for session.  Demonstrates progress for safe discharge to home.  The patient's AM-PAC Basic Mobility Inpatient Short Form Raw Score is 22. A Raw score of greater than 16 suggests the patient may benefit from discharge to home. Please also refer to the recommendation of the Physical Therapist for safe discharge planning. Plan home with family support and OPPT recommended to optimize outcomes following TKA.  Barriers to Discharge: Inaccessible home environment (VIRAL)     Rehab Resource Intensity Level, PT: III (Minimum Resource Intensity) (OPPT)    See flowsheet documentation for full assessment.

## 2025-07-24 NOTE — PLAN OF CARE
Problem: OCCUPATIONAL THERAPY ADULT  Goal: Performs self-care activities at highest level of function for planned discharge setting.  See evaluation for individualized goals.  Description: Treatment Interventions: ADL retraining, Functional transfer training, Endurance training, Patient/family training, Equipment evaluation/education, Compensatory technique education, Continued evaluation, Energy conservation, Activityengagement          See flowsheet documentation for full assessment, interventions and recommendations.   Outcome: Adequate for Discharge  Note: Limitation: Decreased ADL status, Decreased endurance, Decreased self-care trans, Decreased high-level ADLs  Prognosis: Good  Assessment: Pt seen for OT treatment session focusing on ADLs/IADLs, functional mobility, functional standing tolerance, functional transfers, patient education, continued evaluation. Pt greeted OOB in recliner at start of session. Pt alert and cooperative throughout session. Pt with good sitting balance and fair - dynamic standing balance. Pt tolerated treatment well. Pt completed sit to stand from chair with use of armrests with S. Pt completed functional mobility functional in room distance with use of RW and S from chair to bathroom. Pt completed toileting and toilet transfer with S onto [facility] over standard toilet to simulate home environment. Pt completed don of underwear and pants seated on toilet with S, verbal cues for proper technique, then standing to pull over waist with use of RW for stability. Pt completed don of bra and shirt seated on toilet. Pt completed functional mobility from toilet to sink with use of RW and S. Pt completed brushing teeth, washing face, and washing hands standing at sink with use of RW and sink for balance. Pt with good standing balance ~7 min standing at sink. Pt educated on proper technique and use of chair if fatigued at home. Pt completed functional mobility back to chair to with use of RW and S.  Pt stand to sit into chair with S. Don of socks and shoes with modA, pt reporting spouse can assist at home with ADLs/IADLs. Spouse supportive and present during session. Pt educated on ADLs/IADLs, sleeping techniques, bathing, and LE management for independence and safety at home. Pt reports 3/10 pain and no dizziness. Pt's vitals include: stable.     Pt ended session seated OOB in recliner. Call bell and phone within reach. All needs met and pt reports no further questions at this time. Continue to recommend III; Minimum Resource Intensity when medically cleared. OT will continue to follow pt on caseload.     Rehab Resource Intensity Level, OT: III (Minimum Resource Intensity)

## 2025-07-24 NOTE — OP NOTE
OPERATIVE REPORT  PATIENT NAME: Matilda Day  : 1945  MRN: [id number]  Pt Location:  [facility] OR ROOM 03    Surgery Date: 2025    Surgeons and Role:     * Ever Rosales MD - Primary     * Dex Rod PA-C - Assisting     Preop Diagnosis:  Primary osteoarthritis of right knee [M17.11]    Post-Op Diagnosis Codes:     * Primary osteoarthritis of right knee [M17.11]    Procedure(s):  Right - ARTHROPLASTY KNEE TOTAL. RIGHT    Specimens:  * No specimens in log *    Estimated Blood Loss:   Minimal    Drains:  * No LDAs found *    Anesthesia Type:   Spinal     Operative Indications:  Primary osteoarthritis of right knee [M17.11]    See clinic note for complete list of indications    Operative Findings:  Full thickness chondral wear to the lateral compartment  Well preserved patellar shape    Complications:   None    Knee Approach: Sub-vastus    Chronic Narcotic Use:  No      Procedure and Technique:  IMPLANTS:    1.  Medacta SpheriKA size 3 femur  2.  Medacta SpheriKA size 3 tibial baseplate.   3.  Medacta SpheriKA tibial liner thickness 10mm.         DESCRIPTION OF OPERATION:   After adequate anesthesia was induced, the patient was placed on the operating table and all bony prominences were padded.  An upper thigh tourniquet was placed and the right lower extremity was prepped and draped in the usual sterile fashion. A time out was performed verifying the patient, procedure, laterality and implants.  All were in agreement and the procedure began.    The limb was elevated for exsanguinated and the tourniquet was inflated to 225 mmHg. Our planned incision was made with a 10 blade scalpel. Dissection was carried down sharply through the skin and subcutaneous fat to the level of the extensor mechanism.  Full-thickness medial and lateral flaps were raised with Metzenbaum jb. The VMO fascia was then entered and the VMO muscle belly was bluntly dissected off the intramuscular septum. A retractor was then  placed under the extensor mechanism, the knee was brought to a semiflexed position and a new #10 scalpel was used to perform a subvastus arthrotomy.  Electrocautery was then used to perform a medial proximal tibial capsular peel, excision of the suprapatellar fat pad, and excision of the retropatellar fat pad.  The knee was brought to flexion allowing for excision of the ACL and anterior horn of the lateral meniscus.    The patella was then everted and inspected, peripheral osteophytes were removed with a rongeur and peripatellar neurolysis was performed. The decision was made to maintain the native patella     Our attention then turned to the distal femur.  The cartilage was inspected and was found to have full thickness chondral lesions on the Lateral condyles. Any remaining cartilage on the damaged condyle(s) was removed with a ring curette. A drill hole was placed in the distal femur and the fluted intramedullary guide etta was placed down the canal.  A distal femoral cutting guide was affixed to the femur and an oscillating saw was used to perform the distal femoral osteotomy.  The distal femoral resections were measured with calipers and recuts were made as needed. The posterior femoral condyles were then inspected for chondral lesions. Again, remaining cartilage on damaged condyle(s) was removed. The femoral sizing guide was then used and the appropriate component size was selected.  The 4-in-1 cutting block was affixed to the distal femur ensuring not to notch the anterior cortex.  Posterior condyles were cut and the bony fragments were again measured with calipers.  Recuts were made as needed.  An oscillating saw was then used to complete the remaining cuts.    Our attention then turned to the proximal tibia. An extramedullary cutting guide was pinned in place with the appropriate slope, coronal angulation and depth of cut.  Being sure to protect soft tissues, the proximal tibia was resected with an  oscillating saw. The tibial resection was removed, the proximal tibia was sized and the knee was brought into extension where the menisci were removed with electrocautery. The knee was then flexed and posterior femoral osteophytes were removed with an osteotome and curette. A sizing block was inserted in both flexion and extension. The tibia was re-cut if necessary to achieve excellent balance in both planes.     A tibial trial was pinned in place followed by the trial femur and liner. The knee was taken through range of motion and found to have excellent stability throughout the arc of motion.  Trials were removed, pericapsular tissues were injected with local anesthetic and the knee was thoroughly irrigated with sterile saline.    The femoral and tibial bone ends were meticulously jet lavaged with saline and dried. Methyl methacrylate bone cement was then pressurized onto the cancellous surfaces of the tibia and the femur. The prosthetic components were inserted and the knee was placed into extension allowing for the cement to polymerize. The tourniquet was deflated and hemostasis was achieved with electrocautery.  Local anesthesia was infiltrated to the pericapsular tissues.  Dilute betadine solution, normal saline, and Irricept solution were used sequentially to irrigate the soft tissues prior to closure.     At this point, our attention turned to closure. 1g Vancomycin powder was placed into the wound. The knee joint and extensor mechanism was closed using interrupted #1 vicryl suture and running #1 barbed suture. 2-0 vicryl interrupted sutures were used to close subcutaneous tissue. 3-0 monocryl suture was then used to close the skin edges. A glue and mesh dressing was then placed over the incision. A sterile compressive dressing was applied, the patient was awakened, and sent to the recovery room in stable condition. There were no complications. The sponge and needle counts were given as correct x 2.     No  qualified resident was available to participate in this case. Sai Rod PA-c was necessary to assist with positioning the patient, prepping and draping, and assisting with wound closure. I was present and performed all the key portions of the procedure.           SIGNATURE: Ever Rosales MD  DATE: July 23, 2025  TIME: 9:58 PM

## 2025-07-24 NOTE — TELEPHONE ENCOUNTER
Caller: Pharmacy     Doctor: Dr. Santamaria     Reason for call: Asked for an update, patient has called the pharmacy a few times already     [id number] - [place] [address] [address]  Phone: [phone]  Fax: [phone]  ANTONIO #: --

## 2025-07-24 NOTE — ANESTHESIA POSTPROCEDURE EVALUATION
Post-Op Assessment Note    CV Status:  Stable    Pain management: adequate       Mental Status:  Alert and awake   Hydration Status:  Euvolemic   PONV Controlled:  Controlled   Airway Patency:  Patent     Post Op Vitals Reviewed: Yes    No anethesia notable event occurred.    Staff: Anesthesiologist           Last Filed PACU Vitals:  Vitals Value Taken Time   Temp 97.9 °F (36.6 °C) 07/23/25 14:30   Pulse 93 07/23/25 15:10   /72 07/23/25 15:00   Resp 24 07/23/25 15:08   SpO2 93 % 07/23/25 15:10   Vitals shown include unfiled device data.    Modified Tanisha:     Vitals Value Taken Time   Activity 2 07/23/25 15:00   Respiration 2 07/23/25 15:00   Circulation 2 07/23/25 15:00   Consciousness 1 07/23/25 15:00   Oxygen Saturation 1 07/23/25 15:00     Modified Tanisha Score: 8

## 2025-07-24 NOTE — PHYSICAL THERAPY NOTE
PHYSICAL THERAPY NOTE          Patient Name: Matilda Day  Today's Date: 7/24/2025  09:32-09:55       07/24/25 0955   PT Last Visit   PT Visit Date 07/24/25   Note Type   Note Type Treatment   Pain Assessment   Pain Score 3   Pain Location/Orientation Orientation: Right;Location: Knee   Hospital Pain Intervention(s) Repositioned;Cold applied;Ambulation/increased activity   Restrictions/Precautions   RLE Weight Bearing Per Order WBAT   Other Precautions WBS;Chair Alarm;Bed Alarm;Fall Risk;Pain  (Masimo)   General   Chart Reviewed Yes   Response to Previous Treatment Patient with no complaints from previous session.   Family/Caregiver Present Yes   Cognition   Overall Cognitive Status WFL   Arousal/Participation Alert;Cooperative   Attention Within functional limits   Orientation Level Oriented X4   Comments Pleasant and motivated.   Subjective   Subjective Reports did not sleep at all last night.  Anxious to go home and go to bed.   Bed Mobility   Additional Comments received OOB in chair   Transfers   Sit to Stand 5  Supervision   Additional items Increased time required;Armrests;Verbal cues   Stand to Sit 5  Supervision   Additional items Increased time required;Verbal cues;Armrests   Additional Comments Cues for hand and operative leg placement.   Ambulation/Elevation   Gait pattern Improper Weight shift;Festenating;Antalgic;Step through pattern  (progressing to step through pattern.)   Gait Assistance 5  Supervision   Additional items Verbal cues   Assistive Device Rolling walker   Distance Amb with 'x1, seated rest, then 15'x2 with stair navigation in between distances.   Stair Management Assistance 4  Minimal assist   Additional items Assist x 1;Verbal cues;Tactile cues   Stair Management Technique No rails;Step to pattern;Foreward;With walker   Number of Stairs 1   Ambulation/Elevation Additional Comments Progressed from  step to into step through pattern.   Balance   Static Sitting Good   Dynamic Sitting Fair +   Static Standing Fair   Dynamic Standing Fair -   Ambulatory Fair -   Endurance Deficit   Endurance Deficit Yes   Endurance Deficit Description fatigue   Activity Tolerance   Activity Tolerance Patient tolerated treatment well;Patient limited by fatigue   Medical Staff Made Aware NurseHarry.   Nurse Made Aware yes   Exercises   TKR   (verbalizes understanding of HEP.  Ed on frequency and progression.)   Neuro re-ed Reinforced importance of positioning to promote knee extension.   Assessment   Prognosis Good   Problem List Decreased strength;Decreased range of motion;Decreased endurance;Impaired balance;Decreased mobility;Pain;Orthopedic restrictions   Assessment Matilda is seen for progression of PT.  Demonstrates S for transfers and ambulation with RW support. Increased tolerance to distances. Progression from step to into step through pattern.  Able to navigate one step with just RW support using step to pattern and Nahum.  Spouse present for session.  Demonstrates progress for safe discharge to home.  The patient's AM-PAC Basic Mobility Inpatient Short Form Raw Score is 22. A Raw score of greater than 16 suggests the patient may benefit from discharge to home. Please also refer to the recommendation of the Physical Therapist for safe discharge planning. Plan home with family support and OPPT recommended to optimize outcomes following TKA.   Goals   Patient Goals go home.   STG Expiration Date 07/30/25   PT Treatment Day 1   Plan   Treatment/Interventions Functional transfer training;LE strengthening/ROM;Elevations;Therapeutic exercise;Endurance training;Patient/family training;Equipment eval/education;Bed mobility;Gait training;Compensatory technique education;Spoke to nursing;OT;Family   Progress Improving as expected   PT Frequency 2-3x/wk   Discharge Recommendation   Rehab Resource Intensity Level, PT III (Minimum  Resource Intensity)  (OPPT)   AM-PAC Basic Mobility Inpatient   Turning in Flat Bed Without Bedrails 4   Lying on Back to Sitting on Edge of Flat Bed Without Bedrails 3   Moving Bed to Chair 4   Standing Up From Chair Using Arms 4   Walk in Room 4   Climb 3-5 Stairs With Railing 3   Basic Mobility Inpatient Raw Score 22   Basic Mobility Standardized Score 47.4   [facility] Highest Level Of Mobility   JH-HLM Goal 7: Walk 25 feet or more   JH-HLM Achieved 7: Walk 25 feet or more   Education   Education Provided Mobility training;Home exercise program;Assistive device   Patient Demonstrates acceptance/verbal understanding   End of Consult   Patient Position at End of Consult Bedside chair;Bed/Chair alarm activated;All needs within reach   End of Consult Comments ice to knee.  Spouse at bedside .   Naomi Gibbs, PT

## 2025-07-24 NOTE — PLAN OF CARE
Problem: PAIN - ADULT  Goal: Verbalizes/displays adequate comfort level or baseline comfort level  Description: Interventions:  - Encourage patient to monitor pain and request assistance  - Assess pain using appropriate pain scale  - Administer analgesics as ordered based on type and severity of pain and evaluate response  - Implement non-pharmacological measures as appropriate and evaluate response  - Consider cultural and social influences on pain and pain management  - Notify physician/advanced practitioner if interventions unsuccessful or patient reports new pain  - Educate patient/family on pain management process including their role and importance of  reporting pain   - Provide non-pharmacologic/complimentary pain relief interventions  Outcome: Adequate for Discharge     Problem: INFECTION - ADULT  Goal: Absence or prevention of progression during hospitalization  Description: INTERVENTIONS:  - Assess and monitor for signs and symptoms of infection  - Monitor lab/diagnostic results  - Monitor all insertion sites, i.e. indwelling lines, tubes, and drains  - Monitor endotracheal if appropriate and nasal secretions for changes in amount and color  - [place] appropriate cooling/warming therapies per order  - Administer medications as ordered  - Instruct and encourage patient and family to use good hand hygiene technique  - Identify and instruct in appropriate isolation precautions for identified infection/condition  Outcome: Adequate for Discharge  Goal: Absence of fever/infection during neutropenic period  Description: INTERVENTIONS:  - Monitor WBC  - Perform strict hand hygiene  - Limit to healthy visitors only  - No plants, dried, fresh or silk flowers with griggs in patient room  Outcome: Adequate for Discharge     Problem: SAFETY ADULT  Goal: Patient will remain free of falls  Description: INTERVENTIONS:  - Educate patient/family on patient safety including physical limitations  - Instruct patient to call  for assistance with activity   - Consider consulting OT/PT to assist with strengthening/mobility based on AM PAC & JH-HLM score  - Consult OT/PT to assist with strengthening/mobility   - Keep Call bell within reach  - Keep bed low and locked with side rails adjusted as appropriate  - Keep care items and personal belongings within reach  - Initiate and maintain comfort rounds  - Make Fall Risk Sign visible to staff  - Offer Toileting every 2 Hours, in advance of need  - Initiate/Maintain bed/chair alarm  - Obtain necessary fall risk management equipment: walker   - Apply yellow socks and bracelet for high fall risk patients  - Consider moving patient to room near nurses station  Outcome: Adequate for Discharge  Goal: Maintain or return to baseline ADL function  Description: INTERVENTIONS:  -  Assess patient's ability to carry out ADLs; assess patient's baseline for ADL function and identify physical deficits which impact ability to perform ADLs (bathing, care of mouth/teeth, toileting, grooming, dressing, etc.)  - Assess/evaluate cause of self-care deficits   - Assess range of motion  - Assess patient's mobility; develop plan if impaired  - Assess patient's need for assistive devices and provide as appropriate  - Encourage maximum independence but intervene and supervise when necessary  - Involve family in performance of ADLs  - Assess for home care needs following discharge   - Consider OT consult to assist with ADL evaluation and planning for discharge  - Provide patient education as appropriate  - Monitor functional capacity and physical performance, use of AM PAC & JH-HLM   - Monitor gait, balance and fatigue with ambulation    Outcome: Adequate for Discharge  Goal: Maintains/Returns to pre admission functional level  Description: INTERVENTIONS:  - Perform AM-PAC 6 Click Basic Mobility/ Daily Activity assessment daily.  - Set and communicate daily mobility goal to care team and patient/family/caregiver.   -  Collaborate with rehabilitation services on mobility goals if consulted  - Perform Range of Motion 4 times a day.  - Reposition patient every 2 hours.  - Dangle patient 3 times a day  - Stand patient 3 times a day  - Ambulate patient 3 times a day  - Out of bed to chair 3 times a day   - Out of bed for meals 3 times a day  - Out of bed for toileting  - Record patient progress and toleration of activity level   Outcome: Adequate for Discharge     Problem: DISCHARGE PLANNING  Goal: Discharge to home or other facility with appropriate resources  Description: INTERVENTIONS:  - Identify barriers to discharge w/patient and caregiver  - Arrange for needed discharge resources and transportation as appropriate  - Identify discharge learning needs (meds, wound care, etc.)  - Arrange for interpretive services to assist at discharge as needed  - Refer to Case Management Department for coordinating discharge planning if the patient needs post-hospital services based on physician/advanced practitioner order or complex needs related to functional status, cognitive ability, or social support system  Outcome: Adequate for Discharge     Problem: Knowledge Deficit  Goal: Patient/family/caregiver demonstrates understanding of disease process, treatment plan, medications, and discharge instructions  Description: Complete learning assessment and assess knowledge base.  Interventions:  - Provide teaching at level of understanding  - Provide teaching via preferred learning methods  Outcome: Adequate for Discharge

## 2025-07-24 NOTE — PLAN OF CARE
Problem: PAIN - ADULT  Goal: Verbalizes/displays adequate comfort level or baseline comfort level  Description: Interventions:  - Encourage patient to monitor pain and request assistance  - Assess pain using appropriate pain scale  - Administer analgesics as ordered based on type and severity of pain and evaluate response  - Implement non-pharmacological measures as appropriate and evaluate response  - Consider cultural and social influences on pain and pain management  - Notify physician/advanced practitioner if interventions unsuccessful or patient reports new pain  - Educate patient/family on pain management process including their role and importance of  reporting pain   - Provide non-pharmacologic/complimentary pain relief interventions  Outcome: Progressing     Problem: INFECTION - ADULT  Goal: Absence or prevention of progression during hospitalization  Description: INTERVENTIONS:  - Assess and monitor for signs and symptoms of infection  - Monitor lab/diagnostic results  - Monitor all insertion sites, i.e. indwelling lines, tubes, and drains  - Monitor endotracheal if appropriate and nasal secretions for changes in amount and color  - [place] appropriate cooling/warming therapies per order  - Administer medications as ordered  - Instruct and encourage patient and family to use good hand hygiene technique  - Identify and instruct in appropriate isolation precautions for identified infection/condition  Outcome: Progressing  Goal: Absence of fever/infection during neutropenic period  Description: INTERVENTIONS:  - Monitor WBC  - Perform strict hand hygiene  - Limit to healthy visitors only  - No plants, dried, fresh or silk flowers with griggs in patient room  Outcome: Progressing     Problem: SAFETY ADULT  Goal: Patient will remain free of falls  Description: INTERVENTIONS:  - Educate patient/family on patient safety including physical limitations  - Instruct patient to call for assistance with activity   -  Consider consulting OT/PT to assist with strengthening/mobility based on AM PAC & JH-HLM score  - Consult OT/PT to assist with strengthening/mobility   - Keep Call bell within reach  - Keep bed low and locked with side rails adjusted as appropriate  - Keep care items and personal belongings within reach  - Initiate and maintain comfort rounds  - Make Fall Risk Sign visible to staff  - Offer Toileting every 2 Hours, in advance of need  - Initiate/Maintain bed/chair alarm  - Obtain necessary fall risk management equipment: walker   - Apply yellow socks and bracelet for high fall risk patients  - Consider moving patient to room near nurses station  Outcome: Progressing  Goal: Maintain or return to baseline ADL function  Description: INTERVENTIONS:  -  Assess patient's ability to carry out ADLs; assess patient's baseline for ADL function and identify physical deficits which impact ability to perform ADLs (bathing, care of mouth/teeth, toileting, grooming, dressing, etc.)  - Assess/evaluate cause of self-care deficits   - Assess range of motion  - Assess patient's mobility; develop plan if impaired  - Assess patient's need for assistive devices and provide as appropriate  - Encourage maximum independence but intervene and supervise when necessary  - Involve family in performance of ADLs  - Assess for home care needs following discharge   - Consider OT consult to assist with ADL evaluation and planning for discharge  - Provide patient education as appropriate  - Monitor functional capacity and physical performance, use of AM PAC & JH-HLM   - Monitor gait, balance and fatigue with ambulation    Outcome: Progressing  Goal: Maintains/Returns to pre admission functional level  Description: INTERVENTIONS:  - Perform AM-PAC 6 Click Basic Mobility/ Daily Activity assessment daily.  - Set and communicate daily mobility goal to care team and patient/family/caregiver.   - Collaborate with rehabilitation services on mobility goals  if consulted  - Perform Range of Motion 4 times a day.  - Reposition patient every 2 hours.  - Dangle patient 3 times a day  - Stand patient 3 times a day  - Ambulate patient 3 times a day  - Out of bed to chair 3 times a day   - Out of bed for meals 3 times a day  - Out of bed for toileting  - Record patient progress and toleration of activity level   Outcome: Progressing     Problem: DISCHARGE PLANNING  Goal: Discharge to home or other facility with appropriate resources  Description: INTERVENTIONS:  - Identify barriers to discharge w/patient and caregiver  - Arrange for needed discharge resources and transportation as appropriate  - Identify discharge learning needs (meds, wound care, etc.)  - Arrange for interpretive services to assist at discharge as needed  - Refer to Case Management Department for coordinating discharge planning if the patient needs post-hospital services based on physician/advanced practitioner order or complex needs related to functional status, cognitive ability, or social support system  Outcome: Progressing     Problem: Knowledge Deficit  Goal: Patient/family/caregiver demonstrates understanding of disease process, treatment plan, medications, and discharge instructions  Description: Complete learning assessment and assess knowledge base.  Interventions:  - Provide teaching at level of understanding  - Provide teaching via preferred learning methods  Outcome: Progressing

## 2025-07-24 NOTE — PROGRESS NOTES
Progress Note - Orthopedics   Name: Matilda Day 79 y.o. female I MRN: [id number]  Unit/Bed#: WE 2 N -01 I Date of Admission: 7/23/2025   Date of Service: 7/24/2025 I Hospital Day: 0     Assessment & Plan  Primary osteoarthritis of right knee  Plan:  Pain control prn  PT/OT  WBAT RLE   Hemoglobin 11.6 this morning. No signs of bleeding in the operative extremity at this time. Will continue to monitor.  Eliquis 2.5mg bid/ SCDs for DVT prophylaxis.  Patient should be discharged with Elioquis 2.5mg bid  Ancef x 24h  Pillow under achilles, not knee for extension.  Patient is stable from an orthopedic standpoint.    She will require follow-up with Dr. Rosales in the office in 10-14 days.    . Orthopedics service will follow.  Please contact the SecureChat role for the Orthopedics service with any questions/concerns.    Subjective   79 y.o.female seen and examined at the bedside this morning.  She is sitting comfortably in her chair.  Per nursing she was ambulating the halls yesterday without any issues. No acute events, no new complaints. Pain well controlled. Denies fevers, chills, CP, SOB, N/V, numbness or tingling. Patient reports no issues with urination or bowel movements. Patient states that she feels well overall and is hopeful for d/c to home today.    Objective :  Temp:  [97.6 °F (36.4 °C)-98.6 °F (37 °C)] 98.3 °F (36.8 °C)  HR:  [54-74] 74  BP: (134-171)/(63-79) 145/67  Resp:  [11-20] 11  SpO2:  [88 %-98 %] 88 %  O2 Device: Nasal cannula  Nasal Cannula O2 Flow Rate (L/min):  [2 L/min] 2 L/min    Physical Exam  Musculoskeletal: Right lower extremity  Skin intact. No erythema or ecchymosis.  Mepilex Dressing and ACE CDI  Thigh and calf are soft and compressible  Motor intact to +FHL/EHL, +ankle dorsi/plantar flexion  Sensation intact to saphenous, sural, tibial, superficial peroneal nerve, and deep peroneal  2+ DP pulse  No calf swelling or tenderness to palpation      Lab Results: I have reviewed the  "following results:  Recent Labs     07/24/25  0503   WBC 9.36   HGB 11.6   HCT 33.3*   *   BUN 11   CREATININE 0.55*     Blood Culture:    Lab Results   Component Value Date    BLOODCX No Growth After 5 Days. 06/22/2023     Wound Culture: No results found for: \"WOUNDCULT\"    Imaging Results Review: No pertinent imaging studies reviewed.  Other Study Results Review: No additional pertinent studies reviewed.      Lilibeth Chavez PA-C    "

## 2025-07-25 ENCOUNTER — TELEPHONE (OUTPATIENT)
Dept: OBGYN CLINIC | Facility: HOSPITAL | Age: 80
End: 2025-07-25

## 2025-07-25 RX ORDER — DILTIAZEM HCL 60 MG
60 TABLET ORAL DAILY
Qty: 90 TABLET | Refills: 1 | Status: SHIPPED | OUTPATIENT
Start: 2025-07-25

## 2025-07-25 NOTE — TELEPHONE ENCOUNTER
"Patient contacted for a postoperative follow up assessment. Patient states current pain level of a \"rough at night, but doing OK\" and is  walking with RW.  Patient reports swelling and dressing is Dressing C/D/I. Patient is icing the site regularly. NN educated patient on post-op swelling, icing, and constipation.    Confirmed upcoming appointments w/ patient:   PT : N/A, surgeon preference   Postop 8/7     We reviewed patients AVS medication list. Patient is taking Tylenol 1000mg every 8 hours and resumed eliquis 2.5 mg BID. Patient has had a BM but is not taking OTC stool softener. States she has a J-pouch and no issues with constipation, \"Everything goes right through me\" and confirms she had a few BMs already since surgery.     Pt reports she did not  the Oxycodone rx, and is using a bottle of vicodin at home that she recently got filled instead. Pt is unable to confirm Vicodin dose. Educated on Tylenol max dosing, 3000 mg / 24 hours and that each Vicodin tablet has Tylenol in it, which must be calculated into her daily amount. Verbalizes understanding.     Patient denies nausea, vomiting, abdominal pain, chest pain, shortness of breath, fever, dizziness, and calf pain. Patient does not have any other questions or concerns at this time. Pt was encouraged to call with any questions, concerns or issues.   "

## 2025-07-25 NOTE — TELEPHONE ENCOUNTER
Caller: [facility]    Doctor: Dr. Rosaels / [facility]    Reason for call: Kerrie is calling back from pharmacy and needs confirmation from the DR in regard to the oxyCODONE (Roxicodone). She states the patient was also recently prescribed hydrocodone by a different provider and wants to make sure this is OK; please advise with CB.    Call back#: [phone]

## 2025-07-30 DIAGNOSIS — D50.0 IRON DEFICIENCY ANEMIA DUE TO CHRONIC BLOOD LOSS: ICD-10-CM

## 2025-07-30 DIAGNOSIS — E87.6 HYPOKALEMIA: ICD-10-CM

## 2025-07-30 DIAGNOSIS — I81 PORTAL VEIN THROMBOSIS: ICD-10-CM

## 2025-07-30 DIAGNOSIS — F41.9 ANXIETY: ICD-10-CM

## 2025-07-30 RX ORDER — ESCITALOPRAM OXALATE 20 MG/1
20 TABLET ORAL DAILY
Qty: 90 TABLET | Refills: 1 | Status: SHIPPED | OUTPATIENT
Start: 2025-07-30

## 2025-07-30 RX ORDER — POTASSIUM CHLORIDE 750 MG/1
CAPSULE, EXTENDED RELEASE ORAL
Qty: 180 CAPSULE | Refills: 1 | Status: SHIPPED | OUTPATIENT
Start: 2025-07-30

## 2025-07-31 ENCOUNTER — TELEPHONE (OUTPATIENT)
Dept: OBGYN CLINIC | Facility: HOSPITAL | Age: 80
End: 2025-07-31

## 2025-07-31 ENCOUNTER — APPOINTMENT (OUTPATIENT)
Dept: PHYSICAL THERAPY | Facility: CLINIC | Age: 80
End: 2025-07-31
Attending: INTERNAL MEDICINE
Payer: MEDICARE

## 2025-07-31 RX ORDER — POTASSIUM CHLORIDE 750 MG/1
20 CAPSULE, EXTENDED RELEASE ORAL DAILY
Qty: 180 CAPSULE | Refills: 0 | OUTPATIENT
Start: 2025-07-31

## 2025-08-07 ENCOUNTER — OFFICE VISIT (OUTPATIENT)
Dept: OBGYN CLINIC | Facility: CLINIC | Age: 80
End: 2025-08-07

## 2025-08-07 VITALS — WEIGHT: 137 LBS | BODY MASS INDEX: 24.27 KG/M2 | HEIGHT: 63 IN

## 2025-08-07 DIAGNOSIS — Z96.651 S/P TOTAL KNEE ARTHROPLASTY, RIGHT: Primary | ICD-10-CM

## 2025-08-07 PROCEDURE — 99024 POSTOP FOLLOW-UP VISIT: CPT | Performed by: STUDENT IN AN ORGANIZED HEALTH CARE EDUCATION/TRAINING PROGRAM

## 2025-08-11 ENCOUNTER — TELEPHONE (OUTPATIENT)
Age: 80
End: 2025-08-11

## 2025-08-11 ENCOUNTER — EVALUATION (OUTPATIENT)
Dept: PHYSICAL THERAPY | Facility: CLINIC | Age: 80
End: 2025-08-11
Attending: ORTHOPAEDIC SURGERY
Payer: MEDICARE

## 2025-08-14 ENCOUNTER — OFFICE VISIT (OUTPATIENT)
Dept: PHYSICAL THERAPY | Facility: CLINIC | Age: 80
End: 2025-08-14
Attending: ORTHOPAEDIC SURGERY
Payer: MEDICARE

## 2025-08-18 ENCOUNTER — OFFICE VISIT (OUTPATIENT)
Dept: PHYSICAL THERAPY | Facility: CLINIC | Age: 80
End: 2025-08-18
Attending: ORTHOPAEDIC SURGERY
Payer: MEDICARE

## 2025-08-18 DIAGNOSIS — M25.561 CHRONIC PAIN OF RIGHT KNEE: Primary | ICD-10-CM

## 2025-08-18 DIAGNOSIS — Z96.651 STATUS POST TOTAL RIGHT KNEE REPLACEMENT: ICD-10-CM

## 2025-08-18 DIAGNOSIS — M17.11 PRIMARY OSTEOARTHRITIS OF RIGHT KNEE: ICD-10-CM

## 2025-08-18 DIAGNOSIS — G89.29 CHRONIC PAIN OF RIGHT KNEE: Primary | ICD-10-CM

## 2025-08-18 PROCEDURE — 97140 MANUAL THERAPY 1/> REGIONS: CPT

## 2025-08-18 PROCEDURE — 97110 THERAPEUTIC EXERCISES: CPT

## 2025-08-20 ENCOUNTER — OFFICE VISIT (OUTPATIENT)
Dept: PHYSICAL THERAPY | Facility: CLINIC | Age: 80
End: 2025-08-20
Attending: ORTHOPAEDIC SURGERY
Payer: MEDICARE

## 2025-08-20 DIAGNOSIS — G89.29 CHRONIC PAIN OF RIGHT KNEE: Primary | ICD-10-CM

## 2025-08-20 DIAGNOSIS — M25.561 CHRONIC PAIN OF RIGHT KNEE: Primary | ICD-10-CM

## 2025-08-20 DIAGNOSIS — M17.11 PRIMARY OSTEOARTHRITIS OF RIGHT KNEE: ICD-10-CM

## 2025-08-20 DIAGNOSIS — Z96.651 STATUS POST TOTAL RIGHT KNEE REPLACEMENT: ICD-10-CM

## 2025-08-20 PROCEDURE — 97112 NEUROMUSCULAR REEDUCATION: CPT

## 2025-08-20 PROCEDURE — 97110 THERAPEUTIC EXERCISES: CPT

## 2025-08-22 ENCOUNTER — OFFICE VISIT (OUTPATIENT)
Dept: INTERNAL MEDICINE CLINIC | Facility: CLINIC | Age: 80
End: 2025-08-22
Payer: MEDICARE

## 2025-08-22 ENCOUNTER — TELEPHONE (OUTPATIENT)
Dept: ADMINISTRATIVE | Facility: OTHER | Age: 80
End: 2025-08-22

## 2025-08-22 VITALS
BODY MASS INDEX: 24.98 KG/M2 | HEART RATE: 80 BPM | HEIGHT: 63 IN | DIASTOLIC BLOOD PRESSURE: 64 MMHG | TEMPERATURE: 97.9 F | RESPIRATION RATE: 14 BRPM | SYSTOLIC BLOOD PRESSURE: 130 MMHG | OXYGEN SATURATION: 95 % | WEIGHT: 141 LBS

## 2025-08-22 DIAGNOSIS — I81 PORTAL VEIN THROMBOSIS: ICD-10-CM

## 2025-08-22 DIAGNOSIS — E53.8 VITAMIN B12 DEFICIENCY: ICD-10-CM

## 2025-08-22 DIAGNOSIS — Z96.651 STATUS POST TOTAL RIGHT KNEE REPLACEMENT: Primary | ICD-10-CM

## 2025-08-22 DIAGNOSIS — E22.2 SIADH (SYNDROME OF INAPPROPRIATE ADH PRODUCTION) (HCC): ICD-10-CM

## 2025-08-22 DIAGNOSIS — E78.2 MIXED HYPERLIPIDEMIA: ICD-10-CM

## 2025-08-22 DIAGNOSIS — K21.9 GASTROESOPHAGEAL REFLUX DISEASE, UNSPECIFIED WHETHER ESOPHAGITIS PRESENT: ICD-10-CM

## 2025-08-22 DIAGNOSIS — Z00.00 MEDICARE ANNUAL WELLNESS VISIT, SUBSEQUENT: ICD-10-CM

## 2025-08-22 DIAGNOSIS — F41.9 ANXIETY: ICD-10-CM

## 2025-08-22 DIAGNOSIS — F33.1 MODERATE EPISODE OF RECURRENT MAJOR DEPRESSIVE DISORDER (HCC): ICD-10-CM

## 2025-08-22 DIAGNOSIS — M51.362 DEGENERATION OF INTERVERTEBRAL DISC OF LUMBAR REGION WITH DISCOGENIC BACK PAIN AND LOWER EXTREMITY PAIN: ICD-10-CM

## 2025-08-22 DIAGNOSIS — D50.0 IRON DEFICIENCY ANEMIA DUE TO CHRONIC BLOOD LOSS: ICD-10-CM

## 2025-08-22 DIAGNOSIS — I10 PRIMARY HYPERTENSION: ICD-10-CM

## 2025-08-22 DIAGNOSIS — K51.80 OTHER ULCERATIVE COLITIS WITHOUT COMPLICATION (HCC): ICD-10-CM

## 2025-08-22 DIAGNOSIS — M81.0 AGE-RELATED OSTEOPOROSIS WITHOUT CURRENT PATHOLOGICAL FRACTURE: ICD-10-CM

## 2025-08-22 DIAGNOSIS — R73.03 PREDIABETES: ICD-10-CM

## 2025-08-22 PROCEDURE — 96372 THER/PROPH/DIAG INJ SC/IM: CPT | Performed by: INTERNAL MEDICINE

## 2025-08-22 PROCEDURE — G2211 COMPLEX E/M VISIT ADD ON: HCPCS | Performed by: INTERNAL MEDICINE

## 2025-08-22 PROCEDURE — 99214 OFFICE O/P EST MOD 30 MIN: CPT | Performed by: INTERNAL MEDICINE

## 2025-08-22 PROCEDURE — G0439 PPPS, SUBSEQ VISIT: HCPCS | Performed by: INTERNAL MEDICINE

## (undated) DEVICE — GLOVE INDICATOR PI UNDERGLOVE SZ 6.5 BLUE

## (undated) DEVICE — SPONGE SCRUB 4 PCT CHLORHEXIDINE

## (undated) DEVICE — SUT STRATAFIX SPIRAL PLUS 3-0 PS-2 30 X 30 CM SXMP2B408

## (undated) DEVICE — STERILE POLYISOPRENE POWDER-FREE SURGICAL GLOVES WITH EMOLLIENT COATING: Brand: PROTEXIS

## (undated) DEVICE — CHLORAPREP HI-LITE 26ML ORANGE

## (undated) DEVICE — SKIN MARKER DUAL TIP WITH RULER CAP, FLEXIBLE RULER AND LABELS: Brand: DEVON

## (undated) DEVICE — SURGICAL GOWN, XL SMARTSLEEVE: Brand: CONVERTORS

## (undated) DEVICE — ANTENNA PROGRAMMER PATIENT DBS

## (undated) DEVICE — SUT MONOCRYL 3-0 PS-2 27 IN Y427H

## (undated) DEVICE — Device

## (undated) DEVICE — GLOVE SRG BIOGEL ORTHOPEDIC 6.5

## (undated) DEVICE — INTENDED FOR TISSUE SEPARATION, AND OTHER PROCEDURES THAT REQUIRE A SHARP SURGICAL BLADE TO PUNCTURE OR CUT.: Brand: BARD-PARKER SAFETY BLADES SIZE 15, STERILE

## (undated) DEVICE — GLOVE SRG BIOGEL 7

## (undated) DEVICE — CHEST/BREAST DRAPE: Brand: CONVERTORS

## (undated) DEVICE — SUT VICRYL 2-0 SH 27 IN UNDYED J417H

## (undated) DEVICE — 450 ML BOTTLE OF 0.05% CHLORHEXIDINE GLUCONATE IN 99.95% STERILE WATER FOR IRRIGATION, USP AND APPLICATOR.: Brand: IRRISEPT ANTIMICROBIAL WOUND LAVAGE

## (undated) DEVICE — ANTIBACTERIAL VIOLET BRAIDED (POLYGLACTIN 910), SYNTHETIC ABSORBABLE SUTURE: Brand: COATED VICRYL

## (undated) DEVICE — PREP SURGICAL PURPREP 26ML

## (undated) DEVICE — BULB SYRINGE,IRRIGATION WITH PROTECTIVE CAP: Brand: DOVER

## (undated) DEVICE — BETHLEHEM TOTAL HIP, KIT: Brand: CARDINAL HEALTH

## (undated) DEVICE — FRAZIER SUCTION INSTRUMENT 18 FR W/OBTURATOR, NO CONTROL VENT: Brand: FRAZIER

## (undated) DEVICE — NEPTUNE E-SEP 165MM SUCTION SLEEVE: Brand: NEPTUNE E-SEP

## (undated) DEVICE — SYSTEM RECHARGING ACTIVA

## (undated) DEVICE — PENCIL ELECTROSURG E-Z CLEAN -0035H

## (undated) DEVICE — PROGRAMMER NEUROSTIM ACTIVA PC RECHARGEABLE DBS THERAPY

## (undated) DEVICE — GOWN,SLEEVE,STERILE,W/CSR WRAP,1/P: Brand: MEDLINE

## (undated) DEVICE — SUT SILK 2-0 SH 30 IN K833H

## (undated) DEVICE — 40529 DERMAPROX PAD 11'' X 15'' X 1'': Brand: 40529 DERMAPROX PAD 11'' X 15'' X 1''

## (undated) DEVICE — SUT VICRYL 2-0 REEL 54 IN J286G

## (undated) DEVICE — DRAPE EQUIPMENT RF WAND

## (undated) DEVICE — COBAN 6 IN STERILE

## (undated) DEVICE — GLOVE INDICATOR PI UNDERGLOVE SZ 7 BLUE

## (undated) DEVICE — SUT MONOCRYL PLUS 4-0 PS-2 18 IN MCP496G

## (undated) DEVICE — 4-PORT MANIFOLD: Brand: NEPTUNE 2

## (undated) DEVICE — STERILE POLYISOPRENE POWDER-FREE SURGICAL GLOVES: Brand: PROTEXIS

## (undated) DEVICE — BETADINE SCRUB BRUSH

## (undated) DEVICE — ELECTRODE BLADE MOD  E-Z CLEAN 6.5IN -0014M

## (undated) DEVICE — GLOVE INDICATOR PI UNDERGLOVE SZ 7.5 BLUE

## (undated) DEVICE — INTENDED FOR TISSUE SEPARATION, AND OTHER PROCEDURES THAT REQUIRE A SHARP SURGICAL BLADE TO PUNCTURE OR CUT.: Brand: BARD-PARKER SAFETY BLADES SIZE 10, STERILE

## (undated) DEVICE — NEEDLE 25G X 1 1/2

## (undated) DEVICE — C-ARM: Brand: UNBRANDED

## (undated) DEVICE — 3M™ IOBAN™ 2 ANTIMICROBIAL INCISE DRAPE 6640EZ: Brand: IOBAN™ 2

## (undated) DEVICE — GLOVE SRG BIOGEL ECLIPSE 7

## (undated) DEVICE — BIPOLAR SEALER 23-301-1 AQM MBS: Brand: AQUAMANTYS™

## (undated) DEVICE — CAPIT HIP COP -CMNT/POR-ACTIS

## (undated) DEVICE — CUFF TOURNIQUET 30 X 4 IN QUICK CONNECT DISP 1BLA

## (undated) DEVICE — 3M™ STERI-DRAPE™ U-DRAPE 1015: Brand: STERI-DRAPE™

## (undated) DEVICE — POSITIONER HANA TABLE PACK

## (undated) DEVICE — DRAPE CAMERA/LASER

## (undated) DEVICE — SUT VICRYL 2-0 CT-1 36 IN J945H

## (undated) DEVICE — 2108 SERIES SAGITTAL BLADE, GROUND (20.5 X 1.27 X 85.0MM)

## (undated) DEVICE — 3M™ STERI-STRIP™ REINFORCED ADHESIVE SKIN CLOSURES, R1547, 1/2 IN X 4 IN (12 MM X 100 MM), 6 STRIPS/ENVELOPE: Brand: 3M™ STERI-STRIP™

## (undated) DEVICE — SPECIMEN CONTAINER STERILE PEEL PACK

## (undated) DEVICE — BETHLEHEM UNIVERSAL MINOR GEN: Brand: CARDINAL HEALTH

## (undated) DEVICE — GROUNDING PAD RFL

## (undated) DEVICE — STRL UNIVERSAL MINOR GENERAL: Brand: CARDINAL HEALTH

## (undated) DEVICE — ELECTRODE BLADE E-Z CLEAN 6.5IN -0014

## (undated) DEVICE — HANDPIECE SET WITH HIGH FLOW TIP AND SUCTION TUBE: Brand: INTERPULSE

## (undated) DEVICE — HOOD: Brand: T7PLUS

## (undated) DEVICE — MAT ABSORBANT ARTHROSCOPY FLOOR 46 X 40 IN

## (undated) DEVICE — SMOKE EVACUATION TUBING WITH 8 IN INTEGRAL WAND AND SPONGE GUARD: Brand: BUFFALO FILTER

## (undated) DEVICE — TUBING SUCTION 5MM X 12 FT

## (undated) DEVICE — SUT ETHIBOND 5 V-40 30 IN MB46G

## (undated) DEVICE — GLOVE INDICATOR PI UNDERGLOVE SZ 8 BLUE

## (undated) DEVICE — WEBRIL 6 IN UNSTERILE

## (undated) DEVICE — PAD GROUNDING DUAL ADULT

## (undated) DEVICE — REM POLYHESIVE ADULT PATIENT RETURN ELECTRODE: Brand: VALLEYLAB

## (undated) DEVICE — GLOVE SRG BIOGEL 8

## (undated) DEVICE — BETHLEHEM UNIVERSAL BREAST PK: Brand: CARDINAL HEALTH

## (undated) DEVICE — GLOVE PI ULTRA TOUCH SZ 6

## (undated) DEVICE — EMERALD TOP SHEET DRAPE: Brand: CONVERTORS

## (undated) DEVICE — NEEDLE 22 G X 1 1/2 SAFETY

## (undated) DEVICE — DRESSING MEPILEX AG BORDER POST-OP 4 X 8 IN

## (undated) DEVICE — PAD GROUNDING ADULT

## (undated) DEVICE — PACK PBDS TOTAL KNEE RF

## (undated) DEVICE — DRAPE SHEET THREE QUARTER

## (undated) DEVICE — NEPTUNE E-SEP SMOKE EVACUATION PENCIL, COATED, 70MM BLADE, PUSH BUTTON SWITCH: Brand: NEPTUNE E-SEP

## (undated) DEVICE — EXOFIN PRECISION PEN HIGH VISCOSITY TOPICAL SKIN ADHESIVE: Brand: EXOFIN PRECISION PEN, 1G

## (undated) DEVICE — GLOVE INDICATOR PI UNDERGLOVE SZ 8.5 BLUE

## (undated) DEVICE — TIBURON TRANSVERSE LAPAROTOMY SHEET: Brand: CONVERTORS

## (undated) DEVICE — SUT MONOCRYL 4-0 PS-2 18 IN Y496G

## (undated) DEVICE — 3M™ TEGADERM™ TRANSPARENT FILM DRESSING FRAME STYLE, 1626W, 4 IN X 4-3/4 IN (10 CM X 12 CM), 50/CT 4CT/CASE: Brand: 3M™ TEGADERM™

## (undated) DEVICE — BASIC SINGLE BASIN 2-LF: Brand: MEDLINE INDUSTRIES, INC.

## (undated) DEVICE — ELECTRODE BLADE MOD E-Z CLEAN 2.5IN 6.4CM -0012M

## (undated) DEVICE — VIAL DECANTER

## (undated) DEVICE — 3M™ IOBAN™ 2 ANTIMICROBIAL INCISE DRAPE 6648EZ: Brand: IOBAN™ 2

## (undated) DEVICE — TELFA NON-ADHERENT ABSORBENT DRESSING: Brand: TELFA

## (undated) DEVICE — PROXIMATE PLUS MD MULTI-DIRECTIONAL RELEASE SKIN STAPLERS CONTAINS 35 STAINLESS STEEL STAPLES APPROXIMATE CLOSED DIMENSIONS: 6.9MM X 3.9MM WIDE: Brand: PROXIMATE

## (undated) DEVICE — SUT STRATAFIX SPIRAL PDS PLUS 1 CTX 18 IN SXPP1A400

## (undated) DEVICE — SYRINGE 30ML LL

## (undated) DEVICE — SUT VICRYL 1 CTX 36 IN J977H

## (undated) DEVICE — LIGHT HANDLE COVER SLEEVE DISP BLUE STELLAR

## (undated) DEVICE — GLOVE SRG BIOGEL PI ORTHOPEDIC 7

## (undated) DEVICE — DRAIN SPONGES,6 PLY: Brand: EXCILON

## (undated) DEVICE — HOOD FLYTE T7 PLUS

## (undated) DEVICE — NEEDLE 18 G X 1 1/2

## (undated) DEVICE — ADHESIVE SKIN HIGH VISCOSITY EXOFIN 1ML

## (undated) DEVICE — IMPLANTABLE DEVICE
Type: IMPLANTABLE DEVICE | Site: KNEE | Status: NON-FUNCTIONAL
Removed: 2025-07-23